# Patient Record
Sex: FEMALE | Race: WHITE | Employment: OTHER | URBAN - METROPOLITAN AREA
[De-identification: names, ages, dates, MRNs, and addresses within clinical notes are randomized per-mention and may not be internally consistent; named-entity substitution may affect disease eponyms.]

---

## 2017-01-04 ENCOUNTER — TELEPHONE (OUTPATIENT)
Dept: CARDIOLOGY CLINIC | Age: 82
End: 2017-01-04

## 2017-01-04 NOTE — TELEPHONE ENCOUNTER
Called patient due to schedule change/emergency procedure for Dr. Rosa Yo during scheduled appt time on tomorrow. She was assisted to r/s appt. The patient verbalized understanding and will call our office with any further questions or concerns.     Future Appointments  Date Time Provider Cuca Willis   1/10/2017 3:00 PM COUMADIN, STFRANCIS [facility] SCHED   2017 3:00 PM Rain Tyler MD [facility] Salazar   2017 2:40 PM Jeyson Epstein  [address]   3/8/2017 3:15 PM PACEMAKER, STFRANCES [facility] SCHED   3/20/2017 11:00 AM ECHOSHERRIE [facility] SCHED   3/20/2017 11:40 AM Ida De Paz  [address]

## 2017-01-18 ENCOUNTER — CLINICAL SUPPORT (OUTPATIENT)
Dept: CARDIOLOGY CLINIC | Age: 82
End: 2017-01-18

## 2017-01-18 ENCOUNTER — DOCUMENTATION ONLY (OUTPATIENT)
Dept: CARDIOLOGY CLINIC | Age: 82
End: 2017-01-18

## 2017-01-18 DIAGNOSIS — I42.9 CARDIOMYOPATHY (HCC): ICD-10-CM

## 2017-01-18 DIAGNOSIS — Z79.01 LONG TERM (CURRENT) USE OF ANTICOAGULANTS: Primary | ICD-10-CM

## 2017-01-18 DIAGNOSIS — I48.91 ATRIAL FIBRILLATION, UNSPECIFIED TYPE (HCC): ICD-10-CM

## 2017-01-18 LAB
INR BLD: 3
PT POC: 40.7 SEC
VALID INTERNAL CONTROL?: YES

## 2017-01-23 ENCOUNTER — OFFICE VISIT (OUTPATIENT)
Dept: INTERNAL MEDICINE CLINIC | Age: 82
End: 2017-01-23

## 2017-01-23 ENCOUNTER — OFFICE VISIT (OUTPATIENT)
Dept: CARDIOLOGY CLINIC | Age: 82
End: 2017-01-23

## 2017-01-23 ENCOUNTER — HOSPITAL ENCOUNTER (OUTPATIENT)
Dept: LAB | Age: 82
Discharge: HOME | End: 2017-01-23
Payer: MEDICARE

## 2017-01-23 VITALS
DIASTOLIC BLOOD PRESSURE: 76 MMHG | HEART RATE: 84 BPM | HEIGHT: 65 IN | SYSTOLIC BLOOD PRESSURE: 132 MMHG | WEIGHT: 163 LBS | BODY MASS INDEX: 27.16 KG/M2

## 2017-01-23 VITALS
BODY MASS INDEX: 27.16 KG/M2 | DIASTOLIC BLOOD PRESSURE: 82 MMHG | SYSTOLIC BLOOD PRESSURE: 136 MMHG | OXYGEN SATURATION: 95 % | HEART RATE: 87 BPM | WEIGHT: 163 LBS | TEMPERATURE: 97.4 F | RESPIRATION RATE: 18 BRPM | HEIGHT: 65 IN

## 2017-01-23 DIAGNOSIS — F51.01 PRIMARY INSOMNIA: ICD-10-CM

## 2017-01-23 DIAGNOSIS — F32.A ANXIETY AND DEPRESSION: Chronic | ICD-10-CM

## 2017-01-23 DIAGNOSIS — E03.4 HYPOTHYROIDISM DUE TO ACQUIRED ATROPHY OF THYROID: Primary | ICD-10-CM

## 2017-01-23 DIAGNOSIS — F41.9 ANXIETY AND DEPRESSION: Chronic | ICD-10-CM

## 2017-01-23 DIAGNOSIS — R94.39 ABNORMAL STRESS TEST: ICD-10-CM

## 2017-01-23 DIAGNOSIS — I50.22 CHF NYHA CLASS III (SYMPTOMS WITH MILDLY STRENUOUS ACTIVITIES), CHRONIC, SYSTOLIC (HCC): ICD-10-CM

## 2017-01-23 DIAGNOSIS — I48.20 CHRONIC ATRIAL FIBRILLATION (HCC): ICD-10-CM

## 2017-01-23 DIAGNOSIS — N18.30 CKD (CHRONIC KIDNEY DISEASE) STAGE 3, GFR 30-59 ML/MIN (HCC): ICD-10-CM

## 2017-01-23 DIAGNOSIS — E03.4 HYPOTHYROIDISM DUE TO ACQUIRED ATROPHY OF THYROID: ICD-10-CM

## 2017-01-23 DIAGNOSIS — Z98.890 H/O ATRIOVENTRICULAR NODAL ABLATION: ICD-10-CM

## 2017-01-23 DIAGNOSIS — I44.7 LBBB (LEFT BUNDLE BRANCH BLOCK): ICD-10-CM

## 2017-01-23 DIAGNOSIS — I34.0 MITRAL VALVE INSUFFICIENCY, UNSPECIFIED ETIOLOGY: ICD-10-CM

## 2017-01-23 DIAGNOSIS — I42.9 CARDIOMYOPATHY (HCC): ICD-10-CM

## 2017-01-23 DIAGNOSIS — Z79.01 ANTICOAGULATED ON COUMADIN: ICD-10-CM

## 2017-01-23 DIAGNOSIS — Z95.0 PACEMAKER: Primary | ICD-10-CM

## 2017-01-23 PROCEDURE — 80048 BASIC METABOLIC PNL TOTAL CA: CPT

## 2017-01-23 PROCEDURE — 36415 COLL VENOUS BLD VENIPUNCTURE: CPT

## 2017-01-23 RX ORDER — CHOLECALCIFEROL (VITAMIN D3) 125 MCG
5 CAPSULE ORAL
COMMUNITY
Start: 2017-01-23 | End: 2017-05-11 | Stop reason: ALTCHOICE

## 2017-01-23 RX ORDER — SPIRONOLACTONE 25 MG/1
25 TABLET ORAL DAILY
Qty: 30 TAB | Refills: 3 | Status: SHIPPED | OUTPATIENT
Start: 2017-01-23 | End: 2017-05-10 | Stop reason: ALTCHOICE

## 2017-01-23 RX ORDER — CARVEDILOL 6.25 MG/1
6.25 TABLET ORAL 2 TIMES DAILY WITH MEALS
Qty: 60 TAB | Refills: 3 | Status: SHIPPED | OUTPATIENT
Start: 2017-01-23 | End: 2017-05-11 | Stop reason: SDUPTHER

## 2017-01-23 NOTE — MR AVS SNAPSHOT
Visit Information Date & Time Provider Department Dept. Phone Encounter #  
 1/23/2017 11:30 AM Joy Caputo MD [facility] [address] [id number] Follow-up Instructions Return in about 6 months (around 7/23/2017). Your Appointments 1/23/2017  2:40 PM  
New Patient with Nadia Denise MD  
[facility] ([facility]) Appt Note: Per Dr. Colette Montes dx question pacemaker upgrade; Per Dr. Colette Montes dx question pacemaker upgrade [address] [place] [address]  
[address] [facility]  
  
    
 2/17/2017  7:40 AM  
COUMADIN CLINIC with COUMADINSTFRANCIS  
[facility] ([facility] SCHEDULING) Appt Note: f/u sov$0 crf 01/18/17  
 [address] [facility] [address]  
[phone]  
  
   
 [address] [facility] [address] [place] [address]  
  
    
 3/8/2017  3:15 PM  
PACEMAKER with PACEMAKER, STFRANCES  
[facility] ([facility] SCHEDULING) Appt Note: med/pm/stf/b 8-29-16  
 [address] [facility]  
[phone]  
  
   
 [address] [facility] [address] [place] [address] [address] [address]  
  
    
 3/20/2017 11:00 AM  
ECHO CARDIOGRAMS 2D with ECHO, BALDERAS [facility] ([facility] SCHEDULING) Appt Note: Per Dr. Colette Montes Echo dx CM, See Colette Montes after  
 [address] [place] [address]  
[address]  
  
    
 3/20/2017 11:40 AM  
ESTABLISHED PATIENT with Vannesa Villalobos MD  
[facility] ([facility] SCHEDULING) Appt Note: Per Dr. Colette Montes Echo dx CM, See Colette Montes after  
 [address] [place] [address]  
[address] [address] Upcoming Health Maintenance  Date Due  
 DTaP/Tdap/Td series (1 - Tdap) 4/3/2012 MEDICARE YEARLY EXAM 11/6/2016 GLAUCOMA SCREENING Q2Y 9/10/2017 Allergies as of 1/23/2017  Review Complete On: 1/23/2017 By: Juan Blue, LPN Severity Noted Reaction Type Reactions Cardizem [Diltiazem Hcl]  10/04/2010    Hives Codeine  10/04/2010    Nausea and Vomiting Darvocet A500 [Propoxyphene N-acetaminophen]  10/04/2010    Nausea and Vomiting Labetalol  04/25/2012    Nausea and Vomiting Dizzy/Disoriented Pcn [Penicillins]  10/04/2010    Swelling Tongue Swelling Primidone  07/31/2012    Other (comments) Lightheaded/unsteady gait Sulfa (Sulfonamide Antibiotics)  10/04/2010    Nausea and Vomiting Current Immunizations  Reviewed on 11/23/2015 Name Date Influenza High Dose Vaccine PF 9/29/2016, 9/24/2015, 10/1/2014 Influenza Vaccine 12/1/2013 Influenza Vaccine Split 10/12/2012 Pneumococcal Conjugate (PCV-13) 11/23/2015 Pneumococcal Polysaccharide (PPSV-23) 2/27/2015 Pneumococcal Vaccine (Pcv) 12/15/2010 TB Skin Test (PPD) 1/1/2001 TD Vaccine 4/2/2012 Varicella Virus Vaccine Live 9/12/2012 Zoster Vaccine, Live 12/1/2013 Not reviewed this visit You Were Diagnosed With   
  
 Codes Comments Hypothyroidism due to acquired atrophy of thyroid    -  Primary ICD-10-CM: E03.4 ICD-9-CM: 244.8, 246.8 CKD (chronic kidney disease) stage 3, GFR 30-59 ml/min     ICD-10-CM: N18.3 ICD-9-CM: 585.3 Primary insomnia     ICD-10-CM: F51.01 
ICD-9-CM: 307.42 Vitals BP Pulse Temp Resp Height(growth percentile) Weight(growth percentile) 136/82 (BP 1 Location: Right arm, BP Patient Position: Sitting) 87 97.4 °F (36.3 °C) (Oral) 18 5' 5\" (1.651 m) 163 lb (73.9 kg) LMP SpO2 BMI OB Status Smoking Status 02[birth year]26/1970 95% 27.12 kg/m2 Postmenopausal Passive Smoke Exposure - Never Smoker Vitals History BMI and BSA Data Body Mass Index Body Surface Area 27.12 kg/m 2 1.84 m 2 Preferred Pharmacy Pharmacy Name Phone [facility]/PHARMACY #[id number]- [place], [address] [place] [address]) [phone] Your Updated Medication List  
  
   
This list is accurate as of: 1/23/17 12:27 PM.  Always use your most recent med list.  
  
  
  
  
 acetaminophen 500 mg tablet Commonly known as:  TYLENOL Take 1 tablet by mouth every six (6) hours as needed for Pain. ADVAIR DISKUS 250-50 mcg/dose diskus inhaler Generic drug:  fluticasone-salmeterol Take 1 Puff by inhalation two (2) times a day. Calcium-Cholecalciferol (D3) 600 mg(1,500mg) -400 unit Cap Take  by mouth two (2) times a day. COUMADIN 5 mg tablet Generic drug:  warfarin TAKE 1 TABLET BY MOUTH DAILY AND 1/2 TABLET ON SUNDAY AND THURSDAY  
  
 metoprolol tartrate 50 mg tablet Commonly known as:  LOPRESSOR  
TAKE 1 TAB BY MOUTH TWO (2) TIMES A DAY. multivitamin tablet Commonly known as:  ONE A DAY Take 1 Tab by mouth daily. OXYGEN-AIR DELIVERY SYSTEMS  
2 L by Does Not Apply route nightly. potassium chloride SA 10 mEq capsule Commonly known as:  Bernardo Leopard Take 1 Cap by mouth as needed. Take with the as needed Furosemide dose. raNITIdine 150 mg tablet Commonly known as:  ZANTAC TAKE 1 TABLET BY MOUTH TWICE A DAY  
  
 simvastatin 20 mg tablet Commonly known as:  ZOCOR Take 1 Tab by mouth nightly. SPIRIVA WITH HANDIHALER 18 mcg inhalation capsule Generic drug:  tiotropium Take 1 Cap by inhalation daily. SYNTHROID 112 mcg tablet Generic drug:  levothyroxine TAKE 1 TABLET BY MOUTH EVERY DAY BEFORE BREAKFAST  
  
 ZOLOFT 50 mg tablet Generic drug:  sertraline TAKE 1 TABLET BY MOUTH EVERY DAY Follow-up Instructions Return in about 6 months (around 7/23/2017). Introducing [facility] [place] & HEALTH [facility]!    
 [employer] introduces [facility] patient portal. Now you can access parts of your medical record, email your doctor's office, and request medication refills online. 1. In your internet browser, go to [email or web address] 2. Click on the First Time User? Click Here link in the Sign In box. You will see the New Member Sign Up page. 3. Enter your Toothpick Access Code exactly as it appears below. You will not need to use this code after youve completed the sign-up process. If you do not sign up before the expiration date, you must request a new code. · Toothpick Access Code: [email or web address] Expires: 3/30/2017  9:48 AM 
 
4. Enter the last four digits of your Social Security Number (xxxx) and Date of Birth (mm/dd/yyyy) as indicated and click Submit. You will be taken to the next sign-up page. 5. Create a Toothpick ID. This will be your Toothpick login ID and cannot be changed, so think of one that is secure and easy to remember. 6. Create a Toothpick password. You can change your password at any time. 7. Enter your Password Reset Question and Answer. This can be used at a later time if you forget your password. 8. Enter your e-mail address. You will receive e-mail notification when new information is available in [address] 9. Click Sign Up. You can now view and download portions of your medical record. 10. Click the Download Summary menu link to download a portable copy of your medical information. If you have questions, please visit the Frequently Asked Questions section of the Toothpick website. Remember, Toothpick is NOT to be used for urgent needs. For medical emergencies, dial 911. Now available from your iPhone and Android! Please provide this summary of care documentation to your next provider. Your primary care clinician is listed as Monique Pride. If you have any questions after today's visit, please call [phone].

## 2017-01-23 NOTE — MR AVS SNAPSHOT
Visit Information Date & Time Provider Department Dept. Phone Encounter #  
 1/23/2017  2:40 PM Betsy Blizzard, MD [facility]itz[facility]cher [phone] [id number] Your Appointments 2/17/2017  7:40 AM  
COUMADIN CLINIC with COUMADINYOSSI  
[facility] ([place] SCHEDULING) Appt Note: f/u sov$0 crf 01/18/17  
 [address] [facility] [address]  
[phone]  
  
   
 [address] [facility] [address]  
  
    
 3/8/2017  3:15 PM  
PACEMAKER with PACEMAKERDIANA  
[facility] ([place] SCHEDULING) Appt Note: med/pm/stf/b 8-29-16  
 [address] [facility]  
[phone]  
  
   
 [address] [facility] [address] [address]  
  
    
 3/20/2017 11:00 AM  
ECHO CARDIOGRAMS 2D with ECHO, BALDERAS [facility] ([place] SCHEDULING) Appt Note: Per Dr. Radha Bell Echo dx CM, See Radha Bell after  
 [address] [place]  
[address] [facility]  
  
    
 3/20/2017 11:40 AM  
ESTABLISHED PATIENT with Cassandra Gonsalez MD  
[facility] ([place] SCHEDULING) Appt Note: Per Dr. Radha Bell Echo dx CM, See Radha Bell after  
 [address] [place] [address]  
[address] [facility] [address] [address] Upcoming Health Maintenance Date Due DTaP/Tdap/Td series (1 - Tdap) 4/3/2012 MEDICARE YEARLY EXAM 11/6/2016 GLAUCOMA SCREENING Q2Y 9/10/2017 Allergies as of 1/23/2017  Review Complete On: 1/23/2017 By: Betsy Blizzard, MD  
  
 Severity Noted Reaction Type Reactions Cardizem [Diltiazem Hcl]  10/04/2010    Hives Codeine  10/04/2010    Nausea and Vomiting Darvocet A500 [Propoxyphene N-acetaminophen]  10/04/2010    Nausea and Vomiting Labetalol  04/25/2012    Nausea and Vomiting Dizzy/Disoriented Pcn [Penicillins]  10/04/2010    Swelling Tongue Swelling Primidone  07/31/2012    Other (comments) Lightheaded/unsteady gait Sulfa (Sulfonamide Antibiotics)  10/04/2010    Nausea and Vomiting Current Immunizations  Reviewed on 11/23/2015 Name Date Influenza High Dose Vaccine PF 9/29/2016, 9/24/2015, 10/1/2014 Influenza Vaccine 12/1/2013 Influenza Vaccine Split 10/12/2012 Pneumococcal Conjugate (PCV-13) 11/23/2015 Pneumococcal Polysaccharide (PPSV-23) 2/27/2015 Pneumococcal Vaccine (Pcv) 12/15/2010 TB Skin Test (PPD) 1/1/2001 TD Vaccine 4/2/2012 Varicella Virus Vaccine Live 9/12/2012 Zoster Vaccine, Live 12/1/2013 Not reviewed this visit You Were Diagnosed With   
  
 Codes Comments [facility]    -  Primary ICD-10-CM: I42.9 ICD-9-CM: 425.4 CKD (chronic kidney disease) stage 3, GFR 30-59 ml/min     ICD-10-CM: N18.3 ICD-9-CM: 512. 3 Abnormal stress test     ICD-10-CM: R94.39 
ICD-9-CM: 794.39 Vitals BP Pulse Height(growth percentile) Weight(growth percentile) LMP BMI  
 132/76 (BP 1 Location: Left arm, BP Patient Position: Sitting) 84 5' 5\" (1.651 m) 163 lb (73.9 kg) 02/26/1970 27.12 kg/m2 OB Status Smoking Status Postmenopausal Passive Smoke Exposure - Never Smoker Vitals History BMI and BSA Data Body Mass Index Body Surface Area  
 27.12 kg/m 2 1.84 m 2 Preferred Pharmacy Pharmacy Name Phone [id number] [address] [place] [address]) [phone] Your Updated Medication List  
  
   
This list is accurate as of: 1/23/17  3:36 PM.  Always use your most recent med list.  
  
  
  
  
 acetaminophen 500 mg tablet Commonly known as:  TYLENOL Take 1 tablet by mouth every six (6) hours as needed for Pain. ADVAIR DISKUS 250-50 mcg/dose diskus inhaler Generic drug:  fluticasone-salmeterol Take 1 Puff by inhalation two (2) times a day. Calcium-Cholecalciferol (D3) 600 mg(1,500mg) -400 unit Cap Take  by mouth two (2) times a day. carvedilol 6.25 mg tablet Commonly known as:  Kevin Bloom Take 1 Tab by mouth two (2) times daily (with meals). COUMADIN 5 mg tablet Generic drug:  warfarin TAKE 1 TABLET BY MOUTH DAILY AND 1/2 TABLET ON SUNDAY AND THURSDAY  
  
 melatonin Tab tablet Take 1 Tab by mouth nightly. multivitamin tablet Commonly known as:  ONE A DAY Take 1 Tab by mouth daily. OXYGEN-AIR DELIVERY SYSTEMS  
2 L by Does Not Apply route nightly. potassium chloride SA 10 mEq capsule Commonly known as:  [place] Take 1 Cap by mouth as needed. Take with the as needed Furosemide dose. raNITIdine 150 mg tablet Commonly known as:  ZANTAC TAKE 1 TABLET BY MOUTH TWICE A DAY  
  
 * sacubitril-valsartan 24-26 mg tablet Commonly known as:  ENTRESTO Take 1 Tab by mouth two (2) times a day. * sacubitril-valsartan 24-26 mg tablet Commonly known as:  ENTRESTO Take 1 Tab by mouth two (2) times a day. simvastatin 20 mg tablet Commonly known as:  ZOCOR Take 1 Tab by mouth nightly. SPIRIVA WITH HANDIHALER 18 mcg inhalation capsule Generic drug:  tiotropium Take 1 Cap by inhalation daily. spironolactone 25 mg tablet Commonly known as:  ALDACTONE Take 1 Tab by mouth daily. SYNTHROID 112 mcg tablet Generic drug:  levothyroxine TAKE 1 TABLET BY MOUTH EVERY DAY BEFORE BREAKFAST  
  
 ZOLOFT 50 mg tablet Generic drug:  sertraline TAKE 1 TABLET BY MOUTH EVERY DAY  
  
 * Notice: This list has 2 medication(s) that are the same as other medications prescribed for you. Read the directions carefully, and ask your doctor or other care provider to review them with you. Prescriptions Sent to Pharmacy Refills carvedilol (COREG) 6.25 mg tablet 3 Sig: Take 1 Tab by mouth two (2) times daily (with meals). Class: Normal  
 Pharmacy: [facility]/pharmacy #[id number]- [address] [facility], [address] ([place]) Ph #: [phone] Route: Oral  
 spironolactone (ALDACTONE) 25 mg tablet 3 Sig: Take 1 Tab by mouth daily. Class: Normal  
 Pharmacy: [facility]/pharmacy #[id number]- [address] [facility], [address] ([place]) Ph #: [phone] Route: Oral  
 sacubitril-valsartan (ENTRESTO) 24 mg/26 mg tablet 3 Sig: Take 1 Tab by mouth two (2) times a day. Class: Normal  
 Pharmacy: [facility]/pharmacy #[id number]- [address] [facility], [address] ([place]) Ph #: [phone] Route: Oral  
  
We Performed the Following METABOLIC PANEL, BASIC [56855 CPT(R)] To-Do List   
 Around 01/23/2017 ECHO:  2D ECHO COMPLETE ADULT (TTE) W OR WO CONTR Patient Instructions Stop taking metoprolol. Begin taking Coreg 6.25mg twice daily. Begin taking Entresto 24-26mg twice daily. Begin taking aldactone 25mg once daily. You will need labs drawn: BMP. Please go to [facility] to have this done. Return to see Dr. Marva Pena in 3 months. You will also be scheduled for echocardiogram at this time to evaluate the change in your medications and effect on your heart function. Introducing [facility] & HEALTH SERVICES! [employer] introduces [employer] patient portal. Now you can access parts of your medical record, email your doctor's office, and request medication refills online. 1. In your internet browser, go to [email or web address] 2. Click on the First Time User? Click Here link in the Sign In box. You will see the New Member Sign Up page. 3. Enter your [employer] Access Code exactly as it appears below. You will not need to use this code after youve completed the sign-up process.  If you do not sign up before the expiration date, you must request a new code. · [email or web address] Access Code: [email or web address] Expires: 3/30/2017  9:48 AM 
 
4. Enter the last four digits of your Social Security Number (xxxx) and Date of Birth (mm/dd/yyyy) as indicated and click Submit. You will be taken to the next sign-up page. 5. Create a divorce360 ID. This will be your [employer] login ID and cannot be changed, so think of one that is secure and easy to remember. 6. Create a divorce360 password. You can change your password at any time. 7. Enter your Password Reset Question and Answer. This can be used at a later time if you forget your password. 8. Enter your e-mail address. You will receive e-mail notification when new information is available in [address] 9. Click Sign Up. You can now view and download portions of your medical record. 10. Click the Download Summary menu link to download a portable copy of your medical information. If you have questions, please visit the Frequently Asked Questions section of the divorce360 website. Remember, divorce360 is NOT to be used for urgent needs. For medical emergencies, dial 911. Now available from your iPhone and Android! Please provide this summary of care documentation to your next provider. Your primary care clinician is listed as Darren Paulson. If you have any questions after today's visit, please call [phone].

## 2017-01-23 NOTE — PROGRESS NOTES
HISTORY OF PRESENT ILLNESS  Baron Carson is a 80 y.o. female. HPI  Hypertension:  Baron Carson is a 80 y.o. female with hypertension. with Chronic kidney disease stage 3   Medication change since last visit: No  The patient reports:  taking medications as instructed, no medication side effects noted, no chest pain on exertion, no dyspnea on exertion, no swelling of ankles, no orthostatic dizziness or lightheadedness, no palpitations. Lifestyle modification/social history: generally follows a low fat low cholesterol diet, generally follows a low sodium diet, nonsmoker    Lab Results   Component Value Date/Time    Sodium 144 10/21/2016 12:00 AM    Potassium 4.6 10/21/2016 12:00 AM    Chloride 100 10/21/2016 12:00 AM    CO2 27 10/21/2016 12:00 AM    Anion gap 9 01/17/2014 09:16 AM    Glucose 75 10/21/2016 12:00 AM    BUN 22 10/21/2016 12:00 AM    Creatinine 1.01 10/21/2016 12:00 AM    BUN/Creatinine ratio 22 10/21/2016 12:00 AM    GFR est AA 59 10/21/2016 12:00 AM    GFR est non-AA 51 10/21/2016 12:00 AM    Calcium 9.5 10/21/2016 12:00 AM         Thyroid Disease:  Baron Carson is a 80 y.o. female here for follow up of hypothyroidism. Lab Results   Component Value Date/Time    TSH 3.620 08/22/2016 08:45 AM     Residual symptoms denies fatigue, weight changes, heat/cold intolerance, bowel/skin changes or CVS symptoms. she denies denies fatigue, weight changes, heat/cold intolerance, bowel/skin changes or CVS symptoms  Thyroid medication has been unchanged since last medication check and labs. She reports ongoing trouble maintaining sleep. Wakes up in middle of night and can't fall back to sleep. Lives alone and does have onoing anxiety. zoloft has not completely relieved that. She has been referred to psychiatry by another specialist.  ROS    Physical Exam   Constitutional: She appears well-developed and well-nourished. No distress.    /82 (BP 1 Location: Right arm, BP Patient Position: Sitting)  Pulse 87  Temp 97.4 °F (36.3 °C) (Oral)   Resp 18  Ht 5' 5\" (1.651 m)  Wt 163 lb (73.9 kg)  LMP 02/26/1970  SpO2 95%  BMI 27.12 kg/m2Body mass index is 27.12 kg/(m^2). HENT:   Mouth/Throat: Oropharynx is clear and moist.   Neck: No JVD present. Carotid bruit is not present. Cardiovascular: Normal rate, regular rhythm, normal heart sounds and intact distal pulses. Pulmonary/Chest: Effort normal. No accessory muscle usage. No respiratory distress. She has decreased breath sounds. She has no wheezes. She has no rhonchi. She has no rales. Musculoskeletal: She exhibits no edema. Neurological: She is alert. Skin: Skin is warm and dry. She is not diaphoretic. Nursing note and vitals reviewed. ASSESSMENT and PLAN  Quentin Salazar was seen today for thyroid problem. Diagnoses and all orders for this visit:    Hypothyroidism due to acquired atrophy of thyroid - Well controlled and stable. her medications were reviewed and refilled where necessary as noted below. Labs ordered as noted. CKD (chronic kidney disease) stage 3, GFR 30-59 ml/min  -     METABOLIC PANEL, BASIC    Primary insomnia - add melatonin at nighttime. Brandi Jones was counseled on this new medication prescribed. Adverse effects, risks and monitoring of medication along with potential benefits of medication were discussed. All of her questions about the medication were answered. Anxiety and depression -may relate to insomnia. She is planning to see psychiatry given difficulty with medications in past.      Follow-up Disposition:  Return in about 6 months (around 7/23/2017).

## 2017-01-23 NOTE — PROGRESS NOTES
Cardiac Electrophysiology Consultation Note     Subjective:      Silvana Leger is a 80 y.o. female patient who is seen to discuss upgrade to biventricular ICD/pacemaker kindly referred by Dr Geovanni Bain. She is here with her daughter. Dr Carlos De Oliveira used to be her physician, Medtronic pacemaker  implanted 7/2005, generator change 8/13/13. Hx of AV node ablation 5/1998. Hx of pacemaker pocket revision 1/2014. Dr Sharlene Ramos took over after he retired. She knew Dr Geovanni Bain from [facility] and wanted to switch. She reports + JIMENEZ after walking short distances and worse after eating. No chest pain or LE edema. She says Dr Geovanni Bain thought the risk outweigh the benefits in proceeding with cardiac cath d/t abnormal stress test.      Prior cardiac testing  Echo 8/20/14 - LVEF 45%, akinesis of distal lat and inf walls, mod-marked LAE, mild-mod MR, RVSP 50  MUGA Scan 8/29/14 - LVEF 53%  Nuclear stress test Lexiscan Cardiolite 8/24/16 - There is a large sized, mod-high grade, partially reversible defect involving mid-distal anterior wall and apex. The apical defect is high grade and fixed. Extent of ischemia is moderate in LAD territory. SSS = 17, SRS = 10, SDS = 6. LVEF 35% with apical akinesis.    Echo 8/2016 LVEF 35 %. Severe hypokinesis of the mid-apical inferior, apical septal, apical lateral, and apical wall(s). Severe LAE. Mild MAC and Moderate MR. Aortic valve:  trileaflet. Leaflets exhibited mild calcification. Mild aortic regurgitation. Moderate TR and Mild PH. Social History     Social History    Marital status:      Spouse name: N/A    Number of children: N/A    Years of education: N/A     Occupational History    Not on file.      Social History Main Topics    Smoking status: Passive Smoke Exposure - Never Smoker    Smokeless tobacco: Never Used    Alcohol use No    Drug use: No    Sexual activity: No     Other Topics Concern    Not on file     Social History Narrative     Family History Problem Relation Age of Onset    Diabetes Mother     Heart Disease Mother     Heart Attack Mother     Heart Disease Father     Stroke Sister     Breast Cancer Sister 80    Cancer Maternal Aunt      uterus    Diabetes Maternal Uncle     Breast Cancer Daughter 61         Patient Active Problem List    Diagnosis Date Noted    Cardiomyopathy [facility]) 09/20/2016    Abnormal stress test 09/13/2016    Ischemic cardiomyopathy     Chest pain 08/10/2016    Restrictive lung disease 12/14/2015    Osteopenia 11/23/2015    Hypothyroidism due to acquired atrophy of thyroid 11/06/2015    CKD (chronic kidney disease) stage 3, GFR 30-59 ml/min 02/09/2015    Anxiety and depression 02/26/2014    Colon cancer ([facility] 02/26/2014    Hypothyroid 02/26/2014    [facility] 09/26/2011    Mitral regurgitation 10/21/2010    CHB (complete heart block) ([facility] 10/21/2010    Orthostatic hypotension 10/21/2010    Chest pain, unspecified 10/21/2010    Atrial fibrillation (HCC)     Mixed hyperlipidemia 10/18/2010     Current Outpatient Prescriptions   Medication Sig Dispense Refill    melatonin tab tablet Take 1 Tab by mouth nightly.  carvedilol (COREG) 6.25 mg tablet Take 1 Tab by mouth two (2) times daily (with meals). 60 Tab 3    sacubitril-valsartan (ENTRESTO) 24 mg/26 mg tablet Take 1 Tab by mouth two (2) times a day. 28 Tab 0    spironolactone (ALDACTONE) 25 mg tablet Take 1 Tab by mouth daily. 30 Tab 3    sacubitril-valsartan (ENTRESTO) 24 mg/26 mg tablet Take 1 Tab by mouth two (2) times a day. 60 Tab 3    multivitamin (ONE A DAY) tablet Take 1 Tab by mouth daily.       COUMADIN 5 mg tablet TAKE 1 TABLET BY MOUTH DAILY AND 1/2 TABLET ON SUNDAY AND THURSDAY (Patient taking differently: TAKE 1 TABLET BY MOUTH DAILY AND 1/2 TABLET ON SUNDAY) 90 Tab 0    SYNTHROID 112 mcg tablet TAKE 1 TABLET BY MOUTH EVERY DAY BEFORE BREAKFAST 30 Tab 5    Calcium-Cholecalciferol, D3, 600 mg(1,500mg) -400 unit cap Take  by mouth two (2) times a day.  potassium chloride SA (MICRO-K) 10 mEq capsule Take 1 Cap by mouth as needed. Take with the as needed Furosemide dose. 10 Cap 5    simvastatin (ZOCOR) 20 mg tablet Take 1 Tab by mouth nightly. 30 Tab 12    ranitidine (ZANTAC) 150 mg tablet TAKE 1 TABLET BY MOUTH TWICE A DAY 60 Tab 5    OXYGEN-AIR DELIVERY SYSTEMS 2 L by Does Not Apply route nightly.  ZOLOFT 50 mg tablet TAKE 1 TABLET BY MOUTH EVERY DAY 30 Tab 5    ADVAIR DISKUS 250-50 mcg/dose diskus inhaler Take 1 Puff by inhalation two (2) times a day.  acetaminophen (TYLENOL) 500 mg tablet Take 1 tablet by mouth every six (6) hours as needed for Pain.  0    tiotropium (SPIRIVA WITH HANDIHALER) 18 mcg inhalation capsule Take 1 Cap by inhalation daily.        Allergies   Allergen Reactions    Cardizem [Diltiazem Hcl] Hives    Codeine Nausea and Vomiting    Darvocet A500 [Propoxyphene N-Acetaminophen] Nausea and Vomiting    Labetalol Nausea and Vomiting     Dizzy/Disoriented     Pcn [Penicillins] Swelling     Tongue Swelling     Primidone Other (comments)     Lightheaded/unsteady gait    Sulfa (Sulfonamide Antibiotics) Nausea and Vomiting     Past Medical History   Diagnosis Date    Arthritis     Atrial fibrillation ([address]      av node ablation 5/22/98 with placement of CPI #[id number] dual chamber pacemaker subsequently replaced with a single chamber medtronic #[id number] single chamber pacemaker 7/25/05    Cancer ([address] 1970's     colon cancer w/ resection    COPD     Depression     GERD (gastroesophageal reflux disease)     Hyperlipidemia     Hypertension     Ischemic cardiomyopathy     Migraine headache     Orthostatic hypotension     RADHA (obstructive sleep apnea)     Pacemaker 5/22/98     AV node ablation /pacemaker placement utilizing CPI # [id number] dual chamber system, medtronic #[id number] single chamber device placed 7/22/05    Pulmonary hypertension ([address] 9/26/2011     RVSP 50 on echo 8/14    Thyroid disease     Valvular heart disease      mild-mod MR/TR     Past Surgical History   Procedure Laterality Date    Hx tubal ligation      Hx pacemaker      Hx colectomy  1974     colon    Hx knee replacement       right TKA    Pr total knee arthroplasty       total on R, partial on L    Pr breast surgery procedure unlisted       benign breast tumor excision right breast    Hx breast biopsy Right 2002     neg; surgical bx     Family History   Problem Relation Age of Onset    Diabetes Mother     Heart Disease Mother     Heart Attack Mother     Heart Disease Father     Stroke Sister     Breast Cancer Sister 80    Cancer Maternal Aunt      uterus    Diabetes Maternal Uncle     Breast Cancer Daughter 61     Social History   Substance Use Topics    Smoking status: Passive Smoke Exposure - Never Smoker    Smokeless tobacco: Never Used    Alcohol use No        Review of Systems:   Constitutional: Negative for fever, chills, weight loss, +malaise/fatigue. HEENT: Negative for nosebleeds, vision changes. Respiratory: Negative for cough, hemoptysis, sputum production, and wheezing. + JIMENEZ  Cardiovascular: Negative for chest pain, palpitations, orthopnea, claudication, leg swelling, syncope, and PND. Gastrointestinal: Negative for nausea, vomiting, diarrhea, constipation, blood in stool and melena. Genitourinary: Negative for dysuria, and hematuria. Musculoskeletal: Negative for myalgias, arthralgia. Skin: Negative for rash. Heme: Does not bleed or bruise easily. Neurological: Negative for speech change and focal weakness     Objective:     Visit Vitals    /76 (BP 1 Location: Left arm, BP Patient Position: Sitting)    Pulse 84    Ht 5' 5\" (1.651 m)    Wt 163 lb (73.9 kg)    LMP 02/26/1970    BMI 27.12 kg/m2      Physical Exam:   Constitutional: Well-nourished. No distress. in wheel chair   Head: Normocephalic and atraumatic. Eyes: Pupils are equal, round  Neck: supple.  No JVD present. Cardiovascular: Normal rate, regular rhythm. Exam reveals no gallop and no friction rub. No murmur heard. Pulmonary/Chest: Effort normal and breath sounds normal. No wheezes. Abdominal: Soft, no tenderness. Musculoskeletal: no edema. Neurological: alert,oriented. Skin: Skin is warm and dry. Left side ppm scar flat and healed. Psychiatric: normal mood and affect. Behavior is normal. Judgment and thought content normal.      EK2016 ventricular paced,  msec    Assessment/Plan:       ICD-10-CM ICD-9-CM    1. Pacemaker Z95.0 V45.01    2. CKD (chronic kidney disease) stage 3, GFR 30-59 ml/min O91.5 006.8 METABOLIC PANEL, BASIC      2D ECHO COMPLETE ADULT (TTE) W OR WO CONTR   3. Abnormal stress test Q19.41 956.31 METABOLIC PANEL, BASIC      2D ECHO COMPLETE ADULT (TTE) W OR WO CONTR   4. Ischemic cardiomyopathy I25.5 414.8    5. Chronic atrial fibrillation (HCC) I48.2 427.31    6. H/O atrioventricular citlaly ablation Z98.890 V15.1    7. 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 Mitral valve insufficiency, unspecified etiology I34.0 424.0    9. Anticoagulated on Coumadin Z51.81 V58.83     Z79.01 V58.61    10. CHF NYHA class III (symptoms with mildly strenuous activities), chronic, systolic (HCC) P85.19 268.69      428.0    11. LBBB (left bundle branch block) I44.7 426.3      Recommend d/c metoprolol. Will start Coreg, entresto and aldactone. Check BMP to monitor renal function. She will need at least 3 months of GDMT and repeat echo. If LVEF is less than 35%, then recommend biventricular ICD. LBBB QRS > 140 msec  Life expectancy greater than 1 year. Thank you for involving me in this patient's care and please call with further concerns or questions. Marley Rodriguez M.D.   Electrophysiology/Cardiology  [facility]  [address] , [place] [address] [place] [address] [facility] [id number]  [phone]                                        [phone]

## 2017-01-23 NOTE — PROGRESS NOTES
Chief Complaint   Patient presents with    Cardiomyopathy     to discuss possible upgrade to ICD    Irregular Heart Beat     a-fib    Hypertension     Complains of palpitations and shortness of breath. Denies chest pain.      Verified medications with patients medications list.

## 2017-01-23 NOTE — PATIENT INSTRUCTIONS
Stop taking metoprolol. Begin taking Coreg 6.25mg twice daily. Begin taking Entresto 24-26mg twice daily. Begin taking aldactone 25mg once daily. You will need labs drawn: BMP. Please go to [facility] to have this done. Return to see Dr. Ai Ellis in 3 months. You will also be scheduled for echocardiogram at this time to evaluate the change in your medications and effect on your heart function.

## 2017-01-24 LAB
BUN SERPL-MCNC: 21 MG/DL (ref 8–27)
BUN/CREAT SERPL: 21 (ref 11–26)
CALCIUM SERPL-MCNC: 10.1 MG/DL (ref 8.7–10.3)
CHLORIDE SERPL-SCNC: 102 MMOL/L (ref 96–106)
CO2 SERPL-SCNC: 30 MMOL/L (ref 18–29)
CREAT SERPL-MCNC: 0.98 MG/DL (ref 0.57–1)
GLUCOSE SERPL-MCNC: 102 MG/DL (ref 65–99)
INTERPRETATION: NORMAL
POTASSIUM SERPL-SCNC: 4.5 MMOL/L (ref 3.5–5.2)
SODIUM SERPL-SCNC: 145 MMOL/L (ref 134–144)

## 2017-01-24 NOTE — PROGRESS NOTES
Cardiac Electrophysiology Office Consultation Note     Subjective:      Mikhail Willams is a 80 y.o. female patient who is seen to discuss upgrade to biventricular ICD/pacemaker  She is kindly referred by Dr Alex Hackett. She is here with her daughter in law. Dr Madhu Lara used to be her EP physician, Medtronic pacemaker  implanted 7/2005, generator change 8/13/13. Hx of AV node ablation 5/1998. She is pacer dependent and hence she has LBBB  Hx of pacemaker pocket revision 1/2014. Dr Albaro Rodriguez took over after Dr Madhu Lara retired. She knew Dr Alex Hackett from [facility] and wanted to switch after nuclear stress test was done last year by Dr Albaor Rodriguez that showed scar in LAD area with mild reversibility but she has no ischemic chest pain   She reports + JIMENEZ after walking short distances   No chest pain or LE edema. She says Dr Alex Hackett thought the risk outweighed the benefits in proceeding with cardiac cath d/t abnormal stress test.      Prior cardiac testing  Echo 8/20/14 - LVEF 45%, akinesis of distal lat and inf walls, mod-marked LAE, mild-mod MR, RVSP 50  MUGA Scan 8/29/14 - LVEF 53%  Nuclear stress test Lexiscan Cardiolite 8/24/16 - There is a large sized, mod-high grade, partially reversible defect involving mid-distal anterior wall and apex. The apical defect is high grade and fixed. Extent of ischemia is moderate in LAD territory. SSS = 17, SRS = 10, SDS = 6. LVEF 35% with apical akinesis.    Echo 8/2016 LVEF 35 %. Severe hypokinesis of the mid-apical inferior, apical septal, apical lateral, and apical wall(s). Severe LAE. Mild MAC and Moderate MR. Aortic valve:  trileaflet. Leaflets exhibited mild calcification. Mild aortic regurgitation. Moderate TR and Mild PH. Social History     Social History    Marital status:      Spouse name: N/A    Number of children: N/A    Years of education: N/A     Occupational History    Not on file.      Social History Main Topics    Smoking status: Passive Smoke Exposure - Never Smoker    Smokeless tobacco: Never Used    Alcohol use No    Drug use: No    Sexual activity: No     Other Topics Concern    Not on file     Social History Narrative     Family History   Problem Relation Age of Onset    Diabetes Mother     Heart Disease Mother     Heart Attack Mother     Heart Disease Father     Stroke Sister     Breast Cancer Sister 80    Cancer Maternal Aunt      uterus    Diabetes Maternal Uncle     Breast Cancer Daughter 61         Patient Active Problem List    Diagnosis Date Noted    Cardiomyopathy ([facility] 09/20/2016    Abnormal stress test 09/13/2016    Ischemic cardiomyopathy     Chest pain 08/10/2016    Restrictive lung disease 12/14/2015    Osteopenia 11/23/2015    Hypothyroidism due to acquired atrophy of thyroid 11/06/2015    CKD (chronic kidney disease) stage 3, GFR 30-59 ml/min 02/09/2015    Anxiety and depression 02/26/2014    [facility] 02/26/2014    Hypothyroid 02/26/2014    [facility] 09/26/2011    Mitral regurgitation 10/21/2010    CHB (complete heart block) ([facility] 10/21/2010    Orthostatic hypotension 10/21/2010    Chest pain, unspecified 10/21/2010    Atrial fibrillation (HCC)     Mixed hyperlipidemia 10/18/2010     Current Outpatient Prescriptions   Medication Sig Dispense Refill    melatonin tab tablet Take 1 Tab by mouth nightly.  carvedilol (COREG) 6.25 mg tablet Take 1 Tab by mouth two (2) times daily (with meals). 60 Tab 3    sacubitril-valsartan (ENTRESTO) 24 mg/26 mg tablet Take 1 Tab by mouth two (2) times a day. 28 Tab 0    spironolactone (ALDACTONE) 25 mg tablet Take 1 Tab by mouth daily. 30 Tab 3    sacubitril-valsartan (ENTRESTO) 24 mg/26 mg tablet Take 1 Tab by mouth two (2) times a day. 60 Tab 3    multivitamin (ONE A DAY) tablet Take 1 Tab by mouth daily.       COUMADIN 5 mg tablet TAKE 1 TABLET BY MOUTH DAILY AND 1/2 TABLET ON SUNDAY AND THURSDAY (Patient taking differently: TAKE 1 TABLET BY MOUTH DAILY AND 1/2 TABLET ON SUNDAY) 90 Tab 0    SYNTHROID 112 mcg tablet TAKE 1 TABLET BY MOUTH EVERY DAY BEFORE BREAKFAST 30 Tab 5    Calcium-Cholecalciferol, D3, 600 mg(1,500mg) -400 unit cap Take  by mouth two (2) times a day.  potassium chloride SA (MICRO-K) 10 mEq capsule Take 1 Cap by mouth as needed. Take with the as needed Furosemide dose. 10 Cap 5    simvastatin (ZOCOR) 20 mg tablet Take 1 Tab by mouth nightly. 30 Tab 12    ranitidine (ZANTAC) 150 mg tablet TAKE 1 TABLET BY MOUTH TWICE A DAY 60 Tab 5    OXYGEN-AIR DELIVERY SYSTEMS 2 L by Does Not Apply route nightly.  ZOLOFT 50 mg tablet TAKE 1 TABLET BY MOUTH EVERY DAY 30 Tab 5    ADVAIR DISKUS 250-50 mcg/dose diskus inhaler Take 1 Puff by inhalation two (2) times a day.  acetaminophen (TYLENOL) 500 mg tablet Take 1 tablet by mouth every six (6) hours as needed for Pain.  0    tiotropium (SPIRIVA WITH HANDIHALER) 18 mcg inhalation capsule Take 1 Cap by inhalation daily.        Allergies   Allergen Reactions    Cardizem [Diltiazem Hcl] Hives    Codeine Nausea and Vomiting    Darvocet A500 [Propoxyphene N-Acetaminophen] Nausea and Vomiting    Labetalol Nausea and Vomiting     Dizzy/Disoriented     Pcn [Penicillins] Swelling     Tongue Swelling     Primidone Other (comments)     Lightheaded/unsteady gait    Sulfa (Sulfonamide Antibiotics) Nausea and Vomiting     Past Medical History   Diagnosis Date    Arthritis     Atrial fibrillation ([facility]      av node ablation 5/22/98 with placement of CPI #[id number] dual chamber pacemaker subsequently replaced with a single chamber medtronic #[id number] single chamber pacemaker 7/25/05    Cancer ([facility] 1970's     colon cancer w/ resection    COPD     Depression     GERD (gastroesophageal reflux disease)     Hyperlipidemia     Hypertension     Ischemic cardiomyopathy     Migraine headache     Orthostatic hypotension     RADHA (obstructive sleep apnea)     Pacemaker 5/22/98     AV node ablation /pacemaker placement utilizing [facility] # [id number] dual chamber system, medtronic #[id number] single chamber device placed 7/22/05    Pulmonary hypertension ([address] 9/26/2011     RVSP 50 on echo 8/14    Thyroid disease     Valvular heart disease      mild-mod MR/TR     Past Surgical History   Procedure Laterality Date    Hx tubal ligation      Hx pacemaker      Hx colectomy  1974     colon    Hx knee replacement       right TKA    Pr total knee arthroplasty       total on R, partial on L    Pr breast surgery procedure unlisted       benign breast tumor excision right breast    Hx breast biopsy Right 2002     neg; surgical bx     Family History   Problem Relation Age of Onset    Diabetes Mother     Heart Disease Mother     Heart Attack Mother     Heart Disease Father     Stroke Sister     Breast Cancer Sister 80    Cancer Maternal Aunt      uterus    Diabetes Maternal Uncle     Breast Cancer Daughter 61     Social History   Substance Use Topics    Smoking status: Passive Smoke Exposure - Never Smoker    Smokeless tobacco: Never Used    Alcohol use No        Review of Systems:   Constitutional: Negative for fever, chills, weight loss, +malaise/fatigue. HEENT: Negative for nosebleeds, vision changes. Respiratory: Negative for cough, hemoptysis, sputum production, and wheezing. + JIMENEZ  Cardiovascular: Negative for chest pain, palpitations, orthopnea, claudication, leg swelling, syncope, and PND. Gastrointestinal: Negative for nausea, vomiting, diarrhea, constipation, blood in stool and melena. Genitourinary: Negative for dysuria, and hematuria. Musculoskeletal: Negative for myalgias, + arthralgia. Skin: Negative for rash. Heme: Does not bleed or bruise easily.    Neurological: Negative for speech change and focal weakness     Objective:     Visit Vitals    /76 (BP 1 Location: Left arm, BP Patient Position: Sitting)    Pulse 84    Ht 5' 5\" (1.651 m)    Wt 163 lb (73.9 kg)    LMP 1970    BMI 27.12 kg/m2      Physical Exam:   Constitutional: Well-nourished. No distress. in wheel chair   Head: Normocephalic and atraumatic. Eyes: Pupils are equal, round  Neck: supple. No JVD present. Cardiovascular: Normal rate, regular rhythm. Exam reveals no gallop and no friction rub. No murmur heard. Pulmonary/Chest: Effort normal and breath sounds normal. No wheezes. Abdominal: Soft, no tenderness. Musculoskeletal: no edema. Neurological: alert,oriented. Skin: Skin is warm and dry. Left side ppm scar flat and healed. Psychiatric: normal mood and affect. Behavior is normal. Judgment and thought content normal.      EK2016 ventricular paced, LBBB  msec    Assessment/Plan:       ICD-10-CM ICD-9-CM    1. Pacemaker Z95.0 V45.01    2. CKD (chronic kidney disease) stage 3, GFR 30-59 ml/min C58.4 478.5 METABOLIC PANEL, BASIC      2D ECHO COMPLETE ADULT (TTE) W OR WO CONTR   3. Abnormal stress test U28.43 891.43 METABOLIC PANEL, BASIC      2D ECHO COMPLETE ADULT (TTE) W OR WO CONTR   4. Cardiomyopathy ([address] I42.9 425.4    5. Chronic atrial fibrillation (HCC) I48.2 427.31    6. H/O atrioventricular citlaly ablation Z98.890 V15.1    7. 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 Mitral valve insufficiency, unspecified etiology I34.0 424.0    9. Anticoagulated on Coumadin Z51.81 V58.83     Z79.01 V58.61    10. CHF NYHA class III (symptoms with mildly strenuous activities), chronic, systolic (HCC) K53.97 206.05      428.0    11. LBBB (left bundle branch block) I44.7 426.3    I had a long discussion with the patient,  her daughter-in-law about her current medications (she is on lopressor only) as well as indication to upgrade pacemaker to biventricular pacer AICD  Recommend d/c metoprolol. Will start Coreg, entresto and aldactone. Check BMP to monitor renal function. She will need at least 3 months of GDMT and repeat echo.   Her nuclear stress test could be old silent MI of unknown duration or non ischemic cardiomyopathy with scars. She is not considered for cardiac cath per Dr Magui Montano and her  If LVEF is less than 35% after GDMT, then recommend biventricular ICD. LBBB QRS > 140 msec  Life expectancy greater than 1 year. If she is not feeling better she will call me in 2 weeks  Follow-up Disposition:  Return in about 3 months (around 4/23/2017). Thank you for involving me in this patient's care and please call with further concerns or questions. Aneudy Banegas M.D.   Electrophysiology/Cardiology  [facility]  [address] [place] [address]  [phone]

## 2017-02-09 RX ORDER — WARFARIN SODIUM 5 MG/1
TABLET ORAL
Qty: 90 TAB | Refills: 0 | Status: SHIPPED | OUTPATIENT
Start: 2017-02-09 | End: 2017-05-07 | Stop reason: SDUPTHER

## 2017-02-09 RX ORDER — RANITIDINE 150 MG/1
TABLET, FILM COATED ORAL
Qty: 60 TAB | Refills: 5 | Status: SHIPPED | OUTPATIENT
Start: 2017-02-09 | End: 2017-03-23 | Stop reason: SDUPTHER

## 2017-02-13 ENCOUNTER — TELEPHONE (OUTPATIENT)
Dept: CARDIOLOGY CLINIC | Age: 82
End: 2017-02-13

## 2017-02-21 ENCOUNTER — TELEPHONE (OUTPATIENT)
Dept: CARDIOLOGY CLINIC | Age: 82
End: 2017-02-21

## 2017-02-23 ENCOUNTER — TELEPHONE (OUTPATIENT)
Dept: CARDIOLOGY CLINIC | Age: 82
End: 2017-02-23

## 2017-02-23 NOTE — TELEPHONE ENCOUNTER
Pt's daughter called and stated pt accidentally took an additional dose of carvedilol. Please return daughter Rosa's call to [phone]. Thanks!

## 2017-02-23 NOTE — TELEPHONE ENCOUNTER
Patient's daughter called back. Verified patient's identity with two identifiers. Patient took an extra coreg tab. She states patient denies any sxs and feels fine. I told her not to worry as long as patient feels fine since it is low dose coreg. I told her patient can hold tonight's dose if she develops any sxs and resume regular schedule tomorrow. Discussed signs and symptoms qualifying urgent care or call to office. She verbalizes understanding and denies further questions or concerns.

## 2017-03-06 ENCOUNTER — TELEPHONE (OUTPATIENT)
Dept: CARDIOLOGY CLINIC | Age: 82
End: 2017-03-06

## 2017-03-06 NOTE — TELEPHONE ENCOUNTER
Patient's daughter Lauren Mcdonnell calling to discuss patient's recent bp readings. Can be reached at [phone]. Thanks!

## 2017-03-06 NOTE — TELEPHONE ENCOUNTER
Called Sharla. She states she was just calling to update us on patient's BP. She states patient's BP has ranged from 114//73. She states one day BP was 80/65, but then it was checked in the other arm and was 116/72. She states patient denied any sxs at that time. She states patient's only complaint is back pain, which is relieved by tylenol. I advised she encourage patient to stay hydrated and keep us updated. Discussed signs and symptoms qualifying urgent care or call to office. She verbalizes understanding and denies further questions or concerns.

## 2017-03-07 ENCOUNTER — TELEPHONE (OUTPATIENT)
Dept: INTERNAL MEDICINE CLINIC | Age: 82
End: 2017-03-07

## 2017-03-07 RX ORDER — LEVOTHYROXINE SODIUM 112 UG/1
TABLET ORAL
Qty: 30 TAB | Refills: 5 | Status: SHIPPED | OUTPATIENT
Start: 2017-03-07 | End: 2017-08-28 | Stop reason: SDUPTHER

## 2017-03-07 NOTE — TELEPHONE ENCOUNTER
----- Message from Jordan Cody sent at 3/7/2017 10:17 AM EST -----  Regarding: Dr. Jamil Patel  Pt's daughter(Sharla) requested a referral to a rheumatologist ( [facility] [phone]) [phone]) but the doctor doesn't have an appt until April or late May. Pt's daughter would like the doctor to recommend a rheumatologist that maybe able to see the pt sooner. Best contact number [phone].

## 2017-03-08 ENCOUNTER — CLINICAL SUPPORT (OUTPATIENT)
Dept: CARDIOLOGY CLINIC | Age: 82
End: 2017-03-08

## 2017-03-08 ENCOUNTER — TELEPHONE (OUTPATIENT)
Dept: CARDIOLOGY CLINIC | Age: 82
End: 2017-03-08

## 2017-03-08 DIAGNOSIS — Z95.0 CARDIAC PACEMAKER IN SITU: Primary | ICD-10-CM

## 2017-03-14 NOTE — TELEPHONE ENCOUNTER
Patients daughter reports increasing back and hand pain for a few weeks now. The back pain increases intensity after working for 5 minutes. She was using OTC pain relief patient and tylenol that has helped some.

## 2017-03-15 ENCOUNTER — TELEPHONE (OUTPATIENT)
Dept: CARDIOLOGY CLINIC | Age: 82
End: 2017-03-15

## 2017-03-15 RX ORDER — METOPROLOL TARTRATE 50 MG/1
TABLET ORAL
Qty: 60 TAB | Refills: 0 | Status: SHIPPED | OUTPATIENT
Start: 2017-03-15 | End: 2017-03-23

## 2017-03-15 NOTE — TELEPHONE ENCOUNTER
Per Dr. Kriss Whyte patient needs to be scheduled for an evaluation for increase hand and back pain with movement.

## 2017-03-22 ENCOUNTER — TELEPHONE (OUTPATIENT)
Dept: CARDIOLOGY CLINIC | Age: 82
End: 2017-03-22

## 2017-03-22 NOTE — TELEPHONE ENCOUNTER
Denise Vasques. I told her Dr. Jerry Long said okay to wait and see him after echo and seeing Dr. Anna Marie Bales. They will schedule f/u to see Dr. Jerry Long at later date. She denied further questions or concerns. Canceled this Friday's appointment.

## 2017-03-22 NOTE — TELEPHONE ENCOUNTER
Looks like patient was scheduled for echo and to see you, but then saw Dr. Mai Sarabia and echo and and f/u with him were scheduled. She is scheduled to see you Friday. Can that be moved to after she has echo and sees Dr. Mai Sarabia again? As long as she is doing fine of course?     Future Appointments  Date Time Provider Cuca Willis   3/23/2017 2:15 PM MD Dillon Tolentino   3/24/2017 3:20 PM Blanca Phillips  [address]   4/27/2017 3:00 PM ECHO, [address]   4/27/2017 4:00 PM Reno Rand  [address]   6/14/2017 2:45 PM PACEMAKER, [address] [facility]

## 2017-03-22 NOTE — TELEPHONE ENCOUNTER
Pt's daughter, Lodnon Irving  called in reference to office visit with Dr. Crandall Agent, Friday 3/24. She wanted to verify the importance level of that appointment considering she is coming in for echo and Dr. Manriquez January visit next month. She can be reached at [phone]. Please leave a message if she doesn't answer.

## 2017-03-23 ENCOUNTER — OFFICE VISIT (OUTPATIENT)
Dept: INTERNAL MEDICINE CLINIC | Age: 82
End: 2017-03-23

## 2017-03-23 ENCOUNTER — HOSPITAL ENCOUNTER (OUTPATIENT)
Dept: GENERAL RADIOLOGY | Age: 82
Discharge: HOME | End: 2017-03-23
Attending: INTERNAL MEDICINE
Payer: MEDICARE

## 2017-03-23 VITALS
RESPIRATION RATE: 18 BRPM | TEMPERATURE: 97.9 F | DIASTOLIC BLOOD PRESSURE: 53 MMHG | OXYGEN SATURATION: 97 % | HEIGHT: 65 IN | SYSTOLIC BLOOD PRESSURE: 90 MMHG | BODY MASS INDEX: 27.03 KG/M2 | WEIGHT: 162.25 LBS | HEART RATE: 83 BPM

## 2017-03-23 DIAGNOSIS — M54.9 CHRONIC RIGHT-SIDED BACK PAIN, UNSPECIFIED BACK LOCATION: ICD-10-CM

## 2017-03-23 DIAGNOSIS — G89.29 CHRONIC RIGHT-SIDED BACK PAIN, UNSPECIFIED BACK LOCATION: Primary | ICD-10-CM

## 2017-03-23 DIAGNOSIS — M54.9 CHRONIC RIGHT-SIDED BACK PAIN, UNSPECIFIED BACK LOCATION: Primary | ICD-10-CM

## 2017-03-23 DIAGNOSIS — G89.29 CHRONIC RIGHT-SIDED BACK PAIN, UNSPECIFIED BACK LOCATION: ICD-10-CM

## 2017-03-23 DIAGNOSIS — L40.9 PSORIASIS: ICD-10-CM

## 2017-03-23 PROCEDURE — 72072 X-RAY EXAM THORAC SPINE 3VWS: CPT

## 2017-03-23 PROCEDURE — 72100 X-RAY EXAM L-S SPINE 2/3 VWS: CPT

## 2017-03-23 RX ORDER — LIDOCAINE 50 MG/G
1 PATCH TOPICAL EVERY 12 HOURS
Qty: 60 EACH | Refills: 0 | Status: SHIPPED | OUTPATIENT
Start: 2017-03-23 | End: 2017-08-07 | Stop reason: SDUPTHER

## 2017-03-23 RX ORDER — DESONIDE 0.5 MG/G
CREAM TOPICAL 2 TIMES DAILY
Qty: 15 G | Refills: 0 | Status: SHIPPED | OUTPATIENT
Start: 2017-03-23 | End: 2019-06-29

## 2017-03-23 NOTE — PROGRESS NOTES
HISTORY OF PRESENT ILLNESS  Angelia Amador is a 80 y.o. female. HPI  Low Back Pain:  Angelia Amador is a 80 y.o. female who complains of low back pain on the right for 4 month(s), is positional with bending, standing, walking - now using cane, wheelchair, without radiation down the legs. Severity of pain is 10 out of 10.  numbness is present in right leg(s)/ foot. Precipitating factors: none recalled by the patient. Prior history of back problems: recurrent self limited episodes of low back pain in the past.  Self treatment:  Tylenol/ aspircream used but not effective . The patient denies fevers, chills or sweats. The patient has had more bowel/bladder incontinence without saddle numbness. She has had psoriasis and wondering about psoriatic arthritis/ rheum consult.     Patient Active Problem List   Diagnosis Code    Mixed hyperlipidemia E78.2    Atrial fibrillation ([facility] I48.91    Mitral regurgitation I34.0    CHB (complete heart block) (HCC) I44.2    Orthostatic hypotension I95.1    Chest pain, unspecified R07.9    Pulmonary hypertension (HCC) I27.2    Anxiety and depression F41.9, F32.9    [facility] C18.9    Hypothyroid E03.9    CKD (chronic kidney disease) stage 3, GFR 30-59 ml/min N18.3    Hypothyroidism due to acquired atrophy of thyroid E03.4    Osteopenia M85.80    Restrictive lung disease J98.4    Chest pain R07.9    Abnormal stress test R94.39    Ischemic cardiomyopathy I25.5    Cardiomyopathy (HCC) I42.9    Psoriasis L40.9     Past Medical History:   Diagnosis Date    Arthritis     Atrial fibrillation (HCC)     av node ablation 5/22/98 with placement of CPI #[id number] dual chamber pacemaker subsequently replaced with a single chamber medtronic #[id number] single chamber pacemaker 7/25/05    Cancer ([facility] 1970's    colon cancer w/ resection    COPD     Depression     GERD (gastroesophageal reflux disease)     Hyperlipidemia     Hypertension     Ischemic cardiomyopathy     Migraine headache     Orthostatic hypotension     RADHA (obstructive sleep apnea)     Pacemaker 5/22/98    AV node ablation /pacemaker placement utilizing CPI # [id number] dual chamber system, medtronic #[id number] single chamber device placed 7/22/05    Pulmonary hypertension ([address] 9/26/2011    RVSP 50 on echo 8/14    Thyroid disease     Valvular heart disease     mild-mod MR/TR     Allergies   Allergen Reactions    Cardizem [Diltiazem Hcl] Hives    Codeine Nausea and Vomiting    Darvocet A500 [Propoxyphene N-Acetaminophen] Nausea and Vomiting    Labetalol Nausea and Vomiting     Dizzy/Disoriented     Pcn [Penicillins] Swelling     Tongue Swelling     Primidone Other (comments)     Lightheaded/unsteady gait    Sulfa (Sulfonamide Antibiotics) Nausea and Vomiting     Current Outpatient Prescriptions on File Prior to Visit   Medication Sig Dispense Refill    SYNTHROID 112 mcg tablet TAKE 1 TABLET BY MOUTH EVERY DAY BEFORE BREAKFAST 30 Tab 5    COUMADIN 5 mg tablet TAKE 1 TABLET BY MOUTH DAILY AND 1/2 TABLET ON SUNDAY AND THURSDAY 90 Tab 0    melatonin tab tablet Take 1 Tab by mouth nightly.  carvedilol (COREG) 6.25 mg tablet Take 1 Tab by mouth two (2) times daily (with meals). 60 Tab 3    spironolactone (ALDACTONE) 25 mg tablet Take 1 Tab by mouth daily. 30 Tab 3    sacubitril-valsartan (ENTRESTO) 24 mg/26 mg tablet Take 1 Tab by mouth two (2) times a day. 60 Tab 3    multivitamin (ONE A DAY) tablet Take 1 Tab by mouth daily.  Calcium-Cholecalciferol, D3, 600 mg(1,500mg) -400 unit cap Take  by mouth two (2) times a day.  potassium chloride SA (MICRO-K) 10 mEq capsule Take 1 Cap by mouth as needed. Take with the as needed Furosemide dose. 10 Cap 5    simvastatin (ZOCOR) 20 mg tablet Take 1 Tab by mouth nightly. 30 Tab 12    ranitidine (ZANTAC) 150 mg tablet TAKE 1 TABLET BY MOUTH TWICE A DAY 60 Tab 5    OXYGEN-AIR DELIVERY SYSTEMS 2 L by Does Not Apply route nightly.       ZOLOFT 50 mg tablet TAKE 1 TABLET BY MOUTH EVERY DAY 30 Tab 5    ADVAIR DISKUS 250-50 mcg/dose diskus inhaler Take 1 Puff by inhalation two (2) times a day.  acetaminophen (TYLENOL) 500 mg tablet Take 500 mg by mouth every six (6) hours as needed for Pain. (2) Tablet in am (1) tablet in pm (2) Tablet pm  0    tiotropium (SPIRIVA WITH HANDIHALER) 18 mcg inhalation capsule Take 1 Cap by inhalation daily. No current facility-administered medications on file prior to visit. Social History   Substance Use Topics    Smoking status: Passive Smoke Exposure - Never Smoker    Smokeless tobacco: Never Used    Alcohol use No           ROS    Physical Exam   Musculoskeletal:        Back:    Current vital are: BP 90/53 (BP 1 Location: Left arm, BP Patient Position: Sitting)  Pulse 83  Temp 97.9 °F (36.6 °C) (Oral)   Resp 18  Ht 5' 5\" (1.651 m)  Wt 162 lb 4 oz (73.6 kg)  LMP 02/26/1970  SpO2 97%  BMI 27 kg/m2   Patient appears to be in moderate pain, antalgic gait noted. Lumbosacral spine area reveals moderate local tenderness. Painful LS ROM noted. Spinal alignment is normal.  Straight leg raise is negative at 80 degrees on both sides. Lumbosacral distribution DTR's, motor strength and sensation are normal, including heel and toe gait. Peripheral pulses are palpable and 1 plus. Prior studies inlcude: Lumbar spine X-Ray: not available. MRI not available  Tender where depicted. No swelling, rash, ecchymosis. ASSESSMENT and PLAN  Izabella Shook was seen today for back pain. Diagnoses and all orders for this visit:    Chronic right-sided back pain, unspecified back location - paraT spine/L spine. No SI joint tenderness. C/w osteoarthritis/ DJD spine. Will get xrays now. She is intolerant of opiate pain meds, has CKD so can't take nsaids. Tylenol ok. Try lidoderm patch. Rheum referral given hx of psoriasis.   -     XR SPINE THORAC 3 V; Future  -     XR SPINE LUMB 2 OR 3 V; Future  -     lidocaine (LIDODERM) 5 %; 1 Patch by TransDERmal route every twelve (12) hours. -     REFERRAL TO RHEUMATOLOGY    Psoriasis  -     REFERRAL TO RHEUMATOLOGY  -     desonide (TRIDESILON) 0.05 % cream; Apply  to affected area two (2) times a day. Follow-up Disposition:  Return if symptoms worsen or fail to improve.

## 2017-03-23 NOTE — MR AVS SNAPSHOT
Visit Information Date & Time Provider Department Dept. Phone Encounter #  
 3/23/2017  2:15 PM Chris Garcia MD [facility] [address] [facility] [id number] Follow-up Instructions Return if symptoms worsen or fail to improve. Your Appointments 4/27/2017  3:00 PM  
ECHO CARDIOGRAMS 2D with ECHO, [facility] ([place] SCHEDULING) Appt Note: Echo per Dr. Zoey Mark month Medication eval  
 [address] [place]  
[address] [facility]  
  
    
 4/27/2017  4:00 PM  
ESTABLISHED PATIENT with Loco Jackson MD  
[facility] ([facility]) Appt Note: Per Dr. Claude Matias Echo dx CM, See Claude Matias after; Echo per Dr. Zoey Mark month Medication eval  
 [address] [place]  
[phone]  
  
   
 [address] [place]  
  
    
 6/14/2017  2:45 PM  
PACEMAKER with PACEMAKERDIANA  
[facility] ([place] SCHEDULING) Appt Note: med/pm/stf  
 [address] [facility] [address] [place] [place] [address] Upcoming Health Maintenance Date Due DTaP/Tdap/Td series (1 - Tdap) 4/3/2012 MEDICARE YEARLY EXAM 11/6/2016 GLAUCOMA SCREENING Q2Y 9/10/2017 Allergies as of 3/23/2017  Review Complete On: 3/23/2017 By: Chris Garcia MD  
  
 Severity Noted Reaction Type Reactions Cardizem [Diltiazem Hcl]  10/04/2010    Hives Codeine  10/04/2010    Nausea and Vomiting Darvocet A500 [Propoxyphene N-acetaminophen]  10/04/2010    Nausea and Vomiting Labetalol  04/25/2012    Nausea and Vomiting Dizzy/Disoriented Pcn [Penicillins]  10/04/2010    Swelling Tongue Swelling Primidone  07/31/2012    Other (comments) Lightheaded/unsteady gait Sulfa (Sulfonamide Antibiotics)  10/04/2010    Nausea and Vomiting Current Immunizations  Reviewed on 11/23/2015 Name Date Influenza High Dose Vaccine PF 9/29/2016, 9/24/2015, 10/1/2014 Influenza Vaccine 12/1/2013 Influenza Vaccine Split 10/12/2012 Pneumococcal Conjugate (PCV-13) 11/23/2015 Pneumococcal Polysaccharide (PPSV-23) 2/27/2015 Pneumococcal Vaccine (Pcv) 12/15/2010 TB Skin Test (PPD) 1/1/2001 TD Vaccine 4/2/2012 Varicella Virus Vaccine Live 9/12/2012 Zoster Vaccine, Live 12/1/2013 Not reviewed this visit You Were Diagnosed With   
  
 Codes Comments Chronic right-sided back pain, unspecified back location    -  Primary ICD-10-CM: M54.9, G89.29 ICD-9-CM: 724.5, 338.29 Psoriasis     ICD-10-CM: L40.9 ICD-9-CM: 696.1 Vitals BP Pulse Temp Resp Height(growth percentile) Weight(growth percentile) 90/53 (BP 1 Location: Left arm, BP Patient Position: Sitting) 83 97.9 °F (36.6 °C) (Oral) 18 5' 5\" (1.651 m) 162 lb 4 oz (73.6 kg) LMP SpO2 BMI OB Status Smoking Status 02/26/1970 97% 27 kg/m2 Postmenopausal Passive Smoke Exposure - Never Smoker Vitals History BMI and BSA Data Body Mass Index Body Surface Area  
 27 kg/m 2 1.84 m 2 Preferred Pharmacy Pharmacy Name Phone [id number]- [place], [address] [place] [address]) [phone] Your Updated Medication List  
  
   
This list is accurate as of: 3/23/17  2:48 PM.  Always use your most recent med list.  
  
  
  
  
 acetaminophen 500 mg tablet Commonly known as:  TYLENOL Take 500 mg by mouth every six (6) hours as needed for Pain. (2) Tablet in am (1) tablet in pm (2) Tablet pm  
  
 ADVAIR DISKUS 250-50 mcg/dose diskus inhaler Generic drug:  fluticasone-salmeterol Take 1 Puff by inhalation two (2) times a day. Calcium-Cholecalciferol (D3) 600 mg(1,500mg) -400 unit Cap Take  by mouth two (2) times a day. carvedilol 6.25 mg tablet Commonly known as:  [place] [employer]ant Take 1 Tab by mouth two (2) times daily (with meals). COUMADIN 5 mg tablet Generic drug:  warfarin TAKE 1 TABLET BY MOUTH DAILY AND 1/2 TABLET ON  AND THURSDAY  
  
 lidocaine 5 % Commonly known as:  LIDODERM  
1 Patch by TransDERmal route every twelve (12) hours. melatonin Tab tablet Take 1 Tab by mouth nightly. multivitamin tablet Commonly known as:  ONE A DAY Take 1 Tab by mouth daily. OXYGEN-AIR DELIVERY SYSTEMS  
2 L by Does Not Apply route nightly. potassium chloride SA 10 mEq capsule Commonly known as:  [place] Take 1 Cap by mouth as needed. Take with the as needed Furosemide dose. raNITIdine 150 mg tablet Commonly known as:  ZANTAC TAKE 1 TABLET BY MOUTH TWICE A DAY  
  
 sacubitril-valsartan 24-26 mg tablet Commonly known as:  ENTRESTO Take 1 Tab by mouth two (2) times a day. simvastatin 20 mg tablet Commonly known as:  ZOCOR Take 1 Tab by mouth nightly. SPIRIVA WITH HANDIHALER 18 mcg inhalation capsule Generic drug:  tiotropium Take 1 Cap by inhalation daily. spironolactone 25 mg tablet Commonly known as:  ALDACTONE Take 1 Tab by mouth daily. SYNTHROID 112 mcg tablet Generic drug:  levothyroxine TAKE 1 TABLET BY MOUTH EVERY DAY BEFORE BREAKFAST  
  
 ZOLOFT 50 mg tablet Generic drug:  sertraline TAKE 1 TABLET BY MOUTH EVERY DAY Prescriptions Printed Refills  
 lidocaine (LIDODERM) 5 % 0 Si Patch by TransDERmal route every twelve (12) hours. Class: Print Route: TransDERmal  
  
We Performed the Following REFERRAL TO RHEUMATOLOGY [BLR58 Custom] Comments:  
 . Follow-up Instructions Return if symptoms worsen or fail to improve. To-Do List   
 2017 Imaging:  XR SPINE LUMB 2 OR 3 V   
  
 2017   Imaging:  XR SPINE THORAC 3 V   
  
  
 Referral Information Referral ID Referred By Referred To  
  
 [id number] [facility] [address] [place], [address] Visits Status Start Date End Date 1 New Request 3/23/17 3/23/18 If your referral has a status of pending review or denied, additional information will be sent to support the outcome of this decision. Introducing \[facility] [place] [facility]\"" & HEALTH SERVICES! Andria Conroy introduces [employer] patient portal. Now you can access parts of your medical record, email your doctor's office, and request medication refills online. 1. In your internet browser, go to [email or web address] 2. Click on the First Time User? Click Here link in the Sign In box. You will see the New Member Sign Up page. 3. Enter your [employer] Access Code exactly as it appears below. You will not need to use this code after youve completed the sign-up process. If you do not sign up before the expiration date, you must request a new code. · [employer] Access Code: [email or web address] Expires: 3/30/2017 10:48 AM 
 
4. Enter the last four digits of your Social Security Number (xxxx) and Date of Birth (mm/dd/yyyy) as indicated and click Submit. You will be taken to the next sign-up page. 5. Create a [employer] ID. This will be your [employer] login ID and cannot be changed, so think of one that is secure and easy to remember. 6. Create a [employer] password. You can change your password at any time. 7. Enter your Password Reset Question and Answer. This can be used at a later time if you forget your password. 8. Enter your e-mail address. You will receive e-mail notification when new information is available in [address] 9. Click Sign Up. You can now view and download portions of your medical record. 10. Click the Download Summary menu link to download a portable copy of your medical information. If you have questions, please visit the Frequently Asked Questions section of the [employer] website. Remember, [employer] is NOT to be used for urgent needs. For medical emergencies, dial 911. Now available from your iPhone and Android! Please provide this summary of care documentation to your next provider. Your primary care clinician is listed as Francine Long. If you have any questions after today's visit, please call [phone].

## 2017-03-24 ENCOUNTER — TELEPHONE (OUTPATIENT)
Dept: CARDIOLOGY CLINIC | Age: 82
End: 2017-03-24

## 2017-03-24 ENCOUNTER — TELEPHONE (OUTPATIENT)
Dept: INTERNAL MEDICINE CLINIC | Age: 82
End: 2017-03-24

## 2017-03-24 NOTE — TELEPHONE ENCOUNTER
Patients appointment is 5/15/2017 with her arriving at 10:30am. LM daughter Daniele Chauhan has been informed of appointment date and time. Formed faxed from their office to be filled out and faxed again. Notes and all testing will be faxed as requested from providers office.

## 2017-03-24 NOTE — TELEPHONE ENCOUNTER
Pt's daughter states they tried to set up an appt with Dr. Estrada Police but that dr office requires that the PCP office call to set up the first appt. Please call [phone] for appt. Also, pt's daughter would be the one taking her and she is unavailable the week of May 9th so please do not schedule for then.  Please call daughter back with appt info [phone]

## 2017-03-24 NOTE — TELEPHONE ENCOUNTER
Patient does not require any referrals for this service patient has Medicare & BCBS Medicare Supplement.  However patient may require an order from PCP to go to specialist

## 2017-03-30 ENCOUNTER — TELEPHONE (OUTPATIENT)
Dept: INTERNAL MEDICINE CLINIC | Age: 82
End: 2017-03-30

## 2017-03-30 ENCOUNTER — TELEPHONE (OUTPATIENT)
Dept: CARDIOLOGY CLINIC | Age: 82
End: 2017-03-30

## 2017-03-30 NOTE — TELEPHONE ENCOUNTER
----- Message from [facility] sent at 3/30/2017  5:55 PM EDT -----  Regarding: Dr. Doloris Hodgkins requesting that a copy of her latest back x-ray results and office notes are faxed to Dr. Mariusz Man office. (fax) [phone].

## 2017-03-31 ENCOUNTER — TELEPHONE (OUTPATIENT)
Dept: INTERNAL MEDICINE CLINIC | Age: 82
End: 2017-03-31

## 2017-03-31 NOTE — TELEPHONE ENCOUNTER
pts daughter Renetta Nesbitt called in today. She is trying to get her in program at [address] [place] [address]. They are requiring a physical and TB test done prior to her being accepted into program.  I have scheduled her for a physical with Dr Constantino Payton on 4/21/17 at 6 AM however if at all possible if  could write a letter saying she can participate and then just schedule apt for the TB. Please call and advise Renetta Nesbitt verified number [phone].

## 2017-04-04 NOTE — TELEPHONE ENCOUNTER
Informed patients daughter that she is due for an annual exam. It is best that she keeps her appointment and have all required items done to prevent any delay or denial into the program. She verbalized an understanding.

## 2017-04-05 ENCOUNTER — TELEPHONE (OUTPATIENT)
Dept: CARDIOLOGY CLINIC | Age: 82
End: 2017-04-05

## 2017-04-13 ENCOUNTER — TELEPHONE (OUTPATIENT)
Dept: CARDIOLOGY CLINIC | Age: 82
End: 2017-04-13

## 2017-04-18 ENCOUNTER — HOSPITAL ENCOUNTER (OUTPATIENT)
Dept: LAB | Age: 82
Discharge: HOME | End: 2017-04-18
Payer: MEDICARE

## 2017-04-18 PROCEDURE — 80048 BASIC METABOLIC PNL TOTAL CA: CPT

## 2017-04-18 PROCEDURE — 36415 COLL VENOUS BLD VENIPUNCTURE: CPT

## 2017-04-19 ENCOUNTER — TELEPHONE (OUTPATIENT)
Dept: CARDIOLOGY CLINIC | Age: 82
End: 2017-04-19

## 2017-04-19 LAB
BUN SERPL-MCNC: 36 MG/DL (ref 8–27)
BUN/CREAT SERPL: 31 (ref 12–28)
CALCIUM SERPL-MCNC: 9.6 MG/DL (ref 8.7–10.3)
CHLORIDE SERPL-SCNC: 105 MMOL/L (ref 96–106)
CO2 SERPL-SCNC: 24 MMOL/L (ref 18–29)
CREAT SERPL-MCNC: 1.16 MG/DL (ref 0.57–1)
GLUCOSE SERPL-MCNC: 72 MG/DL (ref 65–99)
INTERPRETATION: NORMAL
POTASSIUM SERPL-SCNC: 5.2 MMOL/L (ref 3.5–5.2)
SODIUM SERPL-SCNC: 145 MMOL/L (ref 134–144)

## 2017-04-19 NOTE — TELEPHONE ENCOUNTER
----- Message from Hans Garland NP sent at 4/19/2017  9:17 AM EDT -----  Creatinine slightly up since starting aldactone and entresto  Will continue to monitor creatinine  K borderline high- please d/c potassium supplement as she is now on aldactone and entresto     4/27/2017  3:00 PM    ECHO, [address]  4/27/2017  4:00 PM    Moy Lopez MD            [place] [address]

## 2017-04-19 NOTE — PROGRESS NOTES
Creatinine slightly up since starting aldactone and entresto  Will continue to monitor creatinine  K borderline high- please d/c potassium supplement as she is now on aldactone and entresto     4/27/2017  3:00 PM    ECHO, [address]  4/27/2017  4:00 PM    Loco Jackson MD            [place] [address]

## 2017-04-19 NOTE — TELEPHONE ENCOUNTER
Verified patient with two types of identifiers. Notified daughter Manjeet Martin of lab results and told her to stop the Potassium. Rosa verbalizes understanding. And will call with any questions or concerns. Medication list updated.

## 2017-04-21 ENCOUNTER — TELEPHONE ANTICOAG (OUTPATIENT)
Dept: CARDIOLOGY CLINIC | Age: 82
End: 2017-04-21

## 2017-04-21 NOTE — PROGRESS NOTES
Coumadin Flowsheet Values Recorded Today: Date: 04/19/17  DX:  Atrial Fibrillation  Dosage: 5  Sunday: 2.5  Monday: 5  Tuesday: 2.5  Wednesday: 5  Thursday: 5  Friday: 5  Saturday: 5  INR results: 1.8  INR Results Ranges: 2-3  Denies missing dose or extra dose?: Yes  Bleeding or unusual bruising?: No  Blood in stool or urine?: No  Coughing up blood?: No  Alcohol Use?: No  Any change in medications?: No  Epistaxis?: No  Same dose?: Yes  Next Check: 04/26/17

## 2017-04-27 ENCOUNTER — CLINICAL SUPPORT (OUTPATIENT)
Dept: CARDIOLOGY CLINIC | Age: 82
End: 2017-04-27

## 2017-04-27 ENCOUNTER — OFFICE VISIT (OUTPATIENT)
Dept: CARDIOLOGY CLINIC | Age: 82
End: 2017-04-27

## 2017-04-27 VITALS
DIASTOLIC BLOOD PRESSURE: 75 MMHG | RESPIRATION RATE: 20 BRPM | HEIGHT: 65 IN | WEIGHT: 160 LBS | SYSTOLIC BLOOD PRESSURE: 115 MMHG | OXYGEN SATURATION: 98 % | HEART RATE: 84 BPM | BODY MASS INDEX: 26.66 KG/M2

## 2017-04-27 DIAGNOSIS — I44.2 CHB (COMPLETE HEART BLOCK) (HCC): ICD-10-CM

## 2017-04-27 DIAGNOSIS — Z95.0 PACEMAKER: ICD-10-CM

## 2017-04-27 DIAGNOSIS — I25.5 ISCHEMIC CARDIOMYOPATHY: Primary | ICD-10-CM

## 2017-04-27 DIAGNOSIS — I50.22 CHF NYHA CLASS III (SYMPTOMS WITH MILDLY STRENUOUS ACTIVITIES), CHRONIC, SYSTOLIC (HCC): ICD-10-CM

## 2017-04-27 DIAGNOSIS — N18.30 CKD (CHRONIC KIDNEY DISEASE) STAGE 3, GFR 30-59 ML/MIN (HCC): ICD-10-CM

## 2017-04-27 DIAGNOSIS — R94.39 ABNORMAL STRESS TEST: ICD-10-CM

## 2017-04-27 DIAGNOSIS — I48.20 CHRONIC ATRIAL FIBRILLATION (HCC): ICD-10-CM

## 2017-04-27 DIAGNOSIS — I44.7 LBBB (LEFT BUNDLE BRANCH BLOCK): ICD-10-CM

## 2017-04-27 DIAGNOSIS — I95.1 ORTHOSTATIC HYPOTENSION: ICD-10-CM

## 2017-04-27 NOTE — PROGRESS NOTES
Chief Complaint   Patient presents with    Mitral Regurgitation     3 month follow up with medication changes. Complaints of occasional chest pain. Continued shortness of breath. Contineud dizziness with quick movments.  Irregular Heart Beat     Daughter with patient. States patient has frequent complaints of back pain and decreased energy. To see rheumatologist, Dr. Martina Bustamante in May. Would like to discuss recent labs.

## 2017-04-27 NOTE — MR AVS SNAPSHOT
Visit Information Date & Time Provider Department Dept. Phone Encounter #  
 4/27/2017  4:00 PM Shelby Rebollar MD [facility] [place] [facility] [phone] [id number] Your Appointments 5/19/2017 10:15 AM  
COMPLETE PHYSICAL with Nora Anderson MD  
[facility]) Appt Note: physical for entry in [address]; Pt's daughter called to confirm. ; physical for entry in [address] [place] [address]  
  
   
 Arsen Ritter [phone]  
  
    
 6/14/2017  2:45 PM  
PACEMAKER with PACEMAKER, STANCES  
[facility] (FARHAD SCHEDULING) Appt Note: med/pm/stf  
 [address] [facility] [address] [address] [address] Upcoming Health Maintenance Date Due DTaP/Tdap/Td series (1 - Tdap) 4/3/2012 MEDICARE YEARLY EXAM 11/6/2016 GLAUCOMA SCREENING Q2Y 9/10/2017 Allergies as of 4/27/2017  Review Complete On: 4/27/2017 By: Debra Paula RN Severity Noted Reaction Type Reactions Cardizem [Diltiazem Hcl]  10/04/2010    Hives Codeine  10/04/2010    Nausea and Vomiting Darvocet A500 [Propoxyphene N-acetaminophen]  10/04/2010    Nausea and Vomiting Labetalol  04/25/2012    Nausea and Vomiting Dizzy/Disoriented Pcn [Penicillins]  10/04/2010    Swelling Tongue Swelling Primidone  07/31/2012    Other (comments) Lightheaded/unsteady gait Sulfa (Sulfonamide Antibiotics)  10/04/2010    Nausea and Vomiting Current Immunizations  Reviewed on 11/23/2015 Name Date Influenza High Dose Vaccine PF 9/29/2016, 9/24/2015, 10/1/2014 Influenza Vaccine 12/1/2013 Influenza Vaccine Split 10/12/2012 Pneumococcal Conjugate (PCV-13) 11/23/2015 Pneumococcal Polysaccharide (PPSV-23) 2/27/2015 Pneumococcal Vaccine (Pcv) 12/15/2010 TB Skin Test (PPD) 1/1/2001 TD Vaccine 4/2/2012 Varicella Virus Vaccine Live 9/12/2012 Zoster Vaccine, Live 12/1/2013 Not reviewed this visit You Were Diagnosed With   
  
 Codes Comments Pacemaker    -  Primary ICD-10-CM: Z95.0 ICD-9-CM: V45.01 Ischemic cardiomyopathy     ICD-10-CM: I25.5 ICD-9-CM: 414.8 Chronic atrial fibrillation (HCC)     ICD-10-CM: E73.8 ICD-9-CM: 427.31   
 CHB (complete heart block) (HCC)     ICD-10-CM: I44.2 ICD-9-CM: 426.0 Abnormal stress test     ICD-10-CM: R94.39 
ICD-9-CM: 794.39   
 LBBB (left bundle branch block)     ICD-10-CM: I44.7 ICD-9-CM: 426.3 CHF NYHA class III (symptoms with mildly strenuous activities), chronic, systolic     OUV-15-JJ: V46.58 ICD-9-CM: 428.22, 428.0 Orthostatic hypotension     ICD-10-CM: I95.1 ICD-9-CM: 458.0 Vitals BP Pulse Resp Height(growth percentile) Weight(growth percentile) LMP  
 115/75 (BP 1 Location: Left arm, BP Patient Position: Sitting) 84 20 5' 5\" (1.651 m) 160 lb (72.6 kg) 02/26/1970 SpO2 BMI OB Status Smoking Status 98% 26.63 kg/m2 Postmenopausal Passive Smoke Exposure - Never Smoker Vitals History BMI and BSA Data Body Mass Index Body Surface Area  
 26.63 kg/m 2 1.82 m 2 Preferred Pharmacy Pharmacy Name Phone [id number] [address] [place] [address]) [phone] Your Updated Medication List  
  
   
This list is accurate as of: 4/27/17  5:58 PM.  Always use your most recent med list.  
  
  
  
  
 acetaminophen 500 mg tablet Commonly known as:  TYLENOL Take 500 mg by mouth every six (6) hours as needed for Pain. (2) Tablet in am (1) tablet in pm (2) Tablet pm  
  
 ADVAIR DISKUS 250-50 mcg/dose diskus inhaler Generic drug:  fluticasone-salmeterol Take 1 Puff by inhalation two (2) times a day. Calcium-Cholecalciferol (D3) 600 mg(1,500mg) -400 unit Cap Take  by mouth two (2) times a day. carvedilol 6.25 mg tablet Commonly known as:  Yane Breed Take 1 Tab by mouth two (2) times daily (with meals). COUMADIN 5 mg tablet Generic drug:  warfarin TAKE 1 TABLET BY MOUTH DAILY AND 1/2 TABLET ON SUNDAY AND THURSDAY  
  
 desonide 0.05 % cream  
Commonly known as:  Janeal Bernie Apply  to affected area two (2) times a day. lidocaine 5 % Commonly known as:  LIDODERM  
1 Patch by TransDERmal route every twelve (12) hours. melatonin Tab tablet Take 1 Tab by mouth nightly. multivitamin tablet Commonly known as:  ONE A DAY Take 1 Tab by mouth daily. OXYGEN-AIR DELIVERY SYSTEMS  
2 L by Does Not Apply route nightly. raNITIdine 150 mg tablet Commonly known as:  ZANTAC TAKE 1 TABLET BY MOUTH TWICE A DAY  
  
 sacubitril-valsartan 24-26 mg tablet Commonly known as:  ENTRESTO Take 1 Tab by mouth two (2) times a day. simvastatin 20 mg tablet Commonly known as:  ZOCOR  
TAKE 1 TABLET BY MOUTH NIGHTLY SPIRIVA WITH HANDIHALER 18 mcg inhalation capsule Generic drug:  tiotropium Take 1 Cap by inhalation daily. spironolactone 25 mg tablet Commonly known as:  ALDACTONE Take 1 Tab by mouth daily. SYNTHROID 112 mcg tablet Generic drug:  levothyroxine TAKE 1 TABLET BY MOUTH EVERY DAY BEFORE BREAKFAST  
  
 ZENPEP 40,000-136,000- 218,000 unit Cpdr  
Generic drug:  lipase-protease-amylase Take 1 Tab by mouth three (3) times daily (with meals). And snacks. ZOLOFT 50 mg tablet Generic drug:  sertraline TAKE 1 TABLET BY MOUTH EVERY DAY Introducing [facility]! [facility] introduces [employer] patient portal. Now you can access parts of your medical record, email your doctor's office, and request medication refills online.    
 
1. In your internet browser, go to [email or web address] 2. Click on the First Time User? Click Here link in the Sign In box. You will see the New Member Sign Up page. 3. Enter your Emair Access Code exactly as it appears below. You will not need to use this code after youve completed the sign-up process. If you do not sign up before the expiration date, you must request a new code. · Emair Access Code: [email or web address] Expires: 7/20/2017  4:14 PM 
 
4. Enter the last four digits of your Social Security Number (xxxx) and Date of Birth (mm/dd/yyyy) as indicated and click Submit. You will be taken to the next sign-up page. 5. Create a Transperat ID. This will be your Emair login ID and cannot be changed, so think of one that is secure and easy to remember. 6. Create a Emair password. You can change your password at any time. 7. Enter your Password Reset Question and Answer. This can be used at a later time if you forget your password. 8. Enter your e-mail address. You will receive e-mail notification when new information is available in [address] 9. Click Sign Up. You can now view and download portions of your medical record. 10. Click the Download Summary menu link to download a portable copy of your medical information. If you have questions, please visit the Frequently Asked Questions section of the Emair website. Remember, Emair is NOT to be used for urgent needs. For medical emergencies, dial 911. Now available from your iPhone and Android! Please provide this summary of care documentation to your next provider. Your primary care clinician is listed as Charla Cowart. If you have any questions after today's visit, please call [phone].

## 2017-04-27 NOTE — PROGRESS NOTES
Cardiac Electrophysiology Office Note     Subjective:      Darnell Winter is a 80 y.o. female patient who is seen to discuss upgrade to biventricular ICD/pacemaker  She is kindly referred by Dr Charlee Manzano. She is here with her daughter in law. Dr Esa Dasilva used to be her EP physician, Medtronic pacemaker  implanted 7/2005, generator change 8/13/13. Hx of AV node ablation 5/1998. She is pacer dependent and hence she has LBBB  Hx of pacemaker pocket revision 1/2014. Dr Marii Harirs took over after Dr Esa Dasilva retired. She knew Dr Charlee Manzano from [facility] and wanted to switch after nuclear stress test was done last year by Dr Marii Harris that showed scar in LAD area with mild reversibility but she has no ischemic chest pain   She reports + JIMENEZ after walking short distances   She says Dr Charlee Manzano thought the risk outweighed the benefits in proceeding with cardiac cath d/t abnormal stress test.     She is here today for 3 month follow up after starting Entresto and aldactone. She had an echo today to evaluate LVEF for potential upgrade to biventricular ICD. She reports lightheadedness and dizziness ( chronic problem for several years + 10 years), JIMENEZ (no change since starting the entresto). No chest pain or LE edema. No hospitalizations. She checks her INR at home. Prior cardiac testing  Echo 8/20/14 - LVEF 45%, akinesis of distal lat and inf walls, mod-marked LAE, mild-mod MR, RVSP 50  MUGA Scan 8/29/14 - LVEF 53%  Nuclear stress test Lexiscan Cardiolite 8/24/16 - There is a large sized, mod-high grade, partially reversible defect involving mid-distal anterior wall and apex. The apical defect is high grade and fixed. Extent of ischemia is moderate in LAD territory. SSS = 17, SRS = 10, SDS = 6. LVEF 35% with apical akinesis.    Echo 8/2016 LVEF 35 %. Severe hypokinesis of the mid-apical inferior, apical septal, apical lateral, and apical wall(s). Severe LAE. Mild MAC and Moderate MR. Aortic valve:  trileaflet.  Leaflets exhibited mild calcification. Mild aortic regurgitation. Moderate TR and Mild PH. Social History     Social History    Marital status:      Spouse name: N/A    Number of children: N/A    Years of education: N/A     Occupational History    Not on file. Social History Main Topics    Smoking status: Passive Smoke Exposure - Never Smoker    Smokeless tobacco: Never Used    Alcohol use No    Drug use: No    Sexual activity: No     Other Topics Concern    Not on file     Social History Narrative     Family History   Problem Relation Age of Onset    Diabetes Mother     Heart Disease Mother     Heart Attack Mother     Heart Disease Father     Stroke Sister     Breast Cancer Sister 80    Cancer Maternal Aunt      uterus    Diabetes Maternal Uncle     Breast Cancer Daughter 61         Patient Active Problem List    Diagnosis Date Noted    Psoriasis 03/23/2017    Cardiomyopathy ([facility] 09/20/2016    Abnormal stress test 09/13/2016    Ischemic cardiomyopathy     Chest pain 08/10/2016    Restrictive lung disease 12/14/2015    Osteopenia 11/23/2015    Hypothyroidism due to acquired atrophy of thyroid 11/06/2015    CKD (chronic kidney disease) stage 3, GFR 30-59 ml/min 02/09/2015    Anxiety and depression 02/26/2014    Colon cancer ([facility] 02/26/2014    Hypothyroid 02/26/2014    [facility] 09/26/2011    Mitral regurgitation 10/21/2010    CHB [facility]complete heart block) [facility] 10/21/2010    Orthostatic hypotension 10/21/2010    Chest pain, unspecified 10/21/2010    Atrial fibrillation (HCC)     Mixed hyperlipidemia 10/18/2010     Current Outpatient Prescriptions   Medication Sig Dispense Refill    lipase-protease-amylase (ZENPEP) 40,000-136,000- 218,000 unit cpDR Take 1 Tab by mouth three (3) times daily (with meals). And snacks.       simvastatin (ZOCOR) 20 mg tablet TAKE 1 TABLET BY MOUTH NIGHTLY 30 Tab 0    lidocaine (LIDODERM) 5 % 1 Patch by TransDERmal route every twelve (12) hours. 60 Each 0    desonide (TRIDESILON) 0.05 % cream Apply  to affected area two (2) times a day. 15 g 0    SYNTHROID 112 mcg tablet TAKE 1 TABLET BY MOUTH EVERY DAY BEFORE BREAKFAST 30 Tab 5    COUMADIN 5 mg tablet TAKE 1 TABLET BY MOUTH DAILY AND 1/2 TABLET ON SUNDAY AND THURSDAY 90 Tab 0    carvedilol (COREG) 6.25 mg tablet Take 1 Tab by mouth two (2) times daily (with meals). 60 Tab 3    spironolactone (ALDACTONE) 25 mg tablet Take 1 Tab by mouth daily. 30 Tab 3    sacubitril-valsartan (ENTRESTO) 24 mg/26 mg tablet Take 1 Tab by mouth two (2) times a day. 60 Tab 3    multivitamin (ONE A DAY) tablet Take 1 Tab by mouth daily.  Calcium-Cholecalciferol, D3, 600 mg(1,500mg) -400 unit cap Take  by mouth two (2) times a day.  ranitidine (ZANTAC) 150 mg tablet TAKE 1 TABLET BY MOUTH TWICE A DAY 60 Tab 5    OXYGEN-AIR DELIVERY SYSTEMS 2 L by Does Not Apply route nightly.  ZOLOFT 50 mg tablet TAKE 1 TABLET BY MOUTH EVERY DAY 30 Tab 5    ADVAIR DISKUS 250-50 mcg/dose diskus inhaler Take 1 Puff by inhalation two (2) times a day.  acetaminophen (TYLENOL) 500 mg tablet Take 500 mg by mouth every six (6) hours as needed for Pain. (2) Tablet in am (1) tablet in pm (2) Tablet pm  0    tiotropium (SPIRIVA WITH HANDIHALER) 18 mcg inhalation capsule Take 1 Cap by inhalation daily.  melatonin tab tablet Take 1 Tab by mouth nightly.        Allergies   Allergen Reactions    Cardizem [Diltiazem Hcl] Hives    Codeine Nausea and Vomiting    Darvocet A500 [Propoxyphene N-Acetaminophen] Nausea and Vomiting    Labetalol Nausea and Vomiting     Dizzy/Disoriented     Pcn [Penicillins] Swelling     Tongue Swelling     Primidone Other (comments)     Lightheaded/unsteady gait    Sulfa (Sulfonamide Antibiotics) Nausea and Vomiting     Past Medical History:   Diagnosis Date    Arthritis     Atrial fibrillation ([address]     av node ablation 5/22/98 with placement of CPI #[id number] dual chamber pacemaker subsequently replaced with a single chamber medtronic #[id number] single chamber pacemaker 7/25/05    Cancer ([facility]'s    colon cancer w/ resection    COPD     Depression     GERD (gastroesophageal reflux disease)     Hyperlipidemia     Hypertension     Ischemic cardiomyopathy     Migraine headache     Orthostatic hypotension     RADHA (obstructive sleep apnea)     Pacemaker 5/22/98    AV node ablation /pacemaker placement utilizing CPI # [id number] dual chamber system, medtronic #[id number] single chamber device placed 7/22/05    Pulmonary hypertension ([address] 9/26/2011    RVSP 50 on echo 8/14    Thyroid disease     Valvular heart disease     mild-mod MR/TR     Past Surgical History:   Procedure Laterality Date    BREAST SURGERY PROCEDURE UNLISTED      benign breast tumor excision right breast    HX BREAST BIOPSY Right 2002    neg; surgical bx    HX COLECTOMY  1974    colon    HX KNEE REPLACEMENT      right TKA    HX PACEMAKER      HX TUBAL LIGATION      TOTAL KNEE ARTHROPLASTY      total on R, partial on L     Family History   Problem Relation Age of Onset    Diabetes Mother     Heart Disease Mother     Heart Attack Mother     Heart Disease Father     Stroke Sister     Breast Cancer Sister 80    Cancer Maternal Aunt      uterus    Diabetes Maternal Uncle     Breast Cancer Daughter 61     Social History   Substance Use Topics    Smoking status: Passive Smoke Exposure - Never Smoker    Smokeless tobacco: Never Used    Alcohol use No        Review of Systems:   Constitutional: Negative for fever, chills, weight loss, +malaise/fatigue. HEENT: Negative for nosebleeds, vision changes. Respiratory: Negative for cough, hemoptysis, sputum production, and wheezing. + JIMENEZ  Cardiovascular: Negative for chest pain, palpitations, orthopnea, claudication, leg swelling, syncope, and PND. Gastrointestinal: Negative for nausea, vomiting, diarrhea, constipation, blood in stool and melena. Genitourinary: Negative for dysuria, and hematuria. Musculoskeletal: Negative for myalgias, + arthralgia. Skin: Negative for rash. Heme: Does not bleed or bruise easily. Neurological: Negative for speech change and focal weakness     Objective:     Visit Vitals    /75 (BP 1 Location: Left arm, BP Patient Position: Sitting)    Pulse 84    Resp 20    Ht 5' 5\" (1.651 m)    Wt 160 lb (72.6 kg)    LMP 1970    SpO2 98%    BMI 26.63 kg/m2      Physical Exam:   Constitutional: Well-nourished. No distress. in wheel chair   Head: Normocephalic and atraumatic. Eyes: Pupils are equal, round  Neck: supple. No JVD present. Cardiovascular: Normal rate, regular rhythm. Exam reveals no gallop and no friction rub. No murmur heard. Pulmonary/Chest: Effort normal and breath sounds normal. No wheezes. Abdominal: Soft, no tenderness. Musculoskeletal: no edema. Neurological: alert,oriented. Skin: Skin is warm and dry. Left side ppm scar flat and healed. Psychiatric: normal mood and affect. Behavior is normal. Judgment and thought content normal.      EK2016 ventricular paced, LBBB  msec  BP readings from home show -120. Assessment/Plan:       ICD-10-CM ICD-9-CM    1. Pacemaker Z95.0 V45.01    2. Ischemic cardiomyopathy I25.5 414.8    3. Chronic atrial fibrillation (HCC) I48.2 427.31    4. CHB (complete heart block) (HCC) I44.2 426.0    5. Abnormal stress test R94.39 794.39    6. LBBB (left bundle branch block) I44.7 426.3    7. CHF NYHA class III (symptoms with mildly strenuous activities), chronic, systolic O56.37 582.16      428.0    8. Orthostatic hypotension I95.1 458.0         She is tolerating the entresto, coreg and aldactone. Echo today shows  Reviewed upgrade procedure to biventricular ICD/pacemaker in detail with the patient and her daughter. If LVEF is less than 35% after GDMT, then recommend biventricular ICD.   LBBB QRS > 140 msec  Life expectancy greater than 1 year. Follow-up Disposition: Not on File    Thank you for involving me in this patient's care and please call with further concerns or questions. Nii Newman M.D.   Electrophysiology/Cardiology  [facility]  [facility], [facility] [address] [place] [address]  [phone]

## 2017-04-28 ENCOUNTER — TELEPHONE ANTICOAG (OUTPATIENT)
Dept: CARDIOLOGY CLINIC | Age: 82
End: 2017-04-28

## 2017-04-28 NOTE — PROGRESS NOTES
Cardiac Electrophysiology Office Note     Subjective:      Pratibha Mcbride is a 80 y.o. female patient who is seen to discuss upgrade to biventricular ICD/pacemaker  She was kindly referred by Dr Benny Rich 3 months ago when I saw her and recommended to try entresto and aldactone before the [facility] ICD upgrade. She is here with her daughter in law. Dr Carmelina Chaves used to be her EP physician, Medtronic pacemaker  implanted 7/2005, generator change 8/13/13. Hx of AV node ablation 5/1998. She is pacer dependent and hence she has LBBB  Hx of pacemaker pocket revision 1/2014. Dr Antwan Nazario took over after Dr Carmelina Chaves retired. She knew Dr Benny Rich from [facility] and wanted to switch after nuclear stress test was done last year by Dr Antwan Nazario that showed scar in LAD area with mild reversibility but she has no ischemic chest pain   She reports + JIMENEZ after walking short distances   She says Dr Benny Rich thought the risk outweighed the benefits in proceeding with cardiac cath d/t abnormal stress test.  She reports lightheadedness and dizziness ( chronic problem for several years + 10 years), JIMENEZ (no change since starting the entresto). No chest pain or LE edema. No hospitalizations. She checks her INR at home. Prior cardiac testing  Echo 8/20/14 - LVEF 45%, akinesis of distal lat and inf walls, mod-marked LAE, mild-mod MR, RVSP 50  MUGA Scan 8/29/14 - LVEF 53%  Nuclear stress test Lexiscan Cardiolite 8/24/16 - There is a large sized, mod-high grade, partially reversible defect involving mid-distal anterior wall and apex. The apical defect is high grade and fixed. Extent of ischemia is moderate in LAD territory. SSS = 17, SRS = 10, SDS = 6. LVEF 35% with apical akinesis.    Echo 8/2016 LVEF 35 %. Severe hypokinesis of the mid-apical inferior, apical septal, apical lateral, and apical wall(s). Severe LAE. Mild MAC and Moderate MR. Aortic valve:  trileaflet. Leaflets exhibited mild calcification. Mild aortic regurgitation.  Moderate TR and Mild PH. Social History     Social History    Marital status:      Spouse name: N/A    Number of children: N/A    Years of education: N/A     Occupational History    Not on file. Social History Main Topics    Smoking status: Passive Smoke Exposure - Never Smoker    Smokeless tobacco: Never Used    Alcohol use No    Drug use: No    Sexual activity: No     Other Topics Concern    Not on file     Social History Narrative     Family History   Problem Relation Age of Onset    Diabetes Mother     Heart Disease Mother     Heart Attack Mother     Heart Disease Father     Stroke Sister     Breast Cancer Sister 80    Cancer Maternal Aunt      uterus    Diabetes Maternal Uncle     Breast Cancer Daughter 61         Patient Active Problem List    Diagnosis Date Noted    Psoriasis 03/23/2017    Cardiomyopathy ([facility] 09/20/2016    Abnormal stress test 09/13/2016    Ischemic cardiomyopathy     Chest pain 08/10/2016    Restrictive lung disease 12/14/2015    Osteopenia 11/23/2015    Hypothyroidism due to acquired atrophy of thyroid 11/06/2015    CKD (chronic kidney disease) stage 3, GFR 30-59 ml/min 02/09/2015    Anxiety and depression 02/26/2014    [facility] cancer ([facility] 02/26/2014    Hypothyroid 02/26/2014    Pulmonary hypertension ([facility] 09/26/2011    Mitral regurgitation 10/21/2010    CHB (complete heart block) ([facility] 10/21/2010    Orthostatic hypotension 10/21/2010    Chest pain, unspecified 10/21/2010    Atrial fibrillation (HCC)     Mixed hyperlipidemia 10/18/2010     Current Outpatient Prescriptions   Medication Sig Dispense Refill    lipase-protease-amylase (ZENPEP) 40,000-136,000- 218,000 unit cpDR Take 1 Tab by mouth three (3) times daily (with meals). And snacks.  simvastatin (ZOCOR) 20 mg tablet TAKE 1 TABLET BY MOUTH NIGHTLY 30 Tab 0    lidocaine (LIDODERM) 5 % 1 Patch by TransDERmal route every twelve (12) hours.  60 Each 0    desonide (TRIDESILON) 0.05 % cream Apply  to affected area two (2) times a day. 15 g 0    SYNTHROID 112 mcg tablet TAKE 1 TABLET BY MOUTH EVERY DAY BEFORE BREAKFAST 30 Tab 5    COUMADIN 5 mg tablet TAKE 1 TABLET BY MOUTH DAILY AND 1/2 TABLET ON SUNDAY AND THURSDAY 90 Tab 0    carvedilol (COREG) 6.25 mg tablet Take 1 Tab by mouth two (2) times daily (with meals). 60 Tab 3    spironolactone (ALDACTONE) 25 mg tablet Take 1 Tab by mouth daily. 30 Tab 3    sacubitril-valsartan (ENTRESTO) 24 mg/26 mg tablet Take 1 Tab by mouth two (2) times a day. 60 Tab 3    multivitamin (ONE A DAY) tablet Take 1 Tab by mouth daily.  Calcium-Cholecalciferol, D3, 600 mg(1,500mg) -400 unit cap Take  by mouth two (2) times a day.  ranitidine (ZANTAC) 150 mg tablet TAKE 1 TABLET BY MOUTH TWICE A DAY 60 Tab 5    OXYGEN-AIR DELIVERY SYSTEMS 2 L by Does Not Apply route nightly.  ZOLOFT 50 mg tablet TAKE 1 TABLET BY MOUTH EVERY DAY 30 Tab 5    ADVAIR DISKUS 250-50 mcg/dose diskus inhaler Take 1 Puff by inhalation two (2) times a day.  acetaminophen (TYLENOL) 500 mg tablet Take 500 mg by mouth every six (6) hours as needed for Pain. (2) Tablet in am (1) tablet in pm (2) Tablet pm  0    tiotropium (SPIRIVA WITH HANDIHALER) 18 mcg inhalation capsule Take 1 Cap by inhalation daily.  melatonin tab tablet Take 1 Tab by mouth nightly.        Allergies   Allergen Reactions    Cardizem [Diltiazem Hcl] Hives    Codeine Nausea and Vomiting    Darvocet A500 [Propoxyphene N-Acetaminophen] Nausea and Vomiting    Labetalol Nausea and Vomiting     Dizzy/Disoriented     Pcn [Penicillins] Swelling     Tongue Swelling     Primidone Other (comments)     Lightheaded/unsteady gait    Sulfa (Sulfonamide Antibiotics) Nausea and Vomiting     Past Medical History:   Diagnosis Date    Arthritis     Atrial fibrillation ([address]     av node ablation 5/22/98 with placement of CPI #[id number] dual chamber pacemaker subsequently replaced with a single chamber medtronic #[id number] single chamber pacemaker 7/25/05    Cancer ([facility]'s    colon cancer w/ resection    COPD     Depression     GERD (gastroesophageal reflux disease)     Hyperlipidemia     Hypertension     Ischemic cardiomyopathy     Migraine headache     Orthostatic hypotension     RADHA (obstructive sleep apnea)     Pacemaker 5/22/98    AV node ablation /pacemaker placement utilizing CPI # [id number] dual chamber system, medtronic #[id number] single chamber device placed 7/22/05    Pulmonary hypertension ([facility] 9/26/2011    RVSP 50 on echo 8/14    Thyroid disease     Valvular heart disease     mild-mod MR/TR     Past Surgical History:   Procedure Laterality Date    BREAST SURGERY PROCEDURE UNLISTED      benign breast tumor excision right breast    HX BREAST BIOPSY Right 2002    neg; surgical bx    HX COLECTOMY  1974    colon    HX KNEE REPLACEMENT      right TKA    HX PACEMAKER      HX TUBAL LIGATION      TOTAL KNEE ARTHROPLASTY      total on R, partial on L     Family History   Problem Relation Age of Onset    Diabetes Mother     Heart Disease Mother     Heart Attack Mother     Heart Disease Father     Stroke Sister     Breast Cancer Sister 80    Cancer Maternal Aunt      uterus    Diabetes Maternal Uncle     Breast Cancer Daughter 61     Social History   Substance Use Topics    Smoking status: Passive Smoke Exposure - Never Smoker    Smokeless tobacco: Never Used    Alcohol use No        Review of Systems:   Constitutional: Negative for fever, chills, weight loss, +malaise/fatigue. HEENT: Negative for nosebleeds, vision changes. Respiratory: Negative for cough, hemoptysis, sputum production, and wheezing. + JIMENEZ  Cardiovascular: Negative for chest pain, palpitations, orthopnea, claudication, leg swelling, syncope, and PND. Gastrointestinal: Negative for nausea, vomiting, diarrhea, constipation, blood in stool and melena. Genitourinary: Negative for dysuria, and hematuria.    Musculoskeletal: Negative for myalgias, + arthralgia. Skin: Negative for rash. Heme: Does not bleed or bruise easily. Neurological: Negative for speech change and focal weakness     Objective:     Visit Vitals    /75 (BP 1 Location: Left arm, BP Patient Position: Sitting)    Pulse 84    Resp 20    Ht 5' 5\" (1.651 m)    Wt 160 lb (72.6 kg)    LMP 1970    SpO2 98%    BMI 26.63 kg/m2      Physical Exam:   Constitutional: Well-nourished. No distress. in wheel chair   Head: Normocephalic and atraumatic. Eyes: Pupils are equal, round  Neck: supple. No JVD present. Cardiovascular: Normal rate, regular rhythm. Exam reveals no gallop and no friction rub. No murmur heard. Pulmonary/Chest: Effort normal and breath sounds normal. No wheezes. Abdominal: Soft, no tenderness. Musculoskeletal: no edema. Neurological: alert,oriented. Skin: Skin is warm and dry. Left side ppm scar flat and healed. Psychiatric: normal mood and affect. Behavior is normal. Judgment and thought content normal.      EK2016 ventricular paced, LBBB  msec    Assessment/Plan:       ICD-10-CM ICD-9-CM    1. Ischemic cardiomyopathy I25.5 414.8    2. Chronic atrial fibrillation (HCC) I48.2 427.31    3. CHB (complete heart block) (HCC) I44.2 426.0    4. Abnormal stress test R94.39 794.39    5. Pacemaker Z95.0 V45.01    6. LBBB (left bundle branch block) I44.7 426.3    7. CHF NYHA class III (symptoms with mildly strenuous activities), chronic, systolic G44.77 090.78      428.0    8. Orthostatic hypotension I95.1 458.0      She is tolerating the entresto, coreg and aldactone but she is still dizzy when getting up and still has JIMENEZ with NYHA class 3  SBP is not high enough to increase more GDMT   Echo today shows LVEF still not normal.  It is estimated 45%. In parasternal views, still appear to be 35% but apical 4 may be better  Reviewed upgrade procedure to BIV pacemaker in the event that LVEF is considered >35% by Dr Poonam Parker.    Her daughter in law said she wants to see Dr Berenice Esqueda and review echo again before deciding on procedure so I make another appt with Dr Berenice Esqueda for her  Her Life expectancy greater than 1 year. She and her family have decided to transfer all her care to [facility] office so will make arrangement for future follow up and remote device check at [facility] office with me and Dr Berenice Esqueda  In the event that they are reluctant to upgrade device, I think it is also reasonable to continue with longer GDMT but not sure entresto dose can be increased more with current BP and orthostatic hypotension dizziness  I do think that MR, AR are mild and may get better with CRT as LVESD improves    Follow-up Disposition:  Return in about 3 months (around 7/27/2017). if no plan to upgrade device. Thank you for involving me in this patient's care and please call with further concerns or questions. Nii Newman M.D.   Electrophysiology/Cardiology  [facility]  [address], [facility] [address] [place] [address] Jahaira [address] [place] [address]  [phone]

## 2017-04-28 NOTE — PROGRESS NOTES
Coumadin Flowsheet Values Recorded Today: Date: 04/26/17  DX:  Atrial Fibrillation  Dosage: 5  Sunday: 2.5  Tuesday: 5  Wednesday: 5  Thursday: 2.5  Friday: 5  Saturday: 5  INR results: 2.5  INR Results Ranges: 2-3  Denies missing dose or extra dose?: Yes  Bleeding or unusual bruising?: No  Blood in stool or urine?: No  Coughing up blood?: No  Alcohol Use?: No  Any change in medications?: No  Epistaxis?: No  Same dose?: Yes  Next Check: 05/03/17

## 2017-05-04 ENCOUNTER — OFFICE VISIT (OUTPATIENT)
Dept: CARDIOLOGY CLINIC | Age: 82
End: 2017-05-04

## 2017-05-04 VITALS
RESPIRATION RATE: 16 BRPM | DIASTOLIC BLOOD PRESSURE: 64 MMHG | SYSTOLIC BLOOD PRESSURE: 94 MMHG | OXYGEN SATURATION: 96 % | BODY MASS INDEX: 26.99 KG/M2 | HEART RATE: 81 BPM | HEIGHT: 65 IN | WEIGHT: 162 LBS

## 2017-05-04 DIAGNOSIS — I48.20 CHRONIC ATRIAL FIBRILLATION (HCC): ICD-10-CM

## 2017-05-04 DIAGNOSIS — I34.0 NON-RHEUMATIC MITRAL REGURGITATION: ICD-10-CM

## 2017-05-04 DIAGNOSIS — I95.1 ORTHOSTATIC HYPOTENSION: ICD-10-CM

## 2017-05-04 DIAGNOSIS — I42.0 DILATED CARDIOMYOPATHY (HCC): Primary | ICD-10-CM

## 2017-05-04 DIAGNOSIS — N18.30 CKD (CHRONIC KIDNEY DISEASE) STAGE 3, GFR 30-59 ML/MIN (HCC): ICD-10-CM

## 2017-05-04 DIAGNOSIS — R94.39 ABNORMAL STRESS TEST: ICD-10-CM

## 2017-05-04 RX ORDER — CHLORHEXIDINE GLUCONATE 4 G/100ML
SOLUTION TOPICAL
Qty: 1 BOTTLE | Refills: 0 | Status: SHIPPED | OUTPATIENT
Start: 2017-05-04 | End: 2017-08-07 | Stop reason: ALTCHOICE

## 2017-05-04 NOTE — PROGRESS NOTES
HISTORY OF PRESENT ILLNESS  Mary Cantrell is a 80 y.o. female     SUMMARY:   Problem List  Date Reviewed: 5/4/2017          Codes Class Noted    Psoriasis (Chronic) ICD-10-CM: L40.9  ICD-9-CM: 696.1  3/23/2017        Cardiomyopathy ([facility] ICD-10-CM: I42.9  ICD-9-CM: 425.4  9/20/2016    Overview Addendum 9/20/2016  8:45 AM by Yuri Reina MD     8/12 normal lexiscan cardiolyte, lvef 68%  8/16 echo lvef 35% multiple wall motion abnormalities, melissa, mod mr, mild ai, mod tr pa pressure 36mm  8/16 lexiscan mod reversible anterior defect, mostly fixed apical defect.  lvef 35%             Abnormal stress test ICD-10-CM: R94.39  ICD-9-CM: 794.39  9/13/2016        Ischemic cardiomyopathy ICD-10-CM: I25.5  ICD-9-CM: 414.8  Unknown        Chest pain ICD-10-CM: R07.9  ICD-9-CM: 786.50  8/10/2016        Restrictive lung disease ICD-10-CM: J98.4  ICD-9-CM: 518.89  12/14/2015        Osteopenia ICD-10-CM: M85.80  ICD-9-CM: 733.90  11/23/2015        Hypothyroidism due to acquired atrophy of thyroid ICD-10-CM: E03.4  ICD-9-CM: 244.8, 246.8  11/6/2015        CKD (chronic kidney disease) stage 3, GFR 30-59 ml/min ICD-10-CM: N18.3  ICD-9-CM: 585.3  2/9/2015        Anxiety and depression (Chronic) ICD-10-CM: F41.9, F32.9  ICD-9-CM: 300.00, 311  2/26/2014        Colon cancer ([facility] ICD-10-CM: C18.9  ICD-9-CM: 153.9  2/26/2014    Overview Addendum 2/26/2014 11:12 AM by Merlin Hussar, MD     Partial colectomy  Petey Alejandro             Hypothyroid ICD-10-CM: E03.9  ICD-9-CM: 244.9  2/26/2014        Pulmonary hypertension ([address] ICD-10-CM: I27.2  ICD-9-CM: 416.8  9/26/2011        Atrial fibrillation ([facility] ICD-10-CM: I48.91  ICD-9-CM: 427.31  Unknown    Overview Signed 10/21/2010  8:16 AM by Bakari Burdick     av node ablation 5/22/98 with placement of CPI #[id number] dual chamber pacemaker subsequently replaced with a single chamber medtronic #[id number] single chamber pacemaker 7/25/05             Mitral regurgitation ICD-10-CM: I34.0  ICD-9-CM: 424.0  10/21/2010        CHB (complete heart block) ([facility] ICD-10-CM: I44.2  ICD-9-CM: 426.0  10/21/2010        Orthostatic hypotension ICD-10-CM: I95.1  ICD-9-CM: 458.0  10/21/2010        Chest pain, unspecified ICD-10-CM: R07.9  ICD-9-CM: 786.50  10/21/2010        Mixed hyperlipidemia ICD-10-CM: E78.2  ICD-9-CM: 272.2  10/18/2010              Current Outpatient Prescriptions on File Prior to Visit   Medication Sig    simvastatin (ZOCOR) 20 mg tablet TAKE 1 TABLET BY MOUTH NIGHTLY    lipase-protease-amylase (ZENPEP) 40,000-136,000- 218,000 unit cpDR Take 1 Tab by mouth three (3) times daily (with meals). And snacks.  lidocaine (LIDODERM) 5 % 1 Patch by TransDERmal route every twelve (12) hours.  desonide (TRIDESILON) 0.05 % cream Apply  to affected area two (2) times a day.  SYNTHROID 112 mcg tablet TAKE 1 TABLET BY MOUTH EVERY DAY BEFORE BREAKFAST    COUMADIN 5 mg tablet TAKE 1 TABLET BY MOUTH DAILY AND 1/2 TABLET ON SUNDAY AND THURSDAY (Patient taking differently: TAKE 1 TABLET BY MOUTH DAILY AND 1/2 TABLET ON SUNDAY only)    carvedilol (COREG) 6.25 mg tablet Take 1 Tab by mouth two (2) times daily (with meals).  spironolactone (ALDACTONE) 25 mg tablet Take 1 Tab by mouth daily.  sacubitril-valsartan (ENTRESTO) 24 mg/26 mg tablet Take 1 Tab by mouth two (2) times a day.  multivitamin (ONE A DAY) tablet Take 1 Tab by mouth daily.  Calcium-Cholecalciferol, D3, 600 mg(1,500mg) -400 unit cap Take  by mouth two (2) times a day.  ranitidine (ZANTAC) 150 mg tablet TAKE 1 TABLET BY MOUTH TWICE A DAY    OXYGEN-AIR DELIVERY SYSTEMS 2 L by Does Not Apply route nightly.  ZOLOFT 50 mg tablet TAKE 1 TABLET BY MOUTH EVERY DAY    ADVAIR DISKUS 250-50 mcg/dose diskus inhaler Take 1 Puff by inhalation two (2) times a day.  acetaminophen (TYLENOL) 500 mg tablet Take 500 mg by mouth every six (6) hours as needed for Pain.  (2) Tablet in am (1) tablet in pm (2) Tablet pm    tiotropium (SPIRIVA WITH HANDIHALER) 18 mcg inhalation capsule Take 1 Cap by inhalation daily.  melatonin tab tablet Take 1 Tab by mouth nightly. No current facility-administered medications on file prior to visit. CARDIOLOGY STUDIES TO DATE:  8/12 normal lexiscan cardiolyte, lvef 68%  8/16 echo lvef 35% multiple wall motion abnormalities, melissa, mod mr, mild ai, mod tr pa pressure 36mm  8/16 lexiscan mod reversible anterior defect, mostly fixed apical defect. lvef 35%    4/17 lvef 40% ant hypo, lvh, melissa, mild to mod mr, mild ai. Mild to mod tr upper normal pa pressure      Chief Complaint   Patient presents with    Cardiomyopathy     HPI :  Ms. Bogdan Melo is doing okay. She is now on Entresto but she cannot tolerate any more medication. Her blood pressure is low as it is and she is still having orthostatic type symptoms and has fallen but not hurt herself a few times without actually passing out. She is Class III in terms of heart failure symptoms. No chest pain. She is with both of her daughters today and they had a lot of questions about whether or not it was advisable to proceed with a bi-V pacer upgrade. She has seen a rheumatologist recently who wants to put her on high dose tapering steroids to help with some of her back pain and she is also supposed to see a urologist because there is a suspicion she may have a kidney stone.       CARDIAC ROS:   negative for chest pain, syncope, orthopnea, paroxysmal nocturnal dyspnea, exertional chest pressure/discomfort, claudication, lower extremity edema    Family History   Problem Relation Age of Onset    Diabetes Mother     Heart Disease Mother     Heart Attack Mother     Heart Disease Father     Stroke Sister     Breast Cancer Sister 80    Cancer Maternal Aunt      uterus    Diabetes Maternal Uncle     Breast Cancer Daughter 61       Past Medical History:   Diagnosis Date    Arthritis     Atrial fibrillation ([address]     av node ablation 5/22/98 with placement of CPI #[id number] dual chamber pacemaker subsequently replaced with a single chamber medtronic #[id number] single chamber pacemaker 7/25/05    Cancer ([facility]'s    colon cancer w/ resection    COPD     Depression     GERD (gastroesophageal reflux disease)     Hyperlipidemia     Hypertension     Ischemic cardiomyopathy     Migraine headache     Orthostatic hypotension     RADHA (obstructive sleep apnea)     Pacemaker 5/22/98    AV node ablation /pacemaker placement utilizing CPI # [id number] dual chamber system, medtronic #[id number] single chamber device placed 7/22/05    Pulmonary hypertension ([facility] 9/26/2011    RVSP 50 on echo 8/14    Thyroid disease     Valvular heart disease     mild-mod MR/TR       GENERAL ROS:  A comprehensive review of systems was negative except for that written in the HPI.     Visit Vitals    BP 94/64 (BP 1 Location: Right arm, BP Patient Position: Standing)  Comment: standing    Pulse 81    Resp 16    Ht 5' 5\" (1.651 m)    Wt 162 lb (73.5 kg)    LMP 02/26/1970    SpO2 96%    BMI 26.96 kg/m2       Wt Readings from Last 3 Encounters:   05/04/17 162 lb (73.5 kg)   04/27/17 160 lb (72.6 kg)   03/23/17 162 lb 4 oz (73.6 kg)            BP Readings from Last 3 Encounters:   05/04/17 94/64   04/27/17 115/75   03/23/17 90/53       PHYSICAL EXAM  General appearance: alert, cooperative, no distress, appears stated age  Neck: supple, symmetrical, trachea midline, no adenopathy, no carotid bruit and no JVD  Lungs: clear to auscultation bilaterally  Heart: regular rate and rhythm, S1, S2 normal, no murmur, click, rub or gallop  Extremities: extremities normal, atraumatic, no cyanosis or edema    Lab Results   Component Value Date/Time    Cholesterol, total 173 08/22/2016 08:45 AM    Cholesterol, total 157 08/11/2015 12:14 PM    Cholesterol, total 157 02/09/2015 08:34 AM    Cholesterol, total 156 02/27/2014 08:49 AM    Cholesterol, total 152 05/10/2013 09:15 AM    HDL Cholesterol 46 08/22/2016 08:45 AM    HDL Cholesterol 47 08/11/2015 12:14 PM    HDL Cholesterol 46 02/09/2015 08:34 AM    HDL Cholesterol 48 02/27/2014 08:49 AM    HDL Cholesterol 43 05/10/2013 09:15 AM    LDL, calculated 99 08/22/2016 08:45 AM    LDL, calculated 86 08/11/2015 12:14 PM    LDL, calculated 81 02/09/2015 08:34 AM    LDL, calculated 83 02/27/2014 08:49 AM    LDL, calculated 83 05/10/2013 09:15 AM    Triglyceride 140 08/22/2016 08:45 AM    Triglyceride 121 08/11/2015 12:14 PM    Triglyceride 149 02/09/2015 08:34 AM    Triglyceride 125 02/27/2014 08:49 AM    Triglyceride 130 05/10/2013 09:15 AM     ASSESSMENT  I think a Bi-V upgrade is relatively noninvasive and relatively likely to improve Ms. Forbes's symptoms, so I think she should proceed. I had cautioned them that the biggest negative potential outcome would be device infection. We talked to Dr. Miles Kang, and they are going to get her scheduled in the near future, and they are going to call back with more specific information about steroids and whether or not that should be done pre or post implant. current treatment plan is effective, no change in therapy  lab results and schedule of future lab studies reviewed with patient  reviewed diet, exercise and weight control    Encounter Diagnoses   Name Primary?  Dilated cardiomyopathy (HCC) Yes    Chronic atrial fibrillation (HCC)     Non-rheumatic mitral regurgitation     Abnormal stress test     CKD (chronic kidney disease) stage 3, GFR 30-59 ml/min     Orthostatic hypotension      No orders of the defined types were placed in this encounter. Follow-up Disposition:  Return in about 3 months (around 8/4/2017).     Aiden Joy MD  5/4/2017

## 2017-05-04 NOTE — PATIENT INSTRUCTIONS
Your BiV pacemaker upgrade procedure has been scheduled for 7/24/17 at 8:00am, at [facility].    Please report to Admitting Department by 6:15am, or 2 hours prior to your scheduled procedure. Please bring a list of your current medications and medication bottles, if able, to the hospital on this day. You will be unable to drive after your procedure so please make sure to bring someone with you to your procedure. You will need to have nothing to eat or drink after midnight, the night prior to your procedure. You may have small sips of water, if needed, to take with your medication. You will need labs drawn prior to your procedure. Please go to [facility] to have this done no sooner than 6/25/17 and no later than 7/20/17. You should not stop your medications prior to your scheduled procedure. After your procedure, you will need to follow up with Dr. Benny Connell. Your follow-up appointment has been scheduled for 8/7/17 at 1:00pm.     Hibiclens 4% topical solution  ordered and sent into your pharmacy  Patient it start Hibiclens application 5 days prior to procedure date    Directions Hibiclens 4%: Start cleanse 5 days prior to procedure   1. Rinse area (upper chest and upper arms) with water. 2. Apply minimum amount necessary to scrub the upper chest area from shoulder/neck to mid line of chest and to below the nipple each of  5 nights before the day of the procedure  3. Let solution dry.

## 2017-05-04 NOTE — MR AVS SNAPSHOT
Visit Information Date & Time Provider Department Dept. Phone Encounter #  
 5/4/2017  1:00 PM Charlotte Cabrera MD [facility] [phone] [id number] Follow-up Instructions Return in about 3 months (around 8/4/2017). Your Appointments 5/19/2017 10:15 AM  
COMPLETE PHYSICAL with Nora Anderson MD  
[facility] Appt Note: physical for entry in [address]; Pt's daughter called to confirm. ; physical for entry in [address] [place] [address]  
  
   
 [phone]  
  
    
 6/14/2017  2:45 PM  
PACEMAKER with PACEMAKER, DIANA  
[facility] ([facility] SCHEDULING) Appt Note: med/pm/stf  
 [address] [facility] [address]  
[phone]  
  
   
 [address] [facility] [address]  
  
    
 8/3/2017  8:40 AM  
ESTABLISHED PATIENT with Charlotte Cabrera MD  
[facility] ([facility]) Appt Note: 3 mo f/u  
 [address]  
[facility] [address] [address] [facility] [address]  
  
    
 8/7/2017  1:00 PM  
PACEMAKER with Kalyani Duran [facility] (FARHAD SCHEDULING) Appt Note: 2 week BiV PPM upgrade wound check [address]  
[address] [address] [facility] [address]  
  
    
 8/7/2017  1:00 PM  
ESTABLISHED PATIENT with Shelby Rebollar MD  
[facility] ([facility]) Appt Note: 2 week BiV PPM upgrade wound check [address]  
[address] [facility],[address] [address] Upcoming Health Maintenance  Date Due  
 DTaP/Tdap/Td series (1 - Tdap) 4/3/2012 MEDICARE YEARLY EXAM 11/6/2016 INFLUENZA AGE 9 TO ADULT 8/1/2017 GLAUCOMA SCREENING Q2Y 9/10/2017 Allergies as of 5/4/2017  Review Complete On: 5/4/2017 By: Aiden Joy MD  
  
 Severity Noted Reaction Type Reactions Cardizem [Diltiazem Hcl]  10/04/2010    Hives Codeine  10/04/2010    Nausea and Vomiting Darvocet A500 [Propoxyphene N-acetaminophen]  10/04/2010    Nausea and Vomiting Labetalol  04/25/2012    Nausea and Vomiting Dizzy/Disoriented Pcn [Penicillins]  10/04/2010    Swelling Tongue Swelling Primidone  07/31/2012    Other (comments) Lightheaded/unsteady gait Sulfa (Sulfonamide Antibiotics)  10/04/2010    Nausea and Vomiting Current Immunizations  Reviewed on 11/23/2015 Name Date Influenza High Dose Vaccine PF 9/29/2016, 9/24/2015, 10/1/2014 Influenza Vaccine 12/1/2013 Influenza Vaccine Split 10/12/2012 Pneumococcal Conjugate (PCV-13) 11/23/2015 Pneumococcal Polysaccharide (PPSV-23) 2/27/2015 Pneumococcal Vaccine (Pcv) 12/15/2010 TB Skin Test (PPD) 1/1/2001 TD Vaccine 4/2/2012 Varicella Virus Vaccine Live 9/12/2012 Zoster Vaccine, Live 12/1/2013 Not reviewed this visit You Were Diagnosed With   
  
 Codes Comments Dilated cardiomyopathy ([facility] [address]    -  Primary ICD-10-CM: I42.0 ICD-9-CM: 425. 4 Chronic atrial fibrillation (HCC)     ICD-10-CM: K16.6 ICD-9-CM: 427.31 Non-rheumatic mitral regurgitation     ICD-10-CM: I34.0 ICD-9-CM: 424.0 Abnormal stress test     ICD-10-CM: R94.39 
ICD-9-CM: 794.39 CKD (chronic kidney disease) stage 3, GFR 30-59 ml/min     ICD-10-CM: N18.3 ICD-9-CM: 585.3 Orthostatic hypotension     ICD-10-CM: I95.1 ICD-9-CM: 458.0 Vitals BP Pulse Resp Height(growth percentile) Weight(growth percentile) LMP  
 94/64 (BP 1 Location: Right arm, BP Patient Position: Standing) 81 16 5' 5\" (1.651 m) 162 lb (73.5 kg) 02/26/1970 SpO2 BMI OB Status Smoking Status 96% 26.96 kg/m2 Postmenopausal Passive Smoke Exposure - Never Smoker Vitals History BMI and BSA Data Body Mass Index Body Surface Area  
 26.96 kg/m 2 1.84 m 2 Preferred Pharmacy Pharmacy Name Phone [facility]/PHARMACY #[id number]- [place], [address] [place] [address]) [phone] Your Updated Medication List  
  
   
This list is accurate as of: 5/4/17  2:08 PM.  Always use your most recent med list.  
  
  
  
  
 acetaminophen 500 mg tablet Commonly known as:  TYLENOL Take 500 mg by mouth every six (6) hours as needed for Pain. (2) Tablet in am (1) tablet in pm (2) Tablet pm  
  
 ADVAIR DISKUS 250-50 mcg/dose diskus inhaler Generic drug:  fluticasone-salmeterol Take 1 Puff by inhalation two (2) times a day. Calcium-Cholecalciferol (D3) 600 mg(1,500mg) -400 unit Cap Take  by mouth two (2) times a day. carvedilol 6.25 mg tablet Commonly known as:  Fatmata Duff Take 1 Tab by mouth two (2) times daily (with meals). chlorhexidine 4 % liquid Commonly known as:  HIBICLENS Apply to upper chest and arms as directed COUMADIN 5 mg tablet Generic drug:  warfarin TAKE 1 TABLET BY MOUTH DAILY AND 1/2 TABLET ON SUNDAY AND THURSDAY  
  
 desonide 0.05 % cream  
Commonly known as:  Sammye L[place] Wolf Apply  to affected area two (2) times a day. lidocaine 5 % Commonly known as:  LIDODERM  
1 Patch by TransDERmal route every twelve (12) hours. melatonin Tab tablet Take 1 Tab by mouth nightly. multivitamin tablet Commonly known as:  ONE A DAY Take 1 Tab by mouth daily. OXYGEN-AIR DELIVERY SYSTEMS  
2 L by Does Not Apply route nightly. raNITIdine 150 mg tablet Commonly known as:  ZANTAC TAKE 1 TABLET BY MOUTH TWICE A DAY  
  
 sacubitril-valsartan 24-26 mg tablet Commonly known as:  ENTRESTO Take 1 Tab by mouth two (2) times a day. simvastatin 20 mg tablet Commonly known as:  ZOCOR  
TAKE 1 TABLET BY MOUTH NIGHTLY SPIRIVA WITH HANDIHALER 18 mcg inhalation capsule Generic drug:  tiotropium Take 1 Cap by inhalation daily. spironolactone 25 mg tablet Commonly known as:  ALDACTONE Take 1 Tab by mouth daily. SYNTHROID 112 mcg tablet Generic drug:  levothyroxine TAKE 1 TABLET BY MOUTH EVERY DAY BEFORE BREAKFAST  
  
 ZENPEP 40,000-136,000- 218,000 unit Cpdr  
Generic drug:  lipase-protease-amylase Take 1 Tab by mouth three (3) times daily (with meals). And snacks. ZOLOFT 50 mg tablet Generic drug:  sertraline TAKE 1 TABLET BY MOUTH EVERY DAY Prescriptions Sent to Pharmacy Refills  
 chlorhexidine (HIBICLENS) 4 % liquid 0 Sig: Apply to upper chest and arms as directed Class: Normal  
 Pharmacy: [facility]/pharmacy #[id number]- [address], [address] ([place] [address]) Ph #: [phone] Follow-up Instructions Return in about 3 months (around 8/4/2017). To-Do List   
 06/25/2017 Lab:  CBC WITH AUTOMATED DIFF   
  
 06/25/2017 Lab:  METABOLIC PANEL, BASIC   
  
 06/25/2017 Lab:  PROTHROMBIN TIME + INR Patient Instructions Your BiV pacemaker upgrade procedure has been scheduled for 7/24/17 at 8:00am, at [facility]. 
 
Please report to Admitting Department by 6:15am, or 2 hours prior to your scheduled procedure. Please bring a list of your current medications and medication bottles, if able, to the hospital on this day. You will be unable to drive after your procedure so please make sure to bring someone with you to your procedure. You will need to have nothing to eat or drink after midnight, the night prior to your procedure. You may have small sips of water, if needed, to take with your medication. You will need labs drawn prior to your procedure.  Please go to [facility] to have this done no sooner than 6/25/17 and no later than 7/20/17. You should not stop your medications prior to your scheduled procedure. After your procedure, you will need to follow up with Dr. Domenica Shepard. Your follow-up appointment has been scheduled for 8/7/17 at 1:00pm.  
 
Hibiclens 4% topical solution  ordered and sent into your pharmacy Patient it start Hibiclens application 5 days prior to procedure date Directions Hibiclens 4%: Start cleanse 5 days prior to procedure 1. Rinse area (upper chest and upper arms) with water. 2. Apply minimum amount necessary to scrub the upper chest area from shoulder/neck to mid line of chest and to below the nipple each of  5 nights before the day of the procedure 3. Let solution dry. Introducing [facility] SERVICES! Christi Doll introduces [employer] patient portal. Now you can access parts of your medical record, email your doctor's office, and request medication refills online. 1. In your internet browser, go to [email or web address] 2. Click on the First Time User? Click Here link in the Sign In box. You will see the New Member Sign Up page. 3. Enter your [employer] Access Code exactly as it appears below. You will not need to use this code after youve completed the sign-up process. If you do not sign up before the expiration date, you must request a new code. · [employer] Access Code: [email or web address] Expires: 7/20/2017  4:14 PM 
 
4. Enter the last four digits of your Social Security Number (xxxx) and Date of Birth (mm/dd/yyyy) as indicated and click Submit. You will be taken to the next sign-up page. 5. Create a [employer] ID. This will be your [employer] login ID and cannot be changed, so think of one that is secure and easy to remember. 6. Create a [employer] password. You can change your password at any time. 7. Enter your Password Reset Question and Answer. This can be used at a later time if you forget your password. 8. Enter your e-mail address. You will receive e-mail notification when new information is available in [address] 9. Click Sign Up. You can now view and download portions of your medical record. 10. Click the Download Summary menu link to download a portable copy of your medical information. If you have questions, please visit the Frequently Asked Questions section of the [employer] website. Remember, [employer] is NOT to be used for urgent needs. For medical emergencies, dial 911. Now available from your iPhone and Android! Please provide this summary of care documentation to your next provider. Your primary care clinician is listed as Mateus Rodriguez. If you have any questions after today's visit, please call [phone].

## 2017-05-05 ENCOUNTER — TELEPHONE (OUTPATIENT)
Dept: INTERNAL MEDICINE CLINIC | Age: 82
End: 2017-05-05

## 2017-05-05 NOTE — TELEPHONE ENCOUNTER
Records from pts visit with Dr Maye Madison are coming here via fax. Dr Navid Smith would like Dr Jayy Corey to order a CT for kidney stones for pt. When ready please call daughter, Sadiq Navarro, at [phone].

## 2017-05-08 NOTE — TELEPHONE ENCOUNTER
She has hematuria and back pain. I need to see her in office and recheck please. Please schedule with me this week.

## 2017-05-10 ENCOUNTER — DOCUMENTATION ONLY (OUTPATIENT)
Dept: CARDIOLOGY CLINIC | Age: 82
End: 2017-05-10

## 2017-05-10 RX ORDER — CARVEDILOL 6.25 MG/1
TABLET ORAL
Qty: 60 TAB | Refills: 3 | OUTPATIENT
Start: 2017-05-10

## 2017-05-10 RX ORDER — SPIRONOLACTONE 25 MG/1
TABLET ORAL
Qty: 30 TAB | Refills: 3 | OUTPATIENT
Start: 2017-05-10

## 2017-05-10 NOTE — PROGRESS NOTES
Stop aldactone for K 5.2  Reevaluation needed  Future Appointments  Date Time Provider Cuca Rendoni   5/11/2017 11:30 AM Cat Pereira MD [facility]   5/15/2017 11:00 AM [facility]   5/19/2017 10:15 AM Cat Pereira MD [facility]   7/24/2017 8:15 AM CATH ROOM 3 [facility]   8/3/2017 8:40 AM Paulino Hernández  [address] 8/7/2017 1:00 PM Chin Lang  [address] 8/7/2017 1:00  [facility], [address] [facility]

## 2017-05-11 ENCOUNTER — TELEPHONE (OUTPATIENT)
Dept: CARDIOLOGY CLINIC | Age: 82
End: 2017-05-11

## 2017-05-11 ENCOUNTER — OFFICE VISIT (OUTPATIENT)
Dept: INTERNAL MEDICINE CLINIC | Age: 82
End: 2017-05-11

## 2017-05-11 ENCOUNTER — HOSPITAL ENCOUNTER (OUTPATIENT)
Dept: CT IMAGING | Age: 82
Discharge: HOME | End: 2017-05-11
Attending: INTERNAL MEDICINE
Payer: MEDICARE

## 2017-05-11 ENCOUNTER — HOSPITAL ENCOUNTER (OUTPATIENT)
Dept: LAB | Age: 82
Discharge: HOME | End: 2017-05-11
Payer: MEDICARE

## 2017-05-11 VITALS
DIASTOLIC BLOOD PRESSURE: 68 MMHG | SYSTOLIC BLOOD PRESSURE: 119 MMHG | RESPIRATION RATE: 18 BRPM | HEART RATE: 87 BPM | OXYGEN SATURATION: 98 % | BODY MASS INDEX: 26.85 KG/M2 | WEIGHT: 161.13 LBS | HEIGHT: 65 IN | TEMPERATURE: 97.4 F

## 2017-05-11 DIAGNOSIS — F32.A ANXIETY AND DEPRESSION: Chronic | ICD-10-CM

## 2017-05-11 DIAGNOSIS — R31.9 HEMATURIA: ICD-10-CM

## 2017-05-11 DIAGNOSIS — E03.4 HYPOTHYROIDISM DUE TO ACQUIRED ATROPHY OF THYROID: ICD-10-CM

## 2017-05-11 DIAGNOSIS — Z13.31 SCREENING FOR DEPRESSION: ICD-10-CM

## 2017-05-11 DIAGNOSIS — F41.9 ANXIETY AND DEPRESSION: Chronic | ICD-10-CM

## 2017-05-11 DIAGNOSIS — R10.9 FLANK PAIN: ICD-10-CM

## 2017-05-11 DIAGNOSIS — Z00.00 MEDICARE ANNUAL WELLNESS VISIT, INITIAL: Primary | ICD-10-CM

## 2017-05-11 DIAGNOSIS — N18.30 CKD (CHRONIC KIDNEY DISEASE) STAGE 3, GFR 30-59 ML/MIN (HCC): ICD-10-CM

## 2017-05-11 DIAGNOSIS — Z71.89 ADVANCED CARE PLANNING/COUNSELING DISCUSSION: ICD-10-CM

## 2017-05-11 DIAGNOSIS — Z13.39 SCREENING FOR ALCOHOLISM: ICD-10-CM

## 2017-05-11 LAB
BILIRUB UR QL STRIP: NEGATIVE
GLUCOSE UR-MCNC: NEGATIVE MG/DL
KETONES P FAST UR STRIP-MCNC: NEGATIVE MG/DL
PH UR STRIP: 5.5 [PH] (ref 4.6–8)
PROT UR QL STRIP: NEGATIVE MG/DL
SP GR UR STRIP: 1.02 (ref 1–1.03)
UA UROBILINOGEN AMB POC: NORMAL (ref 0.2–1)
URINALYSIS CLARITY POC: CLEAR
URINALYSIS COLOR POC: NORMAL
URINE BLOOD POC: NORMAL
URINE LEUKOCYTES POC: NEGATIVE
URINE NITRITES POC: NEGATIVE

## 2017-05-11 PROCEDURE — 36415 COLL VENOUS BLD VENIPUNCTURE: CPT

## 2017-05-11 PROCEDURE — 84443 ASSAY THYROID STIM HORMONE: CPT

## 2017-05-11 PROCEDURE — 74176 CT ABD & PELVIS W/O CONTRAST: CPT

## 2017-05-11 RX ORDER — SERTRALINE HYDROCHLORIDE 50 MG/1
TABLET, FILM COATED ORAL DAILY
COMMUNITY
End: 2017-05-11 | Stop reason: SDUPTHER

## 2017-05-11 RX ORDER — PREDNISONE 10 MG/1
20 TABLET ORAL DAILY
COMMUNITY
Start: 2017-05-05 | End: 2017-11-02 | Stop reason: ALTCHOICE

## 2017-05-11 RX ORDER — CARVEDILOL 6.25 MG/1
6.25 TABLET ORAL 2 TIMES DAILY WITH MEALS
Qty: 60 TAB | Refills: 6 | Status: ON HOLD | OUTPATIENT
Start: 2017-05-11 | End: 2017-07-24

## 2017-05-11 RX ORDER — SERTRALINE HYDROCHLORIDE 50 MG/1
50 TABLET, FILM COATED ORAL DAILY
Qty: 90 TAB | Refills: 2 | Status: SHIPPED | OUTPATIENT
Start: 2017-05-11 | End: 2018-02-22 | Stop reason: SDUPTHER

## 2017-05-11 RX ORDER — SPIRONOLACTONE 25 MG/1
TABLET ORAL
Refills: 3 | COMMUNITY
Start: 2017-04-13 | End: 2017-05-11 | Stop reason: ALTCHOICE

## 2017-05-11 NOTE — MR AVS SNAPSHOT
Visit Information Date & Time Provider Department Dept. Phone Encounter #  
 5/11/2017 11:30 AM Hetal Christy MD [facility] [address] [id number] Follow-up Instructions Return in about 6 months (around 11/11/2017). Your Appointments 5/19/2017 10:15 AM  
COMPLETE PHYSICAL with Hetal Christy MD  
[facility]) Appt Note: physical for entry in [address]; Pt's daughter called to confirm. ; physical for entry in [address] [place] [address]  
[phone]  
  
   
 [phone]  
  
    
 8/3/2017  8:40 AM  
ESTABLISHED PATIENT with Mary Jo Michael MD  
[facility] ([facility]) Appt Note: 3 mo f/u  
 [address]  
[address] [address]  
  
    
 8/7/2017  1:00 PM  
PACEMAKER with Kori Browne [facility] [place] ([facility] SCHED[facility]) Appt Note: 2 week BiV PPM upgrade wound check [address]  
[address] [address]  
  
    
 8/7/2017  1:00 PM  
ESTABLISHED PATIENT with Chin Rodriguez MD  
[facility] [place] ([facility]) Appt Note: 2 week BiV PPM upgrade wound check [address]  
[address] [address] Upcoming Health Maintenance Date Due DTaP/Tdap/Td series (1 - Tdap) 4/3/2012 MEDICARE YEARLY EXAM 11/6/2016 INFLUENZA AGE 9 TO ADULT 8/1/2017 GLAUCOMA SCREENING Q2Y 9/10/2017 Allergies as of 5/11/2017  Review Complete On: 5/11/2017 By: Hetal Christy MD  
  
 Severity Noted Reaction Type Reactions Cardizem [Diltiazem Hcl]  10/04/2010    Hives Codeine  10/04/2010    Nausea and Vomiting Darvocet A500 [Propoxyphene N-acetaminophen]  10/04/2010    Nausea and Vomiting Labetalol  04/25/2012    Nausea and Vomiting Dizzy/Disoriented Pcn [Penicillins]  10/04/2010    Swelling Tongue Swelling Primidone  07/31/2012    Other (comments) Lightheaded/unsteady gait Sulfa (Sulfonamide Antibiotics)  10/04/2010    Nausea and Vomiting Current Immunizations  Reviewed on 11/23/2015 Name Date Influenza High Dose Vaccine PF 9/29/2016, 9/24/2015, 10/1/2014 Influenza Vaccine 12/1/2013 Influenza Vaccine Split 10/12/2012 Pneumococcal Conjugate (PCV-13) 11/23/2015 Pneumococcal Polysaccharide (PPSV-23) 2/27/2015 Pneumococcal Vaccine (Pcv) 12/15/2010 TB Skin Test (PPD) 1/1/2001 TD Vaccine 4/2/2012 Varicella Virus Vaccine Live 9/12/2012 Zoster Vaccine, Live 12/1/2013 Not reviewed this visit You Were Diagnosed With   
  
 Codes Comments Hematuria    -  Primary ICD-10-CM: R31.9 ICD-9-CM: 599.70 Flank pain     ICD-10-CM: R10.9 ICD-9-CM: 789.09   
 CKD (chronic kidney disease) stage 3, GFR 30-59 ml/min     ICD-10-CM: N18.3 ICD-9-CM: 581. 3 Hypothyroidism due to acquired atrophy of thyroid     ICD-10-CM: E03.4 ICD-9-CM: 244.8, 246.8 Vitals BP Pulse Temp Resp Height(growth percentile) Weight(growth percentile)  
 119/68 (BP 1 Location: Left arm, BP Patient Position: Sitting) 87 97.4 °F (36.3 °C) (Oral) 18 5' 5\" (1.651 m) 161 lb 2 oz (73.1 kg) LMP SpO2 BMI OB Status Smoking Status 02/26/1970 98% 26.81 kg/m2 Postmenopausal Passive Smoke Exposure - Never Smoker Vitals History BMI and BSA Data Body Mass Index Body Surface Area  
 26.81 kg/m 2 1.83 m 2 Preferred Pharmacy Pharmacy Name Phone [facility]/PHARMACY #[id number]- [place], [address] ([place] [address]) [phone] Your Updated Medication List  
  
   
This list is accurate as of: 5/11/17 12:13 PM.  Always use your most recent med list.  
  
  
  
  
 acetaminophen 500 mg tablet Commonly known as:  TYLENOL Take 500 mg by mouth every six (6) hours as needed for Pain. (2) Tablet in am (1) tablet in pm (2) Tablet pm  
  
 ADVAIR DISKUS 250-50 mcg/dose diskus inhaler Generic drug:  fluticasone-salmeterol Take 1 Puff by inhalation two (2) times a day. Calcium-Cholecalciferol (D3) 600 mg(1,500mg) -400 unit Cap Take  by mouth two (2) times a day. carvedilol 6.25 mg tablet Commonly known as:  Boyd Stinson Take 1 Tab by mouth two (2) times daily (with meals). chlorhexidine 4 % liquid Commonly known as:  HIBICLENS Apply to upper chest and arms as directed COUMADIN 5 mg tablet Generic drug:  warfarin TAKE 1 TABLET BY MOUTH DAILY AND 1/2 TABLET ON SUNDAY AND THURSDAY  
  
 desonide 0.05 % cream  
Commonly known as:  Haylee Makos Apply  to affected area two (2) times a day. lidocaine 5 % Commonly known as:  LIDODERM  
1 Patch by TransDERmal route every twelve (12) hours. multivitamin tablet Commonly known as:  ONE A DAY Take 1 Tab by mouth daily. OXYGEN-AIR DELIVERY SYSTEMS  
2 L by Does Not Apply route nightly. predniSONE 10 mg tablet Commonly known as:  DELTASONE  
  
 raNITIdine 150 mg tablet Commonly known as:  ZANTAC TAKE 1 TABLET BY MOUTH TWICE A DAY  
  
 sacubitril-valsartan 24-26 mg tablet Commonly known as:  ENTRESTO Take 1 Tab by mouth two (2) times a day. sertraline 50 mg tablet Commonly known as:  ZOLOFT Take 1 Tab by mouth daily. simvastatin 20 mg tablet Commonly known as:  ZOCOR  
TAKE 1 TABLET BY MOUTH NIGHTLY SPIRIVA WITH HANDIHALER 18 mcg inhalation capsule Generic drug:  tiotropium Take 1 Cap by inhalation daily. SYNTHROID 112 mcg tablet Generic drug:  levothyroxine TAKE 1 TABLET BY MOUTH EVERY DAY BEFORE BREAKFAST  
  
 ZENPEP 40,000-136,000- 218,000 unit Cpdr  
Generic drug:  lipase-protease-amylase Take 1 Tab by mouth three (3) times daily (with meals). And snacks. Prescriptions Sent to Pharmacy Refills  
 sertraline (ZOLOFT) 50 mg tablet 2 Sig: Take 1 Tab by mouth daily. Class: Normal  
 Pharmacy: [facility]/pharmacy #[id number]- [address], [address] ([place] [address]) Ph #: [phone] Route: Oral  
  
We Performed the Following AMB POC URINALYSIS DIP STICK MANUAL W/O MICRO [27888 CPT(R)] TSH 3RD GENERATION [01906 CPT(R)] Follow-up Instructions Return in about 6 months (around 11/11/2017). To-Do List   
 05/12/2017 Imaging:  CT ABD PELV WO CONT   
  
 05/15/2017 11:00 AM  
  Appointment with Paul Houser at [facility] [place] [address] [address] ([phone])  
  
 07/24/2017 8:15 AM  
  Appointment with CATH ROOM 3 [facility] at [address] [place] [address] ([phone]) NPO AFTER MIDNIGHT! ROUTINE CASES:  Please arrive 2 hour prior to your scheduled appointment time. If your scheduled appointment is for 0730, 0800, 0815, please arrive by 0645. NON ROUTINE CASES:  PATIENTS WHO REQUIRE LABS, X-RAY, EKG, or MEDS:  PLEASE ARRIVE 3 HOURS PRIOR TO YOUR SCHEDULED APPOINTMENT. If you require hydration prior to your procedure, PLEASE ARRIVE 4 HOURS PRIOR TO YOUR APPOINTMENT  **** IT IS THE OFFICE SCHEDULARS RESPONSBILITY TO NOTIFY THE CATH LAB SCHEDULAR IF THE PATIENT WILL REQUIRE ANY ADDITIONAL TIME FOR PREP FROM ROUTINE CASE ***** Introducing [facility] & HEALTH SERVICES! Viktoria Hope introduces [employer] patient portal. Now you can access parts of your medical record, email your doctor's office, and request medication refills online. 1. In your internet browser, go to [email or web address] 2. Click on the First Time User? Click Here link in the Sign In box. You will see the New Member Sign Up page. 3. Enter your [employer] Access Code exactly as it appears below. You will not need to use this code after youve completed the sign-up process. If you do not sign up before the expiration date, you must request a new code. · [employer] Access Code: [email or web address] Expires: 7/20/2017  4:14 PM 
 
4. Enter the last four digits of your Social Security Number (xxxx) and Date of Birth (mm/dd/yyyy) as indicated and click Submit. You will be taken to the next sign-up page. 5. Create a [employer] ID. This will be your [employer] login ID and cannot be changed, so think of one that is secure and easy to remember. 6. Create a [employer] password. You can change your password at any time. 7. Enter your Password Reset Question and Answer. This can be used at a later time if you forget your password. 8. Enter your e-mail address. You will receive e-mail notification when new information is available in [address] 9. Click Sign Up. You can now view and download portions of your medical record. 10. Click the Download Summary menu link to download a portable copy of your medical information. If you have questions, please visit the Frequently Asked Questions section of the [employer] website. Remember, [employer] is NOT to be used for urgent needs. For medical emergencies, dial 911. Now available from your iPhone and Android! Please provide this summary of care documentation to your next provider. Your primary care clinician is listed as Morena Hammond. If you have any questions after today's visit, please call [phone].

## 2017-05-11 NOTE — PATIENT INSTRUCTIONS
Medicare Part B Preventive Services Guidelines/Limitations Date last completed and Frequency Due Date   Bone Mass Measurement  (age 72 & older, biennial) Requires diagnosis related to osteoporosis or estrogen deficiency. Biennial benefit unless patient has history of long-term glucocorticoid tx or baseline is needed because initial test was by other method Completed 11/2015    Recommended every 2 years As recommended by your PCP or Specialist     Cardiovascular Screening Blood Tests (every 5 years)  Total cholesterol, HDL, Triglycerides Order as a panel if possible Completed 8/2016    As recommended by your PCP As recommended by your PCP or Specialist   Colorectal Cancer Screening  -Fecal occult blood test (annual)  -Flexible sigmoidoscopy (5y)  -Screening colonoscopy (10y)  -Barium Enema Age 49-80; After age [de-identified] if history of abnormal results Completed 2013     Recommended every 5 to 10 years  As recommended by your PCP or Specialist     Counseling to Prevent Tobacco Use (up to 8 sessions per year)  - Counseling greater than 3 and up to 10 minutes  - Counseling greater than 10 minutes Patients must be asymptomatic of tobacco-related conditions to receive as preventive service N/A N/A   Diabetes Screening Tests (at least every 3 years, Medicare covers annually or at 6-month intervals for prediabetic patients)    Fasting blood sugar (FBS) or glucose tolerance test (GTT) Patient must be diagnosed with one of the following:  -Hypertension, Dyslipidemia, obesity, previous impaired FBS or GTT  Or any two of the following: overweight, FH of diabetes, age ? 72, history of gestational diabetes, birth of baby weighing more than 9 pounds Completed 1/2017    Recommended every 3 years for non-diabetics     As recommended by your PCP or Specialist     Glaucoma Screening (no USPSTF recommendation) Diabetes mellitus, family history, , age 48 or over,  American, age 72 or over Completed within the last year    Recommended annually As recommended by your PCP or Specialist   Seasonal Influenza Vaccination (annually)  Completed 9/2016    Recommended Annually Completed for 2016 flu season. TDAP Vaccination  Completed 4/2012    Recommended every 10 years As recommended by your PCP or Specialist   Zoster (Shingles) Vaccination Covered by Medicare Part D through the pharmacy- PCP provides prescription Completed 12/2013    Recommended once over age 48  Complete   Pneumococcal Vaccination (once after 72)  Pneumo 23- 2/2015  Recommended once over the age of 72    Prevnar 15- 11/2015 Recommended once over the age of 72 Complete        Complete   Screening Mammography (biennial age 54-69) Annually (age 36 or over) Completed 12/2016   As recommended by your PCP or Specialist     Screening Pap Tests and Pelvic Examination (up to age 79 and after 79 if unknown history or abnormal study last 8 years) Every 25 months except high risk As recommended by your PCP or Specialist   As recommended by your PCP or Specialist     Ultrasound Screening for Abdominal Aortic Aneurysm (AAA) (once) Patient must be referred through IPPE and not have had a screening for abdominal aortic aneurysm before under Medicare. Limited to patients who meet one of the following criteria:  - Men who are 73-68 years old and have smoked more than 100 cigarettes in their lifetime.  -Anyone with a FH of AAA  -Anyone recommended for screening by USPSTF Not indicated unless recommended by PCP   Not indicated unless recommended by PCP     Family Practice Management 2011    If you have any questions or concerns please feel free to contact me at [phone]. It was a pleasure meeting you today and participating in your healthcare.   Julian Drummond RN

## 2017-05-11 NOTE — PROGRESS NOTES
HISTORY OF PRESENT ILLNESS  Santa Madden is a 80 y.o. female. HPI  Problem follow up:  Santa Madden returns for follow up visit regarding low back pain and hematuria. she was seen several weeks ago in reumatologist diagnosed with same and treated with referral her for further workup for kidney stones, referral to Dr. Harvinder Barnhart for back injections. Workup was significant for hemturia. Notes, labs, studies, imaging related to this problem during prior visit were available . Since that visit, she has not changed. she has been compliant with prescribed treatment. She denies gross blood in urine. No dysuria, frequency. She is on warfarin. Planning new PM placement in July with Dr. Vesta Jones. Thyroid Disease:  Santa Madden is a 80 y.o. female here for follow up of hypothyroidism. Lab Results   Component Value Date/Time    TSH 3.620 08/22/2016 08:45 AM     Residual symptoms denies fatigue, weight changes, heat/cold intolerance, bowel/skin changes or CVS symptoms. she denies denies fatigue, weight changes, heat/cold intolerance, bowel/skin changes or CVS symptoms  Thyroid medication has been unchanged since last medication check and labs. ROS    Physical Exam   Musculoskeletal:        Back:    Very tender to light palpation where depicted. C/w musculoskeletal back pain and not renal colic. Visit Vitals    /68 (BP 1 Location: Left arm, BP Patient Position: Sitting)    Pulse 87    Temp 97.4 °F (36.3 °C) (Oral)    Resp 18    Ht 5' 5\" (1.651 m)    Wt 161 lb 2 oz (73.1 kg)    LMP 02/26/1970    SpO2 98%    BMI 26.81 kg/m2     Urine dipstick shows positive for trace red blood cells otherwise negative. ASSESSMENT and PLAN  Park Begum was seen today for hematuria. Diagnoses and all orders for this visit:    Medicare wellness, initial.  Santa Madden received a complete medicare wellness assessment today by Jose Newell RN.   I've reviewed her assessment note and all screening/preventative plans addressed. I also addressed all questions and concerns surrounding the assessment and plans with Brandie Dias. Hematuria - rule out stones, renal masses/sources. May need  consult based on CT results. If negative, may need cystoscopy. -     CT ABD PELV WO CONT; Future  -     AMB POC URINALYSIS DIP STICK MANUAL W/O MICRO    Flank pain  -     CT ABD PELV WO CONT; Future  Now on steroids per rheum. CKD (chronic kidney disease) stage 3, GFR 30-59 ml/min - stable. Repeat bmp in 4 weeks on steroids. Hypothyroidism due to acquired atrophy of thyroid  -     TSH 3RD GENERATION  Anxiety/ depression. She is now on zoloft and seeming to help. .Well controlled and stable. her medications were reviewed and refilled where necessary as noted below. Labs ordered as noted. -     sertraline (ZOLOFT) 50 mg tablet; Take 1 Tab by mouth daily. Follow-up Disposition:  Return in about 6 months (around 11/11/2017).

## 2017-05-11 NOTE — TELEPHONE ENCOUNTER
Pt daughter in law calling in reference to medication spironolactone being denied. She requested a call back to [phone]. Thanks!

## 2017-05-11 NOTE — TELEPHONE ENCOUNTER
Notified Daughter in law Heriberto Geronimo that Dr Anu Mathis stopped the spironolactone a while ago due to K being high. Coreg refill sent into pharmacy [facility] per Dr Anu Mathis.

## 2017-05-11 NOTE — PROGRESS NOTES
Nurse Navigator Medicare Wellness Visit performed by ESTEHPANIA Rachel    This is an Initial Ferdinand Exam (AWV) (Performed 12 months after IPPE or effective date of Medicare Part B enrollment, Once in a lifetime)    I have reviewed the patient's medical history in detail and updated the computerized patient record. History     Past Medical History:   Diagnosis Date    Arthritis     Atrial fibrillation ([address]     av node ablation 5/22/98 with placement of CPI #[id number] dual chamber pacemaker subsequently replaced with a single chamber medtronic #[id number] single chamber pacemaker 7/25/05    Cancer ([address] 1970's    colon cancer w/ resection    COPD     Depression     GERD (gastroesophageal reflux disease)     Hyperlipidemia     Hypertension     Ischemic cardiomyopathy     Migraine headache     Orthostatic hypotension     RADHA (obstructive sleep apnea)     Pacemaker 5/22/98    AV node ablation /pacemaker placement utilizing CPI # [id number] dual chamber system, medtronic #[id number] single chamber device placed 7/22/05    Pulmonary hypertension ([address] 9/26/2011    RVSP 50 on echo 8/14    Thyroid disease     Valvular heart disease     mild-mod MR/TR      Past Surgical History:   Procedure Laterality Date    BREAST SURGERY PROCEDURE UNLISTED      benign breast tumor excision right breast    HX BREAST BIOPSY Right 2002    neg; surgical bx    HX COLECTOMY  1974    colon    HX KNEE REPLACEMENT      right TKA    HX PACEMAKER      HX TUBAL LIGATION      TOTAL KNEE ARTHROPLASTY      total on R, partial on L     Current Outpatient Prescriptions   Medication Sig Dispense Refill    carvedilol (COREG) 6.25 mg tablet Take 1 Tab by mouth two (2) times daily (with meals). 60 Tab 6    sertraline (ZOLOFT) 50 mg tablet Take 1 Tab by mouth daily.  90 Tab 2    COUMADIN 5 mg tablet TAKE 1 TABLET BY MOUTH DAILY AND 1/2 TABLET ON SUNDAY AND THURSDAY (Patient taking differently: TAKE 1 TABLET BY MOUTH DAILY AND 1/2 TABLET ON SUNDAY) 90 Tab 0    chlorhexidine (HIBICLENS) 4 % liquid Apply to upper chest and arms as directed 1 Bottle 0    simvastatin (ZOCOR) 20 mg tablet TAKE 1 TABLET BY MOUTH NIGHTLY 30 Tab 0    lipase-protease-amylase (ZENPEP) 40,000-136,000- 218,000 unit cpDR Take 1 Tab by mouth three (3) times daily (with meals). And snacks.  lidocaine (LIDODERM) 5 % 1 Patch by TransDERmal route every twelve (12) hours. 60 Each 0    desonide (TRIDESILON) 0.05 % cream Apply  to affected area two (2) times a day. 15 g 0    SYNTHROID 112 mcg tablet TAKE 1 TABLET BY MOUTH EVERY DAY BEFORE BREAKFAST 30 Tab 5    sacubitril-valsartan (ENTRESTO) 24 mg/26 mg tablet Take 1 Tab by mouth two (2) times a day. 60 Tab 3    multivitamin (ONE A DAY) tablet Take 1 Tab by mouth daily.  Calcium-Cholecalciferol, D3, 600 mg(1,500mg) -400 unit cap Take  by mouth two (2) times a day.  ranitidine (ZANTAC) 150 mg tablet TAKE 1 TABLET BY MOUTH TWICE A DAY 60 Tab 5    OXYGEN-AIR DELIVERY SYSTEMS 2 L by Does Not Apply route nightly.  ADVAIR DISKUS 250-50 mcg/dose diskus inhaler Take 1 Puff by inhalation two (2) times a day.  acetaminophen (TYLENOL) 500 mg tablet Take 500 mg by mouth every six (6) hours as needed for Pain. (2) Tablet in am (1) tablet in pm (2) Tablet pm  0    tiotropium (SPIRIVA WITH HANDIHALER) 18 mcg inhalation capsule Take 1 Cap by inhalation daily.       predniSONE (DELTASONE) 10 mg tablet        Allergies   Allergen Reactions    Cardizem [Diltiazem Hcl] Hives    Codeine Nausea and Vomiting    Darvocet A500 [Propoxyphene N-Acetaminophen] Nausea and Vomiting    Labetalol Nausea and Vomiting     Dizzy/Disoriented     Pcn [Penicillins] Swelling     Tongue Swelling     Primidone Other (comments)     Lightheaded/unsteady gait    Sulfa (Sulfonamide Antibiotics) Nausea and Vomiting     Family History   Problem Relation Age of Onset    Diabetes Mother     Heart Disease Mother     Heart Attack Mother  Heart Disease Father     Stroke Sister     Breast Cancer Sister 80    Cancer Maternal Aunt      uterus    Diabetes Maternal Uncle     Breast Cancer Daughter 61     Social History   Substance Use Topics    Smoking status: Passive Smoke Exposure - Never Smoker    Smokeless tobacco: Never Used    Alcohol use No     Patient Active Problem List   Diagnosis Code    Mixed hyperlipidemia E78.2    Atrial fibrillation (HCC) I48.91    Mitral regurgitation I34.0    CHB (complete heart block) (HCC) I44.2    Orthostatic hypotension I95.1    Chest pain, unspecified R07.9    Pulmonary hypertension (HCC) I27.2    Anxiety and depression F41.9, F32.9    Colon cancer ([facility] C18.9    Hypothyroid E03.9    CKD (chronic kidney disease) stage 3, GFR 30-59 ml/min N18.3    Hypothyroidism due to acquired atrophy of thyroid E03.4    Osteopenia M85.80    Restrictive lung disease J98.4    Chest pain R07.9    Abnormal stress test R94.39    Ischemic cardiomyopathy I25.5    Cardiomyopathy (HCC) I42.9    Psoriasis L40.9    Advanced care planning/counseling discussion Z71.89         Depression Risk Factor Screening:   Patient denies feelings of being down, depressed or hopeless at this time. Patient states that they have a strong support system within their family & friends. Patient shares that she has been experiencing chronic fatigue which makes her feel unmotivated to get up & get going. Patient states that she will discuss this in more detail with PCP today. Per PCP's progress note, patient does take zoloft as prescribed & as managed by PCP. Patient denies thoughts of harm to self or others. PHQ over the last two weeks 5/11/2017   Little interest or pleasure in doing things Not at all   Feeling down, depressed or hopeless Not at all   Total Score PHQ 2 0     Alcohol Risk Factor Screening: On any occasion during the past 3 months, have you had more than 3 drinks containing alcohol?   No    Do you average more than 7 drinks per week? No    Functional Ability and Level of Safety:     Hearing Loss   mild    Activities of Daily Living   Partial assistance. Patient states that she lives alone in a one level small condo, but her 4 children visit & speak with her frequently. Patient states that she does require assistance with some ADL's as listed in chart below. Patient's daughter (present during appointment) explains that patient has a private duty home care aid with her every Monday & every Friday from 9am to 1pm & that the home care aid will assist with chores/ meal prep & take the patient on outings (i.e to go visit patient's sister at [facility] or out for a meal). When home care aid is not present, patient's family will step in to provide assistance as needed. Patient uses a cane or rollator for ambulation assistance, but patient states that longer distance ambulation is becoming increasingly more challenging & she will inquire with PCP about getting a small wheelchair. Patient states that her physical activity is limited given her age & her medical history. Patient will discuss chronic fatigue & chronic back pain with PCP today. Requires assistance with:   ADL Assessment 5/11/2017   Feeding yourself No Help Needed   Getting from bed to chair No Help Needed   Getting dressed No Help Needed   Bathing or showering No Help Needed   Walk across the room (includes cane/walker) No Help Needed   Using the telphone No Help Needed   Taking your medications Help Needed   Preparing meals Help Needed   Managing money (expenses/bills) Help Needed   Moderately strenuous housework (laundry) Help Needed   Shopping for personal items (toiletries/medicines) Help Needed   Shopping for groceries Help Needed   Driving Help Needed   Climbing a flight of stairs Help Needed   Getting to places beyond walking distances Help Needed       Fall Risk   Patient reports two minor falls at home within the last year.  Patient states that her condo is small & on two occasions, she has caught her foot on a piece of furniture & fallen. Patient adds that her home is carpeted, so there have been no injuries at the time of these falls. Patient denies a visit to the ER/ MD at the time of the falls. Patient states that she was able to get herself back up after the falls. Patient ambulated with the assistance of a cane or rollator. Patient denies any additional falls. Patient denies feeling dizzy, weak, lightheaded & states no loss of consciousness at the time of the fall. Patient verbalized fall prevention strategies. Fall Risk Assessment, last 12 mths 5/11/2017   Able to walk? Yes   Fall in past 12 months? Yes   Fall with injury? No   Number of falls in past 12 months 2   Fall Risk Score 2     Abuse Screen   Patient is not abused    Review of Systems   Medicare Wellness Visit    Physical Examination     No exam data present    Evaluation of Cognitive Function:  Mood/affect:  happy  Appearance: age appropriate and casually dressed  Family member/caregiver input: Patient's daughter present in a supportive manner. No exam performed today, Medicare Wellness Visit. Patient Care Team:  Mann Holder MD as PCP - General (Internal Medicine)  Florentin Vega MD (Pulmonary Disease)  Antoinette Peck MD (Cardiology)  Montey Schirmer, MD as Consulting Provider (Cardiology)    Advice/Referrals/Counseling   Education and counseling provided:  End-of-Life planning (with patient's consent)  Pneumococcal Vaccine  Influenza Vaccine  Screening Mammography  Screening Pap and pelvic (covered once every 2 years)  Colorectal cancer screening tests  Bone mass measurement (DEXA)  Screening for glaucoma  tdap & shingles vaccinations      Assessment/Plan   1. A copy of patient's completed Advanced Medical Directive is on file in the patient's medical record.  NN reviewed Advanced Medical Directive document with patient & patient denies the need for changes/ updates. 2. Patient is up to date on the following immunizations: flu vaccine (admin 9/2016), tdap vaccine (admin 4/2012), shingles vaccine (admin 12/2013), pneumonia 23 vaccine (admin 2/2015) & prevnar 13 vaccine (admin 11/2015). Patient confirmed the aforementioned preventative immunization dates are correct. Patient's health maintenance immunization record has been updated & is current. 3. Due to the patient's age, screening mammograms (report on file from 12/2016) & screening colonoscopies (report on file from 2013 performed by Dr. Mikael Drake) are no longer indicated unless recommended by PCP or a specialist. Patient's last screening dexa scan completed 11/2015 & a copy of this dexa scan report is on file in the patient's medical record. 4. Patient wears corrective lenses. Patient reports having a routine eye exam & glaucoma screening within the last year performed by Dr. Melissa Mcnamara at the [facility]. RICARDO faxed requesting a copy of patient's last eye exam with glaucoma screening with patient's verbal approval. Patient's daughter shares that patient had cataract removal surgery 10/2016 & 11/2016 with good results. 5. Please see depression, ADL & fall risk screening section for additional information. Patient verbalized understanding of all information discussed. Patient was given the opportunity to ask questions. Medication reconciliation completed by MA/ LPN and reviewed by PCP. Patient provided AVS which includes Medicare Wellness Preventative Screening Table.

## 2017-05-12 ENCOUNTER — TELEPHONE (OUTPATIENT)
Dept: INTERNAL MEDICINE CLINIC | Age: 82
End: 2017-05-12

## 2017-05-12 LAB — TSH SERPL DL<=0.005 MIU/L-ACNC: 2.3 UIU/ML (ref 0.45–4.5)

## 2017-05-12 NOTE — TELEPHONE ENCOUNTER
Patient is going to use [facility] for the wheelchair. Carlos Miles was informed that they need to send us the order form so the correct information can be sent to get process going.

## 2017-05-15 ENCOUNTER — HOSPITAL ENCOUNTER (OUTPATIENT)
Dept: PHYSICAL THERAPY | Age: 82
Discharge: HOME | End: 2017-05-15
Payer: MEDICARE

## 2017-05-15 PROCEDURE — G8978 MOBILITY CURRENT STATUS: HCPCS | Performed by: PHYSICAL THERAPIST

## 2017-05-15 PROCEDURE — 97162 PT EVAL MOD COMPLEX 30 MIN: CPT | Performed by: PHYSICAL THERAPIST

## 2017-05-15 PROCEDURE — 97110 THERAPEUTIC EXERCISES: CPT | Performed by: PHYSICAL THERAPIST

## 2017-05-15 PROCEDURE — G8979 MOBILITY GOAL STATUS: HCPCS | Performed by: PHYSICAL THERAPIST

## 2017-05-15 RX ORDER — SACUBITRIL AND VALSARTAN 24; 26 MG/1; MG/1
TABLET, FILM COATED ORAL
Qty: 60 TAB | Refills: 6 | Status: SHIPPED | OUTPATIENT
Start: 2017-05-15 | End: 2017-09-12 | Stop reason: SINTOL

## 2017-05-15 NOTE — PROGRESS NOTES
[address] [facility], [address]  Phone: [phone]  Fax: [phone]    Plan of Care/Statement of Necessity for Physical Therapy Services  2-15    Patient name: Brandie Dias  : 1932  Provider#: [id number]  Referral source: Catherine Strauss MD      Medical/Treatment Diagnosis: Low back pain [M54.5]     Prior Hospitalization: see medical history     Comorbidities: Colon Cancer (87), R TKR, L partial TKR, pacemaker, heart disease, sleep apnea, dizziness, scoliosis, depression, osteoporosis, Elim IRA, HTN  Prior Level of Function: Independent ADLs and household chores, walking without an AD  Medications: Verified on Patient Summary List  Start of Care: 5/15/17      Onset Date: 6 months ago   The Plan of Care and following information is based on the information from the initial evaluation. Assessment/ key information: The patient presents with signs and symptoms consistent with L lumbar radiculopathy complicated by significant scoliosis and arthritis in her lumbar spine. The patient's condition is complicated by complex PMH and fall risk. The patient would benefit from balance training to decrease risk of falls. Evaluation Complexity History HIGH Complexity :3+ comorbidities / personal factors will impact the outcome/ POC ; Examination HIGH Complexity : 4+ Standardized tests and measures addressing body structure, function, activity limitation and / or participation in recreation  ;Presentation MEDIUM Complexity : Evolving with changing characteristics  ; Clinical Decision Making HIGH Complexity : FOTO score of 1- 25   Overall Complexity Rating: MEDIUM    Problem List: pain affecting function, decrease ROM, decrease strength, impaired gait/ balance, decrease ADL/ functional abilitiies, decrease activity tolerance, decrease flexibility/ joint mobility and decrease transfer abilities   Treatment Plan may include any combination of the following: Therapeutic exercise, Therapeutic activities, Neuromuscular re-education, Physical agent/modality, Gait/balance training, Manual therapy, Patient education and Functional mobility training  Patient / Family readiness to learn indicated by: asking questions, trying to perform skills and interest  Persons(s) to be included in education: patient (P) and family support person (FSP);list caretaker  Barriers to Learning/Limitations: None  Patient Goal (s): to decrease the pain  Patient Self Reported Health Status: fair  Rehabilitation Potential: good    Short Term Goals: To be accomplished in 4 weeks:  1) The patient will be independent with introductory HEP   2) The patient will demonstrate ability to transfer sit to stand without UE assist without an increase in pain  3) The patient will demonstrate lumbar flexion AROM to floor without an increase in pain  Long Term Goals: To be accomplished in 12 weeks:  1) The patient will be independent with finalized strengthening HEP  2) The patient will report ability to ambulate 20 minutes without an increase in pain using LRAD  3) The patient will demonstrate pain free bed mobility and transfers, to improve ability to sleep through the night  Frequency / Duration: Patient to be seen 2 times per week for 12 weeks. Patient/ Caregiver education and instruction: self care, activity modification and exercises    [x]  Plan of care has been reviewed with PTA    G-Codes (GP)  Mobility   Current  CL= 60-79%   Goal  CK= 40-59%    The severity rating is based on clinical judgment and the FOTO Score score. Certification Period: 5/15/17-8/17/17  Sarina Belcher, PT 5/15/2017 12:19 PM    ________________________________________________________________________    I certify that the above Therapy Services are being furnished while the patient is under my care. I agree with the treatment plan and certify that this therapy is necessary.     [address] [facility] Signature:____________________  Date:____________Time: _________

## 2017-05-15 NOTE — PROGRESS NOTES
PT INITIAL EVALUATION NOTE - [facility] 2-15    Patient Name: Santa Madden  Date:5/15/2017  : 1932  [x]  Patient  Verified  Payor: Reji Franco / Plan: [place] MEDICARE PART A & B / Product Type: Medicare /    In time:11:05 AM  Out time:12:10 PM  Total Treatment Time (min): 65  Total Timed Codes (min): 50  1:1 Treatment Time ( only): 15   Visit #: 1     Treatment Area: Dorsalgia, unspecified [M54.9]    SUBJECTIVE  Pain Level (0-10 scale): 8  Any medication changes, allergies to medications, adverse drug reactions, diagnosis change, or new procedure performed?: [] No    [x] Yes (see summary sheet for update)  Subjective:    Pt c/o terrible pain in her back. She does not have the results of her CT scan. Pt reports her pain started 6 months ago. Pain has increased since onset. Pt reports pain began insidiously. X-ray reveals arthritis. Pain is across her low back and mid back. Pain is not worse on once side. Pain goes down her left leg to her knee. Pt reports she has been experiencing numbness/ tingling when she wakes up in the morning. Pt describes the pain as excruciating. Pt reports when she lays down or sits her pain lessens, she uses a heating pad for pain  Pain is increased by standing and walking, making her bed, cooking, driving  [employer] has been working for her since 2017, she takes her to the grocery store and doctor appointment. Pt needs help bending over to put on her shoes, laundry. Pt has a walk in shower so she is able to do that independently. She takes 600 mg Tylenol and Lidocaine patch. Pain is relieved temporarily.   Pt has been on oxygen when she sleeps at night, she sleeps at an incline  Pt has been ambulating with SPC for the past 6 months, she uses a rollator intermittnetly  PLOF: Independent with ADLs and household chores  Mechanism of Injury: Insidious onset  Previous Treatment/Compliance: Pt has not had PT for her low back  PMHx/Surgical Hx: Colon Cancer (87), R TKR, L partial TKR, pacemaker, heart disease, sleep apnea, dizziness, scoliosis, depression, osteoporosis, Habematolel, HTN  Work Hx: Retired 20 years ago, she used to work in  and had to lift heavy objects for [employer] Situation: Pt lives by herself. No steps to enter house  Pt Goals:  To get rid of the pain  Barriers: Age  Motivation: Excellent  Substance use: None  FABQ Score: 47%  Cognition: A & O x 3        OBJECTIVE/EXAMINATION  Posture:  L shoulder higher, R hip higher, thoracic kyphosis, decreased lumbar lordosis, scoliosis  Other Observations:  Increased postural sway when standing without UE assist  Gait and Functional Mobility:  Pt requires UE assist to t/f sit to stand, pain with transfers sit to supine, pt required mod assist to transfer sidelying to sit    Lumbar AROM:        R  L   Flexion    5 inches         Extension   25%       Side Bending   50%  50%        Rotation   50% p!  50%        *Pulling pain    LOWER QUARTER   MUSCLE STRENGTH  KEY       R  L  0 - No Contraction  L1, L2 Psoas  4-  4-    1 - Trace   L3 Quads  4-  4-    2 - Poor   L4 Tib Ant  4-  4-    3 - Fair    L5 EHL  4+  4    4 - Good   S1 FHL  4-  3+    5 - Normal   S2 Hams  4-  4-         Neurological: Sensation: decreased sensation in L calf  Special Tests:        SLR: R- L+      Modality rationale: decrease pain and increase tissue extensibility to improve the patients ability to sit, stand, transfer, ambulate, lift, carry, reach, complete ADLs   Min Type Additional Details    [] Estim: []Att   []Unatt        []TENS instruct                  []IFC  []Premod   []NMES                     []Other:  []w/US   []w/ice   []w/heat  Position:  Location:    []  Traction: [] Cervical       []Lumbar                       [] Prone          []Supine                       []Intermittent   []Continuous Lbs:  [] before manual  [] after manual  []w/heat    []  Ultrasound: []Continuous   [] Pulsed at:                           []1MHz []3MHz Location:  W/cm2:    [] Paraffin         Location:   []w/heat   15 []  Ice     [x]  Heat  []  Ice massage Position: supine  Location: low back pain    []  Laser  []  Other: Position:  Location:      []  Vasopneumatic Device Pressure:       [] lo [] med [] hi   Temperature:      [x] Skin assessment post-treatment:  [x]intact []redness- no adverse reaction    []redness  adverse reaction:     15 min Therapeutic Exercise:  [x] See flow sheet :   Rationale: increase ROM and increase strength to improve the patients ability to sit, stand, transfer, ambulate, lift, carry, reach, complete ADLs       With   [x] TE   [] TA   [] neuro   [] other: Patient Education: [x] Review HEP    [] Progressed/Changed HEP based on:   [x] positioning   [x] body mechanics   [] transfers   [x] heat/ice application    [] other:      Other Objective/Functional Measures:Painful bed mobility    Pain Level (0-10 scale) post treatment: 8/10, dizziness    ASSESSMENT/Changes in Function:     [x]  See Plan of [address] PT 5/15/2017  11:03 AM

## 2017-05-15 NOTE — TELEPHONE ENCOUNTER
Request for entresto 24-26mg twice a day. Last office visit 4/27/17, next office visit 8/7/17. Refills per verbal order from Dr. Daniel Faulkner.

## 2017-05-17 ENCOUNTER — TELEPHONE (OUTPATIENT)
Dept: INTERNAL MEDICINE CLINIC | Age: 82
End: 2017-05-17

## 2017-05-17 NOTE — TELEPHONE ENCOUNTER
The daughter Walter García would like to speak with the nurse regarding her mom CT Scan of her kidney stone from last week.  No [phone]  Would like a call this am

## 2017-05-18 ENCOUNTER — TELEPHONE (OUTPATIENT)
Dept: CARDIOLOGY CLINIC | Age: 82
End: 2017-05-18

## 2017-05-18 ENCOUNTER — TELEPHONE (OUTPATIENT)
Dept: INTERNAL MEDICINE CLINIC | Age: 82
End: 2017-05-18

## 2017-05-18 NOTE — PROGRESS NOTES
CT reviewed with daughter- no evidence of stones. To start prednisone for MS back pain. .  Can stop lidocaine patches as they are not working well.

## 2017-05-19 ENCOUNTER — HOSPITAL ENCOUNTER (OUTPATIENT)
Dept: PHYSICAL THERAPY | Age: 82
Discharge: HOME | End: 2017-05-19
Payer: MEDICARE

## 2017-05-19 PROCEDURE — 97112 NEUROMUSCULAR REEDUCATION: CPT | Performed by: PHYSICAL THERAPIST

## 2017-05-19 PROCEDURE — 97110 THERAPEUTIC EXERCISES: CPT | Performed by: PHYSICAL THERAPIST

## 2017-05-19 NOTE — PROGRESS NOTES
PT DAILY TREATMENT NOTE - [facility] 2-15    Patient Name: Tasneem Cheek  Date:2017  : 1932  [x]  Patient  Verified  Payor: [place] MEDICARE / Plan: [place] MEDICARE PART A & B / Product Type: Medicare /    In time:10:30 AM  Out time:11:25 AM  Total Treatment Time (min): 55  Total Timed Codes (min): 40  1:1 Treatment Time (MC only): 40   Visit #: 2     Treatment Area: Low back pain [M54.5]    SUBJECTIVE  Pain Level (0-10 scale): 7  Any medication changes, allergies to medications, adverse drug reactions, diagnosis change, or new procedure performed?: [x] No    [] Yes (see summary sheet for update)  Subjective functional status/changes:   [] No changes reported  Pt reports compliance with HEP  Pt reports she still has to take a pain pill in the morning    OBJECTIVE    Modality rationale: decrease pain and increase tissue extensibility to improve the patients ability to sit, stand, transfer, ambulate, lift, carry, reach, complete ADLs   Min Type Additional Details    [] Estim: []Att   []Unatt        []TENS instruct                  []IFC  []Premod   []NMES                     []Other:  []w/US   []w/ice   []w/heat  Position:  Location:    []  Traction: [] Cervical       []Lumbar                       [] Prone          []Supine                       []Intermittent   []Continuous Lbs:  [] before manual  [] after manual  []w/heat    []  Ultrasound: []Continuous   [] Pulsed at:                           []1MHz   []3MHz Location:  W/cm2:    [] Paraffin         Location:   []w/heat   15 []  Ice     [x]  Heat  []  Ice massage Position: supine  Location:    []  Laser  []  Other: Position:  Location:      []  Vasopneumatic Device Pressure:       [] lo [] med [] hi   Temperature:      [x] Skin assessment post-treatment:  [x]intact []redness- no adverse reaction    []redness  adverse reaction:     30 min Therapeutic Exercise:  [x] See flow sheet :   Rationale: increase ROM and increase strength to improve the patients ability to sit, stand, transfer, ambulate, lift, carry, reach, complete ADLs    10 min Neuromuscular Re-education:  [x]  See flow sheet :   Rationale: improve coordination, improve balance and increase proprioception  to improve the patients ability to sit, stand, transfer, ambulate, lift, carry, reach, complete ADLs        With   [x] TE   [] TA   [x] neuro   [] other: Patient Education: [x] Review HEP    [] Progressed/Changed HEP based on:   [x] positioning   [x] body mechanics   [] transfers   [x] heat/ice application    [] other:      Other Objective/Functional Measures:   No LOB with NBOS EO and WBOS EC, increased postural sway   No increase in pain with therapeutic exercise  Pain Level (0-10 scale) post treatment: 7    ASSESSMENT/Changes in Function:     Patient will continue to benefit from skilled PT services to modify and progress therapeutic interventions, address functional mobility deficits, address ROM deficits, address strength deficits, analyze and address soft tissue restrictions, analyze and cue movement patterns, analyze and modify body mechanics/ergonomics, assess and modify postural abnormalities, address imbalance/dizziness and instruct in home and community integration to attain remaining goals. []  See Plan of Care  []  See progress note/recertification  []  See Discharge Summary         Progress towards goals / Updated goals:  Patient continues to require verbal cues to complete exercises with correct form and postural awareness. Patient was able to advance several exercises this visit and is progressing well towards goals.     PLAN  [x]  Upgrade activities as tolerated     [x]  Continue plan of care  [x]  Update interventions per flow sheet       []  Discharge due to:_  []  Other:_      Cindy Johnson, PT 5/19/2017  10:46 AM

## 2017-05-19 NOTE — TELEPHONE ENCOUNTER
[facility] stated that patient had no other episodes over night and is doing well. She is at PT right now. The daughter was reminding to call if she needs anything.

## 2017-05-22 ENCOUNTER — TELEPHONE (OUTPATIENT)
Dept: CARDIOLOGY CLINIC | Age: 82
End: 2017-05-22

## 2017-05-22 NOTE — TELEPHONE ENCOUNTER
Per Dr Steven Reeves prednisone will not interfere with her BiV upgrade. Notified Yadira Daniel of this.

## 2017-05-22 NOTE — TELEPHONE ENCOUNTER
Patient's daughter [facility] states that the patient will be starting prednisone as prescribed by another physician and would like to discuss this. Can be reached at [phone]. Thanks!

## 2017-05-23 ENCOUNTER — HOSPITAL ENCOUNTER (OUTPATIENT)
Dept: PHYSICAL THERAPY | Age: 82
Discharge: HOME | End: 2017-05-23
Payer: MEDICARE

## 2017-05-23 PROCEDURE — 97112 NEUROMUSCULAR REEDUCATION: CPT | Performed by: PHYSICAL THERAPIST

## 2017-05-23 PROCEDURE — 97110 THERAPEUTIC EXERCISES: CPT | Performed by: PHYSICAL THERAPIST

## 2017-05-23 NOTE — PROGRESS NOTES
PT DAILY TREATMENT NOTE - [facility] 2-15    Patient Name: Darnell Winter  Date:2017  : 1932  [x]  Patient  Verified  Payor: [place] MEDICARE / Plan: [place] MEDICARE PART A & B / Product Type: Medicare /    In time: 11:00 AM Out time: 11:55 AM  Total Treatment Time (min): 55  Total Timed Codes (min): 40  1:1 Treatment Time ( only): 40  Visit #: 3    Treatment Area: Low back pain [M54.5]    SUBJECTIVE  Pain Level (0-10 scale): 7  Any medication changes, allergies to medications, adverse drug reactions, diagnosis change, or new procedure performed?: [x] No    [] Yes (see summary sheet for update)  Subjective functional status/changes:   [] No changes reported  Pt reports when she got up this morning she felt pretty good but then she tried to clean her fridge/ freezer she had an increase in pain    OBJECTIVE    Modality rationale: decrease pain and increase tissue extensibility to improve the patients ability to sit, stand, transfer, ambulate, lift, carry, reach, complete ADLs   Min Type Additional Details    [] Estim: []Att   []Unatt        []TENS instruct                  []IFC  []Premod   []NMES                     []Other:  []w/US   []w/ice   []w/heat  Position:  Location:    []  Traction: [] Cervical       []Lumbar                       [] Prone          []Supine                       []Intermittent   []Continuous Lbs:  [] before manual  [] after manual  []w/heat    []  Ultrasound: []Continuous   [] Pulsed at:                           []1MHz   []3MHz Location:  W/cm2:    [] Paraffin         Location:   []w/heat   15 []  Ice     [x]  Heat  []  Ice massage Position: supine  Location:    []  Laser  []  Other: Position:  Location:      []  Vasopneumatic Device Pressure:       [] lo [] med [] hi   Temperature:      [x] Skin assessment post-treatment:  [x]intact []redness- no adverse reaction    []redness  adverse reaction:     30 min Therapeutic Exercise:  [x] See flow sheet :   Rationale: increase ROM and increase strength to improve the patients ability to sit, stand, transfer, ambulate, lift, carry, reach, complete ADLs    10 min Neuromuscular Re-education:  [x]  See flow sheet :   Rationale: improve coordination, improve balance and increase proprioception  to improve the patients ability to sit, stand, transfer, ambulate, lift, carry, reach, complete ADLs        With   [x] TE   [] TA   [x] neuro   [] other: Patient Education: [x] Review HEP    [] Progressed/Changed HEP based on:   [x] positioning   [x] body mechanics   [] transfers   [x] heat/ice application    [] other:      Other Objective/Functional Measures:   No LOB with standing marching, no UE assist  Pt unable to perform standing heel raises without UE assist    Pain Level (0-10 scale) post treatment: 7    ASSESSMENT/Changes in Function:     Patient will continue to benefit from skilled PT services to modify and progress therapeutic interventions, address functional mobility deficits, address ROM deficits, address strength deficits, analyze and address soft tissue restrictions, analyze and cue movement patterns, analyze and modify body mechanics/ergonomics, assess and modify postural abnormalities, address imbalance/dizziness and instruct in home and community integration to attain remaining goals. []  See Plan of Care  []  See progress note/recertification  []  See Discharge Summary         Progress towards goals / Updated goals:  Patient continues to require verbal cues to complete exercises with correct form and postural awareness. Patient was able to advance several exercises this visit and is progressing well towards goals.     PLAN  [x]  Upgrade activities as tolerated     [x]  Continue plan of care  [x]  Update interventions per flow sheet       []  Discharge due to:_  []  Other:_      Hugh Bautista PT 5/23/2017  10:46 AM

## 2017-05-25 ENCOUNTER — TELEPHONE (OUTPATIENT)
Dept: CARDIOLOGY CLINIC | Age: 82
End: 2017-05-25

## 2017-05-26 ENCOUNTER — HOSPITAL ENCOUNTER (OUTPATIENT)
Dept: PHYSICAL THERAPY | Age: 82
Discharge: HOME | End: 2017-05-26
Payer: MEDICARE

## 2017-05-26 PROCEDURE — 97110 THERAPEUTIC EXERCISES: CPT

## 2017-05-26 PROCEDURE — 97112 NEUROMUSCULAR REEDUCATION: CPT

## 2017-05-26 NOTE — PROGRESS NOTES
PT DAILY TREATMENT NOTE - [facility] 2-15    Patient Name: Julia Duran  Date:2017  : 1932  [x]  Patient  Verified  Payor: Jannetta Dancer / Plan: [place] MEDICARE PART A & B / Product Type: Medicare /    In time: 1100a Out time: 1155a  Total Treatment Time (min): 55  Total Timed Codes (min): 55  1:1 Treatment Time ( only): 40  Visit #: 4    Treatment Area: Low back pain [M54.5]    SUBJECTIVE  Pain Level (0-10 scale): 5.5  Any medication changes, allergies to medications, adverse drug reactions, diagnosis change, or new procedure performed?: [x] No    [] Yes (see summary sheet for update)  Subjective functional status/changes:   [] No changes reported  Patient reports she is feeling a little better today and felt good after last session.     OBJECTIVE    Modality rationale: Patient declined   Min Type Additional Details    [] Estim: []Att   []Unatt        []TENS instruct                  []IFC  []Premod   []NMES                     []Other:  []w/US   []w/ice   []w/heat  Position:  Location:    []  Traction: [] Cervical       []Lumbar                       [] Prone          []Supine                       []Intermittent   []Continuous Lbs:  [] before manual  [] after manual  []w/heat    []  Ultrasound: []Continuous   [] Pulsed at:                           []1MHz   []3MHz Location:  W/cm2:    [] Paraffin         Location:   []w/heat   15 []  Ice     [x]  Heat  []  Ice massage Position: supine  Location:    []  Laser  []  Other: Position:  Location:      []  Vasopneumatic Device Pressure:       [] lo [] med [] hi   Temperature:      [x] Skin assessment post-treatment:  [x]intact []redness- no adverse reaction    []redness  adverse reaction:     35 min Therapeutic Exercise:  [x] See flow sheet :   Rationale: increase ROM and increase strength to improve the patients ability to sit, stand, transfer, ambulate, lift, carry, reach, complete ADLs    20 min Neuromuscular Re-education:  [x]  See flow sheet :   Rationale: improve coordination, improve balance and increase proprioception  to improve the patients ability to sit, stand, transfer, ambulate, lift, carry, reach, complete ADLs        With   [x] TE   [] TA   [x] neuro   [] other: Patient Education: [x] Review HEP    [] Progressed/Changed HEP based on:   [x] positioning   [x] body mechanics   [] transfers   [] heat/ice application    [] other:      Other Objective/Functional Measures:   No LOB with standing marching, no UE assist  Pt unable to perform standing heel raises without UE assist    Pain Level (0-10 scale) post treatment: 5.5    ASSESSMENT/Changes in Function:     Patient will continue to benefit from skilled PT services to modify and progress therapeutic interventions, address functional mobility deficits, address ROM deficits, address strength deficits, analyze and address soft tissue restrictions, analyze and cue movement patterns, analyze and modify body mechanics/ergonomics, assess and modify postural abnormalities, address imbalance/dizziness and instruct in home and community integration to attain remaining goals. []  See Plan of Care  []  See progress note/recertification  []  See Discharge Summary         Progress towards goals / Updated goals:  Patient able to advance neuro re-education exercise and strengthening exercises with no increased pain throughout. Patient requires verbal cues to maintain upright posture with standing exercises. Patient ambulating without SPC throughout clinic and demonstrates 2 LOB with ambulation, however was able to correct without assistance.      PLAN  [x]  Upgrade activities as tolerated     [x]  Continue plan of care  [x]  Update interventions per flow sheet       []  Discharge due to:_  []  Other:_      Tamiko Mendoza, EMILIA 5/26/2017  10:46 AM

## 2017-05-30 ENCOUNTER — HOSPITAL ENCOUNTER (OUTPATIENT)
Dept: PHYSICAL THERAPY | Age: 82
Discharge: HOME | End: 2017-05-30
Payer: MEDICARE

## 2017-05-30 PROCEDURE — 97110 THERAPEUTIC EXERCISES: CPT | Performed by: PHYSICAL THERAPIST

## 2017-05-30 PROCEDURE — 97112 NEUROMUSCULAR REEDUCATION: CPT | Performed by: PHYSICAL THERAPIST

## 2017-05-30 NOTE — PROGRESS NOTES
PT DAILY TREATMENT NOTE - [facility] 2-15    Patient Name: Anai Pop  Date:2017  : 1932  [x]  Patient  Verified  Payor: [place] MEDICARE / Plan: [place] MEDICARE PART A & B / Product Type: Medicare /    In time:  a Out time:   Total Treatment Time (min): 55  Total Timed Codes (min): 40  1:1 Treatment Time (MC only): 30  Visit #: 5    Treatment Area: Low back pain [M54.5]    SUBJECTIVE  Pain Level (0-10 scale): 5  Any medication changes, allergies to medications, adverse drug reactions, diagnosis change, or new procedure performed?: [x] No    [] Yes (see summary sheet for update)  Subjective functional status/changes:   [] No changes reported  Patient reports she continues to feel a little better each day    OBJECTIVE    Modality rationale: decrease pain and increase tissue extensibility to improve the patients ability to sit, stand, transfer, ambulate, lift, carry, reach, complete ADLs   Min Type Additional Details    [] Estim: []Att   []Unatt        []TENS instruct                  []IFC  []Premod   []NMES                     []Other:  []w/US   []w/ice   []w/heat  Position:  Location:    []  Traction: [] Cervical       []Lumbar                       [] Prone          []Supine                       []Intermittent   []Continuous Lbs:  [] before manual  [] after manual  []w/heat    []  Ultrasound: []Continuous   [] Pulsed at:                           []1MHz   []3MHz Location:  W/cm2:    [] Paraffin         Location:   []w/heat   15 []  Ice     [x]  Heat  []  Ice massage Position: supine  Location:    []  Laser  []  Other: Position:  Location:      []  Vasopneumatic Device Pressure:       [] lo [] med [] hi   Temperature:      [x] Skin assessment post-treatment:  [x]intact []redness- no adverse reaction    []redness  adverse reaction:     25 min Therapeutic Exercise:  [x] See flow sheet :   Rationale: increase ROM and increase strength to improve the patients ability to sit, stand, transfer, ambulate, lift, carry, reach, complete ADLs    15 min Neuromuscular Re-education:  [x]  See flow sheet :   Rationale: improve coordination, improve balance and increase proprioception  to improve the patients ability to sit, stand, transfer, ambulate, lift, carry, reach, complete ADLs        With   [x] TE   [] TA   [x] neuro   [] other: Patient Education: [x] Review HEP    [] Progressed/Changed HEP based on:   [x] positioning   [x] body mechanics   [] transfers   [] heat/ice application    [] other:      Other Objective/Functional Measures:   Slight increase in R low back pain during heel raises this visit    1-2 episodes LOB during NBOS EC x 30 seconds    Pain Level (0-10 scale) post treatment: 0    ASSESSMENT/Changes in Function:     Patient will continue to benefit from skilled PT services to modify and progress therapeutic interventions, address functional mobility deficits, address ROM deficits, address strength deficits, analyze and address soft tissue restrictions, analyze and cue movement patterns, analyze and modify body mechanics/ergonomics, assess and modify postural abnormalities, address imbalance/dizziness and instruct in home and community integration to attain remaining goals. []  See Plan of Care  []  See progress note/recertification  []  See Discharge Summary         Progress towards goals / Updated goals:  Patient continues to require verbal cues to complete exercises with correct form and postural awareness. Patient was able to advance several exercises this visit and is progressing well towards goals.     PLAN  [x]  Upgrade activities as tolerated     [x]  Continue plan of care  [x]  Update interventions per flow sheet       []  Discharge due to:_  []  Other:_      Arnold Paz PT, DPT 5/30/2017  10:46 AM

## 2017-05-31 RX ORDER — SIMVASTATIN 20 MG/1
TABLET, FILM COATED ORAL
Qty: 30 TAB | Refills: 5 | Status: SHIPPED | OUTPATIENT
Start: 2017-05-31 | End: 2017-10-02 | Stop reason: SDUPTHER

## 2017-06-01 ENCOUNTER — TELEPHONE (OUTPATIENT)
Dept: INTERNAL MEDICINE CLINIC | Age: 82
End: 2017-06-01

## 2017-06-01 ENCOUNTER — TELEPHONE (OUTPATIENT)
Dept: CARDIOLOGY CLINIC | Age: 82
End: 2017-06-01

## 2017-06-01 NOTE — TELEPHONE ENCOUNTER
Pt's daughter Sean Newell requesting a return call from the nurse with a recommendation to a female GYN close to the [facility].  Please call [phone]

## 2017-06-02 ENCOUNTER — HOSPITAL ENCOUNTER (OUTPATIENT)
Dept: PHYSICAL THERAPY | Age: 82
Discharge: HOME | End: 2017-06-02
Payer: MEDICARE

## 2017-06-02 PROCEDURE — 97112 NEUROMUSCULAR REEDUCATION: CPT | Performed by: PHYSICAL THERAPIST

## 2017-06-02 PROCEDURE — 97110 THERAPEUTIC EXERCISES: CPT | Performed by: PHYSICAL THERAPIST

## 2017-06-02 NOTE — PROGRESS NOTES
PT DAILY TREATMENT NOTE - [facility] 2-15    Patient Name: Len Epperson  Date:2017  : 1932  [x]  Patient  Verified  Payor: [place] MEDICARE / Plan: [place] MEDICARE PART A & B / Product Type: Medicare /    In time: 10:00 am Out time: 11:00 am  Total Treatment Time (min): 60  Total Timed Codes (min): 45  1:1 Treatment Time ( only): 40  Visit #: 6    Treatment Area: Low back pain [M54.5]    SUBJECTIVE  Pain Level (0-10 scale): 4  Any medication changes, allergies to medications, adverse drug reactions, diagnosis change, or new procedure performed?: [x] No    [] Yes (see summary sheet for update)  Subjective functional status/changes:   [] No changes reported  Patient reports she is feeling pretty good today    OBJECTIVE    Modality rationale: decrease pain and increase tissue extensibility to improve the patients ability to sit, stand, transfer, ambulate, lift, carry, reach, complete ADLs   Min Type Additional Details    [] Estim: []Att   []Unatt        []TENS instruct                  []IFC  []Premod   []NMES                     []Other:  []w/US   []w/ice   []w/heat  Position:  Location:    []  Traction: [] Cervical       []Lumbar                       [] Prone          []Supine                       []Intermittent   []Continuous Lbs:  [] before manual  [] after manual  []w/heat    []  Ultrasound: []Continuous   [] Pulsed at:                           []1MHz   []3MHz Location:  W/cm2:    [] Paraffin         Location:   []w/heat   15 []  Ice     [x]  Heat  []  Ice massage Position: supine  Location:    []  Laser  []  Other: Position:  Location:      []  Vasopneumatic Device Pressure:       [] lo [] med [] hi   Temperature:      [x] Skin assessment post-treatment:  [x]intact []redness- no adverse reaction    []redness  adverse reaction:     35 min Therapeutic Exercise:  [x] See flow sheet :   Rationale: increase ROM and increase strength to improve the patients ability to sit, stand, transfer, ambulate, lift, carry, reach, complete ADLs    10 min Neuromuscular Re-education:  [x]  See flow sheet :   Rationale: improve coordination, improve balance and increase proprioception  to improve the patients ability to sit, stand, transfer, ambulate, lift, carry, reach, complete ADLs        With   [x] TE   [] TA   [x] neuro   [] other: Patient Education: [x] Review HEP    [] Progressed/Changed HEP based on:   [x] positioning   [x] body mechanics   [] transfers   [x] heat/ice application    [] other:      Other Objective/Functional Measures:   Pt able to perform heel raises and marching without UE assist, no LOB    Pain Level (0-10 scale) post treatment: 0, some dizziness with supine to sit t/f    ASSESSMENT/Changes in Function:     Patient will continue to benefit from skilled PT services to modify and progress therapeutic interventions, address functional mobility deficits, address ROM deficits, address strength deficits, analyze and address soft tissue restrictions, analyze and cue movement patterns, analyze and modify body mechanics/ergonomics, assess and modify postural abnormalities, address imbalance/dizziness and instruct in home and community integration to attain remaining goals. []  See Plan of Care  []  See progress note/recertification  []  See Discharge Summary         Progress towards goals / Updated goals:  Patient continues to require verbal cues to complete exercises with correct form and postural awareness. Patient was able to advance several exercises this visit and is progressing well towards goals.     PLAN  [x]  Upgrade activities as tolerated     [x]  Continue plan of care  [x]  Update interventions per flow sheet       []  Discharge due to:_  []  Other:_      Racquel Jamison, PT, DPT 6/2/2017  10:00 AM

## 2017-06-05 ENCOUNTER — APPOINTMENT (OUTPATIENT)
Dept: PHYSICAL THERAPY | Age: 82
End: 2017-06-05
Payer: MEDICARE

## 2017-06-06 ENCOUNTER — HOSPITAL ENCOUNTER (OUTPATIENT)
Dept: PHYSICAL THERAPY | Age: 82
Discharge: HOME | End: 2017-06-06
Payer: MEDICARE

## 2017-06-06 PROCEDURE — 97112 NEUROMUSCULAR REEDUCATION: CPT

## 2017-06-06 PROCEDURE — 97110 THERAPEUTIC EXERCISES: CPT

## 2017-06-06 NOTE — PROGRESS NOTES
PT DAILY TREATMENT NOTE - [facility] 2-15    Patient Name: Tristan Dominguez  Date:2017  : 1932  [x]  Patient  Verified  Payor: Juni Hurst / Plan: [place] MEDICARE PART A & B / Product Type: Medicare /    In time: [id number] Out time: 1235p  Total Treatment Time (min): 60  Total Timed Codes (min): 45  1:1 Treatment Time (Ravi Ripper only): 40  Visit #: 7    Treatment Area: Low back pain [M54.5]    SUBJECTIVE  Pain Level (0-10 scale): 2  Any medication changes, allergies to medications, adverse drug reactions, diagnosis change, or new procedure performed?: [x] No    [] Yes (see summary sheet for update)  Subjective functional status/changes:   [] No changes reported  Patient reports she is feeling better than yesterday as her back was hurting more, but felt better after heat.     OBJECTIVE    Modality rationale: decrease pain and increase tissue extensibility to improve the patients ability to sit, stand, transfer, ambulate, lift, carry, reach, complete ADLs   Min Type Additional Details    [] Estim: []Att   []Unatt        []TENS instruct                  []IFC  []Premod   []NMES                     []Other:  []w/US   []w/ice   []w/heat  Position:  Location:    []  Traction: [] Cervical       []Lumbar                       [] Prone          []Supine                       []Intermittent   []Continuous Lbs:  [] before manual  [] after manual  []w/heat    []  Ultrasound: []Continuous   [] Pulsed at:                           []1MHz   []3MHz Location:  W/cm2:    [] Paraffin         Location:   []w/heat   15 []  Ice     [x]  Heat  []  Ice massage Position: supine  Location:    []  Laser  []  Other: Position:  Location:      []  Vasopneumatic Device Pressure:       [] lo [] med [] hi   Temperature:      [x] Skin assessment post-treatment:  [x]intact []redness- no adverse reaction    []redness  adverse reaction:     35 min Therapeutic Exercise:  [x] See flow sheet :   Rationale: increase ROM and increase strength to improve the patients ability to sit, stand, transfer, ambulate, lift, carry, reach, complete ADLs    10 min Neuromuscular Re-education:  [x]  See flow sheet :   Rationale: improve coordination, improve balance and increase proprioception  to improve the patients ability to sit, stand, transfer, ambulate, lift, carry, reach, complete ADLs        With   [x] TE   [] TA   [x] neuro   [] other: Patient Education: [x] Review HEP    [] Progressed/Changed HEP based on:   [x] positioning   [x] body mechanics   [] transfers   [x] heat/ice application    [] other:      Other Objective/Functional Measures: Patient demonstrates improved stance with eye open and continues to demonstrate increased sway with eyes closed stance with no LOB. Pain Level (0-10 scale) post treatment: 0    ASSESSMENT/Changes in Function:     Patient will continue to benefit from skilled PT services to modify and progress therapeutic interventions, address functional mobility deficits, address ROM deficits, address strength deficits, analyze and address soft tissue restrictions, analyze and cue movement patterns, analyze and modify body mechanics/ergonomics, assess and modify postural abnormalities, address imbalance/dizziness and instruct in home and community integration to attain remaining goals. []  See Plan of Care  []  See progress note/recertification  []  See Discharge Summary         Progress towards goals / Updated goals: Patient able to tolerate all exercises with no increased pain and requires verbal cues for postural awareness. Patient making good progress towards goals.       PLAN  [x]  Upgrade activities as tolerated     [x]  Continue plan of care  [x]  Update interventions per flow sheet       []  Discharge due to:_  []  Other:_ Cline Najjar, PTA 6/6/2017  11:45 AM

## 2017-06-07 ENCOUNTER — APPOINTMENT (OUTPATIENT)
Dept: PHYSICAL THERAPY | Age: 82
End: 2017-06-07
Payer: MEDICARE

## 2017-06-08 ENCOUNTER — TELEPHONE (OUTPATIENT)
Dept: CARDIOLOGY CLINIC | Age: 82
End: 2017-06-08

## 2017-06-09 ENCOUNTER — HOSPITAL ENCOUNTER (OUTPATIENT)
Dept: PHYSICAL THERAPY | Age: 82
Discharge: HOME | End: 2017-06-09
Payer: MEDICARE

## 2017-06-09 PROCEDURE — 97112 NEUROMUSCULAR REEDUCATION: CPT

## 2017-06-09 PROCEDURE — 97110 THERAPEUTIC EXERCISES: CPT

## 2017-06-09 NOTE — PROGRESS NOTES
PT DAILY TREATMENT NOTE - [facility] 2-15    Patient Name: Jessica Molina  Date:2017  : 1932  [x]  Patient  Verified  [place]or: [place] / Plan: [place] & B / Product Type: Medicare /    In time: 1100a Out time: 1215p  Total Treatment Time (min): 75  Total Timed Codes (min): 60  1:1 Treatment Time ( only): 50  Visit #: 8    Treatment Area: Low back pain [M54.5]    SUBJECTIVE  Pain Level (0-10 scale): 1  Any medication changes, allergies to medications, adverse drug reactions, diagnosis change, or new procedure performed?: [x] No    [] Yes (see summary sheet for update)  Subjective functional status/changes:   [] No changes reported  Patient reports she might have slept on her back wrong and it was a little tender when she woke, but is feeling better.     OBJECTIVE    Modality rationale: decrease pain and increase tissue extensibility to improve the patients ability to sit, stand, transfer, ambulate, lift, carry, reach, complete ADLs   Min Type Additional Details    [] Estim: []Att   []Unatt        []TENS instruct                  []IFC  []Premod   []NMES                     []Other:  []w/US   []w/ice   []w/heat  Position:  Location:    []  Traction: [] Cervical       []Lumbar                       [] Prone          []Supine                       []Intermittent   []Continuous Lbs:  [] before manual  [] after manual  []w/heat    []  Ultrasound: []Continuous   [] Pulsed at:                           []1MHz   []3MHz Location:  W/cm2:    [] Paraffin         Location:   []w/heat   15 []  Ice     [x]  Heat  []  Ice massage Position: supine  Location:    []  Laser  []  Other: Position:  Location:      []  Vasopneumatic Device Pressure:       [] lo [] med [] hi   Temperature:      [x] Skin assessment post-treatment:  [x]intact []redness- no adverse reaction    []redness  adverse reaction:     45 min Therapeutic Exercise:  [x] See flow sheet :   Rationale: increase ROM and increase strength to improve the patients ability to sit, stand, transfer, ambulate, lift, carry, reach, complete ADLs    15 min Neuromuscular Re-education:  [x]  See flow sheet :   Rationale: improve coordination, improve balance and increase proprioception  to improve the patients ability to sit, stand, transfer, ambulate, lift, carry, reach, complete ADLs        With   [x] TE   [] TA   [x] neuro   [] other: Patient Education: [x] Review HEP    [] Progressed/Changed HEP based on:   [x] positioning   [x] body mechanics   [] transfers   [x] heat/ice application    [] other:      Other Objective/Functional Measures:  Patient demonstrates improved stance with eye open and continues to demonstrate increased sway with eyes closed stance with no LOB. Pain Level (0-10 scale) post treatment: 0    ASSESSMENT/Changes in Function:     Patient will continue to benefit from skilled PT services to modify and progress therapeutic interventions, address functional mobility deficits, address ROM deficits, address strength deficits, analyze and address soft tissue restrictions, analyze and cue movement patterns, analyze and modify body mechanics/ergonomics, assess and modify postural abnormalities, address imbalance/dizziness and instruct in home and community integration to attain remaining goals. []  See Plan of Care  []  See progress note/recertification  []  See Discharge Summary         Progress towards goals / Updated goals: Patient able to advance several exercises with no increased symptoms throughout and is making good progress towards goals.     PLAN  [x]  Upgrade activities as tolerated     [x]  Continue plan of care  [x]  Update interventions per flow sheet       []  Discharge due to:_  []  Other:_      Benson Goode PTA 6/9/2017  11:45 AM

## 2017-06-12 ENCOUNTER — HOSPITAL ENCOUNTER (EMERGENCY)
Age: 82
Discharge: HOME | End: 2017-06-12
Attending: EMERGENCY MEDICINE
Payer: MEDICARE

## 2017-06-12 ENCOUNTER — APPOINTMENT (OUTPATIENT)
Dept: CT IMAGING | Age: 82
End: 2017-06-12
Attending: PHYSICIAN ASSISTANT
Payer: MEDICARE

## 2017-06-12 VITALS
WEIGHT: 155.5 LBS | OXYGEN SATURATION: 98 % | BODY MASS INDEX: 27.55 KG/M2 | DIASTOLIC BLOOD PRESSURE: 72 MMHG | SYSTOLIC BLOOD PRESSURE: 135 MMHG | HEART RATE: 82 BPM | RESPIRATION RATE: 17 BRPM | HEIGHT: 63 IN | TEMPERATURE: 98.1 F

## 2017-06-12 DIAGNOSIS — Z79.01 CHRONIC ANTICOAGULATION: ICD-10-CM

## 2017-06-12 DIAGNOSIS — R07.89 MUSCULOSKELETAL CHEST PAIN: ICD-10-CM

## 2017-06-12 DIAGNOSIS — S20.211A RIB CONTUSION, RIGHT, INITIAL ENCOUNTER: ICD-10-CM
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
## 2017-06-12 DIAGNOSIS — R10.11 RUQ PAIN: ICD-10-CM

## 2017-06-12 LAB
ALBUMIN SERPL BCP-MCNC: 3.3 G/DL (ref 3.5–5)
ALBUMIN/GLOB SERPL: 1 {RATIO} (ref 1.1–2.2)
ALP SERPL-CCNC: 39 U/L (ref 45–117)
ALT SERPL-CCNC: 52 U/L (ref 12–78)
ANION GAP BLD CALC-SCNC: 4 MMOL/L (ref 5–15)
APPEARANCE UR: CLEAR
AST SERPL W P-5'-P-CCNC: 33 U/L (ref 15–37)
BASOPHILS # BLD AUTO: 0 K/UL (ref 0–0.1)
BASOPHILS # BLD: 0 % (ref 0–1)
BILIRUB SERPL-MCNC: 0.9 MG/DL (ref 0.2–1)
BILIRUB UR QL: NEGATIVE
BUN SERPL-MCNC: 45 MG/DL (ref 6–20)
BUN/CREAT SERPL: 33 (ref 12–20)
CALCIUM SERPL-MCNC: 8.6 MG/DL (ref 8.5–10.1)
CHLORIDE SERPL-SCNC: 103 MMOL/L (ref 97–108)
CO2 SERPL-SCNC: 31 MMOL/L (ref 21–32)
COLOR UR: NORMAL
CREAT SERPL-MCNC: 1.38 MG/DL (ref 0.55–1.02)
DIFFERENTIAL METHOD BLD: ABNORMAL
EOSINOPHIL # BLD: 0 K/UL (ref 0–0.4)
EOSINOPHIL NFR BLD: 0 % (ref 0–7)
ERYTHROCYTE [DISTWIDTH] IN BLOOD BY AUTOMATED COUNT: 12.4 % (ref 11.5–14.5)
GLOBULIN SER CALC-MCNC: 3.3 G/DL (ref 2–4)
GLUCOSE SERPL-MCNC: 192 MG/DL (ref 65–100)
GLUCOSE UR STRIP.AUTO-MCNC: NEGATIVE MG/DL
HCT VFR BLD AUTO: 41.1 % (ref 35–47)
HGB BLD-MCNC: 13.6 G/DL (ref 11.5–16)
HGB UR QL STRIP: NEGATIVE
INR PPP: 2.7 (ref 0.9–1.1)
KETONES UR QL STRIP.AUTO: NEGATIVE MG/DL
LEUKOCYTE ESTERASE UR QL STRIP.AUTO: NEGATIVE
LYMPHOCYTES # BLD AUTO: 7 % (ref 12–49)
LYMPHOCYTES # BLD: 0.7 K/UL (ref 0.8–3.5)
MCH RBC QN AUTO: 31.1 PG (ref 26–34)
MCHC RBC AUTO-ENTMCNC: 33.1 G/DL (ref 30–36.5)
MCV RBC AUTO: 94.1 FL (ref 80–99)
MONOCYTES # BLD: 0.4 K/UL (ref 0–1)
MONOCYTES NFR BLD AUTO: 4 % (ref 5–13)
NEUTS SEG # BLD: 8.3 K/UL (ref 1.8–8)
NEUTS SEG NFR BLD AUTO: 89 % (ref 32–75)
NITRITE UR QL STRIP.AUTO: NEGATIVE
PH UR STRIP: 6.5 [PH] (ref 5–8)
PLATELET # BLD AUTO: 137 K/UL (ref 150–400)
POTASSIUM SERPL-SCNC: 4.9 MMOL/L (ref 3.5–5.1)
PROT SERPL-MCNC: 6.6 G/DL (ref 6.4–8.2)
PROT UR STRIP-MCNC: NEGATIVE MG/DL
PROTHROMBIN TIME: 26.5 SEC (ref 9–11.1)
RBC # BLD AUTO: 4.37 M/UL (ref 3.8–5.2)
RBC MORPH BLD: ABNORMAL
SODIUM SERPL-SCNC: 138 MMOL/L (ref 136–145)
SP GR UR REFRACTOMETRY: 1.01 (ref 1–1.03)
UROBILINOGEN UR QL STRIP.AUTO: 0.2 EU/DL (ref 0.2–1)
WBC # BLD AUTO: 9.4 K/UL (ref 3.6–11)

## 2017-06-12 PROCEDURE — 74011250636 HC RX REV CODE- 250/636: Performed by: PHYSICIAN ASSISTANT

## 2017-06-12 PROCEDURE — 80053 COMPREHEN METABOLIC PANEL: CPT | Performed by: PHYSICIAN ASSISTANT

## 2017-06-12 PROCEDURE — 85610 PROTHROMBIN TIME: CPT | Performed by: PHYSICIAN ASSISTANT

## 2017-06-12 PROCEDURE — 81003 URINALYSIS AUTO W/O SCOPE: CPT | Performed by: PHYSICIAN ASSISTANT

## 2017-06-12 PROCEDURE — 99284 EMERGENCY DEPT VISIT MOD MDM: CPT

## 2017-06-12 PROCEDURE — 96375 TX/PRO/DX INJ NEW DRUG ADDON: CPT

## 2017-06-12 PROCEDURE — 85025 COMPLETE CBC W/AUTO DIFF WBC: CPT | Performed by: PHYSICIAN ASSISTANT

## 2017-06-12 PROCEDURE — 36415 COLL VENOUS BLD VENIPUNCTURE: CPT | Performed by: PHYSICIAN ASSISTANT

## 2017-06-12 PROCEDURE — 77030027138 HC INCENT SPIROMETER -A

## 2017-06-12 PROCEDURE — 96361 HYDRATE IV INFUSION ADD-ON: CPT

## 2017-06-12 PROCEDURE — 74176 CT ABD & PELVIS W/O CONTRAST: CPT

## 2017-06-12 PROCEDURE — 71250 CT THORAX DX C-: CPT

## 2017-06-12 PROCEDURE — 96374 THER/PROPH/DIAG INJ IV PUSH: CPT

## 2017-06-12 RX ORDER — LIDOCAINE 50 MG/G
PATCH TOPICAL
Qty: 1 EACH | Refills: 0 | Status: SHIPPED | OUTPATIENT
Start: 2017-06-12 | End: 2017-08-07 | Stop reason: SDUPTHER

## 2017-06-12 RX ORDER — SPIRONOLACTONE 25 MG/1
25 TABLET ORAL DAILY
COMMUNITY
End: 2017-07-05

## 2017-06-12 RX ORDER — ONDANSETRON 2 MG/ML
4 INJECTION INTRAMUSCULAR; INTRAVENOUS
Status: COMPLETED | OUTPATIENT
Start: 2017-06-12 | End: 2017-06-12

## 2017-06-12 RX ORDER — MORPHINE SULFATE 2 MG/ML
2 INJECTION, SOLUTION INTRAMUSCULAR; INTRAVENOUS
Status: COMPLETED | OUTPATIENT
Start: 2017-06-12 | End: 2017-06-12

## 2017-06-12 RX ADMIN — Medication 2 MG: at 15:15

## 2017-06-12 RX ADMIN — SODIUM CHLORIDE 1000 ML: 900 INJECTION, SOLUTION INTRAVENOUS at 15:14

## 2017-06-12 RX ADMIN — ONDANSETRON 4 MG: 2 INJECTION INTRAMUSCULAR; INTRAVENOUS at 15:15

## 2017-06-12 NOTE — ED TRIAGE NOTES
\"I fell over my own feet 3 hours ago at my house and landed on my right side against an oven door handle. I injured my right ribs and breast.\" No loss of consciousness, She was able to get herself in an upright position afterwards. Arrived today in a wheelchair and uses a cane with ambulation. No pain in her arms or legs. Due to have a new pacemaker placed in the next month.

## 2017-06-12 NOTE — DISCHARGE INSTRUCTIONS
Musculoskeletal Chest Pain: Care Instructions  Your Care Instructions  Chest pain is not always a sign that something is wrong with your heart or that you have another serious problem. The doctor thinks your chest pain is caused by strained muscles or ligaments, inflamed chest cartilage, or another problem in your chest, rather than by your heart. You may need more tests to find the cause of your chest pain. Follow-up care is a key part of your treatment and safety. Be sure to make and go to all appointments, and call your doctor if you are having problems. Its also a good idea to know your test results and keep a list of the medicines you take. How can you care for yourself at home? · Take pain medicines exactly as directed. ¨ If the doctor gave you a prescription medicine for pain, take it as prescribed. ¨ If you are not taking a prescription pain medicine, ask your doctor if you can take an over-the-counter medicine. · Rest and protect the sore area. · Stop, change, or take a break from any activity that may be causing your pain or soreness. · Put ice or a cold pack on the sore area for 10 to 20 minutes at a time. Try to do this every 1 to 2 hours for the next 3 days (when you are awake) or until the swelling goes down. Put a thin cloth between the ice and your skin. · After 2 or 3 days, apply a heating pad set on low or a warm cloth to the area that hurts. Some doctors suggest that you go back and forth between hot and cold. · Do not wrap or tape your ribs for support. This may cause you to take smaller breaths, which could increase your risk of lung problems. · Mentholated creams such as Bengay or Icy Hot may soothe sore muscles. Follow the instructions on the package. · Follow your doctor's instructions for exercising. · Gentle stretching and massage may help you get better faster. Stretch slowly to the point just before pain begins, and hold the stretch for at least 15 to 30 seconds.  Do this 3 or 4 times a day. Stretch just after you have applied heat. · As your pain gets better, slowly return to your normal activities. Any increased pain may be a sign that you need to rest a while longer. When should you call for help? Call 911 anytime you think you may need emergency care. For example, call if:  · You have chest pain or pressure. This may occur with:  ¨ Sweating. ¨ Shortness of breath. ¨ Nausea or vomiting. ¨ Pain that spreads from the chest to the neck, jaw, or one or both shoulders or arms. ¨ Dizziness or lightheadedness. ¨ A fast or uneven pulse. After calling 911, chew 1 adult-strength aspirin. Wait for an ambulance. Do not try to drive yourself. · You have sudden chest pain and shortness of breath, or you cough up blood. Call your doctor now or seek immediate medical care if:  · You have any trouble breathing. · Your chest pain gets worse. · Your chest pain occurs consistently with exercise and is relieved by rest.  Watch closely for changes in your health, and be sure to contact your doctor if:  · Your chest pain does not get better after 1 week. Where can you learn more? Go to http://markel-nicolasa.info/. Enter V293 in the search box to learn more about \"Musculoskeletal Chest Pain: Care Instructions. \"  Current as of: May 27, 2016  Content Version: 11.2  © 1463-5048 [employer]. Care instructions adapted under license by [employer] (which disclaims liability or warranty for this information). If you have questions about a medical condition or this instruction, always ask your healthcare professional. [place] [address] any warranty or liability for your use of this information.          Chest Contusion: Care Instructions  Your Care Instructions  A chest contusion, or bruise, is caused by a fall or direct blow to the chest. Car crashes, falls, getting punched, and injury from bicycle handlebars are common causes of chest contusions. A very forceful blow to the chest can injure the heart or blood vessels in the chest, the lungs, the airway, the liver, or the spleen. Pain may be caused by an injury to muscles, cartilage, or ribs. Deep breathing, coughing, or sneezing can increase your pain. Lying on the injured area also can cause pain. Follow-up care is a key part of your treatment and safety. Be sure to make and go to all appointments, and call your doctor if you are having problems. It's also a good idea to know your test results and keep a list of the medicines you take. How can you care for yourself at home? · Rest and protect the injured or sore area. Stop, change, or take a break from any activity that may be causing your pain. · Put ice or a cold pack on the area for 10 to 20 minutes at a time. Put a thin cloth between the ice and your skin. · After 2 or 3 days, if your swelling is gone, apply a heating pad set on low or a warm cloth to your chest. Some doctors suggest that you go back and forth between hot and cold. Put a thin cloth between the heating pad and your skin. · Do not wrap or tape your ribs for support. This may cause you to take smaller breaths, which could increase your risk of pneumonia and lung collapse. · Ask your doctor if you can take an over-the-counter pain medicine, such as acetaminophen (Tylenol), ibuprofen (Advil, Motrin), or naproxen (Aleve). Be safe with medicines. Read and follow all instructions on the label. · Take your medicines exactly as prescribed. Call your doctor if you think you are having a problem with your medicine. · Gentle stretching and massage may help you feel better after a few days of rest. Stretch slowly to the point just before discomfort begins, then hold the stretch for at least 15 to 30 seconds. Do this 3 or 4 times per day. · As your pain gets better, slowly return to your normal activities.  Be patient, because chest bruises can take weeks or months to heal. Any increased pain may be a sign that you need to rest a while longer. When should you call for help? Call 911 anytime you think you may need emergency care. For example, call if:  · You have severe trouble breathing. · You cough up blood. Call your doctor now or seek immediate medical care if:  · You have belly pain. · You are dizzy or lightheaded, or you feel like you may faint. · You develop new symptoms with the chest pain. · Your chest pain gets worse. · You have a fever. · You have some shortness of breath. · You have a cough that brings up mucus from the lungs. Watch closely for changes in your health, and be sure to contact your doctor if:  · Your chest pain is not improving after 1 week. Where can you learn more? Go to http://markel-nicolasa.info/. Enter I174 in the search box to learn more about \"Chest Contusion: Care Instructions. \"  Current as of: May 27, 2016  Content Version: 11.2  © 8461-7246 [email or web address]. Care instructions adapted under license by [employer] (which disclaims liability or warranty for this information). If you have questions about a medical condition or this instruction, always ask your healthcare professional. [place] [address] any warranty or liability for your use of this information. Abdominal Pain: Care Instructions  Your Care Instructions    Abdominal pain has many possible causes. Some aren't serious and get better on their own in a few days. Others need more testing and treatment. If your pain continues or gets worse, you need to be rechecked and may need more tests to find out what is wrong. You may need surgery to correct the problem. Don't ignore new symptoms, such as fever, nausea and vomiting, urination problems, pain that gets worse, and dizziness. These may be signs of a more serious problem. Your doctor may have recommended a follow-up visit in the next 8 to 12 hours.  If you are not getting better, you may need more tests or treatment. The doctor has checked you carefully, but problems can develop later. If you notice any problems or new symptoms, get medical treatment right away. Follow-up care is a key part of your treatment and safety. Be sure to make and go to all appointments, and call your doctor if you are having problems. It's also a good idea to know your test results and keep a list of the medicines you take. How can you care for yourself at home? · Rest until you feel better. · To prevent dehydration, drink plenty of fluids, enough so that your urine is light yellow or clear like water. Choose water and other caffeine-free clear liquids until you feel better. If you have kidney, heart, or liver disease and have to limit fluids, talk with your doctor before you increase the amount of fluids you drink. · If your stomach is upset, eat mild foods, such as rice, dry toast or crackers, bananas, and applesauce. Try eating several small meals instead of two or three large ones. · Wait until 48 hours after all symptoms have gone away before you have spicy foods, alcohol, and drinks that contain caffeine. · Do not eat foods that are high in fat. · Avoid anti-inflammatory medicines such as aspirin, ibuprofen (Advil, Motrin), and naproxen (Aleve). These can cause stomach upset. Talk to your doctor if you take daily aspirin for another health problem. When should you call for help? Call 911 anytime you think you may need emergency care. For example, call if:  · You passed out (lost consciousness). · You pass maroon or very bloody stools. · You vomit blood or what looks like coffee grounds. · You have new, severe belly pain. Call your doctor now or seek immediate medical care if:  · Your pain gets worse, especially if it becomes focused in one area of your belly. · You have a new or higher fever. · Your stools are black and look like tar, or they have streaks of blood.   · You have unexpected vaginal bleeding. · You have symptoms of a urinary tract infection. These may include:  ¨ Pain when you urinate. ¨ Urinating more often than usual.  ¨ Blood in your urine. · You are dizzy or lightheaded, or you feel like you may faint. Watch closely for changes in your health, and be sure to contact your doctor if:  · You are not getting better after 1 day (24 hours). Where can you learn more? Go to http://markel-nicolasa.info/. Enter I938 in the search box to learn more about \"Abdominal Pain: Care Instructions. \"  Current as of: May 27, 2016  Content Version: 11.2  © 0807-4029 [employer]. Care instructions adapted under license by [employer] (which disclaims liability or warranty for this information). If you have questions about a medical condition or this instruction, always ask your healthcare professional. [place] [address] any warranty or liability for your use of this information. Preventing Falls: Care Instructions  Your Care Instructions  Getting around your home safely can be a challenge if you have injuries or health problems that make it easy for you to fall. Loose rugs and furniture in walkways are among the dangers for many older people who have problems walking or who have poor eyesight. People who have conditions such as arthritis, osteoporosis, or dementia also have to be careful not to fall. You can make your home safer with a few simple measures. Follow-up care is a key part of your treatment and safety. Be sure to make and go to all appointments, and call your doctor if you are having problems. It's also a good idea to know your test results and keep a list of the medicines you take. How can you care for yourself at home? Taking care of yourself  · You may get dizzy if you do not drink enough water. To prevent dehydration, drink plenty of fluids, enough so that your urine is light yellow or clear like water. Choose water and other caffeine-free clear liquids. If you have kidney, heart, or liver disease and have to limit fluids, talk with your doctor before you increase the amount of fluids you drink. · Exercise regularly to improve your strength, muscle tone, and balance. Walk if you can. Swimming may be a good choice if you cannot walk easily. · Have your vision and hearing checked each year or any time you notice a change. If you have trouble seeing and hearing, you might not be able to avoid objects and could lose your balance. · Know the side effects of the medicines you take. Ask your doctor or pharmacist whether the medicines you take can affect your balance. Sleeping pills or sedatives can affect your balance. · Limit the amount of alcohol you drink. Alcohol can impair your balance and other senses. · Ask your doctor whether calluses or corns on your feet need to be removed. If you wear loose-fitting shoes because of calluses or corns, you can lose your balance and fall. · Talk to your doctor if you have numbness in your feet. Preventing falls at home  · Remove raised doorway thresholds, throw rugs, and clutter. Repair loose carpet or raised areas in the floor. · Move furniture and electrical cords to keep them out of walking paths. · Use nonskid floor wax, and wipe up spills right away, especially on ceramic tile floors. · If you use a walker or cane, put rubber tips on it. If you use crutches, clean the bottoms of them regularly with an abrasive pad, such as steel wool. · Keep your house well lit, especially [place], and outside walkways. Use night-lights in areas such as hallways and bathrooms. Add extra light switches or use remote switches (such as switches that go on or off when you clap your hands) to make it easier to turn lights on if you have to get up during the night. · Install sturdy handrails on stairways.   · Move items in your cabinets so that the things you use a lot are on the lower shelves (about waist level). · Keep a cordless phone and a flashlight with new batteries by your bed. If possible, put a phone in each of the main rooms of your house, or carry a cell phone in case you fall and cannot reach a phone. Or, you can wear a device around your neck or wrist. You push a button that sends a signal for help. · Wear low-heeled shoes that fit well and give your feet good support. Use footwear with nonskid soles. Check the heels and soles of your shoes for wear. Repair or replace worn heels or soles. · Do not wear socks without shoes on wood floors. · Walk on the grass when the sidewalks are slippery. If you live in an area that gets snow and ice in the winter, sprinkle salt on slippery steps and sidewalks. Preventing falls in the bath  · Install grab bars and nonskid mats inside and outside your shower or tub and near the toilet and sinks. · Use shower chairs and bath benches. · Use a hand-held shower head that will allow you to sit while showering. · Get into a tub or shower by putting the weaker leg in first. Get out of a tub or shower with your strong side first.  · Repair loose toilet seats and consider installing a raised toilet seat to make getting on and off the toilet easier. · Keep your bathroom door unlocked while you are in the shower. Where can you learn more? Go to http://markel-nicolasa.info/. Enter [id number] [id number] in the search box to learn more about \"Preventing Falls: Care Instructions. \"  Current as of: August 4, 2016  Content Version: 11.2  © 4683-1749 [employer]. Care instructions adapted under license by [employer] (which disclaims liability or warranty for this information). If you have questions about a medical condition or this instruction, always ask your healthcare professional. [address]  any warranty or liability for your use of this information.          We hope that we have addressed all of your medical concerns. The examination and treatment you received in the Emergency Department were for an emergent problem and were not intended as complete care. It is important that you follow up with your healthcare provider(s) for ongoing care. If your symptoms worsen or do not improve as expected, and you are unable to reach your usual health care provider(s), you should return to the Emergency Department. Today's healthcare is undergoing tremendous change, and patient satisfaction surveys are one of the many tools to assess the quality of medical care. You may receive a survey from the [employer] regarding your experience in the Emergency Department. I hope that your experience has been completely positive, particularly the medical care that I provided. As such, please participate in the survey; anything less than excellent does not meet my expectations or intentions. [facility] and [facility] participate in nationally recognized quality of care measures. If your blood pressure is greater than 120/80, as reported below, we urge that you seek medical care to address the potential of high blood pressure, commonly known as hypertension. Hypertension can be hereditary or can be caused by certain medical conditions, pain, stress, or \"white coat syndrome. \"       Please make an appointment with your health care provider(s) for follow up of your Emergency Department visit. VITALS:   Patient Vitals for the past 8 hrs:   Temp Pulse Resp BP SpO2   06/12/17 1636 - 80 17 - 98 %   06/12/17 1632 - - - 135/72 -   06/12/17 1521 - - - - 93 %   06/12/17 1515 - 82 15 103/51 -   06/12/17 1436 98.1 °F (36.7 °C) 83 20 128/66 96 %          Thank you for allowing us to provide you with medical care today. We realize that you have many choices for your emergency care needs. Please choose us in the future for any continued health care needs.       Regards, Tori oBo. [place], [address]   Office: [phone]            Recent Results (from the past 24 hour(s))   CBC WITH AUTOMATED DIFF    Collection Time: 06/12/17  3:02 PM   Result Value Ref Range    WBC 9.4 3.6 - 11.0 K/uL    RBC 4.37 3.80 - 5.20 M/uL    HGB 13.6 11.5 - 16.0 g/dL    HCT 41.1 35.0 - 47.0 %    MCV 94.1 80.0 - 99.0 FL    MCH 31.1 26.0 - 34.0 PG    MCHC 33.1 30.0 - 36.5 g/dL    RDW 12.4 11.5 - 14.5 %    PLATELET 650 (L) 191 - 400 K/uL    NEUTROPHILS 89 (H) 32 - 75 %    LYMPHOCYTES 7 (L) 12 - 49 %    MONOCYTES 4 (L) 5 - 13 %    EOSINOPHILS 0 0 - 7 %    BASOPHILS 0 0 - 1 %    ABS. NEUTROPHILS 8.3 (H) 1.8 - 8.0 K/UL    ABS. LYMPHOCYTES 0.7 (L) 0.8 - 3.5 K/UL    ABS. MONOCYTES 0.4 0.0 - 1.0 K/UL    ABS. EOSINOPHILS 0.0 0.0 - 0.4 K/UL    ABS. BASOPHILS 0.0 0.0 - 0.1 K/UL    DF SMEAR SCANNED      RBC COMMENTS NORMOCYTIC, NORMOCHROMIC     METABOLIC PANEL, COMPREHENSIVE    Collection Time: 06/12/17  3:02 PM   Result Value Ref Range    Sodium 138 136 - 145 mmol/L    Potassium 4.9 3.5 - 5.1 mmol/L    Chloride 103 97 - 108 mmol/L    CO2 31 21 - 32 mmol/L    Anion gap 4 (L) 5 - 15 mmol/L    Glucose 192 (H) 65 - 100 mg/dL    BUN 45 (H) 6 - 20 MG/DL    Creatinine 1.38 (H) 0.55 - 1.02 MG/DL    BUN/Creatinine ratio 33 (H) 12 - 20      GFR est AA 44 (L) >60 ml/min/1.73m2    GFR est non-AA 36 (L) >60 ml/min/1.73m2    Calcium 8.6 8.5 - 10.1 MG/DL    Bilirubin, total 0.9 0.2 - 1.0 MG/DL    ALT (SGPT) 52 12 - 78 U/L    AST (SGOT) 33 15 - 37 U/L    Alk.  phosphatase 39 (L) 45 - 117 U/L    Protein, total 6.6 6.4 - 8.2 g/dL    Albumin 3.3 (L) 3.5 - 5.0 g/dL    Globulin 3.3 2.0 - 4.0 g/dL    A-G Ratio 1.0 (L) 1.1 - 2.2     PROTHROMBIN TIME + INR    Collection Time: 06/12/17  3:02 PM   Result Value Ref Range    INR 2.7 (H) 0.9 - 1.1      Prothrombin time 26.5 (H) 9.0 - 11.1 sec   URINALYSIS W/ RFLX MICROSCOPIC    Collection Time: 06/12/17  4:36 PM   Result Value Ref Range    Color YELLOW/STRAW      Appearance CLEAR CLEAR      Specific gravity 1.015 1.003 - 1.030      pH (UA) 6.5 5.0 - 8.0      Protein NEGATIVE  NEG mg/dL    Glucose NEGATIVE  NEG mg/dL    Ketone NEGATIVE  NEG mg/dL    Bilirubin NEGATIVE  NEG      Blood NEGATIVE  NEG      Urobilinogen 0.2 0.2 - 1.0 EU/dL    Nitrites NEGATIVE  NEG      Leukocyte Esterase NEGATIVE  NEG         Ct Chest Wo Cont    Result Date: 6/12/2017  INDICATION: fall, right lower chest injury with bruising, on coumadin. COMPARISON: Chest x-ray 9/18/2014. TECHNIQUE:  5 mm axial images were obtained through the chest, abdomen, and pelvis. Oral contrast and IV contrast were not administered. Coronal and sagittal reconstructions were generated. CT dose reduction was achieved through use of a standardized protocol tailored for this examination and automatic exposure control for dose modulation. FINDINGS: THYROID: No nodule. MEDIASTINUM: No mass or lymphadenopathy. BRENT: No mass or lymphadenopathy. THORACIC AORTA: Atherosclerotic calcification without aneurysm. MAIN PULMONARY ARTERY: Normal in caliber. TRACHEA/BRONCHI: Patent. ESOPHAGUS: No wall thickening or dilatation. HEART: Normal in size without pericardial effusion. Coronary artery calcifications are noted. Left chest wall pacemaker and leads in stable and expected positions. PLEURA: No effusion or pneumothorax. LUNGS: No slight granulomata suprasellar segment left lower lobe. Linear atelectasis lingula. Otherwise clear. LIVER: No mass or biliary dilation. GALLBLADDER: Unremarkable. SPLEEN: A slight granuloma. Otherwise unremarkable. PANCREAS: No mass or ductal dilation. ADRENALS: Unremarkable. KIDNEYS: No mass, calculus, or hydronephrosis. STOMACH: Unremarkable. SMALL BOWEL: No dilatation or wall thickening. COLON: Noninflamed appearing sigmoid diverticula. Status post partial right colectomy. APPENDIX: Surgically absent. PERITONEUM: No ascites or pneumoperitoneum.  RETROPERITONEUM: Atherosclerotic calcification with infrarenal ectasia to 2.6 cm but no aneurysmal dilation or evident dissection. REPRODUCTIVE ORGANS: The uterus and ovaries appear unremarkable. URINARY BLADDER: No mass or calculus. 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 ADDITIONAL COMMENTS: N/A     IMPRESSION: No acute traumatic injury identified. Incidental findings as above including coronary artery disease and colonic diverticulosis. Ct Abd Pelv Wo Cont    Result Date: 6/12/2017  INDICATION: fall, right lower chest injury with bruising, on coumadin. COMPARISON: Chest x-ray 9/18/2014. TECHNIQUE:  5 mm axial images were obtained through the chest, abdomen, and pelvis. Oral contrast and IV contrast were not administered. Coronal and sagittal reconstructions were generated. CT dose reduction was achieved through use of a standardized protocol tailored for this examination and automatic exposure control for dose modulation. FINDINGS: THYROID: No nodule. MEDIASTINUM: No mass or lymphadenopathy. BRENT: No mass or lymphadenopathy. THORACIC AORTA: Atherosclerotic calcification without aneurysm. MAIN PULMONARY ARTERY: Normal in caliber. TRACHEA/BRONCHI: Patent. ESOPHAGUS: No wall thickening or dilatation. HEART: Normal in size without pericardial effusion. Coronary artery calcifications are noted. Left chest wall pacemaker and leads in stable and expected positions. PLEURA: No effusion or pneumothorax. LUNGS: No slight granulomata suprasellar segment left lower lobe. Linear atelectasis lingula. Otherwise clear. LIVER: No mass or biliary dilation. GALLBLADDER: Unremarkable. SPLEEN: A slight granuloma. Otherwise unremarkable. PANCREAS: No mass or ductal dilation. ADRENALS: Unremarkable. KIDNEYS: No mass, calculus, or hydronephrosis. STOMACH: Unremarkable. SMALL BOWEL: No dilatation or wall thickening.  COLON: Noninflamed appearing sigmoid diverticula. Status post partial right colectomy. APPENDIX: Surgically absent. PERITONEUM: No ascites or pneumoperitoneum. RETROPERITONEUM: Atherosclerotic calcification with infrarenal ectasia to 2.6 cm but no aneurysmal dilation or evident dissection. REPRODUCTIVE ORGANS: The uterus and ovaries appear unremarkable. URINARY BLADDER: No mass or calculus. 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 ADDITIONAL COMMENTS: N/A     IMPRESSION: No acute traumatic injury identified. Incidental findings as above including coronary artery disease and colonic diverticulosis. Bruises: Care Instructions  Your Care Instructions    Bruises occur when small blood vessels under the skin tear or rupture, most often from a twist, bump, or fall. Blood leaks into tissues under the skin and causes a black-and-blue spot that often turns colors, including purplish black, reddish blue, or yellowish green, as the bruise heals. Bruises hurt, but most are not serious and will go away on their own within 2 to 4 weeks. Sometimes, gravity causes them to spread down the body. A leg bruise usually will take longer to heal than a bruise on the face or arms. Follow-up care is a key part of your treatment and safety. Be sure to make and go to all appointments, and call your doctor if you are having problems. Its also a good idea to know your test results and keep a list of the medicines you take. How can you care for yourself at home? · Take pain medicines exactly as directed. ¨ If the doctor gave you a prescription medicine for pain, take it as prescribed. ¨ If you are not taking a prescription pain medicine, ask your doctor if you can take an over-the-counter medicine. · Put ice or a cold pack on the area for 10 to 20 minutes at a time.  Put a thin cloth between the ice and your skin. · If you can, prop up the bruised area on pillows as much as possible for the next few days. Try to keep the bruise above the level of your heart. When should you call for help? Call your doctor now or seek immediate medical care if:  · You have signs of infection, such as:  ¨ Increased pain, swelling, warmth, or redness. ¨ Red streaks leading from the bruise. ¨ Pus draining from the bruise. ¨ A fever. · You have a bruise on your leg and signs of a blood clot, such as:  ¨ Increasing redness and swelling along with warmth, tenderness, and pain in the bruised area. ¨ Pain in your calf, back of the knee, thigh, or groin. ¨ Redness and swelling in your leg or groin. · Your pain gets worse. Watch closely for changes in your health, and be sure to contact your doctor if:  · You do not get better as expected. Where can you learn more? Go to http://markel-nicolasa.info/. Enter ([phone] 239-014 in the search box to learn more about \"Bruises: Care Instructions. \"  Current as of: May 27, 2016  Content Version: 11.2  © 1049-8864 [employer]. Care instructions adapted under license by [employer] (which disclaims liability or warranty for this information). If you have questions about a medical condition or this instruction, always ask your healthcare professional. [place] [address] any warranty or liability for your use of this information.

## 2017-06-12 NOTE — ED NOTES
Patient resting on the stretcher and currently without complaints. Call bell within reach; will continue to monitor.

## 2017-06-12 NOTE — ED NOTES
The patient was discharged home by BAY Souza in stable condition. The patient is alert and oriented, in no respiratory distress. The patient's diagnosis, condition and treatment were explained. The patient expressed understanding. A discharge plan has been developed. A  was not involved in the process. Aftercare instructions were given. Pt discharged from the ED via w/c by this RN to the family's car. Ice pack given to patient to use on right ribcage. Incentive Spirometer given and instructions to patient. Patient used the device well.

## 2017-06-12 NOTE — ED PROVIDER NOTES
HPI Comments: Jessica Molina is a 80 y.o. female who presents ambulatory with daughter in law to the ED with a c/o fall 3 hours pta. Pt notes she felt well this am, got her feet tangled and tripped over herself. She reports hitting her right lower chest/ flank and RUQ against the stove handle. She states she hurts to move or take a deep breath now. Pt denies head injury or loc. She denies n/v/d or urinary sx after falling. Pt has a hx of chronic back pain that she notes is not hurting worse after the fall, and actually is feeling better recently secondary to steroids. Pt notes she was able to get up by herself after a while. She denies other injury. She specifically denies any fevers, chills, nausea, vomiting, urinary sx, headache, rash, diarrhea, sweating or weight loss. Pt notes she is on coumadin for her heart/  A fib.       PCP: Michael Garcia MD  PMHx significant for: Past Medical History:  No date: Arthritis  No date: Atrial fibrillation [facility])      Comment: av node ablation 5/22/98 with placement of CPI               #[id number] dual chamber pacemaker subsequently                replaced with a single chamber medtronic                #[id number] single chamber pacemaker 7/25/05  1970's: Cancer ([facility] [address]      Comment: colon cancer w/ resection  No date: COPD  No date: Depression  No date: GERD (gastroesophageal reflux disease)  No date: Hyperlipidemia  No date: Hypertension  No date: Ischemic cardiomyopathy  No date: Migraine headache  No date: Orthostatic hypotension  No date: RADHA (obstructive sleep apnea)  5/22/98: Pacemaker      Comment: AV node ablation /pacemaker placement                utilizing CPI # [id number] dual chamber system,                medtronic #[id number] single chamber device placed                7/22/05 9/26/2011: Pulmonary hypertension ([facility] [address]      Comment: RVSP 50 on echo 8/14  No date: Thyroid disease  No date: Valvular heart disease      Comment: mild-mod MR/TR  PSHx significant for: Past Surgical History:  No date: BREAST SURGERY PROCEDURE UNLISTED      Comment: benign breast tumor excision right breast  2002: HX BREAST BIOPSY Right      Comment: neg; surgical bx  1974: HX COLECTOMY      Comment: colon  No date: HX KNEE REPLACEMENT      Comment: right TKA  No date: HX PACEMAKER  No date: HX TUBAL LIGATION  No date: TOTAL KNEE ARTHROPLASTY      Comment: total on R, partial on L  Social Hx: Tobacco: denies  EtOH: denies  Illicit drug use: denies    There are no further complaints or symptoms at this time. The history is provided by the patient and a relative.         Past Medical History:   Diagnosis Date    Arthritis     Atrial fibrillation ([facility] [address]     av node ablation 5/22/98 with placement of CPI #[id number] dual chamber pacemaker subsequently replaced with a single chamber medtronic #[id number] single chamber pacemaker 7/25/05    Cancer ([facility] [address] 1970's    colon cancer w/ resection    COPD     Depression     GERD (gastroesophageal reflux disease)     Hyperlipidemia     Hypertension     Ischemic cardiomyopathy     Migraine headache     Orthostatic hypotension     RADHA (obstructive sleep apnea)     Pacemaker 5/22/98    AV node ablation /pacemaker placement utilizing CPI # [id number] dual chamber system, medtronic #[id number] single chamber device placed 7/22/05    Pulmonary hypertension ([facility] [address] 9/26/2011    RVSP 50 on echo 8/14    Thyroid disease     Valvular heart disease     mild-mod MR/TR       Past Surgical History:   Procedure Laterality Date    BREAST SURGERY PROCEDURE UNLISTED      benign breast tumor excision right breast    HX BREAST BIOPSY Right 2002    neg; surgical bx    HX COLECTOMY  1974    colon    HX KNEE REPLACEMENT      right TKA    HX PACEMAKER      HX TUBAL LIGATION      TOTAL KNEE ARTHROPLASTY      total on R, partial on L         Family History:   Problem Relation Age of Onset    Diabetes Mother     Heart Disease Mother     Heart Attack Mother     Heart Disease Father     Stroke Sister     Breast Cancer Sister 80    Cancer Maternal Aunt      uterus    Diabetes Maternal Uncle     Breast Cancer Daughter 61       Social History     Social History    Marital status:      Spouse name: N/A    Number of children: N/A    Years of education: N/A     Occupational History    Not on file. Social History Main Topics    Smoking status: Passive Smoke Exposure - Never Smoker    Smokeless tobacco: Never Used    Alcohol use No    Drug use: No    Sexual activity: No     Other Topics Concern    Not on file     Social History Narrative         ALLERGIES: Cardizem [diltiazem hcl]; Codeine; Darvocet a500 [propoxyphene n-acetaminophen]; Labetalol; Pcn [penicillins]; Primidone; and Sulfa (sulfonamide antibiotics)    Review of Systems   Constitutional: Negative for chills and fever. HENT: Negative for congestion, rhinorrhea, sneezing and sore throat. Eyes: Negative for redness and visual disturbance. Respiratory: Positive for shortness of breath. Cardiovascular: Positive for chest pain. Negative for leg swelling. Gastrointestinal: Positive for abdominal pain. Negative for constipation, diarrhea, nausea and vomiting. Genitourinary: Negative for difficulty urinating and frequency. Musculoskeletal: Negative for back pain, myalgias and neck stiffness. Skin: Negative for rash. Neurological: Negative for dizziness, syncope, weakness and headaches. Hematological: Negative for adenopathy. Bruises/bleeds easily. Vitals:    06/12/17 1436   BP: 128/66   Pulse: 83   Resp: 20   Temp: 98.1 °F (36.7 °C)   SpO2: 96%   Weight: 70.5 kg (155 lb 8 oz)   Height: 5' 3\" (1.6 m)            Physical Exam   Constitutional: She is oriented to person, place, and time. She appears well-developed and well-nourished. No distress. Elderly female in moderate discomfort   HENT:   Head: Normocephalic and atraumatic.    Right Ear: External ear normal.   Left Ear: External ear normal.   Eyes: EOM are normal. Pupils are equal, round, and reactive to light. Right eye exhibits no discharge. Left eye exhibits no discharge. Neck: Neck supple. No JVD present. No tracheal deviation present. Non-tender to midline and throughout. No swelling or step off. No discoloration or deformity. No lesions. Moves neck full ROM without diff against resistance.  5/5 bilaterally. Cardiovascular: Normal rate, regular rhythm, normal heart sounds and intact distal pulses. Exam reveals no gallop and no friction rub. No murmur heard. Pulmonary/Chest: Effort normal and breath sounds normal. No stridor. No respiratory distress. She has no wheezes. She has no rales. She exhibits tenderness. Right lower chest anteriorly and flank TTP without swelling. No step off. + some ecchymosis. No deformity or lesions. No subcutaneous air. No tracheal deviation. No changes in respiratory excursion   Abdominal: Soft. Bowel sounds are normal. She exhibits no distension and no mass. There is tenderness. There is no rebound and no guarding. Ruq TTP without rebounding or guarding   Musculoskeletal: Normal range of motion. She exhibits no edema, tenderness or deformity. BACK:Non-tender to midline and throughout no swelling or step off. No discoloration. No deformity or lesions. Neg SLR neg EHL neg MIGUEL ANGEL. Ambulates with pain (today's injury) Distal n/v intact. Cap refill brisk   Lymphadenopathy:     She has no cervical adenopathy. Neurological: She is alert and oriented to person, place, and time. No cranial nerve deficit. Coordination normal.   Skin: No rash noted. No erythema. No pallor. Psychiatric: She has a normal mood and affect. Her behavior is normal.   Nursing note and vitals reviewed.        MDM  Number of Diagnoses or Management Options     Amount and/or Complexity of Data Reviewed  Clinical lab tests: ordered and reviewed  Tests in the radiology section of CPT®: ordered and reviewed  Tests in the medicine section of CPT®: reviewed and ordered  Obtain history from someone other than the patient: yes (Daughter in law)  Review and summarize past medical records: yes  Independent visualization of images, tracings, or specimens: yes    Patient Progress  Patient progress: stable    ED Course       Procedures  2:45 PM  Discussed pt, sx, hx and current findings with Dr Angel Dunham. He is in agreement with plan and will see pt  Vivien Slim. GAMALIEL Souza    4:15 PM   pt improved after meds  Vivien Slim. GAMALIEL Souza      LABORATORY TESTS:  Recent Results (from the past 12 hour(s))   CBC WITH AUTOMATED DIFF    Collection Time: 06/12/17  3:02 PM   Result Value Ref Range    WBC 9.4 3.6 - 11.0 K/uL    RBC 4.37 3.80 - 5.20 M/uL    HGB 13.6 11.5 - 16.0 g/dL    HCT 41.1 35.0 - 47.0 %    MCV 94.1 80.0 - 99.0 FL    MCH 31.1 26.0 - 34.0 PG    MCHC 33.1 30.0 - 36.5 g/dL    RDW 12.4 11.5 - 14.5 %    PLATELET 210 (L) 240 - 400 K/uL    NEUTROPHILS 89 (H) 32 - 75 %    LYMPHOCYTES 7 (L) 12 - 49 %    MONOCYTES 4 (L) 5 - 13 %    EOSINOPHILS 0 0 - 7 %    BASOPHILS 0 0 - 1 %    ABS. NEUTROPHILS 8.3 (H) 1.8 - 8.0 K/UL    ABS. LYMPHOCYTES 0.7 (L) 0.8 - 3.5 K/UL    ABS. MONOCYTES 0.4 0.0 - 1.0 K/UL    ABS. EOSINOPHILS 0.0 0.0 - 0.4 K/UL    ABS. BASOPHILS 0.0 0.0 - 0.1 K/UL    DF SMEAR SCANNED      RBC COMMENTS NORMOCYTIC, NORMOCHROMIC     METABOLIC PANEL, COMPREHENSIVE    Collection Time: 06/12/17  3:02 PM   Result Value Ref Range    Sodium 138 136 - 145 mmol/L    Potassium 4.9 3.5 - 5.1 mmol/L    Chloride 103 97 - 108 mmol/L    CO2 31 21 - 32 mmol/L    Anion gap 4 (L) 5 - 15 mmol/L    Glucose 192 (H) 65 - 100 mg/dL    BUN 45 (H) 6 - 20 MG/DL    Creatinine 1.38 (H) 0.55 - 1.02 MG/DL    BUN/Creatinine ratio 33 (H) 12 - 20      GFR est AA 44 (L) >60 ml/min/1.73m2    GFR est non-AA 36 (L) >60 ml/min/1.73m2    Calcium 8.6 8.5 - 10.1 MG/DL    Bilirubin, total 0.9 0.2 - 1.0 MG/DL    ALT (SGPT) 52 12 - 78 U/L    AST (SGOT) 33 15 - 37 U/L    Alk.  phosphatase 39 (L) 45 - 117 U/L Protein, total 6.6 6.4 - 8.2 g/dL    Albumin 3.3 (L) 3.5 - 5.0 g/dL    Globulin 3.3 2.0 - 4.0 g/dL    A-G Ratio 1.0 (L) 1.1 - 2.2     PROTHROMBIN TIME + INR    Collection Time: 06/12/17  3:02 PM   Result Value Ref Range    INR 2.7 (H) 0.9 - 1.1      Prothrombin time 26.5 (H) 9.0 - 11.1 sec   URINALYSIS W/ RFLX MICROSCOPIC    Collection Time: 06/12/17  4:36 PM   Result Value Ref Range    Color YELLOW/STRAW      Appearance CLEAR CLEAR      Specific gravity 1.015 1.003 - 1.030      pH (UA) 6.5 5.0 - 8.0      Protein NEGATIVE  NEG mg/dL    Glucose NEGATIVE  NEG mg/dL    Ketone NEGATIVE  NEG mg/dL    Bilirubin NEGATIVE  NEG      Blood NEGATIVE  NEG      Urobilinogen 0.2 0.2 - 1.0 EU/dL    Nitrites NEGATIVE  NEG      Leukocyte Esterase NEGATIVE  NEG         IMAGING RESULTS:  Study Result      INDICATION: fall, right lower chest injury with bruising, on coumadin.     COMPARISON: Chest x-ray 9/18/2014.     TECHNIQUE: 5 mm axial images were obtained through the chest, abdomen, and  pelvis. Oral contrast and IV contrast were not administered. Coronal and  sagittal reconstructions were generated. CT dose reduction was achieved through  use of a standardized protocol tailored for this examination and automatic  exposure control for dose modulation.     FINDINGS:     THYROID: No nodule. MEDIASTINUM: No mass or lymphadenopathy. BRENT: No mass or lymphadenopathy. THORACIC AORTA: Atherosclerotic calcification without aneurysm. MAIN PULMONARY ARTERY: Normal in caliber. TRACHEA/BRONCHI: Patent. ESOPHAGUS: No wall thickening or dilatation. HEART: Normal in size without pericardial effusion. Coronary artery  calcifications are noted. Left chest wall pacemaker and leads in stable and  expected positions. PLEURA: No effusion or pneumothorax. LUNGS: No slight granulomata suprasellar segment left lower lobe. Linear  atelectasis lingula. Otherwise clear. LIVER: No mass or biliary dilation. GALLBLADDER: Unremarkable.   SPLEEN: A slight granuloma. Otherwise unremarkable. PANCREAS: No mass or ductal dilation. ADRENALS: Unremarkable. KIDNEYS: No mass, calculus, or hydronephrosis. STOMACH: Unremarkable. SMALL BOWEL: No dilatation or wall thickening. COLON: Noninflamed appearing sigmoid diverticula. Status post partial right  colectomy. APPENDIX: Surgically absent. PERITONEUM: No ascites or pneumoperitoneum. RETROPERITONEUM: Atherosclerotic calcification with infrarenal ectasia to 2.6 cm  but no aneurysmal dilation or evident dissection. REPRODUCTIVE ORGANS: The uterus and ovaries appear unremarkable. URINARY BLADDER: No mass or calculus. BONES: Osteopenia, rightward convex scoliosis, advanced left greater than right  osteoarthritic change of the hips, degenerative spine change, and no acute  fracture or aggressive lesion. ADDITIONAL COMMENTS: N/A     IMPRESSION  IMPRESSION: No acute traumatic injury identified. Incidental findings as above  including coronary artery disease and colonic diverticulosis.             MEDICATIONS GIVEN:  Medications   sodium chloride 0.9 % bolus infusion 1,000 mL (1,000 mL IntraVENous New Bag 6/12/17 1514)   morphine injection 2 mg (2 mg IntraVENous Given 6/12/17 1515)   ondansetron (ZOFRAN) injection 4 mg (4 mg IntraVENous Given 6/12/17 1515)       IMPRESSION:  1. Fall, initial encounter    2. Musculoskeletal chest pain    3. RUQ pain    4. Rib contusion, right, initial encounter    5. Chronic anticoagulation        PLAN:  1. Current Discharge Medication List      START taking these medications    Details   !! lidocaine (LIDODERM) 5 % Apply patch to the affected area for 12 hours a day and remove for 12 hours a day. Qty: 1 Each, Refills: 0       !! - Potential duplicate medications found. Please discuss with provider. CONTINUE these medications which have NOT CHANGED    Details   spironolactone (ALDACTONE) 25 mg tablet Take 25 mg by mouth daily.       simvastatin (ZOCOR) 20 mg tablet TAKE 1 TABLET BY MOUTH NIGHTLY  Qty: 30 Tab, Refills: 5      ENTRESTO 24-26 mg tablet TAKE 1 TABLET BY MOUTH TWICE A DAY  Qty: 60 Tab, Refills: 6      carvedilol (COREG) 6.25 mg tablet Take 1 Tab by mouth two (2) times daily (with meals). Qty: 60 Tab, Refills: 6      predniSONE (DELTASONE) 10 mg tablet Take 10 mg by mouth daily. Associated Diagnoses: Flank pain      sertraline (ZOLOFT) 50 mg tablet Take 1 Tab by mouth daily. Qty: 90 Tab, Refills: 2    Associated Diagnoses: Anxiety and depression      COUMADIN 5 mg tablet TAKE 1 TABLET BY MOUTH DAILY AND 1/2 TABLET ON SUNDAY AND THURSDAY  Qty: 90 Tab, Refills: 0      lipase-protease-amylase (ZENPEP) 40,000-136,000- 218,000 unit cpDR Take 1 Tab by mouth three (3) times daily (with meals). And snacks. SYNTHROID 112 mcg tablet TAKE 1 TABLET BY MOUTH EVERY DAY BEFORE BREAKFAST  Qty: 30 Tab, Refills: 5      multivitamin (ONE A DAY) tablet Take 1 Tab by mouth daily. Calcium-Cholecalciferol, D3, 600 mg(1,500mg) -400 unit cap Take  by mouth two (2) times a day. ranitidine (ZANTAC) 150 mg tablet TAKE 1 TABLET BY MOUTH TWICE A DAY  Qty: 60 Tab, Refills: 5      OXYGEN-AIR DELIVERY SYSTEMS 2 L by Does Not Apply route nightly. ADVAIR DISKUS 250-50 mcg/dose diskus inhaler Take 1 Puff by inhalation two (2) times a day. Associated Diagnoses: Atrial fibrillation (HCC)      tiotropium (SPIRIVA WITH HANDIHALER) 18 mcg inhalation capsule Take 1 Cap by inhalation daily. Associated Diagnoses: Atrial fibrillation ([address] Complete heart block (HCC)      chlorhexidine (HIBICLENS) 4 % liquid Apply to upper chest and arms as directed  Qty: 1 Bottle, Refills: 0      !! lidocaine (LIDODERM) 5 % 1 Patch by TransDERmal route every twelve (12) hours. Qty: 60 Each, Refills: 0    Associated Diagnoses: Chronic right-sided back pain, unspecified back location      desonide (TRIDESILON) 0.05 % cream Apply  to affected area two (2) times a day.   Qty: 15 g, Refills: 0    Associated Diagnoses: Psoriasis      acetaminophen (TYLENOL) 500 mg tablet Take 500 mg by mouth every six (6) hours as needed for Pain. (2) Tablet in am (1) tablet in pm (2) Tablet pm  Refills: 0    Associated Diagnoses: Fever; Viral URI with cough       !! - Potential duplicate medications found. Please discuss with provider. 2.   Follow-up Information     Follow up With Details Comments Contact Info    Nevaeh Whitfield MD Schedule an appointment as soon as possible for a visit 2-4 days for recheck [address]  [phone]          Return to ED if worse         5:07 PM  Pt has been reexamined. Pt has no new complaints, changes or physical findings. Care plan outlined and precautions discussed. All available results were reviewed with pt. All medications were reviewed with pt. All of pt's questions and concerns were addressed. Pt agrees to F/U as instructed and agrees to return to ED upon further deterioration. Pt is ready to go home.   BAY Damico

## 2017-06-13 ENCOUNTER — TELEPHONE (OUTPATIENT)
Dept: INTERNAL MEDICINE CLINIC | Age: 82
End: 2017-06-13

## 2017-06-13 NOTE — TELEPHONE ENCOUNTER
----- Message from Yadira Harry sent at 6/12/2017  4:20 PM EDT -----  Regarding: Dr. Odin Allen, pt's daughter, called in to make the doctor aware the pt is in the [facility] Emergency Room and was wondering if he could go by there and check on her. Daughter stated, if no answer please leave a message. Best contact number [phone].

## 2017-06-14 ENCOUNTER — PATIENT OUTREACH (OUTPATIENT)
Dept: INTERNAL MEDICINE CLINIC | Age: 82
End: 2017-06-14

## 2017-06-14 NOTE — PROGRESS NOTES
[address] [facility]    Patient on discharge report dated 6/12/17 from [facility]. Diagnosis:  Fall, initial encounter    Musculoskeletal chest pain    RUQ pain    Rib contusion, right, initial encounter    Chronic anticoagulation      Left message on voicemail to return call and to make a f/u appt. Will attempt to contact again. Need to complete post-discharge assessment.

## 2017-06-16 ENCOUNTER — TELEPHONE (OUTPATIENT)
Dept: CARDIOLOGY CLINIC | Age: 82
End: 2017-06-16

## 2017-06-21 DIAGNOSIS — I48.20 CHRONIC ATRIAL FIBRILLATION (HCC): Primary | ICD-10-CM

## 2017-06-22 DIAGNOSIS — I48.91 ATRIAL FIBRILLATION, UNSPECIFIED TYPE (HCC): Primary | ICD-10-CM

## 2017-06-23 ENCOUNTER — TELEPHONE (OUTPATIENT)
Dept: CARDIOLOGY CLINIC | Age: 82
End: 2017-06-23

## 2017-06-23 LAB
INR PPP: 3.6 (ref 0.8–1.2)
PROTHROMBIN TIME: 36.9 SEC (ref 9.1–12)

## 2017-06-23 NOTE — TELEPHONE ENCOUNTER
Patient's daughter called. Verified patient's identity with two identifiers. Verified HIPAA. She states patient is scheduled for BiV upgrade in July and asked if patient should get labs drawn now. I asked NP Lul Thacker if the BMP, CBC, and INR were all that is needed. She states the BMP and CBC needs to be within a month and INR will be checked day of procedure. I told this to patient's daughter. She states patient is due for an INR next Wednesday so they will still have all of them drawn at that time. She denied further questions or concerns.

## 2017-06-23 NOTE — TELEPHONE ENCOUNTER
Please call [phone] regarding coumadin. She stated she received a missed call and thought it may have been you. Thanks!

## 2017-06-25 DIAGNOSIS — I42.0 DILATED CARDIOMYOPATHY (HCC): ICD-10-CM

## 2017-06-25 DIAGNOSIS — R94.39 ABNORMAL STRESS TEST: ICD-10-CM

## 2017-06-25 DIAGNOSIS — N18.30 CKD (CHRONIC KIDNEY DISEASE) STAGE 3, GFR 30-59 ML/MIN (HCC): ICD-10-CM

## 2017-06-25 DIAGNOSIS — I48.20 CHRONIC ATRIAL FIBRILLATION (HCC): ICD-10-CM

## 2017-06-25 DIAGNOSIS — I95.1 ORTHOSTATIC HYPOTENSION: ICD-10-CM

## 2017-06-25 DIAGNOSIS — I34.0 NON-RHEUMATIC MITRAL REGURGITATION: ICD-10-CM

## 2017-06-25 NOTE — LETTER
6/30/2017 9:10 AM 
 
Ms. Michael Banuelos [address] [place] [phone] Dear Michael Banuelos: 
 
Please find your most recent results below. Resulted Orders CBC WITH AUTOMATED DIFF Result Value Ref Range WBC 6.7 3.4 - 10.8 x10E3/uL  
 RBC 4.33 3.77 - 5.28 x10E6/uL HGB 13.1 11.1 - 15.9 g/dL HCT 39.9 34.0 - 46.6 % MCV 92 79 - 97 fL  
 MCH 30.3 26.6 - 33.0 pg  
 MCHC 32.8 31.5 - 35.7 g/dL  
 RDW 13.6 12.3 - 15.4 % PLATELET 576 (L) 208 - 379 x10E3/uL NEUTROPHILS 62 % Lymphocytes 29 % MONOCYTES 7 % EOSINOPHILS 1 % BASOPHILS 0 %  
 ABS. NEUTROPHILS 4.2 1.4 - 7.0 x10E3/uL Abs Lymphocytes 2.0 0.7 - 3.1 x10E3/uL  
 ABS. MONOCYTES 0.4 0.1 - 0.9 x10E3/uL  
 ABS. EOSINOPHILS 0.1 0.0 - 0.4 x10E3/uL  
 ABS. BASOPHILS 0.0 0.0 - 0.2 x10E3/uL IMMATURE GRANULOCYTES 1 %  
 ABS. IMM. GRANS. 0.0 0.0 - 0.1 x10E3/uL Narrative Performed at:  [address]  [id number] : Beatriz Casey MD, Phone:  [phone] METABOLIC PANEL, BASIC Result Value Ref Range Glucose 94 65 - 99 mg/dL BUN 32 (H) 8 - 27 mg/dL Creatinine 1.30 (H) 0.57 - 1.00 mg/dL GFR est non-AA 37 (L) >59 mL/min/1.73 GFR est AA 43 (L) >59 mL/min/1.73  
 BUN/Creatinine ratio 25 12 - 28 Sodium 145 (H) 134 - 144 mmol/L Potassium 5.3 (H) 3.5 - 5.2 mmol/L Chloride 102 96 - 106 mmol/L  
 CO2 29 18 - 29 mmol/L Calcium 9.4 8.7 - 10.3 mg/dL Narrative Performed at:  [address]  [id number] : Beatriz Casey MD, Phone:  [phone] PROTHROMBIN TIME + INR Result Value Ref Range INR 1.7 (H) 0.8 - 1.2 Comment:  
   Reference interval is for non-anticoagulated patients. Suggested INR therapeutic range for Vitamin K 
antagonist therapy: 
   Standard Dose (moderate intensity 
                  therapeutic range):       2.0 - 3.0 Higher intensity therapeutic range       2.5 - 3.5 Prothrombin time 17.8 (H) 9.1 - 12.0 sec Narrative Performed at:  [address]  [id number] : Priti Peña MD, Phone:  [phone] CKD REPORT Result Value Ref Range Interpretation Note Comment:  
   Supplement report is available. Narrative Performed at:  [address] [place] [address]  [id number] : Mary Bhagat PhD, Phone:  [phone] RECOMMENDATIONS: Everything is near baseline, except your potassium is a little high. Try to watch your potassium in your diet, but don't overdo it. Please call me if you have any questions: [phone] Sincerely, MD Gerald Hollingsworth, RN

## 2017-06-29 NOTE — TELEPHONE ENCOUNTER
Called # below and it was patient's daughter in law. She states she did not call. She states it may have been patient's daughter.

## 2017-06-30 ENCOUNTER — DOCUMENTATION ONLY (OUTPATIENT)
Dept: CARDIOLOGY CLINIC | Age: 82
End: 2017-06-30

## 2017-06-30 LAB
BASOPHILS # BLD AUTO: 0 X10E3/UL (ref 0–0.2)
BASOPHILS NFR BLD AUTO: 0 %
BUN SERPL-MCNC: 32 MG/DL (ref 8–27)
BUN/CREAT SERPL: 25 (ref 12–28)
CALCIUM SERPL-MCNC: 9.4 MG/DL (ref 8.7–10.3)
CHLORIDE SERPL-SCNC: 102 MMOL/L (ref 96–106)
CO2 SERPL-SCNC: 29 MMOL/L (ref 18–29)
CREAT SERPL-MCNC: 1.3 MG/DL (ref 0.57–1)
EOSINOPHIL # BLD AUTO: 0.1 X10E3/UL (ref 0–0.4)
EOSINOPHIL NFR BLD AUTO: 1 %
ERYTHROCYTE [DISTWIDTH] IN BLOOD BY AUTOMATED COUNT: 13.6 % (ref 12.3–15.4)
GLUCOSE SERPL-MCNC: 94 MG/DL (ref 65–99)
HCT VFR BLD AUTO: 39.9 % (ref 34–46.6)
HGB BLD-MCNC: 13.1 G/DL (ref 11.1–15.9)
IMM GRANULOCYTES # BLD: 0 X10E3/UL (ref 0–0.1)
IMM GRANULOCYTES NFR BLD: 1 %
INR PPP: 1.7 (ref 0.8–1.2)
INTERPRETATION: NORMAL
LYMPHOCYTES # BLD AUTO: 2 X10E3/UL (ref 0.7–3.1)
LYMPHOCYTES NFR BLD AUTO: 29 %
MCH RBC QN AUTO: 30.3 PG (ref 26.6–33)
MCHC RBC AUTO-ENTMCNC: 32.8 G/DL (ref 31.5–35.7)
MCV RBC AUTO: 92 FL (ref 79–97)
MONOCYTES # BLD AUTO: 0.4 X10E3/UL (ref 0.1–0.9)
MONOCYTES NFR BLD AUTO: 7 %
NEUTROPHILS # BLD AUTO: 4.2 X10E3/UL (ref 1.4–7)
NEUTROPHILS NFR BLD AUTO: 62 %
PLATELET # BLD AUTO: 142 X10E3/UL (ref 150–379)
POTASSIUM SERPL-SCNC: 5.3 MMOL/L (ref 3.5–5.2)
PROTHROMBIN TIME: 17.8 SEC (ref 9.1–12)
RBC # BLD AUTO: 4.33 X10E6/UL (ref 3.77–5.28)
SODIUM SERPL-SCNC: 145 MMOL/L (ref 134–144)
WBC # BLD AUTO: 6.7 X10E3/UL (ref 3.4–10.8)

## 2017-06-30 NOTE — PROGRESS NOTES
Results mailed to patient with packet on following a low potassium diet. Routed to coumadin clinic, Dr. Marija Beatty, and YANA Roblero. (please see Dr. Lisa Billings note) Thanks.

## 2017-06-30 NOTE — PROGRESS NOTES
Pt had labwork done,INR 1. 7. Pt dose was on hold x 2 days,then pt held coumadin dose for 2 more days. Spoke with daughter,who pt is staying with pt. Pt restarted coumadin dose last pm,and daughter will take her to have INR,and pt on Monday.

## 2017-06-30 NOTE — TELEPHONE ENCOUNTER
Patients daughter has been speaking with kojo at [facility].  About a standing order for [employer]  She would like a call today please

## 2017-06-30 NOTE — PROGRESS NOTES
Everything near baseline, except potassium a little high so needs to watch potassium in diet, dont overdo it. Forward inr to coumadin clinic. Forward labs to dr pineda and Vannesa Reyes to make sure ok from their standpoint.  thanks

## 2017-07-03 DIAGNOSIS — I48.19 PERSISTENT ATRIAL FIBRILLATION (HCC): Primary | ICD-10-CM

## 2017-07-03 NOTE — TELEPHONE ENCOUNTER
Bailey Owen LPN   You 9 minutes ago (1:09 PM)                 Spoke with daughter this am,and standard order for INR/PT sent to [facility] in [facility].  (Routing comment)

## 2017-07-04 LAB
INR PPP: 2.3 (ref 0.8–1.2)
PROTHROMBIN TIME: 23.6 SEC (ref 9.1–12)

## 2017-07-05 ENCOUNTER — TELEPHONE (OUTPATIENT)
Dept: CARDIOLOGY CLINIC | Age: 82
End: 2017-07-05

## 2017-07-05 NOTE — PROGRESS NOTES
Spoke to daughter in law. She is not taking the Aldactone. Continues to take the [facility].

## 2017-07-05 NOTE — TELEPHONE ENCOUNTER
We held the aldactone 5/10 and when we rechecked the K it was better.  Please verify she is not taking the aldactone, I still see it on her medication list       She is also on entresto     Thanks [place]

## 2017-07-05 NOTE — TELEPHONE ENCOUNTER
I spoke to patient's daughter in law. She is not taking the aldactone and continues to take the [facility].

## 2017-07-06 ENCOUNTER — TELEPHONE (OUTPATIENT)
Dept: CARDIOLOGY CLINIC | Age: 82
End: 2017-07-06

## 2017-07-10 ENCOUNTER — CLINICAL SUPPORT (OUTPATIENT)
Dept: CARDIOLOGY CLINIC | Age: 82
End: 2017-07-10

## 2017-07-10 ENCOUNTER — TELEPHONE (OUTPATIENT)
Dept: INTERNAL MEDICINE CLINIC | Age: 82
End: 2017-07-10

## 2017-07-10 ENCOUNTER — TELEPHONE (OUTPATIENT)
Dept: CARDIOLOGY CLINIC | Age: 82
End: 2017-07-10

## 2017-07-10 DIAGNOSIS — I44.2 CHB (COMPLETE HEART BLOCK) (HCC): Primary | ICD-10-CM

## 2017-07-10 DIAGNOSIS — Z01.812 PRE-PROCEDURE LAB EXAM: ICD-10-CM

## 2017-07-10 DIAGNOSIS — I25.5 ISCHEMIC CARDIOMYOPATHY: ICD-10-CM

## 2017-07-10 DIAGNOSIS — Z95.0 CARDIAC PACEMAKER IN SITU: Primary | ICD-10-CM

## 2017-07-10 NOTE — TELEPHONE ENCOUNTER
Erendira Li states pt had labs done at [facility] on 6/29/17. She wants to know if her mother needs to repeat more labs with Dr. Memo Thomas due to her being on Prednisone or will those labs she already had done work? Erendira Li (the daughter) will wait for a response when Dr. Memo Thomas is back in the office.

## 2017-07-10 NOTE — TELEPHONE ENCOUNTER
Please call patients daughter, Pattricia Aschoff. She has some urgent questions/concerns about her mother's upcoming procedure with Dr. Doreen Arriaga.     Thank you, Abby Falcon

## 2017-07-10 NOTE — TELEPHONE ENCOUNTER
I spoke to pts daughter, she had questions regarding her mother's lab work that was done. She did not have the lab work with her but will be at her mother's house in about 45mins. She asked that I call her back.

## 2017-07-10 NOTE — TELEPHONE ENCOUNTER
Verified patient with two types of identifiers. States that she needs a new order for lab work for the procedure. Will place up front.

## 2017-07-10 NOTE — TELEPHONE ENCOUNTER
The daughter wants to speak with the nurse for Dr Barbara Lindsay regarding her mom.  The Daughter is on the [facility] no [phone] did not say why

## 2017-07-11 NOTE — TELEPHONE ENCOUNTER
Please call pts daughter Lucas Antonio regarding lab order, she has a few questions. She can be reached at [phone].  Thank you

## 2017-07-12 ENCOUNTER — HOSPITAL ENCOUNTER (OUTPATIENT)
Dept: PHYSICAL THERAPY | Age: 82
Discharge: HOME | End: 2017-07-12
Payer: MEDICARE

## 2017-07-12 PROCEDURE — G8980 MOBILITY D/C STATUS: HCPCS | Performed by: PHYSICAL THERAPIST

## 2017-07-12 PROCEDURE — 97110 THERAPEUTIC EXERCISES: CPT | Performed by: PHYSICAL THERAPIST

## 2017-07-12 PROCEDURE — G8979 MOBILITY GOAL STATUS: HCPCS | Performed by: PHYSICAL THERAPIST

## 2017-07-12 NOTE — TELEPHONE ENCOUNTER
Verified patient with two types of identifiers. Daughter states that she has CBC and CMP done 6/29/17 and she will fax it to us for her upcoming procedure.

## 2017-07-12 NOTE — ANCILLARY DISCHARGE INSTRUCTIONS
[address] [facility] [place] [address] [facility] Judie Russell  Phone: [phone] Fax: [phone]      Discharge Summary 2-15    Patient name: Denis Lane  : 1932  Provider#: [id number]  Referral source: Annalisa Rushing MD      Medical/Treatment Diagnosis: Low back pain [M54.5]     Prior Hospitalization: see medical history     Comorbidities: See Plan of Care  Prior Level of Function: See Plan of Care  Medications: Verified on Patient Summary List    Start of Care: 5/15/17      Onset Date:6 months ago    Visits from Start of Care: 9     Missed Visits: 0  Reporting Period : 5/15/17 to 17    Assessment/Summary of care: The patient returns for one visit following one month vacation to address pain, weakness and imbalance prior to pacemaker surgery this month. Her FOTO outcomes score improved from a 22% at initial evaluation to a 33% at today's visit indicating a significant improvement in function. The patient has made poor progress towards short and long term goals secondary to one month gap in care. She would benefit from return to PT following surgery to continue to address pain and general deconditioning. Short Term Goals: To be accomplished in 4 weeks:  1) The patient will be independent with introductory HEP   Status at last note/certification: not met  Status at discharge: met  2) The patient will demonstrate ability to transfer sit to stand without UE assist without an increase in pain  Status at last note/certification: not met  Status at discharge: not met  3) The patient will demonstrate lumbar flexion AROM to floor without an increase in pain  Status at last note/certification: not met  Status at discharge: not met  Long Term Goals:  To be accomplished in 12 weeks:  1) The patient will be independent with finalized strengthening HEP  Status at last note/certification: not met  Status at discharge: met  2) The patient will report ability to ambulate 20 minutes without an increase in pain using LRAD  Status at last note/certification: not met  Status at discharge: not met  3) The patient will demonstrate pain free bed mobility and transfers, to improve ability to sleep through the night  Status at last note/certification: not met  Status at discharge: met    G-Codes (GP)  Mobility    Goal  CL= 60-79%  D/C  CK= 40-59%    The severity rating is based on clinical judgment and the FOTO Score score.     RECOMMENDATIONS:  [x]Discontinue therapy: []Patient has reached or is progressing toward set goals     []Patient is non-compliant or has abdicated     []Due to lack of appreciable progress towards set goals     [x]Other Patient is scheduled for surgery  Jack Olivares, PT 7/12/2017 1:38 PM

## 2017-07-12 NOTE — PROGRESS NOTES
PT DAILY TREATMENT NOTE - [facility] 2-15    Patient Name: Alondra Leung  Date:2017  : 1932  [x]  Patient  Verified  Payor: [employer] / Plan: [place] PART [place] & B / Product Type: Medicare /    In time: 1100a Out time: 1200p  Total Treatment Time (min): 60  Total Timed Codes (min):45  1:1 Treatment Time ([address] only): 30  Visit #: 9    Treatment Area: Low back pain [M54.5]    SUBJECTIVE  Pain Level (0-10 scale): 5  Any medication changes, allergies to medications, adverse drug reactions, diagnosis change, or new procedure performed?: [x] No    [] Yes (see summary sheet for update)  Subjective functional status/changes:   [] No changes reported  Patient reports she has been on vacation and has been climbing a lot of steps   Patient is scheduled to see the Doctor on Monday    OBJECTIVE    Modality rationale: decrease pain and increase tissue extensibility to improve the patients ability to sit, stand, transfer, ambulate, lift, carry, reach, complete ADLs   Min Type Additional Details    [] Estim: []Att   []Unatt        []TENS instruct                  []IFC  []Premod   []NMES                     []Other:  []w/US   []w/ice   []w/heat  Position:  Location:    []  Traction: [] Cervical       []Lumbar                       [] Prone          []Supine                       []Intermittent   []Continuous Lbs:  [] before manual  [] after manual  []w/heat    []  Ultrasound: []Continuous   [] Pulsed at:                           []1MHz   []3MHz Location:  W/cm2:    [] Paraffin         Location:   []w/heat   15 []  Ice     [x]  Heat  []  Ice massage Position: supine  Location:    []  Laser  []  Other: Position:  Location:      []  Vasopneumatic Device Pressure:       [] lo [] med [] hi   Temperature:      [x] Skin assessment post-treatment:  [x]intact []redness- no adverse reaction    []redness  adverse reaction:     45 min Therapeutic Exercise:  [x] See flow sheet :   Rationale: increase ROM and increase strength to improve the patients ability to sit, stand, transfer, ambulate, lift, carry, reach, complete ADLs      With   [x] TE   [] TA   [x] neuro   [] other: Patient Education: [x] Review HEP    [] Progressed/Changed HEP based on:   [x] positioning   [x] body mechanics   [] transfers   [x] heat/ice application    [] other:      Other Objective/Functional Measures:   Poor performance of balance exercise this visit  Pt unable to hold Romberg position with EC    Lumbar flexion AROM: 1 foot from floor    Pt unable to perform sit to stand transfer without UE assist      Pain Level (0-10 scale) post treatment: 0    ASSESSMENT/Changes in Function:      []  See Plan of Care  []  See progress note/recertification  [x]  See Discharge Summary    PLAN  []  Upgrade activities as tolerated     []  Continue plan of care  []  Update interventions per flow sheet       [x]  Discharge due to:_ pt scheduled for surgery  []  Other:_      Patrick Manzano PT, DPT 7/12/2017  12:00 PM

## 2017-07-13 ENCOUNTER — TELEPHONE (OUTPATIENT)
Dept: CARDIOLOGY CLINIC | Age: 82
End: 2017-07-13

## 2017-07-14 ENCOUNTER — DOCUMENTATION ONLY (OUTPATIENT)
Dept: CARDIOLOGY CLINIC | Age: 82
End: 2017-07-14

## 2017-07-14 ENCOUNTER — TELEPHONE (OUTPATIENT)
Dept: CARDIOLOGY CLINIC | Age: 82
End: 2017-07-14

## 2017-07-14 NOTE — TELEPHONE ENCOUNTER
Patients daughter wants to speak with you about the changing of the pacemaker, they have a few questions

## 2017-07-14 NOTE — TELEPHONE ENCOUNTER
Patients daughter Jayme Alcantar called to confirm you received her fax and that you have everything you need. Please call her to [employer], she can be reached at [phone].  Ok to leave In[employer]

## 2017-07-14 NOTE — TELEPHONE ENCOUNTER
States that she was trying to find out what she is to do prior to the procedure. Notified her that she should have a letter that outlines all the procedure information as well as her sister Mrs Rere Mao has all the information. She will check with her and contact our office for any questions.

## 2017-07-18 ENCOUNTER — TELEPHONE (OUTPATIENT)
Dept: INTERNAL MEDICINE CLINIC | Age: 82
End: 2017-07-18

## 2017-07-18 NOTE — TELEPHONE ENCOUNTER
I spoke with pts daughter in law Gurdeep Vaughan. Pt had labs done on 07/14/17 in [address] [place] [address] with [address] [place] [address] Street from Dr. Steffen Wan. They have already informed his office and they have everything they need. She wants to know if pt needs lab work done every month due to being on Prednisone?

## 2017-07-18 NOTE — TELEPHONE ENCOUNTER
Please let her know Dr. Kimberly Hoang requested follow up labs on spironolactone to check lytes. He ordered 7/11.

## 2017-07-18 NOTE — TELEPHONE ENCOUNTER
Pt's daughter Axel Saha requesting a return call from [facility] regarding the labs the patient needs to have done per Dr. Андрей Villeda.  Please call [phone]

## 2017-07-20 ENCOUNTER — TELEPHONE (OUTPATIENT)
Dept: CARDIOLOGY CLINIC | Age: 82
End: 2017-07-20

## 2017-07-21 RX ORDER — SODIUM CHLORIDE 0.9 % (FLUSH) 0.9 %
5-10 SYRINGE (ML) INJECTION EVERY 8 HOURS
Status: CANCELLED | OUTPATIENT
Start: 2017-07-21

## 2017-07-21 RX ORDER — SODIUM CHLORIDE 0.9 % (FLUSH) 0.9 %
5-10 SYRINGE (ML) INJECTION AS NEEDED
Status: CANCELLED | OUTPATIENT
Start: 2017-07-21

## 2017-07-24 ENCOUNTER — HOSPITAL ENCOUNTER (OUTPATIENT)
Dept: NON INVASIVE DIAGNOSTICS | Age: 82
Discharge: HOME | End: 2017-07-24
Attending: INTERNAL MEDICINE | Admitting: INTERNAL MEDICINE
Payer: MEDICARE

## 2017-07-24 VITALS
TEMPERATURE: 97.6 F | DIASTOLIC BLOOD PRESSURE: 50 MMHG | SYSTOLIC BLOOD PRESSURE: 105 MMHG | WEIGHT: 159 LBS | BODY MASS INDEX: 26.49 KG/M2 | OXYGEN SATURATION: 95 % | HEART RATE: 88 BPM | RESPIRATION RATE: 23 BRPM | HEIGHT: 65 IN

## 2017-07-24 DIAGNOSIS — N18.30 CKD (CHRONIC KIDNEY DISEASE) STAGE 3, GFR 30-59 ML/MIN (HCC): Primary | ICD-10-CM

## 2017-07-24 PROBLEM — Z95.0 PACEMAKER: Status: ACTIVE | Noted: 2017-07-24

## 2017-07-24 LAB
ANION GAP BLD CALC-SCNC: 8 MMOL/L (ref 5–15)
BUN SERPL-MCNC: 23 MG/DL (ref 6–20)
BUN/CREAT SERPL: 18 (ref 12–20)
CALCIUM SERPL-MCNC: 8.8 MG/DL (ref 8.5–10.1)
CHLORIDE SERPL-SCNC: 104 MMOL/L (ref 97–108)
CO2 SERPL-SCNC: 30 MMOL/L (ref 21–32)
CREAT SERPL-MCNC: 1.28 MG/DL (ref 0.55–1.02)
ERYTHROCYTE [DISTWIDTH] IN BLOOD BY AUTOMATED COUNT: 13 % (ref 11.5–14.5)
GLUCOSE SERPL-MCNC: 121 MG/DL (ref 65–100)
HCT VFR BLD AUTO: 41 % (ref 35–47)
HGB BLD-MCNC: 13.4 G/DL (ref 11.5–16)
INR BLD: 2.4 (ref 0.9–1.2)
MCH RBC QN AUTO: 30.8 PG (ref 26–34)
MCHC RBC AUTO-ENTMCNC: 32.7 G/DL (ref 30–36.5)
MCV RBC AUTO: 94.3 FL (ref 80–99)
PLATELET # BLD AUTO: 136 K/UL (ref 150–400)
POTASSIUM SERPL-SCNC: 4.8 MMOL/L (ref 3.5–5.1)
RBC # BLD AUTO: 4.35 M/UL (ref 3.8–5.2)
SODIUM SERPL-SCNC: 142 MMOL/L (ref 136–145)
WBC # BLD AUTO: 8.4 K/UL (ref 3.6–11)

## 2017-07-24 PROCEDURE — 85027 COMPLETE CBC AUTOMATED: CPT | Performed by: INTERNAL MEDICINE

## 2017-07-24 PROCEDURE — 80048 BASIC METABOLIC PNL TOTAL CA: CPT | Performed by: INTERNAL MEDICINE

## 2017-07-24 PROCEDURE — 36415 COLL VENOUS BLD VENIPUNCTURE: CPT | Performed by: INTERNAL MEDICINE

## 2017-07-24 PROCEDURE — 85610 PROTHROMBIN TIME: CPT

## 2017-07-24 RX ORDER — SODIUM CHLORIDE 9 MG/ML
500 INJECTION, SOLUTION INTRAVENOUS ONCE
Status: CANCELLED | OUTPATIENT
Start: 2017-07-24 | End: 2017-07-24

## 2017-07-24 RX ORDER — MIDAZOLAM HYDROCHLORIDE 1 MG/ML
.5-1 INJECTION, SOLUTION INTRAMUSCULAR; INTRAVENOUS
Status: CANCELLED | OUTPATIENT
Start: 2017-07-24

## 2017-07-24 RX ORDER — CARVEDILOL 3.12 MG/1
3.12 TABLET ORAL 2 TIMES DAILY WITH MEALS
Qty: 60 TAB | Refills: 12 | Status: SHIPPED | OUTPATIENT
Start: 2017-07-24 | End: 2017-09-28 | Stop reason: SDUPTHER

## 2017-07-24 RX ORDER — SODIUM CHLORIDE 0.9 % (FLUSH) 0.9 %
5-10 SYRINGE (ML) INJECTION EVERY 8 HOURS
Status: DISCONTINUED | OUTPATIENT
Start: 2017-07-24 | End: 2017-07-24 | Stop reason: HOSPADM

## 2017-07-24 RX ORDER — FENTANYL CITRATE 50 UG/ML
25-200 INJECTION, SOLUTION INTRAMUSCULAR; INTRAVENOUS
Status: CANCELLED | OUTPATIENT
Start: 2017-07-24

## 2017-07-24 RX ORDER — SODIUM CHLORIDE 0.9 % (FLUSH) 0.9 %
5 SYRINGE (ML) INJECTION AS NEEDED
Status: CANCELLED | OUTPATIENT
Start: 2017-07-24

## 2017-07-24 RX ORDER — LIDOCAINE HYDROCHLORIDE 10 MG/ML
10-30 INJECTION INFILTRATION; PERINEURAL ONCE
Status: CANCELLED | OUTPATIENT
Start: 2017-07-24 | End: 2017-07-24

## 2017-07-24 RX ORDER — SODIUM CHLORIDE 0.9 % (FLUSH) 0.9 %
5-10 SYRINGE (ML) INJECTION AS NEEDED
Status: DISCONTINUED | OUTPATIENT
Start: 2017-07-24 | End: 2017-07-24 | Stop reason: HOSPADM

## 2017-07-24 RX ORDER — VANCOMYCIN/0.9 % SOD CHLORIDE 1.5G/250ML
1500 PLASTIC BAG, INJECTION (ML) INTRAVENOUS ONCE
Status: DISCONTINUED | OUTPATIENT
Start: 2017-07-24 | End: 2017-07-24 | Stop reason: HOSPADM

## 2017-07-24 RX ORDER — ATROPINE SULFATE 0.1 MG/ML
0.5 INJECTION INTRAVENOUS AS NEEDED
Status: CANCELLED | OUTPATIENT
Start: 2017-07-24

## 2017-07-24 NOTE — IP AVS SNAPSHOT
[address] 
[phone] Patient: Guadelupe Shone MRN: [id number] EOU:6/2/2003 Current Discharge Medication List  
  
CONTINUE these medications which have CHANGED Dose & Instructions Dispensing Information Comments Morning Noon Evening Bedtime  
 carvedilol 3.125 mg tablet Commonly known as:  Barry Jordan What changed:   
- medication strength 
- how much to take Your last dose was: Your next dose is:    
   
   
 Dose:  3.125 mg Take 1 Tab by mouth two (2) times daily (with meals). Quantity:  60 Tab Refills:  12  
     
   
   
   
  
 COUMADIN 5 mg tablet Generic drug:  warfarin What changed:  See the new instructions. Your last dose was: Your next dose is: TAKE 1 TABLET BY MOUTH DAILY AND 1/2 TABLET ON SUNDAY AND THURSDAY Quantity:  90 Tab Refills:  0 CONTINUE these medications which have NOT CHANGED Dose & Instructions Dispensing Information Comments Morning Noon Evening Bedtime  
 acetaminophen 500 mg tablet Commonly known as:  TYLENOL Your last dose was: Your next dose is:    
   
   
 Dose:  500 mg Take 500 mg by mouth every six (6) hours as needed for Pain. (2) Tablet in am (1) tablet in pm (2) Tablet pm  
 Refills:  0 ADVAIR DISKUS 250-50 mcg/dose diskus inhaler Generic drug:  fluticasone-salmeterol Your last dose was: Your next dose is:    
   
   
 Dose:  1 Puff Take 1 Puff by inhalation two (2) times a day. Refills:  0 Calcium-Cholecalciferol (D3) 600 mg(1,500mg) -400 unit Cap Your last dose was: Your next dose is: Take  by mouth two (2) times a day. Refills:  0  
     
   
   
   
  
 chlorhexidine 4 % liquid Commonly known as:  HIBICLENS Your last dose was: Your next dose is: Apply to upper chest and arms as directed Quantity:  1 Bottle Refills:  0  
     
   
   
   
  
 desonide 0.05 % cream  
Commonly known as:  Vick Quarles Your last dose was: Your next dose is:    
   
   
 Apply  to affected area two (2) times a day. Quantity:  15 g Refills:  0 ENTRESTO 24-26 mg tablet Generic drug:  sacubitril-valsartan Your last dose was: Your next dose is: TAKE 1 TABLET BY MOUTH TWICE A DAY Quantity:  60 Tab Refills:  6  
     
   
   
   
  
 * lidocaine 5 % Commonly known as:  Melany Yee Your last dose was: Your next dose is:    
   
   
 Dose:  1 Patch 1 Patch by TransDERmal route every twelve (12) hours. Quantity:  60 Each Refills:  0  
     
   
   
   
  
 * lidocaine 5 % Commonly known as:  Melany Yee Your last dose was: Your next dose is:    
   
   
 Apply patch to the affected area for 12 hours a day and remove for 12 hours a day. Quantity:  1 Each Refills:  0  
     
   
   
   
  
 multivitamin tablet Commonly known as:  ONE A DAY Your last dose was: Your next dose is:    
   
   
 Dose:  1 Tab Take 1 Tab by mouth daily. Refills:  0 OXYGEN-AIR DELIVERY SYSTEMS Your last dose was: Your next dose is:    
   
   
 Dose:  2 L  
2 L by Does Not Apply route nightly. Refills:  0  
     
   
   
   
  
 predniSONE 10 mg tablet Commonly known as:  Sang Rogers Your last dose was: Your next dose is:    
   
   
 Dose:  10 mg Take 10 mg by mouth daily. Refills:  0  
     
   
   
   
  
 raNITIdine 150 mg tablet Commonly known as:  ZANTAC Your last dose was: Your next dose is: TAKE 1 TABLET BY MOUTH TWICE A DAY Quantity:  60 Tab Refills:  5  
     
   
   
   
  
 sertraline 50 mg tablet Commonly known as:  ZOLOFT Your last dose was: Your next dose is:    
   
   
 Dose:  50 mg Take 1 Tab by mouth daily. Quantity:  90 Tab Refills:  2  
     
   
   
   
  
 simvastatin 20 mg tablet Commonly known as:  ZOCOR Your last dose was: Your next dose is: TAKE 1 TABLET BY MOUTH NIGHTLY Quantity:  30 Tab Refills:  5 SPIRIVA WITH HANDIHALER 18 mcg inhalation capsule Generic drug:  tiotropium Your last dose was: Your next dose is:    
   
   
 Dose:  1 Cap Take 1 Cap by inhalation daily. Refills:  0  
     
   
   
   
  
 SYNTHROID 112 mcg tablet Generic drug:  levothyroxine Your last dose was: Your next dose is: TAKE 1 TABLET BY MOUTH EVERY DAY BEFORE BREAKFAST Quantity:  30 Tab Refills:  5 ZENPEP 40,000-136,000- 218,000 unit Cpdr  
Generic drug:  lipase-protease-amylase Your last dose was: Your next dose is:    
   
   
 Dose:  1 Tab Take 1 Tab by mouth three (3) times daily (with meals). And snacks. Refills:  0  
     
   
   
   
  
 * Notice: This list has 2 medication(s) that are the same as other medications prescribed for you. Read the directions carefully, and ask your doctor or other care provider to review them with you. Where to Get Your Medications These medications were sent to [facility]/pharmacy #[id number]- [place], [address] ([address])  [address] [place] [address] Phone:  [phone]  
  carvedilol 3.125 mg tablet

## 2017-07-24 NOTE — PROGRESS NOTES
Cardiac Cath Lab Recovery Arrival Note:      Denis Lane arrived to Cardiac Cath Lab, Recovery Area. Staff introduced to patient. Patient identifiers verified with NAME and DATE OF BIRTH. Procedure verified with patient. Consent forms reviewed and signed by patient or authorized representative and verified. Allergies verified. Patient informed of procedure and plan of care. Questions answered with review. Patient prepped for procedure, per orders from physician, prior to arrival.    Patient on cardiac monitor, non-invasive blood pressure, SPO2 monitor. Patient is A&Ox 4. Patient reports no pain, no chest pain, no n/v. Patient in stretcher, in low position, with side rails up, call bell within reach, patient instructed to call of assistance as needed. Patient prep in: [facility] Recovery Area, Bed# 6. Family in: Daughters: Matthew Landis and Harlan Zheng [phone].    Prep by: Peg Rosales RN

## 2017-07-24 NOTE — IP AVS SNAPSHOT
[address] [place] [address] 
[phone] Patient: Joselito Landers MRN: [id number] CLD:2/7/[birth year]96 You are allergic to the following Allergen Reactions Cardizem (Diltiazem Hcl) Hives Codeine Nausea and Vomiting Darvocet A500 (Propoxyphene N-Acetaminophen) Nausea and Vomiting Labetalol Nausea and Vomiting Dizzy/Disoriented Pcn (Penicillins) Swelling Tongue Swelling Primidone Other (comments) Lightheaded/unsteady gait Sulfa (Sulfonamide Antibiotics) Nausea and Vomiting Recent Documentation Height Weight BMI OB Status Smoking Status 1.651 m 72.1 kg 26.46 kg/m2 Postmenopausal Passive Smoke Exposure - Never Smoker Emergency Contacts Name Discharge Info Relation Home Work Mobile Rosa Forbes DISCHARGE CAREGIVER [3] Child [2]   [phone] Lesley Castellanos  Child [2] [phone] [place]  Son [22]   [phone] About your hospitalization You were admitted on:  July 24, 2017 You last received care in the:  [facility] ELECTROPHYSIOLOGY You were discharged on:  July 24, 2017 Unit phone number:  [phone] Why you were hospitalized Your primary diagnosis was:  Pacemaker Your diagnoses also included:  Chb (Complete Heart Block) (Hcc), Orthostatic Hypotension Providers Seen During Your Hospitalizations Provider Role Specialty Primary office phone Gallo Cartagena MD Attending Provider Cardiology [phone] Your Primary Care Physician (PCP) Primary Care Physician Office Phone Office Fax Ari Hurd [phone] Follow-up Information Follow up With Details Comments Contact Info Adama Ash MD   [address] [facility] [address] [facility] 
[phone] Your Appointments  Thursday August 03, 2017  8:40 AM EDT  
 ESTABLISHED PATIENT with Malaika Courtney MD  
[facility] ([facility] SCHEDULING) [address] [facility]  
[phone] Monday August 07, 2017  1:00 PM EDT  
PACEMAKER with Kelseymelody Reyes [facility] ([facility] SCHEDULING) [address] [place]  
[phone] Monday August 07, 2017  1:00 PM EDT  
ESTABLISHED PATIENT with Boston Ames MD  
[facility] ([address]) [address] [place]  
[phone] Current Discharge Medication List  
  
CONTINUE these medications which have CHANGED Dose & Instructions Dispensing Information Comments Morning Noon Evening Bedtime  
 carvedilol 3.125 mg tablet Commonly known as:  Little Mcgarry What changed:   
- medication strength 
- how much to take Your last dose was: Your next dose is:    
   
   
 Dose:  3.125 mg Take 1 Tab by mouth two (2) times daily (with meals). Quantity:  60 Tab Refills:  12  
     
   
   
   
  
 COUMADIN 5 mg tablet Generic drug:  warfarin What changed:  See the new instructions. Your last dose was: Your next dose is: TAKE 1 TABLET BY MOUTH DAILY AND 1/2 TABLET ON SUNDAY AND THURSDAY Quantity:  90 Tab Refills:  0 CONTINUE these medications which have NOT CHANGED Dose & Instructions Dispensing Information Comments Morning Noon Evening Bedtime  
 acetaminophen 500 mg tablet Commonly known as:  TYLENOL Your last dose was: Your next dose is:    
   
   
 Dose:  500 mg Take 500 mg by mouth every six (6) hours as needed for Pain. (2) Tablet in am (1) tablet in pm (2) Tablet pm  
 Refills:  0 ADVAIR DISKUS 250-50 mcg/dose diskus inhaler Generic drug:  fluticasone-salmeterol Your last dose was: Your next dose is:    
   
   
 Dose:  1 Puff Take 1 Puff by inhalation two (2) times a day. Refills:  0 Calcium-Cholecalciferol (D3) 600 mg(1,500mg) -400 unit Cap Your last dose was: Your next dose is: Take  by mouth two (2) times a day. Refills:  0  
     
   
   
   
  
 chlorhexidine 4 % liquid Commonly known as:  HIBICLENS Your last dose was: Your next dose is:    
   
   
 Apply to upper chest and arms as directed Quantity:  1 Bottle Refills:  0  
     
   
   
   
  
 desonide 0.05 % cream  
Commonly known as:  Karen More Your last dose was: Your next dose is:    
   
   
 Apply  to affected area two (2) times a day. Quantity:  15 g Refills:  0 ENTRESTO 24-26 mg tablet Generic drug:  sacubitril-valsartan Your last dose was: Your next dose is: TAKE 1 TABLET BY MOUTH TWICE A DAY Quantity:  60 Tab Refills:  6  
     
   
   
   
  
 * lidocaine 5 % Commonly known as:  Paz Dust Your last dose was: Your next dose is:    
   
   
 Dose:  1 Patch 1 Patch by TransDERmal route every twelve (12) hours. Quantity:  60 Each Refills:  0  
     
   
   
   
  
 * lidocaine 5 % Commonly known as:  Paz Dust Your last dose was: Your next dose is:    
   
   
 Apply patch to the affected area for 12 hours a day and remove for 12 hours a day. Quantity:  1 Each Refills:  0  
     
   
   
   
  
 multivitamin tablet Commonly known as:  ONE A DAY Your last dose was: Your next dose is:    
   
   
 Dose:  1 Tab Take 1 Tab by mouth daily. Refills:  0 OXYGEN-AIR DELIVERY SYSTEMS Your last dose was: Your next dose is:    
   
   
 Dose:  2 L  
2 L by Does Not Apply route nightly. Refills:  0  
     
   
   
   
  
 predniSONE 10 mg tablet Commonly known as:  Eleanor Patton  
 Your last dose was: Your next dose is:    
   
   
 Dose:  10 mg Take 10 mg by mouth daily. Refills:  0  
     
   
   
   
  
 raNITIdine 150 mg tablet Commonly known as:  ZANTAC Your last dose was: Your next dose is: TAKE 1 TABLET BY MOUTH TWICE A DAY Quantity:  60 Tab Refills:  5  
     
   
   
   
  
 sertraline 50 mg tablet Commonly known as:  ZOLOFT Your last dose was: Your next dose is:    
   
   
 Dose:  50 mg Take 1 Tab by mouth daily. Quantity:  90 Tab Refills:  2  
     
   
   
   
  
 simvastatin 20 mg tablet Commonly known as:  ZOCOR Your last dose was: Your next dose is: TAKE 1 TABLET BY MOUTH NIGHTLY Quantity:  30 Tab Refills:  5 SPIRIVA WITH HANDIHALER 18 mcg inhalation capsule Generic drug:  tiotropium Your last dose was: Your next dose is:    
   
   
 Dose:  1 Cap Take 1 Cap by inhalation daily. Refills:  0  
     
   
   
   
  
 SYNTHROID 112 mcg tablet Generic drug:  levothyroxine Your last dose was: Your next dose is: TAKE 1 TABLET BY MOUTH EVERY DAY BEFORE BREAKFAST Quantity:  30 Tab Refills:  5 ZENPEP 40,000-136,000- 218,000 unit Cpdr  
Generic drug:  lipase-protease-amylase Your last dose was: Your next dose is:    
   
   
 Dose:  1 Tab Take 1 Tab by mouth three (3) times daily (with meals). And snacks. Refills:  0  
     
   
   
   
  
 * Notice: This list has 2 medication(s) that are the same as other medications prescribed for you. Read the directions carefully, and ask your doctor or other care provider to review them with you. Where to Get Your Medications These medications were sent to [facility]pharm[facility] #[id number]- [address], [address] ([address])  [address] [place] [address] Phone:  [phone]  
  carvedilol 3.125 mg tablet Discharge Instructions Future Appointments Date Time Provider Cuca Rendoni 8/3/2017 8:40 AM Fish Vasquez  [address] 8/7/2017 1:00 PM Estephanie Sunshine  [address] 8/7/2017 1:00  [facility], [address] Recheck lab at follow up 2 weeks Discharge Orders Procedure Order Date Status Priority Quantity Spec Type Associated Dx METABOLIC PANEL, BASIC 30/88/74 0956 Normal Routine 1 Serum CKD (chronic kidney disease) stage 3, GFR 30-59 ml/min [065822] ACO Transitions of Care Introducing [employer] [address] Duque offers a voluntary care coordination program to provide high quality service and care to [facility] fee-for-service beneficiaries. Majo Harsh was designed to help you enhance your health and well-being through the following services: ? Transitions of Care  support for individuals who are transitioning from one care setting to another (example: Hospital to home). ? Chronic and Complex Care Coordination  support for individuals and caregivers of those with serious or chronic illnesses or with more than one chronic (ongoing) condition and those who take a number of different medications. If you meet specific medical criteria, a Replaced by [facility] [address] may call you directly to coordinate your care with your primary care physician and your other care providers. For questions about the [facility] programs, please, contact your physicians office. For general questions or additional information about Accountable Care Organizations: 
Please visit www.medicare.gov/acos. html or call 1-800-MEDICARE (8-204.294.8453) TTY users should call 2-876.934.7821. Introducing [facility]!    
 [facility] introduces [place] patient portal. Now you can access parts of your medical record, email your doctor's office, and request medication refills online. 1. In your internet browser, go to [email or web address] 2. Click on the First Time User? Click Here link in the Sign In box. You will see the New Member Sign Up page. 3. Enter your [employer] Access Code exactly as it appears below. You will not need to use this code after youve completed the sign-up process. If you do not sign up before the expiration date, you must request a new code. · [employer] Access Code: [email or web address] Expires: 10/22/2017  9:59 AM 
 
4. Enter the last four digits of your Social Security Number (xxxx) and Date of Birth (mm/dd/yyyy) as indicated and click Submit. You will be taken to the next sign-up page. 5. Create a [employer] ID. This will be your [employer] login ID and cannot be changed, so think of one that is secure and easy to remember. 6. Create a [employer] password. You can change your password at any time. 7. Enter your Password Reset Question and Answer. This can be used at a later time if you forget your password. 8. Enter your e-mail address. You will receive e-mail notification when new information is available in [address] 9. Click Sign Up. You can now view and download portions of your medical record. 10. Click the Download Summary menu link to download a portable copy of your medical information. If you have questions, please visit the Frequently Asked Questions section of the [employer] website. Remember, [employer] is NOT to be used for urgent needs. For medical emergencies, dial 911. Now available from your iPhone and Android! General Information Please provide this summary of care documentation to your next provider. Patient Signature:  ____________________________________________________________ Date:  ____________________________________________________________  
  
Ryan Bustos  Provider Signature: ____________________________________________________________ Date:  ____________________________________________________________

## 2017-07-24 NOTE — H&P
Cardiac Electrophysiology H/P Note     Subjective:      Lani Tyson is a 80 y.o. patient who is seen for upgrade of dual chamber Medtronic pacemaker to BIV pacer  Her echo in 4/2017 showed LVEF 40%  She has not seen me since April  Her daughters were concerned about prednisone and infection risk  Since the last visit she had fallen and hurt her back  Today she and her 2 daughters said she would be ok with some help walking and requested her to be in hospital if device is upgraded    She was kindly referred by Dr Fran Khan 3 months ago when I saw her and she had tried entresto and aldactone but potassium is up and CKD stage 3 so aldactone is off and she still has K 5.3 and creatinine 1.3 in June 2017     Dr Elsa Simental used to be her EP physician, Medtronic pacemaker  implanted 7/2005, generator change 8/13/13. Hx of AV node ablation 5/1998. Her leads are from [address] [place] [address] Avenue  She is pacer dependent and hence she has LBBB  ms  Hx of pacemaker pocket revision 1/2014. Dr Antonio Madsen took over after Dr Elsa Simental retired. She knew Dr Fran Khan from [facility] and wanted to switch after nuclear stress test was done last year by Dr Antonio Madsen that showed scar in LAD area with mild reversibility but she has no ischemic chest pain   She reports + JIMENEZ after walking short distances   She says Dr Fran Khan thought the risk outweighed the benefits in proceeding with cardiac cath d/t abnormal stress test.  She reports lightheadedness and dizziness ( chronic problem for several years + 10 years), JIMENEZ (no change since starting the entresto). No chest pain or LE edema. She checks her INR at home.         Prior cardiac testing  Echo 8/20/14 - LVEF 45%, akinesis of distal lat and inf walls, mod-marked LAE, mild-mod MR, RVSP 50  MUGA Scan 8/29/14 - LVEF 53%  Nuclear stress test Lexiscan Cardiolite 8/24/16 - There is a large sized, mod-high grade, partially reversible defect involving mid-distal anterior wall and apex.  The apical defect is high grade and fixed. Extent of ischemia is moderate in LAD territory. SSS = 17, SRS = 10, SDS = 6. LVEF 35% with apical akinesis.    Echo 8/2016 LVEF 35 %. Severe hypokinesis of the mid-apical inferior, apical septal, apical lateral, and apical wall(s). Severe LAE. Mild MAC and Moderate MR. Aortic valve:  trileaflet. Leaflets exhibited mild calcification. Mild aortic regurgitation. Moderate TR and Mild PH.      Patient Active Problem List   Diagnosis Code    Mixed hyperlipidemia E78.2    Atrial fibrillation (HCC) I48.91    Mitral regurgitation I34.0    CHB (complete heart block) (HCC) I44.2    Orthostatic hypotension I95.1    Chest pain, unspecified R07.9    Pulmonary hypertension (HCC) I27.2    Anxiety and depression F41.9, F32.9    Colon cancer (HCC) C18.9    Hypothyroid E03.9    CKD (chronic kidney disease) stage 3, GFR 30-59 ml/min N18.3    Hypothyroidism due to acquired atrophy of thyroid E03.4    Osteopenia M85.80    Restrictive lung disease J98.4    Chest pain R07.9    Abnormal stress test R94.39    Ischemic cardiomyopathy I25.5    Cardiomyopathy (HCC) I42.9    Psoriasis L40.9    Advanced care planning/counseling discussion Z71.89     Current Facility-Administered Medications   Medication Dose Route Frequency Provider Last Rate Last Dose    sodium chloride (NS) flush 5-10 mL  5-10 mL IntraVENous Q8H Nidia Black MD        sodium chloride (NS) flush 5-10 mL  5-10 mL IntraVENous PRN Nidia Black MD        vancomycin (VANCOCIN) 1500 mg in  ml infusion  1,500 mg IntraVENous ONCE Nidia Black MD        bacitracin 50,000 Units in sodium chloride irrigation 0.9 % 500 mL Irrigation   Irrigation ONCE Nidia Black MD         Allergies   Allergen Reactions    Cardizem [Diltiazem Hcl] Hives    Codeine Nausea and Vomiting    Darvocet A500 [Propoxyphene N-Acetaminophen] Nausea and Vomiting    Labetalol Nausea and Vomiting     Dizzy/Disoriented     Pcn [Penicillins] Swelling     Tongue Swelling     Primidone Other (comments)     Lightheaded/unsteady gait    Sulfa (Sulfonamide Antibiotics) Nausea and Vomiting     Past Medical History:   Diagnosis Date    Arthritis     Atrial fibrillation ([facility] [address]     av node ablation 5/22/98 with placement of CPI #[id number] dual chamber pacemaker subsequently replaced with a single chamber medtronic #[id number] single chamber pacemaker 7/25/05    Cancer ([facility] [address] 1970's    colon cancer w/ resection    COPD     Depression     GERD (gastroesophageal reflux disease)     Hyperlipidemia     Hypertension     Ischemic cardiomyopathy     Migraine headache     Orthostatic hypotension     RADHA (obstructive sleep apnea)     Pacemaker 5/22/98    AV node ablation /pacemaker placement utilizing CPI # [id number] dual chamber system, medtronic #[id number] single chamber device placed 7/22/05    Pulmonary hypertension ([facility] [address] 9/26/2011    RVSP 50 on echo 8/14    Thyroid disease     Valvular heart disease     mild-mod MR/TR     Past Surgical History:   Procedure Laterality Date    BREAST SURGERY PROCEDURE UNLISTED      benign breast tumor excision right breast    HX BREAST BIOPSY Right 2002    neg; surgical bx    HX COLECTOMY  1974    colon    HX KNEE REPLACEMENT      right TKA    HX PACEMAKER      HX TUBAL LIGATION      TOTAL KNEE ARTHROPLASTY      total on R, partial on L     Family History   Problem Relation Age of Onset    Diabetes Mother     Heart Disease Mother     Heart Attack Mother     Heart Disease Father     Stroke Sister     Breast Cancer Sister 80    Cancer Maternal Aunt      uterus    Diabetes Maternal Uncle     Breast Cancer Daughter 61     Social History   Substance Use Topics    Smoking status: Passive Smoke Exposure - Never Smoker    Smokeless tobacco: Never Used    Alcohol use No        Review of Systems:   Constitutional: Negative for fever, chills, weight loss, + malaise/fatigue. HEENT: Negative for nosebleeds, vision changes.    Respiratory: Negative for cough, hemoptysis  Cardiovascular: Negative for chest pain, palpitations, orthopnea, claudication, leg swelling, syncope, and PND. +JIMENEZ  Gastrointestinal: Negative for nausea, vomiting, diarrhea, blood in stool and melena. Genitourinary: Negative for dysuria, and hematuria. Musculoskeletal: Negative for myalgias, + arthralgia. Skin: Negative for rash. Heme: Does not bleed or bruise easily. Neurological: Negative for speech change and focal weakness, + occasional dizziness, ? Orthostatic hypotension     Objective:     Visit Vitals    Ht 5' 5\" (1.651 m)    Wt 159 lb (72.1 kg)    LMP 02/26/1970    BMI 26.46 kg/m2      Physical Exam:   Constitutional: well-developed and well-nourished. No respiratory distress. Head: Normocephalic and atraumatic. Eyes: Pupils are equal, round  ENT: hearing normal  Neck: supple. No JVD present. Cardiovascular: Normal rate, regular rhythm. Exam reveals no gallop and no friction rub. Pulmonary/Chest: Effort normal and breath sounds normal. No wheezes. Abdominal: Soft, no tenderness. Musculoskeletal: no edema. Neurological: alert,oriented. Skin: Skin is warm and dry left side pacer site    Psychiatric: normal mood and affect.  Behavior is normal. Judgment and thought content normal.         Assessment/Plan:   Persistent atrial fibrillation with complete av block s/p av node ablation  RV pacing and hence LBBB  Chronic systolic CHF NYHA class 3 due to CAD and ischemic cardiomyopathy but Dr Emperatriz Oates did not think she is good candidate for cardiac cath and PCI given COPD and chronic renal failure  I noted that she has LVEF 40% and CKD stage 3 with hyperkalemia since April and did not improve after aldactone is stopped  I think if repeat lab shows persistent hyperkalemia, she cannot take entresto for long and not sure how much she can respond to CRT pacing and so I explained to her daughters  Will recheck INR as well  Wait for BMP and then make final decision about the procedure with the family and her       Addendum  Lab check showed INR 2.4   K 4.8 and creatinine 1.28  Daughters said they want to hold off BIV pacer upgrade  She has dizziness so will cut coreg down, recheck lab BMP and BP in 2 weeks to see if she can continue with medications. If she cannot, I do not think she will benefit from BIV pacing and so I agree to not proceeding with procedure today  Pacer recheck by Khris Lao showed battery 2.5 years left  RV pacing threshold 1.5 V @ 0.5 ms    Thank you for involving me in this patient's care and please call with further concerns or questions. Harleen Hernandez M.D.   Electrophysiology/Cardiology  [facility] [place]  [address], [facility] [address] [place] [address] [address]  [phone]

## 2017-07-24 NOTE — DISCHARGE INSTRUCTIONS
Future Appointments  Date Time Provider Cuca Willis   8/3/2017 8:40 AM Reed Benítez  [address] 8/7/2017 1:00 PM Krys Garcia  [address] 8/7/2017 1:00 PM PACEMAKER3, [place] lab at follow up 2 weeks

## 2017-07-26 ENCOUNTER — PATIENT OUTREACH (OUTPATIENT)
Dept: CARDIOLOGY CLINIC | Age: 82
End: 2017-07-26

## 2017-07-26 NOTE — PROGRESS NOTES
This note will not be viewable in [address] Nurse navigator note - cardiology  Call to pt and reached her daughter, Sohail Lynch - Ms. Buzz Gutiérrez is doing well at home - and the below noted - the doctor, patient and daughters agreed to monitor her labs - and regroup about what to do - evaluation of hyperkalemia - and ability to continue Entresto pending -     Plan: Medication changes noted - Coreg reduced to 3.125 mg bid   Coumadin dosing - 5 mg every day - with 1/2 pill on Sunday and Thursday reviewed   Labs to be drawn on 8/4 - and follow up with pacer clinic and  Dr. Elie Vilchis on 8/7 -      Question - Daughter has called Dr. Leigh Ann Valdez - arthritis clinic -         Is it ok to increase Prednisone to 20 mg every day for her back pain - any objections to                   that from Dr. Elie Vilchis         Note to provider - will advise -          8/3 Laurent appt cancelled d/t 8/7 follow up Elie Vilchis. Addendum 7/27/17 - Call to pt and daughter and LM - re: Prednisone 20 mg increase   Ok to increase if she can monitor her pulse, bp and daily weights - call if increasing   LM and contact information - should they have questions.     Joana Ortiz RN , CHFN, CCM  [facility] [place]  [phone]  _______________________________________________________________________________    Dr. Elie Vilchis pre-device change note -7/25/17  Assessment/Plan:   Persistent atrial fibrillation with complete av block s/p av node ablation  RV pacing and hence LBBB  Chronic systolic CHF NYHA class 3 due to CAD and ischemic cardiomyopathy but Dr Tika Martin did not think she is good candidate for cardiac cath and PCI given COPD and chronic renal failure  I noted that she has LVEF 40% and CKD stage 3 with hyperkalemia since April and did not improve after aldactone is stopped  I think if repeat lab shows persistent hyperkalemia, she cannot take entresto for long and not sure how much she can respond to CRT pacing and so I explained to her daughters  Will recheck INR as well  Wait for BMP and then make final decision about the procedure with the family and her      Addendum  Lab check showed INR 2.4   K 4.8 and creatinine 1.28  Daughters said they want to hold off BIV pacer upgrade  She has dizziness so will cut coreg down, recheck lab BMP and BP in 2 weeks to see if she can continue with medications.   If she cannot, I do not think she will benefit from BIV pacing and so I agree to not proceeding with procedure today  Pacer recheck by Kaitlynn Al showed battery 2.5 years left  RV pacing threshold 1.5 V @ 0.5 ms     Thank you for involving me in this patient's care and please call with further concerns or questions.        Erma Malhotra M.D.

## 2017-07-28 ENCOUNTER — TELEPHONE (OUTPATIENT)
Dept: CARDIOLOGY CLINIC | Age: 82
End: 2017-07-28

## 2017-08-03 ENCOUNTER — TELEPHONE (OUTPATIENT)
Dept: CARDIOLOGY CLINIC | Age: 82
End: 2017-08-03

## 2017-08-04 ENCOUNTER — HOSPITAL ENCOUNTER (OUTPATIENT)
Dept: LAB | Age: 82
Discharge: HOME | End: 2017-08-04
Payer: MEDICARE

## 2017-08-04 PROCEDURE — 36415 COLL VENOUS BLD VENIPUNCTURE: CPT

## 2017-08-04 PROCEDURE — 80048 BASIC METABOLIC PNL TOTAL CA: CPT

## 2017-08-04 PROCEDURE — 85025 COMPLETE CBC W/AUTO DIFF WBC: CPT

## 2017-08-05 LAB
BASOPHILS # BLD AUTO: 0 X10E3/UL (ref 0–0.2)
BASOPHILS NFR BLD AUTO: 0 %
BUN SERPL-MCNC: 36 MG/DL (ref 8–27)
BUN/CREAT SERPL: 32 (ref 12–28)
CALCIUM SERPL-MCNC: 9.4 MG/DL (ref 8.7–10.3)
CHLORIDE SERPL-SCNC: 100 MMOL/L (ref 96–106)
CO2 SERPL-SCNC: 24 MMOL/L (ref 18–29)
CREAT SERPL-MCNC: 1.12 MG/DL (ref 0.57–1)
EOSINOPHIL # BLD AUTO: 0.1 X10E3/UL (ref 0–0.4)
EOSINOPHIL NFR BLD AUTO: 1 %
ERYTHROCYTE [DISTWIDTH] IN BLOOD BY AUTOMATED COUNT: 14 % (ref 12.3–15.4)
GLUCOSE SERPL-MCNC: 89 MG/DL (ref 65–99)
HCT VFR BLD AUTO: 41.1 % (ref 34–46.6)
HGB BLD-MCNC: 13.6 G/DL (ref 11.1–15.9)
IMM GRANULOCYTES # BLD: 0 X10E3/UL (ref 0–0.1)
IMM GRANULOCYTES NFR BLD: 1 %
INTERPRETATION: NORMAL
LYMPHOCYTES # BLD AUTO: 2 X10E3/UL (ref 0.7–3.1)
LYMPHOCYTES NFR BLD AUTO: 26 %
MCH RBC QN AUTO: 30.6 PG (ref 26.6–33)
MCHC RBC AUTO-ENTMCNC: 33.1 G/DL (ref 31.5–35.7)
MCV RBC AUTO: 93 FL (ref 79–97)
MONOCYTES # BLD AUTO: 0.6 X10E3/UL (ref 0.1–0.9)
MONOCYTES NFR BLD AUTO: 7 %
NEUTROPHILS # BLD AUTO: 5.2 X10E3/UL (ref 1.4–7)
NEUTROPHILS NFR BLD AUTO: 65 %
PLATELET # BLD AUTO: 148 X10E3/UL (ref 150–379)
POTASSIUM SERPL-SCNC: 5 MMOL/L (ref 3.5–5.2)
RBC # BLD AUTO: 4.44 X10E6/UL (ref 3.77–5.28)
SODIUM SERPL-SCNC: 143 MMOL/L (ref 134–144)
WBC # BLD AUTO: 7.9 X10E3/UL (ref 3.4–10.8)

## 2017-08-07 ENCOUNTER — TELEPHONE (OUTPATIENT)
Dept: CARDIOLOGY CLINIC | Age: 82
End: 2017-08-07

## 2017-08-07 ENCOUNTER — OFFICE VISIT (OUTPATIENT)
Dept: CARDIOLOGY CLINIC | Age: 82
End: 2017-08-07

## 2017-08-07 VITALS
WEIGHT: 160 LBS | DIASTOLIC BLOOD PRESSURE: 62 MMHG | SYSTOLIC BLOOD PRESSURE: 110 MMHG | HEART RATE: 92 BPM | BODY MASS INDEX: 26.66 KG/M2 | HEIGHT: 65 IN

## 2017-08-07 DIAGNOSIS — Z79.01 ANTICOAGULATED ON COUMADIN: ICD-10-CM

## 2017-08-07 DIAGNOSIS — N18.30 CKD (CHRONIC KIDNEY DISEASE) STAGE 3, GFR 30-59 ML/MIN (HCC): ICD-10-CM

## 2017-08-07 DIAGNOSIS — G89.29 CHRONIC RIGHT-SIDED BACK PAIN, UNSPECIFIED BACK LOCATION: ICD-10-CM

## 2017-08-07 DIAGNOSIS — R42 DIZZINESS: ICD-10-CM

## 2017-08-07 DIAGNOSIS — I44.7 LBBB (LEFT BUNDLE BRANCH BLOCK): ICD-10-CM

## 2017-08-07 DIAGNOSIS — I48.20 CHRONIC ATRIAL FIBRILLATION (HCC): ICD-10-CM

## 2017-08-07 DIAGNOSIS — I25.5 ISCHEMIC CARDIOMYOPATHY: Primary | ICD-10-CM

## 2017-08-07 DIAGNOSIS — M54.9 CHRONIC RIGHT-SIDED BACK PAIN, UNSPECIFIED BACK LOCATION: ICD-10-CM

## 2017-08-07 DIAGNOSIS — Z98.890 H/O ATRIOVENTRICULAR NODAL ABLATION: ICD-10-CM

## 2017-08-07 DIAGNOSIS — Z95.0 PACEMAKER: ICD-10-CM

## 2017-08-07 DIAGNOSIS — I34.0 NON-RHEUMATIC MITRAL REGURGITATION: ICD-10-CM

## 2017-08-07 DIAGNOSIS — I50.22 CHF NYHA CLASS II (SYMPTOMS WITH MODERATELY STRENUOUS ACTIVITIES), CHRONIC, SYSTOLIC (HCC): ICD-10-CM

## 2017-08-07 RX ORDER — LIDOCAINE 50 MG/G
1 PATCH TOPICAL EVERY 12 HOURS
Qty: 60 EACH | Refills: 0 | Status: SHIPPED | OUTPATIENT
Start: 2017-08-07 | End: 2018-01-08 | Stop reason: ALTCHOICE

## 2017-08-07 RX ORDER — LIDOCAINE 50 MG/G
PATCH TOPICAL
Qty: 60 PATCH | Refills: 0 | Status: SHIPPED | OUTPATIENT
Start: 2017-08-07 | End: 2017-09-07 | Stop reason: ALTCHOICE

## 2017-08-07 NOTE — MR AVS SNAPSHOT
Visit Information Date & Time Provider Department Dept. Phone Encounter #  
 8/7/2017  1:00 PM Denver Kc MD [facility] [place] [facility] [phone] [id number] Follow-up Instructions Return if symptoms worsen or fail to improve. Follow-up and Disposition History Your Appointments 11/2/2017 11:15 AM  
PACEMAKER with [facility] (FARHAD SCHEDULING) Appt Note: follow up pacer check and follow up wYanira Smith Dr [address] [place] [address]  
[address] [phone]  
  
    
 11/2/2017 11:20 AM  
ESTABLISHED PATIENT with Millie Titus MD  
[facility] ([address]) Appt Note: follow up pacer check and follow up w/Laurent [address] [facility] [address]  
[address] [phone] Upcoming Health Maintenance Date Due DTaP/Tdap/Td series (1 - Tdap) 4/3/2012 INFLUENZA AGE 9 TO ADULT 8/1/2017 MEDICARE YEARLY EXAM 5/12/2018 GLAUCOMA SCREENING Q2Y 12/8/2018 Allergies as of 8/7/2017  Review Complete On: 8/7/2017 By: Denver Kc MD  
  
 Severity Noted Reaction Type Reactions Cardizem [Diltiazem Hcl]  10/04/2010    Hives Codeine  10/04/2010    Nausea and Vomiting Darvocet A500 [Propoxyphene N-acetaminophen]  10/04/2010    Nausea and Vomiting Labetalol  04/25/2012    Nausea and Vomiting Dizzy/Disoriented Pcn [Penicillins]  10/04/2010    Swelling Tongue Swelling Primidone  07/31/2012    Other (comments) Lightheaded/unsteady gait Sulfa (Sulfonamide Antibiotics)  10/04/2010    Nausea and Vomiting Current Immunizations  Reviewed on 11/23/2015 Name Date Influenza High Dose Vaccine PF 9/29/2016, 9/24/2015, 10/1/2014 Influenza Vaccine 12/1/2013 Influenza Vaccine Split 10/12/2012 Pneumococcal Conjugate (PCV-13) 11/23/2015 Pneumococcal Polysaccharide (PPSV-23) 2/27/2015 Pneumococcal Vaccine (Pcv) 12/15/2010 TB Skin Test (PPD) 1/1/2001 TD Vaccine 4/2/2012 Varicella Virus Vaccine Live 9/12/2012 Zoster Vaccine, Live 12/1/2013 Not reviewed this visit You Were Diagnosed With   
  
 Codes Comments Ischemic cardiomyopathy    -  Primary ICD-10-CM: I25.5 ICD-9-CM: 414.8 Non-rheumatic mitral regurgitation     ICD-10-CM: I34.0 ICD-9-CM: 424.0 Pacemaker     ICD-10-CM: Z95.0 ICD-9-CM: V45.01   
 CKD (chronic kidney disease) stage 3, GFR 30-59 ml/min     ICD-10-CM: N18.3 ICD-9-CM: 585.3 LBBB (left bundle branch block)     ICD-10-CM: I44.7 ICD-9-CM: 426.3 Dizziness     ICD-10-CM: R96 ICD-9-CM: 780.4 H/O atrioventricular citlaly ablation     ICD-10-CM: L97.049 ICD-9-CM: V15.1 CHF NYHA class II (symptoms with moderately strenuous activities), chronic, systolic (HCC)     UXA-74-AT: I50.22 ICD-9-CM: 428.22, 428.0 Chronic atrial fibrillation (HCC)     ICD-10-CM: B00.6 ICD-9-CM: 427.31 Anticoagulated on Coumadin     ICD-10-CM: Z51.81, Z79.01 
ICD-9-CM: V58.83, V58.61 Vitals BP Pulse Height(growth percentile) Weight(growth percentile) LMP BMI  
 110/62 (BP 1 Location: Left arm, BP Patient Position: Sitting) 92 5' 5\" (1.651 m) 160 lb (72.6 kg) [birth year]1970 26.63 kg/m2 OB Status Smoking Status Postmenopausal Passive Smoke Exposure - Never Smoker Vitals History BMI and BSA Data Body Mass Index Body Surface Area  
 26.63 kg/m 2 1.82 m 2 Preferred Pharmacy Pharmacy Name Phone [id number] [place], [address]) [phone] Your Updated Medication List  
  
   
This list is accurate as of: 8/7/17  1:36 PM.  Always use your most recent med list.  
  
  
  
  
 acetaminophen 500 mg tablet Commonly known as:  TYLENOL  
 Take 500 mg by mouth every six (6) hours as needed for Pain. (2) Tablet in am (1) tablet in pm (2) Tablet pm  
  
 ADVAIR DISKUS 250-50 mcg/dose diskus inhaler Generic drug:  fluticasone-salmeterol Take 1 Puff by inhalation two (2) times a day. Calcium-Cholecalciferol (D3) 600 mg(1,500mg) -400 unit Cap Take  by mouth two (2) times a day. carvedilol 3.125 mg tablet Commonly known as:  Quinten Beets Take 1 Tab by mouth two (2) times daily (with meals). COUMADIN 5 mg tablet Generic drug:  warfarin TAKE 1 TABLET BY MOUTH DAILY AND 1/2 TABLET ON SUNDAY AND THURSDAY  
  
 desonide 0.05 % cream  
Commonly known as:  Kaaren Pillar Apply  to affected area two (2) times a day. ENTRESTO 24-26 mg tablet Generic drug:  sacubitril-valsartan TAKE 1 TABLET BY MOUTH TWICE A DAY  
  
 lidocaine 5 % Commonly known as:  LIDODERM  
1 Patch by TransDERmal route every twelve (12) hours. multivitamin tablet Commonly known as:  ONE A DAY Take 1 Tab by mouth daily. OXYGEN-AIR DELIVERY SYSTEMS  
2 L by Does Not Apply route nightly. predniSONE 10 mg tablet Commonly known as:  Marium Alistair Take 20 mg by mouth daily. raNITIdine 150 mg tablet Commonly known as:  ZANTAC TAKE 1 TABLET BY MOUTH TWICE A DAY  
  
 sertraline 50 mg tablet Commonly known as:  ZOLOFT Take 1 Tab by mouth daily. simvastatin 20 mg tablet Commonly known as:  ZOCOR  
TAKE 1 TABLET BY MOUTH NIGHTLY SPIRIVA WITH HANDIHALER 18 mcg inhalation capsule Generic drug:  tiotropium Take 1 Cap by inhalation daily. SYNTHROID 112 mcg tablet Generic drug:  levothyroxine TAKE 1 TABLET BY MOUTH EVERY DAY BEFORE BREAKFAST  
  
 ZENPEP 40,000-136,000- 218,000 unit Cpdr  
Generic drug:  lipase-protease-amylase Take 1 Tab by mouth three (3) times daily (with meals). And snacks. Follow-up Instructions Return if symptoms worsen or fail to improve. Patient Instructions Follow up with Dr Gabi Garcia. Follow up with Dr Doreen Arriaga as needed Introducing [facility]! Sarah Coello introduces GetBack patient portal. Now you can access parts of your medical record, email your doctor's office, and request medication refills online. 1. In your internet browser, go to [email or web address] 2. Click on the First Time User? Click Here link in the Sign In box. You will see the New Member Sign Up page. 3. Enter your GetBack Access Code exactly as it appears below. You will not need to use this code after youve completed the sign-up process. If you do not sign up before the expiration date, you must request a new code. · GetBack Access Code: [email or web address] Expires: 10/22/2017  9:59 AM 
 
4. Enter the last four digits of your Social Security Number (xxxx) and Date of Birth (mm/dd/yyyy) as indicated and click Submit. You will be taken to the next sign-up page. 5. Create a GetBack ID. This will be your GetBack login ID and cannot be changed, so think of one that is secure and easy to remember. 6. Create a GetBack password. You can change your password at any time. 7. Enter your Password Reset Question and Answer. This can be used at a later time if you forget your password. 8. Enter your e-mail address. You will receive e-mail notification when new information is available in [address] 9. Click Sign Up. You can now view and download portions of your medical record. 10. Click the Download Summary menu link to download a portable copy of your medical information. If you have questions, please visit the Frequently Asked Questions section of the GetBack website. Remember, GetBack is NOT to be used for urgent needs. For medical emergencies, dial 911. Now available from your iPhone and Android! Please provide this summary of care documentation to your next provider. Your primary care clinician is listed as Millicent Mac.  If you have any questions after today's visit, please call [phone].

## 2017-08-07 NOTE — PROGRESS NOTES
Cardiac Electrophysiology Office Note     Subjective:      Abhilash Diaz is a 80 y.o. female patient who is seen for follow up, she was at [facility] for a biventricular pacemaker, however she did not have this done   LVEF was 40%. Coreg was decreased d/t dizziness. Aldactone d/c d/t hyperkalemia. entresto can only be used with low dose due to the above 2 reasons  Therefore I did not think she would benefit from CRT pacing    She says since she was seen last she is more fatigued. Repeat K since d/c the aldactone, K is 5.0. Weight log avg weight 159-160  BP log shows -130's. She reports lightheadedness and dizziness ( chronic problem for several years + 10 years), JIMENEZ. No chest pain or LE edema. No hospitalizations. She checks her INR at home. She was kindly referred by Dr Vannesa Metz 3 months ago when I saw her and recommended to try entresto and aldactone before the [facility] ICD upgrade. She is here with her daughter in law. Dr Michelle Guerra used to be her EP physician, Medtronic pacemaker  implanted 7/2005, generator change 8/13/13. Hx of AV node ablation 5/1998. She is pacer dependent and hence she has LBBB  Hx of pacemaker pocket revision 1/2014. Dr Cesar Norris took over after Dr Michelle Guerra retired. She knew Dr Vannesa Metz from [facility] and wanted to switch after nuclear stress test was done last year by Dr Cesar Norris that showed scar in LAD area with mild reversibility but she has no ischemic chest pain   She says Dr Vannesa Metz thought the risk outweighed the benefits in proceeding with cardiac cath d/t abnormal stress test.    Prior cardiac testing  Echo 8/20/14 - LVEF 45%, akinesis of distal lat and inf walls, mod-marked LAE, mild-mod MR, RVSP 50  MUGA Scan 8/29/14 - LVEF 53%  Nuclear stress test Lexiscan Cardiolite 8/24/16 - There is a large sized, mod-high grade, partially reversible defect involving mid-distal anterior wall and apex. The apical defect is high grade and fixed.  Extent of ischemia is moderate in LAD territory. SSS = 17, SRS = 10, SDS = 6. LVEF 35% with apical akinesis.    Echo 8/2016 LVEF 35 %. Severe hypokinesis of the mid-apical inferior, apical septal, apical lateral, and apical wall(s). Severe LAE. Mild MAC and Moderate MR. Aortic valve:  trileaflet. Leaflets exhibited mild calcification. Mild aortic regurgitation. Moderate TR and Mild PH. Social History     Social History    Marital status:      Spouse name: N/A    Number of children: N/A    Years of education: N/A     Occupational History    Not on file.      Social History Main Topics    Smoking status: Passive Smoke Exposure - Never Smoker    Smokeless tobacco: Never Used    Alcohol use No    Drug use: No    Sexual activity: No     Other Topics Concern    Not on file     Social History Narrative     Family History   Problem Relation Age of Onset    Diabetes Mother     Heart Disease Mother     Heart Attack Mother     Heart Disease Father     Stroke Sister     Breast Cancer Sister 80    Cancer Maternal Aunt      uterus    Diabetes Maternal Uncle     Breast Cancer Daughter 61         Patient Active Problem List    Diagnosis Date Noted    Pacemaker 07/24/2017    Advanced care planning/counseling discussion 05/11/2017    Psoriasis 03/23/2017    Cardiomyopathy ([facility] [address] 09/20/2016    Abnormal stress test 09/13/2016    Ischemic cardiomyopathy     Chest pain 08/10/2016    Restrictive lung disease 12/14/2015    Osteopenia 11/23/2015    Hypothyroidism due to acquired atrophy of thyroid 11/06/2015    CKD (chronic kidney disease) stage 3, GFR 30-59 ml/min 02/09/2015    Anxiety and depression 02/26/2014    Colon cancer ([address] 02/26/2014    Hypothyroid 02/26/2014    Pulmonary hypertension ([address] 09/26/2011    Mitral regurgitation 10/21/2010    CHB (complete heart block) ([address] 10/21/2010    Orthostatic hypotension 10/21/2010    Chest pain, unspecified 10/21/2010    Atrial fibrillation (HCC)     Mixed hyperlipidemia 10/18/2010     Current Outpatient Prescriptions   Medication Sig Dispense Refill    carvedilol (COREG) 3.125 mg tablet Take 1 Tab by mouth two (2) times daily (with meals). 60 Tab 12    simvastatin (ZOCOR) 20 mg tablet TAKE 1 TABLET BY MOUTH NIGHTLY 30 Tab 5    ENTRESTO 24-26 mg tablet TAKE 1 TABLET BY MOUTH TWICE A DAY 60 Tab 6    predniSONE (DELTASONE) 10 mg tablet Take 20 mg by mouth daily.  sertraline (ZOLOFT) 50 mg tablet Take 1 Tab by mouth daily. 90 Tab 2    COUMADIN 5 mg tablet TAKE 1 TABLET BY MOUTH DAILY AND 1/2 TABLET ON SUNDAY AND THURSDAY (Patient taking differently: TAKE 1 TABLET BY MOUTH DAILY AND 1/2 TABLET ON SUNDAY) 90 Tab 0    lipase-protease-amylase (ZENPEP) 40,000-136,000- 218,000 unit cpDR Take 1 Tab by mouth three (3) times daily (with meals). And snacks.  lidocaine (LIDODERM) 5 % 1 Patch by TransDERmal route every twelve (12) hours. 60 Each 0    desonide (TRIDESILON) 0.05 % cream Apply  to affected area two (2) times a day. 15 g 0    SYNTHROID 112 mcg tablet TAKE 1 TABLET BY MOUTH EVERY DAY BEFORE BREAKFAST 30 Tab 5    multivitamin (ONE A DAY) tablet Take 1 Tab by mouth daily.  Calcium-Cholecalciferol, D3, 600 mg(1,500mg) -400 unit cap Take  by mouth two (2) times a day.  ranitidine (ZANTAC) 150 mg tablet TAKE 1 TABLET BY MOUTH TWICE A DAY 60 Tab 5    OXYGEN-AIR DELIVERY SYSTEMS 2 L by Does Not Apply route nightly.  ADVAIR DISKUS 250-50 mcg/dose diskus inhaler Take 1 Puff by inhalation two (2) times a day.  acetaminophen (TYLENOL) 500 mg tablet Take 500 mg by mouth every six (6) hours as needed for Pain. (2) Tablet in am (1) tablet in pm (2) Tablet pm  0    tiotropium (SPIRIVA WITH HANDIHALER) 18 mcg inhalation capsule Take 1 Cap by inhalation daily.        Allergies   Allergen Reactions    Cardizem [Diltiazem Hcl] Hives    Codeine Nausea and Vomiting    Darvocet A500 [Propoxyphene N-Acetaminophen] Nausea and Vomiting    Labetalol Nausea and Vomiting     Dizzy/Disoriented     Pcn [Penicillins] Swelling     Tongue Swelling     Primidone Other (comments)     Lightheaded/unsteady gait    Sulfa (Sulfonamide Antibiotics) Nausea and Vomiting     Past Medical History:   Diagnosis Date    Arthritis     Atrial fibrillation ([facility] [address]     av node ablation 5/22/98 with placement of CPI #[id number] dual chamber pacemaker subsequently replaced with a single chamber medtronic #[id number] single chamber pacemaker 7/25/05    Cancer ([facility] [address] 1970's    colon cancer w/ resection    COPD     Depression     GERD (gastroesophageal reflux disease)     Hyperlipidemia     Hypertension     Ischemic cardiomyopathy     Migraine headache     Orthostatic hypotension     RADHA (obstructive sleep apnea)     Pacemaker 5/22/98    AV node ablation /pacemaker placement utilizing CPI # [id number] dual chamber system, medtronic #[id number] single chamber device placed 7/22/05    Pulmonary hypertension ([facility] [address] 9/26/2011    RVSP 50 on echo 8/14    Thyroid disease     Valvular heart disease     mild-mod MR/TR     Past Surgical History:   Procedure Laterality Date    BREAST SURGERY PROCEDURE UNLISTED      benign breast tumor excision right breast    HX BREAST BIOPSY Right 2002    neg; surgical bx    HX COLECTOMY  1974    colon    HX KNEE REPLACEMENT      right TKA    HX PACEMAKER      HX TUBAL LIGATION      TOTAL KNEE ARTHROPLASTY      total on R, partial on L     Family History   Problem Relation Age of Onset    Diabetes Mother     Heart Disease Mother     Heart Attack Mother     Heart Disease Father     Stroke Sister     Breast Cancer Sister 80    Cancer Maternal Aunt      uterus    Diabetes Maternal Uncle     Breast Cancer Daughter 61     Social History   Substance Use Topics    Smoking status: Passive Smoke Exposure - Never Smoker    Smokeless tobacco: Never Used    Alcohol use No        Review of Systems:   Constitutional: Negative for fever, chills, weight loss, +malaise/fatigue. HEENT: Negative for nosebleeds, vision changes. Respiratory: Negative for cough, hemoptysis, sputum production, and wheezing. + JIMENEZ  Cardiovascular: Negative for chest pain, palpitations, orthopnea, claudication, leg swelling, syncope, and PND. Gastrointestinal: Negative for nausea, vomiting, diarrhea, constipation, blood in stool and melena. Genitourinary: Negative for dysuria, and hematuria. Musculoskeletal: Negative for myalgias, + arthralgia. Skin: Negative for rash. Heme: Does not bleed or bruise easily. Neurological: Negative for speech change and focal weakness     Objective:     Visit Vitals    /62 (BP 1 Location: Left arm, BP Patient Position: Sitting)    Pulse 92    Ht 5' 5\" (1.651 m)    Wt 160 lb (72.6 kg)    LMP 1970    BMI 26.63 kg/m2      Physical Exam:   Constitutional: Well-nourished. No distress. Rolling walker. Head: Normocephalic and atraumatic. Eyes: Pupils are equal, round  Neck: supple. No JVD present. Cardiovascular: Normal rate, regular rhythm. Exam reveals no gallop and no friction rub. No murmur heard. Pulmonary/Chest: Effort normal and breath sounds normal. No wheezes. Abdominal: Soft, no tenderness. Musculoskeletal: no edema. Neurological: alert,oriented. Skin: Skin is warm and dry. Left side ppm scar flat and healed. Psychiatric: normal mood and affect. Behavior is normal. Judgment and thought content normal.      EK2016 ventricular paced, LBBB  msec    Assessment/Plan:       ICD-10-CM ICD-9-CM    1. Ischemic cardiomyopathy I25.5 414.8    2. Non-rheumatic mitral regurgitation I34.0 424.0    3. Pacemaker Z95.0 V45.01    4. CKD (chronic kidney disease) stage 3, GFR 30-59 ml/min N18.3 585.3    5. LBBB (left bundle branch block) I44.7 426.3    6. Dizziness R42 780.4    7.  H/O atrioventricular citlaly ablation Z98.890 V15.1    8. CHF NYHA class II (symptoms with moderately strenuous activities), chronic, systolic (HCC) F17.89 428.22      428.0    9. Chronic atrial fibrillation (HCC) I48.2 427.31    10. Anticoagulated on Coumadin Z51.81 V58.83     Z79.01 V58.61      Reviewed rationale for not proceeding with biventricular pacemaker upgrade at time. She has 2.5 years left on pacemaker generator. I thinks her response to CRT will be limited and risk of infection would be higher than usual so I recommended her not to proceed and I have explained the reasons to her daughters several times including today  Continue with current medication regimen  If potassium continues to rise and orthostatic dizziness is worse I recommend her stop entresto  If JIMENEZ is worse, she may need cardiac cath by Dr Eulalia Hyatt given the abnormal nuclear stress test in the past     Follow-up Disposition:  Return if symptoms worsen or fail to improve. Thank you for involving me in this patient's care and please call with further concerns or questions. Denise Henderson M.D.   Electrophysiology/Cardiology  [facility]  [address] [place] [address]  [phone]

## 2017-08-07 NOTE — PROGRESS NOTES
Cardiac Electrophysiology Office Note     Subjective:      Chapman Phoenix is a 80 y.o. female patient who is seen for follow up, she was at [facility] for a biventricular pacemaker, however she did not have this done LVEF was 40%. Coreg was decreased d/t dizziness. Aldactone d/c d/t hyperkalemia. She says since she was seen last she is more fatigued. Repeat K since d/c the aldactone, K is 5.0. Weight log avg weight 159-160  BP log shows -130's. She reports lightheadedness and dizziness ( chronic problem for several years + 10 years), JIMENEZ. No chest pain or LE edema. No hospitalizations. She checks her INR at home. She was kindly referred by Dr Thomas Kebede 3 months ago when I saw her and recommended to try entresto and aldactone before the [facility] ICD upgrade. She is here with her daughter in law. Dr Jes Abbott used to be her EP physician, Medtronic pacemaker  implanted 7/2005, generator change 8/13/13. Hx of AV node ablation 5/1998. She is pacer dependent and hence she has LBBB  Hx of pacemaker pocket revision 1/2014. Dr Nicky Castaneda took over after Dr Jes Abbott retired. She knew Dr Thomas Kebede from [facility] and wanted to switch after nuclear stress test was done last year by Dr Nicky Castaneda that showed scar in LAD area with mild reversibility but she has no ischemic chest pain   She says Dr Thomas Kebede thought the risk outweighed the benefits in proceeding with cardiac cath d/t abnormal stress test.          Prior cardiac testing  Echo 8/20/14 - LVEF 45%, akinesis of distal lat and inf walls, mod-marked LAE, mild-mod MR, RVSP 50  MUGA Scan 8/29/14 - LVEF 53%  Nuclear stress test Lexiscan Cardiolite 8/24/16 - There is a large sized, mod-high grade, partially reversible defect involving mid-distal anterior wall and apex. The apical defect is high grade and fixed. Extent of ischemia is moderate in LAD territory. SSS = 17, SRS = 10, SDS = 6. LVEF 35% with apical akinesis.    Echo 8/2016 LVEF 35 %.  Severe hypokinesis of the mid-apical inferior, apical septal, apical lateral, and apical wall(s). Severe LAE. Mild MAC and Moderate MR. Aortic valve:  trileaflet. Leaflets exhibited mild calcification. Mild aortic regurgitation. Moderate TR and Mild PH. Social History     Social History    Marital status:      Spouse name: N/A    Number of children: N/A    Years of education: N/A     Occupational History    Not on file.      Social History Main Topics    Smoking status: Passive Smoke Exposure - Never Smoker    Smokeless tobacco: Never Used    Alcohol use No    Drug use: No    Sexual activity: No     Other Topics Concern    Not on file     Social History Narrative     Family History   Problem Relation Age of Onset    Diabetes Mother     Heart Disease Mother     Heart Attack Mother     Heart Disease Father     Stroke Sister     Breast Cancer Sister 80    Cancer Maternal Aunt      uterus    Diabetes Maternal Uncle     Breast Cancer Daughter 61         Patient Active Problem List    Diagnosis Date Noted    Pacemaker 07/24/2017    Advanced care planning/counseling discussion 05/11/2017    Psoriasis 03/23/2017    Cardiomy[facility] 09/20/2016    Abnormal stress test 09/13/2016    Ischemic cardiomyopathy     Chest pain 08/10/2016    Restrictive lung disease 12/14/2015    Osteopenia 11/23/2015    Hypothyroidism due to acquired atrophy of thyroid 11/06/2015    CKD (chronic kidney disease) stage 3, GFR 30-59 ml/min 02/09/2015    Anxiety and depression 02/26/2014    Colon cancer ([facility] 02/26/2014    Hypothyroid 02/26/2014    Pulmonary hypertension ([facility] 09/26/2011    Mitral regurgitation 10/21/2010    CHB (complete heart block) ([facility] 10/21/2010    Orthostatic hypotension 10/21/2010    Chest pain, unspecified 10/21/2010    Atrial fibrillation (HCC)     Mixed hyperlipidemia 10/18/2010     Current Outpatient Prescriptions   Medication Sig Dispense Refill    carvedilol (COREG) 3.125 mg tablet Take 1 Tab by mouth two (2) times daily (with meals). 60 Tab 12    simvastatin (ZOCOR) 20 mg tablet TAKE 1 TABLET BY MOUTH NIGHTLY 30 Tab 5    ENTRESTO 24-26 mg tablet TAKE 1 TABLET BY MOUTH TWICE A DAY 60 Tab 6    predniSONE (DELTASONE) 10 mg tablet Take 20 mg by mouth daily.  sertraline (ZOLOFT) 50 mg tablet Take 1 Tab by mouth daily. 90 Tab 2    COUMADIN 5 mg tablet TAKE 1 TABLET BY MOUTH DAILY AND 1/2 TABLET ON SUNDAY AND THURSDAY (Patient taking differently: TAKE 1 TABLET BY MOUTH DAILY AND 1/2 TABLET ON SUNDAY) 90 Tab 0    lipase-protease-amylase (ZENPEP) 40,000-136,000- 218,000 unit cpDR Take 1 Tab by mouth three (3) times daily (with meals). And snacks.  lidocaine (LIDODERM) 5 % 1 Patch by TransDERmal route every twelve (12) hours. 60 Each 0    desonide (TRIDESILON) 0.05 % cream Apply  to affected area two (2) times a day. 15 g 0    SYNTHROID 112 mcg tablet TAKE 1 TABLET BY MOUTH EVERY DAY BEFORE BREAKFAST 30 Tab 5    multivitamin (ONE A DAY) tablet Take 1 Tab by mouth daily.  Calcium-Cholecalciferol, D3, 600 mg(1,500mg) -400 unit cap Take  by mouth two (2) times a day.  ranitidine (ZANTAC) 150 mg tablet TAKE 1 TABLET BY MOUTH TWICE A DAY 60 Tab 5    OXYGEN-AIR DELIVERY SYSTEMS 2 L by Does Not Apply route nightly.  ADVAIR DISKUS 250-50 mcg/dose diskus inhaler Take 1 Puff by inhalation two (2) times a day.  acetaminophen (TYLENOL) 500 mg tablet Take 500 mg by mouth every six (6) hours as needed for Pain. (2) Tablet in am (1) tablet in pm (2) Tablet pm  0    tiotropium (SPIRIVA WITH HANDIHALER) 18 mcg inhalation capsule Take 1 Cap by inhalation daily.        Allergies   Allergen Reactions    Cardizem [Diltiazem Hcl] Hives    Codeine Nausea and Vomiting    Darvocet A500 [Propoxyphene N-Acetaminophen] Nausea and Vomiting    Labetalol Nausea and Vomiting     Dizzy/Disoriented     Pcn [Penicillins] Swelling     Tongue Swelling     Primidone Other (comments) Lightheaded/unsteady gait    Sulfa (Sulfonamide Antibiotics) Nausea and Vomiting     Past Medical History:   Diagnosis Date    Arthritis     Atrial fibrillation ([facility]     av node ablation 5/22/98 with placement of CPI #[id number] dual chamber pacemaker subsequently replaced with a single chamber medtronic #[id number] single chamber pacemaker 7/25/05    Cancer ([facility] 1970's    colon cancer w/ resection    COPD     Depression     GERD (gastroesophageal reflux disease)     Hyperlipidemia     Hypertension     Ischemic cardiomyopathy     Migraine headache     Orthostatic hypotension     RADHA (obstructive sleep apnea)     Pacemaker 5/22/98    AV node ablation /pacemaker placement utilizing CPI # [id number] dual chamber system, medtronic #[id number] single chamber device placed 7/22/05    Pulmonary hypertension ([facility] 9/26/2011    RVSP 50 on echo 8/14    Thyroid disease     Valvular heart disease     mild-mod MR/TR     Past Surgical History:   Procedure Laterality Date    BREAST SURGERY PROCEDURE UNLISTED      benign breast tumor excision right breast    HX BREAST BIOPSY Right 2002    neg; surgical bx    HX COLECTOMY  1974    colon    HX KNEE REPLACEMENT      right TKA    HX PACEMAKER      HX TUBAL LIGATION      TOTAL KNEE ARTHROPLASTY      total on R, partial on L     Family History   Problem Relation Age of Onset    Diabetes Mother     Heart Disease Mother     Heart Attack Mother     Heart Disease Father     Stroke Sister     Breast Cancer Sister 80    Cancer Maternal Aunt      uterus    Diabetes Maternal Uncle     Breast Cancer Daughter 61     Social History   Substance Use Topics    Smoking status: Passive Smoke Exposure - Never Smoker    Smokeless tobacco: Never Used    Alcohol use No        Review of Systems:   Constitutional: Negative for fever, chills, weight loss, +malaise/fatigue. HEENT: Negative for nosebleeds, vision changes.    Respiratory: Negative for cough, hemoptysis, sputum production, and wheezing. + JIMENEZ  Cardiovascular: Negative for chest pain, palpitations, orthopnea, claudication, leg swelling, syncope, and PND. Gastrointestinal: Negative for nausea, vomiting, diarrhea, constipation, blood in stool and melena. Genitourinary: Negative for dysuria, and hematuria. Musculoskeletal: Negative for myalgias, + arthralgia. Skin: Negative for rash. Heme: Does not bleed or bruise easily. Neurological: Negative for speech change and focal weakness     Objective:     Visit Vitals    /62 (BP 1 Location: Left arm, BP Patient Position: Sitting)    Pulse 92    Ht 5' 5\" (1.651 m)    Wt 160 lb (72.6 kg)    LMP 1970    BMI 26.63 kg/m2      Physical Exam:   Constitutional: Well-nourished. No distress. Rolling walker. Head: Normocephalic and atraumatic. Eyes: Pupils are equal, round  Neck: supple. No JVD present. Cardiovascular: Normal rate, regular rhythm. Exam reveals no gallop and no friction rub. No murmur heard. Pulmonary/Chest: Effort normal and breath sounds normal. No wheezes. Abdominal: Soft, no tenderness. Musculoskeletal: no edema. Neurological: alert,oriented. Skin: Skin is warm and dry. Left side ppm scar flat and healed. Psychiatric: normal mood and affect. Behavior is normal. Judgment and thought content normal.      EK2016 ventricular paced, LBBB  msec    Assessment/Plan:       ICD-10-CM ICD-9-CM    1. Pacemaker Z95.0 V45.01    2. Ischemic cardiomyopathy I25.5 414.8    3. Non-rheumatic mitral regurgitation I34.0 424.0    4. CKD (chronic kidney disease) stage 3, GFR 30-59 ml/min N18.3 585.3    5. LBBB (left bundle branch block) I44.7 426.3    6. Dizziness R42 780.4    7. H/O atrioventricular citlaly ablation Z98.890 V15.1    8. CHF NYHA class II (symptoms with moderately strenuous activities), chronic, systolic (HCC) V96.19 044.16      428.0    9. Chronic atrial fibrillation (HCC) I48.2 427.31    10.  Anticoagulated on Coumadin Z51.81 V58.83 Z79.01 V58.61      Reviewed rationale for not proceeding with biventricular pacemaker upgrade at time. She has 2.5 years left on pacemaker generator. Continue with current medication regiment. Reviewed lab results, recommend checking labs at least every 6 months to monitor renal function and potassium. Follow-up Disposition: Not on File    Thank you for involving me in this patient's care and please call with further concerns or questions. Dong Ayers M.D.   Electrophysiology/Cardiology  [facility] [place]  [facility], [facility] [address] [place] [address]  [phone]

## 2017-08-07 NOTE — TELEPHONE ENCOUNTER
----- Message from Diego Patrick NP sent at 8/7/2017 10:16 AM EDT -----  H/H WNL  Platelets stable/baseline  Creatinine improving   K WNL

## 2017-08-11 ENCOUNTER — TELEPHONE (OUTPATIENT)
Dept: CARDIOLOGY CLINIC | Age: 82
End: 2017-08-11

## 2017-08-24 ENCOUNTER — TELEPHONE (OUTPATIENT)
Dept: CARDIOLOGY CLINIC | Age: 82
End: 2017-08-24

## 2017-08-24 LAB
INR PPP: 2 (ref 0.8–1.2)
PROTHROMBIN TIME: 20 SEC (ref 9.1–12)

## 2017-08-24 NOTE — TELEPHONE ENCOUNTER
INR 2.0. Dtr Renetta Parker [phone] notified Ms Leticia Ortiz is to keep her coumadin as it is.  Looks like from 8/11/17 she takes coumadin 5 mg daily except on Sun and Thurs it's 2.5 mg.

## 2017-08-24 NOTE — TELEPHONE ENCOUNTER
Patients daughter, Chuy Ash called to advise that her mothers lab was done at [facility] this week due to her being with her this week and away from her home monitoring system. Please call at [phone].       Thank you, Jake Brice

## 2017-08-25 ENCOUNTER — TELEPHONE ANTICOAG (OUTPATIENT)
Dept: CARDIOLOGY CLINIC | Age: 82
End: 2017-08-25

## 2017-08-25 NOTE — PROGRESS NOTES
Coumadin Flowsheet Values Recorded Today: Date: 08/24/17  DX:  Atrial Fibrillation  Dosage: 5  Sunday: 2.5  Monday: 5  Tuesday: 5  Wednesday: 5  Thursday: 2.5  Friday: 5  Saturday: 5  INR results: 2.0  INR Results Ranges: 2-3  Denies missing dose or extra dose?: Yes  Bleeding or unusual bruising?: No  Blood in stool or urine?: No  Coughing up blood?: No  Alcohol Use?: No  Epistaxis?: No  Same dose?: Yes  Next Check: 08/31/17

## 2017-08-28 RX ORDER — LEVOTHYROXINE SODIUM 112 UG/1
TABLET ORAL
Qty: 30 TAB | Refills: 5 | Status: SHIPPED | OUTPATIENT
Start: 2017-08-28 | End: 2017-10-06 | Stop reason: SDUPTHER

## 2017-08-30 ENCOUNTER — TELEPHONE (OUTPATIENT)
Dept: INTERNAL MEDICINE CLINIC | Age: 82
End: 2017-08-30

## 2017-09-01 ENCOUNTER — TELEPHONE (OUTPATIENT)
Dept: CARDIOLOGY CLINIC | Age: 82
End: 2017-09-01

## 2017-09-01 ENCOUNTER — TELEPHONE (OUTPATIENT)
Dept: INTERNAL MEDICINE CLINIC | Age: 82
End: 2017-09-01

## 2017-09-01 NOTE — TELEPHONE ENCOUNTER
Per patient daughter Kristian Hope has left several message to speak with nurse regarding her mom having to be test because she is on Prednisone.   Kae Later would like to be called her no is [phone]

## 2017-09-01 NOTE — TELEPHONE ENCOUNTER
Daughter informed that last lab work was done with another provider. She wants to know if Dr. Arlene Currie still wants her to have monthly check because she is on prednisone. She scheduled a FU appointment for Sep. 12 to discuss it.

## 2017-09-07 ENCOUNTER — TELEPHONE (OUTPATIENT)
Dept: CARDIOLOGY CLINIC | Age: 82
End: 2017-09-07

## 2017-09-07 ENCOUNTER — OFFICE VISIT (OUTPATIENT)
Dept: CARDIOLOGY CLINIC | Age: 82
End: 2017-09-07

## 2017-09-07 VITALS
HEART RATE: 78 BPM | WEIGHT: 163 LBS | BODY MASS INDEX: 27.12 KG/M2 | DIASTOLIC BLOOD PRESSURE: 68 MMHG | SYSTOLIC BLOOD PRESSURE: 116 MMHG

## 2017-09-07 DIAGNOSIS — I34.0 NON-RHEUMATIC MITRAL REGURGITATION: ICD-10-CM

## 2017-09-07 DIAGNOSIS — I42.0 DILATED CARDIOMYOPATHY (HCC): Primary | ICD-10-CM

## 2017-09-07 DIAGNOSIS — I48.20 CHRONIC ATRIAL FIBRILLATION (HCC): ICD-10-CM

## 2017-09-07 DIAGNOSIS — F32.A ANXIETY AND DEPRESSION: ICD-10-CM

## 2017-09-07 DIAGNOSIS — E78.2 MIXED HYPERLIPIDEMIA: ICD-10-CM

## 2017-09-07 DIAGNOSIS — Z95.0 PACEMAKER: ICD-10-CM

## 2017-09-07 DIAGNOSIS — F41.9 ANXIETY AND DEPRESSION: ICD-10-CM

## 2017-09-07 DIAGNOSIS — I95.1 ORTHOSTATIC HYPOTENSION: ICD-10-CM

## 2017-09-07 NOTE — PROGRESS NOTES
HISTORY OF PRESENT ILLNESS  Kingston Lawrence is a 80 y.o. female     SUMMARY:   Problem List  Date Reviewed: 9/6/2017          Codes Class Noted    Pacemaker ICD-10-CM: Z95.0  ICD-9-CM: V45.01  7/24/2017        Advanced care planning/counseling discussion ICD-10-CM: Z71.89  ICD-9-CM: V65.49  5/11/2017    Overview Signed 5/11/2017 12:44 PM by Laura Reyes RN     A copy of patient's completed Advanced Medical Directive is on file in the patient's medical record. NN reviewed Advanced Medical Directive document with patient & patient denies the need for changes/ updates. Psoriasis (Chronic) ICD-10-CM: L40.9  ICD-9-CM: 696.1  3/23/2017        Cardiomyopathy ([facility] ICD-10-CM: I42.9  ICD-9-CM: 425.4  9/20/2016    Overview Addendum 9/20/2016  8:45 AM by Kamari Villarreal MD     8/12 normal lexiscan cardiolyte, lvef 68%  8/16 echo lvef 35% multiple wall motion abnormalities, melissa, mod mr, mild ai, mod tr pa pressure 36mm  8/16 lexiscan mod reversible anterior defect, mostly fixed apical defect.  lvef 35%             Abnormal stress test ICD-10-CM: R94.39  ICD-9-CM: 794.39  9/13/2016        Ischemic cardiomyopathy ICD-10-CM: I25.5  ICD-9-CM: 414.8  Unknown        Chest pain ICD-10-CM: R07.9  ICD-9-CM: 786.50  8/10/2016        Restrictive lung disease ICD-10-CM: J98.4  ICD-9-CM: 518.89  12/14/2015        Osteopenia ICD-10-CM: M85.80  ICD-9-CM: 733.90  11/23/2015        Hypothyroidism due to acquired atrophy of thyroid ICD-10-CM: E03.4  ICD-9-CM: 244.8, 246.8  11/6/2015        CKD (chronic kidney disease) stage 3, GFR 30-59 ml/min ICD-10-CM: N18.3  ICD-9-CM: 585.3  2/9/2015        Anxiety and depression (Chronic) ICD-10-CM: F41.9, F32.9  ICD-9-CM: 300.00, 311  2/26/2014        Colon cancer ([facility] ICD-10-CM: C18.9  ICD-9-CM: 153.9  2/26/2014    Overview Addendum 2/26/2014 11:12 AM by Mendel Councilman, MD     Partial colectomy  Petey Allen             Hypothyroid ICD-10-CM: E03.9  ICD-9-CM: 244.9  2/26/2014 Pulmonary hypertension ([facility] ICD-10-CM: I27.2  ICD-9-CM: 416.8  9/26/2011        Atrial fibrillation ([facility] ICD-10-CM: I48.91  ICD-9-CM: 427.31  Unknown    Overview Signed 10/21/2010  8:16 AM by Carola Turner     av node ablation 5/22/98 with placement of CPI #[id number] dual chamber pacemaker subsequently replaced with a single chamber medtronic #[id number] single chamber pacemaker 7/25/05             Mitral regurgitation ICD-10-CM: I34.0  ICD-9-CM: 424.0  10/21/2010        CHB (complete heart block) (HCC) ICD-10-CM: I44.2  ICD-9-CM: 426.0  10/21/2010        Orthostatic hypotension ICD-10-CM: I95.1  ICD-9-CM: 458.0  10/21/2010        Chest pain, unspecified ICD-10-CM: R07.9  ICD-9-CM: 786.50  10/21/2010        Mixed hyperlipidemia ICD-10-CM: E78.2  ICD-9-CM: 272.2  10/18/2010              Current Outpatient Prescriptions on File Prior to Visit   Medication Sig    SYNTHROID 112 mcg tablet TAKE 1 TABLET BY MOUTH EVERY DAY BEFORE BREAKFAST    lidocaine (LIDODERM) 5 % 1 Patch by TransDERmal route every twelve (12) hours.  carvedilol (COREG) 3.125 mg tablet Take 1 Tab by mouth two (2) times daily (with meals).  simvastatin (ZOCOR) 20 mg tablet TAKE 1 TABLET BY MOUTH NIGHTLY    ENTRESTO 24-26 mg tablet TAKE 1 TABLET BY MOUTH TWICE A DAY    predniSONE (DELTASONE) 10 mg tablet Take 20 mg by mouth daily.  sertraline (ZOLOFT) 50 mg tablet Take 1 Tab by mouth daily.  COUMADIN 5 mg tablet TAKE 1 TABLET BY MOUTH DAILY AND 1/2 TABLET ON SUNDAY AND THURSDAY (Patient taking differently: TAKE 1 TABLET BY MOUTH DAILY AND 1/2 TABLET ON SUNDAY)    lipase-protease-amylase (ZENPEP) 40,000-136,000- 218,000 unit cpDR Take 1 Tab by mouth three (3) times daily (with meals). And snacks.  desonide (TRIDESILON) 0.05 % cream Apply  to affected area two (2) times a day.  multivitamin (ONE A DAY) tablet Take 1 Tab by mouth daily.  Calcium-Cholecalciferol, D3, 600 mg(1,500mg) -400 unit cap Take  by mouth two (2) times a day.  ranitidine (ZANTAC) 150 mg tablet TAKE 1 TABLET BY MOUTH TWICE A DAY    OXYGEN-AIR DELIVERY SYSTEMS 2 L by Does Not Apply route nightly.  ADVAIR DISKUS 250-50 mcg/dose diskus inhaler Take 1 Puff by inhalation two (2) times a day.  acetaminophen (TYLENOL) 500 mg tablet Take 500 mg by mouth every six (6) hours as needed for Pain. (2) Tablet in am (1) tablet in pm (2) Tablet pm    tiotropium (SPIRIVA WITH HANDIHALER) 18 mcg inhalation capsule Take 1 Cap by inhalation daily.  lidocaine (LIDODERM) 5 % APPLY 1 PATCH TO SKIN FOR UP TO 12 HOURS ON, 12 HOURS OFF     No current facility-administered medications on file prior to visit. CARDIOLOGY STUDIES TO DATE:  8/12 normal lexiscan cardiolyte, lvef 68%  8/16 echo lvef 35% multiple wall motion abnormalities, melissa, mod mr, mild ai, mod tr pa pressure 36mm  8/16 lexiscan mod reversible anterior defect, mostly fixed apical defect. lvef 35%     4/17 lvef 40% ant hypo, lvh, melissa, mild to mod mr, mild ai. Mild to mod tr upper normal pa pressure      Chief Complaint   Patient presents with    Cardiomyopathy     HPI :  Last month Ms. Vilma Gonzalez came to the hospital for a Bi-V upgrade, and because of her chronic prednisone therapy and general frailty Dr. Josesito Schultz determined that it was very unlikely her symptoms could be improved with a Bi-V, particularly since she cannot tolerate appropriate doses of usual medical therapy. I agree with that decision, and I explained again to her and her family. She is having more and more orthostatic-type symptoms, can barely stand up without feeling like she is going to faint, and brought in a number of blood pressures from home to support that. Things are particularly bad in the evening after she takes both her Coreg and second dose of Entresto. She also has chronic dyspnea on exertion which I think is primarily related to severe deconditioning, because she does not have any edema or orthopnea.  She has not fainted or fallen recently, but she has in the past which concerns all of us because of her Coumadin. She is seriously thinking about moving into an assisted living situation. CARDIAC ROS:   negative for chest pain, palpitations, paroxysmal nocturnal dyspnea, exertional chest pressure/discomfort, claudication, lower extremity edema    Family History   Problem Relation Age of Onset    Diabetes Mother     Heart Disease Mother     Heart Attack Mother     Heart Disease Father     Stroke Sister     Breast Cancer Sister 80    Cancer Maternal Aunt      uterus    Diabetes Maternal Uncle     Breast Cancer Daughter 61       Past Medical History:   Diagnosis Date    Arthritis     Atrial fibrillation ([address]     av node ablation 5/22/98 with placement of CPI #[id number] dual chamber pacemaker subsequently replaced with a single chamber medtronic #[id number] single chamber pacemaker 7/25/05    Cancer ([address] 1970's    colon cancer w/ resection    COPD     Depression     GERD (gastroesophageal reflux disease)     Hyperlipidemia     Hypertension     Ischemic cardiomyopathy     Migraine headache     Orthostatic hypotension     RADHA (obstructive sleep apnea)     Pacemaker 5/22/98    AV node ablation /pacemaker placement utilizing CPI # [id number] dual chamber system, medtronic #[id number] single chamber device placed 7/22/05    Pulmonary hypertension ([address] 9/26/2011    RVSP 50 on echo 8/14    Thyroid disease     Valvular heart disease     mild-mod MR/TR       GENERAL ROS:  A comprehensive review of systems was negative except for that written in the HPI.     Visit Vitals    /68 (BP 1 Location: Left arm, BP Patient Position: Sitting)    Pulse 78    Wt 163 lb (73.9 kg)    LMP 02/26/1970    BMI 27.12 kg/m2       Wt Readings from Last 3 Encounters:   09/07/17 163 lb (73.9 kg)   08/07/17 160 lb (72.6 kg)   07/24/17 159 lb (72.1 kg)            BP Readings from Last 3 Encounters:   09/07/17 116/68   08/07/17 110/62   07/24/17 105/50       PHYSICAL EXAM  General appearance: alert, cooperative, no distress, appears stated age  Neck: supple, symmetrical, trachea midline, no adenopathy, no carotid bruit and no JVD  Lungs: clear to auscultation bilaterally  Heart: regular rate and rhythm, S1, S2 normal, no murmur, click, rub or gallop  Extremities: extremities normal, atraumatic, no cyanosis or edema    Lab Results   Component Value Date/Time    Cholesterol, total 173 08/22/2016 08:45 AM    Cholesterol, total 157 08/11/2015 12:14 PM    Cholesterol, total 157 02/09/2015 08:34 AM    Cholesterol, total 156 02/27/2014 08:49 AM    Cholesterol, total 152 05/10/2013 09:15 AM    HDL Cholesterol 46 08/22/2016 08:45 AM    HDL Cholesterol 47 08/11/2015 12:14 PM    HDL Cholesterol 46 02/09/2015 08:34 AM    HDL Cholesterol 48 02/27/2014 08:49 AM    HDL Cholesterol 43 05/10/2013 09:15 AM    LDL, calculated 99 08/22/2016 08:45 AM    LDL, calculated 86 08/11/2015 12:14 PM    LDL, calculated 81 02/09/2015 08:34 AM    LDL, calculated 83 02/27/2014 08:49 AM    LDL, calculated 83 05/10/2013 09:15 AM    Triglyceride 140 08/22/2016 08:45 AM    Triglyceride 121 08/11/2015 12:14 PM    Triglyceride 149 02/09/2015 08:34 AM    Triglyceride 125 02/27/2014 08:49 AM    Triglyceride 130 05/10/2013 09:15 AM     ASSESSMENT  In terms of Ms. Forbes's quality of life and fall risk I think we should stop the [facility] and just try to continue low dose Coreg if we can. She has always been very difficult to manage with medications, and most recently prior to this her Aldactone had to be stopped because of hyperkalemia. As I mentioned I agree with the decision not to put her through the burden of a Bi-V upgrade given the risk of infection and so forth particularly since it is unlikely to make her quality of life substantially better. I strongly encouraged her to pursue an assisted living situation, so she can remain as independent as possible for as long as she can.       current treatment plan is effective, no change in therapy  lab results and schedule of future lab studies reviewed with patient  reviewed diet, exercise and weight control  We reviewed her advanced directive and executed a DDNR. Encounter Diagnoses   Name Primary?  Dilated cardiomyopathy (HCC) Yes    Chronic atrial fibrillation (HCC)     Non-rheumatic mitral regurgitation     Mixed hyperlipidemia     Pacemaker     Anxiety and depression     Orthostatic hypotension      No orders of the defined types were placed in this encounter. Follow-up Disposition:  Return in about 4 months (around 1/7/2018).     Blanca Neil MD  9/7/2017

## 2017-09-07 NOTE — MR AVS SNAPSHOT
Visit Information Date & Time Provider Department Dept. Phone Encounter #  
 9/7/2017  1:20 PM Malaika Courtney MD [facility] [phone] [id number] Follow-up Instructions Return in about 4 months (around 1/7/2018). Your Appointments 9/12/2017  8:45 AM  
ROUTINE CARE with Thom Kumar MD  
[facility] [address] [place] [address]) Appt Note: follow up [address] [place] [address]  
  
   
 [address] [facility]  
  
    
 11/2/2017 11:15 AM  
PACEMAKER with Kelsey Reyes [facility] ([facility] Appt Note: follow up pacer check and follow up [address] [place] [address]  
[address] [address]  
  
    
 11/2/2017 11:20 AM  
ESTABLISHED PATIENT with Malaika Courtney MD  
[facility] ([address] [place] [address]) Appt Note: follow up pacer check and follow up [address] [place] [address]  
[address] [address] Upcoming Health Maintenance Date Due DTaP/Tdap/Td series (1 - Tdap) 4/3/2012 INFLUENZA AGE 9 TO ADULT 8/1/2017 MEDICARE YEARLY EXAM 5/12/2018 GLAUCOMA SCREENING Q2Y 12/8/2018 Allergies as of 9/7/2017  Review Complete On: 9/7/2017 By: Malaika Courtney MD  
  
 Severity Noted Reaction Type Reactions Cardizem [Diltiazem Hcl]  10/04/2010    Hives Codeine  10/04/2010    Nausea and Vomiting Darvocet A500 [Propoxyphene N-acetaminophen]  10/04/2010    Nausea and Vomiting Labetalol  04/25/2012    Nausea and Vomiting Dizzy/Disoriented Pcn [Penicillins]  10/04/2010    Swelling Tongue Swelling Primidone  07/31/2012    Other (comments) Lightheaded/unsteady gait Sulfa (Sulfonamide Antibiotics)  10/04/2010    Nausea and Vomiting Current Immunizations  Reviewed on 11/23/2015 Name Date Influenza High Dose Vaccine PF 9/29/2016, 9/24/2015, 10/1/2014 Influenza Vaccine 12/1/2013 Influenza Vaccine Split 10/12/2012 Pneumococcal Conjugate (PCV-13) 11/23/2015 Pneumococcal Polysaccharide (PPSV-23) 2/27/2015 Pneumococcal Vaccine (Pcv) 12/15/2010 TB Skin Test (PPD) 1/1/2001 TD Vaccine 4/2/2012 Varicella Virus Vaccine Live 9/12/2012 Zoster Vaccine, Live 12/1/2013 Not reviewed this visit You Were Diagnosed With   
  
 Codes Comments Dilated cardiomyopathy ([facility]    -  Primary ICD-10-CM: I42.0 ICD-9-CM: 425. 4 Chronic atrial fibrillation (HCC)     ICD-10-CM: H74.2 ICD-9-CM: 427.31 Non-rheumatic mitral regurgitation     ICD-10-CM: I34.0 ICD-9-CM: 424.0 Mixed hyperlipidemia     ICD-10-CM: E78.2 ICD-9-CM: 272.2 Pacemaker     ICD-10-CM: Z95.0 ICD-9-CM: V45.01 Anxiety and depression     ICD-10-CM: F41.9, F32.9 ICD-9-CM: 300.00, 311 Orthostatic hypotension     ICD-10-CM: I95.1 ICD-9-CM: 458.0 Vitals BP Pulse Weight(growth percentile) LMP BMI OB Status 116/68 (BP 1 Location: Left arm, BP Patient Position: Sitting) 78 163 lb (73.9 kg) 02/26/1970 27.12 kg/m2 Postmenopausal  
 Smoking Status Passive Smoke Exposure - Never Smoker Vitals History BMI and BSA Data Body Mass Index Body Surface Area  
 27.12 kg/m 2 1.84 m 2 Preferred Pharmacy Pharmacy Name Phone [id number] [place] [address] [place] [address]) [phone] Your Updated Medication List  
  
   
This list is accurate as of: 9/7/17  1:36 PM.  Always use your most recent med list.  
  
  
  
  
 acetaminophen 500 mg tablet Commonly known as:  TYLENOL Take 500 mg by mouth every six (6) hours as needed for Pain.  (2) Tablet in am (1) tablet in pm (2) Tablet pm  
  
 ADVAIR DISKUS 250-50 mcg/dose diskus inhaler Generic drug:  fluticasone-salmeterol Take 1 Puff by inhalation two (2) times a day. Calcium-Cholecalciferol (D3) 600 mg(1,500mg) -400 unit Cap Take  by mouth two (2) times a day. carvedilol 3.125 mg tablet Commonly known as:  Ale Awe Take 1 Tab by mouth two (2) times daily (with meals). COUMADIN 5 mg tablet Generic drug:  warfarin TAKE 1 TABLET BY MOUTH DAILY AND 1/2 TABLET ON SUNDAY AND THURSDAY  
  
 desonide 0.05 % cream  
Commonly known as:  Samia Nata Apply  to affected area two (2) times a day. ENTRESTO 24-26 mg tablet Generic drug:  sacubitril-valsartan TAKE 1 TABLET BY MOUTH TWICE A DAY  
  
 * lidocaine 5 % Commonly known as:  LIDODERM  
1 Patch by TransDERmal route every twelve (12) hours. * lidocaine 5 % Commonly known as:  LIDODERM  
APPLY 1 PATCH TO SKIN FOR UP TO 12 HOURS ON, 12 HOURS OFF  
  
 multivitamin tablet Commonly known as:  ONE A DAY Take 1 Tab by mouth daily. OXYGEN-AIR DELIVERY SYSTEMS  
2 L by Does Not Apply route nightly. predniSONE 10 mg tablet Commonly known as:  Dalbert May Take 20 mg by mouth daily. raNITIdine 150 mg tablet Commonly known as:  ZANTAC TAKE 1 TABLET BY MOUTH TWICE A DAY  
  
 sertraline 50 mg tablet Commonly known as:  ZOLOFT Take 1 Tab by mouth daily. simvastatin 20 mg tablet Commonly known as:  ZOCOR  
TAKE 1 TABLET BY MOUTH NIGHTLY SPIRIVA WITH HANDIHALER 18 mcg inhalation capsule Generic drug:  tiotropium Take 1 Cap by inhalation daily. SYNTHROID 112 mcg tablet Generic drug:  levothyroxine TAKE 1 TABLET BY MOUTH EVERY DAY BEFORE BREAKFAST  
  
 ZENPEP 40,000-136,000- 218,000 unit Cpdr  
Generic drug:  lipase-protease-amylase Take 1 Tab by mouth three (3) times daily (with meals). And snacks. * Notice:   This list has 2 medication(s) that are the same as other medications prescribed for you. Read the directions carefully, and ask your doctor or other care provider to review them with you. Follow-up Instructions Return in about 4 months (around 1/7/2018). To-Do List   
 09/27/2017 10:30 AM  
  Appointment with Shereen Hassan at [facility] PT Lora Medley ([phone]) Introducing [facility] SERVICES! [facility] introduces [employer] patient portal. Now you can access parts of your medical record, email your doctor's office, and request medication refills online. 1. In your internet browser, go to [email or web address]. [employer]/[facility] 2. Click on the First Time User? Click Here link in the Sign In box. You will see the New Member Sign Up page. 3. Enter your [employer] Access Code exactly as it appears below. You will not need to use this code after youve completed the sign-up process. If you do not sign up before the expiration date, you must request a new code. · [employer] Access Code: [email or web address] Expires: 10/22/2017  9:59 AM 
 
4. Enter the last four digits of your Social Security Number (xxxx) and Date of Birth (mm/dd/yyyy) as indicated and click Submit. You will be taken to the next sign-up page. 5. Create a [employer] ID. This will be your [employer] login ID and cannot be changed, so think of one that is secure and easy to remember. 6. Create a [employer] password. You can change your password at any time. 7. Enter your Password Reset Question and Answer. This can be used at a later time if you forget your password. 8. Enter your e-mail address. You will receive e-mail notification when new information is available in [address] 9. Click Sign Up. You can now view and download portions of your medical record. 10. Click the Download Summary menu link to download a portable copy of your medical information.  
 
If you have questions, please visit the Frequently Asked Questions section of the [employer]. Remember, [employer] is NOT to be used for urgent needs. For medical emergencies, dial 911. Now available from your iPhone and Android! Please provide this summary of care documentation to your next provider. Your primary care clinician is listed as Daniel Denney. If you have any questions after today's visit, please call [phone].

## 2017-09-12 ENCOUNTER — OFFICE VISIT (OUTPATIENT)
Dept: INTERNAL MEDICINE CLINIC | Age: 82
End: 2017-09-12

## 2017-09-12 ENCOUNTER — HOSPITAL ENCOUNTER (OUTPATIENT)
Dept: LAB | Age: 82
Discharge: HOME | End: 2017-09-12
Payer: MEDICARE

## 2017-09-12 VITALS
HEIGHT: 65 IN | TEMPERATURE: 98.2 F | DIASTOLIC BLOOD PRESSURE: 76 MMHG | RESPIRATION RATE: 18 BRPM | BODY MASS INDEX: 27.39 KG/M2 | WEIGHT: 164.38 LBS | OXYGEN SATURATION: 98 % | HEART RATE: 93 BPM | SYSTOLIC BLOOD PRESSURE: 137 MMHG

## 2017-09-12 DIAGNOSIS — E78.2 MIXED HYPERLIPIDEMIA: Primary | ICD-10-CM

## 2017-09-12 DIAGNOSIS — I42.9 CARDIOMYOPATHY, UNSPECIFIED TYPE (HCC): ICD-10-CM

## 2017-09-12 DIAGNOSIS — N18.30 CKD (CHRONIC KIDNEY DISEASE) STAGE 3, GFR 30-59 ML/MIN (HCC): ICD-10-CM

## 2017-09-12 DIAGNOSIS — F32.A ANXIETY AND DEPRESSION: Chronic | ICD-10-CM

## 2017-09-12 DIAGNOSIS — F41.9 ANXIETY AND DEPRESSION: Chronic | ICD-10-CM

## 2017-09-12 PROCEDURE — 80061 LIPID PANEL: CPT

## 2017-09-12 PROCEDURE — 80048 BASIC METABOLIC PNL TOTAL CA: CPT

## 2017-09-12 NOTE — MR AVS SNAPSHOT
Visit Information Date & Time Provider Department Dept. Phone Encounter #  
 9/12/2017  8:45 AM Tammy Jansen MD [facility] [address] [id number] Follow-up Instructions Return in about 4 months (around 1/12/2018). Your Appointments 11/2/2017 11:15 AM  
PACEMAKER with POP3SHERREI [facility] ([facility] SCHEDULING) Appt Note: follow up pacer check and follow up w/[address] [place] [address]  
[address] [address]  
  
    
 11/2/2017 11:20 AM  
ESTABLISHED PATIENT with Diamante Mcdermott MD  
[facility] ([facility]) Appt Note: follow up pacer check and follow up w/Laurent [address] [place] [address]  
[address] [address] Upcoming Health Maintenance Date Due DTaP/Tdap/Td series (1 - Tdap) 4/3/2012 INFLUENZA AGE 9 TO ADULT 8/1/2017 MEDICARE YEARLY EXAM 5/12/2018 GLAUCOMA SCREENING Q2Y 12/8/2018 Allergies as of 9/12/2017  Review Complete On: 9/12/2017 By: Kota Ryan LPN Severity Noted Reaction Type Reactions Cardizem [Diltiazem Hcl]  10/04/2010    Hives Codeine  10/04/2010    Nausea and Vomiting Darvocet A500 [Propoxyphene N-acetaminophen]  10/04/2010    Nausea and Vomiting Labetalol  04/25/2012    Nausea and Vomiting Dizzy/Disoriented Pcn [Penicillins]  10/04/2010    Swelling Tongue Swelling Primidone  07/31/2012    Other (comments) Lightheaded/unsteady gait Sulfa (Sulfonamide Antibiotics)  10/04/2010    Nausea and Vomiting Current Immunizations  Reviewed on 11/23/2015 Name Date Influenza High Dose Vaccine PF 9/29/2016, 9/24/2015, 10/1/2014 Influenza Vaccine 12/1/2013 Influenza Vaccine Split 10/12/2012 Pneumococcal Conjugate (PCV-13) 11/23/2015 Pneumococcal Polysaccharide (PPSV-23) 2/27/2015 Pneumococcal Vaccine (Pcv) 12/15/2010 TB Skin Test (PPD) 1/1/2001 TD Vaccine 4/2/2012 Varicella Virus Vaccine Live 9/12/2012 Zoster Vaccine, Live 12/1/2013 Not reviewed this visit You Were Diagnosed With   
  
 Codes Comments Mixed hyperlipidemia    -  Primary ICD-10-CM: H39.3 ICD-9-CM: 272.2 Anxiety and depression     ICD-10-CM: F41.9, F32.9 ICD-9-CM: 300.00, 311 CKD (chronic kidney disease) stage 3, GFR 30-59 ml/min     ICD-10-CM: N18.3 ICD-9-CM: 258. 3 Cardiomyopathy, unspecified type ([facility] [facility]     ICD-10-CM: I42.9 ICD-9-CM: 425.4 Vitals BP Pulse Temp Resp Height(growth percentile) Weight(growth percentile)  
 137/76 (BP 1 Location: Left arm, BP Patient Position: Sitting) 93 98.2 °F (36.8 °C) (Oral) 18 5' 5\" (1.651 m) 164 lb 6 oz (74.6 kg) LMP SpO2 BMI OB Status Smoking Status 02/26[birth year]1970 98% 27.35 kg/m2 Postmenopausal Passive Smoke Exposure - Never Smoker Vitals History BMI and BSA Data Body Mass Index Body Surface Area  
 27.35 kg/m 2 1.85 m 2 Preferred Pharmacy Pharmacy Name Phone [id number]- [place], [address] ([place] [address]) [phone] Your Updated Medication List  
  
   
This list is accurate as of: 9/12/17  9:04 AM.  Always use your most recent med list.  
  
  
  
  
 acetaminophen 500 mg tablet Commonly known as:  TYLENOL Take 500 mg by mouth every six (6) hours as needed for Pain. (2) Tablet in am (1) tablet in pm (2) Tablet pm  
  
 ADVAIR DISKUS 250-50 mcg/dose diskus inhaler Generic drug:  fluticasone-salmeterol Take 1 Puff by inhalation two (2) times a day. Calcium-Cholecalciferol (D3) 600 mg(1,500mg) -400 unit Cap Take  by mouth two (2) times a day. carvedilol 3.125 mg tablet Commonly known as:  [place]  
 Take 1 Tab by mouth two (2) times daily (with meals). COUMADIN 5 mg tablet Generic drug:  warfarin TAKE 1 TABLET BY MOUTH DAILY AND 1/2 TABLET ON SUNDAY AND THURSDAY  
  
 desonide 0.05 % cream  
Commonly known as:  Dorthula Newcomer Apply  to affected area two (2) times a day. lidocaine 5 % Commonly known as:  LIDODERM  
1 Patch by TransDERmal route every twelve (12) hours. multivitamin tablet Commonly known as:  ONE A DAY Take 1 Tab by mouth daily. OXYGEN-AIR DELIVERY SYSTEMS  
2 L by Does Not Apply route nightly. predniSONE 10 mg tablet Commonly known as:  Cecillia [place] Take 20 mg by mouth daily. raNITIdine 150 mg tablet Commonly known as:  ZANTAC TAKE 1 TABLET BY MOUTH TWICE A DAY  
  
 sertraline 50 mg tablet Commonly known as:  ZOLOFT Take 1 Tab by mouth daily. simvastatin 20 mg tablet Commonly known as:  ZOCOR  
TAKE 1 TABLET BY MOUTH NIGHTLY SPIRIVA WITH HANDIHALER 18 mcg inhalation capsule Generic drug:  tiotropium Take 1 Cap by inhalation daily. SYNTHROID 112 mcg tablet Generic drug:  levothyroxine TAKE 1 TABLET BY MOUTH EVERY DAY BEFORE BREAKFAST  
  
 ZENPEP 40,000-136,000- 218,000 unit Cpdr  
Generic drug:  lipase-protease-amylase Take 1 Tab by mouth three (3) times daily (with meals). And snacks. As needed We Performed the Following LIPID PANEL [92888 CPT(R)] METABOLIC PANEL, BASIC [58542 CPT(R)] Follow-up Instructions Return in about 4 months (around 1/12/2018). To-Do List   
 09/27/2017 10:30 AM  
  Appointment with Dahiana Nevarez at [facility] PT [address] ([phone]) Introducing [facility]! Jose Schafer introduces [employer] patient portal. Now you can access parts of your medical record, email your doctor's office, and request medication refills online. 1. In your internet browser, go to [email or web address]/N(i)Â² 2. Click on the First Time User? Click Here link in the Sign In box. You will see the New Member Sign Up page. 3. Enter your [employer] Access Code exactly as it appears below. You will not need to use this code after youve completed the sign-up process. If you do not sign up before the expiration date, you must request a new code. · [employer] Access Code: [email or web address] Expires: 10/22/2017  9:59 AM 
 
4. Enter the last four digits of your Social Security Number (xxxx) and Date of Birth (mm/dd/yyyy) as indicated and click Submit. You will be taken to the next sign-up page. 5. Create a [employer] ID. This will be your [employer] login ID and cannot be changed, so think of one that is secure and easy to remember. 6. Create a [employer] password. You can change your password at any time. 7. Enter your Password Reset Question and Answer. This can be used at a later time if you forget your password. 8. Enter your e-mail address. You will receive e-mail notification when new information is available in [address] 9. Click Sign Up. You can now view and download portions of your medical record. 10. Click the Download Summary menu link to download a portable copy of your medical information. If you have questions, please visit the Frequently Asked Questions section of the [employer] website. Remember, [employer] is NOT to be used for urgent needs. For medical emergencies, dial 911. Now available from your iPhone and Android! Please provide this summary of care documentation to your next provider. Your primary care clinician is listed as [facility]. If you have any questions after today's visit, please call [phone].

## 2017-09-12 NOTE — PROGRESS NOTES
HISTORY OF PRESENT ILLNESS  Izzy Chavez is a 80 y.o. female. HPI  Cardiomyopathy. Followed closely by Dr. Brando Whitfield in cardiology. with Chronic kidney disease stage    Medication change since last visit: Yes: Comment: meds for cardiomyopathy changed. now on coreg, off entresto due to increased orthostatic dizziness. The patient reports:  taking medications as instructed, no medication side effects noted, home BP monitoring in range of 368-884'U systolic over 57-72'Y diastolic, no chest pain on exertion, stable dyspnea on exertion, no swelling of ankles, no orthostatic dizziness or lightheadedness, no palpitations. Lifestyle modification/social history: generally follows a low fat low cholesterol diet, generally follows a low sodium diet, sedentary    Lab Results   Component Value Date/Time    Sodium 143 08/04/2017 08:25 AM    Potassium 5.0 08/04/2017 08:25 AM    Chloride 100 08/04/2017 08:25 AM    CO2 24 08/04/2017 08:25 AM    Anion gap 8 07/24/2017 08:04 AM    Glucose 89 08/04/2017 08:25 AM    BUN 36 08/04/2017 08:25 AM    Creatinine 1.12 08/04/2017 08:25 AM    BUN/Creatinine ratio 32 08/04/2017 08:25 AM    GFR est AA 52 08/04/2017 08:25 AM    GFR est non-AA 45 08/04/2017 08:25 AM    Calcium 9.4 08/04/2017 08:25 AM       Hyperlipidemia:  Izzy Chavez is following up on her dyslipidemia. Cardiovascular risks for her are: LDL goal is under 80  sedentary lifestyle.    Currently she takes Zocor (simvastatin) ,   Lab Results   Component Value Date/Time    Cholesterol, total 173 08/22/2016 08:45 AM    Cholesterol, total 157 08/11/2015 12:14 PM    Cholesterol, total 157 02/09/2015 08:34 AM    Cholesterol, total 156 02/27/2014 08:49 AM    Cholesterol, total 152 05/10/2013 09:15 AM    HDL Cholesterol 46 08/22/2016 08:45 AM    HDL Cholesterol 47 08/11/2015 12:14 PM    HDL Cholesterol 46 02/09/2015 08:34 AM    HDL Cholesterol 48 02/27/2014 08:49 AM    HDL Cholesterol 43 05/10/2013 09:15 AM    LDL, calculated 99 08/22/2016 08:45 AM    LDL, calculated 86 08/11/2015 12:14 PM    LDL, calculated 81 02/09/2015 08:34 AM    LDL, calculated 83 02/27/2014 08:49 AM    LDL, calculated 83 05/10/2013 09:15 AM    Triglyceride 140 08/22/2016 08:45 AM    Triglyceride 121 08/11/2015 12:14 PM    Triglyceride 149 02/09/2015 08:34 AM    Triglyceride 125 02/27/2014 08:49 AM    Triglyceride 130 05/10/2013 09:15 AM     Lab Results   Component Value Date/Time    ALT (SGPT) 52 06/12/2017 03:02 PM    AST (SGOT) 33 06/12/2017 03:02 PM    Alk. phosphatase 39 06/12/2017 03:02 PM    Bilirubin, total 0.9 06/12/2017 03:02 PM       Myalgias: no  Fatigue: yes    Anxiety/ depression. Still on zoloft. She feels this has helped. She is pursuing assisted living situation where she will have more socialization opportunities.     Patient Active Problem List   Diagnosis Code    Mixed hyperlipidemia E78.2    Atrial fibrillation (HCC) I48.91    Mitral regurgitation I34.0    CHB (complete heart block) (HCC) I44.2    Orthostatic hypotension I95.1    Chest pain, unspecified R07.9    Pulmonary hypertension (HCC) I27.2    Anxiety and depression F41.9, F32.9    Colon cancer (HCC) C18.9    Hypothyroid E03.9    CKD (chronic kidney disease) stage 3, GFR 30-59 ml/min N18.3    Hypothyroidism due to acquired atrophy of thyroid E03.4    Osteopenia M85.80    Restrictive lung disease J98.4    Chest pain R07.9    Abnormal stress test R94.39    Ischemic cardiomyopathy I25.5    Cardiomyopathy (HCC) I42.9    Psoriasis L40.9    Advanced care planning/counseling discussion Z71.89    Pacemaker Z95.0     Past Medical History:   Diagnosis Date    Arthritis     Atrial fibrillation (HCC)     av node ablation 5/22/98 with placement of CPI #[id number] dual chamber pacemaker subsequently replaced with a single chamber medtronic #[id number] single chamber pacemaker 7/25/05    Cancer ([address] 1970's    colon cancer w/ resection    COPD     Depression     GERD (gastroesophageal reflux disease)     Hyperlipidemia     Hypertension     Ischemic cardiomyopathy     Migraine headache     Orthostatic hypotension     RADHA (obstructive sleep apnea)     Pacemaker 5/22/98    AV node ablation /pacemaker placement utilizing CPI # [id number] dual chamber system, medtronic #[id number] single chamber device placed 7/22/05    Pulmonary hypertension ([address] 9/26/2011    RVSP 50 on echo 8/14    Thyroid disease     Valvular heart disease     mild-mod MR/TR     Allergies   Allergen Reactions    Cardizem [Diltiazem Hcl] Hives    Codeine Nausea and Vomiting    Darvocet A500 [Propoxyphene N-Acetaminophen] Nausea and Vomiting    Labetalol Nausea and Vomiting     Dizzy/Disoriented     Pcn [Penicillins] Swelling     Tongue Swelling     Primidone Other (comments)     Lightheaded/unsteady gait    Sulfa (Sulfonamide Antibiotics) Nausea and Vomiting     Current Outpatient Prescriptions on File Prior to Visit   Medication Sig Dispense Refill    SYNTHROID 112 mcg tablet TAKE 1 TABLET BY MOUTH EVERY DAY BEFORE BREAKFAST 30 Tab 5    lidocaine (LIDODERM) 5 % 1 Patch by TransDERmal route every twelve (12) hours. 60 Each 0    carvedilol (COREG) 3.125 mg tablet Take 1 Tab by mouth two (2) times daily (with meals). 60 Tab 12    simvastatin (ZOCOR) 20 mg tablet TAKE 1 TABLET BY MOUTH NIGHTLY 30 Tab 5    predniSONE (DELTASONE) 10 mg tablet Take 20 mg by mouth daily.  sertraline (ZOLOFT) 50 mg tablet Take 1 Tab by mouth daily. 90 Tab 2    COUMADIN 5 mg tablet TAKE 1 TABLET BY MOUTH DAILY AND 1/2 TABLET ON SUNDAY AND THURSDAY (Patient taking differently: TAKE 1 TABLET BY MOUTH DAILY AND 1/2 TABLET ON SUNDAY) 90 Tab 0    lipase-protease-amylase (ZENPEP) 40,000-136,000- 218,000 unit cpDR Take 1 Tab by mouth three (3) times daily (with meals). And snacks. As needed      desonide (TRIDESILON) 0.05 % cream Apply  to affected area two (2) times a day. 15 g 0    multivitamin (ONE A DAY) tablet Take 1 Tab by mouth daily.  Calcium-Cholecalciferol, D3, 600 mg(1,500mg) -400 unit cap Take  by mouth two (2) times a day.  ranitidine (ZANTAC) 150 mg tablet TAKE 1 TABLET BY MOUTH TWICE A DAY 60 Tab 5    OXYGEN-AIR DELIVERY SYSTEMS 2 L by Does Not Apply route nightly.  ADVAIR DISKUS 250-50 mcg/dose diskus inhaler Take 1 Puff by inhalation two (2) times a day.  acetaminophen (TYLENOL) 500 mg tablet Take 500 mg by mouth every six (6) hours as needed for Pain. (2) Tablet in am (1) tablet in pm (2) Tablet pm  0    tiotropium (SPIRIVA WITH HANDIHALER) 18 mcg inhalation capsule Take 1 Cap by inhalation daily. No current facility-administered medications on file prior to visit. Social History   Substance Use Topics    Smoking status: Passive Smoke Exposure - Never Smoker    Smokeless tobacco: Never Used    Alcohol use No               ROS    Physical Exam   Constitutional: She appears well-developed and well-nourished. No distress. /76 (BP 1 Location: Left arm, BP Patient Position: Sitting)  Pulse 93  Temp 98.2 °F (36.8 °C) (Oral)   Resp 18  Ht 5' 5\" (1.651 m)  Wt 164 lb 6 oz (74.6 kg)  LMP 02/26/1970  SpO2 98%  BMI 27.35 kg/m2Body mass index is 27.35 kg/(m^2). HENT:   Mouth/Throat: Oropharynx is clear and moist.   Neck: No JVD present. Carotid bruit is not present. Cardiovascular: Normal rate, regular rhythm and intact distal pulses. Exam reveals distant heart sounds. Pulses:       Posterior tibial pulses are 1+ on the right side, and 1+ on the left side. Pulmonary/Chest: Effort normal. No accessory muscle usage. No respiratory distress. She has decreased breath sounds. She has no wheezes. She has no rhonchi. She has no rales. Musculoskeletal: She exhibits no edema. Neurological: She is alert. Skin: Skin is warm and dry. She is not diaphoretic. Nursing note and vitals reviewed. ASSESSMENT and PLAN  Diagnoses and all orders for this visit:    1.  Mixed hyperlipidemia -recheck on statin  -     LIPID PANEL    2. Anxiety and depression - Well controlled and stable. her medications were reviewed and refilled where necessary as noted below. Labs ordered as noted. 3. CKD (chronic kidney disease) stage 3, GFR 30-59 ml/min - recheck this and K  -     METABOLIC PANEL, BASIC    4. [facility]omy[facility], unspecified [facility] ([facility] - compensated clinically. No changes. Follow-up Disposition:  Return in about 4 months (around 1/12/2018).

## 2017-09-13 LAB
BUN SERPL-MCNC: 34 MG/DL (ref 8–27)
BUN/CREAT SERPL: 36 (ref 12–28)
CALCIUM SERPL-MCNC: 9.3 MG/DL (ref 8.7–10.3)
CHLORIDE SERPL-SCNC: 100 MMOL/L (ref 96–106)
CHOLEST SERPL-MCNC: 247 MG/DL (ref 100–199)
CO2 SERPL-SCNC: 30 MMOL/L (ref 18–29)
CREAT SERPL-MCNC: 0.95 MG/DL (ref 0.57–1)
GLUCOSE SERPL-MCNC: 80 MG/DL (ref 65–99)
HDLC SERPL-MCNC: 79 MG/DL
INTERPRETATION, 910389: NORMAL
INTERPRETATION: NORMAL
LDLC SERPL CALC-MCNC: 124 MG/DL (ref 0–99)
PDF IMAGE, 910387: NORMAL
POTASSIUM SERPL-SCNC: 4.5 MMOL/L (ref 3.5–5.2)
SODIUM SERPL-SCNC: 143 MMOL/L (ref 134–144)
TRIGL SERPL-MCNC: 221 MG/DL (ref 0–149)
VLDLC SERPL CALC-MCNC: 44 MG/DL (ref 5–40)

## 2017-09-15 ENCOUNTER — TELEPHONE (OUTPATIENT)
Dept: CARDIOLOGY CLINIC | Age: 82
End: 2017-09-15

## 2017-09-20 ENCOUNTER — TELEPHONE (OUTPATIENT)
Dept: CARDIOLOGY CLINIC | Age: 82
End: 2017-09-20

## 2017-09-21 NOTE — TELEPHONE ENCOUNTER
Spoke with daughter in law on yesterday,States no missed dose,except the one on last week when INR was 4.1.

## 2017-09-25 ENCOUNTER — TELEPHONE (OUTPATIENT)
Dept: CARDIOLOGY CLINIC | Age: 82
End: 2017-09-25

## 2017-09-26 ENCOUNTER — HOSPITAL ENCOUNTER (OUTPATIENT)
Dept: PHYSICAL THERAPY | Age: 82
Discharge: HOME | End: 2017-09-26
Payer: MEDICARE

## 2017-09-26 PROCEDURE — G8979 MOBILITY GOAL STATUS: HCPCS | Performed by: PHYSICAL THERAPIST

## 2017-09-26 PROCEDURE — 97162 PT EVAL MOD COMPLEX 30 MIN: CPT | Performed by: PHYSICAL THERAPIST

## 2017-09-26 PROCEDURE — 97112 NEUROMUSCULAR REEDUCATION: CPT | Performed by: PHYSICAL THERAPIST

## 2017-09-26 PROCEDURE — 97110 THERAPEUTIC EXERCISES: CPT | Performed by: PHYSICAL THERAPIST

## 2017-09-26 PROCEDURE — G8978 MOBILITY CURRENT STATUS: HCPCS | Performed by: PHYSICAL THERAPIST

## 2017-09-26 NOTE — PROGRESS NOTES
[employer] [facility]  [facility]  [facility] [address]  Phone: [phone]  Fax: [phone]    Plan of Care/Statement of Necessity for Physical Therapy Services  2-15    Patient name: Trevin Garcia  : 1932  Provider#: [id number]  Referral source: Ayush Chase MD      Medical/Treatment Diagnosis: low back pain     Prior Hospitalization: see medical history     Comorbidities:  Pacemaker, orthostatic hypotension, anxiety, depression, CKD, osteopenia, psoriasis, Colon Cancer (87), R TKR, L partial TKR,  heart disease, sleep apnea, dizziness, scoliosis, [place]  Prior Level of Function: Pt lives independently and has a   Medications: Verified on Patient Summary List  Start of Care: 17      Onset Date: 1 year ago   The Plan of Care and following information is based on the information from the initial evaluation. Assessment/ key information: The patient presents with non-radiating chronic low back pain, generalized weakness and imbalance affecting her ability to perform ADLs and ambulate. The patient would benefit from skilled outpatient physical therapy to improve mobility in her home and decrease risk of falls. Evaluation Complexity History HIGH Complexity :3+ comorbidities / personal factors will impact the outcome/ POC ; Examination HIGH Complexity : 4+ Standardized tests and measures addressing body structure, function, activity limitation and / or participation in recreation  ;Presentation MEDIUM Complexity : Evolving with changing characteristics  ; Clinical Decision Making HIGH Complexity : FOTO score of 1- 25   Overall Complexity Rating: MEDIUM    Problem List: pain affecting function, decrease ROM, decrease strength, impaired gait/ balance, decrease ADL/ functional abilitiies, decrease activity tolerance, decrease flexibility/ joint mobility and decrease transfer abilities   Treatment Plan may include any combination of the following: Therapeutic exercise, Neuromuscular re-education, Physical agent/modality, Gait/balance training, Manual therapy, Patient education, Functional mobility training, Home safety training and Stair training  Patient / Family readiness to learn indicated by: asking questions, trying to perform skills and interest  Persons(s) to be included in education: patient (P)  Barriers to Learning/Limitations: none   Patient Goal (s): have zero pain  Patient Self Reported Health Status: fair  Rehabilitation Potential: good    Short Term Goals: To be accomplished in 4 weeks:  1) The patient will be independent with independent HEP  2) The patient will improve lumbar flexion AROM to 5 inches to improve ease with dressing  3) The patient will demonstrate ability to transfer sit to supine and supine to sit independently without an increase in pain  Long Term Goals: To be accomplished in 12 weeks:  1) The patient will be independent with finalized HEP  2) The patient will demonstrate pain free lumbar AROM WFL to improve ease with ADLs  3) The patient will demonstrate ability to transfer sit to stand without UE assist to improve mobility in her home  Frequency / Duration: Patient to be seen 2 times per week for 12 weeks. Patient/ Caregiver education and instruction: self care, activity modification and exercises    [x]  Plan of care has been reviewed with PTA    G-Codes (GP)  Mobility   Current  CM= 80-99%   Goal  CL= 60-79%    The severity rating is based on clinical judgment and the FOTO Score score. Certification Period: 9/26/17-12/26/17  Wilber Dennis, PT 9/26/2017 8:53 AM    ________________________________________________________________________    I certify that the above Therapy Services are being furnished while the patient is under my care. I agree with the treatment plan and certify that this therapy is necessary.     [de-identified] Signature:____________________  Date:____________Time: _________

## 2017-09-26 NOTE — PROGRESS NOTES
PT INITIAL EVALUATION NOTE 2-15    Patient Name: Samuel Vivar  Date:2017  : 1932  [x]  Patient  Verified  Payor: Jose J Whitehead / Plan: [place] MED[place]ARE PART A & B / Product Type: Medicare /    In time:8:10 AM  Out time:9:05 AM  Total Treatment Time (min): 54  Visit #: 1     Treatment Area: low back pain    SUBJECTIVE  Pain Level (0-10 scale): 9/10  Any medication changes, allergies to medications, adverse drug reactions, diagnosis change, or new procedure performed?: [] No    [x] Yes (see summary sheet for update)  Subjective:     She has been experiencing low back pain for the past year. Pain has worsened over the past 3 months. Pain is decreased by sitting and laying down. Pain is increased by walking. Pt is taking Tylenol for the pain. She uses a heating a pad for pain management. Pt reports she doesn't sleep well at night but the pain doesn't wake her up at night. Pt has had 6-8 falls in the past 3 months. \"One was so bad I had to go to the ER. \"  \"My ankles are starting to swell. \"  Pt uses a SPC and rollator as needed. Pt denies numbness/tingling in legs. Pain does not radiate. PLOF: Pt lives independently and has a   Mechanism of Injury: Insidious onset  Previous Treatment/Compliance: Yes, May- 2017. PMHx/Surgical Hx: Pacemaker, orthostatic hypotension, anxiety, depression, CKD, osteopenia, psoriasis, Colon Cancer (87), R TKR, L partial TKR,  heart disease, sleep apnea, dizziness, scoliosis, [place]  Work Hx: Retired  Living Situation: Pt is living by herself  Pt Goals: Pain to be reduced to 0  Barriers: Complex PMH, chronic nature of condition  Motivation: Good  Substance use: None   FABQ Score: see FOTO   Cognition: A & O x 3        OBJECTIVE/EXAMINATION  Posture:   Forward flexed posture, thoracic kyphosis  Gait and Functional Mobility:  Pt requires BUE to t/f sit to stand, ambulates with decreased isis using SPC, pain with sit to supine and supine to sit t/f, pt requires mod A to t/f supine to sit    Lumbar AROM:        R  L   Flexion    7in        Extension   10% p! Side Bending   19 in p! 18in       Rotation   16in  14in            LOWER QUARTER   MUSCLE STRENGTH  KEY       R  L  0 - No Contraction  L1, L2 Psoas  4-  4-    1 - Trace   L3 Quads  5  5    2 - Poor   L4 Tib Ant  4-  4-    3 - Fair    L5 EHL  4-  4-    4 - Good   S1 FHL  4-  4-  5 - Normal   S2 Hams  4-  4-         Neurological: Sensation: hypersensitivity throughout L lateral lower leg, decreased sensitivity at medial border of L foot  Special Tests:        Forward Bend: negative      Slump: negative   Romberg: EO 30 EC 30   Sharpened Romberg: EO 30, significant postural sway EC unable    Modality rationale: decrease pain and increase tissue extensibility to improve the patients ability to sit, stand, transfer, ambulate, lift, carry, reach, complete ADLs   Min Type Additional Details    [] Estim: []Att   []Unatt        []TENS instruct                  []IFC  []Premod   []NMES                     []Other:  []w/US   []w/ice   []w/heat  Position:  Location:    []  Traction: [] Cervical       []Lumbar                       [] Prone          []Supine                       []Intermittent   []Continuous Lbs:  [] before manual  [] after manual  []w/heat    []  Ultrasound: []Continuous   [] Pulsed at:                            []1MHz   []3MHz Location:  W/cm2:    []  Paraffin         Location:  []w/heat   15 []  Ice     [x]  Heat  []  Ice massage Position: supine  Location: lumbar spine    []  Laser  []  Other: Position:  Location:    []  Vasopneumatic Device Pressure:       [] lo [] med [] hi   Temperature:    [x] Skin assessment post-treatment:  [x]intact []redness- no adverse reaction    []redness  adverse reaction:     10 min Therapeutic Exercise:  [x] See flow sheet :   Rationale: increase ROM and increase strength to improve the patients ability to sit, stand, transfer, ambulate, lift, carry, reach, complete ADLs    10 min Neuromuscular Re-education:  [x]  See flow sheet :   Rationale: improve coordination, improve balance and increase proprioception  to improve the patients ability to sit, stand, transfer, ambulate, lift, carry, reach, complete ADLs        With   [x] TE   [] TA   [x] neuro   [] other: Patient Education: [x] Review HEP    [] Progressed/Changed HEP based on:   [x] positioning   [x] body mechanics   [] transfers   [x] heat/ice application    [] other:      Pain Level (0-10 scale) post treatment: 9    ASSESSMENT:      [x]  See Plan of [address], PT 9/26/2017  7:49 AM

## 2017-09-27 ENCOUNTER — TELEPHONE (OUTPATIENT)
Dept: INTERNAL MEDICINE CLINIC | Age: 82
End: 2017-09-27

## 2017-09-27 NOTE — TELEPHONE ENCOUNTER
Patient daughter request a return call regarding patient's labs she does not want to wait for the lab result  letter that has been mailed out on 09/21 Patient best contact [facility] [phone]

## 2017-09-28 ENCOUNTER — TELEPHONE (OUTPATIENT)
Dept: CARDIOLOGY CLINIC | Age: 82
End: 2017-09-28

## 2017-09-28 ENCOUNTER — TELEPHONE (OUTPATIENT)
Dept: INTERNAL MEDICINE CLINIC | Age: 82
End: 2017-09-28

## 2017-09-28 RX ORDER — CARVEDILOL 3.12 MG/1
3.12 TABLET ORAL 2 TIMES DAILY WITH MEALS
Qty: 180 TAB | Refills: 1 | Status: SHIPPED | OUTPATIENT
Start: 2017-09-28 | End: 2018-04-02 | Stop reason: SDUPTHER

## 2017-09-28 NOTE — TELEPHONE ENCOUNTER
Denise Vasques. Verified patient's identity with two identifiers. Verified HIPAA. She states her mom has had swelling in her ankles. She states another family member mentioned some swelling of the face and neck. She states patient denies SOB or CP. She states  ordered labs and results are abnormal (in CC). She states she would like Dr. Nae Brink to review her mom's labs, but that she will discuss further with him when she comes in tomorrow. She denies further questions or concerns.

## 2017-09-28 NOTE — TELEPHONE ENCOUNTER
Please call patients daughter, Phoenix Teixeira at [phone]. Her mother had labs drawn for Dr. Emmy Vanessa on 9/12/17 and her results on many are abnormal.  She's been dealing with swelling in both ankles for awhile now but started yesterday, 9/27/17 with swelling of her face and neck. She was taken off Entresto.       Thank you, Michelle Yoon

## 2017-09-28 NOTE — TELEPHONE ENCOUNTER
Patient's daughter call and said 2 other things have developed and she would like a callback [phone]

## 2017-09-28 NOTE — TELEPHONE ENCOUNTER
Informed daughter of lab results and recommendations, per PCP. She would like to hold off on changing cholesterol medication for now. She is going to PT and changing her diet. Daughter wants to possible recheck lab in a few months to see if change in lifestyle will naturally lower levels. No call back means that PCP is in okay with plan. She will be notified if he decides on another plan of patient.

## 2017-09-28 NOTE — TELEPHONE ENCOUNTER
Lesley called back. She states patient complained of weight gain and fatigue (spoke with her earlier). She would like her mom to be seen instead of her tomorrow. I states we could see them both, but Boris Hinojosa insisted canceling her appointment and just having patient be seen. (She said she will r/s when in office). Discussed signs and symptoms qualifying urgent care or call to office. She verbalizes understanding and denies further questions or concerns.

## 2017-09-28 NOTE — TELEPHONE ENCOUNTER
Request for coreg 3.125mg twice a day. Last office visit 9/7/17, next office visit 11/2/17. Refills per verbal order from Dr. Shavonne Coley.

## 2017-09-29 ENCOUNTER — OFFICE VISIT (OUTPATIENT)
Dept: CARDIOLOGY CLINIC | Age: 82
End: 2017-09-29

## 2017-09-29 ENCOUNTER — TELEPHONE (OUTPATIENT)
Dept: CARDIOLOGY CLINIC | Age: 82
End: 2017-09-29

## 2017-09-29 VITALS
HEIGHT: 65 IN | WEIGHT: 169.8 LBS | HEART RATE: 87 BPM | SYSTOLIC BLOOD PRESSURE: 138 MMHG | OXYGEN SATURATION: 96 % | DIASTOLIC BLOOD PRESSURE: 80 MMHG | BODY MASS INDEX: 28.29 KG/M2

## 2017-09-29 DIAGNOSIS — I42.0 DILATED CARDIOMYOPATHY (HCC): Primary | ICD-10-CM

## 2017-09-29 DIAGNOSIS — F32.A ANXIETY AND DEPRESSION: ICD-10-CM

## 2017-09-29 DIAGNOSIS — I48.20 CHRONIC ATRIAL FIBRILLATION (HCC): ICD-10-CM

## 2017-09-29 DIAGNOSIS — I34.0 NON-RHEUMATIC MITRAL REGURGITATION: ICD-10-CM

## 2017-09-29 DIAGNOSIS — I95.1 ORTHOSTATIC HYPOTENSION: ICD-10-CM

## 2017-09-29 DIAGNOSIS — Z95.0 PACEMAKER: ICD-10-CM

## 2017-09-29 DIAGNOSIS — F41.9 ANXIETY AND DEPRESSION: ICD-10-CM

## 2017-09-29 RX ORDER — FUROSEMIDE 20 MG/1
20 TABLET ORAL DAILY
Qty: 30 TAB | Refills: 2 | Status: SHIPPED | OUTPATIENT
Start: 2017-09-29 | End: 2017-11-02 | Stop reason: DRUGHIGH

## 2017-09-29 RX ORDER — FUROSEMIDE 20 MG/1
TABLET ORAL DAILY
COMMUNITY
End: 2017-09-29 | Stop reason: SDUPTHER

## 2017-09-29 NOTE — PROGRESS NOTES
HISTORY OF PRESENT ILLNESS  Khanh Mcmahon is a 80 y.o. female     SUMMARY:   Problem List  Date Reviewed: 9/29/2017          Codes Class Noted    Pacemaker ICD-10-CM: Z95.0  ICD-9-CM: V45.01  7/24/2017        Advanced care planning/counseling discussion ICD-10-CM: Z71.89  ICD-9-CM: V65.49  5/11/2017    Overview Signed 5/11/2017 12:44 PM by Tamiko Pradhan RN     A copy of patient's completed Advanced Medical Directive is on file in the patient's medical record. NN reviewed Advanced Medical Directive document with patient & patient denies the need for changes/ updates. Psoriasis (Chronic) ICD-10-CM: L40.9  ICD-9-CM: 696.1  3/23/2017        Cardiomyopathy ([address] ICD-10-CM: I42.9  ICD-9-CM: 425.4  9/20/2016    Overview Addendum 9/20/2016  8:45 AM by Blanca Neil MD     8/12 normal lexiscan cardiolyte, lvef 68%  8/16 echo lvef 35% multiple wall motion abnormalities, melissa, mod mr, mild ai, mod tr pa pressure 36mm  8/16 lexiscan mod reversible anterior defect, mostly fixed apical defect.  lvef 35%             Abnormal stress test ICD-10-CM: R94.39  ICD-9-CM: 794.39  9/13/2016        Ischemic cardiomyopathy ICD-10-CM: I25.5  ICD-9-CM: 414.8  Unknown        Chest pain ICD-10-CM: R07.9  ICD-9-CM: 786.50  8/10/2016        Restrictive lung disease ICD-10-CM: J98.4  ICD-9-CM: 518.89  12/14/2015        Osteopenia ICD-10-CM: M85.80  ICD-9-CM: 733.90  11/23/2015        Hypothyroidism due to acquired atrophy of thyroid ICD-10-CM: E03.4  ICD-9-CM: 244.8, 246.8  11/6/2015        CKD (chronic kidney disease) stage 3, GFR 30-59 ml/min ICD-10-CM: N18.3  ICD-9-CM: 585.3  2/9/2015        Anxiety and depression (Chronic) ICD-10-CM: F41.9, F32.9  ICD-9-CM: 300.00, 311  2/26/2014        Colon cancer ([facility] [address] ICD-10-CM: C18.9  ICD-9-CM: 153.9  2/26/2014    Overview Addendum 2/26/2014 11:12 AM by Davon Woody MD     Partial colectomy  Petey Allen             Hypothyroid ICD-10-CM: E03.9  ICD-9-CM: 244.9  2/26/2014 Pulmonary hypertension ([facility] ICD-10-CM: I27.2  ICD-9-CM: 416.8  9/26/2011        Atrial fibrillation ([facility] ICD-10-CM: I48.91  ICD-9-CM: 427.31  Unknown    Overview Signed 10/21/2010  8:16 AM by Tacos Barbosa     av node ablation 5/22/98 with placement of CPI #[id number] dual chamber pacemaker subsequently replaced with a single chamber medtronic #[id number] single chamber pacemaker 7/25/05             Mitral regurgitation ICD-10-CM: I34.0  ICD-9-CM: 424.0  10/21/2010        CHB (complete heart block) (HCC) ICD-10-CM: I44.2  ICD-9-CM: 426.0  10/21/2010        Orthostatic hypotension ICD-10-CM: I95.1  ICD-9-CM: 458.0  10/21/2010        Chest pain, unspecified ICD-10-CM: R07.9  ICD-9-CM: 786.50  10/21/2010        Mixed hyperlipidemia ICD-10-CM: E78.2  ICD-9-CM: 272.2  10/18/2010              Current Outpatient Prescriptions on File Prior to Visit   Medication Sig    carvedilol (COREG) 3.125 mg tablet Take 1 Tab by mouth two (2) times daily (with meals).  COUMADIN 5 mg tablet TAKE 1 TABLET BY MOUTH DAILY AND 1/2 TABLET ON SUNDAY    SYNTHROID 112 mcg tablet TAKE 1 TABLET BY MOUTH EVERY DAY BEFORE BREAKFAST    lidocaine (LIDODERM) 5 % 1 Patch by TransDERmal route every twelve (12) hours.  simvastatin (ZOCOR) 20 mg tablet TAKE 1 TABLET BY MOUTH NIGHTLY    predniSONE (DELTASONE) 10 mg tablet Take 20 mg by mouth daily.  sertraline (ZOLOFT) 50 mg tablet Take 1 Tab by mouth daily.  lipase-protease-amylase (ZENPEP) 40,000-136,000- 218,000 unit cpDR Take 1 Tab by mouth three (3) times daily (with meals). And snacks. As needed    desonide (TRIDESILON) 0.05 % cream Apply  to affected area two (2) times a day.  multivitamin (ONE A DAY) tablet Take 1 Tab by mouth daily.  Calcium-Cholecalciferol, D3, 600 mg(1,500mg) -400 unit cap Take  by mouth two (2) times a day.  ranitidine (ZANTAC) 150 mg tablet TAKE 1 TABLET BY MOUTH TWICE A DAY    OXYGEN-AIR DELIVERY SYSTEMS 2 L by Does Not Apply route nightly.     ADVAIR DISKUS 250-50 mcg/dose diskus inhaler Take 1 Puff by inhalation two (2) times a day.  acetaminophen (TYLENOL) 500 mg tablet Take 500 mg by mouth every six (6) hours as needed for Pain. (2) Tablet in am (1) tablet in pm (2) Tablet pm    tiotropium (SPIRIVA WITH HANDIHALER) 18 mcg inhalation capsule Take 1 Cap by inhalation daily. No current facility-administered medications on file prior to visit. CARDIOLOGY STUDIES TO DATE:  8/12 normal lexiscan cardiolyte, lvef 68%  8/16 echo lvef 35% multiple wall motion abnormalities, melissa, mod mr, mild ai, mod tr pa pressure 36mm  8/16 lexiscan mod reversible anterior defect, mostly fixed apical defect. lvef 35%      4/17 lvef 40% ant hypo, lvh, melissa, mild to mod mr, mild ai. Mild to mod tr upper normal pa pressure      Chief Complaint   Patient presents with    Cardiomyopathy     HPI :  Ms. Cruzs dizziness completely went away when we stopped her Entresto earlier this month, so that is good. She has gained about six pounds and she is having a little more than usual amount of dependent lower extremity edema. She is still on 20 mg a day of Prednisone. Her diet, although she does not add salt, probably still involves quite a bit of additional or extra sodium. She gets very short of breath with minimal activity and that is nothing new.       CARDIAC ROS:   negative for chest pain, palpitations, syncope, orthopnea, paroxysmal nocturnal dyspnea, exertional chest pressure/discomfort, claudication    Family History   Problem Relation Age of Onset    Diabetes Mother     Heart Disease Mother     Heart Attack Mother     Heart Disease Father     Stroke Sister     Breast Cancer Sister 80    Cancer Maternal Aunt      uterus    Diabetes Maternal Uncle     Breast Cancer Daughter 61       Past Medical History:   Diagnosis Date    Arthritis     Atrial fibrillation ([facility] [address]     av node ablation 5/22/98 with placement of CPI #[id number] dual chamber pacemaker subsequently replaced with a single chamber medtronic #[id number] single chamber pacemaker 7/25/05    Cancer [facility]) 1970's    colon cancer w/ resection    COPD     Depression     GERD (gastroesophageal reflux disease)     Hyperlipidemia     Hypertension     Ischemic cardiomyopathy     Migraine headache     Orthostatic hypotension     RADHA (obstructive sleep apnea)     Pacemaker 5/22/98    AV node ablation /pacemaker placement utilizing CPI # [id number] dual chamber system, medtronic #[id number] single chamber device placed 7/22/05    Pulmonary hypertension ([address] 9/26/2011    RVSP 50 on echo 8/14    Thyroid disease     Valvular heart disease     mild-mod MR/TR       GENERAL ROS:  A comprehensive review of systems was negative except for that written in the HPI.     Visit Vitals    /80    Pulse 87    Ht 5' 5\" (1.651 m)    Wt 169 lb 12.8 oz (77 kg)    LMP 02/26/1970    SpO2 96%    BMI 28.26 kg/m2       Wt Readings from Last 3 Encounters:   09/29/17 169 lb 12.8 oz (77 kg)   09/12/17 164 lb 6 oz (74.6 kg)   09/07/17 163 lb (73.9 kg)            BP Readings from Last 3 Encounters:   09/29/17 138/80   09/12/17 137/76   09/07/17 116/68       PHYSICAL EXAM  General appearance: alert, cooperative, no distress, appears stated age  Neck: supple, symmetrical, trachea midline, no adenopathy, no carotid bruit and no JVD  Lungs: clear to auscultation bilaterally  Heart: regular rate and rhythm, S1, S2 normal, no murmur, click, rub or gallop  Extremities: edema tr    Lab Results   Component Value Date/Time    Cholesterol, total 247 09/12/2017 09:53 AM    Cholesterol, total 173 08/22/2016 08:45 AM    Cholesterol, total 157 08/11/2015 12:14 PM    Cholesterol, total 157 02/09/2015 08:34 AM    Cholesterol, total 156 02/27/2014 08:49 AM    HDL Cholesterol 79 09/12/2017 09:53 AM    HDL Cholesterol 46 08/22/2016 08:45 AM    HDL Cholesterol 47 08/11/2015 12:14 PM    HDL Cholesterol 46 02/09/2015 08:34 AM    HDL Cholesterol 48 02/27/2014 08:49 AM    LDL, calculated 124 09/12/2017 09:53 AM    LDL, calculated 99 08/22/2016 08:45 AM    LDL, calculated 86 08/11/2015 12:14 PM    LDL, calculated 81 02/09/2015 08:34 AM    LDL, calculated 83 02/27/2014 08:49 AM    Triglyceride 221 09/12/2017 09:53 AM    Triglyceride 140 08/22/2016 08:45 AM    Triglyceride 121 08/11/2015 12:14 PM    Triglyceride 149 02/09/2015 08:34 AM    Triglyceride 125 02/27/2014 08:49 AM     ASSESSMENT  I do not think there has been any major change with Ms. Rich Calderon other than the fact that her blood pressure is better and she is not having any more orthostatic symptoms. We are going to add just 20 mg of Lasix and try to get her weight down about five pounds, which is where she was a few weeks ago and then they can stop it and use it p.r.n.  I think her being on all this Prednisone is not good for her, so they are going to talk to their orthopedist about tapering that, as it sounds like it is not doing her much good for her back anyway. current treatment plan is effective, no change in therapy  lab results and schedule of future lab studies reviewed with patient  reviewed diet, exercise and weight control    Encounter Diagnoses   Name Primary?  Dilated cardiomyopathy (HCC) Yes    Chronic atrial fibrillation (HCC)     Non-rheumatic mitral regurgitation     Pacemaker     Anxiety and depression     Orthostatic hypotension      Orders Placed This Encounter    DISCONTD: furosemide (LASIX) 20 mg tablet    furosemide (LASIX) 20 mg tablet       Follow-up Disposition:  Return in about 3 months (around 12/29/2017).     Timothy López MD  9/29/2017

## 2017-09-29 NOTE — MR AVS SNAPSHOT
Visit Information Date & Time Provider Department Dept. Phone Encounter #  
 9/29/2017  1:20 PM Kiara Laird MD [facility] Norah Hernandez [phone] [id number] Follow-up Instructions Return in about 3 months (around 12/29/2017). Your Appointments 11/2/2017 11:15 AM  
PACEMAKER with [facility] (FARHAD SCHEDULING) Appt Note: follow up pacer check and follow up w/Laurent [address] [place]  
[address] [address]  
  
    
 11/2/2017 11:20 AM  
ESTABLISHED PATIENT with Kiara Laird MD  
[facility] ([facility] Appt Note: follow up pacer check and follow up w/Laurent [address] [facility] [address]  
[address] [address]  
  
    
 1/12/2018  3:00 PM  
ROUTINE CARE with Lawrence Yañez MD  
[facility] Appt Note: 4 mnth fu  
 [address] [place] [address] [address]  
[phone]  
  
   
 [address] [place] [id number] Upcoming Health Maintenance Date Due DTaP/Tdap/Td series (1 - Tdap) 4/3/2012 INFLUENZA AGE 9 TO ADULT 8/1/2017 MEDICARE YEARLY EXAM 5/12/2018 GLAUCOMA SCREENING Q2Y 12/8/2018 Allergies as of 9/29/2017  Review Complete On: 9/29/2017 By: Charline Yañez RN Severity Noted Reaction Type Reactions Cardizem [Diltiazem Hcl]  10/04/2010    Hives Codeine  10/04/2010    Nausea and Vomiting Darvocet A500 [Propoxyphene N-acetaminophen]  10/04/2010    Nausea and Vomiting Labetalol  04/25/2012    Nausea and Vomiting Dizzy/Disoriented Pcn [Penicillins]  10/04/2010    Swelling Tongue Swelling Primidone  07/31/2012    Other (comments) Lightheaded/unsteady gait Sulfa (Sulfonamide Antibiotics)  10/04/2010    Nausea and Vomiting Current Immunizations  Reviewed on 11/23/2015 Name Date Influenza High Dose Vaccine PF 9/29/2016, 9/24/2015, 10/1/2014 Influenza Vaccine 12/1/2013 Influenza Vaccine Split 10/12/2012 Pneumococcal Conjugate (PCV-13) 11/23/2015 Pneumococcal Polysaccharide (PPSV-23) 2/27/2015 Pneumococcal Vaccine (Pcv) 12/15/2010 TB Skin Test (PPD) 1/1/2001 TD Vaccine 4/2/2012 Varicella Virus Vaccine Live 9/12/2012 Zoster Vaccine, Live 12/1/2013 Not reviewed this visit You Were Diagnosed With   
  
 Codes Comments [facility]    -  Primary ICD-10-CM: I42.0 ICD-9-CM: 425. 4 Chronic atrial fibrillation (HCC)     ICD-10-CM: U63.8 ICD-9-CM: 427.31 Non-rheumatic mitral regurgitation     ICD-10-CM: I34.0 ICD-9-CM: 424.0 Pacemaker     ICD-10-CM: Z95.0 ICD-9-CM: V45.01 Anxiety and depression     ICD-10-CM: F41.9, F32.9 ICD-9-CM: 300.00, 311 Orthostatic hypotension     ICD-10-CM: I95.1 ICD-9-CM: 458.0 Vitals BP Pulse Height(growth percentile) Weight(growth percentile) LMP SpO2  
 138/80 87 5' 5\" (1.651 m) 169 lb 12.8 oz (77 kg) 02/26/1970 96% BMI OB Status Smoking Status 28.26 kg/m2 Postmenopausal Passive Smoke Exposure - Never Smoker Vitals History BMI and BSA Data Body Mass Index Body Surface Area  
 28.26 kg/m 2 1.88 m 2 Preferred Pharmacy Pharmacy Name Phone [id number] [address] [place] [address]) [phone] Your Updated Medication List  
  
   
This list is accurate as of: 9/29/17  2:18 PM.  Always use your most recent med list.  
  
  
  
  
 acetaminophen 500 mg tablet Commonly known as:  TYLENOL Take 500 mg by mouth every six (6) hours as needed for Pain. (2) Tablet in am (1) tablet in pm (2) Tablet pm  
  
 ADVAIR DISKUS 250-50 mcg/dose diskus inhaler Generic drug:  fluticasone-salmeterol Take 1 Puff by inhalation two (2) times a day. Calcium-Cholecalciferol (D3) 600 mg(1,500mg) -400 unit Cap Take  by mouth two (2) times a day. carvedilol 3.125 mg tablet Commonly known as:  [place] Take 1 Tab by mouth two (2) times daily (with meals). COUMADIN 5 mg tablet Generic drug:  warfarin TAKE 1 TABLET BY MOUTH DAILY AND 1/2 TABLET ON SUNDAY  
  
 desonide 0.05 % cream  
Commonly known as:  TRIDESILON Apply  to affected area two (2) times a day. furosemide 20 mg tablet Commonly known as:  LASIX Take 1 Tab by mouth daily. lidocaine 5 % Commonly known as:  LIDODERM  
1 Patch by TransDERmal route every twelve (12) hours. multivitamin tablet Commonly known as:  ONE A DAY Take 1 Tab by mouth daily. OXYGEN-AIR DELIVERY SYSTEMS  
2 L by Does Not Apply route nightly. predniSONE 10 mg tablet Commonly known as:  Garrel Moron Take 20 mg by mouth daily. raNITIdine 150 mg tablet Commonly known as:  ZANTAC TAKE 1 TABLET BY MOUTH TWICE A DAY  
  
 sertraline 50 mg tablet Commonly known as:  ZOLOFT Take 1 Tab by mouth daily. simvastatin 20 mg tablet Commonly known as:  ZOCOR  
TAKE 1 TABLET BY MOUTH NIGHTLY SPIRIVA WITH HANDIHALER 18 mcg inhalation capsule Generic drug:  tiotropium Take 1 Cap by inhalation daily. SYNTHROID 112 mcg tablet Generic drug:  levothyroxine TAKE 1 TABLET BY MOUTH EVERY DAY BEFORE BREAKFAST  
  
 ZENPEP 40,000-136,000- 218,000 unit Cpdr  
Generic drug:  lipase-protease-amylase Take 1 Tab by mouth three (3) times daily (with meals). And snacks. As needed Prescriptions Sent to Pharmacy Refills  
 furosemide (LASIX) 20 mg tablet 2 Sig: Take 1 Tab by mouth daily. Class: Normal  
 Pharmacy: [id number]- [address], [address] ([place] [address]) Ph #: [phone]  Route: Oral  
  
 Follow-up Instructions Return in about 3 months (around 12/29/2017). To-Do List   
 10/02/2017 8:00 AM  
  Appointment with Shanae Linus at [facility]  [address] ([phone]) Please remember to arrive at the hospital at least 30 minutes prior to your scheduled appointment time. When you come in for your appointment, please be sure to bring the therapy prescription the doctor gave you, your insurance card, and a list of the medicines you are taking. Also, please remember to wear comfortable, loose- fitting clothes. We look forward to seeing you. 10/05/2017 9:00 AM  
  Appointment with Cristofer [address] TONI at [facility]  [address] ([phone]) Please remember to arrive at the hospital at least 30 minutes prior to your scheduled appointment time. When you come in for your appointment, please be sure to bring the therapy prescription the doctor gave you, your insurance card, and a list of the medicines you are taking. Also, please remember to wear comfortable, loose- fitting clothes. We look forward to seeing you. Introducing [facility]! Dear Carmencita Goltz: 
Thank you for requesting a [employer] account. Our records indicate that you already have an active [employer] account. You can access your account anytime at [email or web address]. [employer] Did you know that you can access your hospital and ER discharge instructions at any time in [employer]? You can also review all of your test results from your hospital stay or ER visit. Additional Information If you have questions, please visit the Frequently Asked Questions section of the [employer] website at [email or web address] [email or web address] Remember, [employer] is NOT to be used for urgent needs. For medical emergencies, dial 911. Now available from your iPhone and Android! Please provide this summary of care documentation to your next provider. Your primary care clinician is listed as Breezy Vergara.  If you have any questions after today's visit, please call [phone].

## 2017-09-29 NOTE — TELEPHONE ENCOUNTER
Daughter informed that per PCP, she can stay on current cholesterol medication. Daughter verbalized an understanding and agreed to follow up. she reports swelling in left arm, face, and ankles. She has called cardiology and has an appointment today.

## 2017-10-02 ENCOUNTER — HOSPITAL ENCOUNTER (OUTPATIENT)
Dept: PHYSICAL THERAPY | Age: 82
End: 2017-10-02
Payer: MEDICARE

## 2017-10-02 RX ORDER — SIMVASTATIN 20 MG/1
TABLET, FILM COATED ORAL
Qty: 90 TAB | Refills: 1 | Status: SHIPPED | OUTPATIENT
Start: 2017-10-02 | End: 2018-05-22 | Stop reason: SDUPTHER

## 2017-10-04 ENCOUNTER — APPOINTMENT (OUTPATIENT)
Dept: PHYSICAL THERAPY | Age: 82
End: 2017-10-04
Payer: MEDICARE

## 2017-10-05 ENCOUNTER — APPOINTMENT (OUTPATIENT)
Dept: PHYSICAL THERAPY | Age: 82
End: 2017-10-05
Payer: MEDICARE

## 2017-10-05 ENCOUNTER — HOSPITAL ENCOUNTER (OUTPATIENT)
Dept: PHYSICAL THERAPY | Age: 82
Discharge: HOME | End: 2017-10-05
Payer: MEDICARE

## 2017-10-05 ENCOUNTER — TELEPHONE (OUTPATIENT)
Dept: CARDIOLOGY CLINIC | Age: 82
End: 2017-10-05

## 2017-10-05 PROCEDURE — 97110 THERAPEUTIC EXERCISES: CPT | Performed by: PHYSICAL THERAPIST

## 2017-10-05 PROCEDURE — 97112 NEUROMUSCULAR REEDUCATION: CPT | Performed by: PHYSICAL THERAPIST

## 2017-10-05 NOTE — PROGRESS NOTES
PT DAILY TREATMENT NOTE 2-15    Patient Name: Julianne Benoit  Date:10/5/2017  : 1932  [x]  Patient  Verified  Payor: [place] MEDICARE / Plan: [place] MEDICARE PART A & B / Product Type: Medicare /    In time:9:00 AM  Out time:10:05 AM  Total Treatment Time (min): 65  Total Timed Codes (min): 35  Total 1:1 Time (min): 35  Visit #: 2     Treatment Area: low back pain    SUBJECTIVE  Pain Level (0-10 scale): 8/10 (L)  Any medication changes, allergies to medications, adverse drug reactions, diagnosis change, or new procedure performed?: [x] No    [] Yes (see summary sheet for update)  Subjective functional status/changes:   [] No changes reported  Pt reports she is in a lot of pain today  Pt reports she fell while she was standing a week ago  Pt reports her son forgot about her last appointment    OBJECTIVE    Modality rationale: decrease pain and increase tissue extensibility to improve the patients ability to sit, stand, transfer, ambulate, lift, carry, reach, complete ADLs   Min Type Additional Details    [] Estim: []Att   []Unatt        []TENS instruct                  []IFC  []Premod   []NMES                     []Other:  []w/US   []w/ice   []w/heat  Position:  Location:    []  Traction: [] Cervical       []Lumbar                       [] Prone          []Supine                       []Intermittent   []Continuous Lbs:  [] before manual  [] after manual  []w/heat    []  Ultrasound: []Continuous   [] Pulsed at:                            []1MHz   []3MHz Location:  W/cm2:    []  Paraffin         Location:  []w/heat   15/15 pre and post []  Ice     [x]  Heat  []  Ice massage Position: supine  Location: lumbar spine    []  Laser  []  Other: Position:  Location:    []  Vasopneumatic Device Pressure:       [] lo [] med [] hi   Temperature:    [x] Skin assessment post-treatment:  [x]intact []redness- no adverse reaction    []redness  adverse reaction:     25 min Therapeutic Exercise:  [x] See flow sheet :   Rationale: increase ROM and increase strength to improve the patients ability to sit, stand, transfer, ambulate, lift, carry, reach, complete ADLs    10 min Neuromuscular Re-education:  [x]  See flow sheet :   Rationale: improve coordination, improve balance and increase proprioception  to improve the patients ability to sit, stand, transfer, ambulate, lift, carry, reach, complete ADLs      With   [x] TE   [] TA   [x] neuro   [] other: Patient Education: [x] Review HEP    [] Progressed/Changed HEP based on:   [x] positioning   [x] body mechanics   [] transfers   [x] heat/ice application    [] other:      Other Objective/Functional Measures:   Pt unable to hold NBOS with EC, so exercise was performed in WBOS with 1 LOB  Pain with standing balance exercise    Pain Level (0-10 scale) post treatment: \"A little bit better\"    ASSESSMENT/Changes in Function:     Patient will continue to benefit from skilled PT services to modify and progress therapeutic interventions, address functional mobility deficits, address ROM deficits, address strength deficits, analyze and address soft tissue restrictions, analyze and cue movement patterns, analyze and modify body mechanics/ergonomics, assess and modify postural abnormalities, address imbalance/dizziness and instruct in home and community integration to attain remaining goals. []  See Plan of Care  []  See progress note/recertification  []  See Discharge Summary         Progress towards goals / Updated goals:  Patient continues to require verbal cues to complete exercises with correct form and postural awareness. Patient was able to advance several exercises this visit and is progressing well towards goals.     PLAN  [x]  Upgrade activities as tolerated     [x]  Continue plan of care  [x]  Update interventions per flow sheet       []  Discharge due to:_  []  Other:_      Roney Fields PT 10/5/2017  9:09 AM

## 2017-10-06 ENCOUNTER — APPOINTMENT (OUTPATIENT)
Dept: PHYSICAL THERAPY | Age: 82
End: 2017-10-06
Payer: MEDICARE

## 2017-10-06 RX ORDER — LEVOTHYROXINE SODIUM 112 UG/1
112 TABLET ORAL
Qty: 90 TAB | Refills: 1 | Status: SHIPPED | OUTPATIENT
Start: 2017-10-06 | End: 2018-05-31 | Stop reason: SDUPTHER

## 2017-10-06 RX ORDER — RANITIDINE 150 MG/1
150 TABLET, FILM COATED ORAL 2 TIMES DAILY
Qty: 180 TAB | Refills: 1 | Status: SHIPPED | OUTPATIENT
Start: 2017-10-06 | End: 2018-06-03 | Stop reason: SDUPTHER

## 2017-10-09 ENCOUNTER — HOSPITAL ENCOUNTER (OUTPATIENT)
Dept: PHYSICAL THERAPY | Age: 82
Discharge: HOME | End: 2017-10-09
Payer: MEDICARE

## 2017-10-09 PROCEDURE — 97110 THERAPEUTIC EXERCISES: CPT

## 2017-10-09 PROCEDURE — 97140 MANUAL THERAPY 1/> REGIONS: CPT

## 2017-10-09 PROCEDURE — 97112 NEUROMUSCULAR REEDUCATION: CPT

## 2017-10-09 NOTE — PROGRESS NOTES
PT DAILY TREATMENT NOTE 2-15    Patient Name: James Pena  Date:10/9/2017  : 1932  [x]  Patient  Verified  Payor: [place] MEDICARE / Plan: VA MEDICARE PART A & B / Product Type: Medicare /    In time:7:40 AM  Out time:8:50a  Total Treatment Time (min): 70  Total Timed Codes (min): 60  Total 1:1 Time (min): 55  Visit #: 3     Treatment Area: low back pain    SUBJECTIVE  Pain Level (0-10 scale): 6/10 (L)  Any medication changes, allergies to medications, adverse drug reactions, diagnosis change, or new procedure performed?: [x] No    [] Yes (see summary sheet for update)  Subjective functional status/changes:   [] No changes reported  Patient reports she is doing \"fair. \"  Patient states she has not had any falls since last session and her pain is a little better, but still hurts more on the left side.     OBJECTIVE    Modality rationale: decrease pain and increase tissue extensibility to improve the patients ability to sit, stand, transfer, ambulate, lift, carry, reach, complete ADLs   Min Type Additional Details    [] Estim: []Att   []Unatt        []TENS instruct                  []IFC  []Premod   []NMES                     []Other:  []w/US   []w/ice   []w/heat  Position:  Location:    []  Traction: [] Cervical       []Lumbar                       [] Prone          []Supine                       []Intermittent   []Continuous Lbs:  [] before manual  [] after manual  []w/heat    []  Ultrasound: []Continuous   [] Pulsed at:                            []1MHz   []3MHz Location:  W/cm2:    []  Paraffin         Location:  []w/heat   10 []  Ice     [x]  Heat  []  Ice massage Position: supine  Location: lumbar spine    []  Laser  []  Other: Position:  Location:    []  Vasopneumatic Device Pressure:       [] lo [] med [] hi   Temperature:    [x] Skin assessment post-treatment:  [x]intact []redness- no adverse reaction    []redness  adverse reaction:     35 min Therapeutic Exercise:  [x] See flow sheet :   Rationale: increase ROM and increase strength to improve the patients ability to sit, stand, transfer, ambulate, lift, carry, reach, complete ADLs    10 min Neuromuscular Re-education:  [x]  See flow sheet :   Rationale: improve coordination, improve balance and increase proprioception  to improve the patients ability to sit, stand, transfer, ambulate, lift, carry, reach, complete ADLs    10 min Manual Therapy: passive HS and piriformis stretch   Rationale: increase tissue extensibility, increase ROM to improve the patients ability to sit, stand, transfer, ambulate, lift, carry, reach, complete ADLs        With   [x] TE   [] TA   [x] neuro   [] other: Patient Education: [x] Review HEP    [] Progressed/Changed HEP based on:   [x] positioning   [x] body mechanics   [] transfers   [x] heat/ice application    [] other:      Other Objective/Functional Measures:   No pain with standing balance exercises  Pt with several LOB with EC stance, increased sway and difficulty with EC and EO stances. Pain Level (0-10 scale) post treatment: \"little better\"    ASSESSMENT/Changes in Function:     Patient will continue to benefit from skilled PT services to modify and progress therapeutic interventions, address functional mobility deficits, address ROM deficits, address strength deficits, analyze and address soft tissue restrictions, analyze and cue movement patterns, analyze and modify body mechanics/ergonomics, assess and modify postural abnormalities, address imbalance/dizziness and instruct in home and community integration to attain remaining goals. []  See Plan of Care  []  See progress note/recertification  []  See Discharge Summary         Progress towards goals / Updated goals:   Patient continues to require verbal cues to complete exercises with correct form and postural awareness. Patient was able to advance several exercises this visit and is progressing well towards goals.     PLAN  [x]  Upgrade activities as tolerated [x]  Continue plan of care  [x]  Update interventions per flow sheet       []  Discharge due to:_  []  Other:Gatito Murphy 10/9/2017  9:09 AM

## 2017-10-12 ENCOUNTER — TELEPHONE (OUTPATIENT)
Dept: CARDIOLOGY CLINIC | Age: 82
End: 2017-10-12

## 2017-10-13 ENCOUNTER — HOSPITAL ENCOUNTER (OUTPATIENT)
Dept: PHYSICAL THERAPY | Age: 82
Discharge: HOME | End: 2017-10-13
Payer: MEDICARE

## 2017-10-13 PROCEDURE — 97112 NEUROMUSCULAR REEDUCATION: CPT | Performed by: PHYSICAL THERAPIST

## 2017-10-13 PROCEDURE — 97110 THERAPEUTIC EXERCISES: CPT | Performed by: PHYSICAL THERAPIST

## 2017-10-13 NOTE — PROGRESS NOTES
PT DAILY TREATMENT NOTE 2-15    Patient Name: Sathish Duong  Date:10/13/2017  : 1932  [x]  Patient  Verified  Payor: Christi May / Plan: VA MEDICARE PART A & B / Product Type: Medicare /    In time:7:30 AM  Out time: 8:30a  Total Treatment Time (min): 60  Total Timed Codes (min): 45  Total 1:1 Time (min): 25  Visit #: 34    Treatment Area: low back pain    SUBJECTIVE  Pain Level (0-10 scale): 6/10 (L)  Any medication changes, allergies to medications, adverse drug reactions, diagnosis change, or new procedure performed?: [x] No    [] Yes (see summary sheet for update)  Subjective functional status/changes:   [] No changes reported  Patient reports she had a fall last night  Patient reports her pain is no longer constant but she still has pain with walking    OBJECTIVE    Modality rationale: decrease pain and increase tissue extensibility to improve the patients ability to sit, stand, transfer, ambulate, lift, carry, reach, complete ADLs   Min Type Additional Details    [] Estim: []Att   []Unatt        []TENS instruct                  []IFC  []Premod   []NMES                     []Other:  []w/US   []w/ice   []w/heat  Position:  Location:    []  Traction: [] Cervical       []Lumbar                       [] Prone          []Supine                       []Intermittent   []Continuous Lbs:  [] before manual  [] after manual  []w/heat    []  Ultrasound: []Continuous   [] Pulsed at:                            []1MHz   []3MHz Location:  W/cm2:    []  Paraffin         Location:  []w/heat   15 []  Ice     [x]  Heat  []  Ice massage Position: supine  Location: lumbar spine    []  Laser  []  Other: Position:  Location:    []  Vasopneumatic Device Pressure:       [] lo [] med [] hi   Temperature:    [x] Skin assessment post-treatment:  [x]intact []redness- no adverse reaction    []redness  adverse reaction:     35 min Therapeutic Exercise:  [x] See flow sheet :   Rationale: increase ROM and increase strength to improve the patients ability to sit, stand, transfer, ambulate, lift, carry, reach, complete ADLs    10 min Neuromuscular Re-education:  [x]  See flow sheet :   Rationale: improve coordination, improve balance and increase proprioception  to improve the patients ability to sit, stand, transfer, ambulate, lift, carry, reach, complete ADLs      With   [x] TE   [] TA   [x] neuro   [] other: Patient Education: [x] Review HEP    [] Progressed/Changed HEP based on:   [x] positioning   [x] body mechanics   [] transfers   [x] heat/ice application    [] other:      Other Objective/Functional Measures:   Pt unable to hold heel to bunion stance with EO, without LOB    Pain Level (0-10 scale) post treatment: \"better\"    ASSESSMENT/Changes in Function:     Patient will continue to benefit from skilled PT services to modify and progress therapeutic interventions, address functional mobility deficits, address ROM deficits, address strength deficits, analyze and address soft tissue restrictions, analyze and cue movement patterns, analyze and modify body mechanics/ergonomics, assess and modify postural abnormalities, address imbalance/dizziness and instruct in home and community integration to attain remaining goals. []  See Plan of Care  []  See progress note/recertification  []  See Discharge Summary         Progress towards goals / Updated goals:   Patient will continue 2 weeks of PT in [facility].     PLAN  [x]  Upgrade activities as tolerated     [x]  Continue plan of care  [x]  Update interventions per flow sheet       []  Discharge due to:_  []  Other:_      Jose Antonio Mcbride, PT 10/13/2017  9:09 AM

## 2017-10-20 ENCOUNTER — TELEPHONE (OUTPATIENT)
Dept: CARDIOLOGY CLINIC | Age: 82
End: 2017-10-20

## 2017-10-26 ENCOUNTER — TELEPHONE (OUTPATIENT)
Dept: CARDIOLOGY CLINIC | Age: 82
End: 2017-10-26

## 2017-10-27 ENCOUNTER — DOCUMENTATION ONLY (OUTPATIENT)
Dept: CARDIOLOGY CLINIC | Age: 82
End: 2017-10-27

## 2017-10-27 NOTE — PROGRESS NOTES
Spoke with pts daughter this am,and she wanted to clarify pts coumadin dose. States pts INR was 3. 2. Pts dosage should be 5mg every day x mwsa 2.5mg

## 2017-11-02 ENCOUNTER — CLINICAL SUPPORT (OUTPATIENT)
Dept: CARDIOLOGY CLINIC | Age: 82
End: 2017-11-02

## 2017-11-02 ENCOUNTER — OFFICE VISIT (OUTPATIENT)
Dept: CARDIOLOGY CLINIC | Age: 82
End: 2017-11-02

## 2017-11-02 VITALS
BODY MASS INDEX: 28.72 KG/M2 | HEART RATE: 81 BPM | HEIGHT: 65 IN | WEIGHT: 172.4 LBS | OXYGEN SATURATION: 95 % | DIASTOLIC BLOOD PRESSURE: 60 MMHG | SYSTOLIC BLOOD PRESSURE: 110 MMHG | RESPIRATION RATE: 16 BRPM

## 2017-11-02 DIAGNOSIS — I95.1 ORTHOSTATIC HYPOTENSION: ICD-10-CM

## 2017-11-02 DIAGNOSIS — Z95.0 PACEMAKER: ICD-10-CM

## 2017-11-02 DIAGNOSIS — F41.9 ANXIETY AND DEPRESSION: ICD-10-CM

## 2017-11-02 DIAGNOSIS — I42.0 DILATED CARDIOMYOPATHY (HCC): ICD-10-CM

## 2017-11-02 DIAGNOSIS — F32.A ANXIETY AND DEPRESSION: ICD-10-CM

## 2017-11-02 DIAGNOSIS — I44.2 CHB (COMPLETE HEART BLOCK) (HCC): ICD-10-CM

## 2017-11-02 DIAGNOSIS — I25.5 ISCHEMIC CARDIOMYOPATHY: Primary | ICD-10-CM

## 2017-11-02 DIAGNOSIS — Z95.0 CARDIAC PACEMAKER IN SITU: Primary | ICD-10-CM

## 2017-11-02 DIAGNOSIS — I48.20 CHRONIC ATRIAL FIBRILLATION (HCC): Primary | ICD-10-CM

## 2017-11-02 RX ORDER — FUROSEMIDE 40 MG/1
TABLET ORAL DAILY
COMMUNITY
End: 2017-11-02 | Stop reason: SDUPTHER

## 2017-11-02 RX ORDER — FUROSEMIDE 40 MG/1
40 TABLET ORAL DAILY
Qty: 90 TAB | Refills: 2 | Status: SHIPPED | OUTPATIENT
Start: 2017-11-02 | End: 2018-07-27 | Stop reason: SDUPTHER

## 2017-11-02 RX ORDER — SODIUM FLUORIDE 1.1 G/100G
1 GEL, DENTIFRICE ORAL AS NEEDED
Refills: 2 | COMMUNITY
Start: 2017-08-10 | End: 2020-04-15

## 2017-11-02 NOTE — PROGRESS NOTES
1. Have you been to the ER, urgent care clinic since your last visit? Hospitalized since your last visit? No    2. Have you seen or consulted any other health care providers outside of the [address] [place] [address] since your last visit? Include any pap smears or colon screening. No     Pt's daughter reports pt has appt with Dr. Kody Fitch today at 1:30pm (spine specialist) for her back pain.

## 2017-11-02 NOTE — TELEPHONE ENCOUNTER
Requested Prescriptions     Signed Prescriptions Disp Refills    furosemide (LASIX) 40 mg tablet 90 Tab 2     Sig: Take 1 Tab by mouth daily.      Authorizing Provider: Annalee Ramsay     Ordering User: Mortimer Jakob    Per Dr. Baljit Azevedo verbal orders

## 2017-11-02 NOTE — MR AVS SNAPSHOT
Visit Information Date & Time Provider Department Dept. Phone Encounter #  
 11/2/2017 11:20 AM Srinath Perez MD [facility] Rachel Zavala [phone] [id number] Follow-up Instructions Return in about 3 months (around 2/2/2018). Your Appointments 1/12/2018  3:00 PM  
ROUTINE CARE with Lucy Machado MD  
[facility] [address]) Appt Note: 4 mnth   
 [address] [facility] [address] [address]  
[phone]  
  
   
 [address] [facility] [address] Upcoming Health Maintenance Date Due DTaP/Tdap/Td series (1 - Tdap) 4/3/2012 MEDICARE YEARLY EXAM 5/12/2018 GLAUCOMA SCREENING Q2Y 12/8/2018 Allergies as of 11/2/2017  Review Complete On: 11/2/2017 By: Srinath Perez MD  
  
 Severity Noted Reaction Type Reactions Cardizem [Diltiazem Hcl]  10/04/2010    Hives Codeine  10/04/2010    Nausea and Vomiting Darvocet A500 [Propoxyphene N-acetaminophen]  10/04/2010    Nausea and Vomiting Labetalol  04/25/2012    Nausea and Vomiting Dizzy/Disoriented Pcn [Penicillins]  10/04/2010    Swelling Tongue Swelling Primidone  07/31/2012    Other (comments) Lightheaded/unsteady gait Sulfa (Sulfonamide Antibiotics)  10/04/2010    Nausea and Vomiting Current Immunizations  Reviewed on 11/23/2015 Name Date Influenza High Dose Vaccine PF 9/21/2017, 9/29/2016, 9/24/2015, 10/1/2014 Influenza Vaccine 12/1/2013 Influenza Vaccine Split 10/12/2012 Pneumococcal Conjugate (PCV-13) 11/23/2015 Pneumococcal Polysaccharide (PPSV-23) 2/27/2015 Pneumococcal Vaccine (Pcv) 12/15/2010 TB Skin Test (PPD) 1/1/2001 TD Vaccine 4/2/2012 Varicella Virus Vaccine Live 9/12/2012 Zoster Vaccine, Live 12/1/2013 Not reviewed this visit You Were Diagnosed With   
  
 Codes Comments Chronic atrial fibrillation (HCC)    -  Primary ICD-10-CM: K68.7 ICD-9-CM: 427.31 Dilated cardiomyopathy ([facility] [address]     ICD-10-CM: I42.0 ICD-9-CM: 425.4 CHB (complete heart block) (HCC)     ICD-10-CM: I44.2 ICD-9-CM: 426.0 Pacemaker     ICD-10-CM: Z95.0 ICD-9-CM: V45.01 Orthostatic hypotension     ICD-10-CM: I95.1 ICD-9-CM: 458.0 Anxiety and depression     ICD-10-CM: F41.8 ICD-9-CM: 300.00, 311 Vitals BP Pulse Resp Height(growth percentile) Weight(growth percentile) LMP  
 110/60 81 16 5' 5\" (1.651 m) 172 lb 6.4 oz (78.2 kg) 02/26/1970 SpO2 BMI OB Status Smoking Status 95% 28.69 kg/m2 Postmenopausal Passive Smoke Exposure - Never Smoker Vitals History BMI and BSA Data Body Mass Index Body Surface Area  
 28.69 kg/m 2 1.89 m 2 Preferred Pharmacy Pharmacy Name Phone [id number] [address] [place] [address]) [phone] Your Updated Medication List  
  
   
This list is accurate as of: 11/2/17 11:49 AM.  Always use your most recent med list.  
  
  
  
  
 acetaminophen 500 mg tablet Commonly known as:  TYLENOL Take 500 mg by mouth every six (6) hours as needed for Pain. (2) Tablet in am (1) tablet in pm (2) Tablet pm  
  
 ADVAIR DISKUS 250-50 mcg/dose diskus inhaler Generic drug:  fluticasone-salmeterol Take 1 Puff by inhalation two (2) times a day. Calcium-Cholecalciferol (D3) 600 mg(1,500mg) -400 unit Cap Take  by mouth two (2) times a day. carvedilol 3.125 mg tablet Commonly known as:  Millicent Golas Take 1 Tab by mouth two (2) times daily (with meals). COUMADIN 5 mg tablet Generic drug:  warfarin TAKE 1 TABLET BY MOUTH DAILY AND 1/2 TABLET ON SUNDAY DENTA 5000 PLUS 1.1 % Crea Generic drug:  fluoride (sodium)  
as needed. desonide 0.05 % cream  
Commonly known as:  Auther Card Apply  to affected area two (2) times a day. LASIX 40 mg tablet Generic drug:  furosemide Take  by mouth daily. lidocaine 5 % Commonly known as:  LIDODERM  
1 Patch by TransDERmal route every twelve (12) hours. multivitamin tablet Commonly known as:  ONE A DAY Take 1 Tab by mouth daily. OXYGEN-AIR DELIVERY SYSTEMS  
2 L by Does Not Apply route nightly. raNITIdine 150 mg tablet Commonly known as:  ZANTAC Take 1 Tab by mouth two (2) times a day. sertraline 50 mg tablet Commonly known as:  ZOLOFT Take 1 Tab by mouth daily. simvastatin 20 mg tablet Commonly known as:  ZOCOR  
TAKE 1 TABLET BY MOUTH NIGHTLY SPIRIVA WITH HANDIHALER 18 mcg inhalation capsule Generic drug:  tiotropium Take 1 Cap by inhalation daily. SYNTHROID 112 mcg tablet Generic drug:  levothyroxine Take 1 Tab by mouth Daily (before breakfast). ZENPEP 40,000-136,000- 218,000 unit Cpdr  
Generic drug:  lipase-protease-amylase Take 1 Tab by mouth three (3) times daily (with meals). And snacks. As needed Follow-up Instructions Return in about 3 months (around 2/2/2018). Introducing [facility]! Dear Khloe Benavidez: 
Thank you for requesting a "[employer]" account. Our records indicate that you already have an active "[employer]" account. You can access your account anytime at [email or web address] Did you know that you can access your hospital and ER discharge instructions at any time in "[employer]"? You can also review all of your test results from your hospital stay or ER visit. Additional Information If you have questions, please visit the Frequently Asked Questions section of the "[employer]" website at [email or web address] Remember, "[employer]" is NOT to be used for urgent needs. For medical emergencies, dial 911. Now available from your iPhone and Android! Please provide this summary of care documentation to your next provider. Your primary care clinician is listed as Africa Lyons.  If you have any questions after today's visit, please call [phone].

## 2017-11-02 NOTE — PROGRESS NOTES
HISTORY OF PRESENT ILLNESS  Tigre Edouard is a 80 y.o. female     SUMMARY:   Problem List  Date Reviewed: 11/2/2017          Codes Class Noted    Pacemaker ICD-10-CM: Z95.0  ICD-9-CM: V45.01  7/24/2017        Advanced care planning/counseling discussion ICD-10-CM: Z71.89  ICD-9-CM: V65.49  5/11/2017    Overview Signed 5/11/2017 12:44 PM by Vashti Jacques RN     A copy of patient's completed Advanced Medical Directive is on file in the patient's medical record. NN reviewed Advanced Medical Directive document with patient & patient denies the need for changes/ updates. Psoriasis (Chronic) ICD-10-CM: L40.9  ICD-9-CM: 696.1  3/23/2017        Cardiomyopathy ([facility] [address] ICD-10-CM: I42.9  ICD-9-CM: 425.4  9/20/2016    Overview Addendum 9/20/2016  8:45 AM by Ida De Paz MD     8/12 normal lexiscan cardiolyte, lvef 68%  8/16 echo lvef 35% multiple wall motion abnormalities, melissa, mod mr, mild ai, mod tr pa pressure 36mm  8/16 lexiscan mod reversible anterior defect, mostly fixed apical defect.  lvef 35%             Abnormal stress test ICD-10-CM: R94.39  ICD-9-CM: 794.39  9/13/2016        Ischemic cardiomyopathy ICD-10-CM: I25.5  ICD-9-CM: 414.8  Unknown        Chest pain ICD-10-CM: R07.9  ICD-9-CM: 786.50  8/10/2016        Restrictive lung disease ICD-10-CM: J98.4  ICD-9-CM: 518.89  12/14/2015        Osteopenia ICD-10-CM: M85.80  ICD-9-CM: 733.90  11/23/2015        Hypothyroidism due to acquired atrophy of thyroid ICD-10-CM: E03.4  ICD-9-CM: 244.8, 246.8  11/6/2015        CKD (chronic kidney disease) stage 3, GFR 30-59 ml/min ICD-10-CM: N18.3  ICD-9-CM: 585.3  2/9/2015        Anxiety and depression (Chronic) ICD-10-CM: F41.8  ICD-9-CM: 300.00, 311  2/26/2014        Col[facility] ([facility] ICD-10-CM: C18.9  ICD-9-CM: 153.9  2/26/2014    Overview Addendum 2/26/2014 11:12 AM by Rain Tyler MD     [facility]             Hypothyroid ICD-10-CM: E03.9  ICD-9-CM: 244.9  2/26/2014 Pulmonary hypertension ICD-10-CM: I27.20  ICD-9-CM: 416.8  9/26/2011        Atrial fibrillation ([facility] [address] ICD-10-CM: I48.91  ICD-9-CM: 427.31  Unknown    Overview Signed 10/21/2010  8:16 AM by Vikram Pradhan     av node ablation 5/22/98 with placement of CPI #[id number] dual chamber pacemaker subsequently replaced with a single chamber medtronic #[id number] single chamber pacemaker 7/25/05             Mitral regurgitation ICD-10-CM: I34.0  ICD-9-CM: 424.0  10/21/2010        CHB (complete heart block) (HCC) ICD-10-CM: I44.2  ICD-9-CM: 426.0  10/21/2010        Orthostatic hypotension ICD-10-CM: I95.1  ICD-9-CM: 458.0  10/21/2010        Chest pain, unspecified ICD-10-CM: R07.9  ICD-9-CM: 786.50  10/21/2010        Mixed hyperlipidemia ICD-10-CM: E78.2  ICD-9-CM: 272.2  10/18/2010              Current Outpatient Prescriptions on File Prior to Visit   Medication Sig    raNITIdine (ZANTAC) 150 mg tablet Take 1 Tab by mouth two (2) times a day.  SYNTHROID 112 mcg tablet Take 1 Tab by mouth Daily (before breakfast).  simvastatin (ZOCOR) 20 mg tablet TAKE 1 TABLET BY MOUTH NIGHTLY    furosemide (LASIX) 20 mg tablet Take 1 Tab by mouth daily.  carvedilol (COREG) 3.125 mg tablet Take 1 Tab by mouth two (2) times daily (with meals).  COUMADIN 5 mg tablet TAKE 1 TABLET BY MOUTH DAILY AND 1/2 TABLET ON SUNDAY    lidocaine (LIDODERM) 5 % 1 Patch by TransDERmal route every twelve (12) hours.  sertraline (ZOLOFT) 50 mg tablet Take 1 Tab by mouth daily.  lipase-protease-amylase (ZENPEP) 40,000-136,000- 218,000 unit cpDR Take 1 Tab by mouth three (3) times daily (with meals). And snacks. As needed    multivitamin (ONE A DAY) tablet Take 1 Tab by mouth daily.  Calcium-Cholecalciferol, D3, 600 mg(1,500mg) -400 unit cap Take  by mouth two (2) times a day.  OXYGEN-AIR DELIVERY SYSTEMS 2 L by Does Not Apply route nightly.  ADVAIR DISKUS 250-50 mcg/dose diskus inhaler Take 1 Puff by inhalation two (2) times a day.     acetaminophen (TYLENOL) 500 mg tablet Take 500 mg by mouth every six (6) hours as needed for Pain. (2) Tablet in am (1) tablet in pm (2) Tablet pm    tiotropium (SPIRIVA WITH HANDIHALER) 18 mcg inhalation capsule Take 1 Cap by inhalation daily.  predniSONE (DELTASONE) 10 mg tablet Take 20 mg by mouth daily.  desonide (TRIDESILON) 0.05 % cream Apply  to affected area two (2) times a day. No current facility-administered medications on file prior to visit. CARDIOLOGY STUDIES TO DATE:  8/12 normal lexiscan cardiolyte, lvef 68%  8/16 echo lvef 35% multiple wall motion abnormalities, melissa, mod mr, mild ai, mod tr pa pressure 36mm  8/16 lexiscan mod reversible anterior defect, mostly fixed apical defect. lvef 35%      4/17 lvef 40% ant hypo, lvh, melissa, mild to mod mr, mild ai. Mild to mod tr upper normal pa pressure        Chief Complaint   Patient presents with    Irregular Heart Beat     HPI :  Ms. Bebo White lost weight consistently for a while after she started Lasix when we last met and she is now off Prednisone, but started to  a couple of pounds and is up about three pounds from when we last saw her. She continues to have dependent lower extremity edema and shortness of breath with minimal activity, all of which are old problems. They are now in the process of moving her to an assisted living facility, which I think will be really good for her.          CARDIAC ROS:   negative for chest pain, palpitations, syncope, orthopnea, paroxysmal nocturnal dyspnea, exertional chest pressure/discomfort, claudication    Family History   Problem Relation Age of Onset    Diabetes Mother     Heart Disease Mother     Heart Attack Mother     Heart Disease Father     Stroke Sister     Breast Cancer Sister 80    Cancer Maternal Aunt      uterus    Diabetes Maternal Uncle     Breast Cancer Daughter 61       Past Medical History:   Diagnosis Date    Arthritis     Atrial fibrillation (HCC)     av node ablation 5/22/98 with placement of CPI #[id number] dual chamber pacemaker subsequently replaced with a single chamber medtronic #[id number] single chamber pacemaker 7/25/05    Cancer ([address] 1970's    colon cancer w/ resection    COPD     Depression     GERD (gastroesophageal reflux disease)     Hyperlipidemia     Hypertension     Ischemic cardiomyopathy     Migraine headache     Orthostatic hypotension     RADHA (obstructive sleep apnea)     Pacemaker 5/22/98    AV node ablation /pacemaker placement utilizing CPI # [id number] dual chamber system, medtronic #[id number] single chamber device placed 7/22/05    Pulmonary hypertension 9/26/2011    RVSP 50 on echo 8/14    Thyroid disease     Valvular heart disease     mild-mod MR/TR       GENERAL ROS:  A comprehensive review of systems was negative except for that written in the HPI.     Visit Vitals    /60    Pulse 81    Resp 16    Ht 5' 5\" (1.651 m)    Wt 172 lb 6.4 oz (78.2 kg)    LMP 02/26/1970    SpO2 95%    BMI 28.69 kg/m2       Wt Readings from Last 3 Encounters:   11/02/17 172 lb 6.4 oz (78.2 kg)   09/29/17 169 lb 12.8 oz (77 kg)   09/12/17 164 lb 6 oz (74.6 kg)            BP Readings from Last 3 Encounters:   11/02/17 110/60   09/29/17 138/80   09/12/17 137/76       PHYSICAL EXAM  General appearance: alert, cooperative, no distress, appears stated age  Neck: supple, symmetrical, trachea midline, no adenopathy, no carotid bruit and no JVD  Lungs: clear to auscultation bilaterally  Heart: regular rate and rhythm, S1, S2 normal, no murmur, click, rub or gallop  Extremities: extremities normal, atraumatic, no cyanosis or edema    Lab Results   Component Value Date/Time    Cholesterol, total 247 09/12/2017 09:53 AM    Cholesterol, total 173 08/22/2016 08:45 AM    Cholesterol, total 157 08/11/2015 12:14 PM    Cholesterol, total 157 02/09/2015 08:34 AM    Cholesterol, total 156 02/27/2014 08:49 AM    HDL Cholesterol 79 09/12/2017 09:53 AM    HDL Cholesterol 46 08/22/2016 08:45 AM    HDL Cholesterol 47 08/11/2015 12:14 PM    HDL Cholesterol 46 02/09/2015 08:34 AM    HDL Cholesterol 48 02/27/2014 08:49 AM    LDL, calculated 124 09/12/2017 09:53 AM    LDL, calculated 99 08/22/2016 08:45 AM    LDL, calculated 86 08/11/2015 12:14 PM    LDL, calculated 81 02/09/2015 08:34 AM    LDL, calculated 83 02/27/2014 08:49 AM    Triglyceride 221 09/12/2017 09:53 AM    Triglyceride 140 08/22/2016 08:45 AM    Triglyceride 121 08/11/2015 12:14 PM    Triglyceride 149 02/09/2015 08:34 AM    Triglyceride 125 02/27/2014 08:49 AM     ASSESSMENT  Mr. Rivera Grandchild is concerned about the weight gain, so we are going to increase her Lasix to 40 mg a day. The only real risk to this is that she will become more orthostatic, which has been a problem for her for many years, but we will simply see how it goes. She is due for a pacemaker check today and otherwise needs no cardiac testing at this time. current treatment plan is effective, no change in therapy  lab results and schedule of future lab studies reviewed with patient  reviewed diet, exercise and weight control    Encounter Diagnoses   Name Primary?  Chronic atrial fibrillation (HCC) Yes    Dilated cardiomyopathy (HCC)     CHB (complete heart block) (HCC)     Pacemaker     Orthostatic hypotension     Anxiety and depression      Orders Placed This Encounter    DENTA 5000 PLUS 1.1 % crea       Follow-up Disposition:  Return in about 3 months (around 2/2/2018).     Demetrice Carson MD  11/2/2017

## 2017-11-03 ENCOUNTER — TELEPHONE (OUTPATIENT)
Dept: CARDIOLOGY CLINIC | Age: 82
End: 2017-11-03

## 2017-11-06 ENCOUNTER — OFFICE VISIT (OUTPATIENT)
Dept: INTERNAL MEDICINE CLINIC | Age: 82
End: 2017-11-06

## 2017-11-06 ENCOUNTER — TELEPHONE (OUTPATIENT)
Dept: INTERNAL MEDICINE CLINIC | Age: 82
End: 2017-11-06

## 2017-11-06 ENCOUNTER — HOME HEALTH ADMISSION (OUTPATIENT)
Dept: HOME HEALTH SERVICES | Facility: HOME HEALTH | Age: 82
End: 2017-11-06
Payer: MEDICARE

## 2017-11-06 VITALS
HEART RATE: 85 BPM | HEIGHT: 65 IN | OXYGEN SATURATION: 93 % | DIASTOLIC BLOOD PRESSURE: 75 MMHG | WEIGHT: 168 LBS | RESPIRATION RATE: 18 BRPM | SYSTOLIC BLOOD PRESSURE: 118 MMHG | TEMPERATURE: 98.7 F | BODY MASS INDEX: 27.99 KG/M2

## 2017-11-06 DIAGNOSIS — G89.29 CHRONIC MIDLINE LOW BACK PAIN WITHOUT SCIATICA: Primary | ICD-10-CM

## 2017-11-06 DIAGNOSIS — M54.50 CHRONIC MIDLINE LOW BACK PAIN WITHOUT SCIATICA: Primary | ICD-10-CM

## 2017-11-06 LAB
BILIRUB UR QL STRIP: NEGATIVE
GLUCOSE UR-MCNC: NEGATIVE MG/DL
KETONES P FAST UR STRIP-MCNC: NEGATIVE MG/DL
PH UR STRIP: 5.5 [PH] (ref 4.6–8)
PROT UR QL STRIP: NEGATIVE
SP GR UR STRIP: 1.01 (ref 1–1.03)
UA UROBILINOGEN AMB POC: NORMAL (ref 0.2–1)
URINALYSIS CLARITY POC: CLEAR
URINALYSIS COLOR POC: YELLOW
URINE BLOOD POC: NEGATIVE
URINE LEUKOCYTES POC: NORMAL
URINE NITRITES POC: NEGATIVE

## 2017-11-06 RX ORDER — BACLOFEN 10 MG/1
5 TABLET ORAL 2 TIMES DAILY
COMMUNITY
Start: 2017-11-02 | End: 2020-06-15

## 2017-11-06 NOTE — TELEPHONE ENCOUNTER
Per Dr. Shila Ferreira patient needs an overall assessment for safety and immobility. A new order has been placed as prescribed for [facility].

## 2017-11-06 NOTE — PROGRESS NOTES
HISTORY OF PRESENT ILLNESS  Sathish Duong is a 80 y.o. female. HPI  Here with daughter to discuss progressive immobility/ decreasing ability to do ADL's. Increased dependence on family. Family staying with her 50% of time. Limited by severe low back pain. Seeing Dr. Christos Laguerre in ortho. Placed on muscle relaxant with some benefit. Back pain better with sitting,  Worse with standing. Not radiating. No focal numbness, weakness . Walking very short distances but with pain. Using rolator some. Some near falls over last month or more. Hypertension:  Sathish Duong is a 80 y.o. female with hypertension. with Chronic kidney disease stage 3   Medication change since last visit: No  -although lasix has been increased by cardiologist temporarily  The patient reports:  taking medications as instructed, no medication side effects noted, home BP monitoring in range of 324-937'J systolic over 14-88'Z diastolic, no chest pain on exertion, noting swelling of ankles, no orthostatic dizziness or lightheadedness, no palpitations.        Lifestyle modification/social history: nonsmoker    Lab Results   Component Value Date/Time    Sodium 143 09/12/2017 09:53 AM    Potassium 4.5 09/12/2017 09:53 AM    Chloride 100 09/12/2017 09:53 AM    CO2 30 09/12/2017 09:53 AM    Anion gap 8 07/24/2017 08:04 AM    Glucose 80 09/12/2017 09:53 AM    BUN 34 09/12/2017 09:53 AM    Creatinine 0.95 09/12/2017 09:53 AM    BUN/Creatinine ratio 36 09/12/2017 09:53 AM    GFR est AA 63 09/12/2017 09:53 AM    GFR est non-AA 55 09/12/2017 09:53 AM    Calcium 9.3 09/12/2017 09:53 AM           Patient Active Problem List   Diagnosis Code    Mixed hyperlipidemia E78.2    Atrial fibrillation (HCC) I48.91    Mitral regurgitation I34.0    CHB (complete heart block) (HCC) I44.2    Orthostatic hypotension I95.1    Chest pain, unspecified R07.9    Pulmonary hypertension I27.20    Anxiety and depression F41.8    Colon cancer ([facility] [address] C18.9    Hypothyroid E03.9    CKD (chronic kidney disease) stage 3, GFR 30-59 ml/min N18.3    Hypothyroidism due to acquired atrophy of thyroid E03.4    Osteopenia M85.80    Restrictive lung disease J98.4    Chest pain R07.9    Abnormal stress test R94.39    Ischemic cardiomyopathy I25.5    Cardiomyopathy (HCC) I42.9    Psoriasis L40.9    Advanced care planning/counseling discussion Z71.89    Pacemaker Z95.0     Past Medical History:   Diagnosis Date    Arthritis     Atrial fibrillation ([facility] [address]     av node ablation 5/22/98 with placement of CPI #[id number] dual chamber pacemaker subsequently replaced with a single chamber medtronic #[id number] single chamber pacemaker 7/25/05    Cancer ([facility] [address] 1970's    colon cancer w/ resection    COPD     Depression     GERD (gastroesophageal reflux disease)     Hyperlipidemia     Hypertension     Ischemic cardiomyopathy     Migraine headache     Orthostatic hypotension     RADHA (obstructive sleep apnea)     Pacemaker 5/22/98    AV node ablation /pacemaker placement utilizing CPI # [id number] dual chamber system, medtronic #[id number] single chamber device placed 7/22/05    Pulmonary hypertension 9/26/2011    RVSP 50 on echo 8/14    Thyroid disease     Valvular heart disease     mild-mod MR/TR     Allergies   Allergen Reactions    Cardizem [Diltiazem Hcl] Hives    Codeine Nausea and Vomiting    Darvocet A500 [Propoxyphene N-Acetaminophen] Nausea and Vomiting    Diltiazem Hives    Labetalol Nausea and Vomiting     Dizzy/Disoriented     Pcn [Penicillins] Swelling     Tongue Swelling     Primidone Other (comments)     Lightheaded/unsteady gait    Sulfa (Sulfonamide Antibiotics) Nausea and Vomiting     Current Outpatient Prescriptions on File Prior to Visit   Medication Sig Dispense Refill    DENTA 5000 PLUS 1.1 % crea as needed. 2    furosemide (LASIX) 40 mg tablet Take 1 Tab by mouth daily. 90 Tab 2    raNITIdine (ZANTAC) 150 mg tablet Take 1 Tab by mouth two (2) times a day. 180 Tab 1    SYNTHROID 112 mcg tablet Take 1 Tab by mouth Daily (before breakfast). 90 Tab 1    simvastatin (ZOCOR) 20 mg tablet TAKE 1 TABLET BY MOUTH NIGHTLY 90 Tab 1    carvedilol (COREG) 3.125 mg tablet Take 1 Tab by mouth two (2) times daily (with meals). 180 Tab 1    COUMADIN 5 mg tablet TAKE 1 TABLET BY MOUTH DAILY AND 1/2 TABLET ON SUNDAY 90 Tab 0    lidocaine (LIDODERM) 5 % 1 Patch by TransDERmal route every twelve (12) hours. 60 Each 0    sertraline (ZOLOFT) 50 mg tablet Take 1 Tab by mouth daily. 90 Tab 2    desonide (TRIDESILON) 0.05 % cream Apply  to affected area two (2) times a day. 15 g 0    multivitamin (ONE A DAY) tablet Take 1 Tab by mouth daily.  Calcium-Cholecalciferol, D3, 600 mg(1,500mg) -400 unit cap Take  by mouth two (2) times a day.  OXYGEN-AIR DELIVERY SYSTEMS 2 L by Does Not Apply route nightly.  ADVAIR DISKUS 250-50 mcg/dose diskus inhaler Take 1 Puff by inhalation two (2) times a day.  acetaminophen (TYLENOL) 500 mg tablet Take 500 mg by mouth every six (6) hours as needed for Pain. (2) Tablet in am (1) tablet in pm (2) Tablet pm  0    tiotropium (SPIRIVA WITH HANDIHALER) 18 mcg inhalation capsule Take 1 Cap by inhalation daily. No current facility-administered medications on file prior to visit. Social History   Substance Use Topics    Smoking status: Passive Smoke Exposure - Never Smoker    Smokeless tobacco: Never Used    Alcohol use No         ROS    Physical Exam   Constitutional: She appears well-developed and well-nourished. No distress. /75 (BP 1 Location: Left arm, BP Patient Position: Sitting)  Pulse 85  Temp 98.7 °F (37.1 °C) (Oral)   Resp 18  Ht 5' 5\" (1.651 m)  Wt 168 lb (76.2 kg)  LMP 02/26/1970  SpO2 93%  BMI 27.96 kg/m2Body mass index is 27.96 kg/(m^2). HENT:   Mouth/Throat: Oropharynx is clear and moist.   Neck: No JVD present. Carotid bruit is not present.    Cardiovascular: Normal rate, regular rhythm, normal heart sounds and intact distal pulses. Pulmonary/Chest: Effort normal. No accessory muscle usage. No respiratory distress. She has no decreased breath sounds. She has wheezes (distant). She has no rhonchi. She has no rales. Musculoskeletal: She exhibits no edema. Lumbar back: She exhibits decreased range of motion, tenderness and pain. She exhibits no swelling. Back:    Neurological: She is alert. Skin: Skin is warm and dry. She is not diaphoretic. Nursing note and vitals reviewed. ASSESSMENT and PLAN  Diagnoses and all orders for this visit:    1. Chronic midline low back pain without sciatica  - increasing immobility and dependence. Refer to [facility] for OT/ PT, home assessment. I suspect she will soon be candidate for assisted living at least if family unable to manage her needs at home. -     AMB POC URINALYSIS DIP STICK MANUAL W/O MICRO  -     [address] [facility]  -     REFERRAL TO OCCUPATIONAL THERAPY      Follow-up Disposition:  Return if symptoms worsen or fail to improve.

## 2017-11-06 NOTE — MR AVS SNAPSHOT
Visit Information Date & Time Provider Department Dept. Phone Encounter #  
 11/6/2017 11:00 AM Carey Chester MD [facility] [address] [place] [address] [id number] Follow-up Instructions Return if symptoms worsen or fail to improve. Your Appointments 1/12/2018  3:00 PM  
ROUTINE CARE with Carey Chester MD  
[facility] [place] [address] Appt Note: 4 mnth fu  
 Andrea Hansen [address]  
  
   
 Arsen Ritter [phone]  
  
    
 2/15/2018  9:20 AM  
ESTABLISHED PATIENT with Marla Luna MD  
[facility] ([address]) Appt Note: med interrogation/lin/Laurent 1st b 11-2-17  (full ck 7-10-17 at [facility]-single lead) [address] [place]  
[address] [facility] [address]  
  
    
 2/15/2018  9:45 AM  
PACEMAKER with Gwynn Leyden [facility] (FARHAD SCHEDULING) Appt Note: med interrogation/lin/Laurent 1st b 11-2-17  (full ck 7-10-17 at [facility]) [address] [place]  
[address] [address] Upcoming Health Maintenance Date Due DTaP/Tdap/Td series (1 - Tdap) 4/3/2012 MEDICARE YEARLY EXAM 5/12/2018 GLAUCOMA SCREENING Q2Y 12/8/2018 Allergies as of 11/6/2017  Review Complete On: 11/6/2017 By: Gali Milton LPN Severity Noted Reaction Type Reactions Cardizem [Diltiazem Hcl]  10/04/2010    Hives Codeine  10/04/2010    Nausea and Vomiting Darvocet A500 [Propoxyphene N-acetaminophen]  10/04/2010    Nausea and Vomiting Diltiazem  10/04/2010    Hives Labetalol  04/25/2012    Nausea and Vomiting Dizzy/Disoriented Pcn [Penicillins]  10/04/2010    Swelling Tongue Swelling Primidone  07/31/2012    Other (comments) Lightheaded/unsteady gait Sulfa (Sulfonamide Antibiotics)  10/04/2010    Nausea and Vomiting Current Immunizations  Reviewed on 11/23/2015 Name Date Influenza High Dose Vaccine PF 9/21/2017, 9/29/2016, 9/24/2015, 10/1/2014 Influenza Vaccine 12/1/2013 Influenza Vaccine Split 10/12/2012 Pneumococcal Conjugate (PCV-13) 11/23/2015 Pneumococcal Polysaccharide (PPSV-23) 2/27/2015 Pneumococcal Vaccine (Pcv) 12/15/2010 TB Skin Test (PPD) 1/1/2001 TD Vaccine 4/2/2012 Varicella Virus Vaccine Live 9/12/2012 Zoster Vaccine, Live 12/1/2013 Not reviewed this visit You Were Diagnosed With   
  
 Codes Comments Chronic midline low back pain without sciatica    -  Primary ICD-10-CM: M54.5, G89.29 ICD-9-CM: 724.2, 338.29 Vitals BP Pulse Temp Resp Height(growth percentile) Weight(growth percentile) 118/75 (BP 1 Location: Left arm, BP Patient Position: Sitting) 85 98.7 °F (37.1 °C) (Oral) 18 5' 5\" (1.651 m) 168 lb (76.2 kg) LMP SpO2 BMI OB Status Smoking Status 02/26/1970 93% 27.96 kg/m2 Postmenopausal Passive Smoke Exposure - Never Smoker Vitals History BMI and BSA Data Body Mass Index Body Surface Area  
 27.96 kg/m 2 1.87 m 2 Preferred Pharmacy Pharmacy Name Phone [id number] [address] [place] [address]) [phone] Your Updated Medication List  
  
   
This list is accurate as of: 11/6/17 11:56 AM.  Always use your most recent med list.  
  
  
  
  
 acetaminophen 500 mg tablet Commonly known as:  TYLENOL Take 500 mg by mouth every six (6) hours as needed for Pain. (2) Tablet in am (1) tablet in pm (2) Tablet pm  
  
 ADVAIR DISKUS 250-50 mcg/dose diskus inhaler Generic drug:  fluticasone-salmeterol Take 1 Puff by inhalation two (2) times a day. baclofen 10 mg tablet Commonly known as:  LIORESAL  
 Take 10 mg by mouth daily. Calcium-Cholecalciferol (D3) 600 mg(1,500mg) -400 unit Cap Take  by mouth two (2) times a day. carvedilol 3.125 mg tablet Commonly known as:  Georgette Dome Take 1 Tab by mouth two (2) times daily (with meals). COUMADIN 5 mg tablet Generic drug:  warfarin TAKE 1 TABLET BY MOUTH DAILY AND 1/2 TABLET ON SUNDAY DENTA 5000 PLUS 1.1 % Crea Generic drug:  fluoride (sodium)  
as needed. desonide 0.05 % cream  
Commonly known as:  Daphane Renée Apply  to affected area two (2) times a day. furosemide 40 mg tablet Commonly known as:  LASIX Take 1 Tab by mouth daily. lidocaine 5 % Commonly known as:  LIDODERM  
1 Patch by TransDERmal route every twelve (12) hours. multivitamin tablet Commonly known as:  ONE A DAY Take 1 Tab by mouth daily. OXYGEN-AIR DELIVERY SYSTEMS  
2 L by Does Not Apply route nightly. raNITIdine 150 mg tablet Commonly known as:  ZANTAC Take 1 Tab by mouth two (2) times a day. sertraline 50 mg tablet Commonly known as:  ZOLOFT Take 1 Tab by mouth daily. simvastatin 20 mg tablet Commonly known as:  ZOCOR  
TAKE 1 TABLET BY MOUTH NIGHTLY SPIRIVA WITH HANDIHALER 18 mcg inhalation capsule Generic drug:  tiotropium Take 1 Cap by inhalation daily. SYNTHROID 112 mcg tablet Generic drug:  levothyroxine Take 1 Tab by mouth Daily (before breakfast). We Performed the Following AMB POC URINALYSIS DIP STICK MANUAL W/O MICRO [72975 CPT(R)] [address] REFERRAL TO OCCUPATIONAL THERAPY [REF53 Custom] REFERRAL TO PHYSICAL THERAPY [XTG92 Custom] Comments:  
 [facility] home health Follow-up Instructions Return if symptoms worsen or fail to improve. Referral Information Referral ID Referred By Referred To  
  
 [id number] [facility] Not Available Visits Status Start Date End Date 1 New Request 11/6/17 11/6/18 If your referral has a status of pending review or denied, additional information will be sent to support the outcome of this decision. Referral ID Referred By Referred To  
 [id number] [facility] Not Available Visits Status Start Date End Date 1 New Request 11/6/17 11/6/18 If your referral has a status of pending review or denied, additional information will be sent to support the outcome of this decision. Referral ID Referred By Referred To  
 [id number] Nathan Kevin Not Available Visits Status Start Date End Date 1 New Request 11/6/17 11/6/18 If your referral has a status of pending review or denied, additional information will be sent to support the outcome of this decision. Introducing [facility]! Dear Sharda Murillo: 
Thank you for requesting a [employer] account. Our records indicate that you already have an active [employer]ovill[employer] account. You can access your account anytime at [email or web address] Number Did you know that you can access your hospital and ER discharge instructions at any time in Becovillage? You can also review all of your test results from your hospital stay or ER visit. Additional Information If you have questions, please visit the Frequently Asked Questions section of the [employer] website at [email or web address] Remember, Bec[employer]age is NOT to be used for urgent needs. For medical emergencies, dial 911. Now available from your iPhone and Android! Please provide this summary of care documentation to your next provider. Your primary care clinician is listed as Darren Paulson. If you have any questions after today's visit, please call [phone].

## 2017-11-08 ENCOUNTER — HOME CARE VISIT (OUTPATIENT)
Dept: SCHEDULING | Facility: HOME HEALTH | Age: 82
End: 2017-11-08
Payer: MEDICARE

## 2017-11-08 ENCOUNTER — TELEPHONE (OUTPATIENT)
Dept: INTERNAL MEDICINE CLINIC | Age: 82
End: 2017-11-08

## 2017-11-08 VITALS
OXYGEN SATURATION: 98 % | SYSTOLIC BLOOD PRESSURE: 110 MMHG | TEMPERATURE: 98.4 F | DIASTOLIC BLOOD PRESSURE: 60 MMHG | RESPIRATION RATE: 15 BRPM | HEART RATE: 65 BPM

## 2017-11-08 PROCEDURE — G0151 HHCP-SERV OF PT,EA 15 MIN: HCPCS

## 2017-11-08 PROCEDURE — 400013 HH SOC

## 2017-11-08 PROCEDURE — 3331090001 HH PPS REVENUE CREDIT

## 2017-11-08 PROCEDURE — 3331090002 HH PPS REVENUE DEBIT

## 2017-11-08 NOTE — TELEPHONE ENCOUNTER
Amanda Lam from  [facility] called in has questions regarding pt and her pain level and is there anything else that can be given for pain.  Please call her at [phone]

## 2017-11-09 ENCOUNTER — TELEPHONE (OUTPATIENT)
Dept: CARDIOLOGY CLINIC | Age: 82
End: 2017-11-09

## 2017-11-09 PROCEDURE — 3331090001 HH PPS REVENUE CREDIT

## 2017-11-09 PROCEDURE — 3331090002 HH PPS REVENUE DEBIT

## 2017-11-10 ENCOUNTER — HOME CARE VISIT (OUTPATIENT)
Dept: SCHEDULING | Facility: HOME HEALTH | Age: 82
End: 2017-11-10
Payer: MEDICARE

## 2017-11-10 VITALS — DIASTOLIC BLOOD PRESSURE: 64 MMHG | SYSTOLIC BLOOD PRESSURE: 100 MMHG | OXYGEN SATURATION: 91 % | HEART RATE: 86 BPM

## 2017-11-10 PROCEDURE — G0152 HHCP-SERV OF OT,EA 15 MIN: HCPCS

## 2017-11-10 PROCEDURE — 3331090002 HH PPS REVENUE DEBIT

## 2017-11-10 PROCEDURE — 3331090001 HH PPS REVENUE CREDIT

## 2017-11-10 RX ORDER — FENTANYL 25 UG/1
1 PATCH TRANSDERMAL
Qty: 5 PATCH | Refills: 0 | Status: SHIPPED | OUTPATIENT
Start: 2017-11-10 | End: 2018-01-08 | Stop reason: ALTCHOICE

## 2017-11-10 NOTE — TELEPHONE ENCOUNTER
LM informing daughter that new pain prescription is ready to be picked up. The recommendation came from [place] the from [facility] PT. We are open until Alexander Gutierrez has been informed of new prescription.

## 2017-11-10 NOTE — TELEPHONE ENCOUNTER
She has GI upset with narcotics. Transderm fentanyl is a option but family/ pt needs to be cautious of mental confusion/ fall risks. Will start low dose. New Medication or refill(s) ordered, printed and signed. Ready for . Please notify patient family if necessary. Bill Roche

## 2017-11-10 NOTE — TELEPHONE ENCOUNTER
Patient high pain level makes it difficult for patient to ambulate, accurate assessment of need and provide quality assistance to patient. Requesting a new pain medication for patient. Daughter stated that muscle relaxers made her loopy and worried about fall risk.

## 2017-11-11 PROCEDURE — 3331090001 HH PPS REVENUE CREDIT

## 2017-11-11 PROCEDURE — 3331090002 HH PPS REVENUE DEBIT

## 2017-11-12 PROCEDURE — 3331090002 HH PPS REVENUE DEBIT

## 2017-11-12 PROCEDURE — 3331090001 HH PPS REVENUE CREDIT

## 2017-11-13 ENCOUNTER — TELEPHONE (OUTPATIENT)
Dept: INTERNAL MEDICINE CLINIC | Age: 82
End: 2017-11-13

## 2017-11-13 DIAGNOSIS — M54.50 CHRONIC MIDLINE LOW BACK PAIN WITHOUT SCIATICA: ICD-10-CM

## 2017-11-13 DIAGNOSIS — G89.29 CHRONIC MIDLINE LOW BACK PAIN WITHOUT SCIATICA: ICD-10-CM

## 2017-11-13 PROCEDURE — 3331090002 HH PPS REVENUE DEBIT

## 2017-11-13 PROCEDURE — 3331090001 HH PPS REVENUE CREDIT

## 2017-11-13 RX ORDER — BACLOFEN 10 MG/1
5 TABLET ORAL 2 TIMES DAILY
Status: CANCELLED | OUTPATIENT
Start: 2017-11-13

## 2017-11-13 RX ORDER — FENTANYL 25 UG/1
1 PATCH TRANSDERMAL
Qty: 5 PATCH | Refills: 0 | Status: CANCELLED | OUTPATIENT
Start: 2017-11-13

## 2017-11-13 NOTE — TELEPHONE ENCOUNTER
Patient's daughter request a callback only if the pt needs to stop taking the medication. Pt is taking baclofen and was prescribed fentynal patch from us. She wants to make sure that it it won't interact. She would also  Like a Denty's message either way.

## 2017-11-13 NOTE — TELEPHONE ENCOUNTER
Mitra from  [facility] calling in requesting an order for a home health aide. Please call her at [phone] to give a verbal order or just enter in chart.

## 2017-11-13 NOTE — TELEPHONE ENCOUNTER
Both are fairly low dose but can cause drowsiness so family will need to watch her closely. Please let them know.

## 2017-11-14 ENCOUNTER — HOME CARE VISIT (OUTPATIENT)
Dept: HOME HEALTH SERVICES | Facility: HOME HEALTH | Age: 82
End: 2017-11-14
Payer: MEDICARE

## 2017-11-14 ENCOUNTER — TELEPHONE (OUTPATIENT)
Dept: INTERNAL MEDICINE CLINIC | Age: 82
End: 2017-11-14

## 2017-11-14 PROCEDURE — 3331090002 HH PPS REVENUE DEBIT

## 2017-11-14 PROCEDURE — 3331090001 HH PPS REVENUE CREDIT

## 2017-11-14 NOTE — PROGRESS NOTES
[address] [facility] Judie [address]  Phone: [phone]  Fax: [phone]    Discharge Summary  2-15    Patient name: Blanca Runner  : 1932  Provider#: [id number]  Referral source: Nataliia Rollins MD      Medical/Treatment Diagnosis: low back pain     Prior Hospitalization: see medical history     Comorbidities: See Plan of Care  Prior Level of Function:See Plan of Care  Medications: Verified on Patient Summary List    Start of Care: 17      Onset Date:1 year ago   Visits from Start of Care: 4     Missed Visits: 0  Reporting Period : 17 to 10/13/17      ASSESSMENT/SUMMARY OF CARE: The patient has not been seen since 10/13/17 when she moved to [place] to live with her daughter for a couple of weeks. She is scheduled to start home health PT at this time.         RECOMMENDATIONS:  [x]Discontinue therapy: []Patient has reached or is progressing toward set goals      []Patient is non-compliant or has abdicated      []Due to lack of appreciable progress towards set goals      [x]Other Will start home health PT    Lexie Began, PT 2017 8:13 AM

## 2017-11-14 NOTE — TELEPHONE ENCOUNTER
----- Message from Karla Tony sent at 11/14/2017  9:48 AM EST -----  Regarding: Dr.  Melbamy Osgood, daughter would like a call back from the nurse regarding the pain patch and muscle relaxer. Mrs. Peters [place] received the my chart message that the pt can take them together,,but would have to be monitored for falls. Mrs. Peters [place] wants to know if the pt can be admitted to the [address] [place] [address] to be monitored since the pt lives alone. Ms. Peters [place] can be reached at [phone].

## 2017-11-14 NOTE — TELEPHONE ENCOUNTER
Yes we discussed that at last visit. Daughter will need to initiate intake process with [facility].   Let her know

## 2017-11-15 ENCOUNTER — TELEPHONE (OUTPATIENT)
Dept: INTERNAL MEDICINE CLINIC | Age: 82
End: 2017-11-15

## 2017-11-15 ENCOUNTER — TELEPHONE (OUTPATIENT)
Dept: CARDIOLOGY CLINIC | Age: 82
End: 2017-11-15

## 2017-11-15 ENCOUNTER — HOME CARE VISIT (OUTPATIENT)
Dept: SCHEDULING | Facility: HOME HEALTH | Age: 82
End: 2017-11-15
Payer: MEDICARE

## 2017-11-15 ENCOUNTER — HOME CARE VISIT (OUTPATIENT)
Dept: HOME HEALTH SERVICES | Facility: HOME HEALTH | Age: 82
End: 2017-11-15
Payer: MEDICARE

## 2017-11-15 PROCEDURE — 3331090002 HH PPS REVENUE DEBIT

## 2017-11-15 PROCEDURE — 3331090001 HH PPS REVENUE CREDIT

## 2017-11-15 NOTE — TELEPHONE ENCOUNTER
Home health physical therapist called and wants an order for home health aid for pt to help with getting dressed and bathing.  Mariusz Christensen call her at [phone]

## 2017-11-15 NOTE — TELEPHONE ENCOUNTER
Daughter is going to speak to someone the [facility] to get her into assisted living and not home health right now. Mariusz Christensen has been informed.

## 2017-11-16 ENCOUNTER — HOSPITAL ENCOUNTER (OUTPATIENT)
Dept: CT IMAGING | Age: 82
Discharge: HOME | End: 2017-11-16
Attending: PHYSICAL MEDICINE & REHABILITATION
Payer: MEDICARE

## 2017-11-16 ENCOUNTER — HOME CARE VISIT (OUTPATIENT)
Dept: HOME HEALTH SERVICES | Facility: HOME HEALTH | Age: 82
End: 2017-11-16
Payer: MEDICARE

## 2017-11-16 DIAGNOSIS — M54.50 CHRONIC BILATERAL LOW BACK PAIN WITHOUT SCIATICA: ICD-10-CM

## 2017-11-16 DIAGNOSIS — G89.29 CHRONIC BILATERAL LOW BACK PAIN WITHOUT SCIATICA: ICD-10-CM

## 2017-11-16 DIAGNOSIS — M47.816 LUMBAR SPONDYLOSIS: ICD-10-CM

## 2017-11-16 PROCEDURE — 3331090002 HH PPS REVENUE DEBIT

## 2017-11-16 PROCEDURE — 3331090001 HH PPS REVENUE CREDIT

## 2017-11-16 PROCEDURE — 72131 CT LUMBAR SPINE W/O DYE: CPT

## 2017-11-17 ENCOUNTER — HOME CARE VISIT (OUTPATIENT)
Dept: HOME HEALTH SERVICES | Facility: HOME HEALTH | Age: 82
End: 2017-11-17
Payer: MEDICARE

## 2017-11-17 PROCEDURE — 3331090001 HH PPS REVENUE CREDIT

## 2017-11-17 PROCEDURE — 3331090002 HH PPS REVENUE DEBIT

## 2017-11-18 PROCEDURE — 3331090002 HH PPS REVENUE DEBIT

## 2017-11-18 PROCEDURE — 3331090001 HH PPS REVENUE CREDIT

## 2017-11-19 PROCEDURE — 3331090001 HH PPS REVENUE CREDIT

## 2017-11-19 PROCEDURE — 3331090002 HH PPS REVENUE DEBIT

## 2017-11-20 ENCOUNTER — HOME CARE VISIT (OUTPATIENT)
Dept: HOME HEALTH SERVICES | Facility: HOME HEALTH | Age: 82
End: 2017-11-20
Payer: MEDICARE

## 2017-11-20 ENCOUNTER — HOME CARE VISIT (OUTPATIENT)
Dept: SCHEDULING | Facility: HOME HEALTH | Age: 82
End: 2017-11-20
Payer: MEDICARE

## 2017-11-20 PROCEDURE — 3331090001 HH PPS REVENUE CREDIT

## 2017-11-20 PROCEDURE — 3331090002 HH PPS REVENUE DEBIT

## 2017-11-21 ENCOUNTER — TELEPHONE (OUTPATIENT)
Dept: INTERNAL MEDICINE CLINIC | Age: 82
End: 2017-11-21

## 2017-11-21 ENCOUNTER — DOCUMENTATION ONLY (OUTPATIENT)
Dept: INTERNAL MEDICINE CLINIC | Age: 82
End: 2017-11-21

## 2017-11-21 PROCEDURE — 3331090002 HH PPS REVENUE DEBIT

## 2017-11-21 PROCEDURE — 3331090001 HH PPS REVENUE CREDIT

## 2017-11-21 NOTE — TELEPHONE ENCOUNTER
Devan Montes De Oca with [facility] would like to to speak to the nurse in regards to the pt's current status. She states the pt was going to a nursing home and wants to confirm. Please call her [phone].       Thank you

## 2017-11-22 ENCOUNTER — TELEPHONE (OUTPATIENT)
Dept: CARDIOLOGY CLINIC | Age: 82
End: 2017-11-22

## 2017-11-22 ENCOUNTER — HOME CARE VISIT (OUTPATIENT)
Dept: SCHEDULING | Facility: HOME HEALTH | Age: 82
End: 2017-11-22
Payer: MEDICARE

## 2017-11-22 PROCEDURE — 3331090002 HH PPS REVENUE DEBIT

## 2017-11-22 PROCEDURE — 3331090001 HH PPS REVENUE CREDIT

## 2017-11-22 NOTE — TELEPHONE ENCOUNTER
Dr. [facility] is requested cardiac clearance for patient to stop coumadin 4 days prior to an epidural steroid injection. Please advise if okay. Fax # [phone].  # [phone].

## 2017-11-22 NOTE — TELEPHONE ENCOUNTER
Informed Arden Galindo that we haven't heard from the patient or her family since 11/15/2017. Patient's family was asked to let us know what assisted living facility they are deciding to place the patient in and for how long. LM asking for a return call for an update.

## 2017-11-23 PROCEDURE — 3331090002 HH PPS REVENUE DEBIT

## 2017-11-23 PROCEDURE — 3331090001 HH PPS REVENUE CREDIT

## 2017-11-24 PROCEDURE — 3331090002 HH PPS REVENUE DEBIT

## 2017-11-24 PROCEDURE — 3331090001 HH PPS REVENUE CREDIT

## 2017-11-25 ENCOUNTER — HOME CARE VISIT (OUTPATIENT)
Dept: HOME HEALTH SERVICES | Facility: HOME HEALTH | Age: 82
End: 2017-11-25
Payer: MEDICARE

## 2017-11-25 PROCEDURE — 3331090003 HH PPS REVENUE ADJ

## 2017-11-25 PROCEDURE — 3331090002 HH PPS REVENUE DEBIT

## 2017-11-25 PROCEDURE — 3331090001 HH PPS REVENUE CREDIT

## 2017-11-27 ENCOUNTER — TELEPHONE (OUTPATIENT)
Dept: CARDIOLOGY CLINIC | Age: 82
End: 2017-11-27

## 2017-11-30 ENCOUNTER — DOCUMENTATION ONLY (OUTPATIENT)
Dept: CARDIOLOGY CLINIC | Age: 82
End: 2017-11-30

## 2017-11-30 NOTE — PROGRESS NOTES
Spoke to daughter Carla Guzman she states her mother stopped coumadin on Monday,11/27/17,to have back injection in am,and INR was checked on 11/29/17,and it was 1.6,states she must have entered the wrong number,1. 9. Coumadin dose will restart 12/01/17,and she will recheck INR on 12/06/17

## 2017-12-08 ENCOUNTER — TELEPHONE (OUTPATIENT)
Dept: CARDIOLOGY CLINIC | Age: 82
End: 2017-12-08

## 2017-12-14 ENCOUNTER — TELEPHONE (OUTPATIENT)
Dept: CARDIOLOGY CLINIC | Age: 82
End: 2017-12-14

## 2017-12-19 ENCOUNTER — TELEPHONE (OUTPATIENT)
Dept: INTERNAL MEDICINE CLINIC | Age: 82
End: 2017-12-19

## 2017-12-19 NOTE — TELEPHONE ENCOUNTER
Pt's daughter left a form for the doctor to complete and she would like a call when it is complete. Please call her [phone]. I placed it in the doctor's mailbox at the .      Thanks

## 2017-12-21 ENCOUNTER — TELEPHONE (OUTPATIENT)
Dept: CARDIOLOGY CLINIC | Age: 82
End: 2017-12-21

## 2017-12-29 ENCOUNTER — HOSPITAL ENCOUNTER (OUTPATIENT)
Dept: MAMMOGRAPHY | Age: 82
Discharge: HOME | End: 2017-12-29
Attending: INTERNAL MEDICINE
Payer: MEDICARE

## 2017-12-29 DIAGNOSIS — Z12.31 ENCOUNTER FOR SCREENING MAMMOGRAM FOR BREAST CANCER: ICD-10-CM

## 2017-12-29 PROCEDURE — 77067 SCR MAMMO BI INCL CAD: CPT

## 2018-01-02 ENCOUNTER — TELEPHONE ANTICOAG (OUTPATIENT)
Dept: CARDIOLOGY CLINIC | Age: 83
End: 2018-01-02

## 2018-01-02 NOTE — PROGRESS NOTES
Coumadin Flowsheet Values Recorded Today: Date: 12/27/17  DX:  Atrial Fibrillation  Dosage: 5  Sunday: 2.5  Monday: 2.5  Tuesday: 5  Wednesday: 2.5  Thursday: 2.5  Friday: 2.5  Saturday: 5  INR results: 2.6  INR Results Ranges: 2-3  Bleeding or unusual bruising?: No  Blood in stool or urine?: No  Coughing up blood?: No  Alcohol Use?: No  Any change in medications?: No  Same dose?: Yes

## 2018-01-03 NOTE — TELEPHONE ENCOUNTER
Reached out to daughter to schedule an Assisted living [facility]  / Medicare Wellness appointment.  Daughter schedule an appointment for 01/16 @ 3:30

## 2018-01-05 ENCOUNTER — TELEPHONE (OUTPATIENT)
Dept: CARDIOLOGY CLINIC | Age: 83
End: 2018-01-05

## 2018-01-08 ENCOUNTER — HOSPITAL ENCOUNTER (OUTPATIENT)
Dept: LAB | Age: 83
Discharge: HOME | End: 2018-01-08
Payer: MEDICARE

## 2018-01-08 ENCOUNTER — OFFICE VISIT (OUTPATIENT)
Dept: INTERNAL MEDICINE CLINIC | Age: 83
End: 2018-01-08

## 2018-01-08 VITALS
TEMPERATURE: 97.9 F | WEIGHT: 162.5 LBS | SYSTOLIC BLOOD PRESSURE: 123 MMHG | HEART RATE: 89 BPM | HEIGHT: 65 IN | OXYGEN SATURATION: 96 % | DIASTOLIC BLOOD PRESSURE: 67 MMHG | BODY MASS INDEX: 27.07 KG/M2 | RESPIRATION RATE: 18 BRPM

## 2018-01-08 DIAGNOSIS — Z11.1 SCREENING-PULMONARY TB: ICD-10-CM

## 2018-01-08 DIAGNOSIS — N18.30 CKD (CHRONIC KIDNEY DISEASE) STAGE 3, GFR 30-59 ML/MIN (HCC): ICD-10-CM

## 2018-01-08 DIAGNOSIS — I42.0 DILATED CARDIOMYOPATHY (HCC): ICD-10-CM

## 2018-01-08 DIAGNOSIS — E03.4 HYPOTHYROIDISM DUE TO ACQUIRED ATROPHY OF THYROID: ICD-10-CM

## 2018-01-08 DIAGNOSIS — J98.4 RESTRICTIVE LUNG DISEASE: ICD-10-CM

## 2018-01-08 DIAGNOSIS — Z00.00 PREVENTATIVE HEALTH CARE: Primary | ICD-10-CM

## 2018-01-08 PROBLEM — F33.9 RECURRENT DEPRESSION (HCC): Status: ACTIVE | Noted: 2018-01-08

## 2018-01-08 PROCEDURE — 85025 COMPLETE CBC W/AUTO DIFF WBC: CPT

## 2018-01-08 PROCEDURE — 84443 ASSAY THYROID STIM HORMONE: CPT

## 2018-01-08 PROCEDURE — 81001 URINALYSIS AUTO W/SCOPE: CPT

## 2018-01-08 PROCEDURE — 80048 BASIC METABOLIC PNL TOTAL CA: CPT

## 2018-01-08 PROCEDURE — 86480 TB TEST CELL IMMUN MEASURE: CPT

## 2018-01-08 RX ORDER — WARFARIN SODIUM 5 MG/1
TABLET ORAL
Qty: 90 TAB | Refills: 0
Start: 2018-01-08 | End: 2018-02-16 | Stop reason: SDUPTHER

## 2018-01-08 NOTE — PROGRESS NOTES
HISTORY OF PRESENT ILLNESS  Joseline Pugh is a 80 y.o. female. HPI  Here for pre admit evaluation - moving to [facility]. Independent apt living. Patient Active Problem List   Diagnosis Code    Mixed hyperlipidemia E78.2    Atrial fibrillation (HCC) I48.91    Mitral regurgitation I34.0    CHB (complete heart block) (HCC) I44.2    Orthostatic hypotension I95.1    Chest pain, unspecified R07.9    Pulmonary hypertension I27.20    Anxiety and depression F41.8    Colon cancer ([facility] [address] C18.9    Hypothyroid E03.9    CKD (chronic kidney disease) stage 3, GFR 30-59 ml/min N18.3    Hypothyroidism due to acquired atrophy of thyroid E03.4    Osteopenia M85.80    Restrictive lung disease J98.4    Chest pain R07.9    Abnormal stress test R94.39    Ischemic cardiomyopathy I25.5    Cardiomyopathy (HCC) I42.9    Psoriasis L40.9    Advanced care planning/counseling discussion Z71.89    Pacemaker Z95.0    Recurrent depression (HCC) F33.9     Past Surgical History:   Procedure Laterality Date    BREAST SURGERY PROCEDURE UNLISTED      benign breast tumor excision right breast    HX BREAST BIOPSY Right 2002    neg; surgical bx    HX COLECTOMY  1974    colon    HX KNEE REPLACEMENT      right TKA    HX PACEMAKER      HX TUBAL LIGATION      TOTAL KNEE ARTHROPLASTY      total on R, partial on L     Social History     Social History    Marital status:      Spouse name: N/A    Number of children: N/A    Years of education: N/A     Occupational History    Not on file.      Social History Main Topics    Smoking status: Passive Smoke Exposure - Never Smoker    Smokeless tobacco: Never Used    Alcohol use No    Drug use: No    Sexual activity: No     Other Topics Concern    Not on file     Social History Narrative     Family History   Problem Relation Age of Onset    Diabetes Mother     Heart Disease Mother     Heart Attack Mother     Heart Disease Father     Stroke Sister     Breast Cancer Sister 80    Cancer Maternal Aunt      uterus    Diabetes Maternal Uncle     Breast Cancer Daughter 61     Current Outpatient Prescriptions   Medication Sig    COUMADIN 5 mg tablet TAKE 1 TABLET BY MOUTH DAILY AND 1/2 TABLET ON SUNDAY    baclofen (LIORESAL) 10 mg tablet Take 5 mg by mouth two (2) times a day.  DENTA 5000 PLUS 1.1 % crea Apply 1 mg to affected area as needed (to prevent cavities).  furosemide (LASIX) 40 mg tablet Take 1 Tab by mouth daily.  raNITIdine (ZANTAC) 150 mg tablet Take 1 Tab by mouth two (2) times a day.  SYNTHROID 112 mcg tablet Take 1 Tab by mouth Daily (before breakfast).  simvastatin (ZOCOR) 20 mg tablet TAKE 1 TABLET BY MOUTH NIGHTLY    carvedilol (COREG) 3.125 mg tablet Take 1 Tab by mouth two (2) times daily (with meals).  sertraline (ZOLOFT) 50 mg tablet Take 1 Tab by mouth daily.  desonide (TRIDESILON) 0.05 % cream Apply  to affected area two (2) times a day.  multivitamin (ONE A DAY) tablet Take 1 Tab by mouth daily.  Calcium-Cholecalciferol, D3, 600 mg(1,500mg) -400 unit cap Take  by mouth two (2) times a day.  OXYGEN-AIR DELIVERY SYSTEMS 2 L by Does Not Apply route nightly.  ADVAIR DISKUS 250-50 mcg/dose diskus inhaler Take 1 Puff by inhalation two (2) times a day.  acetaminophen (TYLENOL) 500 mg tablet Take 650 mg by mouth every six (6) hours as needed for Pain. (2) Tablet in am (1) tablet in pm (2) Tablet pm    tiotropium (SPIRIVA WITH HANDIHALER) 18 mcg inhalation capsule Take 1 Cap by inhalation daily. No current facility-administered medications for this visit.       Allergies   Allergen Reactions    Cardizem [Diltiazem Hcl] Hives    Codeine Nausea and Vomiting    Darvocet A500 [Propoxyphene N-Acetaminophen] Nausea and Vomiting    Diltiazem Hives    Labetalol Nausea and Vomiting     Dizzy/Disoriented     Pcn [Penicillins] Swelling     Tongue Swelling     Primidone Other (comments)     Lightheaded/unsteady gait    Sulfa (Sulfonamide Antibiotics) Nausea and Vomiting     Immunization History   Administered Date(s) Administered    Influenza High Dose Vaccine PF 10/01/2014, 09/24/2015, 09/29/2016, 09/21/2017    Influenza Vaccine 12/01/2013    Influenza Vaccine Split 10/12/2012    Pneumococcal Conjugate (PCV-13) 11/23/2015    Pneumococcal Polysaccharide (PPSV-23) 02/27/2015    Pneumococcal Vaccine (Pcv) 12/15/2010    TB Skin Test (PPD) 01/01/2001    TD Vaccine 04/02/2012    Varicella Virus Vaccine Live 09/12/2012    Zoster Vaccine, Live 12/01/2013         Review of Systems   Constitutional: Negative for chills, fever, malaise/fatigue and weight loss. HENT: Negative. Negative for sore throat. Eyes: Negative for blurred vision and pain. Respiratory: Negative for cough, shortness of breath and wheezing. Cardiovascular: Negative for chest pain, palpitations and leg swelling. Gastrointestinal: Negative for abdominal pain, blood in stool, constipation, diarrhea, heartburn, nausea and vomiting. Genitourinary: Negative for dysuria, frequency and urgency. Urine has been darker. Occasional incontinence      Musculoskeletal: Positive for back pain (but much improved after steroid injection by pain manager). Negative for falls and joint pain. Skin: Negative for itching and rash. Neurological: Negative for dizziness and headaches. Endo/Heme/Allergies: Negative for environmental allergies. Does not bruise/bleed easily. Psychiatric/Behavioral: Negative for depression and substance abuse. The patient is not nervous/anxious and does not have insomnia. Physical Exam   Constitutional: She appears well-developed and well-nourished. Non-toxic appearance. She does not have a sickly appearance. She does not appear ill. No distress.    /67 (BP 1 Location: Left arm, BP Patient Position: Sitting)  Pulse 89  Temp 97.9 °F (36.6 °C)  Resp 18  Ht 5' 5\" (1.651 m)  Wt 162 lb 8 oz (73.7 kg)  LMP 02/26/1970  SpO2 96%  BMI 27.04 kg/m2Body mass index is 27.04 kg/(m^2). HENT:   Mouth/Throat: Oropharynx is clear and moist.   Neck: No JVD present. Carotid bruit is not present. No thyromegaly present. Cardiovascular: Normal rate, regular rhythm, normal heart sounds and intact distal pulses. Pulses:       Posterior tibial pulses are 1+ on the right side, and 1+ on the left side. Pulmonary/Chest: Effort normal and breath sounds normal.   Abdominal: Soft. Bowel sounds are normal. She exhibits no distension. There is no tenderness. Musculoskeletal: She exhibits no edema. She ambulates with a rolator walker. Neurological: She is alert. Skin: Skin is warm and dry. She is not diaphoretic. Psychiatric: She has a normal mood and affect. Her behavior is normal. Thought content normal.   Nursing note and vitals reviewed. ASSESSMENT and PLAN  Diagnoses and all orders for this visit:    1. Preventative health care  -     URINALYSIS W/ RFLX MICROSCOPIC  -     CBC WITH AUTOMATED DIFF  -     METABOLIC PANEL, BASIC  -     QUANTIFERON TB GOLD    2. Dilated cardiomyopathy ([address]    3. CKD (chronic kidney disease) stage 3, GFR 30-59 ml/min    4. Hypothyroidism due to acquired atrophy of thyroid  -     TSH 3RD GENERATION    5. Restrictive lung disease    6. Screening-pulmonary TB  -     QUANTIFERON TB GOLD    Other orders  -     COUMADIN 5 mg tablet; TAKE 1 TABLET BY MOUTH DAILY AND 1/2 TABLET ON SUNDAY  Results and note to [employer].     Follow-up Disposition: Not on File

## 2018-01-08 NOTE — MR AVS SNAPSHOT
Visit Information Date & Time Provider Department Dept. Phone Encounter #  
 1/8/2018  9:00 AM Елена Rosas MD [facility] [address] [facility] [id number] Your Appointments 2/15/2018  9:20 AM  
ESTABLISHED PATIENT with Krys Linn MD  
[facility] ([address]) Appt Note: med interrogation/lin/Laurent 1st b 11-2-17  (full ck 7-10-17 at [facility]-single lead) [address] [facility] [facility], [facility] [address] [facility] [address]  
  
    
 2/15/2018  9:45 AM  
PACEMAKER with Leonora Krishnan [facility] (FARHAD SCHEDULING) Appt Note: med interrogation/lin/Laurent 1st b 11-2-17  (full ck 7-10-17 at [facility]-single lead) [address], [facility] [address] [facility] 7 [address] Upcoming Health Maintenance Date Due DTaP/Tdap/Td series (1 - Tdap) 4/3/2012 MEDICARE YEARLY EXAM 5/12/2018 GLAUCOMA SCREENING Q2Y 12/8/2018 Allergies as of 1/8/2018  Review Complete On: 1/8/2018 By: Елена Rosas MD  
  
 Severity Noted Reaction Type Reactions Cardizem [Diltiazem Hcl]  10/04/2010    Hives Codeine  10/04/2010    Nausea and Vomiting Darvocet A500 [Propoxyphene N-acetaminophen]  10/04/2010    Nausea and Vomiting Diltiazem  10/04/2010    Hives Labetalol  04/25/2012    Nausea and Vomiting Dizzy/Disoriented Pcn [Penicillins]  10/04/2010    Swelling Tongue Swelling Primidone  07/31/2012    Other (comments) Lightheaded/unsteady gait Sulfa (Sulfonamide Antibiotics)  10/04/2010    Nausea and Vomiting Current Immunizations  Reviewed on 11/23/2015 Name Date Influenza High Dose Vaccine PF 9/21/2017, 9/29/2016, 9/24/2015, 10/1/2014 Influenza Vaccine 12/1/2013 Influenza Vaccine Split 10/12/2012 Pneumococcal Conjugate (PCV-13) 11/23/2015 Pneumococcal Polysaccharide (PPSV-23) 2/27/2015 Pneumococcal Vaccine (Pcv) 12/15/2010 TB Skin Test (PPD) 1/1/2001 TD Vaccine 4/2/2012 Varicella Virus Vaccine Live 9/12/2012 Zoster Vaccine, Live 12/1/2013 Not reviewed this visit You Were Diagnosed With   
  
 Codes Comments Preventative health care    -  Primary ICD-10-CM: Z00.00 ICD-9-CM: V70.0 Dilated cardiomyopathy ([facility] [address]     ICD-10-CM: I42.0 ICD-9-CM: 425.4 CKD (chronic kidney disease) stage 3, GFR 30-59 ml/min     ICD-10-CM: N18.3 ICD-9-CM: 469. 3 Hypothyroidism due to acquired atrophy of thyroid     ICD-10-CM: E03.4 ICD-9-CM: 244.8, 246.8 Restrictive lung disease     ICD-10-CM: J98.4 ICD-9-CM: 518.89 Screening-pulmonary TB     ICD-10-CM: Z11.1 ICD-9-CM: V74.1 Vitals BP Pulse Temp Resp Height(growth percentile) Weight(growth percentile) 123/67 (BP 1 Location: Left arm, BP Patient Position: Sitting) 89 97.9 °F (36.6 °C) 18 5' 5\" (1.651 m) 162 lb 8 oz (73.7 kg) LMP SpO2 BMI OB Status Smoking Status 02/26/1970 96% 27.04 kg/m2 Postmenopausal Passive Smoke Exposure - Never Smoker Vitals History BMI and BSA Data Body Mass Index Body Surface Area  
 27.04 kg/m 2 1.84 m 2 Preferred Pharmacy Pharmacy Name Phone [facility]/PHARMACY #[id number]- [place], [address] [place] [address]) [phone] Your Updated Medication List  
  
   
This list is accurate as of: 1/8/18  9:39 AM.  Always use your most recent med list.  
  
  
  
  
 acetaminophen 500 mg tablet Commonly known as:  TYLENOL Take 650 mg by mouth every six (6) hours as needed for Pain. (2) Tablet in am (1) tablet in pm (2) Tablet pm  
  
 ADVAIR DISKUS 250-50 mcg/dose diskus inhaler Generic drug:  fluticasone-salmeterol Take 1 Puff by inhalation two (2) times a day. baclofen 10 mg tablet Commonly known as:  LIORESAL Take 5 mg by mouth two (2) times a day. Calcium-Cholecalciferol (D3) 600 mg(1,500mg) -400 unit Cap Take  by mouth two (2) times a day. carvedilol 3.125 mg tablet Commonly known as:  Delphina [place] Take 1 Tab by mouth two (2) times daily (with meals). COUMADIN 5 mg tablet Generic drug:  warfarin TAKE 1 TABLET BY MOUTH DAILY AND 1/2 TABLET ON SUNDAY DENTA 5000 PLUS 1.1 % Crea Generic drug:  fluoride (sodium) Apply 1 mg to affected area as needed (to prevent cavities). desonide 0.05 % cream  
Commonly known as:  Erendira Mc Apply  to affected area two (2) times a day. furosemide 40 mg tablet Commonly known as:  LASIX Take 1 Tab by mouth daily. multivitamin tablet Commonly known as:  ONE A DAY Take 1 Tab by mouth daily. OXYGEN-AIR DELIVERY SYSTEMS  
2 L by Does Not Apply route nightly. raNITIdine 150 mg tablet Commonly known as:  ZANTAC Take 1 Tab by mouth two (2) times a day. sertraline 50 mg tablet Commonly known as:  ZOLOFT Take 1 Tab by mouth daily. simvastatin 20 mg tablet Commonly known as:  ZOCOR  
TAKE 1 TABLET BY MOUTH NIGHTLY SPIRIVA WITH HANDIHALER 18 mcg inhalation capsule Generic drug:  tiotropium Take 1 Cap by inhalation daily. SYNTHROID 112 mcg tablet Generic drug:  levothyroxine Take 1 Tab by mouth Daily (before breakfast). We Performed the Following CBC WITH AUTOMATED DIFF [51353 CPT(R)] METABOLIC PANEL, BASIC [19994 CPT(R)] QUANTIFERON TB GOLD [VYL80749 Custom] TSH 3RD GENERATION [28842 CPT(R)] URINALYSIS W/ RFLX MICROSCOPIC [39574 CPT(R)] Introducing [facility] & HEALTH SERVICES! Dear [place]: 
Thank you for requesting a Zase account. Our records indicate that you already have an active Zase account. You can access your account anytime at [email or web address] Kidz Did you know that you can access your hospital and ER discharge instructions at any time in TheShoppingPro? You can also review all of your test results from your hospital stay or ER visit. Additional Information If you have questions, please visit the Frequently Asked Questions section of the TheShoppingPro website at [email or web address] Remember, TheShoppingPro is NOT to be used for urgent needs. For medical emergencies, dial 911. Now available from your iPhone and Android! Please provide this summary of care documentation to your next provider. Your primary care clinician is listed as Marleen Miller. If you have any questions after today's visit, please call [phone].

## 2018-01-09 ENCOUNTER — TELEPHONE (OUTPATIENT)
Dept: INTERNAL MEDICINE CLINIC | Age: 83
End: 2018-01-09

## 2018-01-09 NOTE — TELEPHONE ENCOUNTER
Pt's daughter was wondering if PCP completed assisted living paperwork and she would like to  form when done so she can take it directly to [facility].

## 2018-01-09 NOTE — TELEPHONE ENCOUNTER
Pt's daughter would like a call when paperwork for assistant living is ready for the pt. She would like to come pick it up.   Alla Pizarro [phone]

## 2018-01-11 ENCOUNTER — TELEPHONE (OUTPATIENT)
Dept: INTERNAL MEDICINE CLINIC | Age: 83
End: 2018-01-11

## 2018-01-11 ENCOUNTER — TELEPHONE (OUTPATIENT)
Dept: CARDIOLOGY CLINIC | Age: 83
End: 2018-01-11

## 2018-01-11 DIAGNOSIS — R76.12 (QFT) QUANTIFERON-TB TEST REACTION WITHOUT ACTIVE TUBERCULOSIS: Primary | ICD-10-CM

## 2018-01-11 LAB
ANNOTATION COMMENT IMP: ABNORMAL
APPEARANCE UR: CLEAR
BACTERIA #/AREA URNS HPF: ABNORMAL /[HPF]
BASOPHILS # BLD AUTO: 0 X10E3/UL (ref 0–0.2)
BASOPHILS NFR BLD AUTO: 0 %
BILIRUB UR QL STRIP: NEGATIVE
BUN SERPL-MCNC: 32 MG/DL (ref 8–27)
BUN/CREAT SERPL: 28 (ref 12–28)
CALCIUM SERPL-MCNC: 10.4 MG/DL (ref 8.7–10.3)
CASTS URNS MICRO: ABNORMAL
CASTS URNS QL MICRO: PRESENT /LPF
CHLORIDE SERPL-SCNC: 99 MMOL/L (ref 96–106)
CO2 SERPL-SCNC: 27 MMOL/L (ref 18–29)
COLOR UR: YELLOW
CREAT SERPL-MCNC: 1.14 MG/DL (ref 0.57–1)
EOSINOPHIL # BLD AUTO: 0.1 X10E3/UL (ref 0–0.4)
EOSINOPHIL NFR BLD AUTO: 2 %
EPI CELLS #/AREA URNS HPF: ABNORMAL /HPF
ERYTHROCYTE [DISTWIDTH] IN BLOOD BY AUTOMATED COUNT: 13.6 % (ref 12.3–15.4)
GAMMA INTERFERON BACKGROUND BLD IA-ACNC: 0.05 IU/ML
GLUCOSE SERPL-MCNC: 118 MG/DL (ref 65–99)
GLUCOSE UR QL: NEGATIVE
HCT VFR BLD AUTO: 41.4 % (ref 34–46.6)
HGB BLD-MCNC: 13.3 G/DL (ref 11.1–15.9)
HGB UR QL STRIP: NEGATIVE
IMM GRANULOCYTES # BLD: 0 X10E3/UL (ref 0–0.1)
IMM GRANULOCYTES NFR BLD: 0 %
INTERPRETATION: NORMAL
KETONES UR QL STRIP: NEGATIVE
LEUKOCYTE ESTERASE UR QL STRIP: ABNORMAL
LYMPHOCYTES # BLD AUTO: 1.1 X10E3/UL (ref 0.7–3.1)
LYMPHOCYTES NFR BLD AUTO: 20 %
M TB IFN-G BLD-IMP: ABNORMAL
M TB IFN-G CD4+ BCKGRND COR BLD-ACNC: 0.81 IU/ML
M TB IFN-G CD4+ T-CELLS BLD-ACNC: 0.86 IU/ML
MCH RBC QN AUTO: 29.5 PG (ref 26.6–33)
MCHC RBC AUTO-ENTMCNC: 32.1 G/DL (ref 31.5–35.7)
MCV RBC AUTO: 92 FL (ref 79–97)
MICRO URNS: ABNORMAL
MITOGEN IGNF BLD-ACNC: 10 IU/ML
MONOCYTES # BLD AUTO: 0.6 X10E3/UL (ref 0.1–0.9)
MONOCYTES NFR BLD AUTO: 10 %
MUCOUS THREADS URNS QL MICRO: PRESENT
NEUTROPHILS # BLD AUTO: 3.8 X10E3/UL (ref 1.4–7)
NEUTROPHILS NFR BLD AUTO: 68 %
NITRITE UR QL STRIP: NEGATIVE
PH UR STRIP: 5 [PH] (ref 5–7.5)
PLATELET # BLD AUTO: 172 X10E3/UL (ref 150–379)
POTASSIUM SERPL-SCNC: 4.3 MMOL/L (ref 3.5–5.2)
PROT UR QL STRIP: NEGATIVE
QUANTIFERON INCUBATION: NORMAL
RBC # BLD AUTO: 4.51 X10E6/UL (ref 3.77–5.28)
RBC #/AREA URNS HPF: ABNORMAL /HPF
SERVICE CMNT-IMP: ABNORMAL
SODIUM SERPL-SCNC: 142 MMOL/L (ref 134–144)
SP GR UR: 1.01 (ref 1–1.03)
TSH SERPL DL<=0.005 MIU/L-ACNC: 1.96 UIU/ML (ref 0.45–4.5)
UROBILINOGEN UR STRIP-MCNC: 0.2 MG/DL (ref 0.2–1)
WBC # BLD AUTO: 5.6 X10E3/UL (ref 3.4–10.8)
WBC #/AREA URNS HPF: ABNORMAL /HPF

## 2018-01-11 NOTE — PROGRESS NOTES
Concern for false pos result given low risk. Would repeat quant gold and PPD next week to confirm or rule out.

## 2018-01-11 NOTE — PROGRESS NOTES
I have attempted without success to contact this patient's daughter by phone to discuss lab results and I left a message on answering machine.

## 2018-01-11 NOTE — TELEPHONE ENCOUNTER
The Daughter Martha López needs to speak with you regarding her mom lab results she does not under stand them her no is [phone]

## 2018-01-12 NOTE — TELEPHONE ENCOUNTER
Informed Miriam King that per Dr. Barbara Lindsay, If repeat test is positive, we will get chest X-ray. She would like to get the patient in ASAP. I will call her if something opens up.

## 2018-01-12 NOTE — TELEPHONE ENCOUNTER
Pt's daughter called and states they looked at the [facility] website and realize that pt needs a chest x-ray to rule it out. They want a chest xray ordered and done today because pt is now worried that she won't be able to move. Carlitos Vilchis is also requesting the paperwork now as well.   Call messi at [phone]

## 2018-01-12 NOTE — TELEPHONE ENCOUNTER
----- Message from Elpidio Llanos sent at 1/11/2018  5:27 PM EST -----  Regarding: Dr Stinson/telephone  Pt's daughter [phone], f/up on message left earlier  At 3:30pm and 4:45pm for  a return call back today , and so far no one has return her call about some test that was done., She would like a call back tomorrow morning.

## 2018-01-16 ENCOUNTER — HOSPITAL ENCOUNTER (OUTPATIENT)
Dept: LAB | Age: 83
Discharge: HOME | End: 2018-01-16
Payer: MEDICARE

## 2018-01-16 ENCOUNTER — OFFICE VISIT (OUTPATIENT)
Dept: INTERNAL MEDICINE CLINIC | Age: 83
End: 2018-01-16

## 2018-01-16 VITALS
OXYGEN SATURATION: 95 % | HEART RATE: 88 BPM | WEIGHT: 165.13 LBS | BODY MASS INDEX: 27.51 KG/M2 | RESPIRATION RATE: 18 BRPM | TEMPERATURE: 97.6 F | SYSTOLIC BLOOD PRESSURE: 109 MMHG | DIASTOLIC BLOOD PRESSURE: 70 MMHG | HEIGHT: 65 IN

## 2018-01-16 DIAGNOSIS — Z11.1 SCREENING-PULMONARY TB: Primary | ICD-10-CM

## 2018-01-16 PROCEDURE — 86480 TB TEST CELL IMMUN MEASURE: CPT

## 2018-01-16 NOTE — PROGRESS NOTES
Tracee Arnold presents for screening for latent tuberculosis. she denies exposures to TB including prolonged work or visits in hospitals, homeless shelters, prisons or third world countries. she has not been around anyone he knows who has TB.  she denies symptoms of weight loss, cough, hemoptysis, fever, chills, or drenching sweats. she has had PPD skin testing in past.  (if so, results have been Positive - but she was not treated in 1999? )  She had low pos quantiferon gold test recently and returns for recheck to exclude false pos. Component Results      Component Value Flag Ref Range Units Status     QuantiFERON TB Gold  (A) Negative  Final     Positive   Note: [facility] considers any TB antigen minus Nil value between 0.35        Comment:     and 2.0 to be low positive result. The CDC has stated the following \"In healthy persons who have a low   likelihood both of M tuberculosis infection and of progression to   active tuberculosis if infected, a single positive Interferon gamma   release assay (IGRA) or tuberculin skin test (TST) result should not   be taken as reliable evidence of M tuberculosis infection. Because of   the low probability of infection, a false-positive is more likely. In   such situations the likelihood of M tuberculosis infection and of   disease progression should be reassessed, and the initial test   results should be confirmed (on a new specimen). Repeat testing with   either the initial test or a different test, may be considered on a   case-by-case basis. For such persons, an alternative is to assume,   without additional testing, that the initial result is a false   positive. \" (MMWR, 2010;59(LN-22) p12). Diagnoses and all orders for this visit:    1. Screening-pulmonary TB  -     AMB POC TUBERCULOSIS, INTRADERMAL (SKIN TEST)  -     QUANTIFERON TB GOLD        Follow-up Disposition:  Return in about 2 days (around 1/18/2018).

## 2018-01-16 NOTE — MR AVS SNAPSHOT
[address] [facility], [place] 
 
 
 [address] [place] [address] [facility] 
[phone] Patient: Yon Riojas MRN:  [id number] Visit Information Date & Time Provider Department Dept. Phone Encounter #  
 1/16/2018  9:00 AM Hao Stokes MD [facility] [address] [id number] Your Appointments 2/15/2018  9:20 AM  
ESTABLISHED PATIENT with Pedrito Amos MD  
[facility] Appt Note: med interrogation/lin/Laurent 1st b 11-2-17  (full ck 7-10-17 at [facility]-single lead) [address] [place] [address]  
[address] [place] [address]  
  
    
 2/15/2018  9:45 AM  
PACEMAKER with Carroll Gaytan [facility] (FARHAD SCHEDULING) Appt Note: med interrogation/rc/Laurent 1st b 11-2-17  (full ck 7-10-17 at [facility]-single lead) [address]  
[address] [address] Upcoming Health Maintenance Date Due DTaP/Tdap/Td series (1 - Tdap) 4/3/2012 MEDICARE YEARLY EXAM 5/12/2018 GLAUCOMA SCREENING Q2Y 12/8/2018 Allergies as of 1/16/2018  Review Complete On: 1/8/2018 By: Hao Stokes MD  
  
 Severity Noted Reaction Type Reactions Cardizem [Diltiazem Hcl]  10/04/2010    Hives Codeine  10/04/2010    Nausea and Vomiting Darvocet A500 [Propoxyphene N-acetaminophen]  10/04/2010    Nausea and Vomiting Diltiazem  10/04/2010    Hives Labetalol  04/25/2012    Nausea and Vomiting Dizzy/Disoriented Pcn [Penicillins]  10/04/2010    Swelling Tongue Swelling Primidone  07/31/2012    Other (comments) Lightheaded/unsteady gait Sulfa (Sulfonamide Antibiotics)  10/04/2010    Nausea and Vomiting Current Immunizations  Reviewed on 11/23/2015 Name Date Influenza High Dose Vaccine PF 9/21/2017, 9/29/2016, 9/24/2015, 10/1/2014 Influenza Vaccine 12/1/2013 Influenza Vaccine Split 10/12/2012 Pneumococcal Conjugate (PCV-13) 11/23/2015 Pneumococcal Polysaccharide (PPSV-23) 2/27/2015 Pneumococcal Vaccine (Pcv) 12/15/2010 TB Skin Test (PPD) 1/1/2001 TB Skin Test (PPD) Intradermal  Incomplete TD Vaccine 4/2/2012 Varicella Virus Vaccine Live 9/12/2012 Zoster Vaccine, Live 12/1/2013 Not reviewed this visit You Were Diagnosed With   
  
 Codes Comments Screening-pulmonary TB    -  Primary ICD-10-CM: Z11.1 ICD-9-CM: V74.1 Vitals BP Pulse Temp Resp Height(growth percentile) Weight(growth percentile) 109/70 (BP 1 Location: Left arm, BP Patient Position: Sitting) 88 97.6 °F (36.4 °C) (Oral) 18 5' 5\" (1.651 m) 165 lb 2 oz (74.9 kg) LMP SpO2 BMI OB Status Smoking Status 02/26/1970 95% 27.48 kg/m2 Postmenopausal Passive Smoke Exposure - Never Smoker Vitals History BMI and BSA Data Body Mass Index Body Surface Area  
 27.48 kg/m 2 1.85 m 2 Preferred Pharmacy Pharmacy Name Phone [facility]/PHARMACY #[id number]- [place], [address] ([place] [address]) [phone] Your Updated Medication List  
  
   
This list is accurate as of: 1/16/18  9:16 AM.  Always use your most recent med list.  
  
  
  
  
 acetaminophen 500 mg tablet Commonly known as:  TYLENOL Take 650 mg by mouth every six (6) hours as needed for Pain. (2) Tablet in am (1) tablet in pm (2) Tablet pm  
  
 ADVAIR DISKUS 250-50 mcg/dose diskus inhaler Generic drug:  fluticasone-salmeterol Take 1 Puff by inhalation two (2) times a day. baclofen 10 mg tablet Commonly known as:  LIORESAL Take 5 mg by mouth two (2) times a day. Calcium-Cholecalciferol (D3) 600 mg(1,500mg) -400 unit Cap Take  by mouth two (2) times a day. carvedilol 3.125 mg tablet Commonly known as:  Zulema Rami[place] Take 1 Tab by mouth two (2) times daily (with meals). COUMADIN 5 mg tablet Generic drug:  warfarin TAKE 1 TABLET BY MOUTH DAILY AND 1/2 TABLET ON SUNDAY DENTA 5000 PLUS 1.1 % Crea Generic drug:  fluoride (sodium) Apply 1 mg to affected area as needed (to prevent cavities). desonide 0.05 % cream  
Commonly known as:  Ora Allegra Apply  to affected area two (2) times a day. furosemide 40 mg tablet Commonly known as:  LASIX Take 1 Tab by mouth daily. multivitamin tablet Commonly known as:  ONE A DAY Take 1 Tab by mouth daily. OXYGEN-AIR DELIVERY SYSTEMS  
2 L by Does Not Apply route nightly. raNITIdine 150 mg tablet Commonly known as:  ZANTAC Take 1 Tab by mouth two (2) times a day. sertraline 50 mg tablet Commonly known as:  ZOLOFT Take 1 Tab by mouth daily. simvastatin 20 mg tablet Commonly known as:  ZOCOR  
TAKE 1 TABLET BY MOUTH NIGHTLY SPIRIVA WITH HANDIHALER 18 mcg inhalation capsule Generic drug:  tiotropium Take 1 Cap by inhalation daily. SYNTHROID 112 mcg tablet Generic drug:  levothyroxine Take 1 Tab by mouth Daily (before breakfast). We Performed the Following AMB POC TUBERCULOSIS, INTRADERMAL (SKIN TEST) [96191 CPT(R)] QUANTIFERON TB GOLD [[id number] Custom] Introducing [facility]! Dear [place]: 
Thank you for requesting a Krave-N account. Our records indicate that you already have an active Krave-N account. You can access your account anytime at [email or web address] Did you know that you can access your hospital and ER discharge instructions at any time in Krave-N? You can also review all of your test results from your hospital stay or ER visit. Additional Information If you have questions, please visit the Frequently Asked Questions section of the Krave-N website at [email or web address] Remember, MyChart is NOT to be used for urgent needs. For medical emergencies, dial 911. Now available from your iPhone and Android! Please provide this summary of care documentation to your next provider. Your primary care clinician is listed as Santy Collier. If you have any questions after today's visit, please call [phone].

## 2018-01-18 ENCOUNTER — OFFICE VISIT (OUTPATIENT)
Dept: INTERNAL MEDICINE CLINIC | Age: 83
End: 2018-01-18

## 2018-01-18 VITALS
HEIGHT: 65 IN | HEART RATE: 92 BPM | SYSTOLIC BLOOD PRESSURE: 139 MMHG | OXYGEN SATURATION: 98 % | BODY MASS INDEX: 27.86 KG/M2 | TEMPERATURE: 97.7 F | WEIGHT: 167.25 LBS | RESPIRATION RATE: 18 BRPM | DIASTOLIC BLOOD PRESSURE: 82 MMHG

## 2018-01-18 DIAGNOSIS — Z11.1 SCREENING-PULMONARY TB: Primary | ICD-10-CM

## 2018-01-18 LAB
MM INDURATION POC: 0 MM (ref 0–5)
PPD POC: NEGATIVE NEGATIVE

## 2018-01-18 NOTE — PROGRESS NOTES
Ppd follow up. R forearm. 0 induration palpable. quantiferon gold test repeat is pending still. Diagnoses and all orders for this visit:    1. Screening-pulmonary TB    Over 50% of the 10 minutes face to face with Puneet Andrews consisted of counseling and/or discussing treatment plans in reference to her TB screening results/ plans.

## 2018-01-19 ENCOUNTER — TELEPHONE (OUTPATIENT)
Dept: INTERNAL MEDICINE CLINIC | Age: 83
End: 2018-01-19

## 2018-01-19 ENCOUNTER — TELEPHONE (OUTPATIENT)
Dept: CARDIOLOGY CLINIC | Age: 83
End: 2018-01-19

## 2018-01-19 LAB
ANNOTATION COMMENT IMP: ABNORMAL
GAMMA INTERFERON BACKGROUND BLD IA-ACNC: 0.04 IU/ML
M TB IFN-G BLD-IMP: ABNORMAL
M TB IFN-G CD4+ BCKGRND COR BLD-ACNC: 1.72 IU/ML
M TB IFN-G CD4+ T-CELLS BLD-ACNC: 1.76 IU/ML
MITOGEN IGNF BLD-ACNC: >10 IU/ML
QUANTIFERON INCUBATION: NORMAL
SERVICE CMNT-IMP: ABNORMAL

## 2018-01-19 NOTE — TELEPHONE ENCOUNTER
Lab results are back and placed on PCP's desk. patients daughter has been waiting in the waiting room for results.

## 2018-01-19 NOTE — TELEPHONE ENCOUNTER
Pt's daughter wants to know if what the results are for the testing done for tb Call Arsenio Acevedo at [phone]

## 2018-01-19 NOTE — TELEPHONE ENCOUNTER
Informed Mrs. Castellanos that result is not back yet. She will be notified as soon as Dr. Kandice Duran informs me of results.

## 2018-01-21 ENCOUNTER — HOSPITAL ENCOUNTER (EMERGENCY)
Age: 83
Discharge: HOME | End: 2018-01-21
Attending: EMERGENCY MEDICINE
Payer: MEDICARE

## 2018-01-21 VITALS
BODY MASS INDEX: 27.82 KG/M2 | HEART RATE: 79 BPM | RESPIRATION RATE: 18 BRPM | SYSTOLIC BLOOD PRESSURE: 151 MMHG | OXYGEN SATURATION: 98 % | WEIGHT: 167 LBS | DIASTOLIC BLOOD PRESSURE: 77 MMHG | TEMPERATURE: 98.5 F | HEIGHT: 65 IN

## 2018-01-21 DIAGNOSIS — L72.9 INFECTED CYST OF SKIN: Primary | ICD-10-CM

## 2018-01-21 DIAGNOSIS — L08.9 INFECTED CYST OF SKIN: Primary | ICD-10-CM

## 2018-01-21 PROCEDURE — 87205 SMEAR GRAM STAIN: CPT | Performed by: EMERGENCY MEDICINE

## 2018-01-21 PROCEDURE — 99282 EMERGENCY DEPT VISIT SF MDM: CPT

## 2018-01-21 PROCEDURE — 87077 CULTURE AEROBIC IDENTIFY: CPT | Performed by: EMERGENCY MEDICINE

## 2018-01-21 RX ORDER — DOXYCYCLINE HYCLATE 100 MG
100 TABLET ORAL 2 TIMES DAILY
Qty: 20 TAB | Refills: 0 | Status: SHIPPED | OUTPATIENT
Start: 2018-01-21 | End: 2018-01-31

## 2018-01-21 NOTE — ED TRIAGE NOTES
Per pt's daughter in law pt has three places on her back that have broken open and have \"pus\" in them

## 2018-01-21 NOTE — ED PROVIDER NOTES
HPI Comments: 80 y.o. female with extensive past medical history, please see list, significant for HTN, COPD, afib, valvular heart disease, colon cancer, ischemic cardiomyopathy, pulmonary HTN, RADHA, hyperlipidemia, thyroid disease, GERD, migraines, and pacemaker who presents to the ED with chief complaint of wound drainage. Pt's daughter reports pt has three wounds on her back with sanguinous and purulent drainage. Pt's daughter states pt has been seen by her PCP for these areas in the past and has an appointment with dermatology on February 7th. Pt states she ambulates with a walker or cane. There are no other acute medical complaints voiced at this time. Social Hx: Never smoker. PCP: Peter rGant MD    Note written by Gladys Pompa, as dictated by Rosa Wilson MD 7:43 AM     The history is provided by the patient and a relative (daughter).         Past Medical History:   Diagnosis Date    Arthritis     Atrial fibrillation ([facility] [address]     av node ablation 5/22/98 with placement of CPI #[id number] dual chamber pacemaker subsequently replaced with a single chamber medtronic #[id number] single chamber pacemaker 7/25/05    Cancer ([facility] [address] 1970's    colon cancer w/ resection    COPD     Depression     GERD (gastroesophageal reflux disease)     Hyperlipidemia     Hypertension     Ischemic cardiomyopathy     Migraine headache     Orthostatic hypotension     RADHA (obstructive sleep apnea)     Pacemaker 5/22/98    AV node ablation /pacemaker placement utilizing CPI # [id number] dual chamber system, medtronic #[id number] single chamber device placed 7/22/05    Pulmonary hypertension 9/26/2011    RVSP 50 on echo 8/14    Thyroid disease     Valvular heart disease     mild-mod MR/TR       Past Surgical History:   Procedure Laterality Date    BREAST SURGERY PROCEDURE UNLISTED      benign breast tumor excision right breast    HX BREAST BIOPSY Right 2002    neg; surgical bx    HX COLECTOMY  1974    colon    HX KNEE REPLACEMENT      right TKA    HX PACEMAKER      HX TUBAL LIGATION      TOTAL KNEE ARTHROPLASTY      total on R, partial on L         Family History:   Problem Relation Age of Onset    Diabetes Mother     Heart Disease Mother     Heart Attack Mother     Heart Disease Father     Stroke Sister     Breast Cancer Sister 80    Cancer Maternal Aunt      uterus    Diabetes Maternal Uncle     Breast Cancer Daughter 61       Social History     Social History    Marital status:      Spouse name: N/A    Number of children: N/A    Years of education: N/A     Occupational History    Not on file. Social History Main Topics    Smoking status: Passive Smoke Exposure - Never Smoker    Smokeless tobacco: Never Used    Alcohol use No    Drug use: No    Sexual activity: No     Other Topics Concern    Not on file     Social History Narrative         ALLERGIES: Cardizem [diltiazem hcl]; Codeine; Darvocet a500 [propoxyphene n-acetaminophen]; Diltiazem; Labetalol; Pcn [penicillins]; Primidone; and Sulfa (sulfonamide antibiotics)    Review of Systems   Constitutional: Negative for chills and fever. HENT: Negative for ear pain and sore throat. Eyes: Negative for photophobia and pain. Respiratory: Negative for chest tightness and shortness of breath. Cardiovascular: Negative for chest pain and leg swelling. Gastrointestinal: Negative for abdominal pain, nausea and vomiting. Genitourinary: Negative for dysuria and flank pain. Musculoskeletal: Negative for back pain and neck pain. Skin: Positive for wound (three wounds on back with drainage). Negative for rash. Neurological: Negative for dizziness, light-headedness and headaches. All other systems reviewed and are negative.       Vitals:    01/21/18 0722   BP: 151/77   Pulse: 79   Resp: 18   Temp: 98.5 °F (36.9 °C)   SpO2: 98%   Weight: 75.8 kg (167 lb)   Height: 5' 5\" (1.651 m)            Physical Exam   Constitutional: She is oriented to person, place, and time. She appears well-developed and well-nourished. HENT:   Head: Normocephalic and atraumatic. Pulmonary/Chest: Effort normal. No respiratory distress. Neurological: She is alert and oriented to person, place, and time. Skin: She is not diaphoretic.        2 area of discoloration with pus oozing from wound, bandages removed to evaluate the lesions  Near right buttock  - small area of ecchymosis, no fluctuance or drainage noted   Psychiatric: She has a normal mood and affect. Nursing note and vitals reviewed. MDM  Number of Diagnoses or Management Options  Infected cyst of skin:   Diagnosis management comments: Patient with what appears to be 2 infected cysts on her back - pus, cystic material and blood expressed from the 2 areas on her back. Will d/c on doxycycline and wound culture sent. Amount and/or Complexity of Data Reviewed  Clinical lab tests: ordered      ED Course       I&D Abcess Simple  Date/Time: 1/21/2018 8:36 AM  Performed by: Jess Fajardo  Authorized by: Jess GOODMAN     Consent:     Consent obtained:  Verbal    Consent given by:  Patient and guardian    Risks discussed:  Bleeding, pain and incomplete drainage    Alternatives discussed: abx only and referral.  Location:     Type:  Abscess    Size:  1cm each    Location: back -both sides. Anesthesia (see MAR for exact dosages): Anesthesia method:  None  Procedure type:     Complexity:  Simple  Procedure details:     Needle aspiration: no      Techniques: expression of material through skin openings. Drainage:  Purulent and bloody    Drainage amount:   Moderate    Wound treatment:  Wound left open    Packing materials:  None

## 2018-01-22 ENCOUNTER — TELEPHONE (OUTPATIENT)
Dept: INTERNAL MEDICINE CLINIC | Age: 83
End: 2018-01-22

## 2018-01-22 DIAGNOSIS — Z22.7 LATENT TUBERCULOSIS BY BLOOD TEST: Primary | ICD-10-CM

## 2018-01-22 NOTE — PROGRESS NOTES
I discussed case with Dr. Livia Vidales. 2 abnormal quantiferon gold tests, negative PPD. He recommends interpreting at positive depite posibility of false positive from other condition. He recommends yearly chest X-ray vs 6 months INH/B6. I discussed all this with daughter Lino Chavira who will review with pt and decide on options and let me know.

## 2018-01-23 ENCOUNTER — TELEPHONE (OUTPATIENT)
Dept: INTERNAL MEDICINE CLINIC | Age: 83
End: 2018-01-23

## 2018-01-23 NOTE — TELEPHONE ENCOUNTER
[facility] , pt's daughter, called to  confirm we received pt's x ray yesterday states that was dropped off. Confirmed that the nurse did receive report. [facility] also states dr Emmie Closs stated pt could take a certain  medication but that would mean her coumadin dose would need to be adjusted so pt declined . States its not worth all the trouble, thinks coumadin more much more important and should remain untouched.

## 2018-01-23 NOTE — TELEPHONE ENCOUNTER
Per patient, she is refusing to take the medication recommended by PCP. She doesn't think she needs to take it at this time and doesn't want any more medications to take. This information will be passed onto PCP for form completion in to assisted living facility.

## 2018-01-23 NOTE — TELEPHONE ENCOUNTER
Ariadne Ida Rizvi was informed that form is completed and waiting for  with additional copy for her record.

## 2018-01-23 NOTE — TELEPHONE ENCOUNTER
Per the Daughter Eliana Cerrato needs to speak with Dr Margarette Mora regarding the forum for her mom to get into to [facility]. You should have them and filled out. The Daughter is so up set about this. They need it today for the her to get into [employer] on Wed 01/24/2018.  Daughter wants to be called this melinda blum at [phone]

## 2018-01-24 ENCOUNTER — TELEPHONE (OUTPATIENT)
Dept: CARDIOLOGY CLINIC | Age: 83
End: 2018-01-24

## 2018-01-24 NOTE — ED NOTES
Spoke to patient's daughter with patient in the car, discussed culture results.   No change in management at this time suggested

## 2018-01-25 LAB
BACTERIA SPEC CULT: ABNORMAL
GRAM STN SPEC: ABNORMAL
GRAM STN SPEC: ABNORMAL
SERVICE CMNT-IMP: ABNORMAL

## 2018-02-02 ENCOUNTER — TELEPHONE (OUTPATIENT)
Dept: CARDIOLOGY CLINIC | Age: 83
End: 2018-02-02

## 2018-02-08 ENCOUNTER — TELEPHONE (OUTPATIENT)
Dept: CARDIOLOGY CLINIC | Age: 83
End: 2018-02-08

## 2018-02-15 ENCOUNTER — TELEPHONE (OUTPATIENT)
Dept: CARDIOLOGY CLINIC | Age: 83
End: 2018-02-15

## 2018-02-15 ENCOUNTER — CLINICAL SUPPORT (OUTPATIENT)
Dept: CARDIOLOGY CLINIC | Age: 83
End: 2018-02-15

## 2018-02-15 ENCOUNTER — OFFICE VISIT (OUTPATIENT)
Dept: CARDIOLOGY CLINIC | Age: 83
End: 2018-02-15

## 2018-02-15 VITALS
HEIGHT: 65 IN | BODY MASS INDEX: 27.39 KG/M2 | HEART RATE: 88 BPM | DIASTOLIC BLOOD PRESSURE: 70 MMHG | SYSTOLIC BLOOD PRESSURE: 132 MMHG | WEIGHT: 164.4 LBS

## 2018-02-15 DIAGNOSIS — Z95.0 CARDIAC PACEMAKER IN SITU: Primary | ICD-10-CM

## 2018-02-15 DIAGNOSIS — I48.91 ATRIAL FIBRILLATION, UNSPECIFIED TYPE (HCC): Primary | ICD-10-CM

## 2018-02-15 DIAGNOSIS — I42.0 DILATED CARDIOMYOPATHY (HCC): ICD-10-CM

## 2018-02-15 DIAGNOSIS — R06.02 SOB (SHORTNESS OF BREATH): ICD-10-CM

## 2018-02-15 DIAGNOSIS — Z95.0 PACEMAKER: ICD-10-CM

## 2018-02-15 DIAGNOSIS — F33.9 RECURRENT DEPRESSION (HCC): ICD-10-CM

## 2018-02-15 RX ORDER — MELATONIN
1000 DAILY
COMMUNITY
End: 2019-02-07

## 2018-02-15 NOTE — TELEPHONE ENCOUNTER
Spoke to patient's daughter, Neo Grover. Identifiers x 2. Seen by Dr. Stephany Sandra and pacemaker clinic today. Dr. Stephany Sandra had requested INR check. Patient unable to wait. Stated can check at home. Previous INRs followed by Ilan Buchanan LPN. Neo Grover states INR today 1.8. Taking 2.5 mg (1/2 tablet) 4 days/week and 5 mg (1 tablet) 3 days/week. Spoke to Hanna at [address] [place] [address] office. To call patient/patient's daughter regarding INR.

## 2018-02-15 NOTE — PROGRESS NOTES
HISTORY OF PRESENT ILLNESS  Adelfo Hatfield is a 80 y.o. female     SUMMARY:   Problem List  Date Reviewed: 2/15/2018          Codes Class Noted    Latent tuberculosis by blood test ICD-10-CM: R76.11  ICD-9-CM: 790.6  1/22/2018        Recurrent depression ([facility] ICD-10-CM: F33.9  ICD-9-CM: 296.30  1/8/2018        Pacemaker ICD-10-CM: Z95.0  ICD-9-CM: V45.01  7/24/2017        Advanced care planning/counseling discussion ICD-10-CM: Z71.89  ICD-9-CM: V65.49  5/11/2017    Overview Signed 5/11/2017 12:44 PM by Horacio Multani RN     A copy of patient's completed Advanced Medical Directive is on file in the patient's medical record. NN reviewed Advanced Medical Directive document with patient & patient denies the need for changes/ updates. Psoriasis (Chronic) ICD-10-CM: L40.9  ICD-9-CM: 696.1  3/23/2017        Cardiomyopathy ([facility] ICD-10-CM: I42.9  ICD-9-CM: 425.4  9/20/2016    Overview Addendum 9/20/2016  8:45 AM by Bonita Barron MD     8/12 normal lexiscan cardiolyte, lvef 68%  8/16 echo lvef 35% multiple wall motion abnormalities, melissa, mod mr, mild ai, mod tr pa pressure 36mm  8/16 lexiscan mod reversible anterior defect, mostly fixed apical defect.  lvef 35%             Abnormal stress test ICD-10-CM: R94.39  ICD-9-CM: 794.39  9/13/2016        Ischemic cardiomyopathy ICD-10-CM: I25.5  ICD-9-CM: 414.8  Unknown        Chest pain ICD-10-CM: R07.9  ICD-9-CM: 786.50  8/10/2016        Restrictive lung disease ICD-10-CM: J98.4  ICD-9-CM: 518.89  12/14/2015        Osteopenia ICD-10-CM: M85.80  ICD-9-CM: 733.90  11/23/2015        Hypothyroidism due to acquired atrophy of thyroid ICD-10-CM: E03.4  ICD-9-CM: 244.8, 246.8  11/6/2015        CKD (chronic kidney disease) stage 3, GFR 30-59 ml/min ICD-10-CM: N18.3  ICD-9-CM: 585.3  2/9/2015        Anxiety and depression (Chronic) ICD-10-CM: F41.8  ICD-9-CM: 300.00, 311  2/26/2014        Colon cancer ([facility] ICD-10-CM: C18.9  ICD-9-CM: 153.9  2/26/2014 Overview Addendum 2/26/2014 11:12 AM by Mimi Sahu MD     Partial colectomy  Petey Alejandro             Hypothyroid ICD-10-CM: E03.9  ICD-9-CM: 244.9  2/26/2014        Pulmonary hypertension ICD-10-CM: I27.20  ICD-9-CM: 416.8  9/26/2011        Atrial fibrillation ([address] ICD-10-CM: I48.91  ICD-9-CM: 427.31  Unknown    Overview Signed 10/21/2010  8:16 AM by Magen Regalado     av node ablation 5/22/98 with placement of CPI #[id number] dual chamber pacemaker subsequently replaced with a single chamber medtronic #[id number] single chamber pacemaker 7/25/05             Mitral regurgitation ICD-10-CM: I34.0  ICD-9-CM: 424.0  10/21/2010        CHB (complete heart block) (HCC) ICD-10-CM: I44.2  ICD-9-CM: 426.0  10/21/2010        Orthostatic hypotension ICD-10-CM: I95.1  ICD-9-CM: 458.0  10/21/2010        Chest pain, unspecified ICD-10-CM: R07.9  ICD-9-CM: 786.50  10/21/2010        Mixed hyperlipidemia ICD-10-CM: E78.2  ICD-9-CM: 272.2  10/18/2010              Current Outpatient Prescriptions on File Prior to Visit   Medication Sig    COUMADIN 5 mg tablet TAKE 1 TABLET BY MOUTH DAILY AND 1/2 TABLET ON SUNDAY    baclofen (LIORESAL) 10 mg tablet Take 5 mg by mouth two (2) times a day.  DENTA 5000 PLUS 1.1 % crea Apply 1 mg to affected area as needed (to prevent cavities).  furosemide (LASIX) 40 mg tablet Take 1 Tab by mouth daily. (Patient taking differently: Take 20 mg by mouth daily.)    raNITIdine (ZANTAC) 150 mg tablet Take 1 Tab by mouth two (2) times a day.  SYNTHROID 112 mcg tablet Take 1 Tab by mouth Daily (before breakfast).  simvastatin (ZOCOR) 20 mg tablet TAKE 1 TABLET BY MOUTH NIGHTLY    carvedilol (COREG) 3.125 mg tablet Take 1 Tab by mouth two (2) times daily (with meals).  sertraline (ZOLOFT) 50 mg tablet Take 1 Tab by mouth daily.  desonide (TRIDESILON) 0.05 % cream Apply  to affected area two (2) times a day.  multivitamin (ONE A DAY) tablet Take 1 Tab by mouth daily.     OXYGEN-AIR DELIVERY SYSTEMS 2 L by Does Not Apply route nightly.  ADVAIR DISKUS 250-50 mcg/dose diskus inhaler Take 1 Puff by inhalation two (2) times a day.  acetaminophen (TYLENOL) 500 mg tablet Take 650 mg by mouth every six (6) hours as needed for Pain. (2) Tablet in am (1) tablet in pm (2) Tablet pm    tiotropium (SPIRIVA WITH HANDIHALER) 18 mcg inhalation capsule Take 1 Cap by inhalation daily.  Calcium-Cholecalciferol, D3, 600 mg(1,500mg) -400 unit cap Take  by mouth two (2) times a day. No current facility-administered medications on file prior to visit. CARDIOLOGY STUDIES TO DATE:  8/12 normal lexiscan cardiolyte, lvef 68%  8/16 echo lvef 35% multiple wall motion abnormalities, melissa, mod mr, mild ai, mod tr pa pressure 36mm  8/16 lexiscan mod reversible anterior defect, mostly fixed apical defect. lvef 35%      4/17 lvef 40% ant hypo, lvh, melissa, mild to mod mr, mild ai. Mild to mod tr upper normal pa pressure      Chief Complaint   Patient presents with    Cardiomyopathy     HPI :  Ms. Maisha Burger moved to [facility] shortly after we saw her in November and she looks like a new person. She is very engaged. She is doing an exercise class. She is enjoying the company of other people. Her weight is down about seven pounds since November and she continues to have issues with mild dependent lower extremity edema, as well as shortness of breath and fatigue. She needs a Coumadin check today.        CARDIAC ROS:   negative for chest pain, palpitations, syncope, orthopnea, paroxysmal nocturnal dyspnea, exertional chest pressure/discomfort, claudication    Family History   Problem Relation Age of Onset    Diabetes Mother     Heart Disease Mother     Heart Attack Mother     Heart Disease Father     Stroke Sister     Breast Cancer Sister 80    Cancer Maternal Aunt      uterus    Diabetes Maternal Uncle     Breast Cancer Daughter 61       Past Medical History:   Diagnosis Date    Arthritis     Atrial fibrillation ([facility]     av node ablation 5/22/98 with placement of CPI #[id number] dual chamber pacemaker subsequently replaced with a single chamber medtronic #[id number] single chamber pacemaker 7/25/05    Cancer ([facility]'s    colon cancer w/ resection    COPD     Depression     GERD (gastroesophageal reflux disease)     Hyperlipidemia     Hypertension     Ischemic cardiomyopathy     Migraine headache     Orthostatic hypotension     RADHA (obstructive sleep apnea)     Pacemaker 5/22/98    AV node ablation /pacemaker placement utilizing CPI # [id number] dual chamber system, medtronic #[id number] single chamber device placed 7/22/05    Pulmonary hypertension 9/26/2011    RVSP 50 on echo 8/14    Thyroid disease     Valvular heart disease     mild-mod MR/TR       GENERAL ROS:  A comprehensive review of systems was negative except for that written in the HPI.     Visit Vitals    /70    Pulse 88    Ht 5' 5\" (1.651 m)    Wt 164 lb 6.4 oz (74.6 kg)    LMP 02/26/1970    BMI 27.36 kg/m2       Wt Readings from Last 3 Encounters:   02/15/18 164 lb 6.4 oz (74.6 kg)   01/21/18 167 lb (75.8 kg)   01/18/18 167 lb 4 oz (75.9 kg)            BP Readings from Last 3 Encounters:   02/15/18 132/70   01/21/18 151/77   01/18/18 139/82       PHYSICAL EXAM  General appearance: alert, cooperative, no distress, appears stated age  Neck: supple, symmetrical, trachea midline, no adenopathy, no carotid bruit and no JVD  Lungs: clear to auscultation bilaterally  Heart: regular rate and rhythm, S1, S2 normal, no murmur, click, rub or gallop  Extremities: extremities normal, atraumatic, no cyanosis or edema    Lab Results   Component Value Date/Time    Cholesterol, total 247 (H) 09/12/2017 09:53 AM    Cholesterol, total 173 08/22/2016 08:45 AM    Cholesterol, total 157 08/11/2015 12:14 PM    Cholesterol, total 157 02/09/2015 08:34 AM    Cholesterol, total 156 02/27/2014 08:49 AM    HDL Cholesterol 79 09/12/2017 09:53 AM    HDL Cholesterol 46 08/22/2016 08:45 AM    HDL Cholesterol 47 08/11/2015 12:14 PM    HDL Cholesterol 46 02/09/2015 08:34 AM    HDL Cholesterol 48 02/27/2014 08:49 AM    LDL, calculated 124 (H) 09/12/2017 09:53 AM    LDL, calculated 99 08/22/2016 08:45 AM    LDL, calculated 86 08/11/2015 12:14 PM    LDL, calculated 81 02/09/2015 08:34 AM    LDL, calculated 83 02/27/2014 08:49 AM    Triglyceride 221 (H) 09/12/2017 09:53 AM    Triglyceride 140 08/22/2016 08:45 AM    Triglyceride 121 08/11/2015 12:14 PM    Triglyceride 149 02/09/2015 08:34 AM    Triglyceride 125 02/27/2014 08:49 AM     ASSESSMENT  Ms. Enzo Reid is stable, asymptomatic and well compensated on a good medical regimen and needs no cardiac testing at this time. current treatment plan is effective, no change in therapy  lab results and schedule of future lab studies reviewed with patient  reviewed diet, exercise and weight control    Encounter Diagnoses   Name Primary?  Atrial fibrillation, unspecified type ([address] Yes    Dilated cardiomyopathy ([address]     SOB (shortness of breath)     Recurrent depression ([address]     Pacemaker      Orders Placed This Encounter    cholecalciferol (VITAMIN D3) 1,000 unit tablet       Follow-up Disposition:  Return in about 4 months (around 6/15/2018).     Av Bennett MD  2/15/2018

## 2018-02-15 NOTE — MR AVS SNAPSHOT
[address] [facility] [address] [place] 
[phone] Patient: Riley Scherer MRN:  [id number] Visit Information Date & Time Provider Department Dept. Phone Encounter #  
 2/15/2018  9:20 AM Regine Dotson MD [facility] [phone] [id number] Follow-up Instructions Return in about 3 months (around 5/15/2018). Your Appointments 2/15/2018  9:45 AM  
PROCEDURE with PACEMAKER3, [facility] (FARHAD SCHEDULING) Appt Note: med interrogation/lin/Laurent 1st b 11-2-17 (full ck 7-10-17 at [facility]-single lead) r.s from 2/13  
 [address] [place] [address]  
[address] [facility] [id number] Upcoming Health Maintenance Date Due DTaP/Tdap/Td series (1 - Tdap) 4/3/2012 MEDICARE YEARLY EXAM 5/12/2018 GLAUCOMA SCREENING Q2Y 12/8/2018 Allergies as of 2/15/2018  Review Complete On: 2/15/2018 By: Regine Dotson MD  
  
 Severity Noted Reaction Type Reactions Cardizem [Diltiazem Hcl]  10/04/2010    Hives Codeine  10/04/2010    Nausea and Vomiting Darvocet A500 [Propoxyphene N-acetaminophen]  10/04/2010    Nausea and Vomiting Diltiazem  10/04/2010    Hives Labetalol  04/25/2012    Nausea and Vomiting Dizzy/Disoriented Pcn [Penicillins]  10/04/2010    Swelling Tongue Swelling Primidone  07/31/2012    Other (comments) Lightheaded/unsteady gait Sulfa (Sulfonamide Antibiotics)  10/04/2010    Nausea and Vomiting Current Immunizations  Reviewed on 11/23/2015 Name Date Influenza High Dose Vaccine PF 9/21/2017, 9/29/2016, 9/24/2015, 10/1/2014 Influenza Vaccine 12/1/2013 Influenza Vaccine Split 10/12/2012 Pneumococcal Conjugate (PCV-13) 11/23/2015 Pneumococcal Polysaccharide (PPSV-23) 2/27/2015 Pneumococcal Vaccine (Pcv) 12/15/2010 TB Skin Test (PPD) 1/1/2001 TB Skin Test (PPD) Intradermal 1/16/2018 TD Vaccine 4/2/2012 Varicella Virus Vaccine Live 9/12/2012 Zoster Vaccine, Live 12/1/2013 Not reviewed this visit You Were Diagnosed With   
  
 Codes Comments Atrial fibrillation, unspecified type ([facility]    -  Primary ICD-10-CM: I48.91 
ICD-9-CM: 427.31 Dilated cardiomyopathy ([facility]     ICD-10-CM: I42.0 ICD-9-CM: 425.4 SOB (shortness of breath)     ICD-10-CM: R06.02 
ICD-9-CM: 786.05 Recurrent depression ([facility]     ICD-10-CM: F33.9 ICD-9-CM: 296.30 Pacemaker     ICD-10-CM: Z95.0 ICD-9-CM: V45.01 Vitals BP Pulse Height(growth percentile) Weight(growth percentile) LMP BMI  
 132/70 88 5' 5\" (1.651 m) 164 lb 6.4 oz (74.6 kg) 02/26/1970 27.36 kg/m2 OB Status Smoking Status Postmenopausal Passive Smoke Exposure - Never Smoker Vitals History BMI and BSA Data Body Mass Index Body Surface Area  
 27.36 kg/m 2 1.85 m 2 Preferred Pharmacy Pharmacy Name Phone [id number]- [place] ([place]) [phone] Your Updated Medication List  
  
   
This list is accurate as of: 2/15/18  9:42 AM.  Always use your most recent med list.  
  
  
  
  
 acetaminophen 500 mg tablet Commonly known as:  TYLENOL Take 650 mg by mouth every six (6) hours as needed for Pain. (2) Tablet in am (1) tablet in pm (2) Tablet pm  
  
 ADVAIR DISKUS 250-50 mcg/dose diskus inhaler Generic drug:  fluticasone-salmeterol Take 1 Puff by inhalation two (2) times a day. baclofen 10 mg tablet Commonly known as:  LIORESAL Take 5 mg by mouth two (2) times a day. Calcium-Cholecalciferol (D3) 600 mg(1,500mg) -400 unit Cap Take  by mouth two (2) times a day. carvedilol 3.125 mg tablet Commonly known as:  Nettie Fielders Take 1 Tab by mouth two (2) times daily (with meals). COUMADIN 5 mg tablet Generic drug:  warfarin TAKE 1 TABLET BY MOUTH DAILY AND 1/2 TABLET ON SUNDAY DENTA 5000 PLUS 1.1 % Crea Generic drug:  fluoride (sodium) Apply 1 mg to affected area as needed (to prevent cavities). desonide 0.05 % cream  
Commonly known as:  Marene Flatness Apply  to affected area two (2) times a day. furosemide 40 mg tablet Commonly known as:  LASIX Take 1 Tab by mouth daily. multivitamin tablet Commonly known as:  ONE A DAY Take 1 Tab by mouth daily. OXYGEN-AIR DELIVERY SYSTEMS  
2 L by Does Not Apply route nightly. raNITIdine 150 mg tablet Commonly known as:  ZANTAC Take 1 Tab by mouth two (2) times a day. sertraline 50 mg tablet Commonly known as:  ZOLOFT Take 1 Tab by mouth daily. simvastatin 20 mg tablet Commonly known as:  ZOCOR  
TAKE 1 TABLET BY MOUTH NIGHTLY SPIRIVA WITH HANDIHALER 18 mcg inhalation capsule Generic drug:  tiotropium Take 1 Cap by inhalation daily. SYNTHROID 112 mcg tablet Generic drug:  levothyroxine Take 1 Tab by mouth Daily (before breakfast). VITAMIN D3 1,000 unit tablet Generic drug:  cholecalciferol Take  by mouth daily. Follow-up Instructions Return in about 3 months (around 5/15/2018). Introducing [facility]! Dear Prema Castaneda: 
Thank you for requesting a [employer] account. Our records indicate that you already have an active Routezilla account. You can access your account anytime at [email or web address] Did you know that you can access your hospital and ER discharge instructions at any time in Routezilla? You can also review all of your test results from your hospital stay or ER visit. Additional Information If you have questions, please visit the Frequently Asked Questions section of the Routezilla website at [email or web address] Remember, Routezilla is NOT to be used for urgent needs. For medical emergencies, dial 911. Now available from your iPhone and Android! Please provide this summary of care documentation to your next provider. Your primary care clinician is listed as Rex [place]. If you have any questions after today's visit, please call [phone].

## 2018-02-16 ENCOUNTER — TELEPHONE (OUTPATIENT)
Dept: CARDIOLOGY CLINIC | Age: 83
End: 2018-02-16

## 2018-02-16 RX ORDER — WARFARIN SODIUM 5 MG/1
TABLET ORAL
Qty: 90 TAB | Refills: 0 | Status: SHIPPED | OUTPATIENT
Start: 2018-02-16 | End: 2018-03-19 | Stop reason: SDUPTHER

## 2018-02-16 NOTE — TELEPHONE ENCOUNTER
Pharmacy verified. Patient is almost completely out of her coumadin.    Phone [phone]  [place]

## 2018-02-16 NOTE — TELEPHONE ENCOUNTER
Left message on yesterday,with daughter Rosa,and dauhther in law,Lesley,for pt to have coumadin dose of 7.5mg,per verbal order from [address] [place] [address].

## 2018-02-22 ENCOUNTER — TELEPHONE (OUTPATIENT)
Dept: CARDIOLOGY CLINIC | Age: 83
End: 2018-02-22

## 2018-02-22 DIAGNOSIS — F32.A ANXIETY AND DEPRESSION: Chronic | ICD-10-CM

## 2018-02-22 DIAGNOSIS — F41.9 ANXIETY AND DEPRESSION: Chronic | ICD-10-CM

## 2018-02-22 RX ORDER — SERTRALINE HCL 50 MG
TABLET ORAL
Qty: 90 TAB | Refills: 2 | Status: SHIPPED | OUTPATIENT
Start: 2018-02-22 | End: 2018-11-22 | Stop reason: SDUPTHER

## 2018-03-01 ENCOUNTER — TELEPHONE (OUTPATIENT)
Dept: CARDIOLOGY CLINIC | Age: 83
End: 2018-03-01

## 2018-03-08 ENCOUNTER — TELEPHONE (OUTPATIENT)
Dept: INTERNAL MEDICINE CLINIC | Age: 83
End: 2018-03-08

## 2018-03-08 NOTE — TELEPHONE ENCOUNTER
? Weakness in upper extemity? She needs evaluation first before treatment. I spoke to daughter. Pt not using L hand as much, feels right hand more stable. Noticed over last 3 weeks. No other symptoms. She needs evaluation in office tomorrow or Monday to fully assess neuro status. ? Prior CVA? Daughter will schedule with me.

## 2018-03-08 NOTE — TELEPHONE ENCOUNTER
Per the daughter she would like for Dr Andrey Mckenna to order PT for her Left Arm and Left Hand. She is loosing some of the use of it. And having to use Right Hand and she is out a Right handed person.   Fax the order to [address]  [phone]      Karyn Románjennifer her Daughter no  Is [phone]

## 2018-03-09 ENCOUNTER — TELEPHONE (OUTPATIENT)
Dept: CARDIOLOGY CLINIC | Age: 83
End: 2018-03-09

## 2018-03-09 ENCOUNTER — TELEPHONE (OUTPATIENT)
Dept: INTERNAL MEDICINE CLINIC | Age: 83
End: 2018-03-09

## 2018-03-09 NOTE — TELEPHONE ENCOUNTER
----- Message from [facility] sent at 3/9/2018  8:48 AM EST -----  Regarding: Dr. Jacqueline Velarde Pt's daughter, Denny Mckeon, would like to have her mom seen Monday 3/12 afternoon if possible for possible stroke? Best contact [phone] will not be available between 9:30-noon.

## 2018-03-09 NOTE — TELEPHONE ENCOUNTER
Over the past 3 weeks family noticed she is using her left arm/wrist less and less. She has decreased range of motion. She is scheduled to see PCP 3/13/2018.

## 2018-03-15 ENCOUNTER — TELEPHONE (OUTPATIENT)
Dept: CARDIOLOGY CLINIC | Age: 83
End: 2018-03-15

## 2018-03-19 ENCOUNTER — OFFICE VISIT (OUTPATIENT)
Dept: INTERNAL MEDICINE CLINIC | Age: 83
End: 2018-03-19

## 2018-03-19 VITALS
DIASTOLIC BLOOD PRESSURE: 73 MMHG | SYSTOLIC BLOOD PRESSURE: 116 MMHG | HEIGHT: 65 IN | OXYGEN SATURATION: 95 % | HEART RATE: 83 BPM | RESPIRATION RATE: 18 BRPM | BODY MASS INDEX: 27.24 KG/M2 | TEMPERATURE: 98 F | WEIGHT: 163.5 LBS

## 2018-03-19 DIAGNOSIS — R25.1 TREMORS OF NERVOUS SYSTEM: Primary | ICD-10-CM

## 2018-03-19 DIAGNOSIS — G20 PARKINSONISM, UNSPECIFIED PARKINSONISM TYPE (HCC): ICD-10-CM

## 2018-03-19 DIAGNOSIS — R25.8 BRADYKINESIA: ICD-10-CM

## 2018-03-19 DIAGNOSIS — R29.898 LEFT HAND WEAKNESS: ICD-10-CM

## 2018-03-19 NOTE — PROGRESS NOTES
HISTORY OF PRESENT ILLNESS  Karen Ibarra is a 80 y.o. female. HPI  Problem visit:  Karen Ibarra is here for complaint of L worse than right thumb weakness, numbness. L worse than R hand tremor. .  Problem began months  Ago. She is L handed. Severity is moderate  Character of problem: all worsening over time. Holding hands out, activity  makes the problem worse. Resting hands makes the problem better. Associated symptoms include: also has jaw tremor, trouble buttoning clothing, handwriting. Treatments tried include: medication not used  ICD Codes / Adm. Diagnosis: 215829  214.9 / Head Injury    Examination:  CT HEAD WO CON  - [id number] - Feb 20 2013  6:00PM  Accession No:  [id number]  Reason: Lissettliam Rvaisheba      REPORT:  Indication:  Fall, head trauma    Comparison: None    Findings: 5 mm axial images were obtained from the skull base through the   vertex. The ventricles and cortical sulci are prominent, compatible with age   related volume loss. There is no evidence of intracranial hemorrhage, mass,   mass effect, or acute infarct. There is periventricular white matter   disease. No extra-axial fluid collections are seen. There is trace fluid in   the right sphenoid sinus. The orbital structures are unremarkable. No   osseous abnormalities are seen.        IMPRESSION:  1. No evidence of acute infarct or intracranial hemorrhage. 2. Periventricular white matter disease is likely secondary to chronic small   vessel ischemic changes.        ROS    Physical Exam   Musculoskeletal:   Positive for cogwheeling both UE's vs stiffness. Neurological: She is alert. She is not disoriented. She displays tremor (L UE with hand - arm at 90 deg abduction. resolves at rest.  interrmittant jaw tremor). No cranial nerve deficit.  Gait (short / narrow based gait, excess multistep 180 deg pivot  but no festination) abnormal.   UE's with 4 to 4- /5 strength, lessor on L.  FTF slower, inaccurate on L than R  MIRANDA hands - worse / slower on L  Foot tap slower, clumbsier on L     Visit Vitals    /73 (BP 1 Location: Left arm, BP Patient Position: Sitting)    Pulse 83    Temp 98 °F (36.7 °C) (Oral)    Resp 18    Ht 5' 5\" (1.651 m)    Wt 163 lb 8 oz (74.2 kg)    LMP 02/26/1970    SpO2 95%    BMI 27.21 kg/m2       ASSESSMENT and PLAN  Diagnoses and all orders for this visit:    1. Tremors of nervous system -assymetrical, intentional/postural with jaw tremor - concerning for Parkinsons.  -     CT HEAD WO CONT; Future  -     [facility] ref [facility]    2. Bradykinesia - gait and dexterity disturbance - concerning for Parkinsons  -     CT HEAD WO CONT; Future  -     [facility] ref [facility]    3. Parkinsonism, unspecified Parkinsonism type ([facility]  -     [facility] ref [facility]    4. Left hand weakness -with more focal ataxia - get head CT. Refer to neurology. ? Old CVA      Follow-up Disposition:  Return if symptoms worsen or fail to improve.

## 2018-03-19 NOTE — MR AVS SNAPSHOT
[address] [facility] 
 
 
 [address] [place] [address] 
[phone] Patient: Nate Fox MRN:  [id number] Visit Information Date & Time Provider Department Dept. Phone Encounter #  
 3/19/2018  8:30 AM Renetta Coe MD [facility] [address] [facility] [id number] Follow-up Instructions Return if symptoms worsen or fail to improve. Your Appointments 6/26/2018  1:00 PM  
PACEMAKER with [facility] (FARHAD SCHEDULING) Appt Note: 4 mo f/u; 4 month f/u med interrogation/rc (b 2-15-18 interr)  full ck due after 7-10-18  
 [address] [facility] [address] [place] [address]  
[address] [facility]  
  
    
 6/26/2018  1:20 PM  
ESTABLISHED PATIENT with Tre Patel MD  
[facility] ([facility]) Appt Note: 4 month f/u med interrogation/rc (b 2-15-18 interr)  full ck due after 7-10-18  
 [address] [facility] [address] [place] [address]  
[address] [facility] [address] Upcoming Health Maintenance Date Due DTaP/Tdap/Td series (1 - Tdap) 4/3/2012 MEDICARE YEARLY EXAM 5/12/2018 GLAUCOMA SCREENING Q2Y 12/8/2018 Allergies as of 3/19/2018  Review Complete On: 3/19/2018 By: Brendan Im, LPN Severity Noted Reaction Type Reactions Cardizem [Diltiazem Hcl]  10/04/2010    Hives Codeine  10/04/2010    Nausea and Vomiting Darvocet A500 [Propoxyphene N-acetaminophen]  10/04/2010    Nausea and Vomiting Diltiazem  10/04/2010    Hives Labetalol  04/25/2012    Nausea and Vomiting Dizzy/Disoriented Pcn [Penicillins]  10/04/2010    Swelling Tongue Swelling Primidone  07/31/2012    Other (comments) Lightheaded/unsteady gait Sulfa (Sulfonamide Antibiotics)  10/04/2010    Nausea and Vomiting Current Immunizations  Reviewed on 11/23/2015 Name Date Influenza High Dose Vaccine PF 9/21/2017, 9/29/2016, 9/24/2015, 10/1/2014 Influenza Vaccine 12/1/2013 Influenza Vaccine Split 10/12/2012 Pneumococcal Conjugate (PCV-13) 11/23/2015 Pneumococcal Polysaccharide (PPSV-23) 2/27/2015 Pneumococcal Vaccine (Pcv) 12/15/2010 TB Skin Test (PPD) 1/1/2001 TB Skin Test (PPD) Intradermal 1/16/2018 TD Vaccine 4/2/2012 Varicella Virus Vaccine Live 9/12/2012 Zoster Vaccine, Live 12/1/2013 Not reviewed this visit You Were Diagnosed With   
  
 Codes Comments Tremors of nervous system    -  Primary ICD-10-CM: R25.1 ICD-9-CM: 781.0 Bradykinesia     ICD-10-CM: R25.8 ICD-9-CM: 781.0 Parkinsonism, unspecified Parkinsonism type ([facility]     ICD-10-CM: G20 
ICD-9-CM: 332.0 Left hand weakness     ICD-10-CM: R29.898 ICD-9-CM: 728.87 Vitals BP Pulse Temp Resp Height(growth percentile) Weight(growth percentile) 116/73 (BP 1 Location: Left arm, BP Patient Position: Sitting) 83 98 °F (36.7 °C) (Oral) 18 5' 5\" (1.651 m) 163 lb 8 oz (74.2 kg) LMP SpO2 BMI OB Status Smoking Status 02/26/1970 95% 27.21 kg/m2 Postmenopausal Passive Smoke Exposure - Never Smoker BMI and BSA Data Body Mass Index Body Surface Area  
 27.21 kg/m 2 1.84 m 2 Preferred Pharmacy Pharmacy Name Phone [id number] [address] [place] [address]) [phone] Your Updated Medication List  
  
   
This list is accurate as of 3/19/18  8:58 AM.  Always use your most recent med list.  
  
  
  
  
 acetaminophen 500 mg tablet Commonly known as:  TYLENOL Take 650 mg by mouth every six (6) hours as needed for Pain. (2) Tablet in am (1) tablet in pm (2) Tablet pm  
  
 ADVAIR DISKUS 250-50 mcg/dose diskus inhaler Generic drug:  fluticasone-salmeterol Take 1 Puff by inhalation two (2) times a day. baclofen 10 mg tablet Commonly known as:  LIORESAL Take 5 mg by mouth two (2) times a day. Calcium-Cholecalciferol (D3) 600 mg(1,500mg) -400 unit Cap Take  by mouth two (2) times a day. carvedilol 3.125 mg tablet Commonly known as:  Lindaagris Naa Take 1 Tab by mouth two (2) times daily (with meals). COUMADIN 5 mg tablet Generic drug:  warfarin TAKE 1 TABLET BY MOUTH DAILY AND 1/2 TABLET ON SUNDAY DENTA 5000 PLUS 1.1 % Crea Generic drug:  fluoride (sodium) Apply 1 mg to affected area as needed (to prevent cavities). desonide 0.05 % cream  
Commonly known as:  Viviana Loll Apply  to affected area two (2) times a day. furosemide 40 mg tablet Commonly known as:  LASIX Take 1 Tab by mouth daily. multivitamin tablet Commonly known as:  ONE A DAY Take 1 Tab by mouth daily. OXYGEN-AIR DELIVERY SYSTEMS  
2 L by Does Not Apply route nightly. raNITIdine 150 mg tablet Commonly known as:  ZANTAC Take 1 Tab by mouth two (2) times a day. simvastatin 20 mg tablet Commonly known as:  ZOCOR  
TAKE 1 TABLET BY MOUTH NIGHTLY SPIRIVA WITH HANDIHALER 18 mcg inhalation capsule Generic drug:  tiotropium Take 1 Cap by inhalation daily. SYNTHROID 112 mcg tablet Generic drug:  levothyroxine Take 1 Tab by mouth Daily (before breakfast). VITAMIN D3 1,000 unit tablet Generic drug:  cholecalciferol Take  by mouth daily. ZOLOFT 50 mg tablet Generic drug:  sertraline TAKE 1 TABLET BY MOUTH EVERY DAY We Performed the Following REFERRAL TO NEUROLOGY [TWG09 Custom] Follow-up Instructions Return if symptoms worsen or fail to improve. To-Do List   
 03/19/2018 Imaging:  CT HEAD WO CONT Referral Information Referral ID Referred By Referred To  
  
 [id number] RONAN, [address] [facility], [place] [address] [place] [address] Phone: [phone] Fax: [phone] Visits Status Start Date End Date 1 New Request 3/19/18 3/19/19 If your referral has a status of pending review or denied, additional information will be sent to support the outcome of this decision. Introducing [facility] & HEALTH SERVICES! Dear Susie Dean: 
Thank you for requesting a [employer] account. Our records indicate that you already have an active [employer] account. You can access your account anytime at [email or web address]. [email or web address] Did you know that you can access your hospital and ER discharge instructions at any time in [employer]? You can also review all of your test results from your hospital stay or ER visit. Additional Information If you have questions, please visit the Frequently Asked Questions section of the [employer] website at [email or web address] Remember, [employer] is NOT to be used for urgent needs. For medical emergencies, dial 911. Now available from your iPhone and Android! Please provide this summary of care documentation to your next provider. Your primary care clinician is listed as Radha Bui. If you have any questions after today's visit, please call [phone].

## 2018-03-23 ENCOUNTER — TELEPHONE (OUTPATIENT)
Dept: CARDIOLOGY CLINIC | Age: 83
End: 2018-03-23

## 2018-03-29 ENCOUNTER — TELEPHONE ANTICOAG (OUTPATIENT)
Dept: CARDIOLOGY CLINIC | Age: 83
End: 2018-03-29

## 2018-03-29 ENCOUNTER — HOSPITAL ENCOUNTER (OUTPATIENT)
Dept: CT IMAGING | Age: 83
Discharge: HOME | End: 2018-03-29
Attending: INTERNAL MEDICINE
Payer: MEDICARE

## 2018-03-29 DIAGNOSIS — R25.1 TREMORS OF NERVOUS SYSTEM: ICD-10-CM

## 2018-03-29 DIAGNOSIS — R25.8 BRADYKINESIA: ICD-10-CM

## 2018-03-29 LAB — INR, EXTERNAL: 2.7 (ref 2–3)

## 2018-03-29 PROCEDURE — 70450 CT HEAD/BRAIN W/O DYE: CPT

## 2018-03-29 NOTE — PROGRESS NOTES

## 2018-04-04 ENCOUNTER — TELEPHONE ANTICOAG (OUTPATIENT)
Dept: CARDIOLOGY CLINIC | Age: 83
End: 2018-04-04

## 2018-04-04 LAB — INR, EXTERNAL: 3.1 (ref 2–3)

## 2018-04-04 NOTE — PROGRESS NOTES

## 2018-04-12 ENCOUNTER — TELEPHONE ANTICOAG (OUTPATIENT)
Dept: CARDIOLOGY CLINIC | Age: 83
End: 2018-04-12

## 2018-04-12 LAB — INR, EXTERNAL: 2.8 (ref 2–3)

## 2018-04-17 ENCOUNTER — OFFICE VISIT (OUTPATIENT)
Dept: NEUROLOGY | Age: 83
End: 2018-04-17

## 2018-04-17 VITALS
SYSTOLIC BLOOD PRESSURE: 116 MMHG | HEART RATE: 87 BPM | BODY MASS INDEX: 26.31 KG/M2 | HEIGHT: 65 IN | OXYGEN SATURATION: 94 % | WEIGHT: 157.9 LBS | DIASTOLIC BLOOD PRESSURE: 72 MMHG

## 2018-04-17 DIAGNOSIS — M25.512 LEFT SHOULDER PAIN, UNSPECIFIED CHRONICITY: ICD-10-CM

## 2018-04-17 DIAGNOSIS — M48.061 SPINAL STENOSIS OF LUMBAR REGION, UNSPECIFIED WHETHER NEUROGENIC CLAUDICATION PRESENT: ICD-10-CM

## 2018-04-17 DIAGNOSIS — R26.9 GAIT ABNORMALITY: ICD-10-CM

## 2018-04-17 DIAGNOSIS — R25.1 TREMOR: Primary | ICD-10-CM

## 2018-04-17 RX ORDER — CARBIDOPA AND LEVODOPA 25; 100 MG/1; MG/1
1 TABLET ORAL 3 TIMES DAILY
Qty: 90 TAB | Refills: 5 | Status: SHIPPED | OUTPATIENT
Start: 2018-04-17 | End: 2018-12-20 | Stop reason: ALTCHOICE

## 2018-04-17 NOTE — LETTER
Dear Marleny Sanchez MD, Thank you for allowing me to see your patient, Samuel Vivar for a neurological consultation. Please see my impression and recommendations as outlined in my note. Sincerely, Weldon Romberg, MD 
[facility] at [address] [place] [address] BY: 
Marleny Sanchez MD 
 
CHIEF COMPLAINT: 
Tremor HISTORY OF PRESENT ILLNESS HISTORY PROVIDED BY: 
Patient Family Member: daughter Samuel Vivar is a 80 y.o. female left handed female who I am asked to see in consultation for tremor and balance issues. Her left hand has some numbness in the thumb and her pinky finger. This has been in the last 4-6 months. This makes her have difficulty with holding things. She has had tremor in her left hand for two years. She has some slight resting tremor in the right hand. She has tremor with action. Especially with holding things. At a recent PCP visit when watching her gait he was concerned for PD. She feels like she bends over when walking. She is having a shuffling gait. She has some back pain and arthritis. She did have an epidural which has helped but she is ready for another one. She has felt some freezing gait some challenges with doors. It is challenging to know if this is due to fatigue or not being able to do it. She does have a some breathing issues and a pacemaker. She is too high risk to take get this changed to a dual pacer. She does have hypothyroid and is on synthroid. She is on coumadin for afib. She has not had a stroke. FH of aunt with PD/tremors. She feels her memory is declining. She lives in [facility]. She has been there two months. She is active there. There is a dining room she can eat at. She has a note from her occupational therapist that is concerned about her left shoulder.   She appears to have significant pain and decreased range of motion. Subsequently it is challenging for her to complete therapy due to this. PMH Past Medical History:  
Diagnosis Date  Arthritis  Atrial fibrillation ([address]   
 av node ablation 5/22/98 with placement of CPI #[id number] dual chamber pacemaker subsequently replaced with a single chamber medtronic #[id number] single chamber pacemaker 7/25/05  Cancer [facility]) [id number]  
 colon cancer w/ resection  COPD  Depression  GERD (gastroesophageal reflux disease)  Hyperlipidemia  Hypertension  Ischemic cardiomyopathy  Migraine headache  Orthostatic hypotension  RADHA (obstructive sleep apnea)  Pacemaker 5/22/98 AV node ablation /pacemaker placement utilizing CPI # [id number] dual chamber system, medtronic #[id number] single chamber device placed 7/22/05  Pulmonary hypertension ([address] 9/26/2011 RVSP 50 on echo 8/14  Thyroid disease  Valvular heart disease   
 mild-mod MR/TR  
 
 
[address] Social History Social History  Marital status:  Spouse name: N/A  
 Number of children: N/A  
 Years of education: N/A Social History Main Topics  Smoking status: Passive Smoke Exposure - Never Smoker  Smokeless tobacco: Never Used  Alcohol use No  
 Drug use: No  
 Sexual activity: No  
 
Other Topics Concern  Not on file Social History Narrative [facility] Family History Problem Relation Age of Onset  Diabetes Mother  Heart Disease Mother  Heart Attack Mother  Heart Disease Father  Stroke Sister  Breast Cancer Sister 80  
 Cancer Maternal Aunt   
  uterus  Diabetes Maternal Uncle  Breast Cancer Daughter 61 ALLERGIES Allergies Allergen Reactions  Cardizem [Diltiazem Hcl] Hives  Codeine Nausea and Vomiting  Darvocet A500 [Propoxyphene N-Acetaminophen] Nausea and Vomiting  Diltiazem Hives  Labetalol Nausea and Vomiting Dizzy/Disoriented  Pcn [Penicillins] Swelling Tongue Swelling  Primidone Other (comments) Lightheaded/unsteady gait  Sulfa (Sulfonamide Antibiotics) Nausea and Vomiting CURRENT MEDS Current Outpatient Prescriptions Medication Sig Dispense Refill  carvedilol (COREG) 3.125 mg tablet TAKE 1 TABLET BY MOUTH TWICE A DAY WITH MEALS 180 Tab 1  
 ZOLOFT 50 mg tablet TAKE 1 TABLET BY MOUTH EVERY DAY 90 Tab 2  
 COUMADIN 5 mg tablet TAKE 1 TABLET BY MOUTH DAILY AND 1/2 TABLET ON SUNDAY (Patient taking differently: TAKE 1 TABLET BY MOUTH DAILY AND 1/2 TABLET ON SUNDAY MON & WED) 90 Tab 0  cholecalciferol (VITAMIN D3) 1,000 unit tablet Take  by mouth daily.  baclofen (LIORESAL) 10 mg tablet Take 5 mg by mouth two (2) times a day.  DENTA 5000 PLUS 1.1 % crea Apply 1 mg to affected area as needed (to prevent cavities). 2  
 furosemide (LASIX) 40 mg tablet Take 1 Tab by mouth daily. (Patient taking differently: Take 20 mg by mouth daily.) 90 Tab 2  
 raNITIdine (ZANTAC) 150 mg tablet Take 1 Tab by mouth two (2) times a day. 180 Tab 1  
 SYNTHROID 112 mcg tablet Take 1 Tab by mouth Daily (before breakfast). 90 Tab 1  
 simvastatin (ZOCOR) 20 mg tablet TAKE 1 TABLET BY MOUTH NIGHTLY 90 Tab 1  
 desonide (TRIDESILON) 0.05 % cream Apply  to affected area two (2) times a day. 15 g 0  
 multivitamin (ONE A DAY) tablet Take 1 Tab by mouth daily.  OXYGEN-AIR DELIVERY SYSTEMS 2 L by Does Not Apply route nightly.  ADVAIR DISKUS 250-50 mcg/dose diskus inhaler Take 1 Puff by inhalation two (2) times a day.  acetaminophen (TYLENOL) 500 mg tablet Take 650 mg by mouth every six (6) hours as needed for Pain. (2) Tablet in am (1) tablet in pm (2) Tablet pm  0  
 tiotropium (SPIRIVA WITH HANDIHALER) 18 mcg inhalation capsule Take 1 Cap by inhalation daily.  Calcium-Cholecalciferol, D3, 600 mg(1,500mg) -400 unit cap Take  by mouth two (2) times a day.     
 
 
REVIEW OF SYSTEMS:  
 
Y  N       Y  N  Y  N   Y  N 
 [] [x] AIDS          [x] [] Falls  [x] [] Memory Loss  [x] []  Shortness of breath [x] [] Anxiety          [x] [] Fatigue [x] [] Muscle Pain        [] [x]  Skipped beats [x] [x] Chest Pain   [] [x] Frequent HA [x] [] Ms Weakness     [] [x]  Snoring 
[] [x] Constipation [x] []Hearing loss [] [x] Nause/Vomiting  [] [x]  Stomach Pain 
[] [x] Cough          [] [x]Hepatitis [] [x] Neuropathy         [x] []  Swallowing difficulty 
[] [x] Depression  [x] []Incontinence [] [x] Poor appetite      [x] []  Vertigo 
[x] [] Diarrhea       [x] [] Joint Pain [x] [] Rash                   [] [x]  Visual disturbances 
[] [x] Fainting        [x] [] Leg Swelling [] [x] Ringing ears       [] [x]  Weight changes []Unable to obtain  ROS due to  []mental status change  []sedated   []intubated PREVIOUS WORKUP IMAGING: CT head: Negative (I personally reviewed these images in PACS and this is my impression) LABS Results for orders placed or performed in visit on 04/12/18 AMB PT/INR EXTERNAL Result Value Ref Range INR, External 2.8 2.0 - 3.0 PHYSICAL EXAM 
Visit Vitals  /72  Pulse 87  
 Ht 5' 5\" (1.651 m)  Wt 71.6 kg (157 lb 14.4 oz)  LMP 02/26/1970  SpO2 94%  BMI 26.28 kg/m2 General:  Alert, cooperative, no distress. Head:  Normocephalic, without obvious abnormality, atraumatic. Eyes:  Conjunctivae/corneas clear. Pupils equal, round, reactive to light. Extraocular movements intact, VFF, NO papilledema Lungs: 
Heart:   Non labored breathing Regular rate and rhythm, no carotid bruits Abdomen:   Soft, non-distended Extremities: Extremities normal, atraumatic, no cyanosis or edema. Pulses: 2+ and symmetric all extremities. Skin: Skin color, texture, turgor normal. No rashes or lesions. Neurologic:  Gen: Attention normal 
           Language: naming, repetition, fluency normal 
           Memory: intact recent and remote memory Cranial Nerves: 
I: smell Not tested II: visual fields Full to confrontation II: pupils Equal, round, reactive to light II: optic disc No papilledema III,VII: ptosis none III,IV,VI: extraocular muscles  Full ROM V: mastication normal  
V: facial light touch sensation  normal  
VII: facial muscle function   symmetric VIII: hearing  decreased bilaterally IX: soft palate elevation  normal  
XI: trapezius strength  5/5 XI: sternocleidomastoid strength 5/5 XI: neck flexion strength  5/5 XII: tongue  midline Motor: normal bulk and mild cogwheel rigidity on left upper extremity, mild resting tremor of the left hand but significant action tremor in left hand Strength: 5/5 on right upper extremity and bilateral lower extremities, 4/5 in left upper extremity secondary to pain Sensory: intact to LT, PP, vibration, and temperature Coordination: Not tested Gait: Slow and shuffling gait with resting tremor apparent with walking Reflexes: 2+ throughout Positive bradykinesia, no masked facies IMPRESSION Cornelia Duran is a 80 y.o. female who presents for evaluation of tremor and balance issues. Additionally patient is having some significant left shoulder pain and subsequent numbness of the fingers. Patient's tremor is mixed with both resting and action. She does have some other symptoms on exam including abnormal gait that are concerning for Parkinson's. She is on no medications that could induce this. She has had a CT of the head that was negative. She cannot have an MRI due to pacemaker. Will check reversible tremor labs today. I do think she would benefit from a trial of Sinemet to see if she has improvement. I would like her to be on physical therapy at the same time that we start the Sinemet to determine if it is working well for her. Recommend patient to follow-up with orthopedics regarding her left shoulder to see if there is a rotator cuff issue that could be causing her pain. RECOMMENDATIONS Problem List Items Addressed This Visit None Visit Diagnoses Tremor    -  Primary Relevant Medications  
 carbidopa-levodopa (SINEMET)  mg per tablet Other Relevant Orders AMMONIA  
 HEAVY METALS PROFILE I, BLOOD Gait abnormality Relevant Orders REFERRAL TO PHYSICAL THERAPY Spinal stenosis of lumbar region, unspecified whether neurogenic claudication present Left shoulder pain, unspecified chronicity Referral to orthopedics for this FU 6 weeks Maurizio Barton MD 
 
CC: Mendel Councilman, MD 
Fax: [phone] Medications and side effects discussed with patient in detail. With any new medications prescribed, patient was given instructions on administration and side effects. Written medication information was provided to the patient as well. This note was created using voice recognition software. Despite editing, there may be syntax errors. This note will not be viewable in [address] Chief Complaint Patient presents with  Tremors 1. Have you been to the ER, urgent care clinic since your last visit? Hospitalized since your last visit? No 
 
2. Have you seen or consulted any other health care providers outside of the [address] [place] [address] since your last visit? Include any pap smears or colon screening. No  
Reviewed record in preparation for visit and have necessary documentation Pt did not bring medication to office visit for review Medication list reviewed and reconciled with patient Information was given to pt on Advanced Directives, Living Will 
opportunity was given for questions

## 2018-04-17 NOTE — MR AVS SNAPSHOT
Marisa [facility]inedauaPrem[facility] Cent[facility] [address] [facility] [id number] [phone] Patient: Manny Ortega MRN:  [id number] Visit Information Date & Time Provider Department Dept. Phone Encounter #  
 4/17/2018 10:00 AM Candace Jeffers MD [facility] [phone] [id number] Follow-up Instructions Return in about 6 weeks (around 5/29/2018). Your Appointments 6/26/2018  1:00 PM  
PACEMAKER with [facility] ([facility] SCHEDULING) Appt Note: 4 mo f/u; 4 month f/u med interrogation/rc (b 2-15-18 interr)  full ck due after 7-10-18  
 [address] [place] [address] [address]  
[address]  
  
    
 6/26/2018  1:20 PM  
ESTABLISHED PATIENT with Millie Titus MD  
[facility] Appt Note: 4 month f/u med interrogation/rc (b 2-15-18 interr)  full ck due after 7-10-18  
 [address] [place] [address] [address]  
[address] [facility] [facility] [id number] Upcoming Health Maintenance Date Due DTaP/Tdap/Td series (1 - Tdap) 4/3/2012 MEDICARE YEARLY EXAM 5/12/2018 GLAUCOMA SCREENING Q2Y 12/8/2018 Allergies as of 4/17/2018  Review Complete On: 4/17/2018 By: Candace Jeffers MD  
  
 Severity Noted Reaction Type Reactions Cardizem [Diltiazem Hcl]  10/04/2010    Hives Codeine  10/04/2010    Nausea and Vomiting Darvocet A500 [Propoxyphene N-acetaminophen]  10/04/2010    Nausea and Vomiting Diltiazem  10/04/2010    Hives Labetalol  04/25/2012    Nausea and Vomiting Dizzy/Disoriented Pcn [Penicillins]  10/04/2010    Swelling Tongue Swelling Primidone  07/31/2012    Other (comments) Lightheaded/unsteady gait Sulfa (Sulfonamide Antibiotics)  10/04/2010    Nausea and Vomiting Current Immunizations  Reviewed on 11/23/2015 Name Date Influenza High Dose Vaccine PF 9/21/2017, 9/29/2016, 9/24/2015, 10/1/2014 Influenza Vaccine 12/1/2013 Influenza Vaccine Split 10/12/2012 Pneumococcal Conjugate (PCV-13) 11/23/2015 Pneumococcal Polysaccharide (PPSV-23) 2/27/2015 Pneumococcal Vaccine (Pcv) 12/15/2010 TB Skin Test (PPD) 1/1/2001 TB Skin Test (PPD) Intradermal 1/16/2018 TD Vaccine 4/2/2012 Varicella Virus Vaccine Live 9/12/2012 Zoster Vaccine, Live 12/1/2013 Not reviewed this visit You Were Diagnosed With   
  
 Codes Comments Tremor    -  Primary ICD-10-CM: R25.1 ICD-9-CM: 781.0 Gait abnormality     ICD-10-CM: R26.9 ICD-9-CM: 781.2 Spinal stenosis of lumbar region, unspecified whether neurogenic claudication present     ICD-10-CM: M48.061 
ICD-9-CM: 724.02 Left shoulder pain, unspecified chronicity     ICD-10-CM: M25.512 ICD-9-CM: 719.41 Vitals BP Pulse Height(growth percentile) Weight(growth percentile) LMP SpO2  
 116/72 87 5' 5\" (1.651 m) 157 lb 14.4 oz (71.6 kg) [birth year]1970 94% BMI OB Status Smoking Status 26.28 kg/m2 Postmenopausal Passive Smoke Exposure - Never Smoker Vitals History BMI and BSA Data Body Mass Index Body Surface Area  
 26.28 kg/m 2 1.81 m 2 Preferred Pharmacy Pharmacy Name Phone [facility]/PHARMACY #[id number]- [place], [address]) [phone] Your Updated Medication List  
  
   
This list is accurate as of 4/17/18 11:04 AM.  Always use your most recent med list.  
  
  
  
  
 acetaminophen 500 mg tablet Commonly known as:  TYLENOL Take 650 mg by mouth every six (6) hours as needed for Pain. (2) Tablet in am (1) tablet in pm (2) Tablet pm  
  
 ADVAIR DISKUS 250-50 mcg/dose diskus inhaler Generic drug:  fluticasone-salmeterol Take 1 Puff by inhalation two (2) times a day. baclofen 10 mg tablet Commonly known as:  LIORESAL Take 5 mg by mouth two (2) times a day. Calcium-Cholecalciferol (D3) 600 mg(1,500mg) -400 unit Cap Take  by mouth two (2) times a day. carbidopa-levodopa  mg per tablet Commonly known as:  SINEMET Take 1 Tab by mouth three (3) times daily. carvedilol 3.125 mg tablet Commonly known as:  COREG  
TAKE 1 TABLET BY MOUTH TWICE A DAY WITH MEALS  
  
 COUMADIN 5 mg tablet Generic drug:  warfarin TAKE 1 TABLET BY MOUTH DAILY AND 1/2 TABLET ON SUNDAY DENTA 5000 PLUS 1.1 % Crea Generic drug:  fluoride (sodium) Apply 1 mg to affected area as needed (to prevent cavities). desonide 0.05 % cream  
Commonly known as:  Placer Yohannes Apply  to affected area two (2) times a day. furosemide 40 mg tablet Commonly known as:  LASIX Take 1 Tab by mouth daily. multivitamin tablet Commonly known as:  ONE A DAY Take 1 Tab by mouth daily. OXYGEN-AIR DELIVERY SYSTEMS  
2 L by Does Not Apply route nightly. raNITIdine 150 mg tablet Commonly known as:  ZANTAC Take 1 Tab by mouth two (2) times a day. simvastatin 20 mg tablet Commonly known as:  ZOCOR  
TAKE 1 TABLET BY MOUTH NIGHTLY SPIRIVA WITH HANDIHALER 18 mcg inhalation capsule Generic drug:  tiotropium Take 1 Cap by inhalation daily. SYNTHROID 112 mcg tablet Generic drug:  levothyroxine Take 1 Tab by mouth Daily (before breakfast). VITAMIN D3 1,000 unit tablet Generic drug:  cholecalciferol Take  by mouth daily. ZOLOFT 50 mg tablet Generic drug:  sertraline TAKE 1 TABLET BY MOUTH EVERY DAY Prescriptions Sent to Pharmacy Refills  
 carbidopa-levodopa (SINEMET)  mg per tablet 5 Sig: Take 1 Tab by mouth three (3) times daily.   
 Class: Normal  
 Pharmacy: [facility]/pharmacy #[id number]- [address], [address] AT [facility] #: [phone] Route: Oral  
  
We Performed the Following REFERRAL TO PHYSICAL THERAPY [PRT77 Custom] Comments:  
 [facility] for PT eval for gait. Follow-up Instructions Return in about 6 weeks (around 5/29/2018). Referral Information Referral ID Referred By Referred To  
  
 [id number] Juan José Wills Not Available Visits Status Start Date End Date 1 New Request 4/17/18 4/17/19 If your referral has a status of pending review or denied, additional information will be sent to support the outcome of this decision. Patient Instructions PRESCRIPTION REFILL POLICY [facility] Statement to Patients April 1, 2014 In an effort to ensure the large volume of patient prescription refills is processed in the most efficient and expeditious manner, we are asking our patients to assist us by calling your Pharmacy for all prescription refills, this will include also your  Mail Order Pharmacy. The pharmacy will contact our office electronically to continue the refill process. Please do not wait until the last minute to call your pharmacy. We need at least 48 hours (2days) to fill prescriptions. We also encourage you to call your pharmacy before going to  your prescription to make sure it is ready. With regard to controlled substance prescription refill requests (narcotic refills) that need to be picked up at our office, we ask your cooperation by providing us with at least 72 hours (3days) notice that you will need a refill. We will not refill narcotic prescription refill requests after 4:00pm on any weekday, Monday through Thursday, or after 2:00pm on Fridays, or on the weekends.   
  
We encourage everyone to explore another way of getting your prescription refill request processed using [employer], our patient web portal through our electronic medical record system. THE ICONIC is an efficient and effective way to communicate your medication request directly to the office and  downloadable as an sebastian on your smart phone . [employer] also features a review functionality that allows you to view your medication list as well as leave messages for your physician. Are you ready to get connected? If so please review the attatched instructions or speak to any of our staff to get you set up right away! Thank you so much for your cooperation. Should you have any questions please contact our Practice Administrator. The Physicians and Staff,  [facility] Alison [address]ino [address] What is a living will? A living will is a legal form you use to write down the kind of care you want at the end of your life. It is used by the health professionals who will treat you if you aren't able to decide for yourself. If you put your wishes in writing, your loved ones and others will know what kind of care you want. They won't need to guess. This can ease your mind and be helpful to others. A living will is not the same as an estate or property will. An estate will explains what you want to happen with your money and property after you die. Is a living will a legal document? A living will is a legal document. Each state has its own laws about living edmond. If you move to another state, make sure that your living will is legal in the state where you now live. Or you might use a universal form that has been approved by many states. This kind of form can sometimes be completed and stored online. Your electronic copy will then be available wherever you have a connection to the Internet. In most cases, doctors will respect your wishes even if you have a form from a different state. · You don't need an  to complete a living will.  But legal advice can be helpful if your state's laws are unclear, your health history is complicated, or your family can't agree on what should be in your living will. · You can change your living will at any time. Some people find that their wishes about end-of-life care change as their health changes. · In addition to making a living will, think about completing a medical power of  form. This form lets you name the person you want to make end-of-life treatment decisions for you (your \"health care agent\") if you're not able to. Many hospitals and nursing homes will give you the forms you need to complete a living will and a medical power of . · Your living will is used only if you can't make or communicate decisions for yourself anymore. If you become able to make decisions again, you can accept or refuse any treatment, no matter what you wrote in your living will. · Your state may offer an online registry. This is a place where you can store your living will online so the doctors and nurses who need to treat you can find it right away. What should you think about when creating a living will? Talk about your end-of-life wishes with your family members and your doctor. Let them know what you want. That way the people making decisions for you won't be surprised by your choices. Think about these questions as you make your living will: · Do you know enough about life support methods that might be used? If not, talk to your doctor so you know what might be done if you can't breathe on your own, your heart stops, or you're unable to swallow. · What things would you still want to be able to do after you receive life-support methods? Would you want to be able to walk? To speak? To eat on your own? To live without the help of machines? · If you have a choice, where do you want to be cared for? In your home? At a hospital or nursing home? · Do you want certain Hindu practices performed if you become very ill? · If you have a choice at the end of your life, where would you prefer to die? At home? In a hospital or nursing home? Somewhere else? · Would you prefer to be buried or cremated? · Do you want your organs to be donated after you die? What should you do with your living will? · Make sure that your family members and your health care agent have copies of your living will. · Give your doctor a copy of your living will to keep in your medical record. If you have more than one doctor, make sure that each one has a copy. · You may want to put a copy of your living will where it can be easily found. Where can you learn more? Go to http://markel-nicolasa.info/. Enter T509 in the search box to learn more about \"Learning About Living Perrolisa. \" Current as of: September 24, 2016 Content Version: 11.4 © 1728-5083 [employer]. Care instructions adapted under license by [employer] (which disclaims liability or warranty for this information). If you have questions about a medical condition or this instruction, always ask your healthcare professional. [place] [address] any warranty or liability for your use of this information. Advance Directives: Care Instructions Your Care Instructions An advance directive is a legal way to state your wishes at the end of your life. It tells your family and your doctor what to do if you can no longer say what you want. There are two main types of advance directives. You can change them any time that your wishes change. · A living will tells your family and your doctor your wishes about life support and other treatment. · A durable power of  for health care lets you name a person to make treatment decisions for you when you can't speak for yourself. This person is called a health care agent.  
If you do not have an advance directive, decisions about your medical care may be made by a doctor or a  who doesn't know you. It may help to think of an advance directive as a gift to the people who care for you. If you have one, they won't have to make tough decisions by themselves. Follow-up care is a key part of your treatment and safety. Be sure to make and go to all appointments, and call your doctor if you are having problems. It's also a good idea to know your test results and keep a list of the medicines you take. How can you care for yourself at home? · Discuss your wishes with your loved ones and your doctor. This way, there are no surprises. · Many states have a unique form. Or you might use a universal form that has been approved by many states. This kind of form can sometimes be completed and stored online. Your electronic copy will then be available wherever you have a connection to the Internet. In most cases, doctors will respect your wishes even if you have a form from a different state. · You don't need a  to do an advance directive. But you may want to get legal advice. · Think about these questions when you prepare an advance directive: ¨ Who do you want to make decisions about your medical care if you are not able to? Many people choose a family member or close friend. ¨ Do you know enough about life support methods that might be used? If not, talk to your doctor so you understand. ¨ What are you most afraid of that might happen? You might be afraid of having pain, losing your independence, or being kept alive by machines. ¨ Where would you prefer to die? Choices include your home, a hospital, or a nursing home. ¨ Would you like to have information about hospice care to support you and your family? ¨ Do you want to donate organs when you die? ¨ Do you want certain Adventist practices performed before you die? If so, put your wishes in the advance directive. · Read your advance directive every year, and make changes as needed. When should you call for help? Be sure to contact your doctor if you have any questions. Where can you learn more? Go to http://markel-nicolasa.info/. Enter R264 in the search box to learn more about \"Advance Directives: Care Instructions. \" Current as of: September 24, 2016 Content Version: 11.4 © 3804-5090 [employer]or. Care instructions adapted under license by [employer] (which disclaims liability or warranty for this information). If you have questions about a medical condition or this instruction, always ask your healthcare professional. [address] any warranty or liability for your use of this information. Patient Instructions/Plans: 
Carbidopa/Levodopa (By mouth) Carbidopa (ify-hq-LEJ-pa), Levodopa (pyi-oro-UEI-pa) Treats symptoms of Parkinson disease. Brand Name(s): Rytary, Sinemet , Sinemet , Sinemet , Sinemet CR There may be other brand names for this medicine. When This Medicine Should Not Be Used: This medicine is not right for everyone. Do not use it if you had an allergic reaction to carbidopa or levodopa, or if you have narrow-angle glaucoma, skin lesions, or a history of skin cancer. How to Use This Medicine:  
Long Acting Capsule, Tablet, Dissolving Tablet, Long Acting Tablet · Take your medicine as directed. Your dose may need to be changed several times to find what works best for you. · This medicine will take longer to work if you take it with a high-fat or high-protein meal. Ask your doctor or pharmacist if you should take it 1 or 2 hours before a meal. 
· Disintegrating tablet: Make sure your hands are dry before you handle the tablet. Do not remove the tablet from the bottle until you are ready to take it. Place the tablet on top of your tongue, where it will melt quickly.  
· Extended-release capsule or sustained-release tablet: Swallow the capsule or tablet whole. Do not crush, break, or chew it. · If you cannot swallow the extended-release capsule, you may open it and pour the medicine into a small amount of soft food such as pudding, yogurt, or applesauce. Stir this mixture well and swallow it without chewing. · Missed dose: Take a dose as soon as you remember. If it is almost time for your next dose, wait until then and take a regular dose. Do not take extra medicine to make up for a missed dose. · Store the medicine in a closed container at room temperature, away from heat, moisture, and direct light. Drugs and Foods to Avoid: Ask your doctor or pharmacist before using any other medicine, including over-the-counter medicines, vitamins, and herbal products. · Do not use this medicine if you have used an MAO inhibitor (MAOI) within the past 14 days. · Some foods and medicines can affect how carbidopa/levodopa works. Tell your doctor if you are using any of the following: ¨ Isoniazid, metoclopramide, papaverine, phenytoin, reserpine, risperidone, tetrabenazine ¨ A phenothiazine medicine (such as chlorpromazine, perphenazine, promethazine, prochlorperazine, thioridazine) ¨ Blood pressure medicine ¨ Medicine to treat depression or mental illness · Tell your doctor if you are using any mineral supplements or multivitamins with iron. · Tell your doctor if you use anything else that makes you sleepy. Some examples are allergy medicine, narcotic pain medicine, and alcohol. Warnings While Using This Medicine: · Tell your doctor if you are pregnant or breastfeeding, or if you have kidney disease, liver disease, endocrine problems, lung or breathing problems (such as asthma), a sleep disorder, heart or blood vessel problems, heart rhythm problems, or a history of heart attack. Tell your doctor if you have wide-angle glaucoma, phenylketonuria (PKU), or a history of stomach ulcer or mental illness. · This medicine may cause the following problems: ¨ Dyskinesia (trouble controlling movements) ¨ An increased risk for skin cancer ¨ Unusual changes in thoughts or behavior, such as the urge to candelaria or an increased sex drive · This medicine may make you dizzy or drowsy, or even cause you to fall asleep without warning. This could happen while you are driving, eating, or talking. Tell your doctor right away if this happens. Do not drive or do anything that could be dangerous until you know how this medicine affects you. · If you are already taking levodopa, stop taking it at least 12 hours before starting treatment with carbidopa/levodopa combination. · This medicine might cause your saliva, sweat, or urine to become dark red, brown, or black. This is nothing to worry about, but it could stain your clothes. · Do not stop using this medicine suddenly. Your doctor will need to slowly decrease your dose before you stop it completely. · Tell any doctor or dentist who treats you that you are using this medicine. This medicine may affect certain medical test results. · Your doctor will check your progress and the effects of this medicine at regular visits. Keep all appointments. · Keep all medicine out of the reach of children. Never share your medicine with anyone. Possible Side Effects While Using This Medicine:  
Call your doctor right away if you notice any of these side effects: · Allergic reaction: Itching or hives, swelling in your face or hands, swelling or tingling in your mouth or throat, chest tightness, trouble breathing · Chest pain, trouble breathing, fast or uneven heartbeat · Compulsive behavior or intense urges you cannot control · Falling asleep in the middle of an activity · Jerky muscle movement you cannot control (often in your face, tongue, or jaw) · Unusual behavior, mood changes, seeing or hearing things that are not there, or thoughts of hurting yourself If you notice these less serious side effects, talk with your doctor: · Drowsiness or sleepiness · Headache, trouble sleeping · Lightheadedness, dizziness, or fainting · Nausea · Pink or red urine, dark sweat or saliva, or heavy sweating If you notice other side effects that you think are caused by this medicine, tell your doctor. Call your doctor for medical advice about side effects. You may report side effects to FDA at 3-117-LDZ-2137 © 2017 [address] Information is for End User's use only and may not be sold, redistributed or otherwise used for commercial purposes. The above information is an  only. It is not intended as medical advice for individual conditions or treatments. Talk to your doctor, nurse or pharmacist before following any medical regimen to see if it is safe and effective for you. Introducing [facility]! Dear Josee Partida: 
Thank you for requesting a TaxJar account. Our records indicate that you already have an active TaxJar account. You can access your account anytime at [email or web address] Did you know that you can access your hospital and ER discharge instructions at any time in TaxJar? You can also review all of your test results from your hospital stay or ER visit. Additional Information If you have questions, please visit the Frequently Asked Questions section of the TaxJar website at [email or web address] Remember, TaxJar is NOT to be used for urgent needs. For medical emergencies, dial 911. Now available from your iPhone and Android! Please provide this summary of care documentation to your next provider. Your primary care clinician is listed as [facility]. If you have any questions after today's visit, please call [phone].

## 2018-04-17 NOTE — PROGRESS NOTES
NEUROLOGY NEW PATIENT CONSULTATION    REFERRED BY:  Kanu Ceballos MD    CHIEF COMPLAINT:  Tremor    HISTORY OF PRESENT ILLNESS    HISTORY PROVIDED BY:  Patient  Family Member: daughter      Danielle Alcocer is a 80 y.o. female left handed female who I am asked to see in consultation for tremor and balance issues. Her left hand has some numbness in the thumb and her pinky finger. This has been in the last 4-6 months. This makes her have difficulty with holding things. She has had tremor in her left hand for two years. She has some slight resting tremor in the right hand. She has tremor with action. Especially with holding things. At a recent PCP visit when watching her gait he was concerned for PD. She feels like she bends over when walking. She is having a shuffling gait. She has some back pain and arthritis. She did have an epidural which has helped but she is ready for another one. She has felt some freezing gait some challenges with doors. It is challenging to know if this is due to fatigue or not being able to do it. She does have a some breathing issues and a pacemaker. She is too high risk to take get this changed to a dual pacer. She does have hypothyroid and is on synthroid. She is on coumadin for afib. She has not had a stroke. FH of aunt with PD/tremors. She feels her memory is declining. She lives in [facility]. She has been there two months. She is active there. There is a dining room she can eat at. She has a note from her occupational therapist that is concerned about her left shoulder. She appears to have significant pain and decreased range of motion. Subsequently it is challenging for her to complete therapy due to this.     PMH  Past Medical History:   Diagnosis Date    Arthritis     Atrial fibrillation ([address]     av node ablation 5/22/98 with placement of CPI #[id number] dual chamber pacemaker subsequently replaced with a single chamber medtronic #[id number] single chamber pacemaker 7/25/05    Cancer ([facility] [address] 1970's    colon cancer w/ resection    COPD     Depression     GERD (gastroesophageal reflux disease)     Hyperlipidemia     Hypertension     Ischemic cardiomyopathy     Migraine headache     Orthostatic hypotension     RAHDA (obstructive sleep apnea)     Pacemaker 5/22/98    AV node ablation /pacemaker placement utilizing CPI # [id number] dual chamber system, medtronic #[id number] single chamber device placed 7/22/05    Pulmonary hypertension ([facility] [address] 9/26/2011    RVSP 50 on echo 8/14    Thyroid disease     Valvular heart disease     mild-mod MR/TR       SH  Social History     Social History    Marital status:      Spouse name: N/A    Number of children: N/A    Years of education: N/A     Social History Main Topics    Smoking status: Passive Smoke Exposure - Never Smoker    Smokeless tobacco: Never Used    Alcohol use No    Drug use: No    Sexual activity: No     Other Topics Concern    Not on file     Social History Narrative       FH  Family History   Problem Relation Age of Onset    Diabetes Mother     Heart Disease Mother     Heart Attack Mother     Heart Disease Father     Stroke Sister     Breast Cancer Sister 80    Cancer Maternal Aunt      uterus    Diabetes Maternal Uncle     Breast Cancer Daughter 61       ALLERGIES  Allergies   Allergen Reactions    Cardizem [Diltiazem Hcl] Hives    Codeine Nausea and Vomiting    Darvocet A500 [Propoxyphene N-Acetaminophen] Nausea and Vomiting    Diltiazem Hives    Labetalol Nausea and Vomiting     Dizzy/Disoriented     Pcn [Penicillins] Swelling     Tongue Swelling     Primidone Other (comments)     Lightheaded/unsteady gait    Sulfa (Sulfonamide Antibiotics) Nausea and Vomiting       CURRENT MEDS  Current Outpatient Prescriptions   Medication Sig Dispense Refill    carvedilol (COREG) 3.125 mg tablet TAKE 1 TABLET BY MOUTH TWICE A DAY WITH MEALS 180 Tab 1    ZOLOFT 50 mg tablet TAKE 1 TABLET BY MOUTH EVERY DAY 90 Tab 2    COUMADIN 5 mg tablet TAKE 1 TABLET BY MOUTH DAILY AND 1/2 TABLET ON SUNDAY (Patient taking differently: TAKE 1 TABLET BY MOUTH DAILY AND 1/2 TABLET ON SUNDAY MON & WED) 90 Tab 0    cholecalciferol (VITAMIN D3) 1,000 unit tablet Take  by mouth daily.  baclofen (LIORESAL) 10 mg tablet Take 5 mg by mouth two (2) times a day.  DENTA 5000 PLUS 1.1 % crea Apply 1 mg to affected area as needed (to prevent cavities). 2    furosemide (LASIX) 40 mg tablet Take 1 Tab by mouth daily. (Patient taking differently: Take 20 mg by mouth daily.) 90 Tab 2    raNITIdine (ZANTAC) 150 mg tablet Take 1 Tab by mouth two (2) times a day. 180 Tab 1    SYNTHROID 112 mcg tablet Take 1 Tab by mouth Daily (before breakfast). 90 Tab 1    simvastatin (ZOCOR) 20 mg tablet TAKE 1 TABLET BY MOUTH NIGHTLY 90 Tab 1    desonide (TRIDESILON) 0.05 % cream Apply  to affected area two (2) times a day. 15 g 0    multivitamin (ONE A DAY) tablet Take 1 Tab by mouth daily.  OXYGEN-AIR DELIVERY SYSTEMS 2 L by Does Not Apply route nightly.  ADVAIR DISKUS 250-50 mcg/dose diskus inhaler Take 1 Puff by inhalation two (2) times a day.  acetaminophen (TYLENOL) 500 mg tablet Take 650 mg by mouth every six (6) hours as needed for Pain. (2) Tablet in am (1) tablet in pm (2) Tablet pm  0    tiotropium (SPIRIVA WITH HANDIHALER) 18 mcg inhalation capsule Take 1 Cap by inhalation daily.  Calcium-Cholecalciferol, D3, 600 mg(1,500mg) -400 unit cap Take  by mouth two (2) times a day.          REVIEW OF SYSTEMS:     Y  N       Y  N  Y  N   Y  N  [] [x] AIDS          [x] [] Falls  [x] [] Memory Loss  [x] []  Shortness of breath  [x] [] Anxiety          [x] [] Fatigue [x] [] Muscle Pain        [] [x]  Skipped beats  [x] [x] Chest Pain   [] [x] Frequent HA [x] [] Ms Weakness     [] [x]  Snoring  [] [x] Constipation [x] []Hearing loss [] [x] Nause/Vomiting  [] [x]  Stomach Pain  [] [x] Cough          [] [x]Hepatitis [] [x] Neuropathy [x] []  Swallowing difficulty  [] [x] Depression  [x] []Incontinence [] [x] Poor appetite      [x] []  Vertigo  [x] [] Diarrhea       [x] [] Joint Pain [x] [] Rash                   [] [x]  Visual disturbances  [] [x] Fainting        [x] [] Leg Swelling [] [x] Ringing ears       [] [x]  Weight changes      []Unable to obtain  ROS due to  []mental status change  []sedated   []intubated          PREVIOUS WORKUP  IMAGING: CT head: Negative  (I personally reviewed these images in PACS and this is my impression)    LABS  Results for orders placed or performed in visit on 04/12/18   AMB PT/INR EXTERNAL   Result Value Ref Range    INR, External 2.8 2.0 - 3.0       PHYSICAL EXAM  Visit Vitals    /72    Pulse 87    Ht 5' 5\" (1.651 m)    Wt 71.6 kg (157 lb 14.4 oz)    LMP 02/26/1970    SpO2 94%    BMI 26.28 kg/m2     General:  Alert, cooperative, no distress. Head:  Normocephalic, without obvious abnormality, atraumatic. Eyes:  Conjunctivae/corneas clear. Pupils equal, round, reactive to light. Extraocular movements intact, VFF, NO papilledema   Lungs:  Heart:   Non labored breathing  Regular rate and rhythm, no carotid bruits   Abdomen:   Soft, non-distended   Extremities: Extremities normal, atraumatic, no cyanosis or edema. Pulses: 2+ and symmetric all extremities. Skin: Skin color, texture, turgor normal. No rashes or lesions.    Neurologic:  Gen: Attention normal             Language: naming, repetition, fluency normal             Memory: intact recent and remote memory  Cranial Nerves:  I: smell Not tested   II: visual fields Full to confrontation   II: pupils Equal, round, reactive to light   II: optic disc No papilledema   III,VII: ptosis none   III,IV,VI: extraocular muscles  Full ROM   V: mastication normal   V: facial light touch sensation  normal   VII: facial muscle function   symmetric   VIII: hearing  decreased bilaterally   IX: soft palate elevation  normal   XI: trapezius strength 5/5   XI: sternocleidomastoid strength 5/5   XI: neck flexion strength  5/5   XII: tongue  midline     Motor: normal bulk and mild cogwheel rigidity on left upper extremity, mild resting tremor of the left hand but significant action tremor in left hand              Strength: 5/5 on right upper extremity and bilateral lower extremities, 4/5 in left upper extremity secondary to pain  Sensory: intact to LT, PP, vibration, and temperature  Coordination: Not tested  Gait: Slow and shuffling gait with resting tremor apparent with walking  Reflexes: 2+ throughout  Positive bradykinesia, no masked facies     IMPRESSION  Lani Tyson is a 80 y.o. female who presents for evaluation of tremor and balance issues. Additionally patient is having some significant left shoulder pain and subsequent numbness of the fingers. Patient's tremor is mixed with both resting and action. She does have some other symptoms on exam including abnormal gait that are concerning for Parkinson's. She is on no medications that could induce this. She has had a CT of the head that was negative. She cannot have an MRI due to pacemaker. Will check reversible tremor labs today. I do think she would benefit from a trial of Sinemet to see if she has improvement. I would like her to be on physical therapy at the same time that we start the Sinemet to determine if it is working well for her. Recommend patient to follow-up with orthopedics regarding her left shoulder to see if there is a rotator cuff issue that could be causing her pain.     RECOMMENDATIONS    Problem List Items Addressed This Visit     None      Visit Diagnoses     Tremor    -  Primary    Relevant Medications    carbidopa-levodopa (SINEMET)  mg per tablet    Other Relevant Orders    AMMONIA    HEAVY METALS PROFILE I, BLOOD    Gait abnormality        Relevant Orders    REFERRAL TO PHYSICAL THERAPY    Spinal stenosis of lumbar region, unspecified whether neurogenic claudication present        Left shoulder pain, unspecified chronicity      Referral to orthopedics for this          FU 6 weeks    Maddy Moon MD    CC: Taylor Boas, MD  Fax: [phone]    Medications and side effects discussed with patient in detail. With any new medications prescribed, patient was given instructions on administration and side effects. Written medication information was provided to the patient as well. This note was created using voice recognition software. Despite editing, there may be syntax errors. This note will not be viewable in [address]

## 2018-04-17 NOTE — PATIENT INSTRUCTIONS
[facility]   Statement to Patients  April 1, 2014      In an effort to ensure the large volume of patient prescription refills is processed in the most efficient and expeditious manner, we are asking our patients to assist us by calling your Pharmacy for all prescription refills, this will include also your  Mail Order Pharmacy. The pharmacy will contact our office electronically to continue the refill process. Please do not wait until the last minute to call your pharmacy. We need at least 48 hours (2days) to fill prescriptions. We also encourage you to call your pharmacy before going to  your prescription to make sure it is ready. With regard to controlled substance prescription refill requests (narcotic refills) that need to be picked up at our office, we ask your cooperation by providing us with at least 72 hours (3days) notice that you will need a refill. We will not refill narcotic prescription refill requests after 4:00pm on any weekday, Monday through Thursday, or after 2:00pm on Fridays, or on the weekends. We encourage everyone to explore another way of getting your prescription refill request processed using [employer], our patient web portal through our electronic medical record system. [employer] is an efficient and effective way to communicate your medication request directly to the office and  downloadable as an sebastian on your smart phone . [employer] also features a review functionality that allows you to view your medication list as well as leave messages for your physician. Are you ready to get connected? If so please review the attatched instructions or speak to any of our staff to get you set up right away! Thank you so much for your cooperation. Should you have any questions please contact our Practice Administrator.     The Physicians and Staff,  [facility] [id number] [address]  What is a living will?    A living will is a legal form you use to write down the kind of care you want at the end of your life. It is used by the health professionals who will treat you if you aren't able to decide for yourself. If you put your wishes in writing, your loved ones and others will know what kind of care you want. They won't need to guess. This can ease your mind and be helpful to others. A living will is not the same as an estate or property will. An estate will explains what you want to happen with your money and property after you die. Is a living will a legal document? A living will is a legal document. Each state has its own laws about living [place]. If you move to another state, make sure that your living will is legal in the state where you now live. Or you might use a universal form that has been approved by many states. This kind of form can sometimes be completed and stored online. Your electronic copy will then be available wherever you have a connection to the Internet. In most cases, doctors will respect your wishes even if you have a form from a different state. · You don't need an  to complete a living will. But legal advice can be helpful if your state's laws are unclear, your health history is complicated, or your family can't agree on what should be in your living will. · You can change your living will at any time. Some people find that their wishes about end-of-life care change as their health changes. · In addition to making a living will, think about completing a medical power of  form. This form lets you name the person you want to make end-of-life treatment decisions for you (your \"health care agent\") if you're not able to. Many hospitals and nursing homes will give you the forms you need to complete a living will and a medical power of . · Your living will is used only if you can't make or communicate decisions for yourself anymore.  If you become able to make decisions again, you can accept or refuse any treatment, no matter what you wrote in your living will. · Your state may offer an online registry. This is a place where you can store your living will online so the doctors and nurses who need to treat you can find it right away. What should you think about when creating a living will? Talk about your end-of-life wishes with your family members and your doctor. Let them know what you want. That way the people making decisions for you won't be surprised by your choices. Think about these questions as you make your living will:  · Do you know enough about life support methods that might be used? If not, talk to your doctor so you know what might be done if you can't breathe on your own, your heart stops, or you're unable to swallow. · What things would you still want to be able to do after you receive life-support methods? Would you want to be able to walk? To speak? To eat on your own? To live without the help of machines? · If you have a choice, where do you want to be cared for? In your home? At a hospital or nursing home? · Do you want certain Anabaptism practices performed if you become very ill? · If you have a choice at the end of your life, where would you prefer to die? At home? In a hospital or nursing home? Somewhere else? · Would you prefer to be buried or cremated? · Do you want your organs to be donated after you die? What should you do with your living will? · Make sure that your family members and your health care agent have copies of your living will. · Give your doctor a copy of your living will to keep in your medical record. If you have more than one doctor, make sure that each one has a copy. · You may want to put a copy of your living will where it can be easily found. Where can you learn more? Go to http://markel-nicolasa.info/. Enter O657 in the search box to learn more about \"Learning About Living [place]. \"  Current as of: September 24, 2016  Content Version: 11.4  © 7286-4778 [employer]. Care instructions adapted under license by [employer] (which disclaims liability or warranty for this information). If you have questions about a medical condition or this instruction, always ask your healthcare professional. [address] any warranty or liability for your use of this information. Advance Directives: Care Instructions  Your Care Instructions  An advance directive is a legal way to state your wishes at the end of your life. It tells your family and your doctor what to do if you can no longer say what you want. There are two main types of advance directives. You can change them any time that your wishes change. · A living will tells your family and your doctor your wishes about life support and other treatment. · A durable power of  for health care lets you name a person to make treatment decisions for you when you can't speak for yourself. This person is called a health care agent. If you do not have an advance directive, decisions about your medical care may be made by a doctor or a  who doesn't know you. It may help to think of an advance directive as a gift to the people who care for you. If you have one, they won't have to make tough decisions by themselves. Follow-up care is a key part of your treatment and safety. Be sure to make and go to all appointments, and call your doctor if you are having problems. It's also a good idea to know your test results and keep a list of the medicines you take. How can you care for yourself at home? · Discuss your wishes with your loved ones and your doctor. This way, there are no surprises. · Many states have a unique form. Or you might use a universal form that has been approved by many states. This kind of form can sometimes be completed and stored online.  Your electronic copy will then be available wherever you have a connection to the Internet. In most cases, doctors will respect your wishes even if you have a form from a different state. · You don't need a  to do an advance directive. But you may want to get legal advice. · Think about these questions when you prepare an advance directive:  ¨ Who do you want to make decisions about your medical care if you are not able to? Many people choose a family member or close friend. ¨ Do you know enough about life support methods that might be used? If not, talk to your doctor so you understand. ¨ What are you most afraid of that might happen? You might be afraid of having pain, losing your independence, or being kept alive by machines. ¨ Where would you prefer to die? Choices include your home, a hospital, or a nursing home. ¨ Would you like to have information about hospice care to support you and your family? ¨ Do you want to donate organs when you die? ¨ Do you want certain Sikhism practices performed before you die? If so, put your wishes in the advance directive. · Read your advance directive every year, and make changes as needed. When should you call for help? Be sure to contact your doctor if you have any questions. Where can you learn more? Go to http://markel-nicolasa.info/. Enter R264 in the search box to learn more about \"Advance Directives: Care Instructions. \"  Current as of: September 24, 2016  Content Version: 11.4  © 6787-5997 [employer]. Care instructions adapted under license by [employer] (which disclaims liability or warranty for this information). If you have questions about a medical condition or this instruction, always ask your healthcare professional. [place] [address] any warranty or liability for your use of this information.   Patient Instructions/Plans:  Carbidopa/Levodopa (By mouth)   Carbidopa ([id number]), Levodopa ([id number])  Treats symptoms of Parkinson disease. Brand Name(s): Rytary, Sinemet , Sinemet , Sinemet , Sinemet CR   There may be other brand names for this medicine. When This Medicine Should Not Be Used: This medicine is not right for everyone. Do not use it if you had an allergic reaction to carbidopa or levodopa, or if you have narrow-angle glaucoma, skin lesions, or a history of skin cancer. How to Use This Medicine:   Long Acting Capsule, Tablet, Dissolving Tablet, Long Acting Tablet  · Take your medicine as directed. Your dose may need to be changed several times to find what works best for you. · This medicine will take longer to work if you take it with a high-fat or high-protein meal. Ask your doctor or pharmacist if you should take it 1 or 2 hours before a meal.  · Disintegrating tablet: Make sure your hands are dry before you handle the tablet. Do not remove the tablet from the bottle until you are ready to take it. Place the tablet on top of your tongue, where it will melt quickly. · Extended-release capsule or sustained-release tablet: Swallow the capsule or tablet whole. Do not crush, break, or chew it. · If you cannot swallow the extended-release capsule, you may open it and pour the medicine into a small amount of soft food such as pudding, yogurt, or applesauce. Stir this mixture well and swallow it without chewing. · Missed dose: Take a dose as soon as you remember. If it is almost time for your next dose, wait until then and take a regular dose. Do not take extra medicine to make up for a missed dose. · Store the medicine in a closed container at room temperature, away from heat, moisture, and direct light. Drugs and Foods to Avoid:   Ask your doctor or pharmacist before using any other medicine, including over-the-counter medicines, vitamins, and herbal products. · Do not use this medicine if you have used an MAO inhibitor (MAOI) within the past 14 days.   · Some foods and medicines can affect how carbidopa/levodopa works. Tell your doctor if you are using any of the following:  ¨ Isoniazid, metoclopramide, papaverine, phenytoin, reserpine, risperidone, tetrabenazine  ¨ A phenothiazine medicine (such as chlorpromazine, perphenazine, promethazine, prochlorperazine, thioridazine)  ¨ Blood pressure medicine  ¨ Medicine to treat depression or mental illness  · Tell your doctor if you are using any mineral supplements or multivitamins with iron. · Tell your doctor if you use anything else that makes you sleepy. Some examples are allergy medicine, narcotic pain medicine, and alcohol. Warnings While Using This Medicine:   · Tell your doctor if you are pregnant or breastfeeding, or if you have kidney disease, liver disease, endocrine problems, lung or breathing problems (such as asthma), a sleep disorder, heart or blood vessel problems, heart rhythm problems, or a history of heart attack. Tell your doctor if you have wide-angle glaucoma, phenylketonuria (PKU), or a history of stomach ulcer or mental illness. · This medicine may cause the following problems:  ¨ Dyskinesia (trouble controlling movements)  ¨ An increased risk for skin cancer  ¨ Unusual changes in thoughts or behavior, such as the urge to candelaria or an increased sex drive  · This medicine may make you dizzy or drowsy, or even cause you to fall asleep without warning. This could happen while you are driving, eating, or talking. Tell your doctor right away if this happens. Do not drive or do anything that could be dangerous until you know how this medicine affects you. · If you are already taking levodopa, stop taking it at least 12 hours before starting treatment with carbidopa/levodopa combination. · This medicine might cause your saliva, sweat, or urine to become dark red, brown, or black. This is nothing to worry about, but it could stain your clothes. · Do not stop using this medicine suddenly.  Your doctor will need to slowly decrease your dose before you stop it completely. · Tell any doctor or dentist who treats you that you are using this medicine. This medicine may affect certain medical test results. · Your doctor will check your progress and the effects of this medicine at regular visits. Keep all appointments. · Keep all medicine out of the reach of children. Never share your medicine with anyone. Possible Side Effects While Using This Medicine:   Call your doctor right away if you notice any of these side effects:  · Allergic reaction: Itching or hives, swelling in your face or hands, swelling or tingling in your mouth or throat, chest tightness, trouble breathing  · Chest pain, trouble breathing, fast or uneven heartbeat  · Compulsive behavior or intense urges you cannot control  · Falling asleep in the middle of an activity  · Jerky muscle movement you cannot control (often in your face, tongue, or jaw)  · Unusual behavior, mood changes, seeing or hearing things that are not there, or thoughts of hurting yourself  If you notice these less serious side effects, talk with your doctor:   · Drowsiness or sleepiness  · Headache, trouble sleeping  · Lightheadedness, dizziness, or fainting  · Nausea  · Pink or red urine, dark sweat or saliva, or heavy sweating  If you notice other side effects that you think are caused by this medicine, tell your doctor. Call your doctor for medical advice about side effects. You may report side effects to FDA at 6-105-FDA-4287  © 2017 [address] Information is for End User's use only and may not be sold, redistributed or otherwise used for commercial purposes. The above information is an  only. It is not intended as medical advice for individual conditions or treatments. Talk to your doctor, nurse or pharmacist before following any medical regimen to see if it is safe and effective for you.

## 2018-04-17 NOTE — PROGRESS NOTES
Chief Complaint   Patient presents with    Tremors     1. Have you been to the ER, urgent care clinic since your last visit? Hospitalized since your last visit? No    2. Have you seen or consulted any other health care providers outside of the [address] [place] [address] since your last visit? Include any pap smears or colon screening.  No   Reviewed record in preparation for visit and have necessary documentation  Pt did not bring medication to office visit for review  Medication list reviewed and reconciled with patient  Information was given to pt on Advanced Directives, Living Will  opportunity was given for questions

## 2018-04-18 ENCOUNTER — TELEPHONE ANTICOAG (OUTPATIENT)
Dept: CARDIOLOGY CLINIC | Age: 83
End: 2018-04-18

## 2018-04-18 DIAGNOSIS — R25.1 TREMOR: ICD-10-CM

## 2018-04-18 LAB — INR, EXTERNAL: 2.5 (ref 2–3)

## 2018-04-25 ENCOUNTER — TELEPHONE ANTICOAG (OUTPATIENT)
Dept: CARDIOLOGY CLINIC | Age: 83
End: 2018-04-25

## 2018-04-25 LAB
INR, EXTERNAL: 3.5 (ref 2–3)
INR, EXTERNAL: 3.5 (ref 2–3)

## 2018-04-25 NOTE — PROGRESS NOTES

## 2018-04-27 LAB
AMMONIA PLAS-MCNC: 34 UG/DL (ref 19–87)
ARSENIC BLD-MCNC: 9 UG/L (ref 2–23)
LEAD BLD-MCNC: 1 UG/DL (ref 0–19)
MERCURY BLD-MCNC: NORMAL UG/L (ref 0–14.9)

## 2018-05-10 ENCOUNTER — TELEPHONE ANTICOAG (OUTPATIENT)
Dept: CARDIOLOGY CLINIC | Age: 83
End: 2018-05-10

## 2018-05-10 LAB — INR, EXTERNAL: 2.6

## 2018-05-10 NOTE — PROGRESS NOTES

## 2018-05-14 DIAGNOSIS — I48.91 ATRIAL FIBRILLATION, UNSPECIFIED TYPE (HCC): Primary | ICD-10-CM

## 2018-05-14 RX ORDER — WARFARIN SODIUM 5 MG/1
TABLET ORAL
Qty: 90 TAB | Refills: 0 | Status: SHIPPED | OUTPATIENT
Start: 2018-05-14 | End: 2018-06-26 | Stop reason: ALTCHOICE

## 2018-05-14 NOTE — TELEPHONE ENCOUNTER
Requested Prescriptions     Signed Prescriptions Disp Refills    COUMADIN 5 mg tablet 90 Tab 0     Sig: TAKE 1 TABLET BY MOUTH DAILY AND 1/2 TABLET ON SUNDAY     Authorizing Provider: Vidhi Tomlin     Ordering User: Gabby Castaneda    Per Dr. Sergey Fonseca verbal orders

## 2018-05-18 ENCOUNTER — TELEPHONE ANTICOAG (OUTPATIENT)
Dept: CARDIOLOGY CLINIC | Age: 83
End: 2018-05-18

## 2018-05-18 LAB
INR, EXTERNAL: 2.6 (ref 2–3)
INR, EXTERNAL: 2.7 (ref 2–3)

## 2018-05-18 NOTE — PROGRESS NOTES

## 2018-05-22 DIAGNOSIS — E78.2 MIXED HYPERLIPIDEMIA: Primary | ICD-10-CM

## 2018-05-29 RX ORDER — SIMVASTATIN 20 MG/1
TABLET, FILM COATED ORAL
Qty: 90 TAB | Refills: 1 | Status: SHIPPED | OUTPATIENT
Start: 2018-05-29 | End: 2018-11-22 | Stop reason: SDUPTHER

## 2018-05-29 NOTE — TELEPHONE ENCOUNTER
Requested Prescriptions     Signed Prescriptions Disp Refills    simvastatin (ZOCOR) 20 mg tablet 90 Tab 1     Sig: TAKE 1 TABLET BY MOUTH NIGHTLY     Authorizing Provider: Di Hutchins     Ordering User: Uma Villalobos    Per Dr. Castro Trujillo verbal orders

## 2018-05-30 ENCOUNTER — TELEPHONE ANTICOAG (OUTPATIENT)
Dept: CARDIOLOGY CLINIC | Age: 83
End: 2018-05-30

## 2018-05-30 NOTE — PROGRESS NOTES

## 2018-05-31 RX ORDER — LEVOTHYROXINE SODIUM 112 UG/1
TABLET ORAL
Qty: 90 TAB | Refills: 1 | Status: SHIPPED | OUTPATIENT
Start: 2018-05-31 | End: 2018-09-12 | Stop reason: ALTCHOICE

## 2018-06-03 RX ORDER — RANITIDINE 150 MG/1
TABLET, FILM COATED ORAL
Qty: 180 TAB | Refills: 1 | Status: SHIPPED | OUTPATIENT
Start: 2018-06-03 | End: 2018-12-01 | Stop reason: SDUPTHER

## 2018-06-07 ENCOUNTER — OFFICE VISIT (OUTPATIENT)
Dept: INTERNAL MEDICINE CLINIC | Age: 83
End: 2018-06-07

## 2018-06-07 VITALS
OXYGEN SATURATION: 94 % | HEIGHT: 65 IN | BODY MASS INDEX: 26.16 KG/M2 | SYSTOLIC BLOOD PRESSURE: 143 MMHG | HEART RATE: 83 BPM | TEMPERATURE: 98.3 F | WEIGHT: 157 LBS | RESPIRATION RATE: 18 BRPM | DIASTOLIC BLOOD PRESSURE: 81 MMHG

## 2018-06-07 DIAGNOSIS — L03.032 CELLULITIS OF TOE OF LEFT FOOT: Primary | ICD-10-CM

## 2018-06-07 DIAGNOSIS — I48.91 ATRIAL FIBRILLATION, UNSPECIFIED TYPE (HCC): ICD-10-CM

## 2018-06-07 DIAGNOSIS — N18.30 CKD (CHRONIC KIDNEY DISEASE) STAGE 3, GFR 30-59 ML/MIN (HCC): ICD-10-CM

## 2018-06-07 RX ORDER — CEPHALEXIN 500 MG/1
500 CAPSULE ORAL 2 TIMES DAILY
Qty: 14 CAP | Refills: 0 | Status: SHIPPED | OUTPATIENT
Start: 2018-06-07 | End: 2018-06-07 | Stop reason: ALTCHOICE

## 2018-06-07 RX ORDER — BACITRACIN ZINC 500 UNIT/G
OINTMENT (GRAM) TOPICAL 2 TIMES DAILY
Qty: 15 G | Refills: 0 | Status: SHIPPED | OUTPATIENT
Start: 2018-06-07 | End: 2018-09-11 | Stop reason: SDUPTHER

## 2018-06-07 RX ORDER — ALBUTEROL SULFATE 90 UG/1
AEROSOL, METERED RESPIRATORY (INHALATION)
Status: ON HOLD | COMMUNITY
Start: 2018-06-03 | End: 2019-01-13

## 2018-06-07 RX ORDER — CEPHALEXIN 250 MG/5ML
500 POWDER, FOR SUSPENSION ORAL 2 TIMES DAILY
Qty: 140 ML | Refills: 0 | Status: SHIPPED | OUTPATIENT
Start: 2018-06-07 | End: 2018-06-14

## 2018-06-07 NOTE — PATIENT INSTRUCTIONS
Cellulitis: Care Instructions  Your Care Instructions    Cellulitis is a skin infection. It often occurs after a break in the skin from a scrape, cut, bite, or puncture, or after a rash. The doctor has checked you carefully, but problems can develop later. If you notice any problems or new symptoms, get medical treatment right away. Follow-up care is a key part of your treatment and safety. Be sure to make and go to all appointments, and call your doctor if you are having problems. It's also a good idea to know your test results and keep a list of the medicines you take. How can you care for yourself at home? · Take your antibiotics as directed. Do not stop taking them just because you feel better. You need to take the full course of antibiotics. · Prop up the infected area on pillows to reduce pain and swelling. Try to keep the area above the level of your heart as often as you can. · If your doctor told you how to care for your wound, follow your doctor's instructions. If you did not get instructions, follow this general advice:  ¨ Wash the wound with clean water 2 times a day. Don't use hydrogen peroxide or alcohol, which can slow healing. ¨ You may cover the wound with a thin layer of petroleum jelly, such as Vaseline, and a nonstick bandage. ¨ Apply more petroleum jelly and replace the bandage as needed. · Be safe with medicines. Take pain medicines exactly as directed. ¨ If the doctor gave you a prescription medicine for pain, take it as prescribed. ¨ If you are not taking a prescription pain medicine, ask your doctor if you can take an over-the-counter medicine. To prevent cellulitis in the future  · Try to prevent cuts, scrapes, or other injuries to your skin. Cellulitis most often occurs where there is a break in the skin. · If you get a scrape, cut, mild burn, or bite, wash the wound with clean water as soon as you can to help avoid infection.  Don't use hydrogen peroxide or alcohol, which can slow healing. · If you have swelling in your legs (edema), support stockings and good skin care may help prevent leg sores and cellulitis. · Take care of your feet, especially if you have diabetes or other conditions that increase the risk of infection. Wear shoes and socks. Do not go barefoot. If you have athlete's foot or other skin problems on your feet, talk to your doctor about how to treat them. When should you call for help? Call your doctor now or seek immediate medical care if:  ? · You have signs that your infection is getting worse, such as:  ¨ Increased pain, swelling, warmth, or redness. ¨ Red streaks leading from the area. ¨ Pus draining from the area. ¨ A fever. ? · You get a rash. ? Watch closely for changes in your health, and be sure to contact your doctor if:  ? · You are not getting better after 1 day (24 hours). ? · You do not get better as expected. Where can you learn more? Go to http://markel-nicolasa.info/. John Moser in the search box to learn more about \"Cellulitis: Care Instructions. \"  Current as of: October 13, 2016  Content Version: 11.4  © 1246-1499 [employer]. Care instructions adapted under license by [employer] (which disclaims liability or warranty for this information). If you have questions about a medical condition or this instruction, always ask your healthcare professional. [place] [address] any warranty or liability for your use of this information.

## 2018-06-07 NOTE — PROGRESS NOTES
Manny Ortega is a 80 y.o. female who presents today for Toe Injury  . 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 Today patient is here for an acute visit. she does not have other concerns. Problem visit:  Manny Ortega is here for complaint of L big toe pain and swelling. Problem began 3 week(s) ago. Severity is moderate  Character of problem: tender and swelling. Trauma occurred after a pedicure. Pressure makes the problem worse. Associated symptoms include: no F/C.     ROS  Review of Systems   Constitutional: Negative for chills, fever and weight loss. HENT: Negative for congestion and sore throat. Eyes: Negative for blurred vision, double vision and photophobia. Respiratory: Negative for cough and shortness of breath. Cardiovascular: Positive for leg swelling (Toe). Negative for chest pain and palpitations. Gastrointestinal: Negative for abdominal pain, constipation, diarrhea, heartburn, nausea and vomiting. Genitourinary: Negative for dysuria, frequency and urgency. Musculoskeletal: Positive for joint pain. Negative for myalgias and neck pain. Skin: Negative for rash. Neurological: Negative. Negative for headaches. Endo/Heme/Allergies: Does not bruise/bleed easily. Psychiatric/Behavioral: Negative for depression, memory loss, substance abuse and suicidal ideas. Visit Vitals    /81 (BP 1 Location: Left arm, BP Patient Position: Sitting)    Pulse 83    Temp 98.3 °F (36.8 °C) (Oral)    Resp 18    Ht 5' 5\" (1.651 m)    Wt 157 lb (71.2 kg)    SpO2 94%    BMI 26.13 kg/m2       Physical Exam   Constitutional: She is oriented to person, place, and time. She appears well-developed and well-nourished. HENT:   Head: Normocephalic and atraumatic. Cardiovascular: Normal rate and regular rhythm. Pulmonary/Chest: Effort normal. No respiratory distress. Musculoskeletal:        Feet:    Puncture wound to L great toe latterally. No pus. Cellulitis involved almost to base of toe. No obvious abscess or pus. Neurological: She is alert and oriented to person, place, and time. Skin: Skin is warm and dry. Psychiatric: She has a normal mood and affect. Her behavior is normal.         Current Outpatient Prescriptions   Medication Sig    PROAIR HFA 90 mcg/actuation inhaler     bacitracin zinc (BACITRACIN) ointment Apply  to affected area two (2) times a day.  cephALEXin (KEFLEX) 250 mg/5 mL suspension Take 10 mL by mouth two (2) times a day for 7 days.  raNITIdine (ZANTAC) 150 mg tablet TAKE 1 TABLET BY MOUTH TWICE A DAY    SYNTHROID 112 mcg tablet TAKE 1 TAB BY MOUTH DAILY (BEFORE BREAKFAST).  simvastatin (ZOCOR) 20 mg tablet TAKE 1 TABLET BY MOUTH NIGHTLY    COUMADIN 5 mg tablet TAKE 1 TABLET BY MOUTH DAILY AND 1/2 TABLET ON SUNDAY    carbidopa-levodopa (SINEMET)  mg per tablet Take 1 Tab by mouth three (3) times daily.     carvedilol (COREG) 3.125 mg tablet TAKE 1 TABLET BY MOUTH TWICE A DAY WITH MEALS    ZOLOFT 50 mg tablet TAKE 1 TABLET BY MOUTH EVERY DAY    COUMADIN 5 mg tablet TAKE 1 TABLET BY MOUTH DAILY AND 1/2 TABLET ON SUNDAY (Patient taking differently: TAKE 1 TABLET BY MOUTH DAILY AND 1/2 TABLET ON SUNDAY MON & WED)    cholecalciferol (VITAMIN D3) 1,000 unit tablet Take  by mouth daily.  baclofen (LIORESAL) 10 mg tablet Take 5 mg by mouth two (2) times a day.  DENTA 5000 PLUS 1.1 % crea Apply 1 mg to affected area as needed (to prevent cavities).  furosemide (LASIX) 40 mg tablet Take 1 Tab by mouth daily. (Patient taking differently: Take 20 mg by mouth daily.)    desonide (TRIDESILON) 0.05 % cream Apply  to affected area two (2) times a day.  multivitamin (ONE A DAY) tablet Take 1 Tab by mouth daily.  OXYGEN-AIR DELIVERY SYSTEMS 2 L by Does Not Apply route nightly.  ADVAIR DISKUS 250-50 mcg/dose diskus inhaler Take 1 Puff by inhalation two (2) times a day.  acetaminophen (TYLENOL) 500 mg tablet Take 650 mg by mouth every six (6) hours as needed for Pain. (2) Tablet in am (1) tablet in pm (2) Tablet pm    tiotropium (SPIRIVA WITH HANDIHALER) 18 mcg inhalation capsule Take 1 Cap by inhalation daily.  Calcium-Cholecalciferol, D3, 600 mg(1,500mg) -400 unit cap Take  by mouth two (2) times a day. No current facility-administered medications for this visit.          Past Medical History:   Diagnosis Date    Arthritis     Atrial fibrillation ([facility] [address]     av node ablation 5/22/98 with placement of CPI #[id number] dual chamber pacemaker subsequently replaced with a single chamber medtronic #[id number] single chamber pacemaker 7/25/05    Cancer ([address] 1970's    colon cancer w/ resection    COPD     Depression     GERD (gastroesophageal reflux disease)     Hyperlipidemia     Hypertension     Ischemic cardiomyopathy     Migraine headache     Orthostatic hypotension     RADHA (obstructive sleep apnea)     Pacemaker 5/22/98    AV node ablation /pacemaker placement utilizing CPI # [id number] dual chamber system, medtronic #[id number] single chamber device placed 7/22/05    Pulmonary hypertension ([facility] [address] 9/26/2011 RVSP 50 on echo 8/14    Thyroid disease     Valvular heart disease     mild-mod MR/TR      Past Surgical History:   Procedure Laterality Date    BREAST SURGERY PROCEDURE UNLISTED      benign breast tumor excision right breast    HX BREAST BIOPSY Right 2002    neg; surgical bx    HX COLECTOMY  1974    colon    HX KNEE REPLACEMENT      right TKA    HX PACEMAKER      HX TUBAL LIGATION      TOTAL KNEE ARTHROPLASTY      total on R, partial on L      Social History   Substance Use Topics    Smoking status: Passive Smoke Exposure - Never Smoker    Smokeless tobacco: Never Used    Alcohol use No      Family History   Problem Relation Age of Onset    Diabetes Mother     Heart Disease Mother     Heart Attack Mother     Heart Disease Father     Stroke Sister     Breast Cancer Sister 80    Cancer Maternal Aunt      uterus    Diabetes Maternal Uncle     Breast Cancer Daughter 61        Allergies   Allergen Reactions    Cardizem [Diltiazem Hcl] Hives    Codeine Nausea and Vomiting    Darvocet A500 [Propoxyphene N-Acetaminophen] Nausea and Vomiting    Diltiazem Hives    Labetalol Nausea and Vomiting     Dizzy/Disoriented     Pcn [Penicillins] Swelling     Tongue Swelling     Primidone Other (comments)     Lightheaded/unsteady gait    Sulfa (Sulfonamide Antibiotics) Nausea and Vomiting        Assessment/Plan  Diagnoses and all orders for this visit:    1. Cellulitis of toe of left foot - given appearance, renally dosed keflex. Check INR two days after starting. Urgent evaluation if worsens. Topical Abx. No pus and therefore will not cover MRSA. Close f/u. -     bacitracin zinc (BACITRACIN) ointment; Apply  to affected area two (2) times a day. -     cephALEXin (KEFLEX) 250 mg/5 mL suspension; Take 10 mL by mouth two (2) times a day for 7 days. 2. CKD (chronic kidney disease) stage 3, GFR 30-59 ml/min    3.  Atrial fibrillation, unspecified type ([facility] [address]        Follow-up Disposition:  Return in about 1 week (around 6/14/2018).     Mary Webster MD  6/7/2018

## 2018-06-07 NOTE — MR AVS SNAPSHOT
Randal Guise 
 
 
 [address] [facility] 
[phone] Patient: Rolando Forrest MRN:  [id number] Visit Information Date & Time Provider Department Dept. Phone Encounter #  
 6/7/2018 11:15 AM Chad Fajardo MD [facility] [address] [id number] Your Appointments 6/14/2018  3:20 PM  
Follow Up with Tana Garza MD  
[facility]) Appt Note: per Dr. Guerda Gabriel (6 weeks follow up ) [place] [address] [place] [address] [phone]  
  
   
 [facility]  
  
    
 6/26/2018  1:00 PM  
PACEMAKER with Neal Adams [facility] ([facility] SCHEDULING) Appt Note: 4 mo f/u; 4 month f/u med interrogation/rc (b 2-15-18 interr)  full ck due after 7-10-18  
 [address] [facility] [address]  
[address] [facility]  
  
    
 6/26/2018  1:20 PM  
ESTABLISHED PATIENT with Lennie Wise MD  
[facility] ([facility]) Appt Note: 4 month f/u med interrogation/rc (b 2-15-18 interr)  full ck due after 7-10-18  
 [address] [facility] [address]  
[address] [facility] [id number] Upcoming Health Maintenance Date Due DTaP/Tdap/Td series (1 - Tdap) 4/3/2012 MEDICARE YEARLY EXAM 5/12/2018 Influenza Age 5 to Adult 8/1/2018 GLAUCOMA SCREENING Q2Y 12/8/2018 Allergies as of 6/7/2018  Review Complete On: 9/4/1984 By: Tai Valdovinos Severity Noted Reaction Type Reactions Cardizem [Diltiazem Hcl]  10/04/2010    Hives Codeine  10/04/2010    Nausea and Vomiting Darvocet A500 [Propoxyphene N-acetaminophen]  10/04/2010    Nausea and Vomiting Diltiazem  10/04/2010    Hives Labetalol  04/25/2012    Nausea and Vomiting Dizzy/Disoriented Pcn [Penicillins]  10/04/2010    Swelling Tongue Swelling Primidone  07/31/2012    Other (comments) Lightheaded/unsteady gait Sulfa (Sulfonamide Antibiotics)  10/04/2010    Nausea and Vomiting Current Immunizations  Reviewed on 11/23/2015 Name Date Influenza High Dose Vaccine PF 9/21/2017, 9/29/2016, 9/24/2015, 10/1/2014 Influenza Vaccine 12/1/2013 Influenza Vaccine Split 10/12/2012 Pneumococcal Conjugate (PCV-13) 11/23/2015 Pneumococcal Polysaccharide (PPSV-23) 2/27/2015 Pneumococcal Vaccine (Pcv) 12/15/2010 TB Skin Test (PPD) 1/1/2001 TB Skin Test (PPD) Intradermal 1/16/2018 TD Vaccine 4/2/2012 Varicella Virus Vaccine Live 9/12/2012 Zoster Vaccine, Live 12/1/2013 Not reviewed this visit You Were Diagnosed With   
  
 Codes Comments Cellulitis of toe of left foot    -  Primary ICD-10-CM: D92.236 
ICD-9-CM: 681.10 Vitals BP Pulse Temp Resp Height(growth percentile) Weight(growth percentile) 143/81 (BP 1 Location: Left arm, BP Patient Position: Sitting) 83 98.3 °F (36.8 °C) (Oral) 18 5' 5\" (1.651 m) 157 lb (71.2 kg) LMP SpO2 BMI OB Status Smoking Status 02/26/1970 94% 26.13 kg/m2 Postmenopausal Passive Smoke Exposure - Never Smoker Vitals History BMI and BSA Data Body Mass Index Body Surface Area  
 26.13 kg/m 2 1.81 m 2 Preferred Pharmacy Pharmacy Name Phone [facility]/PHARMACY #[id number]- [place], [address] ([place]) [phone] Your Updated Medication List  
  
   
This list is accurate as of 6/7/18 12:16 PM.  Always use your most recent med list.  
  
  
  
  
 acetaminophen 500 mg tablet Commonly known as:  TYLENOL Take 650 mg by mouth every six (6) hours as needed for Pain. (2) Tablet in am (1) tablet in pm (2) Tablet pm  
  
 ADVAIR DISKUS 250-50 mcg/dose diskus inhaler Generic drug:  fluticasone-salmeterol Take 1 Puff by inhalation two (2) times a day. bacitracin zinc ointment Commonly known as:  BACITRACIN Apply  to affected area two (2) times a day. baclofen 10 mg tablet Commonly known as:  LIORESAL Take 5 mg by mouth two (2) times a day. Calcium-Cholecalciferol (D3) 600 mg(1,500mg) -400 unit Cap Take  by mouth two (2) times a day. carbidopa-levodopa  mg per tablet Commonly known as:  SINEMET Take 1 Tab by mouth three (3) times daily. carvedilol 3.125 mg tablet Commonly known as:  COREG  
TAKE 1 TABLET BY MOUTH TWICE A DAY WITH MEALS  
  
 cephALEXin 500 mg capsule Commonly known as:  Anisha Bees Take 1 Cap by mouth two (2) times a day for 7 days. * COUMADIN 5 mg tablet Generic drug:  warfarin TAKE 1 TABLET BY MOUTH DAILY AND 1/2 TABLET ON SUNDAY  
  
 * COUMADIN 5 mg tablet Generic drug:  warfarin TAKE 1 TABLET BY MOUTH DAILY AND 1/2 TABLET ON SUNDAY DENTA 5000 PLUS 1.1 % Crea Generic drug:  fluoride (sodium) Apply 1 mg to affected area as needed (to prevent cavities). desonide 0.05 % cream  
Commonly known as:  Dorthula Newcomer Apply  to affected area two (2) times a day. furosemide 40 mg tablet Commonly known as:  LASIX Take 1 Tab by mouth daily. multivitamin tablet Commonly known as:  ONE A DAY Take 1 Tab by mouth daily. OXYGEN-AIR DELIVERY SYSTEMS  
2 L by Does Not Apply route nightly. PROAIR HFA 90 mcg/actuation inhaler Generic drug:  albuterol  
  
 raNITIdine 150 mg tablet Commonly known as:  ZANTAC TAKE 1 TABLET BY MOUTH TWICE A DAY  
  
 simvastatin 20 mg tablet Commonly known as:  ZOCOR  
TAKE 1 TABLET BY MOUTH NIGHTLY SPIRIVA WITH HANDIHALER 18 mcg inhalation capsule Generic drug:  tiotropium Take 1 Cap by inhalation daily. SYNTHROID 112 mcg tablet Generic drug:  levothyroxine TAKE 1 TAB BY MOUTH DAILY (BEFORE BREAKFAST). VITAMIN D3 1,000 unit tablet Generic drug:  cholecalciferol Take  by mouth daily. ZOLOFT 50 mg tablet Generic drug:  sertraline TAKE 1 TABLET BY MOUTH EVERY DAY  
  
 * Notice: This list has 2 medication(s) that are the same as other medications prescribed for you. Read the directions carefully, and ask your doctor or other care provider to review them with you. Prescriptions Sent to Pharmacy Refills  
 bacitracin zinc (BACITRACIN) ointment 0 Sig: Apply  to affected area two (2) times a day. Class: Normal  
 Pharmacy: [facility]/pharmacy #[id number]- [address], [address] ([address]) Ph #: [phone] Route: Topical  
 cephALEXin (KEFLEX) 500 mg capsule 0 Sig: Take 1 Cap by mouth two (2) times a day for 7 days. Class: Normal  
 Pharmacy: [facility]/pharmacy #[id number]- [address], [address] ([address]) Ph #: [phone] Route: Oral  
  
Patient Instructions Cellulitis: Care Instructions Your Care Instructions Cellulitis is a skin infection. It often occurs after a break in the skin from a scrape, cut, bite, or puncture, or after a rash. The doctor has checked you carefully, but problems can develop later. If you notice any problems or new symptoms, get medical treatment right away. Follow-up care is a key part of your treatment and safety. Be sure to make and go to all appointments, and call your doctor if you are having problems. It's also a good idea to know your test results and keep a list of the medicines you take. How can you care for yourself at home? · Take your antibiotics as directed. Do not stop taking them just because you feel better. You need to take the full course of antibiotics. · Prop up the infected area on pillows to reduce pain and swelling. Try to keep the area above the level of your heart as often as you can. · If your doctor told you how to care for your wound, follow your doctor's instructions. If you did not get instructions, follow this general advice: ¨ Wash the wound with clean water 2 times a day. Don't use hydrogen peroxide or alcohol, which can slow healing. ¨ You may cover the wound with a thin layer of petroleum jelly, such as Vaseline, and a nonstick bandage. ¨ Apply more petroleum jelly and replace the bandage as needed. · Be safe with medicines. Take pain medicines exactly as directed. ¨ If the doctor gave you a prescription medicine for pain, take it as prescribed. ¨ If you are not taking a prescription pain medicine, ask your doctor if you can take an over-the-counter medicine. To prevent cellulitis in the future · Try to prevent cuts, scrapes, or other injuries to your skin. Cellulitis most often occurs where there is a break in the skin. · If you get a scrape, cut, mild burn, or bite, wash the wound with clean water as soon as you can to help avoid infection. Don't use hydrogen peroxide or alcohol, which can slow healing. · If you have swelling in your legs (edema), support stockings and good skin care may help prevent leg sores and cellulitis. · Take care of your feet, especially if you have diabetes or other conditions that increase the risk of infection. Wear shoes and socks. Do not go barefoot. If you have athlete's foot or other skin problems on your feet, talk to your doctor about how to treat them. When should you call for help? Call your doctor now or seek immediate medical care if: 
? · You have signs that your infection is getting worse, such as: 
¨ Increased pain, swelling, warmth, or redness. ¨ Red streaks leading from the area. ¨ Pus draining from the area. ¨ A fever. ? · You get a rash. ? Watch closely for changes in your health, and be sure to contact your doctor if: 
? · You are not getting better after 1 day (24 hours). ? · You do not get better as expected. Where can you learn more? Go to http://markel-nicolasa.info/. Stephanie Keaton in the search box to learn more about \"Cellulitis: Care Instructions. \" Current as of: October 13, 2016 Content Version: 11.4 © 1926-2604 [employer]. Care instructions adapted under license by [employer] (which disclaims liability or warranty for this information). If you have questions about a medical condition or this instruction, always ask your healthcare professional. [address] any warranty or liability for your use of this information. Introducing [facility]! Dear Ernst Hunt: 
Thank you for requesting a [employer] account. Our records indicate that you already have an active [employer] account. You can access your account anytime at [email or web address] Did you know that you can access your hospital and ER discharge instructions at any time in [employer]? You can also review all of your test results from your hospital stay or ER visit. Additional Information If you have questions, please visit the Frequently Asked Questions section of the [employer] website at [email or web address] Remember, [employer] is NOT to be used for urgent needs. For medical emergencies, dial 911. Now available from your iPhone and Android! Please provide this summary of care documentation to your next provider. Your primary care clinician is listed as Madeline Eldridge. If you have any questions after today's visit, please call [phone].

## 2018-06-11 ENCOUNTER — TELEPHONE (OUTPATIENT)
Dept: NEUROLOGY | Age: 83
End: 2018-06-11

## 2018-06-11 NOTE — TELEPHONE ENCOUNTER
----- Message from Sylvia Amador sent at 6/11/2018 10:10 AM EDT -----  Regarding: Dr. Piter Hayes  Pt has an appt on 06/14/18, but pt's daughter(Lesley) stated she really would like to r/s for this Friday after 1:00 p.m. Or anytime next Tuesday or Thursday.  Best contact number   [phone](please leave message if no answer)

## 2018-06-13 ENCOUNTER — TELEPHONE ANTICOAG (OUTPATIENT)
Dept: CARDIOLOGY CLINIC | Age: 83
End: 2018-06-13

## 2018-06-13 ENCOUNTER — OFFICE VISIT (OUTPATIENT)
Dept: INTERNAL MEDICINE CLINIC | Age: 83
End: 2018-06-13

## 2018-06-13 VITALS
WEIGHT: 158 LBS | HEART RATE: 90 BPM | HEIGHT: 65 IN | SYSTOLIC BLOOD PRESSURE: 120 MMHG | BODY MASS INDEX: 26.33 KG/M2 | TEMPERATURE: 97.9 F | RESPIRATION RATE: 16 BRPM | DIASTOLIC BLOOD PRESSURE: 74 MMHG | OXYGEN SATURATION: 91 %

## 2018-06-13 DIAGNOSIS — L03.032 CELLULITIS OF TOE OF LEFT FOOT: Primary | ICD-10-CM

## 2018-06-13 LAB — INR, EXTERNAL: 3.4 (ref 2–3)

## 2018-06-13 NOTE — PROGRESS NOTES
Arlin Otto is a 80 y.o. female who presents today for Skin Infection and Toe Pain  . She has a history of   Patient Active Problem List   Diagnosis Code    Mixed hyperlipidemia E78.2    Atrial fibrillation ([facility] [address] I48.91    Mitral regurgitation I34.0    CHB (complete heart block) ([facility]) I44.2    Orthostatic hypotension I95.1    Chest pain, unspecified R07.9    Pulmonary hypertension ([facility]) I27.20    Anxiety and depression F41.9, F32.9    Colon cancer ([facility]) C18.9    Hypothyroid E03.9    CKD (chronic kidney disease) stage 3, GFR 30-59 ml/min N18.3    Hypothyroidism due to acquired atrophy of thyroid E03.4    Osteopenia M85.80    Restrictive lung disease J98.4    Chest pain R07.9    Abnormal stress test R94.39    Ischemic cardiomyopathy I25.5    Cardiomyopathy ([facility]) I42.9    Psoriasis L40.9    Advanced care planning/counseling discussion Z71.89    Pacemaker Z95.0    Recurrent depression ([facility]) F33.9    Latent tuberculosis by blood test R76.11   . Today patient is here for f/u.   she does not have other concerns. Toe Cellulitis: seen on 6/7 due to worsening swelling and redness of L great toe after a pedicure. Pt noted increased pain. Decided to treat with PO keflex (renally dosed) and she was instructed on wound Care. Since she notes overall improved. Still mild discharge. INR has been stable. Overall feeling well. No fevers or chills. .     ROS  Review of Systems   Constitutional: Negative for chills, fever and weight loss. Respiratory: Negative for cough and shortness of breath. Cardiovascular: Negative for chest pain, palpitations and leg swelling. Gastrointestinal: Negative for abdominal pain, nausea and vomiting. Musculoskeletal: Positive for joint pain. Neurological: Negative. Endo/Heme/Allergies: Does not bruise/bleed easily. Psychiatric/Behavioral: Negative for depression. The patient is not nervous/anxious.         Visit Vitals    /74 (BP 1 Location: Left arm, BP Patient Position: Sitting)    Pulse 90    Temp 97.9 °F (36.6 °C) (Oral)    Resp 16    Ht 5' 5\" (1.651 m)    Wt 158 lb (71.7 kg)    SpO2 91%    BMI 26.29 kg/m2       Physical Exam   Constitutional: She is oriented to person, place, and time. She appears well-developed and well-nourished. HENT:   Head: Normocephalic and atraumatic. Cardiovascular: Normal rate and regular rhythm. No murmur heard. Pulmonary/Chest: Effort normal. No respiratory distress. Abdominal: Soft. Bowel sounds are normal. She exhibits no distension. Musculoskeletal:        Feet:    Redness improved. Still open wound. No pus. Neurological: She is alert and oriented to person, place, and time. Skin: Skin is warm and dry. Psychiatric: She has a normal mood and affect. Her behavior is normal.         Current Outpatient Prescriptions   Medication Sig    PROAIR HFA 90 mcg/actuation inhaler     bacitracin zinc (BACITRACIN) ointment Apply  to affected area two (2) times a day.  cephALEXin (KEFLEX) 250 mg/5 mL suspension Take 10 mL by mouth two (2) times a day for 7 days.  raNITIdine (ZANTAC) 150 mg tablet TAKE 1 TABLET BY MOUTH TWICE A DAY    SYNTHROID 112 mcg tablet TAKE 1 TAB BY MOUTH DAILY (BEFORE BREAKFAST).  simvastatin (ZOCOR) 20 mg tablet TAKE 1 TABLET BY MOUTH NIGHTLY    COUMADIN 5 mg tablet TAKE 1 TABLET BY MOUTH DAILY AND 1/2 TABLET ON SUNDAY    carbidopa-levodopa (SINEMET)  mg per tablet Take 1 Tab by mouth three (3) times daily.  carvedilol (COREG) 3.125 mg tablet TAKE 1 TABLET BY MOUTH TWICE A DAY WITH MEALS    ZOLOFT 50 mg tablet TAKE 1 TABLET BY MOUTH EVERY DAY    COUMADIN 5 mg tablet TAKE 1 TABLET BY MOUTH DAILY AND 1/2 TABLET ON SUNDAY (Patient taking differently: TAKE 1 TABLET BY MOUTH DAILY AND 1/2 TABLET ON SUNDAY MON & WED)    cholecalciferol (VITAMIN D3) 1,000 unit tablet Take  by mouth daily.  baclofen (LIORESAL) 10 mg tablet Take 5 mg by mouth two (2) times a day.  DENTA 5000 PLUS 1.1 % crea Apply 1 mg to affected area as needed (to prevent cavities).  furosemide (LASIX) 40 mg tablet Take 1 Tab by mouth daily. (Patient taking differently: Take 20 mg by mouth daily.)    desonide (TRIDESILON) 0.05 % cream Apply  to affected area two (2) times a day.  multivitamin (ONE A DAY) tablet Take 1 Tab by mouth daily.  OXYGEN-AIR DELIVERY SYSTEMS 2 L by Does Not Apply route nightly.  ADVAIR DISKUS 250-50 mcg/dose diskus inhaler Take 1 Puff by inhalation two (2) times a day.  acetaminophen (TYLENOL) 500 mg tablet Take 650 mg by mouth every six (6) hours as needed for Pain. (2) Tablet in am (1) tablet in pm (2) Tablet pm    tiotropium (SPIRIVA WITH HANDIHALER) 18 mcg inhalation capsule Take 1 Cap by inhalation daily.  Calcium-Cholecalciferol, D3, 600 mg(1,500mg) -400 unit cap Take  by mouth two (2) times a day. No current facility-administered medications for this visit.          Past Medical History:   Diagnosis Date    Arthritis     Atrial fibrillation ([facility] [address]     av node ablation 5/22/98 with placement of CPI #[id number] dual chamber pacemaker subsequently replaced with a single chamber medtronic #[id number] single chamber pacemaker 7/25/05    Cancer ([facility] [address] 1970's    colon cancer w/ resection    COPD     Depression     GERD (gastroesophageal reflux disease)     Hyperlipidemia     Hypertension     Ischemic cardiomyopathy     Migraine headache     Orthostatic hypotension     RADHA (obstructive sleep apnea)     Pacemaker 5/22/98    AV node ablation /pacemaker placement utilizing CPI # [id number] dual chamber system, medtronic #[id number] single chamber device placed 7/22/05    Pulmonary hypertension ([facility] [address] 9/26/2011    RVSP 50 on echo 8/14    Thyroid disease     Valvular heart disease     mild-mod MR/TR      Past Surgical History:   Procedure Laterality Date    BREAST SURGERY PROCEDURE UNLISTED      benign breast tumor excision right breast    HX BREAST BIOPSY Right 2002 neg; surgical bx    HX COLECTOMY  1974    colon    HX KNEE REPLACEMENT      right TKA    HX PACEMAKER      HX TUBAL LIGATION      TOTAL KNEE ARTHROPLASTY      total on R, partial on L      Social History   Substance Use Topics    Smoking status: Passive Smoke Exposure - Never Smoker    Smokeless tobacco: Never Used    Alcohol use No      Family History   Problem Relation Age of Onset    Diabetes Mother     Heart Disease Mother     Heart Attack Mother     Heart Disease Father     Stroke Sister     Breast Cancer Sister 80    Cancer Maternal Aunt      uterus    Diabetes Maternal Uncle     Breast Cancer Daughter 61        Allergies   Allergen Reactions    Cardizem [Diltiazem Hcl] Hives    Codeine Nausea and Vomiting    Darvocet A500 [Propoxyphene N-Acetaminophen] Nausea and Vomiting    Diltiazem Hives    Labetalol Nausea and Vomiting     Dizzy/Disoriented     Pcn [Penicillins] Swelling     Tongue Swelling     Primidone Other (comments)     Lightheaded/unsteady gait    Sulfa (Sulfonamide Antibiotics) Nausea and Vomiting        Assessment/Plan  Diagnoses and all orders for this visit:    1. Cellulitis of toe of left foot - improved. Continue local wound care. Finish 7th day of abx. S/s of recurrent infection discussed.          Follow-up Disposition: Not on File    Rachel Magallanes MD  6/13/2018

## 2018-06-13 NOTE — MR AVS SNAPSHOT
[facility] 
 
 
 [address] [facility] [address] 
[phone] Patient: Abhilash Diaz MRN:  [id number] Visit Information Date & Time Provider Department Dept. Phone Encounter #  
 6/13/2018 10:45 AM Mary Webster MD [facility] [address] [id number] Your Appointments 6/14/2018  3:20 PM  
Follow Up with Nghia Trinh MD  
[facility]) Appt Note: per Dr. Miladys Sadler (6 weeks follow up ) [place] [address] [facility] [address] [facility] [id number] [phone]  
  
   
 [facility]  
  
    
 6/26/2018  1:00 PM  
PACEMAKER with Brian Conte [facility] ([facility] SCHEDULING) Appt Note: 4 mo f/u; 4 month f/u med interrogation/rc (b 2-15-18 interr)  full ck due after 7-10-18  
 [address] [facility] [address] [address], [facility] [address] [facility]  
  
    
 6/26/2018  1:20 PM  
ESTABLISHED PATIENT with Ingris Guerrero MD  
[facility] ([facility]) Appt Note: 4 month f/u med interrogation/rc (b 2-15-18 interr)  full ck due after 7-10-18  
 [address] [facility] [address] [facility] [address] [facility] [address] [id number] Upcoming Health Maintenance Date Due DTaP/Tdap/Td series (1 - Tdap) 4/3/2012 MEDICARE YEARLY EXAM 5/12/2018 Influenza Age 5 to Adult 8/1/2018 GLAUCOMA SCREENING Q2Y 12/8/2018 Allergies as of 6/13/2018  Review Complete On: 8/83/4154 By: Viet Valdovinos Severity Noted Reaction Type Reactions Cardizem [Diltiazem Hcl]  10/04/2010    Hives Codeine  10/04/2010    Nausea and Vomiting Darvocet A500 [Propoxyphene N-acetaminophen]  10/04/2010    Nausea and Vomiting Diltiazem  10/04/2010    Hives Labetalol  04/25/2012    Nausea and Vomiting Dizzy/Disoriented Pcn [Penicillins]  10/04/2010    Swelling Tongue Swelling Primidone  07/31/2012    Other (comments) Lightheaded/unsteady gait Sulfa (Sulfonamide Antibiotics)  10/04/2010    Nausea and Vomiting Current Immunizations  Reviewed on 11/23/2015 Name Date Influenza High Dose Vaccine PF 9/21/2017, 9/29/2016, 9/24/2015, 10/1/2014 Influenza Vaccine 12/1/2013 Influenza Vaccine Split 10/12/2012 Pneumococcal Conjugate (PCV-13) 11/23/2015 Pneumococcal Polysaccharide (PPSV-23) 2/27/2015 Pneumococcal Vaccine (Pcv) 12/15/2010 TB Skin Test (PPD) 1/1/2001 TB Skin Test (PPD) Intradermal 1/16/2018 TD Vaccine 4/2/2012 Varicella Virus Vaccine Live 9/12/2012 Zoster Vaccine, Live 12/1/2013 Not reviewed this visit You Were Diagnosed With   
  
 Codes Comments Cellulitis of toe of left foot    -  Primary ICD-10-CM: O76.897 
ICD-9-CM: 681.10 Vitals BP Pulse Temp Resp Height(growth percentile) Weight(growth percentile) 120/74 (BP 1 Location: Left arm, BP Patient Position: Sitting) 90 97.9 °F (36.6 °C) (Oral) 16 5' 5\" (1.651 m) 158 lb (71.7 kg) LMP SpO2 BMI OB Status Smoking Status 02/26/1970 91% 26.29 kg/m2 Postmenopausal Passive Smoke Exposure - Never Smoker Vitals History BMI and BSA Data Body Mass Index Body Surface Area  
 26.29 kg/m 2 1.81 m 2 Preferred Pharmacy Pharmacy Name Phone [id number]- [place], [address] [place] [address]) [phone] Your Updated Medication List  
  
   
This list is accurate as of 6/13/18 11:21 AM.  Always use your most recent med list.  
  
  
  
  
 acetaminophen 500 mg tablet Commonly known as:  TYLENOL Take 650 mg by mouth every six (6) hours as needed for Pain. (2) Tablet in am (1) tablet in pm (2) Tablet pm  
  
 ADVAIR DISKUS 250-50 mcg/dose diskus inhaler Generic drug:  fluticasone-salmeterol Take 1 Puff by inhalation two (2) times a day. bacitracin zinc ointment Commonly known as:  BACITRACIN Apply  to affected area two (2) times a day. baclofen 10 mg tablet Commonly known as:  LIORESAL Take 5 mg by mouth two (2) times a day. Calcium-Cholecalciferol (D3) 600 mg(1,500mg) -400 unit Cap Take  by mouth two (2) times a day. carbidopa-levodopa  mg per tablet Commonly known as:  SINEMET Take 1 Tab by mouth three (3) times daily. carvedilol 3.125 mg tablet Commonly known as:  COREG  
TAKE 1 TABLET BY MOUTH TWICE A DAY WITH MEALS  
  
 cephALEXin 250 mg/5 mL suspension Commonly known as:  Janette Crigler Take 10 mL by mouth two (2) times a day for 7 days. * COUMADIN 5 mg tablet Generic drug:  warfarin TAKE 1 TABLET BY MOUTH DAILY AND 1/2 TABLET ON SUNDAY  
  
 * COUMADIN 5 mg tablet Generic drug:  warfarin TAKE 1 TABLET BY MOUTH DAILY AND 1/2 TABLET ON SUNDAY DENTA 5000 PLUS 1.1 % Crea Generic drug:  fluoride (sodium) Apply 1 mg to affected area as needed (to prevent cavities). desonide 0.05 % cream  
Commonly known as:  Lenward Mangle Apply  to affected area two (2) times a day. furosemide 40 mg tablet Commonly known as:  LASIX Take 1 Tab by mouth daily. multivitamin tablet Commonly known as:  ONE A DAY Take 1 Tab by mouth daily. OXYGEN-AIR DELIVERY SYSTEMS  
2 L by Does Not Apply route nightly. PROAIR HFA 90 mcg/actuation inhaler Generic drug:  albuterol  
  
 raNITIdine 150 mg tablet Commonly known as:  ZANTAC TAKE 1 TABLET BY MOUTH TWICE A DAY  
  
 simvastatin 20 mg tablet Commonly known as:  ZOCOR  
TAKE 1 TABLET BY MOUTH NIGHTLY SPIRIVA WITH HANDIHALER 18 mcg inhalation capsule Generic drug:  tiotropium Take 1 Cap by inhalation daily. SYNTHROID 112 mcg tablet Generic drug:  levothyroxine TAKE 1 TAB BY MOUTH DAILY (BEFORE BREAKFAST). VITAMIN D3 1,000 unit tablet Generic drug:  cholecalciferol Take  by mouth daily. ZOLOFT 50 mg tablet Generic drug:  sertraline TAKE 1 TABLET BY MOUTH EVERY DAY  
  
 * Notice: This list has 2 medication(s) that are the same as other medications prescribed for you. Read the directions carefully, and ask your doctor or other care provider to review them with you. Introducing [facility] SERVICES! Dear [facility]: 
Thank you for requesting a [employer] account. Our records indicate that you already have an active [employer] account. You can access your account anytime at [email or web address] Did you know that you can access your hospital and ER discharge instructions at any time in [employer]? You can also review all of your test results from your hospital stay or ER visit. Additional Information If you have questions, please visit the Frequently Asked Questions section of the [employer] website at [email or web address] Remember, [employer] is NOT to be used for urgent needs. For medical emergencies, dial 911. Now available from your iPhone and Android! Please provide this summary of care documentation to your next provider. Your primary care clinician is listed as Efrain Foley. If you have any questions after today's visit, please call [phone].

## 2018-06-13 NOTE — PROGRESS NOTES

## 2018-06-14 ENCOUNTER — OFFICE VISIT (OUTPATIENT)
Dept: NEUROLOGY | Age: 83
End: 2018-06-14

## 2018-06-14 VITALS
RESPIRATION RATE: 18 BRPM | WEIGHT: 158 LBS | BODY MASS INDEX: 26.33 KG/M2 | OXYGEN SATURATION: 96 % | SYSTOLIC BLOOD PRESSURE: 120 MMHG | DIASTOLIC BLOOD PRESSURE: 66 MMHG | HEART RATE: 95 BPM | HEIGHT: 65 IN

## 2018-06-14 DIAGNOSIS — R26.9 GAIT ABNORMALITY: ICD-10-CM

## 2018-06-14 DIAGNOSIS — R25.1 TREMOR: Primary | ICD-10-CM

## 2018-06-14 NOTE — PROGRESS NOTES
Neurology Progress Note    Patient ID:  Veronica Maxwell  [id number]  31 y.o.  2/6/1932    HISTORY PROVIDED BY:  Patient  Family Member: Daughter in law  Other:    Chief Complaint: Tremor  Subjective:    Ms. Leticia Ortiz is here for follow up today of tremor. Patient has a mixed tremor. Predominantly she has a resting tremor. She does have some with action. At our last visit we did order some labs which were negative. She had already had CT of the head that was negative and cannot have an MRI due to having a pacemaker. She has had some freezing gait and balance issues. No fall since our last visit. We started her on a trial of Sinemet. Patient notes that when she takes it it does seem to work and is is wearing off tremor is worse. Patient is having some issues taking the medication since it 3 times a day and is not on the same schedule as her other medications. Patient denies any new focal numbness or weakness. Recap:  Veronica Maxwell is a 80 y.o. female left handed female who I am asked to see in consultation for tremor and balance issues. Her left hand has some numbness in the thumb and her pinky finger. This has been in the last 4-6 months. This makes her have difficulty with holding things. She has had tremor in her left hand for two years. She has some slight resting tremor in the right hand. She has tremor with action. Especially with holding things. At a recent PCP visit when watching her gait he was concerned for PD. She feels like she bends over when walking. She is having a shuffling gait. She has some back pain and arthritis. She did have an epidural which has helped but she is ready for another one. She has felt some freezing gait some challenges with doors. It is challenging to know if this is due to fatigue or not being able to do it. She does have a some breathing issues and a pacemaker. She is too high risk to take get this changed to a dual pacer.    She does have hypothyroid and is on synthroid. She is on coumadin for afib. She has not had a stroke. FH of aunt with PD/tremors. She feels her memory is declining. She lives in [address]. She has been there two months. She is active there. There is a dining room she can eat at. She has a note from her occupational therapist that is concerned about her left shoulder. She appears to have significant pain and decreased range of motion. Subsequently it is challenging for her to complete therapy due to this. Objective:   ROS:  Per HPI-  Otherwise 12 point ROS was negative    Meds:  Current Outpatient Prescriptions on File Prior to Visit   Medication Sig Dispense Refill    PROAIR HFA 90 mcg/actuation inhaler       bacitracin zinc (BACITRACIN) ointment Apply  to affected area two (2) times a day. 15 g 0    cephALEXin (KEFLEX) 250 mg/5 mL suspension Take 10 mL by mouth two (2) times a day for 7 days. 140 mL 0    raNITIdine (ZANTAC) 150 mg tablet TAKE 1 TABLET BY MOUTH TWICE A  Tab 1    SYNTHROID 112 mcg tablet TAKE 1 TAB BY MOUTH DAILY (BEFORE BREAKFAST). 90 Tab 1    simvastatin (ZOCOR) 20 mg tablet TAKE 1 TABLET BY MOUTH NIGHTLY 90 Tab 1    COUMADIN 5 mg tablet TAKE 1 TABLET BY MOUTH DAILY AND 1/2 TABLET ON SUNDAY 90 Tab 0    carbidopa-levodopa (SINEMET)  mg per tablet Take 1 Tab by mouth three (3) times daily. 90 Tab 5    carvedilol (COREG) 3.125 mg tablet TAKE 1 TABLET BY MOUTH TWICE A DAY WITH MEALS 180 Tab 1    ZOLOFT 50 mg tablet TAKE 1 TABLET BY MOUTH EVERY DAY 90 Tab 2    COUMADIN 5 mg tablet TAKE 1 TABLET BY MOUTH DAILY AND 1/2 TABLET ON SUNDAY (Patient taking differently: TAKE 1 TABLET BY MOUTH DAILY AND 1/2 TABLET ON SUNDAY MON & WED) 90 Tab 0    cholecalciferol (VITAMIN D3) 1,000 unit tablet Take  by mouth daily.  baclofen (LIORESAL) 10 mg tablet Take 5 mg by mouth two (2) times a day.  DENTA 5000 PLUS 1.1 % crea Apply 1 mg to affected area as needed (to prevent cavities).   2    furosemide (LASIX) 40 mg tablet Take 1 Tab by mouth daily. (Patient taking differently: Take 20 mg by mouth daily.) 90 Tab 2    desonide (TRIDESILON) 0.05 % cream Apply  to affected area two (2) times a day. 15 g 0    multivitamin (ONE A DAY) tablet Take 1 Tab by mouth daily.  Calcium-Cholecalciferol, D3, 600 mg(1,500mg) -400 unit cap Take  by mouth two (2) times a day.  OXYGEN-AIR DELIVERY SYSTEMS 2 L by Does Not Apply route nightly.  ADVAIR DISKUS 250-50 mcg/dose diskus inhaler Take 1 Puff by inhalation two (2) times a day.  acetaminophen (TYLENOL) 500 mg tablet Take 650 mg by mouth every six (6) hours as needed for Pain. (2) Tablet in am (1) tablet in pm (2) Tablet pm  0    tiotropium (SPIRIVA WITH HANDIHALER) 18 mcg inhalation capsule Take 1 Cap by inhalation daily. No current facility-administered medications on file prior to visit. Imaging:  No new imaging    Reviewed records in [employer] tab today    Lab Review   Results for orders placed or performed in visit on 06/13/18   AMB PT/INR EXTERNAL   Result Value Ref Range    INR, External 3.4 (A) 2.0 - 3.0   Heavy metal profile negative    Exam:  Visit Vitals    /66    Pulse 95    Resp 18    Ht 5' 5\" (1.651 m)    Wt 71.7 kg (158 lb)    SpO2 96%    BMI 26.29 kg/m2     Gen: Well developed  CV: RRR  Lungs: non labored breathing  Abd: non distending  Neuro: A&O x 3, no dysarthria or aphasia  CN II-XII: PERRL, EOMI, face symmetric, tongue/palate midline  Motor: strength 5/5 all four ext, resting tremor L>R  Sensory: intact to LT  Gait: Slow and shuffling gait    Assessment:   Tahmina Guzman is a 80 y.o. female who presents for follow up of tremor. Patient is a mixed tremor. However, she did do well with Sinemet. Would like to continue on this path for now. Given that she has difficulty with pill burden, will try Neupro patch instead. Will start patient 1 mg and titrate.   Discussed with patient that I suspect she has Parkinson's due to some of the features on her exam and would continue to treat for this for now. Plan:        Visit Diagnoses     Tremor    -  Primary  · Continue Sinemet and will change dosing to be with meals  · Start Neupro patch 1 mg transdermal daily. Patient instructed to call prior to next refill to increase dose to 2 mg  · Discussed tremor and Parkinson's disease  · Continue with walker and physical therapy    Gait abnormality     · Continue physical therapy  · Continue with walker        Follow-up in 3 months    Signed:  Casey Samuels MD  6/14/2018    Medications and side effects discussed with patient in detail. With any new medications prescribed, patient was given instructions on administration and side effects. Written medication information was provided to the patient as well. This note was created using voice recognition software. Despite editing, there may be syntax errors. This note will not be viewable in [address]

## 2018-06-14 NOTE — LETTER
Neurology Progress Note Patient ID: 
Khanh Mcmahon [id number] 
80 y.o. 
2/6/1932 HISTORY PROVIDED BY: 
Patient Family Member: Daughter in law Other: Chief Complaint: Tremor Subjective:  
 Ms. Delano Yang is here for follow up today of tremor. Patient has a mixed tremor. Predominantly she has a resting tremor. She does have some with action. At our last visit we did order some labs which were negative. She had already had CT of the head that was negative and cannot have an MRI due to having a pacemaker. She has had some freezing gait and balance issues. No fall since our last visit. We started her on a trial of Sinemet. Patient notes that when she takes it it does seem to work and is is wearing off tremor is worse. Patient is having some issues taking the medication since it 3 times a day and is not on the same schedule as her other medications. Patient denies any new focal numbness or weakness. Recap: 
Khanh Mcmahon is a 80 y.o. female left handed female who I am asked to see in consultation for tremor and balance issues. Her left hand has some numbness in the thumb and her pinky finger. This has been in the last 4-6 months. This makes her have difficulty with holding things. She has had tremor in her left hand for two years. She has some slight resting tremor in the right hand. She has tremor with action. Especially with holding things. At a recent PCP visit when watching her gait he was concerned for PD. She feels like she bends over when walking. She is having a shuffling gait. She has some back pain and arthritis. She did have an epidural which has helped but she is ready for another one. She has felt some freezing gait some challenges with doors. It is challenging to know if this is due to fatigue or not being able to do it. She does have a some breathing issues and a pacemaker. She is too high risk to take get this changed to a dual pacer. She does have hypothyroid and is on synthroid. She is on coumadin for afib. She has not had a stroke. FH of aunt with PD/tremors. She feels her memory is declining. She lives in [address] She has been there two months. She is active there. There is a dining room she can eat at. She has a note from her occupational therapist that is concerned about her left shoulder. She appears to have significant pain and decreased range of motion. Subsequently it is challenging for her to complete therapy due to this. Objective:  
ROS: 
Per HPI- 
Otherwise 12 point ROS was negative Meds: 
Current Outpatient Prescriptions on File Prior to Visit Medication Sig Dispense Refill  PROAIR HFA 90 mcg/actuation inhaler  bacitracin zinc (BACITRACIN) ointment Apply  to affected area two (2) times a day. 15 g 0  
 cephALEXin (KEFLEX) 250 mg/5 mL suspension Take 10 mL by mouth two (2) times a day for 7 days. 140 mL 0  
 raNITIdine (ZANTAC) 150 mg tablet TAKE 1 TABLET BY MOUTH TWICE A  Tab 1  
 SYNTHROID 112 mcg tablet TAKE 1 TAB BY MOUTH DAILY (BEFORE BREAKFAST). 90 Tab 1  
 simvastatin (ZOCOR) 20 mg tablet TAKE 1 TABLET BY MOUTH NIGHTLY 90 Tab 1  
 COUMADIN 5 mg tablet TAKE 1 TABLET BY MOUTH DAILY AND 1/2 TABLET ON SUNDAY 90 Tab 0  carbidopa-levodopa (SINEMET)  mg per tablet Take 1 Tab by mouth three (3) times daily. 90 Tab 5  carvedilol (COREG) 3.125 mg tablet TAKE 1 TABLET BY MOUTH TWICE A DAY WITH MEALS 180 Tab 1  
 ZOLOFT 50 mg tablet TAKE 1 TABLET BY MOUTH EVERY DAY 90 Tab 2  
 COUMADIN 5 mg tablet TAKE 1 TABLET BY MOUTH DAILY AND 1/2 TABLET ON SUNDAY (Patient taking differently: TAKE 1 TABLET BY MOUTH DAILY AND 1/2 TABLET ON SUNDAY MON & WED) 90 Tab 0  cholecalciferol (VITAMIN D3) 1,000 unit tablet Take  by mouth daily.  baclofen (LIORESAL) 10 mg tablet Take 5 mg by mouth two (2) times a day.     
 DENTA 5000 PLUS 1.1 % crea Apply 1 mg to affected area as needed (to prevent cavities). 2  
 furosemide (LASIX) 40 mg tablet Take 1 Tab by mouth daily. (Patient taking differently: Take 20 mg by mouth daily.) 90 Tab 2  
 desonide (TRIDESILON) 0.05 % cream Apply  to affected area two (2) times a day. 15 g 0  
 multivitamin (ONE A DAY) tablet Take 1 Tab by mouth daily.  Calcium-Cholecalciferol, D3, 600 mg(1,500mg) -400 unit cap Take  by mouth two (2) times a day.  OXYGEN-AIR DELIVERY SYSTEMS 2 L by Does Not Apply route nightly.  ADVAIR DISKUS 250-50 mcg/dose diskus inhaler Take 1 Puff by inhalation two (2) times a day.  acetaminophen (TYLENOL) 500 mg tablet Take 650 mg by mouth every six (6) hours as needed for Pain. (2) Tablet in am (1) tablet in pm (2) Tablet pm  0  
 tiotropium (SPIRIVA WITH HANDIHALER) 18 mcg inhalation capsule Take 1 Cap by inhalation daily. No current facility-administered medications on file prior to visit. Imaging: No new imaging Reviewed records in [facility] tab today Lab Review Results for orders placed or performed in visit on 06/13/18 AMB PT/INR EXTERNAL Result Value Ref Range INR, External 3.4 (A) 2.0 - 3.0 Heavy metal profile negative Exam: 
Visit Vitals  /66  Pulse 95  Resp 18  Ht 5' 5\" (1.651 m)  Wt 71.7 kg (158 lb)  SpO2 96%  BMI 26.29 kg/m2 Gen: Well developed CV: RRR Lungs: non labored breathing Abd: non distending Neuro: A&O x 3, no dysarthria or aphasia CN II-XII: PERRL, EOMI, face symmetric, tongue/palate midline Motor: strength 5/5 all four ext, resting tremor L>R Sensory: intact to LT Gait: Slow and shuffling gait Assessment:  
Trevin Garcia is a 80 y.o. female who presents for follow up of tremor. Patient is a mixed tremor. However, she did do well with Sinemet. Would like to continue on this path for now. Given that she has difficulty with pill burden, will try Neupro patch instead. Will start patient 1 mg and titrate. Discussed with patient that I suspect she has Parkinson's due to some of the features on her exam and would continue to treat for this for now. Plan:  
 
  
Visit Diagnoses Tremor    -  Primary · Continue Sinemet and will change dosing to be with meals · Start Neupro patch 1 mg transdermal daily. Patient instructed to call prior to next refill to increase dose to 2 mg · Discussed tremor and Parkinson's disease · Continue with walker and physical therapy Gait abnormality · Continue physical therapy · Continue with walker Follow-up in 3 months Signed: 
Rubén Chahal MD 
6/14/2018 Medications and side effects discussed with patient in detail. With any new medications prescribed, patient was given instructions on administration and side effects. Written medication information was provided to the patient as well. This note was created using voice recognition software. Despite editing, there may be syntax errors. This note will not be viewable in [address] Chief Complaint Patient presents with  Tremors  
  better after taking medicaiton 1. Have you been to the ER, urgent care clinic since your last visit? Hospitalized since your last visit? No 
 
2. Have you seen or consulted any other health care providers outside of the [address] [place] [address] since your last visit? Include any pap smears or colon screening. No  
 
Visit Vitals  /66  Pulse 95  Resp 18  Ht 5' 5\" (1.651 m)  Wt 71.7 kg (158 lb)  SpO2 96%  BMI 26.29 kg/m2

## 2018-06-14 NOTE — PATIENT INSTRUCTIONS
[facility]   Statement to Patients  April 1, 2014      In an effort to ensure the large volume of patient prescription refills is processed in the most efficient and expeditious manner, we are asking our patients to assist us by calling your Pharmacy for all prescription refills, this will include also your  Mail Order Pharmacy. The pharmacy will contact our office electronically to continue the refill process. Please do not wait until the last minute to call your pharmacy. We need at least 48 hours (2days) to fill prescriptions. We also encourage you to call your pharmacy before going to  your prescription to make sure it is ready. With regard to controlled substance prescription refill requests (narcotic refills) that need to be picked up at our office, we ask your cooperation by providing us with at least 72 hours (3days) notice that you will need a refill. We will not refill narcotic prescription refill requests after 4:00pm on any weekday, Monday through Thursday, or after 2:00pm on Fridays, or on the weekends. We encourage everyone to explore another way of getting your prescription refill request processed using [employer], our patient web portal through our electronic medical record system. [employer] is an efficient and effective way to communicate your medication request directly to the office and  downloadable as an sebastian on your smart phone . [employer] also features a review functionality that allows you to view your medication list as well as leave messages for your physician. Are you ready to get connected? If so please review the attatched instructions or speak to any of our staff to get you set up right away! Thank you so much for your cooperation. Should you have any questions please contact our Practice Administrator.     The Physicians and Staff,  [facility]   Patient Instruction Plan/ Result Policy    If we have ordered testing for you, know that; \"NO NEWS IS GOOD NEWS! \" It is our policy that we know longer call patients with results, nor do we  give test results over the phone. We schedule follow up appointments so that your results can be discussed in person. This allows you to address any questions you have regarding the results. If something of concern is revealed on your test, we will contact you to discuss the matter and if needed schedule a sooner follow up appointment. Additionally, results may be found by using the My Chart feature and one of our patient service representatives at the  can give you instructions on how to access this feature to utilize our electronic medical record system. Thank you for your understanding. Rotigotine (Absorbed through the skin)   Rotigotine (oneal-TIG-oh-teen)  Treats symptoms of Parkinson disease, such as jerky muscle movements, muscle stiffness, and slow movement. Also treats restless legs syndrome (RLS). Brand Name(s): Neupro   There may be other brand names for this medicine. When This Medicine Should Not Be Used:   Do not use this medicine if you have had an allergic reaction to rotigotine. How to Use This Medicine:   Patch  · Your doctor will tell you how many patches to use, where to apply them, and how often to apply them. Do not use more patches or apply them more often than your doctor tells you to. Try to change the patch at the same time each day. · Read and follow the patient instructions that come with this medicine. Talk to your doctor or pharmacist if you have any questions. · Wash your hands with soap and water before and after applying a patch. · Leave the patch in its sealed wrapper until you are ready to put it on. Tear the wrapper open carefully. NEVER CUT the wrapper or the patch with scissors. Do not use any patch that has been cut by accident.  Remove the protective liner, and put the patch on immediately after you open the package. · The patient instructions will show the body areas where you can wear the patch. When putting on each new patch, choose a different place within these areas. Do not put the new patch on the same place you wore the last one. Be sure to remove the old patch before applying a new one. Do not put a new patch in the same place for at least 14 days. · Apply the patch to clean, dry skin that has very little or no hair. Do not put the patch over burns, cuts, irritated, or oily skin. Avoid putting the patch on skin folds, under a waistband, or on areas where it could be rubbed by tight clothing. If you need to put the patch on a hairy area, the area should be shaved at least 3 days before you apply the patch. · Do not put cream, lotion, ointment, oil, or powder on the skin area where the patch will be placed. · Put on a new patch if the old one has fallen off and cannot be reapplied. · After you remove the patch, wash the site with soap and water to remove any drug or adhesive. You may also use baby or mineral oil to remove any excess medicine. If a dose is missed:   · If you forget to wear or change a patch, put one on as soon as you can. If it is almost time to put on your next patch, wait until then to apply a new patch and skip the one you missed. Do not apply extra patches to make up for a missed dose. · If the patch comes off, stick it back on. You can use medical tape to keep the edges taped down, if needed. If the patch comes off and you cannot find it, put on a new patch. Continue to change patches on your regular schedule. How to Store and Dispose of This Medicine:   · Store the patches at room temperature in a closed container, away from heat, moisture, and direct light. · Fold the used patch in half with the sticky sides together. Throw any used patch away so that children or pets cannot get to it. You will also need to throw away old patches after the expiration date has passed.   · Keep all medicine out of the reach of children. Never share your medicine with anyone. Drugs and Foods to Avoid:   Ask your doctor or pharmacist before using any other medicine, including over-the-counter medicines, vitamins, and herbal products. · Make sure your doctor knows if you are using metoclopramide (Renford Yamini), medicine to treat mental illness (such as haloperidol, mesoridazine, pimozide, prochlorperazine, quetiapine, thioridazine, ziprasidone, Compazine®, Geodon®, Haldol®, Mellaril®, Orap®, Serentil®, or Seroquel®), or an antidepressant (such as amitriptyline, fluoxetine, nortriptyline, Elavil®, Pamelor®, Prozac®, Sarafem®, Vivactil®). · Tell your doctor if you use anything else that makes you sleepy. Some examples are allergy medicine, narcotic pain medicine, and alcohol. · Do not drink excess alcohol while you are using this medicine. Warnings While Using This Medicine:   · Make sure your doctor knows if you are pregnant or breastfeeding, or if you have kidney disease, heart or blood vessel disease, congestive heart failure, asthma, dyskinesia (abnormal muscle movements), lung disease, melanoma, or psychotic mental illness. · Tell your doctor if you have a sulfite allergy. This medicine contains sodium metabisulfite. · Rarely, this medicine has caused sudden sleep attacks in some people. You might fall asleep without warning during normal daily activities, such as driving or eating. This is more likely to happen if you are also using other medicines that make you sleepy or if you have a sleep disorder. However, many people will feel tired or sleepy but do not have a sudden sleep attack. Tell your doctor about any sleepiness or tiredness that you have. · This medicine may change your blood pressure or heart rate. Make sure your doctor knows if you already have high or low blood pressure. Tell your doctor if you feel faint or lightheaded, or have a fast or pounding heart beat.   · This medicine may make you dizzy, lightheaded, or drowsy, especially when you first begin to use it. Do not drive, use machines, or do anything else that could be dangerous until you know how this medicine affects you. Stand or sit up slowly if you are dizzy. · This medicine may cause agitation, irritability, confusion, aggressiveness, or hostility. It may also cause you to see, hear, or feel things that are not there. Tell your doctor right away if you or your caregiver notice any of these side effects. · This medicine may cause impulse behavior changes, such as strong urges to candelaria, spend money, or binge eat, or an increased sex drive. · Your doctor will need to check your skin regularly for melanoma while you are receiving this medicine. · Remove this patch before you have a medical procedure that involves electricity, such as MRI or cardioversion, because the patch contains aluminum. Make sure any doctor who treats you knows that you have this patch. · Heat may cause your skin to absorb too much of this medicine. Keep the patch away from heat, especially from heating pads, electric blankets, hot tubs, or direct sunlight. · Tell your doctor if you develop redness, swelling, or itchiness from the patch that lasts longer than a few days, becomes more severe, or spreads to areas outside the application site. Also, do not expose the area to direct sunlight until it heals. Direct sunlight may cause skin discoloration. · This medicine may worsen RLS symptoms during or after treatment. · This medicine may cause new or worsened Parkinson disease symptoms, such as jerky muscle movements, during treatment. · Do not stop using this medicine suddenly. Your doctor will need to slowly decrease your dose before you stop it completely. · Your doctor will check your progress and the effects of this medicine at regular visits. Keep all appointments.   Possible Side Effects While Using This Medicine:   Call your doctor right away if you notice any of these side effects:  · Allergic reaction: Itching or hives, swelling in your face or hands, swelling or tingling in your mouth or throat, chest tightness, trouble breathing  · Changes in vision  · Headache  · New or worsened achy legs, or urge to move your legs  · New or worsened jerky muscle movement that you cannot control (often in your face, tongue, arms, or legs)  · Lightheadedness, dizziness, or faint  · Rapid weight gain, or swelling in your hands, ankles, or feet  · Severe drowsiness, suddenly falling asleep  · Skin rash, itching, swelling, or redness where the patch is worn  · Unusual behavior, mood changes, confusion, or seeing or hearing things that are not there  If you notice these less serious side effects, talk with your doctor:   · Loss of appetite  · Nausea, vomiting, or upset stomach  · Sweating  · Trouble sleeping  If you notice other side effects that you think are caused by this medicine, tell your doctor. Call your doctor for medical advice about side effects. You may report side effects to FDA at 9-090-FDA-9626  © 2017 [address] Information is for End User's use only and may not be sold, redistributed or otherwise used for commercial purposes. The above information is an  only. It is not intended as medical advice for individual conditions or treatments. Talk to your doctor, nurse or pharmacist before following any medical regimen to see if it is safe and effective for you.

## 2018-06-14 NOTE — PROGRESS NOTES
Chief Complaint   Patient presents with    Tremors     better after taking medicaiton     1. Have you been to the ER, urgent care clinic since your last visit? Hospitalized since your last visit? No    2. Have you seen or consulted any other health care providers outside of the [address] [place] [address] since your last visit? Include any pap smears or colon screening.  No     Visit Vitals    /66    Pulse 95    Resp 18    Ht 5' 5\" (1.651 m)    Wt 71.7 kg (158 lb)    SpO2 96%    BMI 26.29 kg/m2

## 2018-06-14 NOTE — MR AVS SNAPSHOT
[address] [place] [address] [address] [facility] [id number] [phone] Patient: Heriberto Kaba MRN:  [id number] Visit Information Date & Time Provider Department Dept. Phone Encounter #  
 6/14/2018  3:20 PM MD Rani Baker Neurology [facility] [phone] [id number] Follow-up Instructions Return in about 3 months (around 9/14/2018). Your Appointments 6/26/2018  1:00 PM  
PACEMAKER with PAC[facility] (FARHAD SCHEDULING) Appt Note: 4 mo f/u; 4 month f/u med interrogation/rc (b 2-15-18 interr)  full ck due after 7-10-18  
 [address] [employer] [address] [place]  
[address] [facility]  
  
    
 6/26/2018  1:20 PM  
ESTABLISHED PATIENT with Nasim Noel MD  
[facility] ([address]) Appt Note: 4 month f/u med interrogation/rc (b 2-15-18 interr)  full ck due after 7-10-18  
 [address] [employer] [address] [place]  
[address] [facility] [address] Upcoming Health Maintenance Date Due DTaP/Tdap/Td series (1 - Tdap) 4/3/2012 MEDICARE YEARLY EXAM 5/12/2018 Influenza Age 5 to Adult 8/1/2018 GLAUCOMA SCREENING Q2Y 12/8/2018 Allergies as of 6/14/2018  Review Complete On: 5/90/5899 By: Media Shall Wears Severity Noted Reaction Type Reactions Cardizem [Diltiazem Hcl]  10/04/2010    Hives Codeine  10/04/2010    Nausea and Vomiting Darvocet A500 [Propoxyphene N-acetaminophen]  10/04/2010    Nausea and Vomiting Diltiazem  10/04/2010    Hives Labetalol  04/25/2012    Nausea and Vomiting Dizzy/Disoriented Pcn [Penicillins]  10/04/2010    Swelling Tongue Swelling Primidone  07/31/2012    Other (comments) Lightheaded/unsteady gait Sulfa (Sulfonamide Antibiotics)  10/04/2010    Nausea and Vomiting Current Immunizations  Reviewed on 11/23/2015 Name Date Influenza High Dose Vaccine PF 9/21/2017, 9/29/2016, 9/24/2015, 10/1/2014 Influenza Vaccine 12/1/2013 Influenza Vaccine Split 10/12/2012 Pneumococcal Conjugate (PCV-13) 11/23/2015 Pneumococcal Polysaccharide (PPSV-23) 2/27/2015 Pneumococcal Vaccine (Pcv) 12/15/2010 TB Skin Test (PPD) 1/1/2001 TB Skin Test (PPD) Intradermal 1/16/2018 TD Vaccine 4/2/2012 Varicella Virus Vaccine Live 9/12/2012 Zoster Vaccine, Live 12/1/2013 Not reviewed this visit You Were Diagnosed With   
  
 Codes Comments Tremor    -  Primary ICD-10-CM: R25.1 ICD-9-CM: 781.0 Gait abnormality     ICD-10-CM: R26.9 ICD-9-CM: 268. 2 Vitals BP Pulse Resp Height(growth percentile) Weight(growth percentile) LMP  
 120/66 95 18 5' 5\" (1.651 m) 158 lb (71.7 kg) 02/26/1970 SpO2 BMI OB Status Smoking Status 96% 26.29 kg/m2 Postmenopausal Passive Smoke Exposure - Never Smoker BMI and BSA Data Body Mass Index Body Surface Area  
 26.29 kg/m 2 1.81 m 2 Preferred Pharmacy Pharmacy Name Phone [facility] #[id number]- [place], [address] [place] [address]) [phone] Your Updated Medication List  
  
   
This list is accurate as of 6/14/18  4:02 PM.  Always use your most recent med list.  
  
  
  
  
 acetaminophen 500 mg tablet Commonly known as:  TYLENOL Take 650 mg by mouth every six (6) hours as needed for Pain. (2) Tablet in am (1) tablet in pm (2) Tablet pm  
  
 ADVAIR DISKUS 250-50 mcg/dose diskus inhaler Generic drug:  fluticasone-salmeterol Take 1 Puff by inhalation two (2) times a day. bacitracin zinc ointment Commonly known as:  BACITRACIN Apply  to affected area two (2) times a day. baclofen 10 mg tablet Commonly known as:  LIORESAL  
 Take 5 mg by mouth two (2) times a day. Calcium-Cholecalciferol (D3) 600 mg(1,500mg) -400 unit Cap Take  by mouth two (2) times a day. carbidopa-levodopa  mg per tablet Commonly known as:  SINEMET Take 1 Tab by mouth three (3) times daily. carvedilol 3.125 mg tablet Commonly known as:  COREG  
TAKE 1 TABLET BY MOUTH TWICE A DAY WITH MEALS  
  
 cephALEXin 250 mg/5 mL suspension Commonly known as:  James Awkward Take 10 mL by mouth two (2) times a day for 7 days. * COUMADIN 5 mg tablet Generic drug:  warfarin TAKE 1 TABLET BY MOUTH DAILY AND 1/2 TABLET ON SUNDAY  
  
 * COUMADIN 5 mg tablet Generic drug:  warfarin TAKE 1 TABLET BY MOUTH DAILY AND 1/2 TABLET ON SUNDAY DENTA 5000 PLUS 1.1 % Crea Generic drug:  fluoride (sodium) Apply 1 mg to affected area as needed (to prevent cavities). desonide 0.05 % cream  
Commonly known as:  King Gaw Apply  to affected area two (2) times a day. furosemide 40 mg tablet Commonly known as:  LASIX Take 1 Tab by mouth daily. multivitamin tablet Commonly known as:  ONE A DAY Take 1 Tab by mouth daily. OXYGEN-AIR DELIVERY SYSTEMS  
2 L by Does Not Apply route nightly. PROAIR HFA 90 mcg/actuation inhaler Generic drug:  albuterol  
  
 raNITIdine 150 mg tablet Commonly known as:  ZANTAC TAKE 1 TABLET BY MOUTH TWICE A DAY  
  
 rotigotine 1 mg/24 hour patch Commonly known as:  NEUPRO  
1 Patch by TransDERmal route daily. simvastatin 20 mg tablet Commonly known as:  ZOCOR  
TAKE 1 TABLET BY MOUTH NIGHTLY SPIRIVA WITH HANDIHALER 18 mcg inhalation capsule Generic drug:  tiotropium Take 1 Cap by inhalation daily. SYNTHROID 112 mcg tablet Generic drug:  levothyroxine TAKE 1 TAB BY MOUTH DAILY (BEFORE BREAKFAST). VITAMIN D3 1,000 unit tablet Generic drug:  cholecalciferol Take  by mouth daily. ZOLOFT 50 mg tablet Generic drug:  sertraline TAKE 1 TABLET BY MOUTH EVERY DAY  
  
 * Notice: This list has 2 medication(s) that are the same as other medications prescribed for you. Read the directions carefully, and ask your doctor or other care provider to review them with you. Prescriptions Sent to Pharmacy Refills  
 rotigotine (NEUPRO) 1 mg/24 hour patch 1 Si Patch by TransDERmal route daily. Class: Normal  
 Pharmacy: [facility]/pharmacy #[id number]- [address], [address] ([place] [address]) Ph #: [phone] Route: TransDERmal  
  
Follow-up Instructions Return in about 3 months (around 2018). Patient Instructions PRESCRIPTION REFILL POLICY [facility] Statement to Patients 2014 In an effort to ensure the large volume of patient prescription refills is processed in the most efficient and expeditious manner, we are asking our patients to assist us by calling your Pharmacy for all prescription refills, this will include also your  Mail Order Pharmacy. The pharmacy will contact our office electronically to continue the refill process. Please do not wait until the last minute to call your pharmacy. We need at least 48 hours (2days) to fill prescriptions. We also encourage you to call your pharmacy before going to  your prescription to make sure it is ready. With regard to controlled substance prescription refill requests (narcotic refills) that need to be picked up at our office, we ask your cooperation by providing us with at least 72 hours (3days) notice that you will need a refill. We will not refill narcotic prescription refill requests after 4:00pm on any weekday, Monday through Thursday, or after 2:00pm on , or on the weekends.   
  
We encourage everyone to explore another way of getting your prescription refill request processed using [employer], our patient web portal through our electronic medical record system. [employer] is an efficient and effective way to communicate your medication request directly to the office and  downloadable as an sebastian on your smart phone . [employer] also features a review functionality that allows you to view your medication list as well as leave messages for your physician. Are you ready to get connected? If so please review the attatched instructions or speak to any of our staff to get you set up right away! Thank you so much for your cooperation. Should you have any questions please contact our Practice Administrator. The Physicians and Staff,  [facility] Patient Instruction Plan/ Result Policy If we have ordered testing for you, know that; \"NO NEWS IS GOOD NEWS! \" It is our policy that we know longer call patients with results, nor do we  give test results over the phone. We schedule follow up appointments so that your results can be discussed in person. This allows you to address any questions you have regarding the results. If something of concern is revealed on your test, we will contact you to discuss the matter and if needed schedule a sooner follow up appointment. Additionally, results may be found by using the My Chart feature and one of our patient service representatives at the  can give you instructions on how to access this feature to utilize our electronic medical record system. Thank you for your understanding. Rotigotine (Absorbed through the skin) Rotigotine (oneal-TIG-oh-teen) Treats symptoms of Parkinson disease, such as jerky muscle movements, muscle stiffness, and slow movement. Also treats restless legs syndrome (RLS). Brand Name(s): Neupro There may be other brand names for this medicine. When This Medicine Should Not Be Used: Do not use this medicine if you have had an allergic reaction to rotigotine. How to Use This Medicine:  
Patch · Your doctor will tell you how many patches to use, where to apply them, and how often to apply them. Do not use more patches or apply them more often than your doctor tells you to. Try to change the patch at the same time each day. · Read and follow the patient instructions that come with this medicine. Talk to your doctor or pharmacist if you have any questions. · Wash your hands with soap and water before and after applying a patch. · Leave the patch in its sealed wrapper until you are ready to put it on. Tear the wrapper open carefully. NEVER CUT the wrapper or the patch with scissors. Do not use any patch that has been cut by accident. Remove the protective liner, and put the patch on immediately after you open the package. · The patient instructions will show the body areas where you can wear the patch. When putting on each new patch, choose a different place within these areas. Do not put the new patch on the same place you wore the last one. Be sure to remove the old patch before applying a new one. Do not put a new patch in the same place for at least 14 days. · Apply the patch to clean, dry skin that has very little or no hair. Do not put the patch over burns, cuts, irritated, or oily skin. Avoid putting the patch on skin folds, under a waistband, or on areas where it could be rubbed by tight clothing. If you need to put the patch on a hairy area, the area should be shaved at least 3 days before you apply the patch. · Do not put cream, lotion, ointment, oil, or powder on the skin area where the patch will be placed. · Put on a new patch if the old one has fallen off and cannot be reapplied. · After you remove the patch, wash the site with soap and water to remove any drug or adhesive. You may also use baby or mineral oil to remove any excess medicine. If a dose is missed: · If you forget to wear or change a patch, put one on as soon as you can. If it is almost time to put on your next patch, wait until then to apply a new patch and skip the one you missed. Do not apply extra patches to make up for a missed dose. · If the patch comes off, stick it back on. You can use medical tape to keep the edges taped down, if needed. If the patch comes off and you cannot find it, put on a new patch. Continue to change patches on your regular schedule. How to Store and Dispose of This Medicine: · Store the patches at room temperature in a closed container, away from heat, moisture, and direct light. · Fold the used patch in half with the sticky sides together. Throw any used patch away so that children or pets cannot get to it. You will also need to throw away old patches after the expiration date has passed. · Keep all medicine out of the reach of children. Never share your medicine with anyone. Drugs and Foods to Avoid: Ask your doctor or pharmacist before using any other medicine, including over-the-counter medicines, vitamins, and herbal products. · Make sure your doctor knows if you are using metoclopramide (Laroy Mooring), medicine to treat mental illness (such as haloperidol, mesoridazine, pimozide, prochlorperazine, quetiapine, thioridazine, ziprasidone, Compazine®, Geodon®, Haldol®, Mellaril®, Orap®, Serentil®, or Seroquel®), or an antidepressant (such as amitriptyline, fluoxetine, nortriptyline, Elavil®, Pamelor®, Prozac®, Sarafem®, Vivactil®). · Tell your doctor if you use anything else that makes you sleepy. Some examples are allergy medicine, narcotic pain medicine, and alcohol. · Do not drink excess alcohol while you are using this medicine. Warnings While Using This Medicine: · Make sure your doctor knows if you are pregnant or breastfeeding, or if you have kidney disease, heart or blood vessel disease, congestive heart failure, asthma, dyskinesia (abnormal muscle movements), lung disease, melanoma, or psychotic mental illness. · Tell your doctor if you have a sulfite allergy. This medicine contains sodium metabisulfite. · Rarely, this medicine has caused sudden sleep attacks in some people. You might fall asleep without warning during normal daily activities, such as driving or eating. This is more likely to happen if you are also using other medicines that make you sleepy or if you have a sleep disorder. However, many people will feel tired or sleepy but do not have a sudden sleep attack. Tell your doctor about any sleepiness or tiredness that you have. · This medicine may change your blood pressure or heart rate. Make sure your doctor knows if you already have high or low blood pressure. Tell your doctor if you feel faint or lightheaded, or have a fast or pounding heart beat. · This medicine may make you dizzy, lightheaded, or drowsy, especially when you first begin to use it. Do not drive, use machines, or do anything else that could be dangerous until you know how this medicine affects you. Stand or sit up slowly if you are dizzy. · This medicine may cause agitation, irritability, confusion, aggressiveness, or hostility. It may also cause you to see, hear, or feel things that are not there. Tell your doctor right away if you or your caregiver notice any of these side effects. · This medicine may cause impulse behavior changes, such as strong urges to candelaria, spend money, or binge eat, or an increased sex drive. · Your doctor will need to check your skin regularly for melanoma while you are receiving this medicine. · Remove this patch before you have a medical procedure that involves electricity, such as MRI or cardioversion, because the patch contains aluminum. Make sure any doctor who treats you knows that you have this patch. · Heat may cause your skin to absorb too much of this medicine. Keep the patch away from heat, especially from heating pads, electric blankets, hot tubs, or direct sunlight. · Tell your doctor if you develop redness, swelling, or itchiness from the patch that lasts longer than a few days, becomes more severe, or spreads to areas outside the application site. Also, do not expose the area to direct sunlight until it heals. Direct sunlight may cause skin discoloration. · This medicine may worsen RLS symptoms during or after treatment. · This medicine may cause new or worsened Parkinson disease symptoms, such as jerky muscle movements, during treatment. · Do not stop using this medicine suddenly. Your doctor will need to slowly decrease your dose before you stop it completely. · Your doctor will check your progress and the effects of this medicine at regular visits. Keep all appointments. Possible Side Effects While Using This Medicine:  
Call your doctor right away if you notice any of these side effects: · Allergic reaction: Itching or hives, swelling in your face or hands, swelling or tingling in your mouth or throat, chest tightness, trouble breathing · Changes in vision · Headache · New or worsened achy legs, or urge to move your legs · New or worsened jerky muscle movement that you cannot control (often in your face, tongue, arms, or legs) · Lightheadedness, dizziness, or faint · Rapid weight gain, or swelling in your hands, ankles, or feet · Severe drowsiness, suddenly falling asleep · Skin rash, itching, swelling, or redness where the patch is worn · Unusual behavior, mood changes, confusion, or seeing or hearing things that are not there If you notice these less serious side effects, talk with your doctor: · Loss of appetite · Nausea, vomiting, or upset stomach · Sweating · Trouble sleeping If you notice other side effects that you think are caused by this medicine, tell your doctor. Call your doctor for medical advice about side effects. You may report side effects to FDA at 1-584-RZT-8250 © 2017 [address] [facility] Information is for End User's use only and may not be sold, redistributed or otherwise used for commercial purposes. The above information is an  only. It is not intended as medical advice for individual conditions or treatments. Talk to your doctor, nurse or pharmacist before following any medical regimen to see if it is safe and effective for you. Introducing [facility]! Dear Jolly Barton: 
Thank you for requesting a [employer] account. Our records indicate that you already have an active [employer] account. You can access your account anytime at [email or web address] Did you know that you can access your hospital and ER discharge instructions at any time in [employer]? You can also review all of your test results from your hospital stay or ER visit. Additional Information If you have questions, please visit the Frequently Asked Questions section of the [employer] website at [email or web address] Remember, [employer] is NOT to be used for urgent needs. For medical emergencies, dial 911. Now available from your iPhone and Android! Please provide this summary of care documentation to your next provider. Your primary care clinician is listed as Roann Holstein. If you have any questions after today's visit, please call [phone].

## 2018-06-15 ENCOUNTER — TELEPHONE (OUTPATIENT)
Dept: CARDIOLOGY CLINIC | Age: 83
End: 2018-06-15

## 2018-06-15 NOTE — TELEPHONE ENCOUNTER
[facility] is requesting cardiac clearance for patient to hold coumadin 4 days prior to having spinal injections on 6/27/18. Please advise.      Fax # [phone]   Phone # [phone]

## 2018-06-22 ENCOUNTER — TELEPHONE ANTICOAG (OUTPATIENT)
Dept: CARDIOLOGY CLINIC | Age: 83
End: 2018-06-22

## 2018-06-22 LAB — INR, EXTERNAL: 2 (ref 2–3)

## 2018-06-22 NOTE — PROGRESS NOTES

## 2018-06-26 ENCOUNTER — CLINICAL SUPPORT (OUTPATIENT)
Dept: CARDIOLOGY CLINIC | Age: 83
End: 2018-06-26

## 2018-06-26 ENCOUNTER — OFFICE VISIT (OUTPATIENT)
Dept: CARDIOLOGY CLINIC | Age: 83
End: 2018-06-26

## 2018-06-26 VITALS
OXYGEN SATURATION: 98 % | BODY MASS INDEX: 25.99 KG/M2 | HEART RATE: 85 BPM | SYSTOLIC BLOOD PRESSURE: 100 MMHG | DIASTOLIC BLOOD PRESSURE: 52 MMHG | WEIGHT: 156 LBS | RESPIRATION RATE: 18 BRPM | HEIGHT: 65 IN

## 2018-06-26 DIAGNOSIS — I48.20 CHRONIC ATRIAL FIBRILLATION (HCC): ICD-10-CM

## 2018-06-26 DIAGNOSIS — I25.5 ISCHEMIC CARDIOMYOPATHY: Primary | ICD-10-CM

## 2018-06-26 DIAGNOSIS — Z95.0 PACEMAKER: ICD-10-CM

## 2018-06-26 DIAGNOSIS — I44.2 CHB (COMPLETE HEART BLOCK) (HCC): ICD-10-CM

## 2018-06-26 DIAGNOSIS — Z95.0 CARDIAC PACEMAKER IN SITU: Primary | ICD-10-CM

## 2018-06-26 NOTE — PROGRESS NOTES
HISTORY OF PRESENT ILLNESS  Trevin Garcia is a 80 y.o. female     SUMMARY:   Problem List  Date Reviewed: 6/26/2018          Codes Class Noted    Latent tuberculosis by blood test ICD-10-CM: R76.11  ICD-9-CM: 790.6  1/22/2018        Recurrent depression ([facility] ICD-10-CM: F33.9  ICD-9-CM: 296.30  1/8/2018        Pacemaker ICD-10-CM: Z95.0  ICD-9-CM: V45.01  7/24/2017        Advanced care planning/counseling discussion ICD-10-CM: Z71.89  ICD-9-CM: V65.49  5/11/2017    Overview Signed 5/11/2017 12:44 PM by Abner Hussein RN     A copy of patient's completed Advanced Medical Directive is on file in the patient's medical record. NN reviewed Advanced Medical Directive document with patient & patient denies the need for changes/ updates. Psoriasis (Chronic) ICD-10-CM: L40.9  ICD-9-CM: 696.1  3/23/2017        Cardiomyopathy ([facility] ICD-10-CM: I42.9  ICD-9-CM: 425.4  9/20/2016    Overview Addendum 9/20/2016  8:45 AM by Cash Petersen MD     8/12 normal lexiscan cardiolyte, lvef 68%  8/16 echo lvef 35% multiple wall motion abnormalities, melissa, mod mr, mild ai, mod tr pa pressure 36mm  8/16 lexiscan mod reversible anterior defect, mostly fixed apical defect.  lvef 35%             Abnormal stress test ICD-10-CM: R94.39  ICD-9-CM: 794.39  9/13/2016        Ischemic cardiomyopathy ICD-10-CM: I25.5  ICD-9-CM: 414.8  Unknown        Chest pain ICD-10-CM: R07.9  ICD-9-CM: 786.50  8/10/2016        Restrictive lung disease ICD-10-CM: J98.4  ICD-9-CM: 518.89  12/14/2015        Osteopenia ICD-10-CM: M85.80  ICD-9-CM: 733.90  11/23/2015        Hypothyroidism due to acquired atrophy of thyroid ICD-10-CM: E03.4  ICD-9-CM: 244.8, 246.8  11/6/2015        CKD (chronic kidney disease) stage 3, GFR 30-59 ml/min ICD-10-CM: N18.3  ICD-9-CM: 585.3  2/9/2015        Anxiety and depression (Chronic) ICD-10-CM: F41.9, F32.9  ICD-9-CM: 300.00, 311  2/26/2014        Colon cancer ([facility] ICD-10-CM: C18.9  ICD-9-CM: 153.9  2/26/2014 Overview Addendum 2/26/2014 11:12 AM by Adama Ash MD     Partial colectomy  Petey Alejandro             Hypothyroid ICD-10-CM: E03.9  ICD-9-CM: 244.9  2/26/2014        Pulmonary hypertension ([facility] ICD-10-CM: I27.20  ICD-9-CM: 416.8  9/26/2011        Atrial fibrillation ([facility] ICD-10-CM: I48.91  ICD-9-CM: 427.31  Unknown    Overview Signed 10/21/2010  8:16 AM by Jhonny Salcedo     av node ablation 5/22/98 with placement of CPI #[id number] dual chamber pacemaker subsequently replaced with a single chamber medtronic #[id number] single chamber pacemaker 7/25/05             Mitral regurgitation ICD-10-CM: I34.0  ICD-9-CM: 424.0  10/21/2010        CHB (complete heart block) (HCC) ICD-10-CM: I44.2  ICD-9-CM: 426.0  10/21/2010        Orthostatic hypotension ICD-10-CM: I95.1  ICD-9-CM: 458.0  10/21/2010        Chest pain, unspecified ICD-10-CM: R07.9  ICD-9-CM: 786.50  10/21/2010        Mixed hyperlipidemia ICD-10-CM: E78.2  ICD-9-CM: 272.2  10/18/2010              Current Outpatient Prescriptions on File Prior to Visit   Medication Sig    PROAIR HFA 90 mcg/actuation inhaler     raNITIdine (ZANTAC) 150 mg tablet TAKE 1 TABLET BY MOUTH TWICE A DAY    SYNTHROID 112 mcg tablet TAKE 1 TAB BY MOUTH DAILY (BEFORE BREAKFAST).  simvastatin (ZOCOR) 20 mg tablet TAKE 1 TABLET BY MOUTH NIGHTLY    carbidopa-levodopa (SINEMET)  mg per tablet Take 1 Tab by mouth three (3) times daily.  carvedilol (COREG) 3.125 mg tablet TAKE 1 TABLET BY MOUTH TWICE A DAY WITH MEALS    ZOLOFT 50 mg tablet TAKE 1 TABLET BY MOUTH EVERY DAY    COUMADIN 5 mg tablet TAKE 1 TABLET BY MOUTH DAILY AND 1/2 TABLET ON SUNDAY (Patient taking differently: TAKE 1 TABLET BY MOUTH DAILY AND 1/2 TABLET ON SUNDAY MON & WED)    cholecalciferol (VITAMIN D3) 1,000 unit tablet Take  by mouth daily.  baclofen (LIORESAL) 10 mg tablet Take 5 mg by mouth two (2) times a day.  furosemide (LASIX) 40 mg tablet Take 1 Tab by mouth daily.  (Patient taking differently: Take 20 mg by mouth daily.)    desonide (TRIDESILON) 0.05 % cream Apply  to affected area two (2) times a day.  multivitamin (ONE A DAY) tablet Take 1 Tab by mouth daily.  OXYGEN-AIR DELIVERY SYSTEMS 2 L by Does Not Apply route nightly.  ADVAIR DISKUS 250-50 mcg/dose diskus inhaler Take 1 Puff by inhalation two (2) times a day.  acetaminophen (TYLENOL) 500 mg tablet Take 650 mg by mouth every six (6) hours as needed for Pain. (2) Tablet in am (1) tablet in pm (2) Tablet pm    tiotropium (SPIRIVA WITH HANDIHALER) 18 mcg inhalation capsule Take 1 Cap by inhalation daily.  bacitracin zinc (BACITRACIN) ointment Apply  to affected area two (2) times a day.  DENTA 5000 PLUS 1.1 % crea Apply 1 mg to affected area as needed (to prevent cavities). No current facility-administered medications on file prior to visit. CARDIOLOGY STUDIES TO DATE:  8/12 normal lexiscan cardiolyte, lvef 68%  8/16 echo lvef 35% multiple wall motion abnormalities, melissa, mod mr, mild ai, mod tr pa pressure 36mm  8/16 lexiscan mod reversible anterior defect, mostly fixed apical defect. lvef 35%      4/17 lvef 40% ant hypo, lvh, melissa, mild to mod mr, mild ai. Mild to mod tr upper normal pa pressure        Chief Complaint   Patient presents with    Cardiomyopathy     HPI :  Ms. Nora Armstrong continues with lots of trouble with aches and pains and currently she is off Coumadin because she is going to get an epidural injection. She also has chronic shortness of breath, which is bronchodilator responsive and it sounds like her pulmonologist is thinking about putting her on chronic low dose Prednisone. She is doing some therapies at [facility] three days a week, which she enjoys.           CARDIAC ROS:   negative for chest pain, palpitations, syncope, orthopnea, paroxysmal nocturnal dyspnea, exertional chest pressure/discomfort, claudication, lower extremity edema    Family History   Problem Relation Age of Onset    Diabetes Mother     Heart Disease Mother     Heart Attack Mother     Heart Disease Father     Stroke Sister     Breast Cancer Sister 80    Cancer Maternal Aunt      uterus    Diabetes Maternal Uncle     Breast Cancer Daughter 61       Past Medical History:   Diagnosis Date    Arthritis     Atrial fibrillation ([address]     av node ablation 5/22/98 with placement of CPI #[id number] dual chamber pacemaker subsequently replaced with a single chamber medtronic #[id number] single chamber pacemaker 7/25/05    Cancer ([address] 1970's    colon cancer w/ resection    COPD     Depression     GERD (gastroesophageal reflux disease)     Hyperlipidemia     Hypertension     Ischemic cardiomyopathy     Migraine headache     Orthostatic hypotension     RADHA (obstructive sleep apnea)     Pacemaker 5/22/98    AV node ablation /pacemaker placement utilizing CPI # [id number] dual chamber system, medtronic #[id number] single chamber device placed 7/22/05    Pulmonary hypertension ([address] 9/26/2011    RVSP 50 on echo 8/14    Thyroid disease     Valvular heart disease     mild-mod MR/TR       GENERAL ROS:  A comprehensive review of systems was negative except for that written in the HPI.     Visit Vitals    /52 (BP 1 Location: Left arm, BP Patient Position: Sitting)    Pulse 85    Resp 18    Ht 5' 5\" (1.651 m)    Wt 156 lb (70.8 kg)    LMP 02/26/1970    SpO2 98%    BMI 25.96 kg/m2       Wt Readings from Last 3 Encounters:   06/26/18 156 lb (70.8 kg)   06/14/18 158 lb (71.7 kg)   06/13/18 158 lb (71.7 kg)            BP Readings from Last 3 Encounters:   06/26/18 100/52   06/14/18 120/66   06/13/18 120/74       PHYSICAL EXAM  General appearance: alert, cooperative, no distress, appears stated age  Neck: supple, symmetrical, trachea midline, no adenopathy, no carotid bruit and no JVD  Lungs: clear to auscultation bilaterally  Heart: irregularly irregular rhythm, S1, S2 normal, no S3 or S4  Extremities: extremities normal, atraumatic, no cyanosis or edema    Lab Results   Component Value Date/Time    Cholesterol, total 247 (H) 09/12/2017 09:53 AM    Cholesterol, total 173 08/22/2016 08:45 AM    Cholesterol, total 157 08/11/2015 12:14 PM    Cholesterol, total 157 02/09/2015 08:34 AM    Cholesterol, total 156 02/27/2014 08:49 AM    HDL Cholesterol 79 09/12/2017 09:53 AM    HDL Cholesterol 46 08/22/2016 08:45 AM    HDL Cholesterol 47 08/11/2015 12:14 PM    HDL Cholesterol 46 02/09/2015 08:34 AM    HDL Cholesterol 48 02/27/2014 08:49 AM    LDL, calculated 124 (H) 09/12/2017 09:53 AM    LDL, calculated 99 08/22/2016 08:45 AM    LDL, calculated 86 08/11/2015 12:14 PM    LDL, calculated 81 02/09/2015 08:34 AM    LDL, calculated 83 02/27/2014 08:49 AM    Triglyceride 221 (H) 09/12/2017 09:53 AM    Triglyceride 140 08/22/2016 08:45 AM    Triglyceride 121 08/11/2015 12:14 PM    Triglyceride 149 02/09/2015 08:34 AM    Triglyceride 125 02/27/2014 08:49 AM     ASSESSMENT  Ms. Vilma Gonzalez is stable and I think asymptomatic from a cardiac perspective on a good medical regimen and needs no cardiac testing at this time. She is unable to tolerate higher doses of medical therapy because of orthostatic hypotension symptoms. current treatment plan is effective, no change in therapy  lab results and schedule of future lab studies reviewed with patient  reviewed diet, exercise and weight control    Encounter Diagnoses   Name Primary?  Ischemic cardiomyopathy Yes    Chronic atrial fibrillation (HCC)     CHB (complete heart block) ([facility]     Pacemaker      No orders of the defined types were placed in this encounter. Follow-up Disposition:  Return in about 6 months (around 12/26/2018).     Rebbeca Lesch, MD  6/26/2018

## 2018-06-26 NOTE — MR AVS SNAPSHOT
[address] [facility] [address] [facility] 
[phone] Patient: Heriberto Kaba MRN:  [id number] Visit Information Date & Time Provider Department Dept. Phone Encounter #  
 6/26/2018  1:20 PM Nasim Noel MD [facility] [phone] [id number] Follow-up Instructions Return in about 6 months (around 12/26/2018). Your Appointments 6/26/2018  1:20 PM  
ESTABLISHED PATIENT with Nasim Noel MD  
[facility] ([address]) Appt Note: 4 month f/u med interrogation/rc (b 2-15-18 interr)  full ck due after 7-10-18  
 [address] [address]  
[address] [place] [address]  
  
    
 9/14/2018  3:40 PM  
Follow Up with Dakota Bales MD  
[facility]) Appt Note: 3 month f/u [facility] 06/14/18  
 [facility] [address] [facility] [address] [phone]  
  
   
 [address] [address] [address] Upcoming Health Maintenance Date Due DTaP/Tdap/Td series (1 - Tdap) 4/3/2012 MEDICARE YEARLY EXAM 5/12/2018 Influenza Age 5 to Adult 8/1/2018 GLAUCOMA SCREENING Q2Y 12/8/2018 Allergies as of 6/26/2018  Review Complete On: 6/26/2018 By: Nasim Noel MD  
  
 Severity Noted Reaction Type Reactions Cardizem [Diltiazem Hcl]  10/04/2010    Hives Codeine  10/04/2010    Nausea and Vomiting Darvocet A500 [Propoxyphene N-acetaminophen]  10/04/2010    Nausea and Vomiting Diltiazem  10/04/2010    Hives Labetalol  04/25/2012    Nausea and Vomiting Dizzy/Disoriented Pcn [Penicillins]  10/04/2010    Swelling Tongue Swelling Primidone  07/31/2012    Other (comments) Lightheaded/unsteady gait Sulfa (Sulfonamide Antibiotics)  10/04/2010    Nausea and Vomiting Current Immunizations  Reviewed on 11/23/2015 Name Date Influenza High Dose Vaccine PF 9/21/2017, 9/29/2016, 9/24/2015, 10/1/2014 Influenza Vaccine 12/1/2013 Influenza Vaccine Split 10/12/2012 Pneumococcal Conjugate (PCV-13) 11/23/2015 Pneumococcal Polysaccharide (PPSV-23) 2/27/2015 Pneumococcal Vaccine (Pcv) 12/15/2010 TB Skin Test (PPD) 1/1/2001 TB Skin Test (PPD) Intradermal 1/16/2018 TD Vaccine 4/2/2012 Varicella Virus Vaccine Live 9/12/2012 Zoster Vaccine, Live 12/1/2013 Not reviewed this visit You Were Diagnosed With   
  
 Codes Comments Ischemic cardiomyopathy    -  Primary ICD-10-CM: I25.5 ICD-9-CM: 414.8 Chronic atrial fibrillation (HCC)     ICD-10-CM: F39.4 ICD-9-CM: 427.31   
 CHB (complete heart block) (HCC)     ICD-10-CM: I44.2 ICD-9-CM: 426.0 Pacemaker     ICD-10-CM: Z95.0 ICD-9-CM: V45.01 Vitals BP Pulse Resp Height(growth percentile) Weight(growth percentile) LMP  
 100/52 (BP 1 Location: Left arm, BP Patient Position: Sitting) 85 18 5' 5\" (1.651 m) 156 lb (70.8 kg) 02/26/1970 SpO2 BMI OB Status Smoking Status 98% 25.96 kg/m2 Postmenopausal Passive Smoke Exposure - Never Smoker Vitals History BMI and BSA Data Body Mass Index Body Surface Area  
 25.96 kg/m 2 1.8 m 2 Preferred Pharmacy Pharmacy Name Phone [id number] [address] [place]) [phone] Your Updated Medication List  
  
   
This list is accurate as of 6/26/18  1:06 PM.  Always use your most recent med list.  
  
  
  
  
 acetaminophen 500 mg tablet Commonly known as:  TYLENOL Take 650 mg by mouth every six (6) hours as needed for Pain. (2) Tablet in am (1) tablet in pm (2) Tablet pm  
  
 ADVAIR DISKUS 250-50 mcg/dose diskus inhaler Generic drug:  fluticasone-salmeterol Take 1 Puff by inhalation two (2) times a day. bacitracin zinc ointment Commonly known as:  BACITRACIN Apply  to affected area two (2) times a day. baclofen 10 mg tablet Commonly known as:  LIORESAL Take 5 mg by mouth two (2) times a day. carbidopa-levodopa  mg per tablet Commonly known as:  SINEMET Take 1 Tab by mouth three (3) times daily. carvedilol 3.125 mg tablet Commonly known as:  COREG  
TAKE 1 TABLET BY MOUTH TWICE A DAY WITH MEALS  
  
 COUMADIN 5 mg tablet Generic drug:  warfarin TAKE 1 TABLET BY MOUTH DAILY AND 1/2 TABLET ON SUNDAY DENTA 5000 PLUS 1.1 % Crea Generic drug:  fluoride (sodium) Apply 1 mg to affected area as needed (to prevent cavities). desonide 0.05 % cream  
Commonly known as:  Julia Caroli Apply  to affected area two (2) times a day. furosemide 40 mg tablet Commonly known as:  LASIX Take 1 Tab by mouth daily. multivitamin tablet Commonly known as:  ONE A DAY Take 1 Tab by mouth daily. OXYGEN-AIR DELIVERY SYSTEMS  
2 L by Does Not Apply route nightly. PROAIR HFA 90 mcg/actuation inhaler Generic drug:  albuterol  
  
 raNITIdine 150 mg tablet Commonly known as:  ZANTAC TAKE 1 TABLET BY MOUTH TWICE A DAY  
  
 simvastatin 20 mg tablet Commonly known as:  ZOCOR  
TAKE 1 TABLET BY MOUTH NIGHTLY SPIRIVA WITH HANDIHALER 18 mcg inhalation capsule Generic drug:  tiotropium Take 1 Cap by inhalation daily. SYNTHROID 112 mcg tablet Generic drug:  levothyroxine TAKE 1 TAB BY MOUTH DAILY (BEFORE BREAKFAST). VITAMIN D3 1,000 unit tablet Generic drug:  cholecalciferol Take  by mouth daily. ZOLOFT 50 mg tablet Generic drug:  sertraline TAKE 1 TABLET BY MOUTH EVERY DAY Follow-up Instructions Return in about 6 months (around 12/26/2018). Introducing [facility]! Dear Danika Vann: 
Thank you for requesting a [employer] account.   Our records indicate that you already have an active [employer] account. You can access your account anytime at [email or web address] Did you know that you can access your hospital and ER discharge instructions at any time in [employer]? You can also review all of your test results from your hospital stay or ER visit. Additional Information If you have questions, please visit the Frequently Asked Questions section of the [employer] website at [email or web address] Remember, [employer] is NOT to be used for urgent needs. For medical emergencies, dial 911. Now available from your iPhone and Android! Please provide this summary of care documentation to your next provider. Your primary care clinician is listed as Francisco Singer. If you have any questions after today's visit, please call [phone].

## 2018-07-03 ENCOUNTER — TELEPHONE ANTICOAG (OUTPATIENT)
Dept: CARDIOLOGY CLINIC | Age: 83
End: 2018-07-03

## 2018-07-03 LAB — INR, EXTERNAL: 2.6 (ref 2–3)

## 2018-07-03 NOTE — PROGRESS NOTES

## 2018-07-11 ENCOUNTER — TELEPHONE ANTICOAG (OUTPATIENT)
Dept: CARDIOLOGY CLINIC | Age: 83
End: 2018-07-11

## 2018-07-11 LAB — INR, EXTERNAL: 2.2 (ref 2–3)

## 2018-07-18 ENCOUNTER — TELEPHONE (OUTPATIENT)
Dept: NEUROLOGY | Age: 83
End: 2018-07-18

## 2018-07-18 ENCOUNTER — TELEPHONE ANTICOAG (OUTPATIENT)
Dept: CARDIOLOGY CLINIC | Age: 83
End: 2018-07-18

## 2018-07-18 LAB — INR, EXTERNAL: 3.7 (ref 2–3)

## 2018-07-18 NOTE — PROGRESS NOTES

## 2018-07-18 NOTE — TELEPHONE ENCOUNTER
Spoke with jaiden and she wants to know if they can stop the patches. Was told to leave a detailed message on her voicemail concerning the matter if she did not pick-up.     Please advise

## 2018-07-18 NOTE — TELEPHONE ENCOUNTER
----- Message from Love De Luna sent at 7/18/2018  2:02 PM EDT -----  Regarding: Dr. Yee Dequan, daughter in law would like a call back from the nurse regarding whether or not it will be okay for the pt to stop taking the nu-pro patches. Pt has not been getting any sleep since using the patches and the patches are not working to relieve the tremors. Mrs. Deya Tang can be reached at [phone].

## 2018-07-19 NOTE — TELEPHONE ENCOUNTER
Called and discussed information per Dr. Daniela Tate note. She stated that they would discuss the situation and get back to us as to what they have decided.

## 2018-07-19 NOTE — TELEPHONE ENCOUNTER
We started her on a low dose of the patch so likely it is not enough. We need to titrate up to 2-4 mg to see benefit.  If she doesn't want to do this, then we can stop it and go back to TID dosing of sinemet if they prefer

## 2018-07-25 ENCOUNTER — TELEPHONE ANTICOAG (OUTPATIENT)
Dept: CARDIOLOGY CLINIC | Age: 83
End: 2018-07-25

## 2018-07-25 LAB — INR, EXTERNAL: 2.6 (ref 2–3)

## 2018-07-27 DIAGNOSIS — I25.5 ISCHEMIC CARDIOMYOPATHY: ICD-10-CM

## 2018-07-27 RX ORDER — FUROSEMIDE 40 MG/1
TABLET ORAL
Qty: 90 TAB | Refills: 2 | Status: ON HOLD | OUTPATIENT
Start: 2018-07-27 | End: 2019-01-13

## 2018-07-27 NOTE — TELEPHONE ENCOUNTER
Requested Prescriptions     Signed Prescriptions Disp Refills    furosemide (LASIX) 40 mg tablet 90 Tab 2     Sig: TAKE 1 TABLET BY MOUTH DAILY.      Authorizing Provider: Sheri Nieves     Ordering User: Maximiliano James  Date Time Provider [facility]   9/14/2018 3:40 PM Elliot Jefferson MD [facility]   9/27/2018 1:15 PM [address] [place] [address], [address] [facility]   12/18/2018 3:30 PM [address] [place] [address], [address] [facility]   12/18/2018 4:20 PM Demetrice Carson  [address]

## 2018-07-30 ENCOUNTER — TELEPHONE (OUTPATIENT)
Dept: NEUROLOGY | Age: 83
End: 2018-07-30

## 2018-07-30 NOTE — TELEPHONE ENCOUNTER
----- Message from Tony Cabral sent at 7/30/2018  9:21 AM EDT -----  Regarding: Los/Sandi Chicas request a call back to discuss taking the pt off of her New \"Propatch\" medication.  Best contact number is [phone]

## 2018-07-30 NOTE — TELEPHONE ENCOUNTER
They can do a trial off of it completely and see if she worsens.  If she has falls, balance issues, tremor then she can take the Sinemet again

## 2018-07-30 NOTE — TELEPHONE ENCOUNTER
----- Message from Emeli Elam sent at 7/30/2018  9:21 AM EDT -----  Regarding: Los/Telephone  Daisy Lee request a call back to discuss taking the pt off of her New \"Propatch\" medication.  Best contact number is [phone]

## 2018-07-30 NOTE — TELEPHONE ENCOUNTER
Discussed with aMrilee Roa. They will stop the patch and resume the sinemet TID tonight. Would like to know if they could stop the sinemet all together since she doesn't think it has been helping.

## 2018-08-01 ENCOUNTER — TELEPHONE ANTICOAG (OUTPATIENT)
Dept: CARDIOLOGY CLINIC | Age: 83
End: 2018-08-01

## 2018-08-01 LAB — INR, EXTERNAL: 2.3 (ref 2–3)

## 2018-08-01 NOTE — PROGRESS NOTES

## 2018-08-07 ENCOUNTER — TELEPHONE (OUTPATIENT)
Dept: INTERNAL MEDICINE CLINIC | Age: 83
End: 2018-08-07

## 2018-08-07 ENCOUNTER — HOSPITAL ENCOUNTER (EMERGENCY)
Age: 83
Discharge: HOME | End: 2018-08-07
Attending: EMERGENCY MEDICINE | Admitting: EMERGENCY MEDICINE
Payer: MEDICARE

## 2018-08-07 VITALS
HEIGHT: 65 IN | SYSTOLIC BLOOD PRESSURE: 113 MMHG | WEIGHT: 150 LBS | HEART RATE: 84 BPM | OXYGEN SATURATION: 97 % | TEMPERATURE: 98.2 F | DIASTOLIC BLOOD PRESSURE: 79 MMHG | BODY MASS INDEX: 24.99 KG/M2 | RESPIRATION RATE: 14 BRPM

## 2018-08-07 DIAGNOSIS — L08.9 INFECTED SEBACEOUS CYST: Primary | ICD-10-CM

## 2018-08-07 DIAGNOSIS — L72.3 INFECTED SEBACEOUS CYST: Primary | ICD-10-CM

## 2018-08-07 PROCEDURE — 75810000289 HC I&D ABSCESS SIMP/COMP/MULT

## 2018-08-07 PROCEDURE — 74011000250 HC RX REV CODE- 250: Performed by: PHYSICIAN ASSISTANT

## 2018-08-07 PROCEDURE — 99283 EMERGENCY DEPT VISIT LOW MDM: CPT

## 2018-08-07 RX ORDER — LIDOCAINE 40 MG/G
CREAM TOPICAL
Status: COMPLETED | OUTPATIENT
Start: 2018-08-07 | End: 2018-08-07

## 2018-08-07 RX ORDER — DOXYCYCLINE 25 MG/5ML
100 POWDER, FOR SUSPENSION ORAL EVERY 12 HOURS
Qty: 200 ML | Refills: 0 | Status: SHIPPED | OUTPATIENT
Start: 2018-08-07 | End: 2018-08-12

## 2018-08-07 RX ADMIN — LIDOCAINE 1 TUBE: 40 CREAM TOPICAL at 10:43

## 2018-08-07 NOTE — DISCHARGE INSTRUCTIONS
Epidermoid Cyst: Care Instructions  Your Care Instructions  An epidermoid (say \"lb-ljh-RMJ-maine\") cyst is a lump just under the skin. These cysts can form when a hair follicle becomes blocked. They are common in acne and may occur on the face, neck, back, and genitals. However, they can form anywhere on the body. These cysts are not cancer and do not lead to cancer. They tend not to hurt, but they can sometimes become swollen and painful. They also may break open (rupture) and cause scarring. These cysts sometimes do not cause problems and may not need treatment. If you have a cyst that is swollen and hurts, your doctor may inject it with a medicine to help it heal. But it is more likely that a painful cyst will need to be removed. Your doctor will give you a shot of numbing medicine and cut into the cyst to drain it or remove it. This makes the symptoms go away. Follow-up care is a key part of your treatment and safety. Be sure to make and go to all appointments, and call your doctor if you are having problems. It's also a good idea to know your test results and keep a list of the medicines you take. How can you care for yourself at home? · Do not squeeze the cyst or poke it with a needle to open it. This can cause swelling, redness, and infection. · Always have a doctor look at any new lumps you get to make sure that they are not serious. When should you call for help? Watch closely for changes in your health, and be sure to contact your doctor if:    · You have a fever, redness, or swelling after you get a shot of medicine in the cyst.     · You see or feel a new lump on your skin. Where can you learn more? Go to http://markel-nicolasa.info/. Enter E646 in the search box to learn more about \"Epidermoid Cyst: Care Instructions. \"  Current as of: October 5, 2017  Content Version: 11.7  © 6450-1416 [employer], [employer].  Care instructions adapted under license by [employer] (which disclaims liability or warranty for this information). If you have questions about a medical condition or this instruction, always ask your healthcare professional. [address] any warranty or liability for your use of this information.

## 2018-08-07 NOTE — ED TRIAGE NOTES
Patient states she had a cyst on her back.  States she has had many cysts before and they normally \"open them up to get the core out\"

## 2018-08-07 NOTE — ED PROVIDER NOTES
HPI Comments: 80year old female presenting to the ED for cyst.  Reports hx same. Notes that she has never had to have an incision made. States that she started with an area of soreness to her right mid-back about one week ago, notes that 2 days ago it \"came to a head\" and opened up. Denies fever, chills, vomiting, or other systemic symptoms. No treatment PTA. No other concerns. PMHx: high cholesterol, thyroid disease, migraines, GERD, COPD, RADHA, MR, TR, pacemaker, depression, a fib, pulmonary HTN, HTN, CA, arthritis, ischemic cardiomyopathy    Social: non-smoker    Patient is a 80 y.o. female presenting with cyst. The history is provided by the patient, medical records and a relative.    Cyst           Past Medical History:   Diagnosis Date    Arthritis     Atrial fibrillation ([address]     av node ablation 5/22/98 with placement of CPI #[id number] dual chamber pacemaker subsequently replaced with a single chamber medtronic #[id number] single chamber pacemaker 7/25/05    Cancer ([address] 1970's    colon cancer w/ resection    COPD     Depression     GERD (gastroesophageal reflux disease)     Hyperlipidemia     Hypertension     Ischemic cardiomyopathy     Migraine headache     Orthostatic hypotension     RADHA (obstructive sleep apnea)     Pacemaker 5/22/98    AV node ablation /pacemaker placement utilizing CPI # [id number] dual chamber system, medtronic #[id number] single chamber device placed 7/22/05    Pulmonary hypertension ([address] 9/26/2011    RVSP 50 on echo 8/14    Thyroid disease     Valvular heart disease     mild-mod MR/TR       Past Surgical History:   Procedure Laterality Date    BREAST SURGERY PROCEDURE UNLISTED      benign breast tumor excision right breast    HX BREAST BIOPSY Right 2002    neg; surgical bx    HX COLECTOMY  1974    colon    HX KNEE REPLACEMENT      right TKA    HX PACEMAKER      HX TUBAL LIGATION      TOTAL KNEE ARTHROPLASTY      total on R, partial on L         Family History:   Problem Relation Age of Onset    Diabetes Mother     Heart Disease Mother     Heart Attack Mother     Heart Disease Father     Stroke Sister     Breast Cancer Sister 80    Cancer Maternal Aunt      uterus    Diabetes Maternal Uncle     Breast Cancer Daughter 61       Social History     Social History    Marital status:      Spouse name: N/A    Number of children: N/A    Years of education: N/A     Occupational History    Not on file. Social History Main Topics    Smoking status: Passive Smoke Exposure - Never Smoker    Smokeless tobacco: Never Used    Alcohol use No    Drug use: No    Sexual activity: No     Other Topics Concern    Not on file     Social History Narrative         ALLERGIES: Cardizem [diltiazem hcl]; Codeine; Darvocet a500 [propoxyphene n-acetaminophen]; Diltiazem; Labetalol; Pcn [penicillins]; Primidone; and Sulfa (sulfonamide antibiotics)    Review of Systems   Constitutional: Negative for chills and fever. HENT: Negative for facial swelling. Respiratory: Negative for shortness of breath. Cardiovascular: Negative for chest pain. Gastrointestinal: Negative for nausea and vomiting. Skin: Positive for wound. Neurological: Negative for syncope. All other systems reviewed and are negative. Vitals:    08/07/18 1023 08/07/18 1150   BP: 100/55 113/79   Pulse: 89 84   Resp: 19 14   Temp: 98.2 °F (36.8 °C)    SpO2: 97%    Weight: 68 kg (150 lb)    Height: 5' 5\" (1.651 m)             Physical Exam   Constitutional: She is oriented to person, place, and time. She appears well-developed and well-nourished. No distress. Pleasant elderly WF   HENT:   Head: Normocephalic and atraumatic. Right Ear: External ear normal.   Left Ear: External ear normal.   Eyes: Conjunctivae are normal. No scleral icterus. Neck: Neck supple. No tracheal deviation present. Cardiovascular: Normal rate, regular rhythm and normal heart sounds. Exam reveals no gallop and no friction rub. No murmur heard. Pulmonary/Chest: Effort normal and breath sounds normal. No stridor. No respiratory distress. She has no wheezes. Abdominal: Soft. She exhibits no distension. Musculoskeletal: Normal range of motion. Neurological: She is alert and oriented to person, place, and time. Skin: Skin is warm and dry. Psychiatric: She has a normal mood and affect. Her behavior is normal.   Nursing note and vitals reviewed. MDM  Number of Diagnoses or Management Options  Infected sebaceous cyst:   Diagnosis management comments: 80year old female presenting for infected sebaceous cyst, hx same. Material expressed after LET with purulent and sebaceous material removed. Given purulent drainage, will treat with short course doxy, however no overlying cellulitis changes or systemic symptoms. Encouraged PCP f/u in 2-3 days for wound check and INR check. Amount and/or Complexity of Data Reviewed  Discuss the patient with other providers: yes (Dr. Jessi Willams, ED attending)          ED Course       I&D Abcess Simple  Date/Time: 8/7/2018 11:27 AM  Performed by: Tamara Zamorano  Authorized by: Tamara Zamorano     Consent:     Consent obtained:  Verbal    Consent given by:  Patient    Risks discussed:  Incomplete drainage  Location:     Indications for incision and drainage: infected sebaceous cyst.    Size:  2cm    Location: back. Anesthesia (see MAR for exact dosages): Anesthesia method:  Topical application    Topical anesthetic:  Lidocaine gel  Procedure type:     Complexity:  Simple  Procedure details:     Drainage:  Purulent    Drainage amount:   Moderate    Wound treatment:  Wound left open  Comments:      Material expressed with palpation, yielded some bloody/purulent drainage and small amount of sebaceous material

## 2018-08-07 NOTE — TELEPHONE ENCOUNTER
Patient's daughter is requesting an apt today with Dr Abe Zheng due to an ruptured boil on patient's back , states Dr Ok Marques is never available so they are requesting Dr Abe Zheng . I see Dr Abe Zheng has an open physical slot this afternoon , wasn't sure if I can spilt it up into two acute visit. Please advice.          Eulalia Us can be reached at [phone]

## 2018-08-08 LAB — INR, EXTERNAL: 2.2 (ref 2–3)

## 2018-08-10 ENCOUNTER — TELEPHONE ANTICOAG (OUTPATIENT)
Dept: CARDIOLOGY CLINIC | Age: 83
End: 2018-08-10

## 2018-08-10 ENCOUNTER — OFFICE VISIT (OUTPATIENT)
Dept: INTERNAL MEDICINE CLINIC | Age: 83
End: 2018-08-10

## 2018-08-10 ENCOUNTER — TELEPHONE (OUTPATIENT)
Dept: INTERNAL MEDICINE CLINIC | Age: 83
End: 2018-08-10

## 2018-08-10 VITALS
TEMPERATURE: 98 F | OXYGEN SATURATION: 93 % | DIASTOLIC BLOOD PRESSURE: 58 MMHG | BODY MASS INDEX: 25.66 KG/M2 | HEIGHT: 65 IN | WEIGHT: 154 LBS | HEART RATE: 81 BPM | SYSTOLIC BLOOD PRESSURE: 94 MMHG | RESPIRATION RATE: 16 BRPM

## 2018-08-10 DIAGNOSIS — L40.9 PSORIASIS: Chronic | ICD-10-CM

## 2018-08-10 DIAGNOSIS — R19.7 DIARRHEA DUE TO MALABSORPTION: ICD-10-CM

## 2018-08-10 DIAGNOSIS — L72.9 INFECTED CYST OF SKIN: Primary | ICD-10-CM

## 2018-08-10 DIAGNOSIS — L08.9 INFECTED CYST OF SKIN: Primary | ICD-10-CM

## 2018-08-10 DIAGNOSIS — K90.9 DIARRHEA DUE TO MALABSORPTION: ICD-10-CM

## 2018-08-10 RX ORDER — ROTIGOTINE 1 MG/24H
PATCH, EXTENDED RELEASE TRANSDERMAL
Refills: 1 | COMMUNITY
Start: 2018-07-23 | End: 2018-08-21 | Stop reason: SDUPTHER

## 2018-08-10 RX ORDER — CLOBETASOL PROPIONATE 0.5 MG/G
OINTMENT TOPICAL 2 TIMES DAILY
Qty: 15 G | Refills: 0 | Status: ON HOLD | OUTPATIENT
Start: 2018-08-10 | End: 2019-01-13

## 2018-08-10 RX ORDER — CHOLESTYRAMINE
4 POWDER (GRAM) MISCELLANEOUS DAILY
Qty: 100 G | Refills: 3 | Status: SHIPPED | OUTPATIENT
Start: 2018-08-10 | End: 2019-02-21 | Stop reason: ALTCHOICE

## 2018-08-10 RX ORDER — CLOBETASOL PROPIONATE 0.05 G/100ML
SHAMPOO TOPICAL
Qty: 118 ML | Refills: 3 | Status: ON HOLD | OUTPATIENT
Start: 2018-08-10 | End: 2019-01-13

## 2018-08-10 RX ORDER — BACITRACIN ZINC 500 UNIT/G
OINTMENT (GRAM) TOPICAL 2 TIMES DAILY
Qty: 15 G | Refills: 0 | Status: ON HOLD | OUTPATIENT
Start: 2018-08-10 | End: 2019-01-13

## 2018-08-10 NOTE — MR AVS SNAPSHOT
[facility] 
 
 
 [address] [facility] [address] [address] [facility] 
[phone] Patient: Luiza Frances MRN:  [id number] Visit Information Date & Time Provider Department Dept. Phone Encounter #  
 8/10/2018  8:15 AM Chayo Corcoran MD [facility] [address] [id number] Your Appointments 2018  3:40 PM  
Follow Up with Shy Medeiros MD  
 [facility] ([facility]) Appt Note: 3 month f/u [facility] G[address] 18  
 [address] [facility] [address] [id number] [phone]  
  
   
 [address] [place] [address] [address] 2018  1:15 PM  
PACEMAKER with [facility] (FARHAD SCHEDULING) Appt Note: med thresholc (annual)/rc b 18 (interr billed) [address] [facility]  
[facility] [address] [place] [id number]  
  
    
 2018  3:30 PM  
PACEMAKER with Isabella Nice [facility] (FARHAD SCHEDULING) Appt Note: med interr/rc b   (s) assess ROBBIE; pt r/s 10/18/18 appt. ..atb to coordinate with Dr. Jaymie Mantilla appt. ..atb  
 [address] [address]  
[phone]  
  
    
 2018  4:20 PM  
ESTABLISHED PATIENT with Jacob Cao MD  
[facility] (FARHAD SCHEDULING) Appt Note: 6 mo f/u  
 [address] [facility]  
[address] [place] [address] [address] Upcoming Health Maintenance Date Due DTaP/Tdap/Td series (1 - Tdap) 4/3/2012 MEDICARE YEARLY EXAM 2018 Influenza Age 5 to Adult 2018 GLAUCOMA SCREENING Q2Y 2018 Allergies as of 8/10/2018  Review Complete On: 3/66/8741 By: Charlotta Gowers Wears Severity Noted Reaction Type Reactions Cardizem [Diltiazem Hcl]  10/04/2010    Hives Codeine  10/04/2010    Nausea and Vomiting Darvocet A500 [Propoxyphene N-acetaminophen]  10/04/2010    Nausea and Vomiting Diltiazem  10/04/2010    Hives Labetalol  04/25/2012    Nausea and Vomiting Dizzy/Disoriented Pcn [Penicillins]  10/04/2010    Swelling Tongue Swelling Primidone  07/31/2012    Other (comments) Lightheaded/unsteady gait Sulfa (Sulfonamide Antibiotics)  10/04/2010    Nausea and Vomiting Current Immunizations  Reviewed on 11/23/2015 Name Date Influenza High Dose Vaccine PF 9/21/2017, 9/29/2016, 9/24/2015, 10/1/2014 Influenza Vaccine 12/1/2013 Influenza Vaccine Split 10/12/2012 Pneumococcal Conjugate (PCV-13) 11/23/2015 Pneumococcal Polysaccharide (PPSV-23) 2/27/2015 Pneumococcal Vaccine (Pcv) 12/15/2010 TB Skin Test (PPD) 1/1/2001 TB Skin Test (PPD) Intradermal 1/16/2018 TD Vaccine 4/2/2012 Varicella Virus Vaccine Live 9/12/2012 Zoster Vaccine, Live 12/1/2013 Not reviewed this visit You Were Diagnosed With   
  
 Codes Comments Infected cyst of skin    -  Primary ICD-10-CM: L72.9, L08.9 ICD-9-CM: 706.2 Psoriasis     ICD-10-CM: L40.9 ICD-9-CM: 696.1 Diarrhea, unspecified type     ICD-10-CM: R19.7 ICD-9-CM: 787.91 Diarrhea due to malabsorption     ICD-10-CM: K90.9, R19.7 ICD-9-CM: 579.9, 787.91 Vitals BP Pulse Temp Resp Height(growth percentile) Weight(growth percentile) 94/58 (BP 1 Location: Left arm, BP Patient Position: Sitting) 81 98 °F (36.7 °C) (Oral) 16 5' 5\" (1.651 m) 154 lb (69.9 kg) LMP SpO2 BMI OB Status Smoking Status 02/26/1970 93% 25.63 kg/m2 Postmenopausal Passive Smoke Exposure - Never Smoker Vitals History BMI and BSA Data Body Mass Index Body Surface Area  
 25.63 kg/m 2 1.79 m 2 Preferred Pharmacy Pharmacy Name Phone  [id number] [address], [address] [phone] Your Updated Medication List  
  
   
This list is accurate as of 8/10/18  8:57 AM.  Always use your most recent med list.  
  
  
  
  
 acetaminophen 500 mg tablet Commonly known as:  TYLENOL Take 650 mg by mouth every six (6) hours as needed for Pain. (2) Tablet in am (1) tablet in pm (2) Tablet pm  
  
 ADVAIR DISKUS 250-50 mcg/dose diskus inhaler Generic drug:  fluticasone-salmeterol Take 1 Puff by inhalation two (2) times a day. * bacitracin zinc ointment Commonly known as:  BACITRACIN Apply  to affected area two (2) times a day. * bacitracin zinc ointment Commonly known as:  BACITRACIN Apply  to affected area two (2) times a day. baclofen 10 mg tablet Commonly known as:  LIORESAL Take 5 mg by mouth two (2) times a day. carbidopa-levodopa  mg per tablet Commonly known as:  SINEMET Take 1 Tab by mouth three (3) times daily. carvedilol 3.125 mg tablet Commonly known as:  COREG  
TAKE 1 TABLET BY MOUTH TWICE A DAY WITH MEALS Cholestyramine (Bulk) Powd  
4 g by Does Not Apply route daily. * clobetasol 0.05 % ointment Commonly known as:  Hettie Caulk Apply  to affected area two (2) times a day. * clobetasol 0.05 % Sham  
Apply thin film to dry scalp once daily COUMADIN 5 mg tablet Generic drug:  warfarin TAKE 1 TABLET BY MOUTH DAILY AND 1/2 TABLET ON SUNDAY DENTA 5000 PLUS 1.1 % Crea Generic drug:  fluoride (sodium) Apply 1 mg to affected area as needed (to prevent cavities). desonide 0.05 % cream  
Commonly known as:  Ardath Lie Apply  to affected area two (2) times a day. doxycycline monohydrate 25 mg/5 mL Susr Commonly known as:  VIBRAMYCIN Take 20 mL by mouth every twelve (12) hours for 5 days. furosemide 40 mg tablet Commonly known as:  LASIX TAKE 1 TABLET BY MOUTH DAILY. multivitamin tablet Commonly known as:  ONE A DAY  
 Take 1 Tab by mouth daily. NEUPRO 1 mg/24 hour patch Generic drug:  rotigotine USE 1 PATCH TRANSDERMALLY EVERY DAY OXYGEN-AIR DELIVERY SYSTEMS  
2 L by Does Not Apply route nightly. PROAIR HFA 90 mcg/actuation inhaler Generic drug:  albuterol  
  
 raNITIdine 150 mg tablet Commonly known as:  ZANTAC TAKE 1 TABLET BY MOUTH TWICE A DAY  
  
 simvastatin 20 mg tablet Commonly known as:  ZOCOR  
TAKE 1 TABLET BY MOUTH NIGHTLY SPIRIVA WITH HANDIHALER 18 mcg inhalation capsule Generic drug:  tiotropium Take 1 Cap by inhalation daily. SYNTHROID 112 mcg tablet Generic drug:  levothyroxine TAKE 1 TAB BY MOUTH DAILY (BEFORE BREAKFAST). VITAMIN D3 1,000 unit tablet Generic drug:  cholecalciferol Take  by mouth daily. ZOLOFT 50 mg tablet Generic drug:  sertraline TAKE 1 TABLET BY MOUTH EVERY DAY  
  
 * Notice: This list has 4 medication(s) that are the same as other medications prescribed for you. Read the directions carefully, and ask your doctor or other care provider to review them with you. Prescriptions Sent to Pharmacy Refills  
 clobetasol (TEMOVATE) 0.05 % ointment 0 Sig: Apply  to affected area two (2) times a day. Class: Normal  
 Pharmacy: [id number]- [address] [facility], [address] [facility] [address] ([place]) Ph #: [phone] Route: Topical  
 clobetasol 0.05 % sham 3 Sig: Apply thin film to dry scalp once daily Class: Normal  
 Pharmacy: [facility] Ph #: [phone]  
 bacitracin zinc (BACITRACIN) ointment 0 Sig: Apply  to affected area two (2) times a day. Class: Normal  
 Pharmacy: [facility] #[id number]- [address] [facility], [address] [facility] [place] ([place] [address]) Ph #: [phone] Route: Topical  
 Cholestyramine, Bulk, powd 3 Si g by Does Not Apply route daily. Class: Normal  
 [id number] [address], [address] ([place]) Ph #: [phone] Route: Does Not Apply Introducing [facility] SERVICES! Dear Geetha Osuna: 
Thank you for requesting a [employer] account. Our records indicate that you already have an active PlotWatt account. You can access your account anytime at [email or web address] Did you know that you can access your hospital and ER discharge instructions at any time in PlotWatt? You can also review all of your test results from your hospital stay or ER visit. Additional Information If you have questions, please visit the Frequently Asked Questions section of the PlotWatt website at [email or web address] Remember, PlotWatt is NOT to be used for urgent needs. For medical emergencies, dial 911. Now available from your iPhone and Android! Please provide this summary of care documentation to your next provider. Your primary care clinician is listed as Jemima Ellsworth. If you have any questions after today's visit, please call [phone].

## 2018-08-10 NOTE — PROGRESS NOTES

## 2018-08-10 NOTE — TELEPHONE ENCOUNTER
Patient was just in to see Dr Alena Lam regarding the Boil on her Back and the Daughter Sun Sandy needs to speak with nurse this am her no is [phone]

## 2018-08-10 NOTE — TELEPHONE ENCOUNTER
Patient's daughter London Irving was not aware her mother was using the doxycycline as directed. Patient was doing 10 mL BID instead of 20 mL BID. London Irving questions if her mother should resume the oral antibiotics as instructed. Discussed case with provider who advised the abscess in office did not look too bad so can d/c all oral antibiotics & just use the topicals given at today's visit. Lesley voiced understanding.

## 2018-08-10 NOTE — PROGRESS NOTES
Agustina Denny is a 80 y.o. female who presents today for Cyst and Psoriasis  . She has a history of   Patient Active Problem List   Diagnosis Code    Mixed hyperlipidemia E78.2    Atrial fibrillation ([facility] I48.91    Mitral regurgitation I34.0    CHB (complete heart block) ([facility]) I44.2    Orthostatic hypotension I95.1    Chest pain, unspecified R07.9    Pulmonary hypertension ([facility]) I27.20    Anxiety and depression F41.9, F32.9    Colon cancer (HCC) C18.9    Hypothyroid E03.9    CKD (chronic kidney disease) stage 3, GFR 30-59 ml/min N18.3    Hypothyroidism due to acquired atrophy of thyroid E03.4    Osteopenia M85.80    Restrictive lung disease J98.4    Chest pain R07.9    Abnormal stress test R94.39    Ischemic cardiomyopathy I25.5    Cardiomyopathy (HCC) I42.9    Psoriasis L40.9    Advanced care planning/counseling discussion Z71.89    Pacemaker Z95.0    Recurrent depression ([facility]) F33.9    Latent tuberculosis by blood test R76.11   . Today patient is here for f/u on infected. she does have other concerns. Seen at 8/7/2018 in the ER due to infected cyst. Notes that it had been draining some. The ER I&D'ed the cyst and placed her on a short coarse of Doxy. Since notes that she has been doing well. No systemic s/s of infection. Diarrhea now much worse. Progressively getting worse since CRC surgery decades ago. Has been taking a lot of immodium. No new infectious type symptoms. ROS  Review of Systems   Constitutional: Negative for chills, fever and weight loss. Respiratory: Negative for cough and shortness of breath. Cardiovascular: Negative for chest pain, palpitations and leg swelling. Gastrointestinal: Positive for diarrhea. Negative for abdominal pain, blood in stool, constipation, nausea and vomiting. Skin: Negative for itching. Abscess   Neurological: Negative. Endo/Heme/Allergies: Does not bruise/bleed easily.    Psychiatric/Behavioral: Negative for depression. The patient is not nervous/anxious. Visit Vitals    BP 94/58 (BP 1 Location: Left arm, BP Patient Position: Sitting)    Pulse 81    Temp 98 °F (36.7 °C) (Oral)    Resp 16    Ht 5' 5\" (1.651 m)    Wt 154 lb (69.9 kg)    SpO2 93%    BMI 25.63 kg/m2       Physical Exam   Constitutional: She is oriented to person, place, and time. She appears well-developed and well-nourished. HENT:   Head: Normocephalic and atraumatic. Cardiovascular: Normal rate and regular rhythm. No murmur heard. Pulmonary/Chest: Effort normal. No respiratory distress. Musculoskeletal:        Arms:  Small 2x2cm abscess with mild purulent draining. No surrounding cellulitis. Applied topical abx and covered with dressing. Neurological: She is alert and oriented to person, place, and time. Skin: Skin is warm and dry. Psychiatric: She has a normal mood and affect. Her behavior is normal.         Current Outpatient Prescriptions   Medication Sig    clobetasol (TEMOVATE) 0.05 % ointment Apply  to affected area two (2) times a day.  clobetasol 0.05 % sham Apply thin film to dry scalp once daily    bacitracin zinc (BACITRACIN) ointment Apply  to affected area two (2) times a day.  Cholestyramine, Bulk, powd 4 g by Does Not Apply route daily.  doxycycline monohydrate (VIBRAMYCIN) 25 mg/5 mL susr Take 20 mL by mouth every twelve (12) hours for 5 days.  furosemide (LASIX) 40 mg tablet TAKE 1 TABLET BY MOUTH DAILY.  PROAIR HFA 90 mcg/actuation inhaler     bacitracin zinc (BACITRACIN) ointment Apply  to affected area two (2) times a day.  raNITIdine (ZANTAC) 150 mg tablet TAKE 1 TABLET BY MOUTH TWICE A DAY    SYNTHROID 112 mcg tablet TAKE 1 TAB BY MOUTH DAILY (BEFORE BREAKFAST).     simvastatin (ZOCOR) 20 mg tablet TAKE 1 TABLET BY MOUTH NIGHTLY    carvedilol (COREG) 3.125 mg tablet TAKE 1 TABLET BY MOUTH TWICE A DAY WITH MEALS    ZOLOFT 50 mg tablet TAKE 1 TABLET BY MOUTH EVERY DAY    COUMADIN 5 mg tablet TAKE 1 TABLET BY MOUTH DAILY AND 1/2 TABLET ON SUNDAY (Patient taking differently: TAKE 1 TABLET BY MOUTH DAILY AND 1/2 TABLET ON SUNDAY MON & WED)    cholecalciferol (VITAMIN D3) 1,000 unit tablet Take  by mouth daily.  baclofen (LIORESAL) 10 mg tablet Take 5 mg by mouth two (2) times a day.  DENTA 5000 PLUS 1.1 % crea Apply 1 mg to affected area as needed (to prevent cavities).  desonide (TRIDESILON) 0.05 % cream Apply  to affected area two (2) times a day.  multivitamin (ONE A DAY) tablet Take 1 Tab by mouth daily.  OXYGEN-AIR DELIVERY SYSTEMS 2 L by Does Not Apply route nightly.  ADVAIR DISKUS 250-50 mcg/dose diskus inhaler Take 1 Puff by inhalation two (2) times a day.  acetaminophen (TYLENOL) 500 mg tablet Take 650 mg by mouth every six (6) hours as needed for Pain. (2) Tablet in am (1) tablet in pm (2) Tablet pm    tiotropium (SPIRIVA WITH HANDIHALER) 18 mcg inhalation capsule Take 1 Cap by inhalation daily.  NEUPRO 1 mg/24 hour patch USE 1 PATCH TRANSDERMALLY EVERY DAY    carbidopa-levodopa (SINEMET)  mg per tablet Take 1 Tab by mouth three (3) times daily. No current facility-administered medications for this visit.          Past Medical History:   Diagnosis Date    Arthritis     Atrial fibrillation ([facility]     av node ablation 5/22/98 with placement of CPI #[id number] dual chamber pacemaker subsequently replaced with a single chamber medtronic #[id number] single chamber pacemaker 7/25/05    Cancer ([facility] 1970's    colon cancer w/ resection    COPD     Depression     GERD (gastroesophageal reflux disease)     Hyperlipidemia     Hypertension     Ischemic cardiomyopathy     Migraine headache     Orthostatic hypotension     RADHA (obstructive sleep apnea)     Pacemaker 5/22/98    AV node ablation /pacemaker placement utilizing CPI # [id number] dual chamber system, medtronic #[id number] single chamber device placed 7/22/05    Pulmonary hypertension ([facility] 9/26/2011    RVSP 50 on echo 8/14    Thyroid disease     Valvular heart disease     mild-mod MR/TR      Past Surgical History:   Procedure Laterality Date    BREAST SURGERY PROCEDURE UNLISTED      benign breast tumor excision right breast    HX BREAST BIOPSY Right 2002    neg; surgical bx    HX COLECTOMY  1974    colon    HX KNEE REPLACEMENT      right TKA    HX PACEMAKER      HX TUBAL LIGATION      TOTAL KNEE ARTHROPLASTY      total on R, partial on L      Social History   Substance Use Topics    Smoking status: Passive Smoke Exposure - Never Smoker    Smokeless tobacco: Never Used    Alcohol use No      Family History   Problem Relation Age of Onset    Diabetes Mother     Heart Disease Mother     Heart Attack Mother     Heart Disease Father     Stroke Sister     Breast Cancer Sister 80    Cancer Maternal Aunt      uterus    Diabetes Maternal Uncle     Breast Cancer Daughter 61        Allergies   Allergen Reactions    Cardizem [Diltiazem Hcl] Hives    Codeine Nausea and Vomiting    Darvocet A500 [Propoxyphene N-Acetaminophen] Nausea and Vomiting    Diltiazem Hives    Labetalol Nausea and Vomiting     Dizzy/Disoriented     Pcn [Penicillins] Swelling     Tongue Swelling     Primidone Other (comments)     Lightheaded/unsteady gait    Sulfa (Sulfonamide Antibiotics) Nausea and Vomiting        Assessment/Plan  Diagnoses and all orders for this visit:    1. Infected cyst of skin - improving. Still drainage. Pt instructed on wound care and will have nurse at [address] [place] [address] help her. Provided with extra dressings. -     bacitracin zinc (BACITRACIN) ointment; Apply  to affected area two (2) times a day. 2. Psoriasis - refill   -     clobetasol (TEMOVATE) 0.05 % ointment; Apply  to affected area two (2) times a day. -     clobetasol 0.05 % sham; Apply thin film to dry scalp once daily    3. Diarrhea due to malabsorption - a bit worse and affecting life.  Try adding bile acid sequestrant  -     Cholestyramine, Bulk, powd; 4 g by Does Not Apply route daily. Over 50% of a visit of 25 minutes spent reviewing diagnosis and treatment options and side effects of medicines. Follow-up Disposition:  Return if symptoms worsen or fail to improve.     Joni Cleveland MD  8/10/2018

## 2018-08-11 DIAGNOSIS — I48.91 ATRIAL FIBRILLATION, UNSPECIFIED TYPE (HCC): ICD-10-CM

## 2018-08-15 ENCOUNTER — TELEPHONE ANTICOAG (OUTPATIENT)
Dept: CARDIOLOGY CLINIC | Age: 83
End: 2018-08-15

## 2018-08-15 LAB — INR, EXTERNAL: 3.3 (ref 2–3)

## 2018-08-15 NOTE — PROGRESS NOTES

## 2018-08-16 ENCOUNTER — TELEPHONE (OUTPATIENT)
Dept: CARDIOLOGY CLINIC | Age: 83
End: 2018-08-16

## 2018-08-16 NOTE — TELEPHONE ENCOUNTER
Vesta Todd from [facility] will be doing a epidural steroid injection on 8/27/18 and they would like to know if the patient can hold coumadin for 4 days prior to the procedure.      Phone # [phone]  Fax # [phone]

## 2018-08-21 DIAGNOSIS — R25.1 TREMOR: Primary | ICD-10-CM

## 2018-08-21 RX ORDER — ROTIGOTINE 1 MG/24H
PATCH, EXTENDED RELEASE TRANSDERMAL
Qty: 30 PATCH | Refills: 4 | Status: SHIPPED | OUTPATIENT
Start: 2018-08-21 | End: 2018-12-20 | Stop reason: ALTCHOICE

## 2018-08-21 RX ORDER — ROTIGOTINE 1 MG/24H
PATCH, EXTENDED RELEASE TRANSDERMAL
Qty: 90 PATCH | Refills: 1 | Status: CANCELLED | OUTPATIENT
Start: 2018-08-21

## 2018-08-31 ENCOUNTER — DOCUMENTATION ONLY (OUTPATIENT)
Dept: CARDIOLOGY CLINIC | Age: 83
End: 2018-08-31

## 2018-09-06 RX ORDER — WARFARIN SODIUM 5 MG/1
TABLET ORAL
Qty: 90 TAB | Refills: 0 | Status: SHIPPED | OUTPATIENT
Start: 2018-09-06 | End: 2018-10-05 | Stop reason: SDUPTHER

## 2018-09-07 ENCOUNTER — TELEPHONE ANTICOAG (OUTPATIENT)
Dept: CARDIOLOGY CLINIC | Age: 83
End: 2018-09-07

## 2018-09-07 LAB — INR, EXTERNAL: 2 (ref 2–3)

## 2018-09-07 NOTE — PROGRESS NOTES
A full discussion of the nature of anticoagulants has been carried out. A benefit risk analysis has been presented to the patient, so that they understand the justification for choosing anticoagulation at this time. The need for frequent and regular monitoring, precise dosage adjustment and compliance is stressed. Side effects of potential bleeding are discussed. The patient should avoid any OTC items containing aspirin or ibuprofen, and should avoid great swings in general diet. Avoid alcohol consumption. Call if any signs of abnormal bleeding. Next PT/INR test in 09/14/18; telephone follow up thereafter.

## 2018-09-11 ENCOUNTER — OFFICE VISIT (OUTPATIENT)
Dept: INTERNAL MEDICINE CLINIC | Age: 83
End: 2018-09-11

## 2018-09-11 ENCOUNTER — HOSPITAL ENCOUNTER (OUTPATIENT)
Dept: LAB | Age: 83
Discharge: HOME | End: 2018-09-11
Payer: MEDICARE

## 2018-09-11 VITALS
DIASTOLIC BLOOD PRESSURE: 76 MMHG | OXYGEN SATURATION: 90 % | TEMPERATURE: 98.3 F | BODY MASS INDEX: 25.49 KG/M2 | SYSTOLIC BLOOD PRESSURE: 120 MMHG | RESPIRATION RATE: 16 BRPM | HEART RATE: 85 BPM | WEIGHT: 153 LBS | HEIGHT: 65 IN

## 2018-09-11 DIAGNOSIS — R41.3 MEMORY DEFICITS: ICD-10-CM

## 2018-09-11 DIAGNOSIS — Z13.31 SCREENING FOR DEPRESSION: ICD-10-CM

## 2018-09-11 DIAGNOSIS — Z71.89 ADVANCED DIRECTIVES, COUNSELING/DISCUSSION: ICD-10-CM

## 2018-09-11 DIAGNOSIS — F33.9 RECURRENT DEPRESSION (HCC): ICD-10-CM

## 2018-09-11 DIAGNOSIS — Z00.00 MEDICARE ANNUAL WELLNESS VISIT, SUBSEQUENT: Primary | ICD-10-CM

## 2018-09-11 DIAGNOSIS — M85.80 OSTEOPENIA, UNSPECIFIED LOCATION: ICD-10-CM

## 2018-09-11 DIAGNOSIS — E03.4 HYPOTHYROIDISM DUE TO ACQUIRED ATROPHY OF THYROID: ICD-10-CM

## 2018-09-11 DIAGNOSIS — F32.A ANXIETY AND DEPRESSION: Chronic | ICD-10-CM

## 2018-09-11 DIAGNOSIS — Z78.0 POST-MENOPAUSAL: ICD-10-CM

## 2018-09-11 DIAGNOSIS — E78.2 MIXED HYPERLIPIDEMIA: ICD-10-CM

## 2018-09-11 DIAGNOSIS — R19.7 DIARRHEA, UNSPECIFIED TYPE: ICD-10-CM

## 2018-09-11 DIAGNOSIS — M19.90 OSTEOARTHRITIS, UNSPECIFIED OSTEOARTHRITIS TYPE, UNSPECIFIED SITE: ICD-10-CM

## 2018-09-11 DIAGNOSIS — C18.9 MALIGNANT NEOPLASM OF COLON, UNSPECIFIED PART OF COLON (HCC): ICD-10-CM

## 2018-09-11 DIAGNOSIS — E55.9 VITAMIN D DEFICIENCY: ICD-10-CM

## 2018-09-11 DIAGNOSIS — F41.9 ANXIETY AND DEPRESSION: Chronic | ICD-10-CM

## 2018-09-11 PROCEDURE — 80053 COMPREHEN METABOLIC PANEL: CPT

## 2018-09-11 PROCEDURE — 82607 VITAMIN B-12: CPT

## 2018-09-11 PROCEDURE — 84443 ASSAY THYROID STIM HORMONE: CPT

## 2018-09-11 PROCEDURE — 80061 LIPID PANEL: CPT

## 2018-09-11 PROCEDURE — 36415 COLL VENOUS BLD VENIPUNCTURE: CPT

## 2018-09-11 PROCEDURE — 85025 COMPLETE CBC W/AUTO DIFF WBC: CPT

## 2018-09-11 PROCEDURE — 82306 VITAMIN D 25 HYDROXY: CPT

## 2018-09-11 NOTE — ACP (ADVANCE CARE PLANNING)
Advance Care Planning    Advance Care Planning (ACP) Provider Conversation Snapshot    Date of ACP Conversation: 09/11/18  Persons included in Conversation:  patient and family  Length of ACP Conversation in minutes:  <16 minutes (Non-Billable)    Authorized Decision Maker (if patient is incapable of making informed decisions):    This person is:   Healthcare Agent/Medical Power of  under Advance Directive          For Patients with Decision Making Capacity:   Values/Goals: Exploration of values, goals, and preferences if recovery is not expected, even with continued medical treatment in the event of:  Imminent death  Severe, permanent brain injury    Conversation Outcomes / Follow-Up Plan:   Entered DNR order (If yes, complete Durable DNR form)  Forms in chart

## 2018-09-11 NOTE — PATIENT INSTRUCTIONS
Medicare Wellness Visit, Female The best way to live healthy is to have a lifestyle where you eat a well-balanced diet, exercise regularly, limit alcohol use, and quit all forms of tobacco/nicotine, if applicable. Regular preventive services are another way to keep healthy. Preventive services (vaccines, screening tests, monitoring & exams) can help personalize your care plan, which helps you manage your own care. Screening tests can find health problems at the earliest stages, when they are easiest to treat. [facility] follows the current, evidence-based guidelines published by the [facility] ([facility]) when recommending preventive services for our patients. Because we follow these guidelines, sometimes recommendations change over time as research supports it. (For example, mammograms used to be recommended annually. Even though Medicare will still pay for an annual mammogram, the newer guidelines recommend a mammogram every two years for women of average risk.) Of course, you and your doctor may decide to screen more often for some diseases, based on your risk and your health status. Preventive services for you include: - Medicare offers their members a free annual wellness visit, which is time for you and your primary care provider to discuss and plan for your preventive service needs. Take advantage of this benefit every year! 
-All adults over the age of 72 should receive the recommended pneumonia vaccines. Current USPSTF guidelines recommend a series of two vaccines for the best pneumonia protection.  
-All adults should have a flu vaccine yearly and a tetanus vaccine every 10 years. All adults age 61 and older should receive a shingles vaccine once in their lifetime.   
-A bone mass density test is recommended when a woman turns 65 to screen for osteoporosis. This test is only recommended one time, as a screening. Some providers will use this same test as a disease monitoring tool if you already have osteoporosis. -All adults age 38-68 who are overweight should have a diabetes screening test once every three years.  
-Other screening tests and preventive services for persons with diabetes include: an eye exam to screen for diabetic retinopathy, a kidney function test, a foot exam, and stricter control over your cholesterol.  
-Cardiovascular screening for adults with routine risk involves an electrocardiogram (ECG) at intervals determined by your doctor.  
-Colorectal cancer screenings should be done for adults age 54-65 with no increased risk factors for colorectal cancer. There are a number of acceptable methods of screening for this type of cancer. Each test has its own benefits and drawbacks. Discuss with your doctor what is most appropriate for you during your annual wellness visit. The different tests include: colonoscopy (considered the best screening method), a fecal occult blood test, a fecal DNA test, and sigmoidoscopy. -Breast cancer screenings are recommended every other year for women of normal risk, age 54-69. 
-Cervical cancer screenings for women over age 72 are only recommended with certain risk factors.  
-All adults born between [facility] should be screened once for Hepatitis C. Here is a list of your current Health Maintenance items (your personalized list of preventive services) with a due date: 
Health Maintenance Due Topic Date Due  
 DTaP/Tdap/Td  (1 - Tdap) 04/03/2012  Flu Vaccine  08/01/2018

## 2018-09-11 NOTE — PROGRESS NOTES
Cintia Brandon is a 80 y.o. female who presents today for Annual Wellness Visit Brad Beverly She has a history of  
Patient Active Problem List  
Diagnosis Code  Mixed hyperlipidemia E78.2  Atrial fibrillation (HCC) I48.91  
 Mitral regurgitation I34.0  
 CHB (complete heart block) (HCC) I44.2  Orthostatic hypotension I95.1  Chest pain, unspecified R07.9  Pulmonary hypertension (HCC) I27.20  Anxiety and depression F41.9, F32.9  Colon cancer ([facility] [address] C18.9  Hypothyroid E03.9  CKD (chronic kidney disease) stage 3, GFR 30-59 ml/min N18.3  Hypothyroidism due to acquired atrophy of thyroid E03.4  Osteopenia M85.80  Restrictive lung disease J98.4  Chest pain R07.9  Abnormal stress test R94.39  
 Ischemic cardiomyopathy I25.5  Cardiomyopathy ([facility] I42.9  Psoriasis L40.9  Advanced care planning/counseling discussion Z70.80  
 Pacemaker Z95.0  Recurrent depression ([facility] F33.9  Latent tuberculosis by blood test R76.11 Brad Beverly Today patient is here for f/u CPE. she does have other concerns. Afib/ICM: Taking coumadin, BB, lasix and statin. BP today is good. Home BP readings are very good. No volume overload symptoms. Working with Luz. Notes some short term memory loss. Has not had her thyroid rechecked. No red flag symptoms. Long term memory pretty good. Recent MMSE test 24/30. Seeing neurology for her tremor. Stable. Diarrhea: much improved with bile acid sequestrant. She is very grateful. Hypothyroidism: on replacement. Will be seeing pulmonologist soon. Breathing is stable. Rarely using O2. Mood overall is stable. Still on SSRI. Health maintenance hx includes: 
Exercise: doing aerobics at [facility]. Diet: generally follows a low fat low cholesterol diet Social: Lives at [facility]. Screening:  
 Colon cancer screening:  Last Colonoscopy: History of CRC. No longer screening. Breast cancer screening: last mammogram 2017 and   was normal 
 Cervical cancer screening: last PAP/Pelvic exam: N/A Osteoporosis screening:  Last BMD:  2015 and revealed - Osteopenia, will repeat this. Immunizations: 
  
Immunization History Administered Date(s) Administered  Influenza High Dose Vaccine PF 10/01/2014, 09/24/2015, 09/29/2016, 09/21/2017  Influenza Vaccine 12/01/2013  Influenza Vaccine Split 10/12/2012  Pneumococcal Conjugate (PCV-13) 11/23/2015  Pneumococcal Polysaccharide (PPSV-23) 02/27/2015  Pneumococcal Vaccine (Pcv) 12/15/2010  TB Skin Test (PPD) 01/01/2001  TB Skin Test (PPD) Intradermal 01/16/2018  TD Vaccine 04/02/2012  Varicella Virus Vaccine Live 09/12/2012  Zoster Vaccine, Live 12/01/2013 Immunization status: up to date and documented. Discussed new Shingrix. ROS Review of Systems Constitutional: Negative for chills, fever and weight loss. Respiratory: Positive for shortness of breath. Negative for cough, hemoptysis and sputum production. Cardiovascular: Negative for chest pain, palpitations and leg swelling. Gastrointestinal: Negative for abdominal pain, nausea and vomiting. Musculoskeletal: Positive for back pain, joint pain and neck pain. Negative for myalgias. Neurological: Negative. Endo/Heme/Allergies: Does not bruise/bleed easily. Psychiatric/Behavioral: Positive for memory loss. Negative for depression, hallucinations, substance abuse and suicidal ideas. The patient has insomnia. The patient is not nervous/anxious. Visit Vitals  /76 (BP 1 Location: Left arm, BP Patient Position: Sitting)  Pulse 85  Temp 98.3 °F (36.8 °C) (Oral)  Resp 16  
 Ht 5' 5\" (1.651 m)  Wt 153 lb (69.4 kg)  SpO2 90%  BMI 25.46 kg/m2 Physical Exam  
Constitutional: She is oriented to person, place, and time. She appears well-developed and well-nourished.   
HENT:  
 Head: Normocephalic and atraumatic. Eyes: EOM are normal. Pupils are equal, round, and reactive to light. Neck: Normal range of motion. Neck supple. Cardiovascular: Normal rate and regular rhythm. Exam reveals no friction rub. No murmur heard. Pulmonary/Chest: Effort normal. No respiratory distress. Distant BS Abdominal: Soft. Bowel sounds are normal. She exhibits no distension. There is no tenderness. Musculoskeletal: S/p knee surgery. No significant swelling. Neurological: She is alert and oriented to person, place, and time. Skin: Skin is warm and dry. Small area of eczema to lower R back. No areas of skin breakdown. Psychiatric: She has a normal mood and affect. Her behavior is normal.  
 
 
 
Current Outpatient Prescriptions Medication Sig  
 COUMADIN 5 mg tablet TAKE 1 TABLET BY MOUTH DAILY AND 1/2 TABLET ON SUNDAY  clobetasol (TEMOVATE) 0.05 % ointment Apply  to affected area two (2) times a day.  clobetasol 0.05 % sham Apply thin film to dry scalp once daily  bacitracin zinc (BACITRACIN) ointment Apply  to affected area two (2) times a day.  Cholestyramine, Bulk, powd 4 g by Does Not Apply route daily.  furosemide (LASIX) 40 mg tablet TAKE 1 TABLET BY MOUTH DAILY.  PROAIR HFA 90 mcg/actuation inhaler  raNITIdine (ZANTAC) 150 mg tablet TAKE 1 TABLET BY MOUTH TWICE A DAY  
 SYNTHROID 112 mcg tablet TAKE 1 TAB BY MOUTH DAILY (BEFORE BREAKFAST).  simvastatin (ZOCOR) 20 mg tablet TAKE 1 TABLET BY MOUTH NIGHTLY  carvedilol (COREG) 3.125 mg tablet TAKE 1 TABLET BY MOUTH TWICE A DAY WITH MEALS  ZOLOFT 50 mg tablet TAKE 1 TABLET BY MOUTH EVERY DAY  cholecalciferol (VITAMIN D3) 1,000 unit tablet Take  by mouth daily.  baclofen (LIORESAL) 10 mg tablet Take 5 mg by mouth two (2) times a day.  DENTA 5000 PLUS 1.1 % crea Apply 1 mg to affected area as needed (to prevent cavities).  desonide (TRIDESILON) 0.05 % cream Apply  to affected area two (2) times a day.  multivitamin (ONE A DAY) tablet Take 1 Tab by mouth daily.  OXYGEN-AIR DELIVERY SYSTEMS 2 L by Does Not Apply route nightly.  ADVAIR DISKUS 250-50 mcg/dose diskus inhaler Take 1 Puff by inhalation two (2) times a day.  acetaminophen (TYLENOL) 500 mg tablet Take 650 mg by mouth every six (6) hours as needed for Pain. (2) Tablet in am (1) tablet in pm (2) Tablet pm  
 tiotropium (SPIRIVA WITH HANDIHALER) 18 mcg inhalation capsule Take 1 Cap by inhalation daily.  NEUPRO 1 mg/24 hour patch USE 1 PATCH TRANSDERMALLY EVERY DAY  carbidopa-levodopa (SINEMET)  mg per tablet Take 1 Tab by mouth three (3) times daily. No current facility-administered medications for this visit. Past Medical History:  
Diagnosis Date  Arthritis  Atrial fibrillation ([address]   
 av node ablation 5/22/98 with placement of CPI #[id number] dual chamber pacemaker subsequently replaced with a single chamber medtronic #[id number] single chamber pacemaker 7/25/05  Cancer [facility]) 18's  
 colon cancer w/ resection  COPD  Depression  GERD (gastroesophageal reflux disease)  Hyperlipidemia  Hypertension  Ischemic cardiomyopathy  Migraine headache  Orthostatic hypotension  RADHA (obstructive sleep apnea)  Pacemaker 5/22/98 AV node ablation /pacemaker placement utilizing CPI # [id number] dual chamber system, medtronic #[id number] single chamber device placed 7/22/05  Pulmonary hypertension ([address] 9/26/2011 RVSP 50 on echo 8/14  Thyroid disease  Valvular heart disease   
 mild-mod MR/TR Past Surgical History:  
Procedure Laterality Date  BREAST SURGERY PROCEDURE UNLISTED    
 benign breast tumor excision right breast  
 HX BREAST BIOPSY Right 2002  
 neg; surgical bx  HX COLECTOMY  1974  
 colon  HX KNEE REPLACEMENT    
 right TKA  HX PACEMAKER    
 HX TUBAL LIGATION    
  TOTAL KNEE ARTHROPLASTY    
 total on R, partial on L Social History Substance Use Topics  Smoking status: Passive Smoke Exposure - Never Smoker  Smokeless tobacco: Never Used  Alcohol use No  
  
Family History Problem Relation Age of Onset  Diabetes Mother  Heart Disease Mother  Heart Attack Mother  Heart Disease Father  Stroke Sister  Breast Cancer Sister 80  
 Cancer Maternal Aunt   
  uterus  Diabetes Maternal Uncle  Breast Cancer Daughter 61 Allergies Allergen Reactions  Cardizem [Diltiazem Hcl] Hives  Codeine Nausea and Vomiting  Darvocet A500 [Propoxyphene N-Acetaminophen] Nausea and Vomiting  Diltiazem Hives  Labetalol Nausea and Vomiting Dizzy/Disoriented  Pcn [Penicillins] Swelling Tongue Swelling  Primidone Other (comments) Lightheaded/unsteady gait  Sulfa (Sulfonamide Antibiotics) Nausea and Vomiting Assessment/Plan Diagnoses and all orders for this visit: 
 
1. Medicare annual wellness visit, subsequent - discussed Shingrix. DNR order placed. Pt will get DEXA. Silvana Leger was counseled on age-appropriate/ guideline-based risk prevention behaviors and screening for a 80y.o. year old   female . We also discussed adjustments in screening based on family history if necessary. Printed instructions for preventative screening guidelines and healthy behaviors given to patient with after visit summary. 
 
-     Depression Screen Annual 
-     DEXA BONE DENSITY STUDY AXIAL; Future -     METABOLIC PANEL, COMPREHENSIVE 
-     CBC WITH AUTOMATED DIFF 
-     VITAMIN B12 
-     TSH 3RD GENERATION 
-     VITAMIN D, 25 HYDROXY 
-     LIPID PANEL 2. Recurrent depression ([facility] [address] [facility] - symptoms stable. Continue SSRI. -     Depression Screen Annual 
-     DEXA BONE DENSITY STUDY AXIAL; Future -     METABOLIC PANEL, COMPREHENSIVE 
-     CBC WITH AUTOMATED DIFF 
-     VITAMIN B12 
-     TSH 3RD GENERATION 
 -     VITAMIN D, 25 HYDROXY 
-     LIPID PANEL 3. Malignant neoplasm of colon, unspecified part of colon ([facility] [address] - in the past. No longer screening. Diarrhea much better with bile acid sequestrant.  
-     Depression Screen Annual 
-     DEXA BONE DENSITY STUDY AXIAL; Future -     METABOLIC PANEL, COMPREHENSIVE 
-     CBC WITH AUTOMATED DIFF 
-     VITAMIN B12 
-     TSH 3RD GENERATION 
-     VITAMIN D, 25 HYDROXY 
-     LIPID PANEL 4. Anxiety and depression - stable. Continue SSRI. -     Depression Screen Annual 
-     DEXA BONE DENSITY STUDY AXIAL; Future -     METABOLIC PANEL, COMPREHENSIVE 
-     CBC WITH AUTOMATED DIFF 
-     VITAMIN B12 
-     TSH 3RD GENERATION 
-     VITAMIN D, 25 HYDROXY 
-     LIPID PANEL 5. Mixed hyperlipidemia - on statin. Check FLP. Unclear if this is still needed, but no s/e. -     Depression Screen Annual 
-     DEXA BONE DENSITY STUDY AXIAL; Future -     METABOLIC PANEL, COMPREHENSIVE 
-     CBC WITH AUTOMATED DIFF 
-     VITAMIN B12 
-     TSH 3RD GENERATION 
-     VITAMIN D, 25 HYDROXY 
-     LIPID PANEL 6. Hypothyroidism due to acquired atrophy of thyroid - repeat, some mild memory loss. -     Depression Screen Annual 
-     DEXA BONE DENSITY STUDY AXIAL; Future -     METABOLIC PANEL, COMPREHENSIVE 
-     CBC WITH AUTOMATED DIFF 
-     VITAMIN B12 
-     TSH 3RD GENERATION 
-     VITAMIN D, 25 HYDROXY 
-     LIPID PANEL 
 
7. Osteopenia, unspecified location - repeat. -     Depression Screen Annual 
-     DEXA BONE DENSITY STUDY AXIAL; Future -     METABOLIC PANEL, COMPREHENSIVE 
-     CBC WITH AUTOMATED DIFF 
-     VITAMIN B12 
-     TSH 3RD GENERATION 
-     VITAMIN D, 25 HYDROXY 
-     LIPID PANEL 8. Advanced directives, counseling/discussion -     Depression Screen Annual 
-     DEXA BONE DENSITY STUDY AXIAL; Future -     METABOLIC PANEL, COMPREHENSIVE 
-     CBC WITH AUTOMATED DIFF 
-     VITAMIN B12 
-     TSH 3RD GENERATION 
 -     VITAMIN D, 25 HYDROXY 
-     LIPID PANEL 
-     DO NOT RESUSCITATE 9. Screening for depression -     Depression Screen Annual 
-     DEXA BONE DENSITY STUDY AXIAL; Future -     METABOLIC PANEL, COMPREHENSIVE 
-     CBC WITH AUTOMATED DIFF 
-     VITAMIN B12 
-     TSH 3RD GENERATION 
-     VITAMIN D, 25 HYDROXY 
-     LIPID PANEL 10. Memory deficits - mild. No red flags. Continue to be physically and mentally active. Check B12.  
-     Depression Screen Annual 
-     DEXA BONE DENSITY STUDY AXIAL; Future -     METABOLIC PANEL, COMPREHENSIVE 
-     CBC WITH AUTOMATED DIFF 
-     VITAMIN B12 
-     TSH 3RD GENERATION 
-     VITAMIN D, 25 HYDROXY 
-     LIPID PANEL 11. Vitamin D deficiency -     Depression Screen Annual 
-     DEXA BONE DENSITY STUDY AXIAL; Future -     METABOLIC PANEL, COMPREHENSIVE 
-     CBC WITH AUTOMATED DIFF 
-     VITAMIN B12 
-     TSH 3RD GENERATION 
-     VITAMIN D, 25 HYDROXY 
-     LIPID PANEL 12. Osteoarthritis, unspecified osteoarthritis type, unspecified site - symptoms are stable. -     Depression Screen Annual 
-     DEXA BONE DENSITY STUDY AXIAL; Future -     METABOLIC PANEL, COMPREHENSIVE 
-     CBC WITH AUTOMATED DIFF 
-     VITAMIN B12 
-     TSH 3RD GENERATION 
-     VITAMIN D, 25 HYDROXY 
-     LIPID PANEL 13. Post-menopausal 
-     Depression Screen Annual 
-     DEXA BONE DENSITY STUDY AXIAL; Future -     METABOLIC PANEL, COMPREHENSIVE 
-     CBC WITH AUTOMATED DIFF 
-     VITAMIN B12 
-     TSH 3RD GENERATION 
-     VITAMIN D, 25 HYDROXY 
-     LIPID PANEL Follow-up Disposition: Not on File Starr Garcia MD 
9/11/2018 This is the Subsequent Medicare Annual Wellness Exam, performed 12 months or more after the Initial AWV or the last Subsequent AWV I have reviewed the patient's medical history in detail and updated the computerized patient record. History Past Medical History:  
Diagnosis Date  Arthritis  Atrial fibrillation ([facility] [address] [facility]   
 av node ablation 5/22/98 with placement of CPI #[id number] dual chamber pacemaker subsequently replaced with a single chamber medtronic #[id number] single chamber pacemaker 7/25/05  Cancer [facility]) 18's  
 colon cancer w/ resection  COPD  Depression  GERD (gastroesophageal reflux disease)  Hyperlipidemia  Hypertension  Ischemic cardiomyopathy  Migraine headache  Orthostatic hypotension  RADHA (obstructive sleep apnea)  Pacemaker 5/22/98 AV node ablation /pacemaker placement utilizing CPI # [id number] dual chamber system, medtronic #[id number] single chamber device placed 7/22/05  Pulmonary hypertension ([facility] [address] 9/26/2011 RVSP 50 on echo 8/14  Thyroid disease  Valvular heart disease   
 mild-mod MR/TR Past Surgical History:  
Procedure Laterality Date  BREAST SURGERY PROCEDURE UNLISTED    
 benign breast tumor excision right breast  
 HX BREAST BIOPSY Right 2002  
 neg; surgical bx  HX COLECTOMY  1974  
 colon  HX KNEE REPLACEMENT    
 right TKA  HX PACEMAKER    
 HX TUBAL LIGATION    
 TOTAL KNEE ARTHROPLASTY    
 total on R, partial on L Current Outpatient Prescriptions Medication Sig Dispense Refill  COUMADIN 5 mg tablet TAKE 1 TABLET BY MOUTH DAILY AND 1/2 TABLET ON SUNDAY 90 Tab 0  clobetasol (TEMOVATE) 0.05 % ointment Apply  to affected area two (2) times a day. 15 g 0  
 clobetasol 0.05 % sham Apply thin film to dry scalp once daily 118 mL 3  
 bacitracin zinc (BACITRACIN) ointment Apply  to affected area two (2) times a day. 15 g 0  Cholestyramine, Bulk, powd 4 g by Does Not Apply route daily. 100 g 3  furosemide (LASIX) 40 mg tablet TAKE 1 TABLET BY MOUTH DAILY. 90 Tab 2  
 PROAIR HFA 90 mcg/actuation inhaler  raNITIdine (ZANTAC) 150 mg tablet TAKE 1 TABLET BY MOUTH TWICE A  Tab 1  
 SYNTHROID 112 mcg tablet TAKE 1 TAB BY MOUTH DAILY (BEFORE BREAKFAST).  90 Tab 1  
  simvastatin (ZOCOR) 20 mg tablet TAKE 1 TABLET BY MOUTH NIGHTLY 90 Tab 1  carvedilol (COREG) 3.125 mg tablet TAKE 1 TABLET BY MOUTH TWICE A DAY WITH MEALS 180 Tab 1  
 ZOLOFT 50 mg tablet TAKE 1 TABLET BY MOUTH EVERY DAY 90 Tab 2  cholecalciferol (VITAMIN D3) 1,000 unit tablet Take  by mouth daily.  baclofen (LIORESAL) 10 mg tablet Take 5 mg by mouth two (2) times a day.  DENTA 5000 PLUS 1.1 % crea Apply 1 mg to affected area as needed (to prevent cavities). 2  
 desonide (TRIDESILON) 0.05 % cream Apply  to affected area two (2) times a day. 15 g 0  
 multivitamin (ONE A DAY) tablet Take 1 Tab by mouth daily.  OXYGEN-AIR DELIVERY SYSTEMS 2 L by Does Not Apply route nightly.  ADVAIR DISKUS 250-50 mcg/dose diskus inhaler Take 1 Puff by inhalation two (2) times a day.  acetaminophen (TYLENOL) 500 mg tablet Take 650 mg by mouth every six (6) hours as needed for Pain. (2) Tablet in am (1) tablet in pm (2) Tablet pm  0  
 tiotropium (SPIRIVA WITH HANDIHALER) 18 mcg inhalation capsule Take 1 Cap by inhalation daily.  NEUPRO 1 mg/24 hour patch USE 1 PATCH TRANSDERMALLY EVERY DAY 30 Patch 4  
 carbidopa-levodopa (SINEMET)  mg per tablet Take 1 Tab by mouth three (3) times daily. 90 Tab 5 Allergies Allergen Reactions  Cardizem [Diltiazem Hcl] Hives  Codeine Nausea and Vomiting  Darvocet A500 [Propoxyphene N-Acetaminophen] Nausea and Vomiting  Diltiazem Hives  Labetalol Nausea and Vomiting Dizzy/Disoriented  Pcn [Penicillins] Swelling Tongue Swelling  Primidone Other (comments) Lightheaded/unsteady gait  Sulfa (Sulfonamide Antibiotics) Nausea and Vomiting Family History Problem Relation Age of Onset  Diabetes Mother  Heart Disease Mother  Heart Attack Mother  Heart Disease Father  Stroke Sister  Breast Cancer Sister 80  
 Cancer Maternal Aunt   
  uterus  Diabetes Maternal Uncle  Breast Cancer Daughter 61 Social History Substance Use Topics  Smoking status: Passive Smoke Exposure - Never Smoker  Smokeless tobacco: Never Used  Alcohol use No  
 
Patient Active Problem List  
Diagnosis Code  Mixed hyperlipidemia E78.2  Atrial fibrillation (HCC) I48.91  
 Mitral regurgitation I34.0  
 CHB (complete heart block) (HCC) I44.2  Orthostatic hypotension I95.1  Chest pain, unspecified R07.9  Pulmonary hypertension (HCC) I27.20  Anxiety and depression F41.9, F32.9  Colon cancer ([facility] C18.9  Hypothyroid E03.9  CKD (chronic kidney disease) stage 3, GFR 30-59 ml/min N18.3  Hypothyroidism due to acquired atrophy of thyroid E03.4  Osteopenia M85.80  Restrictive lung disease J98.4  Chest pain R07.9  Abnormal stress test R94.39  
 Ischemic cardiomyopathy I25.5  Cardiomyopathy ([facility] I42.9  Psoriasis L40.9  Advanced care planning/counseling discussion Z70.80  
 Pacemaker Z95.0  Recurrent depression ([facility] F33.9  Latent tuberculosis by blood test R76.11 Depression Risk Factor Screening: PHQ over the last two weeks 9/11/2018 Little interest or pleasure in doing things Not at all Feeling down, depressed, irritable, or hopeless Not at all Total Score PHQ 2 0 Trouble falling or staying asleep, or sleeping too much Nearly every day Feeling tired or having little energy More than half the days Poor appetite, weight loss, or overeating Several days Feeling bad about yourself - or that you are a failure or have let yourself or your family down Not at all Trouble concentrating on things such as school, work, reading, or watching TV Not at all Moving or speaking so slowly that other people could have noticed; or the opposite being so fidgety that others notice Not at all Thoughts of being better off dead, or hurting yourself in some way Not at all PHQ 9 Score 6  
 How difficult have these problems made it for you to do your work, take care of your home and get along with others Somewhat difficult Alcohol Risk Factor Screening: You do not drink alcohol or very rarely. Functional Ability and Level of Safety:  
Hearing Loss Hearing is good. Activities of Daily Living The home contains: handrails and grab bars Patient needs help with:  transportation and shopping Fall Risk Fall Risk Assessment, last 12 mths 9/11/2018 Able to walk? Yes Fall in past 12 months? Yes Fall with injury? No  
Number of falls in past 12 months 1 Fall Risk Score 1 Abuse Screen Patient is not abused Cognitive Screening Evaluation of Cognitive Function: 
Has your family/caregiver stated any concerns about your memory: yes Normal, MMSE Patient Care Team  
Patient Care Team: 
Velasquez Adames MD as PCP - General (Internal Medicine) Jkai Benítez MD (Pulmonary Disease) Tristian Lundberg Dior Whiteside MD (Cardiology) Terrie Morris MD as Consulting Provider (Cardiology) Yoly Sunshine, RN as Ambulatory Care Navigator (Internal Medicine) Marito Pete RN as Ambulatory Care Navigator (Cardiology) Assessment/Plan Education and counseling provided: 
Are appropriate based on today's review and evaluation End-of-Life planning (with patient's consent) Pneumococcal Vaccine Screening Mammography Colorectal cancer screening tests Bone mass measurement (DEXA) Diagnoses and all orders for this visit: 
 
1. Medicare annual wellness visit, subsequent -     Depression Screen Annual 
-     DEXA BONE DENSITY STUDY AXIAL; Future -     METABOLIC PANEL, COMPREHENSIVE 
-     CBC WITH AUTOMATED DIFF 
-     VITAMIN B12 
-     TSH 3RD GENERATION 
-     VITAMIN D, 25 HYDROXY 
-     LIPID PANEL 2. Recurrent depression ([facility] [address] -     Depression Screen Annual 
-     DEXA BONE DENSITY STUDY AXIAL; Future -     METABOLIC PANEL, COMPREHENSIVE 
 -     CBC WITH AUTOMATED DIFF 
-     VITAMIN B12 
-     TSH 3RD GENERATION 
-     VITAMIN D, 25 HYDROXY 
-     LIPID PANEL 3. Malignant neoplasm of colon, unspecified part of colon ([facility] [facility] -     Depression Screen Annual 
-     DEXA BONE DENSITY STUDY AXIAL; Future -     METABOLIC PANEL, COMPREHENSIVE 
-     CBC WITH AUTOMATED DIFF 
-     VITAMIN B12 
-     TSH 3RD GENERATION 
-     VITAMIN D, 25 HYDROXY 
-     LIPID PANEL 4. Anxiety and depression -     Depression Screen Annual 
-     DEXA BONE DENSITY STUDY AXIAL; Future -     METABOLIC PANEL, COMPREHENSIVE 
-     CBC WITH AUTOMATED DIFF 
-     VITAMIN B12 
-     TSH 3RD GENERATION 
-     VITAMIN D, 25 HYDROXY 
-     LIPID PANEL 5. Mixed hyperlipidemia -     Depression Screen Annual 
-     DEXA BONE DENSITY STUDY AXIAL; Future -     METABOLIC PANEL, COMPREHENSIVE 
-     CBC WITH AUTOMATED DIFF 
-     VITAMIN B12 
-     TSH 3RD GENERATION 
-     VITAMIN D, 25 HYDROXY 
-     LIPID PANEL 6. 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 
-     DEXA BONE DENSITY STUDY AXIAL; Future -     METABOLIC PANEL, COMPREHENSIVE 
-     CBC WITH AUTOMATED DIFF 
-     VITAMIN B12 
-     TSH 3RD GENERATION 
-     VITAMIN D, 25 HYDROXY 
-     LIPID PANEL 
 
7. Osteopenia, unspecified location -     Depression Screen Annual 
-     DEXA BONE DENSITY STUDY AXIAL; Future -     METABOLIC PANEL, COMPREHENSIVE 
-     CBC WITH AUTOMATED DIFF 
-     VITAMIN B12 
-     TSH 3RD GENERATION 
-     VITAMIN D, 25 HYDROXY 
-     LIPID PANEL 8. Advanced directives, counseling/discussion -     Depression Screen Annual 
-     DEXA BONE DENSITY STUDY AXIAL; Future -     METABOLIC PANEL, COMPREHENSIVE 
-     CBC WITH AUTOMATED DIFF 
-     VITAMIN B12 
-     TSH 3RD GENERATION 
-     VITAMIN D, 25 HYDROXY 
-     LIPID PANEL 
-     DO NOT RESUSCITATE 9. Screening for depression -     Depression Screen Annual 
 -     DEXA BONE DENSITY STUDY AXIAL; Future -     METABOLIC PANEL, COMPREHENSIVE 
-     CBC WITH AUTOMATED DIFF 
-     VITAMIN B12 
-     TSH 3RD GENERATION 
-     VITAMIN D, 25 HYDROXY 
-     LIPID PANEL 10. Memory deficits -     Depression Screen Annual 
-     DEXA BONE DENSITY STUDY AXIAL; Future -     METABOLIC PANEL, COMPREHENSIVE 
-     CBC WITH AUTOMATED DIFF 
-     VITAMIN B12 
-     TSH 3RD GENERATION 
-     VITAMIN D, 25 HYDROXY 
-     LIPID PANEL 11. Vitamin D deficiency -     Depression Screen Annual 
-     DEXA BONE DENSITY STUDY AXIAL; Future -     METABOLIC PANEL, COMPREHENSIVE 
-     CBC WITH AUTOMATED DIFF 
-     VITAMIN B12 
-     TSH 3RD GENERATION 
-     VITAMIN D, 25 HYDROXY 
-     LIPID PANEL 12. Osteoarthritis, unspecified osteoarthritis type, unspecified site -     Depression Screen Annual 
-     DEXA BONE DENSITY STUDY AXIAL; Future -     METABOLIC PANEL, COMPREHENSIVE 
-     CBC WITH AUTOMATED DIFF 
-     VITAMIN B12 
-     TSH 3RD GENERATION 
-     VITAMIN D, 25 HYDROXY 
-     LIPID PANEL 13. Post-menopausal 
-     Depression Screen Annual 
-     DEXA BONE DENSITY STUDY AXIAL; Future -     METABOLIC PANEL, COMPREHENSIVE 
-     CBC WITH AUTOMATED DIFF 
-     VITAMIN B12 
-     TSH 3RD GENERATION 
-     VITAMIN D, 25 HYDROXY 
-     LIPID PANEL Health Maintenance Due Topic Date Due  
 DTaP/Tdap/Td series (1 - Tdap) 04/03/2012  Influenza Age 5 to Adult  08/01/2018

## 2018-09-11 NOTE — MR AVS SNAPSHOT
[facility] 
 
 
 [address] [place] [address] 
[phone] Patient: Geraldine Dickey MRN:  [id number] Visit Information Date & Time Provider Department Dept. Phone Encounter #  
 9/11/2018  1:30 PM Silver Mejia MD [facility] [address]  [id number] Your Appointments 9/27/2018  1:15 PM  
PACEMAKER with [facility] ([place] SCHEDULING) Appt Note: med thresholc (annual)/rc b 6-26-18 (interr billed) [address]  [address] [place]  
[address] [address]  
  
    
 12/18/2018  3:30 PM  
PACEMAKER with Arnulfo Peraza [facility] ([place] SCHEDULING) Appt Note: med interr/rc b   (s) assess ROBBIE; pt r/s 10/18/18 appt. ..atb to coordinate with Dr. Horacio Raza appt. ..atb  
 [address] [address]  
[phone]  
  
    
 12/18/2018  4:20 PM  
ESTABLISHED PATIENT with Sherif Morales MD  
[facility] ([place] SCHEDULING) Appt Note: 6 mo f/u  
 [address] [place]  
[address] [address] Upcoming Health Maintenance Date Due DTaP/Tdap/Td series (1 - Tdap) 4/3/2012 Influenza Age 5 to Adult 8/1/2018 GLAUCOMA SCREENING Q2Y 12/8/2018 MEDICARE YEARLY EXAM 9/12/2019 Allergies as of 9/11/2018  Review Complete On: 9/11/2018 By: Silver Mejia MD  
  
 Severity Noted Reaction Type Reactions Cardizem [Diltiazem Hcl]  10/04/2010    Hives Codeine  10/04/2010    Nausea and Vomiting Darvocet A500 [Propoxyphene N-acetaminophen]  10/04/2010    Nausea and Vomiting Diltiazem  10/04/2010    Hives Labetalol  04/25/2012    Nausea and Vomiting Dizzy/Disoriented Pcn [Penicillins]  10/04/2010    Swelling Tongue Swelling Primidone  07/31/2012    Other (comments) Lightheaded/unsteady gait Sulfa (Sulfonamide Antibiotics)  10/04/2010    Nausea and Vomiting Current Immunizations  Reviewed on 11/23/2015 Name Date Influenza High Dose Vaccine PF 9/21/2017, 9/29/2016, 9/24/2015, 10/1/2014 Influenza Vaccine 12/1/2013 Influenza Vaccine Split 10/12/2012 Pneumococcal Conjugate (PCV-13) 11/23/2015 Pneumococcal Polysaccharide (PPSV-23) 2/27/2015 Pneumococcal Vaccine (Pcv) 12/15/2010 TB Skin Test (PPD) 1/1/2001 TB Skin Test (PPD) Intradermal 1/16/2018 TD Vaccine 4/2/2012 Varicella Virus Vaccine Live 9/12/2012 Zoster Vaccine, Live 12/1/2013 Not reviewed this visit You Were Diagnosed With   
  
 Codes Comments Recurrent depression ([facility]    -  Primary ICD-10-CM: F33.9 ICD-9-CM: 296.30 Malignant neoplasm of colon, unspecified part of colon ([facility].)     ICD-10-CM: C18.9 ICD-9-CM: 153.9 Anxiety and depression     ICD-10-CM: F41.9, F32.9 ICD-9-CM: 300.00, 311 Mixed hyperlipidemia     ICD-10-CM: E78.2 ICD-9-CM: 272.2 Hypothyroidism due to acquired atrophy of thyroid     ICD-10-CM: E03.4 ICD-9-CM: 244.8, 246.8 Osteopenia, unspecified location     ICD-10-CM: M85.80 ICD-9-CM: 733.90 Medicare annual wellness visit, subsequent     ICD-10-CM: Z00.00 ICD-9-CM: V70.0 Advanced directives, counseling/discussion     ICD-10-CM: Z71.89 ICD-9-CM: V65.49 Screening for depression     ICD-10-CM: Z13.89 ICD-9-CM: V79.0 Memory deficits     ICD-10-CM: R41.3 ICD-9-CM: 780.93 Vitamin D deficiency     ICD-10-CM: E55.9 ICD-9-CM: 268.9 Osteoarthritis, unspecified osteoarthritis type, unspecified site     ICD-10-CM: M19.90 ICD-9-CM: 715.90 Post-menopausal     ICD-10-CM: Z78.0 ICD-9-CM: V49.81 Vitals BP Pulse Temp Resp Height(growth percentile) Weight(growth percentile) 120/76 (BP 1 Location: Left arm, BP Patient Position: Sitting) 85 98.3 °F (36.8 °C) (Oral) 16 5' 5\" (1.651 m) 153 lb (69.4 kg) LMP SpO2 BMI OB Status Smoking Status 02/26/1970 90% 25.46 kg/m2 Postmenopausal Passive Smoke Exposure - Never Smoker Vitals History BMI and BSA Data Body Mass Index Body Surface Area  
 25.46 kg/m 2 1.78 m 2 Preferred Pharmacy Pharmacy Name Phone [id number]- [place], [address]) [phone] Your Updated Medication List  
  
   
This list is accurate as of 9/11/18  2:51 PM.  Always use your most recent med list.  
  
  
  
  
 acetaminophen 500 mg tablet Commonly known as:  TYLENOL Take 650 mg by mouth every six (6) hours as needed for Pain. (2) Tablet in am (1) tablet in pm (2) Tablet pm  
  
 ADVAIR DISKUS 250-50 mcg/dose diskus inhaler Generic drug:  fluticasone-salmeterol Take 1 Puff by inhalation two (2) times a day. bacitracin zinc ointment Commonly known as:  BACITRACIN Apply  to affected area two (2) times a day. baclofen 10 mg tablet Commonly known as:  LIORESAL Take 5 mg by mouth two (2) times a day. carbidopa-levodopa  mg per tablet Commonly known as:  SINEMET Take 1 Tab by mouth three (3) times daily. carvedilol 3.125 mg tablet Commonly known as:  COREG  
TAKE 1 TABLET BY MOUTH TWICE A DAY WITH MEALS Cholestyramine (Bulk) Powd  
4 g by Does Not Apply route daily. * clobetasol 0.05 % ointment Commonly known as:  Laurence Mood Apply  to affected area two (2) times a day. * clobetasol 0.05 % Sham  
Apply thin film to dry scalp once daily COUMADIN 5 mg tablet Generic drug:  warfarin TAKE 1 TABLET BY MOUTH DAILY AND 1/2 TABLET ON SUNDAY DENTA 5000 PLUS 1.1 % Crea Generic drug:  fluoride (sodium) Apply 1 mg to affected area as needed (to prevent cavities).   
  
 desonide 0.05 % cream  
 Commonly known as:  Willard Mulch Apply  to affected area two (2) times a day. furosemide 40 mg tablet Commonly known as:  LASIX TAKE 1 TABLET BY MOUTH DAILY. multivitamin tablet Commonly known as:  ONE A DAY Take 1 Tab by mouth daily. NEUPRO 1 mg/24 hour patch Generic drug:  rotigotine USE 1 PATCH TRANSDERMALLY EVERY DAY OXYGEN-AIR DELIVERY SYSTEMS  
2 L by Does Not Apply route nightly. PROAIR HFA 90 mcg/actuation inhaler Generic drug:  albuterol  
  
 raNITIdine 150 mg tablet Commonly known as:  ZANTAC TAKE 1 TABLET BY MOUTH TWICE A DAY  
  
 simvastatin 20 mg tablet Commonly known as:  ZOCOR  
TAKE 1 TABLET BY MOUTH NIGHTLY SPIRIVA WITH HANDIHALER 18 mcg inhalation capsule Generic drug:  tiotropium Take 1 Cap by inhalation daily. SYNTHROID 112 mcg tablet Generic drug:  levothyroxine TAKE 1 TAB BY MOUTH DAILY (BEFORE BREAKFAST). VITAMIN D3 1,000 unit tablet Generic drug:  cholecalciferol Take  by mouth daily. ZOLOFT 50 mg tablet Generic drug:  sertraline TAKE 1 TABLET BY MOUTH EVERY DAY  
  
 * Notice: This list has 2 medication(s) that are the same as other medications prescribed for you. Read the directions carefully, and ask your doctor or other care provider to review them with you. We Performed the Following CBC WITH AUTOMATED DIFF [52079 CPT(R)] [address] [[id number] [facility]] LIPID PANEL [60995 CPT(R)] METABOLIC PANEL, COMPREHENSIVE [01043 CPT(R)] TSH 3RD GENERATION [57579 CPT(R)] VITAMIN B12 [id number] CPT(R)] VITAMIN D, 25 HYDROXY [id number] CPT(R)] To-Do List   
 09/11/2018 Imaging:  DEXA BONE DENSITY STUDY AXIAL Patient Instructions Medicare Wellness Visit, Female The best way to live healthy is to have a lifestyle where you eat a well-balanced diet, exercise regularly, limit alcohol use, and quit all forms of tobacco/nicotine, if applicable. Regular preventive services are another way to keep healthy. Preventive services (vaccines, screening tests, monitoring & exams) can help personalize your care plan, which helps you manage your own care. Screening tests can find health problems at the earliest stages, when they are easiest to treat. [facility] follows the current, evidence-based guidelines published by the [facility] [employer]re Root (USPSTF) when recommending preventive services for our patients. Because we follow these guidelines, sometimes recommendations change over time as research supports it. (For example, mammograms used to be recommended annually. Even though Medicare will still pay for an annual mammogram, the newer guidelines recommend a mammogram every two years for women of average risk.) Of course, you and your doctor may decide to screen more often for some diseases, based on your risk and your health status. Preventive services for you include: - Medicare offers their members a free annual wellness visit, which is time for you and your primary care provider to discuss and plan for your preventive service needs. Take advantage of this benefit every year! 
-All adults over the age of 72 should receive the recommended pneumonia vaccines. Current USPSTF guidelines recommend a series of two vaccines for the best pneumonia protection.  
-All adults should have a flu vaccine yearly and a tetanus vaccine every 10 years. All adults age 61 and older should receive a shingles vaccine once in their lifetime.   
-A bone mass density test is recommended when a woman turns 65 to screen for osteoporosis. This test is only recommended one time, as a screening. Some providers will use this same test as a disease monitoring tool if you already have osteoporosis. -All adults age 38-68 who are overweight should have a diabetes screening test once every three years. -Other screening tests and preventive services for persons with diabetes include: an eye exam to screen for diabetic retinopathy, a kidney function test, a foot exam, and stricter control over your cholesterol.  
-Cardiovascular screening for adults with routine risk involves an electrocardiogram (ECG) at intervals determined by your doctor.  
-Colorectal cancer screenings should be done for adults age 54-65 with no increased risk factors for colorectal cancer. There are a number of acceptable methods of screening for this type of cancer. Each test has its own benefits and drawbacks. Discuss with your doctor what is most appropriate for you during your annual wellness visit. The different tests include: colonoscopy (considered the best screening method), a fecal occult blood test, a fecal DNA test, and sigmoidoscopy. -Breast cancer screenings are recommended every other year for women of normal risk, age 54-69. 
-Cervical cancer screenings for women over age 72 are only recommended with certain risk factors.  
-All adults born between [facility] should be screened once for Hepatitis C. Here is a list of your current Health Maintenance items (your personalized list of preventive services) with a due date: 
Health Maintenance Due Topic Date Due  
 DTaP/Tdap/Td  (1 - Tdap) 04/03/2012  Flu Vaccine  08/01/2018 Introducing [facility]! Dear Paul Deutsch: 
Thank you for requesting a [employer] account. Our records indicate that you already have an active [employer] account. You can access your account anytime at [email or web address] Did you know that you can access your hospital and ER discharge instructions at any time in [employer]? You can also review all of your test results from your hospital stay or ER visit. Additional Information If you have questions, please visit the Frequently Asked Questions section of the [employer] website at [email or web address] Remember, [employer] is NOT to be used for urgent needs. For medical emergencies, dial 911. Now available from your iPhone and Android! Please provide this summary of care documentation to your next provider. Your primary care clinician is listed as Ivy Huizar. If you have any questions after today's visit, please call [phone].

## 2018-09-12 ENCOUNTER — PATIENT MESSAGE (OUTPATIENT)
Dept: INTERNAL MEDICINE CLINIC | Age: 83
End: 2018-09-12

## 2018-09-12 DIAGNOSIS — E03.4 HYPOTHYROIDISM DUE TO ACQUIRED ATROPHY OF THYROID: Primary | ICD-10-CM

## 2018-09-12 LAB
25(OH)D3+25(OH)D2 SERPL-MCNC: 37.1 NG/ML (ref 30–100)
ALBUMIN SERPL-MCNC: 4.4 G/DL (ref 3.5–4.7)
ALBUMIN/GLOB SERPL: 1.7 {RATIO} (ref 1.2–2.2)
ALP SERPL-CCNC: 76 IU/L (ref 39–117)
ALT SERPL-CCNC: 8 IU/L (ref 0–32)
AST SERPL-CCNC: 17 IU/L (ref 0–40)
BASOPHILS # BLD AUTO: 0 X10E3/UL (ref 0–0.2)
BASOPHILS NFR BLD AUTO: 0 %
BILIRUB SERPL-MCNC: 0.6 MG/DL (ref 0–1.2)
BUN SERPL-MCNC: 23 MG/DL (ref 8–27)
BUN/CREAT SERPL: 20 (ref 12–28)
CALCIUM SERPL-MCNC: 9.6 MG/DL (ref 8.7–10.3)
CHLORIDE SERPL-SCNC: 105 MMOL/L (ref 96–106)
CHOLEST SERPL-MCNC: 141 MG/DL (ref 100–199)
CO2 SERPL-SCNC: 25 MMOL/L (ref 20–29)
CREAT SERPL-MCNC: 1.16 MG/DL (ref 0.57–1)
EOSINOPHIL # BLD AUTO: 0.1 X10E3/UL (ref 0–0.4)
EOSINOPHIL NFR BLD AUTO: 2 %
ERYTHROCYTE [DISTWIDTH] IN BLOOD BY AUTOMATED COUNT: 14 % (ref 12.3–15.4)
GLOBULIN SER CALC-MCNC: 2.6 G/DL (ref 1.5–4.5)
GLUCOSE SERPL-MCNC: 88 MG/DL (ref 65–99)
HCT VFR BLD AUTO: 38.9 % (ref 34–46.6)
HDLC SERPL-MCNC: 49 MG/DL
HGB BLD-MCNC: 12.5 G/DL (ref 11.1–15.9)
IMM GRANULOCYTES # BLD: 0 X10E3/UL (ref 0–0.1)
IMM GRANULOCYTES NFR BLD: 0 %
INTERPRETATION, 910389: NORMAL
INTERPRETATION: NORMAL
LDLC SERPL CALC-MCNC: 68 MG/DL (ref 0–99)
LYMPHOCYTES # BLD AUTO: 1.4 X10E3/UL (ref 0.7–3.1)
LYMPHOCYTES NFR BLD AUTO: 20 %
MCH RBC QN AUTO: 29.1 PG (ref 26.6–33)
MCHC RBC AUTO-ENTMCNC: 32.1 G/DL (ref 31.5–35.7)
MCV RBC AUTO: 91 FL (ref 79–97)
MONOCYTES # BLD AUTO: 0.6 X10E3/UL (ref 0.1–0.9)
MONOCYTES NFR BLD AUTO: 9 %
NEUTROPHILS # BLD AUTO: 4.6 X10E3/UL (ref 1.4–7)
NEUTROPHILS NFR BLD AUTO: 69 %
PDF IMAGE, 910387: NORMAL
PLATELET # BLD AUTO: 170 X10E3/UL (ref 150–379)
POTASSIUM SERPL-SCNC: 4.4 MMOL/L (ref 3.5–5.2)
PROT SERPL-MCNC: 7 G/DL (ref 6–8.5)
RBC # BLD AUTO: 4.29 X10E6/UL (ref 3.77–5.28)
SODIUM SERPL-SCNC: 146 MMOL/L (ref 134–144)
TRIGL SERPL-MCNC: 119 MG/DL (ref 0–149)
TSH SERPL DL<=0.005 MIU/L-ACNC: 0.27 UIU/ML (ref 0.45–4.5)
VIT B12 SERPL-MCNC: 321 PG/ML (ref 232–1245)
VLDLC SERPL CALC-MCNC: 24 MG/DL (ref 5–40)
WBC # BLD AUTO: 6.7 X10E3/UL (ref 3.4–10.8)

## 2018-09-12 RX ORDER — LEVOTHYROXINE SODIUM 100 UG/1
100 TABLET ORAL
Qty: 90 TAB | Refills: 1 | Status: SHIPPED | OUTPATIENT
Start: 2018-09-12 | End: 2019-01-02 | Stop reason: DRUGHIGH

## 2018-09-12 NOTE — PROGRESS NOTES
Please inform that we are decreasing dose of thyroid med. Change to 100mcg. Sent in. Tell daughter sent [employer] message, but unsure if this is read. Other labs are stable.

## 2018-09-13 ENCOUNTER — TELEPHONE ANTICOAG (OUTPATIENT)
Dept: CARDIOLOGY CLINIC | Age: 83
End: 2018-09-13

## 2018-09-13 LAB — INR, EXTERNAL: 3.9 (ref 2–3)

## 2018-09-13 NOTE — TELEPHONE ENCOUNTER
Spoke with patient an made aware of decrease in medication, advised her to have her daughter check My Chart with the same directions.

## 2018-09-13 NOTE — TELEPHONE ENCOUNTER
----- Message from Zohra Link LPN sent at 7/03/0891  8:50 AM EDT -----      ----- Message -----     From: Marcio Hein MD     Sent: 9/12/2018   2:01 PM       To: Zohra Link LPN    Please inform that we are decreasing dose of thyroid med. Change to 100mcg. Sent in. Tell daughter sent mychart message, but unsure if this is read. Other labs are stable.

## 2018-09-13 NOTE — TELEPHONE ENCOUNTER
----- Message from Johnathon Fernandez LPN sent at 1/47/6487  8:50 AM EDT -----      ----- Message -----     From: Jenny Parsons MD     Sent: 9/12/2018   2:01 PM       To: Johnathon Fernandez LPN    Please inform that we are decreasing dose of thyroid med. Change to 100mcg. Sent in. Tell daughter sent mychart message, but unsure if this is read. Other labs are stable.

## 2018-09-19 ENCOUNTER — DOCUMENTATION ONLY (OUTPATIENT)
Dept: CARDIOLOGY CLINIC | Age: 83
End: 2018-09-19

## 2018-09-19 LAB — INR, EXTERNAL: 1.4 (ref 2–3)

## 2018-09-19 NOTE — PROGRESS NOTES
BMI is normal.  .NAZANIN Pink MA July 12, 2017 11:53 AM     Cardiology NN note:  Received phone call from [facility]- they wanted to report an out of range INR. Danitza Quinn is saying that the INR is 1.4. He does not have the PTT value. Verbally passed this onto the INR clinic- she will get the paper from the company and talk with daughter tomorrow about dosing.

## 2018-09-21 ENCOUNTER — TELEPHONE (OUTPATIENT)
Dept: CARDIOLOGY CLINIC | Age: 83
End: 2018-09-21

## 2018-09-21 ENCOUNTER — TELEPHONE ANTICOAG (OUTPATIENT)
Dept: CARDIOLOGY CLINIC | Age: 83
End: 2018-09-21

## 2018-09-27 ENCOUNTER — TELEPHONE ANTICOAG (OUTPATIENT)
Dept: CARDIOLOGY CLINIC | Age: 83
End: 2018-09-27

## 2018-09-27 ENCOUNTER — CLINICAL SUPPORT (OUTPATIENT)
Dept: CARDIOLOGY CLINIC | Age: 83
End: 2018-09-27

## 2018-09-27 DIAGNOSIS — Z95.0 CARDIAC PACEMAKER IN SITU: Primary | ICD-10-CM

## 2018-09-27 LAB — INR, EXTERNAL: 2.2 (ref 2–3)

## 2018-09-27 NOTE — PROGRESS NOTES

## 2018-10-02 ENCOUNTER — TELEPHONE ANTICOAG (OUTPATIENT)
Dept: CARDIOLOGY CLINIC | Age: 83
End: 2018-10-02

## 2018-10-03 ENCOUNTER — TELEPHONE ANTICOAG (OUTPATIENT)
Dept: CARDIOLOGY CLINIC | Age: 83
End: 2018-10-03

## 2018-10-03 LAB — INR, EXTERNAL: 2.6 (ref 2–3)

## 2018-10-03 NOTE — PROGRESS NOTES

## 2018-10-04 RX ORDER — CARVEDILOL 3.12 MG/1
TABLET ORAL
Qty: 180 TAB | Refills: 1 | Status: SHIPPED | OUTPATIENT
Start: 2018-10-04 | End: 2019-04-04 | Stop reason: SDUPTHER

## 2018-10-05 DIAGNOSIS — I48.91 ATRIAL FIBRILLATION, UNSPECIFIED TYPE (HCC): ICD-10-CM

## 2018-10-05 RX ORDER — WARFARIN SODIUM 5 MG/1
TABLET ORAL
Qty: 90 TAB | Refills: 1 | Status: ON HOLD | OUTPATIENT
Start: 2018-10-05 | End: 2019-01-13

## 2018-10-05 NOTE — TELEPHONE ENCOUNTER
Requested Prescriptions     Signed Prescriptions Disp Refills    warfarin (COUMADIN) 5 mg tablet 90 Tab 1     Sig: TAKE 1 TABLET BY MOUTH DAILY AND 1/2 TABLET ON SUNDAY     Authorizing Provider: Arnulfo Naik     Ordering User: Humaira Méndez  Date Time Provider Cuca Willis   12/18/2018 3:30 PM [facility], [address] [facility]   12/18/2018 4:20 PM MD José Wynne

## 2018-10-11 ENCOUNTER — TELEPHONE ANTICOAG (OUTPATIENT)
Dept: CARDIOLOGY CLINIC | Age: 83
End: 2018-10-11

## 2018-10-11 LAB — INR, EXTERNAL: 2.6 (ref 2–3)

## 2018-10-11 NOTE — PROGRESS NOTES

## 2018-10-22 ENCOUNTER — TELEPHONE ANTICOAG (OUTPATIENT)
Dept: CARDIOLOGY CLINIC | Age: 83
End: 2018-10-22

## 2018-10-22 LAB — INR, EXTERNAL: 3.5 (ref 2–3)

## 2018-10-22 NOTE — PROGRESS NOTES
Verbal order from Azar Parada ,to hold coumadin dose x 2 days. Daughter states no change in diet or meds.

## 2018-10-25 DIAGNOSIS — I48.91 ATRIAL FIBRILLATION, UNSPECIFIED TYPE (HCC): Primary | ICD-10-CM

## 2018-10-26 ENCOUNTER — TELEPHONE (OUTPATIENT)
Dept: CARDIOLOGY CLINIC | Age: 83
End: 2018-10-26

## 2018-10-26 NOTE — TELEPHONE ENCOUNTER
Pt daughter called to discuss a faxed prescription to be sent to [facility] she recently discussed with nurse.   Phone # [phone]  Thanks

## 2018-10-30 ENCOUNTER — TELEPHONE ANTICOAG (OUTPATIENT)
Dept: CARDIOLOGY CLINIC | Age: 83
End: 2018-10-30

## 2018-10-30 LAB — INR, EXTERNAL: 2.1 (ref 2–3)

## 2018-11-02 LAB
INR PPP: 3.3 (ref 0.8–1.2)
PROTHROMBIN TIME: 31.9 SEC (ref 9.1–12)
SPECIMEN STATUS REPORT, ROLRST: NORMAL

## 2018-11-12 ENCOUNTER — CLINICAL SUPPORT (OUTPATIENT)
Dept: CARDIOLOGY CLINIC | Age: 83
End: 2018-11-12

## 2018-11-12 DIAGNOSIS — Z79.01 ANTICOAGULATED ON COUMADIN: Primary | ICD-10-CM

## 2018-11-12 LAB
INR BLD: 3.5
INR, EXTERNAL: 3.5 (ref 2–3)
PT POC: 42.3 SECONDS
VALID INTERNAL CONTROL?: YES

## 2018-11-21 ENCOUNTER — TELEPHONE ANTICOAG (OUTPATIENT)
Dept: CARDIOLOGY CLINIC | Age: 83
End: 2018-11-21

## 2018-11-21 DIAGNOSIS — I25.5 ISCHEMIC CARDIOMYOPATHY: ICD-10-CM

## 2018-11-21 DIAGNOSIS — N18.30 CKD (CHRONIC KIDNEY DISEASE) STAGE 3, GFR 30-59 ML/MIN (HCC): ICD-10-CM

## 2018-11-21 DIAGNOSIS — I48.91 ATRIAL FIBRILLATION, UNSPECIFIED TYPE (HCC): Primary | ICD-10-CM

## 2018-11-21 LAB — INR, EXTERNAL: 2.1 (ref 2–3)

## 2018-11-22 DIAGNOSIS — F41.9 ANXIETY AND DEPRESSION: Chronic | ICD-10-CM

## 2018-11-22 DIAGNOSIS — F32.A ANXIETY AND DEPRESSION: Chronic | ICD-10-CM

## 2018-11-22 DIAGNOSIS — E78.2 MIXED HYPERLIPIDEMIA: ICD-10-CM

## 2018-11-23 RX ORDER — SIMVASTATIN 20 MG/1
TABLET, FILM COATED ORAL
Qty: 90 TAB | Refills: 3 | Status: SHIPPED | OUTPATIENT
Start: 2018-11-23 | End: 2019-11-07 | Stop reason: SDUPTHER

## 2018-11-23 NOTE — TELEPHONE ENCOUNTER
Requested Prescriptions     Signed Prescriptions Disp Refills    simvastatin (ZOCOR) 20 mg tablet 90 Tab 3     Sig: TAKE 1 TABLET BY MOUTH NIGHTLY     Authorizing Provider: Karla Bray     Ordering User: Inga Giles    Per Dr. Latonya Ceballos verbal order.

## 2018-11-25 RX ORDER — SERTRALINE HCL 50 MG
TABLET ORAL
Qty: 90 TAB | Refills: 2 | Status: SHIPPED | OUTPATIENT
Start: 2018-11-25 | End: 2019-11-05 | Stop reason: SDUPTHER

## 2018-11-28 ENCOUNTER — OFFICE VISIT (OUTPATIENT)
Dept: INTERNAL MEDICINE CLINIC | Age: 83
End: 2018-11-28

## 2018-11-28 VITALS
HEIGHT: 65 IN | BODY MASS INDEX: 24.49 KG/M2 | OXYGEN SATURATION: 92 % | DIASTOLIC BLOOD PRESSURE: 63 MMHG | WEIGHT: 147 LBS | HEART RATE: 84 BPM | TEMPERATURE: 98.4 F | RESPIRATION RATE: 16 BRPM | SYSTOLIC BLOOD PRESSURE: 123 MMHG

## 2018-11-28 DIAGNOSIS — N30.90 CYSTITIS: Primary | ICD-10-CM

## 2018-11-28 DIAGNOSIS — R30.9 URINARY PAIN: ICD-10-CM

## 2018-11-28 DIAGNOSIS — E03.4 HYPOTHYROIDISM DUE TO ACQUIRED ATROPHY OF THYROID: ICD-10-CM

## 2018-11-28 DIAGNOSIS — I48.91 ATRIAL FIBRILLATION, UNSPECIFIED TYPE (HCC): ICD-10-CM

## 2018-11-28 DIAGNOSIS — N18.30 CKD (CHRONIC KIDNEY DISEASE) STAGE 3, GFR 30-59 ML/MIN (HCC): ICD-10-CM
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
## 2018-11-28 LAB
BILIRUB UR QL STRIP: NEGATIVE
GLUCOSE UR-MCNC: NEGATIVE MG/DL
KETONES P FAST UR STRIP-MCNC: NEGATIVE MG/DL
PH UR STRIP: 5.5 [PH] (ref 4.6–8)
PROT UR QL STRIP: NEGATIVE
SP GR UR STRIP: 1.03 (ref 1–1.03)
UA UROBILINOGEN AMB POC: NORMAL (ref 0.2–1)
URINALYSIS CLARITY POC: CLEAR
URINALYSIS COLOR POC: YELLOW
URINE BLOOD POC: NORMAL
URINE LEUKOCYTES POC: NORMAL
URINE NITRITES POC: NEGATIVE

## 2018-11-28 RX ORDER — PREDNISONE 5 MG/1
5 TABLET ORAL EVERY OTHER DAY
Status: ON HOLD | COMMUNITY
End: 2019-01-13

## 2018-11-28 RX ORDER — CEPHALEXIN 500 MG/1
500 CAPSULE ORAL 2 TIMES DAILY
Qty: 10 CAP | Refills: 0 | Status: SHIPPED | OUTPATIENT
Start: 2018-11-28 | End: 2018-12-03

## 2018-11-28 NOTE — PATIENT INSTRUCTIONS
Urinary Tract Infection in Women: Care Instructions  Your Care Instructions    A urinary tract infection, or UTI, is a general term for an infection anywhere between the kidneys and the urethra (where urine comes out). Most UTIs are bladder infections. They often cause pain or burning when you urinate. UTIs are caused by bacteria and can be cured with antibiotics. Be sure to complete your treatment so that the infection goes away. Follow-up care is a key part of your treatment and safety. Be sure to make and go to all appointments, and call your doctor if you are having problems. It's also a good idea to know your test results and keep a list of the medicines you take. How can you care for yourself at home? · Take your antibiotics as directed. Do not stop taking them just because you feel better. You need to take the full course of antibiotics. · Drink extra water and other fluids for the next day or two. This may help wash out the bacteria that are causing the infection. (If you have kidney, heart, or liver disease and have to limit fluids, talk with your doctor before you increase your fluid intake.)  · Avoid drinks that are carbonated or have caffeine. They can irritate the bladder. · Urinate often. Try to empty your bladder each time. · To relieve pain, take a hot bath or lay a heating pad set on low over your lower belly or genital area. Never go to sleep with a heating pad in place. To prevent UTIs  · Drink plenty of water each day. This helps you urinate often, which clears bacteria from your system. (If you have kidney, heart, or liver disease and have to limit fluids, talk with your doctor before you increase your fluid intake.)  · Urinate when you need to. · Urinate right after you have sex. · Change sanitary pads often. · Avoid douches, bubble baths, feminine hygiene sprays, and other feminine hygiene products that have deodorants.   · After going to the bathroom, wipe from front to back.  When should you call for help? Call your doctor now or seek immediate medical care if:    · Symptoms such as fever, chills, nausea, or vomiting get worse or appear for the first time.     · You have new pain in your back just below your rib cage. This is called flank pain.     · There is new blood or pus in your urine.     · You have any problems with your antibiotic medicine.    Watch closely for changes in your health, and be sure to contact your doctor if:    · You are not getting better after taking an antibiotic for 2 days.     · Your symptoms go away but then come back. Where can you learn more? Go to http://markel-nicolasa.info/. Enter T172 in the search box to learn more about \"Urinary Tract Infection in Women: Care Instructions. \"  Current as of: March 21, 2018  Content Version: 11.8  © 5055-8251 Healthwise, [employer]. Care instructions adapted under license by [employer] (which disclaims liability or warranty for this information). If you have questions about a medical condition or this instruction, always ask your healthcare professional. [address] any warranty or liability for your use of this information.

## 2018-11-28 NOTE — PROGRESS NOTES
Nataliya Wooten is a 80 y.o. female who presents today for Urinary Pain and Fall  . She has a history of   Patient Active Problem List   Diagnosis Code    Mixed hyperlipidemia E78.2    Atrial fibrillation ([facility] [address] I48.91    Mitral regurgitation I34.0    CHB (complete heart block) ([facility]) I44.2    Orthostatic hypotension I95.1    Chest pain, unspecified R07.9    Pulmonary hypertension ([facility]) I27.20    Anxiety and depression F41.9, F32.9    Colon cancer ([facility]) C18.9    Hypothyroid E03.9    CKD (chronic kidney disease) stage 3, GFR 30-59 ml/min ([facility]) N18.3    Hypothyroidism due to acquired atrophy of thyroid E03.4    Osteopenia M85.80    Restrictive lung disease J98.4    Chest pain R07.9    Abnormal stress test R94.39    Ischemic cardiomyopathy I25.5    Cardiomyopathy ([facility]) I42.9    Psoriasis L40.9    Advanced care planning/counseling discussion Z71.89    Pacemaker Z95.0    Recurrent depression ([facility]) F33.9    Latent tuberculosis by blood test R76.11    Nuclear cataract [id number]    Posterior vitreous detachment H43.819    Pseudophakia Z96.1   . Today patient is here for an acute visit. Did have a fall last night. Mechanical fall when she tripped. No LOC. No hematoma. Needed to have assistance to get back up. No acute changes in mentation. Recall is good. Not more confused. Urinary Problems:  Nataliya Wooten is a 80 y.o. female who complains of dysuria, frequency/urgency x 5 days, without flank pain, fever, chills, nausea or vomiting. Urine has been cloudy/foul smelling.  her symptoms are mild. she is drinking plenty of fluids for hydration. Has had several UTI's in the past.     reports that she does not engage in sexual activity. .    Patient's last menstrual period was 02/26/1970. Has been on prednisone since October with Pulmonology. Hypothyroidism: Overtreated at last evaluation. Patient is now taking a lower dose Synthroid. We will repeat this in about a month.     ROS  Review of Systems   Constitutional: Negative for chills, fever and weight loss. Eyes: Negative for blurred vision and double vision. Respiratory: Negative for cough and shortness of breath. Cardiovascular: Negative for chest pain, palpitations and leg swelling. Gastrointestinal: Positive for diarrhea (better). Negative for abdominal pain, nausea and vomiting. Genitourinary: Positive for dysuria, frequency and urgency. Musculoskeletal: Positive for falls. Negative for back pain, joint pain, myalgias and neck pain. Neurological: Negative. Endo/Heme/Allergies: Does not bruise/bleed easily. Psychiatric/Behavioral: Negative for depression. The patient is not nervous/anxious. Visit Vitals  /63 (BP 1 Location: Left arm, BP Patient Position: Sitting)   Pulse 84   Temp 98.4 °F (36.9 °C) (Oral)   Resp 16   Ht 5' 5\" (1.651 m)   Wt 147 lb (66.7 kg)   SpO2 92%   BMI 24.46 kg/m²       Physical Exam   Constitutional: She is oriented to person, place, and time. She appears well-developed and well-nourished. HENT:   Head: Normocephalic and atraumatic. Eyes: EOM are normal. Pupils are equal, round, and reactive to light. Cardiovascular: Normal rate and regular rhythm. Exam reveals no friction rub. No murmur heard. Pulmonary/Chest: Effort normal. No stridor. No respiratory distress. She has no wheezes. She has no rales. Abdominal: Soft. Bowel sounds are normal. She exhibits no distension and no mass. There is no tenderness. There is no guarding. No CVAT   Neurological: She is alert and oriented to person, place, and time. Skin: Skin is warm and dry. Psychiatric: She has a normal mood and affect. Her behavior is normal. Thought content normal.         Current Outpatient Medications   Medication Sig    predniSONE (DELTASONE) 5 mg tablet Take  by mouth.  cephALEXin (KEFLEX) 500 mg capsule Take 1 Cap by mouth two (2) times a day for 5 days.     ZOLOFT 50 mg tablet TAKE 1 TABLET BY MOUTH EVERY DAY    simvastatin (ZOCOR) 20 mg tablet TAKE 1 TABLET BY MOUTH NIGHTLY    warfarin (COUMADIN) 5 mg tablet TAKE 1 TABLET BY MOUTH DAILY AND 1/2 TABLET ON SUNDAY    carvedilol (COREG) 3.125 mg tablet TAKE 1 TABLET BY MOUTH TWICE A DAY WITH MEALS    SYNTHROID 100 mcg tablet Take 1 Tab by mouth Daily (before breakfast).  clobetasol (TEMOVATE) 0.05 % ointment Apply  to affected area two (2) times a day.  clobetasol 0.05 % sham Apply thin film to dry scalp once daily    bacitracin zinc (BACITRACIN) ointment Apply  to affected area two (2) times a day.  Cholestyramine, Bulk, powd 4 g by Does Not Apply route daily.  furosemide (LASIX) 40 mg tablet TAKE 1 TABLET BY MOUTH DAILY.  PROAIR HFA 90 mcg/actuation inhaler     raNITIdine (ZANTAC) 150 mg tablet TAKE 1 TABLET BY MOUTH TWICE A DAY    cholecalciferol (VITAMIN D3) 1,000 unit tablet Take  by mouth daily.  baclofen (LIORESAL) 10 mg tablet Take 5 mg by mouth two (2) times a day.  DENTA 5000 PLUS 1.1 % crea Apply 1 mg to affected area as needed (to prevent cavities).  desonide (TRIDESILON) 0.05 % cream Apply  to affected area two (2) times a day.  multivitamin (ONE A DAY) tablet Take 1 Tab by mouth daily.  OXYGEN-AIR DELIVERY SYSTEMS 2 L by Does Not Apply route nightly.  ADVAIR DISKUS 250-50 mcg/dose diskus inhaler Take 1 Puff by inhalation two (2) times a day.  acetaminophen (TYLENOL) 500 mg tablet Take 650 mg by mouth every six (6) hours as needed for Pain. (2) Tablet in am (1) tablet in pm (2) Tablet pm    tiotropium (SPIRIVA WITH HANDIHALER) 18 mcg inhalation capsule Take 1 Cap by inhalation daily.  NEUPRO 1 mg/24 hour patch USE 1 PATCH TRANSDERMALLY EVERY DAY    carbidopa-levodopa (SINEMET)  mg per tablet Take 1 Tab by mouth three (3) times daily. No current facility-administered medications for this visit.          Past Medical History:   Diagnosis Date    Arthritis     Atrial fibrillation (HCC)     av node ablation 5/22/98 with placement of CPI #[id number] dual chamber pacemaker subsequently replaced with a single chamber medtronic #[id number] single chamber pacemaker 7/25/05    Cancer ([facility] [address] 1970's    colon cancer w/ resection    COPD     Depression     GERD (gastroesophageal reflux disease)     Hyperlipidemia     Hypertension     Ischemic cardiomyopathy     Migraine headache     Orthostatic hypotension     RADHA (obstructive sleep apnea)     Pacemaker 5/22/98    AV node ablation /pacemaker placement utilizing CPI # [id number] dual chamber system, medtronic #[id number] single chamber device placed 7/22/05    Pulmonary hypertension ([facility] [address] 9/26/2011    RVSP 50 on echo 8/14    Thyroid disease     Valvular heart disease     mild-mod MR/TR      Past Surgical History:   Procedure Laterality Date    BREAST SURGERY PROCEDURE UNLISTED      benign breast tumor excision right breast    HX BREAST BIOPSY Right 2002    neg; surgical bx    HX COLECTOMY  1974    colon    HX KNEE REPLACEMENT      right TKA    HX PACEMAKER      HX TUBAL LIGATION      TOTAL KNEE ARTHROPLASTY      total on R, partial on L      Social History     Tobacco Use    Smoking status: Passive Smoke Exposure - Never Smoker    Smokeless tobacco: Never Used   Substance Use Topics    Alcohol use: No     Alcohol/week: 0.0 oz      Family History   Problem Relation Age of Onset    Diabetes Mother     Heart Disease Mother     Heart Attack Mother     Heart Disease Father     Stroke Sister     Breast Cancer Sister 80    Cancer Maternal Aunt         uterus    Diabetes Maternal Uncle     Breast Cancer Daughter 61        Allergies   Allergen Reactions    Cardizem [Diltiazem Hcl] Hives    Codeine Nausea and Vomiting    Darvocet A500 [Propoxyphene N-Acetaminophen] Nausea and Vomiting    Diltiazem Hives    Labetalol Nausea and Vomiting     Dizzy/Disoriented     Pcn [Penicillins] Swelling     Tongue Swelling     Primidone Other (comments)     Lightheaded/unsteady gait    Sulfa (Sulfonamide Antibiotics) Nausea and Vomiting        Assessment/Plan  Diagnoses and all orders for this visit:    1. Cystitis -we will treat today with Keflex. Patient's creatinine borderline for Macrobid. Culture this today  -     CULTURE, URINE  -     cephALEXin (KEFLEX) 500 mg capsule; Take 1 Cap by mouth two (2) times a day for 5 days. 2. Urinary pain  -     AMB POC URINALYSIS DIP STICK MANUAL W/O MICRO  -     CULTURE, URINE    3. Fall, initial encounter -mechanical fall. Patient denies any orthostatic type symptoms. No mental status changes. We will not need further imaging even though she is on warfarin at this time. 4. CKD (chronic kidney disease) stage 3, GFR 30-59 ml/min ([facility]) -lower dose Keflex given kidney function    5. Atrial fibrillation, unspecified type ([facility] [address] -patient on Coumadin. 6. Hypothyroidism due to acquired atrophy of thyroid -now on lower dose.   Will need to be repeated next month  -     METABOLIC PANEL, BASIC  -     TSH 3RD GENERATION        Follow-up Disposition: Not on File    Bautista Whyte MD  11/28/2018

## 2018-11-29 ENCOUNTER — TELEPHONE ANTICOAG (OUTPATIENT)
Dept: CARDIOLOGY CLINIC | Age: 83
End: 2018-11-29

## 2018-11-29 DIAGNOSIS — I25.5 ISCHEMIC CARDIOMYOPATHY: ICD-10-CM

## 2018-11-29 DIAGNOSIS — N18.30 CKD (CHRONIC KIDNEY DISEASE) STAGE 3, GFR 30-59 ML/MIN (HCC): ICD-10-CM

## 2018-11-29 DIAGNOSIS — I48.91 ATRIAL FIBRILLATION, UNSPECIFIED TYPE (HCC): Primary | ICD-10-CM

## 2018-11-29 LAB — INR, EXTERNAL: 2.7 (ref 2–3)

## 2018-11-30 LAB
BACTERIA UR CULT: NO GROWTH
SPECIMEN STATUS REPORT, ROLRST: NORMAL

## 2018-11-30 NOTE — PROGRESS NOTES
Please let patient know that her urine had no growth. She can stop her antibiotic. If still having urinary symptoms let us know.

## 2018-12-03 ENCOUNTER — TELEPHONE ANTICOAG (OUTPATIENT)
Dept: CARDIOLOGY CLINIC | Age: 83
End: 2018-12-03

## 2018-12-03 ENCOUNTER — TELEPHONE (OUTPATIENT)
Dept: CARDIOLOGY CLINIC | Age: 83
End: 2018-12-03

## 2018-12-03 LAB — INR, EXTERNAL: 4.1 (ref 2–3)

## 2018-12-03 RX ORDER — RANITIDINE 150 MG/1
TABLET, FILM COATED ORAL
Qty: 180 TAB | Refills: 1 | Status: SHIPPED | OUTPATIENT
Start: 2018-12-03 | End: 2019-06-02 | Stop reason: SDUPTHER

## 2018-12-12 ENCOUNTER — TELEPHONE ANTICOAG (OUTPATIENT)
Dept: CARDIOLOGY CLINIC | Age: 83
End: 2018-12-12

## 2018-12-12 DIAGNOSIS — I48.91 ATRIAL FIBRILLATION, UNSPECIFIED TYPE (HCC): Primary | ICD-10-CM

## 2018-12-12 DIAGNOSIS — I25.5 ISCHEMIC CARDIOMYOPATHY: ICD-10-CM

## 2018-12-12 DIAGNOSIS — N18.30 CKD (CHRONIC KIDNEY DISEASE) STAGE 3, GFR 30-59 ML/MIN (HCC): ICD-10-CM

## 2018-12-12 LAB — INR, EXTERNAL: 2.5 (ref 2–3)

## 2018-12-20 ENCOUNTER — OFFICE VISIT (OUTPATIENT)
Dept: CARDIOLOGY CLINIC | Age: 83
End: 2018-12-20

## 2018-12-20 ENCOUNTER — TELEPHONE ANTICOAG (OUTPATIENT)
Dept: CARDIOLOGY CLINIC | Age: 83
End: 2018-12-20

## 2018-12-20 ENCOUNTER — CLINICAL SUPPORT (OUTPATIENT)
Dept: CARDIOLOGY CLINIC | Age: 83
End: 2018-12-20

## 2018-12-20 ENCOUNTER — HOSPITAL ENCOUNTER (EMERGENCY)
Age: 83
Discharge: HOME | End: 2018-12-20
Attending: EMERGENCY MEDICINE
Payer: MEDICARE

## 2018-12-20 ENCOUNTER — TELEPHONE (OUTPATIENT)
Dept: CARDIOLOGY CLINIC | Age: 83
End: 2018-12-20

## 2018-12-20 VITALS
BODY MASS INDEX: 24.37 KG/M2 | HEART RATE: 87 BPM | HEIGHT: 64 IN | RESPIRATION RATE: 16 BRPM | DIASTOLIC BLOOD PRESSURE: 60 MMHG | SYSTOLIC BLOOD PRESSURE: 110 MMHG | OXYGEN SATURATION: 96 %

## 2018-12-20 VITALS
HEART RATE: 80 BPM | RESPIRATION RATE: 18 BRPM | OXYGEN SATURATION: 93 % | DIASTOLIC BLOOD PRESSURE: 70 MMHG | SYSTOLIC BLOOD PRESSURE: 133 MMHG | TEMPERATURE: 98.5 F | WEIGHT: 142 LBS | HEIGHT: 64 IN | BODY MASS INDEX: 24.24 KG/M2

## 2018-12-20 DIAGNOSIS — I48.0 PAROXYSMAL ATRIAL FIBRILLATION (HCC): Primary | ICD-10-CM

## 2018-12-20 DIAGNOSIS — I48.91 ATRIAL FIBRILLATION, UNSPECIFIED TYPE (HCC): Primary | ICD-10-CM

## 2018-12-20 DIAGNOSIS — L08.9 INFECTED SEBACEOUS CYST OF SKIN: Primary | ICD-10-CM

## 2018-12-20 DIAGNOSIS — I25.5 ISCHEMIC CARDIOMYOPATHY: ICD-10-CM

## 2018-12-20 DIAGNOSIS — E78.2 MIXED HYPERLIPIDEMIA: ICD-10-CM

## 2018-12-20 DIAGNOSIS — Z95.0 PACEMAKER: ICD-10-CM

## 2018-12-20 DIAGNOSIS — I42.0 DILATED CARDIOMYOPATHY (HCC): ICD-10-CM

## 2018-12-20 DIAGNOSIS — L72.3 INFECTED SEBACEOUS CYST OF SKIN: Primary | ICD-10-CM

## 2018-12-20 DIAGNOSIS — N18.30 CKD (CHRONIC KIDNEY DISEASE) STAGE 3, GFR 30-59 ML/MIN (HCC): ICD-10-CM

## 2018-12-20 DIAGNOSIS — I95.1 ORTHOSTATIC HYPOTENSION: ICD-10-CM

## 2018-12-20 DIAGNOSIS — Z95.0 CARDIAC PACEMAKER IN SITU: Primary | ICD-10-CM

## 2018-12-20 LAB
APPEARANCE UR: CLEAR
BACTERIA URNS QL MICRO: ABNORMAL /HPF
BILIRUB UR QL: NEGATIVE
COLOR UR: ABNORMAL
EPITH CASTS URNS QL MICRO: ABNORMAL /LPF
GLUCOSE UR STRIP.AUTO-MCNC: NEGATIVE MG/DL
HGB UR QL STRIP: ABNORMAL
INR, EXTERNAL: 3 (ref 2–3)
KETONES UR QL STRIP.AUTO: ABNORMAL MG/DL
LEUKOCYTE ESTERASE UR QL STRIP.AUTO: NEGATIVE
NITRITE UR QL STRIP.AUTO: NEGATIVE
PH UR STRIP: 5.5 [PH] (ref 5–8)
PROT UR STRIP-MCNC: NEGATIVE MG/DL
RBC #/AREA URNS HPF: ABNORMAL /HPF (ref 0–5)
SP GR UR REFRACTOMETRY: 1.02 (ref 1–1.03)
UR CULT HOLD, URHOLD: NORMAL
UROBILINOGEN UR QL STRIP.AUTO: 0.2 EU/DL (ref 0.2–1)
WBC URNS QL MICRO: ABNORMAL /HPF (ref 0–4)

## 2018-12-20 PROCEDURE — 99283 EMERGENCY DEPT VISIT LOW MDM: CPT

## 2018-12-20 PROCEDURE — 81001 URINALYSIS AUTO W/SCOPE: CPT

## 2018-12-20 PROCEDURE — 74011250637 HC RX REV CODE- 250/637: Performed by: EMERGENCY MEDICINE

## 2018-12-20 PROCEDURE — 74011000250 HC RX REV CODE- 250: Performed by: EMERGENCY MEDICINE

## 2018-12-20 RX ORDER — MUPIROCIN 20 MG/G
OINTMENT TOPICAL 3 TIMES DAILY
Qty: 22 G | Refills: 0 | Status: ON HOLD | OUTPATIENT
Start: 2018-12-20 | End: 2019-01-13

## 2018-12-20 RX ORDER — DOXYCYCLINE 25 MG/5ML
100 POWDER, FOR SUSPENSION ORAL EVERY 12 HOURS
Qty: 280 ML | Refills: 0 | Status: SHIPPED | OUTPATIENT
Start: 2018-12-20 | End: 2018-12-27

## 2018-12-20 RX ORDER — DOXYCYCLINE 100 MG/1
100 CAPSULE ORAL 2 TIMES DAILY
Qty: 14 CAP | Refills: 0 | Status: SHIPPED | OUTPATIENT
Start: 2018-12-20 | End: 2018-12-27

## 2018-12-20 RX ORDER — LIDOCAINE HYDROCHLORIDE AND EPINEPHRINE 10; 10 MG/ML; UG/ML
1.5 INJECTION, SOLUTION INFILTRATION; PERINEURAL
Status: DISCONTINUED | OUTPATIENT
Start: 2018-12-20 | End: 2018-12-20

## 2018-12-20 RX ORDER — MUPIROCIN 20 MG/G
OINTMENT TOPICAL 2 TIMES DAILY
Status: DISCONTINUED | OUTPATIENT
Start: 2018-12-20 | End: 2018-12-20 | Stop reason: HOSPADM

## 2018-12-20 RX ADMIN — Medication 5 ML: at 07:37

## 2018-12-20 RX ADMIN — MUPIROCIN: 20 OINTMENT TOPICAL at 08:08

## 2018-12-20 NOTE — TELEPHONE ENCOUNTER
[facility] Requested patient's most recent office note. Note was received and given to Dr. Cora Erazo to review.

## 2018-12-20 NOTE — ED NOTES
LET applied to patient right lower back. Patient tolerated well. Updated regarding plan of care and associated time constraints. Daughter remains at bedside.

## 2018-12-20 NOTE — ED TRIAGE NOTES
Pt has three cyst areas to her lower back. None are draining at this time. Family at bedside. Call bell within reach.

## 2018-12-20 NOTE — PROGRESS NOTES
HISTORY OF PRESENT ILLNESS  Carlos Oshea is a 80 y.o. female     SUMMARY:   Problem List  Date Reviewed: 12/20/2018          Codes Class Noted    Latent tuberculosis by blood test ICD-10-CM: R76.11  ICD-9-CM: 790.6  1/22/2018        Recurrent depression ([facility] ICD-10-CM: F33.9  ICD-9-CM: 296.30  1/8/2018        Pacemaker ICD-10-CM: Z95.0  ICD-9-CM: V45.01  7/24/2017        Advanced care planning/counseling discussion ICD-10-CM: Z71.89  ICD-9-CM: V65.49  5/11/2017    Overview Signed 5/11/2017 12:44 PM by Esperanza Diaz RN     A copy of patient's completed Advanced Medical Directive is on file in the patient's medical record. NN reviewed Advanced Medical Directive document with patient & patient denies the need for changes/ updates. Psoriasis (Chronic) ICD-10-CM: L40.9  ICD-9-CM: 696.1  3/23/2017        Pseudophakia ICD-10-CM: Z96.1  ICD-9-CM: V43.1  11/14/2016        Cardiomyopathy ([facility] ICD-10-CM: I42.9  ICD-9-CM: 425.4  9/20/2016    Overview Addendum 9/20/2016  8:45 AM by Ryan Peralta MD     8/12 normal lexiscan cardiolyte, lvef 68%  8/16 echo lvef 35% multiple wall motion abnormalities, melissa, mod mr, mild ai, mod tr pa pressure 36mm  8/16 lexiscan mod reversible anterior defect, mostly fixed apical defect.  lvef 35%             Abnormal stress test ICD-10-CM: R94.39  ICD-9-CM: 794.39  9/13/2016        Ischemic cardiomyopathy ICD-10-CM: I25.5  ICD-9-CM: 414.8  Unknown        Chest pain ICD-10-CM: R07.9  ICD-9-CM: 786.50  8/10/2016        Nuclear cataract ICD-10-CM: [id number]  ICD-9-CM: 366.16  6/6/2016        Posterior vitreous detachment ICD-10-CM: H43.819  ICD-9-CM: 379.21  6/6/2016        Restrictive lung disease ICD-10-CM: J98.4  ICD-9-CM: 518.89  12/14/2015        Osteopenia ICD-10-CM: M85.80  ICD-9-CM: 733.90  11/23/2015        Hypothyroidism due to acquired atrophy of thyroid ICD-10-CM: E03.4  ICD-9-CM: 244.8, 246.8  11/6/2015        CKD (chronic kidney disease) stage 3, GFR 30-59 ml/min ([email or web address] ICD-10-CM: N18.3  ICD-9-CM: 585.3  2/9/2015        Anxiety and depression (Chronic) ICD-10-CM: F41.9, F32.9  ICD-9-CM: 300.00, 311  2/26/2014        Colon cancer ([place] [address] ICD-10-CM: C18.9  ICD-9-CM: 153.9  2/26/2014    Overview Addendum 2/26/2014 11:12 AM by Fina Veloz MD     Partial colectomy  Petey Alejandro             Hypothyroid ICD-10-CM: E03.9  ICD-9-CM: 244.9  2/26/2014        Pulmonary hypertension ([place] [address] ICD-10-CM: I27.20  ICD-9-CM: 416.8  9/26/2011        Atrial fibrillation ([place] [address] ICD-10-CM: I48.91  ICD-9-CM: 427.31  Unknown    Overview Signed 10/21/2010  8:16 AM by Ryan Chu     av node ablation 5/22/98 with placement of CPI #[id number] dual chamber pacemaker subsequently replaced with a single chamber medtronic #[id number] single chamber pacemaker 7/25/05             Mitral regurgitation ICD-10-CM: I34.0  ICD-9-CM: 424.0  10/21/2010        CHB (complete heart block) (HCC) ICD-10-CM: I44.2  ICD-9-CM: 426.0  10/21/2010        Orthostatic hypotension ICD-10-CM: I95.1  ICD-9-CM: 458.0  10/21/2010        Chest pain, unspecified ICD-10-CM: R07.9  ICD-9-CM: 786.50  10/21/2010        Mixed hyperlipidemia ICD-10-CM: E78.2  ICD-9-CM: 272.2  10/18/2010              Current Outpatient Medications on File Prior to Visit   Medication Sig    raNITIdine (ZANTAC) 150 mg tablet TAKE 1 TABLET BY MOUTH TWICE A DAY    predniSONE (DELTASONE) 5 mg tablet Take 5 mg by mouth every other day. ALTERNATE DAYS 10 MG    ZOLOFT 50 mg tablet TAKE 1 TABLET BY MOUTH EVERY DAY    simvastatin (ZOCOR) 20 mg tablet TAKE 1 TABLET BY MOUTH NIGHTLY    warfarin (COUMADIN) 5 mg tablet TAKE 1 TABLET BY MOUTH DAILY AND 1/2 TABLET ON SUNDAY    carvedilol (COREG) 3.125 mg tablet TAKE 1 TABLET BY MOUTH TWICE A DAY WITH MEALS    SYNTHROID 100 mcg tablet Take 1 Tab by mouth Daily (before breakfast).  clobetasol (TEMOVATE) 0.05 % ointment Apply  to affected area two (2) times a day.     clobetasol 0.05 % sham Apply thin film to dry scalp once daily    bacitracin zinc (BACITRACIN) ointment Apply  to affected area two (2) times a day.  Cholestyramine, Bulk, powd 4 g by Does Not Apply route daily.  furosemide (LASIX) 40 mg tablet TAKE 1 TABLET BY MOUTH DAILY.  PROAIR HFA 90 mcg/actuation inhaler     cholecalciferol (VITAMIN D3) 1,000 unit tablet Take  by mouth daily.  baclofen (LIORESAL) 10 mg tablet Take 5 mg by mouth two (2) times a day.  DENTA 5000 PLUS 1.1 % crea Apply 1 mg to affected area as needed (to prevent cavities).  desonide (TRIDESILON) 0.05 % cream Apply  to affected area two (2) times a day.  multivitamin (ONE A DAY) tablet Take 1 Tab by mouth daily.  OXYGEN-AIR DELIVERY SYSTEMS 2 L by Does Not Apply route nightly.  ADVAIR DISKUS 250-50 mcg/dose diskus inhaler Take 1 Puff by inhalation two (2) times a day.  acetaminophen (TYLENOL) 500 mg tablet Take 650 mg by mouth every six (6) hours as needed for Pain. (2) Tablet in am (1) tablet in pm (2) Tablet pm    tiotropium (SPIRIVA WITH HANDIHALER) 18 mcg inhalation capsule Take 1 Cap by inhalation daily.  NEUPRO 1 mg/24 hour patch USE 1 PATCH TRANSDERMALLY EVERY DAY    carbidopa-levodopa (SINEMET)  mg per tablet Take 1 Tab by mouth three (3) times daily. No current facility-administered medications on file prior to visit.         CARDIOLOGY STUDIES TO DATE:  Medtronic [id number] Pulse pacemaker, implanted 7/2005  Echo 2008 - EF 60%, mild LAE, mild AR, mild to mod MR, mild TR, mild PA HTN  Echo 2009 - EF 55%, bilateral atrial enlargement, mild MR, trace AR, mod TR, mild PA HTN  Lexiscan cardiolite 10/15/09 - normal EF 51-71 %  Echo 10/21/10 - EF 55-60%, bilateral atrial enlargement, mild concentric LVH, mild to mod MR, mild AI, mod to severe TI, pulmonary pressures mildly elevated 35mmhg  Echo 7/31/12 - EF 55-60%, ventricular septal paradoxical motion, LA markedly dilated, RA mild to mod dilated, mod MR, mod TR, mod PA HTN  Lexiscan 8/14/12 - normal EF 68%  Lexiscan 12/17/12 - normal, EF 65%  Pacemaker 8/21/13 - generator change, single chamber Medtronic, model # [id number]. Pacemaker Pocket Revision 1/24/14   Echo 8/20/14 - LVEF 45%, akinesis of distal lat and inf walls, mod-marked LAE, mild-mod MR, RVSP 50  MUGA Scan 8/29/14 - LVEF 53%    Chief Complaint   Patient presents with    Irregular Heart Beat     HPI :  Ms. Caden Gonzales is doing okay. She is on chronic Prednisone now for her lung disease and she wears oxygen at night and at times during the day. She gets regular followup with Dr. Hilda Andre. She has gotten even more weak and has had a couple of falls, though has not hurt herself in any way. She does go to therapy four or five days a week at her assisted living and she enjoys that. Apparently, she has a little less than a year left on her pacemaker generator.                CARDIAC ROS:   negative for chest pain, palpitations, syncope, orthopnea, paroxysmal nocturnal dyspnea, exertional chest pressure/discomfort, claudication    Family History   Problem Relation Age of Onset    Diabetes Mother     Heart Disease Mother     Heart Attack Mother     Heart Disease Father     Stroke Sister     Breast Cancer Sister 80    Cancer Maternal Aunt         uterus    Diabetes Maternal Uncle     Breast Cancer Daughter 61       Past Medical History:   Diagnosis Date    Arthritis     Atrial fibrillation ([facility] [address]     av node ablation 5/22/98 with placement of CPI #[id number] dual chamber pacemaker subsequently replaced with a single chamber medtronic #[id number] single chamber pacemaker 7/25/05    Cancer ([facility] [address] 1970's    colon cancer w/ resection    COPD     Depression     GERD (gastroesophageal reflux disease)     Hyperlipidemia     Hypertension     Ischemic cardiomyopathy     Migraine headache     Orthostatic hypotension     RADHA (obstructive sleep apnea)     Pacemaker 5/22/98    AV node ablation /pacemaker placement utilizing CPI # [id number] dual chamber system, medtronic #[id number] single chamber device placed 7/22/05    Pulmonary hypertension ([address] 9/26/2011    RVSP 50 on echo 8/14    Thyroid disease     Valvular heart disease     mild-mod MR/TR       GENERAL ROS:  A comprehensive review of systems was negative except for that written in the HPI.     Visit Vitals  /60 (BP 1 Location: Left arm, BP Patient Position: Sitting)   Pulse 87   Resp 16   Ht 5' 4\" (1.626 m)   LMP 02/26/1970   SpO2 96%   BMI 24.37 kg/m²       Wt Readings from Last 3 Encounters:   12/20/18 142 lb (64.4 kg)   11/28/18 147 lb (66.7 kg)   09/11/18 153 lb (69.4 kg)            BP Readings from Last 3 Encounters:   12/20/18 110/60   12/20/18 133/70   11/28/18 123/63       PHYSICAL EXAM  General appearance: alert, cooperative, no distress, appears stated age  Neurologic: Alert and oriented X 3  Neck: supple, symmetrical, trachea midline, no adenopathy, no carotid bruit and no JVD  Lungs: clear to auscultation bilaterally  Heart: regular rate and rhythm, S1, S2 normal, no murmur, click, rub or gallop  Extremities: edema tr    Lab Results   Component Value Date/Time    Cholesterol, total 141 09/11/2018 03:10 PM    Cholesterol, total 247 (H) 09/12/2017 09:53 AM    Cholesterol, total 173 08/22/2016 08:45 AM    Cholesterol, total 157 08/11/2015 12:14 PM    Cholesterol, total 157 02/09/2015 08:34 AM    HDL Cholesterol 49 09/11/2018 03:10 PM    HDL Cholesterol 79 09/12/2017 09:53 AM    HDL Cholesterol 46 08/22/2016 08:45 AM    HDL Cholesterol 47 08/11/2015 12:14 PM    HDL Cholesterol 46 02/09/2015 08:34 AM    LDL, calculated 68 09/11/2018 03:10 PM    LDL, calculated 124 (H) 09/12/2017 09:53 AM    LDL, calculated 99 08/22/2016 08:45 AM    LDL, calculated 86 08/11/2015 12:14 PM    LDL, calculated 81 02/09/2015 08:34 AM    Triglyceride 119 09/11/2018 03:10 PM    Triglyceride 221 (H) 09/12/2017 09:53 AM    Triglyceride 140 08/22/2016 08:45 AM    Triglyceride 121 08/11/2015 12:14 PM    Triglyceride 149 02/09/2015 08:34 AM     ASSESSMENT  I think Ms. Carisa Flowers is stable and reasonably well compensated from a cardiac perspective on a good medical regimen. She needs no cardiac testing at this time. current treatment plan is effective, no change in therapy  lab results and schedule of future lab studies reviewed with patient  reviewed diet, exercise and weight control    Encounter Diagnoses   Name Primary?  Paroxysmal atrial fibrillation (HCC) Yes    Dilated cardiomyopathy ([address]     Mixed hyperlipidemia     Orthostatic hypotension     Pacemaker      No orders of the defined types were placed in this encounter.       Follow-up Disposition: Not on File    Sathish Dumont MD  12/20/2018

## 2018-12-20 NOTE — ED NOTES
The patient was discharged home by provider in stable condition. The patient is alert and oriented, in no respiratory distress and discharge vital signs obtained. The patient's diagnosis, condition and treatment were explained. The patient expressed understanding. Two prescriptions given. No work/school note given. A discharge plan has been developed. A  was not involved in the process. Aftercare instructions were given. Pt discharged from the ED via w/c with family.

## 2018-12-20 NOTE — ED PROVIDER NOTES
81 yo female with history of infected sebaceous cysts presents with symptoms consistent with prior. The history is provided by the patient and a relative. Skin Problem    This is a recurrent problem. Episode onset: one week ago. The problem has not changed since onset. The problem is associated with nothing. There has been no fever. The pain is at a severity of 0/10. The patient is experiencing no pain. The pain has been constant since onset. She has tried nothing for the symptoms.         Past Medical History:   Diagnosis Date    Arthritis     Atrial fibrillation ([address]     av node ablation 5/22/98 with placement of CPI #[id number] dual chamber pacemaker subsequently replaced with a single chamber medtronic #[id number] single chamber pacemaker 7/25/05    Cancer ([address] 1970's    colon cancer w/ resection    COPD     Depression     GERD (gastroesophageal reflux disease)     Hyperlipidemia     Hypertension     Ischemic cardiomyopathy     Migraine headache     Orthostatic hypotension     RADHA (obstructive sleep apnea)     Pacemaker 5/22/98    AV node ablation /pacemaker placement utilizing CPI # [id number] dual chamber system, medtronic #[id number] single chamber device placed 7/22/05    Pulmonary hypertension ([address] 9/26/2011    RVSP 50 on echo 8/14    Thyroid disease     Valvular heart disease     mild-mod MR/TR       Past Surgical History:   Procedure Laterality Date    BREAST SURGERY PROCEDURE UNLISTED      benign breast tumor excision right breast    HX BREAST BIOPSY Right 2002    neg; surgical bx    HX COLECTOMY  1974    colon    HX KNEE REPLACEMENT      right TKA    HX PACEMAKER      HX TUBAL LIGATION      TOTAL KNEE ARTHROPLASTY      total on R, partial on L         Family History:   Problem Relation Age of Onset    Diabetes Mother     Heart Disease Mother     Heart Attack Mother     Heart Disease Father     Stroke Sister     Breast Cancer Sister 80    Cancer Maternal Aunt         uterus    Diabetes Maternal Uncle     Breast Cancer Daughter 61       Social History     Socioeconomic History    Marital status:      Spouse name: Not on file    Number of children: Not on file    Years of education: Not on file    Highest education level: Not on file   Social Needs    Financial resource strain: Not on file    Food insecurity - worry: Not on file    Food insecurity - inability: Not on file   MovieSet needs - medical: Not on file   MovieSet needs - non-medical: Not on file   Occupational History    Not on file   Tobacco Use    Smoking status: Passive Smoke Exposure - Never Smoker    Smokeless tobacco: Never Used   Substance and Sexual Activity    Alcohol use: No     Alcohol/week: 0.0 oz    Drug use: No    Sexual activity: No   Other Topics Concern    Not on file   Social History Narrative    Not on file         ALLERGIES: Cardizem [diltiazem hcl]; Codeine; Darvocet a500 [propoxyphene n-acetaminophen]; Diltiazem; Labetalol; Pcn [penicillins]; Primidone; and Sulfa (sulfonamide antibiotics)    Review of Systems   Constitutional: Negative for chills and fever. HENT: Negative for ear pain and sore throat. Eyes: Negative for pain. Respiratory: Negative for chest tightness and shortness of breath. Cardiovascular: Negative for chest pain and leg swelling. Gastrointestinal: Negative for abdominal pain, nausea and vomiting. Genitourinary: Negative for dysuria and flank pain. Musculoskeletal: Negative for back pain. Skin: Negative for rash. Neurological: Negative for headaches. All other systems reviewed and are negative. Vitals:    12/20/18 0658   BP: 132/67   Pulse: 87   Resp: 16   Temp: 97.6 °F (36.4 °C)   SpO2: 93%   Weight: 64.4 kg (142 lb)   Height: 5' 4\" (1.626 m)            Physical Exam   Constitutional: She appears well-developed and well-nourished. HENT:   Head: Normocephalic and atraumatic.    Right Ear: Tympanic membrane normal.   Left Ear: Tympanic membrane normal.   Eyes: No scleral icterus. Neck: No tracheal deviation present. Cardiovascular: Normal rate and regular rhythm. Pulmonary/Chest: Effort normal. No respiratory distress. Abdominal: She exhibits no distension. Musculoskeletal: She exhibits no deformity. Neurological: She is alert. Skin: Skin is dry. Erythematous papules/ pustules on right lower back   Psychiatric: She has a normal mood and affect. MDM  Number of Diagnoses or Management Options  Infected sebaceous cyst of skin:   Diagnosis management comments:   - doxycycline  - f/u with pcp  - Return to the emergency department for new/ worsening symptoms or other concerns            I&D Abcess Simple  Date/Time: 12/21/2018 11:12 AM  Performed by: Teresa Lua MD  Authorized by: Teresa Lua MD     Consent:     Consent obtained:  Verbal    Consent given by:  Patient    Risks discussed:  Bleeding, damage to other organs, incomplete drainage and pain    Alternatives discussed:  No treatment  Location:     Type:  Abscess    Location:  Trunk    Trunk location:  Back  Anesthesia (see MAR for exact dosages): Anesthesia method:  Topical application    Topical anesthetic:  LET  Procedure type:     Complexity:  Simple  Procedure details:     Needle aspiration: no    Comments:      Small amount of purulent material expressed from small infected sebaceous cyst               Paul Copeland MD

## 2018-12-20 NOTE — ED NOTES
Mupirocin applied to patient right lower back as ordered; patient tolerated well. Covered with bandages.

## 2018-12-27 LAB — INR, EXTERNAL: 4 (ref 2–3)

## 2018-12-28 ENCOUNTER — TELEPHONE ANTICOAG (OUTPATIENT)
Dept: CARDIOLOGY CLINIC | Age: 83
End: 2018-12-28

## 2018-12-28 DIAGNOSIS — I25.5 ISCHEMIC CARDIOMYOPATHY: ICD-10-CM

## 2018-12-28 DIAGNOSIS — N18.30 CKD (CHRONIC KIDNEY DISEASE) STAGE 3, GFR 30-59 ML/MIN (HCC): ICD-10-CM

## 2018-12-28 DIAGNOSIS — I48.91 ATRIAL FIBRILLATION, UNSPECIFIED TYPE (HCC): Primary | ICD-10-CM

## 2018-12-31 ENCOUNTER — HOSPITAL ENCOUNTER (OUTPATIENT)
Dept: MAMMOGRAPHY | Age: 83
Discharge: HOME | End: 2018-12-31
Attending: INTERNAL MEDICINE
Payer: MEDICARE

## 2018-12-31 ENCOUNTER — HOSPITAL ENCOUNTER (OUTPATIENT)
Dept: LAB | Age: 83
Discharge: HOME | End: 2018-12-31
Payer: MEDICARE

## 2018-12-31 DIAGNOSIS — E55.9 VITAMIN D DEFICIENCY: ICD-10-CM

## 2018-12-31 DIAGNOSIS — Z13.31 SCREENING FOR DEPRESSION: ICD-10-CM

## 2018-12-31 DIAGNOSIS — R41.3 MEMORY DEFICITS: ICD-10-CM

## 2018-12-31 DIAGNOSIS — M19.90 OSTEOARTHRITIS, UNSPECIFIED OSTEOARTHRITIS TYPE, UNSPECIFIED SITE: ICD-10-CM

## 2018-12-31 DIAGNOSIS — Z12.31 VISIT FOR SCREENING MAMMOGRAM: ICD-10-CM

## 2018-12-31 DIAGNOSIS — M85.80 OSTEOPENIA, UNSPECIFIED LOCATION: ICD-10-CM

## 2018-12-31 DIAGNOSIS — Z78.0 POST-MENOPAUSAL: ICD-10-CM

## 2018-12-31 DIAGNOSIS — F32.A ANXIETY AND DEPRESSION: Chronic | ICD-10-CM

## 2018-12-31 DIAGNOSIS — E03.4 HYPOTHYROIDISM DUE TO ACQUIRED ATROPHY OF THYROID: ICD-10-CM

## 2018-12-31 DIAGNOSIS — C18.9 MALIGNANT NEOPLASM OF COLON, UNSPECIFIED PART OF COLON (HCC): ICD-10-CM

## 2018-12-31 DIAGNOSIS — F41.9 ANXIETY AND DEPRESSION: Chronic | ICD-10-CM

## 2018-12-31 DIAGNOSIS — Z71.89 ADVANCED DIRECTIVES, COUNSELING/DISCUSSION: ICD-10-CM

## 2018-12-31 DIAGNOSIS — Z00.00 MEDICARE ANNUAL WELLNESS VISIT, SUBSEQUENT: ICD-10-CM

## 2018-12-31 DIAGNOSIS — F33.9 RECURRENT DEPRESSION (HCC): ICD-10-CM

## 2018-12-31 DIAGNOSIS — E78.2 MIXED HYPERLIPIDEMIA: ICD-10-CM

## 2018-12-31 PROCEDURE — 77067 SCR MAMMO BI INCL CAD: CPT

## 2018-12-31 PROCEDURE — 77080 DXA BONE DENSITY AXIAL: CPT

## 2018-12-31 PROCEDURE — 36415 COLL VENOUS BLD VENIPUNCTURE: CPT

## 2018-12-31 PROCEDURE — 84443 ASSAY THYROID STIM HORMONE: CPT

## 2018-12-31 PROCEDURE — 80048 BASIC METABOLIC PNL TOTAL CA: CPT

## 2019-01-01 LAB
BUN SERPL-MCNC: 21 MG/DL (ref 8–27)
BUN/CREAT SERPL: 25 (ref 12–28)
CALCIUM SERPL-MCNC: 9.4 MG/DL (ref 8.7–10.3)
CHLORIDE SERPL-SCNC: 103 MMOL/L (ref 96–106)
CO2 SERPL-SCNC: 26 MMOL/L (ref 20–29)
CREAT SERPL-MCNC: 0.85 MG/DL (ref 0.57–1)
GLUCOSE SERPL-MCNC: 100 MG/DL (ref 65–99)
POTASSIUM SERPL-SCNC: 4.2 MMOL/L (ref 3.5–5.2)
SODIUM SERPL-SCNC: 144 MMOL/L (ref 134–144)
TSH SERPL DL<=0.005 MIU/L-ACNC: 0.42 UIU/ML (ref 0.45–4.5)

## 2019-01-02 ENCOUNTER — HOSPITAL ENCOUNTER (EMERGENCY)
Age: 84
Discharge: HOME | End: 2019-01-02
Attending: EMERGENCY MEDICINE | Admitting: EMERGENCY MEDICINE
Payer: MEDICARE

## 2019-01-02 ENCOUNTER — APPOINTMENT (OUTPATIENT)
Dept: CT IMAGING | Age: 84
End: 2019-01-02
Attending: PHYSICIAN ASSISTANT
Payer: MEDICARE

## 2019-01-02 ENCOUNTER — APPOINTMENT (OUTPATIENT)
Dept: GENERAL RADIOLOGY | Age: 84
End: 2019-01-02
Attending: PHYSICIAN ASSISTANT
Payer: MEDICARE

## 2019-01-02 VITALS
SYSTOLIC BLOOD PRESSURE: 169 MMHG | RESPIRATION RATE: 14 BRPM | HEIGHT: 64 IN | OXYGEN SATURATION: 95 % | DIASTOLIC BLOOD PRESSURE: 80 MMHG | HEART RATE: 80 BPM | BODY MASS INDEX: 24.37 KG/M2 | TEMPERATURE: 98.3 F

## 2019-01-02 DIAGNOSIS — R79.1 SUBTHERAPEUTIC INTERNATIONAL NORMALIZED RATIO (INR): ICD-10-CM

## 2019-01-02 DIAGNOSIS — S22.000A COMPRESSION FRACTURE OF BODY OF THORACIC VERTEBRA (HCC): ICD-10-CM

## 2019-01-02 DIAGNOSIS — S09.90XA INJURY OF HEAD, INITIAL ENCOUNTER: ICD-10-CM

## 2019-01-02 DIAGNOSIS — Z23 NEED FOR TETANUS BOOSTER: ICD-10-CM

## 2019-01-02 DIAGNOSIS — S01.01XA LACERATION OF SCALP WITHOUT FOREIGN BODY, INITIAL ENCOUNTER: Primary | ICD-10-CM

## 2019-01-02 DIAGNOSIS — E03.4 HYPOTHYROIDISM DUE TO ACQUIRED ATROPHY OF THYROID: ICD-10-CM

## 2019-01-02 DIAGNOSIS — M50.30 DDD (DEGENERATIVE DISC DISEASE), CERVICAL: ICD-10-CM

## 2019-01-02 LAB
INR BLD: 1.2 (ref 0.9–1.2)
INR, EXTERNAL: 1.3 (ref 2–3)

## 2019-01-02 PROCEDURE — 85610 PROTHROMBIN TIME: CPT

## 2019-01-02 PROCEDURE — 72125 CT NECK SPINE W/O DYE: CPT

## 2019-01-02 PROCEDURE — 72072 X-RAY EXAM THORAC SPINE 3VWS: CPT

## 2019-01-02 PROCEDURE — 74011000250 HC RX REV CODE- 250: Performed by: PHYSICIAN ASSISTANT

## 2019-01-02 PROCEDURE — 70450 CT HEAD/BRAIN W/O DYE: CPT

## 2019-01-02 PROCEDURE — 90471 IMMUNIZATION ADMIN: CPT

## 2019-01-02 PROCEDURE — 99284 EMERGENCY DEPT VISIT MOD MDM: CPT

## 2019-01-02 PROCEDURE — 74011250636 HC RX REV CODE- 250/636: Performed by: PHYSICIAN ASSISTANT

## 2019-01-02 PROCEDURE — 90715 TDAP VACCINE 7 YRS/> IM: CPT | Performed by: PHYSICIAN ASSISTANT

## 2019-01-02 PROCEDURE — 75810000293 HC SIMP/SUPERF WND  RPR

## 2019-01-02 PROCEDURE — 77030018836 HC SOL IRR NACL ICUM -A

## 2019-01-02 PROCEDURE — 77030008460 HC STPLR SKN PRECIS 3M -A

## 2019-01-02 RX ORDER — LEVOTHYROXINE SODIUM 88 UG/1
88 TABLET ORAL
Qty: 30 TAB | Refills: 5 | Status: ON HOLD | OUTPATIENT
Start: 2019-01-02 | End: 2019-07-06 | Stop reason: SDUPTHER

## 2019-01-02 RX ORDER — LIDOCAINE HYDROCHLORIDE AND EPINEPHRINE 10; 10 MG/ML; UG/ML
5 INJECTION, SOLUTION INFILTRATION; PERINEURAL ONCE
Status: COMPLETED | OUTPATIENT
Start: 2019-01-02 | End: 2019-01-02

## 2019-01-02 RX ADMIN — TETANUS TOXOID, REDUCED DIPHTHERIA TOXOID AND ACELLULAR PERTUSSIS VACCINE, ADSORBED 0.5 ML: 5; 2.5; 8; 8; 2.5 SUSPENSION INTRAMUSCULAR at 18:01

## 2019-01-02 RX ADMIN — LIDOCAINE HYDROCHLORIDE,EPINEPHRINE BITARTRATE 50 MG: 10; .01 INJECTION, SOLUTION INFILTRATION; PERINEURAL at 18:01

## 2019-01-02 NOTE — PROGRESS NOTES
Please inform patient that thyroid is still overtreated. I would like her to decrease to 88 mcg. I have sent this in.   The kidneys are back to normal

## 2019-01-02 NOTE — ED NOTES
Pt. Presents post ground level occurring today around 1600. Fall was secondary to [facility] chair wrapped around my feet\". Denies presyncopal symptoms and no LOC. INR checked from home today. Level was 1.3. Family reports moderate amount of blood loss prior to hemostasis. Bleeding controlled PTA. Hematoma and dried blood observed on left posterior scalp. Mentation at baseline.

## 2019-01-02 NOTE — ED PROVIDER NOTES
Tahmina Guzman is a 80 y.o. female with PMH of A-fib on warfarin, pacemaker, thyroid disease, hyperlipidemia, GERD, HTN, COPD, pulmonary HTN, ischemic cardiomyopathy presents to emergency room via EMS with daughters for evaluation of head injury. Daughter states prior to arrival patient was at her home in [place] when she went to stand up, lost her footing and fell backwards striking the back left of her head on the hardwood floor. The daughter witnessed the event and confirms patient's recollection of no LOC, no vomiting, and patient recalls entire event. She stood when EMS arrived. Just prior to the fall patient had just gotten her INR level checked which was 1.3. C/o dull posterior headache and thoracic back pain only with palpation. Family states occasionally she falls when trying to plant her feet to stand requiring ER visits. Family states patient is at her mental baseline and has been recently without concerns for confusion, fever, cough, rash. Last documented tetanus almost 7 years ago. PCP: James Lee MD 
 
Surgical hx- see chart Social hx- passive smoker The patient has no other complaints at this time. Past Medical History:  
Diagnosis Date  Arthritis  Atrial fibrillation ([facility] [address]   
 av node ablation 5/22/98 with placement of CPI #[id number] dual chamber pacemaker subsequently replaced with a single chamber medtronic #[id number] single chamber pacemaker 7/25/05  Cancer [facility]) [id number]  
 colon cancer w/ resection  COPD  Depression  GERD (gastroesophageal reflux disease)  Hyperlipidemia  Hypertension  Ischemic cardiomyopathy  Migraine headache  Orthostatic hypotension  RADHA (obstructive sleep apnea)  Pacemaker 5/22/98 AV node ablation /pacemaker placement utilizing CPI # [id number] dual chamber system, medtronic #[id number] single chamber device placed 7/22/05  Pulmonary hypertension ([facility] [address] 9/26/2011 RVSP 50 on echo 8/14  Thyroid disease  Valvular heart disease   
 mild-mod MR/TR Past Surgical History:  
Procedure Laterality Date  BREAST SURGERY PROCEDURE UNLISTED    
 benign breast tumor excision right breast  
 HX BREAST BIOPSY Right 2002  
 neg; surgical bx  HX COLECTOMY  1974  
 colon  HX KNEE REPLACEMENT    
 right TKA  HX PACEMAKER    
 HX TUBAL LIGATION    
 TOTAL KNEE ARTHROPLASTY    
 total on R, partial on L Family History:  
Problem Relation Age of Onset  Diabetes Mother  Heart Disease Mother  Heart Attack Mother  Heart Disease Father  Stroke Sister  Breast Cancer Sister 80  
 Cancer Maternal Aunt   
     uterus  Diabetes Maternal Uncle  Breast Cancer Daughter 61 Social History Socioeconomic History  Marital status:  Spouse name: Not on file  Number of children: Not on file  Years of education: Not on file  Highest education level: Not on file Social Needs  Financial resource strain: Not on file  Food insecurity - worry: Not on file  Food insecurity - inability: Not on file  Transportation needs - medical: Not on file  Transportation needs - non-medical: Not on file Occupational History  Not on file Tobacco Use  Smoking status: Passive Smoke Exposure - Never Smoker  Smokeless tobacco: Never Used Substance and Sexual Activity  Alcohol use: No  
  Alcohol/week: 0.0 oz  Drug use: No  
 Sexual activity: No  
Other Topics Concern  Not on file Social History Narrative  Not on file ALLERGIES: Cardizem [diltiazem hcl]; Codeine; Darvocet a500 [propoxyphene n-acetaminophen]; Diltiazem; Labetalol; Pcn [penicillins]; Primidone; and Sulfa (sulfonamide antibiotics) Review of Systems Constitutional: Negative. Negative for activity change, chills, fatigue and unexpected weight change. HENT: Negative for trouble swallowing. Respiratory: Negative for cough, chest tightness, shortness of breath and wheezing. Cardiovascular: Negative. Negative for chest pain and palpitations. Gastrointestinal: Negative. Negative for abdominal pain, diarrhea, nausea and vomiting. Genitourinary: Negative. Negative for dysuria, flank pain, frequency and hematuria. Musculoskeletal: Negative. Negative for arthralgias, back pain, neck pain and neck stiffness. Skin: Positive for wound. Negative for color change and rash. Neurological: Positive for headaches (dull, posterior). Negative for dizziness, weakness and numbness. All other systems reviewed and are negative. Vitals:  
 01/02/19 1659 01/02/19 1700 BP: 176/82 169/80 Pulse: 80 Resp: 14 Temp: 98.3 °F (36.8 °C) SpO2: 95% 95% Height: 5' 4\" (1.626 m) Physical Exam  
Constitutional: She is oriented to person, place, and time. She appears well-developed and well-nourished. She is active. Non-toxic appearance. No distress. Elderly WF  
HENT:  
Head: Normocephalic.  
5cm linear vertical gaping laceration to L occiput of head to subcutaneous layer; no active hemorrhage. Eyes: Conjunctivae are normal. Pupils are equal, round, and reactive to light. Right eye exhibits no discharge. Left eye exhibits no discharge. Neck: Normal range of motion and full passive range of motion without pain. No tracheal tenderness present. TTP of upper c-spine and BL paracervical soft tissues; no step-off; TTP of midline mid-thoracic region. No lumbar spinous process TTP. Cardiovascular: Normal rate, regular rhythm, normal heart sounds, intact distal pulses and normal pulses. Exam reveals no gallop and no friction rub. No murmur heard. Pulmonary/Chest: Effort normal and breath sounds normal. No respiratory distress. She has no wheezes. She has no rales. She exhibits no tenderness. Abdominal: Soft. Bowel sounds are normal. She exhibits no distension. There is no tenderness. There is no rebound and no guarding. Musculoskeletal: Normal range of motion. She exhibits no edema or tenderness. Pelvis stable; FROM of BL arms, hips and legs. NVI throughout. Strength 5/5. Neurological: She is alert and oriented to person, place, and time. She has normal strength. No cranial nerve deficit or sensory deficit. Coordination normal.  
Skin: Skin is warm, dry and intact. Capillary refill takes less than 2 seconds. No abrasion and no rash noted. She is not diaphoretic. No erythema. Psychiatric: She has a normal mood and affect. Her speech is normal and behavior is normal. Cognition and memory are normal.  
Nursing note and vitals reviewed. MDM Number of Diagnoses or Management Options Compression fracture of body of thoracic vertebra ([address]):  
DDD (degenerative disc disease), cervical:  
Injury of head, initial encounter:  
Laceration of scalp without foreign body, initial encounter:  
Subtherapeutic international normalized ratio (INR):  
Diagnosis management comments:  
Ddx: ICH, frx, abnormal INR Amount and/or Complexity of Data Reviewed Clinical lab tests: ordered and reviewed Tests in the radiology section of CPT®: ordered and reviewed Review and summarize past medical records: yes Discuss the patient with other providers: yes Patient Progress Patient progress: stable Procedures I discussed patient's PMH, exam findings as well as careplan with the ER attending who agrees with care plan. Glenys Ambriz PA-C Procedure Note - Laceration Repair: 
6:45 PM 
Procedure by Glenys Ambriz PA-C. Complexity: complex (length) 5cm linear laceration to scalp  was irrigated copiously with NS under jet lavage, prepped with Betadine and draped in a sterile fashion. Patient declined use of local anesthesia. The wound was explored with the following results: No foreign bodies found.   The wound was repaired with 5 staples. The wound was closed with good hemostasis and approximation. Sterile dressing applied. Estimated blood loss: <1mL The procedure took 1-15 minutes, and pt tolerated well. LABORATORY TESTS: 
POC INR 1.2 
 
7:09 PM 
Patient can ambulate, discussed CT XR and lab findings. She is to restart her warfarin tonight as she is subtherapeutic and no active bleed. Head injury precautions discussed with patient and family who will stay with her tonight. Wound care discussed. Has thoracic compression fractures, unk if acute, patient is not having pain except with palpation, no pain with rotation of spine, has spine specialist she can f/u with. No neurologic deficits. I have reviewed the additional findings with the patient and encouraged them to follow-up with a primary care provider for appropriate outpatient evaluation and treatment. I have encouraged them to see the official results in [facility]\" or to retrieve the specifics of their results from medical records. The patient states that they understand that there were additional findings and that they agree to follow-up as recommended. MEDICATIONS GIVEN: 
Medications  
neomycin-bacitracnZn-polymyxnB (NEOSPORIN) ointment 1 Packet (not administered) diph,Pertuss(AC),Tet Vac-PF (BOOSTRIX) suspension 0.5 mL (0.5 mL IntraMUSCular Given 1/2/19 1801) lidocaine-EPINEPHrine (XYLOCAINE) 1 %-1:100,000 injection 50 mg (50 mg SubCUTAneous Given by Provider 1/2/19 1801) DISCHARGE NOTE: 
7:11 PM 
The patient's results have been reviewed with them and/or available family. Patient and/or family verbally conveyed their understanding and agreement of the patient's signs, symptoms, diagnosis, treatment and prognosis and additionally agree to follow up as recommended in the discharge instructions or to return to the Emergency Room should their condition change prior to their follow-up appointment.  The patient/family verbally agrees with the care-plan and verbally conveys that all of their questions have been answered. The discharge instructions have also been provided to the patient and/or family with some educational information regarding the patient's diagnosis as well a list of reasons why the patient would want to return to the ER prior to their follow-up appointment, should their condition change. Plan: 
1. F/U with PCP 2. Wound and head injury discussed 3. Return precautions discussed and advised to return to ER if worse I was personally available for consultation in the emergency department. I have reviewed the chart and agree with the documentation recorded by the [facility], including the assessment, treatment plan, and disposition.  
Aldo Jurado MD  
1/5/2019 
10:20AM

## 2019-01-03 ENCOUNTER — TELEPHONE ANTICOAG (OUTPATIENT)
Dept: CARDIOLOGY CLINIC | Age: 84
End: 2019-01-03

## 2019-01-03 DIAGNOSIS — I25.5 ISCHEMIC CARDIOMYOPATHY: ICD-10-CM

## 2019-01-03 DIAGNOSIS — N18.30 CKD (CHRONIC KIDNEY DISEASE) STAGE 3, GFR 30-59 ML/MIN (HCC): ICD-10-CM

## 2019-01-03 DIAGNOSIS — I48.91 ATRIAL FIBRILLATION, UNSPECIFIED TYPE (HCC): Primary | ICD-10-CM

## 2019-01-03 NOTE — ED NOTES
Pt. Able to ambulate severe steps without disequilibrium, dizziness, or presyncope. \"I feel fine\". Pt. Discharged at this time.

## 2019-01-03 NOTE — DISCHARGE INSTRUCTIONS
Patient Education        Learning About a Closed Head Injury  What is a closed head injury? A closed head injury happens when your head gets hit hard. The strong force of the blow causes your brain to shake in your skull. This movement can cause the brain to bruise, swell, or tear. Sometimes nerves or blood vessels also get damaged. This can cause bleeding in or around the brain. A concussion is a type of closed head injury. What are the symptoms? If you have a mild concussion, you may have a mild headache or feel \"not quite right. \" These symptoms are common. They usually go away over a few days to 4 weeks. But sometimes after a concussion, you feel like you can't function as well as before the injury. And you have new symptoms. This is called postconcussive syndrome. You may:  · Find it harder to solve problems, think, concentrate, or remember. · Have headaches. · Have changes in your sleep patterns, such as not being able to sleep or sleeping all the time. · Have changes in your personality. · Not be interested in your usual activities. · Feel angry or anxious without a clear reason. · Lose your sense of taste or smell. · Be dizzy, lightheaded, or unsteady. It may be hard to stand or walk. How is a closed head injury treated? Any person who may have a concussion needs to see a doctor. Some people have to stay in the hospital to be watched. Others can go home safely. If you go home, follow your doctor's instructions. He or she will tell you if you need someone to watch you closely for the next 24 hours or longer. Rest is the best treatment. Get plenty of sleep at night. And try to rest during the day. · Avoid activities that are physically or mentally demanding. These include housework, exercise, and schoolwork. And don't play video games, send text messages, or use the computer. You may need to change your school or work schedule to be able to avoid these activities.   · Ask your doctor when it's okay to drive, ride a bike, or operate machinery. · Take an over-the-counter pain medicine, such as acetaminophen (Tylenol), ibuprofen (Advil, Motrin), or naproxen (Aleve). Be safe with medicines. Read and follow all instructions on the label. · Check with your doctor before you use any other medicines for pain. · Do not drink alcohol or use illegal drugs. They can slow recovery. They can also increase your risk of getting a second head injury. Follow-up care is a key part of your treatment and safety. Be sure to make and go to all appointments, and call your doctor if you are having problems. It's also a good idea to know your test results and keep a list of the medicines you take. Where can you learn more? Go to http://markel-nicolasa.info/. Enter E235 in the search box to learn more about \"Learning About a Closed Head Injury. \"  Current as of: June 4, 2018  Content Version: 11.8  © 9180-6764 [employer]. Care instructions adapted under license by [employer] (which disclaims liability or warranty for this information). If you have questions about a medical condition or this instruction, always ask your healthcare professional. [place] [address] any warranty or liability for your use of this information. Patient Education        Cuts Closed With Staples: Care Instructions  Your Care Instructions  A cut can happen anywhere on your body. The doctor used staples to close the cut. Staples easily and quickly close a cut, which helps the cut heal.  Sometimes a cut can injure tendons, blood vessels, or nerves. If the cut went deep and through the skin, the doctor may have put in a layer of stitches below the staples. The deeper layer of stitches brings the deep part of the cut together. These stitches will dissolve and don't need to be removed. The staples in the upper layer are what you see on the cut. You may have a bandage.  You will need to have the staples removed, usually in 7 to 14 days. The doctor has checked you carefully, but problems can develop later. If you notice any problems or new symptoms, get medical treatment right away. Follow-up care is a key part of your treatment and safety. Be sure to make and go to all appointments, and call your doctor if you are having problems. It's also a good idea to know your test results and keep a list of the medicines you take. How can you care for yourself at home? · Keep the cut dry for the first 24 to 48 hours. After this, you can shower if your doctor okays it. Pat the cut dry. · Don't soak the cut, such as in a bathtub. Your doctor will tell you when it's safe to get the cut wet. · If your doctor told you how to care for your cut, follow your doctor's instructions. If you did not get instructions, follow this general advice:  ? After the first 24 to 48 hours, wash around the cut with clean water 2 times a day. Don't use hydrogen peroxide or alcohol, which can slow healing. ? You may cover the cut with a thin layer of petroleum jelly, such as Vaseline, and a nonstick bandage. ? Apply more petroleum jelly and replace the bandage as needed. · Avoid any activity that could cause your cut to reopen. · Do not remove the staples on your own. Your doctor will tell you when to come back to have the staples removed. · Take pain medicines exactly as directed. ? If the doctor gave you a prescription medicine for pain, take it as prescribed. ? If you are not taking a prescription pain medicine, ask your doctor if you can take an over-the-counter medicine. When should you call for help? Call your doctor now or seek immediate medical care if:    · You have new pain, or your pain gets worse.     · The skin near the cut is cold or pale or changes color.     · You have tingling, weakness, or numbness near the cut.     · The cut starts to bleed, and blood soaks through the bandage.  Oozing small amounts of blood is normal.     · You have trouble moving the area near the cut.     · You have symptoms of infection, such as:  ? Increased pain, swelling, warmth, or redness around the cut.  ? Red streaks leading from the cut.  ? Pus draining from the cut.  ? A fever.    Watch closely for changes in your health, and be sure to contact your doctor if:    · You do not get better as expected. Where can you learn more? Go to http://markel-nicolasa.info/. Enter N201 in the search box to learn more about \"Cuts Closed With Staples: Care Instructions. \"  Current as of: November 20, 2017  Content Version: 11.8  © 0098-0612 [employer]. Care instructions adapted under license by [employer] (which disclaims liability or warranty for this information). If you have questions about a medical condition or this instruction, always ask your healthcare professional. [place] [address] any warranty or liability for your use of this information. Patient Education        Cervical Disc Disease: Care Instructions  Your Care Instructions    Cervical disc disease results from damage, disease, or wear and tear to the discs between the bones (vertebra) in your neck. The discs act as shock absorbers for the spine and keep the spine flexible. When a disc is damaged, it can bulge out and press against the nerve roots or spinal cord. This is sometimes called a herniated or \"slipped disc. \" This pressure can cause pain and numbness or tingling in your arms and hands. It can also cause weakness in your legs. An accident can damage a disc and cause it to break open (rupture). Aging and hard physical work can also cause damage to cervical discs. The first treatments for cervical disc disease include physical therapy, special neck exercises, heat, and pain medicine. If these fail, your doctor may inject steroids and pain medicine into your neck.  Surgery is usually done only if other treatments have not worked. Follow-up care is a key part of your treatment and safety. Be sure to make and go to all appointments, and call your doctor if you are having problems. It's also a good idea to know your test results and keep a list of the medicines you take. How can you care for yourself at home? · Take pain medicines exactly as directed. ? If the doctor gave you a prescription medicine for pain, take it as prescribed. ? If you are not taking a prescription pain medicine, ask your doctor if you can take an over-the-counter medicine. · Don't spend too long in one position. Take short breaks to move around and change positions. · Wear a seat belt and shoulder harness when you are in a car. · Sleep with a pillow under your head and neck that keeps your neck straight. · Follow your doctor's instructions for gentle neck-stretching exercises. · Do not smoke. Smoking can slow healing of your discs. If you need help quitting, talk to your doctor about stop-smoking programs and medicines. These can increase your chances of quitting for good. · Avoid strenuous work or exercise until your doctor says it is okay. When should you call for help? Call 911 anytime you think you may need emergency care. For example, call if:    · You are unable to move an arm or a leg at all.   [facility] your doctor now or seek immediate medical care if:    · You have new or worse symptoms in your arms, legs, belly, or buttocks. Symptoms may include:  ? Numbness or tingling. ? Weakness. ? Pain.     · You lose bladder or bowel control.    Watch closely for changes in your health, and be sure to contact your doctor if:    · You do not get better as expected. Where can you learn more? Go to http://markel-nicolasa.info/. Enter N118 in the search box to learn more about \"Cervical Disc Disease: Care Instructions. \"  Current as of: November 29, 2017  Content Version: 11.8  © 9838-3079 [employer]. Care instructions adapted under license by [employer] (which disclaims liability or warranty for this information). If you have questions about a medical condition or this instruction, always ask your healthcare professional. [address] any warranty or liability for your use of this information.

## 2019-01-10 ENCOUNTER — TELEPHONE ANTICOAG (OUTPATIENT)
Dept: CARDIOLOGY CLINIC | Age: 84
End: 2019-01-10

## 2019-01-10 ENCOUNTER — HOSPITAL ENCOUNTER (EMERGENCY)
Age: 84
Discharge: HOME | End: 2019-01-10
Attending: EMERGENCY MEDICINE
Payer: MEDICARE

## 2019-01-10 VITALS
DIASTOLIC BLOOD PRESSURE: 61 MMHG | BODY MASS INDEX: 24.37 KG/M2 | TEMPERATURE: 98.2 F | RESPIRATION RATE: 16 BRPM | OXYGEN SATURATION: 96 % | WEIGHT: 142 LBS | HEART RATE: 82 BPM | SYSTOLIC BLOOD PRESSURE: 115 MMHG

## 2019-01-10 DIAGNOSIS — Z48.02 ENCOUNTER FOR STAPLE REMOVAL: Primary | ICD-10-CM

## 2019-01-10 DIAGNOSIS — I48.91 ATRIAL FIBRILLATION, UNSPECIFIED TYPE (HCC): Primary | ICD-10-CM

## 2019-01-10 DIAGNOSIS — N18.30 CKD (CHRONIC KIDNEY DISEASE) STAGE 3, GFR 30-59 ML/MIN (HCC): ICD-10-CM

## 2019-01-10 DIAGNOSIS — I25.5 ISCHEMIC CARDIOMYOPATHY: ICD-10-CM

## 2019-01-10 LAB — INR, EXTERNAL: 2.8 (ref 2–3)

## 2019-01-10 PROCEDURE — 75810000275 HC EMERGENCY DEPT VISIT NO LEVEL OF CARE

## 2019-01-10 NOTE — ED PROVIDER NOTES
80 y.o. Female, who presents to the ED with assistance of a wheelchair accompanied by family member, with chief complaint of staple removal. Pt reports that she is here in the ED to have staples removed from her posterior head. She states that she received a laceration to her posterior head after a fall on 1/2/19. Pt reports that she has had buttocks and lower back pain yesterday and this morning PTA, but notes that she took Tylenol PTA which provides relief. She also reports having chest pains \"the other night\" and a \"funny feeling\" in her head since fall, but states that she is \"not worried\" about pain as they are intermittent and resolve quickly. Denies any falls since 1/2/19. Family reports that the patient took her coumadin this morning and had an INR of \"2.8\". There are no other acute medical concerns at this time. Social hx: Tobacco use (passive smoke exposure, no personal use); Negative for EtOH use; Negative for Illicit Drug use PCP: Jose E Jacome MD 
 
Note written by Gladys Muniz, as dictated by Marcos Tipton MD 11:36 AM 
 
 
The history is provided by the patient and a relative. No  was used. Past Medical History:  
Diagnosis Date  Arthritis  Atrial fibrillation ([address]   
 av node ablation 5/22/98 with placement of CPI #[id number] dual chamber pacemaker subsequently replaced with a single chamber medtronic #[id number] single chamber pacemaker 7/25/05  Cancer [facility]) [id number]  
 colon cancer w/ resection  COPD  Depression  GERD (gastroesophageal reflux disease)  Hyperlipidemia  Hypertension  Ischemic cardiomyopathy  Migraine headache  Orthostatic hypotension  RADHA (obstructive sleep apnea)  Pacemaker 5/22/98 AV node ablation /pacemaker placement utilizing CPI # [id number] dual chamber system, medtronic #[id number] single chamber device placed 7/22/05  Pulmonary hypertension ([address] 9/26/2011 RVSP 50 on echo 8/14  Thyroid disease  Valvular heart disease   
 mild-mod MR/TR Past Surgical History:  
Procedure Laterality Date  BREAST SURGERY PROCEDURE UNLISTED    
 benign breast tumor excision right breast  
 HX BREAST BIOPSY Right 2002  
 neg; surgical bx  HX COLECTOMY  1974  
 colon  HX KNEE REPLACEMENT    
 right TKA  HX PACEMAKER    
 HX TUBAL LIGATION    
 TOTAL KNEE ARTHROPLASTY    
 total on R, partial on L Family History:  
Problem Relation Age of Onset  Diabetes Mother  Heart Disease Mother  Heart Attack Mother  Heart Disease Father  Stroke Sister  Breast Cancer Sister 80  
 Cancer Maternal Aunt   
     uterus  Diabetes Maternal Uncle  Breast Cancer Daughter 61 Social History Socioeconomic History  Marital status:  Spouse name: Not on file  Number of children: Not on file  Years of education: Not on file  Highest education level: Not on file Social Needs  Financial resource strain: Not on file  Food insecurity - worry: Not on file  Food insecurity - inability: Not on file  Transportation needs - medical: Not on file  Transportation needs - non-medical: Not on file Occupational History  Not on file Tobacco Use  Smoking status: Passive Smoke Exposure - Never Smoker  Smokeless tobacco: Never Used Substance and Sexual Activity  Alcohol use: No  
  Alcohol/week: 0.0 oz  Drug use: No  
 Sexual activity: No  
Other Topics Concern  Not on file Social History Narrative  Not on file ALLERGIES: Cardizem [diltiazem hcl]; Codeine; Darvocet a500 [propoxyphene n-acetaminophen]; Diltiazem; Labetalol; Pcn [penicillins]; Primidone; and Sulfa (sulfonamide antibiotics) Review of Systems Cardiovascular: Negative for chest pain (resolved). Musculoskeletal: Positive for back pain (lower) and myalgias (buttocks). Skin: Positive for wound (posterior head, healing). All other systems reviewed and are negative. Vitals:  
 01/10/19 1133 BP: 115/61 Pulse: 82 Resp: 16 Temp: 98.2 °F (36.8 °C) SpO2: 96% Weight: 64.4 kg (142 lb) Physical Exam  
Constitutional: She appears well-developed and well-nourished. HENT:  
Head: Normocephalic. Scalp wound looks to be healing well. 5 staples were removed. Eyes: Conjunctivae are normal. No scleral icterus. Neck: No JVD present. No tracheal deviation present. Cardiovascular: Normal rate. Pulmonary/Chest: Effort normal.  
Abdominal: She exhibits no distension. Musculoskeletal: She exhibits no edema. Neurological: She is alert. oriented Skin: No rash noted. No pallor. Psychiatric: She has a normal mood and affect. Nursing note and vitals reviewed. Note written by Gladys Arteaga, as dictated by Pau Chester MD 11:34 AM 
 
MDM Procedures A/P: here for staple removal of scalp wound - wound appears to be healing well; removed.   Billy Nicholson MD

## 2019-01-10 NOTE — DISCHARGE INSTRUCTIONS
Patient Education        Learning About Stitches and Staples Removal  When are stitches and staples removed? Your doctor will tell you when to have your stitches or staples removed, usually in 7 to 14 days. How long you'll be told to wait will depend on things like where the wound is located, how big and how deep the wound is, and what your general health is like. Do not remove the stitches on your own. Stitches on the face are usually removed within a week. But stitches and staples on other areas of the body, such as on the back or belly or over a joint, may need to stay in place longer, often a week or two. Be sure to follow your doctor's instructions. How are stitches and staples removed? It usually doesn't hurt when the doctor removes the stitches or staples. You may feel a tug as each stitch or staple is removed. · You will either be seated or lying down. · To remove stitches, the doctor will use scissors to cut each of the knots and then pull the threads out. · To remove staples, the doctor will use a tool to take out the staples one at a time. · The area may still feel tender after the stitches or staples are gone. But it should feel better within a few minutes or up to a few hours. What can you expect after stitches and staples are removed? Depending on the type and location of the cut, you will have a scar. Scars usually fade over time. Keep the area clean, but you won't need a bandage. When should you call for help? Call your doctor now or seek immediate medical care if :  · You have new pain, or your pain gets worse. · You have trouble moving the area near the scar. · You have symptoms of infection, such as:  ? Increased pain, swelling, warmth, or redness around the scar. ? Red streaks leading from the scar. ? Pus draining from the scar. ? A fever. Watch closely for changes in your health, and be sure to contact your doctor if:  · The scar opens.   · You do not get better as expected. Follow-up care is a key part of your treatment and safety. Be sure to make and go to all appointments, and call your doctor if you do not get better as expected. It's also a good idea to keep a list of the medicines you take. Where can you learn more? Go to http://markel-nicolasa.info/. Enter P319 in the search box to learn more about \"Learning About Stitches and Staples Removal.\"  Current as of: November 20, 2017  Content Version: 11.8  © 5277-2220 Healthwise, Inc[employer]. Care instructions adapted under license by [employer] (which disclaims liability or warranty for this information). If you have questions about a medical condition or this instruction, always ask your healthcare professional. [address] any warranty or liability for your use of this information.

## 2019-01-13 ENCOUNTER — APPOINTMENT (OUTPATIENT)
Dept: CT IMAGING | Age: 84
DRG: 554 | End: 2019-01-13
Attending: PHYSICIAN ASSISTANT
Payer: MEDICARE

## 2019-01-13 ENCOUNTER — APPOINTMENT (OUTPATIENT)
Dept: GENERAL RADIOLOGY | Age: 84
DRG: 554 | End: 2019-01-13
Attending: PHYSICIAN ASSISTANT
Payer: MEDICARE

## 2019-01-13 ENCOUNTER — DOCUMENTATION ONLY (OUTPATIENT)
Dept: CARDIOLOGY CLINIC | Age: 84
End: 2019-01-13

## 2019-01-13 ENCOUNTER — HOSPITAL ENCOUNTER (INPATIENT)
Age: 84
LOS: 4 days | Discharge: NURSING, REHAB OR LONG-TERM CARE | DRG: 554 | End: 2019-01-17
Attending: EMERGENCY MEDICINE | Admitting: INTERNAL MEDICINE
Payer: MEDICARE

## 2019-01-13 DIAGNOSIS — M25.551 ACUTE RIGHT HIP PAIN: Primary | ICD-10-CM

## 2019-01-13 PROBLEM — T45.515A WARFARIN-INDUCED COAGULOPATHY (HCC): Status: ACTIVE | Noted: 2019-01-13

## 2019-01-13 PROBLEM — J44.9 COPD (CHRONIC OBSTRUCTIVE PULMONARY DISEASE) (HCC): Status: ACTIVE | Noted: 2019-01-13

## 2019-01-13 PROBLEM — R29.6 FREQUENT FALLS: Status: ACTIVE | Noted: 2019-01-13

## 2019-01-13 PROBLEM — D68.32 WARFARIN-INDUCED COAGULOPATHY (HCC): Status: ACTIVE | Noted: 2019-01-13

## 2019-01-13 PROBLEM — J96.11 CHRONIC RESPIRATORY FAILURE WITH HYPOXIA (HCC): Status: ACTIVE | Noted: 2019-01-13

## 2019-01-13 LAB
ALBUMIN SERPL-MCNC: 3.4 G/DL (ref 3.5–5)
ALBUMIN/GLOB SERPL: 0.9 {RATIO} (ref 1.1–2.2)
ALP SERPL-CCNC: 81 U/L (ref 45–117)
ALT SERPL-CCNC: 25 U/L (ref 12–78)
ANION GAP SERPL CALC-SCNC: 7 MMOL/L (ref 5–15)
APPEARANCE UR: CLEAR
AST SERPL-CCNC: 23 U/L (ref 15–37)
BACTERIA URNS QL MICRO: NEGATIVE /HPF
BASOPHILS # BLD: 0 K/UL (ref 0–0.1)
BASOPHILS NFR BLD: 1 % (ref 0–1)
BILIRUB SERPL-MCNC: 0.7 MG/DL (ref 0.2–1)
BILIRUB UR QL: NEGATIVE
BUN SERPL-MCNC: 26 MG/DL (ref 6–20)
BUN/CREAT SERPL: 25 (ref 12–20)
CALCIUM SERPL-MCNC: 9 MG/DL (ref 8.5–10.1)
CHLORIDE SERPL-SCNC: 105 MMOL/L (ref 97–108)
CO2 SERPL-SCNC: 32 MMOL/L (ref 21–32)
COLOR UR: ABNORMAL
COMMENT, HOLDF: NORMAL
CREAT SERPL-MCNC: 1.04 MG/DL (ref 0.55–1.02)
DIFFERENTIAL METHOD BLD: ABNORMAL
EOSINOPHIL # BLD: 0.3 K/UL (ref 0–0.4)
EOSINOPHIL NFR BLD: 3 % (ref 0–7)
EPITH CASTS URNS QL MICRO: ABNORMAL /LPF
ERYTHROCYTE [DISTWIDTH] IN BLOOD BY AUTOMATED COUNT: 13.5 % (ref 11.5–14.5)
GLOBULIN SER CALC-MCNC: 3.7 G/DL (ref 2–4)
GLUCOSE SERPL-MCNC: 102 MG/DL (ref 65–100)
GLUCOSE UR STRIP.AUTO-MCNC: NEGATIVE MG/DL
HCT VFR BLD AUTO: 41.1 % (ref 35–47)
HGB BLD-MCNC: 13.1 G/DL (ref 11.5–16)
HGB UR QL STRIP: ABNORMAL
HYALINE CASTS URNS QL MICRO: ABNORMAL /LPF (ref 0–5)
IMM GRANULOCYTES # BLD AUTO: 0 K/UL (ref 0–0.04)
IMM GRANULOCYTES NFR BLD AUTO: 1 % (ref 0–0.5)
INR PPP: 3.6 (ref 0.9–1.1)
KETONES UR QL STRIP.AUTO: NEGATIVE MG/DL
LEUKOCYTE ESTERASE UR QL STRIP.AUTO: ABNORMAL
LYMPHOCYTES # BLD: 1.2 K/UL (ref 0.8–3.5)
LYMPHOCYTES NFR BLD: 14 % (ref 12–49)
MCH RBC QN AUTO: 29.6 PG (ref 26–34)
MCHC RBC AUTO-ENTMCNC: 31.9 G/DL (ref 30–36.5)
MCV RBC AUTO: 93 FL (ref 80–99)
MONOCYTES # BLD: 0.7 K/UL (ref 0–1)
MONOCYTES NFR BLD: 9 % (ref 5–13)
NEUTS SEG # BLD: 6.2 K/UL (ref 1.8–8)
NEUTS SEG NFR BLD: 73 % (ref 32–75)
NITRITE UR QL STRIP.AUTO: NEGATIVE
NRBC # BLD: 0 K/UL (ref 0–0.01)
NRBC BLD-RTO: 0 PER 100 WBC
PH UR STRIP: 5.5 [PH] (ref 5–8)
PLATELET # BLD AUTO: 177 K/UL (ref 150–400)
PMV BLD AUTO: 11.7 FL (ref 8.9–12.9)
POTASSIUM SERPL-SCNC: 4.1 MMOL/L (ref 3.5–5.1)
PROT SERPL-MCNC: 7.1 G/DL (ref 6.4–8.2)
PROT UR STRIP-MCNC: NEGATIVE MG/DL
PROTHROMBIN TIME: 34.1 SEC (ref 9–11.1)
RBC # BLD AUTO: 4.42 M/UL (ref 3.8–5.2)
RBC #/AREA URNS HPF: ABNORMAL /HPF (ref 0–5)
SAMPLES BEING HELD,HOLD: NORMAL
SODIUM SERPL-SCNC: 144 MMOL/L (ref 136–145)
SP GR UR REFRACTOMETRY: 1.02 (ref 1–1.03)
UR CULT HOLD, URHOLD: NORMAL
UROBILINOGEN UR QL STRIP.AUTO: 0.2 EU/DL (ref 0.2–1)
WBC # BLD AUTO: 8.5 K/UL (ref 3.6–11)
WBC URNS QL MICRO: ABNORMAL /HPF (ref 0–4)

## 2019-01-13 PROCEDURE — 70450 CT HEAD/BRAIN W/O DYE: CPT

## 2019-01-13 PROCEDURE — 81001 URINALYSIS AUTO W/SCOPE: CPT

## 2019-01-13 PROCEDURE — 96375 TX/PRO/DX INJ NEW DRUG ADDON: CPT

## 2019-01-13 PROCEDURE — 74011250637 HC RX REV CODE- 250/637: Performed by: PHYSICIAN ASSISTANT

## 2019-01-13 PROCEDURE — 93005 ELECTROCARDIOGRAM TRACING: CPT

## 2019-01-13 PROCEDURE — 72192 CT PELVIS W/O DYE: CPT

## 2019-01-13 PROCEDURE — 85610 PROTHROMBIN TIME: CPT

## 2019-01-13 PROCEDURE — 94761 N-INVAS EAR/PLS OXIMETRY MLT: CPT

## 2019-01-13 PROCEDURE — 77030038269 HC DRN EXT URIN PURWCK BARD -A

## 2019-01-13 PROCEDURE — 85025 COMPLETE CBC W/AUTO DIFF WBC: CPT

## 2019-01-13 PROCEDURE — 74011250636 HC RX REV CODE- 250/636: Performed by: PHYSICIAN ASSISTANT

## 2019-01-13 PROCEDURE — 74011636637 HC RX REV CODE- 636/637: Performed by: INTERNAL MEDICINE

## 2019-01-13 PROCEDURE — 99285 EMERGENCY DEPT VISIT HI MDM: CPT

## 2019-01-13 PROCEDURE — 96374 THER/PROPH/DIAG INJ IV PUSH: CPT

## 2019-01-13 PROCEDURE — 65660000000 HC RM CCU STEPDOWN

## 2019-01-13 PROCEDURE — 74011250637 HC RX REV CODE- 250/637: Performed by: INTERNAL MEDICINE

## 2019-01-13 PROCEDURE — 73502 X-RAY EXAM HIP UNI 2-3 VIEWS: CPT

## 2019-01-13 PROCEDURE — 80053 COMPREHEN METABOLIC PANEL: CPT

## 2019-01-13 RX ORDER — IPRATROPIUM BROMIDE AND ALBUTEROL SULFATE 2.5; .5 MG/3ML; MG/3ML
3 SOLUTION RESPIRATORY (INHALATION)
Status: DISCONTINUED | OUTPATIENT
Start: 2019-01-13 | End: 2019-01-17 | Stop reason: HOSPADM

## 2019-01-13 RX ORDER — SODIUM CHLORIDE 0.9 % (FLUSH) 0.9 %
5-40 SYRINGE (ML) INJECTION AS NEEDED
Status: DISCONTINUED | OUTPATIENT
Start: 2019-01-13 | End: 2019-01-17 | Stop reason: HOSPADM

## 2019-01-13 RX ORDER — FAMOTIDINE 20 MG/1
20 TABLET, FILM COATED ORAL
Status: DISCONTINUED | OUTPATIENT
Start: 2019-01-13 | End: 2019-01-17 | Stop reason: HOSPADM

## 2019-01-13 RX ORDER — MELATONIN
1000 DAILY
Status: DISCONTINUED | OUTPATIENT
Start: 2019-01-14 | End: 2019-01-17 | Stop reason: HOSPADM

## 2019-01-13 RX ORDER — PREDNISONE 5 MG/1
5 TABLET ORAL EVERY OTHER DAY
COMMUNITY
End: 2019-02-07 | Stop reason: SDUPTHER

## 2019-01-13 RX ORDER — SERTRALINE HYDROCHLORIDE 50 MG/1
50 TABLET, FILM COATED ORAL DAILY
Status: DISCONTINUED | OUTPATIENT
Start: 2019-01-14 | End: 2019-01-17 | Stop reason: HOSPADM

## 2019-01-13 RX ORDER — ALBUTEROL SULFATE 90 UG/1
1 AEROSOL, METERED RESPIRATORY (INHALATION)
COMMUNITY

## 2019-01-13 RX ORDER — WARFARIN SODIUM 5 MG/1
2.5 TABLET ORAL
Status: ON HOLD | COMMUNITY
End: 2019-06-29

## 2019-01-13 RX ORDER — WARFARIN SODIUM 5 MG/1
5 TABLET ORAL
Status: ON HOLD | COMMUNITY
End: 2019-06-29

## 2019-01-13 RX ORDER — ACETAMINOPHEN 325 MG/1
650 TABLET ORAL
Status: ACTIVE | OUTPATIENT
Start: 2019-01-13 | End: 2019-01-13

## 2019-01-13 RX ORDER — PREDNISONE 5 MG/1
10 TABLET ORAL DAILY
Status: ON HOLD | COMMUNITY
End: 2020-07-08

## 2019-01-13 RX ORDER — NALOXONE HYDROCHLORIDE 0.4 MG/ML
0.4 INJECTION, SOLUTION INTRAMUSCULAR; INTRAVENOUS; SUBCUTANEOUS AS NEEDED
Status: DISCONTINUED | OUTPATIENT
Start: 2019-01-13 | End: 2019-01-17 | Stop reason: HOSPADM

## 2019-01-13 RX ORDER — CHOLESTYRAMINE 4 G/4.8G
4 POWDER, FOR SUSPENSION ORAL
Status: DISCONTINUED | OUTPATIENT
Start: 2019-01-14 | End: 2019-01-17 | Stop reason: HOSPADM

## 2019-01-13 RX ORDER — SODIUM CHLORIDE 0.9 % (FLUSH) 0.9 %
5-40 SYRINGE (ML) INJECTION EVERY 8 HOURS
Status: DISCONTINUED | OUTPATIENT
Start: 2019-01-13 | End: 2019-01-17 | Stop reason: HOSPADM

## 2019-01-13 RX ORDER — ONDANSETRON 2 MG/ML
4 INJECTION INTRAMUSCULAR; INTRAVENOUS
Status: DISCONTINUED | OUTPATIENT
Start: 2019-01-13 | End: 2019-01-17 | Stop reason: HOSPADM

## 2019-01-13 RX ORDER — BACLOFEN 10 MG/1
5 TABLET ORAL 2 TIMES DAILY
Status: DISCONTINUED | OUTPATIENT
Start: 2019-01-14 | End: 2019-01-17 | Stop reason: HOSPADM

## 2019-01-13 RX ORDER — LEVOTHYROXINE SODIUM 88 UG/1
88 TABLET ORAL
Status: DISCONTINUED | OUTPATIENT
Start: 2019-01-14 | End: 2019-01-17 | Stop reason: HOSPADM

## 2019-01-13 RX ORDER — FENTANYL CITRATE 50 UG/ML
25 INJECTION, SOLUTION INTRAMUSCULAR; INTRAVENOUS
Status: COMPLETED | OUTPATIENT
Start: 2019-01-13 | End: 2019-01-13

## 2019-01-13 RX ORDER — PREDNISONE 5 MG/1
10 TABLET ORAL EVERY OTHER DAY
Status: DISCONTINUED | OUTPATIENT
Start: 2019-01-13 | End: 2019-01-17 | Stop reason: HOSPADM

## 2019-01-13 RX ORDER — ONDANSETRON 2 MG/ML
4 INJECTION INTRAMUSCULAR; INTRAVENOUS
Status: COMPLETED | OUTPATIENT
Start: 2019-01-13 | End: 2019-01-13

## 2019-01-13 RX ORDER — OXYCODONE AND ACETAMINOPHEN 5; 325 MG/1; MG/1
1 TABLET ORAL
Status: COMPLETED | OUTPATIENT
Start: 2019-01-13 | End: 2019-01-13

## 2019-01-13 RX ORDER — SIMVASTATIN 20 MG/1
20 TABLET, FILM COATED ORAL DAILY
Status: DISCONTINUED | OUTPATIENT
Start: 2019-01-14 | End: 2019-01-17 | Stop reason: HOSPADM

## 2019-01-13 RX ORDER — PREDNISONE 5 MG/1
5 TABLET ORAL EVERY OTHER DAY
Status: DISCONTINUED | OUTPATIENT
Start: 2019-01-14 | End: 2019-01-17 | Stop reason: HOSPADM

## 2019-01-13 RX ORDER — ACETAMINOPHEN 325 MG/1
650 TABLET ORAL
Status: DISCONTINUED | OUTPATIENT
Start: 2019-01-13 | End: 2019-01-15

## 2019-01-13 RX ORDER — CARVEDILOL 3.12 MG/1
3.12 TABLET ORAL 2 TIMES DAILY WITH MEALS
Status: DISCONTINUED | OUTPATIENT
Start: 2019-01-14 | End: 2019-01-17 | Stop reason: HOSPADM

## 2019-01-13 RX ADMIN — Medication 10 ML: at 21:44

## 2019-01-13 RX ADMIN — FENTANYL CITRATE 25 MCG: 50 INJECTION, SOLUTION INTRAMUSCULAR; INTRAVENOUS at 11:03

## 2019-01-13 RX ADMIN — LOPERAMIDE HYDROCHLORIDE 2 MG: 1 SOLUTION ORAL at 21:40

## 2019-01-13 RX ADMIN — ONDANSETRON 4 MG: 2 INJECTION INTRAMUSCULAR; INTRAVENOUS at 10:35

## 2019-01-13 RX ADMIN — OXYCODONE AND ACETAMINOPHEN 0.5 TABLET: 5; 325 TABLET ORAL at 15:33

## 2019-01-13 RX ADMIN — PREDNISONE 10 MG: 5 TABLET ORAL at 21:41

## 2019-01-13 RX ADMIN — FAMOTIDINE 20 MG: 20 TABLET, FILM COATED ORAL at 21:40

## 2019-01-13 NOTE — PROGRESS NOTES
[facility] Dosing Services: 1/13/19 Consult for Warfarin Dosing by Pharmacy by Dr. Bessy Cantu Consult provided for this 80 y.o.  female , for indication of Atrial Fibrillation. Day of Therapy: Resume from home Dose to achieve an INR goal of 2-3 Hold warfarin tonight for INR 3.6 Significant drug interactions:  
Previous dose given PT/INR Lab Results Component Value Date/Time INR 3.6 (H) 01/13/2019 09:29 AM  
  
Platelets Lab Results Component Value Date/Time PLATELET 342 49/83/3757 09:29 AM  
  
H/H Lab Results Component Value Date/Time HGB 13.1 01/13/2019 09:29 AM  
  
 
Pharmacy to follow daily and will provide subsequent Warfarin dosing based on clinical status. [facility], [address] [place] [address] 23 [id number] [phone]

## 2019-01-13 NOTE — PROGRESS NOTES
She is admitted to [facility] with right hip pain and fall  Her pacemaker has been functioning properly  She is scheduled to recheck that 2/21/19  Will add echo to that day and to see me same day so she does not have to come twice  Nurse will make change and inform her family

## 2019-01-13 NOTE — PROGRESS NOTES
Admission Medication Reconciliation: 
 
Comments/Recommendations: 
-Medication history obtained on the 4th floor (room 436) -Confirmed allergy history and preferred pharmacy -Reviewed medication list with daughter Sina De León in room 
-Warfarin: Home regimen is 5 mg PO every day except for 2.5 mg on Mondays and Wednesdays. Most recent warfarin dose was yesterday Saturday 1/12/19 evening which was 5 mg (full tablet) Medications added: Loperamide Medications removed: Topical creams/ointment that patient no longer uses, furosemide not active med Medications changed: Warfarin regimen Information obtained from: Patient, patient's daughter, and review of ODRBSZF Significant PMH/Disease States:  
Past Medical History:  
Diagnosis Date  Arthritis  Atrial fibrillation ([facility] [address]   
 av node ablation 5/22/98 with placement of CPI #[id number] dual chamber pacemaker subsequently replaced with a single chamber medtronic #[id number] single chamber pacemaker 7/25/05  Cancer [facility]) 18's  
 colon cancer w/ resection  COPD  Depression  GERD (gastroesophageal reflux disease)  Hyperlipidemia  Hypertension  Ischemic cardiomyopathy  Migraine headache  Orthostatic hypotension  RADHA (obstructive sleep apnea)  Pacemaker 5/22/98 AV node ablation /pacemaker placement utilizing CPI # [id number] dual chamber system, medtronic #[id number] single chamber device placed 7/22/05  Pulmonary hypertension ([facility] [address] 9/26/2011 RVSP 50 on echo 8/14  Thyroid disease  Valvular heart disease   
 mild-mod MR/TR Chief Complaint for this Admission: Chief Complaint Patient presents with  
 Hip Pain  Fall  Head Injury Allergies:  Cardizem [diltiazem hcl]; Codeine; Darvocet a500 [propoxyphene n-acetaminophen]; Diltiazem; Labetalol; Pcn [penicillins]; Primidone; and Sulfa (sulfonamide antibiotics) Prior to Admission Medications:  
Prior to Admission Medications Prescriptions Last Dose Informant Patient Reported? Taking? ADVAIR DISKUS 250-50 mcg/dose diskus inhaler 2019 at Unknown time  Yes Yes Sig: Take 1 Puff by inhalation two (2) times a day. Cholestyramine, Bulk, powd 2019 at AM  No Yes Si g by Does Not Apply route daily. DENTA 5000 PLUS 1.1 % crea 2019 at Unknown time  Yes Yes Sig: Apply 1 mg to affected area as needed (to prevent cavities). ZOLOFT 50 mg tablet 2019 at Unknown time  No Yes Sig: TAKE 1 TABLET BY MOUTH EVERY DAY  
acetaminophen (TYLENOL) 500 mg tablet 2019 at Unknown time  Yes Yes Sig: Take 650 mg by mouth every six (6) hours as needed for Pain. (2) Tablet in am (1) tablet in pm (2) Tablet pm  
albuterol (PROVENTIL HFA, VENTOLIN HFA, PROAIR HFA) 90 mcg/actuation inhaler 2019 at Unknown time  Yes Yes Sig: Take 1 Puff by inhalation every six (6) hours as needed for Wheezing. baclofen (LIORESAL) 10 mg tablet 2019 at Unknown time  Yes Yes Sig: Take 5 mg by mouth two (2) times a day. carvedilol (COREG) 3.125 mg tablet 2019 at Unknown time  No Yes Sig: TAKE 1 TABLET BY MOUTH TWICE A DAY WITH MEALS  
cholecalciferol (VITAMIN D3) 1,000 unit tablet 2019 at Unknown time  Yes Yes Sig: Take 1,000 Units by mouth daily. desonide (TRIDESILON) 0.05 % cream 2019 at Unknown time  No Yes Sig: Apply  to affected area two (2) times a day. levothyroxine (SYNTHROID) 88 mcg tablet 2019 at Unknown time  No Yes Sig: Take 1 Tab by mouth Daily (before breakfast). loperamide (IMODIUM) 1 mg/5 mL solution 2019 at Unknown time  Yes Yes Sig: Take 2 mg by mouth nightly. multivitamin (ONE A DAY) tablet 2019 at Unknown time  Yes Yes Sig: Take 1 Tab by mouth daily. predniSONE (DELTASONE) 5 mg tablet 2019 at Unknown time  Yes Yes Sig: Take 5 mg by mouth every other day. predniSONE (DELTASONE) 5 mg tablet 2019  Yes Yes Sig: Take 10 mg by mouth every other day. raNITIdine (ZANTAC) 150 mg tablet 1/12/2019 at Unknown time  No Yes Sig: TAKE 1 TABLET BY MOUTH TWICE A DAY  
simvastatin (ZOCOR) 20 mg tablet 1/12/2019 at bedtime  No Yes Sig: TAKE 1 TABLET BY MOUTH NIGHTLY  
tiotropium (SPIRIVA WITH HANDIHALER) 18 mcg inhalation capsule 1/12/2019 at Unknown time  Yes Yes Sig: Take 1 Cap by inhalation daily. warfarin (COUMADIN) 5 mg tablet 1/12/2019 at PM  Yes Yes Sig: Take 5 mg by mouth five (5) days a week. Take 5 mg on Tuesday, Thursday, Friday, Saturday, and Sunday  
warfarin (COUMADIN) 5 mg tablet 1/9/2019 at PM  Yes Yes Sig: Take 2.5 mg by mouth two (2) days a week. Take 2.5 mg on Mondays and Wednesdays Facility-Administered Medications: None Thank you, 
Markus Manzanares, PHARMD

## 2019-01-13 NOTE — ED NOTES
TRANSFER - OUT REPORT: 
 
Verbal report given to Susi (name) on Anahi Cain  being transferred to 4th floor medical  
Remote tellemetry (unit) for routine progression of care Report consisted of patients Situation, Background, Assessment and  
Recommendations(SBAR). Information from the following report(s) SBAR, ED Summary, Med Rec Status and Cardiac Rhythm paced  was reviewed with the receiving nurse. Lines:  
Peripheral IV 01/13/19 Right Antecubital (Active) Site Assessment Clean, dry, & intact 1/13/2019  9:29 AM  
Phlebitis Assessment 0 1/13/2019  9:29 AM  
Infiltration Assessment 0 1/13/2019  9:29 AM  
Dressing Status Clean, dry, & intact 1/13/2019  9:29 AM  
Dressing Type Transparent;Tape 1/13/2019  9:29 AM  
Hub Color/Line Status Pink;Flushed 1/13/2019  9:29 AM  
Action Taken Blood drawn 1/13/2019  9:29 AM  
  
 
Opportunity for questions and clarification was provided. Patient transported with: 
 O2 @ 2L/nc liters

## 2019-01-13 NOTE — ED NOTES
RN in to medicate pt. Pt verifies with RN that she sometimes has difficulty swallowing and choking. Percocet held and provider notified.

## 2019-01-13 NOTE — ED NOTES
Up to [facility] to void, having a great deal of pain and able to bear only partial weight on RLE. Has a half dollar size bruise on right trochanter area. Patient reports this is her 4th fall since Jan 1, 2019 and she lives alone.

## 2019-01-13 NOTE — ED NOTES
ED EKG interpretation: 
Rhythm: paced; and regular .  Rate (approx.): 80; Axis: left axis deviation; NSST  
EKG documented by Taras Nicole MD

## 2019-01-13 NOTE — ED PROVIDER NOTES
80 y.o. female with past medical history significant for A-fib (currently on coumadin), COPD, dyslipidemia, GERD presents with complaints of right hip pain and head pain after mechanical GLF last night. The pt was accompanied to the ED by family who explained she has had several falls within the last month. The pt reports hitting her head as a result of the fall. Denies LOC. The pt has been unable to bear weight on the right hip since the fall. Denies any other injury. There are no other acute medical complaints at this time. PCP: MD Moreno Lamb PA-C Past Medical History:  
Diagnosis Date  Arthritis  Atrial fibrillation ([address]   
 av node ablation 5/22/98 with placement of CPI #[id number] dual chamber pacemaker subsequently replaced with a single chamber medtronic #[id number] single chamber pacemaker 7/25/05  Cancer [facility]) [id number]  
 colon cancer w/ resection  COPD  Depression  GERD (gastroesophageal reflux disease)  Hyperlipidemia  Hypertension  Ischemic cardiomyopathy  Migraine headache  Orthostatic hypotension  RADHA (obstructive sleep apnea)  Pacemaker 5/22/98 AV node ablation /pacemaker placement utilizing CPI # [id number] dual chamber system, medtronic #[id number] single chamber device placed 7/22/05  Pulmonary hypertension ([address] 9/26/2011 RVSP 50 on echo 8/14  Thyroid disease  Valvular heart disease   
 mild-mod MR/TR Past Surgical History:  
Procedure Laterality Date  BREAST SURGERY PROCEDURE UNLISTED    
 benign breast tumor excision right breast  
 HX BREAST BIOPSY Right 2002  
 neg; surgical bx  HX COLECTOMY  1974  
 colon  HX KNEE REPLACEMENT    
 right TKA  HX PACEMAKER    
 HX TUBAL LIGATION    
 TOTAL KNEE ARTHROPLASTY    
 total on R, partial on L Family History:  
Problem Relation Age of Onset  Diabetes Mother  Heart Disease Mother  Heart Attack Mother  Heart Disease Father  Stroke Sister  Breast Cancer Sister 80  
 Cancer Maternal Aunt   
     uterus  Diabetes Maternal Uncle  Breast Cancer Daughter 61 Social History Socioeconomic History  Marital status:  Spouse name: Not on file  Number of children: Not on file  Years of education: Not on file  Highest education level: Not on file Social Needs  Financial resource strain: Not on file  Food insecurity - worry: Not on file  Food insecurity - inability: Not on file  Transportation needs - medical: Not on file  Transportation needs - non-medical: Not on file Occupational History  Not on file Tobacco Use  Smoking status: Passive Smoke Exposure - Never Smoker  Smokeless tobacco: Never Used Substance and Sexual Activity  Alcohol use: No  
  Alcohol/week: 0.0 oz  Drug use: No  
 Sexual activity: No  
Other Topics Concern  Not on file Social History Narrative  Not on file ALLERGIES: Cardizem [diltiazem hcl]; Codeine; Darvocet a500 [propoxyphene n-acetaminophen]; Diltiazem; Labetalol; Pcn [penicillins]; Primidone; and Sulfa (sulfonamide antibiotics) Review of Systems Constitutional: Negative for chills, diaphoresis and fever. HENT: Negative for congestion, postnasal drip, rhinorrhea and sore throat. Eyes: Negative for photophobia, discharge, redness and visual disturbance. Respiratory: Negative for cough, chest tightness, shortness of breath and wheezing. Cardiovascular: Negative for chest pain, palpitations and leg swelling. Gastrointestinal: Negative for abdominal distention, abdominal pain, blood in stool, constipation, diarrhea, nausea and vomiting. Genitourinary: Negative for difficulty urinating, dysuria, frequency, hematuria and urgency. Musculoskeletal: Positive for arthralgias. Negative for back pain, joint swelling and myalgias. Skin: Negative for color change and rash. Neurological: Negative for dizziness, speech difficulty, weakness, light-headedness, numbness and headaches. Psychiatric/Behavioral: Negative for confusion. The patient is not nervous/anxious. All other systems reviewed and are negative. Vitals:  
 01/13/19 1400 01/13/19 1430 01/13/19 1500 01/13/19 1530 BP: 146/81 122/68 138/65 131/63 Pulse: 82 86 80 80 Resp:      
Temp:      
SpO2: 99% 100% 99% 100% Weight:      
Height:      
      
 
Physical Exam  
Constitutional: She is oriented to person, place, and time. She appears well-developed and well-nourished. No distress. HENT:  
Head: Normocephalic. Head is without raccoon's eyes, without Mcclure's sign and without laceration. Right Ear: Hearing, tympanic membrane, external ear and ear canal normal. No foreign bodies. Tympanic membrane is not bulging. No hemotympanum. Left Ear: Hearing, tympanic membrane, external ear and ear canal normal. No foreign bodies. Tympanic membrane is not bulging. No hemotympanum. Nose: Nose normal. No mucosal edema or rhinorrhea. Right sinus exhibits no maxillary sinus tenderness and no frontal sinus tenderness. Left sinus exhibits no maxillary sinus tenderness and no frontal sinus tenderness. Mouth/Throat: Uvula is midline, oropharynx is clear and moist and mucous membranes are normal. No tonsillar abscesses. No hemotympanum. Dried blood present from frontal scalp abrasion. No active bleeding noted. Eyes: Conjunctivae and EOM are normal. Pupils are equal, round, and reactive to light. Right eye exhibits no discharge. Left eye exhibits no discharge. Neck: Normal range of motion. Neck supple. Cardiovascular: Normal rate, regular rhythm and normal heart sounds. Exam reveals no gallop and no friction rub. No murmur heard. Regular rate and rhythm. No murmurs, gallops, rubs, or clicks.     
Pulmonary/Chest: Effort normal and breath sounds normal. No respiratory distress. She has no wheezes. She has no rales. No stridor or wheezes. No accessory muscle usage. No nasal flaring. Breath Sounds equal bilaterally. Abdominal: Soft. Bowel sounds are normal. She exhibits no distension. There is no tenderness. There is no rebound and no guarding. No abdominal Bruits. No pulsatile mass. No abdominal scars. Active bowel sounds. Musculoskeletal: She exhibits tenderness. She exhibits no edema or deformity. + TTP over the right hip. No C spine tenderness. Neurological: She is alert and oriented to person, place, and time. Skin: She is not diaphoretic. Nursing note and vitals reviewed. MDM Number of Diagnoses or Management Options Diagnosis management comments: Imaging of right hip did not reveal acute fx. Concern d/t multiple ground level falls and pt is on blood thinner. Pt unable to bear weight in ED. Spoke to hospitalist (Dr. Martin Cisneros) regarding concern of fall risk. Hospitalist will admit to their service. Reviewed treatment plan with attending and they agree. Xavier Jimenez PA-C Procedures

## 2019-01-13 NOTE — ED NOTES
In room to medicate patient. Sats noted to be 86% on RA. Pt resting with eyes closed and awakes to voice. Family reports that she uses O2 at night. Pt placed on 2LO2NC. Pt tolerated well. Sats only increased 89% on 2L so O2 increased to 4L. MD aware and verified okay to give Fentanyl.

## 2019-01-13 NOTE — ED TRIAGE NOTES
915 Pt here with right hip pain after falling last night. Pt unable to weight bear today because of the pain.

## 2019-01-13 NOTE — H&P
[facility] [address], [facility] [address] 
[phone] Admission History and Physical 
 
 
NAME:              Abdon Cheema :   1932 MRN:  [id number] PCP:  Charlee Nazario MD  
 
Date:     2019 Chief  Complaint: Recurrent falls, right hip area pain History Of Presenting Illness: Ms. Zia Garcia is a 80 y.o. female who is being admitted for a persistent right hip pain and frequent falls. Ms. Zia Garcia is a resident at the [facility] and was brought to the Emergency Department today due to recurrent falls and a right hip pain. She says she has fallen about 4 times in last 2 months and this time, she hit her head on a surface with some scalp bruising but no LOC. She sustained some injury to her right hip area and has since been having a continuous moderate aching pain, worse with movement and seems stable with rest. She says it radiated to her knee. She denies any chest pain, palpitations or SOB. Last seen by Dr Andrey houser 6 weeks ago and she was told her devise was working well. Xrays and CT pelvis done have been non revealing. She will be admitted for further management and orthopedic review. Allergies Allergen Reactions  Cardizem [Diltiazem Hcl] Hives  Codeine Nausea and Vomiting  Darvocet A500 [Propoxyphene N-Acetaminophen] Nausea and Vomiting  Diltiazem Hives  Labetalol Nausea and Vomiting Dizzy/Disoriented  Pcn [Penicillins] Swelling Tongue Swelling  Primidone Other (comments) Lightheaded/unsteady gait  Sulfa (Sulfonamide Antibiotics) Nausea and Vomiting Prior to Admission medications Medication Sig Start Date End Date Taking?  Authorizing Provider  
levothyroxine (SYNTHROID) 88 mcg tablet Take 1 Tab by mouth Daily (before breakfast). 1/2/19  Yes Yaima Shipley MD  
raNITIdine (ZANTAC) 150 mg tablet TAKE 1 TABLET BY MOUTH TWICE A DAY 12/3/18  Yes Rona Chauhan MD  
predniSONE (DELTASONE) 5 mg tablet Take 5 mg by mouth every other day. ALTERNATE DAYS 10 MG   Yes Provider, Historical  
ZOLOFT 50 mg tablet TAKE 1 TABLET BY MOUTH EVERY DAY 11/25/18  Yes Rona Chauhan MD  
simvastatin (ZOCOR) 20 mg tablet TAKE 1 TABLET BY MOUTH NIGHTLY 11/23/18  Yes Nina Beltrán MD  
warfarin (COUMADIN) 5 mg tablet TAKE 1 TABLET BY MOUTH DAILY AND 1/2 TABLET ON ZORAN 10/5/18  Yes Jarrod Miller MD  
carvedilol (COREG) 3.125 mg tablet TAKE 1 TABLET BY MOUTH TWICE A DAY WITH MEALS 10/4/18  Yes Jarrod Miller MD  
Cholestyramine, Bulk, powd 4 g by Does Not Apply route daily. 8/10/18  Yes Yaima Shipley MD  
cholecalciferol (VITAMIN D3) 1,000 unit tablet Take  by mouth daily. Yes Provider, Historical  
baclofen (LIORESAL) 10 mg tablet Take 5 mg by mouth two (2) times a day. 11/2/17  Yes Provider, Historical  
DENTA 5000 PLUS 1.1 % crea Apply 1 mg to affected area as needed (to prevent cavities). 8/10/17  Yes Provider, Historical  
desonide (TRIDESILON) 0.05 % cream Apply  to affected area two (2) times a day. 3/23/17  Yes Rona Chauhan MD  
multivitamin (ONE A DAY) tablet Take 1 Tab by mouth daily. Yes Provider, Historical  
OXYGEN-AIR DELIVERY SYSTEMS 2 L by Does Not Apply route nightly. Yes Provider, Historical  
ADVAIR DISKUS 250-50 mcg/dose diskus inhaler Take 1 Puff by inhalation two (2) times a day. 1/12/15  Yes Provider, Historical  
acetaminophen (TYLENOL) 500 mg tablet Take 650 mg by mouth every six (6) hours as needed for Pain. (2) Tablet in am (1) tablet in pm (2) Tablet pm   Yes Rona Chauhan MD  
tiotropium (SPIRIVA WITH HANDIHALER) 18 mcg inhalation capsule Take 1 Cap by inhalation daily.    Yes Provider, Historical  
mupirocin (BACTROBAN) 2 % ointment Apply  to affected area three (3) times daily. Apply to area for 10 days 12/20/18   Garry Becerra MD  
clobetasol (TEMOVATE) 0.05 % ointment Apply  to affected area two (2) times a day. 8/10/18   Glenys Bowers MD  
clobetasol 0.05 % sham Apply thin film to dry scalp once daily 8/10/18   Glenys Bowers MD  
bacitracin zinc (BACITRACIN) ointment Apply  to affected area two (2) times a day. 8/10/18   Glenys Bowers MD  
furosemide (LASIX) 40 mg tablet TAKE 1 TABLET BY MOUTH DAILY. 7/27/18   Erma Sheffield MD  
[id number]  [facility] 90 mcg/actuation inhaler  6/3/18   Provider, Historical  
 
 
Past Medical History:  
Diagnosis Date  Arthritis  Atrial [address] ([facility] [address]   
 av node ablation 5/22/98 with placement of CPI #[id number] dual chamber pacemaker subsequently replaced with a single chamber medtronic #[id number] single chamber pacemaker 7/25/05  Cancer [facility]) [id number]  
 colon cancer w/ resection  COPD  Depression  GERD (gastroesophageal reflux disease)  Hyperlipidemia  Hypertension  Ischemic cardiomyopathy  Migraine headache  Orthostatic hypotension  RADHA (obstructive sleep apnea)  Pacemaker 5/22/98 AV node ablation /pacemaker placement utilizing CPI # [id number] dual chamber system, medtronic #[id number] single chamber device placed 7/22/05  Pulmonary hypertension ([address] 9/26/2011 RVSP 50 on echo 8/14  Thyroid disease  Valvular heart disease   
 mild-mod MR/TR Past Surgical History:  
Procedure Laterality Date  BREAST SURGERY PROCEDURE UNLISTED    
 benign breast tumor excision right breast  
 HX BREAST BIOPSY Right 2002  
 neg; surgical bx  HX COLECTOMY  1974  
 colon  HX KNEE REPLACEMENT    
 right TKA  HX PACEMAKER    
 HX TUBAL LIGATION    
 TOTAL KNEE ARTHROPLASTY    
 total on R, partial on L Social History Tobacco Use  Smoking status: Passive Smoke Exposure - Never Smoker  Smokeless tobacco: Never Used Substance Use Topics  Alcohol use:  No  
 Alcohol/week: 0.0 oz Family History Problem Relation Age of Onset  Diabetes Mother  Heart Disease Mother  Heart Attack Mother  Heart Disease Father  Stroke Sister  Breast Cancer Sister 80  
 Cancer Maternal Aunt   
     uterus  Diabetes Maternal Uncle  Breast Cancer Daughter 61 Review of Systems: 
Constitutional ROS: no fever, chills, rigors or night sweats Respiratory ROS: no cough, sputum, hemoptysis, dyspnea or pleuritic pain. Cardiovascular ROS: no chest pain, palpitations, orthopnea, PND or syncope Endocrine ROS: no polydispsia, polyuria, heat or cold intolerance or major weight change. Gastrointestinal ROS: no dysphagia, abdominal pain, nausea, vomiting or diarrhea Genito-Urinary ROS: no dysuria, frequency, hematuria, retention or flank pain Musculoskeletal ROS: no swelling or muscular tenderness but right hip area discomfort Neurological ROS: no headache, confusion, focal weakness or any other neurological symptoms Psychiatric ROS: no depression, anxiety, mood swings Dermatological ROS: no rash, pruritis, or urticaria Heme-Lymph ROS: no swollen glands, bleeding Examination: 
 
Constitutional:   
Visit Vitals /65 Pulse 80 Temp 98.2 °F (36.8 °C) Resp 18 Ht 5' 4\" (1.626 m) Wt 64.4 kg (142 lb) LMP 02/26/1970 SpO2 99% BMI 24.37 kg/m² General:  Weak and ill looking patient in no acute distress Eyes: Pink conjunctivae, PERRLA with no discharge. Normal eye movements Ear, Nose, Mouth & Throat: No ottorrhea, rhinorrhea, non tender sinuses, dry mucous membranes Respiratory:  No accessory muscle use, clear breath sounds without crackles or wheezes Cardiovascular:  No JVD or murmurs, regular and normal S1, S2 without thrills, bruits or peripheral edema. GI & :  Soft abdomen, non-tender, non-distended, normoactive bowel sounds with no palpable organomegaly Heme:  No cervical or axillary adenopathy. Musculoskeletal:  No cyanosis, clubbing, atrophy or deformities. R pelvic area discomfort with restricted ROM RLE Skin:  No rashes. Scalp bruising Neurological: Awake and alert, speech is clear, CNs 2-12 are grossly intact and otherwise non focal 
Psychiatric:  Has a fair insight and is oriented x 3 
________________________________________________________________________ Data Review: 
 
Labs: 
 
Recent Labs  
  01/13/19 
7027 WBC 8.5 HGB 13.1 HCT 41.1  Recent Labs  
  01/13/19 
7192   
K 4.1  CO2 32 * BUN 26* CREA 1.04* CA 9.0 ALB 3.4* SGOT 23 ALT 25 No components found for: [place] No results for input(s): PH, PCO2, PO2, HCO3, FIO2 in the last 72 hours. Recent Labs  
  01/13/19 
7140 INR 3.6* Radiological Studies: All reviewed Other Medical tests: 
 
Personally reviewed 12 lead EKG - ventricular paced I have also reviewed available old medical records. Assessment & Impression: Ms. Ubaldo Agrawal is a 80 y.o. female being evaluated for:  
 
Principal Problem: 
  Right hip pain (1/13/2019) Active Problems: 
  Mixed hyperlipidemia (10/18/2010) Atrial fibrillation (HCC) () Overview: av node ablation 5/22/98 with placement of CPI #[id number] dual chamber  
    pacemaker subsequently replaced with a single chamber medtronic #[id number]  
    single chamber pacemaker 7/25/05 Anxiety and depression (2/26/2014) Hypothyroid (2/26/2014) CKD (chronic kidney disease) stage 3, GFR 30-59 ml/min ([facility]) (2/9/2015) Cardiomyopathy ([facility] [address] (9/20/2016) Overview: 8/12 normal lexiscan cardiolyte, lvef 68% 8/16 echo lvef 35% multiple wall motion abnormalities, melissa, mod mr, mild  
    ai, mod tr pa pressure 36mm 8/16 lexiscan mod reversible anterior defect, mostly fixed apical defect. lvef 35% Pacemaker (7/24/2017) Frequent falls (1/13/2019) Warfarin-induced coagulopathy ([facility] (1/13/2019) COPD (chronic obstructive pulmonary disease) ([facility] [address] (1/13/2019) Chronic respiratory failure with hypoxia ([facility] [address] (1/13/2019) Plan of management: 
 
Right hip pain (1/13/2019): pain significant. No overt displaced fracture on CT and Xrays. Admit to hospital. Pain control and consult orthopedic surgery. Fall precautions. May need therapy at discharge Atrial fibrillation ([facility] [address]): Overview: av node ablation 5/22/98 with placement of CPI #[id number] dual chamber pacemaker subsequently replaced with a single chamber medtronic #[id number] single chamber pacemaker 7/25/05. Rate controlled. Resume Coreg. Pharmacy to dose Coumadin. Current INR supra-therapeutic Cardiomyopathy ([facility] [address] (9/20/2016): Overview: 8/12 normal lexiscan cardiolyte, lvef 68% 8/16 echo lvef 35% multiple wall motion abnormalities, melissa, mod mr, mild ai, mod tr pa pressure 36 mm 8/16 lexiscan mod reversible anterior defect, mostly fixed apical defect. lvef 35%. Consult Cardiology Frequent falls (1/13/2019): at risk for fractures, bleeding. Consult PT, OT. May need rehab Warfarin-induced coagulopathy ([facility] [address] (1/13/2019): hold Coumadin for now. Monitor INR Anxiety and depression (2/26/2014): resume Zoloft Hypothyroid (2/26/2014): resume levothyroxine CKD (chronic kidney disease) stage 3, GFR 30-59 ml/min ([facility]) (2/9/2015): stable. Monitor COPD (chronic obstructive pulmonary disease) ([facility] [address] (1/13/2019): not wheezing. Nebs as needed Chronic respiratory failure with hypoxia ([facility] [address] (1/13/2019): resume home oxygen and monitor Mixed hyperlipidemia (10/18/2010): resume simvastatin Code Status:  Full Surrogate decision maker: Family Risk of deterioration: high Total time spent for the care of the patient: 79 Minutes Care Plan discussed with: Patient, Family, Nursing Staff and ED physician Discussed:  Code Status, Care Plan and D/C Planning Prophylaxis:  Coumadin 
 
Probable Disposition:  [facility]/Rehab ___________________________________________________ Attending Physician: Mark Grant MD

## 2019-01-14 ENCOUNTER — TELEPHONE (OUTPATIENT)
Dept: INTERNAL MEDICINE CLINIC | Age: 84
End: 2019-01-14

## 2019-01-14 ENCOUNTER — APPOINTMENT (OUTPATIENT)
Dept: GENERAL RADIOLOGY | Age: 84
DRG: 554 | End: 2019-01-14
Attending: FAMILY MEDICINE
Payer: MEDICARE

## 2019-01-14 LAB
ALBUMIN SERPL-MCNC: 3 G/DL (ref 3.5–5)
ALBUMIN/GLOB SERPL: 0.8 {RATIO} (ref 1.1–2.2)
ALP SERPL-CCNC: 73 U/L (ref 45–117)
ALT SERPL-CCNC: 16 U/L (ref 12–78)
ANION GAP SERPL CALC-SCNC: 9 MMOL/L (ref 5–15)
AST SERPL-CCNC: 18 U/L (ref 15–37)
ATRIAL RATE: 75 BPM
BASOPHILS # BLD: 0 K/UL (ref 0–0.1)
BASOPHILS NFR BLD: 0 % (ref 0–1)
BILIRUB SERPL-MCNC: 0.9 MG/DL (ref 0.2–1)
BNP SERPL-MCNC: 4771 PG/ML (ref 0–450)
BUN SERPL-MCNC: 23 MG/DL (ref 6–20)
BUN/CREAT SERPL: 23 (ref 12–20)
CALCIUM SERPL-MCNC: 8.9 MG/DL (ref 8.5–10.1)
CALCULATED R AXIS, ECG10: -65 DEGREES
CALCULATED T AXIS, ECG11: 112 DEGREES
CHLORIDE SERPL-SCNC: 102 MMOL/L (ref 97–108)
CO2 SERPL-SCNC: 27 MMOL/L (ref 21–32)
CREAT SERPL-MCNC: 0.98 MG/DL (ref 0.55–1.02)
DIAGNOSIS, 93000: NORMAL
DIFFERENTIAL METHOD BLD: ABNORMAL
EOSINOPHIL # BLD: 0 K/UL (ref 0–0.4)
EOSINOPHIL NFR BLD: 0 % (ref 0–7)
ERYTHROCYTE [DISTWIDTH] IN BLOOD BY AUTOMATED COUNT: 13.2 % (ref 11.5–14.5)
GLOBULIN SER CALC-MCNC: 3.6 G/DL (ref 2–4)
GLUCOSE SERPL-MCNC: 134 MG/DL (ref 65–100)
HCT VFR BLD AUTO: 40.2 % (ref 35–47)
HGB BLD-MCNC: 12.6 G/DL (ref 11.5–16)
IMM GRANULOCYTES # BLD AUTO: 0.1 K/UL (ref 0–0.04)
IMM GRANULOCYTES NFR BLD AUTO: 1 % (ref 0–0.5)
INR PPP: 3 (ref 0.9–1.1)
LYMPHOCYTES # BLD: 0.7 K/UL (ref 0.8–3.5)
LYMPHOCYTES NFR BLD: 9 % (ref 12–49)
MCH RBC QN AUTO: 29 PG (ref 26–34)
MCHC RBC AUTO-ENTMCNC: 31.3 G/DL (ref 30–36.5)
MCV RBC AUTO: 92.6 FL (ref 80–99)
MONOCYTES # BLD: 0.4 K/UL (ref 0–1)
MONOCYTES NFR BLD: 5 % (ref 5–13)
NEUTS SEG # BLD: 6.6 K/UL (ref 1.8–8)
NEUTS SEG NFR BLD: 85 % (ref 32–75)
NRBC # BLD: 0 K/UL (ref 0–0.01)
NRBC BLD-RTO: 0 PER 100 WBC
PLATELET # BLD AUTO: 157 K/UL (ref 150–400)
PMV BLD AUTO: 11.6 FL (ref 8.9–12.9)
POTASSIUM SERPL-SCNC: 4.9 MMOL/L (ref 3.5–5.1)
PROT SERPL-MCNC: 6.6 G/DL (ref 6.4–8.2)
PROTHROMBIN TIME: 29 SEC (ref 9–11.1)
Q-T INTERVAL, ECG07: 432 MS
QRS DURATION, ECG06: 166 MS
QTC CALCULATION (BEZET), ECG08: 498 MS
RBC # BLD AUTO: 4.34 M/UL (ref 3.8–5.2)
RBC MORPH BLD: ABNORMAL
SODIUM SERPL-SCNC: 138 MMOL/L (ref 136–145)
VENTRICULAR RATE, ECG03: 80 BPM
WBC # BLD AUTO: 7.8 K/UL (ref 3.6–11)

## 2019-01-14 PROCEDURE — 83880 ASSAY OF NATRIURETIC PEPTIDE: CPT

## 2019-01-14 PROCEDURE — 85025 COMPLETE CBC W/AUTO DIFF WBC: CPT

## 2019-01-14 PROCEDURE — 36415 COLL VENOUS BLD VENIPUNCTURE: CPT

## 2019-01-14 PROCEDURE — 92610 EVALUATE SWALLOWING FUNCTION: CPT | Performed by: SPEECH-LANGUAGE PATHOLOGIST

## 2019-01-14 PROCEDURE — 97530 THERAPEUTIC ACTIVITIES: CPT

## 2019-01-14 PROCEDURE — 74011636637 HC RX REV CODE- 636/637: Performed by: INTERNAL MEDICINE

## 2019-01-14 PROCEDURE — 74011250637 HC RX REV CODE- 250/637: Performed by: INTERNAL MEDICINE

## 2019-01-14 PROCEDURE — 94760 N-INVAS EAR/PLS OXIMETRY 1: CPT

## 2019-01-14 PROCEDURE — 65660000000 HC RM CCU STEPDOWN

## 2019-01-14 PROCEDURE — 93880 EXTRACRANIAL BILAT STUDY: CPT

## 2019-01-14 PROCEDURE — 71046 X-RAY EXAM CHEST 2 VIEWS: CPT

## 2019-01-14 PROCEDURE — 97165 OT EVAL LOW COMPLEX 30 MIN: CPT

## 2019-01-14 PROCEDURE — 80053 COMPREHEN METABOLIC PANEL: CPT

## 2019-01-14 PROCEDURE — 97162 PT EVAL MOD COMPLEX 30 MIN: CPT

## 2019-01-14 PROCEDURE — 77010033678 HC OXYGEN DAILY

## 2019-01-14 PROCEDURE — 85610 PROTHROMBIN TIME: CPT

## 2019-01-14 RX ORDER — WARFARIN 2.5 MG/1
2.5 TABLET ORAL EVERY EVENING
Status: COMPLETED | OUTPATIENT
Start: 2019-01-14 | End: 2019-01-14

## 2019-01-14 RX ADMIN — CHOLESTYRAMINE 4 G: 4 POWDER, FOR SUSPENSION ORAL at 06:26

## 2019-01-14 RX ADMIN — FAMOTIDINE 20 MG: 20 TABLET, FILM COATED ORAL at 21:34

## 2019-01-14 RX ADMIN — Medication 10 ML: at 21:34

## 2019-01-14 RX ADMIN — WARFARIN SODIUM 2.5 MG: 2.5 TABLET ORAL at 17:02

## 2019-01-14 RX ADMIN — CARVEDILOL 3.12 MG: 3.12 TABLET, FILM COATED ORAL at 17:02

## 2019-01-14 RX ADMIN — PREDNISONE 5 MG: 5 TABLET ORAL at 08:54

## 2019-01-14 RX ADMIN — SIMVASTATIN 20 MG: 20 TABLET, FILM COATED ORAL at 08:54

## 2019-01-14 RX ADMIN — CARVEDILOL 3.12 MG: 3.12 TABLET, FILM COATED ORAL at 08:54

## 2019-01-14 RX ADMIN — BACLOFEN 5 MG: 10 TABLET ORAL at 08:54

## 2019-01-14 RX ADMIN — VITAMIN D, TAB 1000IU (100/BT) 1000 UNITS: 25 TAB at 08:54

## 2019-01-14 RX ADMIN — SERTRALINE HYDROCHLORIDE 50 MG: 50 TABLET ORAL at 08:54

## 2019-01-14 RX ADMIN — ACETAMINOPHEN 650 MG: 325 TABLET, FILM COATED ORAL at 09:58

## 2019-01-14 RX ADMIN — BACLOFEN 5 MG: 10 TABLET ORAL at 17:01

## 2019-01-14 RX ADMIN — Medication 10 ML: at 06:29

## 2019-01-14 RX ADMIN — LEVOTHYROXINE SODIUM 88 MCG: 88 TABLET ORAL at 07:30

## 2019-01-14 RX ADMIN — LOPERAMIDE HYDROCHLORIDE 2 MG: 1 SOLUTION ORAL at 21:34

## 2019-01-14 NOTE — PROGRESS NOTES
Speech Path Consult received and appreciated. Chart reviewed. SLP session aborted due to a variety of reasons; transport arrival for Radiology, toileting needs and PT/OT visits. Will follow up for full assessment once available. In short, patient with longstanding (1+ years) of dysphagia characterized by \"sticking in the throat. \" Patient has been working with an SLP at [facility] on strategies and reports her swallowing is no worse this admission. Suspect dysphagia may be esophageal in nature especially given h/o GERD. Claudia Swain, MS, CCC-SLP, BCS-S

## 2019-01-14 NOTE — PROGRESS NOTES
Problem: Mobility Impaired (Adult and Pediatric) Goal: *Acute Goals and Plan of Care (Insert Text) Physical Therapy Goals Initiated 1/14/2019 1. Patient will move from supine to sit and sit to supine  in bed with supervision/set-up within 7 day(s). 2.  Patient will transfer from bed to chair and chair to bed with minimal assistance assist using the least restrictive device within 7 day(s). 3.  Patient will perform sit to stand with minimal assistance assist within 7 day(s). 4.  Patient will ambulate with minimal assistance assist for 20 feet with the least restrictive device within 7 day(s). physical Therapy EVALUATION Patient: Abdon Cheema (46 y.o. female) Date: 1/14/2019 Primary Diagnosis: Right hip pain Precautions:   Fall, WBAT 
 
ASSESSMENT : 
Based on the objective data described below, the patient presents with decreased balance, strength, ROM, with decreased Right weight bearing in standing following admission for frequent falls. Patient with most recent falls with injury was 2 weeks ago when she hit her head and required staples at her frontal scalp region. Patient prior to admission lives at the [address] [place] [address] and ambulates with a rollator. Patient has been falling more frequently in the last 2 months. She is vague on history, referring to asking her children for the details. She is alert and oriented x4. New orders acknowledged for O2 monitoring and JOBST compression order received following session (we will attempt at next visit). Patient does indicate she wear supplemental O2 at night, but occasionally during the day,  She wears 2L. Patient today received in supine and requesting to use the restroom. Orthostatics were unable to be obtained due to her urgency and need to use the restroom.   Patient was overall MIN A for bed mobility, MOD A x1 for sit-stand, once in standing very decreased weight bearing on the Right LE leading to decreased LE advancement (reports pin point hip pain at groin area inner thigh region). Patient was able to transfer to sit on bedside commode and the transfer to chair. Patient reported no shortness of breath or dizziness with upright activity. Her O2 sats on room air were 87% with activity, improved to 90% on 2L. Patient would benefit from SNF at discharge. Recommend she not return to her premorbid assistive device of Rollator, due to her fall history. She would most benefit from transition to assisted living following her rehab stay due to her frequent falls and increased medical complexity. Documentation for home O2: 
  
ROOM AIR  
 AT REST 
 O2 SATS 94% HR 
88 ROOM AIR WITH ACTIVITY 02 SATS 87% HR 
82  
(2    ) LITERS OF O2 WITH ACTIVITY O2 SATS 90% HR 
83  
(2    )LITERS OF 02 PATIENT LEFT COMFORTABLY SITTING/SUPINE 02 SATS 93% HR 
84 Patient will benefit from skilled intervention to address the above impairments. Patients rehabilitation potential is considered to be Good Factors which may influence rehabilitation potential include:  
[]         None noted 
[]         Mental ability/status [x]         Medical condition 
[]         Home/family situation and support systems 
[]         Safety awareness 
[]         Pain tolerance/management 
[]         Other: PLAN : 
Recommendations and Planned Interventions: 
[x]           Bed Mobility Training             [x]    Neuromuscular Re-Education 
[x]           Transfer Training                   []    Orthotic/Prosthetic Training 
[x]           Gait Training                         []    Modalities [x]           Therapeutic Exercises           []    Edema Management/Control 
[x]           Therapeutic Activities            [x]    Patient and Family Training/Education 
[]           Other (comment): Frequency/Duration: Patient will be followed by physical therapy  5 times a week to address goals. Discharge Recommendations: Bowen Estrada Further Equipment Recommendations for Discharge: TBD SUBJECTIVE:  
Patient stated it hurts right here.  OBJECTIVE DATA SUMMARY:  
HISTORY:   
Past Medical History:  
Diagnosis Date  Arthritis  Atrial fibrillation ([address]   
 av node ablation 5/22/98 with placement of CPI #[id number] dual chamber pacemaker subsequently replaced with a single chamber medtronic #[id number] single chamber pacemaker 7/25/05  Cancer [facility]) [id number]  
 colon cancer w/ resection  COPD  Depression  GERD (gastroesophageal reflux disease)  Hyperlipidemia  Hypertension  Ischemic cardiomyopathy  Migraine headache  Orthostatic hypotension  RADHA (obstructive sleep apnea)  Pacemaker 5/22/98 AV node ablation /pacemaker placement utilizing CPI # [id number] dual chamber system, medtronic #[id number] single chamber device placed 7/22/05  Pulmonary hypertension ([facility] 9/26/2011 RVSP 50 on echo 8/14  Thyroid disease  Valvular heart disease   
 mild-mod MR/TR Past Surgical History:  
Procedure Laterality Date  BREAST SURGERY PROCEDURE UNLISTED    
 benign breast tumor excision right breast  
 HX BREAST BIOPSY Right 2002  
 neg; surgical bx  HX COLECTOMY  1974  
 colon  HX KNEE REPLACEMENT    
 right TKA  HX PACEMAKER    
 HX TUBAL LIGATION    
 TOTAL KNEE ARTHROPLASTY    
 total on R, partial on L Prior Level of Function/Home Situation: see above Personal factors and/or comorbidities impacting plan of care: see below Home Situation [facility] # Steps to Enter: 0 Rails to Enter: No 
One/Two Story Residence: One story Living Alone: Yes Support Systems: Family member(s) Patient Expects to be Discharged to[de-identified] Skilled nursing facility Current DME Used/Available at Home: vanesa Reynoso EXAMINATION/PRESENTATION/DECISION MAKING: Critical Behavior: 
Neurologic State: Alert Orientation Level: Oriented X4 Cognition: Appropriate decision making, Follows commands, Appropriate for age attention/concentration Safety/Judgement: Awareness of environment, Insight into deficits Hearing: Auditory Auditory Impairment: NoneSkin:  All exposed intact, laceration to scalp Edema: none noted Range Of Motion: 
AROM: Generally decreased, functional 
  
  
  
PROM: Generally decreased, functional 
  
  
  
Strength:   
Strength: Generally decreased, functional 
  
  
RLE Strength 
R Hip Flexion: 2+ 
R Knee Flexion: 3 
R Knee Extension: 3 
R Ankle Dorsiflexion: 3- 
R Ankle Plantar Flexion: 3+ 
  
  
LLE Strength L Hip Flexion: 4- 
L Knee Flexion: 4- 
L Knee Extension: 4- 
L Ankle Dorsiflexion: 4+ 
L Ankle Plantar Flexion: 4+ Tone & Sensation:  
Tone: Normal 
  
  
  
  
Sensation: Intact Coordination: 
Coordination: Generally decreased, functional 
Vision:  
  
Functional Mobility: 
Bed Mobility: 
Rolling: Minimum assistance Supine to Sit: Minimum assistance Transfers: 
Sit to Stand: Moderate assistance;Assist x1;Additional time Stand to Sit: Moderate assistance;Assist x1;Additional time Bed to Chair: Moderate assistance;Assist x1;Additional time Balance:  
Sitting: Intact Standing: Impaired Standing - Static: Constant support Standing - Dynamic : PoorAmbulation/Gait Training:  
  
  
  
  
  
  
  
  
  
  
  
  
  
  
  
   
 Stairs: Therapeutic Exercises:  
 
 
Functional Measure: 
Tinetti test: 
 
Sitting Balance: 1 Arises: 0 Attempts to Rise: 0 Immediate Standing Balance: 1 Standing Balance: 1 Nudged: 0 Eyes Closed: 0 Turn 360 Degrees - Continuous/Discontinuous: 0 Turn 360 Degrees - Steady/Unsteady: 0 Sitting Down: 1 Balance Score: 4 Indication of Gait: 0 
R Step Length/Height: 0 
L Step Length/Height: 0 
R Foot Clearance: 1 L Foot Clearance: 1 Step Symmetry: 0 Step Continuity: 0 Path: 1 Trunk: 0 Walking Time: 1 Gait Score: 4 Total Score: 8 Tinetti Tool Score Risk of Falls 
<19 = High Fall Risk 19-24 = Moderate Fall Risk 25-28 = Low Fall Risk Tinetti ME. Performance-Oriented Assessment of Mobility Problems in Elderly Patients. [address]; [id number]. (Scoring Description: PT Bulletin Feb. 10, 1993) Older adults: Bridger Avelino et al, 2009; n = [address] elderly evaluated with ABC, JOHN, ADL, and IADL) · Mean JOHN score for males aged 69-68 years = 26.21(3.40) · Mean JOHN score for females age 69-68 years = 25.16(4.30) · Mean JOHN score for males over 80 years = 23.29(6.02) · Mean JOHN score for females over 80 years = 17.20(8.32) Physical Therapy Evaluation Charge Determination History Examination Presentation Decision-Making HIGH Complexity :3+ comorbidities / personal factors will impact the outcome/ POC  HIGH Complexity : 4+ Standardized tests and measures addressing body structure, function, activity limitation and / or participation in recreation  MEDIUM Complexity : Evolving with changing characteristics  Other outcome measures Tinetti  HIGH Based on the above components, the patient evaluation is determined to be of the following complexity level: MEDIUM Pain: 
  
  
  
  
  
  
Activity Tolerance:  
Fair- limited by pain Please refer to the flowsheet for vital signs taken during this treatment. After treatment:  
[x]         Patient left in no apparent distress sitting up in chair 
[]         Patient left in no apparent distress in bed 
[x]         Call bell left within reach [x]         Nursing notified 
[]         Caregiver present [x]         Chair alarm activated COMMUNICATION/EDUCATION:  
The patients plan of care was discussed with: Registered Nurse. [x]         Fall prevention education was provided and the patient/caregiver indicated understanding. [x]         Patient/family have participated as able in goal setting and plan of care. [x]         Patient/family agree to work toward stated goals and plan of care. []         Patient understands intent and goals of therapy, but is neutral about his/her participation. []         Patient is unable to participate in goal setting and plan of care. Thank you for this referral. 
Mariusz Cai, PT, DPT Time Calculation: 21 mins

## 2019-01-14 NOTE — CONSULTS
ORTHOPEDIC CONSULT    Subjective:     Date of Consultation:  January 14, 2019    Referring Physician:  Dr. Francesca Barbour is a 80 y.o. female who is being seen for right hip pain. She has a significant past medical history of debility. She has a past medical history of Atrial fibrillation s/p pacemaker and on warfarin, ischemic cardiomyopathy, HLD, CKD, Hypothyroid, COPD with chronic respiratory failure, RADHA, anxiety/depression. She has had persistent worsening weakness and falls over the course of the past 6 months per the patient. Since November she reports no less than 6 falls at home. None of these episodes were proceeded by chest pain, SOB, or palpations. She states that every episode she just \"falls,\" but remembers the entire event. She reports worsening right hip pain since the most recent fall that brought her to the ER. She states that prior to her falls she was having mild right groin pain and worsening left groin pain. She reports that this pain has progressively worsening over the course of the past few months. She complains of a feeling of leg length discrepancy on the left versus the right. She is currently complaining of moderate right groin pain that is worsened with ambulation. She has had xrays and a CT scan all negative for fracture to date. She can not have a MRI due to pacemaker.       Patient Active Problem List    Diagnosis Date Noted    Right hip pain 01/13/2019    Frequent falls 01/13/2019    Warfarin-induced coagulopathy ([address] 01/13/2019    COPD (chronic obstructive pulmonary disease) ([address] 01/13/2019    Chronic respiratory failure with hypoxia ([address] 01/13/2019    Latent tuberculosis by blood test 01/22/2018    Recurrent depression ([address] 01/08/2018    Pacemaker 07/24/2017    Advanced care planning/counseling discussion 05/11/2017    Psoriasis 03/23/2017    Pseudophakia 11/14/2016    Cardiomyopathy ([address] 09/20/2016    Abnormal stress test 09/13/2016    Ischemic cardiomyopathy     Chest pain 08/10/2016    Nuclear cataract 06/06/2016    Posterior vitreous detachment 06/06/2016    Restrictive lung disease 12/14/2015    Osteopenia 11/23/2015    Hypothyroidism due to acquired atrophy of thyroid 11/06/2015    CKD (chronic kidney disease) stage 3, GFR 30-59 ml/min ([facility]) 02/09/2015    Anxiety and depression 02/26/2014    Colon cancer ([facility] 02/26/2014    Hypothyroid 02/26/2014    Pulmonary hypertension ([facility] 09/26/2011    Mitral regurgitation 10/21/2010    CHB (complete heart block) ([facility] 10/21/2010    Orthostatic hypotension 10/21/2010    Chest pain, unspecified 10/21/2010    Atrial fibrillation ([address]     Mixed hyperlipidemia 10/18/2010     Family History   Problem Relation Age of Onset    Diabetes Mother     Heart Disease Mother     Heart Attack Mother     Heart Disease Father     Stroke Sister     Breast Cancer Sister 80    Cancer Maternal Aunt         uterus    Diabetes Maternal Uncle     Breast Cancer Daughter 61      Social History     Tobacco Use    Smoking status: Passive Smoke Exposure - Never Smoker    Smokeless tobacco: Never Used   Substance Use Topics    Alcohol use: No     Alcohol/week: 0.0 oz     Past Medical History:   Diagnosis Date    Arthritis     Atrial fibrillation ([address]     av node ablation 5/22/98 with placement of CPI #[id number] dual chamber pacemaker subsequently replaced with a single chamber medtronic #[id number] single chamber pacemaker 7/25/05    Cancer ([address] 1970's    colon cancer w/ resection    COPD     Depression     GERD (gastroesophageal reflux disease)     Hyperlipidemia     Hypertension     Ischemic cardiomyopathy     Migraine headache     Orthostatic hypotension     RADHA (obstructive sleep apnea)     Pacemaker 5/22/98    AV node ablation /pacemaker placement utilizing CPI # [id number] dual chamber system, medtronic #[id number] single chamber device placed 7/22/05    Pulmonary hypertension ([address] 9/26/2011    RVSP 50 on echo 8/14    Thyroid disease     Valvular heart disease     mild-mod MR/TR      Past Surgical History:   Procedure Laterality Date    BREAST SURGERY PROCEDURE UNLISTED      benign breast tumor excision right breast    HX BREAST BIOPSY Right 2002    neg; surgical bx    HX COLECTOMY  1974    colon    HX KNEE REPLACEMENT      right TKA    HX PACEMAKER      HX TUBAL LIGATION      TOTAL KNEE ARTHROPLASTY      total on R, partial on L      Prior to Admission medications    Medication Sig Start Date End Date Taking? Authorizing Provider   predniSONE (DELTASONE) 5 mg tablet Take 5 mg by mouth every other day. Yes Provider, Historical   predniSONE (DELTASONE) 5 mg tablet Take 10 mg by mouth every other day. Yes Provider, Historical   albuterol (PROVENTIL HFA, VENTOLIN HFA, PROAIR HFA) 90 mcg/actuation inhaler Take 1 Puff by inhalation every six (6) hours as needed for Wheezing. Yes Provider, Historical   loperamide (IMODIUM) 1 mg/5 mL solution Take 2 mg by mouth nightly. Yes Provider, Historical   warfarin (COUMADIN) 5 mg tablet Take 5 mg by mouth five (5) days a week. Take 5 mg on Tuesday, Thursday, Friday, Saturday, and Sunday   Yes Provider, Historical   warfarin (COUMADIN) 5 mg tablet Take 2.5 mg by mouth two (2) days a week. Take 2.5 mg on Mondays and Wednesdays   Yes Provider, Historical   levothyroxine (SYNTHROID) 88 mcg tablet Take 1 Tab by mouth Daily (before breakfast). 1/2/19  Yes Rosco Gitelman, MD   raNITIdine (ZANTAC) 150 mg tablet TAKE 1 TABLET BY MOUTH TWICE A DAY 12/3/18  Yes Malik Rabago MD   ZOLOFT 50 mg tablet TAKE 1 TABLET BY MOUTH EVERY DAY 11/25/18  Yes Malik Rabago MD   simvastatin (ZOCOR) 20 mg tablet TAKE 1 TABLET BY MOUTH NIGHTLY 11/23/18  Yes Jackie Browne MD   carvedilol (COREG) 3.125 mg tablet TAKE 1 TABLET BY MOUTH TWICE A DAY WITH MEALS 10/4/18  Yes Mimi Ly MD   Cholestyramine, Bulk, powd 4 g by Does Not Apply route daily.  8/10/18  Yes Josiane Garth Sampson MD   cholecalciferol (VITAMIN D3) 1,000 unit tablet Take 1,000 Units by mouth daily. Yes Provider, Historical   baclofen (LIORESAL) 10 mg tablet Take 5 mg by mouth two (2) times a day. 11/2/17  Yes Provider, Historical   DENTA 5000 PLUS 1.1 % crea Apply 1 mg to affected area as needed (to prevent cavities). 8/10/17  Yes Provider, Historical   desonide (TRIDESILON) 0.05 % cream Apply  to affected area two (2) times a day. 3/23/17  Yes Arpita Gordon MD   multivitamin (ONE A DAY) tablet Take 1 Tab by mouth daily. Yes Provider, Historical   ADVAIR DISKUS 250-50 mcg/dose diskus inhaler Take 1 Puff by inhalation two (2) times a day. 1/12/15  Yes Provider, Historical   acetaminophen (TYLENOL) 500 mg tablet Take 650 mg by mouth every six (6) hours as needed for Pain. (2) Tablet in am (1) tablet in pm (2) Tablet pm   Yes Arpita Gordon MD   tiotropium (SPIRIVA WITH HANDIHALER) 18 mcg inhalation capsule Take 1 Cap by inhalation daily.    Yes Provider, Historical     Current Facility-Administered Medications   Medication Dose Route Frequency    sodium chloride (NS) flush 5-40 mL  5-40 mL IntraVENous Q8H    sodium chloride (NS) flush 5-40 mL  5-40 mL IntraVENous PRN    acetaminophen (TYLENOL) tablet 650 mg  650 mg Oral Q4H PRN    naloxone (NARCAN) injection 0.4 mg  0.4 mg IntraVENous PRN    ondansetron (ZOFRAN) injection 4 mg  4 mg IntraVENous Q4H PRN    Warfarin- Pharmacist Dosing   Other Rx Dosing/Monitoring    cholestyramine-aspartame (QUESTRAN LIGHT) packet 4 g  4 g Oral 7am    sertraline (ZOLOFT) tablet 50 mg  50 mg Oral DAILY    baclofen (LIORESAL) tablet 5 mg  5 mg Oral BID    carvedilol (COREG) tablet 3.125 mg  3.125 mg Oral BID WITH MEALS    cholecalciferol (VITAMIN D3) tablet 1,000 Units  1,000 Units Oral DAILY    levothyroxine (SYNTHROID) tablet 88 mcg  88 mcg Oral ACB    loperamide (IMODIUM) 1 mg/5 mL oral solution 2 mg  2 mg Oral QHS    predniSONE (DELTASONE) tablet 5 mg  5 mg Oral EVERY OTHER DAY    predniSONE (DELTASONE) tablet 10 mg  10 mg Oral EVERY OTHER DAY    famotidine (PEPCID) tablet 20 mg  20 mg Oral QHS    simvastatin (ZOCOR) tablet 20 mg  20 mg Oral DAILY    albuterol-ipratropium (DUO-NEB) 2.5 MG-0.5 MG/3 ML  3 mL Nebulization Q6H PRN     Allergies   Allergen Reactions    Cardizem [Diltiazem Hcl] Hives    Codeine Nausea and Vomiting    Darvocet A500 [Propoxyphene N-Acetaminophen] Nausea and Vomiting    Diltiazem Hives    Labetalol Nausea and Vomiting     Dizzy/Disoriented     Pcn [Penicillins] Swelling     Tongue Swelling   Has previously tolerated cephalexin per daughter    Primidone Other (comments)     Lightheaded/unsteady gait    Sulfa (Sulfonamide Antibiotics) Nausea and Vomiting        Review of Systems:  Pertinent items are noted in HPI. Objective:     Patient Vitals for the past 8 hrs:   BP Temp Pulse Resp SpO2   19 0741 109/59 97.7 °F (36.5 °C) 82 18 93 %   19 0702   80       Temp (24hrs), Av.1 °F (36.7 °C), Min:97.6 °F (36.4 °C), Max:98.5 °F (36.9 °C)        EXAM: GEN: Well appearing female in NAD  PSYCH:  AAO x 3  MUSC: Right and left legs with mild vascular changes distally. There is shortening of the left leg in comparison to the right by .Francenia Ahumada. There are no areas of ecchymosis or erythema about the right hip. Log roll of the right hip does not reproduce pain. I had difficulty flexing her right hip. ER to 30/IR to 15. Moderate pain with IR and ER of the right hip. BCR distally. +DP pulse. DF and PF 5/5. IMAGING:  IMPRESSION:   1. Nonspecific fat stranding noted lateral to the right proximal femur and right  hip and slight ill-definition of the soft tissue planes. Findings could be  related to trauma. Infection cannot be excluded on this unenhanced study. No  fracture. Clinical correlation is warranted.     2. Moderate right and severe left degenerative changes in the hips.     Data Review   Recent Results (from the past 24 hour(s))   URINALYSIS W/MICROSCOPIC    Collection Time: 01/13/19  1:40 PM   Result Value Ref Range    Color YELLOW/STRAW      Appearance CLEAR CLEAR      Specific gravity 1.022 1.003 - 1.030      pH (UA) 5.5 5.0 - 8.0      Protein NEGATIVE  NEG mg/dL    Glucose NEGATIVE  NEG mg/dL    Ketone NEGATIVE  NEG mg/dL    Bilirubin NEGATIVE  NEG      Blood TRACE (A) NEG      Urobilinogen 0.2 0.2 - 1.0 EU/dL    Nitrites NEGATIVE  NEG      Leukocyte Esterase SMALL (A) NEG      WBC 10-20 0 - 4 /hpf    RBC 0-5 0 - 5 /hpf    Epithelial cells FEW FEW /lpf    Bacteria NEGATIVE  NEG /hpf    Hyaline cast 0-2 0 - 5 /lpf   URINE CULTURE HOLD SAMPLE    Collection Time: 01/13/19  1:40 PM   Result Value Ref Range    Urine culture hold        URINE ON HOLD IN MICROBIOLOGY DEPT FOR 3 DAYS. IF UNPRESERVED URINE IS SUBMITTED, IT CANNOT BE USED FOR ADDITIONAL TESTING AFTER 24 HRS, RECOLLECTION WILL BE REQUIRED. METABOLIC PANEL, COMPREHENSIVE    Collection Time: 01/14/19  6:17 AM   Result Value Ref Range    Sodium 138 136 - 145 mmol/L    Potassium 4.9 3.5 - 5.1 mmol/L    Chloride 102 97 - 108 mmol/L    CO2 27 21 - 32 mmol/L    Anion gap 9 5 - 15 mmol/L    Glucose 134 (H) 65 - 100 mg/dL    BUN 23 (H) 6 - 20 MG/DL    Creatinine 0.98 0.55 - 1.02 MG/DL    BUN/Creatinine ratio 23 (H) 12 - 20      GFR est AA >60 >60 ml/min/1.73m2    GFR est non-AA 54 (L) >60 ml/min/1.73m2    Calcium 8.9 8.5 - 10.1 MG/DL    Bilirubin, total 0.9 0.2 - 1.0 MG/DL    ALT (SGPT) 16 12 - 78 U/L    AST (SGOT) 18 15 - 37 U/L    Alk.  phosphatase 73 45 - 117 U/L    Protein, total 6.6 6.4 - 8.2 g/dL    Albumin 3.0 (L) 3.5 - 5.0 g/dL    Globulin 3.6 2.0 - 4.0 g/dL    A-G Ratio 0.8 (L) 1.1 - 2.2     CBC WITH AUTOMATED DIFF    Collection Time: 01/14/19  6:17 AM   Result Value Ref Range    WBC 7.8 3.6 - 11.0 K/uL    RBC 4.34 3.80 - 5.20 M/uL    HGB 12.6 11.5 - 16.0 g/dL    HCT 40.2 35.0 - 47.0 %    MCV 92.6 80.0 - 99.0 FL    MCH 29.0 26.0 - 34.0 PG    MCHC 31.3 30.0 - 36.5 g/dL RDW 13.2 11.5 - 14.5 %    PLATELET 108 650 - 321 K/uL    MPV 11.6 8.9 - 12.9 FL    NRBC 0.0 0  WBC    ABSOLUTE NRBC 0.00 0.00 - 0.01 K/uL    NEUTROPHILS 85 (H) 32 - 75 %    LYMPHOCYTES 9 (L) 12 - 49 %    MONOCYTES 5 5 - 13 %    EOSINOPHILS 0 0 - 7 %    BASOPHILS 0 0 - 1 %    IMMATURE GRANULOCYTES 1 (H) 0.0 - 0.5 %    ABS. NEUTROPHILS 6.6 1.8 - 8.0 K/UL    ABS. LYMPHOCYTES 0.7 (L) 0.8 - 3.5 K/UL    ABS. MONOCYTES 0.4 0.0 - 1.0 K/UL    ABS. EOSINOPHILS 0.0 0.0 - 0.4 K/UL    ABS. BASOPHILS 0.0 0.0 - 0.1 K/UL    ABS. IMM. GRANS. 0.1 (H) 0.00 - 0.04 K/UL    DF SMEAR SCANNED      RBC COMMENTS NORMOCYTIC, NORMOCHROMIC     PROTHROMBIN TIME + INR    Collection Time: 01/14/19  6:17 AM   Result Value Ref Range    INR 3.0 (H) 0.9 - 1.1      Prothrombin time 29.0 (H) 9.0 - 11.1 sec         Assessment/Plan:   A: 1. Bilateral hip osteoarthritis, left greater than right  2. Right hip hematoma  3. Debility  4. Frequent falls    P: 1. Hips:  Ideally we would pursue a MRI of her right hip to exclude and occult femoral neck fracture, but she can not have one due to her pacemaker. She has severe degenerative changes in her left hip causing the leg length discrepancy. She needs bilateral BILL, but obviously her medical state would not allow this and she would likely due poorly from this without significant preoperative therapy. At this point I would recommend PT/OT to see her and ambulate as tolerated. If she has worsening debility we would be forced to repeat imaging to evaluate for changes from initial xrays/ct of the femoral neck. Certainly a hip injection can be considered, but this carries risk in a patient we can not fully exclude fracture from. We will continue to follow at this point and repeat imaging as necessary. Discussed case with Dr. Arlette Sparks who agrees with plan.       Charla Atkins, [place]   [facility]  [address] [facility]

## 2019-01-14 NOTE — PROCEDURES
[address]  *** FINAL REPORT ***    Name: Alfredito Mackenzie  MRN: [id number]    Inpatient  : 1932  HIS Order #: [id number]  [address] [facility] Visit #: [id number]  Date: 2019    TYPE OF TEST: Cerebrovascular Duplex    REASON FOR TEST  Frequent Falls    Right Carotid:-             Proximal               Mid                 Distal  cm/s  Systolic  Diastolic  Systolic  Diastolic  Systolic  Diastolic  CCA:    660.5      16.3                           125.1      14.4  Bulb:  ECA:     78.4       0.0  ICA:     74.6      20.2       92.7      22.8       43.7      12.7  ICA/CCA:  0.6       1.4    ICA Stenosis: <50%    Right Vertebral:-  Finding: Antegrade  Sys:       50.1  Brittney:        9.5    Right Subclavian:    Left Carotid:-            Proximal                Mid                 Distal  cm/s  Systolic  Diastolic  Systolic  Diastolic  Systolic  Diastolic  CCA:    194.5       9.8                            66.6      12.8  Bulb:  ECA:     87.5       1.8  ICA:    113.5      26.8      101.1      20.4       80.1      20.3  ICA/CCA:  1.7       2.1    ICA Stenosis: <50%    Left Vertebral:-  Finding: Antegrade  Sys:       82.3  Brittney:       15.0    Left Subclavian:    INTERPRETATION/FINDINGS  PROCEDURE:  Evaluation of the extracranial cerebrovascular arteries  with ultrasound (B-mode imaging, pulsed Doppler, color Doppler). Includes the common carotid, internal carotid, external carotid, and  vertebral arteries. FINDINGS:  Plaque Morphology:  Calcific plaque noted in the bilateral bulb, proximal ECA, and ICA  with no hemodynamic changes. IMPRESSION: Findings are consistent with 0-49% stenosis of the right  internal carotid and 0-49% stenosis of the left internal carotid. Vertebrals are patent with antegrade flow. ADDITIONAL COMMENTS    I have personally reviewed the data relevant to the interpretation of  this  study. TECHNOLOGIST: Addy Atwood.  Caden  Signed: 2019 03:04 PM    PHYSICIAN: Queen Yuridia Chayo Griffiths MD  Signed: 01/15/2019 09:42 AM

## 2019-01-14 NOTE — PROGRESS NOTES
[facility] Dosing Services: 1/14/19 Consult for Warfarin Dosing by Pharmacy by Dr. Marilin No Consult provided for this 80 y.o.  female , for indication of Atrial Fibrillation. Day of Therapy :continuation - home dose: 5 mg TTFSS 2.5 mg MW Dose to achieve an INR goal of 2-3 Order entered for  Warfarin  2.5 mg (mg) ordered to be given today at 18:00. Significant drug interactions:  
Previous dose given 1/13 - held INR 3.6 
1/12 - 5 mg reported taken home dose PT/INR Lab Results Component Value Date/Time INR 3.0 (H) 01/14/2019 06:17 AM  
  
Platelets Lab Results Component Value Date/Time PLATELET 277 57/83/5488 06:17 AM  
  
H/H Lab Results Component Value Date/Time HGB 12.6 01/14/2019 06:17 AM  
  
 
Pharmacy to follow daily and will provide subsequent Warfarin dosing based on clinical status. Dutch Haines)  Contact information [phone]

## 2019-01-14 NOTE — PROGRESS NOTES
Patient voiced recent difficulty with swallowing food and medications. Dr. Adithya Kennedy aware. Speech to consult. OK for regular diet for now per Dr. Adithya Kennedy.

## 2019-01-14 NOTE — PROGRESS NOTES
Problem: Self Care Deficits Care Plan (Adult) Goal: *Acute Goals and Plan of Care (Insert Text) Occupational Therapy Goals Initiated 1/14/2019 1. Patient will perform lower body dressing with supervision/set-up within 7 day(s) using AE. 2.  Patient will perform bathing with supervision/set-up within 7 day(s). 3.  Patient will perform toilet transfers with supervision/set-up within 7 day(s). 4.  Patient will perform all aspects of toileting with supervision/set-up within 7 day(s). Occupational Therapy EVALUATION Patient: Arlin Otto (14 y.o. female) Date: 1/14/2019 Primary Diagnosis: Right hip pain Precautions:   Fall, WBAT 
 
ASSESSMENT : 
Based on the objective data described below, the patient presents with past medical history of atrial fibrillation s/p pacemaker and on warfarin, ischemic cardiomyopathy, HLD, CKD, Hypothyroid, COPD with chronic respiratory failure, RADHA, anxiety/depression. She has had persistent worsening weakness and increase falls over the course of the past 6 month . Per pt, no symptoms of dizziness, headaches or SOB prior to her falls. She reports worsening right hip pain since the most recent fall that brought her to the ER. PTA, patient resides at independent living at [facility]. Pt was able to access dinning room using RW and perform her own self-care. Today, pt presents at min assist to mod assist level with self-care and mobility. Limited by pain in R groin and having difficulty bearing wt through R LE. Feel pt would benefit from SNF at her facility so they can re-assess living situation 2/2 increase falls in her apt. Will con't to follow and address listed goals. Patient will benefit from skilled intervention to address the above impairments. Patients rehabilitation potential is considered to be Good Factors which may influence rehabilitation potential include:  
[]             None noted []             Mental ability/status [x]             Medical condition []             Home/family situation and support systems []             Safety awareness []             Pain tolerance/management 
[]             Other: PLAN : 
Recommendations and Planned Interventions: 
[x]               Self Care Training                  [x]        Therapeutic Activities [x]               Functional Mobility Training    []        Cognitive Retraining 
[]               Therapeutic Exercises           [x]        Endurance Activities [x]               Balance Training                   []        Neuromuscular Re-Education []               Visual/Perceptual Training     [x]   Home Safety Training 
[x]               Patient Education                 [x]        Family Training/Education []               Other (comment): Frequency/Duration: Patient will be followed by occupational therapy 5 times a week to address goals. Discharge Recommendations: Bowen Estrada Further Equipment Recommendations for Discharge: TBD SUBJECTIVE:  
Patient stated It hurts to put weight through it.  OBJECTIVE DATA SUMMARY:  
HISTORY:  
Past Medical History:  
Diagnosis Date  Arthritis  Atrial fibrillation ([facility] [address]   
 av node ablation 5/22/98 with placement of CPI #[id number] dual chamber pacemaker subsequently replaced with a single chamber medtronic #[id number] single chamber pacemaker 7/25/05  Cancer [facility]) [id number]  
 colon cancer w/ resection  COPD  Depression  GERD (gastroesophageal reflux disease)  Hyperlipidemia  Hypertension  Ischemic cardiomyopathy  Migraine headache  Orthostatic hypotension  RADHA (obstructive sleep apnea)  Pacemaker 5/22/98 AV node ablation /pacemaker placement utilizing CPI # [id number] dual chamber system, medtronic #[id number] single chamber device placed 7/22/05  Pulmonary hypertension ([facility] [address] 9/26/2011 RVSP 50 on echo 8/14  Thyroid disease  Valvular heart disease   
 mild-mod MR/TR  
 
 Past Surgical History:  
Procedure Laterality Date  BREAST SURGERY PROCEDURE UNLISTED    
 benign breast tumor excision right breast  
 HX BREAST BIOPSY Right 2002  
 neg; surgical bx  HX COLECTOMY  1974  
 colon  HX KNEE REPLACEMENT    
 right TKA  HX PACEMAKER    
 HX TUBAL LIGATION    
 TOTAL KNEE ARTHROPLASTY    
 total on R, partial on L Prior Level of Function/Environment/Context: mod I with h/o frequent falls Occupations in which the patient is/was successful, what are the barriers preventing that success:  
Performance Patterns (routines, roles, habits, and rituals):  
Personal Interests and/or values:  
Expanded or extensive additional review of patient history:  
 
Home Situation Home Environment: Independent living([facility]) # Steps to Enter: 0 Rails to Enter: No 
One/Two Story Residence: One story Living Alone: Yes Support Systems: Family member(s) Patient Expects to be Discharged to[de-identified] Skilled nursing facility Current DME Used/Available at Home: vanesa Dorsey EXAMINATION OF PERFORMANCE DEFICITS: 
Cognitive/Behavioral Status: 
Neurologic State: Alert Orientation Level: Oriented X4 Cognition: Appropriate for age attention/concentration Perception: Appears intact Perseveration: No perseveration noted Safety/Judgement: Awareness of environment; Insight into deficits Skin: intact Edema: none noted Hearing: Auditory Auditory Impairment: None Vision/Perceptual:   
    
    
    
  
    
    
  
Range of Motion: 
 
AROM: Generally decreased, functional 
PROM: Generally decreased, functional 
  
  
  
  
  
  
Strength: 
 
Strength: Generally decreased, functional 
  
  
  
  
Coordination: 
Coordination: Generally decreased, functional 
Fine Motor Skills-Upper: Left Intact; Right Intact Gross Motor Skills-Upper: Left Intact; Right Intact Tone & Sensation: 
 
Tone: Normal 
Sensation: Intact Balance: 
Sitting: Intact Standing: Impaired Standing - Static: Constant support Standing - Dynamic : Poor Functional Mobility and Transfers for ADLs:Bed Mobility: 
Rolling: Minimum assistance Supine to Sit: Minimum assistance Transfers: 
Sit to Stand: Moderate assistance;Assist x1;Additional time Stand to Sit: Moderate assistance;Assist x1;Additional time Bed to Chair: Moderate assistance;Assist x1;Additional time ADL Assessment: 
Feeding: Setup Oral Facial Hygiene/Grooming: Setup Bathing: Moderate assistance Upper Body Dressing: Supervision Lower Body Dressing: Moderate assistance Toileting: Moderate assistance ADL Intervention and task modifications: 
  
 
  
 
  
 
Cognitive Retraining Safety/Judgement: Awareness of environment; Insight into deficits Functional Measure: 
Barthel Index: 
 
Bathin Bladder: 10 Bowels: 10 
Groomin Dressin Feeding: 10 Mobility: 0 Stairs: 0 Toilet Use: 5 Transfer (Bed to Chair and Back): 10 Total: 55 The Barthel ADL Index: Guidelines 1. The index should be used as a record of what a patient does, not as a record of what a patient could do. 2. The main aim is to establish degree of independence from any help, physical or verbal, however minor and for whatever reason. 3. The need for supervision renders the patient not independent. 4. A patient's performance should be established using the best available evidence. Asking the patient, friends/relatives and nurses are the usual sources, but direct observation and common sense are also important. However direct testing is not needed. 5. Usually the patient's performance over the preceding 24-48 hours is important, but occasionally longer periods will be relevant. 6. Middle categories imply that the patient supplies over 50 per cent of the effort. 7. Use of aids to be independent is allowed. Marina Mejias., Barthel, D.W. (2370).  Functional evaluation: the Barthel Index. [facility] (14)2. RADHA Bundy, King Hollis, Blessing Guajardo., Tristen, [address] (1999). Measuring the change indisability after inpatient rehabilitation; comparison of the responsiveness of the Barthel Index and Functional [place] Measure. Journal of Neurology, Neurosurgery, and Psychiatry, 66(4), 435-525. CHANELLE Palomino, SARA Rodriguez, & Natalee Hess M.A. (2004.) Assessment of post-stroke quality of life in cost-effectiveness studies: The usefulness of the Barthel Index and the EuroQoL-5D. [facility], 38, 791-16 Occupational Therapy Evaluation Charge Determination History Examination Decision-Making LOW Complexity : Brief history review  LOW Complexity : 1-3 performance deficits relating to physical, cognitive , or psychosocial skils that result in activity limitations and / or participation restrictions  LOW Complexity : No comorbidities that affect functional and no verbal or physical assistance needed to complete eval tasks Based on the above components, the patient evaluation is determined to be of the following complexity level: LOW Pain: 
  
  
  
  
  
  
Activity Tolerance:  
Good Please refer to the flowsheet for vital signs taken during this treatment. After treatment:  
[x] Patient left in no apparent distress sitting up in chair 
[] Patient left in no apparent distress in bed 
[x] Call bell left within reach [x] Nursing notified 
[] Caregiver present [x] Chair alarm activated COMMUNICATION/EDUCATION:  
The patients plan of care was discussed with: Physical Therapist. 
[x] Home safety education was provided and the patient/caregiver indicated understanding. [x] Patient/family have participated as able in goal setting and plan of care. [x] Patient/family agree to work toward stated goals and plan of care. [] Patient understands intent and goals of therapy, but is neutral about his/her participation. [] Patient is unable to participate in goal setting and plan of care. This patients plan of care is appropriate for delegation to RL. Thank you for this referral. 
Nedra Canavan, OTR/L Time Calculation: 17 mins

## 2019-01-14 NOTE — PROGRESS NOTES
Nate Whitaker ian [address] 
[address] [facility], [address] [place] [address], [address] [place] [address] 
[phone] Medical Progress Note NAME: Lior Barrios :  1932 MRM:  [id number] Date/Time: 2019  10:06 AM 
 
  
Assessment and Plan: 1. Right hip pain (2019)/ Frequent falls: pain significant. No overt displaced fracture on CT and Xrays. Pain control and consult orthopedic surgery. Fall precautions. PT/OT evaluation.   
  
2. Atrial fibrillation ([facility] [address]): Overview: av node ablation 98 with placement of CPI #[id number] dual chamber pacemaker subsequently replaced with a single chamber medtronic #[id number] single chamber pacemaker 05. Rate controlled. Resume Coreg. Risk and benefit of anti coagulation was discussed.  
  
3.  Cardiomyopathy ([facility] [address] (2016): evaluated by cardiology 
  
4. Anxiety and depression (2014): resume Zoloft 
  
5. Hypothyroid (2014): resume levothyroxine 
  
6. CKD (chronic kidney disease) stage 3, GFR 30-59 ml/min ([facility]) (2015): stable. Monitor 
  
7. COPD (chronic obstructive pulmonary disease) ([facility] [address] (2019): not wheezing. Nebs as needed. Stable.  
  
8. Chronic respiratory failure with hypoxia ([facility] [address] (2019): on oxygen at home.  
  
9. Mixed hyperlipidemia (10/18/2010): On simvastatin 
  
Code Status:  DNR Subjective: Chief Complaint:  Follow up of pt who was admitted with hip pain after a fall. C/o hip pain on movement ROS: 
(bold if positive, if negative) Tolerating PT  Tolerating Diet Objective:  
 
Last 24hrs VS reviewed since prior progress note. Most recent are: 
 
Visit Vitals /59 (BP 1 Location: Left arm, BP Patient Position: At rest) Pulse 82 Temp 97.7 °F (36.5 °C) Resp 18 Ht 5' 4\" (1.626 m) Wt 64.4 kg (142 lb) SpO2 93% BMI 24.37 kg/m² SpO2 Readings from Last 6 Encounters:  
19 93% 19 95% 18 96% 18 93% 18 92% 18 90% O2 Flow Rate (L/min): 2 l/min No intake or output data in the 24 hours ending 01/14/19 1006 Physical Exam: 
 
Gen:  Well-developed, well-nourished, in no acute distress HEENT:  Pink conjunctivae, PERRL, hearing intact to voice, moist mucous membranes Neck:  Supple, without masses, thyroid non-tender Resp:  No accessory muscle use, clear breath sounds without wheezes rales or rhonchi 
Card:  No murmurs, normal S1, S2 without thrills, bruits or peripheral edema Abd:  Soft, non-tender, non-distended, normoactive bowel sounds are present, no palpable organomegaly and no detectable hernias Lymph:  No cervical or inguinal adenopathy Musc:  No cyanosis or clubbing Skin:  No rashes or ulcers, skin turgor is good Neuro:  Cranial nerves are grossly intact, no focal motor weakness, follows commands appropriately Psych:  Good insight, oriented to person, place and time, alert 
__________________________________________________________________ Medications Reviewed: (see below) Medications:  
 
Current Facility-Administered Medications Medication Dose Route Frequency  sodium chloride (NS) flush 5-40 mL  5-40 mL IntraVENous Q8H  
 sodium chloride (NS) flush 5-40 mL  5-40 mL IntraVENous PRN  
 acetaminophen (TYLENOL) tablet 650 mg  650 mg Oral Q4H PRN  
 naloxone (NARCAN) injection 0.4 mg  0.4 mg IntraVENous PRN  
 ondansetron (ZOFRAN) injection 4 mg  4 mg IntraVENous Q4H PRN  
 Warfarin- Pharmacist Dosing   Other Rx Dosing/Monitoring  cholestyramine-aspartame (QUESTRAN LIGHT) packet 4 g  4 g Oral 7am  
 sertraline (ZOLOFT) tablet 50 mg  50 mg Oral DAILY  baclofen (LIORESAL) tablet 5 mg  5 mg Oral BID  carvedilol (COREG) tablet 3.125 mg  3.125 mg Oral BID WITH MEALS  cholecalciferol (VITAMIN D3) tablet 1,000 Units  1,000 Units Oral DAILY  levothyroxine (SYNTHROID) tablet 88 mcg  88 mcg Oral ACB  loperamide (IMODIUM) 1 mg/5 mL oral solution 2 mg  2 mg Oral QHS  predniSONE (DELTASONE) tablet 5 mg  5 mg Oral EVERY OTHER DAY  predniSONE (DELTASONE) tablet 10 mg  10 mg Oral EVERY OTHER DAY  famotidine (PEPCID) tablet 20 mg  20 mg Oral QHS  simvastatin (ZOCOR) tablet 20 mg  20 mg Oral DAILY  albuterol-ipratropium (DUO-NEB) 2.5 MG-0.5 MG/3 ML  3 mL Nebulization Q6H PRN Lab Data Reviewed: (see below) Lab Review:  
 
Recent Labs  
  01/14/19 
0617 01/13/19 
0054 WBC 7.8 8.5 HGB 12.6 13.1 HCT 40.2 41.1  177 Recent Labs  
  01/14/19 
0617 01/13/19 
9880  144  
K 4.9 4.1  105 CO2 27 32 * 102* BUN 23* 26* CREA 0.98 1.04* CA 8.9 9.0 ALB 3.0* 3.4* TBILI 0.9 0.7 SGOT 18 23 ALT 16 25 INR 3.0* 3.6* No results found for: Adi Whitman No results for input(s): PH, PCO2, PO2, HCO3, FIO2 in the last 72 hours. Recent Labs  
  01/14/19 
0617 01/13/19 
1091 INR 3.0* 3.6* All Micro Results Procedure Component Value Units Date/Time URINE CULTURE HOLD SAMPLE [[id number]] Collected:  01/13/19 1340 Order Status:  Completed Specimen:  Urine from Serum Updated:  01/13/19 1349 Urine culture hold URINE ON HOLD IN MICROBIOLOGY DEPT FOR 3 DAYS. IF UNPRESERVED URINE IS SUBMITTED, IT CANNOT BE USED FOR ADDITIONAL TESTING AFTER 24 HRS, RECOLLECTION WILL BE REQUIRED. I have reviewed notes of prior 24hr. Other pertinent lab: Total time spent with patient: 30 Minutes Care Plan discussed with: Patient, Nursing Staff, Consultant/Specialist and >50% of time spent in counseling and coordination of care Discussed:  Care Plan Prophylaxis:  Coumadin Disposition:  SNF/LTC 
        
___________________________________________________ Attending Physician: Nilton Manzo MD

## 2019-01-14 NOTE — ROUTINE PROCESS
Bedside shift change report given to Beth RN (oncoming nurse) by Sai Bennett RN (offgoing nurse). Report included the following information SBAR, Kardex, MAR and Recent Results.

## 2019-01-14 NOTE — TELEPHONE ENCOUNTER
----- Message from Mary Anne Person sent at 1/14/2019 11:01 AM EST -----  Regarding: Josiane/telephone  Pts daughter Chani Garcia is requesting for you to know her mother fell two days ago and she is at [facility] number is [phone].

## 2019-01-14 NOTE — PROGRESS NOTES
Reason for Admission:  Right hip pain RRAT Score:    16 Do you (patient/family) have any concerns for transition/discharge? No concerns noted. \"I have a lot of help. \" 
           
Plan for utilizing home health:     Plan is for rehab Likelihood of readmission? Moderate Transition of Care Plan:   Rehab 
CM met with pt and confirmed that she resides in [address] [place] [address]. Charted address is pt's daughter, Geraldine Myers, address in [place]. Ms. Nandini Garcia is pt's POA. Pt has another daughter, Rahul Loving, who lives locally. Pt reports owning a rollator, bsc and cane. She has nocturnal O2 from [address]. Pt gets her prescription medications filled at [facility] on [address]. Pt relies on [facility] or her daughter, Rahul Loving, for transportation. CM discussed PT recommendation for rehab with pt. She stated she has had therapy in the past with [facility] and wants to complete her rehab there. When asked about doing rehab at [facility], pt replied, \"let me think about that. \"  Pt is agreeable to sending a referral to [address] [place] [address] and a referral was sent in [facility]. Sulma Woodward LCSW Care Management Interventions PCP Verified by CM: Yes(Dr. Nabeel Lopez; nurse navigator notified) Palliative Care Criteria Met (RRAT>21 & CHF Dx)?: No 
Transition of Care Consult (CM Consult): Discharge Planning Physical Therapy Consult: Yes Occupational Therapy Consult: Yes Speech Therapy Consult: Yes Current Support Network: ([facility]) Plan discussed with Pt/Family/Caregiver: Yes Discharge Location Discharge Placement: Other:(Rehab)

## 2019-01-14 NOTE — CONSULTS
Mary Carmen Leiva MD.       Patient: Tavares Rahman  : 1932    Today's Date: 2019    HISTORY OF PRESENT ILLNESS:     History of Present Illness:    Tavares Rahman is a 80 y.o. female with history of Atrial fibrillation s/p pacemaker and on warfarin, ischemic cardiomyopathy, HLD, CKD, Hypothyroid, COPD with chronic respiratory failure, RADHA, anxiety/depression presenting for right hip pain. We have been consulted for the management of Atrial Fibrillation and Cardiomyopathy in the setting of frequent falls. Patient is followed by Dr. Karis Gonzalez, last seen 2018. Patient admitted following fall onto right hip without fracture. At baseline use rollator walker. Patient endorses that he has had 4 significant falls over the last 2-3 months, 1 fall with head injury requiring staples. Denies syncope, remembers entire falling events, denies palpitations, SOB, dizziness, or other symptoms during fall. Over the last 3 months have increasing \"weakness\" and endorses near falls 1-2 times weekly. History of Orthostatic Hypotension but denies dizziness/falls following standing, wears compression stockings. Chronic respiratory failure on oxygen but does not wear oxygen during most activities, notes per personal pulse-ox O2 sats normally in the \"mid 80's\", does where oxygen at night and when leaving facility. Labs during admission overall unremarkable except INR of 3.6 on admission, EKG with ventricular pacing. Spoke with Daughter of patient Suman Jackson [phone] about plans for stopping warfarin, discussed with Dr. Karis Gonzalez, given increased falls. Daughter would like to hold on stopping Warfarin until she can come to hospital (Lives in [facility]). Also reports previous work up for Parkinsons and on medications in 2018, on medications for 3 months before stopping, also with PT. Unsure why medication was stopped.       PAST MEDICAL HISTORY:     Past Medical History:   Diagnosis Date  Arthritis     Atrial fibrillation (HCC)     av node ablation 5/22/98 with placement of CPI #[id number] dual chamber pacemaker subsequently replaced with a single chamber medtronic #[id number] single chamber pacemaker 7/25/05    Cancer ([facility] [address] 1970's    colon cancer w/ resection    COPD     Depression     GERD (gastroesophageal reflux disease)     Hyperlipidemia     Hypertension     Ischemic cardiomyopathy     Migraine headache     Orthostatic hypotension     RADHA (obstructive sleep apnea)     Pacemaker 5/22/98    AV node ablation /pacemaker placement utilizing CPI # [id number] dual chamber system, medtronic #[id number] single chamber device placed 7/22/05    Pulmonary hypertension ([facility] [address] 9/26/2011    RVSP 50 on echo 8/14    Thyroid disease     Valvular heart disease     mild-mod MR/TR       Past Surgical History:   Procedure Laterality Date    BREAST SURGERY PROCEDURE UNLISTED      benign breast tumor excision right breast    HX BREAST BIOPSY Right 2002    neg; surgical bx    HX COLECTOMY  1974    colon    HX KNEE REPLACEMENT      right TKA    HX PACEMAKER      HX TUBAL LIGATION      TOTAL KNEE ARTHROPLASTY      total on R, partial on L         MEDICATIONS:     Current Facility-Administered Medications   Medication Dose Route Frequency Provider Last Rate Last Dose    sodium chloride (NS) flush 5-40 mL  5-40 mL IntraVENous Q8H Ivana Ochoa MD   10 mL at 01/14/19 0629    sodium chloride (NS) flush 5-40 mL  5-40 mL IntraVENous PRN Ivana Ochoa MD        acetaminophen (TYLENOL) tablet 650 mg  650 mg Oral Q4H PRN Ivana Ochoa MD        naloxone [facility]) injection 0.4 mg  0.4 mg IntraVENous PRN Ivana Ochoa MD        ondansetron [facility]) injection 4 mg  4 mg IntraVENous Q4H PRN Ivana Ochoa MD        Warfarin- Pharmacist Dosing   Other Rx Dosing/Monitoring Ivana Ochoa MD        cholestyramine-aspartame (QUESTRAN LIGHT) packet 4 g  4 g Oral 7am Ivana Ochoa MD   4 g at 01/14/19 6739    sertraline (ZOLOFT) tablet 50 mg  50 mg Oral DAILY Erica Puente MD   50 mg at 01/14/19 0854    baclofen (LIORESAL) tablet 5 mg  5 mg Oral BID Erica Puente MD   5 mg at 01/14/19 0854    carvedilol (COREG) tablet 3.125 mg  3.125 mg Oral BID WITH MEALS Erica Puente MD   3.125 mg at 01/14/19 8782    cholecalciferol (VITAMIN D3) tablet 1,000 Units  1,000 Units Oral DAILY Erica Puente MD   1,000 Units at 01/14/19 1771    levothyroxine (SYNTHROID) tablet 88 mcg  88 mcg Oral ACB Erica Puente MD   88 mcg at 01/14/19 0730    loperamide (IMODIUM) 1 mg/5 mL oral solution 2 mg  2 mg Oral QHS Erica Puente MD   2 mg at 01/13/19 2140    predniSONE (DELTASONE) tablet 5 mg  5 mg Oral EVERY OTHER DAY Erica Puente MD   5 mg at 01/14/19 3688    predniSONE (DELTASONE) tablet 10 mg  10 mg Oral EVERY OTHER DAY Erica Puente MD   10 mg at 01/13/19 2141    famotidine (PEPCID) tablet 20 mg  20 mg Oral QHS Erica Puente MD   20 mg at 01/13/19 2140    simvastatin (ZOCOR) tablet 20 mg  20 mg Oral DAILY Erica Puente MD   20 mg at 01/14/19 0854    albuterol-ipratropium (DUO-NEB) 2.5 MG-0.5 MG/3 ML  3 mL Nebulization Q6H PRN Erica Puente MD           Allergies   Allergen Reactions    Cardizem [Diltiazem Hcl] Hives    Codeine Nausea and Vomiting    Darvocet A500 [Propoxyphene N-Acetaminophen] Nausea and Vomiting    Diltiazem Hives    Labetalol Nausea and Vomiting     Dizzy/Disoriented     Pcn [Penicillins] Swelling     Tongue Swelling   Has previously tolerated cephalexin per daughter    Primidone Other (comments)     Lightheaded/unsteady gait    Sulfa (Sulfonamide Antibiotics) Nausea and Vomiting         SOCIAL HISTORY:     Social History     Tobacco Use    Smoking status: Passive Smoke Exposure - Never Smoker    Smokeless tobacco: Never Used   Substance Use Topics    Alcohol use: No     Alcohol/week: 0.0 oz    Drug use: No         FAMILY HISTORY:     Family History Problem Relation Age of Onset    Diabetes Mother     Heart Disease Mother     Heart Attack Mother     Heart Disease Father     Stroke Sister     Breast Cancer Sister 80    Cancer Maternal Aunt         uterus    Diabetes Maternal Uncle     Breast Cancer Daughter 61         REVIEW OF SYMPTOMS:     Review of Symptoms:  Constitutional: Negative for fever, chills  HEENT: Negative for nosebleeds, tinnitus, and vision changes. Respiratory: Negative for cough, wheezing  Cardiovascular: Positive for falls Negative for orthopnea, claudication, syncope, and PND. Gastrointestinal: Negative for abdominal pain, diarrhea, melena. Genitourinary: Negative for dysuria  Musculoskeletal: Positive for generalized weakness Negative for myalgias. Skin: Negative for rash  Heme: On warfarin  Neurological: Negative for speech change and focal weakness. PHYSICAL EXAM:     Physical Exam:  Visit Vitals  /59 (BP 1 Location: Left arm, BP Patient Position: At rest)   Pulse 82   Temp 97.7 °F (36.5 °C)   Resp 18   Ht 5' 4\" (1.626 m)   Wt 142 lb (64.4 kg)   LMP 02/26/1970   SpO2 93%   BMI 24.37 kg/m²     Patient appears generally well, mood and affect are appropriate and pleasant. HEENT:  Hearing intact, non-icteric, normocephalic, atraumatic. Neck Exam: Supple, No JVD or carotid bruits. Lung Exam: Clear to auscultation, even breath sounds. Cardiac Exam: Regular rate and rhythm with no murmur  Abdomen: Soft, non-tender, normal bowel sounds. No bruits or masses. Extremities: Moves all ext well. No lower extremity edema. Vascular: 2+ dorsalis pedis pulses bilaterally.   Psych: Appropriate affect  Neuro: Grossly intact      LABS / OTHER STUDIES:     Recent Results (from the past 24 hour(s))   URINALYSIS W/MICROSCOPIC    Collection Time: 01/13/19  1:40 PM   Result Value Ref Range    Color YELLOW/STRAW      Appearance CLEAR CLEAR      Specific gravity 1.022 1.003 - 1.030      pH (UA) 5.5 5.0 - 8.0      Protein NEGATIVE  NEG mg/dL    Glucose NEGATIVE  NEG mg/dL    Ketone NEGATIVE  NEG mg/dL    Bilirubin NEGATIVE  NEG      Blood TRACE (A) NEG      Urobilinogen 0.2 0.2 - 1.0 EU/dL    Nitrites NEGATIVE  NEG      Leukocyte Esterase SMALL (A) NEG      WBC 10-20 0 - 4 /hpf    RBC 0-5 0 - 5 /hpf    Epithelial cells FEW FEW /lpf    Bacteria NEGATIVE  NEG /hpf    Hyaline cast 0-2 0 - 5 /lpf   URINE CULTURE HOLD SAMPLE    Collection Time: 01/13/19  1:40 PM   Result Value Ref Range    Urine culture hold        URINE ON HOLD IN MICROBIOLOGY DEPT FOR 3 DAYS. IF UNPRESERVED URINE IS SUBMITTED, IT CANNOT BE USED FOR ADDITIONAL TESTING AFTER 24 HRS, RECOLLECTION WILL BE REQUIRED. METABOLIC PANEL, COMPREHENSIVE    Collection Time: 01/14/19  6:17 AM   Result Value Ref Range    Sodium 138 136 - 145 mmol/L    Potassium 4.9 3.5 - 5.1 mmol/L    Chloride 102 97 - 108 mmol/L    CO2 27 21 - 32 mmol/L    Anion gap 9 5 - 15 mmol/L    Glucose 134 (H) 65 - 100 mg/dL    BUN 23 (H) 6 - 20 MG/DL    Creatinine 0.98 0.55 - 1.02 MG/DL    BUN/Creatinine ratio 23 (H) 12 - 20      GFR est AA >60 >60 ml/min/1.73m2    GFR est non-AA 54 (L) >60 ml/min/1.73m2    Calcium 8.9 8.5 - 10.1 MG/DL    Bilirubin, total 0.9 0.2 - 1.0 MG/DL    ALT (SGPT) 16 12 - 78 U/L    AST (SGOT) 18 15 - 37 U/L    Alk.  phosphatase 73 45 - 117 U/L    Protein, total 6.6 6.4 - 8.2 g/dL    Albumin 3.0 (L) 3.5 - 5.0 g/dL    Globulin 3.6 2.0 - 4.0 g/dL    A-G Ratio 0.8 (L) 1.1 - 2.2     CBC WITH AUTOMATED DIFF    Collection Time: 01/14/19  6:17 AM   Result Value Ref Range    WBC 7.8 3.6 - 11.0 K/uL    RBC 4.34 3.80 - 5.20 M/uL    HGB 12.6 11.5 - 16.0 g/dL    HCT 40.2 35.0 - 47.0 %    MCV 92.6 80.0 - 99.0 FL    MCH 29.0 26.0 - 34.0 PG    MCHC 31.3 30.0 - 36.5 g/dL    RDW 13.2 11.5 - 14.5 %    PLATELET 563 255 - 201 K/uL    MPV 11.6 8.9 - 12.9 FL    NRBC 0.0 0  WBC    ABSOLUTE NRBC 0.00 0.00 - 0.01 K/uL    NEUTROPHILS 85 (H) 32 - 75 %    LYMPHOCYTES 9 (L) 12 - 49 %    MONOCYTES 5 5 - 13 %    EOSINOPHILS 0 0 - 7 %    BASOPHILS 0 0 - 1 %    IMMATURE GRANULOCYTES 1 (H) 0.0 - 0.5 %    ABS. NEUTROPHILS 6.6 1.8 - 8.0 K/UL    ABS. LYMPHOCYTES 0.7 (L) 0.8 - 3.5 K/UL    ABS. MONOCYTES 0.4 0.0 - 1.0 K/UL    ABS. EOSINOPHILS 0.0 0.0 - 0.4 K/UL    ABS. BASOPHILS 0.0 0.0 - 0.1 K/UL    ABS. IMM. GRANS. 0.1 (H) 0.00 - 0.04 K/UL    DF SMEAR SCANNED      RBC COMMENTS NORMOCYTIC, NORMOCHROMIC     PROTHROMBIN TIME + INR    Collection Time: 01/14/19  6:17 AM   Result Value Ref Range    INR 3.0 (H) 0.9 - 1.1      Prothrombin time 29.0 (H) 9.0 - 11.1 sec         CARDIAC DIAGNOSTICS:     Cardiac Evaluation Includes:    - AV node ablation: 5/22/98 with placement of CPI #[id number] dual chamber pacemaker   - Medtronic [id number] EN Pulse pacemaker, implanted 7/2005  - Echo 2008 - EF 60%, mild LAE, mild AR, mild to mod MR, mild TR, mild PA HTN  - Echo 2009 - EF 55%, bilateral atrial enlargement, mild MR, trace AR, mod TR, mild PA HTN  - Lexiscan cardiolite 10/15/09 - normal EF 51-71 %  - Echo 10/21/10 - EF 55-60%, bilateral atrial enlargement, mild concentric LVH, mild to mod MR, mild AI, mod to severe TI, pulmonary pressures mildly elevated 35mmhg  - Echo 7/31/12 - EF 55-60%, ventricular septal paradoxical motion, LA markedly dilated, RA mild to mod dilated, mod MR, mod TR, mod PA HTN  - Lexiscan 8/14/12 - normal EF 68%  - Lexiscan 12/17/12 - normal, EF 65%  - Pacemaker 8/21/13 - generator change, single chamber Medtronic, model # ADSR01.  - Pacemaker Pocket Revision 1/24/14   - Echo 8/20/14 - LVEF 45%, akinesis of distal lat and inf walls, mod-marked LAE, mild-mod MR, RVSP 50  - MUGA Scan 8/29/14 - LVEF 53%  - EKG 1/14/19: Ventricular pacing     ASSESSMENT AND PLAN:     Assessment and Plan:    Frequent Falls:  - Multiple potential causes.   Hx of orthostatic hypotension, atrial fibrillation with pacemaker, chronic respiratory failure w/ oxygen non-compliance, debility.  - CXR, Carotid Duplex  - Pro-BNP for evaluation, expect to be elevated in elderly  - Pacer interrogation  - Repeat Orthostatic BP's  - PT/OT - walking oximetry  - Patient with increased falls over the last 3 months, weakly near falls. Discussed with primary cardiologist (Dr. Duc Sales) and concerned for increased risk of bleeding while on anticoagulation. Recommending stopping anticoagulation as risks of bleeding outweigh benefits. Discussed with patient and POA, discussed risks and benefits of continuing or stopping anticoagulation. Patient/daughter would like to think about decision for now. Atrial Fibrillation s/p Pacemaker:  - Chads-Vasc score of 6  - Rate controlled, Ventricular pacing  - Frequent falls, continue warfarin at this time, continue conversation with family about stopping anti-coagulation  - Pacemaker Interrogation while admitted   - Continue Carvedilol    Ischemic Cardiomyopathy/Orthostatic Hypotension:  - Repeat Orthostatic BP  - CXR/pro-BNP  - PT/OT  - Compression stockings with activity, Thigh high. Right Hip Pain:  - Per Primary    Anxiety/Depression:  - Per Primary    COPD with chronic respiratory failure:   - Per Primary  - Walking Oximetry and patient education    Hypothyroidism:  - Per Primary    Resident: MD Luis Alberto oSfia MD    [address] [address], [facility] [address]  [address]  [address] [facility], [address]  Ph: [phone]   Ph [phone]

## 2019-01-14 NOTE — PROGRESS NOTES
Bedside and Verbal shift change report given to [address] [place] [address] [address] (oncoming nurse) by AURORA Rao (offgoing nurse). Report given with SBAR, Kardex, OR Summary, Intake/Output, MAR and Recent Results.

## 2019-01-14 NOTE — PROGRESS NOTES
Speech Pathology bedside swallow evaluation/discharge Patient: Danielle Alcocer (43 y.o. female) Date: 1/14/2019 Primary Diagnosis: Right hip pain Precautions: fall ASSESSMENT : 
Based on the objective data described below, the patient presents with intact oropharyngeal swallow. No overt s/s of aspiration. I suspect, based on patient's complaints, reported dysphagia is likely esophageal in nature. Recommend continuing regular diet. Skilled acute therapy provided by a speech-language pathologist is not indicated at this time. PLAN : 
Recommendations: 1. Regular diet/thin liquids 2. Safe swallowing strategies (upright for all PO, small bites/sips, slow rate) 3. Alternate liquids/solids 4. Pills one at a time Discharge Recommendations: None SUBJECTIVE:  
Patient stated hard, dry solids give her the most difficulty swallowing along with pills. Her complaint is \"sticking\" in the throat and this has been occurring for at least a year. She uses a \"Pill Glide\" swallow spray before pills which makes her throat \"slippery\" and thus makes swallowing pills easier. She reports her PCP mentioned to her about possibly having PD but she has not gotten a dx from her neurologist. NAD. OBJECTIVE:  
 
Past Medical History:  
Diagnosis Date  Arthritis  Atrial fibrillation ([address]   
 av node ablation 5/22/98 with placement of CPI #[id number] dual chamber pacemaker subsequently replaced with a single chamber medtronic #[id number] single chamber pacemaker 7/25/05  Cancer [facility]) 18's  
 colon cancer w/ resection  COPD  Depression  GERD (gastroesophageal reflux disease)  Hyperlipidemia  Hypertension  Ischemic cardiomyopathy  Migraine headache  Orthostatic hypotension  RADHA (obstructive sleep apnea)  Pacemaker 5/22/98 AV node ablation /pacemaker placement utilizing CPI # [id number] dual chamber system, medtronic #[id number] single chamber device placed 7/22/05  Pulmonary hypertension ([facility] [address] 9/26/2011 RVSP 50 on echo 8/14  Thyroid disease  Valvular heart disease   
 mild-mod MR/TR Past Surgical History:  
Procedure Laterality Date  BREAST SURGERY PROCEDURE UNLISTED    
 benign breast tumor excision right breast  
 HX BREAST BIOPSY Right 2002  
 neg; surgical bx  HX COLECTOMY  1974  
 colon  HX KNEE REPLACEMENT    
 right TKA  HX PACEMAKER    
 HX TUBAL LIGATION    
 TOTAL KNEE ARTHROPLASTY    
 total on R, partial on L Diet prior to admission: Regular Current Diet:  Regular Cognitive and Communication Status: 
Neurologic State: Alert Orientation Level: Oriented X4 Cognition: Appropriate decision making, Follows commands, Appropriate for age attention/concentration Perception: Appears intact Perseveration: No perseveration noted Safety/Judgement: Awareness of environment, Insight into deficits Oral Assessment: 
Oral Assessment Labial: No impairment Dentition: Intact Lingual: No impairment Velum: No impairment Mandible: No impairment P.O. Trials: 
Upright in chair Vocal quality prior to P.O.: No impairment Consistency Presented: All consistencies; Puree; Solid; Thin liquid How Presented: Self-fed/presented;Cup/sip;Spoon Bolus Acceptance: No impairment Bolus Formation/Control: No impairment Propulsion: No impairment Oral Residue: None Initiation of Swallow: No impairment Laryngeal Elevation: Functional 
Aspiration Signs/Symptoms: None Pharyngeal Phase Characteristics: No impairment, issues, or problems Effective Modifications: Small sips and bites Oral Phase Severity: No impairment Pharyngeal Phase Severity : No impairment NOMS:  
The NOMS functional outcome measure was used to quantify this patient's level of swallowing impairment. Based on the NOMS, the patient was determined to be at level 7 for swallow function NOMS Swallowing Levels: 
Level 1 (CN): NPO 
 Level 2 (CM): NPO but takes consistency in therapy Level 3 (CL): Takes less than 50% of nutrition p.o. and continues with nonoral feedings; and/or safe with mod cues; and/or max diet restriction Level 4 (CK): Safe swallow but needs mod cues; and/or mod diet restriction; and/or still requires some nonoral feeding/supplements Level 5 (CJ): Safe swallow with min diet restriction; and/or needs min cues Level 6 (CI): Independent with p.o.; rare cues; usually self cues; may need to avoid some foods or needs extra time Level 7 (CH): Independent for all p.o. JONATHAN. (2003). National Outcomes Measurement System (NOMS): Adult Speech-Language Pathology User's Guide. After treatment:  
[x] Patient left in no apparent distress sitting up in chair 
[] Patient left in no apparent distress in bed 
[x] Call bell left within reach [x] Nursing notified 
[] Caregiver present 
[] Bed alarm activated COMMUNICATION/EDUCATION:  
The patients plan of care including findings, recommendations, and recommended diet changes were discussed with: Registered Nurse. 
 
[] Posted safety precautions in patient's room. [x] Patient/family have participated as able and agree with findings and recommendations. [] Patient is unable to participate in plan of care at this time. Thank you for this referral. 
Alaina Malloy. Danielle Howell MS, CCC-SLP, BCS-S Time Calculation: 20 mins

## 2019-01-14 NOTE — PROGRESS NOTES
Future Appointments   Date Time Provider [place] Willis   1/17/2019  9:15 AM Larisa Camarillo MD [address] [facility] [address]   2/21/2019 10:45 AM Bailey Hutson, [address]   2/21/2019 11:00 AM M[place] [address]   2/21/2019 11:40 AM Addy Ureña  [address]   6/20/2019 10:00 AM Marv Jansen  [address]

## 2019-01-15 LAB
INR PPP: 2.5 (ref 0.9–1.1)
PROTHROMBIN TIME: 23.9 SEC (ref 9–11.1)

## 2019-01-15 PROCEDURE — 97535 SELF CARE MNGMENT TRAINING: CPT

## 2019-01-15 PROCEDURE — 97530 THERAPEUTIC ACTIVITIES: CPT

## 2019-01-15 PROCEDURE — 97116 GAIT TRAINING THERAPY: CPT

## 2019-01-15 PROCEDURE — 77010033678 HC OXYGEN DAILY

## 2019-01-15 PROCEDURE — 74011636637 HC RX REV CODE- 636/637: Performed by: INTERNAL MEDICINE

## 2019-01-15 PROCEDURE — 85610 PROTHROMBIN TIME: CPT

## 2019-01-15 PROCEDURE — 74011250637 HC RX REV CODE- 250/637: Performed by: INTERNAL MEDICINE

## 2019-01-15 PROCEDURE — 36415 COLL VENOUS BLD VENIPUNCTURE: CPT

## 2019-01-15 PROCEDURE — 94760 N-INVAS EAR/PLS OXIMETRY 1: CPT

## 2019-01-15 PROCEDURE — 65660000000 HC RM CCU STEPDOWN

## 2019-01-15 RX ORDER — WARFARIN SODIUM 5 MG/1
5 TABLET ORAL EVERY EVENING
Status: COMPLETED | OUTPATIENT
Start: 2019-01-15 | End: 2019-01-15

## 2019-01-15 RX ADMIN — Medication 10 ML: at 21:06

## 2019-01-15 RX ADMIN — Medication 10 ML: at 07:05

## 2019-01-15 RX ADMIN — WARFARIN SODIUM 5 MG: 5 TABLET ORAL at 18:31

## 2019-01-15 RX ADMIN — SIMVASTATIN 20 MG: 20 TABLET, FILM COATED ORAL at 08:15

## 2019-01-15 RX ADMIN — BACLOFEN 5 MG: 10 TABLET ORAL at 18:31

## 2019-01-15 RX ADMIN — CARVEDILOL 3.12 MG: 3.12 TABLET, FILM COATED ORAL at 08:15

## 2019-01-15 RX ADMIN — CHOLESTYRAMINE 4 G: 4 POWDER, FOR SUSPENSION ORAL at 06:31

## 2019-01-15 RX ADMIN — LEVOTHYROXINE SODIUM 88 MCG: 88 TABLET ORAL at 06:31

## 2019-01-15 RX ADMIN — BACLOFEN 5 MG: 10 TABLET ORAL at 08:14

## 2019-01-15 RX ADMIN — FAMOTIDINE 20 MG: 20 TABLET, FILM COATED ORAL at 21:04

## 2019-01-15 RX ADMIN — CARVEDILOL 3.12 MG: 3.12 TABLET, FILM COATED ORAL at 18:31

## 2019-01-15 RX ADMIN — ACETAMINOPHEN 650 MG: 650 SOLUTION ORAL at 21:04

## 2019-01-15 RX ADMIN — SERTRALINE HYDROCHLORIDE 50 MG: 50 TABLET ORAL at 08:15

## 2019-01-15 RX ADMIN — VITAMIN D, TAB 1000IU (100/BT) 1000 UNITS: 25 TAB at 08:15

## 2019-01-15 RX ADMIN — PREDNISONE 10 MG: 5 TABLET ORAL at 08:15

## 2019-01-15 NOTE — PROGRESS NOTES
Nate Whitaker [facility] [address] 
[address] [place] [address] 
[phone] Medical Progress Note NAME: Trevin Garcia :  1932 MRM:  [id number] Date/Time: 1/15/2019  10:06 AM 
 
  
Assessment and Plan: 1. Right hip pain (2019)/ Frequent falls: pain significant. No overt displaced fracture on CT and Xrays. Pain control. Evaluated by orthopedic surgery. Fall precautions. PT/OT evaluation. May need SNF/ rehab  
  
2. Atrial fibrillation ([facility]): Overview: av node ablation 98 with placement of CPI #[id number] dual chamber pacemaker subsequently replaced with a single chamber medtronic #[id number] single chamber pacemaker 05. Rate controlled. Resume Coreg. Risk and benefit of anti coagulation was discussed( family still wants to think about it).  
  
3.  Cardiomyopathy ([facility] (2016): evaluated by cardiology 
  
4. Anxiety and depression (2014): resume Zoloft 
  
5. Hypothyroid (2014): resume levothyroxine 
  
6. CKD (chronic kidney disease) stage 3, GFR 30-59 ml/min ([facility]) (2015): stable. Monitor 
  
7. COPD (chronic obstructive pulmonary disease) ([facility] (2019): not wheezing. Nebs as needed. Stable.  
  
8. Chronic respiratory failure with hypoxia ([facility] (2019): on oxygen at home.  
  
9. Mixed hyperlipidemia (10/18/2010): On simvastatin 
  
Code Status:  DNR Subjective: Chief Complaint:  Follow up of pt who was admitted with hip pain after a fall. C/o hip pain on movement ROS: 
(bold if positive, if negative) Tolerating PT  Tolerating Diet Objective:  
 
Last 24hrs VS reviewed since prior progress note. Most recent are: 
 
Visit Vitals /64 (BP 1 Location: Left arm, BP Patient Position: At rest;Head of bed elevated (Comment degrees)) Pulse 86 Temp 98.4 °F (36.9 °C) Resp 18 Ht 5' 4\" (1.626 m) Wt 64.4 kg (142 lb) SpO2 97% BMI 24.37 kg/m² SpO2 Readings from Last 6 Encounters: 01/15/19 97% 01/02/19 95% 12/20/18 96% 12/20/18 93% 11/28/18 92% 09/11/18 90% O2 Flow Rate (L/min): 2 l/min Intake/Output Summary (Last 24 hours) at 1/15/2019 1049 Last data filed at 1/15/2019 6654 Gross per 24 hour Intake 720 ml Output 200 ml Net 520 ml Physical Exam: 
 
Gen:  Well-developed, well-nourished, in no acute distress HEENT:  Pink conjunctivae, PERRL, hearing intact to voice, moist mucous membranes Neck:  Supple, without masses, thyroid non-tender Resp:  No accessory muscle use, clear breath sounds without wheezes rales or rhonchi 
Card:  No murmurs, normal S1, S2 without thrills, bruits or peripheral edema Abd:  Soft, non-tender, non-distended, normoactive bowel sounds are present, no palpable organomegaly and no detectable hernias Lymph:  No cervical or inguinal adenopathy Musc:  No cyanosis or clubbing Skin:  No rashes or ulcers, skin turgor is good Neuro:  Cranial nerves are grossly intact, no focal motor weakness, follows commands appropriately Psych:  Good insight, oriented to person, place and time, alert 
__________________________________________________________________ Medications Reviewed: (see below) Medications:  
 
Current Facility-Administered Medications Medication Dose Route Frequency  sodium chloride (NS) flush 5-40 mL  5-40 mL IntraVENous Q8H  
 sodium chloride (NS) flush 5-40 mL  5-40 mL IntraVENous PRN  
 acetaminophen (TYLENOL) tablet 650 mg  650 mg Oral Q4H PRN  
 naloxone (NARCAN) injection 0.4 mg  0.4 mg IntraVENous PRN  
 ondansetron (ZOFRAN) injection 4 mg  4 mg IntraVENous Q4H PRN  
 Warfarin- Pharmacist Dosing   Other Rx Dosing/Monitoring  cholestyramine-aspartame (QUESTRAN LIGHT) packet 4 g  4 g Oral 7am  
 sertraline (ZOLOFT) tablet 50 mg  50 mg Oral DAILY  baclofen (LIORESAL) tablet 5 mg  5 mg Oral BID  carvedilol (COREG) tablet 3.125 mg  3.125 mg Oral BID WITH MEALS  
  cholecalciferol (VITAMIN D3) tablet 1,000 Units  1,000 Units Oral DAILY  levothyroxine (SYNTHROID) tablet 88 mcg  88 mcg Oral ACB  loperamide (IMODIUM) 1 mg/5 mL oral solution 2 mg  2 mg Oral QHS  predniSONE (DELTASONE) tablet 5 mg  5 mg Oral EVERY OTHER DAY  predniSONE (DELTASONE) tablet 10 mg  10 mg Oral EVERY OTHER DAY  famotidine (PEPCID) tablet 20 mg  20 mg Oral QHS  simvastatin (ZOCOR) tablet 20 mg  20 mg Oral DAILY  albuterol-ipratropium (DUO-NEB) 2.5 MG-0.5 MG/3 ML  3 mL Nebulization Q6H PRN Lab Data Reviewed: (see below) Lab Review:  
 
Recent Labs  
  01/14/19 
0617 01/13/19 
4025 WBC 7.8 8.5 HGB 12.6 13.1 HCT 40.2 41.1  177 Recent Labs  
  01/15/19 
0218 01/14/19 
0617 01/13/19 
0366 NA  --  138 144 K  --  4.9 4.1 CL  --  102 105 CO2  --  27 32 GLU  --  134* 102* BUN  --  23* 26* CREA  --  0.98 1.04* CA  --  8.9 9.0 ALB  --  3.0* 3.4* TBILI  --  0.9 0.7 SGOT  --  18 23 ALT  --  16 25 INR 2.5* 3.0* 3.6* No results found for: [facility] No results for input(s): PH, PCO2, PO2, HCO3, FIO2 in the last 72 hours. Recent Labs  
  01/15/19 
0218 01/14/19 
0617 01/13/19 
6751 INR 2.5* 3.0* 3.6* All Micro Results Procedure Component Value Units Date/Time URINE CULTURE HOLD SAMPLE [[id number]] Collected:  01/13/19 1340 Order Status:  Completed Specimen:  Urine from Serum Updated:  01/13/19 1349 Urine culture hold URINE ON HOLD IN MICROBIOLOGY DEPT FOR 3 DAYS. IF UNPRESERVED URINE IS SUBMITTED, IT CANNOT BE USED FOR ADDITIONAL TESTING AFTER 24 HRS, RECOLLECTION WILL BE REQUIRED. I have reviewed notes of prior 24hr. Other pertinent lab: Total time spent with patient: 30 Minutes Care Plan discussed with: Patient, Nursing Staff, Consultant/Specialist and >50% of time spent in counseling and coordination of care Discussed:  Care Plan Prophylaxis:  Coumadin Disposition:  SNF/LTC 
        
___________________________________________________ Attending Physician: Shon Bowles MD

## 2019-01-15 NOTE — PROGRESS NOTES
Problem: Self Care Deficits Care Plan (Adult) Goal: *Acute Goals and Plan of Care (Insert Text) Occupational Therapy Goals Initiated 1/14/2019 1. Patient will perform lower body dressing with supervision/set-up within 7 day(s) using AE. 2.  Patient will perform bathing with supervision/set-up within 7 day(s). 3.  Patient will perform toilet transfers with supervision/set-up within 7 day(s). 4.  Patient will perform all aspects of toileting with supervision/set-up within 7 day(s). Occupational Therapy TREATMENT Patient: Abhilash Diaz (50 y.o. female) Date: 1/15/2019 Diagnosis: Right hip pain Right hip pain Precautions: Fall, WBAT Chart, occupational therapy assessment, plan of care, and goals were reviewed. ASSESSMENT: 
Pt agreeable to OT and wanting to transfer to [facility]. She stood with min assist, pain in standing  and pivoted to commode. Pt first was able to manage her depends in standing with contact guard assist. She stated she needed to sit for a while and pt left with her call bell. Instructed her to call for assistance when through on commode. Recommend inpatient rehab. Progression toward goals: 
[]       Improving appropriately and progressing toward goals [x]       Improving slowly and progressing toward goals 
[]       Not making progress toward goals and plan of care will be adjusted PLAN: 
Patient continues to benefit from skilled intervention to address the above impairments. Continue treatment per established plan of care. Discharge Recommendations:  Rehab Further Equipment Recommendations for Discharge:  None SUBJECTIVE:  
Patient stated Joseph Lucero will need to sit for a while.  OBJECTIVE DATA SUMMARY:  
Cognitive/Behavioral Status: 
Neurologic State: Alert Orientation Level: Oriented to person Cognition: Appropriate decision making Functional Mobility and Transfers for ADLs:Bed Mobility: Not tested as pt already up in the chair Transfers: Sit to Stand: Minimum assistance;Assist x1 Functional Transfers Toilet Transfer : Minimum assistance Cues: Physical assistance Adaptive Equipment: Bedside commode Balance: 
 Impaired standing balance with support. ADL Intervention: 
 Pts family brought in her JOBST stockings which look intact. Pain: 
Pain Scale 1: Numeric (0 - 10) Pain Intensity 1: 0 Activity Tolerance:  
Fair Please refer to the flowsheet for vital signs taken during this treatment. After treatment:  
[x] Patient left in no apparent distress sitting up in chair 
[] Patient left in no apparent distress in bed 
[x] Call bell left within reach 
[] Nursing notified 
[] Caregiver present 
[] Bed alarm activated COMMUNICATION/COLLABORATION:  
The patients plan of care was discussed with: Occupational Therapist, Physical Therapy JANIE Newell Time Calculation: 15 mins

## 2019-01-15 NOTE — PROGRESS NOTES
Problem: Mobility Impaired (Adult and Pediatric) Goal: *Acute Goals and Plan of Care (Insert Text) Physical Therapy Goals Initiated 2019 1. Patient will move from supine to sit and sit to supine  in bed with supervision/set-up within 7 day(s). 2.  Patient will transfer from bed to chair and chair to bed with minimal assistance assist using the least restrictive device within 7 day(s). 3.  Patient will perform sit to stand with minimal assistance assist within 7 day(s). 4.  Patient will ambulate with minimal assistance assist for 20 feet with the least restrictive device within 7 day(s). physical Therapy TREATMENT Patient: Marshall Gardiner (59 y.o. female) Date: 1/15/2019 Diagnosis: Right hip pain Right hip pain Precautions: Fall, TTWB Chart, physical therapy assessment, plan of care and goals were reviewed. ASSESSMENT: 
Chart reviewed and RN cleared PT to work with patient. PT paged orthopedics for weight bearing status and orders. Patient CT scan of right hip negative for fracture. Received return call from NP Best Barry and status at this time TTWB RLE - orders entered. Patient seen for orders to follow up on the  followin.) oxygen needs with activity (uses prn during day and 2L at night), 2.) orthostatic hypotension, 3.) JOBST stockings  4.) new TTWB status RLE. Patient with severe pain with all movement of RLE. Patient had difficultly advancing her LLE due to poor upper body strength to unload LLE and maintain TTWB RLE. Patient is able to maintain TTWB on RLE but chair brought behind after only 4' due to pain. Patient has pair of thigh high small JOBST stocking at home but in past she was unsuccessful donning stockings and so she does not use. Awaiting arrival of stockings otherwise have small pair in patient's room to charge to her. Spoke with [employer] to be on lookout for stockings and address don/doffing- with a babcock or other adaptive equipment. [employer] agreed to follow up.  Spoke with DGt and patient regarding her activity level at baseline, falls , and follow up rehab that will be needed at discharge. Discussed rollator is unsafe since TTWB at this time and patient owns RW. Patient demonstrated she was not positive for orthostatic hypotension and her SPO2 dropped slightly with activity on room air but overall stable- results below. Documentation for home O2: 
  
ROOM AIR  
 AT REST- SUPINE 
 O2 SATS 92 HR             BP 
88 bpm   121/63 ROOM AIR WITH ACTIVITY- Sit EOB 02 SATS 
88 but quick rebound to 91% with PLB HR           BP 
89          116/68 Slight dizzy sitting up EOB  
ROOM AIR WITH ACTIVITY- Standing bedside and few steps. May drop farther if more active  O2 SATS 90% HR         BP 
117        117/61 Same level dizziness (2    )LITERS OF 02 PATIENT LEFT COMFORTABLY SITTING in chair 02 SATS 94% HR        BP  
111      122/80 Progression toward goals: 
[]    Improving appropriately and progressing toward goals [x]    Improving slowly and progressing toward goals 
[]    Not making progress toward goals and plan of care will be adjusted PLAN: 
Patient continues to benefit from skilled intervention to address the above impairments. Continue treatment per established plan of care. Discharge Recommendations:  Rehab Further Equipment Recommendations for Discharge:  TBD SUBJECTIVE:  
Patient stated The pain is incredible.  OBJECTIVE DATA SUMMARY:  
Critical Behavior: 
Neurologic State: Alert Orientation Level: Oriented to person Cognition: Appropriate decision making Safety/Judgement: Awareness of environment, Insight into deficits Functional Mobility Training: 
Bed Mobility: 
Rolling: Moderate assistance Supine to Sit: Moderate assistance Sit to Supine: (up to chair) Scooting: Minimum assistance Transfers: 
Sit to Stand: Minimum assistance Stand to Sit: Minimum assistance Stand Pivot Transfers: Assist x2    
 Bed to Chair: Minimum assistance;Assist x2 Balance: 
Sitting: Intact; With support Standing: Impaired Standing - Static: Constant support; Fair 
Standing - Dynamic : PoorAmbulation/Gait Training: 
Distance (ft): 4 Feet (ft) Assistive Device: Gait belt;Walker, rolling Ambulation - Level of Assistance: Minimal assistance; Moderate assistance; Additional time;Assist x1 Gait Abnormalities: Antalgic;Decreased step clearance Right Side Weight Bearing: Toe touch Base of Support: Narrowed Speed/Melanie: Shuffled; Slow Therapeutic Exercises: Ankle pumps as noemi Pain: 
Pain Scale 1: Numeric (0 - 10) Pain Intensity 1: 0 Activity Tolerance:  
Poor- debility poor balance Please refer to the flowsheet for vital signs taken during this treatment. After treatment:  
[x]    Patient left in no apparent distress sitting up in chair 
[]    Patient left in no apparent distress in bed 
[x]    Call bell left within reach [x]    Nursing notified 
[x]    Caregiver present 
[]    Bed alarm activated COMMUNICATION/COLLABORATION:  
The patients plan of care was discussed with: Certified Occupational Therapy Assistant, Registered Nurse, Physician and  Lorene Mckinley PT, DPT Time Calculation: 50 mins

## 2019-01-15 NOTE — PROGRESS NOTES
[facility] Dosing Services: 1/15/19 Consult for Warfarin Dosing by Pharmacy by Dr. Noris Prieto Consult provided for this 80 y.o.  female , for indication of Atrial Fibrillation. Day of Therapy :continuation - home dose: 5 mg TTFSS 2.5 mg MW Dose to achieve an INR goal of 2-3 Order entered for  Warfarin  5 (mg) ordered to be given today at 18:00. Significant drug interactions:  
Previous dose given 1/14   2.5 mg 
1/13 - held INR 3.6 
1/12 - 5 mg reported taken home dose PT/INR Lab Results Component Value Date/Time INR 2.5 (H) 01/15/2019 02:18 AM  
  
Platelets Lab Results Component Value Date/Time PLATELET 347 91/43/2717 06:17 AM  
  
H/H Lab Results Component Value Date/Time HGB 12.6 01/14/2019 06:17 AM  
  
 
Pharmacy to follow daily and will provide subsequent Warfarin dosing based on clinical status. Dutch Haines)  Contact information [phone]

## 2019-01-15 NOTE — PROGRESS NOTES
Bedside shift change report given to Beth RN (oncoming nurse) by Malcolm Almaguer RN (offgoing nurse). Report included the following information SBAR, Kardex, Accordion and Recent Results.

## 2019-01-15 NOTE — PROGRESS NOTES
Bedside and Verbal shift change report given to Galilea Salgado RN (oncoming nurse) by AURORA Brink (offgoing nurse). Report given with SBAR, Kardex, OR Summary, Intake/Output, MAR and Recent Results.

## 2019-01-15 NOTE — PROGRESS NOTES
1/15/2019 2:38 PM Discussed pt with [address] liaison, [address] referral remains pending until final Ortho recommendations are made. Met with pt and pt's daughter discussed back up to [address]. Pt's daughter reported [address] would be pt's back up if [address] is not an option. Referral sent to [facility] and also called and lvm with admission. 1/15/2019  10:43 AM [facility] referral pending, requesting to know Ortho plan of care. Ortho PA paged. CM will follow up. YELITZA CoppolaW

## 2019-01-16 ENCOUNTER — TELEPHONE (OUTPATIENT)
Dept: CARDIOLOGY CLINIC | Age: 84
End: 2019-01-16

## 2019-01-16 ENCOUNTER — APPOINTMENT (OUTPATIENT)
Dept: GENERAL RADIOLOGY | Age: 84
DRG: 554 | End: 2019-01-16
Attending: PHYSICIAN ASSISTANT
Payer: MEDICARE

## 2019-01-16 LAB
INR PPP: 2.1 (ref 0.9–1.1)
PROTHROMBIN TIME: 21.1 SEC (ref 9–11.1)

## 2019-01-16 PROCEDURE — 73552 X-RAY EXAM OF FEMUR 2/>: CPT

## 2019-01-16 PROCEDURE — 74011636637 HC RX REV CODE- 636/637: Performed by: INTERNAL MEDICINE

## 2019-01-16 PROCEDURE — 77010033678 HC OXYGEN DAILY

## 2019-01-16 PROCEDURE — 85610 PROTHROMBIN TIME: CPT

## 2019-01-16 PROCEDURE — 65660000000 HC RM CCU STEPDOWN

## 2019-01-16 PROCEDURE — 94760 N-INVAS EAR/PLS OXIMETRY 1: CPT

## 2019-01-16 PROCEDURE — 74011250637 HC RX REV CODE- 250/637: Performed by: INTERNAL MEDICINE

## 2019-01-16 PROCEDURE — 97110 THERAPEUTIC EXERCISES: CPT

## 2019-01-16 PROCEDURE — 97530 THERAPEUTIC ACTIVITIES: CPT

## 2019-01-16 PROCEDURE — 72170 X-RAY EXAM OF PELVIS: CPT

## 2019-01-16 PROCEDURE — 97535 SELF CARE MNGMENT TRAINING: CPT

## 2019-01-16 PROCEDURE — 36415 COLL VENOUS BLD VENIPUNCTURE: CPT

## 2019-01-16 RX ORDER — WARFARIN SODIUM 5 MG/1
5 TABLET ORAL EVERY EVENING
Status: COMPLETED | OUTPATIENT
Start: 2019-01-16 | End: 2019-01-16

## 2019-01-16 RX ADMIN — CHOLESTYRAMINE 4 G: 4 POWDER, FOR SUSPENSION ORAL at 06:34

## 2019-01-16 RX ADMIN — PREDNISONE 5 MG: 5 TABLET ORAL at 08:45

## 2019-01-16 RX ADMIN — VITAMIN D, TAB 1000IU (100/BT) 1000 UNITS: 25 TAB at 08:45

## 2019-01-16 RX ADMIN — Medication 10 ML: at 06:34

## 2019-01-16 RX ADMIN — ACETAMINOPHEN 650 MG: 650 SOLUTION ORAL at 22:03

## 2019-01-16 RX ADMIN — CARVEDILOL 3.12 MG: 3.12 TABLET, FILM COATED ORAL at 08:45

## 2019-01-16 RX ADMIN — LOPERAMIDE HYDROCHLORIDE 2 MG: 1 SOLUTION ORAL at 22:03

## 2019-01-16 RX ADMIN — CARVEDILOL 3.12 MG: 3.12 TABLET, FILM COATED ORAL at 17:17

## 2019-01-16 RX ADMIN — BACLOFEN 5 MG: 10 TABLET ORAL at 17:17

## 2019-01-16 RX ADMIN — WARFARIN SODIUM 5 MG: 5 TABLET ORAL at 17:17

## 2019-01-16 RX ADMIN — SERTRALINE HYDROCHLORIDE 50 MG: 50 TABLET ORAL at 08:45

## 2019-01-16 RX ADMIN — SIMVASTATIN 20 MG: 20 TABLET, FILM COATED ORAL at 08:45

## 2019-01-16 RX ADMIN — FAMOTIDINE 20 MG: 20 TABLET, FILM COATED ORAL at 21:59

## 2019-01-16 RX ADMIN — Medication 10 ML: at 22:03

## 2019-01-16 RX ADMIN — LEVOTHYROXINE SODIUM 88 MCG: 88 TABLET ORAL at 06:34

## 2019-01-16 RX ADMIN — BACLOFEN 5 MG: 10 TABLET ORAL at 08:45

## 2019-01-16 NOTE — PROGRESS NOTES
1/16/2019 4:36 PM Called and lvm with [facility] liaison regarding pending referral. CM will follow up. 1/16/2019  3:03 PM Spoke with Ortho PA, pt will be wbat and no plan for surgery. CM relayed this to [facility] liaison. Pt will be reviewed by their MD.  
CM will follow up. JESSE Cristobal

## 2019-01-16 NOTE — PROGRESS NOTES
Problem: Mobility Impaired (Adult and Pediatric) Goal: *Acute Goals and Plan of Care (Insert Text) Physical Therapy Goals Initiated 1/14/2019 1. Patient will move from supine to sit and sit to supine  in bed with supervision/set-up within 7 day(s). 2.  Patient will transfer from bed to chair and chair to bed with minimal assistance assist using the least restrictive device within 7 day(s). 3.  Patient will perform sit to stand with minimal assistance assist within 7 day(s). 4.  Patient will ambulate with minimal assistance assist for 20 feet with the least restrictive device within 7 day(s). physical Therapy TREATMENT Patient: Precious Sharma (89 y.o. female) Date: 1/16/2019 Diagnosis: Right hip pain Right hip pain Precautions: Fall, TTWB Chart, physical therapy assessment, plan of care and goals were reviewed. ASSESSMENT: 
Radiographs (-) fx WBAT from Ortho PA. Pt denies pain at rest, reviewed bed level thera ex with heel slides, SLR, ankle pumps, increased posterior hip pain with modified bridging. Mod A to don compression stocking on RLE. Min A to come to sitting EOB with increased dizziness. Tolerated thera ex in sitting without increased pain. Symptoms somewhat resolved. Min A for sit<>stand using RW. Performed wt shifting and pre gait exercise, increased dizziness after 1 min /61 returned to sitting. Able to scoot toward [facility] with CGA. CGA/Min A to return to supine. /88. Daughter present throughout session. would benefit from rehab at discharge. Progression toward goals: 
[]    Improving appropriately and progressing toward goals [x]    Improving slowly and progressing toward goals 
[]    Not making progress toward goals and plan of care will be adjusted PLAN: 
Patient continues to benefit from skilled intervention to address the above impairments. Continue treatment per established plan of care. Discharge Recommendations:  Rehab Further Equipment Recommendations for Discharge:  Defer to rehab SUBJECTIVE:  
Patient stated Boyd Salazar do ok.  OBJECTIVE DATA SUMMARY:  
Critical Behavior: 
Neurologic State: Appropriate for age Orientation Level: Appropriate for age Cognition: Appropriate decision making Safety/Judgement: Awareness of environment, Insight into deficits Functional Mobility Training: 
Bed Mobility: 
  
Supine to Sit: Minimum assistance Sit to Supine: Minimum assistance Scooting: Contact guard assistance Transfers: 
Sit to Stand: Minimum assistance Stand to Sit: Minimum assistance Bed to Chair: Minimum assistance Balance: 
Sitting: Intact; With support Standing: Impaired Standing - Static: Fair Standing - Dynamic : FairAmbulation/Gait Training: 
  
  
  
  
  
  
  
  
  
  
  
  
  
  
  
  
  
  
Stairs: 
  
  
   
 
Neuro Re-Education: 
 
Therapeutic Exercises:  
 
Pain: 
  
  
  
  
  
  
Activity Tolerance:  
fair Please refer to the flowsheet for vital signs taken during this treatment. After treatment:  
[]    Patient left in no apparent distress sitting up in chair 
[x]    Patient left in no apparent distress in bed 
[x]    Call bell left within reach [x]    Nursing notified 
[x]    Caregiver present [x]    Bed alarm activated COMMUNICATION/COLLABORATION:  
The patients plan of care was discussed with: Registered Nurse Marvin Segundo Time Calculation: 25 mins

## 2019-01-16 NOTE — PROGRESS NOTES
Bedside and Verbal shift change report given to [facility] RN (oncoming nurse) by AURORA Marshall (offgoing nurse). Report given with SBAR, Kardex, OR Summary, Intake/Output, MAR and Recent Results.

## 2019-01-16 NOTE — PROGRESS NOTES
Physical therapy 1203 chart reviewed, spoke with RN. Ortho requesting additional radiographs of R hip/LE. PT will follow up later today Emir Angelo

## 2019-01-16 NOTE — PROGRESS NOTES
Nate Whitaker Gladys Tallmadge 79 
[address] [facility], [address] [place] [address] 
[phone] Medical Progress Note NAME: Danielle Alcocer :  1932 MRM:  [id number] Date/Time: 2019  10:06 AM 
 
  
Assessment and Plan: 1. Right hip pain (2019)/ Frequent falls: pain significant. No overt displaced fracture on CT and Xrays. Pain control. Evaluated by orthopedic surgery. Fall precautions. PT/OT evaluation. Plan is to check xray and work with PT/OT. If pain worsening may need to repeat CT scan ( MRI can not be done due to pacemaker).  
  
2. Atrial fibrillation ([facility] [address]): Overview: av node ablation 98 with placement of CPI #[id number] dual chamber pacemaker subsequently replaced with a single chamber medtronic #[id number] single chamber pacemaker 05. Rate controlled. Resume Coreg. Risk and benefit of anti coagulation was discussed( one daughter is coming from [place] and they want to talk to [address] [place] [address]. According to pt, she still wants to continue taking it).   
  
3.  Cardiomyopathy ([facility] [address] (2016): evaluated by cardiology 
  
4. Anxiety and depression (2014): resume Zoloft 
  
5. Hypothyroid (2014): resume levothyroxine 
  
6. CKD (chronic kidney disease) stage 3, GFR 30-59 ml/min ([facility]) (2015): stable. Monitor 
  
7. COPD (chronic obstructive pulmonary disease) ([facility] [address] (2019): not wheezing. Nebs as needed. Stable.  
  
8. Chronic respiratory failure with hypoxia ([facility] [address] (2019): on oxygen at home.  
  
9. Mixed hyperlipidemia (10/18/2010): On simvastatin 
  
Code Status:  DNR Subjective: Chief Complaint:  Follow up of pt who was admitted with hip pain after a fall. C/o hip pain on movement ROS: 
(bold if positive, if negative) Tolerating PT  Tolerating Diet Objective:  
 
Last 24hrs VS reviewed since prior progress note. Most recent are: 
 
Visit Vitals /73 (BP 1 Location: Left arm, BP Patient Position: At rest) Pulse 81 Temp 97.5 °F (36.4 °C) Resp 16 Ht 5' 4\" (1.626 m) Wt 64.4 kg (142 lb) SpO2 93% BMI 24.37 kg/m² SpO2 Readings from Last 6 Encounters:  
01/16/19 93% 01/02/19 95% 12/20/18 96% 12/20/18 93% 11/28/18 92% 09/11/18 90% O2 Flow Rate (L/min): 2 l/min No intake or output data in the 24 hours ending 01/16/19 1154 Physical Exam: 
 
Gen:  Well-developed, well-nourished, in no acute distress HEENT:  Pink conjunctivae, PERRL, hearing intact to voice, moist mucous membranes Neck:  Supple, without masses, thyroid non-tender Resp:  No accessory muscle use, clear breath sounds without wheezes rales or rhonchi 
Card:  No murmurs, normal S1, S2 without thrills, bruits or peripheral edema Abd:  Soft, non-tender, non-distended, normoactive bowel sounds are present, no palpable organomegaly and no detectable hernias Lymph:  No cervical or inguinal adenopathy Musc:  No cyanosis or clubbing Skin:  No rashes or ulcers, skin turgor is good Neuro:  Cranial nerves are grossly intact, no focal motor weakness, follows commands appropriately Psych:  Good insight, oriented to person, place and time, alert 
__________________________________________________________________ Medications Reviewed: (see below) Medications:  
 
Current Facility-Administered Medications Medication Dose Route Frequency  acetaminophen (TYLENOL) solution 650 mg  650 mg Oral Q4H PRN  
 sodium chloride (NS) flush 5-40 mL  5-40 mL IntraVENous Q8H  
 sodium chloride (NS) flush 5-40 mL  5-40 mL IntraVENous PRN  
 naloxone (NARCAN) injection 0.4 mg  0.4 mg IntraVENous PRN  
 ondansetron (ZOFRAN) injection 4 mg  4 mg IntraVENous Q4H PRN  
 Warfarin- Pharmacist Dosing   Other Rx Dosing/Monitoring  cholestyramine-aspartame (QUESTRAN LIGHT) packet 4 g  4 g Oral 7am  
 sertraline (ZOLOFT) tablet 50 mg  50 mg Oral DAILY  baclofen (LIORESAL) tablet 5 mg  5 mg Oral BID  
  carvedilol (COREG) tablet 3.125 mg  3.125 mg Oral BID WITH MEALS  cholecalciferol (VITAMIN D3) tablet 1,000 Units  1,000 Units Oral DAILY  levothyroxine (SYNTHROID) tablet 88 mcg  88 mcg Oral ACB  loperamide (IMODIUM) 1 mg/5 mL oral solution 2 mg  2 mg Oral QHS  predniSONE (DELTASONE) tablet 5 mg  5 mg Oral EVERY OTHER DAY  predniSONE (DELTASONE) tablet 10 mg  10 mg Oral EVERY OTHER DAY  famotidine (PEPCID) tablet 20 mg  20 mg Oral QHS  simvastatin (ZOCOR) tablet 20 mg  20 mg Oral DAILY  albuterol-ipratropium (DUO-NEB) 2.5 MG-0.5 MG/3 ML  3 mL Nebulization Q6H PRN Lab Data Reviewed: (see below) Lab Review:  
 
Recent Labs  
  01/14/19 0617 WBC 7.8 HGB 12.6 HCT 40.2  Recent Labs  
  01/16/19 
4737 01/15/19 
0218 01/14/19 
0617 NA  --   --  138 K  --   --  4.9 CL  --   --  102 CO2  --   --  27  
GLU  --   --  134* BUN  --   --  23* CREA  --   --  0.98  
CA  --   --  8.9 ALB  --   --  3.0* TBILI  --   --  0.9 SGOT  --   --  18 ALT  --   --  16 INR 2.1* 2.5* 3.0* No results found for: Eveline Martha No results for input(s): PH, PCO2, PO2, HCO3, FIO2 in the last 72 hours. Recent Labs  
  01/16/19 
4676 01/15/19 
0218 01/14/19 
0617 INR 2.1* 2.5* 3.0* All Micro Results Procedure Component Value Units Date/Time URINE CULTURE HOLD SAMPLE [[id number]] Collected:  01/13/19 1340 Order Status:  Completed Specimen:  Urine from Serum Updated:  01/13/19 1349 Urine culture hold URINE ON HOLD IN MICROBIOLOGY DEPT FOR 3 DAYS. IF UNPRESERVED URINE IS SUBMITTED, IT CANNOT BE USED FOR ADDITIONAL TESTING AFTER 24 HRS, RECOLLECTION WILL BE REQUIRED. I have reviewed notes of prior 24hr. Other pertinent lab: Total time spent with patient: 30 Minutes Care Plan discussed with: Patient, Nursing Staff, Consultant/Specialist and >50% of time spent in counseling and coordination of care Discussed:  Care Plan Prophylaxis:  Coumadin Disposition:  SNF/LTC 
        
___________________________________________________ Attending Physician: Zuri Gomes MD

## 2019-01-16 NOTE — TELEPHONE ENCOUNTER
Patient has jury duty on the date of her appointments and needs to be rs but they are asking for earlier or the very next week. She has an echo, pacer check as well as an appointment with dr pineda .   Phone: [phone]

## 2019-01-16 NOTE — PROGRESS NOTES
Orthopaedic Progress Note 2019 3:40 PM  
 
Patient: Precious Sharma MRN: [id number]  SSN: [id number] YOB: 1932  Age: 80 y.o. Sex: female Admit date:  2019 Admitting Physician:  Tripp Selby MD  
Consulting Physician(s): Treatment Team: Attending Provider: Esme Fritz MD; Physician Assistant: Brandy Hernandez; Consulting Provider: BAY Cooper; Surgeon: Cj Kulkarni MD; Care Manager: Mina Rosas; Utilization Review: Lilian Jeronimo; Care Manager: Jim Hayes SUBJECTIVE: 
  
Precious Sharma is a 80 y.o. female is resting in bed. She complains of right hip pain. She states it is a little worse than her normal right hip osteoarthritis pain. No complaints of nausea, vomiting, dizziness, lightheadedness, chest pain, or shortness of breath. OBJECTIVE: 
 
  
Physical Exam: 
General: Alert, cooperative, no distress. Respiratory: Respirations unlabored Neurological:  Neurovascular exam within normal limits. Motor: + DF/PF. Musculoskeletal: Calves soft, supple, non-tender upon palpation. Vital Signs:  
  
 
Patient Vitals for the past 8 hrs: 
 BP Temp Pulse Resp SpO2  
19 1506 141/66 96.9 °F (36.1 °C) 81 18 97 % 19 1137 114/73 97.5 °F (36.4 °C) 81 16 93 % 19 0820 117/56 97.6 °F (36.4 °C) 88 16  Temp (24hrs), Av.5 °F (36.4 °C), Min:96.9 °F (36.1 °C), Max:98.1 °F (36.7 °C) Labs:  
  
 
Recent Labs  
  19 
0283  19 
0617 HCT  --   --  40.2 HGB  --   --  12.6 INR 2.1*   < > 3.0*  
 < > = values in this interval not displayed. Lab Results Component Value Date/Time  Sodium 138 2019 06:17 AM  
 Potassium 4.9 2019 06:17 AM  
 Chloride 102 2019 06:17 AM  
 CO2 27 2019 06:17 AM  
 Glucose 134 (H) 2019 06:17 AM  
 BUN 23 (H) 2019 06:17 AM  
 Creatinine 0.98 2019 06:17 AM  
 Calcium 8.9 01/14/2019 06:17 AM  
 
 
PT/OT:  
 
  
Gait Base of Support: Narrowed Speed/Melanie: Shuffled, Slow Gait Abnormalities: Antalgic, Decreased step clearance Ambulation - Level of Assistance: Minimal assistance, Moderate assistance, Additional time, Assist x1 Distance (ft): 4 Feet (ft) Assistive Device: Gait belt, Walker, rolling Patient mobility Bed Mobility Training Rolling: Moderate assistance Supine to Sit: Moderate assistance Sit to Supine: (up to chair) Scooting: Minimum assistance Transfer Training Sit to Stand: Minimum assistance Stand to Sit: Minimum assistance Bed to Chair: Minimum assistance Gait Training Assistive Device: Gait belt, Walker, rolling Ambulation - Level of Assistance: Minimal assistance, Moderate assistance, Additional time, Assist x1 Distance (ft): 4 Feet (ft) Weight Bearing Status Right Side Weight Bearing: Toe touch ASSESSMENT / PLAN:  
Principal Problem: 
  Right hip pain (1/13/2019) Active Problems: 
  Mixed hyperlipidemia (10/18/2010) Atrial fibrillation (H[facility]) () Overview: av node ablation 5/22/98 with placement of CPI #[id number] dual chamber  
    pacemaker subsequently replaced with a single chamber medtronic #[id number]  
    single chamber pacemaker 7/25/05 Anxiety and depression (2/26/2014) Hypothyroid (2/26/2014) CKD (chronic kidney disease) stage 3, GFR 30-59 ml/min ([facility]) (2/9/2015) Cardiomyopathy ([facility] [address] (9/20/2016) Overview: 8/12 normal lexiscan cardiolyte, lvef 68% 8/16 echo lvef 35% multiple wall motion abnormalities, melissa, mod mr, mild  
    ai, mod tr pa pressure 36mm 8/16 lexiscan mod reversible anterior defect, mostly fixed apical defect. lvef 35% Pacemaker (7/24/2017) Frequent falls (1/13/2019) Warfarin-induced coagulopathy ([address] (1/13/2019) COPD (chronic obstructive pulmonary disease) ([facility] [address] (1/13/2019) [facility] failure with [facility] (1/13/2019) A: 1. Right hip osteoarthritis 2. Left hip osteoarthritis P: 1. New imaging completed today of right hip and femur which was negative for any changes in the right hip. Currently, there appears to be no femoral neck fracture, although MRI is the most definitive test.  I will let her WBAT and we will check her imaging back in the office in 1-2 weeks. If she can not WBAT an outpatient arthrocentesis of the right hip would be considered after final imaging. We will sign off and hope that patient will be placed in a SNF. Signed By: 
BAY Betts [address]

## 2019-01-16 NOTE — PROGRESS NOTES
Ortho progress note: 
 
S: Pt resting in bed on left side. Denies any significant complaints of pain while laying in bed. Daughter at bedside feels that her pain is slightly better than it was when she was first admitted in her right hip. Pt attempted to work with PT this afternoon and was unable to put weight on that RLE. O: VSS A and O x3 Breathing even/nonlabored. Pt was lying on left side with both hips and knees in flexed position. Pt able to extend right LE without difficulty. Pt only complained of slight discomfort in her groin with internal and external rotation. PP+2. Calves / soft/ nontender. A:. Bilateral hip osteoarthritis, left greater than right 2. Right hip hematoma 3. Debility 4. Frequent falls Had a long discussion with the patient and daughter about current condition The daughter is feeling slightly confident that her pain seems better. Pt can agree to that to some extent. Unfortunately, we are unable to get a MRI due to her pacemaker. I would recommend continuing with PT for the next few days and if her pain is worse, consider reimaging with a CT scan. Continue TTWB for now.   
 
Marv Vargas, YANA

## 2019-01-16 NOTE — PROGRESS NOTES
Bedside shift change report given to Beth RN (oncoming nurse) by Lorna Toure RN (offgoing nurse). Report included the following information SBAR, Kardex, Accordion and Recent Results.

## 2019-01-16 NOTE — TELEPHONE ENCOUNTER
Verified patient with two types of identifiers. States that she may or may not have jury duty the day of her appt's and would like a sooner appt date. Advised her that I could not do sooner but that I could move all of her appts to march. She states to keep the appt's as planned and if she has jury duty she will have someone else come with her. Daughter is aware of all three appts.

## 2019-01-16 NOTE — DISCHARGE SUMMARY
Physician Interim Summary   Patient ID:  Cornelia Duran  [id number]  16 y.o.  2/6/1932    PCP: Sole Hodges MD     Consults: Orthopedic Surgery    Covering dates: 1/13/2019 through 1/16/2019    Admission Diagnoses: Right hip pain    Hospital Course:    Ms. Buzz Gutiérrez is a 80 y.o. female who was being admitted for a persistent right hip pain and frequent falls. Pt Has recurrent falls. She has fallen about 4 times in last 2 months. CT scan of the pelvis doesn't show fracture. Evaluated by orthopedic surgery. Plan is repaet xray and work with PT/OT. If pain worsening may need to repeat CT scan ( MRI can not be done due to pacemaker).     Atrial fibrillation ([address]): pt is on coumadin. Risk and benefit of anti coagulation was discussed( one daughter is coming from [place] and they want to talk to Dr Tika Martin. According to pt, she still wants to continue anticoagulation     Please see daily progress note. Additional hospital course and discharge summary will be done by discharging physician.

## 2019-01-16 NOTE — PROGRESS NOTES
[facility] Dosing Services: 1/16/19 Consult for Warfarin Dosing by Pharmacy by Dr. Karen Sevilla Consult provided for this 79 yo female for indication of Atrial Fibrillation. Day of Therapy:resumed from home. (PTA: Warfairn 5 mg Tue/Thur/Fri/Sat/Sun and 2.5 mg on Mon/Wed) Dose to achieve an INR goal of 2-3 Order entered for  Warfarin  5 (mg) ordered to be given today at 18:00. Significant drug interactions: None Previous dose given 5 mg PT/INR Lab Results Component Value Date/Time INR 2.1 (H) 01/16/2019 06:05 AM  
  
Platelets Lab Results Component Value Date/Time PLATELET 950 71/88/8730 06:17 AM  
  
H/H Lab Results Component Value Date/Time HGB 12.6 01/14/2019 06:17 AM  
  
 
Pharmacy to follow daily and will provide subsequent Warfarin dosing based on clinical status. Mauricio Santana)  Contact information [phone]

## 2019-01-16 NOTE — PROGRESS NOTES
Problem: Self Care Deficits Care Plan (Adult) Goal: *Acute Goals and Plan of Care (Insert Text) Occupational Therapy Goals Initiated 1/14/2019 1. Patient will perform lower body dressing with supervision/set-up within 7 day(s) using AE. 2.  Patient will perform bathing with supervision/set-up within 7 day(s). 3.  Patient will perform toilet transfers with supervision/set-up within 7 day(s). 4.  Patient will perform all aspects of toileting with supervision/set-up within 7 day(s). Occupational Therapy TREATMENT Patient: Abhilash Diaz (41 y.o. female) Date: 1/16/2019 Diagnosis: Right hip pain Right hip pain Precautions: Fall, TTWB Chart, occupational therapy assessment, plan of care, and goals were reviewed. ASSESSMENT: 
Pt seated in chair and wanting to return to bed. Min assist chair to bed TTWB,and than educated pt as to sock aide to try to don JOBST stocking L LE. Pt max assist to don stocking material over sock aide and than the hard plastic was too rough on her skin, pt stated she has a history of vein stripping and tender feet, LE's. Recommended pt try a quinn cloth soft sock aide vs hard plastic to assist with donning stockings. Pt stated she would inform her daughter of this option. Pt assisted to supine and min assist. Recommend rehab. Progression toward goals: 
[]       Improving appropriately and progressing toward goals [x]       Improving slowly and progressing toward goals 
[]       Not making progress toward goals and plan of care will be adjusted PLAN: 
Patient continues to benefit from skilled intervention to address the above impairments. Continue treatment per established plan of care. Discharge Recommendations:  Rehab Further Equipment Recommendations for Discharge: None SUBJECTIVE:  
Patient stated This is just too hard on my leg OBJECTIVE DATA SUMMARY:  
Cognitive/Behavioral Status: 
Neurologic State: Appropriate for age Orientation Level: Appropriate for age Cognition: Appropriate decision making Functional Mobility and Transfers for ADLs:Bed Mobility: 
 Min assist sit to supine Transfers: 
  
  
Bed to Chair: Minimum assistance Balance: 
Sitting: Intact; With support Standing: Impaired ADL Intervention: Lower Body Dressing Assistance Antiembolitic Stockings: Maximum assistance Position Performed: Seated edge of bed Cues: Don;Physical assistance Adaptive Equipment Used: Sock aid Activity Tolerance: No signs/symptoms of distress or discomfort during OT Please refer to the flowsheet for vital signs taken during this treatment. After treatment:  
[] Patient left in no apparent distress sitting up in chair 
[x] Patient left in no apparent distress in bed 
[x] Call bell left within reach 
[] Nursing notified 
[] Caregiver present [x] Bed alarm activated COMMUNICATION/COLLABORATION:  
The patients plan of care was discussed with: Occupational Therapist 
 
[address], PEREYRA/L Time Calculation: 22 mins

## 2019-01-17 ENCOUNTER — HOSPITAL ENCOUNTER (OUTPATIENT)
Dept: REHABILITATION | Age: 84
End: 2019-01-26
Attending: PHYSICAL MEDICINE & REHABILITATION | Admitting: PHYSICAL MEDICINE & REHABILITATION

## 2019-01-17 VITALS
WEIGHT: 142 LBS | BODY MASS INDEX: 24.24 KG/M2 | SYSTOLIC BLOOD PRESSURE: 114 MMHG | OXYGEN SATURATION: 97 % | DIASTOLIC BLOOD PRESSURE: 70 MMHG | RESPIRATION RATE: 16 BRPM | HEART RATE: 79 BPM | TEMPERATURE: 97.5 F | HEIGHT: 64 IN

## 2019-01-17 DIAGNOSIS — R26.9 ABNORMALITY OF GAIT AND MOBILITY: ICD-10-CM

## 2019-01-17 PROBLEM — Z95.0 PACEMAKER: Chronic | Status: ACTIVE | Noted: 2017-07-24

## 2019-01-17 PROBLEM — J44.9 COPD (CHRONIC OBSTRUCTIVE PULMONARY DISEASE) (HCC): Chronic | Status: ACTIVE | Noted: 2019-01-13

## 2019-01-17 PROBLEM — T45.515A WARFARIN-INDUCED COAGULOPATHY (HCC): Status: RESOLVED | Noted: 2019-01-13 | Resolved: 2019-01-17

## 2019-01-17 PROBLEM — J96.11 CHRONIC RESPIRATORY FAILURE WITH HYPOXIA (HCC): Chronic | Status: ACTIVE | Noted: 2019-01-13

## 2019-01-17 PROBLEM — R29.6 FREQUENT FALLS: Chronic | Status: ACTIVE | Noted: 2019-01-13

## 2019-01-17 PROBLEM — D68.32 WARFARIN-INDUCED COAGULOPATHY (HCC): Status: RESOLVED | Noted: 2019-01-13 | Resolved: 2019-01-17

## 2019-01-17 LAB
INR PPP: 2.4 (ref 0.9–1.1)
PROTHROMBIN TIME: 23.3 SEC (ref 9–11.1)

## 2019-01-17 PROCEDURE — 94760 N-INVAS EAR/PLS OXIMETRY 1: CPT

## 2019-01-17 PROCEDURE — 74011250637 HC RX REV CODE- 250/637: Performed by: INTERNAL MEDICINE

## 2019-01-17 PROCEDURE — 85610 PROTHROMBIN TIME: CPT

## 2019-01-17 PROCEDURE — 74011636637 HC RX REV CODE- 636/637: Performed by: INTERNAL MEDICINE

## 2019-01-17 PROCEDURE — 36415 COLL VENOUS BLD VENIPUNCTURE: CPT

## 2019-01-17 PROCEDURE — 77010033678 HC OXYGEN DAILY

## 2019-01-17 RX ORDER — WARFARIN SODIUM 5 MG/1
5 TABLET ORAL ONCE
Status: DISCONTINUED | OUTPATIENT
Start: 2019-01-17 | End: 2019-01-17 | Stop reason: HOSPADM

## 2019-01-17 RX ADMIN — PREDNISONE 10 MG: 5 TABLET ORAL at 08:26

## 2019-01-17 RX ADMIN — CARVEDILOL 3.12 MG: 3.12 TABLET, FILM COATED ORAL at 08:26

## 2019-01-17 RX ADMIN — Medication 10 ML: at 06:39

## 2019-01-17 RX ADMIN — VITAMIN D, TAB 1000IU (100/BT) 1000 UNITS: 25 TAB at 08:26

## 2019-01-17 RX ADMIN — BACLOFEN 5 MG: 10 TABLET ORAL at 08:26

## 2019-01-17 RX ADMIN — SIMVASTATIN 20 MG: 20 TABLET, FILM COATED ORAL at 08:26

## 2019-01-17 RX ADMIN — LEVOTHYROXINE SODIUM 88 MCG: 88 TABLET ORAL at 06:39

## 2019-01-17 RX ADMIN — SERTRALINE HYDROCHLORIDE 50 MG: 50 TABLET ORAL at 08:27

## 2019-01-17 RX ADMIN — ACETAMINOPHEN 650 MG: 650 SOLUTION ORAL at 10:39

## 2019-01-17 RX ADMIN — CHOLESTYRAMINE 4 G: 4 POWDER, FOR SUSPENSION ORAL at 06:39

## 2019-01-17 NOTE — DISCHARGE SUMMARY
Physician Discharge Summary     Patient ID:  Jacobo Hinton  [id number]  31 y.o.  2/6/1932    Admit date: 1/13/2019    Discharge date: 1/17/2019    Admission Diagnoses: Right hip pain    Discharge Diagnoses:  Principal Diagnosis Right hip pain                                            Principal Problem:    Right hip pain (1/13/2019)    Active Problems:    Atrial fibrillation (HCC) ()      Overview: av node ablation 5/22/98 with placement of CPI #[id number] dual chamber       pacemaker subsequently replaced with a single chamber medtronic #[id number]       single chamber pacemaker 7/25/05      Anxiety and depression (2/26/2014)      CKD (chronic kidney disease) stage 3, GFR 30-59 ml/min (HCC) (2/9/2015)      Frequent falls (1/13/2019)      COPD (chronic obstructive pulmonary disease) ([facility] (1/13/2019)      Chronic respiratory failure with hypoxia ([facility] (1/13/2019)         Resolved Problems:  Problem List as of 1/17/2019 Date Reviewed: 1/17/2019          Codes Class Noted - Resolved    * (Principal) Right hip pain ICD-10-CM: M25.551  ICD-9-CM: 719.45  1/13/2019 - Present        Frequent falls (Chronic) ICD-10-CM: R29.6  ICD-9-CM: V15.88  1/13/2019 - Present        COPD (chronic obstructive pulmonary disease) ([facility] (Chronic) ICD-10-CM: J44.9  ICD-9-CM: 496  1/13/2019 - Present        Chronic respiratory failure with hypoxia (HCC) (Chronic) ICD-10-CM: J96.11  ICD-9-CM: 518.83, 799.02  1/13/2019 - Present        Latent tuberculosis by blood test ICD-10-CM: R76.11  ICD-9-CM: 790.6  1/22/2018 - Present        Recurrent depression ([facility] ICD-10-CM: F33.9  ICD-9-CM: 296.30  1/8/2018 - Present        Pacemaker (Chronic) ICD-10-CM: Z95.0  ICD-9-CM: V45.01  7/24/2017 - Present        Advanced care planning/counseling discussion ICD-10-CM: Z71.89  ICD-9-CM: V65.49  5/11/2017 - Present    Overview Signed 5/11/2017 12:44 PM by Albaro Perez RN     A copy of patient's completed Advanced Medical Directive is on file in the patient's medical record. NN reviewed Advanced Medical Directive document with patient & patient denies the need for changes/ updates. Psoriasis (Chronic) ICD-10-CM: L40.9  ICD-9-CM: 696.1  3/23/2017 - Present        Pseudophakia ICD-10-CM: Z96.1  ICD-9-CM: V43.1  11/14/2016 - Present        Cardiomyopathy ([address] (Chronic) ICD-10-CM: I42.9  ICD-9-CM: 425.4  9/20/2016 - Present    Overview Addendum 9/20/2016  8:45 AM by Daisy Bolden MD     8/12 normal lexiscan cardiolyte, lvef 68%  8/16 echo lvef 35% multiple wall motion abnormalities, melissa, mod mr, mild ai, mod tr pa pressure 36mm  8/16 lexiscan mod reversible anterior defect, mostly fixed apical defect.  lvef 35%             Abnormal stress test ICD-10-CM: R94.39  ICD-9-CM: 794.39  9/13/2016 - Present        Ischemic cardiomyopathy ICD-10-CM: I25.5  ICD-9-CM: 414.8  Unknown - Present        Chest pain ICD-10-CM: R07.9  ICD-9-CM: 786.50  8/10/2016 - Present        Nuclear cataract ICD-10-CM: [id number]  ICD-9-CM: 366.16  6/6/2016 - Present        Posterior vitreous detachment ICD-10-CM: H43.819  ICD-9-CM: 379.21  6/6/2016 - Present        Restrictive lung disease ICD-10-CM: J98.4  ICD-9-CM: 518.89  12/14/2015 - Present        Osteopenia ICD-10-CM: M85.80  ICD-9-CM: 733.90  11/23/2015 - Present        Hypothyroidism due to acquired atrophy of thyroid ICD-10-CM: E03.4  ICD-9-CM: 244.8, 246.8  11/6/2015 - Present        CKD (chronic kidney disease) stage 3, GFR 30-59 ml/min (HCC) (Chronic) ICD-10-CM: N18.3  ICD-9-CM: 585.3  2/9/2015 - Present        Anxiety and depression (Chronic) ICD-10-CM: F41.9, F32.9  ICD-9-CM: 300.00, 311  2/26/2014 - Present        Colon cancer ([facility] ICD-10-CM: C18.9  ICD-9-CM: 153.9  2/26/2014 - Present    Overview Addendum 2/26/2014 11:12 AM by Nichelle Antonio MD     Partial colectomy  Petey Allen             Hypothyroid (Chronic) ICD-10-CM: E03.9  ICD-9-CM: 244.9  2/26/2014 - Present        Pulmonary hypertension ([facility] ICD-10-CM: I27.20  ICD-9-CM: 416.8  9/26/2011 - Present        Atrial fibrillation (HCC) (Chronic) ICD-10-CM: I48.91  ICD-9-CM: 427.31  Unknown - Present    Overview Signed 10/21/2010  8:16 AM by Law Maciel     av node ablation 5/22/98 with placement of CPI #[id number] dual chamber pacemaker subsequently replaced with a single chamber medtronic #[id number] single chamber pacemaker 7/25/05             Mitral regurgitation ICD-10-CM: I34.0  ICD-9-CM: 424.0  10/21/2010 - Present        CHB (complete heart block) ([facility] ICD-10-CM: I44.2  ICD-9-CM: 426.0  10/21/2010 - Present        Orthostatic hypotension ICD-10-CM: I95.1  ICD-9-CM: 458.0  10/21/2010 - Present        Chest pain, unspecified ICD-10-CM: R07.9  ICD-9-CM: 786.50  10/21/2010 - Present        Mixed hyperlipidemia (Chronic) ICD-10-CM: E78.2  ICD-9-CM: 272.2  10/18/2010 - Present        RESOLVED: Warfarin-induced coagulopathy ([facility] ICD-10-CM: D68.32, T45.515A  ICD-9-CM: 286.7, E934.2  1/13/2019 - 1/17/2019        RESOLVED: Atrial fibrillation ([facility] ICD-10-CM: I48.91  ICD-9-CM: 427.31  10/18/2010 - 2/3/2014        RESOLVED: Complete heart block ([facility] ICD-10-CM: I44.2  ICD-9-CM: 426.0  10/18/2010 - 2/3/2014                Hospital Course: This is an interim summary and covers the hospitalization from 1/16/2019 to 1/17/2019. For any preceding portion of the patient's hospitalization, please see my partner's notes. Rehab was arranged by Case Management. She was discharged to [address] [place] [address] on 1/17/2019 in improved condition. PCP: Jai Valdovinos MD    Consults: Cardiology and Orthopedic Surgery    Discharge Exam:  See my Progress Note from today. Disposition: rehab      Patient Instructions:   Current Discharge Medication List      CONTINUE these medications which have NOT CHANGED    Details   ! ! predniSONE (DELTASONE) 5 mg tablet Take 5 mg by mouth every other day. !! predniSONE (DELTASONE) 5 mg tablet Take 10 mg by mouth every other day.       albuterol (PROVENTIL HFA, VENTOLIN HFA, PROAIR HFA) 90 mcg/actuation inhaler Take 1 Puff by inhalation every six (6) hours as needed for Wheezing. loperamide (IMODIUM) 1 mg/5 mL solution Take 2 mg by mouth nightly. !! warfarin (COUMADIN) 5 mg tablet Take 5 mg by mouth five (5) days a week. Take 5 mg on Tuesday, Thursday, Friday, Saturday, and Sunday      !! warfarin (COUMADIN) 5 mg tablet Take 2.5 mg by mouth two (2) days a week. Take 2.5 mg on Mondays and Wednesdays      levothyroxine (SYNTHROID) 88 mcg tablet Take 1 Tab by mouth Daily (before breakfast). Qty: 30 Tab, Refills: 5    Associated Diagnoses: Hypothyroidism due to acquired atrophy of thyroid      raNITIdine (ZANTAC) 150 mg tablet TAKE 1 TABLET BY MOUTH TWICE A DAY  Qty: 180 Tab, Refills: 1      ZOLOFT 50 mg tablet TAKE 1 TABLET BY MOUTH EVERY DAY  Qty: 90 Tab, Refills: 2    Associated Diagnoses: Anxiety and depression      simvastatin (ZOCOR) 20 mg tablet TAKE 1 TABLET BY MOUTH NIGHTLY  Qty: 90 Tab, Refills: 3    Associated Diagnoses: Mixed hyperlipidemia      carvedilol (COREG) 3.125 mg tablet TAKE 1 TABLET BY MOUTH TWICE A DAY WITH MEALS  Qty: 180 Tab, Refills: 1      Cholestyramine, Bulk, powd 4 g by Does Not Apply route daily. Qty: 100 g, Refills: 3    Associated Diagnoses: Diarrhea due to malabsorption      cholecalciferol (VITAMIN D3) 1,000 unit tablet Take 1,000 Units by mouth daily. baclofen (LIORESAL) 10 mg tablet Take 5 mg by mouth two (2) times a day. Associated Diagnoses: Chronic midline low back pain without sciatica      DENTA 5000 PLUS 1.1 % crea Apply 1 mg to affected area as needed (to prevent cavities). Refills: 2      desonide (TRIDESILON) 0.05 % cream Apply  to affected area two (2) times a day. Qty: 15 g, Refills: 0    Associated Diagnoses: Psoriasis      multivitamin (ONE A DAY) tablet Take 1 Tab by mouth daily. ADVAIR DISKUS 250-50 mcg/dose diskus inhaler Take 1 Puff by inhalation two (2) times a day.     Associated Diagnoses: Atrial fibrillation (HCC)      acetaminophen (TYLENOL) 500 mg tablet Take 650 mg by mouth every six (6) hours as needed for Pain. (2) Tablet in am (1) tablet in pm (2) Tablet pm  Refills: 0    Associated Diagnoses: Fever; Viral URI with cough      tiotropium (SPIRIVA WITH HANDIHALER) 18 mcg inhalation capsule Take 1 Cap by inhalation daily. Associated Diagnoses: Atrial fibrillation ([address] Complete heart block ([address]       ! ! - Potential duplicate medications found. Please discuss with provider. Activity: restrictions per Orthopedics  Diet: Regular Diet  Wound Care: None needed    Follow-up Information     Follow up With Specialties Details Why Contact Info    Nahid Silva MD Orthopedic Surgery In 1 week  [address]  [address] [facility]  Britni Bardales MD Internal Medicine In 2 weeks  [facility] [address]  Internal Med [facility]. 56.  [phone]            35 minutes were spent on this discharge.     Signed:  Christina Horn MD  1/17/2019  2:40 PM

## 2019-01-17 NOTE — PROGRESS NOTES
Bedside shift change report given to Beth RN (oncoming nurse) by Radha Huitron RN (offgoing nurse). Report included the following information SBAR, Kardex, Accordion and Recent Results.

## 2019-01-17 NOTE — PROGRESS NOTES
1/17/2019 11:10 AM Nursing please call report to [facility] at [phone]. [facility] would like to pt to come to their facility after lunch time. Pt's RN aware. [facility] liaison has confirmed pt's acceptance with pt and pt's daughters. Nurse navigator notified of pt's discharge today. 1/17/2019 10:35 AM [facility] has accepted pt for admission today. Pt's attending notified. CM will follow up. 1/17/2019 7:58 AM Pt's referral is pending with [facility]. Pt's family's preference is [facility]. If pt is not accepted by [facility], planning for pt to discharge to [facility]. CM will follow. JESSE Uribe

## 2019-01-17 NOTE — PROGRESS NOTES
Nate Whitaker ian Derwood [address] 
[address] [facility], [address] [place] [address] 
[phone] Medical Progress Note NAME: Cornelia Duran :  1932 MRM:  [id number] Date/Time: 2019  9:31 AM 
 
 
  
Subjective: Chief Complaint:  Pain: bilateral hips, R>L, intermittent, worse with ambulation ROS: 
(bold if positive, if negative) Tolerating PT  Tolerating Diet Objective:  
 
 
Vitals:  
 
 
  
Last 24hrs VS reviewed since prior progress note. Most recent are: 
 
Visit Vitals /81 (BP 1 Location: Right arm, BP Patient Position: Head of bed elevated (Comment degrees)) Pulse 78 Temp 97.9 °F (36.6 °C) Resp 16 Ht 5' 4\" (1.626 m) Wt 64.4 kg (142 lb) SpO2 93% BMI 24.37 kg/m² SpO2 Readings from Last 6 Encounters:  
19 93% 19 95% 18 96% 18 93% 18 92% 18 90% O2 Flow Rate (L/min): 2 l/min Intake/Output Summary (Last 24 hours) at 2019 4578 Last data filed at 2019 1236 Gross per 24 hour Intake  Output 300 ml Net -300 ml Exam:  
 
Physical Exam: 
 
Gen:  Well-developed, well-nourished, frail, elderly, in no acute distress HEENT:  Pink conjunctivae, PERRL, hearing intact to voice, moist mucous membranes Neck:  Supple, without masses, thyroid non-tender Resp:  No accessory muscle use, clear breath sounds without wheezes rales or rhonchi 
Card:  No murmurs, normal S1, S2 without thrills, bruits or peripheral edema Abd:  Soft, non-tender, non-distended, normoactive bowel sounds are present, no palpable organomegaly and no detectable hernias Lymph:  No cervical or inguinal adenopathy Musc:  No cyanosis or clubbing Skin:  No rashes or ulcers, skin turgor is good Neuro:  Cranial nerves are grossly intact, no focal motor weakness, follows commands appropriately Psych:  Good insight, oriented to person, place and time, alert Medications Reviewed: (see below) Lab Data Reviewed: (see below) 
 
______________________________________________________________________ Medications:  
 
Current Facility-Administered Medications Medication Dose Route Frequency  acetaminophen (TYLENOL) solution 650 mg  650 mg Oral Q4H PRN  
 sodium chloride (NS) flush 5-40 mL  5-40 mL IntraVENous Q8H  
 sodium chloride (NS) flush 5-40 mL  5-40 mL IntraVENous PRN  
 naloxone (NARCAN) injection 0.4 mg  0.4 mg IntraVENous PRN  
 ondansetron (ZOFRAN) injection 4 mg  4 mg IntraVENous Q4H PRN  
 Warfarin- Pharmacist Dosing   Other Rx Dosing/Monitoring  cholestyramine-aspartame (QUESTRAN LIGHT) packet 4 g  4 g Oral 7am  
 sertraline (ZOLOFT) tablet 50 mg  50 mg Oral DAILY  baclofen (LIORESAL) tablet 5 mg  5 mg Oral BID  carvedilol (COREG) tablet 3.125 mg  3.125 mg Oral BID WITH MEALS  cholecalciferol (VITAMIN D3) tablet 1,000 Units  1,000 Units Oral DAILY  levothyroxine (SYNTHROID) tablet 88 mcg  88 mcg Oral ACB  loperamide (IMODIUM) 1 mg/5 mL oral solution 2 mg  2 mg Oral QHS  predniSONE (DELTASONE) tablet 5 mg  5 mg Oral EVERY OTHER DAY  predniSONE (DELTASONE) tablet 10 mg  10 mg Oral EVERY OTHER DAY  famotidine (PEPCID) tablet 20 mg  20 mg Oral QHS  simvastatin (ZOCOR) tablet 20 mg  20 mg Oral DAILY  albuterol-ipratropium (DUO-NEB) 2.5 MG-0.5 MG/3 ML  3 mL Nebulization Q6H PRN Lab Review: No results for input(s): WBC, HGB, HCT, PLT, HGBEXT, HCTEXT, PLTEXT in the last 72 hours. Recent Labs  
  01/17/19 
0404 01/16/19 
2642 01/15/19 
8609 INR 2.4* 2.1* 2.5* No results found for: Izabella Castro No results for input(s): PH, PCO2, PO2, HCO3, FIO2 in the last 72 hours. Recent Labs  
  01/17/19 
0404 01/16/19 
5731 01/15/19 
4163 INR 2.4* 2.1* 2.5* Lab Results Component Value Date/Time Specimen Description: URINE 01/17/2014 10:02 AM  
 
Lab Results Component Value Date/Time Culture result: (A) 01/21/2018 08:27 AM  
  RARE STENOTROPHOMONAS Kervin Christina.) MALTOPHILIA CLSI GUIDELINES DO NOT RECOMMEND REPORTING SUSCEPTIBILITIES FOR S. MALTOPHILIA. TRIMETHOPRIM/SULFAMETHOXAZOLE IS THE DRUG OF CHOICE. Culture result: TERESITA CHEN [facility] 01/17/2014 10:02 AM  
 
 
 
  
Assessment:  
 
Principal Problem: 
  Right hip pain (1/13/2019) Active Problems: 
  Atrial fibrillation (HCC) () Overview: av node ablation 5/22/98 with placement of CPI #[id number] dual chamber  
    pacemaker subsequently replaced with a single chamber medtronic #[id number]  
    single chamber pacemaker 7/25/05 Anxiety and depression (2/26/2014) CKD (chronic kidney disease) stage 3, GFR 30-59 ml/min ([facility]) (2/9/2015) Frequent falls (1/13/2019) COPD (chronic obstructive pulmonary disease) ([address] (1/13/2019) Chronic respiratory failure with hypoxia ([address] (1/13/2019) Plan:  
 
Principal Problem: 
  Right hip pain (1/13/2019)/Frequent falls (1/13/2019) - without evidence of fracture 
 - requiring only acetaminophen for pain control 
 - Orthopedics input appreciated 
 - mobilize with PT/OT 
 - suspect she is no longer safe for independent living 
 - awaiting decision on [facility] but suspect she will need SNF for rehab Active Problems: 
  Atrial fibrillation (HCC) () 
 - rate controlled, paced rhythm on monitor 
 - continue warfarin Anxiety and depression (2/26/2014) - continue psych meds CKD (chronic kidney disease) stage 3, GFR 30-59 ml/min ([facility]) (2/9/2015) - creatinine at baseline COPD (chronic obstructive pulmonary disease) ([facility]) (1/13/2019)/Chronic respiratory failure with hypoxia ([address] (1/13/2019) - stable, continue respiratory meds and O2 Total time spent in patient care: 25 minutes Care Plan discussed with: Patient, Care Manager and Nursing Staff Discussed:  Code Status, Care Plan and D/C Planning Prophylaxis:  Coumadin Disposition:  SNF/LTC 
        
___________________________________________________ Attending Physician: Brandie Valdez MD

## 2019-01-17 NOTE — PROGRESS NOTES
SBARreport given to Arpita Dial from [facility]  by AURORA Farfan. Report given with SBAR, Kardex, OR Summary, Intake/Output, MAR and Recent Results.

## 2019-01-17 NOTE — DISCHARGE INSTRUCTIONS
HOSPITALIST DISCHARGE INSTRUCTIONS  NAME: Chapman Phoenix   :  1932   MRN:  [id number]     Date/Time:  2019 10:57 AM    ADMIT DATE: 2019     DISCHARGE DATE: 2019     DISCHARGE DIAGNOSIS:  Hip pain/multiple falls    MEDICATIONS:  · It is important that you take the medication exactly as they are prescribed. · Keep your medication in the bottles provided by the pharmacist and keep a list of the medication names, dosages, and times to be taken in your wallet. · Do not take other medications without consulting your doctor. Pain Management: per above medications    What to do at Home    Recommended diet:  Cardiac Diet    Recommended activity: Activity as tolerated    If you experience any of the following symptoms then please call your primary care physician or return to the emergency room if you cannot get hold of your doctor:  Fever, chills, nausea, vomiting, diarrhea, change in mentation, falling, bleeding, shortness of breath    Follow Up: Follow-up Information     Follow up With Specialties Details Why Contact Info    Satish Broussard MD Orthopedic Surgery In 1 week  [address]  [address] [facility]  Bijal Duffy MD Internal Medicine In 2 weeks  [facility] [address]  Internal Med [facility] [address] [facility]  [phone]              Information obtained by :  I understand that if any problems occur once I am at home I am to contact my physician. I understand and acknowledge receipt of the instructions indicated above.                                                                                                                                            Physician's or R.N.'s Signature                                                                  Date/Time Patient or Representative Signature                                                          Date/Time

## 2019-01-17 NOTE — PROGRESS NOTES
Attempted to see patient. She is eating lunch and wants to finish. PT will attempt to see later. Nursing aware.

## 2019-01-18 LAB
25(OH)D3 SERPL-MCNC: 25.1 NG/ML (ref 30–100)
ALBUMIN SERPL-MCNC: 2.9 G/DL (ref 3.5–5)
ALBUMIN/GLOB SERPL: 0.8 {RATIO} (ref 1.1–2.2)
ALP SERPL-CCNC: 73 U/L (ref 45–117)
ALT SERPL-CCNC: 13 U/L (ref 12–78)
ANION GAP SERPL CALC-SCNC: 5 MMOL/L (ref 5–15)
APPEARANCE UR: CLEAR
AST SERPL-CCNC: 20 U/L (ref 15–37)
BACTERIA URNS QL MICRO: NEGATIVE /HPF
BASOPHILS # BLD: 0 K/UL (ref 0–0.1)
BASOPHILS NFR BLD: 0 % (ref 0–1)
BILIRUB SERPL-MCNC: 0.7 MG/DL (ref 0.2–1)
BILIRUB UR QL: NEGATIVE
BUN SERPL-MCNC: 21 MG/DL (ref 6–20)
BUN/CREAT SERPL: 24 (ref 12–20)
CALCIUM SERPL-MCNC: 9.2 MG/DL (ref 8.5–10.1)
CHLORIDE SERPL-SCNC: 105 MMOL/L (ref 97–108)
CO2 SERPL-SCNC: 31 MMOL/L (ref 21–32)
COLOR UR: ABNORMAL
CREAT SERPL-MCNC: 0.86 MG/DL (ref 0.55–1.02)
DIFFERENTIAL METHOD BLD: ABNORMAL
EOSINOPHIL # BLD: 0.1 K/UL (ref 0–0.4)
EOSINOPHIL NFR BLD: 2 % (ref 0–7)
EPITH CASTS URNS QL MICRO: ABNORMAL /LPF
ERYTHROCYTE [DISTWIDTH] IN BLOOD BY AUTOMATED COUNT: 12.9 % (ref 11.5–14.5)
GLOBULIN SER CALC-MCNC: 3.8 G/DL (ref 2–4)
GLUCOSE SERPL-MCNC: 87 MG/DL (ref 65–100)
GLUCOSE UR STRIP.AUTO-MCNC: NEGATIVE MG/DL
HCT VFR BLD AUTO: 40.8 % (ref 35–47)
HGB BLD-MCNC: 13 G/DL (ref 11.5–16)
HGB UR QL STRIP: ABNORMAL
HYALINE CASTS URNS QL MICRO: ABNORMAL /LPF (ref 0–5)
IMM GRANULOCYTES # BLD AUTO: 0 K/UL (ref 0–0.04)
IMM GRANULOCYTES NFR BLD AUTO: 1 % (ref 0–0.5)
KETONES UR QL STRIP.AUTO: NEGATIVE MG/DL
LEUKOCYTE ESTERASE UR QL STRIP.AUTO: NEGATIVE
LYMPHOCYTES # BLD: 1.5 K/UL (ref 0.8–3.5)
LYMPHOCYTES NFR BLD: 18 % (ref 12–49)
MCH RBC QN AUTO: 28.6 PG (ref 26–34)
MCHC RBC AUTO-ENTMCNC: 31.9 G/DL (ref 30–36.5)
MCV RBC AUTO: 89.9 FL (ref 80–99)
MONOCYTES # BLD: 0.7 K/UL (ref 0–1)
MONOCYTES NFR BLD: 8 % (ref 5–13)
NEUTS SEG # BLD: 5.8 K/UL (ref 1.8–8)
NEUTS SEG NFR BLD: 72 % (ref 32–75)
NITRITE UR QL STRIP.AUTO: NEGATIVE
NRBC # BLD: 0 K/UL (ref 0–0.01)
NRBC BLD-RTO: 0 PER 100 WBC
PH UR STRIP: 6.5 [PH] (ref 5–8)
PLATELET # BLD AUTO: 177 K/UL (ref 150–400)
PMV BLD AUTO: 11.2 FL (ref 8.9–12.9)
POTASSIUM SERPL-SCNC: 4.7 MMOL/L (ref 3.5–5.1)
PROT SERPL-MCNC: 6.7 G/DL (ref 6.4–8.2)
PROT UR STRIP-MCNC: NEGATIVE MG/DL
RBC # BLD AUTO: 4.54 M/UL (ref 3.8–5.2)
RBC #/AREA URNS HPF: ABNORMAL /HPF (ref 0–5)
SODIUM SERPL-SCNC: 141 MMOL/L (ref 136–145)
SP GR UR REFRACTOMETRY: 1.01 (ref 1–1.03)
UA: UC IF INDICATED,UAUC: ABNORMAL
UROBILINOGEN UR QL STRIP.AUTO: 0.2 EU/DL (ref 0.2–1)
WBC # BLD AUTO: 8.1 K/UL (ref 3.6–11)
WBC URNS QL MICRO: ABNORMAL /HPF (ref 0–4)

## 2019-01-18 PROCEDURE — 80053 COMPREHEN METABOLIC PANEL: CPT

## 2019-01-18 PROCEDURE — 85025 COMPLETE CBC W/AUTO DIFF WBC: CPT

## 2019-01-18 PROCEDURE — 77030037759

## 2019-01-18 PROCEDURE — 81001 URINALYSIS AUTO W/SCOPE: CPT

## 2019-01-18 PROCEDURE — 82306 VITAMIN D 25 HYDROXY: CPT

## 2019-01-18 PROCEDURE — 36415 COLL VENOUS BLD VENIPUNCTURE: CPT

## 2019-01-21 LAB
ANION GAP SERPL CALC-SCNC: 8 MMOL/L (ref 5–15)
BUN SERPL-MCNC: 26 MG/DL (ref 6–20)
BUN/CREAT SERPL: 29 (ref 12–20)
CALCIUM SERPL-MCNC: 9.2 MG/DL (ref 8.5–10.1)
CHLORIDE SERPL-SCNC: 104 MMOL/L (ref 97–108)
CO2 SERPL-SCNC: 30 MMOL/L (ref 21–32)
CREAT SERPL-MCNC: 0.91 MG/DL (ref 0.55–1.02)
ERYTHROCYTE [DISTWIDTH] IN BLOOD BY AUTOMATED COUNT: 13.2 % (ref 11.5–14.5)
GLUCOSE SERPL-MCNC: 81 MG/DL (ref 65–100)
HCT VFR BLD AUTO: 42.3 % (ref 35–47)
HGB BLD-MCNC: 13.3 G/DL (ref 11.5–16)
MCH RBC QN AUTO: 28.7 PG (ref 26–34)
MCHC RBC AUTO-ENTMCNC: 31.4 G/DL (ref 30–36.5)
MCV RBC AUTO: 91.4 FL (ref 80–99)
NRBC # BLD: 0 K/UL (ref 0–0.01)
NRBC BLD-RTO: 0 PER 100 WBC
PLATELET # BLD AUTO: 190 K/UL (ref 150–400)
PMV BLD AUTO: 11.6 FL (ref 8.9–12.9)
POTASSIUM SERPL-SCNC: 4.1 MMOL/L (ref 3.5–5.1)
RBC # BLD AUTO: 4.63 M/UL (ref 3.8–5.2)
SODIUM SERPL-SCNC: 142 MMOL/L (ref 136–145)
WBC # BLD AUTO: 7.3 K/UL (ref 3.6–11)

## 2019-01-21 PROCEDURE — 36415 COLL VENOUS BLD VENIPUNCTURE: CPT

## 2019-01-21 PROCEDURE — 80048 BASIC METABOLIC PNL TOTAL CA: CPT

## 2019-01-21 PROCEDURE — 85027 COMPLETE CBC AUTOMATED: CPT

## 2019-01-24 LAB
ANION GAP SERPL CALC-SCNC: 6 MMOL/L (ref 5–15)
BUN SERPL-MCNC: 27 MG/DL (ref 6–20)
BUN/CREAT SERPL: 28 (ref 12–20)
CALCIUM SERPL-MCNC: 9.2 MG/DL (ref 8.5–10.1)
CHLORIDE SERPL-SCNC: 104 MMOL/L (ref 97–108)
CO2 SERPL-SCNC: 30 MMOL/L (ref 21–32)
CREAT SERPL-MCNC: 0.97 MG/DL (ref 0.55–1.02)
ERYTHROCYTE [DISTWIDTH] IN BLOOD BY AUTOMATED COUNT: 13.1 % (ref 11.5–14.5)
GLUCOSE SERPL-MCNC: 94 MG/DL (ref 65–100)
HCT VFR BLD AUTO: 42.5 % (ref 35–47)
HGB BLD-MCNC: 13.4 G/DL (ref 11.5–16)
MCH RBC QN AUTO: 28.7 PG (ref 26–34)
MCHC RBC AUTO-ENTMCNC: 31.5 G/DL (ref 30–36.5)
MCV RBC AUTO: 91 FL (ref 80–99)
NRBC # BLD: 0 K/UL (ref 0–0.01)
NRBC BLD-RTO: 0 PER 100 WBC
PLATELET # BLD AUTO: 174 K/UL (ref 150–400)
PMV BLD AUTO: 11.3 FL (ref 8.9–12.9)
POTASSIUM SERPL-SCNC: 4.6 MMOL/L (ref 3.5–5.1)
RBC # BLD AUTO: 4.67 M/UL (ref 3.8–5.2)
SODIUM SERPL-SCNC: 140 MMOL/L (ref 136–145)
WBC # BLD AUTO: 7.3 K/UL (ref 3.6–11)

## 2019-01-24 PROCEDURE — 80048 BASIC METABOLIC PNL TOTAL CA: CPT

## 2019-01-24 PROCEDURE — 85027 COMPLETE CBC AUTOMATED: CPT

## 2019-01-24 PROCEDURE — 36415 COLL VENOUS BLD VENIPUNCTURE: CPT

## 2019-01-25 ENCOUNTER — APPOINTMENT (OUTPATIENT)
Dept: GENERAL RADIOLOGY | Age: 84
End: 2019-01-25
Attending: PHYSICAL MEDICINE & REHABILITATION

## 2019-01-25 ENCOUNTER — DOCUMENTATION ONLY (OUTPATIENT)
Dept: CARDIOLOGY CLINIC | Age: 84
End: 2019-01-25

## 2019-01-25 PROCEDURE — 71046 X-RAY EXAM CHEST 2 VIEWS: CPT

## 2019-01-25 NOTE — PROGRESS NOTES
Called per daughter to let me know that her mother had fallen 2 weeks ago,and had been in hospital,and now rehab.but maybe home 01/26/19. States INR WAS 2.8 TODAY.

## 2019-01-26 LAB
ANION GAP SERPL CALC-SCNC: 10 MMOL/L (ref 5–15)
BUN SERPL-MCNC: 18 MG/DL (ref 6–20)
BUN/CREAT SERPL: 21 (ref 12–20)
CALCIUM SERPL-MCNC: 9.2 MG/DL (ref 8.5–10.1)
CHLORIDE SERPL-SCNC: 106 MMOL/L (ref 97–108)
CO2 SERPL-SCNC: 26 MMOL/L (ref 21–32)
CREAT SERPL-MCNC: 0.87 MG/DL (ref 0.55–1.02)
GLUCOSE SERPL-MCNC: 79 MG/DL (ref 65–100)
POTASSIUM SERPL-SCNC: 4.4 MMOL/L (ref 3.5–5.1)
SODIUM SERPL-SCNC: 142 MMOL/L (ref 136–145)

## 2019-01-26 PROCEDURE — 80048 BASIC METABOLIC PNL TOTAL CA: CPT

## 2019-01-26 PROCEDURE — 36415 COLL VENOUS BLD VENIPUNCTURE: CPT

## 2019-01-28 ENCOUNTER — PATIENT OUTREACH (OUTPATIENT)
Dept: INTERNAL MEDICINE CLINIC | Age: 84
End: 2019-01-28

## 2019-01-28 NOTE — PROGRESS NOTES
Hospital/Rehab Discharge Follow-Up Date/Time:  2019 5:52 PM 
 
Patient was admitted to [facility] 1019 to 2019 and discharged to [facility] 2019 to 2019. Top Challenges reviewed with the provider Going slow but improving. Now having \"soreness\" not pain in hip. Having frequent falls Method of communication with provider :none Inpatient RRAT score: 16 Was this a readmission? no  
 
Nurse Navigator (NN) contacted the patient by telephone to perform post hospital discharge assessment. Verified name and  with patient as identifiers. Provided introduction to self, and explanation of the Nurse Navigator role. Reviewed discharge instructions and red flags with patient who verbalized understanding. Patient given an opportunity to ask questions and does not have any further questions or concerns at this time. The patient agrees to contact the PCP office for questions related to their healthcare. NN provided contact information for future reference. Disease Specific:   N/A Summary of patient's top problems: 1. Frequent falls and now hip soreness. Patient did not want to have conversation with NN. States she just gets weak. Denied pain or SOB prior to falling. 2. Afib - denies any chest pain 3. COPD - understands medication but declined med rec. 4. Depression Barriers to care? None at this time. Advance Care Planning:  
Does patient have an Advance Directive:  reviewed and current Medication(s):  
Declined medication reconciliation. Patient reports daughter fills her pill boxes. Message left for daughter Referral to Pharm D needed: no  
 
Current Outpatient Medications Medication Sig  predniSONE (DELTASONE) 5 mg tablet Take 5 mg by mouth every other day.  predniSONE (DELTASONE) 5 mg tablet Take 10 mg by mouth every other day.   
 albuterol (PROVENTIL HFA, VENTOLIN HFA, PROAIR HFA) 90 mcg/actuation inhaler Take 1 Puff by inhalation every six (6) hours as needed for Wheezing.  loperamide (IMODIUM) 1 mg/5 mL solution Take 2 mg by mouth nightly.  warfarin (COUMADIN) 5 mg tablet Take 5 mg by mouth five (5) days a week. Take 5 mg on Tuesday, Thursday, Friday, Saturday, and Sunday  warfarin (COUMADIN) 5 mg tablet Take 2.5 mg by mouth two (2) days a week. Take 2.5 mg on Mondays and Wednesdays  levothyroxine (SYNTHROID) 88 mcg tablet Take 1 Tab by mouth Daily (before breakfast).  raNITIdine (ZANTAC) 150 mg tablet TAKE 1 TABLET BY MOUTH TWICE A DAY  ZOLOFT 50 mg tablet TAKE 1 TABLET BY MOUTH EVERY DAY  simvastatin (ZOCOR) 20 mg tablet TAKE 1 TABLET BY MOUTH NIGHTLY  carvedilol (COREG) 3.125 mg tablet TAKE 1 TABLET BY MOUTH TWICE A DAY WITH MEALS  Cholestyramine, Bulk, powd 4 g by Does Not Apply route daily.  cholecalciferol (VITAMIN D3) 1,000 unit tablet Take 1,000 Units by mouth daily.  baclofen (LIORESAL) 10 mg tablet Take 5 mg by mouth two (2) times a day.  DENTA 5000 PLUS 1.1 % crea Apply 1 mg to affected area as needed (to prevent cavities).  desonide (TRIDESILON) 0.05 % cream Apply  to affected area two (2) times a day.  multivitamin (ONE A DAY) tablet Take 1 Tab by mouth daily.  ADVAIR DISKUS 250-50 mcg/dose diskus inhaler Take 1 Puff by inhalation two (2) times a day.  acetaminophen (TYLENOL) 500 mg tablet Take 650 mg by mouth every six (6) hours as needed for Pain. (2) Tablet in am (1) tablet in pm (2) Tablet pm  
 tiotropium (SPIRIVA WITH HANDIHALER) 18 mcg inhalation capsule Take 1 Cap by inhalation daily. No current facility-administered medications for this visit. There are no discontinued medications. [facility] follow up appointment(s):  
Future Appointments Date Time Provider Cuca Alba 2/7/2019 10:00 AM Rosevelt Cushing, MD [address] [address]  
2/21/2019 10:20 AM Millie Hurst  [address]  
 2/21/2019 10:45 AM PACEMAKER[facility], [address]  
2/21/2019 11:00 AM ECHOTWO, [address]  
2/21/2019 11:40 AM Jack Meadows  [address]  
6/20/2019 10:00 AM Cielo Odom  [address] Non-[facility] follow up appointment(s): None at this time. [facility]:  n/a  
 
 
Goals  Attends follow-up appointments as directed. 1/29/2019 Appointments scheduled. PAC 
  
  Understands red flags post discharge. 1/28/2019 Patient understands standing and walking methods taught by PT.  PAC

## 2019-02-06 ENCOUNTER — TELEPHONE ANTICOAG (OUTPATIENT)
Dept: CARDIOLOGY CLINIC | Age: 84
End: 2019-02-06

## 2019-02-06 DIAGNOSIS — I48.91 ATRIAL FIBRILLATION, UNSPECIFIED TYPE (HCC): Primary | ICD-10-CM

## 2019-02-06 DIAGNOSIS — N18.30 CKD (CHRONIC KIDNEY DISEASE) STAGE 3, GFR 30-59 ML/MIN (HCC): ICD-10-CM

## 2019-02-06 DIAGNOSIS — I25.5 ISCHEMIC CARDIOMYOPATHY: ICD-10-CM

## 2019-02-06 LAB — INR, EXTERNAL: 2.6 (ref 2–3)

## 2019-02-07 ENCOUNTER — OFFICE VISIT (OUTPATIENT)
Dept: INTERNAL MEDICINE CLINIC | Age: 84
End: 2019-02-07

## 2019-02-07 ENCOUNTER — HOSPITAL ENCOUNTER (OUTPATIENT)
Dept: LAB | Age: 84
Discharge: HOME | End: 2019-02-07
Payer: MEDICARE

## 2019-02-07 VITALS
TEMPERATURE: 98.2 F | OXYGEN SATURATION: 98 % | BODY MASS INDEX: 25.27 KG/M2 | DIASTOLIC BLOOD PRESSURE: 74 MMHG | WEIGHT: 148 LBS | HEART RATE: 82 BPM | RESPIRATION RATE: 16 BRPM | HEIGHT: 64 IN | SYSTOLIC BLOOD PRESSURE: 112 MMHG

## 2019-02-07 DIAGNOSIS — R29.6 FREQUENT FALLS: ICD-10-CM

## 2019-02-07 DIAGNOSIS — E03.4 HYPOTHYROIDISM DUE TO ACQUIRED ATROPHY OF THYROID: Chronic | ICD-10-CM

## 2019-02-07 DIAGNOSIS — M85.80 OSTEOPENIA, UNSPECIFIED LOCATION: ICD-10-CM

## 2019-02-07 DIAGNOSIS — M25.551 PAIN OF RIGHT HIP JOINT: ICD-10-CM

## 2019-02-07 DIAGNOSIS — I48.91 ATRIAL FIBRILLATION, UNSPECIFIED TYPE (HCC): Chronic | ICD-10-CM

## 2019-02-07 DIAGNOSIS — J44.9 CHRONIC OBSTRUCTIVE PULMONARY DISEASE, UNSPECIFIED COPD TYPE (HCC): Chronic | ICD-10-CM

## 2019-02-07 DIAGNOSIS — Z09 HOSPITAL DISCHARGE FOLLOW-UP: Primary | ICD-10-CM

## 2019-02-07 PROCEDURE — 84443 ASSAY THYROID STIM HORMONE: CPT

## 2019-02-07 PROCEDURE — 36415 COLL VENOUS BLD VENIPUNCTURE: CPT

## 2019-02-07 RX ORDER — ALENDRONATE SODIUM 70 MG/1
70 TABLET ORAL
Qty: 12 TAB | Refills: 3 | Status: SHIPPED | OUTPATIENT
Start: 2019-02-07 | End: 2019-08-01

## 2019-02-07 RX ORDER — CHOLECALCIFEROL (VITAMIN D3) 125 MCG
2000 CAPSULE ORAL DAILY
COMMUNITY
End: 2020-05-14

## 2019-02-07 NOTE — PROGRESS NOTES
Jacobo Hinton is a 80 y.o. female who presents today for Hypothyroidism; Osteopenia; and Hospital Follow Up Fanny Rees She has a history of  
Patient Active Problem List  
Diagnosis Code  Mixed hyperlipidemia E78.2  Atrial fibrillation ([facility]) I48.91  
 Mitral regurgitation I34.0  
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  
 Ischemic cardiomyopathy I25.5  Cardiomyopathy ([address] I42.9  Psoriasis L40.9  Advanced care planning/counseling discussion Z70.80  
 Pacemaker Z95.0  Recurrent depression ([address] F33.9  Latent tuberculosis by blood test R76.11  
 Nuclear cataract [facility]  Posterior vitreous detachment H43.819  Pseudophakia Z96.1  Right hip pain M25.551  Frequent falls R29.6  COPD (chronic obstructive pulmonary disease) ([facility]) J44.9  Chronic respiratory failure with hypoxia ([address] J96.11 Fanny Rees Today patient is here for follow-up. Fanny Rees Hospitalizations. Patient has been hospitalized several times over the course of the last couple months. Navigator has been in touch with patient has facilitated this follow-up . longest hospitalization was January 13 the 17th. She was then in rehab from the 17th until the 25th. Since back at home. This was due to right hip pain. Ortho was consulted and has followed up with her as outpatient. She notes having several falls. No LOC, getting off balance. Doing PT 3x/week and [facility] .  
Back in [facility]. Notes that overall her hip is not hurting her anymore. Hypothyroidism: We decreased her dose in December due to a low TSH. She is currently taking 88 mcg. Osteopenia: Bone density resulted as osteopenia. Given her pulmonary issues she has remained on low-dose prednisone. I discussed starting bisphosphonate to protect her bones. Patient and family agrees. COPD: pt on long term steroids for her COPD. Seeing Dr. Rodney Messer. LOBO. fib: Patient still on Coumadin. This was recently checked and her INR was at 2.6 ROS Review of Systems Constitutional: Negative for chills, fever and weight loss. Respiratory: Negative for cough and shortness of breath. Cardiovascular: Negative for chest pain, palpitations and leg swelling. Gastrointestinal: Negative for abdominal pain, nausea and vomiting. Genitourinary: Negative for dysuria, frequency, hematuria and urgency. Musculoskeletal: Positive for joint pain. Neurological: Negative. Endo/Heme/Allergies: Does not bruise/bleed easily. Psychiatric/Behavioral: Negative for depression. The patient is not nervous/anxious. Visit Vitals /74 (BP 1 Location: Left arm, BP Patient Position: Sitting) Pulse 82 Temp 98.2 °F (36.8 °C) (Oral) Resp 16 Ht 5' 4\" (1.626 m) Wt 148 lb (67.1 kg) SpO2 98% BMI 25.40 kg/m² Physical Exam  
Constitutional: She is oriented to person, place, and time. She appears well-developed and well-nourished. HENT:  
Head: Normocephalic and atraumatic. Neck: Normal range of motion. Neck supple. No thyromegaly present. Cardiovascular: Normal rate and regular rhythm. No murmur heard. Pulmonary/Chest: Effort normal. No respiratory distress. Abdominal: Soft. Bowel sounds are normal. She exhibits no distension. Musculoskeletal:  
Resolving hematoma to R lateral hip. Neurological: She is alert and oriented to person, place, and time. Skin: Skin is warm and dry. Psychiatric: She has a normal mood and affect.  Her behavior is normal.  
 
 
 
 Current Outpatient Medications Medication Sig  cholecalciferol, vitamin D3, (VITAMIN D3) 2,000 unit tab Take  by mouth.  alendronate (FOSAMAX) 70 mg tablet Take 1 Tab by mouth every seven (7) days.  predniSONE (DELTASONE) 5 mg tablet Take 10 mg by mouth every other day.  albuterol (PROVENTIL HFA, VENTOLIN HFA, PROAIR HFA) 90 mcg/actuation inhaler Take 1 Puff by inhalation every six (6) hours as needed for Wheezing.  loperamide (IMODIUM) 1 mg/5 mL solution Take 2 mg by mouth nightly.  warfarin (COUMADIN) 5 mg tablet Take 5 mg by mouth five (5) days a week. Take 5 mg on Tuesday, Thursday, Friday, Saturday, and Sunday  warfarin (COUMADIN) 5 mg tablet Take 2.5 mg by mouth two (2) days a week. Take 2.5 mg on Mondays and Wednesdays  levothyroxine (SYNTHROID) 88 mcg tablet Take 1 Tab by mouth Daily (before breakfast).  raNITIdine (ZANTAC) 150 mg tablet TAKE 1 TABLET BY MOUTH TWICE A DAY  ZOLOFT 50 mg tablet TAKE 1 TABLET BY MOUTH EVERY DAY  simvastatin (ZOCOR) 20 mg tablet TAKE 1 TABLET BY MOUTH NIGHTLY  carvedilol (COREG) 3.125 mg tablet TAKE 1 TABLET BY MOUTH TWICE A DAY WITH MEALS  Cholestyramine, Bulk, powd 4 g by Does Not Apply route daily.  baclofen (LIORESAL) 10 mg tablet Take 5 mg by mouth two (2) times a day.  DENTA 5000 PLUS 1.1 % crea Apply 1 mg to affected area as needed (to prevent cavities).  desonide (TRIDESILON) 0.05 % cream Apply  to affected area two (2) times a day.  multivitamin (ONE A DAY) tablet Take 1 Tab by mouth daily.  ADVAIR DISKUS 250-50 mcg/dose diskus inhaler Take 1 Puff by inhalation two (2) times a day.  acetaminophen (TYLENOL) 500 mg tablet Take 650 mg by mouth every six (6) hours as needed for Pain. (2) Tablet in am (1) tablet in pm (2) Tablet pm  
 tiotropium (SPIRIVA WITH HANDIHALER) 18 mcg inhalation capsule Take 1 Cap by inhalation daily. No current facility-administered medications for this visit. Past Medical History:  
Diagnosis Date  Arthritis  Atrial fibrillation ([facility]   
 av node ablation 5/22/98 with placement of CPI #[id number] dual chamber pacemaker subsequently replaced with a single chamber medtronic #[id number] single chamber pacemaker 7/25/05  Cancer [facility]) [id number]  
 colon cancer w/ resection  COPD  Depression  GERD (gastroesophageal reflux disease)  Hyperlipidemia  Hypertension  Ischemic cardiomyopathy  Migraine headache  Orthostatic hypotension  RADHA (obstructive sleep apnea)  Pacemaker 5/22/98 AV node ablation /pacemaker placement utilizing CPI # [id number] dual chamber system, medtronic #[id number] single chamber device placed 7/22/05  Pulmonary hypertension ([facility] 9/26/2011 RVSP 50 on echo 8/14  Thyroid disease  Valvular heart disease   
 mild-mod MR/TR Past Surgical History:  
Procedure Laterality Date  BREAST SURGERY PROCEDURE UNLISTED    
 benign breast tumor excision right breast  
 HX BREAST BIOPSY Right 2002  
 neg; surgical bx  HX COLECTOMY  1974  
 colon  HX KNEE REPLACEMENT    
 right TKA  HX PACEMAKER    
 HX TUBAL LIGATION    
 TOTAL KNEE ARTHROPLASTY    
 total on R, partial on L Social History Tobacco Use  Smoking status: Passive Smoke Exposure - Never Smoker  Smokeless tobacco: Never Used Substance Use Topics  Alcohol use: No  
  Alcohol/week: 0.0 oz Family History Problem Relation Age of Onset  Diabetes Mother  Heart Disease Mother  Heart Attack Mother  Heart Disease Father  Stroke Sister  Breast Cancer Sister 80  
 Cancer Maternal Aunt   
     uterus  Diabetes Maternal Uncle  Breast Cancer Daughter 61 Allergies Allergen Reactions  Cardizem [Diltiazem Hcl] Hives  Codeine Nausea and Vomiting  Darvocet A500 [Propoxyphene N-Acetaminophen] Nausea and Vomiting  Diltiazem Hives  Labetalol Nausea and Vomiting Dizzy/Disoriented  Pcn [Penicillins] Swelling Tongue Swelling Has previously tolerated cephalexin per daughter  Primidone Other (comments) Lightheaded/unsteady gait  Sulfa (Sulfonamide Antibiotics) Nausea and Vomiting Assessment/Plan Diagnoses and all orders for this visit: 1. Hospital discharge follow-up -patient seems to be having mechanical falls. Patient working with physical therapy for strengthening and balance training. Right hip is improving overall. 2. Chronic obstructive pulmonary disease, unspecified COPD type ([facility] -has been on prednisone for some time. Given her osteopenia we will treat her with a bisphosphonate. 3. Osteopenia, unspecified location -patient agrees to start once weekly Fosamax 
-     alendronate (FOSAMAX) 70 mg tablet; Take 1 Tab by mouth every seven (7) days. 4. Hypothyroidism due to acquired atrophy of thyroid -overtreated in the past we will need to repeat this 
-     TSH 3RD GENERATION 5. Atrial fibrillation, unspecified type ([facility] -on Coumadin hemoglobin stable 6. Pain of right hip joint -resolved small hematoma to the area 7. Frequent falls -#1 Follow-up Disposition: 
Return in about 6 weeks (around 3/21/2019). Leslye Braden MD 
2/7/2019

## 2019-02-08 LAB — TSH SERPL DL<=0.005 MIU/L-ACNC: 1.79 UIU/ML (ref 0.45–4.5)

## 2019-02-15 ENCOUNTER — TELEPHONE ANTICOAG (OUTPATIENT)
Dept: CARDIOLOGY CLINIC | Age: 84
End: 2019-02-15

## 2019-02-15 DIAGNOSIS — I25.5 ISCHEMIC CARDIOMYOPATHY: ICD-10-CM

## 2019-02-15 DIAGNOSIS — N18.30 CKD (CHRONIC KIDNEY DISEASE) STAGE 3, GFR 30-59 ML/MIN (HCC): ICD-10-CM

## 2019-02-15 DIAGNOSIS — I48.91 ATRIAL FIBRILLATION, UNSPECIFIED TYPE (HCC): Primary | ICD-10-CM

## 2019-02-15 LAB — INR, EXTERNAL: 2.7 (ref 2–3)

## 2019-02-21 ENCOUNTER — TELEPHONE ANTICOAG (OUTPATIENT)
Dept: CARDIOLOGY CLINIC | Age: 84
End: 2019-02-21

## 2019-02-21 ENCOUNTER — OFFICE VISIT (OUTPATIENT)
Dept: CARDIOLOGY CLINIC | Age: 84
End: 2019-02-21

## 2019-02-21 ENCOUNTER — CLINICAL SUPPORT (OUTPATIENT)
Dept: CARDIOLOGY CLINIC | Age: 84
End: 2019-02-21

## 2019-02-21 VITALS
BODY MASS INDEX: 25.13 KG/M2 | DIASTOLIC BLOOD PRESSURE: 80 MMHG | RESPIRATION RATE: 18 BRPM | WEIGHT: 147.2 LBS | HEIGHT: 64 IN | SYSTOLIC BLOOD PRESSURE: 130 MMHG | HEART RATE: 88 BPM | OXYGEN SATURATION: 94 %

## 2019-02-21 VITALS
WEIGHT: 147 LBS | SYSTOLIC BLOOD PRESSURE: 130 MMHG | HEART RATE: 88 BPM | DIASTOLIC BLOOD PRESSURE: 80 MMHG | BODY MASS INDEX: 25.1 KG/M2 | HEIGHT: 64 IN

## 2019-02-21 DIAGNOSIS — I48.20 CHRONIC ATRIAL FIBRILLATION (HCC): Primary | ICD-10-CM

## 2019-02-21 DIAGNOSIS — Z79.01 ANTICOAGULATED ON COUMADIN: ICD-10-CM

## 2019-02-21 DIAGNOSIS — I48.19 PERSISTENT ATRIAL FIBRILLATION (HCC): ICD-10-CM

## 2019-02-21 DIAGNOSIS — I44.2 CHB (COMPLETE HEART BLOCK) (HCC): ICD-10-CM

## 2019-02-21 DIAGNOSIS — E78.2 MIXED HYPERLIPIDEMIA: ICD-10-CM

## 2019-02-21 DIAGNOSIS — Z98.890 H/O ATRIOVENTRICULAR NODAL ABLATION: ICD-10-CM

## 2019-02-21 DIAGNOSIS — R29.6 FREQUENT FALLS: ICD-10-CM

## 2019-02-21 DIAGNOSIS — Z95.0 CARDIAC PACEMAKER IN SITU: Primary | ICD-10-CM

## 2019-02-21 DIAGNOSIS — I25.5 ISCHEMIC CARDIOMYOPATHY: Primary | ICD-10-CM

## 2019-02-21 DIAGNOSIS — N18.30 CKD (CHRONIC KIDNEY DISEASE) STAGE 3, GFR 30-59 ML/MIN (HCC): ICD-10-CM

## 2019-02-21 DIAGNOSIS — Z95.0 PACEMAKER: ICD-10-CM

## 2019-02-21 DIAGNOSIS — J43.8 OTHER EMPHYSEMA (HCC): ICD-10-CM

## 2019-02-21 DIAGNOSIS — I48.0 PAROXYSMAL ATRIAL FIBRILLATION (HCC): ICD-10-CM

## 2019-02-21 DIAGNOSIS — I25.5 ISCHEMIC CARDIOMYOPATHY: ICD-10-CM

## 2019-02-21 LAB — INR, EXTERNAL: 3.2 (ref 2–3)

## 2019-02-21 NOTE — PROGRESS NOTES
Identified pt with two pt identifiers(name and ). Reviewed record in preparation for visit and have obtained necessary documentation. No chief complaint on file. Visit Vitals  /80 (BP 1 Location: Right arm, BP Patient Position: Sitting)   Pulse 88   Resp 18   Ht 5' 4\" (1.626 m)   Wt 147 lb 3.2 oz (66.8 kg)   SpO2 94%   BMI 25.27 kg/m²         Health Maintenance Due   Topic    Shingrix Vaccine Age 50> (1 of 2)    GLAUCOMA SCREENING Q2Y        Coordination of Care Questionnaire:  :   1) Have you been to an emergency room, urgent care, or hospitalized since your last visit?   no   If yes, where when, and reason for visit? 2. Have seen or consulted any other health care provider since your last visit? NO  If yes, where when, and reason for visit? 3) Do you have an Advanced Directive/ Living Will in place? YES  If yes, do we have a copy on file YES  If no, would you like information NO    Patient is accompanied by daughter I have received verbal consent from Denis Lane to discuss any/all medical information while they are present in the room.

## 2019-02-21 NOTE — PROGRESS NOTES
HISTORY OF PRESENT ILLNESS  Abdon Cheema is a 80 y.o. female     SUMMARY:   Problem List  Date Reviewed: 2/20/2019          Codes Class Noted    Right hip pain ICD-10-CM: M25.551  ICD-9-CM: 719.45  1/13/2019        Frequent falls (Chronic) ICD-10-CM: R29.6  ICD-9-CM: V15.88  1/13/2019        COPD (chronic obstructive pulmonary disease) (HCC) (Chronic) ICD-10-CM: J44.9  ICD-9-CM: 496  1/13/2019        Chronic respiratory failure with hypoxia (HCC) (Chronic) ICD-10-CM: J96.11  ICD-9-CM: 518.83, 799.02  1/13/2019        Latent tuberculosis by blood test ICD-10-CM: R76.11  ICD-9-CM: 790.6  1/22/2018        Recurrent depression ([facility] ICD-10-CM: F33.9  ICD-9-CM: 296.30  1/8/2018        Pacemaker (Chronic) ICD-10-CM: Z95.0  ICD-9-CM: V45.01  7/24/2017        Advanced care planning/counseling discussion ICD-10-CM: Z71.89  ICD-9-CM: V65.49  5/11/2017    Overview Signed 5/11/2017 12:44 PM by Ke Vela RN     A copy of patient's completed Advanced Medical Directive is on file in the patient's medical record. NN reviewed Advanced Medical Directive document with patient & patient denies the need for changes/ updates. Psoriasis (Chronic) ICD-10-CM: L40.9  ICD-9-CM: 696.1  3/23/2017        Pseudophakia ICD-10-CM: Z96.1  ICD-9-CM: V43.1  11/14/2016        Cardiomyopathy ([facility] (Chronic) ICD-10-CM: I42.9  ICD-9-CM: 425.4  9/20/2016    Overview Addendum 9/20/2016  8:45 AM by Jose Eduardo Jamison MD     8/12 normal lexiscan cardiolyte, lvef 68%  8/16 echo lvef 35% multiple wall motion abnormalities, melissa, mod mr, mild ai, mod tr pa pressure 36mm  8/16 lexiscan mod reversible anterior defect, mostly fixed apical defect.  lvef 35%             Abnormal stress test ICD-10-CM: R94.39  ICD-9-CM: 794.39  9/13/2016        Ischemic cardiomyopathy ICD-10-CM: I25.5  ICD-9-CM: 414.8  Unknown        Chest pain ICD-10-CM: R07.9  ICD-9-CM: 786.50  8/10/2016        Nuclear cataract ICD-10-CM: SOW0738  ICD-9-CM: 366.16 6/6/2016        Posterior vitreous detachment ICD-10-CM: H43.819  ICD-9-CM: 379.21  6/6/2016        Restrictive lung disease ICD-10-CM: J98.4  ICD-9-CM: 518.89  12/14/2015        Osteopenia ICD-10-CM: M85.80  ICD-9-CM: 733.90  11/23/2015        Hypothyroidism due to acquired atrophy of thyroid ICD-10-CM: E03.4  ICD-9-CM: 244.8, 246.8  11/6/2015        CKD (chronic kidney disease) stage 3, GFR 30-59 ml/min (H[facility]) (Chronic) ICD-10-CM: N18.3  ICD-9-CM: 585.3  2/9/2015        Anxiety and depression (Chronic) ICD-10-CM: F41.9, F32.9  ICD-9-CM: 300.00, 311  2/26/2014        Colon cancer ([facility] ICD-10-CM: C18.9  ICD-9-CM: 153.9  2/26/2014    Overview Addendum 2/26/2014 11:12 AM by Bisi Cantu MD     Partial colectomy  Petey Alejandro             Hypothyroid (Chronic) ICD-10-CM: E03.9  ICD-9-CM: 244.9  2/26/2014        Pulmonary hypertension ([facility] ICD-10-CM: I27.20  ICD-9-CM: 416.8  9/26/2011        Atrial fibrillation (HCC) (Chronic) ICD-10-CM: I48.91  ICD-9-CM: 427.31  Unknown    Overview Signed 10/21/2010  8:16 AM by Teri Wynne     av node ablation 5/22/98 with placement of CPI #[id number] dual chamber pacemaker subsequently replaced with a single chamber medtronic #[id number] single chamber pacemaker 7/25/05             Mitral regurgitation ICD-10-CM: I34.0  ICD-9-CM: 424.0  10/21/2010        CHB (complete heart block) ([facility] ICD-10-CM: I44.2  ICD-9-CM: 426.0  10/21/2010        Orthostatic hypotension ICD-10-CM: I95.1  ICD-9-CM: 458.0  10/21/2010        Chest pain, unspecified ICD-10-CM: R07.9  ICD-9-CM: 786.50  10/21/2010        Mixed hyperlipidemia (Chronic) ICD-10-CM: O00.9  ICD-9-CM: 272.2  10/18/2010              Current Outpatient Medications on File Prior to Visit   Medication Sig    cholecalciferol, vitamin D3, (VITAMIN D3) 2,000 unit tab Take  by mouth.  alendronate (FOSAMAX) 70 mg tablet Take 1 Tab by mouth every seven (7) days.  predniSONE (DELTASONE) 5 mg tablet Take 10 mg by mouth every other day.     albuterol (PROVENTIL HFA, VENTOLIN HFA, PROAIR HFA) 90 mcg/actuation inhaler Take 1 Puff by inhalation every six (6) hours as needed for Wheezing.  loperamide (IMODIUM) 1 mg/5 mL solution Take 2 mg by mouth nightly.  warfarin (COUMADIN) 5 mg tablet Take 5 mg by mouth five (5) days a week. Take 5 mg on Tuesday, Thursday, Friday, Saturday, and Sunday    warfarin (COUMADIN) 5 mg tablet Take 2.5 mg by mouth two (2) days a week. Take 2.5 mg on Mondays and Wednesdays    levothyroxine (SYNTHROID) 88 mcg tablet Take 1 Tab by mouth Daily (before breakfast).  raNITIdine (ZANTAC) 150 mg tablet TAKE 1 TABLET BY MOUTH TWICE A DAY    ZOLOFT 50 mg tablet TAKE 1 TABLET BY MOUTH EVERY DAY    simvastatin (ZOCOR) 20 mg tablet TAKE 1 TABLET BY MOUTH NIGHTLY    carvedilol (COREG) 3.125 mg tablet TAKE 1 TABLET BY MOUTH TWICE A DAY WITH MEALS    baclofen (LIORESAL) 10 mg tablet Take 5 mg by mouth two (2) times a day.  DENTA 5000 PLUS 1.1 % crea Apply 1 mg to affected area as needed (to prevent cavities).  desonide (TRIDESILON) 0.05 % cream Apply  to affected area two (2) times a day.  multivitamin (ONE A DAY) tablet Take 1 Tab by mouth daily.  ADVAIR DISKUS 250-50 mcg/dose diskus inhaler Take 1 Puff by inhalation two (2) times a day.  acetaminophen (TYLENOL) 500 mg tablet Take 650 mg by mouth every six (6) hours as needed for Pain. (2) Tablet in am (1) tablet in pm (2) Tablet pm    tiotropium (SPIRIVA WITH HANDIHALER) 18 mcg inhalation capsule Take 1 Cap by inhalation daily. No current facility-administered medications on file prior to visit.         CARDIOLOGY STUDIES TO DATE:  Medtronic [id number] Pulse pacemaker, implanted 7/2005  Echo 2008 - EF 60%, mild LAE, mild AR, mild to mod MR, mild TR, mild PA HTN  Echo 2009 - EF 55%, bilateral atrial enlargement, mild MR, trace AR, mod TR, mild PA HTN  Lexiscan cardiolite 10/15/09 - normal EF 51-71 %  Echo 10/21/10 - EF 55-60%, bilateral atrial enlargement, mild concentric LVH, mild to mod MR, mild AI, mod to severe TI, pulmonary pressures mildly elevated 35mmhg  Echo 7/31/12 - EF 55-60%, ventricular septal paradoxical motion, LA markedly dilated, RA mild to mod dilated, mod MR, mod TR, mod PA HTN  Lexiscan 8/14/12 - normal EF 68%  Lexiscan 12/17/12 - normal, EF 65%  Pacemaker 8/21/13 - generator change, single chamber Medtronic, model # [id number]. Pacemaker Pocket Revision 1/24/14   Echo 8/20/14 - LVEF 45%, akinesis of distal lat and inf walls, mod-marked LAE, mild-mod MR, RVSP 50  MUGA Scan 8/29/14 - LVEF 53%  4/17 echo lvef 40% with apical hypo, melissa, mild to mod mr and tr without pul htn, mild ai  4/17 echo lvef 40% with apical hypo, melissa, mild to mod mr and tr without pul htn, mild ai      Chief Complaint   Patient presents with    Cardiomyopathy     HPI :  Ms. Aleksandra Padilla was hospitalized in January after a couple of falls. On one occasion she hurt her hip and another she hit her head and had to have some sutures. She has subsequently gone through the program at [address] [place] [address], which has markedly improved her stability and she has had no further falls. She still has back pain and shortness of breath with activity. She is wearing her oxygen more faithfully now, which helps significantly.           CARDIAC ROS:   negative for chest pain, palpitations, syncope, orthopnea, paroxysmal nocturnal dyspnea, exertional chest pressure/discomfort, claudication, lower extremity edema    Family History   Problem Relation Age of Onset    Diabetes Mother     Heart Disease Mother     Heart Attack Mother     Heart Disease Father     Stroke Sister     Breast Cancer Sister 80    Cancer Maternal Aunt         uterus    Diabetes Maternal Uncle     Breast Cancer Daughter 61       Past Medical History:   Diagnosis Date    Arthritis     Atrial fibrillation ([address]     av node ablation 5/22/98 with placement of CPI #[id number] dual chamber pacemaker subsequently replaced with a single chamber medtronic #[id number] single chamber pacemaker 7/25/05    Cancer ([address] 1970's    colon cancer w/ resection    COPD     Depression     GERD (gastroesophageal reflux disease)     Hyperlipidemia     Hypertension     Ischemic cardiomyopathy     Migraine headache     Orthostatic hypotension     RADHA (obstructive sleep apnea)     Pacemaker 5/22/98    AV node ablation /pacemaker placement utilizing CPI # [id number] dual chamber system, medtronic #[id number] single chamber device placed 7/22/05    Pulmonary hypertension ([address] 9/26/2011    RVSP 50 on echo 8/14    Thyroid disease     Valvular heart disease     mild-mod MR/TR       GENERAL ROS:  A comprehensive review of systems was negative except for that written in the HPI.     Visit Vitals  /80 (BP 1 Location: Right arm, BP Patient Position: Sitting)   Pulse 88   Resp 18   Ht 5' 4\" (1.626 m)   Wt 147 lb 3.2 oz (66.8 kg)   LMP 02/26/1970   SpO2 94%   BMI 25.27 kg/m²       Wt Readings from Last 3 Encounters:   02/21/19 147 lb 3.2 oz (66.8 kg)   02/07/19 148 lb (67.1 kg)   01/13/19 142 lb (64.4 kg)            BP Readings from Last 3 Encounters:   02/21/19 130/80   02/07/19 112/74   01/17/19 114/70       PHYSICAL EXAM  General appearance: alert, cooperative, no distress, appears stated age  Neurologic: Alert and oriented X 3  Neck: supple, symmetrical, trachea midline, no adenopathy, no carotid bruit and no JVD  Lungs: clear to auscultation bilaterally  Heart: irregularly irregular rhythm, S1, S2 normal, no S3 or S4  Extremities: extremities normal, atraumatic, no cyanosis or edema    Lab Results   Component Value Date/Time    Cholesterol, total 141 09/11/2018 03:10 PM    Cholesterol, total 247 (H) 09/12/2017 09:53 AM    Cholesterol, total 173 08/22/2016 08:45 AM    Cholesterol, total 157 08/11/2015 12:14 PM    Cholesterol, total 157 02/09/2015 08:34 AM    HDL Cholesterol 49 09/11/2018 03:10 PM    HDL Cholesterol 79 09/12/2017 09:53 AM    HDL Cholesterol 46 08/22/2016 08:45 AM    HDL Cholesterol 47 08/11/2015 12:14 PM    HDL Cholesterol 46 02/09/2015 08:34 AM    LDL, calculated 68 09/11/2018 03:10 PM    LDL, calculated 124 (H) 09/12/2017 09:53 AM    LDL, calculated 99 08/22/2016 08:45 AM    LDL, calculated 86 08/11/2015 12:14 PM    LDL, calculated 81 02/09/2015 08:34 AM    Triglyceride 119 09/11/2018 03:10 PM    Triglyceride 221 (H) 09/12/2017 09:53 AM    Triglyceride 140 08/22/2016 08:45 AM    Triglyceride 121 08/11/2015 12:14 PM    Triglyceride 149 02/09/2015 08:34 AM     ASSESSMENT  Ms. David Mcdermott is stable and fairly well compensated on a reasonable medical regimen. We have never been able to push her medications very far because of orthostatic hypotension, though it sounds like her recent falls were not related to syncope. We had a long talk about the pros and cons of stopping her Coumadin. Her INR does remain relatively stable, which is good in terms of bleeding risks, but if she continues to fall, I think her risk of serious injury, intracranial hemorrhage is probably as high as her risk of stroke. The daughter is going to discuss this further with her and let us know if they want to make any changes. current treatment plan is effective, no change in therapy  lab results and schedule of future lab studies reviewed with patient  reviewed diet, exercise and weight control    Encounter Diagnoses   Name Primary?  Ischemic cardiomyopathy Yes    Other emphysema (HCC)     Paroxysmal atrial fibrillation (HCC)     CKD (chronic kidney disease) stage 3, GFR 30-59 ml/min (HCC)     Mixed hyperlipidemia     Frequent falls     Pacemaker      No orders of the defined types were placed in this encounter. Follow-up Disposition:  Return in about 3 months (around 5/21/2019).     Kiara Laird MD  2/21/2019

## 2019-02-21 NOTE — PROGRESS NOTES
Cardiac Electrophysiology Office Note     Subjective:      Trevin Garcia is a 80 y.o. female patient who is seen for follow up  The patient is here with her daughter. She had been falling and was treated [facility] and then to [facility] for rehab. She is better but her daughter is concerned about frequent falls and the risk of taking Coumadin, but she is also concerned about having a stroke if she stops the anticoagulant. I have been seeing her for pacemaker. In the past, she was at [facility] for a biventricular pacemaker, however she did not have this done   LVEF was 40%. Coreg was decreased d/t dizziness. Aldactone/entresto d/c d/t hyperkalemia. She was kindly referred by Dr Kayleen Cruz used to be her EP physician, Medtronic pacemaker  implanted 7/2005, generator change 8/13/13. Hx of AV node ablation 5/1998. She is pacer dependent and hence she has LBBB  Hx of pacemaker pocket revision 1/2014. Dr Gem Del Toro took over after Dr Royce Cruz retired. She knew Dr Kayleen Pruitt from [facility] and wanted to switch after nuclear stress test was done last year by Dr Gem Del Toro that showed scar in LAD area with mild reversibility but she has no ischemic chest pain   She says Dr Kayleen Pruitt thought the risk outweighed the benefits in proceeding with cardiac cath d/t abnormal stress test.    Prior cardiac testing  Echo 8/20/14 - LVEF 45%, akinesis of distal lat and inf walls, mod-marked LAE, mild-mod MR, RVSP 50  MUGA Scan 8/29/14 - LVEF 53%  Nuclear stress test Lexiscan Cardiolite 8/24/16 - There is a large sized, mod-high grade, partially reversible defect involving mid-distal anterior wall and apex. The apical defect is high grade and fixed. Extent of ischemia is moderate in LAD territory. SSS = 17, SRS = 10, SDS = 6. LVEF 35% with apical akinesis.    Echo 8/2016 LVEF 35 %. Severe hypokinesis of the mid-apical inferior, apical septal, apical lateral, and apical wall(s). Severe LAE. Mild MAC and Moderate MR.  Aortic valve:  trileaflet. Leaflets exhibited mild calcification. Mild aortic regurgitation. Moderate TR and Mild PH.      Social History     Socioeconomic History    Marital status:      Spouse name: Not on file    Number of children: Not on file    Years of education: Not on file    Highest education level: Not on file   Social Needs    Financial resource strain: Not on file    Food insecurity - worry: Not on file    Food insecurity - inability: Not on file    Transportation needs - medical: Not on file   [employer] needs - non-medical: Not on file   Occupational History    Not on file   Tobacco Use    Smoking status: Passive Smoke Exposure - Never Smoker    Smokeless tobacco: Never Used   Substance and Sexual Activity    Alcohol use: No     Alcohol/week: 0.0 oz    Drug use: No    Sexual activity: No   Other Topics Concern    Not on file   Social History Narrative    Not on file     Family History   Problem Relation Age of Onset    Diabetes Mother     Heart Disease Mother     Heart Attack Mother     Heart Disease Father     Stroke Sister     Breast Cancer Sister 80    Cancer Maternal Aunt         uterus    Diabetes Maternal Uncle     Breast Cancer Daughter 61         Patient Active Problem List    Diagnosis Date Noted    Right hip pain 01/13/2019    Frequent falls 01/13/2019    COPD (chronic obstructive pulmonary disease) ([facility] 01/13/2019    Chronic respiratory failure with hypoxia ([facility] 01/13/2019    Latent tuberculosis by blood test 01/22/2018    Recurrent depression ([facility] 01/08/2018    Pacemaker 07/24/2017    Advanced care planning/counseling discussion 05/11/2017    Psoriasis 03/23/2017    Pseudophakia 11/14/2016    Cardiomyopathy ([facility] 09/20/2016    Abnormal stress test 09/13/2016    Ischemic cardiomyopathy     Chest pain 08/10/2016    Nuclear cataract 06/06/2016    Posterior vitreous detachment 06/06/2016    Restrictive lung disease 12/14/2015    Osteopenia 11/23/2015    Hypothyroidism due to acquired atrophy of thyroid 11/06/2015    CKD (chronic kidney disease) stage 3, GFR 30-59 ml/min ([facility] 02/09/2015    Anxiety and depression 02/26/2014    Colon cancer ([facility] 02/26/2014    Hypothyroid 02/26/2014    Pulmonary hypertension ([facility] 09/26/2011    Mitral regurgitation 10/21/2010    CHB (complete heart block) ([facility] 10/21/2010    Orthostatic hypotension 10/21/2010    Chest pain, unspecified 10/21/2010    Atrial fibrillation (HCC)     Mixed hyperlipidemia 10/18/2010     Current Outpatient Medications   Medication Sig Dispense Refill    cholecalciferol, vitamin D3, (VITAMIN D3) 2,000 unit tab Take  by mouth.  alendronate (FOSAMAX) 70 mg tablet Take 1 Tab by mouth every seven (7) days. 12 Tab 3    predniSONE (DELTASONE) 5 mg tablet Take 10 mg by mouth every other day.  albuterol (PROVENTIL HFA, VENTOLIN HFA, PROAIR HFA) 90 mcg/actuation inhaler Take 1 Puff by inhalation every six (6) hours as needed for Wheezing.  loperamide (IMODIUM) 1 mg/5 mL solution Take 2 mg by mouth nightly.  warfarin (COUMADIN) 5 mg tablet Take 5 mg by mouth five (5) days a week. Take 5 mg on Tuesday, Thursday, Friday, Saturday, and Sunday      warfarin (COUMADIN) 5 mg tablet Take 2.5 mg by mouth two (2) days a week. Take 2.5 mg on Mondays and Wednesdays      levothyroxine (SYNTHROID) 88 mcg tablet Take 1 Tab by mouth Daily (before breakfast). 30 Tab 5    raNITIdine (ZANTAC) 150 mg tablet TAKE 1 TABLET BY MOUTH TWICE A  Tab 1    ZOLOFT 50 mg tablet TAKE 1 TABLET BY MOUTH EVERY DAY 90 Tab 2    simvastatin (ZOCOR) 20 mg tablet TAKE 1 TABLET BY MOUTH NIGHTLY 90 Tab 3    carvedilol (COREG) 3.125 mg tablet TAKE 1 TABLET BY MOUTH TWICE A DAY WITH MEALS 180 Tab 1    baclofen (LIORESAL) 10 mg tablet Take 5 mg by mouth two (2) times a day.  DENTA 5000 PLUS 1.1 % crea Apply 1 mg to affected area as needed (to prevent cavities).   2    desonide (TRIDESILON) 0.05 % cream Apply  to affected area two (2) times a day. 15 g 0    multivitamin (ONE A DAY) tablet Take 1 Tab by mouth daily.  ADVAIR DISKUS 250-50 mcg/dose diskus inhaler Take 1 Puff by inhalation two (2) times a day.  acetaminophen (TYLENOL) 500 mg tablet Take 650 mg by mouth every six (6) hours as needed for Pain. (2) Tablet in am (1) tablet in pm (2) Tablet pm  0    tiotropium (SPIRIVA WITH HANDIHALER) 18 mcg inhalation capsule Take 1 Cap by inhalation daily.        Allergies   Allergen Reactions    Cardizem [Diltiazem Hcl] Hives    Codeine Nausea and Vomiting    Darvocet A500 [Propoxyphene N-Acetaminophen] Nausea and Vomiting    Diltiazem Hives    Labetalol Nausea and Vomiting     Dizzy/Disoriented     Pcn [Penicillins] Swelling     Tongue Swelling   Has previously tolerated cephalexin per daughter    Primidone Other (comments)     Lightheaded/unsteady gait    Sulfa (Sulfonamide Antibiotics) Nausea and Vomiting     Past Medical History:   Diagnosis Date    Arthritis     Atrial fibrillation (HCC)     av node ablation 5/22/98 with placement of CPI #[id number] dual chamber pacemaker subsequently replaced with a single chamber medtronic #[id number] single chamber pacemaker 7/25/05    Cancer ([address] 1970's    colon cancer w/ resection    COPD     Depression     GERD (gastroesophageal reflux disease)     Hyperlipidemia     Hypertension     Ischemic cardiomyopathy     Migraine headache     Orthostatic hypotension     RADHA (obstructive sleep apnea)     Pacemaker 5/22/98    AV node ablation /pacemaker placement utilizing CPI # [id number] dual chamber system, medtronic #[id number] single chamber device placed 7/22/05    Pulmonary hypertension ([address] 9/26/2011    RVSP 50 on echo 8/14    Thyroid disease     Valvular heart disease     mild-mod MR/TR     Past Surgical History:   Procedure Laterality Date    BREAST SURGERY PROCEDURE UNLISTED      benign breast tumor excision right breast    HX BREAST BIOPSY Right 2002    neg; surgical bx    HX COLECTOMY  1974    colon    HX KNEE REPLACEMENT      right TKA    HX PACEMAKER      HX TUBAL LIGATION      TOTAL KNEE ARTHROPLASTY      total on R, partial on L     Family History   Problem Relation Age of Onset    Diabetes Mother     Heart Disease Mother     Heart Attack Mother     Heart Disease Father     Stroke Sister     Breast Cancer Sister 80    Cancer Maternal Aunt         uterus    Diabetes Maternal Uncle     Breast Cancer Daughter 61     Social History     Tobacco Use    Smoking status: Passive Smoke Exposure - Never Smoker    Smokeless tobacco: Never Used   Substance Use Topics    Alcohol use: No     Alcohol/week: 0.0 oz        Review of Systems:   Constitutional: Negative for fever, chills, weight loss  HEENT: Negative for nosebleeds, vision changes. Respiratory: Negative for cough, hemoptysis, sputum production, and wheezing. Cardiovascular: Negative for chest pain, palpitations, orthopnea, claudication, leg swelling, syncope, and PND. Gastrointestinal: Negative for nausea, vomiting, diarrhea, constipation, blood in stool and melena. Genitourinary: Negative for dysuria, and hematuria. Musculoskeletal: Negative for myalgias, + arthralgia. Skin: Negative for rash. Heme: Does not bleed or bruise easily. Neurological: Negative for speech change and focal weakness  + falls     Objective:     Visit Vitals  /80   Pulse 88   Ht 5' 4\" (1.626 m)   Wt 147 lb (66.7 kg)   LMP 02/26/1970   BMI 25.23 kg/m²      Physical Exam:   Constitutional: Well-nourished. No distress. Rolling walker. Head: Normocephalic and atraumatic. Eyes: Pupils are equal, round  Neck: supple. No JVD present. Cardiovascular: Normal rate, regular rhythm. Exam reveals no gallop and no friction rub. No murmur heard. Pulmonary/Chest: Effort normal and breath sounds normal. No wheezes. Abdominal: Soft, no tenderness. Musculoskeletal: no edema.    Neurological: alert,oriented. walk with walker  Skin: Skin is warm and dry. Left side ppm scar flat and healed. Psychiatric: normal mood and affect. Behavior is normal. Judgment and thought content normal.      EK2016 ventricular paced, LBBB  msec    Assessment/Plan:       ICD-10-CM ICD-9-CM    1. Cardiac pacemaker in situ Z95.0 V45.01    2. Persistent atrial fibrillation (HCC) I48.1 427.31    3. Anticoagulated on Coumadin Z51.81 V58.83     Z79.01 V58.61    4. CHB (complete heart block) (HCC) I44.2 426.0    5. H/O atrioventricular citlaly ablation Z98.890 V15.1         I have explained to the patient and her daughter about the risks of falls and intracranial hemorrhage. I am more concerned about Coumadin and uncontrollable therapeutic level of INR and her risk of bleeding. We discussed the role of Eliquis and her daughter will call in to see if it is covered. Due to her previous normal renal function, she can use 5 mg twice a day of Eliquis. Her pacemaker functions properly. She is pacemaker dependent. Her echocardiogram is pending today for Dr. Keven Willams to review. IN the past, we were considering biventricular pacemaker upgrade due to the systolic dysfunction and LV dyssynchrony with chronic RV pacing. Her battery of the pacemaker is estimated to be about one year left. I have explained to her and her daughter about the generator change with or without upgrade to biventricular pacemaker at the same time. Symptomatically, she is doing better. She has not fallen since she got home.    Future Appointments   Date Time Provider Cuca Willis   3/21/2019 10:30 AM Larisa Camarillo MD [address]   2019  9:45 AM PACEMAKER3, [address]   2019 10:00 AM Marv Jansen  [address]   2019 10:00 AM Marv Jansen  [address]   2020 10:20 AM Addy Ureña  [address]       Thank you for involving me in this patient's care and please call with further concerns or questions. Dong Ayers M.D.   Electrophysiology/Cardiology  [facility]  [address] [facility], [facility] [place] [address] [place] [address]  [phone]

## 2019-02-22 ENCOUNTER — TELEPHONE (OUTPATIENT)
Dept: CARDIOLOGY CLINIC | Age: 84
End: 2019-02-22

## 2019-02-22 NOTE — TELEPHONE ENCOUNTER
----- Message from Juarez Loo MD sent at 2/22/2019  8:27 AM EST -----  Left ventricular function is slightly worse from 40% to 30-35%  Will ask if she wants to try low dose entresto or just continue to follow up as is

## 2019-02-27 ENCOUNTER — TELEPHONE ANTICOAG (OUTPATIENT)
Dept: CARDIOLOGY CLINIC | Age: 84
End: 2019-02-27

## 2019-02-27 DIAGNOSIS — N18.30 CKD (CHRONIC KIDNEY DISEASE) STAGE 3, GFR 30-59 ML/MIN (HCC): ICD-10-CM

## 2019-02-27 DIAGNOSIS — I48.91 ATRIAL FIBRILLATION, UNSPECIFIED TYPE (HCC): Primary | ICD-10-CM

## 2019-02-27 DIAGNOSIS — I25.5 ISCHEMIC CARDIOMYOPATHY: ICD-10-CM

## 2019-02-27 LAB — INR, EXTERNAL: 2.5 (ref 2–3)

## 2019-03-05 ENCOUNTER — TELEPHONE (OUTPATIENT)
Dept: INTERNAL MEDICINE CLINIC | Age: 84
End: 2019-03-05

## 2019-03-05 NOTE — TELEPHONE ENCOUNTER
Please call Pt's daughter, pt was pt today and complaining of confusion last few days and Pt is asking for her to have urine test. Can she come here to get one w/ out an appt. Please call daughter.

## 2019-03-07 ENCOUNTER — TELEPHONE ANTICOAG (OUTPATIENT)
Dept: CARDIOLOGY CLINIC | Age: 84
End: 2019-03-07

## 2019-03-07 DIAGNOSIS — I25.5 ISCHEMIC CARDIOMYOPATHY: ICD-10-CM

## 2019-03-07 DIAGNOSIS — N18.30 CKD (CHRONIC KIDNEY DISEASE) STAGE 3, GFR 30-59 ML/MIN (HCC): ICD-10-CM

## 2019-03-07 DIAGNOSIS — I48.91 ATRIAL FIBRILLATION, UNSPECIFIED TYPE (HCC): Primary | ICD-10-CM

## 2019-03-07 LAB
INR, EXTERNAL: 3.6 (ref 2–3)
INR, EXTERNAL: 3.7 (ref 2–3)

## 2019-03-08 ENCOUNTER — OFFICE VISIT (OUTPATIENT)
Dept: INTERNAL MEDICINE CLINIC | Age: 84
End: 2019-03-08

## 2019-03-08 ENCOUNTER — HOSPITAL ENCOUNTER (OUTPATIENT)
Dept: LAB | Age: 84
Discharge: HOME | End: 2019-03-08
Payer: MEDICARE

## 2019-03-08 VITALS
TEMPERATURE: 98.1 F | SYSTOLIC BLOOD PRESSURE: 138 MMHG | WEIGHT: 153.8 LBS | BODY MASS INDEX: 26.26 KG/M2 | RESPIRATION RATE: 14 BRPM | HEIGHT: 64 IN | HEART RATE: 86 BPM | DIASTOLIC BLOOD PRESSURE: 81 MMHG | OXYGEN SATURATION: 95 %

## 2019-03-08 DIAGNOSIS — N30.00 ACUTE CYSTITIS WITHOUT HEMATURIA: Primary | ICD-10-CM

## 2019-03-08 DIAGNOSIS — R29.6 FREQUENT FALLS: ICD-10-CM

## 2019-03-08 DIAGNOSIS — R42 DIZZINESS: ICD-10-CM

## 2019-03-08 LAB
BILIRUB UR QL STRIP: NORMAL
GLUCOSE UR-MCNC: NEGATIVE MG/DL
KETONES P FAST UR STRIP-MCNC: NORMAL MG/DL
PH UR STRIP: 5.5 [PH] (ref 4.6–8)
PROT UR QL STRIP: NORMAL
SP GR UR STRIP: 1.02 (ref 1–1.03)
UA UROBILINOGEN AMB POC: NORMAL (ref 0.2–1)
URINALYSIS CLARITY POC: NORMAL
URINALYSIS COLOR POC: NORMAL
URINE BLOOD POC: NORMAL
URINE LEUKOCYTES POC: NORMAL
URINE NITRITES POC: NEGATIVE

## 2019-03-08 PROCEDURE — 87086 URINE CULTURE/COLONY COUNT: CPT

## 2019-03-08 RX ORDER — CEPHALEXIN 500 MG/1
1 TABLET ORAL 2 TIMES DAILY
Qty: 10 TAB | Refills: 0 | Status: SHIPPED | OUTPATIENT
Start: 2019-03-08 | End: 2019-03-21

## 2019-03-08 NOTE — PATIENT INSTRUCTIONS
Urinary Tract Infection in Women: Care Instructions  Your Care Instructions    A urinary tract infection, or UTI, is a general term for an infection anywhere between the kidneys and the urethra (where urine comes out). Most UTIs are bladder infections. They often cause pain or burning when you urinate. UTIs are caused by bacteria and can be cured with antibiotics. Be sure to complete your treatment so that the infection goes away. Follow-up care is a key part of your treatment and safety. Be sure to make and go to all appointments, and call your doctor if you are having problems. It's also a good idea to know your test results and keep a list of the medicines you take. How can you care for yourself at home? · Take your antibiotics as directed. Do not stop taking them just because you feel better. You need to take the full course of antibiotics. · Drink extra water and other fluids for the next day or two. This may help wash out the bacteria that are causing the infection. (If you have kidney, heart, or liver disease and have to limit fluids, talk with your doctor before you increase your fluid intake.)  · Avoid drinks that are carbonated or have caffeine. They can irritate the bladder. · Urinate often. Try to empty your bladder each time. · To relieve pain, take a hot bath or lay a heating pad set on low over your lower belly or genital area. Never go to sleep with a heating pad in place. To prevent UTIs  · Drink plenty of water each day. This helps you urinate often, which clears bacteria from your system. (If you have kidney, heart, or liver disease and have to limit fluids, talk with your doctor before you increase your fluid intake.)  · Urinate when you need to. · Urinate right after you have sex. · Change sanitary pads often. · Avoid douches, bubble baths, feminine hygiene sprays, and other feminine hygiene products that have deodorants.   · After going to the bathroom, wipe from front to back.  When should you call for help? Call your doctor now or seek immediate medical care if:    · Symptoms such as fever, chills, nausea, or vomiting get worse or appear for the first time.     · You have new pain in your back just below your rib cage. This is called flank pain.     · There is new blood or pus in your urine.     · You have any problems with your antibiotic medicine.    Watch closely for changes in your health, and be sure to contact your doctor if:    · You are not getting better after taking an antibiotic for 2 days.     · Your symptoms go away but then come back. Where can you learn more? Go to http://markel-nicolasa.info/. Enter J221 in the search box to learn more about \"Urinary Tract Infection in Women: Care Instructions. \"  Current as of: March 20, 2018  Content Version: 11.9  © 9378-9710 [employer]. Care instructions adapted under license by [employer] (which disclaims liability or warranty for this information). If you have questions about a medical condition or this instruction, always ask your healthcare professional. [address] any warranty or liability for your use of this information.

## 2019-03-08 NOTE — PROGRESS NOTES
HISTORY OF PRESENT ILLNESS  Sohail Amato is a 80 y.o. female. HPI  Presents accompanied by her daughter-in-law with concerns about possible UTI; was at PT yesterday for gait strengthening and mentioned to therapist that she has some dizziness with position changes. Today she states that this has been a ongoing issue for her and has not increased recently. Having issues with frequent falls. Notes some urinary frequency, urgency over the last several days but denies any burning. Denies fever, chills, abdominal pain, flank pain, nausea, vomiting. Has not noted any blood in urine.     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 atrophy of thyroid E03.4    Osteopenia M85.80    Restrictive lung disease J98.4    Chest pain R07.9    Abnormal stress test R94.39    Ischemic cardiomyopathy I25.5    Cardiomyopathy ([facility]) I42.9    Psoriasis L40.9    Advanced care planning/counseling discussion Z71.89    Pacemaker Z95.0    Recurrent depression ([facility] F33.9    Latent tuberculosis by blood test R76.11    Nuclear cataract [id number]    Posterior vitreous detachment H43.819    Pseudophakia Z96.1    Right hip pain M25.551    Frequent falls R29.6    COPD (chronic obstructive pulmonary disease) ([facility]) J44.9    Chronic respiratory failure with hypoxia ([facility]) J96.11     Past Surgical History:   Procedure Laterality Date    BREAST SURGERY PROCEDURE UNLISTED      benign breast tumor excision right breast    HX BREAST BIOPSY Right 2002    neg; surgical bx    HX COLECTOMY  1974 colon    HX KNEE REPLACEMENT      right TKA    HX PACEMAKER      HX TUBAL LIGATION      TOTAL KNEE ARTHROPLASTY      total on R, partial on L     Social History     Socioeconomic History    Marital status:      Spouse name: Not on file    Number of children: Not on file    Years of education: Not on file    Highest education level: Not on file   Social Needs    Financial resource strain: Not on file    Food insecurity - worry: Not on file    Food insecurity - inability: Not on file   [employer] needs - medical: Not on file   [employer] needs - non-medical: Not on file   Occupational History    Not on file   Tobacco Use    Smoking status: Passive Smoke Exposure - Never Smoker    Smokeless tobacco: Never Used   Substance and Sexual Activity    Alcohol use: No     Alcohol/week: 0.0 oz    Drug use: No    Sexual activity: No   Other Topics Concern    Not on file   Social History Narrative    Not on file     Family History   Problem Relation Age of Onset    Diabetes Mother     Heart Disease Mother     Heart Attack Mother     Heart Disease Father     Stroke Sister     Breast Cancer Sister 80    Cancer Maternal Aunt         uterus    Diabetes Maternal Uncle     Breast Cancer Daughter 61     Current Outpatient Medications   Medication Sig    cephalexin 500 mg tab Take 1 Tab by mouth two (2) times a day. Please dispense tablet form not capsule    cholecalciferol, vitamin D3, (VITAMIN D3) 2,000 unit tab Take  by mouth.  alendronate (FOSAMAX) 70 mg tablet Take 1 Tab by mouth every seven (7) days.  predniSONE (DELTASONE) 5 mg tablet Take 10 mg by mouth every other day.  albuterol (PROVENTIL HFA, VENTOLIN HFA, PROAIR HFA) 90 mcg/actuation inhaler Take 1 Puff by inhalation every six (6) hours as needed for Wheezing.  loperamide (IMODIUM) 1 mg/5 mL solution Take 2 mg by mouth nightly.  warfarin (COUMADIN) 5 mg tablet Take 5 mg by mouth five (5) days a week.  Take 5 mg on Tuesday, Thursday, Friday, Saturday, and Sunday    warfarin (COUMADIN) 5 mg tablet Take 2.5 mg by mouth two (2) days a week. Take 2.5 mg on Mondays and Wednesdays    levothyroxine (SYNTHROID) 88 mcg tablet Take 1 Tab by mouth Daily (before breakfast).  raNITIdine (ZANTAC) 150 mg tablet TAKE 1 TABLET BY MOUTH TWICE A DAY    ZOLOFT 50 mg tablet TAKE 1 TABLET BY MOUTH EVERY DAY    simvastatin (ZOCOR) 20 mg tablet TAKE 1 TABLET BY MOUTH NIGHTLY    carvedilol (COREG) 3.125 mg tablet TAKE 1 TABLET BY MOUTH TWICE A DAY WITH MEALS    baclofen (LIORESAL) 10 mg tablet Take 5 mg by mouth two (2) times a day.  DENTA 5000 PLUS 1.1 % crea Apply 1 mg to affected area as needed (to prevent cavities).  desonide (TRIDESILON) 0.05 % cream Apply  to affected area two (2) times a day.  multivitamin (ONE A DAY) tablet Take 1 Tab by mouth daily.  ADVAIR DISKUS 250-50 mcg/dose diskus inhaler Take 1 Puff by inhalation two (2) times a day.  acetaminophen (TYLENOL) 500 mg tablet Take 650 mg by mouth every six (6) hours as needed for Pain. (2) Tablet in am (1) tablet in pm (2) Tablet pm    tiotropium (SPIRIVA WITH HANDIHALER) 18 mcg inhalation capsule Take 1 Cap by inhalation daily. No current facility-administered medications for this visit.       Allergies   Allergen Reactions    Cardizem [Diltiazem Hcl] Hives    Codeine Nausea and Vomiting    Darvocet A500 [Propoxyphene N-Acetaminophen] Nausea and Vomiting    Diltiazem Hives    Labetalol Nausea and Vomiting     Dizzy/Disoriented     Pcn [Penicillins] Swelling     Tongue Swelling   Has previously tolerated cephalexin per daughter    Primidone Other (comments)     Lightheaded/unsteady gait    Sulfa (Sulfonamide Antibiotics) Nausea and Vomiting     Immunization History   Administered Date(s) Administered    Influenza High Dose Vaccine PF 10/01/2014, 09/24/2015, 09/29/2016, 09/21/2017, 10/05/2018    Influenza Vaccine 12/01/2013    Influenza Vaccine Split 10/12/2012    Pneumococcal Conjugate (PCV-13) 11/23/2015    Pneumococcal Polysaccharide (PPSV-23) 02/27/2015    Pneumococcal Vaccine (Pcv) 12/15/2010    TB Skin Test (PPD) 01/01/2001    TB Skin Test (PPD) Intradermal 01/16/2018    TD Vaccine 04/02/2012    Tdap 01/02/2019    Varicella Virus Vaccine Live 09/12/2012    Zoster Vaccine, Live 12/01/2013       Review of Systems   Constitutional: Negative for chills and fever. HENT: Negative for congestion and sore throat. Respiratory: Negative for cough. Cardiovascular: Negative for chest pain and palpitations. Gastrointestinal: Negative for abdominal pain, nausea and vomiting. Genitourinary: Positive for frequency and urgency. Negative for dysuria, flank pain and hematuria. Neurological: Positive for dizziness. Negative for headaches. Physical Exam   Constitutional: She is oriented to person, place, and time. She appears well-developed and well-nourished. No distress. HENT:   Head: Normocephalic and atraumatic. Cardiovascular: Normal rate and regular rhythm. Pulmonary/Chest: Effort normal and breath sounds normal.   Abdominal: Soft. Bowel sounds are normal. There is tenderness (mild tenderness over suprapubic area). Neurological: She is alert and oriented to person, place, and time. Psychiatric: She has a normal mood and affect. Her behavior is normal.   Nursing note and vitals reviewed. ASSESSMENT and PLAN  Diagnoses and all orders for this visit:    1. Acute cystitis without hematuria --urine fam in color with specific gravity 1.025; advised patient to increase fluid intake. Will treat with Keflex and send out urine culture. -     CULTURE, URINE  -     cephalexin 500 mg tab; Take 1 Tab by mouth two (2) times a day. Please dispense tablet form not capsule  -     AMB POC URINALYSIS DIP STICK AUTO W/O MICRO    2. Dizziness --has had history of orthostatic hypotension and this appears unchanged.     3. Frequent falls --currently in gait strengthening with physical therapy.       lab results and schedule of future lab studies reviewed with patient  reviewed diet, exercise and weight control  reviewed medications and side effects in detail

## 2019-03-10 LAB — BACTERIA UR CULT: NORMAL

## 2019-03-11 ENCOUNTER — TELEPHONE (OUTPATIENT)
Dept: INTERNAL MEDICINE CLINIC | Age: 84
End: 2019-03-11

## 2019-03-11 NOTE — TELEPHONE ENCOUNTER
Patents daughter called to report that patient was seen for UTI last week by NP. Stated they are awaiting culture results. Reports that mother also has noticed weight gain and swelling. Mother's ankles also stated to swell so she started the PRN lasix medication given by DR. Joshua. Patient is taking 1/2 dose and it seems to be helping. Daughter just wanted Dr. Idalmis Samuel to be aware. Please call daughter back if you have any questions regarding.  [phone]

## 2019-03-13 ENCOUNTER — TELEPHONE ANTICOAG (OUTPATIENT)
Dept: CARDIOLOGY CLINIC | Age: 84
End: 2019-03-13

## 2019-03-13 DIAGNOSIS — I48.91 ATRIAL FIBRILLATION, UNSPECIFIED TYPE (HCC): Primary | ICD-10-CM

## 2019-03-13 DIAGNOSIS — I25.5 ISCHEMIC CARDIOMYOPATHY: ICD-10-CM

## 2019-03-13 DIAGNOSIS — N18.30 CKD (CHRONIC KIDNEY DISEASE) STAGE 3, GFR 30-59 ML/MIN (HCC): ICD-10-CM

## 2019-03-13 LAB — INR, EXTERNAL: 2 (ref 2–3)

## 2019-03-21 ENCOUNTER — OFFICE VISIT (OUTPATIENT)
Dept: INTERNAL MEDICINE CLINIC | Age: 84
End: 2019-03-21

## 2019-03-21 ENCOUNTER — TELEPHONE ANTICOAG (OUTPATIENT)
Dept: CARDIOLOGY CLINIC | Age: 84
End: 2019-03-21

## 2019-03-21 VITALS
HEART RATE: 86 BPM | RESPIRATION RATE: 16 BRPM | DIASTOLIC BLOOD PRESSURE: 72 MMHG | HEIGHT: 64 IN | BODY MASS INDEX: 25.27 KG/M2 | OXYGEN SATURATION: 94 % | TEMPERATURE: 98 F | SYSTOLIC BLOOD PRESSURE: 116 MMHG | WEIGHT: 148 LBS

## 2019-03-21 DIAGNOSIS — R29.6 FREQUENT FALLS: Chronic | ICD-10-CM

## 2019-03-21 DIAGNOSIS — N30.00 ACUTE CYSTITIS WITHOUT HEMATURIA: ICD-10-CM

## 2019-03-21 DIAGNOSIS — I48.91 ATRIAL FIBRILLATION, UNSPECIFIED TYPE (HCC): Chronic | ICD-10-CM

## 2019-03-21 DIAGNOSIS — I48.91 ATRIAL FIBRILLATION, UNSPECIFIED TYPE (HCC): Primary | ICD-10-CM

## 2019-03-21 DIAGNOSIS — E03.4 HYPOTHYROIDISM DUE TO ACQUIRED ATROPHY OF THYROID: Primary | Chronic | ICD-10-CM

## 2019-03-21 DIAGNOSIS — I25.5 ISCHEMIC CARDIOMYOPATHY: Chronic | ICD-10-CM

## 2019-03-21 DIAGNOSIS — Z79.52 LONG TERM SYSTEMIC STEROID USER: ICD-10-CM

## 2019-03-21 DIAGNOSIS — N18.30 CKD (CHRONIC KIDNEY DISEASE) STAGE 3, GFR 30-59 ML/MIN (HCC): Chronic | ICD-10-CM

## 2019-03-21 DIAGNOSIS — I25.5 ISCHEMIC CARDIOMYOPATHY: ICD-10-CM

## 2019-03-21 DIAGNOSIS — J43.8 OTHER EMPHYSEMA (HCC): Chronic | ICD-10-CM

## 2019-03-21 DIAGNOSIS — N18.30 CKD (CHRONIC KIDNEY DISEASE) STAGE 3, GFR 30-59 ML/MIN (HCC): ICD-10-CM

## 2019-03-21 LAB — INR, EXTERNAL: 2.3 (ref 2–3)

## 2019-03-21 NOTE — PROGRESS NOTES
Tavares Rahman is a 80 y.o. female who presents today for COPD; Irregular Heart Beat; and Hypothyroidism Aamir Castillo She has a history of  
Patient Active Problem List  
Diagnosis Code  Mixed hyperlipidemia E78.2  Atrial fibrillation ([facility]) I48.91  
 Mitral regurgitation I34.0  
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  
 Ischemic cardiomyopathy I25.5  Cardiomyopathy ([facility] I42.9  Psoriasis L40.9  Advanced care planning/counseling discussion Z70.80  
 Pacemaker Z95.0  Recurrent depression ([facility] F33.9  Latent tuberculosis by blood test R76.11  
 Nuclear cataract Prudy Navy  Posterior vitreous detachment H43.819  Pseudophakia Z96.1  Right hip pain M25.551  Frequent falls R29.6  COPD (chronic obstructive pulmonary disease) ([facility]) J44.9  Chronic respiratory failure with hypoxia ([facility]) J96.11  
 Long term systemic steroid user H56.34 Aamir Castillo Today patient is here for follow-up. Recurrent Falls: I am concerned of her recurrent falls that she has had. I last saw her in February after several hospitalizations due to falls and joint pains. These all seem to be mechanical falls. She had then finished physical therapy with Prinivil once. She is currently done with physical therapy. UTI: seen by NP on 3/8. Culture negative. She did finish her course of cephalexin. Staying hydrated. Urinary symptoms have resolved. Afib/CM: Patient is currently anticoagulated with Coumadin. She is following with the cardiologist.  INR today was 2.3. Last echo showed an ejection fraction in the low 30s. Fluid status is stable and she has not required diuretics recently. Hypothyroidism: Patient continued on levothyroxine. COPD: has been maintained on prednisone. Will be seeing Dr. Dyllan Singh next month. Still using PRN O2.  
 
ROS Review of Systems Constitutional: Negative for chills, fever and weight loss. HENT: Negative for congestion and sore throat. Eyes: Negative for blurred vision, double vision and photophobia. Respiratory: Positive for shortness of breath (Stable). Negative for cough, hemoptysis and sputum production. Cardiovascular: Negative for chest pain, palpitations and leg swelling. Gastrointestinal: Negative for abdominal pain, constipation, diarrhea, heartburn, nausea and vomiting. Genitourinary: Negative for dysuria, frequency and urgency. Musculoskeletal: Negative for joint pain and myalgias. No falls since last visit. Skin: Negative for rash. Neurological: Negative. Negative for dizziness and headaches. Endo/Heme/Allergies: Does not bruise/bleed easily. Psychiatric/Behavioral: Negative for memory loss and suicidal ideas. Visit Vitals /72 (BP 1 Location: Left arm, BP Patient Position: Sitting) Pulse 86 Temp 98 °F (36.7 °C) (Oral) Resp 16 Ht 5' 4\" (1.626 m) Wt 148 lb (67.1 kg) SpO2 94% BMI 25.40 kg/m² Physical Exam  
Constitutional: She is oriented to person, place, and time. She appears well-developed and well-nourished. HENT:  
Head: Normocephalic and atraumatic. Right Ear: External ear normal.  
Left Ear: External ear normal.  
Cardiovascular: Normal rate and regular rhythm. No murmur heard. Regular, pacer located to left upper chest  
Pulmonary/Chest: Effort normal. No stridor. No respiratory distress. No basilar crackles. No wheezing. Distant breath sounds Abdominal: Soft. Bowel sounds are normal. She exhibits no distension. There is no tenderness. Musculoskeletal: She exhibits no edema. Neurological: She is alert and oriented to person, place, and time. Skin: Skin is warm and dry. Psychiatric: She has a normal mood and affect. Her behavior is normal.  
 
 
 
Current Outpatient Medications Medication Sig  cholecalciferol, vitamin D3, (VITAMIN D3) 2,000 unit tab Take  by mouth.  alendronate (FOSAMAX) 70 mg tablet Take 1 Tab by mouth every seven (7) days.  predniSONE (DELTASONE) 5 mg tablet Take 10 mg by mouth every other day.  albuterol (PROVENTIL HFA, VENTOLIN HFA, PROAIR HFA) 90 mcg/actuation inhaler Take 1 Puff by inhalation every six (6) hours as needed for Wheezing.  loperamide (IMODIUM) 1 mg/5 mL solution Take 2 mg by mouth nightly.  warfarin (COUMADIN) 5 mg tablet Take 5 mg by mouth five (5) days a week. Take 5 mg on Tuesday, Thursday, Friday, Saturday, and Sunday  warfarin (COUMADIN) 5 mg tablet Take 2.5 mg by mouth two (2) days a week. Take 2.5 mg on Mondays and Wednesdays  levothyroxine (SYNTHROID) 88 mcg tablet Take 1 Tab by mouth Daily (before breakfast).  raNITIdine (ZANTAC) 150 mg tablet TAKE 1 TABLET BY MOUTH TWICE A DAY  ZOLOFT 50 mg tablet TAKE 1 TABLET BY MOUTH EVERY DAY  simvastatin (ZOCOR) 20 mg tablet TAKE 1 TABLET BY MOUTH NIGHTLY  carvedilol (COREG) 3.125 mg tablet TAKE 1 TABLET BY MOUTH TWICE A DAY WITH MEALS  
 baclofen (LIORESAL) 10 mg tablet Take 5 mg by mouth two (2) times a day.  DENTA 5000 PLUS 1.1 % crea Apply 1 mg to affected area as needed (to prevent cavities).  desonide (TRIDESILON) 0.05 % cream Apply  to affected area two (2) times a day.  multivitamin (ONE A DAY) tablet Take 1 Tab by mouth daily.  ADVAIR DISKUS 250-50 mcg/dose diskus inhaler Take 1 Puff by inhalation two (2) times a day.  acetaminophen (TYLENOL) 500 mg tablet Take 650 mg by mouth every six (6) hours as needed for Pain.  (2) Tablet in am (1) tablet in pm (2) Tablet pm  
  tiotropium (SPIRIVA WITH HANDIHALER) 18 mcg inhalation capsule Take 1 Cap by inhalation daily. No current facility-administered medications for this visit. Past Medical History:  
Diagnosis Date  Arthritis  Atrial fibrillation ([address]   
 av node ablation 5/22/98 with placement of CPI #[id number] dual chamber pacemaker subsequently replaced with a single chamber medtronic #[id number] single chamber pacemaker 7/25/05  Cancer [facility]) [id number]  
 colon cancer w/ resection  COPD  Depression  GERD (gastroesophageal reflux disease)  Hyperlipidemia  Hypertension  Ischemic cardiomyopathy  Migraine headache  Orthostatic hypotension  RADHA (obstructive sleep apnea)  Pacemaker 5/22/98 AV node ablation /pacemaker placement utilizing CPI # [id number] dual chamber system, medtronic #[id number] single chamber device placed 7/22/05  Pulmonary hypertension ([address] 9/26/2011 RVSP 50 on echo 8/14  Thyroid disease  Valvular heart disease   
 mild-mod MR/TR Past Surgical History:  
Procedure Laterality Date  BREAST SURGERY PROCEDURE UNLISTED    
 benign breast tumor excision right breast  
 HX BREAST BIOPSY Right 2002  
 neg; surgical bx  HX COLECTOMY  1974  
 colon  HX KNEE REPLACEMENT    
 right TKA  HX PACEMAKER    
 HX TUBAL LIGATION    
 TOTAL KNEE ARTHROPLASTY    
 total on R, partial on L Social History Tobacco Use  Smoking status: Passive Smoke Exposure - Never Smoker  Smokeless tobacco: Never Used Substance Use Topics  Alcohol use: No  
  Alcohol/week: 0.0 oz Family History Problem Relation Age of Onset  Diabetes Mother  Heart Disease Mother  Heart Attack Mother  Heart Disease Father  Stroke Sister  Breast Cancer Sister 80  
 Cancer Maternal Aunt   
     uterus  Diabetes Maternal Uncle  Breast Cancer Daughter 61 Allergies Allergen Reactions  Cardizem [Diltiazem Hcl] Hives  Codeine Nausea and Vomiting  Darvocet A500 [Propoxyphene N-Acetaminophen] Nausea and Vomiting  Diltiazem Hives  Labetalol Nausea and Vomiting Dizzy/Disoriented  Pcn [Penicillins] Swelling Tongue Swelling Has previously tolerated cephalexin per daughter  Primidone Other (comments) Lightheaded/unsteady gait  Sulfa (Sulfonamide Antibiotics) Nausea and Vomiting Assessment/Plan Diagnoses and all orders for this visit: 
 
1. Hypothyroidism due to acquired atrophy of thyroid -continue current therapy. We will repeat this in 3 months 2. Other emphysema ([facility] -seeing pulmonologist soon. We will try to to wean steroids 3. Long term systemic steroid user -patient on Fosamax to help with bone loss with steroid use. 4. Frequent falls -has done very well with physical therapy. Patient still doing weekly courses where she lives in [facility] 5. CKD (chronic kidney disease) stage 3, GFR 30-59 ml/min ([facility]) -repeat at follow-up 6. Atrial fibrillation, unspecified type ([facility] -following with cards 7. Ischemic cardiomyopathy -only using as needed Lasix. 8. Acute cystitis without hematuria -symptoms have resolved. Santa Metz MD 
3/21/2019

## 2019-03-28 ENCOUNTER — TELEPHONE ANTICOAG (OUTPATIENT)
Dept: CARDIOLOGY CLINIC | Age: 84
End: 2019-03-28

## 2019-03-28 DIAGNOSIS — I48.91 ATRIAL FIBRILLATION, UNSPECIFIED TYPE (HCC): Primary | ICD-10-CM

## 2019-03-28 DIAGNOSIS — N18.30 CKD (CHRONIC KIDNEY DISEASE) STAGE 3, GFR 30-59 ML/MIN (HCC): ICD-10-CM

## 2019-03-28 DIAGNOSIS — I25.5 ISCHEMIC CARDIOMYOPATHY: ICD-10-CM

## 2019-03-28 LAB — INR, EXTERNAL: 3.3 (ref 2–3)

## 2019-04-04 ENCOUNTER — TELEPHONE ANTICOAG (OUTPATIENT)
Dept: CARDIOLOGY CLINIC | Age: 84
End: 2019-04-04

## 2019-04-04 DIAGNOSIS — N18.30 CKD (CHRONIC KIDNEY DISEASE) STAGE 3, GFR 30-59 ML/MIN (HCC): ICD-10-CM

## 2019-04-04 DIAGNOSIS — I48.91 ATRIAL FIBRILLATION, UNSPECIFIED TYPE (HCC): Primary | ICD-10-CM

## 2019-04-04 DIAGNOSIS — I48.91 ATRIAL FIBRILLATION, UNSPECIFIED TYPE (HCC): ICD-10-CM

## 2019-04-04 DIAGNOSIS — I25.5 ISCHEMIC CARDIOMYOPATHY: ICD-10-CM

## 2019-04-04 LAB — INR, EXTERNAL: 2.4 (ref 2–3)

## 2019-04-04 RX ORDER — WARFARIN SODIUM 5 MG/1
TABLET ORAL
Qty: 90 TAB | Refills: 0 | Status: SHIPPED | OUTPATIENT
Start: 2019-04-04 | End: 2019-05-23

## 2019-04-04 RX ORDER — CARVEDILOL 3.12 MG/1
TABLET ORAL
Qty: 180 TAB | Refills: 1 | Status: SHIPPED | OUTPATIENT
Start: 2019-04-04 | End: 2019-10-10 | Stop reason: SDUPTHER

## 2019-04-04 NOTE — TELEPHONE ENCOUNTER
Requested Prescriptions     Signed Prescriptions Disp Refills    warfarin (COUMADIN) 5 mg tablet 90 Tab 0     Sig: TAKE 1 TABLET BY MOUTH DAILY AND 1/2 TABLET ON Tuesday & Thursday. OR AS ADVISED BY COUMADIN CLINIC.      Authorizing Provider: Bernard Gibbs     Ordering User: Johnathan Major carvedilol (COREG) 3.125 mg tablet 180 Tab 1     Sig: TAKE 1 TABLET BY MOUTH TWICE A DAY WITH MEALS     Authorizing Provider: Bernard Gibbs     Ordering User: Randal Luna    Per Dr. Prasanna Saxena verbal orders     Future Appointments   Date Time Provider Cuca Willis   5/23/2019  9:45 AM [id number], [address]   5/23/2019 10:00 AM [address] 6/20/2019 10:00 AM [address] 2/20/2020 10:20 AM Kris Damon  [address]

## 2019-04-19 ENCOUNTER — TELEPHONE ANTICOAG (OUTPATIENT)
Dept: CARDIOLOGY CLINIC | Age: 84
End: 2019-04-19

## 2019-04-19 DIAGNOSIS — I25.5 ISCHEMIC CARDIOMYOPATHY: ICD-10-CM

## 2019-04-19 DIAGNOSIS — N18.30 CKD (CHRONIC KIDNEY DISEASE) STAGE 3, GFR 30-59 ML/MIN (HCC): ICD-10-CM

## 2019-04-19 DIAGNOSIS — I48.91 ATRIAL FIBRILLATION, UNSPECIFIED TYPE (HCC): Primary | ICD-10-CM

## 2019-04-19 LAB — INR, EXTERNAL: 3 (ref 2–3)

## 2019-05-01 ENCOUNTER — TELEPHONE ANTICOAG (OUTPATIENT)
Dept: CARDIOLOGY CLINIC | Age: 84
End: 2019-05-01

## 2019-05-01 DIAGNOSIS — N18.30 CKD (CHRONIC KIDNEY DISEASE) STAGE 3, GFR 30-59 ML/MIN (HCC): ICD-10-CM

## 2019-05-01 DIAGNOSIS — I48.91 ATRIAL FIBRILLATION, UNSPECIFIED TYPE (HCC): Primary | ICD-10-CM

## 2019-05-01 DIAGNOSIS — I25.5 ISCHEMIC CARDIOMYOPATHY: ICD-10-CM

## 2019-05-01 LAB — INR, EXTERNAL: 2.9 (ref 2–3)

## 2019-05-07 ENCOUNTER — TELEPHONE (OUTPATIENT)
Dept: CARDIOLOGY CLINIC | Age: 84
End: 2019-05-07

## 2019-05-07 NOTE — TELEPHONE ENCOUNTER
Dr. Elias Cabot with [address] is requesting cardiac clearance for patient to have an epidural steroid injection. Clearance to include holding coumadin 4 days prior to procedure. Please advise. Fax #: [phone]. Phone #: [phone].

## 2019-05-23 ENCOUNTER — CLINICAL SUPPORT (OUTPATIENT)
Dept: CARDIOLOGY CLINIC | Age: 84
End: 2019-05-23

## 2019-05-23 ENCOUNTER — TELEPHONE (OUTPATIENT)
Dept: CARDIOLOGY CLINIC | Age: 84
End: 2019-05-23

## 2019-05-23 ENCOUNTER — OFFICE VISIT (OUTPATIENT)
Dept: CARDIOLOGY CLINIC | Age: 84
End: 2019-05-23

## 2019-05-23 VITALS
DIASTOLIC BLOOD PRESSURE: 78 MMHG | HEART RATE: 83 BPM | OXYGEN SATURATION: 95 % | RESPIRATION RATE: 17 BRPM | HEIGHT: 64 IN | BODY MASS INDEX: 25.27 KG/M2 | WEIGHT: 148 LBS | SYSTOLIC BLOOD PRESSURE: 130 MMHG

## 2019-05-23 DIAGNOSIS — I48.20 CHRONIC ATRIAL FIBRILLATION (HCC): Primary | ICD-10-CM

## 2019-05-23 DIAGNOSIS — Z95.0 CARDIAC PACEMAKER IN SITU: Primary | ICD-10-CM

## 2019-05-23 DIAGNOSIS — I48.91 ATRIAL FIBRILLATION, UNSPECIFIED TYPE (HCC): ICD-10-CM

## 2019-05-23 DIAGNOSIS — J43.8 OTHER EMPHYSEMA (HCC): ICD-10-CM

## 2019-05-23 DIAGNOSIS — I42.0 DILATED CARDIOMYOPATHY (HCC): ICD-10-CM

## 2019-05-23 DIAGNOSIS — I44.7 LBBB (LEFT BUNDLE BRANCH BLOCK): ICD-10-CM

## 2019-05-23 RX ORDER — FUROSEMIDE 40 MG/1
20 TABLET ORAL DAILY
COMMUNITY
End: 2019-09-19 | Stop reason: SDUPTHER

## 2019-05-23 NOTE — TELEPHONE ENCOUNTER
Patient's daughter, Manjeet Bautista, states that Dr. Hyacinth Albarran advised her that if the patient decided she wanted to start taking Eliquis, she could call to let you know. She is stating that the patient does want to start Eliquis and it can be called in to [facility] on [address] at [facility]. Please advise.     Phone #: [phone]  Thanks

## 2019-05-23 NOTE — PROGRESS NOTES
HISTORY OF PRESENT ILLNESS  Lucille Hester is a 80 y.o. female     SUMMARY:   Problem List  Date Reviewed: 5/22/2019          Codes Class Noted    Long term systemic steroid user ICD-10-CM: Z79.52  ICD-9-CM: V58.65  3/21/2019        Right hip pain ICD-10-CM: M25.551  ICD-9-CM: 719.45  1/13/2019        Frequent falls (Chronic) ICD-10-CM: R29.6  ICD-9-CM: V15.88  1/13/2019        COPD (chronic obstructive pulmonary disease) (HCC) (Chronic) ICD-10-CM: J44.9  ICD-9-CM: 496  1/13/2019        Chronic respiratory failure with hypoxia (HCC) (Chronic) ICD-10-CM: J96.11  ICD-9-CM: 518.83, 799.02  1/13/2019        Latent tuberculosis by blood test ICD-10-CM: R76.11  ICD-9-CM: 790.6  1/22/2018        Recurrent depression ([facility] ICD-10-CM: F33.9  ICD-9-CM: 296.30  1/8/2018        Pacemaker (Chronic) ICD-10-CM: Z95.0  ICD-9-CM: V45.01  7/24/2017        Advanced care planning/counseling discussion ICD-10-CM: Z71.89  ICD-9-CM: V65.49  5/11/2017    Overview Signed 5/11/2017 12:44 PM by Laxmi Montes RN     A copy of patient's completed Advanced Medical Directive is on file in the patient's medical record. NN reviewed Advanced Medical Directive document with patient & patient denies the need for changes/ updates. Psoriasis (Chronic) ICD-10-CM: L40.9  ICD-9-CM: 696.1  3/23/2017        Pseudophakia ICD-10-CM: Z96.1  ICD-9-CM: V43.1  11/14/2016        Cardiomyopathy ([facility] (Chronic) ICD-10-CM: I42.9  ICD-9-CM: 425.4  9/20/2016    Overview Addendum 9/20/2016  8:45 AM by Natanael Newberry MD     8/12 normal lexiscan cardiolyte, lvef 68%  8/16 echo lvef 35% multiple wall motion abnormalities, melissa, mod mr, mild ai, mod tr pa pressure 36mm  8/16 lexiscan mod reversible anterior defect, mostly fixed apical defect.  lvef 35%             Abnormal stress test ICD-10-CM: R94.39  ICD-9-CM: 794.39  9/13/2016        Ischemic cardiomyopathy ICD-10-CM: I25.5  ICD-9-CM: 414.8  Unknown        Chest pain ICD-10-CM: R07.9  ICD-9-CM: 786.50  8/10/2016        Nuclear cataract ICD-10-CM: QYQ0490  ICD-9-CM: 366.16  6/6/2016        Posterior vitreous detachment ICD-10-CM: H43.819  ICD-9-CM: 379.21  6/6/2016        Restrictive lung disease ICD-10-CM: J98.4  ICD-9-CM: 518.89  12/14/2015        Osteopenia ICD-10-CM: M85.80  ICD-9-CM: 733.90  11/23/2015        Hypothyroidism due to acquired atrophy of thyroid ICD-10-CM: E03.4  ICD-9-CM: 244.8, 246.8  11/6/2015        CKD (chronic kidney disease) stage 3, GFR 30-59 ml/min (HCC) (Chronic) ICD-10-CM: N18.3  ICD-9-CM: 585.3  2/9/2015        Anxiety and depression (Chronic) ICD-10-CM: F41.9, F32.9  ICD-9-CM: 300.00, 311  2/26/2014        Colon cancer ([facility] ICD-10-CM: C18.9  ICD-9-CM: 153.9  2/26/2014    Overview Addendum 2/26/2014 11:12 AM by Lazaro Bowling MD     Partial colectomy  Peteylisa Allen             Hypothyroid (Chronic) ICD-10-CM: E03.9  ICD-9-CM: 244.9  2/26/2014        Pulmonary hypertension ([facility] ICD-10-CM: I27.20  ICD-9-CM: 416.8  9/26/2011        Atrial fibrillation (HCC) (Chronic) ICD-10-CM: I48.91  ICD-9-CM: 427.31  Unknown    Overview Signed 10/21/2010  8:16 AM by Mary Anne Jiménez     av node ablation 5/22/98 with placement of CPI #[id number] dual chamber pacemaker subsequently replaced with a single chamber medtronic #[id number] single chamber pacemaker 7/25/05             Mitral regurgitation ICD-10-CM: I34.0  ICD-9-CM: 424.0  10/21/2010        CHB (complete heart block) ([facility] ICD-10-CM: I44.2  ICD-9-CM: 426.0  10/21/2010        Orthostatic hypotension ICD-10-CM: I95.1  ICD-9-CM: 458.0  10/21/2010        Chest pain, unspecified ICD-10-CM: R07.9  ICD-9-CM: 786.50  10/21/2010        Mixed hyperlipidemia (Chronic) ICD-10-CM: G51.2  ICD-9-CM: 272.2  10/18/2010              Current Outpatient Medications on File Prior to Visit   Medication Sig    furosemide (LASIX) 40 mg tablet Take 40 mg by mouth daily.  Daily as needed for swelling    warfarin (COUMADIN) 5 mg tablet TAKE 1 TABLET BY MOUTH DAILY AND 1/2 TABLET ON Tuesday & Thursday. OR AS ADVISED BY COUMADIN CLINIC.  carvedilol (COREG) 3.125 mg tablet TAKE 1 TABLET BY MOUTH TWICE A DAY WITH MEALS    cholecalciferol, vitamin D3, (VITAMIN D3) 2,000 unit tab Take  by mouth.  alendronate (FOSAMAX) 70 mg tablet Take 1 Tab by mouth every seven (7) days.  predniSONE (DELTASONE) 5 mg tablet Take 10 mg by mouth every other day.  albuterol (PROVENTIL HFA, VENTOLIN HFA, PROAIR HFA) 90 mcg/actuation inhaler Take 1 Puff by inhalation every six (6) hours as needed for Wheezing.  loperamide (IMODIUM) 1 mg/5 mL solution Take 2 mg by mouth nightly.  warfarin (COUMADIN) 5 mg tablet Take 5 mg by mouth five (5) days a week. Take 5 mg on Tuesday, Thursday, Friday, Saturday, and Sunday    warfarin (COUMADIN) 5 mg tablet Take 2.5 mg by mouth two (2) days a week. Take 2.5 mg on Mondays and Wednesdays    levothyroxine (SYNTHROID) 88 mcg tablet Take 1 Tab by mouth Daily (before breakfast).  raNITIdine (ZANTAC) 150 mg tablet TAKE 1 TABLET BY MOUTH TWICE A DAY    ZOLOFT 50 mg tablet TAKE 1 TABLET BY MOUTH EVERY DAY    simvastatin (ZOCOR) 20 mg tablet TAKE 1 TABLET BY MOUTH NIGHTLY    baclofen (LIORESAL) 10 mg tablet Take 5 mg by mouth two (2) times a day.  DENTA 5000 PLUS 1.1 % crea Apply 1 mg to affected area as needed (to prevent cavities).  desonide (TRIDESILON) 0.05 % cream Apply  to affected area two (2) times a day.  multivitamin (ONE A DAY) tablet Take 1 Tab by mouth daily.  ADVAIR DISKUS 250-50 mcg/dose diskus inhaler Take 1 Puff by inhalation two (2) times a day.  acetaminophen (TYLENOL) 500 mg tablet Take 650 mg by mouth every six (6) hours as needed for Pain. (2) Tablet in am (1) tablet in pm (2) Tablet pm    tiotropium (SPIRIVA WITH HANDIHALER) 18 mcg inhalation capsule Take 1 Cap by inhalation daily. No current facility-administered medications on file prior to visit.         CARDIOLOGY STUDIES TO DATE:  Medtronic [id number] Pulse pacemaker, implanted 7/2005  Echo 2008 - EF 60%, mild LAE, mild AR, mild to mod MR, mild TR, mild PA HTN  Echo 2009 - EF 55%, bilateral atrial enlargement, mild MR, trace AR, mod TR, mild PA HTN  Lexiscan cardiolite 10/15/09 - normal EF 51-71 %  Echo 10/21/10 - EF 55-60%, bilateral atrial enlargement, mild concentric LVH, mild to mod MR, mild AI, mod to severe TI, pulmonary pressures mildly elevated 35mmhg  Echo 7/31/12 - EF 55-60%, ventricular septal paradoxical motion, LA markedly dilated, RA mild to mod dilated, mod MR, mod TR, mod PA HTN  Lexiscan 8/14/12 - normal EF 68%  Lexiscan 12/17/12 - normal, EF 65%  Pacemaker 8/21/13 - generator change, single chamber Medtronic, model # [id number]. Pacemaker Pocket Revision 1/24/14   Echo 8/20/14 - LVEF 45%, akinesis of distal lat and inf walls, mod-marked LAE, mild-mod MR, RVSP 50  MUGA Scan 8/29/14 - LVEF 53%  4/17 echo lvef 40% with apical hypo, melissa, mild to mod mr and tr without pul htn, mild ai  2/10 02/21/19   ECHO ADULT COMPLETE 02/21/2019 2/21/2019  Narrative  · Estimated left ventricular ejection fraction is 31 - 35%. Visually measured ejection fraction. Mild concentric remodeling observed. Abnormal left ventricular wall motion as described on the wall scoring diagram below. Abnormal left ventricular septal motion consistent with right ventricular pacing. Inconclusive left ventricular diastolic function. · Mild aortic valve leaflet calcification present with reduced excursion. Mild aortic valve stenosis is present. Mild aortic valve regurgitation is present. · Right ventricular global systolic function is mildly reduced. Pacing wire present· Right atrial cavity size is moderately dilated. · Mild mitral annular calcification. Mild to moderate mitral valve regurgitation. · Left atrial cavity size is moderately dilated. · Moderate tricuspid valve regurgitation is present. Moderate pulmonary hypertension is present. · Pa systolic pressure 24KL· Moderately elevated central venous pressure (10-15 mmHg); IVC diameter is larger than 21 mm and collapses more than 50% with respiration. Signed by: Shabnam Natarajan MD    Chief Complaint   Patient presents with    Irregular Heart Beat     HPI :  Ms. Leoncio Dumont is doing remarkably well. Her back pain is under pretty good control and she is walking now just with a cane, no wheelchair. She is going to a light exercise class. Very little in the way of dizziness. She does have some mild shortness of breath with activity and she has not had any falls.         CARDIAC ROS:   negative for chest pain, palpitations, syncope, orthopnea, paroxysmal nocturnal dyspnea, exertional chest pressure/discomfort, claudication, lower extremity edema    Family History   Problem Relation Age of Onset    Diabetes Mother     Heart Disease Mother     Heart Attack Mother     Heart Disease Father     Stroke Sister     Breast Cancer Sister 80    Cancer Maternal Aunt         uterus    Diabetes Maternal Uncle     Breast Cancer Daughter 61       Past Medical History:   Diagnosis Date    Arthritis     Atrial fibrillation ([address]     av node ablation 5/22/98 with placement of CPI #[id number] dual chamber pacemaker subsequently replaced with a single chamber medtronic #[id number] single chamber pacemaker 7/25/05    Cancer ([address] 1970's    colon cancer w/ resection    COPD     Depression     GERD (gastroesophageal reflux disease)     Hyperlipidemia     Hypertension     Ischemic cardiomyopathy     Migraine headache     Orthostatic hypotension     RADHA (obstructive sleep apnea)     Pacemaker 5/22/98    AV node ablation /pacemaker placement utilizing CPI # [id number] dual chamber system, medtronic #[id number] single chamber device placed 7/22/05    Pulmonary hypertension ([address] 9/26/2011    RVSP 50 on echo 8/14    Thyroid disease     Valvular heart disease     mild-mod MR/TR       GENERAL ROS:  A comprehensive review of systems was negative except for that written in the HPI. Visit Vitals  /78 (BP 1 Location: Left arm, BP Patient Position: Sitting)   Pulse 83   Resp 17   Ht 5' 4\" (1.626 m)   Wt 148 lb (67.1 kg)   LMP 02/26/1970   SpO2 95%   BMI 25.40 kg/m²       Wt Readings from Last 3 Encounters:   05/23/19 148 lb (67.1 kg)   03/21/19 148 lb (67.1 kg)   03/08/19 153 lb 12.8 oz (69.8 kg)            BP Readings from Last 3 Encounters:   05/23/19 130/78   03/21/19 116/72   03/08/19 138/81       PHYSICAL EXAM  General appearance: alert, cooperative, no distress, appears stated age  Neurologic: Alert and oriented X 3  Neck: supple, symmetrical, trachea midline, no adenopathy, no carotid bruit and no JVD  Lungs: clear to auscultation bilaterally  Heart: regular rate and rhythm, S1, S2 normal, no murmur, click, rub or gallop  Extremities: extremities normal, atraumatic, no cyanosis or edema    Lab Results   Component Value Date/Time    Cholesterol, total 141 09/11/2018 03:10 PM    Cholesterol, total 247 (H) 09/12/2017 09:53 AM    Cholesterol, total 173 08/22/2016 08:45 AM    Cholesterol, total 157 08/11/2015 12:14 PM    Cholesterol, total 157 02/09/2015 08:34 AM    HDL Cholesterol 49 09/11/2018 03:10 PM    HDL Cholesterol 79 09/12/2017 09:53 AM    HDL Cholesterol 46 08/22/2016 08:45 AM    HDL Cholesterol 47 08/11/2015 12:14 PM    HDL Cholesterol 46 02/09/2015 08:34 AM    LDL, calculated 68 09/11/2018 03:10 PM    LDL, calculated 124 (H) 09/12/2017 09:53 AM    LDL, calculated 99 08/22/2016 08:45 AM    LDL, calculated 86 08/11/2015 12:14 PM    LDL, calculated 81 02/09/2015 08:34 AM    Triglyceride 119 09/11/2018 03:10 PM    Triglyceride 221 (H) 09/12/2017 09:53 AM    Triglyceride 140 08/22/2016 08:45 AM    Triglyceride 121 08/11/2015 12:14 PM    Triglyceride 149 02/09/2015 08:34 AM     ASSESSMENT  Ms. Johnny Urbina is stable and minimally symptomatic, well compensated on a good medical regimen.   They are still debating whether or not they want to switch from Coumadin to Eliquis. If they decide they are going to do it, she should hold her Coumadin for four days and then she can start full dose Eliquis at 5 mg twice a day. current treatment plan is effective, no change in therapy  lab results and schedule of future lab studies reviewed with patient  reviewed diet, exercise and weight control    Encounter Diagnoses   Name Primary?  Chronic atrial fibrillation (HCC) Yes    Other emphysema (HCC)     Dilated cardiomyopathy (HCC)     LBBB (left bundle branch block)      Orders Placed This Encounter    furosemide (LASIX) 40 mg tablet       Follow-up and Dispositions    · Return in about 4 months (around 9/23/2019).          Inocente Carr MD  5/23/2019

## 2019-05-23 NOTE — TELEPHONE ENCOUNTER
Denise Vasques. They contacted pharmacy and eliquis will only cost patient $14. Patient would like to switch from coumadin to eliquis 5 mg bid, which Dr. Scotty Loyd had advised was okay in the office morning (see office note). Reminded her patient needs to stop coumadin now and hold for 4 days prior to starting eliquis. Patient has not taken coumadin today, so will not take it tonight, not take either for the next 3 days also, and start eliquis 5 mg bid on Monday morning. I including this in prescription instructions that will print off on bottle when picked up at pharmacy. Jeanette Vuderian will notify patient and denied questions or concerns. Requested Prescriptions     Signed Prescriptions Disp Refills    apixaban (ELIQUIS) 5 mg tablet 180 Tab 3     Sig: Take 1 Tab by mouth two (2) times a day. Stop coumadin now (last dose 5/22). Hold blood thinners 4 days. Start eliquis Monday 5/27.      Authorizing Provider: Ernie Marquis     Ordering User: Efrain Miramontes    Per Dr. Srinath Singh verbal orders

## 2019-06-02 RX ORDER — RANITIDINE 150 MG/1
TABLET, FILM COATED ORAL
Qty: 180 TAB | Refills: 1 | Status: SHIPPED | OUTPATIENT
Start: 2019-06-02 | End: 2019-11-29 | Stop reason: SDUPTHER

## 2019-06-10 ENCOUNTER — HOSPITAL ENCOUNTER (OUTPATIENT)
Dept: GENERAL RADIOLOGY | Age: 84
Discharge: HOME | End: 2019-06-10
Attending: PHYSICIAN ASSISTANT
Payer: MEDICARE

## 2019-06-10 DIAGNOSIS — M54.16 LUMBAR RADICULOPATHY: ICD-10-CM

## 2019-06-10 PROCEDURE — 72100 X-RAY EXAM L-S SPINE 2/3 VWS: CPT

## 2019-06-17 ENCOUNTER — TELEPHONE (OUTPATIENT)
Dept: CARDIOLOGY CLINIC | Age: 84
End: 2019-06-17

## 2019-06-17 NOTE — TELEPHONE ENCOUNTER
Dr. Madelyn Elliott with [facility] is requesting cardiac clearance for patient to have a spinal injection. Clearance to include holding Eliquis 4 days prior to procedure, if okay to do so. Please advise.     Fax # [phone]  Phone # [phone]

## 2019-06-29 ENCOUNTER — HOSPITAL ENCOUNTER (INPATIENT)
Age: 84
LOS: 4 days | Discharge: NURSING, REHAB OR LONG-TERM CARE | DRG: 516 | End: 2019-07-03
Attending: EMERGENCY MEDICINE | Admitting: INTERNAL MEDICINE
Payer: MEDICARE

## 2019-06-29 ENCOUNTER — APPOINTMENT (OUTPATIENT)
Dept: CT IMAGING | Age: 84
DRG: 516 | End: 2019-06-29
Attending: NURSE PRACTITIONER
Payer: MEDICARE

## 2019-06-29 DIAGNOSIS — M54.9 ACUTE BACK PAIN, UNSPECIFIED BACK LOCATION, UNSPECIFIED BACK PAIN LATERALITY: ICD-10-CM

## 2019-06-29 DIAGNOSIS — R52 INTRACTABLE PAIN: Primary | ICD-10-CM

## 2019-06-29 DIAGNOSIS — Z74.09 MOBILITY IMPAIRED: ICD-10-CM

## 2019-06-29 DIAGNOSIS — S32.030A CLOSED COMPRESSION FRACTURE OF THIRD LUMBAR VERTEBRA, INITIAL ENCOUNTER: ICD-10-CM

## 2019-06-29 PROBLEM — K59.00 CONSTIPATION: Status: ACTIVE | Noted: 2019-06-29

## 2019-06-29 PROBLEM — S32.000A LUMBAR COMPRESSION FRACTURE (HCC): Status: ACTIVE | Noted: 2019-06-29

## 2019-06-29 PROBLEM — I71.40 AAA (ABDOMINAL AORTIC ANEURYSM): Status: ACTIVE | Noted: 2019-06-29

## 2019-06-29 PROBLEM — I10 HYPERTENSION: Status: ACTIVE | Noted: 2019-06-29

## 2019-06-29 PROBLEM — E80.6 HYPERBILIRUBINEMIA: Status: ACTIVE | Noted: 2019-06-29

## 2019-06-29 LAB
ALBUMIN SERPL-MCNC: 3.3 G/DL (ref 3.5–5)
ALBUMIN/GLOB SERPL: 1 {RATIO} (ref 1.1–2.2)
ALP SERPL-CCNC: 47 U/L (ref 45–117)
ALT SERPL-CCNC: 19 U/L (ref 12–78)
ANION GAP SERPL CALC-SCNC: 2 MMOL/L (ref 5–15)
APPEARANCE UR: CLEAR
AST SERPL-CCNC: 16 U/L (ref 15–37)
BASOPHILS # BLD: 0 K/UL (ref 0–0.1)
BASOPHILS NFR BLD: 0 % (ref 0–1)
BILIRUB SERPL-MCNC: 1.1 MG/DL (ref 0.2–1)
BILIRUB UR QL: NEGATIVE
BUN SERPL-MCNC: 24 MG/DL (ref 6–20)
BUN/CREAT SERPL: 23 (ref 12–20)
CALCIUM SERPL-MCNC: 9.2 MG/DL (ref 8.5–10.1)
CHLORIDE SERPL-SCNC: 99 MMOL/L (ref 97–108)
CO2 SERPL-SCNC: 36 MMOL/L (ref 21–32)
COLOR UR: NORMAL
COMMENT, HOLDF: NORMAL
CREAT SERPL-MCNC: 1.05 MG/DL (ref 0.55–1.02)
DIFFERENTIAL METHOD BLD: ABNORMAL
EOSINOPHIL # BLD: 0.1 K/UL (ref 0–0.4)
EOSINOPHIL NFR BLD: 2 % (ref 0–7)
ERYTHROCYTE [DISTWIDTH] IN BLOOD BY AUTOMATED COUNT: 13.4 % (ref 11.5–14.5)
GLOBULIN SER CALC-MCNC: 3.3 G/DL (ref 2–4)
GLUCOSE SERPL-MCNC: 126 MG/DL (ref 65–100)
GLUCOSE UR STRIP.AUTO-MCNC: NEGATIVE MG/DL
HCT VFR BLD AUTO: 44.7 % (ref 35–47)
HGB BLD-MCNC: 14.9 G/DL (ref 11.5–16)
HGB UR QL STRIP: NEGATIVE
IMM GRANULOCYTES # BLD AUTO: 0.1 K/UL (ref 0–0.04)
IMM GRANULOCYTES NFR BLD AUTO: 1 % (ref 0–0.5)
KETONES UR QL STRIP.AUTO: NEGATIVE MG/DL
LEUKOCYTE ESTERASE UR QL STRIP.AUTO: NEGATIVE
LIPASE SERPL-CCNC: 77 U/L (ref 73–393)
LYMPHOCYTES # BLD: 1 K/UL (ref 0.8–3.5)
LYMPHOCYTES NFR BLD: 13 % (ref 12–49)
MCH RBC QN AUTO: 30.9 PG (ref 26–34)
MCHC RBC AUTO-ENTMCNC: 33.3 G/DL (ref 30–36.5)
MCV RBC AUTO: 92.7 FL (ref 80–99)
MONOCYTES # BLD: 0.7 K/UL (ref 0–1)
MONOCYTES NFR BLD: 9 % (ref 5–13)
NEUTS SEG # BLD: 6.2 K/UL (ref 1.8–8)
NEUTS SEG NFR BLD: 75 % (ref 32–75)
NITRITE UR QL STRIP.AUTO: NEGATIVE
NRBC # BLD: 0 K/UL (ref 0–0.01)
NRBC BLD-RTO: 0 PER 100 WBC
PH UR STRIP: 7 [PH] (ref 5–8)
PLATELET # BLD AUTO: 141 K/UL (ref 150–400)
PMV BLD AUTO: 11.7 FL (ref 8.9–12.9)
POTASSIUM SERPL-SCNC: 4 MMOL/L (ref 3.5–5.1)
PROT SERPL-MCNC: 6.6 G/DL (ref 6.4–8.2)
PROT UR STRIP-MCNC: NEGATIVE MG/DL
RBC # BLD AUTO: 4.82 M/UL (ref 3.8–5.2)
SAMPLES BEING HELD,HOLD: NORMAL
SODIUM SERPL-SCNC: 137 MMOL/L (ref 136–145)
SP GR UR REFRACTOMETRY: 1.01 (ref 1–1.03)
TROPONIN I SERPL-MCNC: <0.05 NG/ML
UROBILINOGEN UR QL STRIP.AUTO: 0.2 EU/DL (ref 0.2–1)
WBC # BLD AUTO: 8.2 K/UL (ref 3.6–11)

## 2019-06-29 PROCEDURE — 77030005563 HC CATH URETH INT MMGH -A

## 2019-06-29 PROCEDURE — 74011250637 HC RX REV CODE- 250/637: Performed by: INTERNAL MEDICINE

## 2019-06-29 PROCEDURE — 96376 TX/PRO/DX INJ SAME DRUG ADON: CPT

## 2019-06-29 PROCEDURE — 36415 COLL VENOUS BLD VENIPUNCTURE: CPT

## 2019-06-29 PROCEDURE — 74011000250 HC RX REV CODE- 250: Performed by: INTERNAL MEDICINE

## 2019-06-29 PROCEDURE — 97530 THERAPEUTIC ACTIVITIES: CPT

## 2019-06-29 PROCEDURE — 84484 ASSAY OF TROPONIN QUANT: CPT

## 2019-06-29 PROCEDURE — 80053 COMPREHEN METABOLIC PANEL: CPT

## 2019-06-29 PROCEDURE — 97162 PT EVAL MOD COMPLEX 30 MIN: CPT

## 2019-06-29 PROCEDURE — 93005 ELECTROCARDIOGRAM TRACING: CPT

## 2019-06-29 PROCEDURE — 65270000029 HC RM PRIVATE

## 2019-06-29 PROCEDURE — L0627 LO SAG RI AN/POS PNL PRE CST: HCPCS

## 2019-06-29 PROCEDURE — 74177 CT ABD & PELVIS W/CONTRAST: CPT

## 2019-06-29 PROCEDURE — 74011250636 HC RX REV CODE- 250/636: Performed by: NURSE PRACTITIONER

## 2019-06-29 PROCEDURE — 74011636320 HC RX REV CODE- 636/320: Performed by: RADIOLOGY

## 2019-06-29 PROCEDURE — 96374 THER/PROPH/DIAG INJ IV PUSH: CPT

## 2019-06-29 PROCEDURE — 81003 URINALYSIS AUTO W/O SCOPE: CPT

## 2019-06-29 PROCEDURE — 85025 COMPLETE CBC W/AUTO DIFF WBC: CPT

## 2019-06-29 PROCEDURE — 94640 AIRWAY INHALATION TREATMENT: CPT

## 2019-06-29 PROCEDURE — 83690 ASSAY OF LIPASE: CPT

## 2019-06-29 PROCEDURE — 99285 EMERGENCY DEPT VISIT HI MDM: CPT

## 2019-06-29 PROCEDURE — 77030038269 HC DRN EXT URIN PURWCK BARD -A

## 2019-06-29 RX ORDER — AMOXICILLIN 250 MG
1 CAPSULE ORAL 2 TIMES DAILY
Status: DISCONTINUED | OUTPATIENT
Start: 2019-06-29 | End: 2019-07-03 | Stop reason: HOSPADM

## 2019-06-29 RX ORDER — PREDNISONE 10 MG/1
5 TABLET ORAL EVERY OTHER DAY
COMMUNITY
End: 2019-08-01

## 2019-06-29 RX ORDER — CARVEDILOL 3.12 MG/1
3.12 TABLET ORAL 2 TIMES DAILY WITH MEALS
Status: DISCONTINUED | OUTPATIENT
Start: 2019-06-29 | End: 2019-07-03 | Stop reason: HOSPADM

## 2019-06-29 RX ORDER — SERTRALINE HYDROCHLORIDE 50 MG/1
50 TABLET, FILM COATED ORAL DAILY
Status: DISCONTINUED | OUTPATIENT
Start: 2019-06-30 | End: 2019-07-03 | Stop reason: HOSPADM

## 2019-06-29 RX ORDER — IPRATROPIUM BROMIDE AND ALBUTEROL SULFATE 2.5; .5 MG/3ML; MG/3ML
3 SOLUTION RESPIRATORY (INHALATION)
Status: DISCONTINUED | OUTPATIENT
Start: 2019-06-29 | End: 2019-07-03 | Stop reason: HOSPADM

## 2019-06-29 RX ORDER — SIMVASTATIN 20 MG/1
20 TABLET, FILM COATED ORAL
Status: DISCONTINUED | OUTPATIENT
Start: 2019-06-29 | End: 2019-07-03 | Stop reason: HOSPADM

## 2019-06-29 RX ORDER — SODIUM CHLORIDE 0.9 % (FLUSH) 0.9 %
5-40 SYRINGE (ML) INJECTION EVERY 8 HOURS
Status: DISCONTINUED | OUTPATIENT
Start: 2019-06-29 | End: 2019-07-03 | Stop reason: HOSPADM

## 2019-06-29 RX ORDER — NALOXONE HYDROCHLORIDE 0.4 MG/ML
0.4 INJECTION, SOLUTION INTRAMUSCULAR; INTRAVENOUS; SUBCUTANEOUS AS NEEDED
Status: DISCONTINUED | OUTPATIENT
Start: 2019-06-29 | End: 2019-07-03 | Stop reason: HOSPADM

## 2019-06-29 RX ORDER — FENTANYL CITRATE 50 UG/ML
25 INJECTION, SOLUTION INTRAMUSCULAR; INTRAVENOUS
Status: COMPLETED | OUTPATIENT
Start: 2019-06-29 | End: 2019-06-29

## 2019-06-29 RX ORDER — FAMOTIDINE 20 MG/1
20 TABLET, FILM COATED ORAL 2 TIMES DAILY
Status: DISCONTINUED | OUTPATIENT
Start: 2019-06-29 | End: 2019-06-30

## 2019-06-29 RX ORDER — SODIUM CHLORIDE 0.9 % (FLUSH) 0.9 %
5-40 SYRINGE (ML) INJECTION AS NEEDED
Status: DISCONTINUED | OUTPATIENT
Start: 2019-06-29 | End: 2019-07-03 | Stop reason: HOSPADM

## 2019-06-29 RX ORDER — ARFORMOTEROL TARTRATE 15 UG/2ML
15 SOLUTION RESPIRATORY (INHALATION)
Status: DISCONTINUED | OUTPATIENT
Start: 2019-06-29 | End: 2019-07-03 | Stop reason: HOSPADM

## 2019-06-29 RX ORDER — LIDOCAINE 4 G/100G
1 PATCH TOPICAL EVERY 24 HOURS
Status: DISCONTINUED | OUTPATIENT
Start: 2019-06-29 | End: 2019-07-03 | Stop reason: HOSPADM

## 2019-06-29 RX ORDER — THERA TABS 400 MCG
1 TAB ORAL DAILY
Status: DISCONTINUED | OUTPATIENT
Start: 2019-06-30 | End: 2019-07-03 | Stop reason: HOSPADM

## 2019-06-29 RX ORDER — BUDESONIDE 0.5 MG/2ML
500 INHALANT ORAL
Status: DISCONTINUED | OUTPATIENT
Start: 2019-06-29 | End: 2019-07-03 | Stop reason: HOSPADM

## 2019-06-29 RX ORDER — LEVOTHYROXINE SODIUM 88 UG/1
88 TABLET ORAL
Status: DISCONTINUED | OUTPATIENT
Start: 2019-06-30 | End: 2019-07-03 | Stop reason: HOSPADM

## 2019-06-29 RX ORDER — ACETAMINOPHEN 325 MG/1
650 TABLET ORAL
Status: DISCONTINUED | OUTPATIENT
Start: 2019-06-29 | End: 2019-07-03 | Stop reason: HOSPADM

## 2019-06-29 RX ORDER — HYDROMORPHONE HYDROCHLORIDE 1 MG/ML
0.2 INJECTION, SOLUTION INTRAMUSCULAR; INTRAVENOUS; SUBCUTANEOUS
Status: DISCONTINUED | OUTPATIENT
Start: 2019-06-29 | End: 2019-07-02

## 2019-06-29 RX ORDER — MELATONIN
2000 DAILY
Status: DISCONTINUED | OUTPATIENT
Start: 2019-06-30 | End: 2019-07-03 | Stop reason: HOSPADM

## 2019-06-29 RX ORDER — LIDOCAINE 50 MG/G
1 PATCH TOPICAL
Status: ON HOLD | COMMUNITY
End: 2020-10-09

## 2019-06-29 RX ORDER — OXYCODONE HYDROCHLORIDE 5 MG/1
5 TABLET ORAL
Status: DISCONTINUED | OUTPATIENT
Start: 2019-06-29 | End: 2019-07-02

## 2019-06-29 RX ORDER — PREDNISONE 5 MG/1
10 TABLET ORAL EVERY OTHER DAY
Status: DISCONTINUED | OUTPATIENT
Start: 2019-07-01 | End: 2019-07-03 | Stop reason: HOSPADM

## 2019-06-29 RX ORDER — TRAMADOL HYDROCHLORIDE 50 MG/1
50 TABLET ORAL
Status: DISCONTINUED | OUTPATIENT
Start: 2019-06-29 | End: 2019-07-03 | Stop reason: HOSPADM

## 2019-06-29 RX ORDER — ACETAMINOPHEN 500 MG
1000 TABLET ORAL
COMMUNITY
End: 2020-04-15

## 2019-06-29 RX ORDER — PREDNISONE 5 MG/1
5 TABLET ORAL EVERY OTHER DAY
Status: DISCONTINUED | OUTPATIENT
Start: 2019-06-30 | End: 2019-07-03 | Stop reason: HOSPADM

## 2019-06-29 RX ADMIN — SIMVASTATIN 20 MG: 20 TABLET, FILM COATED ORAL at 23:52

## 2019-06-29 RX ADMIN — IOPAMIDOL 100 ML: 755 INJECTION, SOLUTION INTRAVENOUS at 10:02

## 2019-06-29 RX ADMIN — FENTANYL CITRATE 25 MCG: 50 INJECTION, SOLUTION INTRAMUSCULAR; INTRAVENOUS at 13:03

## 2019-06-29 RX ADMIN — BUDESONIDE 500 MCG: 0.5 INHALANT RESPIRATORY (INHALATION) at 20:06

## 2019-06-29 RX ADMIN — TRAMADOL HYDROCHLORIDE 50 MG: 50 TABLET, FILM COATED ORAL at 23:52

## 2019-06-29 RX ADMIN — TRAMADOL HYDROCHLORIDE 50 MG: 50 TABLET, FILM COATED ORAL at 15:23

## 2019-06-29 RX ADMIN — CARVEDILOL 3.12 MG: 6.25 TABLET, FILM COATED ORAL at 17:44

## 2019-06-29 RX ADMIN — SENNOSIDES, DOCUSATE SODIUM 1 TABLET: 50; 8.6 TABLET, FILM COATED ORAL at 17:44

## 2019-06-29 RX ADMIN — FENTANYL CITRATE 25 MCG: 50 INJECTION, SOLUTION INTRAMUSCULAR; INTRAVENOUS at 10:35

## 2019-06-29 RX ADMIN — ARFORMOTEROL TARTRATE 15 MCG: 15 SOLUTION RESPIRATORY (INHALATION) at 20:06

## 2019-06-29 RX ADMIN — Medication 10 ML: at 23:53

## 2019-06-29 RX ADMIN — APIXABAN 5 MG: 5 TABLET, FILM COATED ORAL at 17:44

## 2019-06-29 RX ADMIN — FAMOTIDINE 20 MG: 20 TABLET, FILM COATED ORAL at 17:44

## 2019-06-29 NOTE — PROGRESS NOTES
Initial assessment:    ED NP contacted me to see pt as family was hopeful pt could be transferred from the ED to inpatient rehab @ [address]     I had talked earlier with the liason from [facility] who informed me that the next female bed is anticipated sometime between Monday and Wednesday and there are some female patients ahead of pt. I discussed other options such as [facility] and [facility] but family states pt has been @ [facility] before and she did very well there. On pt.'s demographics,there is a [facility] address listed. This is pt.'s mailing address as Bassam Foy takes care of all of her bills. Pt actually lives in independent living @ [facility]. I explained to family that pt will need 24 hour supervision if she returned to [place] or even if she had a bed in [place] . Family agrees that this plan would not be sufficient or safe for pt @ this juncture. PT has worked with pt today. OT order obtained from Dr Júnior Chan in anticipation of pt going to inpatient rehab @ [address]. I discussed the plan with the ED NP. Pt is being admitted to the hospital.    [facility]. A back-up plan will be SNF @ [facility]. Referrals sent to both facilities. Case Management will continue to follow pt for discharge needs. [address] [facility] for pt:    Jakub Malhotra states she is the [place] but the AMD designates Sahra Santiago @ [phone] or [phone] as the primary decision maker if pt cannot make her own decisions and Dorothyann Sandifer as the secondary decision-maker,His numbes are [phone] and [phone].     Tyree Lagunas

## 2019-06-29 NOTE — PROGRESS NOTES
Upon shift change day shift nurse and current nurse were reported that patient had hematuria, patient urinated 200ml of bright red blood, area assessed by day shift nurse noticed small open areas probably related to trama from straight cath obtained in ED, amount of blood is concerning, MD called spoke to MD Nani Falk and MD Scooter Stephens, reported findings, labs reviewed and order received to hold next dose of Eliquis, referral to be placed, and UA obtained tomorrow as well.  Sn will continue to monitor   02:04AM  Patient urinated for second time during shift, patient urine is clear with droplets of blood this time, probably related to trauma as discussed previously 1

## 2019-06-29 NOTE — ED NOTES
Patient assisted to use bedpan to urinate, amber-care provided and patient is now resting on stretcher in no apparent distress. Family at bedside. Waiting for PT to deliver and apply the LSO brace.

## 2019-06-29 NOTE — PROGRESS NOTES
Discussed with PT. Patient having 10/10 back pain with bed mobility. LSO issued by PT. Will follow up at later time after pain managed to initiate OT training for ADL.

## 2019-06-29 NOTE — PROGRESS NOTES
[facility]: MED RECONCILIATION    Comments/Recommendations:   Medication information was received from patient's daughter. Patient's daughter states that she has been taking Loperamide daily to avoid diarrhea. Patient was prescribed Cholestyramine powder daily, but the patient does not take it due to taste and having to mix it up. Patient's daughter believes she is not complaint with her Advair inhaler. She does not use it every day (BID) as directed. Allergies were confirmed    Medication(s) ADDED to PTA list:  Tylenol 1000 mg in the morning, 500 mg in the afternoon, 1000 mg in the evening  Prednisone 5 mg daily (alternating every other day with 10 mg)  Lidoderm patch    Medication(s) REMOVED from PTA list:  Desonide cream  Warfarin (taking Eliquis)    Medication(s) ADJUSTED on PTA list:        Information obtained from: Rx Query/ Patient's daughter      Allergies: Cardizem [diltiazem hcl]; Codeine; Darvocet a500 [propoxyphene n-acetaminophen]; Diltiazem; Labetalol; Pcn [penicillins]; Primidone; and Sulfa (sulfonamide antibiotics)    Prior to Admission Medications:     Prior to Admission Medications   Prescriptions Last Dose Informant Patient Reported? Taking? ADVAIR DISKUS 250-50 mcg/dose diskus inhaler 6/28/2019 at Unknown time  Yes Yes   Sig: Take 1 Puff by inhalation two (2) times a day. DENTA 5000 PLUS 1.1 % crea   Yes Yes   Sig: Apply 1 mg to affected area as needed (to prevent cavities). ZOLOFT 50 mg tablet 6/29/2019 at Unknown time  No Yes   Sig: TAKE 1 TABLET BY MOUTH EVERY DAY   acetaminophen (TYLENOL EXTRA STRENGTH) 500 mg tablet   Yes Yes   Sig: Take 500 mg by mouth daily (with lunch). acetaminophen (TYLENOL) 500 mg tablet 6/29/2019 at Unknown time  Yes Yes   Sig: Take 1,000 mg by mouth two (2) times a day.  (2) Tablet in am (1) tablet in pm (2) Tablet pm   albuterol (PROVENTIL HFA, VENTOLIN HFA, PROAIR HFA) 90 mcg/actuation inhaler 6/28/2019 at Unknown time  Yes Yes   Sig: Take 1 Puff by inhalation every six (6) hours as needed for Wheezing. alendronate (FOSAMAX) 70 mg tablet 2019 at Unknown time  No Yes   Sig: Take 1 Tab by mouth every seven (7) days. Patient taking differently: Take 70 mg by mouth every . apixaban (ELIQUIS) 5 mg tablet 2019 at Unknown time  No Yes   Sig: Take 1 Tab by mouth two (2) times a day. Stop coumadin now (last dose ). Hold blood thinners 4 days. Start eliquis . baclofen (LIORESAL) 10 mg tablet 2019 at Unknown time  Yes Yes   Sig: Take 5 mg by mouth two (2) times a day. carvedilol (COREG) 3.125 mg tablet 2019 at Unknown time  No Yes   Sig: TAKE 1 TABLET BY MOUTH TWICE A DAY WITH MEALS   cholecalciferol, vitamin D3, (VITAMIN D3) 2,000 unit tab 2019 at Unknown time  Yes Yes   Sig: Take 2,000 Units by mouth daily. furosemide (LASIX) 40 mg tablet   Yes Yes   Sig: Take 20 mg by mouth daily. Daily as needed for swelling    levothyroxine (SYNTHROID) 88 mcg tablet 2019 at Unknown time  No Yes   Sig: Take 1 Tab by mouth Daily (before breakfast). lidocaine (LIDODERM) 5 %   Yes Yes   Si Patch by TransDERmal route every twenty-four (24) hours. Apply patch to the affected area for 12 hours a day and remove for 12 hours a day. loperamide (IMODIUM) 1 mg/5 mL solution 2019 at Unknown time  Yes Yes   Sig: Take 2 mg by mouth daily. multivitamins (CHEWABLE-AMOS) chew 2019 at Unknown time  Yes Yes   Sig: Take 1 Tab by mouth daily. predniSONE (DELTASONE) 10 mg tablet 2019 at Unknown time  Yes Yes   Sig: Take 5 mg by mouth every other day. predniSONE (DELTASONE) 5 mg tablet 2019 at Unknown time  Yes Yes   Sig: Take 10 mg by mouth every other day.    raNITIdine (ZANTAC) 150 mg tablet 2019 at Unknown time  No Yes   Sig: TAKE 1 TABLET BY MOUTH TWICE A DAY   simvastatin (ZOCOR) 20 mg tablet 2019 at Unknown time  No Yes   Sig: TAKE 1 TABLET BY MOUTH NIGHTLY   tiotropium ([address]) 18 mcg inhalation capsule 6/28/2019 at Unknown time  Yes Yes   Sig: Take 1 Cap by inhalation daily.       Facility-Administered Medications: None                Maria De Jesus Rosales, PHARMD   Contact: [phone]

## 2019-06-29 NOTE — PROGRESS NOTES
Notified by nursing re: hematuria. Bright red blood mixed with urine. Apparently had traumatic valentine but per nursing seemed more blood than expected. Hold Eliquis tonight. Urology evaluation in AM.  If hematuria worsens / persists, check serial Hg.

## 2019-06-29 NOTE — PROGRESS NOTES
Problem: Mobility Impaired (Adult and Pediatric)  Goal: *Acute Goals and Plan of Care (Insert Text)  Description  Physical Therapy Goals  Initiated 6/29/2019    1. Patient will move from supine to sit and sit to supine  and roll side to side in bed with supervision/set-up within 7 days. 2. Patient will perform sit to stand with supervision/set-up within 7 days. 3. Patient will ambulate with supervision/set-up for 100 feet with the least restrictive device within 7 days. 4. Patient will verbalize and demonstrate understanding of spinal precautions (No bending, lifting greater than 5 lbs, or twisting; log-roll technique; frequent repositioning as instructed) within  days. Outcome: Progressing Towards Goal    PHYSICAL THERAPY EVALUATION  Patient: Noel Guevara (14 y.o. female)  Date: 6/29/2019  Primary Diagnosis: No admission diagnoses are documented for this encounter. Precautions:   Fall, Back    ASSESSMENT :  Based on the objective data described below, the patient presents with generally decreased strength, decreased endurance, back pain rated 10/10 with bed mobility, and limited functional mobility on day of presentation with back pain and decreased mobility x 2 weeks. Pt with acute L3 compression fracture and order for LSO. Pt reports modified independent baseline ambulation using RW or cane and offers good participation with PT evaluation. Pt requiring up to moderate assistance for rolling and supine to sit and reports 10/10 pain with this activity. Transfers and brief ambulation performed with minimal assistance. Pt and family educated regarding LSO use, back precautions, and recommended assistance for pt mobility. Patient will benefit from skilled intervention to address the above impairments. Patient?s rehabilitation potential is considered to be Good, likely slow  Factors which may influence rehabilitation potential include:   ? None noted  ? Mental ability/status  ? Medical condition  ? Home/family situation and support systems  ? Safety awareness  ? Pain tolerance/management  ? Other:      PLAN :  Recommendations and Planned Interventions:  ?           Bed Mobility Training             ? Neuromuscular Re-Education  ? Transfer Training                   ? Orthotic/Prosthetic Training  ? Gait Training                         ? Modalities  ? Therapeutic Exercises           ? Edema Management/Control  ? Therapeutic Activities            ? Patient and Family Training/Education  ? Other (comment):    Frequency/Duration: Patient will be followed by physical therapy  5 times a week to address goals. Discharge Recommendations: [address] with 24-hour assistance  Further Equipment Recommendations for Discharge: none; LSO issued per order and BAY Danielson's instruction. Per Eloisa Mas, ortho PA Paola Turner instructing PT to issue LSO from hospital supply. SUBJECTIVE:   Patient stated ? It hurts so bad. ?    Pt received supine, agreeable to PT and cleared by RN.     OBJECTIVE DATA SUMMARY:   HISTORY:    Past Medical History:   Diagnosis Date    Arthritis     Atrial fibrillation ([facility] [address]     av node ablation 5/22/98 with placement of CPI #[id number] dual chamber pacemaker subsequently replaced with a single chamber medtronic #[id number] single chamber pacemaker 7/25/05    Cancer ([facility] [address] 1970's    colon cancer w/ resection    COPD     Depression     GERD (gastroesophageal reflux disease)     Hyperlipidemia     Hypertension     Ischemic cardiomyopathy     Migraine headache     Orthostatic hypotension     RADHA (obstructive sleep apnea)     Pacemaker 5/22/98    AV node ablation /pacemaker placement utilizing CPI # [id number] dual chamber system, medtronic #[id number] single chamber device placed 7/22/05    Pulmonary hypertension ([facility] [address] 9/26/2011    RVSP 50 on echo 8/14    Thyroid disease     Valvular heart disease     mild-mod MR/TR     Past Surgical History:   Procedure Laterality Date    BREAST SURGERY PROCEDURE UNLISTED      benign breast tumor excision right breast    HX BREAST BIOPSY Right 2002    neg; surgical bx    HX COLECTOMY  1974    colon    HX KNEE REPLACEMENT      right TKA    HX PACEMAKER      HX TUBAL LIGATION      TOTAL KNEE ARTHROPLASTY      total on R, partial on L     Prior Level of Function/Home Situation: as above; reports receiving facility assistance for meals and housekeeping only, no assistance with mobility. Personal factors and/or comorbidities impacting plan of care: as above. Home Situation  Home Environment: Independent living  One/Two Story Residence: One story  Living Alone: Yes  Support Systems: Family member(s)  Current DME Used/Available at Home: Walker, rolling    EXAMINATION/PRESENTATION/DECISION MAKING:   Critical Behavior:  Neurologic State: Alert  Orientation Level: Oriented X4  Cognition: Follows commands     Hearing: Auditory  Auditory Impairment: Hard of hearing, bilateral, Hearing aid(s)  Hearing Aids/Status: With patient  Skin:  LE exposed skin intact  Edema: none noted LEs. Range Of Motion:      Generally decreased LEs. Strength:     Generally decreased throughout                    Tone & Sensation:       Normal LE tone; LE sensation intact                           Coordination:   WFL       Functional Mobility:  Bed Mobility:  Rolling: Additional time;Minimum assistance  Supine to Sit: Additional time; Moderate assistance  Sit to Supine: Additional time; Moderate assistance  Scooting: Additional time;Contact guard assistance  Transfers:  Sit to Stand:  Additional time;Minimum assistance; cues for UE placement  Stand to Sit: Additional time;Contact guard assistance                       Balance:   Sitting: Without support  Sitting - Static: Good (unsupported)  Sitting - Dynamic: Good (unsupported)  Standing: With support  Standing - Static: Good  Standing - Dynamic : Fair  Ambulation/Gait Training:  Distance (ft): 4 Feet (ft)  Assistive Device: Gait belt;Walker, rolling;Brace/Splint  Ambulation - Level of Assistance: Minimal assistance        Gait Abnormalities: Decreased step clearance              Speed/Melanie: Pace decreased (<100 feet/min)                     Appropriate RW techniques. Tolerance limited by pain. Functional Measure:  Barthel Index:    Bathin  Bladder: 5  Bowels: 10  Groomin  Dressin  Feeding: 10  Mobility: 0  Stairs: 0  Toilet Use: 5  Transfer (Bed to Chair and Back): 5  Total: 35/100       Percentage of impairment   0%   1-19%   20-39%   40-59%   60-79%   80-99%   100%   Barthel Score 0-100 100 99-80 79-60 59-40 20-39 1-19   0     The Barthel ADL Index: Guidelines  1. The index should be used as a record of what a patient does, not as a record of what a patient could do. 2. The main aim is to establish degree of independence from any help, physical or verbal, however minor and for whatever reason. 3. The need for supervision renders the patient not independent. 4. A patient's performance should be established using the best available evidence. Asking the patient, friends/relatives and nurses are the usual sources, but direct observation and common sense are also important. However direct testing is not needed. 5. Usually the patient's performance over the preceding 24-48 hours is important, but occasionally longer periods will be relevant. 6. Middle categories imply that the patient supplies over 50 per cent of the effort. 7. Use of aids to be independent is allowed. Vinh Jean., Barthel, D.W. (). Functional evaluation: the Barthel Index. [facility] (14)2. RADHA Robbins, Venancio Sher, Shaylee Mustafa., Tristen, [address] [place] [address] (). Measuring the change indisability after inpatient rehabilitation; comparison of the responsiveness of the Barthel Index and Functional [place] Measure. Journal of Neurology, Neurosurgery, and Psychiatry, 66(4), 646-667. CHANELLE Peraza, SARA Rodriguez, & Dayanara Oviedo M.A. (2004.) Assessment of post-stroke quality of life in cost-effectiveness studies: The usefulness of the Barthel Index and the EuroQoL-5D. Quality of Life Research, 15, 616-95            Physical Therapy Evaluation Charge Determination   History Examination Presentation Decision-Making   HIGH Complexity :3+ comorbidities / personal factors will impact the outcome/ POC  HIGH Complexity : 4+ Standardized tests and measures addressing body structure, function, activity limitation and / or participation in recreation  MEDIUM Complexity : Evolving with changing characteristics  Other outcome measures barthel  MEDIUM      Based on the above components, the patient evaluation is determined to be of the following complexity level: MEDIUM    Pain:  Pain Scale 1: Numeric (0 - 10)  Pain Intensity 1: 5 at rest; 10 with activity. Pain Location 1: Abdomen;Back   Interventions: rest, repositioned, RN providing medication. Activity Tolerance:   Limited by pain. Please refer to the flowsheet for vital signs taken during this treatment. After treatment:   ?         Patient left in no apparent distress sitting up in chair  ? Patient left in no apparent distress in bed  ? Call bell left within reach  ? Nursing notified  ? Caregiver present  ? Bed alarm activated    Reviewed back precautions including no bending, no lifting more than 5 lbs, no twisting, (BLTs); log roll technique for bed mobility. Pt verbalizes understanding. COMMUNICATION/EDUCATION:   The patient?s plan of care was discussed with: Registered Nurse. ?         Fall prevention education was provided and the patient/caregiver indicated understanding. ? Patient/family have participated as able in goal setting and plan of care.   ?         Patient/family agree to work toward stated goals and plan of care. ?         Patient understands intent and goals of therapy, but is neutral about his/her participation. ? Patient is unable to participate in goal setting and plan of care.     Thank you for this referral.  Stacie Katz, PT, DPT   Time Calculation: 40 mins

## 2019-06-29 NOTE — ED PROVIDER NOTES
This is an 70-year-old female who presents by way of EMS to the emergency room with complaint of low back pain acutely worsening and abdominal pain. Patient states she started having low back pain about 2 weeks ago but has gotten acutely worse. Patient states her pain is 7/10 in intensity and aching in nature. Patient has not taken any medications prior to arrival for her pain however she did receive a spine injection right before she had the acute back pain beginning 2 weeks ago. Patient denies any trauma, denies any falls. Patient denies chest pain, shortness of breath, dizziness. States she does have mild abdominal distention with a history of diarrhea. Patient takes Imodium daily for her diarrhea. States her last bowel movement was yesterday and loose. Denies any blood. Denies any urinary urgency or frequency, hematuria. There are no further complaints at this time.     Sully Greenfield MD  Past Medical History:  No date: Arthritis  No date: Atrial fibrillation [facility])      Comment:  av node ablation 5/22/98 with placement of CPI #[id number]                dual chamber pacemaker subsequently replaced with a                single chamber medtronic #[id number] single chamber pacemaker               7/25/05  1970's: Cancer ([facility] [address]      Comment:  colon cancer w/ resection  No date: COPD  No date: Depression  No date: GERD (gastroesophageal reflux disease)  No date: Hyperlipidemia  No date: Hypertension  No date: Ischemic cardiomyopathy  No date: Migraine headache  No date: Orthostatic hypotension  No date: RADHA (obstructive sleep apnea)  5/22/98: Pacemaker      Comment:  AV node ablation /pacemaker placement utilizing CPI #                [id number] dual chamber system, medtronic #[id number] single                chamber device placed 7/22/05 9/26/2011: Pulmonary hypertension (HCC)      Comment:  RVSP 50 on echo 8/14  No date: Thyroid disease  No date: Valvular heart disease      Comment:  mild-mod MR/TR  Past Surgical History:  No date: BREAST SURGERY PROCEDURE UNLISTED      Comment:  benign breast tumor excision right breast  2002: HX BREAST BIOPSY;  Right      Comment:  neg; surgical bx  1974: HX COLECTOMY      Comment:  colon  No date: HX KNEE REPLACEMENT      Comment:  right TKA  No date: HX PACEMAKER  No date: HX TUBAL LIGATION  No date: TOTAL KNEE ARTHROPLASTY      Comment:  total on R, partial on L             Past Medical History:   Diagnosis Date    Arthritis     Atrial fibrillation ([address]     av node ablation 5/22/98 with placement of CPI #[id number] dual chamber pacemaker subsequently replaced with a single chamber medtronic #[id number] single chamber pacemaker 7/25/05    Cancer ([facility] [address] 1970's    colon cancer w/ resection    COPD     Depression     GERD (gastroesophageal reflux disease)     Hyperlipidemia     Hypertension     Ischemic cardiomyopathy     Migraine headache     Orthostatic hypotension     RADHA (obstructive sleep apnea)     Pacemaker 5/22/98    AV node ablation /pacemaker placement utilizing CPI # [id number] dual chamber system, medtronic #[id number] single chamber device placed 7/22/05    Pulmonary hypertension ([facility] [address] 9/26/2011    RVSP 50 on echo 8/14    Thyroid disease     Valvular heart disease     mild-mod MR/TR       Past Surgical History:   Procedure Laterality Date    BREAST SURGERY PROCEDURE UNLISTED      benign breast tumor excision right breast    HX BREAST BIOPSY Right 2002    neg; surgical bx    HX COLECTOMY  1974    colon    HX KNEE REPLACEMENT      right TKA    HX PACEMAKER      HX TUBAL LIGATION      TOTAL KNEE ARTHROPLASTY      total on R, partial on L         Family History:   Problem Relation Age of Onset    Diabetes Mother     Heart Disease Mother     Heart Attack Mother     Heart Disease Father     Stroke Sister     Breast Cancer Sister 80    Cancer Maternal Aunt         uterus    Diabetes Maternal Uncle     Breast Cancer Daughter 61       Social History     Socioeconomic History    Marital status:      Spouse name: Not on file    Number of children: Not on file    Years of education: Not on file    Highest education level: Not on file   Occupational History    Not on file   Social Needs    Financial resource strain: Not on file    Food insecurity:     Worry: Not on file     Inability: Not on file    Transportation needs:     Medical: Not on file     Non-medical: Not on file   Tobacco Use    Smoking status: Passive Smoke Exposure - Never Smoker    Smokeless tobacco: Never Used   Substance and Sexual Activity    Alcohol use: No     Alcohol/week: 0.0 oz    Drug use: No    Sexual activity: Never   Lifestyle    Physical activity:     Days per week: Not on file     Minutes per session: Not on file    Stress: Not on file   Relationships    Social connections:     Talks on phone: Not on file     Gets together: Not on file     Attends [employer] service: Not on file     Active member of club or organization: Not on file     Attends meetings of clubs or organizations: Not on file     Relationship status: Not on file    Intimate partner violence:     Fear of current or ex partner: Not on file     Emotionally abused: Not on file     Physically abused: Not on file     Forced sexual activity: Not on file   Other Topics Concern    Not on file   Social History Narrative    Not on file         ALLERGIES: Cardizem [diltiazem hcl]; Codeine; Darvocet a500 [propoxyphene n-acetaminophen]; Diltiazem; Labetalol; Pcn [penicillins]; Primidone; and Sulfa (sulfonamide antibiotics)    Review of Systems   Constitutional: Negative for appetite change, chills, diaphoresis, fatigue and fever. HENT: Negative for congestion, ear discharge, ear pain, sinus pressure, sinus pain, sore throat and trouble swallowing. Eyes: Negative for photophobia, pain, redness and visual disturbance. Respiratory: Negative for chest tightness, shortness of breath and wheezing.     Cardiovascular: Negative for chest pain and palpitations. Gastrointestinal: Positive for abdominal distention and abdominal pain. Negative for nausea and vomiting. Endocrine: Negative. Genitourinary: Negative for difficulty urinating, flank pain, frequency and urgency. Musculoskeletal: Positive for back pain. Negative for neck pain and neck stiffness. Skin: Negative for color change, pallor, rash and wound. Allergic/Immunologic: Negative. Neurological: Negative for dizziness, speech difficulty, weakness and headaches. Hematological: Does not bruise/bleed easily. Psychiatric/Behavioral: Negative for behavioral problems. The patient is not nervous/anxious. Vitals:    06/29/19 0856   BP: 118/69   Pulse: 81   Resp: 18   Temp: 98.3 °F (36.8 °C)   SpO2: 95%   Weight: 68.5 kg (151 lb)   Height: 5' 2\" (1.575 m)            Physical Exam   Constitutional: She is oriented to person, place, and time. She appears well-developed and well-nourished. No distress. HENT:   Head: Normocephalic and atraumatic. Right Ear: External ear normal.   Left Ear: External ear normal.   Nose: Nose normal.   Mouth/Throat: Oropharynx is clear and moist.   Eyes: Pupils are equal, round, and reactive to light. Conjunctivae and EOM are normal. Right eye exhibits no discharge. Left eye exhibits no discharge. Neck: Normal range of motion. Neck supple. No JVD present. No tracheal deviation present. Cardiovascular: Normal rate, normal heart sounds and intact distal pulses. Exam reveals no gallop. No murmur heard. Paced on the cardiac monitor. Pulmonary/Chest: Effort normal and breath sounds normal. No respiratory distress. She has no wheezes. She has no rales. She exhibits no tenderness. Abdominal: Soft. Bowel sounds are normal. She exhibits no distension. There is tenderness. There is no rebound and no guarding. Genitourinary:   Genitourinary Comments: Negative     Musculoskeletal: Normal range of motion. She exhibits tenderness (lumbar back pain).  She exhibits no edema. Neurological: She is alert and oriented to person, place, and time. Skin: Skin is warm and dry. No rash noted. No erythema. No pallor. Psychiatric: She has a normal mood and affect. Her behavior is normal. Judgment and thought content normal.   Nursing note and vitals reviewed. MDM  Number of Diagnoses or Management Options  Acute back pain, unspecified back location, unspecified back pain laterality: new and requires workup  Closed compression fracture of third lumbar vertebra, initial encounter [facility]): new and requires workup  Intractable pain: new and requires workup  Mobility impaired: new and requires workup  Diagnosis management comments: Plan:  Admit to the hospital for pain control, PT and case management for disposition. Patient and family in agreement with plan of care. Amount and/or Complexity of Data Reviewed  Clinical lab tests: ordered and reviewed  Tests in the radiology section of CPT®: ordered and reviewed  Discuss the patient with other providers: yes (Discussed with Dr. Ernst Kelly  )         10:56 AM  [place] text with orthopedics regarding L3 compression fracture. They will order a LSO brace. Procedures    ED EKG interpretation:  Rhythm: Ventricular paced rhythm; and regular . Rate (approx.): 82 bpm.    Note written by Faylene Galeazzi, Scribe, as dictated by France Sacks, NP 12:00 PM    Hospitalist Lynsey for Admission  1:32 PM    ED Room Number: ER12/12  Patient Name and age:  Mylene Ramos 80 y.o.  female  Working Diagnosis:   1. Intractable pain    2. Acute back pain, unspecified back location, unspecified back pain laterality    3. Mobility impaired      Readmission: no  Isolation Requirements:  no  Recommended Level of Care:  med/surg  Code Status:  full  Other:  New compression fracture L3.  Did not pass PT, pain control

## 2019-06-29 NOTE — ED NOTES
TRANSFER - OUT REPORT:    Verbal report given to Pro Macias RN (name) on Nate Bill  being transferred to 4th floor(unit) for routine progression of care       Report consisted of patients Situation, Background, Assessment and   Recommendations(SBAR). Information from the following report(s) ED Summary and MAR was reviewed with the receiving nurse. Lines:   Peripheral IV 06/29/19 Right Antecubital (Active)   Site Assessment Clean, dry, & intact 6/29/2019  9:11 AM   Phlebitis Assessment 0 6/29/2019  9:11 AM   Infiltration Assessment 0 6/29/2019  9:11 AM   Dressing Status Clean, dry, & intact 6/29/2019  9:11 AM   Hub Color/Line Status Pink 6/29/2019  9:11 AM   Action Taken Catheter retaped 6/29/2019  9:11 AM        Opportunity for questions and clarification was provided.       Patient transported with:   [email or web address]

## 2019-06-29 NOTE — ED TRIAGE NOTES
Pt arrives via EMS from [place] [address] assisted living with cc of lower abd pain and back pain x2 weeks, progressively worsening. No changes in bowel patterns, denies nausea and vomiting, denies urinary symptoms. Reports abd distention. Pt reports Person Spine gave injection approx 2 weeks ago, after which this pain began.

## 2019-06-29 NOTE — H&P
[facility]  [address] [facility], [facility] [address]  [phone]    Hospitalist Admission Note      NAME:  Ellen Adkins   :   1932   MRN:  [id number]     PCP:  Mame Carrera MD     Date/Time:  2019 1:47 PM         Assessment / Plan:         Acute back pain: due to acute lumbar compression fracture, likely due to osteoporosis and long term systemic steroid user. Requiring frequent IV opioids, unable to ambulate. Continue IV opioids, monitor for sedation. Having loose stools but CT shows evidence of constipation, ?overflow. Ortho consult. Lengthy discussion held, pt and family wish to try conservative therapy first. PT/OT, pain control, bracing      Atrial fibrillation (HCC) (): s/p AVN ablation w/ placement of PPM. Anticoagulated on Eliquis, which we would have to hold if she forwards with kyphoplasty      Ischemic cardiomyopathy / HFrEF: EF <35% on echo in . Continue Coreg. Takes Lasix PRN at home. Monitor volume status      Mitral regurgitation / Tricuspid regurgitation: moderate MR/TR seen on last echo. Monitor volume status      COPD (chronic obstructive pulmonary disease) / Pulmonary hypertension / Restrictive lung disease : no wheezing, use duo-nebs PRN. Supplemental O2 PRN. On chronic prednisone      Hypertension: BP controlled. Monitor on Coreg      AAA (abdominal aortic aneurysm): Chronic infrarenal AAA measuring 3 cm seen on CT a/p. Outpatient follow up      Hyperbilirubinemia: borderline. May be 2/2 congestive hepatopathy. Repeat hepatic panel in AM       Mixed hyperlipidemia: continue statin      Hypothyroidism due to acquired atrophy of thyroid: cont LT4       CKD (chronic kidney disease) stage 3, GFR 30-59 ml/min: Cr stable. Follow BMP      Recurrent depression: stable. Continue Zoloft      Colon cancer: s/p partial colectomy. Follow up outpatient      Code Status: DNR, d/w pt and family.  See ACP note     Surrogate decision maker: children      ED notes and lab results reviewed. Recent echo results reviewed      Total time spent with patient: 79 Minutes   Time spent in the care of this patient included reviewing records, discussing with nursing, obtaining history and examining the patient, and discussing treatment plans, with >50% time spent counseling/coordinating care    Risk of deterioration: High                 Care Plan discussed with: ED provider, Patient, Family, Nursing Staff and >50% of time spent in counseling and coordination of care    Discussed:  Code Status, Care Plan and D/C Planning    Prophylaxis:  Eliquis    Disposition:  TBD                 Subjective:     CHIEF COMPLAINT: back pain     HISTORY OF PRESENT ILLNESS:     Ms. Amaris Calloway is a 80 y.o. female w/ hx of AF, HFrEF, COPD who presents with low back pain. Started few days ago, low back, sharp, severe at times. NOt associated with fever or chills or LE weakness. No recent falls. Does have osteoporosis and is on chronic steroids. No better in the past few days so came to ED for eval.    Ms. Amaris Calloway is admitted for further evaluation and management.       Past Medical History:   Diagnosis Date    Arthritis     Atrial fibrillation ([address]     av node ablation 5/22/98 with placement of CPI #[id number] dual chamber pacemaker subsequently replaced with a single chamber medtronic #[id number] single chamber pacemaker 7/25/05    Cancer ([address] 1970's    colon cancer w/ resection    COPD     Depression     GERD (gastroesophageal reflux disease)     Hyperlipidemia     Hypertension     Ischemic cardiomyopathy     Migraine headache     Orthostatic hypotension     RADHA (obstructive sleep apnea)     Pacemaker 5/22/98    AV node ablation /pacemaker placement utilizing CPI # [id number] dual chamber system, medtronic #[id number] single chamber device placed 7/22/05    Pulmonary hypertension ([address] 9/26/2011    RVSP 50 on echo 8/14    Thyroid disease     Valvular heart disease     mild-mod MR/TR Past Surgical History:   Procedure Laterality Date    BREAST SURGERY PROCEDURE UNLISTED      benign breast tumor excision right breast    HX BREAST BIOPSY Right 2002    neg; surgical bx    HX COLECTOMY  1974    colon    HX KNEE REPLACEMENT      right TKA    HX PACEMAKER      HX TUBAL LIGATION      TOTAL KNEE ARTHROPLASTY      total on R, partial on L       Social History     Tobacco Use    Smoking status: Passive Smoke Exposure - Never Smoker    Smokeless tobacco: Never Used   Substance Use Topics    Alcohol use: No     Alcohol/week: 0.0 oz        Family History   Problem Relation Age of Onset    Diabetes Mother     Heart Disease Mother     Heart Attack Mother     Heart Disease Father     Stroke Sister     Breast Cancer Sister 80    Cancer Maternal Aunt         uterus    Diabetes Maternal Uncle     Breast Cancer Daughter 61          Allergies   Allergen Reactions    Cardizem [Diltiazem Hcl] Hives    Codeine Nausea and Vomiting    Darvocet A500 [Propoxyphene N-Acetaminophen] Nausea and Vomiting    Diltiazem Hives    Labetalol Nausea and Vomiting     Dizzy/Disoriented     Pcn [Penicillins] Swelling     Tongue Swelling   Has previously tolerated cephalexin per daughter    Primidone Other (comments)     Lightheaded/unsteady gait    Sulfa (Sulfonamide Antibiotics) Nausea and Vomiting        Prior to Admission medications    Medication Sig Start Date End Date Taking? Authorizing Provider   raNITIdine (ZANTAC) 150 mg tablet TAKE 1 TABLET BY MOUTH TWICE A DAY 6/2/19  Yes Nir Randhawa MD   apixaban (ELIQUIS) 5 mg tablet Take 1 Tab by mouth two (2) times a day. Stop coumadin now (last dose 5/22). Hold blood thinners 4 days. Start eliquis Monday 5/27. 5/23/19  Yes Hu Henderson MD   carvedilol (COREG) 3.125 mg tablet TAKE 1 TABLET BY MOUTH TWICE A DAY WITH MEALS 4/4/19  Yes Hu Henderson MD   cholecalciferol, vitamin D3, (VITAMIN D3) 2,000 unit tab Take  by mouth.    Yes Provider, Historical   alendronate (FOSAMAX) 70 mg tablet Take 1 Tab by mouth every seven (7) days. 2/7/19  Yes Ezequiel Ruffin MD   predniSONE (DELTASONE) 5 mg tablet Take 10 mg by mouth every other day. Yes Provider, Historical   albuterol (PROVENTIL HFA, VENTOLIN HFA, PROAIR HFA) 90 mcg/actuation inhaler Take 1 Puff by inhalation every six (6) hours as needed for Wheezing. Yes Provider, Historical   loperamide (IMODIUM) 1 mg/5 mL solution Take 2 mg by mouth nightly. Yes Provider, Historical   levothyroxine (SYNTHROID) 88 mcg tablet Take 1 Tab by mouth Daily (before breakfast). 1/2/19  Yes Ezequiel Ruffin MD   ZOLOFT 50 mg tablet TAKE 1 TABLET BY MOUTH EVERY DAY 11/25/18  Yes Estela Kunz MD   simvastatin (ZOCOR) 20 mg tablet TAKE 1 TABLET BY MOUTH NIGHTLY 11/23/18  Yes Theodore Burger MD   baclofen (LIORESAL) 10 mg tablet Take 5 mg by mouth two (2) times a day. 11/2/17  Yes Provider, Historical   desonide (TRIDESILON) 0.05 % cream Apply  to affected area two (2) times a day. 3/23/17  Yes Estela Kunz MD   multivitamin (ONE A DAY) tablet Take 1 Tab by mouth daily. Yes Provider, Historical   ADVAIR DISKUS 250-50 mcg/dose diskus inhaler Take 1 Puff by inhalation two (2) times a day. 1/12/15  Yes Provider, Historical   acetaminophen (TYLENOL) 500 mg tablet Take 650 mg by mouth every six (6) hours as needed for Pain. (2) Tablet in am (1) tablet in pm (2) Tablet pm   Yes Estela Kunz MD   tiotropium (SPIRIVA WITH HANDIHALER) 18 mcg inhalation capsule Take 1 Cap by inhalation daily. Yes Provider, Historical   furosemide (LASIX) 40 mg tablet Take 40 mg by mouth daily. Daily as needed for swelling    Provider, Historical   warfarin (COUMADIN) 5 mg tablet Take 5 mg by mouth five (5) days a week. Take 5 mg on Tuesday, Thursday, Friday, Saturday, and Sunday    Provider, Historical   warfarin (COUMADIN) 5 mg tablet Take 2.5 mg by mouth two (2) days a week.  Take 2.5 mg on Mondays and Wednesdays Provider, Historical   DENTA 5000 PLUS 1.1 % crea Apply 1 mg to affected area as needed (to prevent cavities). 8/10/17   Provider, Historical       Review of Systems:  (bold if positive, if negative)    Gen:  Eyes:  ENT:  CVS:  Pulm:  dyspneaGI:    :    MS:  PainSkin:  Psych:  Endo:    Hem:  Renal:    Neuro:            Objective:      VITALS:    Vital signs reviewed; most recent are:    Visit Vitals  /62   Pulse 81   Temp 98.3 °F (36.8 °C)   Resp 18   Ht 5' 2\" (1.575 m)   Wt 68.5 kg (151 lb)   SpO2 93%   BMI 27.62 kg/m²     SpO2 Readings from Last 6 Encounters:   06/29/19 93%   05/23/19 95%   03/21/19 94%   03/08/19 95%   02/21/19 94%   02/07/19 98%        No intake or output data in the 24 hours ending 06/29/19 1347         Exam:     Physical Exam:    Gen:  Elderly. Well-developed, well-nourished, in no acute distress  HEENT:  No scleral icterus, PERRL, hearing intact to voice, moist mucous membranes  Neck:  Supple, without masses. Thyroid non-tender  Resp:  No accessory muscle use. CTAB without wheezing, rales, rhonchi  Card: irreg irreg. 2/6 systolic murmurs. No rubs, or gallops. No peripheral lower extremity edema. No JVD. Peripheral pulses in tact. Abd:  Normoactive bowel sounds. Soft, non-tender, non-distended. No rebound, no guarding. No appreciable hepatosplenomegaly   Lymph:  No cervical adenopathy  Musc:  No cyanosis or clubbing  Skin:  No rashes or ulcers; turgor intact. Neuro:  Cranial nerves are grossly intact, no focal motor weakness, follows commands appropriately  Psych:  Good insight, normal affect. Alert, oriented x 3.  Answers questions appropriately       Labs:    Recent Labs     06/29/19  0912   WBC 8.2   HGB 14.9   HCT 44.7   *     Recent Labs     06/29/19  0912      K 4.0   CL 99   CO2 36*   *   BUN 24*   CREA 1.05*   CA 9.2   ALB 3.3*   SGOT 16   ALT 19     No components found for: GLPOC  No results for input(s): PH, PCO2, PO2, HCO3, FIO2 in the last 72 hours. No results for input(s): INR in the last 72 hours. No lab exists for component: INREXT  Lab Results   Component Value Date/Time    Specimen Description: URINE 01/17/2014 10:02 AM     Lab Results   Component Value Date/Time    Culture result: (A) 01/21/2018 08:27 AM     RARE STENOTROPHOMONAS Keon Kari.) MALTOPHILIA CLSI GUIDELINES DO NOT RECOMMEND REPORTING SUSCEPTIBILITIES FOR S. MALTOPHILIA. TRIMETHOPRIM/SULFAMETHOXAZOLE IS THE DRUG OF CHOICE. Culture result: TERESITA CHEN [facility] 01/17/2014 10:02 AM     All other current labs reviewed in the computer. Imaging/Studies:    CT a/p  1. Acute-appearing L3 compression fracture with minimal loss of height. 2. Background of severe degenerative spondylosis throughout the spine, with a  moderate levoscoliosis of the lumbar spine. 3. Moderate generalized constipation. 4. Diverticulosis of the colon. No acute diverticulitis. 5. Cardiomegaly. 6. Chronic infrarenal AAA measuring 3 cm.     EKG: V-paced  EKG personally reviewed    ___________________________________________________    Attending Physician: Halle Pate MD

## 2019-06-30 PROBLEM — E80.6 HYPERBILIRUBINEMIA: Status: RESOLVED | Noted: 2019-06-29 | Resolved: 2019-06-30

## 2019-06-30 PROBLEM — I71.40 AAA (ABDOMINAL AORTIC ANEURYSM): Chronic | Status: ACTIVE | Noted: 2019-06-29

## 2019-06-30 PROBLEM — I10 HYPERTENSION: Chronic | Status: ACTIVE | Noted: 2019-06-29

## 2019-06-30 PROBLEM — Z79.52 LONG TERM SYSTEMIC STEROID USER: Chronic | Status: ACTIVE | Noted: 2019-03-21

## 2019-06-30 PROBLEM — R31.9 HEMATURIA: Status: ACTIVE | Noted: 2019-06-30

## 2019-06-30 PROBLEM — F33.9 RECURRENT DEPRESSION (HCC): Chronic | Status: ACTIVE | Noted: 2018-01-08

## 2019-06-30 PROBLEM — M25.551 RIGHT HIP PAIN: Status: RESOLVED | Noted: 2019-01-13 | Resolved: 2019-06-30

## 2019-06-30 PROBLEM — Z71.89 ADVANCED CARE PLANNING/COUNSELING DISCUSSION: Status: RESOLVED | Noted: 2017-05-11 | Resolved: 2019-06-30

## 2019-06-30 LAB
ALBUMIN SERPL-MCNC: 3.3 G/DL (ref 3.5–5)
ALBUMIN/GLOB SERPL: 1 {RATIO} (ref 1.1–2.2)
ALP SERPL-CCNC: 53 U/L (ref 45–117)
ALT SERPL-CCNC: 16 U/L (ref 12–78)
ANION GAP SERPL CALC-SCNC: 6 MMOL/L (ref 5–15)
AST SERPL-CCNC: 18 U/L (ref 15–37)
ATRIAL RATE: 84 BPM
BASOPHILS # BLD: 0 K/UL (ref 0–0.1)
BASOPHILS NFR BLD: 0 % (ref 0–1)
BILIRUB SERPL-MCNC: 0.9 MG/DL (ref 0.2–1)
BUN SERPL-MCNC: 24 MG/DL (ref 6–20)
BUN/CREAT SERPL: 22 (ref 12–20)
CALCIUM SERPL-MCNC: 8.8 MG/DL (ref 8.5–10.1)
CALCULATED R AXIS, ECG10: -60 DEGREES
CALCULATED T AXIS, ECG11: 110 DEGREES
CHLORIDE SERPL-SCNC: 101 MMOL/L (ref 97–108)
CO2 SERPL-SCNC: 30 MMOL/L (ref 21–32)
CREAT SERPL-MCNC: 1.07 MG/DL (ref 0.55–1.02)
DIAGNOSIS, 93000: NORMAL
DIFFERENTIAL METHOD BLD: ABNORMAL
EOSINOPHIL # BLD: 0.1 K/UL (ref 0–0.4)
EOSINOPHIL NFR BLD: 2 % (ref 0–7)
ERYTHROCYTE [DISTWIDTH] IN BLOOD BY AUTOMATED COUNT: 13.2 % (ref 11.5–14.5)
GLOBULIN SER CALC-MCNC: 3.4 G/DL (ref 2–4)
GLUCOSE SERPL-MCNC: 132 MG/DL (ref 65–100)
HCT VFR BLD AUTO: 43.7 % (ref 35–47)
HGB BLD-MCNC: 14.4 G/DL (ref 11.5–16)
IMM GRANULOCYTES # BLD AUTO: 0.1 K/UL (ref 0–0.04)
IMM GRANULOCYTES NFR BLD AUTO: 1 % (ref 0–0.5)
INR PPP: 1.3 (ref 0.9–1.1)
LYMPHOCYTES # BLD: 1.6 K/UL (ref 0.8–3.5)
LYMPHOCYTES NFR BLD: 19 % (ref 12–49)
MAGNESIUM SERPL-MCNC: 1.7 MG/DL (ref 1.6–2.4)
MCH RBC QN AUTO: 30.3 PG (ref 26–34)
MCHC RBC AUTO-ENTMCNC: 33 G/DL (ref 30–36.5)
MCV RBC AUTO: 91.8 FL (ref 80–99)
MONOCYTES # BLD: 0.9 K/UL (ref 0–1)
MONOCYTES NFR BLD: 11 % (ref 5–13)
NEUTS SEG # BLD: 5.8 K/UL (ref 1.8–8)
NEUTS SEG NFR BLD: 67 % (ref 32–75)
NRBC # BLD: 0 K/UL (ref 0–0.01)
NRBC BLD-RTO: 0 PER 100 WBC
PHOSPHATE SERPL-MCNC: 2.8 MG/DL (ref 2.6–4.7)
PLATELET # BLD AUTO: 119 K/UL (ref 150–400)
PMV BLD AUTO: 12.2 FL (ref 8.9–12.9)
POTASSIUM SERPL-SCNC: 4 MMOL/L (ref 3.5–5.1)
PROT SERPL-MCNC: 6.7 G/DL (ref 6.4–8.2)
PROTHROMBIN TIME: 13.2 SEC (ref 9–11.1)
Q-T INTERVAL, ECG07: 462 MS
QRS DURATION, ECG06: 170 MS
QTC CALCULATION (BEZET), ECG08: 539 MS
RBC # BLD AUTO: 4.76 M/UL (ref 3.8–5.2)
SODIUM SERPL-SCNC: 137 MMOL/L (ref 136–145)
VENTRICULAR RATE, ECG03: 82 BPM
WBC # BLD AUTO: 8.5 K/UL (ref 3.6–11)

## 2019-06-30 PROCEDURE — 74011250637 HC RX REV CODE- 250/637: Performed by: INTERNAL MEDICINE

## 2019-06-30 PROCEDURE — 80053 COMPREHEN METABOLIC PANEL: CPT

## 2019-06-30 PROCEDURE — 36415 COLL VENOUS BLD VENIPUNCTURE: CPT

## 2019-06-30 PROCEDURE — 94760 N-INVAS EAR/PLS OXIMETRY 1: CPT

## 2019-06-30 PROCEDURE — 65270000029 HC RM PRIVATE

## 2019-06-30 PROCEDURE — 94640 AIRWAY INHALATION TREATMENT: CPT

## 2019-06-30 PROCEDURE — 97530 THERAPEUTIC ACTIVITIES: CPT

## 2019-06-30 PROCEDURE — 85610 PROTHROMBIN TIME: CPT

## 2019-06-30 PROCEDURE — 97116 GAIT TRAINING THERAPY: CPT

## 2019-06-30 PROCEDURE — 84100 ASSAY OF PHOSPHORUS: CPT

## 2019-06-30 PROCEDURE — 74011636637 HC RX REV CODE- 636/637: Performed by: INTERNAL MEDICINE

## 2019-06-30 PROCEDURE — 97535 SELF CARE MNGMENT TRAINING: CPT

## 2019-06-30 PROCEDURE — 83735 ASSAY OF MAGNESIUM: CPT

## 2019-06-30 PROCEDURE — 74011000250 HC RX REV CODE- 250: Performed by: INTERNAL MEDICINE

## 2019-06-30 PROCEDURE — 97165 OT EVAL LOW COMPLEX 30 MIN: CPT

## 2019-06-30 PROCEDURE — 85025 COMPLETE CBC W/AUTO DIFF WBC: CPT

## 2019-06-30 RX ORDER — FAMOTIDINE 20 MG/1
20 TABLET, FILM COATED ORAL DAILY
Status: DISCONTINUED | OUTPATIENT
Start: 2019-07-01 | End: 2019-07-03 | Stop reason: HOSPADM

## 2019-06-30 RX ORDER — FACIAL-BODY WIPES
10 EACH TOPICAL DAILY PRN
Status: DISCONTINUED | OUTPATIENT
Start: 2019-06-30 | End: 2019-07-03 | Stop reason: HOSPADM

## 2019-06-30 RX ORDER — POLYETHYLENE GLYCOL 3350 17 G/17G
17 POWDER, FOR SOLUTION ORAL 2 TIMES DAILY
Status: DISCONTINUED | OUTPATIENT
Start: 2019-06-30 | End: 2019-07-03 | Stop reason: HOSPADM

## 2019-06-30 RX ADMIN — TRAMADOL HYDROCHLORIDE 50 MG: 50 TABLET, FILM COATED ORAL at 10:59

## 2019-06-30 RX ADMIN — VITAMIN D, TAB 1000IU (100/BT) 2000 UNITS: 25 TAB at 07:59

## 2019-06-30 RX ADMIN — CARVEDILOL 3.12 MG: 6.25 TABLET, FILM COATED ORAL at 17:43

## 2019-06-30 RX ADMIN — OXYCODONE HYDROCHLORIDE 5 MG: 5 TABLET ORAL at 07:59

## 2019-06-30 RX ADMIN — Medication 10 ML: at 22:00

## 2019-06-30 RX ADMIN — BUDESONIDE 500 MCG: 0.5 INHALANT RESPIRATORY (INHALATION) at 20:35

## 2019-06-30 RX ADMIN — ARFORMOTEROL TARTRATE 15 MCG: 15 SOLUTION RESPIRATORY (INHALATION) at 07:33

## 2019-06-30 RX ADMIN — Medication 10 MG: at 11:32

## 2019-06-30 RX ADMIN — SENNOSIDES, DOCUSATE SODIUM 1 TABLET: 50; 8.6 TABLET, FILM COATED ORAL at 07:59

## 2019-06-30 RX ADMIN — SIMVASTATIN 20 MG: 20 TABLET, FILM COATED ORAL at 21:35

## 2019-06-30 RX ADMIN — LEVOTHYROXINE SODIUM 88 MCG: 88 TABLET ORAL at 06:31

## 2019-06-30 RX ADMIN — SERTRALINE HYDROCHLORIDE 50 MG: 50 TABLET ORAL at 08:00

## 2019-06-30 RX ADMIN — OXYCODONE HYDROCHLORIDE 5 MG: 5 TABLET ORAL at 04:02

## 2019-06-30 RX ADMIN — IPRATROPIUM BROMIDE AND ALBUTEROL SULFATE 3 ML: .5; 3 SOLUTION RESPIRATORY (INHALATION) at 03:51

## 2019-06-30 RX ADMIN — PREDNISONE 5 MG: 5 TABLET ORAL at 08:00

## 2019-06-30 RX ADMIN — THERA TABS 1 TABLET: TAB at 08:00

## 2019-06-30 RX ADMIN — POLYETHYLENE GLYCOL 3350 17 G: 17 POWDER, FOR SOLUTION ORAL at 11:35

## 2019-06-30 RX ADMIN — SENNOSIDES, DOCUSATE SODIUM 1 TABLET: 50; 8.6 TABLET, FILM COATED ORAL at 17:43

## 2019-06-30 RX ADMIN — Medication 10 ML: at 11:37

## 2019-06-30 RX ADMIN — TRAMADOL HYDROCHLORIDE 50 MG: 50 TABLET, FILM COATED ORAL at 19:49

## 2019-06-30 RX ADMIN — FAMOTIDINE 20 MG: 20 TABLET, FILM COATED ORAL at 08:00

## 2019-06-30 RX ADMIN — BUDESONIDE 500 MCG: 0.5 INHALANT RESPIRATORY (INHALATION) at 07:33

## 2019-06-30 RX ADMIN — CARVEDILOL 3.12 MG: 6.25 TABLET, FILM COATED ORAL at 07:59

## 2019-06-30 RX ADMIN — ACETAMINOPHEN 650 MG: 325 TABLET ORAL at 21:35

## 2019-06-30 RX ADMIN — ARFORMOTEROL TARTRATE 15 MCG: 15 SOLUTION RESPIRATORY (INHALATION) at 20:35

## 2019-06-30 RX ADMIN — Medication 10 ML: at 06:31

## 2019-06-30 NOTE — PROGRESS NOTES
Problem: Self Care Deficits Care Plan (Adult)  Goal: *Acute Goals and Plan of Care (Insert Text)  Description  Occupational Therapy Goals  Initiated 6/30/2019    1. Patient will perform lower body dressing with supervision/set-up using AE PRN within 7 days. 2.  Patient will perform toilet transfer with modified independence using most appropriate DME within 7 days. 3.  Patient will perform all aspects of toileting at supervision/set-up within 7 days. 4.  Patient will don/doff back brace at supervision/set-up within 7 days. 5.  Patient will verbalize/demonstrate 3/3 back precautions during ADL tasks without cues within 7 days. Outcome: Progressing Towards Goal     OCCUPATIONAL THERAPY EVALUATION  Patient: Yanni Evangelista (35 y.o. female)  Date: 6/30/2019  Primary Diagnosis: Acute back pain [M54.9]        Precautions: Fall, Back    ASSESSMENT :  Based on the objective data described below, the patient presents with elevated pain, impaired dynamic standing balance, decreased recall, poor awareness of safety precautions, and decreased ADL performance following admission for acute back pain. Patient found to have lumbar compression fracture and moving forward with conservative mgmt and order for LSO brace from PT. Patient is received this date up in bathroom with PT and reporting increased pain. She requires min A for toilet transfer due to attempting to twist to use both UEs on grab bar. Patient requires constant cues to maintain back precautions, with formal education provided regarding BLTS (and written on board to increase carry over). Patient with trial for standing to don LSO brace in standing with max A, however continued to have increased pain (see PT note). Patient unable to perform tailor sitting, therefore requires max to total assist for lower body dressing tasks and appears resistant to using AE . At baseline, patient lives alone in independent living, does not drive, and is independent.  At this time, patient is below her baseline and would benefit from continued skilled services. Recommend discharge to SNF pending progress in acute setting. Patient will benefit from skilled intervention to address the above impairments. Patient?s rehabilitation potential is considered to be Fair  Factors which may influence rehabilitation potential include:   ? None noted  ? Mental ability/status  ? Medical condition: back precautions  ? Home/family situation and support systems  ? Safety awareness  ? Pain tolerance/management  ? Other:      PLAN :  Recommendations and Planned Interventions:  ?               Self Care Training                  ? Therapeutic Activities  ? Functional Mobility Training    ? Cognitive Retraining  ? Therapeutic Exercises           ? Endurance Activities  ? Balance Training                   ? Neuromuscular Re-Education  ? Visual/Perceptual Training     ? Home Safety Training  ? Patient Education                 ? Family Training/Education  ? Other (comment):    Frequency/Duration: Patient will be followed by occupational therapy 5 times a week to address goals. Discharge Recommendations: Rehab  Further Equipment Recommendations for Discharge: AE for lower body dressing; patient has all other needed DME      SUBJECTIVE:   Patient stated ?ugh. I've had therapy before, I know what equipment you are talking about. ? RN cleared patient for therapy. Patient pleasant and agreeable to participate, however with elevated pain levels. Daughter present at bedside for duration of session.      OBJECTIVE DATA SUMMARY:   HISTORY:   Past Medical History:   Diagnosis Date    Arthritis     Atrial fibrillation ([address]     av node ablation 5/22/98 with placement of CPI #[id number] dual chamber pacemaker subsequently replaced with a single chamber medtronic #[id number] single chamber pacemaker 7/25/05    Cancer ([facility] [address] 1970's    colon cancer w/ resection    COPD     Depression     GERD (gastroesophageal reflux disease)     Hyperlipidemia     Hypertension     Ischemic cardiomyopathy     Migraine headache     Orthostatic hypotension     RADHA (obstructive sleep apnea)     Pacemaker 5/22/98    AV node ablation /pacemaker placement utilizing CPI # [id number] dual chamber system, medtronic #[id number] single chamber device placed 7/22/05    Pulmonary hypertension ([facility] [address] 9/26/2011    RVSP 50 on echo 8/14    Thyroid disease     Valvular heart disease     mild-mod MR/TR     Past Surgical History:   Procedure Laterality Date    BREAST SURGERY PROCEDURE UNLISTED      benign breast tumor excision right breast    HX BREAST BIOPSY Right 2002    neg; surgical bx    HX COLECTOMY  1974    colon    HX KNEE REPLACEMENT      right TKA    HX PACEMAKER      HX TUBAL LIGATION      TOTAL KNEE ARTHROPLASTY      total on R, partial on L     Prior Level of Function/Environment/Context: patient lives in independent living at [facility] and is independent for ADLs. Uses rolling walker or rollator for mobility, with use of transport chair when out in community. Does not drive. Home Situation  Home Environment: Independent living  One/Two Story Residence: One story  Living Alone: Yes  Support Systems: Assisted living  Patient Expects to be Discharged to[de-identified] Independent living facility  Current DME Used/Available at Home: Millie Minium, rolling, Walker, rollator, Commode, bedside, Shower chair, Grab bars(transport chair)  Tub or Shower Type: Shower    EXAMINATION OF PERFORMANCE DEFICITS:  Cognitive/Behavioral Status:  Neurologic State: Alert  Orientation Level: Oriented X4  Cognition: Appropriate decision making; Appropriate for age attention/concentration; Appropriate safety awareness; Follows commands  Perception: Appears intact  Perseveration: No perseveration noted  Safety/Judgement: Awareness of environment    Skin: observed skin intact    Edema: no abnormal swelling noted. Hearing: Auditory  Auditory Impairment: Hard of hearing, bilateral, Hearing aid(s)  Hearing Aids/Status: With patient    Vision/Perceptual:    Tracking: Able to track stimulus in all quadrants w/o difficulty    Corrective Lenses: Reading glasses    Range of Motion:  AROM: Generally decreased, functional In bilateral UEs    Strength:  Strength: Generally decreased, functional In bilateral UEs    Coordination:  Coordination: Within functional limits In bilateral UEs  Fine Motor Skills-Upper: Left Intact; Right Intact    Gross Motor Skills-Upper: Left Intact; Right Intact    Tone & Sensation:  Tone: Normal  Sensation: Intact    Balance:  Sitting: Intact  Sitting - Static: Good (unsupported)  Sitting - Dynamic: Good (unsupported)  Standing: Impaired  Standing - Static: Good  Standing - Dynamic : Fair(with hand held assist)    Functional Mobility and Transfers for ADLs:  Bed Mobility:  Rolling: Minimum assistance;Assist x1;Additional time(verbal cues for log roll technique)  Supine to Sit: (Patient was received sitting up)  Sit to Supine: Minimum assistance; Additional time;Assist x1(for LE management)    Transfers:  Sit to Stand: Minimum assistance;Assist x1  Stand to Sit: Contact guard assistance  Bathroom Mobility: Minimum assistance(with hand hold on L side)  Toilet Transfer : Minimum assistance;Assist x1(with grab bar use; cues for twisting)    ADL Assessment:  Feeding: Modified independent    Oral Facial Hygiene/Grooming: Contact guard assistance    Bathing: Maximum assistance(due to inability to reach LEs 2/2 back precautions)    Upper Body Dressing: Moderate assistance(including donning/doffing LSO brace)    Lower Body Dressing:  Total assistance    Toileting: Contact guard assistance(in standing to manage clothing)    ADL Intervention and task modifications:  Grooming  Grooming Assistance: Contact guard assistance(standing at sink; verbal cues for twisting)  Washing Hands: Contact guard assistance    Upper Body Dressing Assistance  Orthotics(Brace): Maximum assistance(in standing due to reported pain when donning in sitting)    Lower Body Dressing Assistance  Socks: Total assistance (dependent)(Patient resistive to adaptive equipment)  Leg Crossed Method Used: No(patient unable to perform tailor sitting)  Position Performed: Seated edge of bed  Cues: Physical assistance    Toileting  Bladder Hygiene: Supervision(seated)  Clothing Management: Contact guard assistance  Cues: Verbal cues provided; Tactile cues provided  Adaptive Equipment: Grab bars    Cognitive Retraining  Safety/Judgement: Awareness of environment    Patient instructed and indicated understanding the benefits of maintaining activity tolerance, functional mobility, and independence with self care tasks during acute stay  to ensure safe return home and to baseline. Encouraged patient to increase frequency and duration OOB, not sitting longer than 30 mins without marching/walking with staff, be out of bed for all meals, perform daily ADLs (as approved by RN/MD regarding bathing etc), and performing functional mobility to/from bathroom. Patient instruction and indicated understanding on body mechanics, ergonomics and gravitational force on the spine during different body positions to plan activities in prep for return home to complete basic ADLs, instrumental ADLs and back to work safely. Dressing brace: Patient instructed and demonstrated to don/doff velcro on brace using dominant side, keeping non-dominant side intact. Patient instructed and demonstrated in meantime of being able to stand with back against wall to don/doff brace, to don/doff seated using lap and bed/chair surface to support brace while manipulating. Patient with increased pain in lower L back, therefore LSO brace removed.    Dressing lower body: Patient instructed to don brace first and on the benefits to remain seated to don all clothing to increase independence with precautions and pain management. Patient instructed on tailor sitting for lower body dressing however unable to perform. Discussed use of AE to assist with independence and maintaining back precautions; patient appears resistant of same and reports familiarity with tools due to previous rehab. Will follow up with formal education on AE. Toileting: Patient instructed on the benefits of using flushable wet wipes and toilet tongs if decreased reach or pain for amber care. Also, the benefits of a reacher to aid in clothing management. Home safety: Patient instructed and indicated understanding on home modifications and safety [raise height of ADL objects (i.e. clothing, sink items, fridge items, items to mouth when grooming), appropriate height of chair surfaces, recliner safety, change of floor surfaces, clear pathways] to increase independence and fall prevention. Functional Measure:  Barthel Index:    Bathin  Bladder: 5  Bowels: 10  Groomin  Dressin  Feeding: 10  Mobility: 0  Stairs: 0  Toilet Use: 5  Transfer (Bed to Chair and Back): 10  Total: 45/100        Percentage of impairment   0%   1-19%   20-39%   40-59%   60-79%   80-99%   100%   Barthel Score 0-100 100 99-80 79-60 59-40 20-39 1-19   0     The Barthel ADL Index: Guidelines  1. The index should be used as a record of what a patient does, not as a record of what a patient could do. 2. The main aim is to establish degree of independence from any help, physical or verbal, however minor and for whatever reason. 3. The need for supervision renders the patient not independent. 4. A patient's performance should be established using the best available evidence. Asking the patient, friends/relatives and nurses are the usual sources, but direct observation and common sense are also important. However direct testing is not needed.   5. Usually the patient's performance over the preceding 24-48 hours is important, but occasionally longer periods will be relevant. 6. Middle categories imply that the patient supplies over 50 per cent of the effort. 7. Use of aids to be independent is allowed. Jeffrey Marques., Barthel, D.W. ([id number]). Functional evaluation: the Barthel Index. [facility] (14)2. Wilian Palencia marc RADHA Page, Betsy Marquez., Levon Marcos., [place], [address] (1999). Measuring the change indisability after inpatient rehabilitation; comparison of the responsiveness of the Barthel Index and Functional Cunningham Measure. Journal of Neurology, Neurosurgery, and Psychiatry, 66(4), 939-953. CHANELLE Manzano, SARA Rodriguez, & Tierney Roper MLYNDA. (2004.) Assessment of post-stroke quality of life in cost-effectiveness studies: The usefulness of the Barthel Index and the EuroQoL-5D. Quality of Life Research, 15, 481-83     Occupational Therapy Evaluation Charge Determination   History Examination Decision-Making   LOW Complexity : Brief history review  HIGH Complexity : 5 or more performance deficits relating to physical, cognitive , or psychosocial skils that result in activity limitations and / or participation restrictions LOW Complexity : No comorbidities that affect functional and no verbal or physical assistance needed to complete eval tasks       Based on the above components, the patient evaluation is determined to be of the following complexity level: LOW   Pain:  Pain Scale 1: Numeric (0 - 10)  Pain Intensity 1: 7  Pain Location 1: Back  Pain Orientation 1: Lower  Pain Description 1: Aching  Pain Intervention(s) 1: repositioned in bed to patient comfort. Patient reporting she had pain medication prior to session. Activity Tolerance:   Patient limited by pain this session. Denied feeling dizzy or lightheaded during session. Please refer to the flowsheet for vital signs taken during this treatment.   After treatment:   ? Patient left in no apparent distress sitting up in chair  ? Patient left in no apparent distress in bed  ? Call bell left within reach  ? Nursing notified  ? Caregiver present (daughter)  ? Bed alarm activated    COMMUNICATION/EDUCATION:   The patient?s plan of care was discussed with: Physical Therapist and Registered Nurse.  ? Home safety education was provided and the patient/caregiver indicated understanding. ? Patient/family have participated as able in goal setting and plan of care. ? Patient/family agree to work toward stated goals and plan of care. ? Patient understands intent and goals of therapy, but is neutral about his/her participation. ? Patient is unable to participate in goal setting and plan of care. This patient?s plan of care is appropriate for delegation to [facility] [place].     Thank you for this referral.  Chantell Salmeron OT  Time Calculation: 20 mins

## 2019-06-30 NOTE — ACP (ADVANCE CARE PLANNING)
Advance Care Planning (ACP) Provider Note - Comprehensive      Date of ACP Conversation: 06/29/19    Persons included in Conversation:  patient and family (2 daughters and son)  Length of ACP Conversation in minutes:  16 minutes     Authorized Decision Maker (if patient is incapable of making informed decisions):   Patient's daughters and son       General ACP for ALL Patients with Decision Making Capacity:  Importance of advance care planning, including choosing a healthcare agent to communicate patient's healthcare decisions if patient lost the ability to make decisions. Review of Existing Advance Directive:  Patient and family do have a current advance directive however not readily available for review.  Pt confirms that her children would serve as her MPOA     Active Diagnoses:   Patient Active Problem List   Diagnosis Code    Mixed hyperlipidemia E78.2    Atrial fibrillation ([facility] [address] I48.91    Mitral regurgitation I34.0    CHB (complete heart block) ([facility]) I44.2    Orthostatic hypotension I95.1    Chest pain, unspecified R07.9    Pulmonary hypertension ([facility]) I27.20    Anxiety and depression F41.9, F32.9    Colon cancer ([facility]) C18.9    Hypothyroid E03.9    CKD (chronic kidney disease) stage 3, GFR 30-59 ml/min ([facility]) N18.3    Hypothyroidism due to acquired atrophy of thyroid E03.4    Osteopenia M85.80    Restrictive lung disease J98.4    Chest pain R07.9    Abnormal stress test R94.39    Ischemic cardiomyopathy I25.5    Cardiomyopathy ([facility]) I42.9    Psoriasis L40.9    Advanced care planning/counseling discussion Z71.89    Pacemaker Z95.0    Recurrent depression ([facility] [address] F33.9    Latent tuberculosis by blood test R76.11    Nuclear cataract [id number]    Posterior vitreous detachment H43.819    Pseudophakia Z96.1    Right hip pain M25.551    Frequent falls R29.6    COPD (chronic obstructive pulmonary disease) ([facility]) J44.9    Chronic respiratory failure with hypoxia ([facility]) J96.11    Long term systemic steroid user Z79.52    Hypertension I10    AAA (abdominal aortic aneurysm) ([facility]) I71.4    Hyperbilirubinemia E80.6    Acute back pain M54.9    Constipation K59.00    Lumbar compression fracture ([facility]) S32.000A         These active diagnoses are of sufficient risk that focused discussion on advance care planning is indicated in order to allow the patient to thoughtfully consider personal goals of care; and, if situations arise that prevent the ability to personally give input, to ensure appropriate representation of their personal desires for different levels and aggressiveness of care. For Serious or Chronic Illness:  Understanding of medical condition     Reviewed pt's clinical status. Mrs. Lawyer Fernandes has multiple medical problems including HFrEF, A-fib, COPD and is being admitted for acute back pain secondary to acute lumbar fracture. We reviewed our treatment plan and anticipated discharge plans. Further, we discussed pt's wishes on how whe would like to proceed (aggressive/heroic resuscitation vs not intervening and allowing nature takes its course)  if she were to suffer cardiopulmonary arrest.    Understanding of CPR, goals and expected outcomes, benefits and burdens discussed. Information on CPR success provided (many factors lower a patients chance of survival, including advanced age, performance status, malignancy, and presence of multiple comorbidities); Individual asked to communicate understanding of information in his/her own words. With family at bedside, patient made it very clear to me that she would not want heroic measures for resuscitation in the event of cardiopulmonary arrest, including chest compressions, defibrillation, intubation/mechanical ventilation.  She is alert and oriented during our discussion     Interventions Provided:  Code status updated to DNR

## 2019-06-30 NOTE — PROGRESS NOTES
Problem: Mobility Impaired (Adult and Pediatric)  Goal: *Acute Goals and Plan of Care (Insert Text)  Description  Physical Therapy Goals  Initiated 6/29/2019    1. Patient will move from supine to sit and sit to supine  and roll side to side in bed with supervision/set-up within 7 days. 2. Patient will perform sit to stand with supervision/set-up within 7 days. 3. Patient will ambulate with supervision/set-up for 100 feet with the least restrictive device within 7 days. 4. Patient will verbalize and demonstrate understanding of spinal precautions (No bending, lifting greater than 5 lbs, or twisting; log-roll technique; frequent repositioning as instructed) within  days. Outcome: Progressing Towards Goal   PHYSICAL THERAPY TREATMENT  Patient: Harsh Serrato (15 y.o. female)  Date: 6/30/2019  Diagnosis: Acute back pain [M54.9] Lumbar compression fracture (HCC)       Precautions: (P) Fall, Back  Chart, physical therapy assessment, plan of care and goals were reviewed. ASSESSMENT:  Pt received supine in bed hesitant to work with therapy as she was finally pain free and unsteady d/t pain medication. Agreeable with encouragement. Pt needing multiple reminders to maintain back precautions throughout bed mobility and transfers (particularly twisting). Min A to log roll and come to sitting. Min A to stand. Ambulated with bilateral HHA to the bathroom and then back to sitting EOB. Pt reports significant pain from the brace therefore did not don prior to toileting. Attempted to place brace on in standing vs sitting but continual complains of pain. Pt is not pain-free without the brace but does much better than with it on. Pending progress anticipate pt will need rehab at discharge as she lives alone in [address] [place] [address]. Progression toward goals:  ?    Improving appropriately and progressing toward goals  ? Improving slowly and progressing toward goals  ?     Not making progress toward goals and plan of care will be adjusted PLAN:  Patient continues to benefit from skilled intervention to address the above impairments. Continue treatment per established plan of care. Discharge Recommendations:  Rehab  Further Equipment Recommendations for Discharge:  None at this time      SUBJECTIVE:   Patient stated ? The brace hurts too much. ?    OBJECTIVE DATA SUMMARY:   Critical Behavior:  Neurologic State: (P) Alert  Orientation Level: (P) Oriented X4  Cognition: (P) Appropriate decision making, Appropriate for age attention/concentration, Appropriate safety awareness, Follows commands  Safety/Judgement: (P) Awareness of environment  Functional Mobility Training:  Bed Mobility:  Rolling: Minimum assistance  Supine to Sit: Minimum assistance  Sit to Supine: Minimum assistance           Transfers:  Sit to Stand: Minimum assistance  Stand to Sit: Minimum assistance                             Balance:  Sitting: Intact  Standing: Impaired  Standing - Static: Good  Standing - Dynamic : Fair  Ambulation/Gait Training:  Distance (ft): 12 Feet (ft)(12ft x 2)  Assistive Device: Gait belt(hand held assist)  Ambulation - Level of Assistance: Minimal assistance        Gait Abnormalities: Decreased step clearance;Shuffling gait              Speed/Melanie: Pace decreased (<100 feet/min); Slow;Shuffled                       Stairs:              Neuro Re-Education:    Therapeutic Exercises:     Pain:  Pain Scale 1: Numeric (0 - 10)  Pain Intensity 1: 8  Pain Location 1: Back  Pain Orientation 1: Lower  Pain Description 1: Aching  Pain Intervention(s) 1: Medication (see MAR)  Activity Tolerance:   Good  Please refer to the flowsheet for vital signs taken during this treatment. After treatment:   ?    Patient left in no apparent distress sitting up in chair  ? Patient left in no apparent distress in bed  ? Call bell left within reach  ? Nursing notified  ? Caregiver present  ?     Bed alarm activated    COMMUNICATION/COLLABORATION:   The patient?s plan of care was discussed with: Registered Nurse    Leonor Prakash, PT   Time Calculation: 28 mins

## 2019-06-30 NOTE — CONSULTS
ORTHO CONSULT    Subjective:     Date of Consultation:  June 29, 2019    Referring Physician:  Dr. Marlena Rivas is a 80 y.o. female we are consulted to see for LBP, worsened over the last few days. Unable to ambulate. Pt. States long history of LBP, sees pain specialist, last SA[facility]ARA 1 month ago. States had worsening pain last week, no improving, bedridden for 2 days. Denies injury. Denies numbness or tingling, no bowel or bladder issues.     Patient Active Problem List    Diagnosis Date Noted    Hypertension 06/29/2019    AAA (abdominal aortic aneurysm) ([address] 06/29/2019    Hyperbilirubinemia 06/29/2019    Acute back pain 06/29/2019    Constipation 06/29/2019    Lumbar compression fracture ([address] 06/29/2019    Long term systemic steroid user 03/21/2019    Right hip pain 01/13/2019    Frequent falls 01/13/2019    COPD (chronic obstructive pulmonary disease) ([address] 01/13/2019    Chronic respiratory failure with hypoxia ([address] 01/13/2019    Latent tuberculosis by blood test 01/22/2018    Recurrent depression ([address] 01/08/2018    Pacemaker 07/24/2017    Advanced care planning/counseling discussion 05/11/2017    Psoriasis 03/23/2017    Pseudophakia 11/14/2016    Cardiomyopathy ([address] 09/20/2016    Abnormal stress test 09/13/2016    Ischemic cardiomyopathy     Chest pain 08/10/2016    Nuclear cataract 06/06/2016    Posterior vitreous detachment 06/06/2016    Restrictive lung disease 12/14/2015    Osteopenia 11/23/2015    Hypothyroidism due to acquired atrophy of thyroid 11/06/2015    CKD (chronic kidney disease) stage 3, GFR 30-59 ml/min ([facility]) 02/09/2015    Anxiety and depression 02/26/2014    Colon cancer ([address] 02/26/2014    Hypothyroid 02/26/2014    Pulmonary hypertension ([address] 09/26/2011    Mitral regurgitation 10/21/2010    CHB (complete heart block) ([address] 10/21/2010    Orthostatic hypotension 10/21/2010    Chest pain, unspecified 10/21/2010    Atrial fibrillation ([address]     Mixed hyperlipidemia 10/18/2010     Family History   Problem Relation Age of Onset    Diabetes Mother     Heart Disease Mother     Heart Attack Mother     Heart Disease Father     Stroke Sister     Breast Cancer Sister 80    Cancer Maternal Aunt         uterus    Diabetes Maternal Uncle     Breast Cancer Daughter 61      Social History     Tobacco Use    Smoking status: Passive Smoke Exposure - Never Smoker    Smokeless tobacco: Never Used   Substance Use Topics    Alcohol use: No     Alcohol/week: 0.0 oz     Past Medical History:   Diagnosis Date    Arthritis     Atrial fibrillation (HCC)     av node ablation 5/22/98 with placement of CPI #[id number] dual chamber pacemaker subsequently replaced with a single chamber medtronic #[id number] single chamber pacemaker 7/25/05    Cancer ([facility] 1970's    colon cancer w/ resection    COPD     Depression     GERD (gastroesophageal reflux disease)     Hyperlipidemia     Hypertension     Ischemic cardiomyopathy     Migraine headache     Orthostatic hypotension     RADHA (obstructive sleep apnea)     Pacemaker 5/22/98    AV node ablation /pacemaker placement utilizing CPI # [id number] dual chamber system, medtronic #[id number] single chamber device placed 7/22/05    Pulmonary hypertension ([facility] 9/26/2011    RVSP 50 on echo 8/14    Thyroid disease     Valvular heart disease     mild-mod MR/TR      Past Surgical History:   Procedure Laterality Date    BREAST SURGERY PROCEDURE UNLISTED      benign breast tumor excision right breast    HX BREAST BIOPSY Right 2002    neg; surgical bx    HX COLECTOMY  1974    colon    HX KNEE REPLACEMENT      right TKA    HX PACEMAKER      HX TUBAL LIGATION      TOTAL KNEE ARTHROPLASTY      total on R, partial on L      Prior to Admission medications    Medication Sig Start Date End Date Taking? Authorizing Provider   acetaminophen (TYLENOL EXTRA STRENGTH) 500 mg tablet Take 500 mg by mouth daily (with lunch).    Yes Provider, Historical multivitamins (CHEWABLE-AMOS) chew Take 1 Tab by mouth daily. Yes Provider, Historical   predniSONE (DELTASONE) 10 mg tablet Take 5 mg by mouth every other day. Yes Provider, Historical   lidocaine (LIDODERM) 5 % 1 Patch by TransDERmal route every twenty-four (24) hours. Apply patch to the affected area for 12 hours a day and remove for 12 hours a day. Yes Provider, Historical   raNITIdine (ZANTAC) 150 mg tablet TAKE 1 TABLET BY MOUTH TWICE A DAY 6/2/19  Yes Emir Carias MD   furosemide (LASIX) 40 mg tablet Take 20 mg by mouth daily. Daily as needed for swelling    Yes Provider, Historical   apixaban (ELIQUIS) 5 mg tablet Take 1 Tab by mouth two (2) times a day. Stop coumadin now (last dose 5/22). Hold blood thinners 4 days. Start eliquis Monday 5/27. 5/23/19  Yes Willie Sosa MD   carvedilol (COREG) 3.125 mg tablet TAKE 1 TABLET BY MOUTH TWICE A DAY WITH MEALS 4/4/19  Yes Willie Sosa MD   cholecalciferol, vitamin D3, (VITAMIN D3) 2,000 unit tab Take 2,000 Units by mouth daily. Yes Provider, Historical   alendronate (FOSAMAX) 70 mg tablet Take 1 Tab by mouth every seven (7) days. Patient taking differently: Take 70 mg by mouth every Sunday. 2/7/19  Yes Emir Carias MD   predniSONE (DELTASONE) 5 mg tablet Take 10 mg by mouth every other day. Yes Provider, Historical   albuterol (PROVENTIL HFA, VENTOLIN HFA, PROAIR HFA) 90 mcg/actuation inhaler Take 1 Puff by inhalation every six (6) hours as needed for Wheezing. Yes Provider, Historical   loperamide (IMODIUM) 1 mg/5 mL solution Take 2 mg by mouth daily. Yes Provider, Historical   levothyroxine (SYNTHROID) 88 mcg tablet Take 1 Tab by mouth Daily (before breakfast).  1/2/19  Yes Emir Carias MD   ZOLOFT 50 mg tablet TAKE 1 TABLET BY MOUTH EVERY DAY 11/25/18  Yes Bryn Mckeon MD   simvastatin (ZOCOR) 20 mg tablet TAKE 1 TABLET BY MOUTH NIGHTLY 11/23/18  Yes Itz Wray MD   baclofen (LIORESAL) 10 mg tablet Take 5 mg by mouth two (2) times a day. 11/2/17  Yes Provider, Historical   DENTA 5000 PLUS 1.1 % crea Apply 1 mg to affected area as needed (to prevent cavities). 8/10/17  Yes Provider, Historical   ADVAIR DISKUS 250-50 mcg/dose diskus inhaler Take 1 Puff by inhalation two (2) times a day. 1/12/15  Yes Provider, Historical   acetaminophen (TYLENOL) 500 mg tablet Take 1,000 mg by mouth two (2) times a day. (2) Tablet in am (1) tablet in pm (2) Tablet pm   Yes Yohana Cosby MD   tiotropium (SPIRIVA WITH HANDIHALER) 18 mcg inhalation capsule Take 1 Cap by inhalation daily.    Yes Provider, Historical     Current Facility-Administered Medications   Medication Dose Route Frequency    sodium chloride (NS) flush 5-40 mL  5-40 mL IntraVENous Q8H    sodium chloride (NS) flush 5-40 mL  5-40 mL IntraVENous PRN    acetaminophen (TYLENOL) tablet 650 mg  650 mg Oral Q6H PRN    naloxone (NARCAN) injection 0.4 mg  0.4 mg IntraVENous PRN    traMADol (ULTRAM) tablet 50 mg  50 mg Oral Q6H PRN    oxyCODONE IR (ROXICODONE) tablet 5 mg  5 mg Oral Q4H PRN    HYDROmorphone (DILAUDID) syringe 0.2 mg  0.2 mg IntraVENous Q4H PRN    lidocaine 4 % patch 1 Patch  1 Patch TransDERmal Q24H    senna-docusate (PERICOLACE) 8.6-50 mg per tablet 1 Tab  1 Tab Oral BID    carvedilol (COREG) tablet 3.125 mg  3.125 mg Oral BID WITH MEALS    [START ON 6/30/2019] cholecalciferol (VITAMIN D3) tablet 2,000 Units  2,000 Units Oral DAILY    [START ON 6/30/2019] levothyroxine (SYNTHROID) tablet 88 mcg  88 mcg Oral ACB    [START ON 6/30/2019] therapeutic multivitamin (THERAGRAN) tablet 1 Tab  1 Tab Oral DAILY    [START ON 7/1/2019] predniSONE (DELTASONE) tablet 10 mg  10 mg Oral EVERY OTHER DAY    famotidine (PEPCID) tablet 20 mg  20 mg Oral BID    simvastatin (ZOCOR) tablet 20 mg  20 mg Oral QHS    [START ON 6/30/2019] sertraline (ZOLOFT) tablet 50 mg  50 mg Oral DAILY    budesonide (PULMICORT) 500 mcg/2 ml nebulizer suspension  500 mcg Nebulization BID RT    arformoterol (BROVANA) neb solution 15 mcg  15 mcg Nebulization BID RT    albuterol-ipratropium (DUO-NEB) 2.5 MG-0.5 MG/3 ML  3 mL Nebulization Q6H PRN    [START ON 6/30/2019] predniSONE (DELTASONE) tablet 5 mg  5 mg Oral EVERY OTHER DAY     Allergies   Allergen Reactions    Cardizem [Diltiazem Hcl] Hives    Codeine Nausea and Vomiting    Darvocet A500 [Propoxyphene N-Acetaminophen] Nausea and Vomiting    Diltiazem Hives    Labetalol Nausea and Vomiting     Dizzy/Disoriented     Pcn [Penicillins] Swelling     Tongue Swelling   Has previously tolerated cephalexin per daughter    Primidone Other (comments)     Lightheaded/unsteady gait    Sulfa (Sulfonamide Antibiotics) Nausea and Vomiting        Review of Systems:  A comprehensive review of systems was negative except for that written in the HPI. Objective:     Visit Vitals  /71 (BP 1 Location: Left arm, BP Patient Position: At rest)   Pulse 80   Temp 98.3 °F (36.8 °C)   Resp 16   Ht 5' 2\" (1.575 m)   Wt 68.5 kg (151 lb)   LMP 02/26/1970   SpO2 94%   BMI 27.62 kg/m²       EXAM: I examined pt's L spine. +L3 Spinous process pain. No paraspinous process pain on exam. Strength 5/5 BLE's, DTR 2+ BLE's, +Dorsi/plantar flexion BLE's. NVI distally BLE's. Cap. Refill <2secs all toe. No long track signs. XR Results (most recent):  CT abd:    BONES: No destructive bone lesion. There is a degenerative levoscoliosis of the  lumbar spine. There is severe multilevel degenerative spondylosis. The bones  also appear osteopenic. There is an L3 compression fracture, new since 2017,  with minimal loss of height. There is multilevel bilateral neuroforaminal  stenosis. ,        Assessment/Plan:     Encounter Diagnoses     ICD-10-CM ICD-9-CM   1. Intractable pain R52 780.96   2. Acute back pain, unspecified back location, unspecified back pain laterality M54.9 724.5   3. Mobility impaired Z74.09 799.89   4.  Closed compression fracture of third lumbar vertebra, initial encounter ([facility] S32.030A 805.4     Acute appearing L3 compression fx, not seen on xrays 2 weeks ago. Pt. Unable to get MRI due to pacemaker. Will attempt to work with PT using LSO brace over next 48hrs. If not improving, will discuss kyphoplasty at that time. Dr. Rhina Heard agrees with plan.       BAY Montiel-C    Orthopaedic Surgery PA

## 2019-06-30 NOTE — PROGRESS NOTES
The following medication: Famotidine was automatically dose-adjusted per [address] [place] [address] P&T Committee Protocol, for estimated CrCl ~30ml/min. Previous dose: Famotidine 20mg PO BID  New dose: Famotidine 20mg PO daily    Ht Readings from Last 1 Encounters:   12/10/18 160 cm (63\")     Lab Results   Component Value Date/Time    Creatinine 0.99 12/17/2018 02:20 PM     Pharmacy to continue to monitor patient daily. Will automatically make dosing changes as renal function changes. Jenny Guzman, Pharm. D.   [address]

## 2019-06-30 NOTE — PROGRESS NOTES
[facility]  [address] [facility], [address] [place] [address]  [phone]      Medical Progress Note      NAME: Ellen Adkins   :  1932  MRM:  [id number]    Date/Time: 2019  10:49 AM         Subjective:     Chief Complaint:  Pain: low back, severe, intermittent, better with pain meds    ROS:  (bold if positive, if negative)                        Tolerating Diet          Objective:       Vitals:          Last 24hrs VS reviewed since prior progress note.  Most recent are:    Visit Vitals  /66 (BP 1 Location: Right arm, BP Patient Position: At rest)   Pulse 83   Temp 97.6 °F (36.4 °C)   Resp 16   Ht 5' 2\" (1.575 m)   Wt 68.5 kg (151 lb)   SpO2 97%   BMI 27.62 kg/m²     SpO2 Readings from Last 6 Encounters:   19 97%   19 95%   19 94%   19 95%   19 94%   19 98%            Intake/Output Summary (Last 24 hours) at 2019 1049  Last data filed at 2019 0201  Gross per 24 hour   Intake    Output 200 ml   Net -200 ml          Exam:     Physical Exam:    Gen:  Well-developed, well-nourished, frail, elderly, in no acute distress  HEENT:  Pink conjunctivae, PERRL, hearing intact to voice, moist mucous membranes  Neck:  Supple, without masses, thyroid non-tender  Resp:  No accessory muscle use, clear breath sounds without wheezes rales or rhonchi  Card:  No murmurs, normal S1, S2 without thrills, bruits or peripheral edema  Abd:  Soft, non-tender, non-distended, normoactive bowel sounds are present, no palpable organomegaly and no detectable hernias  Lymph:  No cervical or inguinal adenopathy  Musc:  No cyanosis or clubbing  Skin:  No rashes or ulcers, skin turgor is good  Neuro:  Cranial nerves are grossly intact, no focal motor weakness, follows commands appropriately  Psych:  Good insight, oriented to person, place and time, alert    Medications Reviewed: (see below)    Lab Data Reviewed: (see below)    ______________________________________________________________________    Medications:     Current Facility-Administered Medications   Medication Dose Route Frequency    polyethylene glycol (MIRALAX) packet 17 g  17 g Oral BID    bisacodyl (DULCOLAX) suppository 10 mg  10 mg Rectal DAILY PRN    sodium chloride (NS) flush 5-40 mL  5-40 mL IntraVENous Q8H    sodium chloride (NS) flush 5-40 mL  5-40 mL IntraVENous PRN    acetaminophen (TYLENOL) tablet 650 mg  650 mg Oral Q6H PRN    naloxone (NARCAN) injection 0.4 mg  0.4 mg IntraVENous PRN    traMADol (ULTRAM) tablet 50 mg  50 mg Oral Q6H PRN    oxyCODONE IR (ROXICODONE) tablet 5 mg  5 mg Oral Q4H PRN    HYDROmorphone (DILAUDID) syringe 0.2 mg  0.2 mg IntraVENous Q4H PRN    lidocaine 4 % patch 1 Patch  1 Patch TransDERmal Q24H    senna-docusate (PERICOLACE) 8.6-50 mg per tablet 1 Tab  1 Tab Oral BID    carvedilol (COREG) tablet 3.125 mg  3.125 mg Oral BID WITH MEALS    cholecalciferol (VITAMIN D3) tablet 2,000 Units  2,000 Units Oral DAILY    levothyroxine (SYNTHROID) tablet 88 mcg  88 mcg Oral ACB    therapeutic multivitamin (THERAGRAN) tablet 1 Tab  1 Tab Oral DAILY    [START ON 7/1/2019] predniSONE (DELTASONE) tablet 10 mg  10 mg Oral EVERY OTHER DAY    famotidine (PEPCID) tablet 20 mg  20 mg Oral BID    simvastatin (ZOCOR) tablet 20 mg  20 mg Oral QHS    sertraline (ZOLOFT) tablet 50 mg  50 mg Oral DAILY    budesonide (PULMICORT) 500 mcg/2 ml nebulizer suspension  500 mcg Nebulization BID RT    arformoterol (BROVANA) neb solution 15 mcg  15 mcg Nebulization BID RT    albuterol-ipratropium (DUO-NEB) 2.5 MG-0.5 MG/3 ML  3 mL Nebulization Q6H PRN    predniSONE (DELTASONE) tablet 5 mg  5 mg Oral EVERY OTHER DAY            Lab Review:     Recent Labs     06/30/19  0435 06/29/19  0912   WBC 8.5 8.2   HGB 14.4 14.9   HCT 43.7 44.7   * 141*     Recent Labs     06/30/19 0435 06/29/19  0912    137   K 4.0 4.0    99   CO2 30 36*   * 126*   BUN 24* 24*   CREA 1.07* 1.05*   CA 8.8 9.2   MG 1.7  --    PHOS 2.8  --    ALB 3.3* 3.3*   TBILI 0.9 1.1*   SGOT 18 16   ALT 16 19   INR 1.3*  --      No results found for: GLUCPOC  No results for input(s): PH, PCO2, PO2, HCO3, FIO2 in the last 72 hours. Recent Labs     06/30/19  0435   INR 1.3*     Lab Results   Component Value Date/Time    Specimen Description: URINE 01/17/2014 10:02 AM     Lab Results   Component Value Date/Time    Culture result: (A) 01/21/2018 08:27 AM     RARE STENOTROPHOMONAS Jacoby Doyne.) MALTOPHILIA CLSI GUIDELINES DO NOT RECOMMEND REPORTING SUSCEPTIBILITIES FOR S. MALTOPHILIA. TRIMETHOPRIM/SULFAMETHOXAZOLE IS THE DRUG OF CHOICE.     Culture result: TERESITA CHEN [facility] 01/17/2014 10:02 AM            Assessment:     Principal Problem:    Lumbar compression fracture ([address] (6/29/2019)    Active Problems:    Atrial fibrillation (HCC) ()      Overview: av node ablation 5/22/98 with placement of CPI #[id number] dual chamber       pacemaker subsequently replaced with a single chamber medtronic #[id number]       single chamber pacemaker 7/25/05      Pulmonary hypertension ([address] (9/26/2011)      Colon cancer ([facility] (2/26/2014)      Overview: Partial colectomy      Petey Alejandro      CKD (chronic kidney disease) stage 3, GFR 30-59 ml/min ([facility]) (2/9/2015)      Hypothyroidism due to acquired atrophy of thyroid (11/6/2015)      Restrictive lung disease (12/14/2015)      COPD (chronic obstructive pulmonary disease) ([address] (1/13/2019)      Hypertension (6/29/2019)      Constipation (6/29/2019)      Hematuria (6/30/2019)           Plan:     Principal Problem:    Lumbar compression fracture ([address] (6/29/2019)   - mobilize with PT/OT with pain control and brace   - Orthopedics following, may need kyphoplasty   - this is a pathologic fracture due to osteoporosis and chronic steroid use    Active Problems:    Hematuria (6/30/2019)   - developed post-Dahl placement, on anticoagulation (now on hold)   - follow for now, consult Urology if persists/worsens      Atrial fibrillation ([address] ()   - Eliquis on hold for hematuria      COPD (chronic obstructive pulmonary disease) ([address] (1/13/2019)/Restrictive lung disease (12/14/2015)/Pulmonary hypertension ([address] (9/26/2011)   - continue respiratory meds      Colon cancer ([address] (2/26/2014)   - outpatient follow up      CKD (chronic kidney disease) stage 3, GFR 30-59 ml/min ([address] (2/9/2015)   - creatinine at baseline      Hypothyroidism due to acquired atrophy of thyroid (11/6/2015)   - continue LT4, TSH okay      Hypertension (6/29/2019)   - BP okay on meds as above      Constipation (6/29/2019)   - increase bowel regimen, see orders      Total time spent in patient care: 25 minutes                  Care Plan discussed with: Patient, Family, Care Manager and Nursing Staff    Discussed:  Code Status, Care Plan and D/C Planning    Prophylaxis:  SCD's    Disposition:  SNF/LTC           ___________________________________________________    Attending Physician: Tyler Bradley MD

## 2019-07-01 ENCOUNTER — TELEPHONE (OUTPATIENT)
Dept: CARDIOLOGY CLINIC | Age: 84
End: 2019-07-01

## 2019-07-01 ENCOUNTER — APPOINTMENT (OUTPATIENT)
Dept: NUCLEAR MEDICINE | Age: 84
DRG: 516 | End: 2019-07-01
Attending: NURSE PRACTITIONER
Payer: MEDICARE

## 2019-07-01 LAB
ANION GAP SERPL CALC-SCNC: 5 MMOL/L (ref 5–15)
BUN SERPL-MCNC: 25 MG/DL (ref 6–20)
BUN/CREAT SERPL: 22 (ref 12–20)
CALCIUM SERPL-MCNC: 8.7 MG/DL (ref 8.5–10.1)
CHLORIDE SERPL-SCNC: 100 MMOL/L (ref 97–108)
CO2 SERPL-SCNC: 29 MMOL/L (ref 21–32)
CREAT SERPL-MCNC: 1.13 MG/DL (ref 0.55–1.02)
ERYTHROCYTE [DISTWIDTH] IN BLOOD BY AUTOMATED COUNT: 13 % (ref 11.5–14.5)
GLUCOSE SERPL-MCNC: 114 MG/DL (ref 65–100)
HCT VFR BLD AUTO: 41.7 % (ref 35–47)
HGB BLD-MCNC: 14 G/DL (ref 11.5–16)
MAGNESIUM SERPL-MCNC: 1.8 MG/DL (ref 1.6–2.4)
MCH RBC QN AUTO: 30.4 PG (ref 26–34)
MCHC RBC AUTO-ENTMCNC: 33.6 G/DL (ref 30–36.5)
MCV RBC AUTO: 90.7 FL (ref 80–99)
NRBC # BLD: 0 K/UL (ref 0–0.01)
NRBC BLD-RTO: 0 PER 100 WBC
PHOSPHATE SERPL-MCNC: 3.3 MG/DL (ref 2.6–4.7)
PLATELET # BLD AUTO: 128 K/UL (ref 150–400)
PMV BLD AUTO: 11.7 FL (ref 8.9–12.9)
POTASSIUM SERPL-SCNC: 3.9 MMOL/L (ref 3.5–5.1)
RBC # BLD AUTO: 4.6 M/UL (ref 3.8–5.2)
SODIUM SERPL-SCNC: 134 MMOL/L (ref 136–145)
WBC # BLD AUTO: 8.2 K/UL (ref 3.6–11)

## 2019-07-01 PROCEDURE — 94760 N-INVAS EAR/PLS OXIMETRY 1: CPT

## 2019-07-01 PROCEDURE — 97116 GAIT TRAINING THERAPY: CPT

## 2019-07-01 PROCEDURE — 74011250637 HC RX REV CODE- 250/637: Performed by: INTERNAL MEDICINE

## 2019-07-01 PROCEDURE — 97530 THERAPEUTIC ACTIVITIES: CPT

## 2019-07-01 PROCEDURE — 84100 ASSAY OF PHOSPHORUS: CPT

## 2019-07-01 PROCEDURE — 94640 AIRWAY INHALATION TREATMENT: CPT

## 2019-07-01 PROCEDURE — 65270000029 HC RM PRIVATE

## 2019-07-01 PROCEDURE — 36415 COLL VENOUS BLD VENIPUNCTURE: CPT

## 2019-07-01 PROCEDURE — 97535 SELF CARE MNGMENT TRAINING: CPT

## 2019-07-01 PROCEDURE — 74011250636 HC RX REV CODE- 250/636: Performed by: INTERNAL MEDICINE

## 2019-07-01 PROCEDURE — 74011636637 HC RX REV CODE- 636/637: Performed by: INTERNAL MEDICINE

## 2019-07-01 PROCEDURE — 83735 ASSAY OF MAGNESIUM: CPT

## 2019-07-01 PROCEDURE — 74011000250 HC RX REV CODE- 250: Performed by: INTERNAL MEDICINE

## 2019-07-01 PROCEDURE — 85027 COMPLETE CBC AUTOMATED: CPT

## 2019-07-01 PROCEDURE — A9503 TC99M MEDRONATE: HCPCS

## 2019-07-01 PROCEDURE — 80048 BASIC METABOLIC PNL TOTAL CA: CPT

## 2019-07-01 RX ORDER — SODIUM CHLORIDE AND POTASSIUM CHLORIDE .9; .15 G/100ML; G/100ML
SOLUTION INTRAVENOUS CONTINUOUS
Status: DISPENSED | OUTPATIENT
Start: 2019-07-01 | End: 2019-07-02

## 2019-07-01 RX ADMIN — PREDNISONE 10 MG: 5 TABLET ORAL at 07:58

## 2019-07-01 RX ADMIN — Medication 10 ML: at 06:00

## 2019-07-01 RX ADMIN — CARVEDILOL 3.12 MG: 6.25 TABLET, FILM COATED ORAL at 07:58

## 2019-07-01 RX ADMIN — POLYETHYLENE GLYCOL 3350 17 G: 17 POWDER, FOR SOLUTION ORAL at 08:00

## 2019-07-01 RX ADMIN — BUDESONIDE 500 MCG: 0.5 INHALANT RESPIRATORY (INHALATION) at 08:12

## 2019-07-01 RX ADMIN — THERA TABS 1 TABLET: TAB at 08:00

## 2019-07-01 RX ADMIN — BUDESONIDE 500 MCG: 0.5 INHALANT RESPIRATORY (INHALATION) at 21:19

## 2019-07-01 RX ADMIN — SODIUM CHLORIDE AND POTASSIUM CHLORIDE: 9; 1.49 INJECTION, SOLUTION INTRAVENOUS at 07:54

## 2019-07-01 RX ADMIN — LEVOTHYROXINE SODIUM 88 MCG: 88 TABLET ORAL at 06:30

## 2019-07-01 RX ADMIN — CARVEDILOL 3.12 MG: 6.25 TABLET, FILM COATED ORAL at 17:19

## 2019-07-01 RX ADMIN — ARFORMOTEROL TARTRATE 15 MCG: 15 SOLUTION RESPIRATORY (INHALATION) at 08:12

## 2019-07-01 RX ADMIN — POLYETHYLENE GLYCOL 3350 17 G: 17 POWDER, FOR SOLUTION ORAL at 17:19

## 2019-07-01 RX ADMIN — Medication 10 ML: at 16:00

## 2019-07-01 RX ADMIN — SIMVASTATIN 20 MG: 20 TABLET, FILM COATED ORAL at 21:05

## 2019-07-01 RX ADMIN — VITAMIN D, TAB 1000IU (100/BT) 2000 UNITS: 25 TAB at 08:00

## 2019-07-01 RX ADMIN — FAMOTIDINE 20 MG: 20 TABLET ORAL at 08:00

## 2019-07-01 RX ADMIN — TRAMADOL HYDROCHLORIDE 50 MG: 50 TABLET, FILM COATED ORAL at 01:22

## 2019-07-01 RX ADMIN — ARFORMOTEROL TARTRATE 15 MCG: 15 SOLUTION RESPIRATORY (INHALATION) at 21:19

## 2019-07-01 RX ADMIN — TRAMADOL HYDROCHLORIDE 50 MG: 50 TABLET, FILM COATED ORAL at 14:35

## 2019-07-01 RX ADMIN — SERTRALINE HYDROCHLORIDE 50 MG: 50 TABLET ORAL at 08:00

## 2019-07-01 RX ADMIN — TRAMADOL HYDROCHLORIDE 50 MG: 50 TABLET, FILM COATED ORAL at 07:59

## 2019-07-01 RX ADMIN — SENNOSIDES, DOCUSATE SODIUM 1 TABLET: 50; 8.6 TABLET, FILM COATED ORAL at 08:00

## 2019-07-01 RX ADMIN — SENNOSIDES, DOCUSATE SODIUM 1 TABLET: 50; 8.6 TABLET, FILM COATED ORAL at 17:19

## 2019-07-01 NOTE — TELEPHONE ENCOUNTER
Patients daughter, Stacy Ambrocio is calling to get a verbal approval for a surgery the patient wants to have. Patients daughter states that it is a radiology procedure, they will be injecting fluid in patients back to realign a disc in patients back. Stacy Ambrocio states that the Dr. Vanessa Beyer (Chief of orthopedic surgery patient states) over at [facility] would like to do the surgery on Wednesday 7/3, which is not yet scheduled. Daughters states that the Dr stated is was minimally invasive and that it should be okay, but patients daughter would like an 'ok' from Dr. Aniyah Nettles. Patients daughter would also like to know if the patient needs to stop taking Eliquis. Procure name: Kyphoplasty, Dr. [facility])  would be the Dr Zendejas Persons the procedure.      Phone: [phone]

## 2019-07-01 NOTE — PROGRESS NOTES
Ms Pratima Sauceda had some gross hematuria assoc with her Eliquis use and a valentine. It has improved. CT images reviewed. No source for [facility] is clearly seen. She can FU as OP with [facility] for consideration of cystoscopy in the office to complete the [facility] workup. No need for inpt management of this issue. My  will arrange with her family.

## 2019-07-01 NOTE — PROGRESS NOTES
Orthopaedic Progress Note  Post Op day: * No surgery found *    2019 11:15 AM     Patient: Arnulfo Cruz MRN: [id number]  SSN: [id number]    YOB: 1932  Age: 80 y.o. Sex: female      Admit date:  2019  Date of Surgery:  * No surgery found *   Procedures:    Admitting Physician:  Lonny Jacobs MD   Surgeon:  * Surgery not found *    Consulting Physician(s): Treatment Team: Attending Provider: Jose Goodwin MD; Consulting Provider: Myesha Andrews MD; Consulting Provider: Anny Mcgowan MD; Utilization Review: Linus Pro RN; Primary Nurse: Colonel Cox; Utilization Review: Teresa Huynhr; Care Manager: Cosme Angel Consulting Provider: Cedric Thakkar MD    SUBJECTIVE:     Arnulfo Cruz is a 80 y.o. female is * No surgery found * s/p  with an appropriate level of post-operative pain. No complaints of nausea, vomiting, dizziness, lightheadedness, chest pain, or shortness of breath. OBJECTIVE:       Physical Exam:  General: Alert, cooperative, no distress. Respiratory: Respirations unlabored  Neurological:  Neurovascular exam within normal limits. Motor: + DF/PF. Musculoskeletal: Calves soft, supple, non-tender upon palpation. Dressing/Wound:  Clean, dry and intact. No significant erythema or swelling.       Vital Signs:        Patient Vitals for the past 8 hrs:   BP Temp Pulse Resp SpO2   19 1041 100/60 98 °F (36.7 °C) 76 18 94 %   19 0812     98 %   19 0758 116/69  80     19 0711 116/74 98.6 °F (37 °C) 94 15 96 %   19 0355 125/76 97.7 °F (36.5 °C) 80 16 94 %                                          Temp (24hrs), Av.9 °F (36.6 °C), Min:97.1 °F (36.2 °C), Max:98.6 °F (37 °C)      Labs:        Recent Labs     19  0512 19  0435   HCT 41.7 43.7   HGB 14.0 14.4   INR  --  1.3*     Lab Results   Component Value Date/Time    Sodium 134 (L) 2019 05:12 AM    Potassium 3.9 2019 05:12 AM Chloride 100 07/01/2019 05:12 AM    CO2 29 07/01/2019 05:12 AM    Glucose 114 (H) 07/01/2019 05:12 AM    BUN 25 (H) 07/01/2019 05:12 AM    Creatinine 1.13 (H) 07/01/2019 05:12 AM    Calcium 8.7 07/01/2019 05:12 AM       PT/OT:        Gait  Speed/Melanie: Pace decreased (<100 feet/min), Slow, Shuffled  Gait Abnormalities: Decreased step clearance, Shuffling gait  Ambulation - Level of Assistance: Minimal assistance  Distance (ft): 12 Feet (ft)(12ft x 2)  Assistive Device: Gait belt(hand held assist)       Patient mobility  Bed Mobility Training  Rolling: Minimum assistance, Assist x1, Additional time(verbal cues for log roll technique Simultaneous filing. User may not have seen previous data.)  Supine to Sit: (Patient was received sitting up Simultaneous filing. User may not have seen previous data.)  Sit to Supine: Minimum assistance, Additional time, Assist x1(for LE management Simultaneous filing. User may not have seen previous data.)  Scooting: Additional time, Contact guard assistance  Transfer Training  Sit to Stand: Minimum assistance, Assist x1(Simultaneous filing. User may not have seen previous data.)  Stand to Sit: Contact guard assistance(Simultaneous filing.  User may not have seen previous data.)      Gait Training  Assistive Device: Gait belt(hand held assist)  Ambulation - Level of Assistance: Minimal assistance  Distance (ft): 12 Feet (ft)(12ft x 2)            ASSESSMENT / PLAN:   Principal Problem:    Lumbar compression fracture ([address] (6/29/2019)    Active Problems:    Atrial fibrillation ([address] ()      Overview: av node ablation 5/22/98 with placement of CPI #[id number] dual chamber       pacemaker subsequently replaced with a single chamber medtronic #[id number]       single chamber pacemaker 7/25/05      Pulmonary hypertension ([address] (9/26/2011)      Colon cancer ([address] (2/26/2014)      Overview: Partial colectomy      Petey Allen      CKD (chronic kidney disease) stage 3, GFR 30-59 ml/min ([address] (2/9/2015) Hypothyroidism due to acquired atrophy of thyroid (11/6/2015)      Restrictive lung disease (12/14/2015)      COPD (chronic obstructive pulmonary disease) [facility] (1/13/2019)      Hypertension (6/29/2019)      Constipation (6/29/2019)      Hematuria (6/30/2019)          L3 compression fx; as noted on CT abd/ pelvis. Likely acute.    -Pt unable to tolerate LSO brace.    -Pt with severe, limiting pain.    -Long discussion with patient and daughter about nature of condition and all tx options, including risks/ benefits of kypho including              risk of fracture above and below kyphoplasty site. .   -Due to patient difficulty with tolerating PT/ patient and family would like to pursue kyphoplasty   -Will need bone scan to check acuity of L3 compression fx/ ordered for today   -Discussed with IR, pt last dose of Eliquis on 6/29. Will need at least 48 hours off eliquis.   -Pt will need to be NPO after MN and most likely have anesthesia for kypho.    -Plan for kypho to be done tomorrow.      Will discuss with Dr. Júnior Rader discussed with Dr. Veronica Knight.                  Signed By:  Jenny Ward NP    Orthopedic Trauma Nurse Practitioner  [address] [place] [address]

## 2019-07-01 NOTE — TELEPHONE ENCOUNTER
Patients daughter (messi) is calling to speak with you letting you know the procedure is to take place tomorrow. Is 48 hrs long enough to be off eliquis?    Phone: [phone]

## 2019-07-01 NOTE — PROGRESS NOTES
SN notified by Cerevo, around 1900 that patient had low B/p, instructed patient to increase fluid intake and report and s/s of light headedness or dizziness, B/p reassessed 21:44 WNL as documented, will continue to monitor, note patient/daughter would like her pain managed with oral medications, tramadol as available every 6 hours and then oxy for break through pain, patient states this has been adequate for pain relief at this time, SN will continue with pain management.

## 2019-07-01 NOTE — TELEPHONE ENCOUNTER
Dr. Emely Orozco provided cardiac clearance for spinal injection (see note 6/26/19). I am not sure if this is for the same procedure so I called Lesley. Left message on voicemail stating I was returning call. Will ask if this is for the same clearance.

## 2019-07-01 NOTE — PROGRESS NOTES
Problem: Mobility Impaired (Adult and Pediatric)  Goal: *Acute Goals and Plan of Care (Insert Text)  Description  Physical Therapy Goals  Initiated 6/29/2019    1. Patient will move from supine to sit and sit to supine  and roll side to side in bed with supervision/set-up within 7 days. 2. Patient will perform sit to stand with supervision/set-up within 7 days. 3. Patient will ambulate with supervision/set-up for 100 feet with the least restrictive device within 7 days. 4. Patient will verbalize and demonstrate understanding of spinal precautions (No bending, lifting greater than 5 lbs, or twisting; log-roll technique; frequent repositioning as instructed) within  days. Outcome: Progressing Towards Goal  Note:   PHYSICAL THERAPY TREATMENT  Patient: Lindy Scott (35 y.o. female)  Date: 7/1/2019  Diagnosis: Acute back pain [M54.9] Lumbar compression fracture [facility])       Precautions: Fall, Back  Chart, physical therapy assessment, plan of care and goals were reviewed. ASSESSMENT:  Pt received in bed, daughter at bedside reporting pt will be having kyphoplasty with many questions regarding pain medications, compression fx's, kyphoplasty and rehab. Pt requires increased time for functional mobility and gait training, pt does not tolerate LSO. Min A for steadying ,secondary to pain, with gait training, verbal cues throughout session to maintain back precautions with functional activity. Pt requested to return to bed, in hooklying bolstered with pillows for pt comfort. Progression toward goals:  ?    Improving appropriately and progressing toward goals  ? Improving slowly and progressing toward goals  ? Not making progress toward goals and plan of care will be adjusted     PLAN:  Patient continues to benefit from skilled intervention to address the above impairments. Continue treatment per established plan of care.   Discharge Recommendations:  Home Health and [place] pending progress  Further Equipment Recommendations for Discharge:  none     SUBJECTIVE:   Patient stated ?i need to get to bathroom. ?    OBJECTIVE DATA SUMMARY:   Critical Behavior:  Neurologic State: Alert  Orientation Level: Oriented X4  Cognition: Appropriate decision making, Appropriate for age attention/concentration, Appropriate safety awareness  Safety/Judgement: Awareness of environment  Functional Mobility Training:  Bed Mobility:     Supine to Sit: Moderate assistance  Sit to Supine: Minimum assistance           Transfers:  Sit to Stand: Minimum assistance  Stand to Sit: Minimum assistance                             Balance:  Sitting: Intact  Standing: Impaired  Standing - Static: Constant support;Good  Standing - Dynamic : Fair  Ambulation/Gait Training:  Distance (ft): 20 Feet (ft)  Assistive Device: Gait belt;Walker, rolling  Ambulation - Level of Assistance: Minimal assistance;Contact guard assistance        Gait Abnormalities: Decreased step clearance              Speed/Melanie: Slow                       Stairs:              Neuro Re-Education:    Therapeutic Exercises:     Pain:  Pain Scale 1: Numeric (0 - 10)  Pain Intensity 1: 6  Pain Location 1: Back  Pain Orientation 1: Lower  Pain Description 1: Aching  Pain Intervention(s) 1: Medication (see MAR)  Activity Tolerance:   Fair-limited by pain  Please refer to the flowsheet for vital signs taken during this treatment. After treatment:   ?    Patient left in no apparent distress sitting up in chair  ? Patient left in no apparent distress in bed  ? Call bell left within reach  ? Nursing notified  ? Caregiver present  ?     Bed alarm activated    COMMUNICATION/COLLABORATION:   The patient?s plan of care was discussed with: Occupational Therapist and Registered Nurse    Forest Oppenheim   Time Calculation: 23 mins

## 2019-07-01 NOTE — PROGRESS NOTES
Spiritual Care Partner Volunteer visited patient in [address] [place] [address] on 7/1/19.   Documented by: Chela Bravo, MS., [address] [facility] ([id number])

## 2019-07-01 NOTE — TELEPHONE ENCOUNTER
Called Dagoberto Petersen and notified her Dr. Justina Gurrola said this was fine. She stated she was thankful for the call and feels better knowing Dr. Justina Gurrola said this was okay. She denied further questions or concerns.

## 2019-07-01 NOTE — PROGRESS NOTES
Bedside and Verbal shift change report given to Pola Guerra (oncoming nurse) by Dmitry Kirk (offgoing nurse). Report included the following information SBAR, Kardex, Intake/Output, MAR and Recent Results.

## 2019-07-01 NOTE — PROGRESS NOTES
Nutrition Assessment:    RECOMMENDATIONS/INTERVENTION(S):   1. Continue cardiac diet. 2. Encourage/ monitor PO intake. If PO intake trends <50% will add ONS. 3. Monitor labs, GI function, and weight trends. ASSESSMENT:   7/1: 81 y/o F admitted with acute back pain. MST screen >2: Pt reports recent weight loss without trying. PMH - AF, HFrEF, COPD. Unable to interview pt at time of visit, attempt x2. Spoke with daughter, reports she is eating well and consumed 50% lunch. Daughter reports pt does not like low sodium diet but has explained to her it is r/t cardiac health.  lb. BMI 27 c/w overweight. Weight Hx per EMR shows weight stability/ mild weight gain. No note of GI disturbances. LBM 6/28, on bowel regimen. Skin without PI. Will f/u need for ONS. Labs - Na 134 L, BUN 25 H, Cr 1.13 H. Meds - carvedilol, cholecalciferol, famotidine, levothyroxine, polyethylene glycol, prednisone, senna-docusate, multivitamin. Diet Order:  Cardiac  % Eaten:  No data found. Pertinent Medications: [x] Reviewed    Labs: [x] Reviewed    Anthropometrics: Height: 5' 2\" (157.5 cm) Weight: 68.5 kg (151 lb)    IBW (%IBW):   ( ) UBW (%UBW):   (  %)      BMI: Body mass index is 27.62 kg/m². This BMI is indicative of:   [] Underweight    [] Normal    [x] Overweight    []  Obesity    []  Extreme Obesity (BMI>40)  Estimated Nutrition Needs (Based on): 1395 Kcals/day(REE 1073 x 1.3 ) , 70 g(69 - 80 gm (1.0 - 1.2 gm/kg)) Protein  Carbohydrate: At Least 130 g/day  Fluids: 1395 mL/day (1 ml/kcal)    Last BM: 6/28   []Active     []Hyperactive  []Hypoactive       [] Absent   BS  Skin:  No PI  [] Intact   [] Incision  [] Breakdown   [] DTI   [] Tears/Excoriation/Abrasion  []Edema [] Other:    Wt Readings from Last 30 Encounters:   06/29/19 68.5 kg (151 lb)   05/23/19 67.1 kg (148 lb)   03/21/19 67.1 kg (148 lb)   03/08/19 69.8 kg (153 lb 12.8 oz)   02/21/19 66.7 kg (147 lb)   02/21/19 66.8 kg (147 lb 3.2 oz)   02/07/19 67.1 kg (148 lb)   01/13/19 64.4 kg (142 lb)   12/20/18 64.4 kg (142 lb)   11/28/18 66.7 kg (147 lb)   09/11/18 69.4 kg (153 lb)   08/10/18 69.9 kg (154 lb)   08/07/18 68 kg (150 lb)   06/26/18 70.8 kg (156 lb)   06/14/18 71.7 kg (158 lb)   06/13/18 71.7 kg (158 lb)   06/07/18 71.2 kg (157 lb)   04/17/18 71.6 kg (157 lb 14.4 oz)   03/19/18 74.2 kg (163 lb 8 oz)   02/15/18 74.6 kg (164 lb 6.4 oz)   01/21/18 75.8 kg (167 lb)   01/18/18 75.9 kg (167 lb 4 oz)   01/16/18 74.9 kg (165 lb 2 oz)   01/08/18 73.7 kg (162 lb 8 oz)   11/06/17 76.2 kg (168 lb)   11/02/17 78.2 kg (172 lb 6.4 oz)   09/29/17 77 kg (169 lb 12.8 oz)   09/12/17 74.6 kg (164 lb 6 oz)   09/07/17 73.9 kg (163 lb)   08/07/17 72.6 kg (160 lb)      NUTRITION DIAGNOSES:   Problem:  Inadequate energy intake     Etiology: related to decreased ability to consume sufficient energy     Signs/Symptoms: as evidenced by predicted suboptimal energy intake d/t advanced age and pt report weight loss      NUTRITION INTERVENTIONS:                     GOAL:   Pt will consume >50% meals within 2-3 days    Cultural, [place], or Ethnic Dietary Needs: None     EDUCATION & DISCHARGE NEEDS:    [x] None Identified   [] Identified and Education Provided/Documented   [] Identified and Pt declined/was not appropriate      [x] Interdisciplinary Care Plan Reviewed/Documented    [x] Discharge Needs:    Cardiac diet   [] No Nutrition Related Discharge Needs    NUTRITION RISK:   Pt Is At Nutrition Risk  [x]     No Nutrition Risk Identified  []       PT SEEN FOR:    []  MD Consult: []Calorie Count      []Diabetic Diet Education        []Diet Education     []Electrolyte Management     []General Nutrition Management and Supplements     []Management of Tube Feeding     []TPN Recommendations    [x]  RN Referral:  [x]MST score >=2     []Enteral/Parenteral Nutrition PTA     []Pregnant: Gestational DM or Multigestation                 [] Pressure Ulcer    []  Low BMI      []  Length of Stay [] Dysphagia Diet         [] Ventilator  []  Follow-up     Previous Recommendations:   [] Implemented          [] Not Implemented          [x] Not Applicable    Previous Goal:   [] Met              [] Progressing Towards Goal              [] Not Progressing Towards Goal   [x] Not Applicable            Emely Johnson, [address] [facility]  Pager [phone]  Phone [phone]

## 2019-07-01 NOTE — TELEPHONE ENCOUNTER
Denise Vasques. She said patient was admitted to [facility]. She has a compression fracture and plan for kypho tomorrow. Patient is in a lot of pain and wants this done. Patient off eliquis yesterday and today. Lesley asked if 48 hours was long enough to hold. She also asked if okay to do with pacemaker. Reviewed notes in chart EmrPARIS Jacobortho) and the physicians at [facility] are are monitoring patient well and have this under control. I told [facility] this should be fine. The physican performing the procedure would not do it if they did not feel they could control the bleeding after and 48 hours is usually adequate. She asked if I could message Dr. Aniyah Nettles to review and call her back if he disagreed. I told her I would do so. She denied further questions or concerns at the time.

## 2019-07-01 NOTE — PROGRESS NOTES
Problem: Self Care Deficits Care Plan (Adult)  Goal: *Acute Goals and Plan of Care (Insert Text)  Description  Occupational Therapy Goals  Initiated 6/30/2019    1. Patient will perform lower body dressing with supervision/set-up using AE PRN within 7 days. 2.  Patient will perform toilet transfer with modified independence using most appropriate DME within 7 days. 3.  Patient will perform all aspects of toileting at supervision/set-up within 7 days. 4.  Patient will don/doff back brace at supervision/set-up within 7 days. 5.  Patient will verbalize/demonstrate 3/3 back precautions during ADL tasks without cues within 7 days. Outcome: Progressing Towards Goal   OCCUPATIONAL THERAPY TREATMENT  Patient: Edel Echavarria (51 y.o. female)  Date: 7/1/2019  Diagnosis: Acute back pain [M54.9] Lumbar compression fracture [facility])       Precautions: Fall, Back No bending, no lifting greater than 5 lbs, no twisting, log-roll technique, repositioning every 20-30 min except when sleeping  Chart, occupational therapy assessment, plan of care, and goals were reviewed. ASSESSMENT:  Cleared by RN to see pt for therapy session. Pt received supine in bed, needing to use the restroom. Reviewed back precautions and pt verbalized understanding, but required frequent cues to adhere during session. Pt declined to wear LSO brace, reported it increases her back pain. Mod A for supine>sit with max cueing for logroll technique. Overall min A for functional transfers, several cues to avoid twisting during toilet transfer. CGA-min A for toilet hygiene and brief standing grooming ADL. Pt declined sitting in chair, returned to sidelying in bed at end of session. At this time pt's functional performance is well below her baseline due to significant back pain and decreased balance, endurance and strength. Pending progress and medical course recommend rehab at discharge.   Progression toward goals:  ?          Improving appropriately and progressing toward goals  ? Improving slowly and progressing toward goals  ? Not making progress toward goals and plan of care will be adjusted     PLAN:  Patient continues to benefit from skilled intervention to address the above impairments. Continue treatment per established plan of care. Discharge Recommendations:  Rehab  Further Equipment Recommendations for Discharge:  TBD      SUBJECTIVE:   Patient stated ? The brace doesn't help. ?  The patient stated 1/3 back precautions. Reviewed all 3 with patient. OBJECTIVE DATA SUMMARY:   Cognitive/Behavioral Status:  Neurologic State: Alert  Orientation Level: Oriented X4  Cognition: Follows commands  Safety/Judgement: Fall prevention, Decreased awareness of need for safety, Awareness of environment    Functional Mobility and Transfers for ADLs:  Bed Mobility:  Supine to Sit: Moderate assistance  Sit to Supine: Minimum assistance    Transfers:  Sit to Stand: Minimum assistance  Functional Transfers  Toilet Transfer : Minimum assistance; Adaptive equipment; Additional time        Balance:  Sitting: Intact  Standing: Impaired  Standing - Static: Constant support;Good  Standing - Dynamic : Fair    ADL Intervention and Instruction:       Grooming  Washing Hands: Contact guard assistance(standing at sink)         Toileting  Bowel Hygiene: Contact guard assistance;Minimum assistance(standing with 1 UE support)    Cognitive Retraining  Safety/Judgement: Fall prevention;Decreased awareness of need for safety; Awareness of environment  Toileting: Patient instructed on the benefits of using flushable wet wipes and toilet tongs if decreased reach or pain for amber care. Also, the benefits of a reacher to aid in clothing management.    Standing: Patient instructed and indicated understanding to walk up to sink/counter top/surfaces, step into walker, square off while using objects, slide objects along surfaces, to increase adherence to back precautions and fall prevention. Patient instructed to increase amount of time standing in order to increase independence and tolerance with ADLs. During prolonged standing, can open cabinet door or place foot on stool to decrease spinal pressure/increase pain. Patient instructed and indicated understanding the benefits of maintaining activity tolerance, functional mobility, and independence with self care tasks during acute stay  to ensure safe return home and to baseline. Encouraged patient to increase frequency and duration OOB, not sitting longer than 30 mins without marching/walking with staff, be out of bed for all meals, perform daily ADLs (as approved by RN/MD regarding bathing etc), and performing functional mobility to/from bathroom. Patient instruction and indicated understanding on body mechanics, ergonomics and gravitational force on the spine during different body positions to plan activities in prep for return home to complete instrumental ADLs and back to work safely. Pain:  Pain Scale 1: Numeric (0 - 10)  Pain Intensity 1: 0  Pain Location 1: Back  Pain Orientation 1: Lower  Pain Description 1: Aching  Pain Intervention(s) 1: Medication (see MAR)  Activity Tolerance:   Good  Please refer to the flowsheet for vital signs taken during this treatment. After treatment:   ? Patient left in no apparent distress sitting up in chair  ? Patient left in no apparent distress in bed  ? Call bell left within reach  ? Nursing notified  ? Caregiver present  ?  Bed alarm activated    COMMUNICATION/COLLABORATION:   The patient?s plan of care was discussed with: Physical Therapist and Registered Nurse    Brenda Guevara OT  Time Calculation: 23 mins

## 2019-07-02 ENCOUNTER — ANESTHESIA (OUTPATIENT)
Dept: INTERVENTIONAL RADIOLOGY/VASCULAR | Age: 84
DRG: 516 | End: 2019-07-02
Payer: MEDICARE

## 2019-07-02 ENCOUNTER — ANESTHESIA EVENT (OUTPATIENT)
Dept: INTERVENTIONAL RADIOLOGY/VASCULAR | Age: 84
DRG: 516 | End: 2019-07-02
Payer: MEDICARE

## 2019-07-02 ENCOUNTER — APPOINTMENT (OUTPATIENT)
Dept: INTERVENTIONAL RADIOLOGY/VASCULAR | Age: 84
DRG: 516 | End: 2019-07-02
Attending: NURSE PRACTITIONER
Payer: MEDICARE

## 2019-07-02 LAB
ANION GAP SERPL CALC-SCNC: 6 MMOL/L (ref 5–15)
BUN SERPL-MCNC: 20 MG/DL (ref 6–20)
BUN/CREAT SERPL: 22 (ref 12–20)
CALCIUM SERPL-MCNC: 8.6 MG/DL (ref 8.5–10.1)
CHLORIDE SERPL-SCNC: 103 MMOL/L (ref 97–108)
CO2 SERPL-SCNC: 28 MMOL/L (ref 21–32)
CREAT SERPL-MCNC: 0.92 MG/DL (ref 0.55–1.02)
GLUCOSE SERPL-MCNC: 109 MG/DL (ref 65–100)
MAGNESIUM SERPL-MCNC: 1.8 MG/DL (ref 1.6–2.4)
PHOSPHATE SERPL-MCNC: 2.8 MG/DL (ref 2.6–4.7)
POTASSIUM SERPL-SCNC: 4.6 MMOL/L (ref 3.5–5.1)
SODIUM SERPL-SCNC: 137 MMOL/L (ref 136–145)

## 2019-07-02 PROCEDURE — 80048 BASIC METABOLIC PNL TOTAL CA: CPT

## 2019-07-02 PROCEDURE — 65270000029 HC RM PRIVATE

## 2019-07-02 PROCEDURE — 74011000250 HC RX REV CODE- 250: Performed by: INTERNAL MEDICINE

## 2019-07-02 PROCEDURE — C1713 ANCHOR/SCREW BN/BN,TIS/BN: HCPCS

## 2019-07-02 PROCEDURE — 97116 GAIT TRAINING THERAPY: CPT

## 2019-07-02 PROCEDURE — 77030003666 HC NDL SPINAL BD -A

## 2019-07-02 PROCEDURE — 77030008543 HC TBNG MON PRSS MRTM -A

## 2019-07-02 PROCEDURE — 77030034842 HC TAMP SPN BN INFL EXP II KYPH -I

## 2019-07-02 PROCEDURE — 76060000033 HC ANESTHESIA 1 TO 1.5 HR

## 2019-07-02 PROCEDURE — 0QS03ZZ REPOSITION LUMBAR VERTEBRA, PERCUTANEOUS APPROACH: ICD-10-PCS | Performed by: RADIOLOGY

## 2019-07-02 PROCEDURE — 74011250636 HC RX REV CODE- 250/636

## 2019-07-02 PROCEDURE — 3E0R3BZ INTRODUCTION OF ANESTHETIC AGENT INTO SPINAL CANAL, PERCUTANEOUS APPROACH: ICD-10-PCS | Performed by: RADIOLOGY

## 2019-07-02 PROCEDURE — 84100 ASSAY OF PHOSPHORUS: CPT

## 2019-07-02 PROCEDURE — 97530 THERAPEUTIC ACTIVITIES: CPT

## 2019-07-02 PROCEDURE — 74011000250 HC RX REV CODE- 250: Performed by: RADIOLOGY

## 2019-07-02 PROCEDURE — 74011250636 HC RX REV CODE- 250/636: Performed by: INTERNAL MEDICINE

## 2019-07-02 PROCEDURE — 77030029065 HC DRSG HEMO QCLOT ZMED -B

## 2019-07-02 PROCEDURE — 22514 PERQ VERTEBRAL AUGMENTATION: CPT

## 2019-07-02 PROCEDURE — 0QU03JZ SUPPLEMENT LUMBAR VERTEBRA WITH SYNTHETIC SUBSTITUTE, PERCUTANEOUS APPROACH: ICD-10-PCS | Performed by: RADIOLOGY

## 2019-07-02 PROCEDURE — 36415 COLL VENOUS BLD VENIPUNCTURE: CPT

## 2019-07-02 PROCEDURE — 94640 AIRWAY INHALATION TREATMENT: CPT

## 2019-07-02 PROCEDURE — 64483 NJX AA&/STRD TFRM EPI L/S 1: CPT

## 2019-07-02 PROCEDURE — 74011250637 HC RX REV CODE- 250/637: Performed by: INTERNAL MEDICINE

## 2019-07-02 PROCEDURE — 3E0R33Z INTRODUCTION OF ANTI-INFLAMMATORY INTO SPINAL CANAL, PERCUTANEOUS APPROACH: ICD-10-PCS | Performed by: RADIOLOGY

## 2019-07-02 PROCEDURE — 74011636320 HC RX REV CODE- 636/320: Performed by: RADIOLOGY

## 2019-07-02 PROCEDURE — 83735 ASSAY OF MAGNESIUM: CPT

## 2019-07-02 PROCEDURE — 74011250636 HC RX REV CODE- 250/636: Performed by: RADIOLOGY

## 2019-07-02 RX ORDER — BUPIVACAINE HYDROCHLORIDE 5 MG/ML
10-30 INJECTION, SOLUTION EPIDURAL; INTRACAUDAL
Status: DISCONTINUED | OUTPATIENT
Start: 2019-07-02 | End: 2019-07-02 | Stop reason: HOSPADM

## 2019-07-02 RX ORDER — MIDAZOLAM HYDROCHLORIDE 1 MG/ML
INJECTION, SOLUTION INTRAMUSCULAR; INTRAVENOUS AS NEEDED
Status: DISCONTINUED | OUTPATIENT
Start: 2019-07-02 | End: 2019-07-02 | Stop reason: HOSPADM

## 2019-07-02 RX ORDER — LIDOCAINE HYDROCHLORIDE 10 MG/ML
1-20 INJECTION INFILTRATION; PERINEURAL
Status: DISCONTINUED | OUTPATIENT
Start: 2019-07-02 | End: 2019-07-02 | Stop reason: HOSPADM

## 2019-07-02 RX ORDER — LIDOCAINE HYDROCHLORIDE 10 MG/ML
0-10 INJECTION, SOLUTION EPIDURAL; INFILTRATION; INTRACAUDAL; PERINEURAL
Status: DISCONTINUED | OUTPATIENT
Start: 2019-07-02 | End: 2019-07-03 | Stop reason: HOSPADM

## 2019-07-02 RX ORDER — LIDOCAINE HYDROCHLORIDE 20 MG/ML
INJECTION, SOLUTION EPIDURAL; INFILTRATION; INTRACAUDAL; PERINEURAL AS NEEDED
Status: DISCONTINUED | OUTPATIENT
Start: 2019-07-02 | End: 2019-07-02 | Stop reason: HOSPADM

## 2019-07-02 RX ORDER — SODIUM CHLORIDE, SODIUM LACTATE, POTASSIUM CHLORIDE, CALCIUM CHLORIDE 600; 310; 30; 20 MG/100ML; MG/100ML; MG/100ML; MG/100ML
INJECTION, SOLUTION INTRAVENOUS
Status: DISCONTINUED | OUTPATIENT
Start: 2019-07-02 | End: 2019-07-02 | Stop reason: HOSPADM

## 2019-07-02 RX ORDER — PROPOFOL 10 MG/ML
INJECTION, EMULSION INTRAVENOUS
Status: DISCONTINUED | OUTPATIENT
Start: 2019-07-02 | End: 2019-07-02 | Stop reason: HOSPADM

## 2019-07-02 RX ORDER — DEXAMETHASONE SODIUM PHOSPHATE 10 MG/ML
10 INJECTION INTRAMUSCULAR; INTRAVENOUS
Status: DISCONTINUED | OUTPATIENT
Start: 2019-07-02 | End: 2019-07-03 | Stop reason: HOSPADM

## 2019-07-02 RX ORDER — FENTANYL CITRATE 50 UG/ML
INJECTION, SOLUTION INTRAMUSCULAR; INTRAVENOUS AS NEEDED
Status: DISCONTINUED | OUTPATIENT
Start: 2019-07-02 | End: 2019-07-02 | Stop reason: HOSPADM

## 2019-07-02 RX ORDER — PROPOFOL 10 MG/ML
INJECTION, EMULSION INTRAVENOUS AS NEEDED
Status: DISCONTINUED | OUTPATIENT
Start: 2019-07-02 | End: 2019-07-02 | Stop reason: HOSPADM

## 2019-07-02 RX ORDER — KETOROLAC TROMETHAMINE 30 MG/ML
15 INJECTION, SOLUTION INTRAMUSCULAR; INTRAVENOUS
Status: DISCONTINUED | OUTPATIENT
Start: 2019-07-02 | End: 2019-07-03 | Stop reason: HOSPADM

## 2019-07-02 RX ADMIN — ACETAMINOPHEN 650 MG: 325 TABLET ORAL at 22:09

## 2019-07-02 RX ADMIN — SENNOSIDES, DOCUSATE SODIUM 1 TABLET: 50; 8.6 TABLET, FILM COATED ORAL at 16:16

## 2019-07-02 RX ADMIN — Medication 10 ML: at 06:53

## 2019-07-02 RX ADMIN — POLYETHYLENE GLYCOL 3350 17 G: 17 POWDER, FOR SOLUTION ORAL at 16:16

## 2019-07-02 RX ADMIN — ARFORMOTEROL TARTRATE 15 MCG: 15 SOLUTION RESPIRATORY (INHALATION) at 21:20

## 2019-07-02 RX ADMIN — LIDOCAINE HYDROCHLORIDE 10 ML: 10 INJECTION, SOLUTION EPIDURAL; INFILTRATION; INTRACAUDAL; PERINEURAL at 12:04

## 2019-07-02 RX ADMIN — LIDOCAINE HYDROCHLORIDE 60 MG: 20 INJECTION, SOLUTION EPIDURAL; INFILTRATION; INTRACAUDAL; PERINEURAL at 11:25

## 2019-07-02 RX ADMIN — OXYCODONE HYDROCHLORIDE 5 MG: 5 TABLET ORAL at 03:43

## 2019-07-02 RX ADMIN — PROPOFOL 20 MG: 10 INJECTION, EMULSION INTRAVENOUS at 11:34

## 2019-07-02 RX ADMIN — BUDESONIDE 500 MCG: 0.5 INHALANT RESPIRATORY (INHALATION) at 21:20

## 2019-07-02 RX ADMIN — FENTANYL CITRATE 25 MCG: 50 INJECTION, SOLUTION INTRAMUSCULAR; INTRAVENOUS at 11:56

## 2019-07-02 RX ADMIN — SIMVASTATIN 20 MG: 20 TABLET, FILM COATED ORAL at 22:09

## 2019-07-02 RX ADMIN — CARVEDILOL 3.12 MG: 6.25 TABLET, FILM COATED ORAL at 16:16

## 2019-07-02 RX ADMIN — SODIUM CHLORIDE, SODIUM LACTATE, POTASSIUM CHLORIDE, CALCIUM CHLORIDE: 600; 310; 30; 20 INJECTION, SOLUTION INTRAVENOUS at 11:16

## 2019-07-02 RX ADMIN — MIDAZOLAM HYDROCHLORIDE 2 MG: 1 INJECTION, SOLUTION INTRAMUSCULAR; INTRAVENOUS at 11:16

## 2019-07-02 RX ADMIN — MIDAZOLAM HYDROCHLORIDE 1 MG: 1 INJECTION, SOLUTION INTRAMUSCULAR; INTRAVENOUS at 11:56

## 2019-07-02 RX ADMIN — BUDESONIDE 500 MCG: 0.5 INHALANT RESPIRATORY (INHALATION) at 07:12

## 2019-07-02 RX ADMIN — DEXAMETHASONE SODIUM PHOSPHATE 10 MG: 10 INJECTION, SOLUTION INTRAMUSCULAR; INTRAVENOUS at 12:18

## 2019-07-02 RX ADMIN — TRAMADOL HYDROCHLORIDE 50 MG: 50 TABLET, FILM COATED ORAL at 01:33

## 2019-07-02 RX ADMIN — PROPOFOL 25 MCG/KG/MIN: 10 INJECTION, EMULSION INTRAVENOUS at 11:34

## 2019-07-02 RX ADMIN — IOPAMIDOL 20 ML: 408 INJECTION, SOLUTION INTRATHECAL at 12:18

## 2019-07-02 RX ADMIN — KETOROLAC TROMETHAMINE 15 MG: 30 INJECTION, SOLUTION INTRAMUSCULAR at 08:34

## 2019-07-02 RX ADMIN — VANCOMYCIN HYDROCHLORIDE 1000 MG: 1 INJECTION, POWDER, LYOPHILIZED, FOR SOLUTION INTRAVENOUS at 10:49

## 2019-07-02 RX ADMIN — BUPIVACAINE HYDROCHLORIDE 150 MG: 5 INJECTION, SOLUTION EPIDURAL; INTRACAUDAL; PERINEURAL at 12:05

## 2019-07-02 RX ADMIN — ACETAMINOPHEN 650 MG: 325 TABLET ORAL at 16:16

## 2019-07-02 RX ADMIN — Medication 10 ML: at 16:17

## 2019-07-02 RX ADMIN — ARFORMOTEROL TARTRATE 15 MCG: 15 SOLUTION RESPIRATORY (INHALATION) at 07:12

## 2019-07-02 NOTE — PROGRESS NOTES
TRANSFER - IN REPORT:    Verbal report received froM ED on Miles Herbert  being received from [facility] for routine progression of care      Report consisted of patients Situation, Background, Assessment and   Recommendations(SBAR). Information from the following report(s) SBAR and Procedure Summary was reviewed with the receiving nurse. Opportunity for questions and clarification was provided. Assessment completed upon patients arrival to unit and care assumed.

## 2019-07-02 NOTE — ANESTHESIA PREPROCEDURE EVALUATION
Relevant Problems   No relevant active problems       Anesthetic History   No history of anesthetic complications            Review of Systems / Medical History  Patient summary reviewed and nursing notes reviewed    Pulmonary    COPD: moderate      Shortness of breath      Comments: Uses oxygen at night and prn   Neuro/Psych             Comments: Migraines Cardiovascular    Hypertension: well controlled  Valvular problems/murmurs: tricuspid insufficiency and mitral insufficiency      Dysrhythmias : atrial fibrillation  Pacemaker and hyperlipidemia    Exercise tolerance: <4 METS  Comments: Pacemaker battery needs replacement in 4 months  Pulmonary hypertension  Ischemic cardiomyopathy   GI/Hepatic/Renal     GERD: well controlled          Comments: Swallowing difficulty Endo/Other        Arthritis and cancer    Comments: Colon resection Other Findings   Comments: Fractured vertebra: L3           Physical Exam    Airway  Mallampati: II    Neck ROM: normal range of motion   Mouth opening: Normal     Cardiovascular    Rhythm: regular  Rate: normal         Dental  No notable dental hx       Pulmonary  Breath sounds clear to auscultation               Abdominal         Other Findings            Anesthetic Plan    ASA: 3  Anesthesia type: MAC            Anesthetic plan and risks discussed with: Patient      Informed consent obtained.

## 2019-07-02 NOTE — H&P
Radiology History and Physical    Patient: Sho Chaudhry 80 y.o. female       Chief Complaint: Abdominal Pain and Back Pain      History of Present Illness: [id number]  History:    Past Medical History:   Diagnosis Date    Arthritis     Atrial fibrillation ([facility] [address]     av node ablation 5/22/98 with placement of CPI #[id number] dual chamber pacemaker subsequently replaced with a single chamber medtronic #[id number] single chamber pacemaker 7/25/05    Cancer ([facility] [address] 1970's    colon cancer w/ resection    COPD     Depression     GERD (gastroesophageal reflux disease)     Hyperlipidemia     Hypertension     Ischemic cardiomyopathy     Migraine headache     Orthostatic hypotension     RADHA (obstructive sleep apnea)     Pacemaker 5/22/98    AV node ablation /pacemaker placement utilizing CPI # [id number] dual chamber system, medtronic #[id number] single chamber device placed 7/22/05    Pulmonary hypertension ([facility] [address] 9/26/2011    RVSP 50 on echo 8/14    Thyroid disease     Valvular heart disease     mild-mod MR/TR     Family History   Problem Relation Age of Onset    Diabetes Mother     Heart Disease Mother     Heart Attack Mother     Heart Disease Father     Stroke Sister     Breast Cancer Sister [address]    Cancer Maternal Aunt         uterus    Diabetes Maternal Uncle     Breast Cancer Daughter 61     Social History     Socioeconomic History    Marital status:      Spouse name: Not on file    Number of children: Not on file    Years of education: Not on file    Highest education level: Not on file   Occupational History    Not on file   Social Needs    Financial resource strain: Not on file    Food insecurity:     Worry: Not on file     Inability: Not on file    Transportation needs:     Medical: Not on file     Non-medical: Not on file   Tobacco Use    Smoking status: Passive Smoke Exposure - Never Smoker    Smokeless tobacco: Never Used   Substance and Sexual Activity    Alcohol use: No     Alcohol/week: 0.0 oz    Drug use: No    Sexual activity: Never   Lifestyle    Physical activity:     Days per week: Not on file     Minutes per session: Not on file    Stress: Not on file   Relationships    Social connections:     Talks on phone: Not on file     Gets together: Not on file     Attends Spiritism service: Not on file     Active member of club or organization: Not on file     Attends meetings of clubs or organizations: Not on file     Relationship status: Not on file    Intimate partner violence:     Fear of current or ex partner: Not on file     Emotionally abused: Not on file     Physically abused: Not on file     Forced sexual activity: Not on file   Other Topics Concern    Not on file   Social History Narrative    Not on file       Allergies:    Allergies   Allergen Reactions    Cardizem [Diltiazem Hcl] Hives    Codeine Nausea and Vomiting    Darvocet A500 [Propoxyphene N-Acetaminophen] Nausea and Vomiting    Diltiazem Hives    Labetalol Nausea and Vomiting     Dizzy/Disoriented     Pcn [Penicillins] Swelling     Tongue Swelling   Has previously tolerated cephalexin per daughter    Primidone Other (comments)     Lightheaded/unsteady gait    Sulfa (Sulfonamide Antibiotics) Nausea and Vomiting       Current Medications:  Current Facility-Administered Medications   Medication Dose Route Frequency    ketorolac (TORADOL) injection 15 mg  15 mg IntraVENous Q6H PRN    lidocaine (XYLOCAINE) 10 mg/mL (1 %) injection 1-20 mL  1-20 mL SubCUTAneous Multiple    iopamidol (ISOVUE-M 200) 41 % contrast solution 20 mL  20 mL Intra artICUlar RAD ONCE    bupivacaine (PF) (MARCAINE) 0.5 % (5 mg/mL) injection  mg  10-30 mL SubCUTAneous Multiple    vancomycin (VANCOCIN) 1,000 mg in 0.9% sodium chloride (MBP/ADV) 250 mL  1,000 mg IntraVENous ONCE    polyethylene glycol (MIRALAX) packet 17 g  17 g Oral BID    bisacodyl (DULCOLAX) suppository 10 mg  10 mg Rectal DAILY PRN    famotidine (PEPCID) tablet 20 mg  20 mg Oral DAILY    sodium chloride (NS) flush 5-40 mL  5-40 mL IntraVENous Q8H    sodium chloride (NS) flush 5-40 mL  5-40 mL IntraVENous PRN    acetaminophen (TYLENOL) tablet 650 mg  650 mg Oral Q6H PRN    naloxone (NARCAN) injection 0.4 mg  0.4 mg IntraVENous PRN    traMADol (ULTRAM) tablet 50 mg  50 mg Oral Q6H PRN    lidocaine 4 % patch 1 Patch  1 Patch TransDERmal Q24H    senna-docusate (PERICOLACE) 8.6-50 mg per tablet 1 Tab  1 Tab Oral BID    carvedilol (COREG) tablet 3.125 mg  3.125 mg Oral BID WITH MEALS    cholecalciferol (VITAMIN D3) tablet 2,000 Units  2,000 Units Oral DAILY    levothyroxine (SYNTHROID) tablet 88 mcg  88 mcg Oral ACB    therapeutic multivitamin (THERAGRAN) tablet 1 Tab  1 Tab Oral DAILY    predniSONE (DELTASONE) tablet 10 mg  10 mg Oral EVERY OTHER DAY    simvastatin (ZOCOR) tablet 20 mg  20 mg Oral QHS    sertraline (ZOLOFT) tablet 50 mg  50 mg Oral DAILY    budesonide (PULMICORT) 500 mcg/2 ml nebulizer suspension  500 mcg Nebulization BID RT    arformoterol (BROVANA) neb solution 15 mcg  15 mcg Nebulization BID RT    albuterol-ipratropium (DUO-NEB) 2.5 MG-0.5 MG/3 ML  3 mL Nebulization Q6H PRN    predniSONE (DELTASONE) tablet 5 mg  5 mg Oral EVERY OTHER DAY        Physical Exam:  Blood pressure 141/77, pulse 83, temperature 98.9 °F (37.2 °C), resp. rate 12, height 5' 2\" (1.575 m), weight 68.5 kg (151 lb), last menstrual period 02/26/1970, SpO2 94 %.   GENERAL: alert, cooperative, no distress, appears stated age, LUNG: clear to auscultation bilaterally, HEART: regular rate and rhythm, S1, S2 normal, no murmur, click, rub or gallop      Alerts:    Hospital Problems  Date Reviewed: 6/30/2019          Codes Class Noted POA    Hematuria ICD-10-CM: R31.9  ICD-9-CM: 599.70  6/30/2019 Yes        Hypertension (Chronic) ICD-10-CM: I10  ICD-9-CM: 401.9  6/29/2019 Yes        Constipation ICD-10-CM: K59.00  ICD-9-CM: 564.00  6/29/2019 Yes        * (Principal) Lumbar compression fracture [facility]) ICD-10-CM: S32.000A  ICD-9-CM: 805.4  6/29/2019 Yes        COPD (chronic obstructive pulmonary disease) (HCC) (Chronic) ICD-10-CM: J44.9  ICD-9-CM: 496  1/13/2019 Yes        Restrictive lung disease (Chronic) ICD-10-CM: J98.4  ICD-9-CM: 518.89  12/14/2015 Yes        Hypothyroidism due to acquired atrophy of thyroid (Chronic) ICD-10-CM: E03.4  ICD-9-CM: 244.8, 246.8  11/6/2015 Yes        CKD (chronic kidney disease) stage 3, GFR 30-59 ml/min (HCC) (Chronic) ICD-10-CM: N18.3  ICD-9-CM: 585.3  2/9/2015 Yes        Colon cancer ([facility] [address] (Chronic) ICD-10-CM: C18.9  ICD-9-CM: 153.9  2/26/2014 Yes    Overview Addendum 2/26/2014 11:12 AM by Mena Bloch, MD     [facility]  [facility]             Pulmonary hypertension ([facility] [address] (Chronic) ICD-10-CM: I27.20  ICD-9-CM: 416.8  9/26/2011 Yes        Atrial fibrillation ([facility] [address] (Chronic) ICD-10-CM: I48.91  ICD-9-CM: 427.31  Unknown Yes    Overview Signed 10/21/2010  8:16 AM by Toya Valadez     av node ablation 5/22/98 with placement of CPI #[id number] dual chamber pacemaker subsequently replaced with a single chamber medtronic #[id number] single chamber pacemaker 7/25/05                   Laboratory:      Recent Labs     07/02/19  0401 07/01/19  0512 06/30/19  0435   HGB  --  14.0 14.4   HCT  --  41.7 43.7   WBC  --  8.2 8.5   PLT  --  128* 119*   INR  --   --  1.3*   BUN 20 25* 24*   CREA 0.92 1.13* 1.07*   K 4.6 3.9 4.0         Plan of Care/Planned Procedure:  Risks, benefits, and alternatives reviewed with patient and she agrees to proceed with the procedure.        Anika Mcmanus MD

## 2019-07-02 NOTE — PROGRESS NOTES
TRANSFER - OUT REPORT:    Verbal report given to DAYMARICRUZ on [facility] being transferred to [facility] for routine post - op       Report consisted of patient's Situation, Background, Assessment and   Recommendations(SBAR). Information from the following report(s) Procedure Summary was reviewed with the receiving nurse. Opportunity for questions and clarification was provided.       Patient transported with:   Registered Nurse

## 2019-07-02 NOTE — PROGRESS NOTES
Chart reviewed. Pt off the floor for Kyphoplasty L3. Will f/u later when available and appropriate. Thanks!

## 2019-07-02 NOTE — ANESTHESIA POSTPROCEDURE EVALUATION
* No procedures listed *. MAC    Anesthesia Post Evaluation      Multimodal analgesia: multimodal analgesia not used between 6 hours prior to anesthesia start to PACU discharge  Patient location during evaluation: PACU  Patient participation: complete - patient participated  Level of consciousness: awake and alert  Pain score: 0  Pain management: adequate  Airway patency: patent  Anesthetic complications: no  Cardiovascular status: hemodynamically stable and acceptable  Respiratory status: acceptable  Hydration status: acceptable  Comments: Patient seen and evaluated; no concerns. Post anesthesia nausea and vomiting:  none      Vitals Value Taken Time   /91 7/2/2019  1:28 PM   Temp     Pulse 81 7/2/2019  1:36 PM   Resp 21 7/2/2019  1:36 PM   SpO2 94 % 7/2/2019  1:36 PM   Vitals shown include unvalidated device data.

## 2019-07-02 NOTE — PROGRESS NOTES
4313    Patient arrived. ID and allergies verified verbally with patient. Pt voices understanding of procedure to be performed. Consent obtained. Pt prepped for procedure. Kyphoplasty of L3    1120    TRANSFER - OUT REPORT:    Verbal report given to Arsen Roth RN (name) on Yanni Cos  being transferred to  [facility] (unit) for routine progression of care       Report consisted of patients Situation, Background, Assessment and   Recommendations(SBAR). Information from the following report(s) SBAR was reviewed with the receiving nurse. Lines:       Opportunity for questions and clarification was provided. Patient transported with:   Registered Nurse      [address] REPORT:    Verbal report received from Arsen Roth RN(name) on Yanni Cos  being received from  [facility] (unit) for routine progression of care      Report consisted of patients Situation, Background, Assessment and   Recommendations(SBAR). Information from the following report(s) SBAR was reviewed with the receiving nurse. Opportunity for questions and clarification was provided. Assessment completed upon patients arrival to unit and care assumed. 1340    TRANSFER - OUT REPORT:    Verbal report given to Marlyviky Ortega 5F(name) on Yanni Cos  being transferred to [address] [place] [address] (unit) for routine progression of care       Report consisted of patients Situation, Background, Assessment and   Recommendations(SBAR). Information from the following report(s) SBAR was reviewed with the receiving nurse. Lines:   Peripheral IV 07/01/19 Posterior;Right Forearm (Active)   Site Assessment Clean, dry, & intact 7/2/2019  8:00 AM   Phlebitis Assessment 0 7/2/2019  8:00 AM   Infiltration Assessment 0 7/2/2019  8:00 AM   Dressing Status Clean, dry, & intact 7/2/2019  8:00 AM   Dressing Type Transparent 7/2/2019  8:00 AM   Hub Color/Line Status Blue; Infusing 7/2/2019  8:00 AM   Action Taken Open ports on tubing capped 7/2/2019  8:00 AM Alcohol Cap Used Yes 7/2/2019  8:00 AM        Opportunity for questions and clarification was provided.       Patient transported with:   Registered Nurse

## 2019-07-02 NOTE — PROGRESS NOTES
Bedside and Verbal shift change report given to Melvin Moser (oncoming nurse) by Jake Garcia (offgoing nurse). Report included the following information SBAR, Kardex, Procedure Summary, Intake/Output, MAR and Recent Results.

## 2019-07-02 NOTE — PROGRESS NOTES
Pt resting in in bed at the time of assessment. Lungs clear to auscultation. Heart sounds normal and regular. Abdomen soft, round, tender with palpation, and non distended. Bowel sounds present in all quadrants. Denies any pain or further needs at this time. Family at bedside. Will continue to monitor. 3239- Pt up to bathroom with family at the bedside. Pt with back pain, PO tramadol and oxycodone administered per family request. Family refusing dilaudid IV citing hallucinations after admin. Currently pt reports feeling better sitting on commode. Pt denies further need for pain medication at this time. Will follow up.

## 2019-07-02 NOTE — PROGRESS NOTES
TRANSFER - OUT REPORT:    Verbal report given to DAYBREAK PAIGE LANDON on Sil Liriano being transferred to [facility] for routine post - op       Report consisted of patient's Situation, Background, Assessment and   Recommendations(SBAR). Information from the following report(s) Procedure Summary was reviewed with the receiving nurse. Opportunity for questions and clarification was provided.       Patient transported with:   Registered Nurse

## 2019-07-02 NOTE — PROGRESS NOTES
07/02/19 1400   Vitals   Temp 97.5 °F (36.4 °C)   Temp Source Oral   Pulse (Heart Rate) 84   Heart Rate Source Monitor   Resp Rate 18   O2 Sat (%) 94 %   Level of Consciousness Alert   /68   MAP (Calculated) 84   BP 1 Location Left arm   BP 1 Method Automatic   BP Patient Position At rest   MEWS Score 1   Pain 1   Pain Scale 1 Numeric (0 - 10)   Pain Intensity 1 0   Patient Stated Pain Goal 0   Patient received from IR s/p Kypoplasty in stable condition. She is laying comfortably in bed, call bell provided. Will continue to monitor.

## 2019-07-02 NOTE — PROGRESS NOTES
Problem: Mobility Impaired (Adult and Pediatric)  Goal: *Acute Goals and Plan of Care (Insert Text)  Description  Physical Therapy Goals  Initiated 6/29/2019    1. Patient will move from supine to sit and sit to supine  and roll side to side in bed with supervision/set-up within 7 days. 2. Patient will perform sit to stand with supervision/set-up within 7 days. 3. Patient will ambulate with supervision/set-up for 100 feet with the least restrictive device within 7 days. 4. Patient will verbalize and demonstrate understanding of spinal precautions (No bending, lifting greater than 5 lbs, or twisting; log-roll technique; frequent repositioning as instructed) within  days. Outcome: Progressing Towards Goal  Note:   PHYSICAL THERAPY TREATMENT  Patient: Asaf Mata (57 y.o. female)  Date: 7/2/2019  Diagnosis: Acute back pain [M54.9] Lumbar compression fracture [facility])       Precautions: Fall, Back  Chart, physical therapy assessment, plan of care and goals were reviewed. ASSESSMENT:  Pt received in bed, two family members at bedside. Reporting improved pain control after today's kyphoplasty 3/10 at rest. Reviewed log rolling technique for bed mobility requiring Min A to come to sitting EOB. Denies dizziness. lightheadedness with positional change. Gait training x 50' with CGA/SBA using RW for steadying No LOB. BR tranfers with CGA, Mod I for amber hygiene. Pt fatigued with session and assisted BTB with Min A for log rolling technique. Bolstered in hooklying for pt comfort. Progression toward goals:  ?    Improving appropriately and progressing toward goals  ? Improving slowly and progressing toward goals  ? Not making progress toward goals and plan of care will be adjusted     PLAN:  Patient continues to benefit from skilled intervention to address the above impairments. Continue treatment per established plan of care.   Discharge Recommendations:  Rehab and Home Health pending progress  Further Equipment Recommendations for Discharge:  none      SUBJECTIVE:   Patient stated ?i am tired but pain is better. ?    OBJECTIVE DATA SUMMARY:   Critical Behavior:  Neurologic State: Alert  Orientation Level: Oriented X4  Cognition: Follows commands  Safety/Judgement: Fall prevention, Decreased awareness of need for safety, Awareness of environment  Functional Mobility Training:  Bed Mobility:     Supine to Sit: Minimum assistance  Sit to Supine: Minimum assistance           Transfers:  Sit to Stand: Contact guard assistance  Stand to Sit: Contact guard assistance                             Balance:  Sitting: Intact  Standing: Intact; With support  Standing - Static: Constant support;Good  Standing - Dynamic : Good  Ambulation/Gait Training:  Distance (ft): 50 Feet (ft)  Assistive Device: Walker, rolling;Gait belt  Ambulation - Level of Assistance: Contact guard assistance;Stand-by assistance        Gait Abnormalities: Decreased step clearance              Speed/Melanie: Pace decreased (<100 feet/min)                       Stairs:              Neuro Re-Education:    Therapeutic Exercises:     Pain:  Pain Scale 1: Numeric (0 - 10)  Pain Intensity 1: 0  Pain Location 1: Back  Pain Orientation 1: Lower  Pain Description 1: Aching  Pain Intervention(s) 1: Medication (see MAR)  Activity Tolerance:   good  Please refer to the flowsheet for vital signs taken during this treatment. After treatment:   ?    Patient left in no apparent distress sitting up in chair  ? Patient left in no apparent distress in bed  ? Call bell left within reach  ? Nursing notified  ? Caregiver present  ?     Bed alarm activated    COMMUNICATION/COLLABORATION:   The patient?s plan of care was discussed with: Registered Nurse    Katerina Trejo   Time Calculation: 29 mins

## 2019-07-02 NOTE — PROGRESS NOTES
Nate [facility]  [address] [facility], [address] [place] [address]  [phone]      Medical Progress Note      NAME: Blessing Gomez   :  1932  MRM:  [id number]    Date/Time: 2019         Subjective:     Chief Complaint:  Patient was seen and examined by me. Chart reviewed. Back pain much better after kyphoplasty        Objective:       Vitals:       Last 24hrs VS reviewed since prior progress note.  Most recent are:    Visit Vitals  /68 (BP 1 Location: Left arm, BP Patient Position: At rest)   Pulse 84   Temp 97.5 °F (36.4 °C)   Resp 18   Ht 5' 2\" (1.575 m)   Wt 68.5 kg (151 lb)   SpO2 94%   BMI 27.62 kg/m²     SpO2 Readings from Last 6 Encounters:   19 94%   19 95%   19 94%   19 95%   19 94%   19 98%    O2 Flow Rate (L/min): 2 l/min       Intake/Output Summary (Last 24 hours) at 2019 1421  Last data filed at 2019 1600  Gross per 24 hour   Intake    Output 0 ml   Net 0 ml        Exam:     Physical Exam:    Gen:  Elderly, frail, NAD  HEENT:  Pink conjunctivae, PERRL, hearing intact to voice, moist mucous membranes  Neck:  Supple, without masses, thyroid non-tender  Resp:  No accessory muscle use, clear breath sounds without wheezes rales or rhonchi  Card:  No murmurs, normal S1, S2 without thrills, bruits or peripheral edema  Abd:  Soft, non-tender, non-distended, normoactive bowel sounds are present  Musc:  No cyanosis or clubbing, lower back pain to palpation, ROM  Skin:  No rashes   Neuro:  Cranial nerves 3-12 are grossly intact, follows commands appropriately  Psych:  Good insight, oriented to person, place and time, alert    Medications Reviewed: (see below)    Lab Data Reviewed: (see below)    ______________________________________________________________________    Medications:     Current Facility-Administered Medications   Medication Dose Route Frequency    ketorolac (TORADOL) injection 15 mg  15 mg IntraVENous Q6H PRN    dexamethasone (PF) (DECADRON) injection 10 mg  10 mg Intra artICUlar Multiple    lidocaine (PF) (XYLOCAINE) 10 mg/mL (1 %) injection 0-10 mL  0-10 mL IntraDERMal Multiple    polyethylene glycol (MIRALAX) packet 17 g  17 g Oral BID    bisacodyl (DULCOLAX) suppository 10 mg  10 mg Rectal DAILY PRN    famotidine (PEPCID) tablet 20 mg  20 mg Oral DAILY    sodium chloride (NS) flush 5-40 mL  5-40 mL IntraVENous Q8H    sodium chloride (NS) flush 5-40 mL  5-40 mL IntraVENous PRN    acetaminophen (TYLENOL) tablet 650 mg  650 mg Oral Q6H PRN    naloxone (NARCAN) injection 0.4 mg  0.4 mg IntraVENous PRN    traMADol (ULTRAM) tablet 50 mg  50 mg Oral Q6H PRN    lidocaine 4 % patch 1 Patch  1 Patch TransDERmal Q24H    senna-docusate (PERICOLACE) 8.6-50 mg per tablet 1 Tab  1 Tab Oral BID    carvedilol (COREG) tablet 3.125 mg  3.125 mg Oral BID WITH MEALS    cholecalciferol (VITAMIN D3) tablet 2,000 Units  2,000 Units Oral DAILY    levothyroxine (SYNTHROID) tablet 88 mcg  88 mcg Oral ACB    therapeutic multivitamin (THERAGRAN) tablet 1 Tab  1 Tab Oral DAILY    predniSONE (DELTASONE) tablet 10 mg  10 mg Oral EVERY OTHER DAY    simvastatin (ZOCOR) tablet 20 mg  20 mg Oral QHS    sertraline (ZOLOFT) tablet 50 mg  50 mg Oral DAILY    budesonide (PULMICORT) 500 mcg/2 ml nebulizer suspension  500 mcg Nebulization BID RT    arformoterol (BROVANA) neb solution 15 mcg  15 mcg Nebulization BID RT    albuterol-ipratropium (DUO-NEB) 2.5 MG-0.5 MG/3 ML  3 mL Nebulization Q6H PRN    predniSONE (DELTASONE) tablet 5 mg  5 mg Oral EVERY OTHER DAY          Lab Review:     Recent Labs     07/01/19  0512 06/30/19  0435   WBC 8.2 8.5   HGB 14.0 14.4   HCT 41.7 43.7   * 119*     Recent Labs     07/02/19  0401 07/01/19  0512 06/30/19  0435    134* 137   K 4.6 3.9 4.0    100 101   CO2 28 29 30   * 114* 132*   BUN 20 25* 24*   CREA 0.92 1.13* 1.07*   CA 8.6 8.7 8.8   MG 1.8 1.8 1.7   PHOS 2.8 3.3 2.8   ALB --   --  3.3*   TBILI  --   --  0.9   SGOT  --   --  18   ALT  --   --  16   INR  --   --  1.3*     No results found for: GLUCPOC       Assessment / Plan:     Principal Problem:    79 yo hx of HTN, afib s/p pacer, ICM EF 30-35%, COPD, CKD 3, colon CA s/p partial colectomy, presented w/ back pain, L3 compression fx, debility    1) L3 compression fracture/acute back pain: Now improved, s/p kyphoplasty 07/02. Unable to tolerate brace. Start IV toradol prn, cont tramadol prn pain. Ortho following. Cont PT/OT    2) Hematuria: now resolved. Likely from traumatic valentine (valentine out). Urology recommended outpatient f/u (w/ Dr. Bryant)    3) Afib: s/p pacer. Holding Eliquis for kypho. Resume in AM    4) COPD: stable. Cont LABA/ICS    5) HTN: cont coreg    6) CKD 3: stable.   Cont to monitor     Total time spent with patient: 20 min                  Care Plan discussed with: Patient, family, nursing, ortho    Discussed:  Care Plan    Prophylaxis:  SCD's    Disposition:  [facility]/Rehab           ___________________________________________________    Attending Physician: Jaimee Huizar MD

## 2019-07-03 ENCOUNTER — HOSPITAL ENCOUNTER (OUTPATIENT)
Dept: REHABILITATION | Age: 84
End: 2019-07-23
Attending: PHYSICAL MEDICINE & REHABILITATION | Admitting: PHYSICAL MEDICINE & REHABILITATION

## 2019-07-03 VITALS
SYSTOLIC BLOOD PRESSURE: 110 MMHG | HEIGHT: 62 IN | OXYGEN SATURATION: 94 % | DIASTOLIC BLOOD PRESSURE: 60 MMHG | RESPIRATION RATE: 19 BRPM | WEIGHT: 151 LBS | TEMPERATURE: 97.9 F | HEART RATE: 90 BPM | BODY MASS INDEX: 27.79 KG/M2

## 2019-07-03 LAB
ANION GAP SERPL CALC-SCNC: 7 MMOL/L (ref 5–15)
BUN SERPL-MCNC: 28 MG/DL (ref 6–20)
BUN/CREAT SERPL: 27 (ref 12–20)
CALCIUM SERPL-MCNC: 8.6 MG/DL (ref 8.5–10.1)
CHLORIDE SERPL-SCNC: 101 MMOL/L (ref 97–108)
CO2 SERPL-SCNC: 26 MMOL/L (ref 21–32)
CREAT SERPL-MCNC: 1.02 MG/DL (ref 0.55–1.02)
ERYTHROCYTE [DISTWIDTH] IN BLOOD BY AUTOMATED COUNT: 13.1 % (ref 11.5–14.5)
GLUCOSE SERPL-MCNC: 160 MG/DL (ref 65–100)
HCT VFR BLD AUTO: 38.5 % (ref 35–47)
HGB BLD-MCNC: 13.1 G/DL (ref 11.5–16)
MAGNESIUM SERPL-MCNC: 1.9 MG/DL (ref 1.6–2.4)
MCH RBC QN AUTO: 31 PG (ref 26–34)
MCHC RBC AUTO-ENTMCNC: 34 G/DL (ref 30–36.5)
MCV RBC AUTO: 91.2 FL (ref 80–99)
NRBC # BLD: 0 K/UL (ref 0–0.01)
NRBC BLD-RTO: 0 PER 100 WBC
PHOSPHATE SERPL-MCNC: 3 MG/DL (ref 2.6–4.7)
PLATELET # BLD AUTO: 143 K/UL (ref 150–400)
PMV BLD AUTO: 11.5 FL (ref 8.9–12.9)
POTASSIUM SERPL-SCNC: 4.6 MMOL/L (ref 3.5–5.1)
RBC # BLD AUTO: 4.22 M/UL (ref 3.8–5.2)
SODIUM SERPL-SCNC: 134 MMOL/L (ref 136–145)
WBC # BLD AUTO: 7.7 K/UL (ref 3.6–11)

## 2019-07-03 PROCEDURE — 74011250637 HC RX REV CODE- 250/637: Performed by: INTERNAL MEDICINE

## 2019-07-03 PROCEDURE — 74011636637 HC RX REV CODE- 636/637: Performed by: INTERNAL MEDICINE

## 2019-07-03 PROCEDURE — 85027 COMPLETE CBC AUTOMATED: CPT

## 2019-07-03 PROCEDURE — 74011000250 HC RX REV CODE- 250: Performed by: INTERNAL MEDICINE

## 2019-07-03 PROCEDURE — 94760 N-INVAS EAR/PLS OXIMETRY 1: CPT

## 2019-07-03 PROCEDURE — 83735 ASSAY OF MAGNESIUM: CPT

## 2019-07-03 PROCEDURE — 84100 ASSAY OF PHOSPHORUS: CPT

## 2019-07-03 PROCEDURE — 97535 SELF CARE MNGMENT TRAINING: CPT

## 2019-07-03 PROCEDURE — 36415 COLL VENOUS BLD VENIPUNCTURE: CPT

## 2019-07-03 PROCEDURE — 80048 BASIC METABOLIC PNL TOTAL CA: CPT

## 2019-07-03 PROCEDURE — 94640 AIRWAY INHALATION TREATMENT: CPT

## 2019-07-03 RX ORDER — LOPERAMIDE HYDROCHLORIDE 2 MG/1
2 CAPSULE ORAL ONCE
Status: COMPLETED | OUTPATIENT
Start: 2019-07-03 | End: 2019-07-03

## 2019-07-03 RX ORDER — TRAMADOL HYDROCHLORIDE 50 MG/1
50 TABLET ORAL
Qty: 30 TAB | Refills: 0 | Status: SHIPPED | OUTPATIENT
Start: 2019-07-03 | End: 2019-07-08

## 2019-07-03 RX ADMIN — ARFORMOTEROL TARTRATE 15 MCG: 15 SOLUTION RESPIRATORY (INHALATION) at 08:56

## 2019-07-03 RX ADMIN — TRAMADOL HYDROCHLORIDE 50 MG: 50 TABLET, FILM COATED ORAL at 10:54

## 2019-07-03 RX ADMIN — FAMOTIDINE 20 MG: 20 TABLET ORAL at 08:16

## 2019-07-03 RX ADMIN — Medication 1 CAPSULE: at 12:06

## 2019-07-03 RX ADMIN — ACETAMINOPHEN 650 MG: 325 TABLET ORAL at 08:16

## 2019-07-03 RX ADMIN — Medication 10 ML: at 06:19

## 2019-07-03 RX ADMIN — SERTRALINE HYDROCHLORIDE 50 MG: 50 TABLET ORAL at 08:16

## 2019-07-03 RX ADMIN — LEVOTHYROXINE SODIUM 88 MCG: 88 TABLET ORAL at 06:17

## 2019-07-03 RX ADMIN — PREDNISONE 10 MG: 5 TABLET ORAL at 08:16

## 2019-07-03 RX ADMIN — THERA TABS 1 TABLET: TAB at 08:16

## 2019-07-03 RX ADMIN — CARVEDILOL 3.12 MG: 6.25 TABLET, FILM COATED ORAL at 08:16

## 2019-07-03 RX ADMIN — BUDESONIDE 500 MCG: 0.5 INHALANT RESPIRATORY (INHALATION) at 08:56

## 2019-07-03 RX ADMIN — LOPERAMIDE HYDROCHLORIDE 2 MG: 2 CAPSULE ORAL at 12:06

## 2019-07-03 RX ADMIN — VITAMIN D, TAB 1000IU (100/BT) 2000 UNITS: 25 TAB at 08:16

## 2019-07-03 NOTE — PROGRESS NOTES
Problem: Falls - Risk of  Goal: *Absence of Falls  Description  Document Farhad Zelaya Fall Risk and appropriate interventions in the flowsheet. Outcome: Progressing Towards Goal     Problem: Patient Education: Go to Patient Education Activity  Goal: Patient/Family Education  Outcome: Progressing Towards Goal     Problem: Patient Education: Go to Patient Education Activity  Goal: Patient/Family Education  Outcome: Progressing Towards Goal     Problem: Pressure Injury - Risk of  Goal: *Prevention of pressure injury  Description  Document Deric Scale and appropriate interventions in the flowsheet.   Outcome: Progressing Towards Goal     Problem: Patient Education: Go to Patient Education Activity  Goal: Patient/Family Education  Outcome: Progressing Towards Goal     Problem: Patient Education: Go to Patient Education Activity  Goal: Patient/Family Education  Outcome: Progressing Towards Goal     Problem: General Medical Care Plan  Goal: *Vital signs within specified parameters  Outcome: Progressing Towards Goal  Goal: *Labs within defined limits  Outcome: Progressing Towards Goal  Goal: *Absence of infection signs and symptoms  Outcome: Progressing Towards Goal  Goal: *Optimal pain control at patient's stated goal  Outcome: Progressing Towards Goal  Goal: *Skin integrity maintained  Outcome: Progressing Towards Goal  Goal: *Fluid volume balance  Outcome: Progressing Towards Goal  Goal: *Optimize nutritional status  Outcome: Progressing Towards Goal  Goal: *Anxiety reduced or absent  Outcome: Progressing Towards Goal  Goal: *Progressive mobility and function (eg: ADL's)  Outcome: Progressing Towards Goal     Problem: Patient Education: Go to Patient Education Activity  Goal: Patient/Family Education  Outcome: Progressing Towards Goal

## 2019-07-03 NOTE — PROGRESS NOTES
Spiritual Care Assessment/Progress Note  [address]      NAME: Noel Guevara      MRN: [id number]  AGE: 80 y.o.  SEX: female  Latter day Affiliation: Samaritan   Language: English     7/3/2019     Total Time (in minutes): 20     Spiritual Assessment begun in [facility] POST SURG ORT 2 through conversation with:         [x]Patient        [x] Family    [] Friend(s)        Reason for Consult: Initial visit     Spiritual beliefs: (Please include comment if needed)     [x] Identifies with a juan tradition: Samaritan        [] Supported by a juan community:            [] Claims no spiritual orientation:           [] Seeking spiritual identity:                [] Adheres to an individual form of spirituality:           [] Not able to assess:                           Identified resources for coping:      [] Prayer                               [] Music                  [] Guided Imagery     [x] Family/friends                 [] Pet visits     [] Devotional reading                         [] Unknown     [] Other:                                           Interventions offered during this visit: (See comments for more details)    Patient Interventions: Catharsis/review of pertinent events in supportive environment, Coping skills reviewed/reinforced, Affirmation of juan           Plan of Care:     [x] Support spiritual and/or cultural needs    [] Support AMD and/or advance care planning process      [] Support grieving process   [] Coordinate Rites and/or Rituals    [] Coordination with community clergy   [] No spiritual needs identified at this time   [] Detailed Plan of Care below (See Comments)  [] Make referral to Music Therapy  [] Make referral to Pet Therapy     [] Make referral to Addiction services  [] Make referral to [facility]  [] Make referral to Spiritual Care Partner  [] No future visits requested        [x] Follow up visits as needed     Comments:     visited patient, Wilfrid Hernández, for an initial spiritual assessment on the Post. Surgical Ortho floor. Erasmo Cervantes was awake, alert, and lying in bed when the  came to visit. Her daughter was sitting at the bedside.  introduced herself and extended support through active listening and pastoral presence. Erasmo Cervantes openly shared her illness story, discussed the challenges of aging, and briefly provided life review. She is looking forward to going to Rehab to gain strength before she goes back home.  continued to be a supportive presence as Erasmo Cervantes discussed her juan background, shared stories of gratitude regarding her life, and expressed her desire to embrace all the time she has left.  confirmed this desire.  conducted a brief spiritual assessment. Erasmo Crevantes is Jehovah's witness, but has been unable to attend Mormonism for some time. She finds comfort in some of the local Mormonism streaming she has access to.  inquired how she could best suppot  Erasmo Cervantes and her daughter during this time. Erasmo Cervantes expressed no additional needs and thanked the  for stopping by.  will follow up as able and/or needed. Steve Crowe. Francis Pozo.      Paging Service: [phone])

## 2019-07-03 NOTE — PROGRESS NOTES
Problem: Self Care Deficits Care Plan (Adult)  Goal: *Acute Goals and Plan of Care (Insert Text)  Description  Occupational Therapy Goals  Initiated 6/30/2019    1. Patient will perform lower body dressing with supervision/set-up using AE PRN within 7 days. 2.  Patient will perform toilet transfer with modified independence using most appropriate DME within 7 days. 3.  Patient will perform all aspects of toileting at supervision/set-up within 7 days. 4.  Patient will don/doff back brace at supervision/set-up within 7 days. 5.  Patient will verbalize/demonstrate 3/3 back precautions during ADL tasks without cues within 7 days. Outcome: Progressing Towards Goal   OCCUPATIONAL THERAPY TREATMENT  Patient: Nate Bill (20 y.o. female)  Date: 7/3/2019  Diagnosis: Acute back pain [M54.9] Lumbar compression fracture [facility])       Precautions: Fall, Back  Chart, occupational therapy assessment, plan of care, and goals were reviewed. ASSESSMENT:  Patient with kyphoplasty yesterday and agreeable to activity at this time. Patient reports pain improved but still with discomfort. She is able to log roll to R side with SBA but requires min assist to move into sitting. Once sitting patient with good attempt to don socks using cross leg technique, however though she is able to cross legs today, she is unable to reach feet without increasing discomfort to back. Patient assisted. Sit to stand with CGA and transfers to bathroom with CGA. Patient agreeable to stand at sink for grooming/hygiene and patient manages with CGA initially. With prolonged standing patient reports increased back pain and requires min assist to return to EOB. Once seated patient returned to supine with min assist.  Patient lives alone and currently not able to perform ADLs without assistance. Recommend rehab setting at discharge. Progression toward goals:  ?       Improving appropriately and progressing toward goals  ?        Improving slowly and progressing toward goals  ? Not making progress toward goals and plan of care will be adjusted     PLAN:  Patient continues to benefit from skilled intervention to address the above impairments. Continue treatment per established plan of care. Discharge Recommendations:  Rehab  Further Equipment Recommendations for Discharge:  TBD      SUBJECTIVE:   Patient stated ? Its hurting a little more . ? OBJECTIVE DATA SUMMARY:   Cognitive/Behavioral Status:  Neurologic State: Alert  Orientation Level: Oriented X4  Cognition: Follows commands             Functional Mobility and Transfers for ADLs:  Bed Mobility:  Supine to Sit: Minimum assistance  Sit to Supine: Minimum assistance  Scooting: Stand-by assistance    Transfers:  Sit to Stand: Contact guard assistance  Functional Transfers  Bathroom Mobility: Contact guard assistance       Balance:  Sitting: Intact  Standing: With support  Standing - Static: Constant support  Standing - Dynamic : Fair(after standing at sink)    ADL Intervention:       Grooming  Grooming Assistance: Contact guard assistance(verbal cues for back precautions)  Washing Hands: Contact guard assistance  Cues: Verbal cues provided                   Lower Body Dressing Assistance  Socks: Moderate assistance  Leg Crossed Method Used: Yes(but unable to reach feet)  Position Performed: Seated edge of bed  Cues: Physical assistance;Verbal cues provided              Neuro Re-Education:           Therapeutic Exercises:     Pain:  Pain Scale 1: Numeric (0 - 10)  Pain Intensity 1: 0  Pain Location 1: Back  Pain Orientation 1: Lower  Pain Description 1: Aching; Sore  Pain Intervention(s) 1: Medication (see MAR)  Activity Tolerance:   VSS  Please refer to the flowsheet for vital signs taken during this treatment. After treatment:   ? Patient left in no apparent distress sitting up in chair  ? Patient left in no apparent distress in bed  ? Call bell left within reach  ? Nursing notified  ? Caregiver present  ?  Bed alarm activated    COMMUNICATION/COLLABORATION:   The patient?s plan of care was discussed with: Physical Therapy Assistant and Registered Nurse    Sunni Stokes OTR/L  Time Calculation: 26 mins

## 2019-07-03 NOTE — DISCHARGE SUMMARY
Nate Whitaker [facility] 79  [address] [facility], [id number] [place] [address]  [phone]    Physician Discharge Summary     Patient ID:  Harvinder Huerta  [id number]  80 y.o.  2/6/1932    Admit date: 6/29/2019    Discharge date and time: 7/3/2019    Admission Diagnoses: Acute back pain [M54.9]    Discharge Diagnoses:  Principal Diagnosis Lumbar compression fracture [facility])                                            Principal Problem:    Lumbar compression fracture ([facility] (6/29/2019)    Active Problems:    Atrial fibrillation (HCC) ()      Overview: av node ablation 5/22/98 with placement of CPI #[id number] dual chamber       pacemaker subsequently replaced with a single chamber medtronic #[id number]       single chamber pacemaker 7/25/05      Pulmonary hypertension ([facility] (9/26/2011)      Colon cancer ([facility] (2/26/2014)      Overview: Partial colectomy      Petey Allen      CKD (chronic kidney disease) stage 3, GFR 30-59 ml/min ([facility]) (2/9/2015)      Hypothyroidism due to acquired atrophy of thyroid (11/6/2015)      Restrictive lung disease (12/14/2015)      COPD (chronic obstructive pulmonary disease) ([facility] (1/13/2019)      Hypertension (6/29/2019)      Constipation (6/29/2019)      Hematuria (6/30/2019)           Hospital Course:     81 yo hx of HTN, afib s/p pacer, ICM EF 30-35%, COPD, CKD 3, colon CA s/p partial colectomy, presented w/ back pain, L3 compression fx, debility     1) L3 compression fracture/acute back pain: Now improved, s/p kyphoplasty 07/02. Unable to tolerate brace. Cont tramadol prn pain. Ortho was following. Cont PT/OT at [facility]     2) Hematuria: now resolved. Likely from traumatic valentine (valentine out). Urology recommended outpatient f/u (w/ Dr. Sheila Brady)     3) Afib: s/p pacer. Cont Coreg, Eliquis     4) COPD: stable. Cont LABA/ICS     5) HTN: cont coreg     6) CKD 3: stable.   Cont to monitor     PCP: Emir Carias MD     Consults: Orthopedic Surgery    Significant Diagnostic Studies: kyphoplasty    Discharge Exam:  Physical Exam:    Gen:  Elderly, frail, NAD  HEENT:  Pink conjunctivae, PERRL, hearing intact to voice, moist mucous membranes  Neck:  Supple, without masses, thyroid non-tender  Resp:  No accessory muscle use, clear breath sounds without wheezes rales or rhonchi  Card:  No murmurs, normal S1, S2 without thrills, bruits or peripheral edema  Abd:  Soft, non-tender, non-distended, normoactive bowel sounds are present  Musc:  No cyanosis or clubbing, lower back pain to palpation, ROM  Skin:  No rashes   Neuro:  Cranial nerves 3-12 are grossly intact, follows commands appropriately  Psych:  Good insight, oriented to person, place and time, alert    Disposition: [facility]  Discharge Condition: Stable    Patient Instructions:   Current Discharge Medication List      START taking these medications    Details   L. acidoph & paracasei- S therm- Bifido (MADAN-Q/RISAQUAD) 8 billion cell cap cap Take 1 Cap by mouth daily. Qty: 30 Cap, Refills: 0      traMADol (ULTRAM) 50 mg tablet Take 1 Tab by mouth every six (6) hours as needed for Pain for up to 5 days. Max Daily Amount: 200 mg. Qty: 30 Tab, Refills: 0    Associated Diagnoses: Intractable pain         CONTINUE these medications which have CHANGED    Details   loperamide (IMODIUM) 1 mg/5 mL solution Take 10 mL by mouth daily as needed for Diarrhea. Qty: 60 mL, Refills: 0         CONTINUE these medications which have NOT CHANGED    Details   !! acetaminophen (TYLENOL EXTRA STRENGTH) 500 mg tablet Take 500 mg by mouth daily (with lunch). multivitamins (CHEWABLE-AMOS) chew Take 1 Tab by mouth daily. !! predniSONE (DELTASONE) 10 mg tablet Take 5 mg by mouth every other day. lidocaine (LIDODERM) 5 % 1 Patch by TransDERmal route every twenty-four (24) hours. Apply patch to the affected area for 12 hours a day and remove for 12 hours a day.       raNITIdine (ZANTAC) 150 mg tablet TAKE 1 TABLET BY MOUTH TWICE A DAY  Qty: 180 Tab, Refills: 1 furosemide (LASIX) 40 mg tablet Take 20 mg by mouth daily. Daily as needed for swelling       apixaban (ELIQUIS) 5 mg tablet Take 1 Tab by mouth two (2) times a day. Stop coumadin now (last dose 5/22). Hold blood thinners 4 days. Start eliquis Monday 5/27. Qty: 180 Tab, Refills: 3    Associated Diagnoses: Atrial fibrillation, unspecified type (HCC)      carvedilol (COREG) 3.125 mg tablet TAKE 1 TABLET BY MOUTH TWICE A DAY WITH MEALS  Qty: 180 Tab, Refills: 1    Associated Diagnoses: Atrial fibrillation, unspecified type (HCC)      cholecalciferol, vitamin D3, (VITAMIN D3) 2,000 unit tab Take 2,000 Units by mouth daily. alendronate (FOSAMAX) 70 mg tablet Take 1 Tab by mouth every seven (7) days. Qty: 12 Tab, Refills: 3    Associated Diagnoses: Osteopenia, unspecified location      ! ! predniSONE (DELTASONE) 5 mg tablet Take 10 mg by mouth every other day. albuterol (PROVENTIL HFA, VENTOLIN HFA, PROAIR HFA) 90 mcg/actuation inhaler Take 1 Puff by inhalation every six (6) hours as needed for Wheezing. levothyroxine (SYNTHROID) 88 mcg tablet Take 1 Tab by mouth Daily (before breakfast). Qty: 30 Tab, Refills: 5    Associated Diagnoses: Hypothyroidism due to acquired atrophy of thyroid      ZOLOFT 50 mg tablet TAKE 1 TABLET BY MOUTH EVERY DAY  Qty: 90 Tab, Refills: 2    Associated Diagnoses: Anxiety and depression      simvastatin (ZOCOR) 20 mg tablet TAKE 1 TABLET BY MOUTH NIGHTLY  Qty: 90 Tab, Refills: 3    Associated Diagnoses: Mixed hyperlipidemia      baclofen (LIORESAL) 10 mg tablet Take 5 mg by mouth two (2) times a day. Associated Diagnoses: Chronic midline low back pain without sciatica      DENTA 5000 PLUS 1.1 % crea Apply 1 mg to affected area as needed (to prevent cavities). Refills: 2      ADVAIR DISKUS 250-50 mcg/dose diskus inhaler Take 1 Puff by inhalation two (2) times a day. Associated Diagnoses: Atrial fibrillation ([address]      ! ! acetaminophen (TYLENOL) 500 mg tablet Take 1,000 mg by mouth two (2) times a day. (2) Tablet in am (1) tablet in pm (2) Tablet pm  Refills: 0    Associated Diagnoses: Fever; Viral URI with cough      tiotropium (SPIRIVA WITH HANDIHALER) 18 mcg inhalation capsule Take 1 Cap by inhalation daily. Associated Diagnoses: Atrial fibrillation ([address] Complete heart block ([address]       ! ! - Potential duplicate medications found. Please discuss with provider.       STOP taking these medications       multivitamin (ONE A DAY) tablet Comments:   Reason for Stopping:             Activity: Activity as tolerated  Diet: Regular Diet  Wound Care: As directed    Follow-up with  Follow-up Information     Follow up With Specialties Details Why Contact Info    [facility] 104  [address]    Herbie Lundberg MD Internal Medicine Schedule an appointment as soon as possible for a visit in 2 weeks  [facility] 320 250  [facility] 69 [facility] [address] [place] [address]  [phone]      Nancy Bello MD Urology Schedule an appointment as soon as possible for a visit in 2 weeks for hematuria [facility]  [phone]            Follow-up tests/labs none    Signed:  Mike Fernandes MD  7/3/2019  11:41 AM    I spent 33 min on discharge

## 2019-07-03 NOTE — PROGRESS NOTES
Report given to Luis Felipe Chavira at [address]. Patient will be transported via wheelchair at 1530. Family notified of discharge.

## 2019-07-03 NOTE — PROGRESS NOTES
CM met with pt's daughter and [facility] liaison, Kurt Masters. Will resend referral to [address] [place] [address] for review. CM also contacted [facility]. Pt has been accepted.   Yanira Kate LCSW

## 2019-07-03 NOTE — PROGRESS NOTES
Pt has been accepted by [facility] for rehab, and they can take pt today after 3pm.  Nursing, please call report to [phone]. Thank you.   [facility], LCSW

## 2019-07-03 NOTE — DISCHARGE INSTRUCTIONS
HOSPITALIST DISCHARGE INSTRUCTIONS  NAME: Rickey Smallwood   :  1932   MRN:  [id number]     Date/Time:  7/3/2019 11:39 AM    ADMIT DATE: 2019     DISCHARGE DATE: 7/3/2019     ADMITTING DIAGNOSIS:  L3 compression fracture s/p kyphoplasty    DISCHARGE DIAGNOSIS:  same    MEDICATIONS:  See after visit summary       · It is important that you take the medication exactly as they are prescribed. · Keep your medication in the bottles provided by the pharmacist and keep a list of the medication names, dosages, and times to be taken in your wallet. · Do not take other medications without consulting your doctor     Pain Management: per above medications    What to do at Home    Recommended diet:  Regular Diet    Recommended activity: Activity as tolerated    1) Return to the hospital if you feel worse    2) If you experience any of the following symptoms then please call your primary care physician or return to the emergency room if you cannot get hold of your doctor:  Fever, chills, nausea, vomiting, diarrhea, change in mentation, falling, bleeding, shortness of breath, chest pain, severe headache, severe abdominal pain,     3) Follow up with your doctors    Follow Up: Follow-up Information     Follow up With Specialties Details Why Contact [address]    Nir Randhawa MD Internal Medicine Schedule an appointment as soon as possible for a visit in 2 weeks  [address] [place] [address]  [phone]      Sujata Baez MD Urology Schedule an appointment as soon as possible for a visit in 2 weeks for hematuria [address] [address]  [phone]              Information obtained by :  I understand that if any problems occur once I am at home I am to contact my physician.     I understand and acknowledge receipt of the instructions indicated above. Physician's or R.N.'s Signature                                                                  Date/Time                                                                                                                                              Patient or Representative Signature                                                          Date/Time    Patient Education        Compression Fracture of the Spine: Care Instructions  Your Care Instructions    A compression fracture happens when the front part of a spinal bone breaks and collapses. A fall or other accident can cause it. A minor injury or moving the wrong way can cause a break if you have thin or brittle bones (osteoporosis). These types of breaks will heal in 8 to 10 weeks. You will need rest and pain medicines. Your doctor may recommend physical therapy. Some doctors recommend that certain people with compression fractures wear braces. Your doctor also may treat thin or brittle bones. You may need surgery if you have lasting pain or if the bone presses on the spinal cord or nerves. You heal best when you take good care of yourself. Eat a variety of healthy foods, and don't smoke. Follow-up care is a key part of your treatment and safety. Be sure to make and go to all appointments, and call your doctor if you are having problems. It's also a good idea to know your test results and keep a list of the medicines you take. How can you care for yourself at home? · Be safe with medicines. Read and follow all instructions on the label. ? If the doctor gave you a prescription medicine for pain, take it as prescribed. ? If you are not taking a prescription pain medicine, ask your doctor if you can take an over-the-counter medicine.   · Talk to your doctor about how to make your bones stronger. You may need medicines or a change in what you eat. · Be active only as directed by your doctor. When should you call for help? Call 911 anytime you think you may need emergency care. For example, call if:    · You are unable to move an arm or a leg at all.   [facility] your doctor now or seek immediate medical care if:    · You have new or worse symptoms in your arms, legs, belly, or buttocks. Symptoms may include:  ? Numbness or tingling. ? Weakness. ? Pain.     · You lose bladder or bowel control.     · You have belly pain, bloating, vomiting, or nausea.    Watch closely for changes in your health, and be sure to contact your doctor if:    · You do not get better as expected. Where can you learn more? Go to http://markel-nicolasa.info/. Enter P445 in the search box to learn more about \"Compression Fracture of the Spine: Care Instructions. \"  Current as of: September 20, 2018  Content Version: 11.9  © 9024-6016 [employer]. Care instructions adapted under license by [employer] (which disclaims liability or warranty for this information). If you have questions about a medical condition or this instruction, always ask your healthcare professional. [place] [address] any warranty or liability for your use of this information.

## 2019-07-03 NOTE — PROGRESS NOTES
I have reviewed discharge instructions with the caregiver (POA). The caregiver (POA) verbalized understanding.

## 2019-07-04 ENCOUNTER — DOCUMENTATION ONLY (OUTPATIENT)
Dept: CARDIOLOGY CLINIC | Age: 84
End: 2019-07-04

## 2019-07-04 LAB
ALBUMIN SERPL-MCNC: 3.4 G/DL (ref 3.5–5)
ALBUMIN/GLOB SERPL: 1 {RATIO} (ref 1.1–2.2)
ALP SERPL-CCNC: 82 U/L (ref 45–117)
ALT SERPL-CCNC: 31 U/L (ref 12–78)
ANION GAP SERPL CALC-SCNC: 5 MMOL/L (ref 5–15)
AST SERPL-CCNC: 29 U/L (ref 15–37)
BASOPHILS # BLD: 0 K/UL (ref 0–0.1)
BASOPHILS NFR BLD: 0 % (ref 0–1)
BILIRUB SERPL-MCNC: 0.8 MG/DL (ref 0.2–1)
BUN SERPL-MCNC: 29 MG/DL (ref 6–20)
BUN/CREAT SERPL: 29 (ref 12–20)
CALCIUM SERPL-MCNC: 9 MG/DL (ref 8.5–10.1)
CHLORIDE SERPL-SCNC: 104 MMOL/L (ref 97–108)
CO2 SERPL-SCNC: 26 MMOL/L (ref 21–32)
CREAT SERPL-MCNC: 0.99 MG/DL (ref 0.55–1.02)
DIFFERENTIAL METHOD BLD: ABNORMAL
EOSINOPHIL # BLD: 0.1 K/UL (ref 0–0.4)
EOSINOPHIL NFR BLD: 1 % (ref 0–7)
ERYTHROCYTE [DISTWIDTH] IN BLOOD BY AUTOMATED COUNT: 13.5 % (ref 11.5–14.5)
GLOBULIN SER CALC-MCNC: 3.3 G/DL (ref 2–4)
GLUCOSE SERPL-MCNC: 104 MG/DL (ref 65–100)
HCT VFR BLD AUTO: 39.4 % (ref 35–47)
HGB BLD-MCNC: 13.1 G/DL (ref 11.5–16)
IMM GRANULOCYTES # BLD AUTO: 0.1 K/UL (ref 0–0.04)
IMM GRANULOCYTES NFR BLD AUTO: 1 % (ref 0–0.5)
LYMPHOCYTES # BLD: 1.2 K/UL (ref 0.8–3.5)
LYMPHOCYTES NFR BLD: 11 % (ref 12–49)
MCH RBC QN AUTO: 30.8 PG (ref 26–34)
MCHC RBC AUTO-ENTMCNC: 33.2 G/DL (ref 30–36.5)
MCV RBC AUTO: 92.7 FL (ref 80–99)
MONOCYTES # BLD: 0.7 K/UL (ref 0–1)
MONOCYTES NFR BLD: 6 % (ref 5–13)
NEUTS SEG # BLD: 8.9 K/UL (ref 1.8–8)
NEUTS SEG NFR BLD: 81 % (ref 32–75)
NRBC # BLD: 0 K/UL (ref 0–0.01)
NRBC BLD-RTO: 0 PER 100 WBC
PLATELET # BLD AUTO: 149 K/UL (ref 150–400)
PMV BLD AUTO: 11.9 FL (ref 8.9–12.9)
POTASSIUM SERPL-SCNC: 4.4 MMOL/L (ref 3.5–5.1)
PROT SERPL-MCNC: 6.7 G/DL (ref 6.4–8.2)
RBC # BLD AUTO: 4.25 M/UL (ref 3.8–5.2)
SODIUM SERPL-SCNC: 135 MMOL/L (ref 136–145)
WBC # BLD AUTO: 10.9 K/UL (ref 3.6–11)

## 2019-07-04 PROCEDURE — 80053 COMPREHEN METABOLIC PANEL: CPT

## 2019-07-04 PROCEDURE — 85025 COMPLETE CBC W/AUTO DIFF WBC: CPT

## 2019-07-04 PROCEDURE — 36415 COLL VENOUS BLD VENIPUNCTURE: CPT

## 2019-07-04 NOTE — PROGRESS NOTES
Daughter Gagandeep Gama called about the patient's back pain  [facility] did CT scan and found lumbar fracture and she had surgery  Dr Estephanie Appiah approved. Injection and kyphoplasty was done  Now she has hip pain and daughter is concerned about DVT because she was off NOAC. She is now at [address] [place] [address]  She said she talked with her mother's current physician  I am not sure if I can do anything because she is current inpatient at [address] [place] [address].

## 2019-07-05 ENCOUNTER — PATIENT OUTREACH (OUTPATIENT)
Dept: INTERNAL MEDICINE CLINIC | Age: 84
End: 2019-07-05

## 2019-07-05 NOTE — PROGRESS NOTES
Transition of Care Coordination/Hospital to Post Acute Facility:     Date/Time:  2019 8:10 AM    Patient was admitted to [place] [address] on 19 for treatment of compression fx L3, s/p kyphoplasty. Patient was discharged 7/3/19 to [address] [place] [address] for continuation of care. Inpatient RRAT score: 11    Top Challenges reviewed           Method of communication with care team :none     Nurse Navigator(NN) spoke with nurseTrudi to provide introduction to self and explanation of the Nurse Navigator Role. Verified name and  as patient identifiers. Nurse Trudi was unable to speak right now,  NN provided nurse with work cell number, [phone] for return call. Patient presented to ED with c/o lower abdominal pain and back pain x 2 weeks 2/2 lumbar compression fracture with difficult ambulation. Ortho consulted. Possible kyphoplasty if no improvement with PT using Lumbar sacral orthosis (LSO brace). Patient unable to tolerate LSO brace. Kyphoplasty performed (19)    Of note, patient had an episode of hematuria likely do to traumatic valentine insertion. Urology consulted. Recommended O/P cystoscopy for complete [address] workup. Discussed and reviewed  anticipated length of stay, discharge summary, medication reconciliation. Per Trudi, patient is alert and c/o back, leg, and hip pain. Reports patient receiving Nucynta for pain management as well as Lidoderm patch. Reports VSS, 127/67 84.     ACP:   Does the patient have a current ACP (including DDNR):  yes  Does the post acute facility have a copy of the patients ACP:  N/A    Medication(s):   New Medications at Discharge: L. acidoph & paracasei- S therm- Bifido 8 billion cell Cap cap (MADAN-Q/RISAQUAD)  traMADol 50 mg tablet (ULTRAM)  Changed Medications at Discharge: loperamide 1 mg/5 mL solution (IMODIUM)  Discontinued Medications at Discharge: NA     PCP/Specialist follow up:   Future Appointments   Date Time Provider Cuca Willis 7/23/2019  1:30 PM PACEMAKER3, [address]   9/24/2019 10:00 AM Mendoza Parisi  [address]   2/20/2020 10:20 AM Jaxon Arellano  [address]        Opportunity to ask questions was provided. Contact information was provided for future reference or further questions. Will continue to monitor.

## 2019-07-06 DIAGNOSIS — E03.4 HYPOTHYROIDISM DUE TO ACQUIRED ATROPHY OF THYROID: ICD-10-CM

## 2019-07-07 RX ORDER — LEVOTHYROXINE SODIUM 88 UG/1
TABLET ORAL
Qty: 30 TAB | Refills: 5 | Status: SHIPPED | OUTPATIENT
Start: 2019-07-07 | End: 2020-01-10

## 2019-07-08 LAB
ANION GAP SERPL CALC-SCNC: 6 MMOL/L (ref 5–15)
BUN SERPL-MCNC: 20 MG/DL (ref 6–20)
BUN/CREAT SERPL: 21 (ref 12–20)
CALCIUM SERPL-MCNC: 9.2 MG/DL (ref 8.5–10.1)
CHLORIDE SERPL-SCNC: 104 MMOL/L (ref 97–108)
CO2 SERPL-SCNC: 29 MMOL/L (ref 21–32)
CREAT SERPL-MCNC: 0.96 MG/DL (ref 0.55–1.02)
ERYTHROCYTE [DISTWIDTH] IN BLOOD BY AUTOMATED COUNT: 13.2 % (ref 11.5–14.5)
GLUCOSE SERPL-MCNC: 87 MG/DL (ref 65–100)
HCT VFR BLD AUTO: 41.5 % (ref 35–47)
HGB BLD-MCNC: 13.9 G/DL (ref 11.5–16)
MCH RBC QN AUTO: 30.5 PG (ref 26–34)
MCHC RBC AUTO-ENTMCNC: 33.5 G/DL (ref 30–36.5)
MCV RBC AUTO: 91 FL (ref 80–99)
NRBC # BLD: 0 K/UL (ref 0–0.01)
NRBC BLD-RTO: 0 PER 100 WBC
PLATELET # BLD AUTO: 184 K/UL (ref 150–400)
PMV BLD AUTO: 11.2 FL (ref 8.9–12.9)
POTASSIUM SERPL-SCNC: 4.4 MMOL/L (ref 3.5–5.1)
RBC # BLD AUTO: 4.56 M/UL (ref 3.8–5.2)
SODIUM SERPL-SCNC: 139 MMOL/L (ref 136–145)
WBC # BLD AUTO: 7 K/UL (ref 3.6–11)

## 2019-07-08 PROCEDURE — 80048 BASIC METABOLIC PNL TOTAL CA: CPT

## 2019-07-08 PROCEDURE — 85027 COMPLETE CBC AUTOMATED: CPT

## 2019-07-08 PROCEDURE — 36415 COLL VENOUS BLD VENIPUNCTURE: CPT

## 2019-07-11 LAB
ANION GAP SERPL CALC-SCNC: 4 MMOL/L (ref 5–15)
BUN SERPL-MCNC: 25 MG/DL (ref 6–20)
BUN/CREAT SERPL: 23 (ref 12–20)
CALCIUM SERPL-MCNC: 9.3 MG/DL (ref 8.5–10.1)
CHLORIDE SERPL-SCNC: 99 MMOL/L (ref 97–108)
CO2 SERPL-SCNC: 33 MMOL/L (ref 21–32)
CREAT SERPL-MCNC: 1.09 MG/DL (ref 0.55–1.02)
ERYTHROCYTE [DISTWIDTH] IN BLOOD BY AUTOMATED COUNT: 13.2 % (ref 11.5–14.5)
GLUCOSE SERPL-MCNC: 92 MG/DL (ref 65–100)
HCT VFR BLD AUTO: 44.4 % (ref 35–47)
HGB BLD-MCNC: 14.5 G/DL (ref 11.5–16)
MCH RBC QN AUTO: 30.1 PG (ref 26–34)
MCHC RBC AUTO-ENTMCNC: 32.7 G/DL (ref 30–36.5)
MCV RBC AUTO: 92.3 FL (ref 80–99)
NRBC # BLD: 0 K/UL (ref 0–0.01)
NRBC BLD-RTO: 0 PER 100 WBC
PLATELET # BLD AUTO: 203 K/UL (ref 150–400)
PMV BLD AUTO: 11.1 FL (ref 8.9–12.9)
POTASSIUM SERPL-SCNC: 4.3 MMOL/L (ref 3.5–5.1)
RBC # BLD AUTO: 4.81 M/UL (ref 3.8–5.2)
SODIUM SERPL-SCNC: 136 MMOL/L (ref 136–145)
WBC # BLD AUTO: 7.4 K/UL (ref 3.6–11)

## 2019-07-11 PROCEDURE — 85027 COMPLETE CBC AUTOMATED: CPT

## 2019-07-11 PROCEDURE — 36415 COLL VENOUS BLD VENIPUNCTURE: CPT

## 2019-07-11 PROCEDURE — 80048 BASIC METABOLIC PNL TOTAL CA: CPT

## 2019-07-15 LAB
ANION GAP SERPL CALC-SCNC: 4 MMOL/L (ref 5–15)
BUN SERPL-MCNC: 26 MG/DL (ref 6–20)
BUN/CREAT SERPL: 25 (ref 12–20)
CALCIUM SERPL-MCNC: 9.3 MG/DL (ref 8.5–10.1)
CHLORIDE SERPL-SCNC: 103 MMOL/L (ref 97–108)
CO2 SERPL-SCNC: 32 MMOL/L (ref 21–32)
CREAT SERPL-MCNC: 1.05 MG/DL (ref 0.55–1.02)
ERYTHROCYTE [DISTWIDTH] IN BLOOD BY AUTOMATED COUNT: 13.6 % (ref 11.5–14.5)
GLUCOSE SERPL-MCNC: 81 MG/DL (ref 65–100)
HCT VFR BLD AUTO: 43 % (ref 35–47)
HGB BLD-MCNC: 14.4 G/DL (ref 11.5–16)
MCH RBC QN AUTO: 31 PG (ref 26–34)
MCHC RBC AUTO-ENTMCNC: 33.5 G/DL (ref 30–36.5)
MCV RBC AUTO: 92.5 FL (ref 80–99)
NRBC # BLD: 0 K/UL (ref 0–0.01)
NRBC BLD-RTO: 0 PER 100 WBC
PLATELET # BLD AUTO: 191 K/UL (ref 150–400)
PMV BLD AUTO: 10.9 FL (ref 8.9–12.9)
POTASSIUM SERPL-SCNC: 4.6 MMOL/L (ref 3.5–5.1)
RBC # BLD AUTO: 4.65 M/UL (ref 3.8–5.2)
SODIUM SERPL-SCNC: 139 MMOL/L (ref 136–145)
WBC # BLD AUTO: 8 K/UL (ref 3.6–11)

## 2019-07-15 PROCEDURE — 36415 COLL VENOUS BLD VENIPUNCTURE: CPT

## 2019-07-15 PROCEDURE — 85027 COMPLETE CBC AUTOMATED: CPT

## 2019-07-15 PROCEDURE — 80048 BASIC METABOLIC PNL TOTAL CA: CPT

## 2019-07-18 LAB
ANION GAP SERPL CALC-SCNC: 5 MMOL/L (ref 5–15)
BUN SERPL-MCNC: 28 MG/DL (ref 6–20)
BUN/CREAT SERPL: 24 (ref 12–20)
CALCIUM SERPL-MCNC: 9.4 MG/DL (ref 8.5–10.1)
CHLORIDE SERPL-SCNC: 102 MMOL/L (ref 97–108)
CO2 SERPL-SCNC: 31 MMOL/L (ref 21–32)
CREAT SERPL-MCNC: 1.16 MG/DL (ref 0.55–1.02)
ERYTHROCYTE [DISTWIDTH] IN BLOOD BY AUTOMATED COUNT: 13.3 % (ref 11.5–14.5)
GLUCOSE SERPL-MCNC: 95 MG/DL (ref 65–100)
HCT VFR BLD AUTO: 45.4 % (ref 35–47)
HGB BLD-MCNC: 15 G/DL (ref 11.5–16)
MCH RBC QN AUTO: 30.2 PG (ref 26–34)
MCHC RBC AUTO-ENTMCNC: 33 G/DL (ref 30–36.5)
MCV RBC AUTO: 91.5 FL (ref 80–99)
NRBC # BLD: 0 K/UL (ref 0–0.01)
NRBC BLD-RTO: 0 PER 100 WBC
PLATELET # BLD AUTO: 206 K/UL (ref 150–400)
PMV BLD AUTO: 11.2 FL (ref 8.9–12.9)
POTASSIUM SERPL-SCNC: 4.1 MMOL/L (ref 3.5–5.1)
RBC # BLD AUTO: 4.96 M/UL (ref 3.8–5.2)
SODIUM SERPL-SCNC: 138 MMOL/L (ref 136–145)
WBC # BLD AUTO: 8.1 K/UL (ref 3.6–11)

## 2019-07-18 PROCEDURE — 85027 COMPLETE CBC AUTOMATED: CPT

## 2019-07-18 PROCEDURE — 80048 BASIC METABOLIC PNL TOTAL CA: CPT

## 2019-07-18 PROCEDURE — 36415 COLL VENOUS BLD VENIPUNCTURE: CPT

## 2019-07-20 RX ORDER — CHOLESTYRAMINE 4 G/9G
POWDER, FOR SUSPENSION ORAL
Qty: 378 G | Refills: 1 | Status: SHIPPED | OUTPATIENT
Start: 2019-07-20 | End: 2019-08-01

## 2019-07-22 LAB
ANION GAP SERPL CALC-SCNC: 5 MMOL/L (ref 5–15)
BUN SERPL-MCNC: 25 MG/DL (ref 6–20)
BUN/CREAT SERPL: 25 (ref 12–20)
CALCIUM SERPL-MCNC: 9.4 MG/DL (ref 8.5–10.1)
CHLORIDE SERPL-SCNC: 101 MMOL/L (ref 97–108)
CO2 SERPL-SCNC: 31 MMOL/L (ref 21–32)
CREAT SERPL-MCNC: 1.01 MG/DL (ref 0.55–1.02)
ERYTHROCYTE [DISTWIDTH] IN BLOOD BY AUTOMATED COUNT: 13.4 % (ref 11.5–14.5)
GLUCOSE SERPL-MCNC: 87 MG/DL (ref 65–100)
HCT VFR BLD AUTO: 41.2 % (ref 35–47)
HGB BLD-MCNC: 13.4 G/DL (ref 11.5–16)
MCH RBC QN AUTO: 30.5 PG (ref 26–34)
MCHC RBC AUTO-ENTMCNC: 32.5 G/DL (ref 30–36.5)
MCV RBC AUTO: 93.6 FL (ref 80–99)
NRBC # BLD: 0 K/UL (ref 0–0.01)
NRBC BLD-RTO: 0 PER 100 WBC
PLATELET # BLD AUTO: 156 K/UL (ref 150–400)
PMV BLD AUTO: 11.5 FL (ref 8.9–12.9)
POTASSIUM SERPL-SCNC: 4 MMOL/L (ref 3.5–5.1)
RBC # BLD AUTO: 4.4 M/UL (ref 3.8–5.2)
SODIUM SERPL-SCNC: 137 MMOL/L (ref 136–145)
WBC # BLD AUTO: 7.5 K/UL (ref 3.6–11)

## 2019-07-22 PROCEDURE — 36415 COLL VENOUS BLD VENIPUNCTURE: CPT

## 2019-07-22 PROCEDURE — 80048 BASIC METABOLIC PNL TOTAL CA: CPT

## 2019-07-22 PROCEDURE — 85027 COMPLETE CBC AUTOMATED: CPT

## 2019-07-25 ENCOUNTER — CLINICAL SUPPORT (OUTPATIENT)
Dept: CARDIOLOGY CLINIC | Age: 84
End: 2019-07-25

## 2019-07-25 DIAGNOSIS — Z95.0 CARDIAC PACEMAKER IN SITU: Primary | ICD-10-CM

## 2019-08-01 ENCOUNTER — TELEPHONE (OUTPATIENT)
Dept: INTERNAL MEDICINE CLINIC | Age: 84
End: 2019-08-01

## 2019-08-01 ENCOUNTER — HOSPITAL ENCOUNTER (OUTPATIENT)
Dept: LAB | Age: 84
Discharge: HOME | End: 2019-08-01
Payer: MEDICARE

## 2019-08-01 ENCOUNTER — OFFICE VISIT (OUTPATIENT)
Dept: INTERNAL MEDICINE CLINIC | Age: 84
End: 2019-08-01

## 2019-08-01 VITALS
RESPIRATION RATE: 16 BRPM | BODY MASS INDEX: 27.97 KG/M2 | HEART RATE: 82 BPM | SYSTOLIC BLOOD PRESSURE: 114 MMHG | HEIGHT: 62 IN | DIASTOLIC BLOOD PRESSURE: 60 MMHG | OXYGEN SATURATION: 96 % | WEIGHT: 152 LBS | TEMPERATURE: 98.1 F

## 2019-08-01 DIAGNOSIS — Z09 HOSPITAL DISCHARGE FOLLOW-UP: Primary | ICD-10-CM

## 2019-08-01 DIAGNOSIS — I48.20 CHRONIC ATRIAL FIBRILLATION (HCC): Chronic | ICD-10-CM

## 2019-08-01 DIAGNOSIS — I71.40 ABDOMINAL AORTIC ANEURYSM (AAA) WITHOUT RUPTURE: ICD-10-CM

## 2019-08-01 DIAGNOSIS — L89.312 PRESSURE INJURY OF RIGHT BUTTOCK, STAGE 2 (HCC): ICD-10-CM

## 2019-08-01 DIAGNOSIS — I10 ESSENTIAL HYPERTENSION: Chronic | ICD-10-CM

## 2019-08-01 DIAGNOSIS — J43.8 OTHER EMPHYSEMA (HCC): Chronic | ICD-10-CM

## 2019-08-01 DIAGNOSIS — Z98.890 S/P KYPHOPLASTY: ICD-10-CM

## 2019-08-01 DIAGNOSIS — E03.4 HYPOTHYROIDISM DUE TO ACQUIRED ATROPHY OF THYROID: Chronic | ICD-10-CM

## 2019-08-01 DIAGNOSIS — R31.9 HEMATURIA, UNSPECIFIED TYPE: ICD-10-CM

## 2019-08-01 DIAGNOSIS — M81.0 OSTEOPOROSIS, UNSPECIFIED OSTEOPOROSIS TYPE, UNSPECIFIED PATHOLOGICAL FRACTURE PRESENCE: ICD-10-CM

## 2019-08-01 DIAGNOSIS — S32.030S CLOSED COMPRESSION FRACTURE OF THIRD LUMBAR VERTEBRA, SEQUELA: ICD-10-CM

## 2019-08-01 PROBLEM — F33.9 RECURRENT DEPRESSION (HCC): Chronic | Status: RESOLVED | Noted: 2018-01-08 | Resolved: 2019-08-01

## 2019-08-01 PROCEDURE — 80048 BASIC METABOLIC PNL TOTAL CA: CPT

## 2019-08-01 PROCEDURE — 36415 COLL VENOUS BLD VENIPUNCTURE: CPT

## 2019-08-01 PROCEDURE — 84443 ASSAY THYROID STIM HORMONE: CPT

## 2019-08-01 PROCEDURE — 81003 URINALYSIS AUTO W/O SCOPE: CPT

## 2019-08-01 NOTE — TELEPHONE ENCOUNTER
Aysha Nance called extremely concerned about some information on the pts AVS and requesting to speak with the nurse right away. Please call back. Thanks.

## 2019-08-01 NOTE — TELEPHONE ENCOUNTER
Brandie  is concerned about dx: AAA listed in AVS. Brandie  states they were never informed of this dx. Please review abdominal CT results from 19 and advise Brandie [place] if this needs to be addressed.

## 2019-08-01 NOTE — TELEPHONE ENCOUNTER
Patient's daughter was concerned regarding AAA diagnosis. I discussed that this was found on abdominal CT. This is previously been seen and has not changed much. We will monitor this over time with imaging. Patient voiced understanding and was reassured.

## 2019-08-01 NOTE — PROGRESS NOTES
Sharon Duran is a 80 y.o. female who presents today for Transitions Of Care  . She has a history of   Patient Active Problem List   Diagnosis Code    Mixed hyperlipidemia E78.2    Atrial fibrillation ([facility] I48.91    Mitral regurgitation I34.0    Pulmonary hypertension ([facility]) I27.20    Anxiety and depression F41.9, F32.9    Colon cancer ([facility] C18.9    Hypothyroid E03.9    CKD (chronic kidney disease) stage 3, GFR 30-59 ml/min ([facility]) N18.3    Hypothyroidism due to acquired atrophy of thyroid E03.4    Osteopenia M85.80    Restrictive lung disease J98.4    Ischemic cardiomyopathy I25.5    Cardiomyopathy ([facility] I42.9    Psoriasis L40.9    Pacemaker Z95.0    Latent tuberculosis by blood test R76.11    Frequent falls R29.6    COPD (chronic obstructive pulmonary disease) ([facility]) J44.9    Chronic respiratory failure with hypoxia ([facility]) J96.11    Long term systemic steroid user Z79.52    Hypertension I10    AAA (abdominal aortic aneurysm) ([facility]) I71.4    Constipation K59.00    Lumbar compression fracture ([facility]) S32.000A    Hematuria R31.9   . Today patient is here for transition of care visit. [facility]: Patient was hospitalized at [place] [address] from June 29 to July 3. She was then discharged to rehab at [facility] where she remained until 7/23/19. Our nurse navigator has been in touch with patient and did contact them on 5 July. She has helped facilitate this follow-up. Patient was hospitalized due to acute onset of back pain. She did end up having kyphoplasty. She was found to have an acute compression fracture of L3. Since having kyphoplasty she notes that her pain has been well controlled. .    She is back at home alone and is being seen by home health with [facility]. Will be getting PT. Osteoporosis: Patient has stopped her Fosamax since the hospitalization. We discussed options for treatment of osteoporosis. She is opted to try Prolia.   We will get her set up    Decubitus ulcer: Noted on her right buttocks. Patient notes she is not sure when this developed. She notes some mild discomfort in the area. Daughters mentioned that it looks much better and that the home health nurse has addressed it. Hematuria: during hospitalization. Thought to be due to traumatic catheterization. We will repeat UA today    Afib/CM: still on eliquis. BB and eliquis. COPD: breathing has been stable. Still on prednisone but has been able to cut it down to 5 mg. Infrarenal abdominal aneurysm: Noted again on imaging. Noted to be stable in size. ROS  Review of Systems   Constitutional: Positive for malaise/fatigue (Improving). Negative for chills, fever and weight loss. HENT: Negative for congestion and sore throat. Eyes: Negative for blurred vision, double vision and photophobia. Respiratory: Negative for cough, hemoptysis, sputum production and shortness of breath. Cardiovascular: Negative for chest pain, palpitations, orthopnea and leg swelling. Gastrointestinal: Negative for abdominal pain, constipation, diarrhea, heartburn, nausea and vomiting. Genitourinary: Negative for dysuria, frequency, hematuria and urgency. Musculoskeletal: Negative for joint pain, myalgias and neck pain. Skin: Positive for rash. Neurological: Negative. Negative for headaches. Endo/Heme/Allergies: Does not bruise/bleed easily. Psychiatric/Behavioral: Negative for depression, hallucinations, memory loss, substance abuse and suicidal ideas. Visit Vitals  /60 (BP 1 Location: Left arm, BP Patient Position: Sitting)   Pulse 82   Temp 98.1 °F (36.7 °C) (Oral)   Resp 16   Ht 5' 2\" (1.575 m)   Wt 152 lb (68.9 kg)   SpO2 96%   BMI 27.80 kg/m²       Physical Exam   Constitutional: She is oriented to person, place, and time. She appears well-developed and well-nourished. No distress. HENT:   Head: Normocephalic and atraumatic.    Right Ear: External ear normal.   Left Ear: External ear normal.   Eyes: Pupils are equal, round, and reactive to light. EOM are normal.   Neck: Normal range of motion. Neck supple. No thyromegaly present. Cardiovascular: Normal rate and regular rhythm. No murmur heard. Pulmonary/Chest: Effort normal and breath sounds normal. She has no wheezes. Abdominal: Soft. Bowel sounds are normal. She exhibits no distension. Musculoskeletal: She exhibits no edema. No spinal point tenderness. Good range of motion of both legs. Neurological: She is alert and oriented to person, place, and time. No cranial nerve deficit. Skin: Skin is warm and dry. 2 small stage II decubitus ulcers noted. No surrounding cellulitis. No signs of infection. Psychiatric: She has a normal mood and affect. Her behavior is normal.         Current Outpatient Medications   Medication Sig    SYNTHROID 88 mcg tablet TAKE 1 TABLET BY MOUTH EVERY DAY BEFORE BREAKFAST    loperamide (IMODIUM) 1 mg/5 mL solution Take 10 mL by mouth daily as needed for Diarrhea.  acetaminophen (TYLENOL EXTRA STRENGTH) 500 mg tablet Take 500 mg by mouth daily (with lunch).  multivitamins (CHEWABLE-AMOS) chew Take 1 Tab by mouth daily.  lidocaine (LIDODERM) 5 % 1 Patch by TransDERmal route every twenty-four (24) hours. Apply patch to the affected area for 12 hours a day and remove for 12 hours a day.  raNITIdine (ZANTAC) 150 mg tablet TAKE 1 TABLET BY MOUTH TWICE A DAY    furosemide (LASIX) 40 mg tablet Take 20 mg by mouth daily. Daily as needed for swelling     apixaban (ELIQUIS) 5 mg tablet Take 1 Tab by mouth two (2) times a day. Stop coumadin now (last dose 5/22). Hold blood thinners 4 days. Start eliquis Monday 5/27.  carvedilol (COREG) 3.125 mg tablet TAKE 1 TABLET BY MOUTH TWICE A DAY WITH MEALS    cholecalciferol, vitamin D3, (VITAMIN D3) 2,000 unit tab Take 2,000 Units by mouth daily.     predniSONE (DELTASONE) 5 mg tablet Take 10 mg by mouth every other day.  albuterol (PROVENTIL HFA, VENTOLIN HFA, PROAIR HFA) 90 mcg/actuation inhaler Take 1 Puff by inhalation every six (6) hours as needed for Wheezing.  ZOLOFT 50 mg tablet TAKE 1 TABLET BY MOUTH EVERY DAY    simvastatin (ZOCOR) 20 mg tablet TAKE 1 TABLET BY MOUTH NIGHTLY    baclofen (LIORESAL) 10 mg tablet Take 5 mg by mouth two (2) times a day.  DENTA 5000 PLUS 1.1 % crea Apply 1 mg to affected area as needed (to prevent cavities).  ADVAIR DISKUS 250-50 mcg/dose diskus inhaler Take 1 Puff by inhalation two (2) times a day.  tiotropium (SPIRIVA WITH HANDIHALER) 18 mcg inhalation capsule Take 1 Cap by inhalation daily.  alendronate (FOSAMAX) 70 mg tablet Take 1 Tab by mouth every seven (7) days. (Patient taking differently: Take 70 mg by mouth every Sunday.)     No current facility-administered medications for this visit.          Past Medical History:   Diagnosis Date    Arthritis     Atrial fibrillation ([address]     av node ablation 5/22/98 with placement of CPI #[id number] dual chamber pacemaker subsequently replaced with a single chamber medtronic #[id number] single chamber pacemaker 7/25/05    Cancer ([address] 1970's    colon cancer w/ resection    COPD     Depression     GERD (gastroesophageal reflux disease)     Hyperlipidemia     Hypertension     Ischemic cardiomyopathy     Migraine headache     Orthostatic hypotension     RADHA (obstructive sleep apnea)     Pacemaker 5/22/98    AV node ablation /pacemaker placement utilizing CPI # [id number] dual chamber system, medtronic #[id number] single chamber device placed 7/22/05    Pulmonary hypertension ([address] 9/26/2011    RVSP 50 on echo 8/14    Thyroid disease     Valvular heart disease     mild-mod MR/TR      Past Surgical History:   Procedure Laterality Date    BREAST SURGERY PROCEDURE UNLISTED      benign breast tumor excision right breast    HX BREAST BIOPSY Right 2002    neg; surgical bx    HX COLECTOMY  1974    colon    HX KNEE REPLACEMENT right TKA    HX PACEMAKER      HX TUBAL LIGATION      IR KYPHOPLASTY LUMBAR  7/2/2019    TOTAL KNEE ARTHROPLASTY      total on R, partial on L      Social History     Tobacco Use    Smoking status: Passive Smoke Exposure - Never Smoker    Smokeless tobacco: Never Used   Substance Use Topics    Alcohol use: No     Alcohol/week: 0.0 standard drinks      Family History   Problem Relation Age of Onset    Diabetes Mother     Heart Disease Mother     Heart Attack Mother     Heart Disease Father     Stroke Sister     Breast Cancer Sister 80    Cancer Maternal Aunt         uterus    Diabetes Maternal Uncle     Breast Cancer Daughter 61        Allergies   Allergen Reactions    Cardizem [Diltiazem Hcl] Hives    Codeine Nausea and Vomiting    Darvocet A500 [Propoxyphene N-Acetaminophen] Nausea and Vomiting    Diltiazem Hives    Labetalol Nausea and Vomiting     Dizzy/Disoriented     Pcn [Penicillins] Swelling     Tongue Swelling   Has previously tolerated cephalexin per daughter    Primidone Other (comments)     Lightheaded/unsteady gait    Sulfa (Sulfonamide Antibiotics) Nausea and Vomiting        Assessment/Plan  Diagnoses and all orders for this visit:    1. Hospital discharge follow-up -overall patient is doing well. She is back at home. She does have home health and physical therapy coming to work with her. She also has wound care to take care of her decubitus ulcers. They do not look infected. Continue wound care to the area    2. Abdominal aortic aneurysm (AAA) without rupture ([address] -discussed that this is virtually unchanged and likely of no concern    3. Chronic atrial fibrillation (HCC) -patient is on Eliquis. 4. Essential hypertension -blood pressure well controlled  -     TSH 3RD GENERATION  -     METABOLIC PANEL, BASIC    5. Other emphysema ([address] -patient is on chronic prednisone. [facility].   She has been able to decrease her prednisone usage to 5 mg every other day. 6. Closed compression fracture of third lumbar vertebra, sequela -is post kyphoplasty. Pain has resolved    7. S/P kyphoplasty    8. Hypothyroidism due to acquired atrophy of thyroid -repeat TSH  -     TSH 3RD GENERATION    9. Hematuria, unspecified type-thought to be secondary to traumatic [place]. Repeat UA today  -     URINALYSIS W/ RFLX MICROSCOPIC    10. Pressure injury of right buttock, stage 2 -discussed home health providing wound care    11. Osteoporosis, unspecified osteoporosis type, unspecified pathological fracture presence -patient was on Fosamax. Patient is still on chronic prednisone. We discussed changing her to Prolia. She agrees. -     denosumab (PROLIA) 60 mg/mL injection; 1 mL by SubCUTAneous route every 6 months. Dank Fontana MD  8/1/2019    This note was created with the help of speech recognition software Cherylene Santee) and may contain some 'sound alike' errors.

## 2019-08-02 ENCOUNTER — DOCUMENTATION ONLY (OUTPATIENT)
Dept: INTERNAL MEDICINE CLINIC | Age: 84
End: 2019-08-02

## 2019-08-02 ENCOUNTER — TELEPHONE (OUTPATIENT)
Dept: INTERNAL MEDICINE CLINIC | Age: 84
End: 2019-08-02

## 2019-08-02 NOTE — PROGRESS NOTES
Order for Briana [facility].   [phone].

## 2019-08-02 NOTE — TELEPHONE ENCOUNTER
Uma with [facility] called requesting that  follow patient for home health. Please call Uma to advise.    [phone]

## 2019-08-02 NOTE — TELEPHONE ENCOUNTER
Florence Valle Speech Therapist with [facility] states per patient's daughter, they would like to only focus on physical therapy at this time.

## 2019-08-03 LAB
APPEARANCE UR: CLEAR
BILIRUB UR QL STRIP: NEGATIVE
BUN SERPL-MCNC: 25 MG/DL (ref 8–27)
BUN/CREAT SERPL: 25 (ref 12–28)
CALCIUM SERPL-MCNC: 9.6 MG/DL (ref 8.7–10.3)
CHLORIDE SERPL-SCNC: 99 MMOL/L (ref 96–106)
CO2 SERPL-SCNC: 26 MMOL/L (ref 20–29)
COLOR UR: YELLOW
CREAT SERPL-MCNC: 0.99 MG/DL (ref 0.57–1)
GLUCOSE SERPL-MCNC: 168 MG/DL (ref 65–99)
GLUCOSE UR QL: NEGATIVE
HGB UR QL STRIP: NEGATIVE
INTERPRETATION: NORMAL
KETONES UR QL STRIP: NEGATIVE
LEUKOCYTE ESTERASE UR QL STRIP: NEGATIVE
MICRO URNS: NORMAL
NITRITE UR QL STRIP: NEGATIVE
PH UR STRIP: 5.5 [PH] (ref 5–7.5)
POTASSIUM SERPL-SCNC: 4.3 MMOL/L (ref 3.5–5.2)
PROT UR QL STRIP: NEGATIVE
SODIUM SERPL-SCNC: 143 MMOL/L (ref 134–144)
SP GR UR: 1.01 (ref 1–1.03)
TSH SERPL DL<=0.005 MIU/L-ACNC: 2.95 UIU/ML (ref 0.45–4.5)
UROBILINOGEN UR STRIP-MCNC: 0.2 MG/DL (ref 0.2–1)

## 2019-08-08 ENCOUNTER — TELEPHONE (OUTPATIENT)
Dept: INTERNAL MEDICINE CLINIC | Age: 84
End: 2019-08-08

## 2019-08-08 NOTE — TELEPHONE ENCOUNTER
Patient's daughter states the lab work that was posted on my chart indicates high glucose, wants to know if PCP can send out a script for glucose monitor and supplies so they can check her BS periodically, daughter is  requesting  Free style lite for patient ( she uses the same brand ) .        She can be reached at [phone]

## 2019-08-09 NOTE — TELEPHONE ENCOUNTER
BG has been within normal range previously. Pt's daughter states Pt was not fasting for labs this time. Advised Sharla that without a dx we are unable to order glucometer at this time. Juanita Banuelos verbalized understanding and states Pt will fast for next f/u to recheck BG at that time.

## 2019-08-23 ENCOUNTER — OFFICE VISIT (OUTPATIENT)
Dept: INTERNAL MEDICINE CLINIC | Age: 84
End: 2019-08-23

## 2019-08-23 VITALS
TEMPERATURE: 98.5 F | HEIGHT: 62 IN | OXYGEN SATURATION: 97 % | RESPIRATION RATE: 16 BRPM | BODY MASS INDEX: 27.97 KG/M2 | HEART RATE: 81 BPM | SYSTOLIC BLOOD PRESSURE: 108 MMHG | DIASTOLIC BLOOD PRESSURE: 62 MMHG | WEIGHT: 152 LBS

## 2019-08-23 DIAGNOSIS — L89.312 PRESSURE INJURY OF RIGHT BUTTOCK, STAGE 2 (HCC): ICD-10-CM

## 2019-08-23 DIAGNOSIS — J43.8 OTHER EMPHYSEMA (HCC): Chronic | ICD-10-CM

## 2019-08-23 DIAGNOSIS — M81.0 OSTEOPOROSIS, UNSPECIFIED OSTEOPOROSIS TYPE, UNSPECIFIED PATHOLOGICAL FRACTURE PRESENCE: ICD-10-CM

## 2019-08-23 DIAGNOSIS — R73.9 BLOOD GLUCOSE ELEVATED: ICD-10-CM

## 2019-08-23 DIAGNOSIS — I10 ESSENTIAL HYPERTENSION: Chronic | ICD-10-CM

## 2019-08-23 DIAGNOSIS — G89.29 CHRONIC MIDLINE LOW BACK PAIN WITHOUT SCIATICA: ICD-10-CM

## 2019-08-23 DIAGNOSIS — E03.4 HYPOTHYROIDISM DUE TO ACQUIRED ATROPHY OF THYROID: Chronic | ICD-10-CM

## 2019-08-23 DIAGNOSIS — M54.50 CHRONIC MIDLINE LOW BACK PAIN WITHOUT SCIATICA: ICD-10-CM

## 2019-08-23 DIAGNOSIS — S32.030S CLOSED COMPRESSION FRACTURE OF THIRD LUMBAR VERTEBRA, SEQUELA: Primary | ICD-10-CM

## 2019-08-23 LAB — HBA1C MFR BLD HPLC: 5.7 %

## 2019-08-23 RX ORDER — DICLOFENAC SODIUM 10 MG/G
GEL TOPICAL 4 TIMES DAILY
Qty: 100 G | Refills: 3 | Status: SHIPPED | OUTPATIENT
Start: 2019-08-23 | End: 2019-11-23 | Stop reason: SDUPTHER

## 2019-08-23 NOTE — PROGRESS NOTES
Lina Kirk is a 80 y.o. female who presents today for Hospital Follow Up and Back Pain  . She has a history of   Patient Active Problem List   Diagnosis Code    Mixed hyperlipidemia E78.2    Atrial fibrillation ([facility] I48.91    Mitral regurgitation I34.0    Pulmonary hypertension ([facility]) I27.20    Anxiety and depression F41.9, F32.9    Colon cancer ([facility] C18.9    Hypothyroid E03.9    CKD (chronic kidney disease) stage 3, GFR 30-59 ml/min ([facility]) N18.3    Osteopenia M85.80    Restrictive lung disease J98.4    Ischemic cardiomyopathy I25.5    Cardiomyopathy ([facility] I42.9    Psoriasis L40.9    Pacemaker Z95.0    Latent tuberculosis by blood test R76.11    Frequent falls R29.6    COPD (chronic obstructive pulmonary disease) ([facility]) J44.9    Chronic respiratory failure with hypoxia ([facility]) J96.11    Long term systemic steroid user Z79.52    Hypertension I10    AAA (abdominal aortic aneurysm) ([facility]) I71.4    Constipation K59.00    Lumbar compression fracture ([facility]) S32.000A    Hematuria R31.9    Osteoporosis M81.0    Pressure injury of right buttock, stage 2 L89.312    Chronic midline low back pain without sciatica M54.5, G89.29   . Today patient is here for follow-up. Decubitus Ulcer: Noted at last visit. We did add wound care to her home health. Notes that this is healing. Daughter shows me a picture. No signs of infection. Patient denies any pain to the area. L3 compression fracture: Status post kyphoplasty. Overall she notes that the pain is controlled pretty well controlled but she is having some soreness to lower back/over sacral spine. This is been present since compression fracture and not getting any better or worse. Still working with physical therapy. . Taking 2500mg/day of acetaminophen. Elevated BG: noted on non-fasting blood work. Given the fact that she is on chronic steroids we will check an A1c today.     COPD: Patient continues to take prednisone but at a lower dose 5/10mg on alternating days. She is still on oxygen at night. Breathing is been stable. Hypothyroidism: TSH was normal at last check. Hypertension - Stable. Hypertension ROS: taking medications as instructed, no medication side effects noted, no TIA's, no chest pain on exertion, no dyspnea on exertion, no swelling of ankles     reports that she is a non-smoker but has been exposed to tobacco smoke. She has never used smokeless tobacco.    reports that she does not drink alcohol. BP Readings from Last 2 Encounters:   08/23/19 108/62   08/01/19 114/60     Osteoporosis: Patient will be starting Prolia soon. She has been in touch with the rheumatology office. ROS  Review of Systems   Constitutional: Negative for chills, fever and weight loss. HENT: Negative for congestion, ear discharge, ear pain, hearing loss, sore throat and tinnitus. Eyes: Negative for blurred vision, double vision and photophobia. Respiratory: Negative for cough, hemoptysis, sputum production and shortness of breath. Cardiovascular: Negative for chest pain, palpitations and leg swelling. Gastrointestinal: Negative for abdominal pain, constipation, diarrhea, heartburn, nausea and vomiting. Genitourinary: Negative for dysuria, frequency and urgency. Musculoskeletal: Positive for back pain. Negative for joint pain, myalgias and neck pain. Skin: Positive for rash. Neurological: Negative. Negative for headaches. Endo/Heme/Allergies: Does not bruise/bleed easily. Psychiatric/Behavioral: Negative for memory loss and suicidal ideas. Visit Vitals  /62 (BP 1 Location: Left arm, BP Patient Position: Sitting)   Pulse 81   Temp 98.5 °F (36.9 °C) (Oral)   Resp 16   Ht 5' 2\" (1.575 m)   Wt 152 lb (68.9 kg)   SpO2 97%   BMI 27.80 kg/m²       Physical Exam   Constitutional: She is oriented to person, place, and time. She appears well-developed and well-nourished. No distress.    HENT:   Head: Normocephalic and atraumatic. Neck: Normal range of motion. Neck supple. No thyromegaly present. Cardiovascular: Normal rate and regular rhythm. No murmur heard. Pulmonary/Chest: Effort normal. No stridor. No respiratory distress. She has no wheezes. No egophony, distant breath sounds   Abdominal: Soft. Bowel sounds are normal. She exhibits no distension. Musculoskeletal: She exhibits no edema. Generalized pain to the lower sacral/lower lumbar area. No skin changes no point tenderness on exam.  Normal strength testing to lower extremities. Neurological: She is alert and oriented to person, place, and time. No cranial nerve deficit. Skin: Skin is warm and dry. Decubitus ulcer dressed. Psychiatric: She has a normal mood and affect. Her behavior is normal.         Current Outpatient Medications   Medication Sig    diclofenac (VOLTAREN) 1 % gel Apply  to affected area four (4) times daily.  denosumab (PROLIA) 60 mg/mL injection 1 mL by SubCUTAneous route every 6 months.  SYNTHROID 88 mcg tablet TAKE 1 TABLET BY MOUTH EVERY DAY BEFORE BREAKFAST    loperamide (IMODIUM) 1 mg/5 mL solution Take 10 mL by mouth daily as needed for Diarrhea.  acetaminophen (TYLENOL EXTRA STRENGTH) 500 mg tablet Take 500 mg by mouth daily (with lunch).  multivitamins (CHEWABLE-AMOS) chew Take 1 Tab by mouth daily.  lidocaine (LIDODERM) 5 % 1 Patch by TransDERmal route every twenty-four (24) hours. Apply patch to the affected area for 12 hours a day and remove for 12 hours a day.  raNITIdine (ZANTAC) 150 mg tablet TAKE 1 TABLET BY MOUTH TWICE A DAY    furosemide (LASIX) 40 mg tablet Take 20 mg by mouth daily. Daily as needed for swelling     apixaban (ELIQUIS) 5 mg tablet Take 1 Tab by mouth two (2) times a day. Stop coumadin now (last dose 5/22). Hold blood thinners 4 days. Start eliquis Monday 5/27.     carvedilol (COREG) 3.125 mg tablet TAKE 1 TABLET BY MOUTH TWICE A DAY WITH MEALS    cholecalciferol, vitamin D3, (VITAMIN D3) 2,000 unit tab Take 2,000 Units by mouth daily.  predniSONE (DELTASONE) 5 mg tablet Take 10 mg by mouth every other day.  albuterol (PROVENTIL HFA, VENTOLIN HFA, PROAIR HFA) 90 mcg/actuation inhaler Take 1 Puff by inhalation every six (6) hours as needed for Wheezing.  ZOLOFT 50 mg tablet TAKE 1 TABLET BY MOUTH EVERY DAY    simvastatin (ZOCOR) 20 mg tablet TAKE 1 TABLET BY MOUTH NIGHTLY    baclofen (LIORESAL) 10 mg tablet Take 5 mg by mouth two (2) times a day.  DENTA 5000 PLUS 1.1 % crea Apply 1 mg to affected area as needed (to prevent cavities).  ADVAIR DISKUS 250-50 mcg/dose diskus inhaler Take 1 Puff by inhalation two (2) times a day.  tiotropium (SPIRIVA WITH HANDIHALER) 18 mcg inhalation capsule Take 1 Cap by inhalation daily. No current facility-administered medications for this visit.          Past Medical History:   Diagnosis Date    Arthritis     Atrial fibrillation ([address]     av node ablation 5/22/98 with placement of CPI #[id number] dual chamber pacemaker subsequently replaced with a single chamber medtronic #[id number] single chamber pacemaker 7/25/05    Cancer ([address] 1970's    colon cancer w/ resection    COPD     Depression     GERD (gastroesophageal reflux disease)     Hyperlipidemia     Hypertension     Ischemic cardiomyopathy     Migraine headache     Orthostatic hypotension     RADHA (obstructive sleep apnea)     Pacemaker 5/22/98    AV node ablation /pacemaker placement utilizing CPI # [id number] dual chamber system, medtronic #[id number] single chamber device placed 7/22/05    Pulmonary hypertension ([address] 9/26/2011    RVSP 50 on echo 8/14    Thyroid disease     Valvular heart disease     mild-mod MR/TR      Past Surgical History:   Procedure Laterality Date    BREAST SURGERY PROCEDURE UNLISTED      benign breast tumor excision right breast    HX BREAST BIOPSY Right 2002    neg; surgical bx    HX COLECTOMY  1974    colon    HX KNEE REPLACEMENT      right TKA    HX PACEMAKER      HX TUBAL LIGATION      IR KYPHOPLASTY LUMBAR  7/2/2019    TOTAL KNEE ARTHROPLASTY      total on R, partial on L      Social History     Tobacco Use    Smoking status: Passive Smoke Exposure - Never Smoker    Smokeless tobacco: Never Used   Substance Use Topics    Alcohol use: No     Alcohol/week: 0.0 standard drinks      Family History   Problem Relation Age of Onset    Diabetes Mother     Heart Disease Mother     Heart Attack Mother     Heart Disease Father     Stroke Sister     Breast Cancer Sister [address]    Cancer Maternal Aunt         uterus    Diabetes Maternal Uncle     Breast Cancer Daughter 61        Allergies   Allergen Reactions    Cardizem [Diltiazem Hcl] Hives    Codeine Nausea and Vomiting    Darvocet A500 [Propoxyphene N-Acetaminophen] Nausea and Vomiting    Diltiazem Hives    Labetalol Nausea and Vomiting     Dizzy/Disoriented     Pcn [Penicillins] Swelling     Tongue Swelling   Has previously tolerated cephalexin per daughter    Primidone Other (comments)     Lightheaded/unsteady gait    Sulfa (Sulfonamide Antibiotics) Nausea and Vomiting        Assessment/Plan  Diagnoses and all orders for this visit:    1. Closed compression fracture of third lumbar vertebra, sequela -still some pain in the sacral area. Unchanged from previous imaging. We discussed continue acetaminophen and trying topical diclofenac. Good Rx coupon provided in case insurance does not cover it  -     diclofenac (VOLTAREN) 1 % gel; Apply  to affected area four (4) times daily. 2. Hypothyroidism due to acquired atrophy of thyroid -levels been stable    3. Other emphysema ([address] -patient alternating 5 and 10 mg prednisone every other day. Continuing to follow with pulmonary    4. Pressure injury of right buttock, stage 2 -working with home health wound care. Area looking better    5.  Chronic midline low back pain without sciatica -try topical anti-inflammatory  -     diclofenac (VOLTAREN) 1 % gel; Apply  to affected area four (4) times daily. 6. Blood glucose elevated -A1c nondiabetic range. -     AMB POC HEMOGLOBIN A1C    7. Essential hypertension -stable continue current therapy    8. Osteoporosis, unspecified osteoporosis type, unspecified pathological fracture presence -she will be starting Prolia soon        Follow-up and Dispositions    · Return in about 3 months (around 11/23/2019) for CPE. Janessa Khan MD  8/23/2019    This note was created with the help of speech recognition software Jairon Tabor) and may contain some 'sound alike' errors.

## 2019-08-26 ENCOUNTER — TELEPHONE (OUTPATIENT)
Dept: INTERNAL MEDICINE CLINIC | Age: 84
End: 2019-08-26

## 2019-08-26 NOTE — TELEPHONE ENCOUNTER
[place] Nazario with [address] is requesting verbal order for 2 more visit .  States she's almost healed, phone# [phone]

## 2019-09-19 DIAGNOSIS — I25.5 ISCHEMIC CARDIOMYOPATHY: ICD-10-CM

## 2019-09-19 RX ORDER — FUROSEMIDE 40 MG/1
TABLET ORAL
Qty: 90 TAB | Refills: 2 | Status: ON HOLD | OUTPATIENT
Start: 2019-09-19 | End: 2020-06-09

## 2019-09-24 ENCOUNTER — OFFICE VISIT (OUTPATIENT)
Dept: CARDIOLOGY CLINIC | Age: 84
End: 2019-09-24

## 2019-09-24 ENCOUNTER — CLINICAL SUPPORT (OUTPATIENT)
Dept: CARDIOLOGY CLINIC | Age: 84
End: 2019-09-24

## 2019-09-24 VITALS
HEIGHT: 62 IN | RESPIRATION RATE: 16 BRPM | HEART RATE: 72 BPM | OXYGEN SATURATION: 96 % | DIASTOLIC BLOOD PRESSURE: 60 MMHG | SYSTOLIC BLOOD PRESSURE: 104 MMHG | BODY MASS INDEX: 28.56 KG/M2 | WEIGHT: 155.2 LBS

## 2019-09-24 DIAGNOSIS — I48.0 PAROXYSMAL ATRIAL FIBRILLATION (HCC): ICD-10-CM

## 2019-09-24 DIAGNOSIS — I27.20 PULMONARY HYPERTENSION (HCC): ICD-10-CM

## 2019-09-24 DIAGNOSIS — E78.2 MIXED HYPERLIPIDEMIA: ICD-10-CM

## 2019-09-24 DIAGNOSIS — Z95.0 CARDIAC PACEMAKER IN SITU: Primary | ICD-10-CM

## 2019-09-24 DIAGNOSIS — I42.0 DILATED CARDIOMYOPATHY (HCC): Primary | ICD-10-CM

## 2019-09-24 DIAGNOSIS — I95.1 ORTHOSTATIC HYPOTENSION: ICD-10-CM

## 2019-09-24 RX ORDER — UMECLIDINIUM 62.5 UG/1
AEROSOL, POWDER ORAL
Refills: 0 | COMMUNITY
Start: 2019-08-18 | End: 2019-09-24

## 2019-09-24 NOTE — PROGRESS NOTES
HISTORY OF PRESENT ILLNESS  Marcellus Lee is a 80 y.o. female     SUMMARY:   Problem List  Date Reviewed: 9/24/2019          Codes Class Noted    Osteoporosis ICD-10-CM: M81.0  ICD-9-CM: 733.00  8/23/2019        Pressure injury of right buttock, stage 2 ICD-10-CM: A64.690  ICD-9-CM: 707.05, 707.22  8/23/2019        Chronic midline low back pain without sciatica ICD-10-CM: M54.5, G89.29  ICD-9-CM: 724.2, 338.29  8/23/2019        Hematuria ICD-10-CM: R31.9  ICD-9-CM: 599.70  6/30/2019        Hypertension (Chronic) ICD-10-CM: I10  ICD-9-CM: 401.9  6/29/2019        AAA (abdominal aortic aneurysm) (HCC) (Chronic) ICD-10-CM: I71.4  ICD-9-CM: 441.4  6/29/2019        Constipation ICD-10-CM: K59.00  ICD-9-CM: 564.00  6/29/2019        Lumbar compression fracture ([facility] [address] ICD-10-CM: S32.000A  ICD-9-CM: 805.4  6/29/2019        Long term systemic steroid user (Chronic) ICD-10-CM: Z79.52  ICD-9-CM: V58.65  3/21/2019        Frequent falls (Chronic) ICD-10-CM: R29.6  ICD-9-CM: V15.88  1/13/2019        COPD (chronic obstructive pulmonary disease) (HCC) (Chronic) ICD-10-CM: J44.9  ICD-9-CM: 496  1/13/2019        Chronic respiratory failure with hypoxia (HCC) (Chronic) ICD-10-CM: J96.11  ICD-9-CM: 518.83, 799.02  1/13/2019        Latent tuberculosis by blood test ICD-10-CM: R76.11  ICD-9-CM: 790.6  1/22/2018        Pacemaker (Chronic) ICD-10-CM: Z95.0  ICD-9-CM: V45.01  7/24/2017        Psoriasis (Chronic) ICD-10-CM: L40.9  ICD-9-CM: 696.1  3/23/2017        Cardiomyopathy ([address] (Chronic) ICD-10-CM: I42.9  ICD-9-CM: 425.4  9/20/2016    Overview Addendum 9/20/2016  8:45 AM by Hermes Scales MD     8/12 normal lexiscan cardiolyte, lvef 68%  8/16 echo lvef 35% multiple wall motion abnormalities, melissa, mod mr, mild ai, mod tr pa pressure 36mm  8/16 lexiscan mod reversible anterior defect, mostly fixed apical defect.  lvef 35%             Ischemic cardiomyopathy (Chronic) ICD-10-CM: I25.5  ICD-9-CM: 414.8  Unknown        Restrictive lung disease (Chronic) ICD-10-CM: J98.4  ICD-9-CM: 518.89  12/14/2015        Osteopenia ICD-10-CM: M85.80  ICD-9-CM: 733.90  11/23/2015        CKD (chronic kidney disease) stage 3, GFR 30-59 ml/min (HCC) (Chronic) ICD-10-CM: N18.3  ICD-9-CM: 585.3  2/9/2015        Anxiety and depression (Chronic) ICD-10-CM: F41.9, F32.9  ICD-9-CM: 300.00, 311  2/26/2014        Colon cancer (HCC) (Chronic) ICD-10-CM: C18.9  ICD-9-CM: 153.9  2/26/2014    Overview Addendum 2/26/2014 11:12 AM by Seth Jackson     Partial colectomy  Petey Alejandro             Hypothyroid (Chronic) ICD-10-CM: E03.9  ICD-9-CM: 244.9  2/26/2014        Pulmonary hypertension (HCC) (Chronic) ICD-10-CM: I27.20  ICD-9-CM: 416.8  9/26/2011        Atrial fibrillation (HCC) (Chronic) ICD-10-CM: I48.91  ICD-9-CM: 427.31  Unknown    Overview Signed 10/21/2010  8:16 AM by Mercedes Schafer     av node ablation 5/22/98 with placement of CPI #[id number] dual chamber pacemaker subsequently replaced with a single chamber medtronic #[id number] single chamber pacemaker 7/25/05             Mitral regurgitation (Chronic) ICD-10-CM: I34.0  ICD-9-CM: 424.0  10/21/2010        Mixed hyperlipidemia (Chronic) ICD-10-CM: O43.2  ICD-9-CM: 272.2  10/18/2010              Current Outpatient Medications on File Prior to Visit   Medication Sig    Oxygen 2 liters via NC at night.  furosemide (LASIX) 40 mg tablet TAKE 1 TABLET BY MOUTH DAILY.  diclofenac (VOLTAREN) 1 % gel Apply  to affected area four (4) times daily.  SYNTHROID 88 mcg tablet TAKE 1 TABLET BY MOUTH EVERY DAY BEFORE BREAKFAST    loperamide (IMODIUM) 1 mg/5 mL solution Take 10 mL by mouth daily as needed for Diarrhea.  acetaminophen (TYLENOL EXTRA STRENGTH) 500 mg tablet Take 1,000 mg by mouth two (2) times a day.  multivitamins (CHEWABLE-AMOS) chew Take 1 Tab by mouth daily.  lidocaine (LIDODERM) 5 % 1 Patch by TransDERmal route every twenty-four (24) hours.  Apply patch to the affected area for 12 hours a day and remove for 12 hours a day.  raNITIdine (ZANTAC) 150 mg tablet TAKE 1 TABLET BY MOUTH TWICE A DAY    apixaban (ELIQUIS) 5 mg tablet Take 1 Tab by mouth two (2) times a day. Stop coumadin now (last dose 5/22). Hold blood thinners 4 days. Start eliquis Monday 5/27. (Patient taking differently: Take 5 mg by mouth two (2) times a day.)    carvedilol (COREG) 3.125 mg tablet TAKE 1 TABLET BY MOUTH TWICE A DAY WITH MEALS    cholecalciferol, vitamin D3, (VITAMIN D3) 2,000 unit tab Take 2,000 Units by mouth daily.  predniSONE (DELTASONE) 5 mg tablet Take 10 mg by mouth every other day.  albuterol (PROVENTIL HFA, VENTOLIN HFA, PROAIR HFA) 90 mcg/actuation inhaler Take 1 Puff by inhalation every six (6) hours as needed for Wheezing.  ZOLOFT 50 mg tablet TAKE 1 TABLET BY MOUTH EVERY DAY    simvastatin (ZOCOR) 20 mg tablet TAKE 1 TABLET BY MOUTH NIGHTLY    baclofen (LIORESAL) 10 mg tablet Take 5 mg by mouth two (2) times a day.  DENTA 5000 PLUS 1.1 % crea Apply 1 mg to affected area as needed (to prevent cavities).  ADVAIR DISKUS 250-50 mcg/dose diskus inhaler Take 1 Puff by inhalation two (2) times a day.  tiotropium (SPIRIVA WITH HANDIHALER) 18 mcg inhalation capsule Take 1 Cap by inhalation daily.  denosumab (PROLIA) 60 mg/mL injection 1 mL by SubCUTAneous route every 6 months. No current facility-administered medications on file prior to visit.         CARDIOLOGY STUDIES TO DATE:  Medtronic [id number] EN Pulse pacemaker, implanted 7/2005  Echo 2008 - EF 60%, mild LAE, mild AR, mild to mod MR, mild TR, mild PA HTN  Echo 2009 - EF 55%, bilateral atrial enlargement, mild MR, trace AR, mod TR, mild PA HTN  Lexiscan cardiolite 10/15/09 - normal EF 51-71 %  Echo 10/21/10 - EF 55-60%, bilateral atrial enlargement, mild concentric LVH, mild to mod MR, mild AI, mod to severe TI, pulmonary pressures mildly elevated 35mmhg  Echo 7/31/12 - EF 55-60%, ventricular septal paradoxical motion, LA markedly dilated, RA mild to mod dilated, mod MR, mod TR, mod PA HTN  Lexiscan 8/14/12 - normal EF 68%  Lexiscan 12/17/12 - normal, EF 65%  Pacemaker 8/21/13 - generator change, single chamber Medtronic, model # [id number]. Pacemaker Pocket Revision 1/24/14   Echo 8/20/14 - LVEF 45%, akinesis of distal lat and inf walls, mod-marked LAE, mild-mod MR, RVSP 50  MUGA Scan 8/29/14 - LVEF 53%  4/17 echo lvef 40% with apical hypo, melissa, mild to mod mr and tr without pul htn, mild ai  2/19 echo lvef 35%. Lvh, mild as/ai, mild decreased rvef, lae, mild mod mr, mod tr with pa pressure 55mm    Chief Complaint   Patient presents with    Cardiomyopathy     HPI :  Ms. Deepthi Hicks is doing pretty well. She has had some recent dizziness, which sounds more like a balance issue than an orthostatic issue, but that being said, as usual her blood pressure is very, very low. She was in the hospital a couple of months ago with back pain and ended up with a kyphoplasty and then spent significant time in [address] [place] [address] and now is getting around with a cane. She needs to have a bunch of teeth pulled and some dentures made in the next month or two and she is worried about her pacemaker battery.         CARDIAC ROS:   negative for chest pain, palpitations, syncope, orthopnea, paroxysmal nocturnal dyspnea, exertional chest pressure/discomfort, claudication, lower extremity edema    Family History   Problem Relation Age of Onset    Diabetes Mother     Heart Disease Mother     Heart Attack Mother     Heart Disease Father     Stroke Sister     Breast Cancer Sister 80    Cancer Maternal Aunt         uterus    Diabetes Maternal Uncle     Breast Cancer Daughter 61       Past Medical History:   Diagnosis Date    Arthritis     Atrial fibrillation ([facility] [address]     av node ablation 5/22/98 with placement of CPI #[id number] dual chamber pacemaker subsequently replaced with a single chamber medtronic #[id number] single chamber pacemaker 7/25/05    Cancer ([facility] [address] 1970's    colon cancer w/ resection    COPD     Depression     GERD (gastroesophageal reflux disease)     Hyperlipidemia     Hypertension     Ischemic cardiomyopathy     Migraine headache     Orthostatic hypotension     RADHA (obstructive sleep apnea)     Pacemaker 5/22/98    AV node ablation /pacemaker placement utilizing CPI # [id number] dual chamber system, medtronic #[id number] single chamber device placed 7/22/05    Pulmonary hypertension ([address] 9/26/2011    RVSP 50 on echo 8/14    Thyroid disease     Valvular heart disease     mild-mod MR/TR       GENERAL ROS:  A comprehensive review of systems was negative except for that written in the HPI.     Visit Vitals  /60 (BP 1 Location: Right arm, BP Patient Position: Sitting)   Pulse 72   Resp 16   Ht 5' 2\" (1.575 m)   Wt 155 lb 3.2 oz (70.4 kg)   LMP 02/26/1970   SpO2 96%   BMI 28.39 kg/m²       Wt Readings from Last 3 Encounters:   09/24/19 155 lb 3.2 oz (70.4 kg)   08/23/19 152 lb (68.9 kg)   08/01/19 152 lb (68.9 kg)            BP Readings from Last 3 Encounters:   09/24/19 104/60   08/23/19 108/62   08/01/19 114/60       PHYSICAL EXAM  General appearance: alert, cooperative, no distress, appears stated age  Neurologic: Alert and oriented X 3  Neck: supple, symmetrical, trachea midline, no adenopathy, no carotid bruit and no JVD  Lungs: clear to auscultation bilaterally  Heart: regular rate and rhythm, S1, S2 normal, no murmur, click, rub or gallop  Extremities: extremities normal, atraumatic, no cyanosis or edema    Lab Results   Component Value Date/Time    Cholesterol, total 141 09/11/2018 03:10 PM    Cholesterol, total 247 (H) 09/12/2017 09:53 AM    Cholesterol, total 173 08/22/2016 08:45 AM    Cholesterol, total 157 08/11/2015 12:14 PM    Cholesterol, total 157 02/09/2015 08:34 AM    HDL Cholesterol 49 09/11/2018 03:10 PM    HDL Cholesterol 79 09/12/2017 09:53 AM    HDL Cholesterol 46 08/22/2016 08:45 AM    HDL Cholesterol 47 08/11/2015 12:14 PM    HDL Cholesterol 46 02/09/2015 08:34 AM    LDL, calculated 68 09/11/2018 03:10 PM    LDL, calculated 124 (H) 09/12/2017 09:53 AM    LDL, calculated 99 08/22/2016 08:45 AM    LDL, calculated 86 08/11/2015 12:14 PM    LDL, calculated 81 02/09/2015 08:34 AM    Triglyceride 119 09/11/2018 03:10 PM    Triglyceride 221 (H) 09/12/2017 09:53 AM    Triglyceride 140 08/22/2016 08:45 AM    Triglyceride 121 08/11/2015 12:14 PM    Triglyceride 149 02/09/2015 08:34 AM     ASSESSMENT  Ms. Kingsley Baptiste is stable and asymptomatic at this point, fairly well compensated. With her low blood pressure, we cannot push her medicines any further. I reassured her that we would make sure her pacemaker generator was changed before the battery stopped working. current treatment plan is effective, no change in therapy  lab results and schedule of future lab studies reviewed with patient  reviewed diet, exercise and weight control    Encounter Diagnoses   Name Primary?  Dilated cardiomyopathy (HCC) Yes    Orthostatic hypotension     Mixed hyperlipidemia     Pulmonary hypertension (HCC)     Paroxysmal atrial fibrillation (HCC)      Orders Placed This Encounter    DISCONTD: INCRUSE ELLIPTA 62.5 mcg/actuation inhaler    Oxygen       Follow-up and Dispositions    · Return in about 4 months (around 1/24/2020).          [address], MD  9/24/2019

## 2019-10-10 DIAGNOSIS — I48.91 ATRIAL FIBRILLATION, UNSPECIFIED TYPE (HCC): ICD-10-CM

## 2019-10-10 RX ORDER — CARVEDILOL 3.12 MG/1
TABLET ORAL
Qty: 180 TAB | Refills: 1 | Status: ON HOLD | OUTPATIENT
Start: 2019-10-10 | End: 2020-04-23

## 2019-10-31 ENCOUNTER — TELEPHONE (OUTPATIENT)
Dept: CARDIOLOGY CLINIC | Age: 84
End: 2019-10-31

## 2019-10-31 ENCOUNTER — CLINICAL SUPPORT (OUTPATIENT)
Dept: CARDIOLOGY CLINIC | Age: 84
End: 2019-10-31

## 2019-10-31 DIAGNOSIS — Z95.0 CARDIAC PACEMAKER IN SITU: Primary | ICD-10-CM

## 2019-10-31 NOTE — TELEPHONE ENCOUNTER
Cony Pierre (PM clinic) sent me a message stating patient was in office for PM check and mentioned she had some occasional chest discomfort and other concerns she wanted to discuss with Dr. Gema White. She said patient may want sooner f/u. Called patient. Spoke with [employer]. Appointment scheduled for 11/7. They will call if needed sooner. [employer] denied further questions or concerns.

## 2019-10-31 NOTE — PROGRESS NOTES
See scanned Pacemaker Report in Chart Review. Chargeable visit.   (billed interrogation, near ROBBIE, no remotes)

## 2019-11-07 ENCOUNTER — OFFICE VISIT (OUTPATIENT)
Dept: CARDIOLOGY CLINIC | Age: 84
End: 2019-11-07

## 2019-11-07 ENCOUNTER — TELEPHONE (OUTPATIENT)
Dept: INTERNAL MEDICINE CLINIC | Age: 84
End: 2019-11-07

## 2019-11-07 VITALS
RESPIRATION RATE: 18 BRPM | HEIGHT: 62 IN | DIASTOLIC BLOOD PRESSURE: 72 MMHG | SYSTOLIC BLOOD PRESSURE: 118 MMHG | BODY MASS INDEX: 28.89 KG/M2 | HEART RATE: 83 BPM | OXYGEN SATURATION: 97 % | WEIGHT: 157 LBS

## 2019-11-07 DIAGNOSIS — I27.20 PULMONARY HYPERTENSION (HCC): ICD-10-CM

## 2019-11-07 DIAGNOSIS — N18.30 CKD (CHRONIC KIDNEY DISEASE) STAGE 3, GFR 30-59 ML/MIN (HCC): ICD-10-CM

## 2019-11-07 DIAGNOSIS — I10 ESSENTIAL HYPERTENSION: ICD-10-CM

## 2019-11-07 DIAGNOSIS — J43.8 OTHER EMPHYSEMA (HCC): ICD-10-CM

## 2019-11-07 DIAGNOSIS — I42.0 DILATED CARDIOMYOPATHY (HCC): ICD-10-CM

## 2019-11-07 DIAGNOSIS — I48.20 CHRONIC ATRIAL FIBRILLATION (HCC): Primary | ICD-10-CM

## 2019-11-07 DIAGNOSIS — E78.2 MIXED HYPERLIPIDEMIA: ICD-10-CM

## 2019-11-07 RX ORDER — NITROGLYCERIN 0.4 MG/1
0.4 TABLET SUBLINGUAL
Qty: 1 BOTTLE | Refills: 1 | Status: SHIPPED | OUTPATIENT
Start: 2019-11-07 | End: 2020-05-14

## 2019-11-07 RX ORDER — SIMVASTATIN 20 MG/1
TABLET, FILM COATED ORAL
Qty: 90 TAB | Refills: 3 | Status: SHIPPED | OUTPATIENT
Start: 2019-11-07 | End: 2020-05-14

## 2019-11-07 NOTE — PROGRESS NOTES
HISTORY OF PRESENT ILLNESS  Carol Baker is a 80 y.o. female     SUMMARY:   Problem List  Date Reviewed: 11/6/2019          Codes Class Noted    Osteoporosis ICD-10-CM: M81.0  ICD-9-CM: 733.00  8/23/2019        Pressure injury of right buttock, stage 2 ([facility] ICD-10-CM: Z92.980  ICD-9-CM: 707.05, 707.22  8/23/2019        Chronic midline low back pain without sciatica ICD-10-CM: M54.5, G89.29  ICD-9-CM: 724.2, 338.29  8/23/2019        Hematuria ICD-10-CM: R31.9  ICD-9-CM: 599.70  6/30/2019        Hypertension (Chronic) ICD-10-CM: I10  ICD-9-CM: 401.9  6/29/2019        AAA (abdominal aortic aneurysm) ([facility] (Chronic) ICD-10-CM: I71.4  ICD-9-CM: 441.4  6/29/2019        Constipation ICD-10-CM: K59.00  ICD-9-CM: 564.00  6/29/2019        Lumbar compression fracture ([facility] ICD-10-CM: S32.000A  ICD-9-CM: 805.4  6/29/2019        Long term systemic steroid user (Chronic) ICD-10-CM: Z79.52  ICD-9-CM: V58.65  3/21/2019        Frequent falls (Chronic) ICD-10-CM: R29.6  ICD-9-CM: V15.88  1/13/2019        COPD (chronic obstructive pulmonary disease) ([facility] (Chronic) ICD-10-CM: J44.9  ICD-9-CM: 204  1/13/2019        Chronic respiratory failure with hypoxia ([facility] (Chronic) ICD-10-CM: J96.11  ICD-9-CM: 518.83, 799.02  1/13/2019        Latent tuberculosis by blood test ICD-10-CM: Z22.7  ICD-9-CM: 790.6  1/22/2018        Pacemaker (Chronic) ICD-10-CM: Z95.0  ICD-9-CM: V45.01  7/24/2017        Psoriasis (Chronic) ICD-10-CM: L40.9  ICD-9-CM: 696.1  3/23/2017        Cardiomyopathy ([address] (Chronic) ICD-10-CM: I42.9  ICD-9-CM: 425.4  9/20/2016    Overview Addendum 9/20/2016  8:45 AM by Jessica Buchanan MD     8/12 normal lexiscan cardiolyte, lvef 68%  8/16 echo lvef 35% multiple wall motion abnormalities, melissa, mod mr, mild ai, mod tr pa pressure 36mm  8/16 lexiscan mod reversible anterior defect, mostly fixed apical defect.  lvef 35%             Ischemic cardiomyopathy (Chronic) ICD-10-CM: I25.5  ICD-9-CM: 414.8  Unknown Restrictive lung disease (Chronic) ICD-10-CM: J98.4  ICD-9-CM: 518.89  12/14/2015        Osteopenia ICD-10-CM: M85.80  ICD-9-CM: 733.90  11/23/2015        CKD (chronic kidney disease) stage 3, GFR 30-59 ml/min (HCC) (Chronic) ICD-10-CM: N18.3  ICD-9-CM: 585.3  2/9/2015        Anxiety and depression (Chronic) ICD-10-CM: F41.9, F32.9  ICD-9-CM: 300.00, 311  2/26/2014        Colon cancer (HCC) (Chronic) ICD-10-CM: C18.9  ICD-9-CM: 153.9  2/26/2014    Overview Addendum 2/26/2014 11:12 AM by Shira Merino     Partial colectomy  Petey Allen             Hypothyroid (Chronic) ICD-10-CM: E03.9  ICD-9-CM: 244.9  2/26/2014        Pulmonary hypertension (HCC) (Chronic) ICD-10-CM: I27.20  ICD-9-CM: 416.8  9/26/2011        Atrial fibrillation (HCC) (Chronic) ICD-10-CM: I48.91  ICD-9-CM: 427.31  Unknown    Overview Signed 10/21/2010  8:16 AM by Jonnie Lee     av node ablation 5/22/98 with placement of CPI #[id number] dual chamber pacemaker subsequently replaced with a single chamber medtronic #[id number] single chamber pacemaker 7/25/05             Mitral regurgitation (Chronic) ICD-10-CM: I34.0  ICD-9-CM: 424.0  10/21/2010        Mixed hyperlipidemia (Chronic) ICD-10-CM: Y68.9  ICD-9-CM: 272.2  10/18/2010              Current Outpatient Medications on File Prior to Visit   Medication Sig    ZOLOFT 50 mg tablet TAKE 1 TABLET BY MOUTH EVERY DAY    carvedilol (COREG) 3.125 mg tablet TAKE 1 TABLET BY MOUTH TWICE A DAY WITH MEALS    Oxygen 2 liters via NC at night. And as needed    furosemide (LASIX) 40 mg tablet TAKE 1 TABLET BY MOUTH DAILY. (Patient taking differently: 40 mg. Reports taking 1/2 tab)    diclofenac (VOLTAREN) 1 % gel Apply  to affected area four (4) times daily.  SYNTHROID 88 mcg tablet TAKE 1 TABLET BY MOUTH EVERY DAY BEFORE BREAKFAST    loperamide (IMODIUM) 1 mg/5 mL solution Take 10 mL by mouth daily as needed for Diarrhea.     acetaminophen (TYLENOL EXTRA STRENGTH) 500 mg tablet Take 1,000 mg by mouth every six (6) hours as needed.  multivitamins (CHEWABLE-AMOS) chew Take 1 Tab by mouth daily.  lidocaine (LIDODERM) 5 % 1 Patch by TransDERmal route every twenty-four (24) hours. Apply patch to the affected area for 12 hours a day and remove for 12 hours a day.  raNITIdine (ZANTAC) 150 mg tablet TAKE 1 TABLET BY MOUTH TWICE A DAY    apixaban (ELIQUIS) 5 mg tablet Take 1 Tab by mouth two (2) times a day. Stop coumadin now (last dose 5/22). Hold blood thinners 4 days. Start eliquis Monday 5/27. (Patient taking differently: Take 5 mg by mouth two (2) times a day.)    cholecalciferol, vitamin D3, (VITAMIN D3) 2,000 unit tab Take 2,000 Units by mouth daily.  predniSONE (DELTASONE) 5 mg tablet Take 10 mg by mouth every other day. 5mg & 10mg alternating days    albuterol (PROVENTIL HFA, VENTOLIN HFA, PROAIR HFA) 90 mcg/actuation inhaler Take 1 Puff by inhalation every six (6) hours as needed for Wheezing.  simvastatin (ZOCOR) 20 mg tablet TAKE 1 TABLET BY MOUTH NIGHTLY    baclofen (LIORESAL) 10 mg tablet Take 5 mg by mouth two (2) times a day.  DENTA 5000 PLUS 1.1 % crea Apply 1 mg to affected area as needed (to prevent cavities).  ADVAIR DISKUS 250-50 mcg/dose diskus inhaler Take 1 Puff by inhalation two (2) times a day.  tiotropium (SPIRIVA WITH HANDIHALER) 18 mcg inhalation capsule Take 1 Cap by inhalation daily.  denosumab (PROLIA) 60 mg/mL injection 1 mL by SubCUTAneous route every 6 months. No current facility-administered medications on file prior to visit.         CARDIOLOGY STUDIES TO DATE:  Medtronic [id number] EN Pulse pacemaker, implanted 7/2005  Echo 2008 - EF 60%, mild LAE, mild AR, mild to mod MR, mild TR, mild PA HTN  Echo 2009 - EF 55%, bilateral atrial enlargement, mild MR, trace AR, mod TR, mild PA HTN  Lexiscan cardiolite 10/15/09 - normal EF 51-71 %  Echo 10/21/10 - EF 55-60%, bilateral atrial enlargement, mild concentric LVH, mild to mod MR, mild AI, mod to severe TI, pulmonary pressures mildly elevated 35mmhg  Echo 7/31/12 - EF 55-60%, ventricular septal paradoxical motion, LA markedly dilated, RA mild to mod dilated, mod MR, mod TR, mod PA HTN  Lexiscan 8/14/12 - normal EF 68%  Lexiscan 12/17/12 - normal, EF 65%  Pacemaker 8/21/13 - generator change, single chamber Medtronic, model # ADSR01. Pacemaker Pocket Revision 1/24/14   Echo 8/20/14 - LVEF 45%, akinesis of distal lat and inf walls, mod-marked LAE, mild-mod MR, RVSP 50  MUGA Scan 8/29/14 - LVEF 53%  4/17 echo lvef 40% with apical hypo, melissa, mild to mod mr and tr without pul htn, mild ai  2/19 echo lvef 35%. Lvh, mild as/ai, mild decreased rvef, lae, mild mod mr, mod tr with pa pressure 55mm    Chief Complaint   Patient presents with    Cardiomyopathy     HPI :  Ms. Redd Eldridge has had some concerns over the last few weeks about some exertional and nonexertional chest tightness, neck and shoulder discomfort. When this happens, it will last just a couple of minutes. Her real complaint is of shortness of breath with activity, which is nothing new, but it is becoming more limiting. Her leg edema is under good control and she has not had any falls. She needs to have a bunch of teeth extracted and she and her daughters are worried about this and the risk of infection and the fact that she also is going to need her pacemaker generator replaced in the next few months.       CARDIAC ROS:   negative for palpitations, syncope, orthopnea, paroxysmal nocturnal dyspnea, exertional chest pressure/discomfort, claudication    Family History   Problem Relation Age of Onset    Diabetes Mother     Heart Disease Mother     Heart Attack Mother     Heart Disease Father     Stroke Sister     Breast Cancer Sister 80    Cancer Maternal Aunt         uterus    Diabetes Maternal Uncle     Breast Cancer Daughter 61       Past Medical History:   Diagnosis Date    Arthritis     Atrial fibrillation ([address]     av node ablation 5/22/98 with placement of CPI #[id number] dual chamber pacemaker subsequently replaced with a single chamber medtronic #[id number] single chamber pacemaker 7/25/05    Cancer ([facility] [address] 1970's    colon cancer w/ resection    COPD     Depression     GERD (gastroesophageal reflux disease)     Hyperlipidemia     Hypertension     Ischemic cardiomyopathy     Migraine headache     Orthostatic hypotension     RADHA (obstructive sleep apnea)     Pacemaker 5/22/98    AV node ablation /pacemaker placement utilizing CPI # [id number] dual chamber system, medtronic #[id number] single chamber device placed 7/22/05    Pulmonary hypertension ([facility] [address] 9/26/2011    RVSP 50 on echo 8/14    Thyroid disease     Valvular heart disease     mild-mod MR/TR       GENERAL ROS:  A comprehensive review of systems was negative except for that written in the HPI.     Visit Vitals  /72 (BP 1 Location: Right arm, BP Patient Position: Sitting)   Pulse 83   Resp 18   Ht 5' 2\" (1.575 m)   Wt 157 lb (71.2 kg)   LMP 02/26/1970   SpO2 97%   BMI 28.72 kg/m²       Wt Readings from Last 3 Encounters:   11/07/19 157 lb (71.2 kg)   09/24/19 155 lb 3.2 oz (70.4 kg)   08/23/19 152 lb (68.9 kg)            BP Readings from Last 3 Encounters:   11/07/19 118/72   09/24/19 104/60   08/23/19 108/62       PHYSICAL EXAM  General appearance: alert, cooperative, no distress, appears stated age  Neurologic: Alert and oriented X 3  Neck: supple, symmetrical, trachea midline, no adenopathy, no carotid bruit and no JVD  Lungs: clear to auscultation bilaterally  Heart: regular rate and rhythm, S1, S2 normal, no murmur, click, rub or gallop  Extremities: edema tr, support hose in place    Lab Results   Component Value Date/Time    Cholesterol, total 141 09/11/2018 03:10 PM    Cholesterol, total 247 (H) 09/12/2017 09:53 AM    Cholesterol, total 173 08/22/2016 08:45 AM    Cholesterol, total 157 08/11/2015 12:14 PM    Cholesterol, total 157 02/09/2015 08:34 AM    HDL Cholesterol 49 09/11/2018 03:10 PM    HDL Cholesterol 79 09/12/2017 09:53 AM    HDL Cholesterol 46 08/22/2016 08:45 AM    HDL Cholesterol 47 08/11/2015 12:14 PM    HDL Cholesterol 46 02/09/2015 08:34 AM    LDL, calculated 68 09/11/2018 03:10 PM    LDL, calculated 124 (H) 09/12/2017 09:53 AM    LDL, calculated 99 08/22/2016 08:45 AM    LDL, calculated 86 08/11/2015 12:14 PM    LDL, calculated 81 02/09/2015 08:34 AM    Triglyceride 119 09/11/2018 03:10 PM    Triglyceride 221 (H) 09/12/2017 09:53 AM    Triglyceride 140 08/22/2016 08:45 AM    Triglyceride 121 08/11/2015 12:14 PM    Triglyceride 149 02/09/2015 08:34 AM     ASSESSMENT  Ms. Forbes's chest pain could be angina. Fortunately, it is not very frequent nor prolonged. I do not think she is a candidate for any invasive cardiac studies at this time and increasing her medications is going to be difficult given her baseline low blood pressure and her problems with orthostasis. I think for now we will give her a prescription for sublingual Nitroglycerin to use only if the pain does not resolve within a few minutes and cautioned her and her daughters about the fact that it might lower her blood pressure. She will keep her usual followup with us in January. current treatment plan is effective, no change in therapy  lab results and schedule of future lab studies reviewed with patient  reviewed diet, exercise and weight control    Encounter Diagnoses   Name Primary?  Chronic atrial fibrillation Yes    Essential hypertension     Other emphysema (HCC)     Dilated cardiomyopathy (HCC)     CKD (chronic kidney disease) stage 3, GFR 30-59 ml/min (HCC)     Pulmonary hypertension ([facility]     Mixed hyperlipidemia      No orders of the defined types were placed in this encounter. Follow-up and Dispositions    · Return in about 4 months (around 3/7/2020).          Lizzy Garcia MD  11/7/2019

## 2019-11-07 NOTE — TELEPHONE ENCOUNTER
[facility]) for patient called again to report that she does not need DR. Joshua/nurse to refill medication. Boaz Fischer stated that she located doctor who prescribed medication for patient.

## 2019-11-07 NOTE — TELEPHONE ENCOUNTER
Authur Lombard is requesting a refill on Simvastatin as pt is completely out. Dr. Maria Ybarra originally prescribed it but asking if Dr. Lexie Carrillo can continue refilling medication as Katy Morton believes Dr. Maria Ybarra was a physician in a hospital pt saw last year. Pt upcoming appt is 11/18/19. Please contact Rosa once refill has been sent to [facility]) or if Dr. Lexie Carrillo cannot prescribe. Thanks.

## 2019-11-18 ENCOUNTER — HOSPITAL ENCOUNTER (OUTPATIENT)
Dept: LAB | Age: 84
Discharge: HOME | End: 2019-11-18

## 2019-11-18 ENCOUNTER — OFFICE VISIT (OUTPATIENT)
Dept: INTERNAL MEDICINE CLINIC | Age: 84
End: 2019-11-18

## 2019-11-18 ENCOUNTER — HOSPITAL ENCOUNTER (OUTPATIENT)
Dept: GENERAL RADIOLOGY | Age: 84
Discharge: HOME | End: 2019-11-18
Attending: INTERNAL MEDICINE
Payer: MEDICARE

## 2019-11-18 VITALS
HEIGHT: 62 IN | WEIGHT: 159 LBS | SYSTOLIC BLOOD PRESSURE: 122 MMHG | TEMPERATURE: 97.9 F | HEART RATE: 90 BPM | RESPIRATION RATE: 16 BRPM | DIASTOLIC BLOOD PRESSURE: 70 MMHG | BODY MASS INDEX: 29.26 KG/M2 | OXYGEN SATURATION: 97 %

## 2019-11-18 DIAGNOSIS — Z13.31 SCREENING FOR DEPRESSION: ICD-10-CM

## 2019-11-18 DIAGNOSIS — E03.4 HYPOTHYROIDISM DUE TO ACQUIRED ATROPHY OF THYROID: Chronic | ICD-10-CM

## 2019-11-18 DIAGNOSIS — I25.5 ISCHEMIC CARDIOMYOPATHY: Chronic | ICD-10-CM

## 2019-11-18 DIAGNOSIS — N18.30 CKD (CHRONIC KIDNEY DISEASE) STAGE 3, GFR 30-59 ML/MIN (HCC): Chronic | ICD-10-CM

## 2019-11-18 DIAGNOSIS — Z71.89 ADVANCED DIRECTIVES, COUNSELING/DISCUSSION: ICD-10-CM

## 2019-11-18 DIAGNOSIS — E78.2 MIXED HYPERLIPIDEMIA: Chronic | ICD-10-CM

## 2019-11-18 DIAGNOSIS — Z00.00 MEDICARE ANNUAL WELLNESS VISIT, SUBSEQUENT: Primary | ICD-10-CM

## 2019-11-18 DIAGNOSIS — I10 ESSENTIAL HYPERTENSION: Chronic | ICD-10-CM

## 2019-11-18 DIAGNOSIS — L89.312 PRESSURE INJURY OF RIGHT BUTTOCK, STAGE 2 (HCC): ICD-10-CM

## 2019-11-18 DIAGNOSIS — M54.50 ACUTE MIDLINE LOW BACK PAIN WITHOUT SCIATICA: ICD-10-CM

## 2019-11-18 DIAGNOSIS — E55.9 VITAMIN D DEFICIENCY: ICD-10-CM

## 2019-11-18 DIAGNOSIS — I48.20 CHRONIC ATRIAL FIBRILLATION (HCC): Chronic | ICD-10-CM

## 2019-11-18 DIAGNOSIS — M81.0 OSTEOPOROSIS, UNSPECIFIED OSTEOPOROSIS TYPE, UNSPECIFIED PATHOLOGICAL FRACTURE PRESENCE: ICD-10-CM

## 2019-11-18 DIAGNOSIS — L30.9 ECZEMA, UNSPECIFIED TYPE: ICD-10-CM

## 2019-11-18 LAB
25(OH)D3 SERPL-MCNC: 26 NG/ML (ref 30–100)
ANION GAP SERPL CALC-SCNC: 5 MMOL/L (ref 5–15)
BASOPHILS # BLD: 0 K/UL (ref 0–0.1)
BASOPHILS NFR BLD: 0 % (ref 0–1)
BUN SERPL-MCNC: 30 MG/DL (ref 6–20)
BUN/CREAT SERPL: 26 (ref 12–20)
CALCIUM SERPL-MCNC: 9.7 MG/DL (ref 8.5–10.1)
CHLORIDE SERPL-SCNC: 105 MMOL/L (ref 97–108)
CHOLEST SERPL-MCNC: 199 MG/DL
CO2 SERPL-SCNC: 32 MMOL/L (ref 21–32)
CREAT SERPL-MCNC: 1.16 MG/DL (ref 0.55–1.02)
DIFFERENTIAL METHOD BLD: ABNORMAL
EOSINOPHIL # BLD: 0.1 K/UL (ref 0–0.4)
EOSINOPHIL NFR BLD: 1 % (ref 0–7)
ERYTHROCYTE [DISTWIDTH] IN BLOOD BY AUTOMATED COUNT: 12.9 % (ref 11.5–14.5)
GLUCOSE SERPL-MCNC: 148 MG/DL (ref 65–100)
HCT VFR BLD AUTO: 42.1 % (ref 35–47)
HDLC SERPL-MCNC: 65 MG/DL
HDLC SERPL: 3.1 {RATIO} (ref 0–5)
HGB BLD-MCNC: 13.1 G/DL (ref 11.5–16)
IMM GRANULOCYTES # BLD AUTO: 0.1 K/UL (ref 0–0.04)
IMM GRANULOCYTES NFR BLD AUTO: 1 % (ref 0–0.5)
LDLC SERPL CALC-MCNC: 97.4 MG/DL (ref 0–100)
LIPID PROFILE,FLP: ABNORMAL
LYMPHOCYTES # BLD: 0.8 K/UL (ref 0.8–3.5)
LYMPHOCYTES NFR BLD: 9 % (ref 12–49)
MCH RBC QN AUTO: 31.6 PG (ref 26–34)
MCHC RBC AUTO-ENTMCNC: 31.1 G/DL (ref 30–36.5)
MCV RBC AUTO: 101.4 FL (ref 80–99)
MONOCYTES # BLD: 0.6 K/UL (ref 0–1)
MONOCYTES NFR BLD: 7 % (ref 5–13)
NEUTS SEG # BLD: 7.4 K/UL (ref 1.8–8)
NEUTS SEG NFR BLD: 82 % (ref 32–75)
NRBC # BLD: 0 K/UL (ref 0–0.01)
NRBC BLD-RTO: 0 PER 100 WBC
PLATELET # BLD AUTO: 135 K/UL (ref 150–400)
PMV BLD AUTO: 12.3 FL (ref 8.9–12.9)
POTASSIUM SERPL-SCNC: 4.2 MMOL/L (ref 3.5–5.1)
RBC # BLD AUTO: 4.15 M/UL (ref 3.8–5.2)
SODIUM SERPL-SCNC: 142 MMOL/L (ref 136–145)
TRIGL SERPL-MCNC: 183 MG/DL (ref ?–150)
TSH SERPL DL<=0.05 MIU/L-ACNC: 3.17 UIU/ML (ref 0.36–3.74)
VLDLC SERPL CALC-MCNC: 36.6 MG/DL
WBC # BLD AUTO: 9.1 K/UL (ref 3.6–11)

## 2019-11-18 PROCEDURE — 72100 X-RAY EXAM L-S SPINE 2/3 VWS: CPT

## 2019-11-18 RX ORDER — KETOCONAZOLE 20 MG/ML
SHAMPOO TOPICAL
Qty: 120 ML | Refills: 2 | Status: SHIPPED | OUTPATIENT
Start: 2019-11-18 | End: 2020-05-14

## 2019-11-18 NOTE — ACP (ADVANCE CARE PLANNING)
Advance Care Planning    Advance Care Planning (ACP) Provider Conversation Snapshot    Date of ACP Conversation: 11/18/19  Persons included in Conversation:  patient and family  Length of ACP Conversation in minutes:  <16 minutes (Non-Billable)    Authorized Decision Maker (if patient is incapable of making informed decisions):    This person is:   Healthcare Agent/Medical Power of  under Advance Directive            For Patients with Decision Making Capacity:   Values/Goals: Exploration of values, goals, and preferences if recovery is not expected, even with continued medical treatment in the event of:  Imminent death  Severe, permanent brain injury    Conversation Outcomes / Follow-Up Plan:   Reviewed existing Advance Directive

## 2019-11-18 NOTE — PROGRESS NOTES
Ha Gleason is a 80 y.o. female who presents today for Hypertension; Hypothyroidism; Osteoporosis; Anxiety; and Depression  . She has a history of   Patient Active Problem List   Diagnosis Code    Mixed hyperlipidemia E78.2    Atrial fibrillation ([facility] I48.91    Mitral regurgitation I34.0    Pulmonary hypertension ([facility]) I27.20    Anxiety and depression F41.9, F32.9    Colon cancer ([facility] C18.9    Hypothyroid E03.9    CKD (chronic kidney disease) stage 3, GFR 30-59 ml/min ([facility]) N18.3    Osteopenia M85.80    Restrictive lung disease J98.4    Ischemic cardiomyopathy I25.5    Cardiomyopathy ([facility] I42.9    Psoriasis L40.9    Pacemaker Z95.0    Latent tuberculosis by blood test Z22.7    Frequent falls R29.6    COPD (chronic obstructive pulmonary disease) ([facility]) J44.9    Chronic respiratory failure with hypoxia ([facility]) J96.11    Long term systemic steroid user Z79.52    Hypertension I10    AAA (abdominal aortic aneurysm) ([facility]) I71.4    Constipation K59.00    Lumbar compression fracture ([facility]) S32.000A    Hematuria R31.9    Osteoporosis M81.0    Pressure injury of right buttock, stage 2 ([facility]) L89.312    Chronic midline low back pain without sciatica M54.5, G89.29   . Today patient is here for follow-up. Medardo Rosales Hypothyroidism: Patient is taking replacement thyroid hormone. She is due for repeat testing. Decubitus ulcer: Patient notes that this has completely healed. Status post kyphoplasty/osteoporosis: We decided to start her on Prolia at last visit. Since she notes she has been having worsening lower back pain. No radiating pain to her legs. Some help with lidocaine. Continuing to be physically active. Hyperlipidemia  Currently she takes 20 mg of zocor  ROS: taking medications as instructed, no medication side effects noted  No new myalgias, no joint pains, no weakness  No TIA's, no chest pain on exertion, no dyspnea on exertion, no swelling of ankles.    Lab Results   Component Value Date/Time    Cholesterol, total 141 09/11/2018 03:10 PM    HDL Cholesterol 49 09/11/2018 03:10 PM    LDL, calculated 68 09/11/2018 03:10 PM    VLDL, calculated 24 09/11/2018 03:10 PM    Triglyceride 119 09/11/2018 03:10 PM     ICM: Continue to follow with cardiology. She is on Lasix and carvedilol. Volume status been stable. Has not needed any Nitro. Planning to have pacer replaced soon. Her breathing has been stable. Health maintenance hx includes:  Exercise: moderately active. Form of exercise: exercise. Diet: generally follows a low fat low cholesterol diet  Social: living at [facility]. Two sons and two daughters. Screening:    Colon cancer screening: N/A   Breast cancer screening: N/A   Cervical cancer screening: N/A   Osteoporosis screening:  Last BMD:  Osteoporosis. Holding due to oral issues. Immunizations:     Immunization History   Administered Date(s) Administered    Influenza High Dose Vaccine PF 10/01/2014, 09/24/2015, 09/29/2016, 09/21/2017, 10/05/2018    Influenza Vaccine 12/01/2013    Influenza Vaccine (Tri) Adjuvanted 10/09/2019    Influenza Vaccine Split 10/12/2012    Pneumococcal Conjugate (PCV-13) 11/23/2015    Pneumococcal Polysaccharide (PPSV-23) 02/27/2015    Pneumococcal Vaccine (Pcv) 12/15/2010    TB Skin Test (PPD) 01/01/2001    TB Skin Test (PPD) Intradermal 01/16/2018    TD Vaccine 04/02/2012    Tdap 01/02/2019    Varicella Virus Vaccine Live 09/12/2012    Zoster Vaccine, Live 12/01/2013      Immunization status: up to date and documented. ROS  Review of Systems   Constitutional: Negative for chills, fever, malaise/fatigue and weight loss. Eyes: Negative for blurred vision, double vision, photophobia and pain. Respiratory: Negative for cough and shortness of breath. Cardiovascular: Negative for chest pain, palpitations and leg swelling.    Gastrointestinal: Negative for abdominal pain, constipation, diarrhea, heartburn, nausea and vomiting. Genitourinary: Negative for dysuria, frequency, hematuria and urgency. Musculoskeletal: Positive for back pain. Negative for falls, joint pain, myalgias and neck pain. Neurological: Negative. Endo/Heme/Allergies: Does not bruise/bleed easily. Psychiatric/Behavioral: Negative for depression. The patient is not nervous/anxious. Visit Vitals  /70 (BP 1 Location: Left arm, BP Patient Position: Sitting)   Pulse 90   Temp 97.9 °F (36.6 °C) (Oral)   Resp 16   Ht 5' 2\" (1.575 m)   Wt 159 lb (72.1 kg)   SpO2 97%   BMI 29.08 kg/m²       Physical Exam   Constitutional: She is oriented to person, place, and time. She appears well-developed and well-nourished. No distress. HENT:   Head: Normocephalic and atraumatic. Right Ear: External ear normal.   Left Ear: External ear normal.   Neck: Normal range of motion. Neck supple. No thyromegaly present. Cardiovascular: Normal rate and regular rhythm. No murmur heard. Pulmonary/Chest: Effort normal and breath sounds normal. She has no wheezes. Distant but clear   Abdominal: Soft. Bowel sounds are normal. She exhibits no distension. Musculoskeletal: She exhibits no edema. Point tenderness over lower lumbar spine. No physical abnormalities. Neurological: She is alert and oriented to person, place, and time. No cranial nerve deficit. Skin: Skin is warm and dry. Psychiatric: She has a normal mood and affect. Her behavior is normal.         Current Outpatient Medications   Medication Sig    ketoconazole (NIZORAL) 2 % shampoo Apply 5 to 10 mL to wet scalp, lather, leave on 3 to 5 minutes, and rinse; apply once every 1 to 2 weeks    simvastatin (ZOCOR) 20 mg tablet TAKE 1 TABLET BY MOUTH NIGHTLY    nitroglycerin (NITROSTAT) 0.4 mg SL tablet 1 Tab by SubLINGual route every five (5) minutes as needed for Chest Pain. Up to 3 doses.     ZOLOFT 50 mg tablet TAKE 1 TABLET BY MOUTH EVERY DAY    carvedilol (COREG) 3.125 mg tablet TAKE 1 TABLET BY MOUTH TWICE A DAY WITH MEALS    Oxygen 2 liters via NC at night. And as needed    furosemide (LASIX) 40 mg tablet TAKE 1 TABLET BY MOUTH DAILY. (Patient taking differently: 40 mg. Reports taking 1/2 tab)    diclofenac (VOLTAREN) 1 % gel Apply  to affected area four (4) times daily.  SYNTHROID 88 mcg tablet TAKE 1 TABLET BY MOUTH EVERY DAY BEFORE BREAKFAST    loperamide (IMODIUM) 1 mg/5 mL solution Take 10 mL by mouth daily as needed for Diarrhea.  acetaminophen (TYLENOL EXTRA STRENGTH) 500 mg tablet Take 1,000 mg by mouth every six (6) hours as needed.  multivitamins (CHEWABLE-AMOS) chew Take 1 Tab by mouth daily.  lidocaine (LIDODERM) 5 % 1 Patch by TransDERmal route every twenty-four (24) hours. Apply patch to the affected area for 12 hours a day and remove for 12 hours a day.  raNITIdine (ZANTAC) 150 mg tablet TAKE 1 TABLET BY MOUTH TWICE A DAY    apixaban (ELIQUIS) 5 mg tablet Take 1 Tab by mouth two (2) times a day. Stop coumadin now (last dose 5/22). Hold blood thinners 4 days. Start eliquis Monday 5/27. (Patient taking differently: Take 5 mg by mouth two (2) times a day.)    cholecalciferol, vitamin D3, (VITAMIN D3) 2,000 unit tab Take 2,000 Units by mouth daily.  predniSONE (DELTASONE) 5 mg tablet Take 10 mg by mouth every other day. 5mg & 10mg alternating days    albuterol (PROVENTIL HFA, VENTOLIN HFA, PROAIR HFA) 90 mcg/actuation inhaler Take 1 Puff by inhalation every six (6) hours as needed for Wheezing.  baclofen (LIORESAL) 10 mg tablet Take 5 mg by mouth two (2) times a day.  DENTA 5000 PLUS 1.1 % crea Apply 1 mg to affected area as needed (to prevent cavities).  ADVAIR DISKUS 250-50 mcg/dose diskus inhaler Take 1 Puff by inhalation two (2) times a day.  tiotropium (SPIRIVA WITH HANDIHALER) 18 mcg inhalation capsule Take 1 Cap by inhalation daily.  denosumab (PROLIA) 60 mg/mL injection 1 mL by SubCUTAneous route every 6 months.      No current facility-administered medications for this visit.          Past Medical History:   Diagnosis Date    Arthritis     Atrial fibrillation ([facility]     av node ablation 5/22/98 with placement of CPI #[id number] dual chamber pacemaker subsequently replaced with a single chamber medtronic #[id number] single chamber pacemaker 7/25/05    Cancer ([facility] 1970's    colon cancer w/ resection    COPD     Depression     GERD (gastroesophageal reflux disease)     Hyperlipidemia     Hypertension     Ischemic cardiomyopathy     Migraine headache     Orthostatic hypotension     RADHA (obstructive sleep apnea)     Pacemaker 5/22/98    AV node ablation /pacemaker placement utilizing CPI # [id number] dual chamber system, medtronic #[id number] single chamber device placed 7/22/05    Pulmonary hypertension ([facility] 9/26/2011    RVSP 50 on echo 8/14    Thyroid disease     Valvular heart disease     mild-mod MR/TR      Past Surgical History:   Procedure Laterality Date    BREAST SURGERY PROCEDURE UNLISTED      benign breast tumor excision right breast    HX BREAST BIOPSY Right 2002    neg; surgical bx    HX COLECTOMY  1974    colon    HX KNEE REPLACEMENT      right TKA    HX PACEMAKER      HX TUBAL LIGATION      IR KYPHOPLASTY LUMBAR  7/2/2019    TOTAL KNEE ARTHROPLASTY      total on R, partial on L      Social History     Tobacco Use    Smoking status: Passive Smoke Exposure - Never Smoker    Smokeless tobacco: Never Used   Substance Use Topics    Alcohol use: No     Alcohol/week: 0.0 standard drinks      Family History   Problem Relation Age of Onset    Diabetes Mother     Heart Disease Mother     Heart Attack Mother     Heart Disease Father     Stroke Sister     Breast Cancer Sister 80    Cancer Maternal Aunt         uterus    Diabetes Maternal Uncle     Breast Cancer Daughter 61        Allergies   Allergen Reactions    Cardizem [Diltiazem Hcl] Hives    Codeine Nausea and Vomiting    Darvocet A500 [Propoxyphene N-Acetaminophen] Nausea and Vomiting    Diltiazem Hives    Labetalol Nausea and Vomiting     Dizzy/Disoriented     Pcn [Penicillins] Swelling     Tongue Swelling   Has previously tolerated cephalexin per daughter    Primidone Other (comments)     Lightheaded/unsteady gait    Sulfa (Sulfonamide Antibiotics) Nausea and Vomiting        Assessment/Plan  Diagnoses and all orders for this visit:    1. Medicare annual wellness visit, subsequent -health maintenance is up-to-date. Marj Lynda was counseled on age-appropriate/ guideline-based risk prevention behaviors and screening for a 80y.o. year old   female . We also discussed adjustments in screening based on family history if necessary. Printed instructions for preventative screening guidelines and healthy behaviors given to patient with after visit summary. 2. Mixed hyperlipidemia - repeat  -     LIPID PANEL; Future    3. CKD (chronic kidney disease) stage 3, GFR 30-59 ml/min ([facility]) - has been stable. -     CBC WITH AUTOMATED DIFF; Future  -     METABOLIC PANEL, BASIC; Future    4. Osteoporosis, unspecified osteoporosis type, unspecified pathological fracture presence -patient holding off on Prolia as oral surgeon states that she cannot be on this if she has her teeth worked on. There were awaiting need to have her pacemaker changed out before having dental work done. We discussed continuing to avoid falls and doing physical therapy. 5. Pressure injury of right buttock, stage 2 ([address] -resolved    6. Hypothyroidism due to acquired atrophy of thyroid -repeat  -     TSH 3RD GENERATION; Future    7. Chronic atrial fibrillation -on anticoagulation    8. Essential hypertension -blood pressure well controlled    9. Ischemic cardiomyopathy -no volume overload symptoms. 10. Vitamin D deficiency  -     VITAMIN D, 25 HYDROXY; Future    11. Advanced directives, counseling/discussion    12.  Screening for depression-doing well with low-dose SSRI  -     DEPRESSION SCREEN ANNUAL    13. Acute midline low back pain without sciatica -given point tenderness over lumbar spine suggest getting x-ray today. If negative suggest continuing to take acetaminophen 1000 mg up to 3 times a day and being physically active. -     XR SPINE LUMB 2 OR 3 V; Future    14. Eczema, unspecified type -for her scalp  -     ketoconazole (NIZORAL) 2 % shampoo; Apply 5 to 10 mL to wet scalp, lather, leave on 3 to 5 minutes, and rinse; apply once every 1 to 2 weeks        Follow-up and Dispositions    · Return in about 3 months (around 2/18/2020). Rosa Gaona MD  11/18/2019    This note was created with the help of speech recognition software [employer]) and may contain some 'sound alike' errors. This is the Subsequent Medicare Annual Wellness Exam, performed 12 months or more after the Initial AWV or the last Subsequent AWV    I have reviewed the patient's medical history in detail and updated the computerized patient record.      History     Patient Active Problem List   Diagnosis Code    Mixed hyperlipidemia E78.2    Atrial fibrillation ([facility] I48.91    Mitral regurgitation I34.0    Pulmonary hypertension ([facility] I27.20    Anxiety and depression F41.9, F32.9    Colon cancer ([facility] C18.9    Hypothyroid E03.9    CKD (chronic kidney disease) stage 3, GFR 30-59 ml/min ([facility] N18.3    Osteopenia M85.80    Restrictive lung disease J98.4    Ischemic cardiomyopathy I25.5    Cardiomyopathy ([facility] I42.9    Psoriasis L40.9    Pacemaker Z95.0    Latent tuberculosis by blood test Z22.7    Frequent falls R29.6    COPD (chronic obstructive pulmonary disease) ([facility] J44.9    Chronic respiratory failure with hypoxia ([facility] J96.11    Long term systemic steroid user Z79.52    Hypertension I10    AAA (abdominal aortic aneurysm) ([facility] I71.4    Constipation K59.00    Lumbar compression fracture ([facility] S32.000A    Hematuria R31.9    Osteoporosis M81.0    Pressure injury of right buttock, stage 2 ([facility] W41.005    Chronic midline low back pain without sciatica M54.5, G89.29     Past Medical History:   Diagnosis Date    Arthritis     Atrial fibrillation ([facility]     av node ablation 5/22/98 with placement of CPI #[id number] dual chamber pacemaker subsequently replaced with a single chamber medtronic #[id number] single chamber pacemaker 7/25/05    Cancer ([facility] 1970's    colon cancer w/ resection    COPD     Depression     GERD (gastroesophageal reflux disease)     Hyperlipidemia     Hypertension     Ischemic cardiomyopathy     Migraine headache     Orthostatic hypotension     RADHA (obstructive sleep apnea)     Pacemaker 5/22/98    AV node ablation /pacemaker placement utilizing CPI # [id number] dual chamber system, medtronic #[id number] single chamber device placed 7/22/05    Pulmonary hypertension ([facility] 9/26/2011    RVSP 50 on echo 8/14    Thyroid disease     Valvular heart disease     mild-mod MR/TR      Past Surgical History:   Procedure Laterality Date    BREAST SURGERY PROCEDURE UNLISTED      benign breast tumor excision right breast    HX BREAST BIOPSY Right 2002    neg; surgical bx    HX COLECTOMY  1974    colon    HX KNEE REPLACEMENT      right TKA    HX PACEMAKER      HX TUBAL LIGATION      IR KYPHOPLASTY LUMBAR  7/2/2019    TOTAL KNEE ARTHROPLASTY      total on R, partial on L     Current Outpatient Medications   Medication Sig Dispense Refill    ketoconazole (NIZORAL) 2 % shampoo Apply 5 to 10 mL to wet scalp, lather, leave on 3 to 5 minutes, and rinse; apply once every 1 to 2 weeks 120 mL 2    simvastatin (ZOCOR) 20 mg tablet TAKE 1 TABLET BY MOUTH NIGHTLY 90 Tab 3    nitroglycerin (NITROSTAT) 0.4 mg SL tablet 1 Tab by SubLINGual route every five (5) minutes as needed for Chest Pain. Up to 3 doses.  1 Bottle 1    ZOLOFT 50 mg tablet TAKE 1 TABLET BY MOUTH EVERY DAY 90 Tab 1    carvedilol (COREG) 3.125 mg tablet TAKE 1 TABLET BY MOUTH TWICE A DAY WITH MEALS 180 Tab 1    Oxygen 2 liters via NC at night. And as needed      furosemide (LASIX) 40 mg tablet TAKE 1 TABLET BY MOUTH DAILY. (Patient taking differently: 40 mg. Reports taking 1/2 tab) 90 Tab 2    diclofenac (VOLTAREN) 1 % gel Apply  to affected area four (4) times daily. 100 g 3    SYNTHROID 88 mcg tablet TAKE 1 TABLET BY MOUTH EVERY DAY BEFORE BREAKFAST 30 Tab 5    loperamide (IMODIUM) 1 mg/5 mL solution Take 10 mL by mouth daily as needed for Diarrhea. 60 mL 0    acetaminophen (TYLENOL EXTRA STRENGTH) 500 mg tablet Take 1,000 mg by mouth every six (6) hours as needed.  multivitamins (CHEWABLE-AMOS) chew Take 1 Tab by mouth daily.  lidocaine (LIDODERM) 5 % 1 Patch by TransDERmal route every twenty-four (24) hours. Apply patch to the affected area for 12 hours a day and remove for 12 hours a day.  raNITIdine (ZANTAC) 150 mg tablet TAKE 1 TABLET BY MOUTH TWICE A  Tab 1    apixaban (ELIQUIS) 5 mg tablet Take 1 Tab by mouth two (2) times a day. Stop coumadin now (last dose 5/22). Hold blood thinners 4 days. Start eliquis Monday 5/27. (Patient taking differently: Take 5 mg by mouth two (2) times a day.) 180 Tab 3    cholecalciferol, vitamin D3, (VITAMIN D3) 2,000 unit tab Take 2,000 Units by mouth daily.  predniSONE (DELTASONE) 5 mg tablet Take 10 mg by mouth every other day. 5mg & 10mg alternating days      albuterol (PROVENTIL HFA, VENTOLIN HFA, PROAIR HFA) 90 mcg/actuation inhaler Take 1 Puff by inhalation every six (6) hours as needed for Wheezing.  baclofen (LIORESAL) 10 mg tablet Take 5 mg by mouth two (2) times a day.  DENTA 5000 PLUS 1.1 % crea Apply 1 mg to affected area as needed (to prevent cavities). 2    ADVAIR DISKUS 250-50 mcg/dose diskus inhaler Take 1 Puff by inhalation two (2) times a day.  tiotropium (SPIRIVA WITH HANDIHALER) 18 mcg inhalation capsule Take 1 Cap by inhalation daily.  denosumab (PROLIA) 60 mg/mL injection 1 mL by SubCUTAneous route every 6 months.  1 Each 3 Allergies   Allergen Reactions    Cardizem [Diltiazem Hcl] Hives    Codeine Nausea and Vomiting    Darvocet A500 [Propoxyphene N-Acetaminophen] Nausea and Vomiting    Diltiazem Hives    Labetalol Nausea and Vomiting     Dizzy/Disoriented     Pcn [Penicillins] Swelling     Tongue Swelling   Has previously tolerated cephalexin per daughter    Primidone Other (comments)     Lightheaded/unsteady gait    Sulfa (Sulfonamide Antibiotics) Nausea and Vomiting       Family History   Problem Relation Age of Onset    Diabetes Mother     Heart Disease Mother     Heart Attack Mother     Heart Disease Father     Stroke Sister     Breast Cancer Sister 80    Cancer Maternal Aunt         uterus    Diabetes Maternal Uncle     Breast Cancer Daughter 61     Social History     Tobacco Use    Smoking status: Passive Smoke Exposure - Never Smoker    Smokeless tobacco: Never Used   Substance Use Topics    Alcohol use: No     Alcohol/week: 0.0 standard drinks       Depression Risk Factor Screening:     3 most recent PHQ Screens 11/18/2019   Little interest or pleasure in doing things Not at all   Feeling down, depressed, irritable, or hopeless Not at all   Total Score PHQ 2 0   Trouble falling or staying asleep, or sleeping too much Not at all   Feeling tired or having little energy Not at all   Poor appetite, weight loss, or overeating Not at all   Feeling bad about yourself - or that you are a failure or have let yourself or your family down Not at all   Trouble concentrating on things such as school, work, reading, or watching TV Not at all   Moving or speaking so slowly that other people could have noticed; or the opposite being so fidgety that others notice Not at all   Thoughts of being better off dead, or hurting yourself in some way Not at all   PHQ 9 Score 0   How difficult have these problems made it for you to do your work, take care of your home and get along with others Not difficult at all       Alcohol Risk Factor Screening:   Do you average 1 drink per night or more than 7 drinks a week:  No    On any one occasion in the past three months have you have had more than 3 drinks containing alcohol:  No      Functional Ability and Level of Safety:   Hearing: Hearing is good. Activities of Daily Living: The home contains: handrails and grab bars  Patient does total self care    Ambulation: with difficulty, uses a walker    Fall Risk:  Fall Risk Assessment, last 12 mths 11/18/2019   Able to walk? Yes   Fall in past 12 months? No   Fall with injury? -   Number of falls in past 12 months -   Fall Risk Score -       Abuse Screen:  Patient is not abused    Cognitive Screening   Has your family/caregiver stated any concerns about your memory: no      Patient Care Team   Patient Care Team:  Mendoza Powell MD as PCP - General (Internal Medicine)  Mendoza Powell MD as PCP - [facility] Empaneled Provider  Latonia Pruett MD (Pulmonary Disease)  Sea Paige MD (Cardiology)  Louella Goodell, MD as Consulting Provider (Cardiology)  Gustavo Canela RN as Ambulatory Care Manager    Assessment/Plan   Education and counseling provided:  Are appropriate based on today's review and evaluation  End-of-Life planning (with patient's consent)  Pneumococcal Vaccine  Bone mass measurement (DEXA)    Diagnoses and all orders for this visit:    1. Medicare annual wellness visit, subsequent    2. Mixed hyperlipidemia  -     LIPID PANEL; Future    3. CKD (chronic kidney disease) stage 3, GFR 30-59 ml/min ([facility])  -     CBC WITH AUTOMATED DIFF; Future  -     METABOLIC PANEL, BASIC; Future    4. Osteoporosis, unspecified osteoporosis type, unspecified pathological fracture presence    5. Pressure injury of right buttock, stage 2 ([address]    6. Hypothyroidism due to acquired atrophy of thyroid  -     TSH 3RD GENERATION; Future    7. Chronic atrial fibrillation    8. Essential hypertension    9.  Ischemic cardiomyopathy    10. Vitamin D deficiency  -     VITAMIN D, 25 HYDROXY; Future    11. Advanced directives, counseling/discussion    12. Screening for depression  -     [place] [id number]    13. Acute midline low back pain without sciatica  -     XR SPINE LUMB 2 OR 3 V; Future    14. Eczema, unspecified type  -     ketoconazole (NIZORAL) 2 % shampoo;  Apply 5 to 10 mL to wet scalp, lather, leave on 3 to 5 minutes, and rinse; apply once every 1 to 2 weeks        Health Maintenance Due   Topic Date Due    Shingrix Vaccine Age 50> (1 of 2) 02/06/1982    GLAUCOMA SCREENING Q2Y  12/08/2018

## 2019-11-18 NOTE — PATIENT INSTRUCTIONS
Medicare Wellness Visit, Female The best way to live healthy is to have a lifestyle where you eat a well-balanced diet, exercise regularly, limit alcohol use, and quit all forms of tobacco/nicotine, if applicable. Regular preventive services are another way to keep healthy. Preventive services (vaccines, screening tests, monitoring & exams) can help personalize your care plan, which helps you manage your own care. Screening tests can find health problems at the earliest stages, when they are easiest to treat. [facility] follows the current, evidence-based guidelines published by the [facility] ([facility]) when recommending preventive services for our patients. Because we follow these guidelines, sometimes recommendations change over time as research supports it. (For example, mammograms used to be recommended annually. Even though Medicare will still pay for an annual mammogram, the newer guidelines recommend a mammogram every two years for women of average risk). Of course, you and your doctor may decide to screen more often for some diseases, based on your risk and your co-morbidities (chronic disease you are already diagnosed with). Preventive services for you include: - Medicare offers their members a free annual wellness visit, which is time for you and your primary care provider to discuss and plan for your preventive service needs. Take advantage of this benefit every year! 
-All adults over the age of 72 should receive the recommended pneumonia vaccines. Current USPSTF guidelines recommend a series of two vaccines for the best pneumonia protection.  
-All adults should have a flu vaccine yearly and a tetanus vaccine every 10 years.  
-All adults age 48 and older should receive the shingles vaccines (series of two vaccines). -All adults age 38-68 who are overweight should have a diabetes screening test once every three years. -All adults born between [birth year] and 1965 should be screened once for Hepatitis C. 
-Other screening tests and preventive services for persons with diabetes include: an eye exam to screen for diabetic retinopathy, a kidney function test, a foot exam, and stricter control over your cholesterol.  
-Cardiovascular screening for adults with routine risk involves an electrocardiogram (ECG) at intervals determined by your doctor.  
-Colorectal cancer screenings should be done for adults age 54-65 with no increased risk factors for colorectal cancer. There are a number of acceptable methods of screening for this type of cancer. Each test has its own benefits and drawbacks. Discuss with your doctor what is most appropriate for you during your annual wellness visit. The different tests include: colonoscopy (considered the best screening method), a fecal occult blood test, a fecal DNA test, and sigmoidoscopy. 
 
-A bone mass density test is recommended when a woman turns 65 to screen for osteoporosis. This test is only recommended one time, as a screening. Some providers will use this same test as a disease monitoring tool if you already have osteoporosis. -Breast cancer screenings are recommended every other year for women of normal risk, age 54-69. 
-Cervical cancer screenings for women over age 72 are only recommended with certain risk factors. Here is a list of your current Health Maintenance items (your personalized list of preventive services) with a due date: 
Health Maintenance Due Topic Date Due  Shingles Vaccine (1 of 2) 02/06/1982  Glaucoma Screening   12/08/2018 [facility] Annual Well Visit  09/12/2019

## 2019-11-29 ENCOUNTER — HOSPITAL ENCOUNTER (OUTPATIENT)
Dept: LAB | Age: 84
Discharge: HOME | End: 2019-11-29

## 2019-11-29 ENCOUNTER — OFFICE VISIT (OUTPATIENT)
Dept: INTERNAL MEDICINE CLINIC | Age: 84
End: 2019-11-29

## 2019-11-29 VITALS
OXYGEN SATURATION: 97 % | WEIGHT: 159 LBS | DIASTOLIC BLOOD PRESSURE: 74 MMHG | RESPIRATION RATE: 16 BRPM | HEART RATE: 88 BPM | TEMPERATURE: 97.5 F | HEIGHT: 62 IN | BODY MASS INDEX: 29.26 KG/M2 | SYSTOLIC BLOOD PRESSURE: 117 MMHG

## 2019-11-29 DIAGNOSIS — R30.0 DYSURIA: Primary | ICD-10-CM

## 2019-11-29 DIAGNOSIS — R30.0 DYSURIA: ICD-10-CM

## 2019-11-29 DIAGNOSIS — E03.4 HYPOTHYROIDISM DUE TO ACQUIRED ATROPHY OF THYROID: ICD-10-CM

## 2019-11-29 DIAGNOSIS — I10 ESSENTIAL HYPERTENSION: ICD-10-CM

## 2019-11-29 DIAGNOSIS — R42 DIZZINESS: ICD-10-CM

## 2019-11-29 DIAGNOSIS — E78.2 MIXED HYPERLIPIDEMIA: ICD-10-CM

## 2019-11-29 DIAGNOSIS — I48.20 CHRONIC ATRIAL FIBRILLATION (HCC): ICD-10-CM

## 2019-11-29 LAB
BILIRUB UR QL STRIP: NEGATIVE
GLUCOSE UR-MCNC: NEGATIVE MG/DL
KETONES P FAST UR STRIP-MCNC: NEGATIVE MG/DL
PH UR STRIP: 6.5 [PH] (ref 4.6–8)
PROT UR QL STRIP: NEGATIVE
SP GR UR STRIP: 1.02 (ref 1–1.03)
UA UROBILINOGEN AMB POC: NORMAL (ref 0.2–1)
URINALYSIS CLARITY POC: CLEAR
URINALYSIS COLOR POC: YELLOW
URINE BLOOD POC: NORMAL
URINE LEUKOCYTES POC: NEGATIVE
URINE NITRITES POC: NEGATIVE

## 2019-11-29 RX ORDER — RANITIDINE 150 MG/1
TABLET, FILM COATED ORAL
Qty: 180 TAB | Refills: 1 | Status: ON HOLD | OUTPATIENT
Start: 2019-11-29 | End: 2020-04-15 | Stop reason: SINTOL

## 2019-11-29 NOTE — PROGRESS NOTES
HISTORY OF PRESENT ILLNESS  Lee Frankel is a 80 y.o. female. HPI  She is having repeat urine today as daughter was worried she was a little more confused but back and forth for awhile     She has afib and a pacemaker and taking the eliquis; she is scheduled for battery replacement but it has been stable ;s sirena Reyse and  and checking her pacemaker every month       Subjective:   Lee Frankel is a 80 y.o. female with hypertension. Hypertension ROS: taking medications as instructed, no medication side effects noted, no TIA's, no chest pain on exertion, no dyspnea on exertion, no swelling of ankles. New concerns: stable. SUBJECTIVE: Lee Frankel is a 80 y.o. female here for follow up of hypothyroidism. Lab Results   Component Value Date/Time    TSH 3.17 11/18/2019 11:29 AM     Thyroid ROS: denies fatigue, weight changes, heat/cold intolerance, bowel/skin changes or CVS symptoms. She has been eating a little more sweets than she is use to and we are wondering if that is playing a role    Review of Systems   Constitutional: Negative. Negative for chills, diaphoresis, fever, malaise/fatigue and weight loss. HENT: Negative for congestion, nosebleeds and tinnitus. Eyes: Negative for blurred vision, double vision and photophobia. Respiratory: Negative for cough, hemoptysis, sputum production, shortness of breath and wheezing. Cardiovascular: Negative for chest pain, palpitations, orthopnea, claudication, leg swelling and PND. Gastrointestinal: Negative for abdominal pain, blood in stool, constipation, diarrhea, heartburn, melena, nausea and vomiting. Genitourinary: Negative for dysuria, frequency, hematuria and urgency. Musculoskeletal: Negative for back pain, joint pain, myalgias and neck pain. Skin: Negative for itching and rash. Neurological: Negative for dizziness, tingling, sensory change, speech change, focal weakness, weakness and headaches.    Endo/Heme/Allergies: Negative for polydipsia. Does not bruise/bleed easily. Psychiatric/Behavioral: Negative for depression. The patient is not nervous/anxious and does not have insomnia. Physical Exam  Vitals signs and nursing note reviewed. Constitutional:       Appearance: She is well-developed. HENT:      Head: Normocephalic and atraumatic. Right Ear: External ear normal.      Left Ear: External ear normal.      Nose: Nose normal.   Neck:      Musculoskeletal: Normal range of motion and neck supple. Thyroid: No thyroid mass or thyromegaly. Vascular: No carotid bruit or JVD. Cardiovascular:      Rate and Rhythm: Regular rhythm. Pulses: Normal pulses. Heart sounds: Normal heart sounds, S1 normal and S2 normal. No murmur. No friction rub. No gallop. Pulmonary:      Effort: Pulmonary effort is normal.      Breath sounds: Normal breath sounds. Abdominal:      General: Bowel sounds are normal.      Palpations: Abdomen is soft. Musculoskeletal: Normal range of motion. Skin:     General: Skin is warm and dry. Neurological:      Mental Status: She is alert and oriented to person, place, and time. Psychiatric:         Behavior: Behavior normal.         Thought Content: Thought content normal.         Judgment: Judgment normal.         ASSESSMENT and PLAN  Diagnoses and all orders for this visit:    1. Dysuria  -     AMB POC URINALYSIS DIP STICK AUTO W/O MICRO  -     CULTURE, URINE; Future    2. Chronic atrial fibrillation-cont with current meds, elquis as doing and seeing     3. Essential hypertension- at goal    4. Mixed hyperlipidemia-stable on meds     5. Hypothyroidism due to acquired atrophy of thyroid-cont current dose doing well last TSH good    6.  Dizziness-she has been increasing her sweet comsumption and last sugar a little high- she may be having a little high/low from that as she has been eating a little more- we will back off on sweets and stay hydrated and if not better follow up     This note will not be viewable in [address]         lab results and schedule of future lab studies reviewed with patient  reviewed diet, exercise and weight control  cardiovascular risk and specific lipid/LDL goals reviewed  reviewed medications and side effects in detail

## 2019-12-01 LAB
BACTERIA SPEC CULT: NORMAL
CC UR VC: NORMAL
SERVICE CMNT-IMP: NORMAL

## 2019-12-11 ENCOUNTER — TELEPHONE (OUTPATIENT)
Dept: CARDIOLOGY CLINIC | Age: 84
End: 2019-12-11

## 2019-12-11 NOTE — TELEPHONE ENCOUNTER
Keith Baeza with [address] would like to speak with someone in the pacemaker clinic, no further details were given.      Phone: [phone]

## 2019-12-12 ENCOUNTER — CLINICAL SUPPORT (OUTPATIENT)
Dept: CARDIOLOGY CLINIC | Age: 84
End: 2019-12-12

## 2019-12-12 DIAGNOSIS — Z95.0 CARDIAC PACEMAKER IN SITU: Primary | ICD-10-CM

## 2019-12-18 ENCOUNTER — TELEPHONE (OUTPATIENT)
Dept: CARDIOLOGY CLINIC | Age: 84
End: 2019-12-18

## 2019-12-18 NOTE — TELEPHONE ENCOUNTER
Patient's daughter, would like to speak with someone in regards to patient's pacemaker. She requested to have Dr. Trixie Morris nurse call her back.      Phone: [phone]

## 2019-12-18 NOTE — TELEPHONE ENCOUNTER
Returned call; discussed her mom's device, ROBBIE (what it means), past experiences with mom's generator change (pocket revision after). The estimated battery longevity is just that, an estimate. Told her I always go on the lower end, bring in as needed to assess. Remotes at this stage are helpful; prefers coming in. We will re-assess in January. Mom's dental work should be discussed with the MD. Yuval Amezquita stated there are concerns with prednisone usage, infection risks, etc. She felt better after our discussion. She was told 9 months of battery life after we spoke last week, which raised concerns.

## 2019-12-18 NOTE — TELEPHONE ENCOUNTER
Denise Vasques. She wanted to discuss different things she has heard about needing PM battery replacement. Patient wants to get dentures, but is holding off because of this. I told her I would discuss with PM clinic and Dr. Ilsa Howell nurse and one of us will call her back. Called Ben and let her know what Samson Cabezas and Magda Denis told me. She requested a call from Magda Denis today or tomorrow. I told her I would give Magda Denis message. She denied further questions for me at this time.

## 2020-01-07 ENCOUNTER — TELEPHONE (OUTPATIENT)
Dept: INTERNAL MEDICINE CLINIC | Age: 85
End: 2020-01-07

## 2020-01-07 DIAGNOSIS — M54.50 CHRONIC MIDLINE LOW BACK PAIN WITHOUT SCIATICA: Primary | ICD-10-CM

## 2020-01-07 DIAGNOSIS — G89.29 CHRONIC MIDLINE LOW BACK PAIN WITHOUT SCIATICA: Primary | ICD-10-CM

## 2020-01-07 NOTE — TELEPHONE ENCOUNTER
Anna with [facility] called requesting verbal orders for patient to receive PT & OT. Please call Anna to advise if PCP is agreeable.    [phone]

## 2020-01-10 DIAGNOSIS — E03.4 HYPOTHYROIDISM DUE TO ACQUIRED ATROPHY OF THYROID: ICD-10-CM

## 2020-01-10 RX ORDER — LEVOTHYROXINE SODIUM 88 UG/1
TABLET ORAL
Qty: 90 TAB | Refills: 1 | Status: SHIPPED | OUTPATIENT
Start: 2020-01-10 | End: 2020-05-12

## 2020-01-24 ENCOUNTER — CLINICAL SUPPORT (OUTPATIENT)
Dept: CARDIOLOGY CLINIC | Age: 85
End: 2020-01-24

## 2020-01-24 ENCOUNTER — OFFICE VISIT (OUTPATIENT)
Dept: CARDIOLOGY CLINIC | Age: 85
End: 2020-01-24

## 2020-01-24 VITALS
RESPIRATION RATE: 18 BRPM | HEART RATE: 63 BPM | WEIGHT: 161.8 LBS | HEIGHT: 62 IN | DIASTOLIC BLOOD PRESSURE: 66 MMHG | SYSTOLIC BLOOD PRESSURE: 114 MMHG | BODY MASS INDEX: 29.77 KG/M2 | OXYGEN SATURATION: 94 %

## 2020-01-24 DIAGNOSIS — I10 ESSENTIAL HYPERTENSION: ICD-10-CM

## 2020-01-24 DIAGNOSIS — J43.8 OTHER EMPHYSEMA (HCC): ICD-10-CM

## 2020-01-24 DIAGNOSIS — Z95.0 CARDIAC PACEMAKER IN SITU: Primary | ICD-10-CM

## 2020-01-24 DIAGNOSIS — I48.0 PAROXYSMAL ATRIAL FIBRILLATION (HCC): Primary | ICD-10-CM

## 2020-01-24 DIAGNOSIS — E78.2 MIXED HYPERLIPIDEMIA: ICD-10-CM

## 2020-01-24 DIAGNOSIS — I25.5 ISCHEMIC CARDIOMYOPATHY: ICD-10-CM

## 2020-01-24 NOTE — PROGRESS NOTES
HISTORY OF PRESENT ILLNESS  Tari Self is a 80 y.o. female     SUMMARY:   Problem List  Date Reviewed: 1/23/2020          Codes Class Noted    Osteoporosis ICD-10-CM: M81.0  ICD-9-CM: 733.00  8/23/2019        Pressure injury of right buttock, stage 2 ([facility] ICD-10-CM: K56.493  ICD-9-CM: 707.05, 707.22  8/23/2019        Chronic midline low back pain without sciatica ICD-10-CM: M54.5, G89.29  ICD-9-CM: 724.2, 338.29  8/23/2019        Hematuria ICD-10-CM: R31.9  ICD-9-CM: 599.70  6/30/2019        Hypertension (Chronic) ICD-10-CM: I10  ICD-9-CM: 401.9  6/29/2019        AAA (abdominal aortic aneurysm) ([facility] (Chronic) ICD-10-CM: I71.4  ICD-9-CM: 441.4  6/29/2019        Constipation ICD-10-CM: K59.00  ICD-9-CM: 564.00  6/29/2019        Lumbar compression fracture ([facility] ICD-10-CM: S32.000A  ICD-9-CM: 805.4  6/29/2019        Long term systemic steroid user (Chronic) ICD-10-CM: Z79.52  ICD-9-CM: V58.65  3/21/2019        Frequent falls (Chronic) ICD-10-CM: R29.6  ICD-9-CM: V15.88  1/13/2019        COPD (chronic obstructive pulmonary disease) ([facility] (Chronic) ICD-10-CM: J44.9  ICD-9-CM: 172  1/13/2019        Chronic respiratory failure with hypoxia ([facility] (Chronic) ICD-10-CM: J96.11  ICD-9-CM: 518.83, 799.02  1/13/2019        Latent tuberculosis by blood test ICD-10-CM: Z22.7  ICD-9-CM: 790.6  1/22/2018        Pacemaker (Chronic) ICD-10-CM: Z95.0  ICD-9-CM: V45.01  7/24/2017        Psoriasis (Chronic) ICD-10-CM: L40.9  ICD-9-CM: 696.1  3/23/2017        Cardiomyopathy ([address] (Chronic) ICD-10-CM: I42.9  ICD-9-CM: 425.4  9/20/2016    Overview Addendum 9/20/2016  8:45 AM by Abiola Wilkerson MD     8/12 normal lexiscan cardiolyte, lvef 68%  8/16 echo lvef 35% multiple wall motion abnormalities, melissa, mod mr, mild ai, mod tr pa pressure 36mm  8/16 lexiscan mod reversible anterior defect, mostly fixed apical defect.  lvef 35%             Ischemic cardiomyopathy (Chronic) ICD-10-CM: I25.5  ICD-9-CM: 414.8  Unknown Restrictive lung disease (Chronic) ICD-10-CM: J98.4  ICD-9-CM: 518.89  12/14/2015        Osteopenia ICD-10-CM: M85.80  ICD-9-CM: 733.90  11/23/2015        CKD (chronic kidney disease) stage 3, GFR 30-59 ml/min (HCC) (Chronic) ICD-10-CM: N18.3  ICD-9-CM: 585.3  2/9/2015        Anxiety and depression (Chronic) ICD-10-CM: F41.9, F32.9  ICD-9-CM: 300.00, 311  2/26/2014        Colon cancer (HCC) (Chronic) ICD-10-CM: C18.9  ICD-9-CM: 153.9  2/26/2014    Overview Addendum 2/26/2014 11:12 AM by Lauren Bennett     Partial colectomy  Petey Alejandro             Hypothyroid (Chronic) ICD-10-CM: E03.9  ICD-9-CM: 244.9  2/26/2014        Pulmonary hypertension (HCC) (Chronic) ICD-10-CM: I27.20  ICD-9-CM: 416.8  9/26/2011        Atrial fibrillation (HCC) (Chronic) ICD-10-CM: I48.91  ICD-9-CM: 427.31  Unknown    Overview Signed 10/21/2010  8:16 AM by Luis Wilkins     av node ablation 5/22/98 with placement of CPI #[id number] dual chamber pacemaker subsequently replaced with a single chamber medtronic #[id number] single chamber pacemaker 7/25/05             Mitral regurgitation (Chronic) ICD-10-CM: I34.0  ICD-9-CM: 424.0  10/21/2010        Mixed hyperlipidemia (Chronic) ICD-10-CM: U10.1  ICD-9-CM: 272.2  10/18/2010              Current Outpatient Medications on File Prior to Visit   Medication Sig    SYNTHROID 88 mcg tablet TAKE 1 TABLET BY MOUTH EVERY DAY BEFORE BREAKFAST    raNITIdine (ZANTAC) 150 mg tablet TAKE 1 TABLET BY MOUTH TWICE A DAY    diclofenac (VOLTAREN) 1 % gel Apply 2 g to affected area four (4) times daily.  ketoconazole (NIZORAL) 2 % shampoo Apply 5 to 10 mL to wet scalp, lather, leave on 3 to 5 minutes, and rinse; apply once every 1 to 2 weeks    simvastatin (ZOCOR) 20 mg tablet TAKE 1 TABLET BY MOUTH NIGHTLY    nitroglycerin (NITROSTAT) 0.4 mg SL tablet 1 Tab by SubLINGual route every five (5) minutes as needed for Chest Pain. Up to 3 doses.     ZOLOFT 50 mg tablet TAKE 1 TABLET BY MOUTH EVERY DAY    carvedilol (COREG) 3.125 mg tablet TAKE 1 TABLET BY MOUTH TWICE A DAY WITH MEALS    Oxygen 2 liters via NC at night. And as needed    furosemide (LASIX) 40 mg tablet TAKE 1 TABLET BY MOUTH DAILY. (Patient taking differently: 40 mg. Reports taking 1/2 tab)    loperamide (IMODIUM) 1 mg/5 mL solution Take 10 mL by mouth daily as needed for Diarrhea.  acetaminophen (TYLENOL EXTRA STRENGTH) 500 mg tablet Take 1,000 mg by mouth every six (6) hours as needed.  multivitamins (CHEWABLE-AMOS) chew Take 1 Tab by mouth daily.  lidocaine (LIDODERM) 5 % 1 Patch by TransDERmal route every twenty-four (24) hours. Apply patch to the affected area for 12 hours a day and remove for 12 hours a day.  apixaban (ELIQUIS) 5 mg tablet Take 1 Tab by mouth two (2) times a day. Stop coumadin now (last dose 5/22). Hold blood thinners 4 days. Start eliquis Monday 5/27. (Patient taking differently: Take 5 mg by mouth two (2) times a day.)    cholecalciferol, vitamin D3, (VITAMIN D3) 2,000 unit tab Take 2,000 Units by mouth daily.  predniSONE (DELTASONE) 5 mg tablet Take 10 mg by mouth daily.  albuterol (PROVENTIL HFA, VENTOLIN HFA, PROAIR HFA) 90 mcg/actuation inhaler Take 1 Puff by inhalation every six (6) hours as needed for Wheezing.  baclofen (LIORESAL) 10 mg tablet Take 5 mg by mouth two (2) times a day.  DENTA 5000 PLUS 1.1 % crea Apply 1 mg to affected area as needed (to prevent cavities).  ADVAIR DISKUS 250-50 mcg/dose diskus inhaler Take 1 Puff by inhalation two (2) times a day.  tiotropium (SPIRIVA WITH HANDIHALER) 18 mcg inhalation capsule Take 1 Cap by inhalation daily. No current facility-administered medications on file prior to visit.         CARDIOLOGY STUDIES TO DATE:  Medtronic [id number] EN Pulse pacemaker, implanted 7/2005  Echo 2008 - EF 60%, mild LAE, mild AR, mild to mod MR, mild TR, mild PA HTN  Echo 2009 - EF 55%, bilateral atrial enlargement, mild MR, trace AR, mod TR, mild PA HTN  Lexiscan cardiolite 10/15/09 - normal EF 51-71 %  Echo 10/21/10 - EF 55-60%, bilateral atrial enlargement, mild concentric LVH, mild to mod MR, mild AI, mod to severe TI, pulmonary pressures mildly elevated 35mmhg  Echo 7/31/12 - EF 55-60%, ventricular septal paradoxical motion, LA markedly dilated, RA mild to mod dilated, mod MR, mod TR, mod PA HTN  Lexiscan 8/14/12 - normal EF 68%  Lexiscan 12/17/12 - normal, EF 65%  Pacemaker 8/21/13 - generator change, single chamber Medtronic, model # [id number]. Pacemaker Pocket Revision 1/24/14   Echo 8/20/14 - LVEF 45%, akinesis of distal lat and inf walls, mod-marked LAE, mild-mod MR, RVSP 50  MUGA Scan 8/29/14 - LVEF 53%  4/17 echo lvef 40% with apical hypo, melissa, mild to mod mr and tr without pul htn, mild ai  2/19 echo lvef 35%. Lvh, mild as/ai, mild decreased rvef, lae, mild mod mr, mod tr with pa pressure 55mm    Chief Complaint   Patient presents with    Irregular Heart Beat     HPI :  A few weeks ago Ms. Mamta Sanchez was feeling sort of down in the dumps week short of breath and so forth and Dr. Aina Valenzuela increased her prednisone to 10 mg a day and that has really improved her. She is getting home physical therapy and she has had no falls. She still gets short of breath but not like it was a month or so ago. She and her daughter still have concerns about the timing of her generator replacement for pacemaker and the extensive dental work that she needs to have done in the risk of infection particularly since she is on prednisone.   CARDIAC ROS:   negative for chest pain, palpitations, syncope, orthopnea, paroxysmal nocturnal dyspnea, exertional chest pressure/discomfort, claudication, lower extremity edema    Family History   Problem Relation Age of Onset    Diabetes Mother     Heart Disease Mother     Heart Attack Mother     Heart Disease Father     Stroke Sister     Breast Cancer Sister 80    Cancer Maternal Aunt         uterus    Diabetes Maternal Uncle     Breast Cancer Daughter 61       Past Medical History:   Diagnosis Date    Arthritis     Atrial fibrillation ([facility]     av node ablation 5/22/98 with placement of CPI #[id number] dual chamber pacemaker subsequently replaced with a single chamber medtronic #[id number] single chamber pacemaker 7/25/05    Cancer ([facility] 1970's    colon cancer w/ resection    COPD     Depression     GERD (gastroesophageal reflux disease)     Hyperlipidemia     Hypertension     Ischemic cardiomyopathy     Migraine headache     Orthostatic hypotension     RADHA (obstructive sleep apnea)     Pacemaker 5/22/98    AV node ablation /pacemaker placement utilizing CPI # [id number] dual chamber system, medtronic #[id number] single chamber device placed 7/22/05    Pulmonary hypertension ([facility] 9/26/2011    RVSP 50 on echo 8/14    Thyroid disease     Valvular heart disease     mild-mod MR/TR       GENERAL ROS:  A comprehensive review of systems was negative except for that written in the HPI.     Visit Vitals  /66 (BP 1 Location: Right arm, BP Patient Position: Sitting)   Pulse 63   Resp 18   Ht 5' 2\" (1.575 m)   Wt 161 lb 12.8 oz (73.4 kg)   LMP 02/26/1970   SpO2 94%   BMI 29.59 kg/m²       Wt Readings from Last 3 Encounters:   01/24/20 161 lb 12.8 oz (73.4 kg)   11/29/19 159 lb (72.1 kg)   11/18/19 159 lb (72.1 kg)            BP Readings from Last 3 Encounters:   01/24/20 114/66   11/29/19 117/74   11/18/19 122/70       PHYSICAL EXAM  General appearance: alert, cooperative, no distress, appears stated age  Neurologic: Alert and oriented X 3  Neck: supple, symmetrical, trachea midline, no adenopathy, no carotid bruit and no JVD  Lungs: clear to auscultation bilaterally  Heart: regular rate and rhythm, S1, S2 normal, no murmur, click, rub or gallop  Extremities: varicose veins noted    Lab Results   Component Value Date/Time    Cholesterol, total 199 11/18/2019 11:29 AM    Cholesterol, total 141 09/11/2018 03:10 PM    Cholesterol, total 247 (H) 09/12/2017 09:53 AM Cholesterol, total 173 08/22/2016 08:45 AM    Cholesterol, total 157 08/11/2015 12:14 PM    HDL Cholesterol 65 11/18/2019 11:29 AM    HDL Cholesterol 49 09/11/2018 03:10 PM    HDL Cholesterol 79 09/12/2017 09:53 AM    HDL Cholesterol 46 08/22/2016 08:45 AM    HDL Cholesterol 47 08/11/2015 12:14 PM    LDL, calculated 97.4 11/18/2019 11:29 AM    LDL, calculated 68 09/11/2018 03:10 PM    LDL, calculated 124 (H) 09/12/2017 09:53 AM    LDL, calculated 99 08/22/2016 08:45 AM    LDL, calculated 86 08/11/2015 12:14 PM    Triglyceride 183 (H) 11/18/2019 11:29 AM    Triglyceride 119 09/11/2018 03:10 PM    Triglyceride 221 (H) 09/12/2017 09:53 AM    Triglyceride 140 08/22/2016 08:45 AM    Triglyceride 121 08/11/2015 12:14 PM    CHOL/HDL Ratio 3.1 11/18/2019 11:29 AM     ASSESSMENT :      Ms. Sivan Love is stable and mostly asymptomatic on a reasonable medical regimen and needs no cardiac testing at this time. She is due to see Dr. Buzz Gupta next month and they will talk again about the timing of everything that she needs to have done. current treatment plan is effective, no change in therapy  lab results and schedule of future lab studies reviewed with patient  reviewed diet, exercise and weight control    Encounter Diagnoses   Name Primary?  Paroxysmal atrial fibrillation (HCC) Yes    Essential hypertension     Other emphysema (HCC)     Ischemic cardiomyopathy     Mixed hyperlipidemia      No orders of the defined types were placed in this encounter. Follow-up and Dispositions    · Return in about 6 months (around 7/24/2020).          Rosalva Arrieta MD  1/24/2020

## 2020-01-24 NOTE — PROGRESS NOTES
See scanned Pacemaker Report in Chart Review. Chargeable visit.   Interrogation billed; [facility]

## 2020-01-26 NOTE — PROGRESS NOTES

## 2020-01-29 ENCOUNTER — DOCUMENTATION ONLY (OUTPATIENT)
Dept: CARDIOLOGY CLINIC | Age: 85
End: 2020-01-29

## 2020-01-29 NOTE — PROGRESS NOTES
Pacemaker is near ROBBIE, 100% RV pacing  Future Appointments   Date Time Provider [place]   2/18/2020 11:15 AM Arnold Strong MD [address]   2/20/2020 10:15 AM [id number], [address]   2/20/2020 10:20 AM Devora Copeland  [address]   5/29/2020 10:00 AM Vivien Gomez  [address]

## 2020-02-18 ENCOUNTER — HOSPITAL ENCOUNTER (OUTPATIENT)
Dept: LAB | Age: 85
Discharge: HOME | End: 2020-02-18

## 2020-02-18 ENCOUNTER — OFFICE VISIT (OUTPATIENT)
Dept: INTERNAL MEDICINE CLINIC | Age: 85
End: 2020-02-18

## 2020-02-18 VITALS
HEART RATE: 85 BPM | WEIGHT: 159 LBS | TEMPERATURE: 98.1 F | HEIGHT: 62 IN | OXYGEN SATURATION: 97 % | BODY MASS INDEX: 29.26 KG/M2 | SYSTOLIC BLOOD PRESSURE: 128 MMHG | RESPIRATION RATE: 16 BRPM | DIASTOLIC BLOOD PRESSURE: 70 MMHG
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
## 2020-02-18 DIAGNOSIS — F32.A ANXIETY AND DEPRESSION: Chronic | ICD-10-CM

## 2020-02-18 DIAGNOSIS — I10 ESSENTIAL HYPERTENSION: Chronic | ICD-10-CM

## 2020-02-18 DIAGNOSIS — E03.4 HYPOTHYROIDISM DUE TO ACQUIRED ATROPHY OF THYROID: Primary | Chronic | ICD-10-CM

## 2020-02-18 DIAGNOSIS — L98.9 SKIN LESION: ICD-10-CM

## 2020-02-18 DIAGNOSIS — I71.40 ABDOMINAL AORTIC ANEURYSM (AAA) WITHOUT RUPTURE: ICD-10-CM

## 2020-02-18 DIAGNOSIS — E03.4 HYPOTHYROIDISM DUE TO ACQUIRED ATROPHY OF THYROID: Chronic | ICD-10-CM

## 2020-02-18 DIAGNOSIS — F41.9 ANXIETY AND DEPRESSION: Chronic | ICD-10-CM

## 2020-02-18 DIAGNOSIS — R29.898 LEFT HAND WEAKNESS: ICD-10-CM

## 2020-02-18 DIAGNOSIS — M81.0 OSTEOPOROSIS, UNSPECIFIED OSTEOPOROSIS TYPE, UNSPECIFIED PATHOLOGICAL FRACTURE PRESENCE: ICD-10-CM

## 2020-02-18 PROBLEM — L89.312 PRESSURE INJURY OF RIGHT BUTTOCK, STAGE 2 (HCC): Status: RESOLVED | Noted: 2019-08-23 | Resolved: 2020-02-18

## 2020-02-18 LAB
ANION GAP SERPL CALC-SCNC: 4 MMOL/L (ref 5–15)
BASOPHILS # BLD: 0 K/UL (ref 0–0.1)
BASOPHILS NFR BLD: 0 % (ref 0–1)
BUN SERPL-MCNC: 40 MG/DL (ref 6–20)
BUN/CREAT SERPL: 32 (ref 12–20)
CALCIUM SERPL-MCNC: 9.8 MG/DL (ref 8.5–10.1)
CHLORIDE SERPL-SCNC: 105 MMOL/L (ref 97–108)
CO2 SERPL-SCNC: 31 MMOL/L (ref 21–32)
CREAT SERPL-MCNC: 1.24 MG/DL (ref 0.55–1.02)
DIFFERENTIAL METHOD BLD: NORMAL
EOSINOPHIL # BLD: 0.1 K/UL (ref 0–0.4)
EOSINOPHIL NFR BLD: 1 % (ref 0–7)
ERYTHROCYTE [DISTWIDTH] IN BLOOD BY AUTOMATED COUNT: 12.9 % (ref 11.5–14.5)
GLUCOSE SERPL-MCNC: 125 MG/DL (ref 65–100)
HCT VFR BLD AUTO: 43.2 % (ref 35–47)
HGB BLD-MCNC: 13.9 G/DL (ref 11.5–16)
IMM GRANULOCYTES # BLD AUTO: 0 K/UL (ref 0–0.04)
IMM GRANULOCYTES NFR BLD AUTO: 0 % (ref 0–0.5)
LYMPHOCYTES # BLD: 1.3 K/UL (ref 0.8–3.5)
LYMPHOCYTES NFR BLD: 16 % (ref 12–49)
MCH RBC QN AUTO: 31.6 PG (ref 26–34)
MCHC RBC AUTO-ENTMCNC: 32.2 G/DL (ref 30–36.5)
MCV RBC AUTO: 98.2 FL (ref 80–99)
MONOCYTES # BLD: 0.7 K/UL (ref 0–1)
MONOCYTES NFR BLD: 8 % (ref 5–13)
NEUTS SEG # BLD: 6.3 K/UL (ref 1.8–8)
NEUTS SEG NFR BLD: 75 % (ref 32–75)
NRBC # BLD: 0 K/UL (ref 0–0.01)
NRBC BLD-RTO: 0 PER 100 WBC
PLATELET # BLD AUTO: 150 K/UL (ref 150–400)
PMV BLD AUTO: 12.5 FL (ref 8.9–12.9)
POTASSIUM SERPL-SCNC: 4.3 MMOL/L (ref 3.5–5.1)
RBC # BLD AUTO: 4.4 M/UL (ref 3.8–5.2)
SODIUM SERPL-SCNC: 140 MMOL/L (ref 136–145)
TSH SERPL DL<=0.05 MIU/L-ACNC: 4 UIU/ML (ref 0.36–3.74)
WBC # BLD AUTO: 8.5 K/UL (ref 3.6–11)

## 2020-02-18 NOTE — PROGRESS NOTES
Ruthann Walls is a 80 y.o. female who presents today for Chronic Kidney Disease; Hypertension; Hypothyroidism; and Cholesterol Problem  . She has a history of   Patient Active Problem List   Diagnosis Code    Mixed hyperlipidemia E78.2    Atrial fibrillation ([facility] I48.91    Mitral regurgitation I34.0    Anxiety and depression F41.9, F32.9    [facility] cancer ([facility] C18.9    Hypothyroid E03.9    CKD (chronic kidney disease) stage 3, GFR 30-59 ml/min ([facility]) N18.3    Osteopenia M85.80    Restrictive lung disease J98.4    Ischemic cardiomyopathy I25.5    Psoriasis L40.9    Pacemaker Z95.0    Latent tuberculosis by blood test Z22.7    Frequent falls R29.6    COPD (chronic obstructive pulmonary disease) ([facility]) J44.9    Chronic respiratory failure with hypoxia ([facility]) J96.11    Long term systemic steroid user Z79.52    Hypertension I10    AAA (abdominal aortic aneurysm) ([facility]) I71.4    Constipation K59.00    Lumbar compression fracture ([facility]) S32.000A    Hematuria R31.9    Osteoporosis M81.0    Chronic midline low back pain without sciatica M54.5, G89.29   . Today patient is here for follow-up. .     She recently celebrated her 80th birthday. Noting more L hand weakness and dropping some things. More clumsiness. Denies any numbness or tingling. Denies any pain. Did have a mechanical fall. Did bruise her back a bit. Does note that her balance is a bit off. This is worsened since physical therapy has stopped. She does note that she has the ability to do some balance training where she lives. She will asked them to start this. Otherwise we could order physical therapy. Hypothyroidism: Patient continues to take levothyroxine. We will repeat these levels    Hypertension - BP is stable.    Hypertension ROS: taking medications as instructed, no medication side effects noted, no TIA's, no chest pain on exertion, no dyspnea on exertion, no swelling of ankles     reports that she is a non-smoker but has been exposed to tobacco smoke. She has never used smokeless tobacco.    reports no history of alcohol use. BP Readings from Last 2 Encounters:   02/18/20 128/70   01/24/20 114/66     ICM/Afib: Volume status is stable. She continues to follow with cardiology. Following closely with cardiology regarding battery life. Anxiety and depression: Patient continues to take low-dose SSRI. She notes that overall her mood is stable. COPD: On low dose prednisone as well as Advair and Spiriva. Osteoporosis: Plan was to start Prolia but given ongoing dental issues they have held off on this. Skin breakdown: Has recurrent sores to her back. No signs of infection. We discussed how to manage she is at home. If these persist or get worse would ask for home health and wound care to come see her. Does have a small infrarenal AAA. Last evaluated in June 2019. ROS  Review of Systems   Constitutional: Negative for chills, fever and weight loss. HENT: Negative for congestion and sore throat. Eyes: Negative for blurred vision, double vision and photophobia. Respiratory: Negative for cough, hemoptysis, sputum production and shortness of breath. Cardiovascular: Negative for chest pain, palpitations and leg swelling. Gastrointestinal: Negative for abdominal pain, constipation, diarrhea, heartburn, nausea and vomiting. Genitourinary: Negative for dysuria, frequency and urgency. Musculoskeletal: Positive for falls. Negative for back pain, joint pain, myalgias and neck pain. Skin: Negative for rash. Areas of skin breakdown to right mid back into upper buttocks. Neurological: Positive for dizziness (occasional with positional changes. ). Negative for headaches. Started have episodes of left hand weakness. Endo/Heme/Allergies: Does not bruise/bleed easily. Psychiatric/Behavioral: Negative for memory loss and suicidal ideas.        Visit Vitals  /70 (BP 1 Location: Right arm, BP Patient Position: Sitting)   Pulse 85   Temp 98.1 °F (36.7 °C) (Oral)   Resp 16   Ht 5' 2\" (1.575 m)   Wt 159 lb (72.1 kg)   SpO2 97%   BMI 29.08 kg/m²       Physical Exam  Constitutional:       General: She is not in acute distress. Appearance: She is well-developed. HENT:      Head: Normocephalic and atraumatic. Right Ear: Tympanic membrane normal.      Left Ear: Tympanic membrane normal.   Neck:      Musculoskeletal: Normal range of motion and neck supple. Thyroid: No thyromegaly. Cardiovascular:      Rate and Rhythm: Normal rate and regular rhythm. Heart sounds: No murmur. Comments: Pacemaker present  Pulmonary:      Effort: Pulmonary effort is normal.      Breath sounds: Normal breath sounds. No wheezing. Comments: No basilar crackles  Abdominal:      General: Bowel sounds are normal. There is no distension. Palpations: Abdomen is soft. Musculoskeletal: Normal range of motion. General: No swelling. Comments: No obvious abnormalities to left hand though she does seem to have some decreased muscle mass that hand compared to right. Slightly decreased  strength. Skin:     General: Skin is warm and dry. Comments: What appears to be a small cyst versus area of skin breakdown to mid upper right back. Neurological:      Mental Status: She is alert and oriented to person, place, and time. Cranial Nerves: No cranial nerve deficit. Psychiatric:         Behavior: Behavior normal.           Current Outpatient Medications   Medication Sig    SYNTHROID 88 mcg tablet TAKE 1 TABLET BY MOUTH EVERY DAY BEFORE BREAKFAST    raNITIdine (ZANTAC) 150 mg tablet TAKE 1 TABLET BY MOUTH TWICE A DAY    diclofenac (VOLTAREN) 1 % gel Apply 2 g to affected area four (4) times daily.     ketoconazole (NIZORAL) 2 % shampoo Apply 5 to 10 mL to wet scalp, lather, leave on 3 to 5 minutes, and rinse; apply once every 1 to 2 weeks    simvastatin (ZOCOR) 20 mg tablet TAKE 1 TABLET BY MOUTH NIGHTLY    nitroglycerin (NITROSTAT) 0.4 mg SL tablet 1 Tab by SubLINGual route every five (5) minutes as needed for Chest Pain. Up to 3 doses.  ZOLOFT 50 mg tablet TAKE 1 TABLET BY MOUTH EVERY DAY    carvedilol (COREG) 3.125 mg tablet TAKE 1 TABLET BY MOUTH TWICE A DAY WITH MEALS    Oxygen 2 liters via NC at night. And as needed    furosemide (LASIX) 40 mg tablet TAKE 1 TABLET BY MOUTH DAILY. (Patient taking differently: 40 mg.)    loperamide (IMODIUM) 1 mg/5 mL solution Take 10 mL by mouth daily as needed for Diarrhea.  acetaminophen (TYLENOL EXTRA STRENGTH) 500 mg tablet Take 1,000 mg by mouth every six (6) hours as needed.  multivitamins (CHEWABLE-AMOS) chew Take 1 Tab by mouth daily.  lidocaine (LIDODERM) 5 % 1 Patch by TransDERmal route every twenty-four (24) hours. Apply patch to the affected area for 12 hours a day and remove for 12 hours a day.  apixaban (ELIQUIS) 5 mg tablet Take 1 Tab by mouth two (2) times a day. Stop coumadin now (last dose 5/22). Hold blood thinners 4 days. Start eliquis Monday 5/27. (Patient taking differently: Take 5 mg by mouth two (2) times a day.)    cholecalciferol, vitamin D3, (VITAMIN D3) 2,000 unit tab Take 2,000 Units by mouth daily.  predniSONE (DELTASONE) 5 mg tablet Take 10 mg by mouth daily.  albuterol (PROVENTIL HFA, VENTOLIN HFA, PROAIR HFA) 90 mcg/actuation inhaler Take 1 Puff by inhalation every six (6) hours as needed for Wheezing.  baclofen (LIORESAL) 10 mg tablet Take 10 mg by mouth two (2) times a day.  DENTA 5000 PLUS 1.1 % crea Apply 1 mg to affected area as needed (to prevent cavities).  ADVAIR DISKUS 250-50 mcg/dose diskus inhaler Take 1 Puff by inhalation two (2) times a day.  tiotropium (SPIRIVA WITH HANDIHALER) 18 mcg inhalation capsule Take 1 Cap by inhalation daily. No current facility-administered medications for this visit.          Past Medical History:   Diagnosis Date    Arthritis     Atrial fibrillation ([facility]     av node ablation 5/22/98 with placement of CPI #[id number] dual chamber pacemaker subsequently replaced with a single chamber medtronic #[id number] single chamber pacemaker 7/25/05    Cancer ([facility] 1970's    colon cancer w/ resection    COPD     Depression     GERD (gastroesophageal reflux disease)     Hyperlipidemia     Hypertension     Ischemic cardiomyopathy     Migraine headache     Orthostatic hypotension     RADHA (obstructive sleep apnea)     Pacemaker 5/22/98    AV node ablation /pacemaker placement utilizing CPI # [id number] dual chamber system, medtronic #[id number] single chamber device placed 7/22/05    Pulmonary hypertension ([facility] 9/26/2011    RVSP 50 on echo 8/14    Thyroid disease     Valvular heart disease     mild-mod MR/TR      Past Surgical History:   Procedure Laterality Date    BREAST SURGERY PROCEDURE UNLISTED      benign breast tumor excision right breast    HX BREAST BIOPSY Right 2002    neg; surgical bx    HX COLECTOMY  1974    colon    HX KNEE REPLACEMENT      right TKA    HX PACEMAKER      HX TUBAL LIGATION      IR KYPHOPLASTY LUMBAR  7/2/2019    TOTAL KNEE ARTHROPLASTY      total on R, partial on L      Social History     Tobacco Use    Smoking status: Passive Smoke Exposure - Never Smoker    Smokeless tobacco: Never Used   Substance Use Topics    Alcohol use: No     Alcohol/week: 0.0 standard drinks      Family History   Problem Relation Age of Onset    Diabetes Mother     Heart Disease Mother     Heart Attack Mother     Heart Disease Father     Stroke Sister     Breast Cancer Sister 80    Cancer Maternal Aunt         uterus    Diabetes Maternal Uncle     Breast Cancer Daughter 61        Allergies   Allergen Reactions    Cardizem [Diltiazem Hcl] Hives    Codeine Nausea and Vomiting    Darvocet A500 [Propoxyphene N-Acetaminophen] Nausea and Vomiting    Diltiazem Hives    Labetalol Nausea and Vomiting     Dizzy/Disoriented     Pcn [Penicillins] Swelling     Tongue Swelling   Has previously tolerated cephalexin per daughter    Primidone Other (comments)     Lightheaded/unsteady gait    Sulfa (Sulfonamide Antibiotics) Nausea and Vomiting        Assessment/Plan  Diagnoses and all orders for this visit:    1. Hypothyroidism due to acquired atrophy of thyroid-repeat levels today.  -     TSH 3RD GENERATION; Future  -     CBC WITH AUTOMATED DIFF; Future  -     METABOLIC PANEL, BASIC; Future    2. Anxiety and depression-mental health doing well. 4. Essential hypertension-blood pressure stable. -     TSH 3RD GENERATION; Future  -     CBC WITH AUTOMATED DIFF; Future  -     METABOLIC PANEL, BASIC; Future    5. Abdominal aortic aneurysm (AAA) without rupture (HCC)-small. 6. Osteoporosis, unspecified osteoporosis type, unspecified pathological fracture presence-holding off on any therapy given planned oral procedure. 7. Left hand weakness-patient with signs or symptoms of carpal tunnel. Will send to hand orthopedist  -     REFERRAL TO ORTHOPEDICS    8. Fall, sequela-mechanical.  I believe that she would benefit from balance training    9. Skin lesion-areas of skin breakdown to her back. Family will perform wound care and if this persist will ask home health to assist us. Follow-up and Dispositions    · Return in about 6 months (around 8/18/2020). Bonnie Mckinney MD  2/18/2020    This note was created with the help of speech recognition software [employer]) and may contain some 'sound alike' errors.

## 2020-02-20 ENCOUNTER — OFFICE VISIT (OUTPATIENT)
Dept: CARDIOLOGY CLINIC | Age: 85
End: 2020-02-20

## 2020-02-20 ENCOUNTER — CLINICAL SUPPORT (OUTPATIENT)
Dept: CARDIOLOGY CLINIC | Age: 85
End: 2020-02-20

## 2020-02-20 VITALS
HEART RATE: 87 BPM | DIASTOLIC BLOOD PRESSURE: 74 MMHG | HEIGHT: 62 IN | SYSTOLIC BLOOD PRESSURE: 128 MMHG | RESPIRATION RATE: 14 BRPM | BODY MASS INDEX: 29.08 KG/M2

## 2020-02-20 DIAGNOSIS — I34.0 MITRAL VALVE INSUFFICIENCY, UNSPECIFIED ETIOLOGY: ICD-10-CM

## 2020-02-20 DIAGNOSIS — N18.30 CKD (CHRONIC KIDNEY DISEASE) STAGE 3, GFR 30-59 ML/MIN (HCC): ICD-10-CM

## 2020-02-20 DIAGNOSIS — I25.5 ISCHEMIC CARDIOMYOPATHY: ICD-10-CM

## 2020-02-20 DIAGNOSIS — I10 ESSENTIAL HYPERTENSION: ICD-10-CM

## 2020-02-20 DIAGNOSIS — I44.7 LBBB (LEFT BUNDLE BRANCH BLOCK): ICD-10-CM

## 2020-02-20 DIAGNOSIS — J43.9 PULMONARY EMPHYSEMA, UNSPECIFIED EMPHYSEMA TYPE (HCC): ICD-10-CM

## 2020-02-20 DIAGNOSIS — Z95.0 CARDIAC PACEMAKER IN SITU: Primary | ICD-10-CM

## 2020-02-20 DIAGNOSIS — I48.20 CHRONIC ATRIAL FIBRILLATION (HCC): ICD-10-CM

## 2020-02-20 DIAGNOSIS — I44.2 CHB (COMPLETE HEART BLOCK) (HCC): ICD-10-CM

## 2020-02-20 NOTE — PROGRESS NOTES
Cardiac Electrophysiology OFFICE Note Subjective:  
  
Grisel Sheffield is a 80 y.o. 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 Melida Dillard. Device check today shows proper lead & generator function. Generator longevity estimated 8 months, though now at 2.63V. Pacer dependent. Known dilated cardiomyopathy, last LVEF 31-35% in 02/2019. NYHA II-III chronic systolic CHF, notes shortness of breath with activities around her apartment, mainly stays in wheelchair when out & about. GDMT includes carvedilol, furosemide, but no ACEi/ARB due to renal function. History COPD, uses supplemental oxygen nightly, sometimes during the day with activity. Multiple inhalers, is on prednisone po. No falls since having home PT, but still feels unsteady on her feet. She & daughter vocalize concern regarding timing of pacemaker ROBBIE & extensive dental work (extraction x 6), concerned regarding risk of infection. They would like pacemaker generator change (+/- upgrade to biventricular PM) as soon as possible. On Eliquis 5 mg po bid, denies bleeding issues. Previous: 
Echo (02/21/2019): LVEF 31-35%, mod concentric LVH, inconclusive LV diastolic function. RV global systolic function mildly reduced. RA cavity size mod dilated. Mild to mod MR, mild MAC. Mild AS, mild AR. Mod TR, mod PH. Mod elevated CVP. ECG (06/29/2020): AS-, QRSd 170 ms. Lexiscan cardiolite stress (08/24/2016): Large size, mod to high grade, partially reversible defect involving mid-distal anterior wall & apex. Extent of ischemia moderate in LAD territory. LVEF 35% with apical akinesis. Dr. Collin Luna is primary cardiologist. 
 
S/p AV node ablation & PM implant 1998. Lumbar fracture 07/2019, had kyphoplasty & injection. Lives at [facility]. Problem List  Date Reviewed: 1/23/2020 Codes Class Noted Osteoporosis ICD-10-CM: M81.0 ICD-9-CM: 733.00  8/23/2019 Chronic midline low back pain without sciatica ICD-10-CM: M54.5, G89.29 ICD-9-CM: 724.2, 338.29  8/23/2019 Hematuria ICD-10-CM: R31.9 ICD-9-CM: 599.70  6/30/2019 Hypertension (Chronic) ICD-10-CM: I10 
ICD-9-CM: 401.9  6/29/2019 AAA (abdominal aortic aneurysm) (HCC) (Chronic) ICD-10-CM: I71.4 ICD-9-CM: 441.4  6/29/2019 Constipation ICD-10-CM: K59.00 ICD-9-CM: 564.00  6/29/2019 Lumbar compression fracture (HCC) ICD-10-CM: S32.000A ICD-9-CM: 805.4  6/29/2019 Long term systemic steroid user (Chronic) ICD-10-CM: Z79.52 
ICD-9-CM: V58.65  3/21/2019 Frequent falls (Chronic) ICD-10-CM: R29.6 ICD-9-CM: V15.88  1/13/2019 COPD (chronic obstructive pulmonary disease) (HCC) (Chronic) ICD-10-CM: J44.9 ICD-9-CM: 498  1/13/2019 Chronic respiratory failure with hypoxia (HCC) (Chronic) ICD-10-CM: J96.11 
ICD-9-CM: 518.83, 799.02  1/13/2019 Latent tuberculosis by blood test ICD-10-CM: Z22.7 ICD-9-CM: 790.6  1/22/2018 Pacemaker (Chronic) ICD-10-CM: Z95.0 ICD-9-CM: V45.01  7/24/2017 Psoriasis (Chronic) ICD-10-CM: L40.9 ICD-9-CM: 696.1  3/23/2017 Ischemic cardiomyopathy (Chronic) ICD-10-CM: I25.5 ICD-9-CM: 414.8  Unknown Restrictive lung disease (Chronic) ICD-10-CM: J98.4 ICD-9-CM: 518.89  12/14/2015 Osteopenia ICD-10-CM: M85.80 ICD-9-CM: 733.90  11/23/2015 CKD (chronic kidney disease) stage 3, GFR 30-59 ml/min (HCC) (Chronic) ICD-10-CM: N18.3 ICD-9-CM: 585.3  2/9/2015 Anxiety and depression (Chronic) ICD-10-CM: F41.9, F32.9 ICD-9-CM: 300.00, 311  2/26/2014 Colon cancer (HCC) (Chronic) ICD-10-CM: C18.9 ICD-9-CM: 153.9  2/26/2014 Overview Addendum 2/26/2014 11:12 AM by Lauren Bennett Partial colectomy Petey Allen Hypothyroid (Chronic) ICD-10-CM: E03.9 ICD-9-CM: 244.9  2/26/2014 Atrial fibrillation (HCC) (Chronic) ICD-10-CM: I48.91 
ICD-9-CM: 427.31  Unknown Overview Signed 10/21/2010  8:16 AM by Aamir Gabriel  
  av node ablation 5/22/98 with placement of CPI #[id number] dual chamber pacemaker subsequently replaced with a single chamber medtronic #[id number] single chamber pacemaker 7/25/05 Mitral regurgitation (Chronic) ICD-10-CM: I34.0 ICD-9-CM: 424.0  10/21/2010 Mixed hyperlipidemia (Chronic) ICD-10-CM: P80.7 ICD-9-CM: 272.2  10/18/2010 Current Outpatient Medications Medication Sig Dispense Refill  
 SYNTHROID 88 mcg tablet TAKE 1 TABLET BY MOUTH EVERY DAY BEFORE BREAKFAST 90 Tab 1  raNITIdine (ZANTAC) 150 mg tablet TAKE 1 TABLET BY MOUTH TWICE A  Tab 1  
 diclofenac (VOLTAREN) 1 % gel Apply 2 g to affected area four (4) times daily. 100 g 3  
 ketoconazole (NIZORAL) 2 % shampoo Apply 5 to 10 mL to wet scalp, lather, leave on 3 to 5 minutes, and rinse; apply once every 1 to 2 weeks 120 mL 2  
 simvastatin (ZOCOR) 20 mg tablet TAKE 1 TABLET BY MOUTH NIGHTLY 90 Tab 3  
 nitroglycerin (NITROSTAT) 0.4 mg SL tablet 1 Tab by SubLINGual route every five (5) minutes as needed for Chest Pain. Up to 3 doses. 1 Bottle 1  
 ZOLOFT 50 mg tablet TAKE 1 TABLET BY MOUTH EVERY DAY 90 Tab 1  carvedilol (COREG) 3.125 mg tablet TAKE 1 TABLET BY MOUTH TWICE A DAY WITH MEALS 180 Tab 1  
 Oxygen 2 liters via NC at night. And as needed  furosemide (LASIX) 40 mg tablet TAKE 1 TABLET BY MOUTH DAILY. (Patient taking differently: 20 mg.) 90 Tab 2  
 loperamide (IMODIUM) 1 mg/5 mL solution Take 10 mL by mouth daily as needed for Diarrhea. 60 mL 0  
 acetaminophen (TYLENOL EXTRA STRENGTH) 500 mg tablet Take 1,000 mg by mouth every six (6) hours as needed.  multivitamins (CHEWABLE-AMOS) chew Take 1 Tab by mouth daily.     
 lidocaine (LIDODERM) 5 % 1 Patch by TransDERmal route every twenty-four (24) hours. Apply patch to the affected area for 12 hours a day and remove for 12 hours a day.  apixaban (ELIQUIS) 5 mg tablet Take 1 Tab by mouth two (2) times a day. Stop coumadin now (last dose 5/22). Hold blood thinners 4 days. Start eliquis Monday 5/27. (Patient taking differently: Take 5 mg by mouth two (2) times a day.) 180 Tab 3  cholecalciferol, vitamin D3, (VITAMIN D3) 2,000 unit tab Take 2,000 Units by mouth daily.  predniSONE (DELTASONE) 5 mg tablet Take 10 mg by mouth daily.  albuterol (PROVENTIL HFA, VENTOLIN HFA, PROAIR HFA) 90 mcg/actuation inhaler Take 1 Puff by inhalation every six (6) hours as needed for Wheezing.  baclofen (LIORESAL) 10 mg tablet Take 5 mg by mouth two (2) times a day.  DENTA 5000 PLUS 1.1 % crea Apply 1 mg to affected area as needed (to prevent cavities). 2  
 ADVAIR DISKUS 250-50 mcg/dose diskus inhaler Take 1 Puff by inhalation two (2) times a day.  tiotropium (SPIRIVA WITH HANDIHALER) 18 mcg inhalation capsule Take 1 Cap by inhalation daily. Allergies Allergen Reactions  Cardizem [Diltiazem Hcl] Hives  Codeine Nausea and Vomiting  Darvocet A500 [Propoxyphene N-Acetaminophen] Nausea and Vomiting  Diltiazem Hives  Labetalol Nausea and Vomiting Dizzy/Disoriented  Pcn [Penicillins] Swelling Tongue Swelling Has previously tolerated cephalexin per daughter  Primidone Other (comments) Lightheaded/unsteady gait  Sulfa (Sulfonamide Antibiotics) Nausea and Vomiting Past Medical History:  
Diagnosis Date  Arthritis  Atrial fibrillation ([address]   
 av node ablation 5/22/98 with placement of CPI #[id number] dual chamber pacemaker subsequently replaced with a single chamber medtronic #[id number] single chamber pacemaker 7/25/05  Cancer [facility]) [id number]  
 colon cancer w/ resection  COPD  Depression  GERD (gastroesophageal reflux disease)  Hyperlipidemia  Hypertension  Ischemic cardiomyopathy  Migraine headache  Orthostatic hypotension  RADHA (obstructive sleep apnea)  Pacemaker 5/22/98 AV node ablation /pacemaker placement utilizing CP # [id number] dual chamber system, medtronic #[id number] single chamber device placed 7/22/05  Pulmonary hypertension ([address] 9/26/2011 RVSP 50 on echo 8/14  Thyroid disease  Valvular heart disease   
 mild-mod MR/TR Past Surgical History:  
Procedure Laterality Date  BREAST SURGERY PROCEDURE UNLISTED    
 benign breast tumor excision right breast  
 HX BREAST BIOPSY Right 2002  
 neg; surgical bx  HX COLECTOMY  1974  
 colon  HX KNEE REPLACEMENT    
 right TKA  HX PACEMAKER    
 HX TUBAL LIGATION    
 IR KYPHOPLASTY LUMBAR  7/2/2019  TOTAL KNEE ARTHROPLASTY    
 total on R, partial on L Family History Problem Relation Age of Onset  Diabetes Mother  Heart Disease Mother  Heart Attack Mother  Heart Disease Father  Stroke Sister  Breast Cancer Sister 80  
 Cancer Maternal Aunt   
     uterus  Diabetes Maternal Uncle  Breast Cancer Daughter 61 Social History Tobacco Use  Smoking status: Passive Smoke Exposure - Never Smoker  Smokeless tobacco: Never Used Substance Use Topics  Alcohol use: No  
  Alcohol/week: 0.0 standard drinks Review of Systems:  
Constitutional: Negative for fever, chills, weight loss, + malaise/fatigue. HEENT: Negative for nosebleeds, vision changes. + dental pain/ Respiratory: Negative for cough, hemoptysis Cardiovascular: Negative for chest pain, palpitations, no orthopnea with supplemental oxygen, no claudication, leg swelling, syncope, and no PND. + JIMENEZ Gastrointestinal: Negative for nausea, vomiting, + chronic diarrhea, no blood in stool and no melena. Genitourinary: Negative for dysuria, and hematuria. Musculoskeletal: Negative for myalgias, arthralgia. Skin: Negative for rash. Heme: Does not bleed or bruise easily. Neurological: Negative for speech change and focal weakness Objective:  
 
Visit Vitals /74 (BP 1 Location: Left arm, BP Patient Position: Sitting) Pulse 87 Resp 14 Ht 5' 2\" (1.575 m) LMP 02/26/1970 BMI 29.08 kg/m² Physical Exam:  
Constitutional: Well-developed and well-nourished. No respiratory distress. Head: Normocephalic and atraumatic. Eyes: Pupils are equal, round ENT: Hearing normal. 
Neck: Supple. No JVD present. Cardiovascular: Normal rate, regular rhythm. Exam reveals no gallop and no friction rub. 2/6 systolic murmur. Pulmonary/Chest: Effort normal and breath sounds normal. No wheezes. Abdominal: Soft, no tenderness. Musculoskeletal: No edema. Neurological: Alert,oriented. Skin: Skin is warm and dry. Left chest pacer site well healed. Psychiatric: Normal mood and affect. Behavior is normal. Judgment and thought content normal.   
 
 
Assessment/Plan: ICD-10-CM ICD-9-CM 1. Cardiac pacemaker in situ Z95.0 V45.01   
2. Essential hypertension I10 401.9 3. Ischemic cardiomyopathy I25.5 414.8 4. Chronic atrial fibrillation I48.20 427.31   
5. LBBB (left bundle branch block) I44.7 426.3 6. Mitral valve insufficiency, unspecified etiology I34.0 424.0 7. CHB (complete heart block) ([facility]) I44.2 426.0 Device check today shows proper lead & generator function. Generator longevity estimated 8 months, though now at 2.63V. Pacer dependent. Last LVEF in 02/2019 30-35%, NYHA II-III. Appropriate GDMT, but no ACEi/ARB due to renal dysfunction. Will repeat echo for LVEF prior to generator change. Will likely perform upgrade to biventricular pacemaker +/- ICD. Discussed risks/benefits of procedure. Will determine exact procedure after echo. Continue Eliquis for embolic CVA prophylaxis. Future Appointments Date Time Provider Cuca Willis 4/9/2020 10:15 AM [id number], [address] [facility]  
5/29/2020 10:00 AM Argelia Hammond  [address] Thank you for involving me in this patient's care and please call with further concerns or questions. Ilir Estrada M.D. Electrophysiology/Cardiology [facility] [place] [address] , [facility] [address] 
[phone]                                        [phone]

## 2020-02-23 NOTE — PROGRESS NOTES
Cardiac Electrophysiology OFFICE Note     Subjective:      Danette Lindsay is a 80 y.o. 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 Javier Rosas. Device check today shows proper lead & generator function. Generator longevity battery is at 2.63V, ROBBIE. She is pacer dependent. Known dilated cardiomyopathy, last LVEF 31-35% in 02/2019. NYHA II-III chronic systolic CHF, notes shortness of breath with activities around her apartment, mainly stays in wheelchair when out & about. GDMT includes carvedilol, furosemide, but no ACEi/ARB due to renal function. History COPD, uses supplemental oxygen nightly, sometimes during the day with activity. Multiple inhalers, is on prednisone po. No falls since having home PT, but still feels unsteady on her feet. She & daughter vocalize concern regarding timing of pacemaker ROBBIE & extensive dental work (extraction x 6), concerned regarding risk of infection. They would like pacemaker generator change (+/- upgrade to biventricular PM) as soon as possible. On Eliquis 5 mg po bid, denies bleeding issues. Previous:  Echo (02/21/2019): LVEF 31-35%, mod concentric LVH, inconclusive LV diastolic function. RV global systolic function mildly reduced. RA cavity size mod dilated. Mild to mod MR, mild MAC. Mild AS, mild AR. Mod TR, mod PH. Mod elevated CVP. ECG (06/29/2020): AS-, QRSd 170 ms. Lexiscan cardiolite stress (08/24/2016): Large size, mod to high grade, partially reversible defect involving mid-distal anterior wall & apex. Extent of ischemia moderate in LAD territory. LVEF 35% with apical akinesis. Dr. Claude Matias is primary cardiologist.    S/p AV node ablation & PM implant 1998. Lumbar fracture 07/2019, had kyphoplasty & injection. Lives at [facility].     Problem List  Date Reviewed: 2/20/2020          Codes Class Noted    Osteoporosis ICD-10-CM: M81.0  ICD-9-CM: 733.00  8/23/2019        Chronic midline low back pain without sciatica ICD-10-CM: M54.5, G89.29  ICD-9-CM: 724.2, 338.29  8/23/2019        Hematuria ICD-10-CM: R31.9  ICD-9-CM: 599.70  6/30/2019        Hypertension (Chronic) ICD-10-CM: I10  ICD-9-CM: 401.9  6/29/2019        AAA (abdominal aortic aneurysm) (HCC) (Chronic) ICD-10-CM: I71.4  ICD-9-CM: 441.4  6/29/2019        Constipation ICD-10-CM: K59.00  ICD-9-CM: 564.00  6/29/2019        Lumbar compression fracture ([facility] ICD-10-CM: S32.000A  ICD-9-CM: 805.4  6/29/2019        Long term systemic steroid user (Chronic) ICD-10-CM: Z79.52  ICD-9-CM: V58.65  3/21/2019        Frequent falls (Chronic) ICD-10-CM: R29.6  ICD-9-CM: V15.88  1/13/2019        COPD (chronic obstructive pulmonary disease) (HCC) (Chronic) ICD-10-CM: J44.9  ICD-9-CM: 496  1/13/2019        Chronic respiratory failure with hypoxia (HCC) (Chronic) ICD-10-CM: J96.11  ICD-9-CM: 518.83, 799.02  1/13/2019        Latent tuberculosis by blood test ICD-10-CM: Z22.7  ICD-9-CM: 790.6  1/22/2018        Pacemaker (Chronic) ICD-10-CM: Z95.0  ICD-9-CM: V45.01  7/24/2017        Psoriasis (Chronic) ICD-10-CM: L40.9  ICD-9-CM: 696.1  3/23/2017        Ischemic cardiomyopathy (Chronic) ICD-10-CM: I25.5  ICD-9-CM: 414.8  Unknown        Restrictive lung disease (Chronic) ICD-10-CM: J98.4  ICD-9-CM: 518.89  12/14/2015        Osteopenia ICD-10-CM: M85.80  ICD-9-CM: 733.90  11/23/2015        CKD (chronic kidney disease) stage 3, GFR 30-59 ml/min (HCC) (Chronic) ICD-10-CM: N18.3  ICD-9-CM: 585.3  2/9/2015        Anxiety and depression (Chronic) ICD-10-CM: F41.9, F32.9  ICD-9-CM: 300.00, 311  2/26/2014        Colon cancer ([facility] [address] (Chronic) ICD-10-CM: C18.9  ICD-9-CM: 153.9  2/26/2014    Overview Addendum 2/26/2014 11:12 AM by Cherie Lennox     Partial colectomy  Petey Allen             Hypothyroid (Chronic) ICD-10-CM: E03.9  ICD-9-CM: 244.9  2/26/2014        Atrial fibrillation (HCC) (Chronic) ICD-10-CM: I48.91  ICD-9-CM: 427.31  Unknown    Overview Signed 10/21/2010  8:16 AM by Kerry Pak     av node ablation 5/22/98 with placement of CPI #[id number] dual chamber pacemaker subsequently replaced with a single chamber medtronic #[id number] single chamber pacemaker 7/25/05             Mitral regurgitation (Chronic) ICD-10-CM: I34.0  ICD-9-CM: 424.0  10/21/2010        Mixed hyperlipidemia (Chronic) ICD-10-CM: R67.6  ICD-9-CM: 272.2  10/18/2010              Current Outpatient Medications   Medication Sig Dispense Refill    SYNTHROID 88 mcg tablet TAKE 1 TABLET BY MOUTH EVERY DAY BEFORE BREAKFAST 90 Tab 1    raNITIdine (ZANTAC) 150 mg tablet TAKE 1 TABLET BY MOUTH TWICE A  Tab 1    diclofenac (VOLTAREN) 1 % gel Apply 2 g to affected area four (4) times daily. 100 g 3    ketoconazole (NIZORAL) 2 % shampoo Apply 5 to 10 mL to wet scalp, lather, leave on 3 to 5 minutes, and rinse; apply once every 1 to 2 weeks 120 mL 2    simvastatin (ZOCOR) 20 mg tablet TAKE 1 TABLET BY MOUTH NIGHTLY 90 Tab 3    nitroglycerin (NITROSTAT) 0.4 mg SL tablet 1 Tab by SubLINGual route every five (5) minutes as needed for Chest Pain. Up to 3 doses. 1 Bottle 1    ZOLOFT 50 mg tablet TAKE 1 TABLET BY MOUTH EVERY DAY 90 Tab 1    carvedilol (COREG) 3.125 mg tablet TAKE 1 TABLET BY MOUTH TWICE A DAY WITH MEALS 180 Tab 1    Oxygen 2 liters via NC at night. And as needed      furosemide (LASIX) 40 mg tablet TAKE 1 TABLET BY MOUTH DAILY. (Patient taking differently: 20 mg.) 90 Tab 2    loperamide (IMODIUM) 1 mg/5 mL solution Take 10 mL by mouth daily as needed for Diarrhea. 60 mL 0    acetaminophen (TYLENOL EXTRA STRENGTH) 500 mg tablet Take 1,000 mg by mouth every six (6) hours as needed.  multivitamins (CHEWABLE-AMOS) chew Take 1 Tab by mouth daily.  lidocaine (LIDODERM) 5 % 1 Patch by TransDERmal route every twenty-four (24) hours. Apply patch to the affected area for 12 hours a day and remove for 12 hours a day.  apixaban (ELIQUIS) 5 mg tablet Take 1 Tab by mouth two (2) times a day. Stop coumadin now (last dose 5/22). Hold blood thinners 4 days. Start eliquis Monday 5/27. (Patient taking differently: Take 5 mg by mouth two (2) times a day.) 180 Tab 3    cholecalciferol, vitamin D3, (VITAMIN D3) 2,000 unit tab Take 2,000 Units by mouth daily.  predniSONE (DELTASONE) 5 mg tablet Take 10 mg by mouth daily.  albuterol (PROVENTIL HFA, VENTOLIN HFA, PROAIR HFA) 90 mcg/actuation inhaler Take 1 Puff by inhalation every six (6) hours as needed for Wheezing.  baclofen (LIORESAL) 10 mg tablet Take 5 mg by mouth two (2) times a day.  DENTA 5000 PLUS 1.1 % crea Apply 1 mg to affected area as needed (to prevent cavities). 2    ADVAIR DISKUS 250-50 mcg/dose diskus inhaler Take 1 Puff by inhalation two (2) times a day.  tiotropium (SPIRIVA WITH HANDIHALER) 18 mcg inhalation capsule Take 1 Cap by inhalation daily.        Allergies   Allergen Reactions    Cardizem [Diltiazem Hcl] Hives    Codeine Nausea and Vomiting    Darvocet A500 [Propoxyphene N-Acetaminophen] Nausea and Vomiting    Diltiazem Hives    Labetalol Nausea and Vomiting     Dizzy/Disoriented     Pcn [Penicillins] Swelling     Tongue Swelling   Has previously tolerated cephalexin per daughter    Primidone Other (comments)     Lightheaded/unsteady gait    Sulfa (Sulfonamide Antibiotics) Nausea and Vomiting     Past Medical History:   Diagnosis Date    Arthritis     Atrial fibrillation ([address]     av node ablation 5/22/98 with placement of CPI #[id number] dual chamber pacemaker subsequently replaced with a single chamber medtronic #[id number] single chamber pacemaker 7/25/05    Cancer ([address] 1970's    colon cancer w/ resection    COPD     Depression     GERD (gastroesophageal reflux disease)     Hyperlipidemia     Hypertension     Ischemic cardiomyopathy     Migraine headache     Orthostatic hypotension     RADHA (obstructive sleep apnea)     Pacemaker 5/22/98    AV node ablation /pacemaker placement utilizing CPI # [id number] dual chamber system, medtronic #[id number] single chamber device placed 7/22/05    Pulmonary hypertension ([address] 9/26/2011    RVSP 50 on echo 8/14    Thyroid disease     Valvular heart disease     mild-mod MR/TR     Past Surgical History:   Procedure Laterality Date    BREAST SURGERY PROCEDURE UNLISTED      benign breast tumor excision right breast    HX BREAST BIOPSY Right 2002    neg; surgical bx    HX COLECTOMY  1974    colon    HX KNEE REPLACEMENT      right TKA    HX PACEMAKER      HX TUBAL LIGATION      IR KYPHOPLASTY LUMBAR  7/2/2019    TOTAL KNEE ARTHROPLASTY      total on R, partial on L     Family History   Problem Relation Age of Onset    Diabetes Mother     Heart Disease Mother     Heart Attack Mother     Heart Disease Father     Stroke Sister     Breast Cancer Sister 80    Cancer Maternal Aunt         uterus    Diabetes Maternal Uncle     Breast Cancer Daughter 61     Social History     Tobacco Use    Smoking status: Passive Smoke Exposure - Never Smoker    Smokeless tobacco: Never Used   Substance Use Topics    Alcohol use: No     Alcohol/week: 0.0 standard drinks        Review of Systems:   Constitutional: Negative for fever, chills, weight loss, + malaise/fatigue. HEENT: Negative for nosebleeds, vision changes. + dental pain/  Respiratory: Negative for cough, hemoptysis  Cardiovascular: Negative for chest pain, palpitations, no orthopnea with supplemental oxygen, no claudication, leg swelling, syncope, and no PND. + JIMENEZ  Gastrointestinal: Negative for nausea, vomiting, + chronic diarrhea, no blood in stool and no melena. Genitourinary: Negative for dysuria, and hematuria. Musculoskeletal: Negative for myalgias, arthralgia. Skin: Negative for rash. Heme: Does not bleed or bruise easily.    Neurological: Negative for speech change and focal weakness     Objective:     Visit Vitals  /74 (BP 1 Location: Left arm, BP Patient Position: Sitting)   Pulse 87   Resp 14   Ht 5' 2\" (1.575 m)   LMP 02/26/1970   BMI 29.08 kg/m²      Physical Exam:   Constitutional: Well-developed and well-nourished. No respiratory distress. Head: Normocephalic and atraumatic. Eyes: Pupils are equal, round  ENT: Hearing normal.  Neck: Supple. No JVD present. Cardiovascular: Normal rate, regular rhythm. Exam reveals no gallop and no friction rub. 2/6 systolic murmur. Pulmonary/Chest: Effort normal and breath sounds normal. No wheezes. Abdominal: Soft, no tenderness. Musculoskeletal: No edema. Neurological: Alert,oriented. Skin: Skin is warm and dry. Left chest pacer site well healed. Psychiatric: Normal mood and affect. Behavior is normal. Judgment and thought content normal.        Assessment/Plan:       ICD-10-CM ICD-9-CM    1. Cardiac pacemaker in situ Z95.0 V45.01 ECHO ADULT COMPLETE   2. Essential hypertension I10 401.9 ECHO ADULT COMPLETE   3. Ischemic cardiomyopathy I25.5 414.8 ECHO ADULT COMPLETE   4. Chronic atrial fibrillation I48.20 427.31 ECHO ADULT COMPLETE   5. LBBB (left bundle branch block) I44.7 426.3 ECHO ADULT COMPLETE   6. Mitral valve insufficiency, unspecified etiology I34.0 424.0 ECHO ADULT COMPLETE   7. CHB (complete heart block) ([facility]) I44.2 426.0 ECHO ADULT COMPLETE   8. CKD (chronic kidney disease) stage 3, GFR 30-59 ml/min ([facility]) N18.3 585.3    9. Pulmonary emphysema, unspecified emphysema type ([address] J43.9 492.8        Device check today shows proper lead & generator function. Generator is  at 2.63V, ROBBIE level. She is Pacer dependent. Last LVEF in 02/2019 30-35%, NYHA II-III. Appropriate GDMT, but no ACEi/ARB due to renal dysfunction, CKD. Will repeat echo for LVEF prior to generator change. We discussed upgrade to biventricular pacemaker +/- ICD. Discussed risks/benefits of procedure. Will determine procedure type, time after echo is done.     Continue Eliquis for embolic CVA prophylaxis. Future Appointments   Date Time Provider Cuca Willis   3/5/2020  2:00 PM VASCULAR, [address]   4/9/2020 10:15 AM PACEMAKER3, SHERRIE LLAMAS SCHED   5/29/2020 10:00 AM Darshana Moyer  [address]       Thank you for involving me in this patient's care and please call with further concerns or questions. Sil Rubio M.D.   Electrophysiology/Cardiology  [facility]  [address], [address]  [address]                             [address] [place] [address]  [phone]

## 2020-02-27 ENCOUNTER — PATIENT MESSAGE (OUTPATIENT)
Dept: INTERNAL MEDICINE CLINIC | Age: 85
End: 2020-02-27

## 2020-02-27 DIAGNOSIS — Z51.89 WOUND CHECK, ABSCESS: Primary | ICD-10-CM

## 2020-02-27 RX ORDER — DOXYCYCLINE 100 MG/1
100 TABLET ORAL 2 TIMES DAILY
Qty: 14 TAB | Refills: 0 | Status: SHIPPED | OUTPATIENT
Start: 2020-02-27 | End: 2020-03-05

## 2020-02-27 NOTE — TELEPHONE ENCOUNTER
From: James Pena  To: Pepper Baptiste MD  Sent: 2/27/2020 9:25 AM EST  Subject: Prescription Question    Message from [facility]. Angeles Po Angeles Po Angeles Forbes's daughter. During my mom's last visit /Dr. Roque Mckenna we showed pictures of potential cyst on mom's back and lower buttocks. ... Dr. Roque Mckenna suggested if it got worse to let let him know. .. I'm hopeful you can see the pictures attached we are wondering whether we should take her to the ER or bring her to see you or put Neosporin on them and put her on an antibiotic. If antibiotic is the route please take note of all of her allergies . Angeles Aguilera ...thank you    Pls advise  Komal Rojas [phone]

## 2020-03-02 ENCOUNTER — TELEPHONE (OUTPATIENT)
Dept: CARDIOLOGY CLINIC | Age: 85
End: 2020-03-02

## 2020-03-02 NOTE — TELEPHONE ENCOUNTER
Called Lesley pt daughterTwo patient identifiers confirmed. Manisha Brannon wanted me to relay what was said in Mychart massage to Dr. Del Gambino. Notified Manisha Brannon I would send Yorder message directly to him. Manisha Brannon  verbalized understanding of information discussed w/ no further questions at this time.

## 2020-03-06 ENCOUNTER — OFFICE VISIT (OUTPATIENT)
Dept: INTERNAL MEDICINE CLINIC | Age: 85
End: 2020-03-06

## 2020-03-06 ENCOUNTER — TELEPHONE (OUTPATIENT)
Dept: INTERNAL MEDICINE CLINIC | Age: 85
End: 2020-03-06

## 2020-03-06 ENCOUNTER — DOCUMENTATION ONLY (OUTPATIENT)
Dept: INTERNAL MEDICINE CLINIC | Age: 85
End: 2020-03-06

## 2020-03-06 VITALS
BODY MASS INDEX: 29.63 KG/M2 | WEIGHT: 161 LBS | RESPIRATION RATE: 16 BRPM | TEMPERATURE: 98.1 F | OXYGEN SATURATION: 96 % | HEART RATE: 86 BPM | SYSTOLIC BLOOD PRESSURE: 106 MMHG | DIASTOLIC BLOOD PRESSURE: 62 MMHG | HEIGHT: 62 IN

## 2020-03-06 DIAGNOSIS — I25.5 ISCHEMIC CARDIOMYOPATHY: Chronic | ICD-10-CM

## 2020-03-06 DIAGNOSIS — L03.90 CELLULITIS, UNSPECIFIED CELLULITIS SITE: ICD-10-CM

## 2020-03-06 DIAGNOSIS — L89.90 PRESSURE INJURY OF SKIN, UNSPECIFIED INJURY STAGE, UNSPECIFIED LOCATION: Primary | ICD-10-CM

## 2020-03-06 DIAGNOSIS — R53.81 PHYSICAL DECONDITIONING: ICD-10-CM

## 2020-03-06 RX ORDER — CEPHALEXIN 500 MG/1
500 CAPSULE ORAL 3 TIMES DAILY
Qty: 21 CAP | Refills: 0 | Status: SHIPPED | OUTPATIENT
Start: 2020-03-06 | End: 2020-03-13

## 2020-03-06 NOTE — TELEPHONE ENCOUNTER
Ruma Morales states they do not accept pts insurance and she could not find anyone other home health company that does. Thanks. Main number: [phone].

## 2020-03-06 NOTE — PROGRESS NOTES
Angelia Mccarthy is a 80 y.o. female who presents today for Skin Problem  . She has a history of   Patient Active Problem List   Diagnosis Code    Mixed hyperlipidemia E78.2    Atrial fibrillation ([facility] [address] I48.91    Mitral regurgitation I34.0    Anxiety and depression F41.9, F32.9    Colon cancer ([facility] C18.9    Hypothyroid E03.9    CKD (chronic kidney disease) stage 3, GFR 30-59 ml/min ([facility]) N18.3    Osteopenia M85.80    Restrictive lung disease J98.4    Ischemic cardiomyopathy I25.5    Psoriasis L40.9    Pacemaker Z95.0    Latent tuberculosis by blood test Z22.7    Frequent falls R29.6    COPD (chronic obstructive pulmonary disease) ([facility]) J44.9    Chronic respiratory failure with hypoxia ([facility]) J96.11    Long term systemic steroid user Z79.52    Hypertension I10    AAA (abdominal aortic aneurysm) ([facility]) I71.4    Constipation K59.00    Lumbar compression fracture ([facility]) S32.000A    Hematuria R31.9    Osteoporosis M81.0    Chronic midline low back pain without sciatica M54.5, G89.29   . Today patient is here for an acute visit. .     Problem visit:  Angelia Mccarthy is here for complaint of recurrent skin lesions and sores. .  Given the appearance and pus we placed her on doxycycline on February 27. Since she notes that this has had a couple new lesions. They know she may be allergic to new underwear that they have purchased. They will change her back to previous underwear. Denies any systemic signs or symptoms of infection. Denies any pain to the area unless she is sitting on that area for long periods of time. Having more issues with stregnth and balance. Having more problems getting out of bed. Strength has decreased a bit. The family asked about other DME that may reduce her risk for falling. She will be working one-on-one with physical therapy where she lives. We will try to get occupational therapy to evaluate her. Visit.     Patient does had an echocardiogram done yesterday for further evaluation of her heart and in preparation to have her pacemaker changed out with considerations for ICD placement. Final results are not in yet. She notes that her breathing has been stable. ROS  Review of Systems   Constitutional: Negative for chills, fever and weight loss. HENT: Negative for congestion and sore throat. Eyes: Negative for blurred vision, double vision and photophobia. Respiratory: Negative for cough and shortness of breath. Cardiovascular: Negative for chest pain, palpitations and leg swelling. Gastrointestinal: Negative for abdominal pain, constipation, diarrhea, heartburn, nausea and vomiting. Genitourinary: Negative for dysuria, frequency and urgency. Musculoskeletal: Negative for joint pain and myalgias. Skin: Positive for rash. Neurological: Negative. Negative for headaches. Endo/Heme/Allergies: Does not bruise/bleed easily. Psychiatric/Behavioral: Negative for memory loss and suicidal ideas. Visit Vitals  /62 (BP 1 Location: Right arm, BP Patient Position: Sitting)   Pulse 86   Temp 98.1 °F (36.7 °C) (Oral)   Resp 16   Ht 5' 2\" (1.575 m)   Wt 161 lb (73 kg)   SpO2 96%   BMI 29.45 kg/m²       Physical Exam  Constitutional:       Appearance: She is well-developed. HENT:      Head: Normocephalic and atraumatic. Cardiovascular:      Rate and Rhythm: Normal rate and regular rhythm. Heart sounds: No murmur. Pulmonary:      Effort: Pulmonary effort is normal. No respiratory distress. Musculoskeletal:        Legs:       Comments: Several small sores where noted. No pus. Skin breakdwon. Skin:     General: Skin is warm and dry. Neurological:      Mental Status: She is alert and oriented to person, place, and time. Psychiatric:         Behavior: Behavior normal.           Current Outpatient Medications   Medication Sig    cephALEXin (KEFLEX) 500 mg capsule Take 1 Cap by mouth three (3) times daily for 7 days.     SYNTHROID 88 mcg tablet TAKE 1 TABLET BY MOUTH EVERY DAY BEFORE BREAKFAST    raNITIdine (ZANTAC) 150 mg tablet TAKE 1 TABLET BY MOUTH TWICE A DAY    diclofenac (VOLTAREN) 1 % gel Apply 2 g to affected area four (4) times daily.  ketoconazole (NIZORAL) 2 % shampoo Apply 5 to 10 mL to wet scalp, lather, leave on 3 to 5 minutes, and rinse; apply once every 1 to 2 weeks    simvastatin (ZOCOR) 20 mg tablet TAKE 1 TABLET BY MOUTH NIGHTLY    nitroglycerin (NITROSTAT) 0.4 mg SL tablet 1 Tab by SubLINGual route every five (5) minutes as needed for Chest Pain. Up to 3 doses.  ZOLOFT 50 mg tablet TAKE 1 TABLET BY MOUTH EVERY DAY    carvedilol (COREG) 3.125 mg tablet TAKE 1 TABLET BY MOUTH TWICE A DAY WITH MEALS    Oxygen 2 liters via NC at night. And as needed    furosemide (LASIX) 40 mg tablet TAKE 1 TABLET BY MOUTH DAILY. (Patient taking differently: 20 mg.)    loperamide (IMODIUM) 1 mg/5 mL solution Take 10 mL by mouth daily as needed for Diarrhea.  acetaminophen (TYLENOL EXTRA STRENGTH) 500 mg tablet Take 1,000 mg by mouth every six (6) hours as needed.  multivitamins (CHEWABLE-AMOS) chew Take 1 Tab by mouth daily.  lidocaine (LIDODERM) 5 % 1 Patch by TransDERmal route every twenty-four (24) hours. Apply patch to the affected area for 12 hours a day and remove for 12 hours a day.  apixaban (ELIQUIS) 5 mg tablet Take 1 Tab by mouth two (2) times a day. Stop coumadin now (last dose 5/22). Hold blood thinners 4 days. Start eliquis Monday 5/27. (Patient taking differently: Take 5 mg by mouth two (2) times a day.)    cholecalciferol, vitamin D3, (VITAMIN D3) 2,000 unit tab Take 2,000 Units by mouth daily.  predniSONE (DELTASONE) 5 mg tablet Take 10 mg by mouth daily.  albuterol (PROVENTIL HFA, VENTOLIN HFA, PROAIR HFA) 90 mcg/actuation inhaler Take 1 Puff by inhalation every six (6) hours as needed for Wheezing.  baclofen (LIORESAL) 10 mg tablet Take 5 mg by mouth two (2) times a day.     DENTA 5000 PLUS 1.1 % crea Apply 1 mg to affected area as needed (to prevent cavities).  ADVAIR DISKUS 250-50 mcg/dose diskus inhaler Take 1 Puff by inhalation two (2) times a day.  tiotropium (SPIRIVA WITH HANDIHALER) 18 mcg inhalation capsule Take 1 Cap by inhalation daily. No current facility-administered medications for this visit.          Past Medical History:   Diagnosis Date    Arthritis     Atrial fibrillation ([address]     av node ablation 5/22/98 with placement of CPI #[id number] dual chamber pacemaker subsequently replaced with a single chamber medtronic #[id number] single chamber pacemaker 7/25/05    Cancer ([address] 1970's    colon cancer w/ resection    COPD     Depression     GERD (gastroesophageal reflux disease)     Hyperlipidemia     Hypertension     Ischemic cardiomyopathy     Migraine headache     Orthostatic hypotension     RADHA (obstructive sleep apnea)     Pacemaker 5/22/98    AV node ablation /pacemaker placement utilizing CPI # [id number] dual chamber system, medtronic #[id number] single chamber device placed 7/22/05    Pulmonary hypertension ([facility] 9/26/2011    RVSP 50 on echo 8/14    Thyroid disease     Valvular heart disease     mild-mod MR/TR      Past Surgical History:   Procedure Laterality Date    BREAST SURGERY PROCEDURE UNLISTED      benign breast tumor excision right breast    HX BREAST BIOPSY Right 2002    neg; surgical bx    HX COLECTOMY  1974    colon    HX KNEE REPLACEMENT      right TKA    HX PACEMAKER      HX TUBAL LIGATION      IR KYPHOPLASTY LUMBAR  7/2/2019    TOTAL KNEE ARTHROPLASTY      total on R, partial on L      Social History     Tobacco Use    Smoking status: Passive Smoke Exposure - Never Smoker    Smokeless tobacco: Never Used   Substance Use Topics    Alcohol use: No     Alcohol/week: 0.0 standard drinks      Family History   Problem Relation Age of Onset    Diabetes Mother     Heart Disease Mother     Heart Attack Mother     Heart Disease Father     Stroke Sister     Breast Cancer Sister 80    Cancer Maternal Aunt         uterus    Diabetes Maternal Uncle     Breast Cancer Daughter 61        Allergies   Allergen Reactions    Cardizem [Diltiazem Hcl] Hives    Codeine Nausea and Vomiting    Darvocet A500 [Propoxyphene N-Acetaminophen] Nausea and Vomiting    Diltiazem Hives    Labetalol Nausea and Vomiting     Dizzy/Disoriented     Pcn [Penicillins] Swelling     Tongue Swelling   Has previously tolerated cephalexin per daughter    Primidone Other (comments)     Lightheaded/unsteady gait    Sulfa (Sulfonamide Antibiotics) Nausea and Vomiting        Assessment/Plan  Diagnoses and all orders for this visit:    1. Pressure injury of skin, unspecified injury stage, unspecified location-given appearance of open wounds will  placed on Keflex for 7 days. No pus present. Will put in referral for wound care to come see her at home. If these lesions multiplier persist would suggest seeing dermatology. This may have started with a contact dermatitis his underwear has changed. Patient will go back to original underwear. No systemic signs or symptoms of infection.  -     REFERRAL TO HOME HEALTH    2. Cellulitis, unspecified cellulitis site  -     REFERRAL TO HOME HEALTH  -     cephALEXin (KEFLEX) 500 mg capsule; Take 1 Cap by mouth three (3) times daily for 7 days. 3. Physical deconditioning-patient having more issues getting out of bed. She has not sustained any falls but notes that she is having more difficulty. We will ask Occupational Therapy to come evaluate her home. She is planning on starting one-on-one PT in her community. 4. Ischemic cardiomyopathy-patient being evaluated for ICD when she needs her pacemaker battery changed out. Cordelia Samuel MD  3/6/2020    This note was created with the help of speech recognition software [facility]) and may contain some 'sound alike' errors.

## 2020-03-06 NOTE — PROGRESS NOTES
[facility] has been faxed to [facility].

## 2020-03-09 ENCOUNTER — HOME HEALTH ADMISSION (OUTPATIENT)
Dept: HOME HEALTH SERVICES | Facility: HOME HEALTH | Age: 85
End: 2020-03-09

## 2020-03-09 NOTE — PROGRESS NOTES
Echo with LVEF 20-25%  Pacemaker is ROBBIE  She is RV pacing dependent   Upgrade to biventricular pacemaker/AICD if she wants defibrillator otherwise biventricular pacemaker  She and her daughter had wanted upgrade

## 2020-03-13 ENCOUNTER — TELEPHONE (OUTPATIENT)
Dept: INTERNAL MEDICINE CLINIC | Age: 85
End: 2020-03-13

## 2020-03-13 NOTE — TELEPHONE ENCOUNTER
Received notification from [facility] [address] that Pt has refused [facility] services d/t corona virus risk.

## 2020-03-17 ENCOUNTER — OFFICE VISIT (OUTPATIENT)
Dept: CARDIOLOGY CLINIC | Age: 85
End: 2020-03-17

## 2020-03-17 ENCOUNTER — TELEPHONE (OUTPATIENT)
Dept: CARDIOLOGY CLINIC | Age: 85
End: 2020-03-17

## 2020-03-17 DIAGNOSIS — Z95.0 CARDIAC PACEMAKER IN SITU: Primary | ICD-10-CM

## 2020-03-17 DIAGNOSIS — I25.5 ISCHEMIC CARDIOMYOPATHY: ICD-10-CM

## 2020-03-17 NOTE — PROGRESS NOTES
No show today because of COVID 19 infection risk  I did not want her to come to office for discussion about what she may need  I called Elliot Rose at [phone] and discussed with her as I was asked and we agreed that she will have biventricular pacemaker upgrade as soon as possible in the next 30 days because her pacemaker is ROBBIE and she is RV pacing dependent    My nurse will work with her to set this up

## 2020-03-17 NOTE — TELEPHONE ENCOUNTER
Called Pt.daughter Desiree Resendiz Two patient identifiers confirmed. Desiree Resendiz is hippa verified. Notified Lesley that Dr. Anna Marie Bales would like Mrs. Daria Bolton to stay home and he would call them to discuss procedure details. Desiree Resendiz stated that she is going to call her sister who is coming into town to talk to Dr. Anna Marie Bales as well. Desiree Resendiz stated that she will get back to us after she talks to her.

## 2020-03-18 ENCOUNTER — TELEPHONE (OUTPATIENT)
Dept: CARDIOLOGY CLINIC | Age: 85
End: 2020-03-18

## 2020-03-18 RX ORDER — CHLORHEXIDINE GLUCONATE 4 G/100ML
1 SOLUTION TOPICAL
Qty: 118 ML | Refills: 0 | Status: SHIPPED | OUTPATIENT
Start: 2020-03-18 | End: 2020-03-18

## 2020-03-18 NOTE — TELEPHONE ENCOUNTER
Elliot Rose pt daughter called back. Notified Jessi Whitehead of date and time of procedure. Notified Jessi Whitehead I will send her instructions for the procedure over Phybridge. Jessi Whitehead stated she will has a few more questions but will send them over Tackkt as well. Pt verbalized understanding of information discussed w/ no further questions at this time.

## 2020-03-18 NOTE — TELEPHONE ENCOUNTER
----- Message from Loco Jackson MD sent at 3/17/2020  6:27 PM EDT -----  Pls called Alejandro Townsend at [phone] and schedule for biventricular pacemaker upgrade as soon as possible in the next 30 days because her pacemaker is ROBBIE and she is RV pacing dependent  I have discussed with Vikash Head and we do not have much more time to delay  We do not need to bring her in April 9 unless it is for wound check

## 2020-03-26 ENCOUNTER — TELEPHONE (OUTPATIENT)
Dept: CARDIOLOGY CLINIC | Age: 85
End: 2020-03-26

## 2020-03-26 NOTE — TELEPHONE ENCOUNTER
Spoke with MsAriadne Carine Goods two identifiers confirmed. Notified Ms. Castellanos about needing to rescheduled for procedure on 4/3/2020. Scheduled pt to come to office for device check 3/27/2020 to check battery life. Pt verbalized understanding of information discussed w/ no further questions at this time.

## 2020-03-27 ENCOUNTER — OFFICE VISIT (OUTPATIENT)
Dept: CARDIOLOGY CLINIC | Age: 85
End: 2020-03-27

## 2020-03-27 ENCOUNTER — CLINICAL SUPPORT (OUTPATIENT)
Dept: CARDIOLOGY CLINIC | Age: 85
End: 2020-03-27

## 2020-03-27 DIAGNOSIS — I44.7 LBBB (LEFT BUNDLE BRANCH BLOCK): ICD-10-CM

## 2020-03-27 DIAGNOSIS — Z95.0 CARDIAC PACEMAKER IN SITU: Primary | ICD-10-CM

## 2020-03-27 DIAGNOSIS — I25.5 CARDIOMYOPATHY, ISCHEMIC: Primary | ICD-10-CM

## 2020-03-27 DIAGNOSIS — Z95.0 CARDIAC PACEMAKER IN SITU: ICD-10-CM

## 2020-03-27 NOTE — PROGRESS NOTES
Pacemaker checked in clinic today. Generator longevity estimated 6 months. Spoke with Dr. Tari Rojas via phone, & he advises recheck in 1 month. Biventricular pacemaker upgrade canceled for now. Extensive discussion (>40 minutes) today regarding defibrillator vs pacemaker, including risks/benefits. Discussed that they would like patient admitted overnight after device implant. No further questions.

## 2020-04-15 ENCOUNTER — HOSPITAL ENCOUNTER (INPATIENT)
Age: 85
LOS: 7 days | Discharge: NURSING, REHAB OR LONG-TERM CARE | DRG: 300 | End: 2020-04-22
Attending: EMERGENCY MEDICINE | Admitting: INTERNAL MEDICINE
Payer: MEDICARE

## 2020-04-15 ENCOUNTER — APPOINTMENT (OUTPATIENT)
Dept: VASCULAR SURGERY | Age: 85
DRG: 300 | End: 2020-04-15
Attending: EMERGENCY MEDICINE
Payer: MEDICARE

## 2020-04-15 ENCOUNTER — APPOINTMENT (OUTPATIENT)
Dept: GENERAL RADIOLOGY | Age: 85
DRG: 300 | End: 2020-04-15
Attending: EMERGENCY MEDICINE
Payer: MEDICARE

## 2020-04-15 DIAGNOSIS — L03.119 CELLULITIS OF FOOT: Primary | ICD-10-CM

## 2020-04-15 DIAGNOSIS — I48.91 ATRIAL FIBRILLATION, UNSPECIFIED TYPE (HCC): ICD-10-CM

## 2020-04-15 DIAGNOSIS — L97.518 NON-PRESSURE CHRONIC ULCER OF OTHER PART OF RIGHT FOOT WITH OTHER SPECIFIED SEVERITY (HCC): ICD-10-CM

## 2020-04-15 DIAGNOSIS — I99.8 VASCULAR INSUFFICIENCY OF EXTREMITY: ICD-10-CM

## 2020-04-15 PROBLEM — L08.9 FOOT INFECTION: Status: ACTIVE | Noted: 2020-04-15

## 2020-04-15 LAB
ALBUMIN SERPL-MCNC: 3.2 G/DL (ref 3.5–5)
ALBUMIN/GLOB SERPL: 0.9 {RATIO} (ref 1.1–2.2)
ALP SERPL-CCNC: 52 U/L (ref 45–117)
ALT SERPL-CCNC: 22 U/L (ref 12–78)
ANION GAP SERPL CALC-SCNC: 7 MMOL/L (ref 5–15)
AST SERPL-CCNC: 25 U/L (ref 15–37)
BASOPHILS # BLD: 0 K/UL (ref 0–0.1)
BASOPHILS NFR BLD: 0 % (ref 0–1)
BILIRUB SERPL-MCNC: 0.7 MG/DL (ref 0.2–1)
BUN SERPL-MCNC: 27 MG/DL (ref 6–20)
BUN/CREAT SERPL: 21 (ref 12–20)
CALCIUM SERPL-MCNC: 8.7 MG/DL (ref 8.5–10.1)
CHLORIDE SERPL-SCNC: 106 MMOL/L (ref 97–108)
CO2 SERPL-SCNC: 27 MMOL/L (ref 21–32)
COMMENT, HOLDF: NORMAL
CREAT SERPL-MCNC: 1.31 MG/DL (ref 0.55–1.02)
DIFFERENTIAL METHOD BLD: ABNORMAL
EOSINOPHIL # BLD: 0 K/UL (ref 0–0.4)
EOSINOPHIL NFR BLD: 0 % (ref 0–7)
ERYTHROCYTE [DISTWIDTH] IN BLOOD BY AUTOMATED COUNT: 12.4 % (ref 11.5–14.5)
GLOBULIN SER CALC-MCNC: 3.7 G/DL (ref 2–4)
GLUCOSE SERPL-MCNC: 253 MG/DL (ref 65–100)
HCT VFR BLD AUTO: 40.4 % (ref 35–47)
HGB BLD-MCNC: 13 G/DL (ref 11.5–16)
IMM GRANULOCYTES # BLD AUTO: 0.1 K/UL (ref 0–0.04)
IMM GRANULOCYTES NFR BLD AUTO: 1 % (ref 0–0.5)
LACTATE SERPL-SCNC: 3.3 MMOL/L (ref 0.4–2)
LYMPHOCYTES # BLD: 0.6 K/UL (ref 0.8–3.5)
LYMPHOCYTES NFR BLD: 6 % (ref 12–49)
MCH RBC QN AUTO: 31.3 PG (ref 26–34)
MCHC RBC AUTO-ENTMCNC: 32.2 G/DL (ref 30–36.5)
MCV RBC AUTO: 97.1 FL (ref 80–99)
MONOCYTES # BLD: 0.4 K/UL (ref 0–1)
MONOCYTES NFR BLD: 4 % (ref 5–13)
NEUTS SEG # BLD: 8.4 K/UL (ref 1.8–8)
NEUTS SEG NFR BLD: 89 % (ref 32–75)
NRBC # BLD: 0 K/UL (ref 0–0.01)
NRBC BLD-RTO: 0 PER 100 WBC
PLATELET # BLD AUTO: 157 K/UL (ref 150–400)
PMV BLD AUTO: 12.1 FL (ref 8.9–12.9)
POTASSIUM SERPL-SCNC: 4 MMOL/L (ref 3.5–5.1)
PROT SERPL-MCNC: 6.9 G/DL (ref 6.4–8.2)
RBC # BLD AUTO: 4.16 M/UL (ref 3.8–5.2)
RBC MORPH BLD: ABNORMAL
SAMPLES BEING HELD,HOLD: NORMAL
SODIUM SERPL-SCNC: 140 MMOL/L (ref 136–145)
TSH SERPL DL<=0.05 MIU/L-ACNC: 2.72 UIU/ML (ref 0.36–3.74)
WBC # BLD AUTO: 9.5 K/UL (ref 3.6–11)

## 2020-04-15 PROCEDURE — 93922 UPR/L XTREMITY ART 2 LEVELS: CPT

## 2020-04-15 PROCEDURE — 83605 ASSAY OF LACTIC ACID: CPT

## 2020-04-15 PROCEDURE — 65270000029 HC RM PRIVATE

## 2020-04-15 PROCEDURE — 74011250636 HC RX REV CODE- 250/636: Performed by: EMERGENCY MEDICINE

## 2020-04-15 PROCEDURE — 80053 COMPREHEN METABOLIC PANEL: CPT

## 2020-04-15 PROCEDURE — 74011000250 HC RX REV CODE- 250: Performed by: EMERGENCY MEDICINE

## 2020-04-15 PROCEDURE — 73620 X-RAY EXAM OF FOOT: CPT

## 2020-04-15 PROCEDURE — 84443 ASSAY THYROID STIM HORMONE: CPT

## 2020-04-15 PROCEDURE — 74011000250 HC RX REV CODE- 250: Performed by: INTERNAL MEDICINE

## 2020-04-15 PROCEDURE — 85025 COMPLETE CBC W/AUTO DIFF WBC: CPT

## 2020-04-15 PROCEDURE — 99285 EMERGENCY DEPT VISIT HI MDM: CPT

## 2020-04-15 PROCEDURE — 77030029684 HC NEB SM VOL KT MONA -A

## 2020-04-15 PROCEDURE — 87040 BLOOD CULTURE FOR BACTERIA: CPT

## 2020-04-15 PROCEDURE — 36415 COLL VENOUS BLD VENIPUNCTURE: CPT

## 2020-04-15 PROCEDURE — 94640 AIRWAY INHALATION TREATMENT: CPT

## 2020-04-15 PROCEDURE — 74011250636 HC RX REV CODE- 250/636: Performed by: INTERNAL MEDICINE

## 2020-04-15 RX ORDER — BUDESONIDE 0.5 MG/2ML
500 INHALANT ORAL
Status: DISCONTINUED | OUTPATIENT
Start: 2020-04-15 | End: 2020-04-22 | Stop reason: HOSPADM

## 2020-04-15 RX ORDER — SERTRALINE HYDROCHLORIDE 50 MG/1
50 TABLET, FILM COATED ORAL DAILY
Status: DISCONTINUED | OUTPATIENT
Start: 2020-04-16 | End: 2020-04-22 | Stop reason: HOSPADM

## 2020-04-15 RX ORDER — ACETAMINOPHEN 500 MG
1000 TABLET ORAL 2 TIMES DAILY
COMMUNITY
End: 2020-05-21 | Stop reason: SDUPTHER

## 2020-04-15 RX ORDER — ACETAMINOPHEN 325 MG/1
650 TABLET ORAL
Status: DISCONTINUED | OUTPATIENT
Start: 2020-04-15 | End: 2020-04-22 | Stop reason: HOSPADM

## 2020-04-15 RX ORDER — CARVEDILOL 3.12 MG/1
3.12 TABLET ORAL 2 TIMES DAILY WITH MEALS
Status: DISCONTINUED | OUTPATIENT
Start: 2020-04-16 | End: 2020-04-22 | Stop reason: HOSPADM

## 2020-04-15 RX ORDER — ARFORMOTEROL TARTRATE 15 UG/2ML
15 SOLUTION RESPIRATORY (INHALATION)
Status: DISCONTINUED | OUTPATIENT
Start: 2020-04-15 | End: 2020-04-22 | Stop reason: HOSPADM

## 2020-04-15 RX ORDER — HEPARIN SODIUM 5000 [USP'U]/ML
5000 INJECTION, SOLUTION INTRAVENOUS; SUBCUTANEOUS EVERY 8 HOURS
Status: DISCONTINUED | OUTPATIENT
Start: 2020-04-15 | End: 2020-04-22

## 2020-04-15 RX ORDER — ACETAMINOPHEN 500 MG
500 TABLET ORAL
Status: DISCONTINUED | OUTPATIENT
Start: 2020-04-16 | End: 2020-04-22 | Stop reason: HOSPADM

## 2020-04-15 RX ORDER — DICLOFENAC SODIUM 10 MG/G
2 GEL TOPICAL
COMMUNITY
End: 2020-05-14

## 2020-04-15 RX ORDER — SODIUM CHLORIDE 0.9 % (FLUSH) 0.9 %
5-40 SYRINGE (ML) INJECTION AS NEEDED
Status: DISCONTINUED | OUTPATIENT
Start: 2020-04-15 | End: 2020-04-22 | Stop reason: HOSPADM

## 2020-04-15 RX ORDER — IPRATROPIUM BROMIDE AND ALBUTEROL SULFATE 2.5; .5 MG/3ML; MG/3ML
3 SOLUTION RESPIRATORY (INHALATION)
Status: DISCONTINUED | OUTPATIENT
Start: 2020-04-15 | End: 2020-04-22 | Stop reason: HOSPADM

## 2020-04-15 RX ORDER — ACETAMINOPHEN 500 MG
500 TABLET ORAL
COMMUNITY
End: 2020-06-15

## 2020-04-15 RX ORDER — OXYCODONE AND ACETAMINOPHEN 5; 325 MG/1; MG/1
1 TABLET ORAL
Status: DISCONTINUED | OUTPATIENT
Start: 2020-04-15 | End: 2020-04-22 | Stop reason: HOSPADM

## 2020-04-15 RX ORDER — PREDNISONE 5 MG/1
10 TABLET ORAL DAILY
Status: DISCONTINUED | OUTPATIENT
Start: 2020-04-16 | End: 2020-04-22 | Stop reason: HOSPADM

## 2020-04-15 RX ORDER — LEVOTHYROXINE SODIUM 88 UG/1
88 TABLET ORAL
Status: DISCONTINUED | OUTPATIENT
Start: 2020-04-16 | End: 2020-04-21

## 2020-04-15 RX ORDER — SODIUM CHLORIDE 0.9 % (FLUSH) 0.9 %
5-40 SYRINGE (ML) INJECTION EVERY 8 HOURS
Status: DISCONTINUED | OUTPATIENT
Start: 2020-04-15 | End: 2020-04-22 | Stop reason: HOSPADM

## 2020-04-15 RX ORDER — METRONIDAZOLE 500 MG/100ML
500 INJECTION, SOLUTION INTRAVENOUS EVERY 12 HOURS
Status: DISCONTINUED | OUTPATIENT
Start: 2020-04-15 | End: 2020-04-21

## 2020-04-15 RX ADMIN — VANCOMYCIN HYDROCHLORIDE 1750 MG: 10 INJECTION, POWDER, LYOPHILIZED, FOR SOLUTION INTRAVENOUS at 17:27

## 2020-04-15 RX ADMIN — WATER 2 G: 1 INJECTION INTRAMUSCULAR; INTRAVENOUS; SUBCUTANEOUS at 17:14

## 2020-04-15 RX ADMIN — Medication 10 ML: at 21:15

## 2020-04-15 RX ADMIN — HEPARIN SODIUM 5000 UNITS: 5000 INJECTION INTRAVENOUS; SUBCUTANEOUS at 21:14

## 2020-04-15 RX ADMIN — Medication 10 ML: at 22:00

## 2020-04-15 RX ADMIN — METRONIDAZOLE 500 MG: 500 INJECTION, SOLUTION INTRAVENOUS at 20:47

## 2020-04-15 RX ADMIN — BUDESONIDE 500 MCG: 0.5 INHALANT RESPIRATORY (INHALATION) at 22:19

## 2020-04-15 RX ADMIN — ARFORMOTEROL TARTRATE 15 MCG: 15 SOLUTION RESPIRATORY (INHALATION) at 22:19

## 2020-04-15 NOTE — ED NOTES
TRANSFER - OUT REPORT:    Verbal report given to MICHAEL Jacob(name) on Kimberley Whyetgo  being transferred to 5 th floor(unit) for routine progression of care       Report consisted of patients Situation, Background, Assessment and   Recommendations(SBAR). Information from the following report(s) SBAR, ED Summary and MAR was reviewed with the receiving nurse. Lines:   Peripheral IV 04/15/20 Right Antecubital (Active)   Site Assessment Clean, dry, & intact 4/15/2020  5:12 PM   Phlebitis Assessment 0 4/15/2020  5:12 PM   Infiltration Assessment 0 4/15/2020  5:12 PM   Dressing Status Clean, dry, & intact 4/15/2020  5:12 PM   Dressing Type Transparent 4/15/2020  5:12 PM   Hub Color/Line Status Pink;Flushed 4/15/2020  5:12 PM        Opportunity for questions and clarification was provided.       Patient transported with:   O2 @ 2 liters  Tech

## 2020-04-15 NOTE — Clinical Note
Vessel: right AA w/ Runoff. Power injection to the artery. PSI = 800. Rate of rise = 0.5 sec. Injection rate = 20 mL/sec. Total injected volume = 20 mL.

## 2020-04-15 NOTE — PROGRESS NOTES
4/15/2020  4:28 PM  Case management note    Spoke with daughter after receiving call from patient advocate. Daughter is waiting in parking lot. I told her MD order labs and some testing. As results come in we will be able to let her know more. She asked if I would talk to other daughter and she is also going to call. Will continue to follow. Be Ady BSRT    Reason for Admission:   Cellulitis of foot    EVAL information from chart, MD and daughter. Patient lives indpendently at [facility], her daughter check on her. She has been seeing Dr. Po Byers who sent her to hospital for possible admission. Patient had been walking with walker but lately scooting with her transport w/c according to moisés  Patient has w/c and rollator. Patient came to ED with glasses, false teeth upper/ partial on lower. She also has one hearing aid in. Patient was suppose to get new pacemaker soon. [facility]. Daughter Blanquita Dewitt is the one that I communicated with [phone], she confirmed information                  RUR Score:     18%             PCP: First and Last name:  Steven Roper   [facility] of Practice:  [facility]   Are you a current patient: Yes/No: yes   Approximate date of last visit: 5 weeks    Do you (patient/family) have any concerns for transition/discharge? Patient lives independently at [facility]. She has very supportive family              Plan for utilizing home health: In the past had been open to [facility] out patient/ home therapy    Current Advanced Directive/Advance Care Plan:  DNR/ AD on file            Transition of Care Plan:          1. Home with family assistance  2. PT/OT eval  3.  CM to follow until discharge    Care Management Interventions  PCP Verified by CM: Yes(dr. Gloria Briseno)  Mode of Transport at Discharge: Self  Transition of Care Consult (CM Consult): Discharge Planning  Current Support Network: Lives Alone  Confirm Follow Up Transport: Family  The Plan for Transition of Care is Related to the Following Treatment Goals : cellulitis of foot  Discharge Location  Discharge Placement: Home with family assistance         Laura Hernandez

## 2020-04-15 NOTE — PROGRESS NOTES
TRANSFER - IN REPORT:    Verbal report received from Anesthesia, Rn(name) on Hetal Taylor  being received from  ER(unit) for routine progression of care      Report consisted of patients Situation, Background, Assessment and   Recommendations(SBAR). Information from the following report(s) SBAR, Kardex, Intake/Output, MAR, Accordion, Recent Results and Med Rec Status was reviewed with the receiving nurse. Opportunity for questions and clarification was provided. Assessment completed upon patients arrival to unit and care assumed.

## 2020-04-15 NOTE — ED NOTES
Patient reports trouble swallowing at home, takes her mediation with apple sauce. No water/straw or big peices of food.

## 2020-04-15 NOTE — ED TRIAGE NOTES
Patient presents with her daugher [phone]) who reports the patient has been seeing Dr Mark Morton for what started as right heel pain. The patient also had an ingrown toenail cut out of a toe on the right foot approx 1 month ago (3/17/20) and the pain in the toe and heel has continued to get worse since. Pt was directed to the ED for evaluation and possible admission with a vascular consult.

## 2020-04-15 NOTE — ED PROVIDER NOTES
Mrs. Vesta Lange is an 70-year-old female who presents the ER with complaints of right foot pain. She says that her pain in her right foot has steadily gotten worse over the last month. 1 month ago, she had an ingrown toenail cut out of her right great toe. She said that her right foot is started hurting more more over the last month. She has had more difficulty walking on it because of the pain. She has been seen by a \"foot doctor. \"  She has been on 2 courses of Keflex. She is due for her last dose of the second course tomorrow. She saw the foot doctor today, who sent her into the ER. He was apparently worried about blood flow to the right foot. She denies fevers. No nausea or vomiting. No chills. No runny nose, sore throat, or cough. She denies any other complaints.            Past Medical History:   Diagnosis Date    Arthritis     Atrial fibrillation ([address]     av node ablation 5/22/98 with placement of CPI #[id number] dual chamber pacemaker subsequently replaced with a single chamber medtronic #[id number] single chamber pacemaker 7/25/05    Cancer ([address] 1970's    colon cancer w/ resection    COPD     Depression     GERD (gastroesophageal reflux disease)     Hyperlipidemia     Hypertension     Ischemic cardiomyopathy     Migraine headache     Orthostatic hypotension     RADHA (obstructive sleep apnea)     Pacemaker 5/22/98    AV node ablation /pacemaker placement utilizing CPI # [id number] dual chamber system, medtronic #[id number] single chamber device placed 7/22/05    Pulmonary hypertension ([address] 9/26/2011    RVSP 50 on echo 8/14    Thyroid disease     Valvular heart disease     mild-mod MR/TR       Past Surgical History:   Procedure Laterality Date    BREAST SURGERY PROCEDURE UNLISTED      benign breast tumor excision right breast    HX BREAST BIOPSY Right 2002    neg; surgical bx    HX COLECTOMY  1974    colon    HX KNEE REPLACEMENT      right TKA    HX PACEMAKER      HX TUBAL LIGATION      IR KYPHOPLASTY LUMBAR  7/2/2019    TOTAL KNEE ARTHROPLASTY      total on R, partial on L         Family History:   Problem Relation Age of Onset    Diabetes Mother     Heart Disease Mother     Heart Attack Mother     Heart Disease Father     Stroke Sister     Breast Cancer Sister [address]    Cancer Maternal Aunt         uterus    Diabetes Maternal Uncle     Breast Cancer Daughter 61       Social History     Socioeconomic History    Marital status:      Spouse name: Not on file    Number of children: Not on file    Years of education: Not on file    Highest education level: Not on file   Occupational History    Not on file   Social Needs    Financial resource strain: Not on file    Food insecurity     Worry: Not on file     Inability: Not on file    Transportation needs     Medical: Not on file     Non-medical: Not on file   Tobacco Use    Smoking status: Passive Smoke Exposure - Never Smoker    Smokeless tobacco: Never Used   Substance and Sexual Activity    Alcohol use: No     Alcohol/week: 0.0 standard drinks    Drug use: No    Sexual activity: Never   Lifestyle    Physical activity     Days per week: Not on file     Minutes per session: Not on file    Stress: Not on file   Relationships    Social connections     Talks on phone: Not on file     Gets together: Not on file     Attends [employer]ism service: Not on file     Active member of club or organization: Not on file     Attends meetings of clubs or organizations: Not on file     Relationship status: Not on file    Intimate partner violence     Fear of current or ex partner: Not on file     Emotionally abused: Not on file     Physically abused: Not on file     Forced sexual activity: Not on file   Other Topics Concern    Not on file   Social History Narrative    Not on file         ALLERGIES: Cardizem [diltiazem hcl]; Codeine; Darvocet a500 [propoxyphene n-acetaminophen]; Diltiazem; Labetalol; Pcn [penicillins];  Primidone; and Sulfa (sulfonamide antibiotics)    Review of Systems   Constitutional: Negative for chills and fever. HENT: Negative for rhinorrhea and sore throat. Respiratory: Negative for cough and shortness of breath. Cardiovascular: Negative for chest pain. Gastrointestinal: Negative for abdominal pain, diarrhea, nausea and vomiting. Genitourinary: Negative for dysuria and urgency. Musculoskeletal:        Foot pain   Skin: Positive for rash and wound. Neurological: Negative for dizziness, weakness and light-headedness.        Vitals:    04/15/20 1514 04/15/20 1539   BP: 122/59    Pulse: 81    Resp: 16    Temp: 98 °F (36.7 °C)    SpO2: 99% 97%   Weight: 72.6 kg (160 lb)    Height: 5' 5\" (1.651 m)             Physical Exam     Const:  No acute distress, well developed, well nourished  Head:  Atraumatic, normocephalic  Eyes:  PERRL, conjunctiva normal, no scleral icterus  Neck:  Supple, trachea midline  Cardiovascular:  Regular rate  Resp:  No resp distress, no increased work of breathing  Abd:  non-distended  :  Deferred  MSK: Erythema and tenderness of the right foot extending from the toes to the distal right lower leg, an area of necrosis on the distal left great toe, areas of skin breakdown and tenderness on the posterior heel, patient has no feeling to the distal right great toe, cap refill greater than 5 seconds of the right foot, right foot is cold to the touch  Neuro:  Alert and oriented x3, no cranial nerve defect  Skin: See MSK  Psych: normal mood and affect, behavior is normal, judgement and thought content is normal        MDM  Number of Diagnoses or Management Options  Cellulitis of foot:   Vascular insufficiency of extremity:      Amount and/or Complexity of Data Reviewed  Clinical lab tests: ordered and reviewed  Tests in the radiology section of CPT®: ordered and reviewed  Review and summarize past medical records: yes    Patient Progress  Patient progress: stable         Procedures        Hospitalist Perfect Serve for Admission  5:03 PM    ED Room Number: ER10/10  Patient Name and age:  Fanta Eduardo 80 y.o.  female  Working Diagnosis:   1. Cellulitis of foot    2. Vascular insufficiency of extremity      COVID-19 Suspicion:  no  Readmission: no  Isolation Requirements:  no  Recommended Level of Care:  med/surg  Code Status:  Full Code  Department:[facility] ED - [phone]  Other:  ABIs pending, obvious chronically worsening vascular insufficiency       Ms. Melani Huizar is an 70-year-old female who presents to the ER pain in her right foot. Wound on her foot with vascular insufficiency to her foot. She has an area of gangrene at the tip of her foot. No signs of osteo-all on x-ray. I will cover her with antibiotics.   To be evaluated for admission by the hospitalist.

## 2020-04-15 NOTE — Clinical Note
Vessel: right PTA. Power injection to the artery. PSI = 800. Rate of rise = 0.5 sec. Injection rate = 5 mL/sec. Total injected volume = 10 mL.

## 2020-04-15 NOTE — PROGRESS NOTES
Bedside and Verbal shift change report given to  Thom Nye Rn (oncoming nurse) by  Daisy Trinh (offgoing nurse). Report included the following information SBAR, Kardex, Intake/Output, MAR, Accordion, Recent Results and Med Rec Status. Patient has not arrived to the floor yet.

## 2020-04-15 NOTE — PROGRESS NOTES
[address] Dosing Services: Antimicrobial Stewardship Daily Doc 4/15/2020    Consult for antibiotic dosing of Vancomycin/Cefepime by Dr. Winsome Montana  Indication:SSTI, poss complicated w/ vascular insufficiency, area of gangrene tip RT foot  - No osteo on Xray  Day of Therapy 1    Ht Readings from Last 1 Encounters:   04/15/20 165.1 cm (65\")        Wt Readings from Last 1 Encounters:   04/15/20 72.6 kg (160 lb)      Vancomycin therapy:  Current maintenance dose: Initial dosing  Dose calculated to approximate a therapeutic trough of 15-20 mcg/mL. Last trough level: Initiald osing, no vancomycin trough history   Plan for level / Adjustment in Therapy: Will order vancomycin 1750 mg (~25 mg/kg) x 1 dose followed by 1000 mg (~15 mg/kg/dose) Q24 hours for calculated trough ~18 mcg/ml d/t mild LARISSA and CrCl ~29 ml/min. Plan for trough prior to 3rd dose. Low threshold to dose by levels if renal fxn worsens. Dose administration notes:   Doses given appropriately as scheduled    Date Dose & Interval Measured (mcg/mL) Extrapolated (mcg/mL)   ? ? ? ?   ? ? ? ?   ? ? ? ? Non-Kinetic Antimicrobial Dosing Regimen:   Current Regimen:  Cefepime 2 grams q8 hrs  Recommendation: Cefepime dose adjusted to 2 grams Q12 hrs for CrCl 30-60 ml/min. Anticipate renal fxn improvement, if CrCl <30 ml/min in AM, plan to adjust dose to 2 grams Q24 hours  Dose administration notes:   Doses given appropriately as scheduled    Other Antimicrobial   (not dosed by pharmacist) None   Cultures 4/15 Blood (paired): Pending   Serum Creatinine Lab Results   Component Value Date/Time    Creatinine 1.31 (H) 04/15/2020 03:58 PM         Creatinine Clearance Estimated Creatinine Clearance: 29.6 mL/min (A) (based on SCr of 1.31 mg/dL (H)).      Temp Temp: 98 °F (36.7 °C)       WBC Lab Results   Component Value Date/Time    WBC 9.5 04/15/2020 03:58 PM        H/H Lab Results   Component Value Date/Time    HGB 13.0 04/15/2020 03:58 PM        Platelets    Lab Results   Component Value Date/Time    PLATELET 043 27/73/9338 03:58 PM          Thanks,  Pharmacist SAAD CarpenterD Contact information: [phone]

## 2020-04-15 NOTE — Clinical Note
TRANSFER - OUT REPORT:     Verbal report given to: Priscila. Report consisted of patient's Situation, Background, Assessment and   Recommendations(SBAR). Opportunity for questions and clarification was provided. Patient transported with a Registered Nurse. Patient transported to: [facility].

## 2020-04-15 NOTE — H&P
[facility]  [address], [address]  [phone]    Admission History and Physical      NAME:  Michele Cardona   :   1932   MRN:  [id number]     PCP:  Leodan Koch MD     Date of service:  4/15/2020         Subjective:     CHIEF COMPLAINT: Right foot pain, redness    HISTORY OF PRESENT ILLNESS:     Ms. Vesta Lange is a 80 y.o.  female who is admitted with right foot infection/PAD. Ms. Vesta Lange  with past medical history of chronic A. fib status post pacemaker, colon cancer, COPD, depression, GERD, hyperlipidemia, hypertension, presented to ER complaining of right foot pain, which started about a month ago. The pain got worse progressively, worsened with movement or touching. It started with an ingrown toenail on the right toe and later it gets worse progressively. Patient has difficulty walking. She was seen by podiatry and finished 2 course of Keflex. She was seen by her podiatrist, Dr. Zoë Banda today and was referred to ER.          Past Medical History:   Diagnosis Date    Arthritis     Atrial fibrillation ([address]     av node ablation 98 with placement of CPI #[id number] dual chamber pacemaker subsequently replaced with a single chamber medtronic #[id number] single chamber pacemaker 05    Cancer ([facility] [address] 1970's    colon cancer w/ resection    COPD     Depression     GERD (gastroesophageal reflux disease)     Hyperlipidemia     Hypertension     Ischemic cardiomyopathy     Migraine headache     Orthostatic hypotension     RADHA (obstructive sleep apnea)     Pacemaker 98    AV node ablation /pacemaker placement utilizing CPI # [id number] dual chamber system, medtronic #[id number] single chamber device placed 05    Pulmonary hypertension ([address] 2011    RVSP 50 on echo     Thyroid disease     Valvular heart disease     mild-mod MR/TR        Past Surgical History:   Procedure Laterality Date    BREAST SURGERY PROCEDURE UNLISTED benign breast tumor excision right breast    HX BREAST BIOPSY Right 2002    neg; surgical bx    HX COLECTOMY  1974    colon    HX KNEE REPLACEMENT      right TKA    HX PACEMAKER      HX TUBAL LIGATION      IR KYPHOPLASTY LUMBAR  7/2/2019    TOTAL KNEE ARTHROPLASTY      total on R, partial on L       Social History     Tobacco Use    Smoking status: Passive Smoke Exposure - Never Smoker    Smokeless tobacco: Never Used   Substance Use Topics    Alcohol use: No     Alcohol/week: 0.0 standard drinks        Family History   Problem Relation Age of Onset    Diabetes Mother     Heart Disease Mother     Heart Attack Mother     Heart Disease Father     Stroke Sister     Breast Cancer Sister 80    Cancer Maternal Aunt         uterus    Diabetes Maternal Uncle     Breast Cancer Daughter 61        Allergies   Allergen Reactions    Cardizem [Diltiazem Hcl] Hives    Codeine Nausea and Vomiting    Darvocet A500 [Propoxyphene N-Acetaminophen] Nausea and Vomiting    Diltiazem Hives    Labetalol Nausea and Vomiting     Dizzy/Disoriented     Pcn [Penicillins] Swelling     Tongue Swelling   Has previously tolerated cephalexin per daughter    Primidone Other (comments)     Lightheaded/unsteady gait    Sulfa (Sulfonamide Antibiotics) Nausea and Vomiting        Prior to Admission medications    Medication Sig Start Date End Date Taking?  Authorizing Provider   SYNTHROID 88 mcg tablet TAKE 1 TABLET BY MOUTH EVERY DAY BEFORE BREAKFAST 1/10/20  Yes Leodan Koch MD   raNITIdine (ZANTAC) 150 mg tablet TAKE 1 TABLET BY MOUTH TWICE A DAY 11/29/19  Yes Leodan Koch MD   simvastatin (ZOCOR) 20 mg tablet TAKE 1 TABLET BY MOUTH NIGHTLY 11/7/19  Yes Kaylee Silva III, MD   ZOLOFT 50 mg tablet TAKE 1 TABLET BY MOUTH EVERY DAY 11/5/19  Yes Leodan Koch MD   carvedilol (COREG) 3.125 mg tablet TAKE 1 TABLET BY MOUTH TWICE A DAY WITH MEALS 10/10/19  Yes Janette Wagner MD   furosemide (LASIX) 40 mg tablet TAKE 1 TABLET BY MOUTH DAILY. Patient taking differently: 20 mg. 9/19/19  Yes Juana Maldonado MD   acetaminophen (TYLENOL EXTRA STRENGTH) 500 mg tablet Take 1,000 mg by mouth every six (6) hours as needed. 2 tab 500mg in AM, 2 tab 500mg mid day, 1 tab 500 mg at night   Yes Provider, Historical   apixaban (ELIQUIS) 5 mg tablet Take 1 Tab by mouth two (2) times a day. Stop coumadin now (last dose 5/22). Hold blood thinners 4 days. Start eliquis Monday 5/27. Patient taking differently: Take 5 mg by mouth two (2) times a day. 5/23/19  Yes Juana Maldonado MD   predniSONE (DELTASONE) 5 mg tablet Take 10 mg by mouth daily. Yes Provider, Historical   baclofen (LIORESAL) 10 mg tablet Take 5 mg by mouth two (2) times a day. 11/2/17  Yes Provider, Historical   diclofenac (VOLTAREN) 1 % gel Apply 2 g to affected area four (4) times daily. 11/23/19   Nicolasa Blanco MD   ketoconazole (NIZORAL) 2 % shampoo Apply 5 to 10 mL to wet scalp, lather, leave on 3 to 5 minutes, and rinse; apply once every 1 to 2 weeks 11/18/19   Nicolasa Blanco MD   nitroglycerin (NITROSTAT) 0.4 mg SL tablet 1 Tab by SubLINGual route every five (5) minutes as needed for Chest Pain. Up to 3 doses. 11/7/19   Ramya Kruger III, MD   Oxygen 2 liters via NC at night. And as needed    Provider, Historical   loperamide (IMODIUM) 1 mg/5 mL solution Take 10 mL by mouth daily as needed for Diarrhea. 7/3/19   Chacorta Mueller MD   multivitamins (CHEWABLE-AMOS) chew Take 1 Tab by mouth daily. Provider, Historical   lidocaine (LIDODERM) 5 % 1 Patch by TransDERmal route every twenty-four (24) hours. Apply patch to the affected area for 12 hours a day and remove for 12 hours a day. Provider, Historical   cholecalciferol, vitamin D3, (VITAMIN D3) 2,000 unit tab Take 2,000 Units by mouth daily.     Provider, Historical   albuterol (PROVENTIL HFA, VENTOLIN HFA, PROAIR HFA) 90 mcg/actuation inhaler Take 1 Puff by inhalation every six (6) hours as needed for Wheezing. Provider, Historical   DENTA 5000 PLUS 1.1 % crea Apply 1 mg to affected area as needed (to prevent cavities). 8/10/17   Provider, Historical   ADVAIR DISKUS 250-50 mcg/dose diskus inhaler Take 1 Puff by inhalation two (2) times a day. 1/12/15   Provider, Historical   tiotropium (SPIRIVA WITH HANDIHALER) 18 mcg inhalation capsule Take 1 Cap by inhalation daily. Provider, Historical         Review of Systems:  (bold if positive, if negative)    Gen:  Eyes:  ENT:  CVS:  Pulm:  GI:  :  MS:  Pain,Skin:  Psych:  Endo:  Hem:  Renal:  Neuro:            Objective:      VITALS:    Vital signs reviewed; most recent are:    Visit Vitals  /56   Pulse 81   Temp 98 °F (36.7 °C)   Resp 16   Ht 5' 5\" (1.651 m)   Wt 72.6 kg (160 lb)   SpO2 98%   BMI 26.63 kg/m²     SpO2 Readings from Last 6 Encounters:   04/15/20 98%   03/06/20 96%   02/18/20 97%   01/24/20 94%   11/29/19 97%   11/18/19 97%    O2 Flow Rate (L/min): 2 l/min   No intake or output data in the 24 hours ending 04/15/20 1726         Exam:     Physical Exam:    Gen:  Well-developed, well-nourished, in no acute distress  HEENT:  Pink conjunctivae, PERRL, hearing intact to voice, moist mucous membranes  Neck:  Supple, without masses, thyroid non-tender  Resp:  No accessory muscle use, clear breath sounds without wheezes rales or rhonchi  Card:  No murmurs, normal S1, S2 without thrills, bruits or peripheral edema  Abd:  Soft, non-tender, non-distended, normoactive bowel sounds are present, no palpable organomegaly and no detectable hernias  Lymph:  No cervical or inguinal adenopathy  Musc:  No cyanosis or clubbing  Skin: Right foot hyperemic and dark discoloration on the right great toe.   Pulse is absent on the left foot   Neuro:  Cranial nerves are grossly intact, no focal motor weakness, follows commands appropriately  Psych:  Good insight, oriented to person, place and time, alert       Labs:    Recent Labs     04/15/20  8098 WBC 9.5   HGB 13.0   HCT 40.4        Recent Labs     04/15/20  1558      K 4.0      CO2 27   *   BUN 27*   CREA 1.31*   CA 8.7   ALB 3.2*   TBILI 0.7   SGOT 25   ALT 22     No results found for: GLUCPOC  No results for input(s): PH, PCO2, PO2, HCO3, FIO2 in the last 72 hours. No results for input(s): INR, INREXT in the last 72 hours. Telemetry reviewed:   paced       Assessment/Plan:    1. RT foot cellulitis (4/15/2020)/ulcer /gangrene of right great toe. X-ray of the foot result noted. Will admit to medical and start broad-spectrum antibiotics. Consult podiatry, Dr. Silvia Etienne. Pain management. Wound care specialist consult. 2.  PAD. Right foot is cold, pedal pulse is not palpable on th RT. Check SHAUNA and consult vascular surgery. 3.   chronic atrial fibrillation ([facility] [address] s/p PPM/  Hypertension (6/29/2019) . There is a plan to replace her pacemaker. hold Eliquis in case surgical intervention is needed. Continue Coreg. Rate is well controlled       4. Anxiety and depression (2/26/2014). Continue Zoloft    5. Hypothyroid (2/26/2014). Continue Synthroid. Check TSH    6.  CKD (chronic kidney disease) stage 3, GFR 30-59 ml/min ([facility]) (2/9/2015). Stable creatinine. Avoid nephrotoxic drugs    7. COPD (chronic obstructive pulmonary disease) ([facility]) (1/13/2019)/ Chronic respiratory failure with hypoxia ([facility] [address] (1/13/2019). Stable not wheezing.,  Supplemental oxygen as needed.   DuoNeb, Brovana and Pulmicort        CODE STATUS: Partial(does not want intubation)          Previous medical records reviewed     Risk of deterioration: high      Total time spent with patient: 14 [phone] discussed with: Patient, Family, Nursing Staff and >50% of time spent in counseling and coordination of care    Discussed:  Care Plan    Prophylaxis:  heparin    Probable Disposition:  Home w/Family           ___________________________________________________    Attending Physician: Roby Hussein MD

## 2020-04-15 NOTE — PROGRESS NOTES
Pharmacy Dosing Services:     Cefepime dose adjusted from 2 gm IV Q8h to 2 gm IV Q12h per P&T protocol.  - CrCl close to 30 ml/min    Thank you,  Lenora Hinton, PharmD

## 2020-04-15 NOTE — Clinical Note
Vessel: right popliteal. Power injection to the artery. PSI = 800. Rate of rise = 0.5 sec. Injection rate = 5 mL/sec. Total injected volume = 10 mL.

## 2020-04-15 NOTE — PROGRESS NOTES
Advance Care Planning (ACP) Provider Note - Comprehensive      Date of ACP Conversation:  04/15/20    Persons included in Conversation:  patient   Length of ACP Conversation in minutes:  16 minutes     Authorized Decision Maker (if patient is incapable of making informed decisions): This person is: Mrs Aaron Kimble for ALL Patients with Decision Making Capacity:  Importance of advance care planning, including choosing a healthcare agent to communicate patient's healthcare decisions if patient lost the ability to make decisions. Review of Existing Advance Directive:  Patient and family do not have a current advance directive however pt confirms that his wife would serve as his MPOA     Active Diagnoses: Foot infection    These active diagnoses are of sufficient risk that focused discussion on advance care planning is indicated in order to allow the patient to thoughtfully consider personal goals of care; and, if situations arise that prevent the ability to personally give input, to ensure appropriate representation of their personal desires for different levels and aggressiveness of care. For Serious or Chronic Illness:  Understanding of medical condition     Reviewed pt's clinical status. [place] has multiple medical problems including chronic A. fib status post pacemaker, colon cancer, COPD, depression, GERD, hyperlipidemia, hypertension, and is being admitted for RT foot infection and PAD. We reviewed our treatment plan and anticipated discharge plans. Further, we discussed pt's wishes on how she would like to proceed (aggressive/heroic resuscitation vs not intervening and allowing nature takes its course) if she were to suffer cardiopulmonary arrest.    Understanding of CPR, goals and expected outcomes, benefits and burdens discussed.   Information on CPR success provided (many factors lower a patients chance of survival, including advanced age, performance status, malignancy, and presence of multiple comorbidities); Individual asked to communicate understanding of information in his/her own words.     Patient made it very clear to me that she would not want to be intubated, but other measures to save her is ok in the event of cardiopulmonary arrest, including chest compressions, defibrillation,   She is alert and oriented x 4       Interventions Provided:  Referral made for ACP follow-up assistance to: Palliative care

## 2020-04-15 NOTE — PROGRESS NOTES
[facility]: MED RECONCILIATION    Comments/Recommendations:   Reviewed PTA medications with patient's son and daughter (Agustin Cedillo) over the phone. Family confirms PTA medications. Reviewed with patient that she took her AM medications but is a poor historian regarding her medications. Below list confirmed w/ family and is consistent with patient's Rx fill history. Patient recently completed a course of cephalexin 500 mg TID x 7 days, and tolerated. Updated allergies. Information obtained from: Patient's family, RxQuery, Chart review    Allergies: Cardizem [diltiazem hcl]; Codeine; Darvocet a500 [propoxyphene n-acetaminophen]; Diltiazem; Labetalol; Pcn [penicillins]; Primidone; and Sulfa (sulfonamide antibiotics)    Prior to Admission Medications:     Medication Documentation Review Audit       Reviewed by Iza Jerez, PHARMD (Pharmacist) on 04/15/20 at 1933      Medication Sig Documenting Provider Last Dose Status Taking?   acetaminophen (TYLENOL) 500 mg tablet Take 1,000 mg by mouth two (2) times a day. Acetaminophen 1000 mg morning and afternoon, 500 mg at bedtime Provider, Historical 4/15/2020 AM Active Yes   acetaminophen (TYLENOL) 500 mg tablet Take 500 mg by mouth nightly. Acetaminophen 1000 mg morning and afternoon, 500 mg at bedtime Provider, Historical 4/14/2020 Active Yes   ADVAIR DISKUS 250-50 mcg/dose diskus inhaler Take 1 Puff by inhalation two (2) times a day. Provider, Historical 4/15/2020 Active Yes           Med Note (Memo Panchal Sep 12, 2017  8:47 AM)     albuterol (PROVENTIL HFA, VENTOLIN HFA, PROAIR HFA) 90 mcg/actuation inhaler Take 1 Puff by inhalation every six (6) hours as needed for Wheezing. Provider, Historical 4/8/2020 Active Yes   apixaban (ELIQUIS) 5 mg tablet Take 1 Tab by mouth two (2) times a day. Stop coumadin now (last dose 5/22). Hold blood thinners 4 days. Start eliquis Monday 5/27.  Prince Fadumo MD 4/15/2020 AM Active Yes   baclofen (LIORESAL) 10 mg tablet Take 5 mg by mouth two (2) times a day. Provider, Historical 4/15/2020 AM Active Yes           Med Note (Taina Marie Nov 6, 2017 11:28 AM)     carvedilol (COREG) 3.125 mg tablet TAKE 1 TABLET BY MOUTH TWICE A DAY WITH MEALS Daniel Valdez MD 4/15/2020 AM Active Yes   cholecalciferol, vitamin D3, (VITAMIN D3) 2,000 unit tab Take 2,000 Units by mouth daily. Provider, Historical 4/15/2020 AM Active Yes   diclofenac (Voltaren) 1 % gel Apply 2 g to affected area every six (6) hours as needed for Pain. Provider, Historical 4/8/2020 Active Yes   furosemide (LASIX) 40 mg tablet TAKE 1 TABLET BY MOUTH DAILY. Daniel Valdez MD 4/15/2020 Active Yes   ketoconazole (NIZORAL) 2 % shampoo Apply 5 to 10 mL to wet scalp, lather, leave on 3 to 5 minutes, and rinse; apply once every 1 to 2 weeks Nahid Smith MD  Active Yes   lidocaine (LIDODERM) 5 % 1 Patch by TransDERmal route daily as needed. Apply patch to the affected area for 12 hours a day and remove for 12 hours a day. Provider, Historical 4/8/2020 Active Yes           Med Note (Violet George Apr 15, 2020  7:31 PM)     loperamide (IMODIUM) 1 mg/5 mL solution Take 10 mL by mouth daily as needed for Diarrhea. Penelope Joyner MD 4/8/2020 Active Yes           Med Note (Thomas Delacruz Apr 15, 2020  7:31 PM)     multivitamins (CHEWABLE-AMOS) chew Take 1 Tab by mouth daily. Provider, Historical 4/15/2020 Active Yes   nitroglycerin (NITROSTAT) 0.4 mg SL tablet 1 Tab by SubLINGual route every five (5) minutes as needed for Chest Pain. Up to 3 doses. Daniel Valdez MD  Active Yes   predniSONE (DELTASONE) 5 mg tablet Take 10 mg by mouth daily.  Provider, Historical 4/15/2020 Active Yes           Med Note (JOSE JACKSON Aug 1, 2019  9:27 AM)     raNITIdine (ZANTAC) 150 mg tablet TAKE 1 TABLET BY MOUTH TWICE A Cookie MD Suki 4/15/2020 AM Active Yes   simvastatin (ZOCOR) 20 mg tablet TAKE 1 TABLET BY MOUTH NIGHTLY Marcella Perkins III, MD 4/14/2020 Active Yes   SYNTHROID 88 mcg tablet TAKE 1 TABLET BY MOUTH EVERY DAY BEFORE Ozzy Jimenez MD 4/15/2020 Active Yes   tiotropium (SPIRIVA WITH HANDIHALER) 18 mcg inhalation capsule Take 1 Cap by inhalation daily.  Provider, Historical 4/15/2020 Active Yes   ZOLOFT 50 mg tablet TAKE 1 TABLET BY MOUTH EVERY DAY Bianca Ramirez MD 4/15/2020 Active Yes                  Thanks,  Mukesh Parra, PHARMD   Contact: [phone]

## 2020-04-15 NOTE — Clinical Note
Vessel: right SFA. Power injection to the artery. Single view taken. PSI = 800. Rate of rise = 0.5 sec. Injection rate = 5 mL/sec. Total injected volume = 10 mL.

## 2020-04-15 NOTE — Clinical Note
TRANSFER - IN REPORT:     Verbal report received from: Bedside RN. Report consisted of patient's Situation, Background, Assessment and   Recommendations(SBAR). Opportunity for questions and clarification was provided. Assessment completed upon patient's arrival to unit and care assumed. Patient transported with a Registered Nurse and [address] [place] [address] / [facility].

## 2020-04-16 ENCOUNTER — APPOINTMENT (OUTPATIENT)
Dept: CT IMAGING | Age: 85
DRG: 300 | End: 2020-04-16
Payer: MEDICARE

## 2020-04-16 ENCOUNTER — TELEPHONE (OUTPATIENT)
Dept: CARDIOLOGY CLINIC | Age: 85
End: 2020-04-16

## 2020-04-16 LAB
ANION GAP SERPL CALC-SCNC: 4 MMOL/L (ref 5–15)
BUN SERPL-MCNC: 28 MG/DL (ref 6–20)
BUN/CREAT SERPL: 28 (ref 12–20)
CALCIUM SERPL-MCNC: 8.4 MG/DL (ref 8.5–10.1)
CHLORIDE SERPL-SCNC: 109 MMOL/L (ref 97–108)
CO2 SERPL-SCNC: 29 MMOL/L (ref 21–32)
CREAT SERPL-MCNC: 0.99 MG/DL (ref 0.55–1.02)
ERYTHROCYTE [DISTWIDTH] IN BLOOD BY AUTOMATED COUNT: 12.6 % (ref 11.5–14.5)
GLUCOSE SERPL-MCNC: 136 MG/DL (ref 65–100)
HCT VFR BLD AUTO: 37.9 % (ref 35–47)
HGB BLD-MCNC: 12.3 G/DL (ref 11.5–16)
LACTATE SERPL-SCNC: 1.7 MMOL/L (ref 0.4–2)
LEFT TBI: 0.74
LEFT TOE PRESSURE: 112 MMHG
MCH RBC QN AUTO: 31.4 PG (ref 26–34)
MCHC RBC AUTO-ENTMCNC: 32.5 G/DL (ref 30–36.5)
MCV RBC AUTO: 96.7 FL (ref 80–99)
NRBC # BLD: 0 K/UL (ref 0–0.01)
NRBC BLD-RTO: 0 PER 100 WBC
PLATELET # BLD AUTO: 139 K/UL (ref 150–400)
PMV BLD AUTO: 11.8 FL (ref 8.9–12.9)
POTASSIUM SERPL-SCNC: 3.7 MMOL/L (ref 3.5–5.1)
RBC # BLD AUTO: 3.92 M/UL (ref 3.8–5.2)
RIGHT ARM BP: 152 MMHG
SODIUM SERPL-SCNC: 142 MMOL/L (ref 136–145)
WBC # BLD AUTO: 8.6 K/UL (ref 3.6–11)

## 2020-04-16 PROCEDURE — 74011250636 HC RX REV CODE- 250/636: Performed by: EMERGENCY MEDICINE

## 2020-04-16 PROCEDURE — 74011000250 HC RX REV CODE- 250: Performed by: INTERNAL MEDICINE

## 2020-04-16 PROCEDURE — 80048 BASIC METABOLIC PNL TOTAL CA: CPT

## 2020-04-16 PROCEDURE — 85027 COMPLETE CBC AUTOMATED: CPT

## 2020-04-16 PROCEDURE — 36415 COLL VENOUS BLD VENIPUNCTURE: CPT

## 2020-04-16 PROCEDURE — 92610 EVALUATE SWALLOWING FUNCTION: CPT

## 2020-04-16 PROCEDURE — 83605 ASSAY OF LACTIC ACID: CPT

## 2020-04-16 PROCEDURE — 74011250637 HC RX REV CODE- 250/637: Performed by: INTERNAL MEDICINE

## 2020-04-16 PROCEDURE — 97162 PT EVAL MOD COMPLEX 30 MIN: CPT

## 2020-04-16 PROCEDURE — 97530 THERAPEUTIC ACTIVITIES: CPT

## 2020-04-16 PROCEDURE — 74011000258 HC RX REV CODE- 258: Performed by: EMERGENCY MEDICINE

## 2020-04-16 PROCEDURE — 77030038269 HC DRN EXT URIN PURWCK BARD -A

## 2020-04-16 PROCEDURE — 65270000029 HC RM PRIVATE

## 2020-04-16 PROCEDURE — 94640 AIRWAY INHALATION TREATMENT: CPT

## 2020-04-16 PROCEDURE — 74011636320 HC RX REV CODE- 636/320: Performed by: STUDENT IN AN ORGANIZED HEALTH CARE EDUCATION/TRAINING PROGRAM

## 2020-04-16 PROCEDURE — 74011636637 HC RX REV CODE- 636/637: Performed by: INTERNAL MEDICINE

## 2020-04-16 PROCEDURE — 74011250636 HC RX REV CODE- 250/636: Performed by: INTERNAL MEDICINE

## 2020-04-16 PROCEDURE — 75635 CT ANGIO ABDOMINAL ARTERIES: CPT

## 2020-04-16 RX ORDER — LOPERAMIDE HYDROCHLORIDE 2 MG/1
2 CAPSULE ORAL
Status: DISCONTINUED | OUTPATIENT
Start: 2020-04-16 | End: 2020-04-17

## 2020-04-16 RX ORDER — HYDROMORPHONE HYDROCHLORIDE 1 MG/ML
0.5 INJECTION, SOLUTION INTRAMUSCULAR; INTRAVENOUS; SUBCUTANEOUS
Status: DISCONTINUED | OUTPATIENT
Start: 2020-04-16 | End: 2020-04-22 | Stop reason: HOSPADM

## 2020-04-16 RX ORDER — IODIXANOL 320 MG/ML
150 INJECTION, SOLUTION INTRAVASCULAR
Status: COMPLETED | OUTPATIENT
Start: 2020-04-16 | End: 2020-04-16

## 2020-04-16 RX ADMIN — ARFORMOTEROL TARTRATE 15 MCG: 15 SOLUTION RESPIRATORY (INHALATION) at 07:35

## 2020-04-16 RX ADMIN — PREDNISONE 10 MG: 5 TABLET ORAL at 08:24

## 2020-04-16 RX ADMIN — LOPERAMIDE HYDROCHLORIDE 2 MG: 2 CAPSULE ORAL at 16:14

## 2020-04-16 RX ADMIN — CEFEPIME 2 G: 20 INJECTION, POWDER, FOR SOLUTION INTRAVENOUS at 05:12

## 2020-04-16 RX ADMIN — ARFORMOTEROL TARTRATE 15 MCG: 15 SOLUTION RESPIRATORY (INHALATION) at 19:50

## 2020-04-16 RX ADMIN — Medication 10 ML: at 14:00

## 2020-04-16 RX ADMIN — HEPARIN SODIUM 5000 UNITS: 5000 INJECTION INTRAVENOUS; SUBCUTANEOUS at 05:11

## 2020-04-16 RX ADMIN — ACETAMINOPHEN 500 MG: 500 TABLET ORAL at 00:08

## 2020-04-16 RX ADMIN — ACETAMINOPHEN 650 MG: 325 TABLET ORAL at 14:25

## 2020-04-16 RX ADMIN — CEFEPIME 2 G: 20 INJECTION, POWDER, FOR SOLUTION INTRAVENOUS at 19:11

## 2020-04-16 RX ADMIN — BUDESONIDE 500 MCG: 0.5 INHALANT RESPIRATORY (INHALATION) at 07:35

## 2020-04-16 RX ADMIN — Medication 10 ML: at 21:29

## 2020-04-16 RX ADMIN — BUDESONIDE 500 MCG: 0.5 INHALANT RESPIRATORY (INHALATION) at 19:50

## 2020-04-16 RX ADMIN — METRONIDAZOLE 500 MG: 500 INJECTION, SOLUTION INTRAVENOUS at 21:23

## 2020-04-16 RX ADMIN — ACETAMINOPHEN 500 MG: 500 TABLET ORAL at 21:23

## 2020-04-16 RX ADMIN — VANCOMYCIN HYDROCHLORIDE 1250 MG: 1.25 INJECTION, POWDER, LYOPHILIZED, FOR SOLUTION INTRAVENOUS at 16:15

## 2020-04-16 RX ADMIN — HEPARIN SODIUM 5000 UNITS: 5000 INJECTION INTRAVENOUS; SUBCUTANEOUS at 21:23

## 2020-04-16 RX ADMIN — CARVEDILOL 3.12 MG: 3.12 TABLET, FILM COATED ORAL at 00:08

## 2020-04-16 RX ADMIN — Medication 10 ML: at 05:12

## 2020-04-16 RX ADMIN — METRONIDAZOLE 500 MG: 500 INJECTION, SOLUTION INTRAVENOUS at 08:24

## 2020-04-16 RX ADMIN — CARVEDILOL 3.12 MG: 3.12 TABLET, FILM COATED ORAL at 08:24

## 2020-04-16 RX ADMIN — LEVOTHYROXINE SODIUM 88 MCG: 88 TABLET ORAL at 06:32

## 2020-04-16 RX ADMIN — HEPARIN SODIUM 5000 UNITS: 5000 INJECTION INTRAVENOUS; SUBCUTANEOUS at 14:26

## 2020-04-16 RX ADMIN — Medication 1 CAPSULE: at 14:25

## 2020-04-16 RX ADMIN — CARVEDILOL 3.12 MG: 3.12 TABLET, FILM COATED ORAL at 16:14

## 2020-04-16 RX ADMIN — IODIXANOL 40 ML: 320 INJECTION, SOLUTION INTRAVASCULAR at 12:16

## 2020-04-16 RX ADMIN — SERTRALINE HYDROCHLORIDE 50 MG: 50 TABLET ORAL at 08:24

## 2020-04-16 NOTE — PROGRESS NOTES
Bedside shift change report given to Becka Paulson RN (oncoming nurse) by Nikhil Man RN (offgoing nurse). Report included the following information Davon BONNER and FREDDIE Arrington

## 2020-04-16 NOTE — PROGRESS NOTES
Nutrition Assessment:    RECOMMENDATIONS/INTERVENTION(S):   1. Continue regular diet. 2. Add chocolate Ensure Enlive BID to promote adequate protein intake for wound healing. 3. Continue to monitor intakes, wt changes, labs, skin. ASSESSMENT:   4/16: Pt assessed for RN referral for PI. Pt admitted with cellulitis of foot. PMH includes CKD, COPD, HTN. BMI 26.6, WNL for age. Pt reports fair appetite. Says she ate eggs, cereal, some peaches for breakfast this AM. Pt with multiple PIs noted. Pt likes Ensure and is agreeable to receiving it while here- will add BID. No significant weight changes noted per EMR. Labs- -253, Ca 8.4. MEds- cefepime, flagyl, prednisone. Diet Order: Regular  % Eaten:  No data found. Pertinent Medications: [x] Reviewed    Labs: [x] Reviewed    Anthropometrics: Height: 5' 5\" (165.1 cm) Weight: 72.6 kg (160 lb)    IBW (%IBW):   ( ) UBW (%UBW):   (  %)      BMI: Body mass index is 26.63 kg/m². This BMI is indicative of:   [] Underweight    [x] Normal    [] Overweight    []  Obesity    []  Extreme Obesity (BMI>40)  Estimated Nutrition Needs (Based on): 1503 Kcals/day(1156 x 1.3 AF) , 73 g(1.2-1.5 g/kg) Protein  Carbohydrate: At Least 130 g/day  Fluids: 1503 mL/day (1 ml/kcal)    Last BM: 41/6   [x]Active     []Hyperactive  []Hypoactive       [] Absent   BS  Skin:    [] Intact   [] Incision  [x] Breakdown: stage 2 heel PI, unstageable toe PI, stage 1 back PI  [] DTI   [] Tears/Excoriation/Abrasion  [x]Edema: 2+ pitting RLE [] Other:      Wt Readings from Last 30 Encounters:   04/15/20 72.6 kg (160 lb)   03/06/20 73 kg (161 lb)   03/05/20 72.1 kg (159 lb)   02/18/20 72.1 kg (159 lb)   01/24/20 73.4 kg (161 lb 12.8 oz)   11/29/19 72.1 kg (159 lb)   11/18/19 72.1 kg (159 lb)   11/07/19 71.2 kg (157 lb)   09/24/19 70.4 kg (155 lb 3.2 oz)   08/23/19 68.9 kg (152 lb)   08/01/19 68.9 kg (152 lb)   06/29/19 68.5 kg (151 lb)   05/23/19 67.1 kg (148 lb)   03/21/19 67.1 kg (148 lb) 03/08/19 69.8 kg (153 lb 12.8 oz)   02/21/19 66.7 kg (147 lb)   02/21/19 66.8 kg (147 lb 3.2 oz)   02/07/19 67.1 kg (148 lb)   01/13/19 64.4 kg (142 lb)   12/20/18 64.4 kg (142 lb)   11/28/18 66.7 kg (147 lb)   09/11/18 69.4 kg (153 lb)   08/10/18 69.9 kg (154 lb)   08/07/18 68 kg (150 lb)   06/26/18 70.8 kg (156 lb)   06/14/18 71.7 kg (158 lb)   06/13/18 71.7 kg (158 lb)   06/07/18 71.2 kg (157 lb)   04/17/18 71.6 kg (157 lb 14.4 oz)   03/19/18 74.2 kg (163 lb 8 oz)      NUTRITION DIAGNOSES:   Problem:  Increased nutrient needs     Etiology: related to increased demand for protein      Signs/Symptoms: as evidenced by stage 2 heel PI, unstageable toe PI, stage 1 back PI      NUTRITION INTERVENTIONS:  Meals/Snacks: General/healthful diet   Supplements: Commercial supplement              GOAL:   PO intake >50% meals + ONS meeting estimated protein needs for wound healing next 3-5 days    Cultural, Yazidi, or Ethnic Dietary Needs: None     EDUCATION & DISCHARGE NEEDS:    [x] None Identified   [] Identified and Education Provided/Documented   [] Identified and Pt declined/was not appropriate      [] Interdisciplinary Care Plan Reviewed/Documented    [x] Discharge Needs: regular diet   [] No Nutrition Related Discharge Needs    NUTRITION RISK:   Pt Is At Nutrition Risk  [x]     No Nutrition Risk Identified  []       PT SEEN FOR:    []  MD Consult: []Calorie Count      []Diabetic Diet Education        []Diet Education     []Electrolyte Management     []General Nutrition Management and Supplements     []Management of Tube Feeding     []TPN Recommendations    [x]  RN Referral:  []MST score >=2     []Enteral/Parenteral Nutrition PTA     []Pregnant: Gestational DM or Multigestation                 [x] Pressure Ulcer    []  Low BMI      []  Length of Stay       [] Dysphagia Diet         [] Ventilator  []  Follow-up     Previous Recommendations:   [] Implemented          [] Not Implemented          [x] Not Applicable    Previous Goal:   [] Met              [] Progressing Towards Goal              [] Not Progressing Towards Goal   [x] Not Applicable            Archie Gar, RDN  Pager [phone]  Phone [phone]

## 2020-04-16 NOTE — TELEPHONE ENCOUNTER
Pacemaker was checked March 27 2020 and she has 6 months average longevity so I put off BIV pacer upgrade since it is not ROBBIE yet  If she needs vascular surgery, magnet can be applied if she needs electrocautery above umbilicus

## 2020-04-16 NOTE — TELEPHONE ENCOUNTER
I called and left message for Rosina Mayer  If cardiac participation is needed, one  hospital assigned cardiologist can see her for Dr Rodriguez Dun  Her pacemaker should be ok for now, without need to do something to it

## 2020-04-16 NOTE — PROGRESS NOTES
Bedside and Verbal shift change report given to Deep Quarles RN (oncoming nurse) by Fabi Mcgowan RN (offgoing nurse). Report included the following information SBAR, Kardex, MAR and Accordion.

## 2020-04-16 NOTE — PROGRESS NOTES
Problem: Mobility Impaired (Adult and Pediatric)  Goal: *Acute Goals and Plan of Care (Insert Text)  Description: FUNCTIONAL STATUS PRIOR TO ADMISSION: Patient was modified independent using a rolling walker for functional mobility. HOME SUPPORT PRIOR TO ADMISSION: The patient lived with home care staff. Physical Therapy Goals  Initiated 4/16/2020  1. Patient will move from supine to sit and sit to supine  in bed with modified independence within 7 day(s). 2.  Patient will transfer from bed to chair and chair to bed with minimal assistance/contact guard assist using the least restrictive device within 7 day(s). 3.  Patient will perform sit to stand with minimal assistance/contact guard assist within 7 day(s). 4.  Patient will ambulate with minimal assistance/contact guard assist for 50 feet with the least restrictive device within 7 day(s). Outcome: Progressing Towards Goal  Note:   PHYSICAL THERAPY EVALUATION  Patient: Jordy Cao (9 y.o. female)  Date: 4/16/2020  Primary Diagnosis: Cellulitis of foot [L03.119]  Foot infection [L08.9]        Precautions:   Fall      ASSESSMENT  Based on the objective data described below, the patient presents with decreased standing tolerance, pain and decreased activity tolerance following admission for Right LE wound. Patient prior to admission ambulate with RW. Patient today with increased Right LE pain and unable to weight bearing on the LE. Patient able to stand pivot with RW to chair with MOD A. Current Level of Function Impacting Discharge (mobility/balance): MOD A and unable to weight bear on RIght LE due to pain    Functional Outcome Measure: The patient scored Total: 50/100 on the Barthel Index which is indicative of 50% impaired ability to care for basic self needs/dependency on others.      Other factors to consider for discharge: lives in assisted living     Patient will benefit from skilled therapy intervention to address the above noted impairments. PLAN :  Recommendations and Planned Interventions: bed mobility training, transfer training, gait training, therapeutic exercises, and therapeutic activities      Frequency/Duration: Patient will be followed by physical therapy:  5 times a week to address goals. Recommendation for discharge: (in order for the patient to meet his/her long term goals)  Therapy up to 5 days/week in SNF setting    This discharge recommendation:  A follow-up discussion with the attending provider and/or case management is planned    IF patient discharges home will need the following DME: patient owns DME required for discharge         SUBJECTIVE:   Patient stated we can try.     OBJECTIVE DATA SUMMARY:   HISTORY:    Past Medical History:   Diagnosis Date    Arthritis     Atrial fibrillation ([facility] [address]     av node ablation 5/22/98 with placement of CPI #[id number] dual chamber pacemaker subsequently replaced with a single chamber medtronic #[id number] single chamber pacemaker 7/25/05    Cancer ([facility] [address] 1970's    colon cancer w/ resection    COPD     Depression     GERD (gastroesophageal reflux disease)     Hyperlipidemia     Hypertension     Ischemic cardiomyopathy     Migraine headache     Orthostatic hypotension     RADHA (obstructive sleep apnea)     Pacemaker 5/22/98    AV node ablation /pacemaker placement utilizing CPI # [id number] dual chamber system, medtronic #[id number] single chamber device placed 7/22/05    Pulmonary hypertension ([facility] [address] 9/26/2011    RVSP 50 on echo 8/14    Thyroid disease     Valvular heart disease     mild-mod MR/TR     Past Surgical History:   Procedure Laterality Date    BREAST SURGERY PROCEDURE UNLISTED      benign breast tumor excision right breast    HX BREAST BIOPSY Right 2002    neg; surgical bx    HX COLECTOMY  1974    colon    HX KNEE REPLACEMENT      right TKA    HX PACEMAKER      HX TUBAL LIGATION      IR KYPHOPLASTY LUMBAR  7/2/2019    TOTAL KNEE ARTHROPLASTY      total on R, partial on L       Personal factors and/or comorbidities impacting plan of care: see above    Home Situation  Home Environment: Independent living([facility])  One/Two Story Residence: One story  Living Alone: Yes  Support Systems: Assisted living, Family member(s)  Patient Expects to be Discharged to[de-identified] Skilled nursing facility  Current DME Used/Available at Home: Gretta Matter, rolling(transport chair)    EXAMINATION/PRESENTATION/DECISION MAKING:   Critical Behavior:  Neurologic State: Alert  Orientation Level: Oriented X4  Cognition: Appropriate decision making     Hearing: Auditory  Auditory Impairment: Hard of hearing, bilateral  Hearing Aids/Status: Not functioning  Skin:  all exposed intact- wound on right LE  Edema: none noted  Range Of Motion:  AROM: Within functional limits           PROM: Within functional limits           Strength:    Strength: Generally decreased, functional                    Tone & Sensation:   Tone: Normal              Sensation: Intact               Coordination:  Coordination: Generally decreased, functional  Vision:      Functional Mobility:  Bed Mobility:  Rolling: Minimum assistance  Supine to Sit: Minimum assistance        Transfers:  Sit to Stand: Moderate assistance  Stand to Sit: Minimum assistance        Bed to Chair: Moderate assistance              Balance:      Ambulation/Gait Training:   unable                Functional Measure:  Barthel Index:    Bathin  Bladder: 5  Bowels: 10  Groomin  Dressin  Feeding: 10  Mobility: 5  Stairs: 0  Toilet Use: 5  Transfer (Bed to Chair and Back): 5  Total: 50/100       The Barthel ADL Index: Guidelines  1. The index should be used as a record of what a patient does, not as a record of what a patient could do. 2. The main aim is to establish degree of independence from any help, physical or verbal, however minor and for whatever reason. 3. The need for supervision renders the patient not independent.   4. A patient's performance should be established using the best available evidence. Asking the patient, friends/relatives and nurses are the usual sources, but direct observation and common sense are also important. However direct testing is not needed. 5. Usually the patient's performance over the preceding 24-48 hours is important, but occasionally longer periods will be relevant. 6. Middle categories imply that the patient supplies over 50 per cent of the effort. 7. Use of aids to be independent is allowed. William Alford., Barthel, DAriadneW. (9614). Functional evaluation: the Barthel Index. [address] [facility] (14)2. Asim Carbajal marc RADHA Page, Day Bermeo., Darryl Plata., [place], [address] (1999). Measuring the change indisability after inpatient rehabilitation; comparison of the responsiveness of the Barthel Index and Functional [place] Measure. Journal of Neurology, Neurosurgery, and Psychiatry, 66(4), 737-907. oLw López, N.J.A, SARA Rodriguez, & Bonifacio Reina, M.A. (2004.) Assessment of post-stroke quality of life in cost-effectiveness studies: The usefulness of the Barthel Index and the EuroQoL-5D. Quality of Life Research, 15, 887-87        Physical Therapy Evaluation Charge Determination   History Examination Presentation Decision-Making   HIGH Complexity :3+ comorbidities / personal factors will impact the outcome/ POC  MEDIUM Complexity : 3 Standardized tests and measures addressing body structure, function, activity limitation and / or participation in recreation  MEDIUM Complexity : Evolving with changing characteristics  Other outcome measures barthel index  MEDIUM      Based on the above components, the patient evaluation is determined to be of the following complexity level: MEDIUM    Pain Rating:  Right LE and unable to bear weight due to pain    Activity Tolerance:   Fair  Please refer to the flowsheet for vital signs taken during this treatment.     After treatment patient left in no apparent distress:   Sitting in chair, Call bell within reach, and Bed / chair alarm activated    COMMUNICATION/EDUCATION:   The patients plan of care was discussed with: Occupational therapist and Registered nurse. Fall prevention education was provided and the patient/caregiver indicated understanding., Patient/family have participated as able in goal setting and plan of care. , and Patient/family agree to work toward stated goals and plan of care.     Thank you for this referral.  Ada Root, PT, DPT   Time Calculation: 19 mins

## 2020-04-16 NOTE — TELEPHONE ENCOUNTER
----- Message from Angelito Castillo LPN sent at 7/07/4784  7:46 AM EDT -----  Regarding: FW: Update Medical Information  Contact: [phone]    ----- Message -----  From: Edi Cantu  Sent: 4/16/2020   7:45 AM EDT  To: Dayna Fu Nurse Pool  Subject: Update Medical Information                       Fidelin [place] ([birth year]) in [facility] ER approx 3pm yesterday/Wednesday 4/14/20. Admitted to room 505. Please doctors notes.   Thank you  [employer]  [phone]

## 2020-04-16 NOTE — PROGRESS NOTES
[facility] Dosing Services: Antimicrobial Stewardship Daily Doc 4/16/2020    Consult for antibiotic dosing of Vancomycin/Cefepime by Dr. Susi Vergara  Indication:SSTI, poss complicated w/ vascular insufficiency, area of gangrene tip RT foot  Day of Therapy 2    Vancomycin therapy:  Current maintenance dose: 1000mg q 24 hrs  Dose calculated to approximate a therapeutic trough of 15-20 mcg/mL. Last trough level:   Date Dose & Interval Measured (mcg/mL) Extrapolated (mcg/mL)   ? ? ? ?   ? ? ? ?   ? ? ? ? Plan for level / Adjustment in Therapy:   Improved renal function. Empirically adjusted regimen to 1250mg every 24 hr  Ordered trough level for 1630 on 4/17/20    Dose administration notes:   Doses given appropriately as scheduled      Non-Kinetic Antimicrobial Dosing Regimen:   Current Regimen:  Cefepime 2 grams q12 hrs  Recommendation: continue same  Dose administration notes:   Doses given appropriately as scheduled    Other Antimicrobial   (not dosed by pharmacist) Metronidazole 500mg IV every 12 hrs   Cultures 4/15 Blood (paired): Pending   Serum Creatinine Lab Results   Component Value Date/Time    Creatinine 0.99 04/16/2020 12:30 AM         Creatinine Clearance Estimated Creatinine Clearance: 39.2 mL/min (based on SCr of 0.99 mg/dL). Temp Temp: 97.9 °F (36.6 °C)       WBC Lab Results   Component Value Date/Time    WBC 8.6 04/16/2020 12:30 AM        H/H Lab Results   Component Value Date/Time    HGB 12.3 04/16/2020 12:30 AM        Platelets    Lab Results   Component Value Date/Time    PLATELET 834 (L) 86/29/9189 12:30 AM          Thanks,  Pharmacist Kevin Moore [place] [address] information: [phone]

## 2020-04-16 NOTE — PROGRESS NOTES
SPEECH PATHOLOGY BEDSIDE SWALLOW EVALUATION/DISCHARGE  Patient: Pierre Freire (54 y.o. female)  Date: 4/16/2020  Primary Diagnosis: Cellulitis of foot [L03.119]  Foot infection [L08.9]       Precautions: aspiration       ASSESSMENT :  Based on the objective data described below, the patient presents with functional swallow. She has a h/o pill dysphagia and uses \"pill glide spray\" at home. She currently takes pills with applesauce in hospital.   Admitted for cellulitis of foot. .  Skilled acute therapy provided by a speech-language pathologist is not indicated at this time. PLAN :  Recommendations:  Ok for regular diet, thin liquids. Discharge Recommendations: None     SUBJECTIVE:   Patient stated I take the pills in that-pointing to applesauce.     OBJECTIVE:     Past Medical History:   Diagnosis Date    Arthritis     Atrial fibrillation ([address]     av node ablation 5/22/98 with placement of CPI #[id number] dual chamber pacemaker subsequently replaced with a single chamber medtronic #[id number] single chamber pacemaker 7/25/05    Cancer ([address] 1970's    colon cancer w/ resection    COPD     Depression     GERD (gastroesophageal reflux disease)     Hyperlipidemia     Hypertension     Ischemic cardiomyopathy     Migraine headache     Orthostatic hypotension     RADHA (obstructive sleep apnea)     Pacemaker 5/22/98    AV node ablation /pacemaker placement utilizing CPI # [id number] dual chamber system, medtronic #[id number] single chamber device placed 7/22/05    Pulmonary hypertension ([address] 9/26/2011    RVSP 50 on echo 8/14    Thyroid disease     Valvular heart disease     mild-mod MR/TR     Past Surgical History:   Procedure Laterality Date    BREAST SURGERY PROCEDURE UNLISTED      benign breast tumor excision right breast    HX BREAST BIOPSY Right 2002    neg; surgical bx    HX COLECTOMY  1974    colon    HX KNEE REPLACEMENT      right TKA    HX PACEMAKER      HX TUBAL LIGATION      IR KYPHOPLASTY LUMBAR 7/2/2019    TOTAL KNEE ARTHROPLASTY      total on R, partial on L     Prior Level of Function/Home Situation:   Home Situation  Home Environment: Independent living  One/Two Story Residence: One story  Living Alone: Yes  Support Systems: Child(luis fernando)  Patient Expects to be Discharged to[de-identified] Independent living facility  Current DME Used/Available at Home: [address] prior to admission: regular, thins  Current Diet:  Regualr, thins   Cognitive and Communication Status:  Neurologic State: Alert  Orientation Level: Oriented X4  Cognition: Appropriate decision making  Perception: Appears intact  Perseveration: No perseveration noted     Oral Assessment:     P.O. Trials:  Patient Position: upright in bed  Vocal quality prior to P.O.: No impairment  Consistency Presented: Thin liquid  How Presented: Self-fed/presented;Straw;Successive swallows     Bolus Acceptance: No impairment(with thins in multiple sips)  Bolus Formation/Control: No impairment     Propulsion: No impairment  Oral Residue: None     Laryngeal Elevation: Functional  Aspiration Signs/Symptoms: None  Pharyngeal Phase Characteristics: No impairment, issues, or problems       She was seen in Jan 2019 by SLP: normal O-P swallow. X pills. SLP suspected GERD. RN currently has no concerns about dysphagia          NOMS:   The NOMS functional outcome measure was used to quantify this patient's level of swallowing impairment. Based on the NOMS, the patient was determined to be at level 6 for swallow function     NOMS Swallowing Levels:  Level 1 (CN): NPO  Level 2 (CM): NPO but takes consistency in therapy  Level 3 (CL): Takes less than 50% of nutrition p.o. and continues with nonoral feedings; and/or safe with mod cues; and/or max diet restriction  Level 4 (CK):  Safe swallow but needs mod cues; and/or mod diet restriction; and/or still requires some nonoral feeding/supplements  Level 5 (CJ): Safe swallow with min diet restriction; and/or needs min cues  Level 6 (CI): Independent with p.o.; rare cues; usually self cues; may need to avoid some foods or needs extra time  Level 7 ([address] [place] [address]): Independent for all p.o.  JONATHAN. (2003). National Outcomes Measurement System (NOMS): Adult Speech-Language Pathology User's Guide. Pain:  Pain Scale 1: Numeric (0 - 10)  Pain Intensity 1: 0     After treatment:   Patient left in no apparent distress in bed and Nursing notified    COMMUNICATION/EDUCATION:   Patient was educated regarding her deficit(s) of h/o dysphagia  as this relates to her diagnosis of foot cellulitis. She demonstrated Excellent understanding as evidenced by discussion. .    The patient's plan of care including recommendations, planned interventions, and recommended diet changes were discussed with: Registered nurse.      Thank you for this referral.  MALENA Melton  Time Calculation: 10 mins

## 2020-04-16 NOTE — PROGRESS NOTES
Pt refused yellow gripper socks at this time. Fall band on arm. Bed alarm on and working. Pt educated on fall precautions.

## 2020-04-16 NOTE — PROGRESS NOTES
Patient was given drink in the ER and food and drink afterr arrival to the floor. She did not have any problems with her food and drink. While dong her dysphagia history after she arrived here, she said that she has had some problems with swallowing in the past but  not now. Message sent to Dr. Paloma Palmer to see if he wants a Speech order and if he wishes to change the diet order.

## 2020-04-16 NOTE — WOUND CARE
Wound care consult: 
Initial visit; Consulted for \"Rt. Foot wound. \" 
 
Patient sitting up in chair, no distress. Assessment All skin folds and bony prominences assessed, turned with staff assistance. All wounds and skin conditions present on admission. Right heel: Noted dark necrotic tissue to posterior heel over bony prominences. No drainage or odor, blanching redness amber-wound skin. Patient states that she does not know what happened to cause it. Right anterior and medial great toe: Noted dark necrotic tissue, no drainage noted. Blanching redness amber-wound. Toe nail remains intact. Right buttock: Scattered red areas that appear as a resolving rash. Skin is not raised over red areas. Some peeling skin noted on the rash. No drainage. Amber-wound skin is intact. Treatment Right heel and right great toe: Apply betadine daily and let dry. Float heel or use Heel boot (boot when in bed). Repositioned in chair Heels floated, heelboot provided for pt. Recommendations/Plan Turn, reposition every 2 hours as tolerated, float heels, place in heel boots while in bed. Incontinent care with comfort shields. Apply Zinc to all open areas, moisture barrier as needed. Reconsult as needed.

## 2020-04-16 NOTE — PROGRESS NOTES
1518:  Met with pt for snf choices. Pt requested her Brittney Justin, ([phone]) be called  for choice. Her dtr would prefer pt to go to [facility] if at all possible. She stated pt had done very well there in the past.  MICHAEL Reynaga    CM Note:  Transition of Care Plan:     RUR-22%-moderate      1. PT recommending snf  2. PT/OT/SLP evals  3. Wound care consult  4. Podiatry and vascular surgery consults  5. CM to follow for any needs.   MICHAEL Reynaga

## 2020-04-16 NOTE — PROGRESS NOTES
Nate Whitaker [facility]  [address] [facility], [place], [address] [place] [address]  [phone]      Medical Progress Note      NAME: Gary Hughes   :  1932  MRM:  [id number]    Date/Time of service: 2020  9:56 AM       Subjective:     Chief Complaint:  Patient was personally seen and examined by me during this time period. Chart reviewed. Denied fevers, chills. R foot feeling better. Spoke with daughter on phone       Objective:       Vitals:       Last 24hrs VS reviewed since prior progress note. Most recent are:    Visit Vitals  /73 (BP 1 Location: Right arm, BP Patient Position: At rest)   Pulse 88   Temp 97.6 °F (36.4 °C)   Resp 18   Ht 5' 5\" (1.651 m)   Wt 72.6 kg (160 lb)   SpO2 94%   BMI 26.63 kg/m²     SpO2 Readings from Last 6 Encounters:   20 94%   20 96%   20 97%   20 94%   19 97%   19 97%    O2 Flow Rate (L/min): 2 l/min   No intake or output data in the 24 hours ending 20 0956     Exam:     Physical Exam:    Gen:  Elderly, frail, NAD  HEENT:  Pink conjunctivae, PERRL, hearing intact to voice, moist mucous membranes  Neck:  Supple, without masses, thyroid non-tender  Resp:  No accessory muscle use, clear breath sounds without wheezes rales or rhonchi  Card:  No murmurs, normal S1, S2 without thrills, bruits or peripheral edema  Abd:  Soft, non-tender, non-distended, normoactive bowel sounds are present  Musc:  No cyanosis or clubbing  Skin:  R foot erythema improving.   Ulcer on R 1st toe and heel  Neuro:  Cranial nerves 3-12 are grossly intact, follows commands appropriately  Psych:  Good insight, oriented to person, place and time, alert    Medications Reviewed: (see below)    Lab Data Reviewed: (see below)    ______________________________________________________________________    Medications:     Current Facility-Administered Medications   Medication Dose Route Frequency    vancomycin (VANCOCIN) 1,250 mg in 0.9% sodium chloride (MBP/ADV) 250 mL  1,250 mg IntraVENous Q24H    [START ON 4/17/2020] Vancomycin- Level at 430pm on 4/17/20   Other ONCE    cefepime (MAXIPIME) 2 g in 0.9% sodium chloride (MBP/ADV) 100 mL  2 g IntraVENous Q12H    sodium chloride (NS) flush 5-40 mL  5-40 mL IntraVENous Q8H    sodium chloride (NS) flush 5-40 mL  5-40 mL IntraVENous PRN    heparin (porcine) injection 5,000 Units  5,000 Units SubCUTAneous Q8H    acetaminophen (TYLENOL) tablet 650 mg  650 mg Oral Q6H PRN    oxyCODONE-acetaminophen (PERCOCET) 5-325 mg per tablet 1 Tab  1 Tab Oral Q6H PRN    albuterol-ipratropium (DUO-NEB) 2.5 MG-0.5 MG/3 ML  3 mL Nebulization Q4H PRN    arformoteroL (BROVANA) neb solution 15 mcg  15 mcg Nebulization BID RT    budesonide (PULMICORT) 500 mcg/2 ml nebulizer suspension  500 mcg Nebulization BID RT    metroNIDAZOLE (FLAGYL) IVPB premix 500 mg  500 mg IntraVENous Q12H    acetaminophen (TYLENOL) tablet 500 mg  500 mg Oral QHS    carvediloL (COREG) tablet 3.125 mg  3.125 mg Oral BID WITH MEALS    predniSONE (DELTASONE) tablet 10 mg  10 mg Oral DAILY    levothyroxine (SYNTHROID) tablet 88 mcg  88 mcg Oral ACB    sertraline (ZOLOFT) tablet 50 mg  50 mg Oral DAILY          Lab Review:     Recent Labs     04/16/20  0030 04/15/20  1558   WBC 8.6 9.5   HGB 12.3 13.0   HCT 37.9 40.4   * 157     Recent Labs     04/16/20  0030 04/15/20  1558    140   K 3.7 4.0   * 106   CO2 29 27   * 253*   BUN 28* 27*   CREA 0.99 1.31*   CA 8.4* 8.7   ALB  --  3.2*   TBILI  --  0.7   SGOT  --  25   ALT  --  22     No results found for: GLUCPOC       Assessment / Plan:     81 yo hx of HTN, afib s/p pacer, ICM EF 30-35%, COPD, CKD 3, colon CA s/p partial colectomy, presented w/ right foot ulcer, cellulitis, toe gangrene, PVD    1) R foot cellulitis/heel ulcer/1st toe gangrene: improving. Not septic. Xray neg for osteo. Unable to perform MRI due to pacer. Will cont IV Vanc/cefepime/flagyl.   Defer further wound management to Podiatry and wound team    2) PAD: poor pulse on R foot. Awaiting SHAUNA. Consult Vascular    3) HTN/chronic afib: s/p pacer. Plan to replace pacer by Dr. Tamiko Miller in the next month. Will cont coreg. Hold Eliquis in case of surgery    4) COPD: stable. Cont LABA/ICS, nebs prn    5) Hypothyroid: cont synthroid    6) Anxiety/depression: cont Zoloft    7) CKD 3: Cr. Stable. Will monitor     Code: Partial (does not want intubation).   Daughter Petra Mac, [phone]    Total time spent with patient: 39 Minutes **I personally saw and examined the patient during this time period**                 Care Plan discussed with: Patient, nursing    Discussed:  Care Plan    Prophylaxis:  Hep SQ    Disposition:   PT, OT, RN           ___________________________________________________    Attending Physician: Caren Gilliam MD

## 2020-04-17 ENCOUNTER — TELEPHONE (OUTPATIENT)
Dept: CARDIOLOGY CLINIC | Age: 85
End: 2020-04-17

## 2020-04-17 ENCOUNTER — APPOINTMENT (OUTPATIENT)
Dept: GENERAL RADIOLOGY | Age: 85
DRG: 300 | End: 2020-04-17
Attending: INTERNAL MEDICINE
Payer: MEDICARE

## 2020-04-17 LAB
ANION GAP SERPL CALC-SCNC: 3 MMOL/L (ref 5–15)
BUN SERPL-MCNC: 24 MG/DL (ref 6–20)
BUN/CREAT SERPL: 28 (ref 12–20)
CALCIUM SERPL-MCNC: 8.5 MG/DL (ref 8.5–10.1)
CHLORIDE SERPL-SCNC: 108 MMOL/L (ref 97–108)
CO2 SERPL-SCNC: 29 MMOL/L (ref 21–32)
CREAT SERPL-MCNC: 0.86 MG/DL (ref 0.55–1.02)
DATE LAST DOSE: ABNORMAL
ERYTHROCYTE [DISTWIDTH] IN BLOOD BY AUTOMATED COUNT: 12.4 % (ref 11.5–14.5)
GLUCOSE SERPL-MCNC: 113 MG/DL (ref 65–100)
HCT VFR BLD AUTO: 35.8 % (ref 35–47)
HGB BLD-MCNC: 11.5 G/DL (ref 11.5–16)
LACTATE SERPL-SCNC: 1.2 MMOL/L (ref 0.4–2)
MAGNESIUM SERPL-MCNC: 1.6 MG/DL (ref 1.6–2.4)
MCH RBC QN AUTO: 31.2 PG (ref 26–34)
MCHC RBC AUTO-ENTMCNC: 32.1 G/DL (ref 30–36.5)
MCV RBC AUTO: 97 FL (ref 80–99)
NRBC # BLD: 0 K/UL (ref 0–0.01)
NRBC BLD-RTO: 0 PER 100 WBC
PHOSPHATE SERPL-MCNC: 3.1 MG/DL (ref 2.6–4.7)
PLATELET # BLD AUTO: 131 K/UL (ref 150–400)
PMV BLD AUTO: 11.8 FL (ref 8.9–12.9)
POTASSIUM SERPL-SCNC: 4.1 MMOL/L (ref 3.5–5.1)
RBC # BLD AUTO: 3.69 M/UL (ref 3.8–5.2)
REPORTED DOSE,DOSE: ABNORMAL UNITS
REPORTED DOSE/TIME,TMG: ABNORMAL
SODIUM SERPL-SCNC: 140 MMOL/L (ref 136–145)
VANCOMYCIN TROUGH SERPL-MCNC: 15.2 UG/ML (ref 5–10)
WBC # BLD AUTO: 7.6 K/UL (ref 3.6–11)

## 2020-04-17 PROCEDURE — 76937 US GUIDE VASCULAR ACCESS: CPT

## 2020-04-17 PROCEDURE — 94640 AIRWAY INHALATION TREATMENT: CPT

## 2020-04-17 PROCEDURE — 65270000029 HC RM PRIVATE

## 2020-04-17 PROCEDURE — 97530 THERAPEUTIC ACTIVITIES: CPT

## 2020-04-17 PROCEDURE — 77030038269 HC DRN EXT URIN PURWCK BARD -A

## 2020-04-17 PROCEDURE — 77030018786 HC NDL GD F/USND BARD -B

## 2020-04-17 PROCEDURE — 74011636637 HC RX REV CODE- 636/637: Performed by: INTERNAL MEDICINE

## 2020-04-17 PROCEDURE — 80048 BASIC METABOLIC PNL TOTAL CA: CPT

## 2020-04-17 PROCEDURE — 83605 ASSAY OF LACTIC ACID: CPT

## 2020-04-17 PROCEDURE — 36415 COLL VENOUS BLD VENIPUNCTURE: CPT

## 2020-04-17 PROCEDURE — C1751 CATH, INF, PER/CENT/MIDLINE: HCPCS

## 2020-04-17 PROCEDURE — 74011250636 HC RX REV CODE- 250/636: Performed by: EMERGENCY MEDICINE

## 2020-04-17 PROCEDURE — 97535 SELF CARE MNGMENT TRAINING: CPT

## 2020-04-17 PROCEDURE — 83735 ASSAY OF MAGNESIUM: CPT

## 2020-04-17 PROCEDURE — 74011250636 HC RX REV CODE- 250/636: Performed by: INTERNAL MEDICINE

## 2020-04-17 PROCEDURE — 97110 THERAPEUTIC EXERCISES: CPT

## 2020-04-17 PROCEDURE — 74011000258 HC RX REV CODE- 258: Performed by: EMERGENCY MEDICINE

## 2020-04-17 PROCEDURE — 97165 OT EVAL LOW COMPLEX 30 MIN: CPT

## 2020-04-17 PROCEDURE — 84100 ASSAY OF PHOSPHORUS: CPT

## 2020-04-17 PROCEDURE — 74011250637 HC RX REV CODE- 250/637: Performed by: INTERNAL MEDICINE

## 2020-04-17 PROCEDURE — 74011000250 HC RX REV CODE- 250: Performed by: INTERNAL MEDICINE

## 2020-04-17 PROCEDURE — 80202 ASSAY OF VANCOMYCIN: CPT

## 2020-04-17 PROCEDURE — 85027 COMPLETE CBC AUTOMATED: CPT

## 2020-04-17 RX ORDER — PSEUDOEPHED/ACETAMINOPHEN/CPM 30-500-2MG
2 TABLET ORAL
Status: DISCONTINUED | OUTPATIENT
Start: 2020-04-17 | End: 2020-04-17

## 2020-04-17 RX ORDER — PSEUDOEPHED/ACETAMINOPHEN/CPM 30-500-2MG
2 TABLET ORAL
Status: DISCONTINUED | OUTPATIENT
Start: 2020-04-17 | End: 2020-04-19

## 2020-04-17 RX ADMIN — CARVEDILOL 3.12 MG: 3.12 TABLET, FILM COATED ORAL at 08:38

## 2020-04-17 RX ADMIN — VANCOMYCIN HYDROCHLORIDE 1250 MG: 1.25 INJECTION, POWDER, LYOPHILIZED, FOR SOLUTION INTRAVENOUS at 16:23

## 2020-04-17 RX ADMIN — Medication 10 ML: at 15:15

## 2020-04-17 RX ADMIN — LOPERAMIDE HYDROCHLORIDE 2 MG: 2 CAPSULE ORAL at 08:38

## 2020-04-17 RX ADMIN — Medication 1 CAPSULE: at 08:38

## 2020-04-17 RX ADMIN — CEFEPIME 2 G: 20 INJECTION, POWDER, FOR SOLUTION INTRAVENOUS at 05:55

## 2020-04-17 RX ADMIN — METRONIDAZOLE 500 MG: 500 INJECTION, SOLUTION INTRAVENOUS at 22:51

## 2020-04-17 RX ADMIN — LOPERAMIDE HYDROCHLORIDE 2 MG: 2 CAPSULE ORAL at 15:22

## 2020-04-17 RX ADMIN — LEVOTHYROXINE SODIUM 88 MCG: 88 TABLET ORAL at 05:56

## 2020-04-17 RX ADMIN — BUDESONIDE 500 MCG: 0.5 INHALANT RESPIRATORY (INHALATION) at 19:52

## 2020-04-17 RX ADMIN — HEPARIN SODIUM 5000 UNITS: 5000 INJECTION INTRAVENOUS; SUBCUTANEOUS at 05:56

## 2020-04-17 RX ADMIN — ARFORMOTEROL TARTRATE 15 MCG: 15 SOLUTION RESPIRATORY (INHALATION) at 07:30

## 2020-04-17 RX ADMIN — BUDESONIDE 500 MCG: 0.5 INHALANT RESPIRATORY (INHALATION) at 07:30

## 2020-04-17 RX ADMIN — SERTRALINE HYDROCHLORIDE 50 MG: 50 TABLET ORAL at 08:38

## 2020-04-17 RX ADMIN — ARFORMOTEROL TARTRATE 15 MCG: 15 SOLUTION RESPIRATORY (INHALATION) at 19:52

## 2020-04-17 RX ADMIN — CEFEPIME 2 G: 20 INJECTION, POWDER, FOR SOLUTION INTRAVENOUS at 22:06

## 2020-04-17 RX ADMIN — Medication 10 ML: at 05:57

## 2020-04-17 RX ADMIN — Medication 10 ML: at 22:00

## 2020-04-17 RX ADMIN — HEPARIN SODIUM 5000 UNITS: 5000 INJECTION INTRAVENOUS; SUBCUTANEOUS at 21:23

## 2020-04-17 RX ADMIN — HEPARIN SODIUM 5000 UNITS: 5000 INJECTION INTRAVENOUS; SUBCUTANEOUS at 15:15

## 2020-04-17 RX ADMIN — METRONIDAZOLE 500 MG: 500 INJECTION, SOLUTION INTRAVENOUS at 08:39

## 2020-04-17 RX ADMIN — CARVEDILOL 3.12 MG: 3.12 TABLET, FILM COATED ORAL at 16:23

## 2020-04-17 RX ADMIN — PREDNISONE 10 MG: 5 TABLET ORAL at 08:38

## 2020-04-17 NOTE — PROGRESS NOTES
[address] [address] Pharmacy Dosing Services: Indication:SSTI, poss complicated w/ vascular insufficiency, area of gangrene tip RT foot  Day of Therapy 3     Vancomycin therapy:  Current maintenance dose: 1250mg q 24 hrs  Goal trough 15-20 mcg/ml. Last trough level 15.2 mcg/ml drawn timely. Plan: therapeutic - continue current dose    Pharmacy to follow daily.   Pharmacist [facility]

## 2020-04-17 NOTE — PROGRESS NOTES
MIDLINE PLACEMENT NOTE    Midline catheters are NOT central lines. Approved midline products are peripheral infusion devices with the tip terminating in the arm at or below the axillary vein, distal to the shoulder. The tip DOES NOT ENTER THE CENTRAL VASCULATURE. A Midline is for reliable short term (less than 30 days) vascular access. PRE-PROCEDURE VERIFICATION  Correct Procedure: yes  Correct Site:  yes  Temperature: Temp: 97.6 °F (36.4 °C), Temperature Source: Temp Source: Oral  Recent Labs     04/17/20  0121   BUN 24*   CREA 0.86   *   WBC 7.6     Allergies: Cardizem [diltiazem hcl]; Codeine; Darvocet a500 [propoxyphene n-acetaminophen]; Diltiazem; Labetalol; Pcn [penicillins]; Primidone; and Sulfa (sulfonamide antibiotics)  Education materials for Midline Care given: yes. See Patient Education activity for further details. Midline Booklet placed at bedside: yes    Midline Catheter Maintenance: For complete information reference Policy # [id number]    A. Dressing Changes       1. Assess the dressing in the first 24 hours for accumulation of blood, fluid or moisture beneath the dressing. During dressing changes, assess the external length of the catheter to determine if migration of the catheter has occurred. Periodically confirm catheter placement, tip location, patency and security of dressing. Dressing changes should be done every 7 days, and PRN using sterile technique if integrity of dressing is compromised. Apply new dressing per hospital policy. Caution: Do not use scissors to remove dressing to minimize the risk of cutting the  Catheter. B. Flushing       1. Flush each lumen of the catheter with 10 mL of sterile saline every 12 hours or after each use. In addition, lock each lumen of the catheter with sterile saline. Note: Flush with 20 mL of sterile saline after blood therapy   Caution: DO NOT USE A SYRINGE SMALLER THAN 10 mL TO FLUSH AND CONFIRM PATENCY.   Patency should be assessed with a 10 mL or larger syringe and  sterile saline. Upon confirmation of patency, administration of medication should be  given in a syringe appropriately sized for the dose. Do not infuse against resistance. C. Blood Draws:        1. Stop infusion, draw off and waste 10 ml of blood. Draw sample with 10 mL syringe or          greater. DO NOT USE VACUTAINER . Transfer with appropriate device to lab tubes. Flush        with 20 ml NS.  D. Occluded or Partially Occluded Catheter       1. Catheters that present resistance to flushing and aspiration may be partially or completely  occluded. Do not flush against resistance. PROCEDURE DETAIL:    Verbal consent was obtained and all questions were answered related to risks and benefits. A Midline was inserted as a sterile procedure using ultrasound and Modified Seldinger Technique for vascular access. The following documentation is in addition to the Midline properties in the lines/airways Doc Flow Sheet:  Lot #: [id number]  Lidocaine 1% administered intradermally :yes  Internal Catheter Total Length: 11 (cm)   Vein Selection for Midline:left brachial  Sterile CVC Insertion Bundle was used to place line yes  Complication Related to Insertion:no    Prior to use, line patency MUST be verified by flushing at least a 10 mL syringe. Line should flush easily without resistance, and withdrawal of brisk blood return to verify patency. Line is okay to use: yes        (Remove Midline by:   May 17, 2020   )  Srinath Hong RN

## 2020-04-17 NOTE — PROGRESS NOTES
[facility] [place] [facility]  [address] [facility], [address] [place] [address]  [phone]      Medical Progress Note      NAME: Jamie Beck   :  1932  MRM:  [id number]    Date/Time of service: 2020  9:56 AM       Subjective:     Chief Complaint:  Patient was personally seen and examined by me during this time period. Chart reviewed. Still with R foot pain       Objective:       Vitals:       Last 24hrs VS reviewed since prior progress note. Most recent are:    Visit Vitals  /74 (BP 1 Location: Right arm, BP Patient Position: At rest)   Pulse 83   Temp 97.9 °F (36.6 °C)   Resp 16   Ht 5' (1.524 m)   Wt 72 kg (158 lb 11.7 oz)   SpO2 99%   BMI 31.00 kg/m²     SpO2 Readings from Last 6 Encounters:   20 99%   20 96%   20 97%   20 94%   19 97%   19 97%    O2 Flow Rate (L/min): 2 l/min       Intake/Output Summary (Last 24 hours) at 2020 0901  Last data filed at 2020 0555  Gross per 24 hour   Intake 980 ml   Output 300 ml   Net 680 ml        Exam:     Physical Exam:    Gen:  Elderly, frail, NAD  HEENT:  Pink conjunctivae, PERRL, hearing intact to voice, moist mucous membranes  Neck:  Supple, without masses, thyroid non-tender  Resp:  No accessory muscle use, clear breath sounds without wheezes rales or rhonchi  Card:  No murmurs, normal S1, S2 without thrills, bruits or peripheral edema  Abd:  Soft, non-tender, non-distended, normoactive bowel sounds are present  Musc:  No cyanosis or clubbing  Skin:  R foot w/o erythema.   Ulcer on R 1st toe and heel  Neuro:  Cranial nerves 3-12 are grossly intact, follows commands appropriately  Psych:  fair insight, oriented to person, place and time, alert    Medications Reviewed: (see below)    Lab Data Reviewed: (see below)    ______________________________________________________________________    Medications:     Current Facility-Administered Medications   Medication Dose Route Frequency    vancomycin (VANCOCIN) 1,250 mg in 0.9% sodium chloride (MBP/ADV) 250 mL  1,250 mg IntraVENous Q24H    Vancomycin- Level at 430pm on 4/17/20   Other ONCE    lactobac ac& pc-s.therm-b.anim (MADAN Q/RISAQUAD)  1 Cap Oral DAILY    HYDROmorphone (DILAUDID) syringe 0.5 mg  0.5 mg IntraVENous Q4H PRN    loperamide (IMODIUM) capsule 2 mg  2 mg Oral Q6H PRN    cefepime (MAXIPIME) 2 g in 0.9% sodium chloride (MBP/ADV) 100 mL  2 g IntraVENous Q12H    sodium chloride (NS) flush 5-40 mL  5-40 mL IntraVENous Q8H    sodium chloride (NS) flush 5-40 mL  5-40 mL IntraVENous PRN    heparin (porcine) injection 5,000 Units  5,000 Units SubCUTAneous Q8H    acetaminophen (TYLENOL) tablet 650 mg  650 mg Oral Q6H PRN    oxyCODONE-acetaminophen (PERCOCET) 5-325 mg per tablet 1 Tab  1 Tab Oral Q6H PRN    albuterol-ipratropium (DUO-NEB) 2.5 MG-0.5 MG/3 ML  3 mL Nebulization Q4H PRN    arformoteroL (BROVANA) neb solution 15 mcg  15 mcg Nebulization BID RT    budesonide (PULMICORT) 500 mcg/2 ml nebulizer suspension  500 mcg Nebulization BID RT    metroNIDAZOLE (FLAGYL) IVPB premix 500 mg  500 mg IntraVENous Q12H    acetaminophen (TYLENOL) tablet 500 mg  500 mg Oral QHS    carvediloL (COREG) tablet 3.125 mg  3.125 mg Oral BID WITH MEALS    predniSONE (DELTASONE) tablet 10 mg  10 mg Oral DAILY    levothyroxine (SYNTHROID) tablet 88 mcg  88 mcg Oral ACB    sertraline (ZOLOFT) tablet 50 mg  50 mg Oral DAILY          Lab Review:     Recent Labs     04/17/20  0121 04/16/20  0030 04/15/20  1558   WBC 7.6 8.6 9.5   HGB 11.5 12.3 13.0   HCT 35.8 37.9 40.4   * 139* 157     Recent Labs     04/17/20  0121 04/16/20  0030 04/15/20  1558    142 140   K 4.1 3.7 4.0    109* 106   CO2 29 29 27   * 136* 253*   BUN 24* 28* 27*   CREA 0.86 0.99 1.31*   CA 8.5 8.4* 8.7   MG 1.6  --   --    PHOS 3.1  --   --    ALB  --   --  3.2*   TBILI  --   --  0.7   SGOT  --   --  25   ALT  --   --  22     No results found for: GLUCPOC Assessment / Plan:     81 yo hx of HTN, afib s/p pacer, ICM EF 30-35%, COPD, CKD 3, colon CA s/p partial colectomy, presented w/ right foot ulcer, cellulitis, toe gangrene, PVD    1) R foot cellulitis/heel ulcer/1st toe gangrene: improving. Not septic. Xray neg for osteo. Unable to perform MRI due to pacer. Will cont IV Vanc/cefepime/flagyl. Use IV dilaudid prn severe pain. Cont wound care with betadine, heel boot. Further management per wound management team and Podiatry team    2) PAD: poor pulse on R foot. Vascular to eval    3) HTN/chronic afib: s/p pacer. Plan to replace pacer by Dr. Tamiko Miller in the next month. Will cont coreg. Hold Eliquis in case of vascular procedures    4) COPD: stable. Cont LABA/ICS, nebs prn    5) Hypothyroid: cont synthroid    6) Anxiety/depression: cont Zoloft    7) CKD 3: Cr. Stable. Will monitor     8) Debility: cont PT/OT. Will need SNF    Code: Partial (does not want intubation).   Daughter Petra Mac, [phone]    Total time spent with patient: 28 Minutes **I personally saw and examined the patient during this time period**                 Care Plan discussed with: Patient, nursing    Discussed:  Care Plan    Prophylaxis:  Hep SQ    Disposition:  SNF pending           ___________________________________________________    Attending Physician: Caren Gilliam MD

## 2020-04-17 NOTE — PROGRESS NOTES
Problem: Self Care Deficits Care Plan (Adult)  Goal: *Acute Goals and Plan of Care (Insert Text)  Description:   FUNCTIONAL STATUS PRIOR TO ADMISSION: Patient was modified independent using a walker for functional mobility. HOME SUPPORT: The patient lived in an independent living community with local amenities, including restaurants which pt reports using frequently. Occupational Therapy Goals  Initiated 4/17/2020  1. Patient will perform brief standing grooming with minimal assistance within 7 day(s). 2.  Patient will perform lower body dressing with minimal assistance using AE prn within 7 day(s). 3.  Patient will perform toilet transfers to [facility] with minimal assistance using most appropriate DME within 7 day(s). 4.  Patient will perform all aspects of toileting with minimal assistance within 7 day(s). 5.  Patient will participate in upper extremity therapeutic exercise/activities with modified independence for 10 minutes within 7 day(s). 7.  Patient will utilize energy conservation techniques during functional activities with verbal cues within 7 day(s). Outcome: Progressing Towards Goal    OCCUPATIONAL THERAPY EVALUATION  Patient: Mera Foley (74 y.o. female)  Date: 4/17/2020  Primary Diagnosis: Cellulitis of foot [L03.119]  Foot infection [L08.9]  Procedure(s) (LRB):  AORTAGRAM ABDOMINAL (N/A)  ANGIOGRAPHY LOWER EXT RIGHT (N/A)     Precautions: Fall    ASSESSMENT  Based on the objective data described below, the patient presents with impaired sitting balance, functional mobility, and fine motor skills as well as pain in R foot following admission for foot infection. Pt received supine in bed with instructions from RN to minimize movement of LUE following midline placement - min assist x2 persons with additional time to move to seated EOB. Pt with noticeable lean to L side - when cued, pt briefly able to sit upright at midline. Per pt report, concerned about recently placed line in L arm. Pt is very anxious, but is receptive to education and encouragement and is motivated to participate in therapy. Pt requested OT speak to 2 daughters to update on her performance - spoke briefly over phone and answered all questions. Pending pt progress and activity tolerance in the acute care setting, recommending SNF vs. inpatient rehab. Current Level of Function Impacting Discharge (ADLs/self-care): min-mod assist x2 for bed mobility; set-up for upper body ADLs; max-total assist for lower body ADLs    Functional Outcome Measure: The patient scored 50/100 on the Barthel Index. Other factors to consider for discharge: lives alone     Patient will benefit from skilled therapy intervention to address the above noted impairments. PLAN :  Recommendations and Planned Interventions: self care training, functional mobility training, therapeutic exercise, balance training, therapeutic activities, cognitive retraining, endurance activities, patient education, home safety training, and family training/education    Frequency/Duration: Patient will be followed by occupational therapy 5 times a week to address goals. Recommendation for discharge: (in order for the patient to meet his/her long term goals)  TBD: therapy up to 5 days/week in SNF setting vs rehab    This discharge recommendation:  Has not yet been discussed the attending provider and/or case management    IF patient discharges home will need the following DME: TBD       SUBJECTIVE:   Patient stated if I call my daughter, will you talk to her and tell her how I'm doing?     OBJECTIVE DATA SUMMARY:   HISTORY:   Past Medical History:   Diagnosis Date    Arthritis     Atrial fibrillation ([facility]     av node ablation 5/22/98 with placement of CPI #[id number] dual chamber pacemaker subsequently replaced with a single chamber medtronic #[id number] single chamber pacemaker 7/25/05    [facility]    colon cancer w/ resection    COPD     Depression     GERD (gastroesophageal reflux disease)     History of vascular access device 04/17/2020    4F MIDLINE PLACED BY EMIL Cazares RN LEFT BRACHIAL, 13CM    Hyperlipidemia     Hypertension     Ischemic cardiomyopathy     Migraine headache     Orthostatic hypotension     RADHA (obstructive sleep apnea)     Pacemaker 5/22/98    AV node ablation /pacemaker placement utilizing CPI # [id number] dual chamber system, medtronic #[id number] single chamber device placed 7/22/05    Pulmonary hypertension ([address] 9/26/2011    RVSP 50 on echo 8/14    Thyroid disease     Valvular heart disease     mild-mod MR/TR     Past Surgical History:   Procedure Laterality Date    BREAST SURGERY PROCEDURE UNLISTED      benign breast tumor excision right breast    HX BREAST BIOPSY Right 2002    neg; surgical bx    HX COLECTOMY  1974    colon    HX KNEE REPLACEMENT      right TKA    HX PACEMAKER      HX TUBAL LIGATION      IR KYPHOPLASTY LUMBAR  7/2/2019    TOTAL KNEE ARTHROPLASTY      total on R, partial on L       Expanded or extensive additional review of patient history:     Home Situation  Home Environment: Independent living([facility]; apartment)  One/Two Story Residence: One story  Living Alone: Yes  Support Systems: Child(luis fernando)  Patient Expects to be Discharged to[de-identified] Independent living facility  Current DME Used/Available at Home: Shower chair, Raised toilet seat, Grab bars, Oxygen, portable, CPAP, Wheelchair  Tub or Shower Type: Shower    Hand dominance: Left    EXAMINATION OF PERFORMANCE DEFICITS:  Cognitive/Behavioral Status:  Neurologic State: Alert; Appropriate for age  Orientation Level: Oriented X4  Cognition: Appropriate decision making; Appropriate safety awareness; Appropriate for age attention/concentration; Follows commands;Memory loss  Perception: Appears intact  Perseveration: No perseveration noted  Safety/Judgement: Awareness of environment; Fall prevention;Home safety; Insight into deficits    Skin:       R foot skin impaired 2/2 gangrene on heel and large toe. Hearing: Auditory  Auditory Impairment: Hard of hearing, bilateral  Hearing Aids/Status: With patient    Vision/Perceptual:    Acuity: Within Defined Limits    Corrective Lenses: Reading glasses    Range of Motion:  AROM: Generally decreased, functional  PROM: Generally decreased, functional    Strength:  Strength: Generally decreased, functional     Coordination:  Coordination: Generally decreased, functional  Pt with overt tremor in B hands - reports this is recent baseline. Pt states she is unable to use clothing fasteners and has a very difficult time writing. Tone & Sensation:  Tone: Normal  Sensation: Intact    Balance:  Sitting: High guard  Sitting - Static: Fair (occasional)  Sitting - Dynamic: Poor (constant support)    Functional Mobility and Transfers for ADLs:  Bed Mobility:  Supine to Sit: Minimum assistance; Moderate assistance;Assist x2  Sit to Supine: Moderate assistance;Assist x2  Scooting: Moderate assistance    Transfers:   Not tested today 2/2 pt just having    ADL Assessment:  Feeding: Setup;Modified independent    Oral Facial Hygiene/Grooming: Setup;Modified Independent    Bathing: Maximum assistance; Additional time    Upper Body Dressing: Setup;Stand-by assistance    Lower Body Dressing: Setup;Maximum assistance; Total assistance    Toileting: Maximum assistance(bed level)     ADL Intervention and task modifications:   Cognitive Retraining  Safety/Judgement: Awareness of environment; Fall prevention;Home safety; Insight into deficits    Therapeutic Exercise:  Pt briefly educated on BUE exercises to complete to improve strength and endurance. Functional Measure:  Barthel Index:    Bathin  Bladder: 5  Bowels: 10  Groomin  Dressin  Feeding: 10  Mobility: 5  Stairs: 0  Toilet Use: 5  Transfer (Bed to Chair and Back): 5  Total: 50/100        The Barthel ADL Index: Guidelines  1.  The index should be used as a record of what a patient does, not as a record of what a patient could do. 2. The main aim is to establish degree of independence from any help, physical or verbal, however minor and for whatever reason. 3. The need for supervision renders the patient not independent. 4. A patient's performance should be established using the best available evidence. Asking the patient, friends/relatives and nurses are the usual sources, but direct observation and common sense are also important. However direct testing is not needed. 5. Usually the patient's performance over the preceding 24-48 hours is important, but occasionally longer periods will be relevant. 6. Middle categories imply that the patient supplies over 50 per cent of the effort. 7. Use of aids to be independent is allowed. Sangeeta Solis., Barthel, D.W. (2947). Functional evaluation: the Barthel Index. [facility] (14)2. Elijah Garcia marc RADHA Page, Guillermo Aguirre., Andria Macedo., Serafina, [address] [place] [address] (1999). Measuring the change indisability after inpatient rehabilitation; comparison of the responsiveness of the Barthel Index and Functional [place] Measure. Journal of Neurology, Neurosurgery, and Psychiatry, 66(4), 004-157. Raheem Alanis, N.J.A, SARA Rodriguez, & Jaki Gamez, MLYNDA. (2004.) Assessment of post-stroke quality of life in cost-effectiveness studies: The usefulness of the Barthel Index and the EuroQoL-5D.  Quality of Life Research, 15, 199-19         Occupational Therapy Evaluation Charge Determination   History Examination Decision-Making   LOW Complexity : Brief history review  LOW Complexity : 1-3 performance deficits relating to physical, cognitive , or psychosocial skils that result in activity limitations and / or participation restrictions  LOW Complexity : No comorbidities that affect functional and no verbal or physical assistance needed to complete eval tasks       Based on the above components, the patient evaluation is determined to be of the following complexity level: LOW   Pain Rating:  Pt reporting pain in R foot (heel and large toe). Activity Tolerance:   Fair  Please refer to the flowsheet for vital signs taken during this treatment. After treatment patient left in no apparent distress:    Supine in bed, Call bell within reach, Bed / chair alarm activated, and Side rails x 3    COMMUNICATION/EDUCATION:   The patients plan of care was discussed with: Physical therapist and Registered nurse. Home safety education was provided and the patient/caregiver indicated understanding., Patient/family have participated as able in goal setting and plan of care. , and Patient/family agree to work toward stated goals and plan of care. This patients plan of care is appropriate for delegation to [facility].     Thank you for this referral.  Luiza Gracia, OT  Time Calculation: 22 mins

## 2020-04-17 NOTE — ROUTINE PROCESS
Bedside shift change report given to MICHAEL Velazquez (oncoming nurse) by Cuca Hdez RN (offgoing nurse). Report included the following information SBAR and Kardex.

## 2020-04-17 NOTE — PROGRESS NOTES
Problem: Mobility Impaired (Adult and Pediatric)  Goal: *Acute Goals and Plan of Care (Insert Text)  Description: FUNCTIONAL STATUS PRIOR TO ADMISSION: Patient was modified independent using a rolling walker for functional mobility. HOME SUPPORT PRIOR TO ADMISSION: The patient lived with home care staff. Physical Therapy Goals  Initiated 4/16/2020  1. Patient will move from supine to sit and sit to supine  in bed with modified independence within 7 day(s). 2.  Patient will transfer from bed to chair and chair to bed with minimal assistance/contact guard assist using the least restrictive device within 7 day(s). 3.  Patient will perform sit to stand with minimal assistance/contact guard assist within 7 day(s). 4.  Patient will ambulate with minimal assistance/contact guard assist for 50 feet with the least restrictive device within 7 day(s). Note:   PHYSICAL THERAPY TREATMENT  Patient: Altagracia Gonzalez (21 y.o. female)  Date: 4/17/2020  Diagnosis: Cellulitis of foot [L03.119]  Foot infection [L08.9]   <principal problem not specified>  Procedure(s) (LRB):  AORTAGRAM ABDOMINAL (N/A)  ANGIOGRAPHY LOWER EXT RIGHT (N/A)    Precautions: Fall  Chart, physical therapy assessment, plan of care and goals were reviewed. ASSESSMENT  Patient continues with skilled PT services. Pt just back from a procedure and is not to use right UE for a period of time. Pt reports sitting up in chair earlier today. Pt agreed to and performed supine to sit with min to mod assist of 2. Pt sitting unsupported but leaning to the left secondary to back issues. Pt performed LE exercise sitting on EOB. Pt returned to supine with mod assist of 2. Pt was positioned sitting up in bed. Pt tolerated treatment fairly well. Continue goals. Current Level of Function Impacting Discharge (mobility/balance): Pt is mod assist of 2 for bed mobility.              PLAN :  Patient continues to benefit from skilled intervention to address the above impairments. Continue treatment per established plan of care. to address goals. Recommendation for discharge: (in order for the patient to meet his/her long term goals)  Therapy up to 5 days/week in SNF setting    This discharge recommendation:  Has been made in collaboration with the attending provider and/or case management    IF patient discharges home will need the following DME: to be determined (TBD)       SUBJECTIVE:       OBJECTIVE DATA SUMMARY:   Critical Behavior:  Neurologic State: Alert, Appropriate for age  Orientation Level: Oriented X4  Cognition: Appropriate decision making, Appropriate safety awareness, Appropriate for age attention/concentration, Follows commands, Memory loss  Safety/Judgement: Awareness of environment, Fall prevention, Home safety, Insight into deficits  Functional Mobility Training:  Bed Mobility:     Supine to Sit: Minimum assistance; Moderate assistance;Assist x2  Sit to Supine: Moderate assistance;Assist x2                   Balance:  Sitting: High guard  Sitting - Static: Fair (occasional)  Sitting - Dynamic: Poor (constant support)                 Therapeutic Exercises:   Pt performed LE exercise with cues. Activity Tolerance:   Good and Fair  Please refer to the flowsheet for vital signs taken during this treatment.     After treatment patient left in no apparent distress:   Supine in bed    COMMUNICATION/COLLABORATION:   The patients plan of care was discussed with: Physical therapist.     Easton Jimenez PTA   Time Calculation: 23 mins

## 2020-04-17 NOTE — PROGRESS NOTES
1210:  CM Note:  Transition of Care Plan:     RUR-22%-moderate      1. PT recommending snf; family wants [facility]--referral sent  2. PT/OT/SLP   3. Wound care following  4. Podiatry following  5. Vascular surgery following; plan for angio on Monday  GERALD Real RN CM Note:  A referral was sent in [facility] to [facility] to be evaluated for IPR.   MICHAEL Reynaga

## 2020-04-17 NOTE — ROUTINE PROCESS
Bedside and Verbal shift change report given to Rios Kang (oncoming nurse) by Arjun Haley (offgoing nurse). Report included the following information SBAR and Kardex.

## 2020-04-17 NOTE — TELEPHONE ENCOUNTER
Called Pt Daughter Ambrosio patient identifiers confirmed. Vera Gant stated the pt called her and told her Dr. Luis Adkins was having PPM put in on Monday at [facility]. Robert Vazquez Notified Vera Gant that Dr. Mikie Ayala will not be doing pacemaker on Monday she is just having the vascular surgery. Vera Gant verbalized understanding of information discussed w/ no further questions at this time.

## 2020-04-17 NOTE — TELEPHONE ENCOUNTER
Patient's daughter, Providence Boast, states patient is at [facility] and she is under the impression that Dr. Augustina Branch is going to be doing a procedure on her on Monday.  She is confused and wants to clarify    Phone: [phone]

## 2020-04-17 NOTE — PROGRESS NOTES
Problem: Falls - Risk of  Goal: *Absence of Falls  Outcome: Progressing Towards Goal  Note: Fall Risk Interventions:  Mobility Interventions: Bed/chair exit alarm, Communicate number of staff needed for ambulation/transfer, Patient to call before getting OOB         Medication Interventions: Bed/chair exit alarm, Patient to call before getting OOB    Elimination Interventions: Bed/chair exit alarm, Call light in reach, Patient to call for help with toileting needs    History of Falls Interventions: Bed/chair exit alarm, Door open when patient unattended, Investigate reason for fall, Room close to nurse's station

## 2020-04-18 LAB — CREAT SERPL-MCNC: 0.92 MG/DL (ref 0.55–1.02)

## 2020-04-18 PROCEDURE — 94640 AIRWAY INHALATION TREATMENT: CPT

## 2020-04-18 PROCEDURE — 74011250636 HC RX REV CODE- 250/636: Performed by: INTERNAL MEDICINE

## 2020-04-18 PROCEDURE — 65270000029 HC RM PRIVATE

## 2020-04-18 PROCEDURE — 74011000250 HC RX REV CODE- 250: Performed by: INTERNAL MEDICINE

## 2020-04-18 PROCEDURE — 74011250636 HC RX REV CODE- 250/636: Performed by: EMERGENCY MEDICINE

## 2020-04-18 PROCEDURE — 77030038269 HC DRN EXT URIN PURWCK BARD -A

## 2020-04-18 PROCEDURE — 74011250637 HC RX REV CODE- 250/637: Performed by: INTERNAL MEDICINE

## 2020-04-18 PROCEDURE — 36415 COLL VENOUS BLD VENIPUNCTURE: CPT

## 2020-04-18 PROCEDURE — 74011636637 HC RX REV CODE- 636/637: Performed by: INTERNAL MEDICINE

## 2020-04-18 PROCEDURE — 74011000258 HC RX REV CODE- 258: Performed by: EMERGENCY MEDICINE

## 2020-04-18 PROCEDURE — 82565 ASSAY OF CREATININE: CPT

## 2020-04-18 PROCEDURE — 94760 N-INVAS EAR/PLS OXIMETRY 1: CPT

## 2020-04-18 RX ADMIN — LEVOTHYROXINE SODIUM 88 MCG: 88 TABLET ORAL at 06:52

## 2020-04-18 RX ADMIN — METRONIDAZOLE 500 MG: 500 INJECTION, SOLUTION INTRAVENOUS at 22:17

## 2020-04-18 RX ADMIN — BUDESONIDE 500 MCG: 0.5 INHALANT RESPIRATORY (INHALATION) at 19:49

## 2020-04-18 RX ADMIN — BUDESONIDE 500 MCG: 0.5 INHALANT RESPIRATORY (INHALATION) at 07:27

## 2020-04-18 RX ADMIN — Medication 1 CAPSULE: at 08:32

## 2020-04-18 RX ADMIN — METRONIDAZOLE 500 MG: 500 INJECTION, SOLUTION INTRAVENOUS at 10:18

## 2020-04-18 RX ADMIN — CARVEDILOL 3.12 MG: 3.12 TABLET, FILM COATED ORAL at 17:55

## 2020-04-18 RX ADMIN — HEPARIN SODIUM 5000 UNITS: 5000 INJECTION INTRAVENOUS; SUBCUTANEOUS at 21:32

## 2020-04-18 RX ADMIN — ARFORMOTEROL TARTRATE 15 MCG: 15 SOLUTION RESPIRATORY (INHALATION) at 19:49

## 2020-04-18 RX ADMIN — CARVEDILOL 3.12 MG: 3.12 TABLET, FILM COATED ORAL at 08:32

## 2020-04-18 RX ADMIN — CEFEPIME 2 G: 20 INJECTION, POWDER, FOR SOLUTION INTRAVENOUS at 21:32

## 2020-04-18 RX ADMIN — PREDNISONE 10 MG: 5 TABLET ORAL at 08:32

## 2020-04-18 RX ADMIN — ACETAMINOPHEN 650 MG: 325 TABLET ORAL at 20:47

## 2020-04-18 RX ADMIN — ACETAMINOPHEN 650 MG: 325 TABLET ORAL at 04:41

## 2020-04-18 RX ADMIN — HEPARIN SODIUM 5000 UNITS: 5000 INJECTION INTRAVENOUS; SUBCUTANEOUS at 06:52

## 2020-04-18 RX ADMIN — HEPARIN SODIUM 5000 UNITS: 5000 INJECTION INTRAVENOUS; SUBCUTANEOUS at 13:19

## 2020-04-18 RX ADMIN — SERTRALINE HYDROCHLORIDE 50 MG: 50 TABLET ORAL at 08:32

## 2020-04-18 RX ADMIN — Medication 10 ML: at 21:34

## 2020-04-18 RX ADMIN — VANCOMYCIN HYDROCHLORIDE 1250 MG: 1.25 INJECTION, POWDER, LYOPHILIZED, FOR SOLUTION INTRAVENOUS at 17:55

## 2020-04-18 RX ADMIN — Medication 10 ML: at 06:56

## 2020-04-18 RX ADMIN — ARFORMOTEROL TARTRATE 15 MCG: 15 SOLUTION RESPIRATORY (INHALATION) at 07:27

## 2020-04-18 RX ADMIN — CEFEPIME 2 G: 20 INJECTION, POWDER, FOR SOLUTION INTRAVENOUS at 10:18

## 2020-04-18 RX ADMIN — LOPERAMIDE HCL 2 MG: 1 SOLUTION ORAL at 03:06

## 2020-04-18 RX ADMIN — LOPERAMIDE HCL 2 MG: 1 SOLUTION ORAL at 17:22

## 2020-04-18 NOTE — PROGRESS NOTES
[facility]   Podiatric Surgery  Progress Note    Date: April 18, 2020  Patient: Iglesia Eaton  MR #: [id number]  CSN #: [id number]    CC:  Right foot pain is improved per the patient. She states that she sees a difference in the color of her foot. Of note, patient continues to have pressure to the right heel, she was noted to have her heel resting on her left leg. Blood pressure 122/68, pulse 84, temperature 98.1 °F (36.7 °C), resp. rate 16, height 5' (1.524 m), weight 72 kg (158 lb 11.7 oz), last menstrual period 02/26/1970, SpO2 93 %.     Intake/Output Summary (Last 24 hours) at 4/18/2020 1149  Last data filed at 4/17/2020 2002  Gross per 24 hour   Intake 120 ml   Output 400 ml   Net -280 ml         Medical History:  Past Medical History:   Diagnosis Date    Arthritis     Atrial fibrillation ([address]     av node ablation 5/22/98 with placement of CPI #[id number] dual chamber pacemaker subsequently replaced with a single chamber medtronic #[id number] single chamber pacemaker 7/25/05    Cancer ([address] 1970's    colon cancer w/ resection    COPD     Depression     GERD (gastroesophageal reflux disease)     History of vascular access device 04/17/2020    4F MIDLINE PLACED BY EMIL Mireles RN LEFT BRACHIAL, 13CM    Hyperlipidemia     Hypertension     Ischemic cardiomyopathy     Migraine headache     Orthostatic hypotension     RADHA (obstructive sleep apnea)     Pacemaker 5/22/98    AV node ablation /pacemaker placement utilizing CPI # [id number] dual chamber system, medtronic #[id number] single chamber device placed 7/22/05    Pulmonary hypertension ([address] 9/26/2011    RVSP 50 on echo 8/14    Thyroid disease     Valvular heart disease     mild-mod MR/TR     Past Surgical History:   Procedure Laterality Date    BREAST SURGERY PROCEDURE UNLISTED      benign breast tumor excision right breast    HX BREAST BIOPSY Right 2002    neg; surgical bx    HX COLECTOMY  1974    colon    HX KNEE REPLACEMENT      right TKA    HX PACEMAKER      HX TUBAL LIGATION      IR KYPHOPLASTY LUMBAR  7/2/2019    TOTAL KNEE ARTHROPLASTY      total on R, partial on L     [unfilled]  Allergies   Allergen Reactions    Cardizem [Diltiazem Hcl] Hives    Codeine Nausea and Vomiting    Darvocet A500 [Propoxyphene N-Acetaminophen] Nausea and Vomiting    Diltiazem Hives    Labetalol Nausea and Vomiting     Dizzy/Disoriented     Pcn [Penicillins] Swelling     Tongue Swelling   Has tolerated cephalexin recently 4/7/20     Primidone Other (comments)     Lightheaded/unsteady gait    Sulfa (Sulfonamide Antibiotics) Nausea and Vomiting     Family History   Problem Relation Age of Onset    Diabetes Mother     Heart Disease Mother     Heart Attack Mother     Heart Disease Father     Stroke Sister     Breast Cancer Sister 80    Cancer Maternal Aunt         uterus    Diabetes Maternal Uncle     Breast Cancer Daughter 61     Social History     Tobacco Use   Smoking Status Passive Smoke Exposure - Never Smoker   Smokeless Tobacco Never Used     Social History     Substance and Sexual Activity   Drug Use No     Social History     Substance and Sexual Activity   Alcohol Use No    Alcohol/week: 0.0 standard drinks       Labs:  Lab Results   Component Value Date/Time    WBC 7.6 04/17/2020 01:21 AM    HGB 11.5 04/17/2020 01:21 AM    HCT 35.8 04/17/2020 01:21 AM    MCV 97.0 04/17/2020 01:21 AM    PLATELET 313 (L) 19/39/8995 01:21 AM     Lab Results   Component Value Date/Time    Sodium 140 04/17/2020 01:21 AM    Potassium 4.1 04/17/2020 01:21 AM    Chloride 108 04/17/2020 01:21 AM    CO2 29 04/17/2020 01:21 AM    Phosphorus 3.1 04/17/2020 01:21 AM    BUN 24 (H) 04/17/2020 01:21 AM    Calcium 8.5 04/17/2020 01:21 AM      Lab Results   Component Value Date/Time    Glucose 113 (H) 04/17/2020 01:21 AM     Lab Results   Component Value Date/Time    INR 1.3 (H) 06/30/2019 04:35 AM    No components found for: PTT  Recent Labs     04/17/20  0121 04/16/20  0030 04/15/20  1558   * 136* 253*       Physical Exam:  General appearance: alert, cooperative, no distress, appears stated age    Lower Extremity Exam:  Vascular:    Dorsalis Pedis Pulse: absent  Posterior Tibialis Pulse: absent  Skin Temp: cool to the RIGHT foot and distal leg; warmer on the LEFT foot and leg   Extremity Edema: non-pitting edema right foot  Varicosities: PRESENT    Neurological:  Epicritic and Protective Sensations: Intact  Deep Pain Response: intact    Dermatological:  Ulceration: no acute changes to the right hallux as it remains dark/necrotic tuft distally and plantarlly; no pus or purulence, no fluctuance noted; multiple small areas to the RIGHT heel dark/necrotic ulcers over posterior heel; no drainage, no pus, no mal-odor; no fluctuance noted  Rash: absent  Erythema: unchanged   Calor: negative    Musculoskeletal:  Gait and Station: antalgic  Muscle Strength of Major Muscle Groups: diminished on the right secondary to guarding   Range of Motion: decreased ROM to the RIGHT 1st MTP joint     Impression:  1. Peripheral arterial disease  2. Necrotic ulcer right great toe and heel    Plan:  Patient seen and evaluated at bedside.   - Stressed to the patient that she has to avoid pressure to her right heel. - Dressing changes daily with betadine to the right heel and right great toe and offloading or abd pad to the heel. - Angio planned for Monday by Vascular surgery. - Likely small and large vessel disease and healing will be an issue - recommend conservative management. - Offload bilateral heels/feet on pillows with heels floating as patient does not tolerate the offloading boots. - WBAT. - Will follow intermittently. Sachin Farrell, [address] (o)  4/18/2020  11:49 AM

## 2020-04-18 NOTE — PROGRESS NOTES
Problem: Falls - Risk of  Goal: *Absence of Falls  Outcome: Progressing Towards Goal  Note: Fall Risk Interventions:  Mobility Interventions: Bed/chair exit alarm, Patient to call before getting OOB         Medication Interventions: Teach patient to arise slowly    Elimination Interventions:  Toilet paper/wipes in reach, Patient to call for help with toileting needs    History of Falls Interventions: Door open when patient unattended, Room close to nurse's station

## 2020-04-18 NOTE — PROGRESS NOTES
Bedside shift change report given to [facility], RN (oncoming nurse) by Nubia Wells RN (offgoing nurse). Report included the following information SBAR, Kardex, ED Summary, Procedure Summary, MAR and Recent Results.

## 2020-04-18 NOTE — ROUTINE PROCESS
Bedside and Verbal shift change report given to MICHAEL Manzano (oncoming nurse) by Tanja Durán RN (offgoing nurse). Report included the following information SBAR, Kardex and Quality Measures.

## 2020-04-18 NOTE — PROGRESS NOTES
Nate Whitaker [facility]  [address] [facility], [address] [place] [address]  [phone]      Medical Progress Note      NAME: Exira El ELISEB:  1932  MRM:  [id number]    Date/Time of service: 2020  9:56 AM       Subjective:     Chief Complaint:  Patient was personally seen and examined by me during this time period. Chart reviewed. No fevers, chills. Doing well       Objective:       Vitals:       Last 24hrs VS reviewed since prior progress note. Most recent are:    Visit Vitals  /68 (BP 1 Location: Right arm, BP Patient Position: Sitting)   Pulse 84   Temp 98.1 °F (36.7 °C)   Resp 16   Ht 5' (1.524 m)   Wt 72 kg (158 lb 11.7 oz)   SpO2 93%   BMI 31.00 kg/m²     SpO2 Readings from Last 6 Encounters:   20 93%   20 96%   20 97%   20 94%   19 97%   19 97%    O2 Flow Rate (L/min): 2 l/min       Intake/Output Summary (Last 24 hours) at 2020 0920  Last data filed at 2020  Gross per 24 hour   Intake 120 ml   Output 400 ml   Net -280 ml        Exam:     Physical Exam:    Gen:  Elderly, frail, NAD  HEENT:  Pink conjunctivae, PERRL, hearing intact to voice, moist mucous membranes  Neck:  Supple, without masses, thyroid non-tender  Resp:  No accessory muscle use, clear breath sounds without wheezes rales or rhonchi  Card:  No murmurs, normal S1, S2 without thrills, bruits or peripheral edema  Abd:  Soft, non-tender, non-distended, normoactive bowel sounds are present  Musc:  No cyanosis or clubbing  Skin:  R foot w/o erythema.   Ulcer on R 1st toe and heel  Neuro:  Cranial nerves 3-12 are grossly intact, follows commands appropriately  Psych:  fair insight, oriented to person, place and time, alert    Medications Reviewed: (see below)    Lab Data Reviewed: (see below)    ______________________________________________________________________    Medications:     Current Facility-Administered Medications   Medication Dose Route Frequency    loperamide (IMODIUM) 1 mg/7.5 mL oral solution 2 mg  2 mg Oral Q6H PRN    vancomycin (VANCOCIN) 1,250 mg in 0.9% sodium chloride (MBP/ADV) 250 mL  1,250 mg IntraVENous Q24H    lactobac ac& pc-s.therm-b.anim (MADAN Q/RISAQUAD)  1 Cap Oral DAILY    HYDROmorphone (DILAUDID) syringe 0.5 mg  0.5 mg IntraVENous Q4H PRN    cefepime (MAXIPIME) 2 g in 0.9% sodium chloride (MBP/ADV) 100 mL  2 g IntraVENous Q12H    sodium chloride (NS) flush 5-40 mL  5-40 mL IntraVENous Q8H    sodium chloride (NS) flush 5-40 mL  5-40 mL IntraVENous PRN    heparin (porcine) injection 5,000 Units  5,000 Units SubCUTAneous Q8H    acetaminophen (TYLENOL) tablet 650 mg  650 mg Oral Q6H PRN    oxyCODONE-acetaminophen (PERCOCET) 5-325 mg per tablet 1 Tab  1 Tab Oral Q6H PRN    albuterol-ipratropium (DUO-NEB) 2.5 MG-0.5 MG/3 ML  3 mL Nebulization Q4H PRN    arformoteroL (BROVANA) neb solution 15 mcg  15 mcg Nebulization BID RT    budesonide (PULMICORT) 500 mcg/2 ml nebulizer suspension  500 mcg Nebulization BID RT    metroNIDAZOLE (FLAGYL) IVPB premix 500 mg  500 mg IntraVENous Q12H    acetaminophen (TYLENOL) tablet 500 mg  500 mg Oral QHS    carvediloL (COREG) tablet 3.125 mg  3.125 mg Oral BID WITH MEALS    predniSONE (DELTASONE) tablet 10 mg  10 mg Oral DAILY    levothyroxine (SYNTHROID) tablet 88 mcg  88 mcg Oral ACB    sertraline (ZOLOFT) tablet 50 mg  50 mg Oral DAILY          Lab Review:     Recent Labs     04/17/20  0121 04/16/20  0030 04/15/20  1558   WBC 7.6 8.6 9.5   HGB 11.5 12.3 13.0   HCT 35.8 37.9 40.4   * 139* 157     Recent Labs     04/18/20  0256 04/17/20  0121 04/16/20  0030 04/15/20  1558   NA  --  140 142 140   K  --  4.1 3.7 4.0   CL  --  108 109* 106   CO2  --  29 29 27   GLU  --  113* 136* 253*   BUN  --  24* 28* 27*   CREA 0.92 0.86 0.99 1.31*   CA  --  8.5 8.4* 8.7   MG  --  1.6  --   --    PHOS  --  3.1  --   --    ALB  --   --   --  3.2*   TBILI  --   --   --  0.7   SGOT  --   --   --  25   ALT  -- --   --  22     No results found for: Reynold Payton / Plan:     79 yo hx of HTN, afib s/p pacer, ICM EF 30-35%, COPD, CKD 3, colon CA s/p partial colectomy, presented w/ right foot ulcer, cellulitis, toe gangrene, PVD    1) R foot cellulitis/heel ulcer/1st toe gangrene: improving. Not septic. Xray neg for osteo. Unable to perform MRI due to pacer. Will cont empiric IV Vanc/cefepime/flagyl. Use IV dilaudid prn severe pain. Cont wound care with betadine, heel boot. Further management per wound management team and Podiatry team    2) PAD: poor pulse on R foot. Plan for Angio on 04/20 by Vascular    3) HTN/chronic afib: s/p pacer. Plan to replace pacer by Dr. Kael Langofrd in the next month. Will cont coreg. Hold Eliquis for vascular procedures    4) COPD: stable. Cont LABA/ICS, nebs prn    5) Hypothyroid: cont synthroid    6) Anxiety/depression: cont Zoloft    7) CKD 3: Cr. Stable. Will monitor     8) Debility: cont PT/OT. Will need SNF vs Rehab    Code: Partial (does not want intubation).   Daughter Anel Weathers, [phone]    Total time spent with patient: 27 Minutes **I personally saw and examined the patient during this time period**                 Care Plan discussed with: Patient, nursing    Discussed:  Care Plan    Prophylaxis:  Hep SQ    Disposition:  SNF pending           ___________________________________________________    Attending Physician: Bert Cole MD

## 2020-04-19 LAB — CREAT SERPL-MCNC: 0.94 MG/DL (ref 0.55–1.02)

## 2020-04-19 PROCEDURE — 74011250636 HC RX REV CODE- 250/636: Performed by: INTERNAL MEDICINE

## 2020-04-19 PROCEDURE — 94760 N-INVAS EAR/PLS OXIMETRY 1: CPT

## 2020-04-19 PROCEDURE — 74011250637 HC RX REV CODE- 250/637: Performed by: INTERNAL MEDICINE

## 2020-04-19 PROCEDURE — 74011000250 HC RX REV CODE- 250: Performed by: INTERNAL MEDICINE

## 2020-04-19 PROCEDURE — 94640 AIRWAY INHALATION TREATMENT: CPT

## 2020-04-19 PROCEDURE — 82565 ASSAY OF CREATININE: CPT

## 2020-04-19 PROCEDURE — 97110 THERAPEUTIC EXERCISES: CPT | Performed by: OCCUPATIONAL THERAPIST

## 2020-04-19 PROCEDURE — 77010033678 HC OXYGEN DAILY

## 2020-04-19 PROCEDURE — 97535 SELF CARE MNGMENT TRAINING: CPT | Performed by: OCCUPATIONAL THERAPIST

## 2020-04-19 PROCEDURE — 74011250636 HC RX REV CODE- 250/636: Performed by: EMERGENCY MEDICINE

## 2020-04-19 PROCEDURE — 74011000258 HC RX REV CODE- 258: Performed by: EMERGENCY MEDICINE

## 2020-04-19 PROCEDURE — 65270000029 HC RM PRIVATE

## 2020-04-19 PROCEDURE — 74011636637 HC RX REV CODE- 636/637: Performed by: INTERNAL MEDICINE

## 2020-04-19 PROCEDURE — 36415 COLL VENOUS BLD VENIPUNCTURE: CPT

## 2020-04-19 RX ORDER — ATORVASTATIN CALCIUM 10 MG/1
10 TABLET, FILM COATED ORAL DAILY
Status: DISCONTINUED | OUTPATIENT
Start: 2020-04-19 | End: 2020-04-22 | Stop reason: HOSPADM

## 2020-04-19 RX ORDER — BACLOFEN 10 MG/1
5 TABLET ORAL 2 TIMES DAILY
Status: DISCONTINUED | OUTPATIENT
Start: 2020-04-19 | End: 2020-04-22 | Stop reason: HOSPADM

## 2020-04-19 RX ORDER — THERA TABS 400 MCG
1 TAB ORAL DAILY
Status: DISCONTINUED | OUTPATIENT
Start: 2020-04-19 | End: 2020-04-22 | Stop reason: HOSPADM

## 2020-04-19 RX ORDER — PSEUDOEPHED/ACETAMINOPHEN/CPM 30-500-2MG
2 TABLET ORAL AS NEEDED
Status: DISCONTINUED | OUTPATIENT
Start: 2020-04-19 | End: 2020-04-22

## 2020-04-19 RX ADMIN — METRONIDAZOLE 500 MG: 500 INJECTION, SOLUTION INTRAVENOUS at 09:17

## 2020-04-19 RX ADMIN — CARVEDILOL 3.12 MG: 3.12 TABLET, FILM COATED ORAL at 09:16

## 2020-04-19 RX ADMIN — Medication 1 CAPSULE: at 09:16

## 2020-04-19 RX ADMIN — CEFEPIME 2 G: 20 INJECTION, POWDER, FOR SOLUTION INTRAVENOUS at 11:28

## 2020-04-19 RX ADMIN — ACETAMINOPHEN 500 MG: 500 TABLET ORAL at 00:07

## 2020-04-19 RX ADMIN — OXYCODONE HYDROCHLORIDE AND ACETAMINOPHEN 1 TABLET: 5; 325 TABLET ORAL at 12:56

## 2020-04-19 RX ADMIN — SERTRALINE HYDROCHLORIDE 50 MG: 50 TABLET ORAL at 09:16

## 2020-04-19 RX ADMIN — THERA TABS 1 TABLET: TAB at 09:17

## 2020-04-19 RX ADMIN — HEPARIN SODIUM 5000 UNITS: 5000 INJECTION INTRAVENOUS; SUBCUTANEOUS at 05:54

## 2020-04-19 RX ADMIN — PREDNISONE 10 MG: 5 TABLET ORAL at 09:16

## 2020-04-19 RX ADMIN — BUDESONIDE 500 MCG: 0.5 INHALANT RESPIRATORY (INHALATION) at 20:07

## 2020-04-19 RX ADMIN — HEPARIN SODIUM 5000 UNITS: 5000 INJECTION INTRAVENOUS; SUBCUTANEOUS at 13:58

## 2020-04-19 RX ADMIN — BACLOFEN 5 MG: 10 TABLET ORAL at 09:16

## 2020-04-19 RX ADMIN — VANCOMYCIN HYDROCHLORIDE 1250 MG: 1.25 INJECTION, POWDER, LYOPHILIZED, FOR SOLUTION INTRAVENOUS at 17:38

## 2020-04-19 RX ADMIN — CEFEPIME 2 G: 20 INJECTION, POWDER, FOR SOLUTION INTRAVENOUS at 22:32

## 2020-04-19 RX ADMIN — BUDESONIDE 500 MCG: 0.5 INHALANT RESPIRATORY (INHALATION) at 07:32

## 2020-04-19 RX ADMIN — OXYCODONE HYDROCHLORIDE AND ACETAMINOPHEN 1 TABLET: 5; 325 TABLET ORAL at 20:44

## 2020-04-19 RX ADMIN — ACETAMINOPHEN 500 MG: 500 TABLET ORAL at 22:31

## 2020-04-19 RX ADMIN — METRONIDAZOLE 500 MG: 500 INJECTION, SOLUTION INTRAVENOUS at 22:32

## 2020-04-19 RX ADMIN — BACLOFEN 5 MG: 10 TABLET ORAL at 17:37

## 2020-04-19 RX ADMIN — LEVOTHYROXINE SODIUM 88 MCG: 88 TABLET ORAL at 06:39

## 2020-04-19 RX ADMIN — Medication 10 ML: at 06:00

## 2020-04-19 RX ADMIN — LOPERAMIDE HCL 2 MG: 1 SOLUTION ORAL at 20:50

## 2020-04-19 RX ADMIN — Medication 10 ML: at 22:00

## 2020-04-19 RX ADMIN — CARVEDILOL 3.12 MG: 3.12 TABLET, FILM COATED ORAL at 17:37

## 2020-04-19 RX ADMIN — ARFORMOTEROL TARTRATE 15 MCG: 15 SOLUTION RESPIRATORY (INHALATION) at 07:32

## 2020-04-19 RX ADMIN — ATORVASTATIN CALCIUM 10 MG: 10 TABLET, FILM COATED ORAL at 09:16

## 2020-04-19 RX ADMIN — ARFORMOTEROL TARTRATE 15 MCG: 15 SOLUTION RESPIRATORY (INHALATION) at 20:07

## 2020-04-19 RX ADMIN — ACETAMINOPHEN 650 MG: 325 TABLET ORAL at 05:54

## 2020-04-19 NOTE — PROGRESS NOTES
Nate Whitaker [facility]  [address] [facility], [address] [place] [address]  [phone]      Medical Progress Note      NAME: Brittani Brannon   :  1932  MRM:  [id number]    Date/Time of service: 2020  9:56 AM       Subjective:     Chief Complaint:  Patient was personally seen and examined by me during this time period. Chart reviewed. C/o chronic back pain       Objective:       Vitals:       Last 24hrs VS reviewed since prior progress note. Most recent are:    Visit Vitals  /73 (BP 1 Location: Right arm, BP Patient Position: At rest)   Pulse 83   Temp 97.5 °F (36.4 °C)   Resp 17   Ht 5' (1.524 m)   Wt 72 kg (158 lb 11.7 oz)   SpO2 98%   BMI 31.00 kg/m²     SpO2 Readings from Last 6 Encounters:   20 98%   20 96%   20 97%   20 94%   19 97%   19 97%    O2 Flow Rate (L/min): 3 l/min       Intake/Output Summary (Last 24 hours) at 2020 0851  Last data filed at 2020 0314  Gross per 24 hour   Intake 120 ml   Output 650 ml   Net -530 ml        Exam:     Physical Exam:    Gen:  Elderly, frail, NAD  HEENT:  Pink conjunctivae, PERRL, hearing intact to voice, moist mucous membranes  Neck:  Supple, without masses, thyroid non-tender  Resp:  No accessory muscle use, clear breath sounds without wheezes rales or rhonchi  Card:  No murmurs, normal S1, S2 without thrills, bruits or peripheral edema  Abd:  Soft, non-tender, non-distended, normoactive bowel sounds are present  Musc:  No cyanosis or clubbing  Skin:  R foot w/o erythema.   Ulcer on R 1st toe and heel  Neuro:  Cranial nerves 3-12 are grossly intact, follows commands appropriately  Psych:  fair insight, oriented to person, place and time, alert    Medications Reviewed: (see below)    Lab Data Reviewed: (see below)    ______________________________________________________________________    Medications:     Current Facility-Administered Medications   Medication Dose Route Frequency    baclofen (LIORESAL) tablet 5 mg  5 mg Oral BID    therapeutic multivitamin (THERAGRAN) tablet 1 Tab  1 Tab Oral DAILY    atorvastatin (LIPITOR) tablet 10 mg  10 mg Oral DAILY    loperamide (IMODIUM) 1 mg/7.5 mL oral solution 2 mg  2 mg Oral Q6H PRN    vancomycin (VANCOCIN) 1,250 mg in 0.9% sodium chloride (MBP/ADV) 250 mL  1,250 mg IntraVENous Q24H    lactobac ac& pc-s.therm-b.anim (MADAN Q/RISAQUAD)  1 Cap Oral DAILY    HYDROmorphone (DILAUDID) syringe 0.5 mg  0.5 mg IntraVENous Q4H PRN    cefepime (MAXIPIME) 2 g in 0.9% sodium chloride (MBP/ADV) 100 mL  2 g IntraVENous Q12H    sodium chloride (NS) flush 5-40 mL  5-40 mL IntraVENous Q8H    sodium chloride (NS) flush 5-40 mL  5-40 mL IntraVENous PRN    heparin (porcine) injection 5,000 Units  5,000 Units SubCUTAneous Q8H    acetaminophen (TYLENOL) tablet 650 mg  650 mg Oral Q6H PRN    oxyCODONE-acetaminophen (PERCOCET) 5-325 mg per tablet 1 Tab  1 Tab Oral Q6H PRN    albuterol-ipratropium (DUO-NEB) 2.5 MG-0.5 MG/3 ML  3 mL Nebulization Q4H PRN    arformoteroL (BROVANA) neb solution 15 mcg  15 mcg Nebulization BID RT    budesonide (PULMICORT) 500 mcg/2 ml nebulizer suspension  500 mcg Nebulization BID RT    metroNIDAZOLE (FLAGYL) IVPB premix 500 mg  500 mg IntraVENous Q12H    acetaminophen (TYLENOL) tablet 500 mg  500 mg Oral QHS    carvediloL (COREG) tablet 3.125 mg  3.125 mg Oral BID WITH MEALS    predniSONE (DELTASONE) tablet 10 mg  10 mg Oral DAILY    levothyroxine (SYNTHROID) tablet 88 mcg  88 mcg Oral ACB    sertraline (ZOLOFT) tablet 50 mg  50 mg Oral DAILY          Lab Review:     Recent Labs     04/17/20  0121   WBC 7.6   HGB 11.5   HCT 35.8   *     Recent Labs     04/19/20  0048 04/18/20  0256 04/17/20  0121   NA  --   --  140   K  --   --  4.1   CL  --   --  108   CO2  --   --  29   GLU  --   --  113*   BUN  --   --  24*   CREA 0.94 0.92 0.86   CA  --   --  8.5   MG  --   --  1.6   PHOS  --   --  3.1     No results found for: [address] [place] [address] Assessment / Plan:     79 yo hx of HTN, afib s/p pacer, ICM EF 30-35%, COPD, CKD 3, colon CA s/p partial colectomy, presented w/ right foot ulcer, cellulitis, toe gangrene, PVD    1) R foot cellulitis/heel ulcer/1st toe gangrene: improving. Not septic. Xray neg for osteo. Unable to perform MRI due to pacer. Will cont empiric IV Vanc/cefepime/flagyl. Can wean to oral abx after vascular procedure. Use IV dilaudid prn severe pain. Cont wound care with betadine, heel boot. Further management per wound management team and Podiatry team    2) PAD: poor pulse on R foot. Plan for Angio on 04/20 by Vascular. Cont statin    3) HTN/chronic afib: s/p pacer. Plan to replace pacer by Dr. Shirlene Mccall in the next month. Will cont coreg. Holding Eliquis for vascular procedures    4) COPD: stable. Cont LABA/ICS, nebs prn    5) Hypothyroid: cont synthroid    6) Anxiety/depression: cont Zoloft    7) CKD 3: Cr. Stable. Will monitor     8) Debility: cont PT/OT. Will need SNF vs Rehab    Code: Partial (does not want intubation).   Daughter Po Herrera, [phone]    Total time spent with patient: 22 Minutes **I personally saw and examined the patient during this time period**                 Care Plan discussed with: Patient, nursing    Discussed:  Care Plan    Prophylaxis:  Hep SQ    Disposition:  SNF pending           ___________________________________________________    Attending Physician: Yohana Dillard MD

## 2020-04-19 NOTE — PROGRESS NOTES
The [address] [place] [address]   Podiatric Surgery  Progress Note    Date: April 19, 2020  Patient: Edi Cantu  MR #: [id number]  [facility] #: [id number]    CC: continued pain to the right foot/heel    Blood pressure 126/72, pulse 85, temperature 98.2 °F (36.8 °C), resp. rate 16, height 5' (1.524 m), weight 72 kg (158 lb 11.7 oz), last menstrual period 02/26/1970, SpO2 97 %.     Intake/Output Summary (Last 24 hours) at 4/19/2020 1303  Last data filed at 4/19/2020 0314  Gross per 24 hour   Intake 120 ml   Output 650 ml   Net -530 ml         Medical History:  Past Medical History:   Diagnosis Date    Arthritis     Atrial fibrillation ([address]     av node ablation 5/22/98 with placement of CPI #[id number] dual chamber pacemaker subsequently replaced with a single chamber medtronic #[id number] single chamber pacemaker 7/25/05    Cancer ([address] 1970's    colon cancer w/ resection    COPD     Depression     GERD (gastroesophageal reflux disease)     History of vascular access device 04/17/2020    4F MIDLINE PLACED BY EMIL Torres RN LEFT BRACHIAL, 13CM    Hyperlipidemia     Hypertension     Ischemic cardiomyopathy     Migraine headache     Orthostatic hypotension     RADHA (obstructive sleep apnea)     Pacemaker 5/22/98    AV node ablation /pacemaker placement utilizing CPI # [id number] dual chamber system, medtronic #[id number] single chamber device placed 7/22/05    Pulmonary hypertension ([address] 9/26/2011    RVSP 50 on echo 8/14    Thyroid disease     Valvular heart disease     mild-mod MR/TR     Past Surgical History:   Procedure Laterality Date    BREAST SURGERY PROCEDURE UNLISTED      benign breast tumor excision right breast    HX BREAST BIOPSY Right 2002    neg; surgical bx    HX COLECTOMY  1974    colon    HX KNEE REPLACEMENT      right TKA    HX PACEMAKER      HX TUBAL LIGATION      IR KYPHOPLASTY LUMBAR  7/2/2019    TOTAL KNEE ARTHROPLASTY      total on R, partial on L     [unfilled]  Allergies   Allergen Reactions    Cardizem [Diltiazem Hcl] Hives    Codeine Nausea and Vomiting    Darvocet A500 [Propoxyphene N-Acetaminophen] Nausea and Vomiting    Diltiazem Hives    Labetalol Nausea and Vomiting     Dizzy/Disoriented     Pcn [Penicillins] Swelling     Tongue Swelling   Has tolerated cephalexin recently 4/7/20     Primidone Other (comments)     Lightheaded/unsteady gait    Sulfa (Sulfonamide Antibiotics) Nausea and Vomiting     Family History   Problem Relation Age of Onset    Diabetes Mother     Heart Disease Mother     Heart Attack Mother     Heart Disease Father     Stroke Sister     Breast Cancer Sister 80    Cancer Maternal Aunt         uterus    Diabetes Maternal Uncle     Breast Cancer Daughter 61     Social History     Tobacco Use   Smoking Status Passive Smoke Exposure - Never Smoker   Smokeless Tobacco Never Used     Social History     Substance and Sexual Activity   Drug Use No     Social History     Substance and Sexual Activity   Alcohol Use No    Alcohol/week: 0.0 standard drinks       Labs:  Lab Results   Component Value Date/Time    WBC 7.6 04/17/2020 01:21 AM    HGB 11.5 04/17/2020 01:21 AM    HCT 35.8 04/17/2020 01:21 AM    MCV 97.0 04/17/2020 01:21 AM    PLATELET 979 (L) 32/22/6799 01:21 AM     Lab Results   Component Value Date/Time    Sodium 140 04/17/2020 01:21 AM    Potassium 4.1 04/17/2020 01:21 AM    Chloride 108 04/17/2020 01:21 AM    CO2 29 04/17/2020 01:21 AM    Phosphorus 3.1 04/17/2020 01:21 AM    BUN 24 (H) 04/17/2020 01:21 AM    Calcium 8.5 04/17/2020 01:21 AM      Lab Results   Component Value Date/Time    Glucose 113 (H) 04/17/2020 01:21 AM     Lab Results   Component Value Date/Time    INR 1.3 (H) 06/30/2019 04:35 AM    No components found for: PTT  Recent Labs     04/17/20  0121   *       Physical Exam:  General appearance: alert, cooperative, no distress, appears stated age    Lower Extremity Exam:  Vascular:    Dorsalis Pedis Pulse: absent  Posterior Tibialis Pulse: absent  Skin Temp: cool to the RIGHT foot and distal leg; warmer on the LEFT foot and leg   Extremity Edema: non-pitting edema right foot  Varicosities: PRESENT    Neurological:  Epicritic and Protective Sensations: Intact  Deep Pain Response: intact    Dermatological:  Ulceration: no acute changes to the right hallux as it remains dark/necrotic tuft distally and plantarlly; no pus or purulence, no fluctuance noted; multiple small areas to the RIGHT heel dark/necrotic ulcers over posterior heel; no drainage, no pus, no mal-odor; no fluctuance noted  Rash: absent  Erythema: unchanged   Calor: negative    Musculoskeletal:  Gait and Station: antalgic  Muscle Strength of Major Muscle Groups: diminished on the right secondary to guarding   Range of Motion: decreased ROM to the RIGHT 1st MTP joint     Impression:  1. Peripheral arterial disease  2. Necrotic ulcer right great toe and heel    Plan:  Patient seen and evaluated at bedside.   - Continue offloading of right heel and right great toe; Dressing changes daily with betadine to the right heel and right great toe and offloading or abd pad to the heel. - Angio planned for Monday by Vascular surgery. - Likely small and large vessel disease and healing will be an issue - recommend conservative management. - Float heels when in bed on pillows. - WBAT. - Will follow intermittently. Sachin Aviles, [address] (o)  4/19/2020  1:03 PM

## 2020-04-19 NOTE — ROUTINE PROCESS
Bedside shift change report given to Bud Hurd RN (oncoming nurse) by Nikko He RN (offgoing nurse). Report included the following information SBAR and Kardex.

## 2020-04-19 NOTE — ROUTINE PROCESS
Call made to Dr. Adriana Hunter regarding heparin. Heparin to be held overnight in preparation for procedure tomorrow.

## 2020-04-19 NOTE — PROGRESS NOTES
Problem: Self Care Deficits Care Plan (Adult)  Goal: *Acute Goals and Plan of Care (Insert Text)  Description:   FUNCTIONAL STATUS PRIOR TO ADMISSION: Patient was modified independent using a walker for functional mobility. HOME SUPPORT: The patient lived in an independent living community with local amenities, including restaurants which pt reports using frequently. Occupational Therapy Goals  Initiated 4/17/2020  1. Patient will perform brief standing grooming with minimal assistance within 7 day(s). 2.  Patient will perform lower body dressing with minimal assistance using AE prn within 7 day(s). 3.  Patient will perform toilet transfers to [facility] with minimal assistance using most appropriate DME within 7 day(s). 4.  Patient will perform all aspects of toileting with minimal assistance within 7 day(s). 5.  Patient will participate in upper extremity therapeutic exercise/activities with modified independence for 10 minutes within 7 day(s). 7.  Patient will utilize energy conservation techniques during functional activities with verbal cues within 7 day(s). Outcome: Progressing Towards Goal     OCCUPATIONAL THERAPY TREATMENT  Patient: Rosalina Moore (27 y.o. female)  Date: 4/19/2020  Diagnosis: Cellulitis of foot [L03.119]  Foot infection [L08.9]   <principal problem not specified>  Procedure(s) (LRB):  AORTAGRAM ABDOMINAL (N/A)  ANGIOGRAPHY LOWER EXT RIGHT (N/A)    Precautions: Fall  Chart, occupational therapy assessment, plan of care, and goals were reviewed. ASSESSMENT  Patient continues with skilled OT services and is progressing towards goals. She remains limited by decreased strength, endurance, mobility, balance in standing, safety and pain in RLE. Per podiatrist's notes pt is WBAT and B heels are to be floated in bed with no WB. She is scheduled for angio of RLE tomorrow.       Current Level of Function Impacting Discharge (ADLs): mod A LE ADLs and toileting, SBA bed mobility and mod A amb using RW and WBAT due to RLE pain    Other factors to consider for discharge: pt scheduled for angio of RLE tomorrow         PLAN :  Patient continues to benefit from skilled intervention to address the above impairments. Continue treatment per established plan of care. to address goals. Recommend with staff: Recommend with nursing patient to complete as able in order to maintain strength, endurance and independence: ADLs with min A/setup and OOB to chair 3x/day and mobilizing to the bathroom for toileting with mod assist. Thank you for your assistance. Recommend next OT session: BUE AROM exer, toilet/BSC transfers and toileting, standing balance    Recommendation for discharge: (in order for the patient to meet his/her long term goals)  Therapy 3 hours per day 5-7 days per week    This discharge recommendation:  Has not yet been discussed the attending provider and/or case management    IF patient discharges home will need the following DME: TBD       SUBJECTIVE:   Patient stated I am ok.     OBJECTIVE DATA SUMMARY:   Cognitive/Behavioral Status:  Neurologic State: Alert; Appropriate for age  Orientation Level: Oriented X4  Cognition: Appropriate decision making; Appropriate for age attention/concentration; Follows commands  Perception: Appears intact  Perseveration: No perseveration noted  Safety/Judgement: Awareness of environment; Fall prevention; Insight into deficits    Functional Mobility and Transfers for ADLs:  Bed Mobility:  Supine to Sit: Supervision; Additional time  Scooting: Stand-by assistance    Transfers:  Sit to Stand: Minimum assistance; Additional time;Assist x1(RW)  Functional Transfers  Toilet Transfer : Moderate assistance; Additional time;Assist x1(RW and minimal wt through RUE)  Cues: Physical assistance; Tactile cues provided;Verbal cues provided;Visual cues provided  Adaptive Equipment: Bedside commode;Walker (comment)(rolling)  Bed to Chair: Moderate assistance; Additional time;Assist x1(RW and minimal wt through RUE)    Balance:  Sitting: Intact  Standing: Impaired; With support(RW)  Standing - Static: Fair;Constant support  Standing - Dynamic : Fair;Constant support    ADL Intervention:  Lower Body Dressing Assistance  Dressing Assistance: Moderate assistance  Underpants: Moderate assistance(A to thread over toes and pull over hips- imp balance)  Socks: Set-up; Supervision(LLE only)  Slip on Shoes with Back: Set-up; Supervision(LLE only)  Leg Crossed Method Used: Yes(LLE)  Position Performed: Seated edge of bed  Cues: Don;Doff;Verbal cues provided;Visual cues provided  Adaptive Equipment Used: Walker    Toileting  Toileting Assistance: Moderate assistance  Bladder Hygiene: Supervision;Set-up  Bowel Hygiene: Set-up; Supervision  Clothing Management: Moderate assistance(unable to maintain balance and manage pants)  Cues: Tactile cues provided;Verbal cues provided;Visual cues provided  Adaptive Equipment: Walker(BSC)    Cognitive Retraining  Safety/Judgement: Awareness of environment; Fall prevention; Insight into deficits    Therapeutic Exercises:   Educated pt on benefits of BUE exer to increase her strength and endurance for all ADLs. Pt then performed B shoulder flexion- 16 reps then 10 reps, abduction- 10 reps then 8 reps, horizontal abduction- 8 reps then 6 reps and chest presses 18 reps then 10 reps with rest breaks between each exer set. Encouraged pt to perform at least 2x a day and she agreed. Pain:  6/10 R foot    Activity Tolerance:   Fair and requires frequent rest breaks  Please refer to the flowsheet for vital signs taken during this treatment. After treatment patient left in no apparent distress:   Sitting in chair, Call bell within reach, and Bed / chair alarm activated    COMMUNICATION/COLLABORATION:   The patients plan of care was discussed with: Registered nurse.      Kaylee Kayser, OT  Time Calculation: 27 mins

## 2020-04-20 ENCOUNTER — TELEPHONE (OUTPATIENT)
Dept: CARDIOLOGY CLINIC | Age: 85
End: 2020-04-20

## 2020-04-20 LAB
ANION GAP SERPL CALC-SCNC: 5 MMOL/L (ref 5–15)
BUN SERPL-MCNC: 16 MG/DL (ref 6–20)
BUN/CREAT SERPL: 19 (ref 12–20)
CALCIUM SERPL-MCNC: 8.5 MG/DL (ref 8.5–10.1)
CHLORIDE SERPL-SCNC: 110 MMOL/L (ref 97–108)
CO2 SERPL-SCNC: 24 MMOL/L (ref 21–32)
CREAT SERPL-MCNC: 0.84 MG/DL (ref 0.55–1.02)
ERYTHROCYTE [DISTWIDTH] IN BLOOD BY AUTOMATED COUNT: 12.6 % (ref 11.5–14.5)
GLUCOSE SERPL-MCNC: 100 MG/DL (ref 65–100)
HCT VFR BLD AUTO: 35.1 % (ref 35–47)
HGB BLD-MCNC: 11.4 G/DL (ref 11.5–16)
INR PPP: 1.3 (ref 0.9–1.1)
MAGNESIUM SERPL-MCNC: 1.5 MG/DL (ref 1.6–2.4)
MCH RBC QN AUTO: 31.4 PG (ref 26–34)
MCHC RBC AUTO-ENTMCNC: 32.5 G/DL (ref 30–36.5)
MCV RBC AUTO: 96.7 FL (ref 80–99)
NRBC # BLD: 0 K/UL (ref 0–0.01)
NRBC BLD-RTO: 0 PER 100 WBC
PHOSPHATE SERPL-MCNC: 2.6 MG/DL (ref 2.6–4.7)
PLATELET # BLD AUTO: 127 K/UL (ref 150–400)
PMV BLD AUTO: 11.4 FL (ref 8.9–12.9)
POTASSIUM SERPL-SCNC: 4.2 MMOL/L (ref 3.5–5.1)
PROTHROMBIN TIME: 13 SEC (ref 9–11.1)
RBC # BLD AUTO: 3.63 M/UL (ref 3.8–5.2)
SODIUM SERPL-SCNC: 139 MMOL/L (ref 136–145)
WBC # BLD AUTO: 7.7 K/UL (ref 3.6–11)

## 2020-04-20 PROCEDURE — 94760 N-INVAS EAR/PLS OXIMETRY 1: CPT

## 2020-04-20 PROCEDURE — 80048 BASIC METABOLIC PNL TOTAL CA: CPT

## 2020-04-20 PROCEDURE — C1769 GUIDE WIRE: HCPCS

## 2020-04-20 PROCEDURE — 65270000029 HC RM PRIVATE

## 2020-04-20 PROCEDURE — 99153 MOD SED SAME PHYS/QHP EA: CPT

## 2020-04-20 PROCEDURE — C1894 INTRO/SHEATH, NON-LASER: HCPCS

## 2020-04-20 PROCEDURE — 74011250637 HC RX REV CODE- 250/637: Performed by: INTERNAL MEDICINE

## 2020-04-20 PROCEDURE — 77030015395 HC CATH ANGI DX SVU ANGI -C

## 2020-04-20 PROCEDURE — 74011000258 HC RX REV CODE- 258: Performed by: EMERGENCY MEDICINE

## 2020-04-20 PROCEDURE — C1760 CLOSURE DEV, VASC: HCPCS

## 2020-04-20 PROCEDURE — 74011250636 HC RX REV CODE- 250/636: Performed by: EMERGENCY MEDICINE

## 2020-04-20 PROCEDURE — B4101ZZ FLUOROSCOPY OF ABDOMINAL AORTA USING LOW OSMOLAR CONTRAST: ICD-10-PCS

## 2020-04-20 PROCEDURE — 74011250636 HC RX REV CODE- 250/636: Performed by: INTERNAL MEDICINE

## 2020-04-20 PROCEDURE — 99152 MOD SED SAME PHYS/QHP 5/>YRS: CPT

## 2020-04-20 PROCEDURE — 85610 PROTHROMBIN TIME: CPT

## 2020-04-20 PROCEDURE — 77030016702 HC CATH ANGI DX MRN1 ANGI -B

## 2020-04-20 PROCEDURE — 74011636320 HC RX REV CODE- 636/320

## 2020-04-20 PROCEDURE — 74011000250 HC RX REV CODE- 250: Performed by: INTERNAL MEDICINE

## 2020-04-20 PROCEDURE — 77030038269 HC DRN EXT URIN PURWCK BARD -A

## 2020-04-20 PROCEDURE — 77030029065 HC DRSG HEMO QCLOT ZMED -B

## 2020-04-20 PROCEDURE — 84100 ASSAY OF PHOSPHORUS: CPT

## 2020-04-20 PROCEDURE — 75625 CONTRAST EXAM ABDOMINL AORTA: CPT

## 2020-04-20 PROCEDURE — 74011636637 HC RX REV CODE- 636/637: Performed by: INTERNAL MEDICINE

## 2020-04-20 PROCEDURE — 74011000250 HC RX REV CODE- 250

## 2020-04-20 PROCEDURE — 83735 ASSAY OF MAGNESIUM: CPT

## 2020-04-20 PROCEDURE — B41F1ZZ FLUOROSCOPY OF RIGHT LOWER EXTREMITY ARTERIES USING LOW OSMOLAR CONTRAST: ICD-10-PCS

## 2020-04-20 PROCEDURE — 85027 COMPLETE CBC AUTOMATED: CPT

## 2020-04-20 PROCEDURE — 75710 ARTERY X-RAYS ARM/LEG: CPT

## 2020-04-20 PROCEDURE — 77030004561 HC CATH ANGI DX COBRA ANGI -B

## 2020-04-20 PROCEDURE — 74011250636 HC RX REV CODE- 250/636

## 2020-04-20 PROCEDURE — 36415 COLL VENOUS BLD VENIPUNCTURE: CPT

## 2020-04-20 PROCEDURE — 94640 AIRWAY INHALATION TREATMENT: CPT

## 2020-04-20 RX ORDER — MIDAZOLAM HYDROCHLORIDE 1 MG/ML
.5-1 INJECTION, SOLUTION INTRAMUSCULAR; INTRAVENOUS
Status: DISCONTINUED | OUTPATIENT
Start: 2020-04-20 | End: 2020-04-20 | Stop reason: HOSPADM

## 2020-04-20 RX ORDER — MAGNESIUM SULFATE HEPTAHYDRATE 40 MG/ML
2 INJECTION, SOLUTION INTRAVENOUS ONCE
Status: COMPLETED | OUTPATIENT
Start: 2020-04-20 | End: 2020-04-20

## 2020-04-20 RX ORDER — LIDOCAINE HYDROCHLORIDE 10 MG/ML
10-30 INJECTION INFILTRATION; PERINEURAL
Status: DISCONTINUED | OUTPATIENT
Start: 2020-04-20 | End: 2020-04-20 | Stop reason: HOSPADM

## 2020-04-20 RX ORDER — FENTANYL CITRATE 50 UG/ML
25-50 INJECTION, SOLUTION INTRAMUSCULAR; INTRAVENOUS
Status: DISCONTINUED | OUTPATIENT
Start: 2020-04-20 | End: 2020-04-20 | Stop reason: HOSPADM

## 2020-04-20 RX ORDER — HEPARIN SODIUM 1000 [USP'U]/ML
1000-5000 INJECTION, SOLUTION INTRAVENOUS; SUBCUTANEOUS
Status: DISCONTINUED | OUTPATIENT
Start: 2020-04-20 | End: 2020-04-20 | Stop reason: HOSPADM

## 2020-04-20 RX ORDER — HEPARIN SODIUM 200 [USP'U]/100ML
500 INJECTION, SOLUTION INTRAVENOUS CONTINUOUS
Status: DISCONTINUED | OUTPATIENT
Start: 2020-04-20 | End: 2020-04-20 | Stop reason: HOSPADM

## 2020-04-20 RX ADMIN — LIDOCAINE HYDROCHLORIDE 15 ML: 10 INJECTION, SOLUTION INFILTRATION; PERINEURAL at 12:20

## 2020-04-20 RX ADMIN — CARVEDILOL 3.12 MG: 3.12 TABLET, FILM COATED ORAL at 09:15

## 2020-04-20 RX ADMIN — ATORVASTATIN CALCIUM 10 MG: 10 TABLET, FILM COATED ORAL at 09:15

## 2020-04-20 RX ADMIN — HEPARIN SODIUM 500 UNITS/HR: 200 INJECTION, SOLUTION INTRAVENOUS at 12:00

## 2020-04-20 RX ADMIN — LOPERAMIDE HCL 2 MG: 1 SOLUTION ORAL at 09:15

## 2020-04-20 RX ADMIN — HEPARIN SODIUM 5000 UNITS: 5000 INJECTION INTRAVENOUS; SUBCUTANEOUS at 22:37

## 2020-04-20 RX ADMIN — Medication 10 ML: at 06:00

## 2020-04-20 RX ADMIN — Medication 1 CAPSULE: at 09:14

## 2020-04-20 RX ADMIN — MIDAZOLAM 1 MG: 1 INJECTION INTRAMUSCULAR; INTRAVENOUS at 12:23

## 2020-04-20 RX ADMIN — METRONIDAZOLE 500 MG: 500 INJECTION, SOLUTION INTRAVENOUS at 11:30

## 2020-04-20 RX ADMIN — ACETAMINOPHEN 500 MG: 500 TABLET ORAL at 22:37

## 2020-04-20 RX ADMIN — LOPERAMIDE HCL 2 MG: 1 SOLUTION ORAL at 17:50

## 2020-04-20 RX ADMIN — THERA TABS 1 TABLET: TAB at 09:14

## 2020-04-20 RX ADMIN — METRONIDAZOLE 500 MG: 500 INJECTION, SOLUTION INTRAVENOUS at 22:37

## 2020-04-20 RX ADMIN — Medication 10 ML: at 15:01

## 2020-04-20 RX ADMIN — BACLOFEN 5 MG: 10 TABLET ORAL at 09:15

## 2020-04-20 RX ADMIN — CARVEDILOL 3.12 MG: 3.12 TABLET, FILM COATED ORAL at 17:25

## 2020-04-20 RX ADMIN — VANCOMYCIN HYDROCHLORIDE 1250 MG: 1.25 INJECTION, POWDER, LYOPHILIZED, FOR SOLUTION INTRAVENOUS at 17:25

## 2020-04-20 RX ADMIN — FENTANYL CITRATE 25 MCG: 50 INJECTION, SOLUTION INTRAMUSCULAR; INTRAVENOUS at 12:23

## 2020-04-20 RX ADMIN — HEPARIN SODIUM 3000 UNITS: 1000 INJECTION INTRAVENOUS; SUBCUTANEOUS at 12:46

## 2020-04-20 RX ADMIN — CEFEPIME 2 G: 20 INJECTION, POWDER, FOR SOLUTION INTRAVENOUS at 22:37

## 2020-04-20 RX ADMIN — Medication 10 ML: at 22:00

## 2020-04-20 RX ADMIN — PREDNISONE 10 MG: 5 TABLET ORAL at 09:14

## 2020-04-20 RX ADMIN — OXYCODONE HYDROCHLORIDE AND ACETAMINOPHEN 1 TABLET: 5; 325 TABLET ORAL at 09:15

## 2020-04-20 RX ADMIN — LEVOTHYROXINE SODIUM 88 MCG: 88 TABLET ORAL at 06:43

## 2020-04-20 RX ADMIN — ARFORMOTEROL TARTRATE 15 MCG: 15 SOLUTION RESPIRATORY (INHALATION) at 07:29

## 2020-04-20 RX ADMIN — SERTRALINE HYDROCHLORIDE 50 MG: 50 TABLET ORAL at 09:14

## 2020-04-20 RX ADMIN — MAGNESIUM SULFATE HEPTAHYDRATE 2 G: 40 INJECTION, SOLUTION INTRAVENOUS at 09:16

## 2020-04-20 RX ADMIN — HEPARIN SODIUM 5000 UNITS: 5000 INJECTION INTRAVENOUS; SUBCUTANEOUS at 15:01

## 2020-04-20 RX ADMIN — IOPAMIDOL 100 ML: 755 INJECTION, SOLUTION INTRAVENOUS at 12:00

## 2020-04-20 RX ADMIN — BUDESONIDE 500 MCG: 0.5 INHALANT RESPIRATORY (INHALATION) at 07:29

## 2020-04-20 RX ADMIN — BACLOFEN 5 MG: 10 TABLET ORAL at 17:24

## 2020-04-20 RX ADMIN — BUDESONIDE 500 MCG: 0.5 INHALANT RESPIRATORY (INHALATION) at 20:16

## 2020-04-20 RX ADMIN — CEFEPIME 2 G: 20 INJECTION, POWDER, FOR SOLUTION INTRAVENOUS at 10:40

## 2020-04-20 RX ADMIN — ARFORMOTEROL TARTRATE 15 MCG: 15 SOLUTION RESPIRATORY (INHALATION) at 20:16

## 2020-04-20 NOTE — PROGRESS NOTES
Bedside shift change report given to Emelina Sewell RN and Joaquin Silva RN (oncoming nurse) by MICHAEL Aj (offgoing nurse). Report included the following information SBAR, Kardex, Procedure Summary, MAR and Accordion.    [address]

## 2020-04-20 NOTE — PROGRESS NOTES
TRANSFER - IN REPORT:    Verbal report received from [address]  RN(name) on Brenda Nichols  being received from [facility]GIO [facility](unit) for routine post - op      Report consisted of patients Situation, Background, Assessment and   Recommendations(SBAR). Information from the following report(s) Procedure Summary was reviewed with the receiving nurse. Opportunity for questions and clarification was provided. Assessment completed upon patients arrival to unit and care assumed. 1:58 PM  TRANSFER - OUT REPORT:    Verbal report given to Edwin Padilla  RN(name) on Brenda Nichols  being transferred to 5th floor 505(unit) for routine progression of care       Report consisted of patients Situation, Background, Assessment and   Recommendations(SBAR). Information from the following report(s) Procedure Summary, Recent Results and Cardiac Rhythm PACED was reviewed with the receiving nurse. Lines:       Opportunity for questions and clarification was provided.       Patient transported with:   Registered Nurse

## 2020-04-20 NOTE — PROGRESS NOTES
CM Note:  Transition of Care Plan:     RUR-25%-moderate      1. PT recommending snf--daughter wants [facility]; referral sent  2. PT/OT following   3. Wound care: Dressing changes daily with betadine to the right heel and right great toe and offloading or abd pad to the heel. 4. Podiatry and vascular surgery following--for angio today  5. Has a midline cath  6. CM to follow for any needs.   MICHAEL Reynaga

## 2020-04-20 NOTE — TELEPHONE ENCOUNTER
Verified patient with two types of identifiers. Spoke with Ori Zamorano and notified of MD recommendations. Patient's other daughter, Rolando Hendricks, in the back asked if upgrading the device will improve blood flow to patient's foot. Explained to [facility] the reason for upgrading the patient's device. Rolando Hendricks requested that Dr. Rashawn Byers call back personally to discuss patient.  Will notify MD.

## 2020-04-20 NOTE — PROGRESS NOTES
Right leg angiogram done without difficulty  Good flow to mid lower leg   No identifiable vessels in the foot  Recommend conservative management  May ultimately require BKA  Will discuss with family.

## 2020-04-20 NOTE — TELEPHONE ENCOUNTER
Verified patient with two types of identifiers. Spoke with Nestor Sepulveda, Verified on HIPAA. Patient had Angiogram today that went well; however, Dr. Manuel Dudley states that patient may need a BKA. Nestor Sepulveda wanted to know Dr. Vashti Garza recommendation of this regarding her device and timing. Dr. Manuel Dudley told Nestor Sepulveda that procedure did not have to be soon and can wait. Nestor Sepulveda really wanting to have device checked while patient is in the hospital. Nestor Sepulveda finding it very difficult to be away from her mother, due to [place], trying to make these decisions.  Will ask MD for recommendations regarding device

## 2020-04-20 NOTE — TELEPHONE ENCOUNTER
The device does not require reprogramming  Hers has enough battery for surgery  On surgery day, pacemaker rep usually comes to check pacemaker if surgeon wants to. Try to limit infection for all involved so only one check when it is really needed.   The rep or any visitor can be COVID carrier to her

## 2020-04-20 NOTE — PROGRESS NOTES
[facility]  [address] [facility], [address] [place] [address]  [phone]      Medical Progress Note      NAME: [place] ELISEB:  1932  MRM:  [id number]    Date/Time of service: 2020  9:56 AM       Subjective:     Chief Complaint: \"I'm okay\"     Pt seen and examined. Anxious about angio today. No complaints other than R foot pain        Objective:       Vitals:       Last 24hrs VS reviewed since prior progress note. Most recent are:    Visit Vitals  /65 (BP 1 Location: Right arm, BP Patient Position: At rest)   Pulse 90   Temp 97.9 °F (36.6 °C)   Resp 16   Ht 5' (1.524 m)   Wt 72 kg (158 lb 11.7 oz)   SpO2 92%   BMI 31.00 kg/m²     SpO2 Readings from Last 6 Encounters:   20 92%   20 96%   20 97%   20 94%   19 97%   19 97%    O2 Flow Rate (L/min): 3 l/min       Intake/Output Summary (Last 24 hours) at 2020 1136  Last data filed at 2020 0153  Gross per 24 hour   Intake    Output 300 ml   Net -300 ml        Exam:     Physical Exam:    Gen:  Elderly, frail, NAD  HEENT:  Pink conjunctivae, PERRL, hearing intact to voice, moist mucous membranes  Neck:  Supple, without masses, thyroid non-tender  Resp:  No accessory muscle use, clear breath sounds without wheezes rales or rhonchi  Card:  No murmurs, normal S1, S2 without thrills, bruits or peripheral edema  Abd:  Soft, non-tender, non-distended, normoactive bowel sounds are present  Musc:  No cyanosis or clubbing  Skin:  R foot w/o erythema. Ulcer on R 1st toe and heel.  Poor distal pulse in RLE   Neuro:  Cranial nerves 3-12 are grossly intact, follows commands appropriately  Psych:  fair insight, oriented to person, place and time, alert    Medications Reviewed: (see below)    Lab Data Reviewed: (see below)    ______________________________________________________________________    Medications:     Current Facility-Administered Medications   Medication Dose Route Frequency    baclofen (LIORESAL) tablet 5 mg  5 mg Oral BID    therapeutic multivitamin (THERAGRAN) tablet 1 Tab  1 Tab Oral DAILY    atorvastatin (LIPITOR) tablet 10 mg  10 mg Oral DAILY    loperamide (IMODIUM) 1 mg/7.5 mL oral solution 2 mg  2 mg Oral PRN    vancomycin (VANCOCIN) 1,250 mg in 0.9% sodium chloride (MBP/ADV) 250 mL  1,250 mg IntraVENous Q24H    lactobac ac& pc-s.therm-b.anim (MADAN Q/RISAQUAD)  1 Cap Oral DAILY    HYDROmorphone (DILAUDID) syringe 0.5 mg  0.5 mg IntraVENous Q4H PRN    cefepime (MAXIPIME) 2 g in 0.9% sodium chloride (MBP/ADV) 100 mL  2 g IntraVENous Q12H    sodium chloride (NS) flush 5-40 mL  5-40 mL IntraVENous Q8H    sodium chloride (NS) flush 5-40 mL  5-40 mL IntraVENous PRN    heparin (porcine) injection 5,000 Units  5,000 Units SubCUTAneous Q8H    acetaminophen (TYLENOL) tablet 650 mg  650 mg Oral Q6H PRN    oxyCODONE-acetaminophen (PERCOCET) 5-325 mg per tablet 1 Tab  1 Tab Oral Q6H PRN    albuterol-ipratropium (DUO-NEB) 2.5 MG-0.5 MG/3 ML  3 mL Nebulization Q4H PRN    arformoteroL (BROVANA) neb solution 15 mcg  15 mcg Nebulization BID RT    budesonide (PULMICORT) 500 mcg/2 ml nebulizer suspension  500 mcg Nebulization BID RT    metroNIDAZOLE (FLAGYL) IVPB premix 500 mg  500 mg IntraVENous Q12H    acetaminophen (TYLENOL) tablet 500 mg  500 mg Oral QHS    carvediloL (COREG) tablet 3.125 mg  3.125 mg Oral BID WITH MEALS    predniSONE (DELTASONE) tablet 10 mg  10 mg Oral DAILY    levothyroxine (SYNTHROID) tablet 88 mcg  88 mcg Oral ACB    sertraline (ZOLOFT) tablet 50 mg  50 mg Oral DAILY          Lab Review:     Recent Labs     04/20/20 0353   WBC 7.7   HGB 11.4*   HCT 35.1   *     Recent Labs     04/20/20  0353 04/19/20  0048 04/18/20  0256     --   --    K 4.2  --   --    *  --   --    CO2 24  --   --      --   --    BUN 16  --   --    CREA 0.84 0.94 0.92   CA 8.5  --   --    MG 1.5*  --   --    PHOS 2.6  --   --    INR 1.3*  --   --      No results found for: Tammy Armor / Plan:   79 yo hx of HTN, afib s/p pacer, ICM EF 30-35%, COPD, CKD 3, colon CA s/p partial colectomy, presented w/ right foot ulcer, cellulitis, toe gangrene, PVD    1) R foot cellulitis/heel ulcer/1st toe gangrene: improving. Not septic. Xray neg for osteo. Unable to perform MRI due to pacer. Will cont empiric IV Vanc/cefepime/flagyl. Podiatry and vascular on board     2) PAD: poor pulse on R foot. Plan for Angio on 04/20 by Vascular. Cont statin    3) HTN/chronic afib: s/p pacer. Plan to replace pacer by Dr. Funmi Baker in the next month. Will cont coreg. Holding Eliquis for vascular procedures. Family requesting pacer check now     4) COPD: stable.   -cont LABA/ICS, nebs prn    5) Hypothyroid: stable  -cont synthroid    6) Anxiety/depression: stable   -cont Zoloft    7) CKD 3: Cr. Stable  -monitor     8) Debility:   -PT. OT on board   -likely will need placement     9) HypoMg  -replete     Code: Partial (does not want intubation).   Daughter Trevor Mensah, [phone]    Total time spent with patient: [phone] [facility]30 discussed with: Patient, nursing    Discussed:  Care Plan    Prophylaxis:  Hep SQ    Disposition:  SNF pending           ___________________________________________________    Attending Physician: Ke Rhodes MD

## 2020-04-20 NOTE — PROGRESS NOTES
Bedside and Verbal shift change report given to Lurdes Cavazos (oncoming nurse) by Annelle Duverney (offgoing nurse). Report included the following information SBAR, Kardex, Procedure Summary, Intake/Output, MAR, Recent Results and Med Rec Status.

## 2020-04-20 NOTE — TELEPHONE ENCOUNTER
Patient's daughter, Ori Zamorano, is calling to make Dr. Rashawn Byers aware that the patient is in [facility] and would like to speak with someone regarding the patient's pacemaker. Please advise.     Phone #: [phone]  Thanks

## 2020-04-21 ENCOUNTER — DOCUMENTATION ONLY (OUTPATIENT)
Dept: CARDIOLOGY CLINIC | Age: 85
End: 2020-04-21

## 2020-04-21 LAB
ANION GAP SERPL CALC-SCNC: 6 MMOL/L (ref 5–15)
BACTERIA SPEC CULT: NORMAL
BACTERIA SPEC CULT: NORMAL
BASOPHILS # BLD: 0 K/UL (ref 0–0.1)
BASOPHILS NFR BLD: 0 % (ref 0–1)
BUN SERPL-MCNC: 19 MG/DL (ref 6–20)
BUN/CREAT SERPL: 23 (ref 12–20)
CALCIUM SERPL-MCNC: 8.6 MG/DL (ref 8.5–10.1)
CHLORIDE SERPL-SCNC: 105 MMOL/L (ref 97–108)
CO2 SERPL-SCNC: 24 MMOL/L (ref 21–32)
CREAT SERPL-MCNC: 0.81 MG/DL (ref 0.55–1.02)
DIFFERENTIAL METHOD BLD: ABNORMAL
EOSINOPHIL # BLD: 0.1 K/UL (ref 0–0.4)
EOSINOPHIL NFR BLD: 2 % (ref 0–7)
ERYTHROCYTE [DISTWIDTH] IN BLOOD BY AUTOMATED COUNT: 12.6 % (ref 11.5–14.5)
GLUCOSE SERPL-MCNC: 98 MG/DL (ref 65–100)
HCT VFR BLD AUTO: 35.1 % (ref 35–47)
HGB BLD-MCNC: 11.2 G/DL (ref 11.5–16)
IMM GRANULOCYTES # BLD AUTO: 0.1 K/UL (ref 0–0.04)
IMM GRANULOCYTES NFR BLD AUTO: 1 % (ref 0–0.5)
LYMPHOCYTES # BLD: 0.7 K/UL (ref 0.8–3.5)
LYMPHOCYTES NFR BLD: 9 % (ref 12–49)
MAGNESIUM SERPL-MCNC: 1.8 MG/DL (ref 1.6–2.4)
MCH RBC QN AUTO: 31.3 PG (ref 26–34)
MCHC RBC AUTO-ENTMCNC: 31.9 G/DL (ref 30–36.5)
MCV RBC AUTO: 98 FL (ref 80–99)
MONOCYTES # BLD: 0.7 K/UL (ref 0–1)
MONOCYTES NFR BLD: 9 % (ref 5–13)
NEUTS SEG # BLD: 5.8 K/UL (ref 1.8–8)
NEUTS SEG NFR BLD: 78 % (ref 32–75)
NRBC # BLD: 0 K/UL (ref 0–0.01)
NRBC BLD-RTO: 0 PER 100 WBC
PLATELET # BLD AUTO: 119 K/UL (ref 150–400)
PMV BLD AUTO: 12 FL (ref 8.9–12.9)
POTASSIUM SERPL-SCNC: 4.1 MMOL/L (ref 3.5–5.1)
RBC # BLD AUTO: 3.58 M/UL (ref 3.8–5.2)
SERVICE CMNT-IMP: NORMAL
SERVICE CMNT-IMP: NORMAL
SODIUM SERPL-SCNC: 135 MMOL/L (ref 136–145)
WBC # BLD AUTO: 7.4 K/UL (ref 3.6–11)

## 2020-04-21 PROCEDURE — 36415 COLL VENOUS BLD VENIPUNCTURE: CPT

## 2020-04-21 PROCEDURE — 74011250637 HC RX REV CODE- 250/637: Performed by: INTERNAL MEDICINE

## 2020-04-21 PROCEDURE — 94640 AIRWAY INHALATION TREATMENT: CPT

## 2020-04-21 PROCEDURE — 85025 COMPLETE CBC W/AUTO DIFF WBC: CPT

## 2020-04-21 PROCEDURE — 83735 ASSAY OF MAGNESIUM: CPT

## 2020-04-21 PROCEDURE — 74011636637 HC RX REV CODE- 636/637: Performed by: INTERNAL MEDICINE

## 2020-04-21 PROCEDURE — 80048 BASIC METABOLIC PNL TOTAL CA: CPT

## 2020-04-21 PROCEDURE — 65270000029 HC RM PRIVATE

## 2020-04-21 PROCEDURE — 97530 THERAPEUTIC ACTIVITIES: CPT

## 2020-04-21 PROCEDURE — 97535 SELF CARE MNGMENT TRAINING: CPT

## 2020-04-21 PROCEDURE — 74011000250 HC RX REV CODE- 250: Performed by: INTERNAL MEDICINE

## 2020-04-21 PROCEDURE — 97110 THERAPEUTIC EXERCISES: CPT

## 2020-04-21 PROCEDURE — 77010033678 HC OXYGEN DAILY

## 2020-04-21 PROCEDURE — 94760 N-INVAS EAR/PLS OXIMETRY 1: CPT

## 2020-04-21 PROCEDURE — 74011250636 HC RX REV CODE- 250/636: Performed by: INTERNAL MEDICINE

## 2020-04-21 RX ORDER — CEPHALEXIN 250 MG/1
500 CAPSULE ORAL EVERY 12 HOURS
Status: DISCONTINUED | OUTPATIENT
Start: 2020-04-21 | End: 2020-04-22 | Stop reason: HOSPADM

## 2020-04-21 RX ORDER — LEVOTHYROXINE SODIUM 88 UG/1
88 TABLET ORAL
Status: DISCONTINUED | OUTPATIENT
Start: 2020-04-22 | End: 2020-04-22 | Stop reason: HOSPADM

## 2020-04-21 RX ORDER — LANOLIN ALCOHOL/MO/W.PET/CERES
3 CREAM (GRAM) TOPICAL
Status: DISCONTINUED | OUTPATIENT
Start: 2020-04-21 | End: 2020-04-22 | Stop reason: HOSPADM

## 2020-04-21 RX ADMIN — ARFORMOTEROL TARTRATE 15 MCG: 15 SOLUTION RESPIRATORY (INHALATION) at 08:38

## 2020-04-21 RX ADMIN — HEPARIN SODIUM 5000 UNITS: 5000 INJECTION INTRAVENOUS; SUBCUTANEOUS at 05:42

## 2020-04-21 RX ADMIN — BUDESONIDE 500 MCG: 0.5 INHALANT RESPIRATORY (INHALATION) at 08:38

## 2020-04-21 RX ADMIN — THERA TABS 1 TABLET: TAB at 08:06

## 2020-04-21 RX ADMIN — BUDESONIDE 500 MCG: 0.5 INHALANT RESPIRATORY (INHALATION) at 19:53

## 2020-04-21 RX ADMIN — PREDNISONE 10 MG: 5 TABLET ORAL at 08:06

## 2020-04-21 RX ADMIN — CEPHALEXIN 500 MG: 250 CAPSULE ORAL at 10:54

## 2020-04-21 RX ADMIN — LEVOTHYROXINE SODIUM 88 MCG: 88 TABLET ORAL at 05:42

## 2020-04-21 RX ADMIN — HEPARIN SODIUM 5000 UNITS: 5000 INJECTION INTRAVENOUS; SUBCUTANEOUS at 21:31

## 2020-04-21 RX ADMIN — MELATONIN TAB 3 MG 3 MG: 3 TAB at 00:15

## 2020-04-21 RX ADMIN — Medication 10 ML: at 05:43

## 2020-04-21 RX ADMIN — CARVEDILOL 3.12 MG: 3.12 TABLET, FILM COATED ORAL at 18:03

## 2020-04-21 RX ADMIN — ARFORMOTEROL TARTRATE 15 MCG: 15 SOLUTION RESPIRATORY (INHALATION) at 19:53

## 2020-04-21 RX ADMIN — BACLOFEN 5 MG: 10 TABLET ORAL at 08:07

## 2020-04-21 RX ADMIN — ACETAMINOPHEN 500 MG: 500 TABLET ORAL at 21:31

## 2020-04-21 RX ADMIN — CARVEDILOL 3.12 MG: 3.12 TABLET, FILM COATED ORAL at 08:07

## 2020-04-21 RX ADMIN — Medication 1 CAPSULE: at 08:06

## 2020-04-21 RX ADMIN — CEPHALEXIN 500 MG: 250 CAPSULE ORAL at 21:31

## 2020-04-21 RX ADMIN — BACLOFEN 5 MG: 10 TABLET ORAL at 18:02

## 2020-04-21 RX ADMIN — ATORVASTATIN CALCIUM 10 MG: 10 TABLET, FILM COATED ORAL at 08:06

## 2020-04-21 RX ADMIN — Medication 10 ML: at 14:05

## 2020-04-21 RX ADMIN — HEPARIN SODIUM 5000 UNITS: 5000 INJECTION INTRAVENOUS; SUBCUTANEOUS at 14:05

## 2020-04-21 RX ADMIN — SERTRALINE HYDROCHLORIDE 50 MG: 50 TABLET ORAL at 08:06

## 2020-04-21 RX ADMIN — OXYCODONE HYDROCHLORIDE AND ACETAMINOPHEN 1 TABLET: 5; 325 TABLET ORAL at 08:06

## 2020-04-21 RX ADMIN — Medication 10 ML: at 21:32

## 2020-04-21 NOTE — PROGRESS NOTES
I called Ms Aurelia Montalvo her daughter at her request  I left her message as below since she did not answer call  I gave my recommendation about pacemaker and her surgery and with her foot cellulitis infection I would not recommend pacer gen change or biv pacer upgrade now  She needs to follow Dr Myra Claude recommendation of PAD and leg infection from hospitalist attending or ID specialist

## 2020-04-21 NOTE — PROGRESS NOTES
Spiritual Care Assessment/Progress Note  [address]      NAME: Daxa Ontiveros      MRN: [id number]  AGE: 80 y.o. SEX: female  Alevism Affiliation: Scientology   Language: English     4/21/2020     Total Time (in minutes): 16     Spiritual Assessment begun in [facility] 5M1 MED SURG 1 through conversation with:         [x]Patient        [] Family    [] Friend(s)        Reason for Consult: Initial/Spiritual assessment, patient floor     Spiritual beliefs: (Please include comment if needed)     [x] Identifies with a juan tradition:         [] Supported by a juan community:            [] Claims no spiritual orientation:           [] Seeking spiritual identity:                [] Adheres to an individual form of spirituality:           [x] Not able to assess:                           Identified resources for coping:      [] Prayer                               [] Music                  [] Guided Imagery     [x] Family/friends                 [] Pet visits     [] Devotional reading                         [] Unknown     [] Other:                                               Interventions offered during this visit: (See comments for more details)    Patient Interventions: Other (comment)(Unable to assess)           Plan of Care:     [] Support spiritual and/or cultural needs    [] Support AMD and/or advance care planning process      [] Support grieving process   [] Coordinate Rites and/or Rituals    [] Coordination with community clergy   [] No spiritual needs identified at this time   [] Detailed Plan of Care below (See Comments)  [] Make referral to Music Therapy  [] Make referral to Pet Therapy     [] Make referral to Addiction services  [] Make referral to [facility]  [] Make referral to Spiritual Care Partner  [] No future visits requested        [x] Follow up visits as needed     Comments:  visited Mrs. Lui Tan for an initial spiritual assessment on the Med. Surgical unit.  Mrs Lui Tan was sitting in her recliner sleeping. She did not become alert to the 's presence.  left a spiritual care card on Mrs Forbes's bedside table. 's will follow up as able and/or needed  Steve Crowe. Rosalba Tariq.      Paging Service: [phone])

## 2020-04-21 NOTE — ACP (ADVANCE CARE PLANNING)
Advance Care Planning Note Name: Pina Ghotra YOB: 1932 MRN: [id number] Admission Date: 4/15/2020  3:11 PM  
 
Date of discussion:  4/21/2020 Active Diagnoses: Active Problems: 
  Atrial fibrillation (HCC) () Overview: av node ablation 5/22/98 with placement of CPI #[id number] dual chamber  
    pacemaker subsequently replaced with a single chamber medtronic #[id number]  
    single chamber pacemaker 7/25/05 Anxiety and depression (2/26/2014) Hypothyroid (2/26/2014) CKD (chronic kidney disease) stage 3, GFR 30-59 ml/min (HCC) (2/9/2015) Pacemaker (7/24/2017) COPD (chronic obstructive pulmonary disease) ([address] (1/13/2019) Chronic respiratory failure with hypoxia ([address] (1/13/2019) Hypertension (6/29/2019) AAA (abdominal aortic aneurysm) ([facility] (6/29/2019) Cellulitis of foot (4/15/2020) Foot infection (4/15/2020) These active diagnoses are of sufficient risk that focused discussion on advance care planning is indicated in order to allow the patient to thoughtfully consider personal goals of care; and, if situations arise that prevent the ability to personally give input, to ensure appropriate representation of their personal desires for different levels and aggressiveness of care. Discussion:  
 
Persons present and participating in discussion:  
  
Discussion: *** Time Spent:  
 
Total time spent face-to-face in education and discussion: *** minutes.   
  
 
  
 
Joni Martin MD

## 2020-04-21 NOTE — PROGRESS NOTES
Nutrition Assessment:    RECOMMENDATIONS/INTERVENTION(S):   1. Changed diet to dental soft as pt c/o having issues chewing her food. 2. Add berry Ensure Clear BID to promote adequate protein intake for wound healing. 3. Continue to monitor intakes, wt changes, labs, skin. ASSESSMENT:   4/21: F/u. Pt reports poor po intake d/t having issues chewing food. Says she has some \"bad teeth. \" Pt interested in receiving dental soft diet. Says she ate one pancake for breakfast this AM. Ensure Enlive d/c'd as pt says it hurt her stomach. Likes juice, agreeable to trying Ensure Clear instead. Will ad BID. No c/o N/V. Pt provided food preferences, added to diet order. Labs- Na 135. Meds- cefepime, Marya-Q, flagyl, prednisone. 4/16: Pt assessed for RN referral for PI. Pt admitted with cellulitis of foot. PMH includes CKD, COPD, HTN. BMI 26.6, WNL for age. Pt reports fair appetite. Says she ate eggs, cereal, some peaches for breakfast this AM. Pt with multiple PIs noted. Pt likes Ensure and is agreeable to receiving it while here- will add BID. No significant weight changes noted per EMR. Labs- -253, Ca 8.4. MEds- cefepime, flagyl, prednisone. Diet Order: Regular  % Eaten:  No data found. Pertinent Medications: [x] Reviewed    Labs: [x] Reviewed    Anthropometrics: Height: 5' (152.4 cm) Weight: 72 kg (158 lb 11.7 oz)    IBW (%IBW):   ( ) UBW (%UBW):   (  %)      BMI: Body mass index is 31 kg/m². This BMI is indicative of:   [] Underweight    [x] Normal    [] Overweight    []  Obesity    []  Extreme Obesity (BMI>40)  Estimated Nutrition Needs (Based on): 1503 Kcals/day(1156 x 1.3 AF) , 73 g(1.2-1.5 g/kg) Protein  Carbohydrate:  At Least 130 g/day  Fluids: 1503 mL/day (1 ml/kcal)    Last BM: 4/20 (loose)   [x]Active     []Hyperactive  []Hypoactive       [] Absent   BS  Skin:    [] Intact   [] Incision  [x] Breakdown: stage 2 heel PI, unstageable toe PI, stage 1 back PI  [] DTI   [] Tears/Excoriation/Abrasion  []Edema:  [] Other: Wt Readings from Last 30 Encounters:   04/15/20 72 kg (158 lb 11.7 oz)   03/06/20 73 kg (161 lb)   03/05/20 72.1 kg (159 lb)   02/18/20 72.1 kg (159 lb)   01/24/20 73.4 kg (161 lb 12.8 oz)   11/29/19 72.1 kg (159 lb)   11/18/19 72.1 kg (159 lb)   11/07/19 71.2 kg (157 lb)   09/24/19 70.4 kg (155 lb 3.2 oz)   08/23/19 68.9 kg (152 lb)   08/01/19 68.9 kg (152 lb)   06/29/19 68.5 kg (151 lb)   05/23/19 67.1 kg (148 lb)   03/21/19 67.1 kg (148 lb)   03/08/19 69.8 kg (153 lb 12.8 oz)   02/21/19 66.7 kg (147 lb)   02/21/19 66.8 kg (147 lb 3.2 oz)   02/07/19 67.1 kg (148 lb)   01/13/19 64.4 kg (142 lb)   12/20/18 64.4 kg (142 lb)   11/28/18 66.7 kg (147 lb)   09/11/18 69.4 kg (153 lb)   08/10/18 69.9 kg (154 lb)   08/07/18 68 kg (150 lb)   06/26/18 70.8 kg (156 lb)   06/14/18 71.7 kg (158 lb)   06/13/18 71.7 kg (158 lb)   06/07/18 71.2 kg (157 lb)   04/17/18 71.6 kg (157 lb 14.4 oz)   03/19/18 74.2 kg (163 lb 8 oz)      NUTRITION DIAGNOSES:   Problem:  Increased nutrient needs     Etiology: related to increased demand for protein      Signs/Symptoms: as evidenced by stage 2 heel PI, unstageable toe PI, stage 1 back PI      4/21: Nutrition dx continues.     NUTRITION INTERVENTIONS:  Meals/Snacks: General/healthful diet   Supplements: Commercial supplement              GOAL:   PO intake >50% meals + ONS meeting estimated protein needs for wound healing next 1-3 days    Cultural, Yazidi, or Ethnic Dietary Needs: None     EDUCATION & DISCHARGE NEEDS:    [x] None Identified   [] Identified and Education Provided/Documented   [] Identified and Pt declined/was not appropriate      [] Interdisciplinary Care Plan Reviewed/Documented    [x] Discharge Needs: regular diet   [] No Nutrition Related Discharge Needs    NUTRITION RISK:   Pt Is At Nutrition Risk  [x]     No Nutrition Risk Identified  []       PT SEEN FOR:    []  MD Consult: []Calorie Count      []Diabetic Diet Education        []Diet Education     []Electrolyte Management     []General Nutrition Management and Supplements     []Management of Tube Feeding     []TPN Recommendations    []  RN Referral:  []MST score >=2     []Enteral/Parenteral Nutrition PTA     []Pregnant: Gestational DM or Multigestation                 [] Pressure Ulcer    []  Low BMI      []  Length of Stay       [] Dysphagia Diet         [] Ventilator  [x]  Follow-up     Previous Recommendations:   [x] Implemented          [] Not Implemented          [] Not Applicable    Previous Goal:   [] Met              [x] Progressing Towards Goal              [] Not Progressing Towards Goal   [] Not Applicable            Joslyn Culver, [address] [facility]  Pager [phone]  Phone [phone]

## 2020-04-21 NOTE — PROGRESS NOTES
Bedside and Verbal shift change report given to [employer] (oncoming nurse) by Domenica Kirk (offgoing nurse). Report included the following information Kardex.

## 2020-04-21 NOTE — ROUTINE PROCESS
Bedside and Verbal shift change report given to [address] MICHAEL Perez (oncoming nurse) by Rosa Maria Whitten RN (offgoing nurse). Report included the following information SBAR, Intake/Output, MAR and Recent Results.

## 2020-04-21 NOTE — PROGRESS NOTES
The [address] [place] [address]   Podiatric Surgery  Progress Note    Date: April 21, 2020  Patient: Hetal Vazquez  MR #: [id number]  [facility] #: [id number]    CC: states she has very little pain to the foot    Blood pressure 126/69, pulse 77, temperature 98.4 °F (36.9 °C), resp. rate 18, height 5' (1.524 m), weight 72 kg (158 lb 11.7 oz), last menstrual period 02/26/1970, SpO2 95 %.     Intake/Output Summary (Last 24 hours) at 4/21/2020 1745  Last data filed at 4/21/2020 1644  Gross per 24 hour   Intake 450 ml   Output    Net 450 ml         Medical History:  Past Medical History:   Diagnosis Date    Arthritis     Atrial fibrillation ([address]     av node ablation 5/22/98 with placement of CPI #[id number] dual chamber pacemaker subsequently replaced with a single chamber medtronic #[id number] single chamber pacemaker 7/25/05    Cancer ([facility] 1970's    colon cancer w/ resection    COPD     Depression     GERD (gastroesophageal reflux disease)     History of vascular access device 04/17/2020    4F MIDLINE PLACED BY EMIL Clements RN LEFT BRACHIAL, 13CM    Hyperlipidemia     Hypertension     Ischemic cardiomyopathy     Migraine headache     Orthostatic hypotension     RADHA (obstructive sleep apnea)     Pacemaker 5/22/98    AV node ablation /pacemaker placement utilizing CPI # [id number] dual chamber system, medtronic #[id number] single chamber device placed 7/22/05    Pulmonary hypertension ([address] 9/26/2011    RVSP 50 on echo 8/14    Thyroid disease     Valvular heart disease     mild-mod MR/TR     Past Surgical History:   Procedure Laterality Date    BREAST SURGERY PROCEDURE UNLISTED      benign breast tumor excision right breast    HX BREAST BIOPSY Right 2002    neg; surgical bx    HX COLECTOMY  1974    colon    HX KNEE REPLACEMENT      right TKA    HX PACEMAKER      HX TUBAL LIGATION      IR KYPHOPLASTY LUMBAR  7/2/2019    TOTAL KNEE ARTHROPLASTY      total on R, partial on L     [unfilled]  Allergies   Allergen Reactions    Cardizem [Diltiazem Hcl] Hives    Codeine Nausea and Vomiting    Darvocet A500 [Propoxyphene N-Acetaminophen] Nausea and Vomiting    Diltiazem Hives    Labetalol Nausea and Vomiting     Dizzy/Disoriented     Pcn [Penicillins] Swelling     Tongue Swelling   Has tolerated cephalexin recently 4/7/20     Primidone Other (comments)     Lightheaded/unsteady gait    Sulfa (Sulfonamide Antibiotics) Nausea and Vomiting     Family History   Problem Relation Age of Onset    Diabetes Mother     Heart Disease Mother     Heart Attack Mother     Heart Disease Father     Stroke Sister     Breast Cancer Sister 80    Cancer Maternal Aunt         uterus    Diabetes Maternal Uncle     Breast Cancer Daughter 61     Social History     Tobacco Use   Smoking Status Passive Smoke Exposure - Never Smoker   Smokeless Tobacco Never Used     Social History     Substance and Sexual Activity   Drug Use No     Social History     Substance and Sexual Activity   Alcohol Use No    Alcohol/week: 0.0 standard drinks       Labs:  Lab Results   Component Value Date/Time    WBC 7.4 04/21/2020 01:53 AM    HGB 11.2 (L) 04/21/2020 01:53 AM    HCT 35.1 04/21/2020 01:53 AM    MCV 98.0 04/21/2020 01:53 AM    PLATELET 971 (L) 84/63/4975 01:53 AM     Lab Results   Component Value Date/Time    Sodium 135 (L) 04/21/2020 01:53 AM    Potassium 4.1 04/21/2020 01:53 AM    Chloride 105 04/21/2020 01:53 AM    CO2 24 04/21/2020 01:53 AM    Phosphorus 2.6 04/20/2020 03:53 AM    BUN 19 04/21/2020 01:53 AM    Calcium 8.6 04/21/2020 01:53 AM      Lab Results   Component Value Date/Time    Glucose 98 04/21/2020 01:53 AM     Lab Results   Component Value Date/Time    INR 1.3 (H) 04/20/2020 03:53 AM    No components found for: PTT  Recent Labs     04/21/20  0153 04/20/20  0353   GLU 98 100       Physical Exam:  General appearance: alert, cooperative, no distress, appears stated age    Lower Extremity Exam:  Vascular:    Dorsalis Pedis Pulse: absent  Posterior Tibialis Pulse: absent  Skin Temp: cool to the RIGHT foot and distal leg; warmer on the LEFT foot and leg   Extremity Edema: non-pitting edema right foot  Varicosities: PRESENT    Neurological:  Epicritic and Protective Sensations: Intact  Deep Pain Response: intact    Dermatological:  Ulceration: Not evaluated today as dressings are clean,dry and intact; right hallux as it remains dark/necrotic tuft distally and plantarlly; no pus or purulence, no fluctuance noted; multiple small areas to the RIGHT heel dark/necrotic ulcers over posterior heel; no drainage, no pus, no mal-odor; no fluctuance noted  Rash: absent  Erythema: unchanged   Calor: negative    Musculoskeletal:  Gait and Station: antalgic  Muscle Strength of Major Muscle Groups: diminished on the right secondary to guarding   Range of Motion: decreased ROM to the RIGHT 1st MTP joint     Impression:  1. Peripheral arterial disease  2. Necrotic ulcer right great toe and heel    Plan:  Patient seen and evaluated at bedside.   - Continue offloading of right heel and right great toe; Recommend continuing dressing changes daily/ every other day with betadine to the right heel and right great toe and offloading or abd pad to the heel.   - Local wound care vs BKA per vascular as patient does not have any identifiable vessels in the right foot. - Float heels when in bed on pillows. - WBAT. - Will follow intermittently and as needed. Sachin Cornejo, [address] (o)  4/21/2020  5:44 PM

## 2020-04-21 NOTE — PROGRESS NOTES
65  Pt stating she wants to refuse her medications. RN attempted to explain importance of medications/ascertain why pt wanted to refuse meds. Pt began crying, stating that none of the treatments/medications are doing any good and that she does not want to keep having them. Stated her procedure today did not work and that she does not think she will be able to tolerate therapy. Pt then called daughter and spoke with her about wanting to cease medications/treatment. Daughter convinced pt to continue taking her medications for tonight. Pt's daughters (2) placed on visitation list for pt, will visit in AM to speak with their mother re: care decisions.

## 2020-04-21 NOTE — PROGRESS NOTES
4/21/2020   CARE MANAGEMENT NOTE:  (cross coverage)  CM reviewed EMR and handoff received from previous . Pt was admitted with right foot cellulitis, and 1st toe gangrene. Also PAD with pt possibly needing BKA. Reportedly, pt resides [facility] at [facility]. Pt's dtr, Sukhdeep Maza ([phone]) is her primary family contact. RUR 25%; LOS 5 days    Transition Plan of Care:  1. [facility] following - they await progression with PT/OT before rendering a decision   2. SNF is backup plan    CM will continue to follow pt for definitive discharge plan [facility] vs SNF.   Mike

## 2020-04-21 NOTE — PROGRESS NOTES
Problem: Mobility Impaired (Adult and Pediatric)  Goal: *Acute Goals and Plan of Care (Insert Text)  Description: FUNCTIONAL STATUS PRIOR TO ADMISSION: Patient was modified independent using a rolling walker for functional mobility. HOME SUPPORT PRIOR TO ADMISSION: The patient lived with home care staff. Physical Therapy Goals  Initiated 4/16/2020  1. Patient will move from supine to sit and sit to supine  in bed with modified independence within 7 day(s). 2.  Patient will transfer from bed to chair and chair to bed with minimal assistance/contact guard assist using the least restrictive device within 7 day(s). 3.  Patient will perform sit to stand with minimal assistance/contact guard assist within 7 day(s). 4.  Patient will ambulate with minimal assistance/contact guard assist for 50 feet with the least restrictive device within 7 day(s). Note:   PHYSICAL THERAPY TREATMENT  Patient: Mercedes Blue (7 y.o. female)  Date: 4/21/2020  Diagnosis: Cellulitis of foot [L03.119]  Foot infection [L08.9]   <principal problem not specified>  Procedure(s) (LRB):  AORTAGRAM ABDOMINAL (N/A)  ANGIOGRAPHY LOWER EXT RIGHT (N/A) 1 Day Post-Op  Precautions: Fall  Chart, physical therapy assessment, plan of care and goals were reviewed. ASSESSMENT  Patient continues with skilled PT services. Pt comes to stand from chair with min assist of 2. Pt took 4 steps to bedside commode with RW mod assist of 2. Pt SPT to bed from commode min to mod assist of 2 with RW. Pt was decrease WB on right. Pt sit to supine with min assist of 2. Current Level of Function Impacting Discharge (mobility/balance): Pt is min assist to mod of 2 for tranfers. PLAN :  Patient continues to benefit from skilled intervention to address the above impairments. Continue treatment per established plan of care. to address goals.     Recommendation for discharge: (in order for the patient to meet his/her long term goals)  Therapy up to 5 days/week in SNF setting    This discharge recommendation:  Has been made in collaboration with the attending provider and/or case management    IF patient discharges home will need the following DME: rolling walker       SUBJECTIVE:       OBJECTIVE DATA SUMMARY:   Critical Behavior:  Neurologic State: Alert  Orientation Level: Oriented X4  Cognition: Follows commands  Safety/Judgement: Awareness of environment, Fall prevention, Insight into deficits  Functional Mobility Training:  Bed Mobility:        Sit to Supine: Assist x2;Minimum assistance           Transfers:  Sit to Stand: Minimum assistance;Assist x2  Stand to Sit: Minimum assistance      SPT chair to commode with RW min to mod assist of 2. Balance:  Sitting: Intact  Sitting - Static: Good (unsupported); Fair (occasional)  Standing: With support  Standing - Static: Fair            Activity Tolerance:   Fair  Please refer to the flowsheet for vital signs taken during this treatment.     After treatment patient left in no apparent distress:   Supine in bed    COMMUNICATION/COLLABORATION:   The patients plan of care was discussed with: Physical therapist.     Jeff Beverly PTA   Time Calculation: 38 mins

## 2020-04-21 NOTE — PROGRESS NOTES
Nate Whitaker [facility]  [address] [facility], [place], [place], [address] [place] [address]  [phone]      Medical Progress Note      NAME: Pierre Freire   :  1932  MRM:  [id number]    Date/Time of service: 2020  9:56 AM       Subjective:   \"I don't know\"     Pt is seen and examined. No complaints       Objective:       Vitals:       Last 24hrs VS reviewed since prior progress note. Most recent are:    Visit Vitals  /69 (BP 1 Location: Right arm, BP Patient Position: At rest)   Pulse 66   Temp 97.7 °F (36.5 °C)   Resp 18   Ht 5' (1.524 m)   Wt 72 kg (158 lb 11.7 oz)   SpO2 91%   BMI 31.00 kg/m²     SpO2 Readings from Last 6 Encounters:   20 91%   20 96%   20 97%   20 94%   19 97%   19 97%    O2 Flow Rate (L/min): 2 l/min     No intake or output data in the 24 hours ending 20 0926     Exam:     Physical Exam:    Gen:  Elderly, frail, NAD  HEENT:  Pink conjunctivae, PERRL, hearing intact to voice, moist mucous membranes  Neck:  Supple, without masses, thyroid non-tender  Resp:  No accessory muscle use, clear breath sounds without wheezes rales or rhonchi  Card:  No murmurs, normal S1, S2 without thrills, bruits or peripheral edema  Abd:  Soft, non-tender, non-distended, normoactive bowel sounds are present  Musc:  No cyanosis or clubbing  Skin:  R foot w/o erythema. Ulcer on R 1st toe and heel.  Poor distal pulse in RLE   Neuro:  Cranial nerves 3-12 are grossly intact, follows commands appropriately  Psych:  fair insight, oriented to person, place and time, alert    Medications Reviewed: (see below)    Lab Data Reviewed: (see below)    ______________________________________________________________________    Medications:     Current Facility-Administered Medications   Medication Dose Route Frequency    melatonin tablet 3 mg  3 mg Oral QHS PRN    baclofen (LIORESAL) tablet 5 mg  5 mg Oral BID    therapeutic multivitamin (THERAGRAN) tablet 1 Tab  1 Tab Oral DAILY    atorvastatin (LIPITOR) tablet 10 mg  10 mg Oral DAILY    loperamide (IMODIUM) 1 mg/7.5 mL oral solution 2 mg  2 mg Oral PRN    vancomycin (VANCOCIN) 1,250 mg in 0.9% sodium chloride (MBP/ADV) 250 mL  1,250 mg IntraVENous Q24H    lactobac ac& pc-s.therm-b.anim (MADAN Q/RISAQUAD)  1 Cap Oral DAILY    HYDROmorphone (DILAUDID) syringe 0.5 mg  0.5 mg IntraVENous Q4H PRN    cefepime (MAXIPIME) 2 g in 0.9% sodium chloride (MBP/ADV) 100 mL  2 g IntraVENous Q12H    sodium chloride (NS) flush 5-40 mL  5-40 mL IntraVENous Q8H    sodium chloride (NS) flush 5-40 mL  5-40 mL IntraVENous PRN    heparin (porcine) injection 5,000 Units  5,000 Units SubCUTAneous Q8H    acetaminophen (TYLENOL) tablet 650 mg  650 mg Oral Q6H PRN    oxyCODONE-acetaminophen (PERCOCET) 5-325 mg per tablet 1 Tab  1 Tab Oral Q6H PRN    albuterol-ipratropium (DUO-NEB) 2.5 MG-0.5 MG/3 ML  3 mL Nebulization Q4H PRN    arformoteroL (BROVANA) neb solution 15 mcg  15 mcg Nebulization BID RT    budesonide (PULMICORT) 500 mcg/2 ml nebulizer suspension  500 mcg Nebulization BID RT    metroNIDAZOLE (FLAGYL) IVPB premix 500 mg  500 mg IntraVENous Q12H    acetaminophen (TYLENOL) tablet 500 mg  500 mg Oral QHS    carvediloL (COREG) tablet 3.125 mg  3.125 mg Oral BID WITH MEALS    predniSONE (DELTASONE) tablet 10 mg  10 mg Oral DAILY    levothyroxine (SYNTHROID) tablet 88 mcg  88 mcg Oral ACB    sertraline (ZOLOFT) tablet 50 mg  50 mg Oral DAILY          Lab Review:     Recent Labs     04/21/20  0153 04/20/20  0353   WBC 7.4 7.7   HGB 11.2* 11.4*   HCT 35.1 35.1   * 127*     Recent Labs     04/21/20  0153 04/20/20  0353 04/19/20  0048   * 139  --    K 4.1 4.2  --     110*  --    CO2 24 24  --    GLU 98 100  --    BUN 19 16  --    CREA 0.81 0.84 0.94   CA 8.6 8.5  --    MG 1.8 1.5*  --    PHOS  --  2.6  --    INR  --  1.3*  --      No results found for: GLUCPOC       Assessment / Plan:   79 yo hx of HTN, afib s/p pacer, ICM EF 30-35%, COPD, CKD 3, colon CA s/p partial colectomy, presented w/ right foot ulcer, cellulitis, toe gangrene, PVD    1) R foot cellulitis/heel ulcer/1st toe gangrene: improving. Not septic. Xray neg for osteo. Unable to perform MRI due to pacer. Will cont empiric IV Vanc/cefepime/flagyl. Podiatry and vascular on board. Will begin de-escalating antibiotics     2) PAD: poor pulse on R foot. S/o angio. Per vascular no indentifiable vessels in the foot     3) HTN/chronic afib: s/p pacer. Plan to replace pacer by Dr. Idalia Serna in the next month. Will cont coreg  -resume eliquis when ok with vascular    4) COPD: stable.   -cont LABA/ICS, nebs prn    5) Hypothyroid: stable  -cont synthroid    6) Anxiety/depression: stable   -cont Zoloft    7) CKD 3: Cr. Stable  -monitor     8) Debility:   -PT. OT on board   -? [facility] v. SNF     9) HypoMg  -repleted on 4/20. Monitor     Code: Partial (does not want intubation).   Daughter William Post, [phone]    Total time spent with patient: 22 [address] discussed with: Patient, nursing    Discussed:  Care Plan    Prophylaxis:  Hep SQ    Disposition:  [facility] v. SNF            ___________________________________________________    Attending Physician: Aurelia Weir MD

## 2020-04-21 NOTE — PROGRESS NOTES
Problem: Self Care Deficits Care Plan (Adult)  Goal: *Acute Goals and Plan of Care (Insert Text)  Description:   FUNCTIONAL STATUS PRIOR TO ADMISSION: Patient was modified independent using a walker for functional mobility. HOME SUPPORT: The patient lived in an independent living community with local amenities, including restaurants which pt reports using frequently. Occupational Therapy Goals  Initiated 4/17/2020  1. Patient will perform brief standing grooming with minimal assistance within 7 day(s). 2.  Patient will perform lower body dressing with minimal assistance using AE prn within 7 day(s). 3.  Patient will perform toilet transfers to [facility] with minimal assistance using most appropriate DME within 7 day(s). 4.  Patient will perform all aspects of toileting with minimal assistance within 7 day(s). 5.  Patient will participate in upper extremity therapeutic exercise/activities with modified independence for 10 minutes within 7 day(s). 7.  Patient will utilize energy conservation techniques during functional activities with verbal cues within 7 day(s). Outcome: Progressing Towards Goal   OCCUPATIONAL THERAPY TREATMENT  Patient: Celena Chacon (89 y.o. female)  Date: 4/21/2020  Diagnosis: Cellulitis of foot [L03.119]  Foot infection [L08.9]   <principal problem not specified>  Procedure(s) (LRB):  AORTAGRAM ABDOMINAL (N/A)  ANGIOGRAPHY LOWER EXT RIGHT (N/A) 1 Day Post-Op  Precautions: Fall  Chart, occupational therapy assessment, plan of care, and goals were reviewed. ASSESSMENT  Patient continues with skilled OT services and is progressing towards goals. Pt stating needing to use the BSC, moderate assist for transfer and able to perform seated hygiene. She also washed her face and arms before return to bed. She than performed UE exercises with verbalizations of arthritis \"all over. \" Pt with good effort. Current Level of Function Impacting Discharge (ADLs):  Moderate assist to transfer to [facility], seated bladder hygiene    Other factors to consider for discharge:           PLAN :  Patient continues to benefit from skilled intervention to address the above impairments. Continue treatment per established plan of care. to address goals. Recommend with staff: Out of bed for meals and activity    Recommend next OT session: Cont towards goals    Recommendation for discharge: (in order for the patient to meet his/her long term goals)  Therapy up to 5 days/week in SNF setting    This discharge recommendation:  Has not yet been discussed the attending provider and/or case management    IF patient discharges home will need the following DME: Pt has DME       SUBJECTIVE:   Patient stated I am just so weak    OBJECTIVE DATA SUMMARY:   Cognitive/Behavioral Status:  Neurologic State: Alert  Orientation Level: Oriented X4  Cognition: Follows commands             Functional Mobility and Transfers for ADLs:  Bed Mobility:       Transfers:  Sit to Stand: Moderate assistance  Functional Transfers  Toilet Transfer :  Moderate assistance  Cues: Physical assistance;Verbal cues provided  Adaptive Equipment: Bedside commode;Walker (comment)       Balance:  Sitting: Intact  Standing: Impaired    ADL Intervention:            Upper Body Bathing  Bathing Assistance: Minimum assistance  Position Performed: Seated in chair       Therapeutic Exercises:     EXERCISE   Sets   Reps   Active Active Assist   Passive   Comments   Finger flex/ext 1 10 [x]           []           []              Forearm supination/pronation 1 10 [x]           []           []              Elbow flex/ext 1 10 [x]           []           []              Chest presses 1 10 [x]           []           []                 []           []           []                 []           []           []                 []           []           []                 []           []           []                 []           []           []                 [] []           []                 []           []           []                   Activity Tolerance:   Fair  Please refer to the flowsheet for vital signs taken during this treatment.     After treatment patient left in no apparent distress:   Supine in bed    COMMUNICATION/COLLABORATION:   The patients plan of care was discussed with: Physical therapy assistant and Occupational therapist.     JANIE Youssef  Time Calculation: 25 mins

## 2020-04-22 ENCOUNTER — TELEPHONE (OUTPATIENT)
Dept: CARDIOLOGY CLINIC | Age: 85
End: 2020-04-22

## 2020-04-22 ENCOUNTER — HOSPITAL ENCOUNTER (OUTPATIENT)
Dept: REHABILITATION | Age: 85
End: 2020-05-06
Attending: PHYSICAL MEDICINE & REHABILITATION | Admitting: PHYSICAL MEDICINE & REHABILITATION

## 2020-04-22 VITALS
HEART RATE: 81 BPM | SYSTOLIC BLOOD PRESSURE: 116 MMHG | HEIGHT: 60 IN | TEMPERATURE: 97.7 F | OXYGEN SATURATION: 94 % | RESPIRATION RATE: 18 BRPM | BODY MASS INDEX: 31.16 KG/M2 | DIASTOLIC BLOOD PRESSURE: 69 MMHG | WEIGHT: 158.73 LBS

## 2020-04-22 PROCEDURE — 74011636637 HC RX REV CODE- 636/637: Performed by: INTERNAL MEDICINE

## 2020-04-22 PROCEDURE — 77010033678 HC OXYGEN DAILY

## 2020-04-22 PROCEDURE — 94640 AIRWAY INHALATION TREATMENT: CPT

## 2020-04-22 PROCEDURE — 74011000250 HC RX REV CODE- 250: Performed by: INTERNAL MEDICINE

## 2020-04-22 PROCEDURE — 74011250636 HC RX REV CODE- 250/636: Performed by: INTERNAL MEDICINE

## 2020-04-22 PROCEDURE — 74011250637 HC RX REV CODE- 250/637: Performed by: INTERNAL MEDICINE

## 2020-04-22 PROCEDURE — 94760 N-INVAS EAR/PLS OXIMETRY 1: CPT

## 2020-04-22 RX ORDER — ASCORBIC ACID 250 MG
250 TABLET ORAL 2 TIMES DAILY
Qty: 60 TAB | Refills: 0 | Status: SHIPPED | OUTPATIENT
Start: 2020-04-22 | End: 2020-05-14

## 2020-04-22 RX ORDER — ASCORBIC ACID 500 MG
500 TABLET ORAL 2 TIMES DAILY
Status: DISCONTINUED | OUTPATIENT
Start: 2020-04-22 | End: 2020-04-22 | Stop reason: HOSPADM

## 2020-04-22 RX ORDER — PSEUDOEPHED/ACETAMINOPHEN/CPM 30-500-2MG
2 TABLET ORAL
Status: DISCONTINUED | OUTPATIENT
Start: 2020-04-22 | End: 2020-04-22 | Stop reason: HOSPADM

## 2020-04-22 RX ORDER — FUROSEMIDE 40 MG/1
40 TABLET ORAL DAILY
Status: DISCONTINUED | OUTPATIENT
Start: 2020-04-22 | End: 2020-04-22 | Stop reason: HOSPADM

## 2020-04-22 RX ORDER — OXYCODONE HYDROCHLORIDE 5 MG/1
5 TABLET ORAL
Qty: 30 TAB | Refills: 0 | Status: SHIPPED | OUTPATIENT
Start: 2020-04-22 | End: 2020-05-14

## 2020-04-22 RX ORDER — OXYCODONE AND ACETAMINOPHEN 5; 325 MG/1; MG/1
1 TABLET ORAL
Qty: 30 TAB | Refills: 0 | Status: SHIPPED | OUTPATIENT
Start: 2020-04-22 | End: 2020-04-22

## 2020-04-22 RX ORDER — CEPHALEXIN 500 MG/1
500 CAPSULE ORAL EVERY 12 HOURS
Qty: 10 CAP | Refills: 0 | Status: SHIPPED | OUTPATIENT
Start: 2020-04-22 | End: 2020-04-27

## 2020-04-22 RX ADMIN — ARFORMOTEROL TARTRATE 15 MCG: 15 SOLUTION RESPIRATORY (INHALATION) at 07:58

## 2020-04-22 RX ADMIN — Medication 10 ML: at 05:25

## 2020-04-22 RX ADMIN — OXYCODONE HYDROCHLORIDE AND ACETAMINOPHEN 500 MG: 500 TABLET ORAL at 09:03

## 2020-04-22 RX ADMIN — BUDESONIDE 500 MCG: 0.5 INHALANT RESPIRATORY (INHALATION) at 07:58

## 2020-04-22 RX ADMIN — SERTRALINE HYDROCHLORIDE 50 MG: 50 TABLET ORAL at 09:03

## 2020-04-22 RX ADMIN — CEPHALEXIN 500 MG: 250 CAPSULE ORAL at 09:03

## 2020-04-22 RX ADMIN — PREDNISONE 10 MG: 5 TABLET ORAL at 09:03

## 2020-04-22 RX ADMIN — APIXABAN 5 MG: 5 TABLET, FILM COATED ORAL at 10:55

## 2020-04-22 RX ADMIN — OXYCODONE HYDROCHLORIDE AND ACETAMINOPHEN 1 TABLET: 5; 325 TABLET ORAL at 00:46

## 2020-04-22 RX ADMIN — BACLOFEN 5 MG: 10 TABLET ORAL at 09:03

## 2020-04-22 RX ADMIN — HEPARIN SODIUM 5000 UNITS: 5000 INJECTION INTRAVENOUS; SUBCUTANEOUS at 05:24

## 2020-04-22 RX ADMIN — Medication 1 CAPSULE: at 09:02

## 2020-04-22 RX ADMIN — OXYCODONE HYDROCHLORIDE AND ACETAMINOPHEN 1 TABLET: 5; 325 TABLET ORAL at 09:03

## 2020-04-22 RX ADMIN — THERA TABS 1 TABLET: TAB at 09:03

## 2020-04-22 RX ADMIN — CARVEDILOL 3.12 MG: 3.12 TABLET, FILM COATED ORAL at 09:03

## 2020-04-22 RX ADMIN — FUROSEMIDE 40 MG: 40 TABLET ORAL at 10:55

## 2020-04-22 RX ADMIN — Medication 2 MG: at 09:02

## 2020-04-22 RX ADMIN — ATORVASTATIN CALCIUM 10 MG: 10 TABLET, FILM COATED ORAL at 09:03

## 2020-04-22 RX ADMIN — LEVOTHYROXINE SODIUM 88 MCG: 0.09 TABLET ORAL at 06:41

## 2020-04-22 RX ADMIN — Medication 2 MG: at 13:21

## 2020-04-22 NOTE — PROGRESS NOTES
Bedside and Verbal shift change report given to [address] RN (oncoming nurse) by Johnathan Suarez (offgoing nurse). Report included the following information SBAR, Kardex, MAR and Accordion.

## 2020-04-22 NOTE — PROGRESS NOTES
Pharmacist Discharge Medication Reconciliation    Discharge Provider: Dr. Sonia Lara       Discharge Medications:      My Medications        START taking these medications        Instructions Each Dose to Equal Morning Noon Evening Bedtime   ascorbic acid (vitamin C) 250 mg tablet  Commonly known as:  VITAMIN C    Your last dose was: Your next dose is: Take 1 Tab by mouth two (2) times a day. 250 mg                 cephALEXin 500 mg capsule  Commonly known as:  KEFLEX    Your last dose was: Your next dose is: Take 1 Cap by mouth every twelve (12) hours for 5 days. 500 mg                 L. acidoph & paracasei- S therm- Bifido 8 billion cell Cap cap  Commonly known as:  MADAN-Q/RISAQUAD  Start taking on:  April 23, 2020    Your last dose was: Your next dose is: Take 1 Cap by mouth daily. 1 Cap                 OTHER(NON-FORMULARY)    Your last dose was: Your next dose is:          Right heel and right great toe: Apply betadine daily and let dry. Float heel or use Heel boot (boot when in bed). Gently wrap R foot in Kerlix to keep clean and dry. Change daily                  oxyCODONE IR 5 mg immediate release tablet  Commonly known as:  Roxicodone    Your last dose was: Your next dose is: Take 1 Tab by mouth every six (6) hours as needed for Pain for up to 30 days. Max Daily Amount: 20 mg.   5 mg                        CONTINUE taking these medications        Instructions Each Dose to Equal Morning Noon Evening Bedtime   * acetaminophen 500 mg tablet  Commonly known as:  TYLENOL    Your last dose was: Your next dose is: Take 1,000 mg by mouth two (2) times a day. Acetaminophen 1000 mg morning and afternoon, 500 mg at bedtime   1,000 mg                 * acetaminophen 500 mg tablet  Commonly known as:  TYLENOL    Your last dose was: Your next dose is: Take 500 mg by mouth nightly.  Acetaminophen 1000 mg morning and afternoon, 500 mg at bedtime   500 mg                 Advair Diskus 250-50 mcg/dose diskus inhaler  Generic drug:  fluticasone propion-salmeteroL    Your last dose was: Your next dose is: Take 1 Puff by inhalation two (2) times a day. 1 Puff                 albuterol 90 mcg/actuation inhaler  Commonly known as:  PROVENTIL HFA, VENTOLIN HFA, PROAIR HFA    Your last dose was: Your next dose is: Take 1 Puff by inhalation every six (6) hours as needed for Wheezing. 1 Puff                 apixaban 5 mg tablet  Commonly known as:  ELIQUIS    Your last dose was: Your next dose is: Take 1 Tab by mouth two (2) times a day. Stop coumadin now (last dose 5/22). Hold blood thinners 4 days. Start eliquis Monday 5/27.   5 mg                 baclofen 10 mg tablet  Commonly known as:  LIORESAL    Your last dose was: Your next dose is: Take 5 mg by mouth two (2) times a day. 5 mg                 carvediloL 3.125 mg tablet  Commonly known as:  COREG    Your last dose was: Your next dose is:          TAKE 1 TABLET BY MOUTH TWICE A DAY WITH MEALS                  Chewable-Erica Chew  Generic drug:  multivitamins    Your last dose was: Your next dose is: Take 1 Tab by mouth daily. 1 Tab                 furosemide 40 mg tablet  Commonly known as:  LASIX    Your last dose was: Your next dose is:          TAKE 1 TABLET BY MOUTH DAILY. ketoconazole 2 % shampoo  Commonly known as:  NIZORAL    Your last dose was: Your next dose is:          Apply 5 to 10 mL to wet scalp, lather, leave on 3 to 5 minutes, and rinse; apply once every 1 to 2 weeks                  Lidoderm 5 %  Generic drug:  lidocaine    Your last dose was: Your next dose is:          1 Patch by TransDERmal route daily as needed. Apply patch to the affected area for 12 hours a day and remove for 12 hours a day.    1 Patch                 loperamide 1 mg/5 mL solution  Commonly known as:  IMODIUM    Your last dose was: Your next dose is: Take 10 mL by mouth daily as needed for Diarrhea.   2 mg                 nitroglycerin 0.4 mg SL tablet  Commonly known as:  NITROSTAT    Your last dose was: Your next dose is:          1 Tab by SubLINGual route every five (5) minutes as needed for Chest Pain. Up to 3 doses. 0.4 mg                 predniSONE 5 mg tablet  Commonly known as:  DELTASONE    Your last dose was: Your next dose is: Take 10 mg by mouth daily. 10 mg                 simvastatin 20 mg tablet  Commonly known as:  ZOCOR    Your last dose was: Your next dose is:          TAKE 1 TABLET BY MOUTH NIGHTLY                  Spiriva with HandiHaler 18 mcg inhalation capsule  Generic drug:  tiotropium    Your last dose was: Your next dose is: Take 1 Cap by inhalation daily. 1 Cap                 Synthroid 88 mcg tablet  Generic drug:  levothyroxine    Your last dose was: Your next dose is:          TAKE 1 TABLET BY MOUTH EVERY DAY BEFORE BREAKFAST                  Vitamin D3 50 mcg (2,000 unit) Tab  Generic drug:  cholecalciferol (vitamin D3)    Your last dose was: Your next dose is: Take 2,000 Units by mouth daily. 2,000 Units                 Voltaren 1 % Gel  Generic drug:  diclofenac    Your last dose was: Your next dose is:          Apply 2 g to affected area every six (6) hours as needed for Pain. 2 g                 Zoloft 50 mg tablet  Generic drug:  sertraline    Your last dose was: Your next dose is:          TAKE 1 TABLET BY MOUTH EVERY DAY                        * This list has 2 medication(s) that are the same as other medications prescribed for you. Read the directions carefully, and ask your doctor or other care provider to review them with you. Where to Get Your Medications        Information on where to get these meds will be given to you by the nurse or doctor.     Ask your nurse or doctor about these medications  ascorbic acid (vitamin C) 250 mg tablet  cephALEXin 500 mg capsule  L. acidoph & paracasei- S therm- Bifido 8 billion cell Cap cap  OTHER(NON-FORMULARY)  oxyCODONE IR 5 mg immediate release tablet       Roc Dial Pharmacist

## 2020-04-22 NOTE — PROGRESS NOTES
Daughter Xiomara called, stating that she called Dr. Deion Murphy with cardiology and that he told her that Mrs. Kashif Marie needs to be on antibiotics for at least 2-4 weeks to make sure her infections is clear prior to pace maker exchange. Dr. Ninfa Mcmullen made aware.

## 2020-04-22 NOTE — PROGRESS NOTES
4/22/2020   CARE MANAGEMENT NOTE:  (cross coverage)  CM reviewed EMR. Pt was admitted with right foot cellulitis, and 1st toe gangrene. Reportedly, pt resides alone at [facility]. Pt's dtr, Po Herrera ([phone]) is her primary family contact.     RUR 25%; LOS 6 days     Transition Plan of Care:  1. [facility] has accepted pt for inpt rehab and bed is available today. [facility] liaison notified dtr, Christo Mcdonnell. Pt is aware and agreeable to plan as well. 2.  RN, please call report to [phone] and a bed will be assigned at that time. No further needs identified at this writing.   ESTEPHANIA Lafleur

## 2020-04-22 NOTE — DISCHARGE INSTRUCTIONS
HOSPITALIST DISCHARGE INSTRUCTIONS  NAME: Nancy Hawk   :  1932   MRN:  [id number]     Date/Time:  2020 11:51 AM    ADMIT DATE: 4/15/2020     DISCHARGE DATE: 2020     ADMITTING DIAGNOSIS:  R toe and heel ulcer  Cellulitis   PAD    DISCHARGE DIAGNOSIS:  As above     MEDICATIONS:  Right heel and right great toe: Apply betadine daily and let dry. Float heel or use Heel boot (boot when in bed). Gently wrap R foot in Kerlix to keep clean and dry. Change daily      · It is important that you take the medication exactly as they are prescribed. · Keep your medication in the bottles provided by the pharmacist and keep a list of the medication names, dosages, and times to be taken in your wallet. · Do not take other medications without consulting your doctor. Pain Management: per above medications    What to do at Home    Recommended diet:  Resume previous diet    Recommended activity: As per podiatry     If you experience any of the following symptoms then please call your primary care physician or return to the emergency room if you cannot get hold of your doctor:  Fever, chills, nausea, vomiting, diarrhea, change in mentation, falling, bleeding, shortness of breath, ***    Follow Up:  Please follow-up with podiatry or vascular surgery in 1-2 weeks     Information obtained by :  I understand that if any problems occur once I am at home I am to contact my physician. I understand and acknowledge receipt of the instructions indicated above.                                                                                                                                            Physician's or R.N.'s Signature                                                                  Date/Time                                                                                                                                              Patient or Representative Signature Date/Time

## 2020-04-22 NOTE — DISCHARGE SUMMARY
Physician Discharge Summary     Patient ID:  Kimberley Richardson  [id number]  80 y.o.  2/6/1932    Admit date: 4/15/2020    Discharge date and time: 4/22/2020    Admission Diagnoses: Cellulitis of foot [L03.119]  Foot infection [L08.9]    Discharge Diagnoses/Hospital Course   81 yo hx of HTN, afib s/p pacer, ICM EF 30-35%, COPD, CKD 3, colon CA s/p partial colectomy, presented w/ right foot ulcer, cellulitis, toe gangrene, PVD     1) R foot cellulitis/heel ulcer/1st toe gangrene: NOT septic. No e/o osteomyelitis on x-ray. Unable to perform MRI due to pacer. S/p IV antibiotics and now on Keflex. S/p podiatry eval and angio by vascular surgery with no identifiable vessels. 2) PAD: poor pulse on R foot. S/o angio. Per vascular no indentifiable vessels in the foot. As per vascular, if #1 continues to worsen or not heal, would need BKA which pt would likely not be amenable to.      3) HTN/chronic afib: s/p pacer. Plan to replace pacer by Dr. Amado Rizvi in the next month. Will cont coreg and now back on Eliquis. Defer pacer change to Dr. Amado Rizvi. Cellulitis improved. No bacteremia     4) COPD: stable. Resume home meds at discharge      5) Hypothyroid: stable on levothyroxine     6) Anxiety/depression: stable on Zoloft    7) CKD 3: Cr. Stable.  On Lasix      8) Debility: SAH at discharge     PCP: Peace Richards MD     Consults: Vascular and podiatry    Discharge Exam:  Visit Vitals  /64 (BP 1 Location: Right arm, BP Patient Position: At rest)   Pulse 81   Temp 98.2 °F (36.8 °C)   Resp 18   Ht 5' (1.524 m)   Wt 72 kg (158 lb 11.7 oz)   SpO2 94%   BMI 31.00 kg/m²     Gen:  Elderly, frail, NAD  HEENT:  Pink conjunctivae, PERRL, hearing intact to voice, moist mucous membranes  Neck:  Supple, without masses, thyroid non-tender  Resp:  No accessory muscle use, clear breath sounds without wheezes rales or rhonchi  Card:  No murmurs, normal S1, S2 without thrills, bruits or peripheral edema  Abd:  Soft, non-tender, non-distended, normoactive bowel sounds are present  Musc:  No cyanosis or clubbing  Skin:  R foot w/o erythema. Ulcer on R 1st toe and heel. Poor distal pulse in RLE   Neuro:  Cranial nerves 3-12 are grossly intact, follows commands appropriately  Psych:  fair insight, oriented to person, place and time, alert    Disposition: [facility]     Patient Instructions:   Current Discharge Medication List      START taking these medications    Details   ascorbic acid, vitamin C, (VITAMIN C) 250 mg tablet Take 1 Tab by mouth two (2) times a day. Qty: 60 Tab, Refills: 0      cephALEXin (KEFLEX) 500 mg capsule Take 1 Cap by mouth every twelve (12) hours for 5 days. Qty: 10 Cap, Refills: 0      L. acidoph & paracasei- S therm- Bifido (MADAN-Q/RISAQUAD) 8 billion cell cap cap Take 1 Cap by mouth daily. Qty: 30 Cap, Refills: 0      oxyCODONE IR (Roxicodone) 5 mg immediate release tablet Take 1 Tab by mouth every six (6) hours as needed for Pain for up to 30 days. Max Daily Amount: 20 mg.  Qty: 30 Tab, Refills: 0    Associated Diagnoses: Cellulitis of foot      OTHER,NON-FORMULARY, Right heel and right great toe: Apply betadine daily and let dry. Float heel or use Heel boot (boot when in bed). Gently wrap R foot in Kerlix to keep clean and dry. Change daily  Qty: 1 Each, Refills: 0         CONTINUE these medications which have NOT CHANGED    Details   !! acetaminophen (TYLENOL) 500 mg tablet Take 1,000 mg by mouth two (2) times a day. Acetaminophen 1000 mg morning and afternoon, 500 mg at bedtime      !! acetaminophen (TYLENOL) 500 mg tablet Take 500 mg by mouth nightly. Acetaminophen 1000 mg morning and afternoon, 500 mg at bedtime      diclofenac (Voltaren) 1 % gel Apply 2 g to affected area every six (6) hours as needed for Pain.       SYNTHROID 88 mcg tablet TAKE 1 TABLET BY MOUTH EVERY DAY BEFORE BREAKFAST  Qty: 90 Tab, Refills: 1    Associated Diagnoses: Hypothyroidism due to acquired atrophy of thyroid      ketoconazole (NIZORAL) 2 % shampoo Apply 5 to 10 mL to wet scalp, lather, leave on 3 to 5 minutes, and rinse; apply once every 1 to 2 weeks  Qty: 120 mL, Refills: 2    Associated Diagnoses: Eczema, unspecified type      simvastatin (ZOCOR) 20 mg tablet TAKE 1 TABLET BY MOUTH NIGHTLY  Qty: 90 Tab, Refills: 3    Associated Diagnoses: Mixed hyperlipidemia; Essential hypertension; Other emphysema ([address] Dilated cardiomyopathy ([address] CKD (chronic kidney disease) stage 3, GFR 30-59 ml/min ([address] Pulmonary hypertension ([address] Chronic atrial fibrillation      nitroglycerin (NITROSTAT) 0.4 mg SL tablet 1 Tab by SubLINGual route every five (5) minutes as needed for Chest Pain. Up to 3 doses. Qty: 1 Bottle, Refills: 1    Associated Diagnoses: Essential hypertension; Other emphysema ([address] Dilated cardiomyopathy ([address] CKD (chronic kidney disease) stage 3, GFR 30-59 ml/min ([address] Pulmonary hypertension ([address] Chronic atrial fibrillation; Mixed hyperlipidemia      ZOLOFT 50 mg tablet TAKE 1 TABLET BY MOUTH EVERY DAY  Qty: 90 Tab, Refills: 1    Associated Diagnoses: Anxiety and depression      carvedilol (COREG) 3.125 mg tablet TAKE 1 TABLET BY MOUTH TWICE A DAY WITH MEALS  Qty: 180 Tab, Refills: 1    Associated Diagnoses: Atrial fibrillation, unspecified type (HCC)      furosemide (LASIX) 40 mg tablet TAKE 1 TABLET BY MOUTH DAILY. Qty: 90 Tab, Refills: 2    Associated Diagnoses: Ischemic cardiomyopathy      loperamide (IMODIUM) 1 mg/5 mL solution Take 10 mL by mouth daily as needed for Diarrhea. Qty: 60 mL, Refills: 0      multivitamins (CHEWABLE-AMOS) chew Take 1 Tab by mouth daily. lidocaine (LIDODERM) 5 % 1 Patch by TransDERmal route daily as needed. Apply patch to the affected area for 12 hours a day and remove for 12 hours a day.        apixaban (ELIQUIS) 5 mg tablet Take 1 Tab by mouth two (2) times a day    Associated Diagnoses: Atrial fibrillation, unspecified type (HCC)      cholecalciferol, vitamin D3, (VITAMIN D3) 2,000 unit tab Take 2,000 Units by mouth daily. predniSONE (DELTASONE) 5 mg tablet Take 10 mg by mouth daily. albuterol (PROVENTIL HFA, VENTOLIN HFA, PROAIR HFA) 90 mcg/actuation inhaler Take 1 Puff by inhalation every six (6) hours as needed for Wheezing. baclofen (LIORESAL) 10 mg tablet Take 5 mg by mouth two (2) times a day. Associated Diagnoses: Chronic midline low back pain without sciatica      ADVAIR DISKUS 250-50 mcg/dose diskus inhaler Take 1 Puff by inhalation two (2) times a day. Associated Diagnoses: Atrial fibrillation (HCC)      tiotropium (SPIRIVA WITH HANDIHALER) 18 mcg inhalation capsule Take 1 Cap by inhalation daily. Associated Diagnoses: Atrial fibrillation ([address] Complete heart block ([address]       ! ! - Potential duplicate medications found. Please discuss with provider. STOP taking these medications       raNITIdine (ZANTAC) 150 mg tablet Comments: DRUG RECALL   Reason for Stopping:             - Continue offloading of right heel and right great toe; Recommend continuing dressing changes daily/ every other day with betadine to the right heel and right great toe and offloading or abd pad to the heel. - Float heels when in bed on pillows. - WBAT.     Follow-up with podiatry or vascular surgery in 1-2 weeks     Approximate time spent in patient care on day of discharge: 35 minutes     Signed:  Syed Kimbrough MD  4/22/2020  11:52 AM

## 2020-04-22 NOTE — PROGRESS NOTES
CMS Note  4/22/2020    Patient received and signed their 2nd IMM letter. Patient was given a copy for their record.   Neil Snow CMS

## 2020-04-22 NOTE — TELEPHONE ENCOUNTER
----- Message from Latanya Rowe RN sent at 4/22/2020  9:01 AM EDT -----  Regarding: FW: Update Medical Information  Contact: [phone]    ----- Message -----  From: Altagracia Gonzalez  Sent: 4/22/2020   8:59 AM EDT  To: Sandra Rodriguez Pool  Subject: Update Medical Information                       Good morning   My sister, Kasey Rogers, received your message yesterday regarding my mother and pacemaker status    We have met before, I'm also a daughter of Rainer Quarles and would like clarification of your comment \"infection needs to be cleared up before pacemaker change\"    Are you referring to infection on foot areas such as where toe incision was and heel or are you referring to her PAD as it relates to her minimal circulation in her right foot? Mom has elected to engage physical therapy and wound/pain management as opposed to amputation at this time    We anticipate her being transferred to rehab for this purpose following hospital discharge    Please call me - [phone].  Meredith Duvall  Thank you

## 2020-04-22 NOTE — PROGRESS NOTES
Per RN, family member said that Dr. Camilla Black stated pt needed to be on Keflex for several weeks prior to pacer change.  Reached out to Dr. Camilla Black and he said that this was not relayed to the family

## 2020-04-22 NOTE — PROGRESS NOTES
Report called in SBAR format to [address] at [phone]    Ben Daughter [phone] called informed

## 2020-04-22 NOTE — TELEPHONE ENCOUNTER
I called and talked to Carlos  She now understands that if the leg infection does not heal she can get osteomyelitis and sepsis and therefore focus should be to monitor antibiotic treatment response in the next few weeks since family does not want amputation at this time  No biv pacer upgrade procedure should be attempted at this time

## 2020-04-22 NOTE — PROGRESS NOTES
Midline removed. Discharge instruction given and gone over with Pt. Opportunity to ask questions given.

## 2020-04-23 DIAGNOSIS — I48.91 ATRIAL FIBRILLATION, UNSPECIFIED TYPE (HCC): ICD-10-CM

## 2020-04-23 LAB
ALBUMIN SERPL-MCNC: 2.8 G/DL (ref 3.5–5)
ALBUMIN/GLOB SERPL: 0.7 {RATIO} (ref 1.1–2.2)
ALP SERPL-CCNC: 52 U/L (ref 45–117)
ALT SERPL-CCNC: 22 U/L (ref 12–78)
ANION GAP SERPL CALC-SCNC: 7 MMOL/L (ref 5–15)
AST SERPL-CCNC: 25 U/L (ref 15–37)
BASOPHILS # BLD: 0 K/UL (ref 0–0.1)
BASOPHILS NFR BLD: 0 % (ref 0–1)
BILIRUB SERPL-MCNC: 0.4 MG/DL (ref 0.2–1)
BUN SERPL-MCNC: 18 MG/DL (ref 6–20)
BUN/CREAT SERPL: 19 (ref 12–20)
CALCIUM SERPL-MCNC: 8.5 MG/DL (ref 8.5–10.1)
CHLORIDE SERPL-SCNC: 106 MMOL/L (ref 97–108)
CO2 SERPL-SCNC: 27 MMOL/L (ref 21–32)
CREAT SERPL-MCNC: 0.95 MG/DL (ref 0.55–1.02)
DIFFERENTIAL METHOD BLD: ABNORMAL
EOSINOPHIL # BLD: 0.1 K/UL (ref 0–0.4)
EOSINOPHIL NFR BLD: 2 % (ref 0–7)
ERYTHROCYTE [DISTWIDTH] IN BLOOD BY AUTOMATED COUNT: 12.8 % (ref 11.5–14.5)
GLOBULIN SER CALC-MCNC: 3.8 G/DL (ref 2–4)
GLUCOSE SERPL-MCNC: 107 MG/DL (ref 65–100)
HCT VFR BLD AUTO: 37.2 % (ref 35–47)
HGB BLD-MCNC: 12.2 G/DL (ref 11.5–16)
IMM GRANULOCYTES # BLD AUTO: 0.1 K/UL (ref 0–0.04)
IMM GRANULOCYTES NFR BLD AUTO: 2 % (ref 0–0.5)
LYMPHOCYTES # BLD: 1 K/UL (ref 0.8–3.5)
LYMPHOCYTES NFR BLD: 11 % (ref 12–49)
MAGNESIUM SERPL-MCNC: 1.5 MG/DL (ref 1.6–2.4)
MCH RBC QN AUTO: 31.7 PG (ref 26–34)
MCHC RBC AUTO-ENTMCNC: 32.8 G/DL (ref 30–36.5)
MCV RBC AUTO: 96.6 FL (ref 80–99)
MONOCYTES # BLD: 0.9 K/UL (ref 0–1)
MONOCYTES NFR BLD: 10 % (ref 5–13)
NEUTS SEG # BLD: 6.5 K/UL (ref 1.8–8)
NEUTS SEG NFR BLD: 75 % (ref 32–75)
NRBC # BLD: 0 K/UL (ref 0–0.01)
NRBC BLD-RTO: 0 PER 100 WBC
PLATELET # BLD AUTO: 142 K/UL (ref 150–400)
PMV BLD AUTO: 11.6 FL (ref 8.9–12.9)
POTASSIUM SERPL-SCNC: 4.2 MMOL/L (ref 3.5–5.1)
PROT SERPL-MCNC: 6.6 G/DL (ref 6.4–8.2)
RBC # BLD AUTO: 3.85 M/UL (ref 3.8–5.2)
SODIUM SERPL-SCNC: 140 MMOL/L (ref 136–145)
WBC # BLD AUTO: 8.6 K/UL (ref 3.6–11)

## 2020-04-23 PROCEDURE — 85025 COMPLETE CBC W/AUTO DIFF WBC: CPT

## 2020-04-23 PROCEDURE — 80053 COMPREHEN METABOLIC PANEL: CPT

## 2020-04-23 PROCEDURE — 83735 ASSAY OF MAGNESIUM: CPT

## 2020-04-23 PROCEDURE — 36415 COLL VENOUS BLD VENIPUNCTURE: CPT

## 2020-04-23 RX ORDER — CARVEDILOL 3.12 MG/1
TABLET ORAL
Qty: 180 TAB | Refills: 1 | Status: SHIPPED | OUTPATIENT
Start: 2020-04-23 | End: 2021-03-04 | Stop reason: SDUPTHER

## 2020-04-23 NOTE — OP NOTES
Nate Whitaker [facility] 79  OPERATIVE REPORT    Name:  Jeevan Dennison  MR#:  [id number]  :  1932  ACCOUNT #:  [de-identified]  DATE OF SERVICE:  2020    PREOPERATIVE DIAGNOSIS:  Peripheral vascular disease with ulceration. POSTOPERATIVE DIAGNOSIS:  Peripheral vascular disease with ulceration. PROCEDURE PERFORMED:  1. Sheath placement in the left common femoral artery for the purposes of angiography. 2.  Abdominal aortogram.  3.  Third-order catheterization of right anterior tibial artery with right lower extremity runoff. SURGEON:  Juan Clark MD    ASSISTANT:  None. ANESTHESIA:  IV sedation with local anesthesia. COMPLICATIONS:  None. SPECIMENS REMOVED:  None. IMPLANTS:  None. ESTIMATED BLOOD LOSS:  Minimal.    INDICATION FOR THE PROCEDURE:  The patient is an elderly female with wounds to her right foot and evidence of arterial insufficiency. Decision was made to take her to the cath lab for angiographic evaluation. TECHNICAL DETAILS:  The patient was taken to the cath lab. Once a suitable level of anesthesia was introduced, she was prepped and draped in typical sterile fashion. Percutaneous access to the left common femoral artery was achieved and wire sheath was placed. Wire and catheter were introduced in the abdominal aorta and an aorta and pelvic aortogram was performed. This showed no evidence of significant disease. Catheter was used to select out the right common iliac, external iliac and common femoral artery. Through this, a right lower extremity runoff was performed. Catheter was exchanged for a long sheath and a simple catheter was used to select out the popliteal and then the anterior tibial artery. Through this, an anterior tibial artery arteriogram was performed. The patient had good blood flow from the femoral down to the anterior tibial artery but an abrupt cutoff just above the ankle with no nameable foot vessels seen.   Sheath was removed. Angio-Seal used for hemostasis. She tolerated the procedure well. There were no obvious complications. She was transferred to the recovery area in good condition.       Roslynn Rubinstein, MD      [id number]  D:  04/23/2020 9:21  T:  04/23/2020 10:23  JOB #:  [id number]

## 2020-04-23 NOTE — TELEPHONE ENCOUNTER
Requested Prescriptions     Signed Prescriptions Disp Refills    carvediloL (COREG) 3.125 mg tablet 180 Tab 1     Sig: TAKE 1 TABLET BY MOUTH TWICE A DAY WITH MEALS     Authorizing Provider: Ashwini Mckoy User: Filiberto Paredes    apixaban (Eliquis) 5 mg tablet 180 Tab 1     Sig: Take 1 Tab by mouth two (2) times a day.      Authorizing Provider: Ashwini Urbina     Ordering User: Filiberto Paredes    Per Dr. White Remedies verbal orders

## 2020-04-27 DIAGNOSIS — F32.A ANXIETY AND DEPRESSION: Chronic | ICD-10-CM

## 2020-04-27 DIAGNOSIS — F41.9 ANXIETY AND DEPRESSION: Chronic | ICD-10-CM

## 2020-04-27 LAB
ANION GAP SERPL CALC-SCNC: 8 MMOL/L (ref 5–15)
BUN SERPL-MCNC: 21 MG/DL (ref 6–20)
BUN/CREAT SERPL: 23 (ref 12–20)
CALCIUM SERPL-MCNC: 9.1 MG/DL (ref 8.5–10.1)
CHLORIDE SERPL-SCNC: 99 MMOL/L (ref 97–108)
CO2 SERPL-SCNC: 31 MMOL/L (ref 21–32)
CREAT SERPL-MCNC: 0.92 MG/DL (ref 0.55–1.02)
ERYTHROCYTE [DISTWIDTH] IN BLOOD BY AUTOMATED COUNT: 13 % (ref 11.5–14.5)
GLUCOSE SERPL-MCNC: 116 MG/DL (ref 65–100)
HCT VFR BLD AUTO: 37.1 % (ref 35–47)
HGB BLD-MCNC: 12.2 G/DL (ref 11.5–16)
MAGNESIUM SERPL-MCNC: 1.6 MG/DL (ref 1.6–2.4)
MCH RBC QN AUTO: 31.4 PG (ref 26–34)
MCHC RBC AUTO-ENTMCNC: 32.9 G/DL (ref 30–36.5)
MCV RBC AUTO: 95.4 FL (ref 80–99)
NRBC # BLD: 0 K/UL (ref 0–0.01)
NRBC BLD-RTO: 0 PER 100 WBC
PLATELET # BLD AUTO: 180 K/UL (ref 150–400)
PMV BLD AUTO: 11.7 FL (ref 8.9–12.9)
POTASSIUM SERPL-SCNC: 4 MMOL/L (ref 3.5–5.1)
RBC # BLD AUTO: 3.89 M/UL (ref 3.8–5.2)
SODIUM SERPL-SCNC: 138 MMOL/L (ref 136–145)
WBC # BLD AUTO: 9.5 K/UL (ref 3.6–11)

## 2020-04-27 PROCEDURE — 80048 BASIC METABOLIC PNL TOTAL CA: CPT

## 2020-04-27 PROCEDURE — 85027 COMPLETE CBC AUTOMATED: CPT

## 2020-04-27 PROCEDURE — 83735 ASSAY OF MAGNESIUM: CPT

## 2020-04-27 PROCEDURE — 36415 COLL VENOUS BLD VENIPUNCTURE: CPT

## 2020-04-27 RX ORDER — SERTRALINE HCL 50 MG
TABLET ORAL
Qty: 90 TAB | Refills: 1 | Status: SHIPPED | OUTPATIENT
Start: 2020-04-27 | End: 2020-06-19

## 2020-04-30 LAB
ANION GAP SERPL CALC-SCNC: 8 MMOL/L (ref 5–15)
BUN SERPL-MCNC: 27 MG/DL (ref 6–20)
BUN/CREAT SERPL: 29 (ref 12–20)
CALCIUM SERPL-MCNC: 9.2 MG/DL (ref 8.5–10.1)
CHLORIDE SERPL-SCNC: 98 MMOL/L (ref 97–108)
CO2 SERPL-SCNC: 30 MMOL/L (ref 21–32)
CREAT SERPL-MCNC: 0.93 MG/DL (ref 0.55–1.02)
ERYTHROCYTE [DISTWIDTH] IN BLOOD BY AUTOMATED COUNT: 12.5 % (ref 11.5–14.5)
GLUCOSE SERPL-MCNC: 110 MG/DL (ref 65–100)
HCT VFR BLD AUTO: 38.6 % (ref 35–47)
HGB BLD-MCNC: 12.8 G/DL (ref 11.5–16)
MAGNESIUM SERPL-MCNC: 1.8 MG/DL (ref 1.6–2.4)
MCH RBC QN AUTO: 31.4 PG (ref 26–34)
MCHC RBC AUTO-ENTMCNC: 33.2 G/DL (ref 30–36.5)
MCV RBC AUTO: 94.8 FL (ref 80–99)
NRBC # BLD: 0 K/UL (ref 0–0.01)
NRBC BLD-RTO: 0 PER 100 WBC
PLATELET # BLD AUTO: 217 K/UL (ref 150–400)
PMV BLD AUTO: 11.3 FL (ref 8.9–12.9)
POTASSIUM SERPL-SCNC: 4.4 MMOL/L (ref 3.5–5.1)
RBC # BLD AUTO: 4.07 M/UL (ref 3.8–5.2)
SODIUM SERPL-SCNC: 136 MMOL/L (ref 136–145)
WBC # BLD AUTO: 10.3 K/UL (ref 3.6–11)

## 2020-04-30 PROCEDURE — 80048 BASIC METABOLIC PNL TOTAL CA: CPT

## 2020-04-30 PROCEDURE — 83735 ASSAY OF MAGNESIUM: CPT

## 2020-04-30 PROCEDURE — 85027 COMPLETE CBC AUTOMATED: CPT

## 2020-04-30 PROCEDURE — 36415 COLL VENOUS BLD VENIPUNCTURE: CPT

## 2020-05-01 PROCEDURE — 87635 SARS-COV-2 COVID-19 AMP PRB: CPT

## 2020-05-02 LAB
SARS-COV-2, COV2: NOT DETECTED
SPECIMEN SOURCE, FCOV2M: NORMAL

## 2020-05-04 ENCOUNTER — TELEPHONE (OUTPATIENT)
Dept: CARDIOLOGY CLINIC | Age: 85
End: 2020-05-04

## 2020-05-04 LAB
ANION GAP SERPL CALC-SCNC: 8 MMOL/L (ref 5–15)
BUN SERPL-MCNC: 31 MG/DL (ref 6–20)
BUN/CREAT SERPL: 34 (ref 12–20)
CALCIUM SERPL-MCNC: 9 MG/DL (ref 8.5–10.1)
CHLORIDE SERPL-SCNC: 98 MMOL/L (ref 97–108)
CO2 SERPL-SCNC: 30 MMOL/L (ref 21–32)
CREAT SERPL-MCNC: 0.9 MG/DL (ref 0.55–1.02)
ERYTHROCYTE [DISTWIDTH] IN BLOOD BY AUTOMATED COUNT: 12.3 % (ref 11.5–14.5)
GLUCOSE SERPL-MCNC: 107 MG/DL (ref 65–100)
HCT VFR BLD AUTO: 37.5 % (ref 35–47)
HGB BLD-MCNC: 12.4 G/DL (ref 11.5–16)
MAGNESIUM SERPL-MCNC: 1.6 MG/DL (ref 1.6–2.4)
MCH RBC QN AUTO: 31.2 PG (ref 26–34)
MCHC RBC AUTO-ENTMCNC: 33.1 G/DL (ref 30–36.5)
MCV RBC AUTO: 94.2 FL (ref 80–99)
NRBC # BLD: 0 K/UL (ref 0–0.01)
NRBC BLD-RTO: 0 PER 100 WBC
PLATELET # BLD AUTO: 201 K/UL (ref 150–400)
PMV BLD AUTO: 11.6 FL (ref 8.9–12.9)
POTASSIUM SERPL-SCNC: 4 MMOL/L (ref 3.5–5.1)
RBC # BLD AUTO: 3.98 M/UL (ref 3.8–5.2)
SODIUM SERPL-SCNC: 136 MMOL/L (ref 136–145)
WBC # BLD AUTO: 9.1 K/UL (ref 3.6–11)

## 2020-05-04 PROCEDURE — 80048 BASIC METABOLIC PNL TOTAL CA: CPT

## 2020-05-04 PROCEDURE — 36415 COLL VENOUS BLD VENIPUNCTURE: CPT

## 2020-05-04 PROCEDURE — 85027 COMPLETE CBC AUTOMATED: CPT

## 2020-05-04 PROCEDURE — 83735 ASSAY OF MAGNESIUM: CPT

## 2020-05-04 NOTE — TELEPHONE ENCOUNTER
Hers is an elective procedure for outpatient, not inpatient transfer. Currently we refrain from transferring patient unless it is emergency procedure and cannot be done at the other facility.  I cannot accept transfer over because of convenience

## 2020-05-04 NOTE — TELEPHONE ENCOUNTER
----- Message from Angle Mishra RN sent at 5/4/2020  7:42 AM EDT -----  Regarding: FW: Non-Urgent Medical Question  Contact: [phone]    ----- Message -----  From: Pierre Freire  Sent: 5/2/2020   5:27 AM EDT  To: Shan [facility]ors Nurse Pool  Subject: Non-Urgent Medical Question                      I am at [address] having transferred here on 4/22 from [facility]  for continued rehab following vascular study by Dr Peder Holter     Discharge from [address] [place] [address] is likely within next week.  Recent bloodwork looks good    I understand hospitals can now resume surgeries and I would like to get scheduled for the pacemaker changeout we had postponed from early April    Ideally, I would like to transfer directly from [facility] to [facility] for the procedure     You may contact Dr Mamta Christianson of [facility] with any questions on my current health @ [phone]    Please contact my daughter Edenilson Serna @[phone] to discuss this as soon as possible  Thank you

## 2020-05-08 ENCOUNTER — TELEPHONE (OUTPATIENT)
Dept: CARDIOLOGY CLINIC | Age: 85
End: 2020-05-08

## 2020-05-08 NOTE — TELEPHONE ENCOUNTER
Lm to rs 5/29/20 appt with Dr. Nisreen Simons, pt needs VV or push out after 7/1/20, if pt needs VV please message [place] to schedule

## 2020-05-12 ENCOUNTER — HOSPITAL ENCOUNTER (OUTPATIENT)
Dept: WOUND CARE | Age: 85
Discharge: HOME | End: 2020-05-12
Payer: MEDICARE

## 2020-05-12 ENCOUNTER — TELEPHONE (OUTPATIENT)
Dept: CARDIOLOGY CLINIC | Age: 85
End: 2020-05-12

## 2020-05-12 VITALS
SYSTOLIC BLOOD PRESSURE: 122 MMHG | DIASTOLIC BLOOD PRESSURE: 75 MMHG | RESPIRATION RATE: 18 BRPM | TEMPERATURE: 97.8 F | HEART RATE: 82 BPM

## 2020-05-12 PROCEDURE — 74011000250 HC RX REV CODE- 250: Performed by: PODIATRIST

## 2020-05-12 PROCEDURE — 99204 OFFICE O/P NEW MOD 45 MIN: CPT

## 2020-05-12 RX ORDER — CEPHALEXIN 500 MG/1
500 CAPSULE ORAL 4 TIMES DAILY
COMMUNITY
End: 2020-05-21

## 2020-05-12 RX ORDER — FAMOTIDINE 20 MG/1
20 TABLET, FILM COATED ORAL 2 TIMES DAILY
COMMUNITY
End: 2020-05-14

## 2020-05-12 RX ORDER — LEVOTHYROXINE SODIUM 88 UG/1
88 TABLET ORAL
COMMUNITY
End: 2020-06-27

## 2020-05-12 RX ORDER — LANOLIN ALCOHOL/MO/W.PET/CERES
400 CREAM (GRAM) TOPICAL DAILY
COMMUNITY
End: 2020-05-14

## 2020-05-12 RX ADMIN — Medication: at 08:44

## 2020-05-12 NOTE — WOUND CARE
Discharge Condition: Stable Pain: 0 Ambulatory Status: Wheelchair Discharge Destination: Home Transportation: Car Accompanied by: Self Discharge instructions reviewed with Family/Caregiver  and copy or written instructions have been provided. All questions/concerns have been addressed at this time.

## 2020-05-12 NOTE — TELEPHONE ENCOUNTER
Verified patient with two types of identifiers. Spoke with patient's daughter and notified that as of now we will have keep patient's in office appointment scheduled as is currently 5/21/20, . [facility] patient has been feeling well lately. Patient went in for first wound care treatment today and was told that their treatment plan would be pretty extensive 5 days a week for 5 weeks. [facility] wound care department suggested they discuss having the biventricular pacemaker upgrade prior to starting treatment. Will notify MD for recommendations.

## 2020-05-12 NOTE — TELEPHONE ENCOUNTER
Patients daughter would like to speak with Paul Owusu about the possibility of moving the patients appointments with Dr Segundo marcial.      Phone: [phone]

## 2020-05-12 NOTE — WOUND CARE
Wound Center History and Physical 
 
Subjective: Chief Complaint: 
Farhad Garcia is a @AGE female who presents with toes great toe wound of  weeks duration. Referred by:  Dr. Kiran Cornejo HPI:  
Wound caused by: arterial insuffciency Current wound care: 
Offloading wound: no Appetite: good Wound associated pain: mild Diabetic: - 
Smoker: - 
ROS: no N/V, no T/chills; no local rash Past Medical History:  
Diagnosis Date  Arthritis  Atrial fibrillation ([facility] [address]   
 av node ablation 5/22/98 with placement of CPI #[id number] dual chamber pacemaker subsequently replaced with a single chamber medtronic #[id number] single chamber pacemaker 7/25/05  Cancer [facility]) [id number]  
 colon cancer w/ resection  COPD  Depression  GERD (gastroesophageal reflux disease)  History of vascular access device 04/17/2020  
 4F MIDLINE PLACED BY EMIL GUNN RN LEFT BRACHIAL, 13CM  Hyperlipidemia  Hypertension  Ischemic cardiomyopathy  Migraine headache  Orthostatic hypotension  RADHA (obstructive sleep apnea)  Pacemaker 5/22/98 AV node ablation /pacemaker placement utilizing CPI # [id number] dual chamber system, medtronic #[id number] single chamber device placed 7/22/05  Pulmonary hypertension ([facility] [address] 9/26/2011 RVSP 50 on echo 8/14  Thyroid disease  Valvular heart disease   
 mild-mod MR/TR Past Surgical History:  
Procedure Laterality Date  BREAST SURGERY PROCEDURE UNLISTED    
 benign breast tumor excision right breast  
 HX BREAST BIOPSY Right 2002  
 neg; surgical bx  HX COLECTOMY  1974  
 colon  HX KNEE REPLACEMENT    
 right TKA  HX PACEMAKER    
 HX TUBAL LIGATION    
 IR KYPHOPLASTY LUMBAR  7/2/2019  TOTAL KNEE ARTHROPLASTY    
 total on R, partial on L Family History Problem Relation Age of Onset  Diabetes Mother  Heart Disease Mother  Heart Attack Mother  Heart Disease Father  Stroke Sister  Breast Cancer Sister 80  Cancer Maternal Aunt   
     uterus  Diabetes Maternal Uncle  Breast Cancer Daughter 61 Social History Tobacco Use  Smoking status: Passive Smoke Exposure - Never Smoker  Smokeless tobacco: Never Used Substance Use Topics  Alcohol use: No  
  Alcohol/week: 0.0 standard drinks Prior to Admission medications Medication Sig Start Date End Date Taking? Authorizing Provider  
tuberculin (AplisoL) 5 tub. unit /0.1 mL injection 5 Units by IntraDERMal route Once. Yes Provider, Historical  
cephALEXin (KEFLEX) 500 mg capsule Take 500 mg by mouth four (4) times daily. Yes Provider, Historical  
magnesium oxide (MAG-OX) 400 mg tablet Take 400 mg by mouth daily. Yes Provider, Historical  
famotidine (Pepcid) 20 mg tablet Take 20 mg by mouth two (2) times a day. Yes Provider, Historical  
levothyroxine (SYNTHROID) 88 mcg tablet Take 88 mcg by mouth Daily (before breakfast). Yes Provider, Historical  
Zoloft 50 mg tablet TAKE 1 TABLET BY MOUTH EVERY DAY 4/27/20   Wilton Mota MD  
carvediloL (COREG) 3.125 mg tablet TAKE 1 TABLET BY MOUTH TWICE A DAY WITH MEALS 4/23/20   Sinan Carlos III, MD  
apixaban (Eliquis) 5 mg tablet Take 1 Tab by mouth two (2) times a day. 4/23/20   Serene Childs MD  
ascorbic acid, vitamin C, (VITAMIN C) 250 mg tablet Take 1 Tab by mouth two (2) times a day. 4/22/20   Manuelito Roque MD  
L. acidoph & paracasei- S therm- Bifido (MADAN-Q/RISAQUAD) 8 billion cell cap cap Take 1 Cap by mouth daily. 4/23/20   Manuelito Roque MD  
oxyCODONE IR (Roxicodone) 5 mg immediate release tablet Take 1 Tab by mouth every six (6) hours as needed for Pain for up to 30 days. Max Daily Amount: 20 mg. 4/22/20 5/22/20  Manuelito Roque MD  
OTHER,NON-FORMULARY, Right heel and right great toe: Apply betadine daily and let dry. Float heel or use Heel boot (boot when in bed).  Gently wrap R foot in Kerlix to keep clean and dry. Change daily 4/22/20   Adry Burnett MD  
acetaminophen (TYLENOL) 500 mg tablet Take 1,000 mg by mouth two (2) times a day. Acetaminophen 1000 mg morning and afternoon, 500 mg at bedtime    Provider, Historical  
acetaminophen (TYLENOL) 500 mg tablet Take 500 mg by mouth nightly. Acetaminophen 1000 mg morning and afternoon, 500 mg at bedtime    Provider, Historical  
diclofenac (Voltaren) 1 % gel Apply 2 g to affected area every six (6) hours as needed for Pain. Provider, Historical  
ketoconazole (NIZORAL) 2 % shampoo Apply 5 to 10 mL to wet scalp, lather, leave on 3 to 5 minutes, and rinse; apply once every 1 to 2 weeks 11/18/19   Mukesh Antony MD  
simvastatin (ZOCOR) 20 mg tablet TAKE 1 TABLET BY MOUTH NIGHTLY 11/7/19   Nettie Hendrickson MD  
nitroglycerin (NITROSTAT) 0.4 mg SL tablet 1 Tab by SubLINGual route every five (5) minutes as needed for Chest Pain. Up to 3 doses. 11/7/19   Vernadine Cogan III, MD  
furosemide (LASIX) 40 mg tablet TAKE 1 TABLET BY MOUTH DAILY. 9/19/19   Nettie Hendrickson MD  
multivitamins (CHEWABLE-AMOS) chew Take 1 Tab by mouth daily. Provider, Historical  
lidocaine (LIDODERM) 5 % 1 Patch by TransDERmal route daily as needed. Apply patch to the affected area for 12 hours a day and remove for 12 hours a day. Provider, Historical  
cholecalciferol, vitamin D3, (VITAMIN D3) 2,000 unit tab Take 2,000 Units by mouth daily. Provider, Historical  
predniSONE (DELTASONE) 5 mg tablet Take 10 mg by mouth daily. Provider, Historical  
albuterol (PROVENTIL HFA, VENTOLIN HFA, PROAIR HFA) 90 mcg/actuation inhaler Take 1 Puff by inhalation every six (6) hours as needed for Wheezing. Provider, Historical  
baclofen (LIORESAL) 10 mg tablet Take 5 mg by mouth two (2) times a day.  11/2/17   Provider, Historical  
ADVAIR DISKUS 250-50 mcg/dose diskus inhaler Take 1 Puff by inhalation two (2) times a day. 1/12/15   Provider, Historical  
tiotropium (SPIRIVA WITH HANDIHALER) 18 mcg inhalation capsule Take 1 Cap by inhalation daily. Provider, Historical  
 
Allergies Allergen Reactions  Cardizem [Diltiazem Hcl] Hives  Codeine Nausea and Vomiting  Darvocet A500 [Propoxyphene N-Acetaminophen] Nausea and Vomiting  Diltiazem Hives  Labetalol Nausea and Vomiting Dizzy/Disoriented  Pcn [Penicillins] Swelling Tongue Swelling Has tolerated cephalexin recently 4/7/20  Primidone Other (comments) Lightheaded/unsteady gait  Sulfa (Sulfonamide Antibiotics) Nausea and Vomiting Review of Systems: A comprehensive review of systems was negative except for that written in the History of Present Illness. Objective:  
 
Physical Exam:  
  
General: well developed, well nourished, pleasant , NAD. Hygiene good Psych: cooperative. Pleasant. No anxiety or depression. Normal mood and affect. Neuro: alert and oriented to person/place/situation. Otherwise nonfocal. 
HEENT: Normocephalic, atraumatic. EOMI. Conjunctiva clear. No scleral icterus. Neck: Normal range of motion. No masses. Chest: Good air entry bilaterally. Respirations nonlabored Abdomen: Soft, nontender, nondistended, normoactive bowel sounds Lower extremities: color normal; temperature normal. Hair growth is not present. Calves are supple, nontender, approximately equally sized in comparison. Capillary refill <3 sec Focussed Lower Extremity Exam: 
Vascular exam: 
Right lower extremity: mild  edema, foot ,  
DP pulse : 0 
PT pulse: 0 Nails dystrophic Left lower extremity: mild  edema, foot ,  
DP pulse : 0 
PT pulse: 0 Nails dystrophic Ulcer Description:  
Etiology: ischemic Location: toes great toe Measurement: in cm  Pre/post debridement mainly covers distal tip of toe from HIPJ forward but extending more proximally on the medial side of the toe. Ulcer on posterior heel Ulcer bed: Necrotic eschar Periwound: Reddened Exudate: None: wound tissue dry Data Review: No results found for this or any previous visit (from the past 24 hour(s)). Assessment:  
 
80 y.o. female with toes great toe ischemic ulcer. Ulcer R Heel and R Hallux PVD with ischemia/ gangrene R Hallux Plan: Wound debridement - 
Dressing: Betadine/gauze Frequency: every other day. Discussed keeping foot in dependant position and avoiding caffeine. Multi-podis boot (bunny boot) / offload heel. She has had a vascular consult with severe ischemia on R and mild on L. Vascular referred here. Will do HBO work-up. Sounds like she needs a pacemaker. Had extensive discussion with Pt and daughter and another daughter on the phone in coordinating all this info. Lots of questions were asked. Patient understood and agrees with plan. Questions answered. Weekly visits and serial debridements also discussed. Follow up with me in 1 week Signed By: Alex Bedolla DPM   
 May 12, 2020

## 2020-05-12 NOTE — WOUND CARE
05/12/20 3632 Wound Toe (Comment  which one) Right Great Toe #1 Date First Assessed/Time First Assessed: 05/12/20 0838   Present on Hospital Admission: Yes  Location: Toe (Comment  which one)  Wound Location Orientation: Right  Wound Description: Great Toe #1 Dressing Status Removed Dressing Type Gauze;Gauze wrap (alondra) Wound Length (cm) 4 cm Wound Width (cm) 4.5 cm Wound Depth (cm) 1 cm Wound Surface Area (cm^2) 18 cm^2 Wound Volume (cm^3) 18 cm^3 Condition of Base [place];Eschar [facility]) Condition of Edges Open Drainage Amount Moderate Drainage Color Serosanguinous Wound Odor None Naty-wound Assessment Intact; Pink Wound Heel Right #2 Date First Assessed/Time First Assessed: 05/12/20 0839   Present on Hospital Admission: Yes  Location: Heel  Wound Location Orientation: Right  Wound Description: #2 Dressing Status Removed Dressing Type Gauze Wound Length (cm) 3.2 cm Wound Width (cm) 1.5 cm Wound Depth (cm) 0.1 cm Wound Surface Area (cm^2) 4.8 cm^2 Wound Volume (cm^3) 0.48 cm^3 Condition of Base Eschar Drainage Amount None Wound Odor None Naty-wound Assessment Intact Visit Vitals /75 Pulse 82 Temp 97.8 °F (36.6 °C) Resp 18 LMP 02/26/1970

## 2020-05-13 RX ORDER — SODIUM CHLORIDE 0.9 % (FLUSH) 0.9 %
5-40 SYRINGE (ML) INJECTION AS NEEDED
Status: CANCELLED | OUTPATIENT
Start: 2020-05-13

## 2020-05-13 RX ORDER — SODIUM CHLORIDE 0.9 % (FLUSH) 0.9 %
5-40 SYRINGE (ML) INJECTION EVERY 8 HOURS
Status: CANCELLED | OUTPATIENT
Start: 2020-05-13

## 2020-05-13 NOTE — TELEPHONE ENCOUNTER
Verified patient with two types of identifiers. Spoke with Xiomara verified on HIPAA and notified that we can move patient's appointment up to tomorrow at 3:30 pm. We will tentatively schedule procedure for 5/22/20 at 2:30 pm per cath lab schedule. Xiomara to call her sister who will be bringing patient and call back to confirm appointment date.

## 2020-05-13 NOTE — TELEPHONE ENCOUNTER
Verified patient with two types of identifiers. Patient agreed to appointment tomorrow and procedure on 5/22/20. Notified I will review pre procedure instructions with patient and messi tomorrow. Patient's daugher verbalized understanding and will call with any other questions.

## 2020-05-14 ENCOUNTER — CLINICAL SUPPORT (OUTPATIENT)
Dept: CARDIOLOGY CLINIC | Age: 85
End: 2020-05-14

## 2020-05-14 ENCOUNTER — OFFICE VISIT (OUTPATIENT)
Dept: CARDIOLOGY CLINIC | Age: 85
End: 2020-05-14

## 2020-05-14 VITALS
RESPIRATION RATE: 16 BRPM | HEART RATE: 83 BPM | BODY MASS INDEX: 31.02 KG/M2 | DIASTOLIC BLOOD PRESSURE: 60 MMHG | SYSTOLIC BLOOD PRESSURE: 110 MMHG | OXYGEN SATURATION: 93 % | WEIGHT: 158 LBS | HEIGHT: 60 IN

## 2020-05-14 DIAGNOSIS — I44.2 CHB (COMPLETE HEART BLOCK) (HCC): ICD-10-CM

## 2020-05-14 DIAGNOSIS — Z95.0 CARDIAC PACEMAKER IN SITU: Primary | ICD-10-CM

## 2020-05-14 DIAGNOSIS — I44.7 LBBB (LEFT BUNDLE BRANCH BLOCK): ICD-10-CM

## 2020-05-14 DIAGNOSIS — I42.0 DILATED CARDIOMYOPATHY (HCC): ICD-10-CM

## 2020-05-14 DIAGNOSIS — I73.9 PAD (PERIPHERAL ARTERY DISEASE) (HCC): ICD-10-CM

## 2020-05-14 DIAGNOSIS — I48.19 PERSISTENT ATRIAL FIBRILLATION (HCC): ICD-10-CM

## 2020-05-14 DIAGNOSIS — I48.20 CHRONIC ATRIAL FIBRILLATION (HCC): ICD-10-CM

## 2020-05-14 DIAGNOSIS — N18.30 CKD (CHRONIC KIDNEY DISEASE) STAGE 3, GFR 30-59 ML/MIN (HCC): ICD-10-CM

## 2020-05-14 RX ORDER — CHLORHEXIDINE GLUCONATE 4 G/100ML
SOLUTION TOPICAL
Qty: 1 BOTTLE | Refills: 0 | Status: SHIPPED | OUTPATIENT
Start: 2020-05-14 | End: 2020-05-23

## 2020-05-14 NOTE — PATIENT INSTRUCTIONS
Your Biventricular Pacemaker Upgrade procedure has been scheduled for 5/22/20 at 2:30, at [facility]. 
 
Please report to Admitting Department by 12:30 pm, or 2 hours prior to your scheduled procedure. Please bring a list of your current medications and medication bottles, if able, to the hospital on this day. You will be unable to drive after your procedure so please make sure to bring someone with you to your procedure. You will need to have nothing to eat or drink after breakfast the day of your procedure. You may have small sips of water, if needed, to take with your medication. You should stop your medication, Eliquis, 1 day prior to your scheduled procedure. After your procedure, you will need to follow up with Dr. Betty Tavera nurse for a wound and device check. Your follow-up appointment has been scheduled for 6/5/20 at 10:15 am.  
 
Hibiclens 4% topical solution has been ordered and sent into your pharmacy Patient it start Hibiclens application 5 days prior to procedure date Directions Hibiclens 4%: Start cleanse 5 days prior to procedure 1. Rinse area (upper chest and upper arms) with water. 2. Apply minimum amount necessary to scrub the upper chest area from shoulder/neck to mid line of chest and to below the nipple each of  5 nights before the day of the procedure 3. Let solution dry.

## 2020-05-14 NOTE — PROGRESS NOTES
Cardiac Electrophysiology OFFICE Note     Subjective:      Daxa Ontiveros is a 80 y.o. 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 Inder Krishnan. She is scheduled for upgrade to biventricular pacemaker on 05/22/2020, delayed due to COVID-19 considerations. Device check 03/27/2020 showed proper lead & generator function. Generator close to RRT, not triggered ROBBIE. Battery impedance 2.64V. She is pacer dependent. Today check estimated battery about 5 months left  She is currently at the [address] and getting right leg hyperbaric oxygen therapy next week. Her daughters Joselito Cisneros is in office today and her other daughter Renetta Parker 81Yoselin called in  They wanted her to have BIV pacemaker upgrade to improve LVEF and perfusion before she goes into hyperbaric treatment    Known dilated cardiomyopathy, LVEF 20-25% in 03/2020. NYHA II-III chronic systolic CHF, notes shortness of breath with activities around her apartment, mainly stays in wheelchair when out & about. 03/05/20   ECHO ADULT COMPLETE 03/09/2020 3/9/2020    Narrative · Normal left ventricular cavity size. Upper normal wall thickness. There   is severe global hypokinesis with regional variations. Ejection fraction   is estimated to be 20-25%. · Abnormal right ventricular septal motion consistent with RV pacemaker. Pacer/ICD present. · Severely dilated left atrium. · Moderately dilated right atrium. · Aortic valve leaflet calcification present with mild stenosis. Dimensionless index measures 0.436. Mild aortic valve regurgitation is   present. · Mitral valve thickening. Mild mitral annular calcification. Mild to   moderate mitral valve regurgitation is present. · Moderate tricuspid valve regurgitation is present. · Mild to moderate pulmonary hypertension. Pulmonary arterial systolic   pressure is 45 mmHg.         Signed by: Dudley Costello MD       GDMT includes carvedilol, furosemide, but no ACEi/ARB due to renal function. Recent hospital admission at [facility] for right foot ulcer, toe gangrene, PAD. Not septic, no osteomyelitis. BKA discussed, but opted against.  Discharged on po Keflex until 5/20/2020. History COPD, uses supplemental oxygen nightly, sometimes during the day with activity. Multiple inhalers, is on prednisone po. On Eliquis 5 mg po bid, denies bleeding issues. Previous:  Admitted at [facility] 04/15/2020-04/22/2020 for right foot ulcer, cellulitis, toe gangrene, PAD with poor pulse on right foot. Not septic, no osteomyelitis on xray. IV antibiotics given, discharged on Keflex po. Abdominal aortogram with catheterization of right anterior tibial artery with RLE runoff. Good flow from femoral down to anterior tibial artery, but abrupt cutoff just above the ankle with no nameable foot vessels seen. Echo (03/05/2020): LVEF 20-25%, upper normal wall thickness, severe global hypokinesis with regional variations. Severely dilated LA, mod dilated RA. Mild MAC, mild to mod MR. Mild AS, mild AR. Mod TR. Mild to mod PH. ECG (06/29/2020): AS-, QRSd 170 ms. Lexiscan cardiolite stress (08/24/2016): Large size, mod to high grade, partially reversible defect involving mid-distal anterior wall & apex. Mod extent ischemia LAD territory. LVEF 35%, apical akinesis. Dr. Sayda Ward is primary cardiologist.    Seen by Dr. Paul Shah (vascular). S/p AV node ablation & PM implant 1998. Lumbar fracture 07/2019, had kyphoplasty & injection. Lives at [facility].     Problem List  Date Reviewed: 3/17/2020          Codes Class Noted    Cellulitis of foot ICD-10-CM: L70.797  ICD-9-CM: 682.7  4/15/2020        Foot infection ICD-10-CM: L08.9  ICD-9-CM: 686.9  4/15/2020        Osteoporosis ICD-10-CM: M81.0  ICD-9-CM: 733.00  8/23/2019        Chronic midline low back pain without sciatica ICD-10-CM: M54.5, G89.29  ICD-9-CM: 724.2, 338.29  8/23/2019        Hematuria ICD-10-CM: R31.9  ICD-9-CM: 599.70  6/30/2019        Hypertension (Chronic) ICD-10-CM: I10  ICD-9-CM: 401.9  6/29/2019        AAA (abdominal aortic aneurysm) (HCC) (Chronic) ICD-10-CM: I71.4  ICD-9-CM: 441.4  6/29/2019        Constipation ICD-10-CM: K59.00  ICD-9-CM: 564.00  6/29/2019        Lumbar compression fracture ([facility] [address] ICD-10-CM: S32.000A  ICD-9-CM: 805.4  6/29/2019        Long term systemic steroid user (Chronic) ICD-10-CM: Z79.52  ICD-9-CM: V58.65  3/21/2019        Frequent falls (Chronic) ICD-10-CM: R29.6  ICD-9-CM: V15.88  1/13/2019        COPD (chronic obstructive pulmonary disease) (HCC) (Chronic) ICD-10-CM: J44.9  ICD-9-CM: 496  1/13/2019        Chronic respiratory failure with hypoxia (HCC) (Chronic) ICD-10-CM: J96.11  ICD-9-CM: 518.83, 799.02  1/13/2019        Latent tuberculosis by blood test ICD-10-CM: Z22.7  ICD-9-CM: 790.6  1/22/2018        Pacemaker (Chronic) ICD-10-CM: Z95.0  ICD-9-CM: V45.01  7/24/2017        Psoriasis (Chronic) ICD-10-CM: L40.9  ICD-9-CM: 696.1  3/23/2017        Ischemic cardiomyopathy (Chronic) ICD-10-CM: I25.5  ICD-9-CM: 414.8  Unknown        Restrictive lung disease (Chronic) ICD-10-CM: J98.4  ICD-9-CM: 518.89  12/14/2015        Osteopenia ICD-10-CM: M85.80  ICD-9-CM: 733.90  11/23/2015        CKD (chronic kidney disease) stage 3, GFR 30-59 ml/min (HCC) (Chronic) ICD-10-CM: N18.3  ICD-9-CM: 585.3  2/9/2015        Anxiety and depression (Chronic) ICD-10-CM: F41.9, F32.9  ICD-9-CM: 300.00, 311  2/26/2014        Colon cancer ([facility] [address] (Chronic) ICD-10-CM: C18.9  ICD-9-CM: 153.9  2/26/2014    Overview Addendum 2/26/2014 11:12 AM by Ginger Ferrara     Partial colectomy  Petey Allen             Hypothyroid (Chronic) ICD-10-CM: E03.9  ICD-9-CM: 244.9  2/26/2014        Atrial fibrillation (HCC) (Chronic) ICD-10-CM: I48.91  ICD-9-CM: 427.31  Unknown    Overview Signed 10/21/2010  8:16 AM by Jennifer Ellsworth     av node ablation 5/22/98 with placement of CPI #[id number] dual chamber pacemaker subsequently replaced with a single chamber medtronic #[id number] single chamber pacemaker 7/25/05             Mitral regurgitation (Chronic) ICD-10-CM: I34.0  ICD-9-CM: 424.0  10/21/2010        Mixed hyperlipidemia (Chronic) ICD-10-CM: V60.0  ICD-9-CM: 272.2  10/18/2010              Current Outpatient Medications   Medication Sig Dispense Refill    tuberculin (AplisoL) 5 tub. unit /0.1 mL injection 5 Units by IntraDERMal route Once.  cephALEXin (KEFLEX) 500 mg capsule Take 500 mg by mouth four (4) times daily.  magnesium oxide (MAG-OX) 400 mg tablet Take 400 mg by mouth daily.  famotidine (Pepcid) 20 mg tablet Take 20 mg by mouth two (2) times a day.  levothyroxine (SYNTHROID) 88 mcg tablet Take 88 mcg by mouth Daily (before breakfast).  Zoloft 50 mg tablet TAKE 1 TABLET BY MOUTH EVERY DAY 90 Tab 1    carvediloL (COREG) 3.125 mg tablet TAKE 1 TABLET BY MOUTH TWICE A DAY WITH MEALS 180 Tab 1    apixaban (Eliquis) 5 mg tablet Take 1 Tab by mouth two (2) times a day. 180 Tab 1    ascorbic acid, vitamin C, (VITAMIN C) 250 mg tablet Take 1 Tab by mouth two (2) times a day. 60 Tab 0    L. acidoph & paracasei- S therm- Bifido (MADAN-Q/RISAQUAD) 8 billion cell cap cap Take 1 Cap by mouth daily. 30 Cap 0    oxyCODONE IR (Roxicodone) 5 mg immediate release tablet Take 1 Tab by mouth every six (6) hours as needed for Pain for up to 30 days. Max Daily Amount: 20 mg. 30 Tab 0    OTHER,NON-FORMULARY, Right heel and right great toe: Apply betadine daily and let dry. Float heel or use Heel boot (boot when in bed). Gently wrap R foot in Kerlix to keep clean and dry. Change daily 1 Each 0    acetaminophen (TYLENOL) 500 mg tablet Take 1,000 mg by mouth two (2) times a day. Acetaminophen 1000 mg morning and afternoon, 500 mg at bedtime      acetaminophen (TYLENOL) 500 mg tablet Take 500 mg by mouth nightly.  Acetaminophen 1000 mg morning and afternoon, 500 mg at bedtime  diclofenac (Voltaren) 1 % gel Apply 2 g to affected area every six (6) hours as needed for Pain.  ketoconazole (NIZORAL) 2 % shampoo Apply 5 to 10 mL to wet scalp, lather, leave on 3 to 5 minutes, and rinse; apply once every 1 to 2 weeks 120 mL 2    simvastatin (ZOCOR) 20 mg tablet TAKE 1 TABLET BY MOUTH NIGHTLY 90 Tab 3    nitroglycerin (NITROSTAT) 0.4 mg SL tablet 1 Tab by SubLINGual route every five (5) minutes as needed for Chest Pain. Up to 3 doses. 1 Bottle 1    furosemide (LASIX) 40 mg tablet TAKE 1 TABLET BY MOUTH DAILY. 90 Tab 2    multivitamins (CHEWABLE-AMOS) chew Take 1 Tab by mouth daily.  lidocaine (LIDODERM) 5 % 1 Patch by TransDERmal route daily as needed. Apply patch to the affected area for 12 hours a day and remove for 12 hours a day.  cholecalciferol, vitamin D3, (VITAMIN D3) 2,000 unit tab Take 2,000 Units by mouth daily.  predniSONE (DELTASONE) 5 mg tablet Take 10 mg by mouth daily.  albuterol (PROVENTIL HFA, VENTOLIN HFA, PROAIR HFA) 90 mcg/actuation inhaler Take 1 Puff by inhalation every six (6) hours as needed for Wheezing.  baclofen (LIORESAL) 10 mg tablet Take 5 mg by mouth two (2) times a day.  ADVAIR DISKUS 250-50 mcg/dose diskus inhaler Take 1 Puff by inhalation two (2) times a day.  tiotropium (SPIRIVA WITH HANDIHALER) 18 mcg inhalation capsule Take 1 Cap by inhalation daily.        Allergies   Allergen Reactions    Cardizem [Diltiazem Hcl] Hives    Codeine Nausea and Vomiting    Darvocet A500 [Propoxyphene N-Acetaminophen] Nausea and Vomiting    Diltiazem Hives    Labetalol Nausea and Vomiting     Dizzy/Disoriented     Pcn [Penicillins] Swelling     Tongue Swelling   Has tolerated cephalexin recently 4/7/20     Primidone Other (comments)     Lightheaded/unsteady gait    Sulfa (Sulfonamide Antibiotics) Nausea and Vomiting     Past Medical History:   Diagnosis Date    Arthritis     Atrial fibrillation (HCC)     av node ablation 5/22/98 with placement of CPI #[id number] dual chamber pacemaker subsequently replaced with a single chamber medtronic #[id number] single chamber pacemaker 7/25/05    Cancer ([facility] [address] 1970's    colon cancer w/ resection    COPD     Depression     GERD (gastroesophageal reflux disease)     History of vascular access device 04/17/2020    4F MIDLINE PLACED BY EMIL Quintana RN LEFT BRACHIAL, 13CM    Hyperlipidemia     Hypertension     Ischemic cardiomyopathy     Migraine headache     Orthostatic hypotension     RADHA (obstructive sleep apnea)     Pacemaker 5/22/98    AV node ablation /pacemaker placement utilizing CPI # [id number] dual chamber system, medtronic #[id number] single chamber device placed 7/22/05    Pulmonary hypertension ([address] 9/26/2011    RVSP 50 on echo 8/14    Thyroid disease     Valvular heart disease     mild-mod MR/TR     Past Surgical History:   Procedure Laterality Date    BREAST SURGERY PROCEDURE UNLISTED      benign breast tumor excision right breast    HX BREAST BIOPSY Right 2002    neg; surgical bx    HX COLECTOMY  1974    colon    HX KNEE REPLACEMENT      right TKA    HX PACEMAKER      HX TUBAL LIGATION      IR KYPHOPLASTY LUMBAR  7/2/2019    TOTAL KNEE ARTHROPLASTY      total on R, partial on L     Family History   Problem Relation Age of Onset    Diabetes Mother     Heart Disease Mother     Heart Attack Mother     Heart Disease Father     Stroke Sister     Breast Cancer Sister 80    Cancer Maternal Aunt         uterus    Diabetes Maternal Uncle     Breast Cancer Daughter 61     Social History     Tobacco Use    Smoking status: Passive Smoke Exposure - Never Smoker    Smokeless tobacco: Never Used   Substance Use Topics    Alcohol use: No     Alcohol/week: 0.0 standard drinks        Review of Systems:   Constitutional: Negative for fever, chills, weight loss, + malaise/fatigue. HEENT: Negative for nosebleeds, vision changes.    Respiratory: Negative for cough, hemoptysis  Cardiovascular: Negative for chest pain, palpitations, no orthopnea with supplemental oxygen, no claudication, leg swelling, syncope, and no PND. + JIMENEZ  Gastrointestinal: Negative for nausea, vomiting, + chronic diarrhea, no blood in stool and no melena. Genitourinary: Negative for dysuria, and hematuria. Musculoskeletal: Negative for myalgias, arthralgia. Skin: Negative for rash. + right toe & heel wounds  Heme: Does not bleed or bruise easily. Neurological: Negative for speech change and focal weakness     Objective:     Visit Vitals  /60 (BP 1 Location: Left arm, BP Patient Position: Sitting)   Pulse 83   Resp 16   Ht 5' (1.524 m)   Wt 158 lb (71.7 kg)   SpO2 93%   BMI 30.86 kg/m²        Physical Exam:   Constitutional: Well-developed and well-nourished. No respiratory distress. Head: Normocephalic and atraumatic. Eyes: Pupils are equal, round  ENT: Hearing normal.  Neck: Supple. No JVD present. Cardiovascular: Normal rate, regular rhythm. Exam reveals no gallop and no friction rub. 2/6 systolic murmur. Pulmonary/Chest: Effort normal and breath sounds normal. No wheezes. Abdominal: Soft, no tenderness. Musculoskeletal: No edema. Right leg in foot brace  Neurological: Alert,oriented. Skin: Skin is warm and dry. Left chest pacer site unremarkable  Psychiatric: Normal mood and affect. Behavior is normal. Judgment and thought content normal.        Assessment/Plan:       ICD-10-CM ICD-9-CM    1. Cardiac pacemaker in situ Z95.0 V45.01    2. LBBB (left bundle branch block) I44.7 426.3    3. Chronic atrial fibrillation (HCC) I48.20 427.31    4. CKD (chronic kidney disease) stage 3, GFR 30-59 ml/min ([facility]) N18.3 585.3    5. CHB (complete heart block) ([facility]) I44.2 426.0    6. Persistent atrial fibrillation ([facility]) I48.19 427.31    7. Dilated cardiomyopathy ([facility]) I42.0 425.4    8.  PAD (peripheral artery disease) ([facility]) I73.9 443.9        Medtronic dual chamber pacemaker check 03/27/2020 showed proper lead & generator function. Generator close to RRT, not triggered ROBBIE. Battery impedance 2.64V. She is pacer dependent. Last LVEF in 03/2020 20-25%, NYHA III. Appropriate GDMT, but no ACEi/ARB due to renal dysfunction, CKD. S/p recent hospitalization & antibiotic therapy for right toe & foot wound, PAD. Not septic, no osteomyelitis noted. She is making plans for daily wound care     Risks/benefits of upgrade to biventricular pacemaker reviewed, had initially been postponed due to COVID-19 restrictions. She agrees to proceed as currently scheduled. She and her children understands COVID risk and wants to go for procedure next Friday    The patient was counseled at length about the risks of berenice Covid-19 during their perioperative period and any recovery window from their procedure. The patient was made aware that berenice Covid-19  may worsen their prognosis for recovering from their procedure and lend to a higher morbidity and/or mortality risk. All material risks, benefits, and reasonable alternatives including postponing the procedure were discussed. The patient does wish to proceed with the procedure at this time. Risks involve device implant include but are not limited to bleeding, infection, valvular damage, heart attack, stroke, lung collapse (pneumothorax or hemothorax), heart collapse (pericardial tamponade), heart perforation, kidney failure, death. Elective or emergency surgery may be required to repair some of these complications. Prolonged hospitalization would be required. General anesthesia may be required for the procedure. Denies bleeding problems, on Eliquis for embolic CVA prophylaxis.   Hold eliquis one day before procedure    Future Appointments   Date Time Provider Cuca Willis   5/14/2020  3:30  [facility], [address] [address] [facility]   5/14/2020  3:40 PM Graham Marino  [address]   6/2/2020 10:00 AM Yuli Jorgensen DPM [address]   6/5/2020 10:15 AM PACEMAKER3, [address]   6/5/2020 10:30 AM HOLTER, [address]   8/4/2020 11:15 AM Esther Joshua MD [address]       Thank you for involving me in this patient's care and please call with further concerns or questions. Adriana Webb M.D.   Electrophysiology/Cardiology  [facility] [place]  [facility] [address] [place] [address]  [address] [facility], [address] [place] [address]  [phone]

## 2020-05-14 NOTE — H&P (VIEW-ONLY)
Cardiac Electrophysiology OFFICE Note Subjective:  
  
Zenovia Meckel is a 80 y.o. 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 Ev Hurt. She is scheduled for upgrade to biventricular pacemaker on 05/22/2020, delayed due to COVID-19 considerations. Device check 03/27/2020 showed proper lead & generator function. Generator close to RRT, not triggered ROBBIE. Battery impedance 2.64V. She is pacer dependent. Today check estimated battery about 5 months left She is currently at the [address] and getting right leg hyperbaric oxygen therapy next week. Her daughters Max Serra is in office today and her other daughter Darshana James called in They wanted her to have BIV pacemaker upgrade to improve LVEF and perfusion before she goes into hyperbaric treatment Known dilated cardiomyopathy, LVEF 20-25% in 03/2020. NYHA II-III chronic systolic CHF, notes shortness of breath with activities around her apartment, mainly stays in wheelchair when out & about. 03/05/20 ECHO ADULT COMPLETE 03/09/2020 3/9/2020 Narrative · Normal left ventricular cavity size. Upper normal wall thickness. There  
is severe global hypokinesis with regional variations. Ejection fraction  
is estimated to be 20-25%. · Abnormal right ventricular septal motion consistent with RV pacemaker. Pacer/ICD present. · Severely dilated left atrium. · Moderately dilated right atrium. · Aortic valve leaflet calcification present with mild stenosis. Dimensionless index measures 0.436. Mild aortic valve regurgitation is  
present. · Mitral valve thickening. Mild mitral annular calcification. Mild to  
moderate mitral valve regurgitation is present. · Moderate tricuspid valve regurgitation is present. · Mild to moderate pulmonary hypertension. Pulmonary arterial systolic  
pressure is 45 mmHg.  
   
  Signed by: Nery Branch MD  
 
 
 GDMT includes carvedilol, furosemide, but no ACEi/ARB due to renal function. Recent hospital admission at [facility] for right foot ulcer, toe gangrene, PAD. Not septic, no osteomyelitis. BKA discussed, but opted against.  Discharged on po Keflex until 5/20/2020. History COPD, uses supplemental oxygen nightly, sometimes during the day with activity. Multiple inhalers, is on prednisone po. On Eliquis 5 mg po bid, denies bleeding issues. Previous: 
Admitted at [facility] 04/15/2020-04/22/2020 for right foot ulcer, cellulitis, toe gangrene, PAD with poor pulse on right foot. Not septic, no osteomyelitis on xray. IV antibiotics given, discharged on Keflex po. Abdominal aortogram with catheterization of right anterior tibial artery with RLE runoff. Good flow from femoral down to anterior tibial artery, but abrupt cutoff just above the ankle with no nameable foot vessels seen. Echo (03/05/2020): LVEF 20-25%, upper normal wall thickness, severe global hypokinesis with regional variations. Severely dilated LA, mod dilated RA. Mild MAC, mild to mod MR. Mild AS, mild AR. Mod TR. Mild to mod PH. ECG (06/29/2020): AS-, QRSd 170 ms. Lexiscan cardiolite stress (08/24/2016): Large size, mod to high grade, partially reversible defect involving mid-distal anterior wall & apex. Mod extent ischemia LAD territory. LVEF 35%, apical akinesis. Dr. Faustina Roach is primary cardiologist. 
 
Seen by Dr. Kinjal Lockett (vascular). S/p AV node ablation & PM implant 1998. Lumbar fracture 07/2019, had kyphoplasty & injection. Lives at [facility]. Problem List  Date Reviewed: 3/17/2020 Codes Class Noted Cellulitis of foot ICD-10-CM: L03.119 ICD-9-CM: 682.7  4/15/2020 Foot infection ICD-10-CM: L08.9 ICD-9-CM: 686.9  4/15/2020 Osteoporosis ICD-10-CM: M81.0 ICD-9-CM: 733.00  8/23/2019  Chronic midline low back pain without sciatica ICD-10-CM: M54.5, G89.29 
 ICD-9-CM: 724.2, 338.29  8/23/2019 Hematuria ICD-10-CM: R31.9 ICD-9-CM: 599.70  6/30/2019 Hypertension (Chronic) ICD-10-CM: I10 
ICD-9-CM: 401.9  6/29/2019 AAA (abdominal aortic aneurysm) (HCC) (Chronic) ICD-10-CM: I71.4 ICD-9-CM: 441.4  6/29/2019 Constipation ICD-10-CM: K59.00 ICD-9-CM: 564.00  6/29/2019 Lumbar compression fracture (HCC) ICD-10-CM: S32.000A ICD-9-CM: 805.4  6/29/2019 Long term systemic steroid user (Chronic) ICD-10-CM: Z79.52 
ICD-9-CM: V58.65  3/21/2019 Frequent falls (Chronic) ICD-10-CM: R29.6 ICD-9-CM: V15.88  1/13/2019 COPD (chronic obstructive pulmonary disease) (HCC) (Chronic) ICD-10-CM: J44.9 ICD-9-CM: 496  1/13/2019 Chronic respiratory failure with hypoxia (HCC) (Chronic) ICD-10-CM: J96.11 
ICD-9-CM: 518.83, 799.02  1/13/2019 Latent tuberculosis by blood test ICD-10-CM: Z22.7 ICD-9-CM: 790.6  1/22/2018 Pacemaker (Chronic) ICD-10-CM: Z95.0 ICD-9-CM: V45.01  7/24/2017 Psoriasis (Chronic) ICD-10-CM: L40.9 ICD-9-CM: 696.1  3/23/2017 Ischemic cardiomyopathy (Chronic) ICD-10-CM: I25.5 ICD-9-CM: 414.8  Unknown Restrictive lung disease (Chronic) ICD-10-CM: J98.4 ICD-9-CM: 518.89  12/14/2015 Osteopenia ICD-10-CM: M85.80 ICD-9-CM: 733.90  11/23/2015 CKD (chronic kidney disease) stage 3, GFR 30-59 ml/min (HCC) (Chronic) ICD-10-CM: N18.3 ICD-9-CM: 585.3  2/9/2015 Anxiety and depression (Chronic) ICD-10-CM: F41.9, F32.9 ICD-9-CM: 300.00, 311  2/26/2014 Colon cancer (HCC) (Chronic) ICD-10-CM: C18.9 ICD-9-CM: 153.9  2/26/2014 Overview Addendum 2/26/2014 11:12 AM by Isabel Flores Partial colectomy Petey Allen Hypothyroid (Chronic) ICD-10-CM: E03.9 ICD-9-CM: 244.9  2/26/2014 Atrial fibrillation (HCC) (Chronic) ICD-10-CM: I48.91 
ICD-9-CM: 427.31  Unknown Overview Signed 10/21/2010  8:16 AM by Sharad Powell av node ablation 5/22/98 with placement of CPI #[id number] dual chamber pacemaker subsequently replaced with a single chamber medtronic #[id number] single chamber pacemaker 7/25/05 Mitral regurgitation (Chronic) ICD-10-CM: I34.0 ICD-9-CM: 424.0  10/21/2010 Mixed hyperlipidemia (Chronic) ICD-10-CM: F95.5 ICD-9-CM: 272.2  10/18/2010 Current Outpatient Medications Medication Sig Dispense Refill  tuberculin (AplisoL) 5 tub. unit /0.1 mL injection 5 Units by IntraDERMal route Once.  cephALEXin (KEFLEX) 500 mg capsule Take 500 mg by mouth four (4) times daily.  magnesium oxide (MAG-OX) 400 mg tablet Take 400 mg by mouth daily.  famotidine (Pepcid) 20 mg tablet Take 20 mg by mouth two (2) times a day.  levothyroxine (SYNTHROID) 88 mcg tablet Take 88 mcg by mouth Daily (before breakfast).  Zoloft 50 mg tablet TAKE 1 TABLET BY MOUTH EVERY DAY 90 Tab 1  carvediloL (COREG) 3.125 mg tablet TAKE 1 TABLET BY MOUTH TWICE A DAY WITH MEALS 180 Tab 1  
 apixaban (Eliquis) 5 mg tablet Take 1 Tab by mouth two (2) times a day. 180 Tab 1  
 ascorbic acid, vitamin C, (VITAMIN C) 250 mg tablet Take 1 Tab by mouth two (2) times a day. 60 Tab 0  
 L. acidoph & paracasei- S therm- Bifido (MADAN-Q/RISAQUAD) 8 billion cell cap cap Take 1 Cap by mouth daily. 30 Cap 0  
 oxyCODONE IR (Roxicodone) 5 mg immediate release tablet Take 1 Tab by mouth every six (6) hours as needed for Pain for up to 30 days. Max Daily Amount: 20 mg. 30 Tab 0  
 OTHER,NON-FORMULARY, Right heel and right great toe: Apply betadine daily and let dry. Float heel or use Heel boot (boot when in bed). Gently wrap R foot in Kerlix to keep clean and dry. Change daily 1 Each 0  
 acetaminophen (TYLENOL) 500 mg tablet Take 1,000 mg by mouth two (2) times a day. Acetaminophen 1000 mg morning and afternoon, 500 mg at bedtime  acetaminophen (TYLENOL) 500 mg tablet Take 500 mg by mouth nightly. Acetaminophen 1000 mg morning and afternoon, 500 mg at bedtime  diclofenac (Voltaren) 1 % gel Apply 2 g to affected area every six (6) hours as needed for Pain.  ketoconazole (NIZORAL) 2 % shampoo Apply 5 to 10 mL to wet scalp, lather, leave on 3 to 5 minutes, and rinse; apply once every 1 to 2 weeks 120 mL 2  
 simvastatin (ZOCOR) 20 mg tablet TAKE 1 TABLET BY MOUTH NIGHTLY 90 Tab 3  
 nitroglycerin (NITROSTAT) 0.4 mg SL tablet 1 Tab by SubLINGual route every five (5) minutes as needed for Chest Pain. Up to 3 doses. 1 Bottle 1  
 furosemide (LASIX) 40 mg tablet TAKE 1 TABLET BY MOUTH DAILY. 90 Tab 2  
 multivitamins (CHEWABLE-AMOS) chew Take 1 Tab by mouth daily.  lidocaine (LIDODERM) 5 % 1 Patch by TransDERmal route daily as needed. Apply patch to the affected area for 12 hours a day and remove for 12 hours a day.  cholecalciferol, vitamin D3, (VITAMIN D3) 2,000 unit tab Take 2,000 Units by mouth daily.  predniSONE (DELTASONE) 5 mg tablet Take 10 mg by mouth daily.  albuterol (PROVENTIL HFA, VENTOLIN HFA, PROAIR HFA) 90 mcg/actuation inhaler Take 1 Puff by inhalation every six (6) hours as needed for Wheezing.  baclofen (LIORESAL) 10 mg tablet Take 5 mg by mouth two (2) times a day.  ADVAIR DISKUS 250-50 mcg/dose diskus inhaler Take 1 Puff by inhalation two (2) times a day.  tiotropium (SPIRIVA WITH HANDIHALER) 18 mcg inhalation capsule Take 1 Cap by inhalation daily. Allergies Allergen Reactions  Cardizem [Diltiazem Hcl] Hives  Codeine Nausea and Vomiting  Darvocet A500 [Propoxyphene N-Acetaminophen] Nausea and Vomiting  Diltiazem Hives  Labetalol Nausea and Vomiting Dizzy/Disoriented  Pcn [Penicillins] Swelling Tongue Swelling Has tolerated cephalexin recently 4/7/20  Primidone Other (comments) Lightheaded/unsteady gait  Sulfa (Sulfonamide Antibiotics) Nausea and Vomiting Past Medical History:  
Diagnosis Date  Arthritis  Atrial fibrillation ([facility] [address]   
 av node ablation 5/22/98 with placement of CPI #[id number] dual chamber pacemaker subsequently replaced with a single chamber medtronic #[id number] single chamber pacemaker 7/25/05  Cancer [facility]) [id number]  
 colon cancer w/ resection  COPD  Depression  GERD (gastroesophageal reflux disease)  History of vascular access device 04/17/2020  
 4F MIDLINE PLACED BY EMIL GUNN RN LEFT BRACHIAL, 13CM  Hyperlipidemia  Hypertension  Ischemic cardiomyopathy  Migraine headache  Orthostatic hypotension  RADHA (obstructive sleep apnea)  Pacemaker 5/22/98 AV node ablation /pacemaker placement utilizing CPI # [id number] dual chamber system, medtronic #[id number] single chamber device placed 7/22/05  Pulmonary hypertension ([facility] [address] 9/26/2011 RVSP 50 on echo 8/14  Thyroid disease  Valvular heart disease   
 mild-mod MR/TR Past Surgical History:  
Procedure Laterality Date  BREAST SURGERY PROCEDURE UNLISTED    
 benign breast tumor excision right breast  
 HX BREAST BIOPSY Right 2002  
 neg; surgical bx  HX COLECTOMY  1974  
 colon  HX KNEE REPLACEMENT    
 right TKA  HX PACEMAKER    
 HX TUBAL LIGATION    
 IR KYPHOPLASTY LUMBAR  7/2/2019  TOTAL KNEE ARTHROPLASTY    
 total on R, partial on L Family History Problem Relation Age of Onset  Diabetes Mother  Heart Disease Mother  Heart Attack Mother  Heart Disease Father  Stroke Sister  Breast Cancer Sister 80  
 Cancer Maternal Aunt   
     uterus  Diabetes Maternal Uncle  Breast Cancer Daughter 61 Social History Tobacco Use  Smoking status: Passive Smoke Exposure - Never Smoker  Smokeless tobacco: Never Used Substance Use Topics  Alcohol use: No  
  Alcohol/week: 0.0 standard drinks Review of Systems:  
Constitutional: Negative for fever, chills, weight loss, + malaise/fatigue. HEENT: Negative for nosebleeds, vision changes. Respiratory: Negative for cough, hemoptysis Cardiovascular: Negative for chest pain, palpitations, no orthopnea with supplemental oxygen, no claudication, leg swelling, syncope, and no PND. + JIMENEZ Gastrointestinal: Negative for nausea, vomiting, + chronic diarrhea, no blood in stool and no melena. Genitourinary: Negative for dysuria, and hematuria. Musculoskeletal: Negative for myalgias, arthralgia. Skin: Negative for rash. + right toe & heel wounds Heme: Does not bleed or bruise easily. Neurological: Negative for speech change and focal weakness Objective:  
 
Visit Vitals /60 (BP 1 Location: Left arm, BP Patient Position: Sitting) Pulse 83 Resp 16 Ht 5' (1.524 m) Wt 158 lb (71.7 kg) SpO2 93% BMI 30.86 kg/m² Physical Exam:  
Constitutional: Well-developed and well-nourished. No respiratory distress. Head: Normocephalic and atraumatic. Eyes: Pupils are equal, round ENT: Hearing normal. 
Neck: Supple. No JVD present. Cardiovascular: Normal rate, regular rhythm. Exam reveals no gallop and no friction rub. 2/6 systolic murmur. Pulmonary/Chest: Effort normal and breath sounds normal. No wheezes. Abdominal: Soft, no tenderness. Musculoskeletal: No edema. Right leg in foot brace Neurological: Alert,oriented. Skin: Skin is warm and dry. Left chest pacer site unremarkable Psychiatric: Normal mood and affect. Behavior is normal. Judgment and thought content normal.   
 
 
Assessment/Plan: ICD-10-CM ICD-9-CM 1. Cardiac pacemaker in situ Z95.0 V45.01   
2. LBBB (left bundle branch block) I44.7 426.3 3. Chronic atrial fibrillation (HCC) I48.20 427.31   
4. CKD (chronic kidney disease) stage 3, GFR 30-59 ml/min (HCC) N18.3 585.3 5. CHB (complete heart block) (HCC) I44.2 426.0 6. Persistent atrial fibrillation (HCC) I48.19 427.31   
7. Dilated cardiomyopathy (HCC) I42.0 425.4 8. PAD (peripheral artery disease) ([facility]) I73.9 443.9 Medtronic dual chamber pacemaker check 03/27/2020 showed proper lead & generator function. Generator close to RRT, not triggered ROBBIE. Battery impedance 2.64V. She is pacer dependent. Last LVEF in 03/2020 20-25%, NYHA III. Appropriate GDMT, but no ACEi/ARB due to renal dysfunction, CKD. S/p recent hospitalization & antibiotic therapy for right toe & foot wound, PAD. Not septic, no osteomyelitis noted. She is making plans for daily wound care Risks/benefits of upgrade to biventricular pacemaker reviewed, had initially been postponed due to COVID-19 restrictions. She agrees to proceed as currently scheduled. She and her children understands COVID risk and wants to go for procedure next Friday The patient was counseled at length about the risks of berenice Covid-19 during their perioperative period and any recovery window from their procedure. The patient was made aware that berenice Covid-19  may worsen their prognosis for recovering from their procedure and lend to a higher morbidity and/or mortality risk. All material risks, benefits, and reasonable alternatives including postponing the procedure were discussed. The patient does wish to proceed with the procedure at this time. Risks involve device implant include but are not limited to bleeding, infection, valvular damage, heart attack, stroke, lung collapse (pneumothorax or hemothorax), heart collapse (pericardial tamponade), heart perforation, kidney failure, death. Elective or emergency surgery may be required to repair some of these complications. Prolonged hospitalization would be required. General anesthesia may be required for the procedure. Denies bleeding problems, on Eliquis for embolic CVA prophylaxis. Hold eliquis one day before procedure Future Appointments Date Time Provider Cuca Willis 5/14/2020  3:30 PM PACEMAKER3, [address]  
5/14/2020  3:40 PM Aramis Perez  [address]  
6/2/2020 10:00 AM Erika Rivers DPM [address]  
6/5/2020 10:15  [place], [address]  
6/5/2020 10:30 AM HOLTER, [address]  
8/4/2020 11:15 AM Tamiko Ma MD [address] Thank you for involving me in this patient's care and please call with further concerns or questions. Enedelia Grace M.D. Electrophysiology/Cardiology [facility] [place] [address], [address] 
[phone]                                        [phone]

## 2020-05-18 ENCOUNTER — TELEPHONE (OUTPATIENT)
Dept: CARDIOLOGY CLINIC | Age: 85
End: 2020-05-18

## 2020-05-18 NOTE — TELEPHONE ENCOUNTER
Per our clinical supervisor patient does not need to tested for COVID again pre-procedure. Patient has 2 negative COVID tests (1) 5/1/20 and the (2) the [facility] had patient test. They also said that from their information, inpatient COVID tests are, on average, coming back in less than 24 hours. Attempted to reach patient by telephone. A message was left for return call.

## 2020-05-18 NOTE — TELEPHONE ENCOUNTER
Verified patient with two types of identifiers. Spoke with Nicko Ellis who states that they would still like to move forward with procedure on Friday, 5/22/20, at 2:30 pm. Nicko Ellis states patient will be going back to her apartment at [address] [place] [address] as opposed to the [address] [facility]. Nicko Ellis states [address] [place] [address] is requiring 1 negative COVID test prior to return. Patient still requesting to spend the night after her procedure. Patient has 2 recent negative COVID tests one on 5/1/20 ant another on 5/7/20. Checking with administration to see if further testing is needed prior to procedure. Daughter also asking the turn around time for COVID testing while admitting. Will ask administration as well can return call to daughter.

## 2020-05-18 NOTE — TELEPHONE ENCOUNTER
Patients daughter is requesting to speak with Dede Newell regarding the patients procedure scheduled for Friday.      Phone: [phone] O-L Flap Text: The defect edges were debeveled with a #15 scalpel blade.  Given the location of the defect, shape of the defect and the proximity to free margins an O-L flap was deemed most appropriate.  Using a sterile surgical marker, an appropriate advancement flap was drawn incorporating the defect and placing the expected incisions within the relaxed skin tension lines where possible.    The area thus outlined was incised deep to adipose tissue with a #15 scalpel blade.  The skin margins were undermined to an appropriate distance in all directions utilizing iris scissors.

## 2020-05-19 ENCOUNTER — PATIENT MESSAGE (OUTPATIENT)
Dept: INTERNAL MEDICINE CLINIC | Age: 85
End: 2020-05-19

## 2020-05-19 NOTE — TELEPHONE ENCOUNTER
Verified patient with two types of identifiers. Notified patient's daughter of below information. Patient verbalized understanding and will call with any other questions.

## 2020-05-19 NOTE — WOUND CARE
This is an addendum to last weeks note to include the icd-10 codes. PVD  i73.9 Gangrene R Hallux  i96 Ulcer R Hallux (non-blanchable erythema/gangrene) with skin layer - L97.511

## 2020-05-20 NOTE — TELEPHONE ENCOUNTER
F/u with Pt's daughter and advised face to face would be required. She states she will call office back to schedule VV for 5/21/20.

## 2020-05-20 NOTE — TELEPHONE ENCOUNTER
Per [address] test not needed prior to procedure. Patient will return to apartment at [address] [place] [address] after procedure.

## 2020-05-20 NOTE — TELEPHONE ENCOUNTER
As long as the [facility] will take her back Saturday because family wants her to stay over Friday night at [facility] test before procedure is now required by [facility] 4 days before the procedure

## 2020-05-20 NOTE — TELEPHONE ENCOUNTER
From: Gary Hughes  To: Lorna Agrawal MD  Sent: 5/19/2020 5:53 AM EDT  Subject: Non-Urgent Medical Question    I have returned home yesterday having spent past month at [facility], [facility] and most recently the [address] [facility] [place] [address]    I am scheduled for this coming Friday for pacemaker changeout at [facility]     I need a hospital style bed     Ive been thinking about this for some time but its apparent now I am having difficulty pulling myself up to get out of bed at home   I will not be able to pull myself after the pacemaker procedure. Is it possible for you to order the bed thru a Medicare provider?   Please contact my daughter Servando Rivera  @ [phone]  Thank you

## 2020-05-21 ENCOUNTER — VIRTUAL VISIT (OUTPATIENT)
Dept: INTERNAL MEDICINE CLINIC | Age: 85
End: 2020-05-21

## 2020-05-21 ENCOUNTER — DOCUMENTATION ONLY (OUTPATIENT)
Dept: INTERNAL MEDICINE CLINIC | Age: 85
End: 2020-05-21

## 2020-05-21 VITALS
WEIGHT: 158 LBS | HEART RATE: 82 BPM | HEIGHT: 60 IN | OXYGEN SATURATION: 96 % | TEMPERATURE: 96.8 F | SYSTOLIC BLOOD PRESSURE: 131 MMHG | BODY MASS INDEX: 31.02 KG/M2 | DIASTOLIC BLOOD PRESSURE: 73 MMHG

## 2020-05-21 DIAGNOSIS — F32.A ANXIETY AND DEPRESSION: Chronic | ICD-10-CM

## 2020-05-21 DIAGNOSIS — I96 GANGRENE OF RIGHT FOOT (HCC): Primary | ICD-10-CM

## 2020-05-21 DIAGNOSIS — I25.5 ISCHEMIC CARDIOMYOPATHY: Chronic | ICD-10-CM

## 2020-05-21 DIAGNOSIS — F41.9 ANXIETY AND DEPRESSION: Chronic | ICD-10-CM

## 2020-05-21 DIAGNOSIS — I73.9 PAD (PERIPHERAL ARTERY DISEASE) (HCC): ICD-10-CM

## 2020-05-21 DIAGNOSIS — I48.20 CHRONIC ATRIAL FIBRILLATION (HCC): Chronic | ICD-10-CM

## 2020-05-21 DIAGNOSIS — I10 ESSENTIAL HYPERTENSION: Chronic | ICD-10-CM

## 2020-05-21 DIAGNOSIS — L03.119 CELLULITIS OF FOOT: ICD-10-CM

## 2020-05-21 RX ORDER — SIMVASTATIN 20 MG/1
TABLET, FILM COATED ORAL
Status: ON HOLD | COMMUNITY
End: 2020-07-08

## 2020-05-21 RX ORDER — OXYCODONE AND ACETAMINOPHEN 5; 325 MG/1; MG/1
1 TABLET ORAL
COMMUNITY
End: 2020-05-21

## 2020-05-21 RX ORDER — OXYCODONE HYDROCHLORIDE 5 MG/1
5 TABLET ORAL
COMMUNITY
End: 2020-06-04 | Stop reason: SDUPTHER

## 2020-05-21 NOTE — PROGRESS NOTES
Rosalina Moore was seen on 5/21/2020 using synchronous (real-time) audio-video technology; doxy. me. Consent: Rosalina Moore, who was seen by synchronous (real-time) audio-video technology, and/or her healthcare decision maker, is aware that this patient-initiated, Telehealth encounter on 5/21/2020 is a billable service, with coverage as determined by her insurance carrier. She is aware that she may receive a bill and has provided verbal consent to proceed: Yes. I was in the office while conducting this encounter. Rosalina Moore is a 80 y.o. female who presents today for New Order  . She has a history of   Patient Active Problem List   Diagnosis Code    Mixed hyperlipidemia E78.2    Atrial fibrillation ([facility] I48.91    Mitral regurgitation I34.0    Anxiety and depression F41.9, F32.9    Colon cancer ([facility] C18.9    Hypothyroid E03.9    CKD (chronic kidney disease) stage 3, GFR 30-59 ml/min ([facility]) N18.3    Osteopenia M85.80    Restrictive lung disease J98.4    Ischemic cardiomyopathy I25.5    Psoriasis L40.9    Pacemaker Z95.0    Latent tuberculosis by blood test Z22.7    Frequent falls R29.6    COPD (chronic obstructive pulmonary disease) ([facility]) J44.9    Chronic respiratory failure with hypoxia ([facility]) J96.11    Long term systemic steroid user Z79.52    Hypertension I10    AAA (abdominal aortic aneurysm) ([facility]) I71.4    Constipation K59.00    Lumbar compression fracture ([facility]) S32.000A    Hematuria R31.9    Osteoporosis M81.0    Chronic midline low back pain without sciatica M54.5, G89.29    Cellulitis of foot L03.119    Foot infection L08.9   . Today patient is being seen for follow-up and evaluation for hospital bed. Patient was hospitalized in mid April to the 22nd due to gangrene and cellulitis of R foot. Patient was noted to have significant peripheral vascular disease and on angiogram there was no foot vessel seen as there was an abrupt cut off just above the ankle. Patient was placed on IV antibiotics and then transition to oral.  They discussed with her that if her foot worsen she would need a below-knee amputation which patient reports she was not interested in. Blood cultures remained negative. She was sent to rehab where she remained until the 18th of this month. Taking PRN oxycodone and tylenol. Has nursing with [facility] and PT/Wound Care with [facility]. We will make sure the Occupational Therapy also visits patient to help her with equipment she may need for her ADLs. Rating has been stable since being home    Currently patient is back at home. She is having significant issues pulling herself out of bed. In addition she will be having her leads changed out for her pacemaker tomorrow which will lead to her not being able to use her upper extremities quite as much as she is. She is currently not supposed to ambulate on her right foot. I agree with ordering a hospital bed for her to help her be able to remain as active as possible. Notes that she is not ambulating due to this foot. Hypertension- BP has been stable. Hypertension ROS: taking medications as instructed, no medication side effects noted, no TIA's, no chest pain on exertion, no dyspnea on exertion, no swelling of ankles     reports that she is a non-smoker but has been exposed to tobacco smoke. She has never used smokeless tobacco.    reports no history of alcohol use. BP Readings from Last 2 Encounters:   05/21/20 131/73   05/14/20 110/60     Afib/CM: volume status has remained stable. Some edema in L leg. Patient and daughters do note that she is a bit anxious with all the changes of occurred but overall she is managing her symptoms. ROS  Review of Systems   Constitutional: Negative for chills, fever and weight loss. HENT: Negative for congestion and sore throat. Eyes: Negative for blurred vision, double vision and photophobia.    Respiratory: Negative for cough and shortness of breath. Cardiovascular: Positive for leg swelling. Negative for chest pain, palpitations, orthopnea and claudication. Gastrointestinal: Negative for abdominal pain, constipation, diarrhea, heartburn, nausea and vomiting. Genitourinary: Negative for dysuria, frequency and urgency. Musculoskeletal: Positive for joint pain. Negative for myalgias. Skin: Negative for rash. Neurological: Negative. Negative for headaches. Endo/Heme/Allergies: Does not bruise/bleed easily. Psychiatric/Behavioral: Negative for memory loss and suicidal ideas. The patient is nervous/anxious. The patient does not have insomnia. Visit Vitals  /73 (BP 1 Location: Left arm, BP Patient Position: Sitting)   Pulse 82   Temp 96.8 °F (36 °C) (Oral)   Ht 5' (1.524 m)   Wt 158 lb (71.7 kg)   SpO2 96%   BMI 30.86 kg/m²       Physical Exam  Constitutional:       Appearance: Normal appearance. HENT:      Head: Normocephalic and atraumatic. Pulmonary:      Effort: Pulmonary effort is normal.   Musculoskeletal:      Comments: Unable to visualize right foot due to poor video quality. Neurological:      General: No focal deficit present. Mental Status: She is alert. Psychiatric:         Mood and Affect: Mood normal.         Behavior: Behavior normal.           Current Outpatient Medications   Medication Sig    simvastatin (ZOCOR) 20 mg tablet Take  by mouth nightly.  oxyCODONE IR (ROXICODONE) 5 mg immediate release tablet Take 5 mg by mouth every four (4) hours as needed for Pain.  chlorhexidine (Hibiclens) 4 % liquid Apply to the upper chest area from shoulder/neck to mid line of chest and to below the nipple every day, 5 days prior to the procedure. '    levothyroxine (SYNTHROID) 88 mcg tablet Take 88 mcg by mouth Daily (before breakfast).     Zoloft 50 mg tablet TAKE 1 TABLET BY MOUTH EVERY DAY    carvediloL (COREG) 3.125 mg tablet TAKE 1 TABLET BY MOUTH TWICE A DAY WITH MEALS    apixaban (Eliquis) 5 mg tablet Take 1 Tab by mouth two (2) times a day.  acetaminophen (TYLENOL) 500 mg tablet Take 500 mg by mouth nightly. Acetaminophen 1000 mg morning and afternoon, 500 mg at bedtime    furosemide (LASIX) 40 mg tablet TAKE 1 TABLET BY MOUTH DAILY.  multivitamins (CHEWABLE-AMOS) chew Take 1 Tab by mouth daily.  lidocaine (LIDODERM) 5 % 1 Patch by TransDERmal route daily as needed. Apply patch to the affected area for 12 hours a day and remove for 12 hours a day.  predniSONE (DELTASONE) 5 mg tablet Take 10 mg by mouth daily.  albuterol (PROVENTIL HFA, VENTOLIN HFA, PROAIR HFA) 90 mcg/actuation inhaler Take 1 Puff by inhalation every six (6) hours as needed for Wheezing.  baclofen (LIORESAL) 10 mg tablet Take 5 mg by mouth two (2) times a day.  ADVAIR DISKUS 250-50 mcg/dose diskus inhaler Take 1 Puff by inhalation two (2) times a day.  tiotropium (SPIRIVA WITH HANDIHALER) 18 mcg inhalation capsule Take 1 Cap by inhalation daily. No current facility-administered medications for this visit.          Past Medical History:   Diagnosis Date    Arthritis     Atrial fibrillation ([address]     av node ablation 5/22/98 with placement of CPI #[id number] dual chamber pacemaker subsequently replaced with a single chamber medtronic #[id number] single chamber pacemaker 7/25/05    Cancer ([facility] 1970's    colon cancer w/ resection    COPD     Depression     GERD (gastroesophageal reflux disease)     History of vascular access device 04/17/2020    4F MIDLINE PLACED BY EMIL Garcia RN LEFT BRACHIAL, 13CM    Hyperlipidemia     Hypertension     Ischemic cardiomyopathy     Migraine headache     Orthostatic hypotension     RADHA (obstructive sleep apnea)     Pacemaker 5/22/98    AV node ablation /pacemaker placement utilizing CPI # [id number] dual chamber system, medtronic #[id number] single chamber device placed 7/22/05    Pulmonary hypertension ([facility] [address] 9/26/2011    RVSP 50 on echo 8/14    Thyroid disease     Valvular heart disease     mild-mod MR/TR      Past Surgical History:   Procedure Laterality Date    BREAST SURGERY PROCEDURE UNLISTED      benign breast tumor excision right breast    HX BREAST BIOPSY Right 2002    neg; surgical bx    HX COLECTOMY  1974    colon    HX KNEE REPLACEMENT      right TKA    HX PACEMAKER      HX TUBAL LIGATION      IR KYPHOPLASTY LUMBAR  7/2/2019    TOTAL KNEE ARTHROPLASTY      total on R, partial on L      Social History     Tobacco Use    Smoking status: Passive Smoke Exposure - Never Smoker    Smokeless tobacco: Never Used   Substance Use Topics    Alcohol use: No     Alcohol/week: 0.0 standard drinks      Family History   Problem Relation Age of Onset    Diabetes Mother     Heart Disease Mother     Heart Attack Mother     Heart Disease Father     Stroke Sister     Breast Cancer Sister 80    Cancer Maternal Aunt         uterus    Diabetes Maternal Uncle     Breast Cancer Daughter 61        Allergies   Allergen Reactions    Cardizem [Diltiazem Hcl] Hives    Codeine Nausea and Vomiting    Darvocet A500 [Propoxyphene N-Acetaminophen] Nausea and Vomiting    Diltiazem Hives    Labetalol Nausea and Vomiting     Dizzy/Disoriented     Pcn [Penicillins] Swelling     Tongue Swelling   Has tolerated cephalexin recently 4/7/20     Primidone Other (comments)     Lightheaded/unsteady gait    Sulfa (Sulfonamide Antibiotics) Nausea and Vomiting        Assessment/Plan  Diagnoses and all orders for this visit:    1. Gangrene of right foot (HCC)-patient's daughter ischemic changes seem to be improving. She is still experiencing some discomfort to this foot. She is unable to bear weight on this foot. She does have wound care come in to see her. Given her inability to ambulate and her upcoming lead change for her cardiac device I believe that a hospital bed will help her and protect her from injuring herself. She does have a good bit of nursing help at home.   We discussed infectious symptoms and to reach out to us if these occur. 2. Essential hypertension-relatively stable    3. Anxiety and depression-anxiety is a bit up but overall doing well. 4. PAD (peripheral artery disease) (HCC)-unable to improve circulation of that foot. 5. Cellulitis of foot-off oral antibiotics. No signs of systemic infection. 6. Chronic atrial fibrillation (HCC)-scheduled for generator change tomorrow. 7. Ischemic cardiomyopathy-him status appears to be stable. Archie Palencia is a 80 y.o. female being evaluated by a video visit encounter for concerns as above. A caregiver was present when appropriate. Due to this being a TeleHealth encounter (During FARBK-35 public health emergency), evaluation of the following organ systems was limited: Vitals/Constitutional/EENT/Resp/CV/GI//MS/Neuro/Skin/Heme-Lymph-Imm. Pursuant to the emergency declaration under the [facility]5 waiver authority and the [employer] Act, this Virtual  Visit was conducted, with patient's (and/or legal guardian's) consent, to reduce the patient's risk of exposure to COVID-19 and provide necessary medical care. Services were provided through a video synchronous discussion virtually to substitute for in-person clinic visit. Patient and provider were located at their individual homes. Marco A Evangelista MD  5/21/2020    This note was created with the help of speech recognition software Sindhu Jackson) and may contain some 'sound alike' errors.

## 2020-05-22 ENCOUNTER — APPOINTMENT (OUTPATIENT)
Dept: GENERAL RADIOLOGY | Age: 85
End: 2020-05-22
Attending: NURSE PRACTITIONER
Payer: MEDICARE

## 2020-05-22 ENCOUNTER — HOSPITAL ENCOUNTER (OUTPATIENT)
Age: 85
Setting detail: OBSERVATION
Discharge: HOME | End: 2020-05-23
Attending: INTERNAL MEDICINE | Admitting: INTERNAL MEDICINE
Payer: MEDICARE

## 2020-05-22 DIAGNOSIS — I25.5 CARDIOMYOPATHY, ISCHEMIC: ICD-10-CM

## 2020-05-22 PROBLEM — Z95.0 STATUS POST BIVENTRICULAR PACEMAKER: Status: ACTIVE | Noted: 2020-05-22

## 2020-05-22 PROBLEM — Z95.0 BIVENTRICULAR CARDIAC PACEMAKER IN SITU: Status: ACTIVE | Noted: 2020-05-22

## 2020-05-22 PROBLEM — R00.1 BRADYCARDIA: Status: ACTIVE | Noted: 2020-05-22

## 2020-05-22 PROBLEM — I42.0 DILATED CARDIOMYOPATHY (HCC): Status: ACTIVE | Noted: 2020-05-22

## 2020-05-22 PROCEDURE — 77030018673: Performed by: INTERNAL MEDICINE

## 2020-05-22 PROCEDURE — 74011250636 HC RX REV CODE- 250/636: Performed by: INTERNAL MEDICINE

## 2020-05-22 PROCEDURE — 77030018547 HC SUT ETHBND1 J&J -B: Performed by: INTERNAL MEDICINE

## 2020-05-22 PROCEDURE — 99218 HC RM OBSERVATION: CPT

## 2020-05-22 PROCEDURE — C1781 MESH (IMPLANTABLE): HCPCS | Performed by: INTERNAL MEDICINE

## 2020-05-22 PROCEDURE — 99153 MOD SED SAME PHYS/QHP EA: CPT | Performed by: INTERNAL MEDICINE

## 2020-05-22 PROCEDURE — 74011000250 HC RX REV CODE- 250: Performed by: INTERNAL MEDICINE

## 2020-05-22 PROCEDURE — 99152 MOD SED SAME PHYS/QHP 5/>YRS: CPT | Performed by: INTERNAL MEDICINE

## 2020-05-22 PROCEDURE — 77030028700 HC BLD TISS PLSM MEDT -E: Performed by: INTERNAL MEDICINE

## 2020-05-22 PROCEDURE — 74011000272 HC RX REV CODE- 272: Performed by: INTERNAL MEDICINE

## 2020-05-22 PROCEDURE — 77030031139 HC SUT VCRL2 J&J -A: Performed by: INTERNAL MEDICINE

## 2020-05-22 PROCEDURE — C1887 CATHETER, GUIDING: HCPCS | Performed by: INTERNAL MEDICINE

## 2020-05-22 PROCEDURE — 87635 SARS-COV-2 COVID-19 AMP PRB: CPT

## 2020-05-22 PROCEDURE — 71045 X-RAY EXAM CHEST 1 VIEW: CPT

## 2020-05-22 PROCEDURE — C1769 GUIDE WIRE: HCPCS | Performed by: INTERNAL MEDICINE

## 2020-05-22 PROCEDURE — 33225 L VENTRIC PACING LEAD ADD-ON: CPT | Performed by: INTERNAL MEDICINE

## 2020-05-22 PROCEDURE — C1894 INTRO/SHEATH, NON-LASER: HCPCS | Performed by: INTERNAL MEDICINE

## 2020-05-22 PROCEDURE — C1751 CATH, INF, PER/CENT/MIDLINE: HCPCS | Performed by: INTERNAL MEDICINE

## 2020-05-22 PROCEDURE — C1893 INTRO/SHEATH, FIXED,NON-PEEL: HCPCS | Performed by: INTERNAL MEDICINE

## 2020-05-22 PROCEDURE — C1900 LEAD, CORONARY VENOUS: HCPCS | Performed by: INTERNAL MEDICINE

## 2020-05-22 PROCEDURE — 74011250637 HC RX REV CODE- 250/637: Performed by: INTERNAL MEDICINE

## 2020-05-22 PROCEDURE — 33229 REMV&REPLC PM GEN MULT LEADS: CPT | Performed by: INTERNAL MEDICINE

## 2020-05-22 PROCEDURE — C1892 INTRO/SHEATH,FIXED,PEEL-AWAY: HCPCS | Performed by: INTERNAL MEDICINE

## 2020-05-22 PROCEDURE — C1733 CATH, EP, OTHR THAN COOL-TIP: HCPCS | Performed by: INTERNAL MEDICINE

## 2020-05-22 PROCEDURE — C2621 PMKR, OTHER THAN SING/DUAL: HCPCS | Performed by: INTERNAL MEDICINE

## 2020-05-22 PROCEDURE — 74011636320 HC RX REV CODE- 636/320: Performed by: INTERNAL MEDICINE

## 2020-05-22 PROCEDURE — 77030033819 HC SELCT VEIN WORLEY MRTM -C: Performed by: INTERNAL MEDICINE

## 2020-05-22 DEVICE — LEAD 429888 MRI CANT US
Type: IMPLANTABLE DEVICE | Status: FUNCTIONAL
Brand: ATTAIN PERFORMA™ MRI SURESCAN™

## 2020-05-22 DEVICE — CRTP W4TR01 PERCEPTA QUAD CRTP MRI US
Type: IMPLANTABLE DEVICE | Status: FUNCTIONAL
Brand: PERCEPTA™ QUAD CRT-P MRI SURESCAN™

## 2020-05-22 DEVICE — ENVELOPE CMRM6133 ABSORB LRG US
Type: IMPLANTABLE DEVICE | Status: FUNCTIONAL
Brand: TYRX™

## 2020-05-22 RX ORDER — LIDOCAINE HYDROCHLORIDE 10 MG/ML
INJECTION INFILTRATION; PERINEURAL AS NEEDED
Status: DISCONTINUED | OUTPATIENT
Start: 2020-05-22 | End: 2020-05-22 | Stop reason: HOSPADM

## 2020-05-22 RX ORDER — SODIUM CHLORIDE 0.9 % (FLUSH) 0.9 %
5-40 SYRINGE (ML) INJECTION EVERY 8 HOURS
Status: DISCONTINUED | OUTPATIENT
Start: 2020-05-22 | End: 2020-05-23 | Stop reason: HOSPADM

## 2020-05-22 RX ORDER — PREDNISONE 10 MG/1
10 TABLET ORAL DAILY
Status: DISCONTINUED | OUTPATIENT
Start: 2020-05-23 | End: 2020-05-23 | Stop reason: HOSPADM

## 2020-05-22 RX ORDER — HYDROCODONE BITARTRATE AND ACETAMINOPHEN 5; 325 MG/1; MG/1
1 TABLET ORAL
Status: DISCONTINUED | OUTPATIENT
Start: 2020-05-22 | End: 2020-05-23 | Stop reason: HOSPADM

## 2020-05-22 RX ORDER — SODIUM CHLORIDE 0.9 % (FLUSH) 0.9 %
5-40 SYRINGE (ML) INJECTION AS NEEDED
Status: DISCONTINUED | OUTPATIENT
Start: 2020-05-22 | End: 2020-05-23 | Stop reason: HOSPADM

## 2020-05-22 RX ORDER — BACLOFEN 10 MG/1
5 TABLET ORAL 2 TIMES DAILY
Status: DISCONTINUED | OUTPATIENT
Start: 2020-05-22 | End: 2020-05-23 | Stop reason: HOSPADM

## 2020-05-22 RX ORDER — ATORVASTATIN CALCIUM 10 MG/1
10 TABLET, FILM COATED ORAL
Status: DISCONTINUED | OUTPATIENT
Start: 2020-05-22 | End: 2020-05-23 | Stop reason: HOSPADM

## 2020-05-22 RX ORDER — CLINDAMYCIN HYDROCHLORIDE 150 MG/1
150 CAPSULE ORAL 3 TIMES DAILY
Qty: 15 CAP | Refills: 0 | Status: SHIPPED | OUTPATIENT
Start: 2020-05-22 | End: 2020-06-15

## 2020-05-22 RX ORDER — FUROSEMIDE 20 MG/1
20 TABLET ORAL DAILY
Status: DISCONTINUED | OUTPATIENT
Start: 2020-05-23 | End: 2020-05-23 | Stop reason: HOSPADM

## 2020-05-22 RX ORDER — ALBUTEROL SULFATE 0.83 MG/ML
2.5 SOLUTION RESPIRATORY (INHALATION)
Status: DISCONTINUED | OUTPATIENT
Start: 2020-05-22 | End: 2020-05-23 | Stop reason: HOSPADM

## 2020-05-22 RX ORDER — ONDANSETRON 2 MG/ML
4 INJECTION INTRAMUSCULAR; INTRAVENOUS
Status: DISCONTINUED | OUTPATIENT
Start: 2020-05-22 | End: 2020-05-23 | Stop reason: HOSPADM

## 2020-05-22 RX ORDER — LIDOCAINE 4 G/100G
1 PATCH TOPICAL DAILY PRN
Status: DISCONTINUED | OUTPATIENT
Start: 2020-05-22 | End: 2020-05-23 | Stop reason: HOSPADM

## 2020-05-22 RX ORDER — LEVOTHYROXINE SODIUM 88 UG/1
88 TABLET ORAL
Status: DISCONTINUED | OUTPATIENT
Start: 2020-05-23 | End: 2020-05-23 | Stop reason: HOSPADM

## 2020-05-22 RX ORDER — LOSARTAN POTASSIUM 25 MG/1
25 TABLET ORAL DAILY
Qty: 30 TAB | Refills: 3 | Status: SHIPPED | OUTPATIENT
Start: 2020-05-23 | End: 2021-09-10 | Stop reason: ALTCHOICE

## 2020-05-22 RX ORDER — CEPHALEXIN 250 MG/1
250 CAPSULE ORAL 3 TIMES DAILY
Status: DISCONTINUED | OUTPATIENT
Start: 2020-05-23 | End: 2020-05-23 | Stop reason: HOSPADM

## 2020-05-22 RX ORDER — LOSARTAN POTASSIUM 25 MG/1
25 TABLET ORAL DAILY
Status: DISCONTINUED | OUTPATIENT
Start: 2020-05-23 | End: 2020-05-23 | Stop reason: HOSPADM

## 2020-05-22 RX ORDER — ACETAMINOPHEN 325 MG/1
650 TABLET ORAL
Status: DISCONTINUED | OUTPATIENT
Start: 2020-05-22 | End: 2020-05-23 | Stop reason: HOSPADM

## 2020-05-22 RX ORDER — CARVEDILOL 3.12 MG/1
3.12 TABLET ORAL 2 TIMES DAILY WITH MEALS
Status: DISCONTINUED | OUTPATIENT
Start: 2020-05-22 | End: 2020-05-23 | Stop reason: HOSPADM

## 2020-05-22 RX ORDER — SERTRALINE HYDROCHLORIDE 50 MG/1
50 TABLET, FILM COATED ORAL DAILY
Status: DISCONTINUED | OUTPATIENT
Start: 2020-05-23 | End: 2020-05-23 | Stop reason: HOSPADM

## 2020-05-22 RX ORDER — MIDAZOLAM HYDROCHLORIDE 1 MG/ML
INJECTION, SOLUTION INTRAMUSCULAR; INTRAVENOUS AS NEEDED
Status: DISCONTINUED | OUTPATIENT
Start: 2020-05-22 | End: 2020-05-22 | Stop reason: HOSPADM

## 2020-05-22 RX ORDER — OXYCODONE HYDROCHLORIDE 5 MG/1
5 TABLET ORAL
Status: DISCONTINUED | OUTPATIENT
Start: 2020-05-22 | End: 2020-05-23 | Stop reason: HOSPADM

## 2020-05-22 RX ORDER — ACETAMINOPHEN 325 MG/1
650 TABLET ORAL
Status: DISCONTINUED | OUTPATIENT
Start: 2020-05-22 | End: 2020-05-22 | Stop reason: SDUPTHER

## 2020-05-22 RX ORDER — FENTANYL CITRATE 50 UG/ML
INJECTION, SOLUTION INTRAMUSCULAR; INTRAVENOUS AS NEEDED
Status: DISCONTINUED | OUTPATIENT
Start: 2020-05-22 | End: 2020-05-22 | Stop reason: HOSPADM

## 2020-05-22 RX ORDER — IPRATROPIUM BROMIDE 0.5 MG/2.5ML
0.5 SOLUTION RESPIRATORY (INHALATION)
Status: DISCONTINUED | OUTPATIENT
Start: 2020-05-22 | End: 2020-05-23 | Stop reason: HOSPADM

## 2020-05-22 RX ORDER — CLINDAMYCIN HYDROCHLORIDE 150 MG/1
150 CAPSULE ORAL 3 TIMES DAILY
Status: DISCONTINUED | OUTPATIENT
Start: 2020-05-22 | End: 2020-05-22 | Stop reason: ALTCHOICE

## 2020-05-22 RX ORDER — ACETAMINOPHEN 500 MG
500 TABLET ORAL
Status: DISCONTINUED | OUTPATIENT
Start: 2020-05-22 | End: 2020-05-23 | Stop reason: HOSPADM

## 2020-05-22 RX ADMIN — CARVEDILOL 3.12 MG: 3.12 TABLET, FILM COATED ORAL at 18:55

## 2020-05-22 RX ADMIN — ATORVASTATIN CALCIUM 10 MG: 10 TABLET, FILM COATED ORAL at 22:16

## 2020-05-22 RX ADMIN — ACETAMINOPHEN 500 MG: 500 TABLET ORAL at 22:15

## 2020-05-22 RX ADMIN — Medication 10 ML: at 22:16

## 2020-05-22 RX ADMIN — BACLOFEN 5 MG: 10 TABLET ORAL at 18:55

## 2020-05-22 NOTE — PROGRESS NOTES
Cardiac Cath Lab Procedure Area Arrival Note:    Jordy Cao arrived to Cardiac Cath Lab, Procedure Area. Patient identifiers verified with NAME and DATE OF BIRTH. Procedure verified with patient. Consent forms verified. Allergies verified. Patient informed of procedure and plan of care. Questions answered with review. Patient voiced understanding of procedure and plan of care. Patient on cardiac monitor, non-invasive blood pressure, SPO2 monitor. On ra, placed on O2 @ 2 lpm via nc. IV of ns on pump at 25 ml/hr. Patient status doing well without problems. Patient is A&Ox 3. Patient reports pain in right foot. #6, pain meds given     Patient medicated during procedure with orders obtained and verified by Dr. Camilla Black. Refer to patients Cardiac Cath Lab PROCEDURE REPORT for vital signs, assessment, status, and response during procedure, printed at end of case. Printed report on chart or scanned into chart.

## 2020-05-22 NOTE — PROCEDURES
Cardiac Procedure Note   Patient: Mercedes Blue  MRN: [id number]  SSN: [id number]   YOB: 1932 Age: 80 y.o.   Sex: female    Date of Procedure: 5/22/2020   Pre-procedure Diagnosis: dilated cardiomyopathy third degree av block, atrial fibrillation, PAD, pacemaker near ROBBIE  Post-procedure Diagnosis: same  Procedure:   LV lead implant  Upgrade of dual chamber pacemaker to biventricular pacemaker  :  Dr. Bela Bales MD    Assistant(s):  None  Anesthesia: Moderate Sedation   Estimated Blood Loss: Less than 20 mL   Specimens Removed: old Medtronic dual chamber pacemaker  Findings: only posterolateral CS branch is large enough for LV lead implant  It ended at lateral wall abruptly so distal end is short  LV lead was dislodged 3 times and was reimplanted  Left phrenic nerve stim with LV1-2 pacing  Family requested observation overnight   She goes to [facility] assisted living tomorrow and they require one additional negative COVID test  She had been tested at [facility] and at [facility] before    Her daughter Wilton Carpenter will take her to [facility] tomorrow  Complications: None   Implants:  Medtronic BIV pacemaker  Signed by:  Bela Bales MD  5/22/2020  4:40 PM

## 2020-05-22 NOTE — Clinical Note
A Bovie was used. Mode: monopolar. Coagulation Settin. Cut Settin. Site (pad location): lateral thigh. Laterality: left.

## 2020-05-22 NOTE — Clinical Note
A venogram was performed on the coronary sinus. Injected with multiple hand injections.  Done  Via PA catheter placed in CS

## 2020-05-22 NOTE — DISCHARGE INSTRUCTIONS
PATIENT INSTRUCTIONS POST-PACEMAKER IMPLANT  Resume eliquis 5/23/2020 evening dose and from there on  Try losartan to improve heart function     1. No heavy lifting or exercises with the left arm for 4 weeks. This includes the following:  Do not raise arm above the shoulder level to comb hair, pull on clothes, etc... Do not use the affected arm to pull up or push up from a seated or laying  down position. Do not allow anyone else to pull on the affected arm. 2.  Keep site clean & dry. No showers until after follow up. Dressing is typically removed in clinic at follow up appointment, but you may remove yourself if you develop skin irritation associated with dressing. Try to remove within a week  Steri strips can be removed same time    3. Do not drive for 3 days. 4.  Call Dr. Gene Dewitt at [phone] if you experience any of the following symptoms:  1. Redness at the pacemaker site  2. Swelling at or around the pacemaker or in the left arm  3. Pain around the pacemaker  4. Dizziness, lightheadedness, fainting spells  5. Lack of energy  6. Shortness of breath  7. Rapid heart rate  8. Chest or muscle twitches    5. Follow-up with Dr. Gene Dewitt as scheduled below. Future Appointments   Date Time Provider Cuca Willis   6/2/2020 10:00 AM Safia Siddiqui DPM [address] [facility]   6/5/2020 10:15 AM Naomi Montanez [address] [facility]   6/5/2020 10:30 AM Wound checkSHERRIE   8/4/2020 11:15 AM Nicolasa Blanco MD [address]     6. You may use tylenol pain and losartan medication and ice pack for pain relief as needed. You may wear the sling as a reminder to keep your arm below the your shoulder. 7.  A prescription for antibiotics was sent electronically to your pharmacy on record. Please pick it up & take as directed. Nova Hayden M.D.  [facility]  Electrophysiology/Cardiology  [facility] [place]  [address], [facility] [address] [facility] Claudia Santamaria, [address] [place] [address]  [phone]

## 2020-05-22 NOTE — PROGRESS NOTES
Cardiac Cath Lab Recovery Arrival Note:      Hetal Vazquez arrived to Cardiac Cath Lab, Recovery Area. Staff introduced to patient. Patient identifiers verified with NAME and DATE OF BIRTH. Procedure verified with patient. Consent forms reviewed and signed by patient or authorized representative and verified. Allergies verified. Patient and family oriented to department. Patient and family informed of procedure and plan of care. Questions answered with review. Patient prepped for procedure, per orders from physician, prior to arrival.    Patient on cardiac monitor, non-invasive blood pressure, SPO2 monitor. On RA. Patient is A&Ox 3. Patient reports no c/o's. Patient in stretcher, in low position, with side rails up, call bell within reach, patient instructed to call if assistance as needed. Patient prep in: [address], [place] [address]. Patient family has pager # n/a  Family in: hospital.   Prep by: ESTEPHANIA Garcia

## 2020-05-22 NOTE — PROGRESS NOTES
TRANSFER - IN REPORT:    Verbal report received from Jeremy Herrera on Altagracia Gonzalez  being received from procedural area for routine progression of care. Report consisted of patients Situation, Background, Assessment and Recommendations(SBAR). Information from the following report(s) Procedure Summary, MAR and Recent Results was reviewed with the receiving clinician. Opportunity for questions and clarification was provided. Assessment completed upon patients arrival to [address] [place] [address] and care assumed. Cardiac Cath Lab Recovery Arrival Note:    Altagracia Gonzalez arrived to [facility] recovery area. Patient procedure= BIV ICD. Patient on cardiac monitor, non-invasive blood pressure, SPO2 monitor. On  or O2 @ 2 lpm via NC.  IV  of NS on pump at 25 ml/hr. Patient status doing well without problems. Patient is A&Ox 3. Patient reports no c/o's. PROCEDURE SITE CHECK:    Procedure site:without any bleeding and hematoma, no pain/discomfort reported at procedure site. No change in patient status. Continue to monitor patient and status.

## 2020-05-22 NOTE — PROGRESS NOTES
1724hrs: TRANSFER - IN REPORT:  Verbal report received from Cecy Yun RN(name) on Rosalina Moore  being received from Cardiac Cath Lab (unit) for routine post - op  Report consisted of patients Situation, Background, Assessment and Recommendations(SBAR). Information from the following report(s) SBAR, Procedure Summary, MAR, Accordion and Med Rec Status was reviewed with the receiving nurse. Opportunity for questions and clarification was provided. Assessment completed upon patients arrival to unit and care assumed. 1744hrs:  Patient assisted from stretcher to bed. Drowsy but easily arousable, answers questions appropriately and follows commands. V paced on the monitor in the 80's, SpO2 % on room air, will continue to monitor given patient's level of drowsiness. Left upper chest wall pacemaker site is dry, intact, no hematoma or oozing noted. No complaints of pain at this time. Primary Nurse Akila Bryson RN and Regina Barksdale RN performed a dual skin assessment on this patient     Impairment noted- see wound doc flow sheet--PTA: patient reports having ingrown toenail on right foot \"I take good care of my feet. I don't know what happened. \"  Right foot is covered with telfa and gauze wrap. MD is following patient outpatient for this issue. All other skin noted to be intact. Patient wearing brief but it's dry. Deric score is 18    Will follow up with patient to complete patient database when she is less drowsy.

## 2020-05-22 NOTE — PROGRESS NOTES
TRANSFER - OUT REPORT:    Verbal report given to Richi Kaur on Michele Cardona being transferred to  for routine progression of care       Report consisted of patients Situation, Background, Assessment and   Recommendations(SBAR). Information from the following report(s) SBAR, Procedure Summary and MAR was reviewed with the receiving nurse. Opportunity for questions and clarification was provided.

## 2020-05-22 NOTE — INTERVAL H&P NOTE
Update History & Physical    The Patient's History and Physical of May 22, 2020,   it was reviewed with the patient and I examined the patient. There was no change. The surgical site was confirmed by the patient and me. Plan:  The risk, benefits, expected outcome, and alternative to the recommended procedure have been discussed with the patient. Patient understands and wants to proceed with the procedure. The patient was counseled at length about the risks of berenice Covid-19 during their perioperative period and any recovery window from their procedure. The patient was made aware that berenice Covid-19  may worsen their prognosis for recovering from their procedure and lend to a higher morbidity and/or mortality risk. All material risks, benefits, and reasonable alternatives including postponing the procedure were discussed. The patient does wish to proceed with the procedure at this time.         Electronically signed by Pastora Sanford MD on 5/22/2020 at 1:14 PM

## 2020-05-22 NOTE — Clinical Note
Left chest prepped with ChloraPrep Wet prep solution applied at: 1407. Wet prep solution dried at: 1410. Wet prep elapsed drying time: 3 mins.

## 2020-05-22 NOTE — PROGRESS NOTES
Verbal report given to Shabnam(name) on NAME  being transferred to [facility](unit) for routine progression of care       Report consisted of patients Situation, Background, Assessment and   Recommendations(SBAR). Information from the following report(s) Procedure Summary, MAR and Recent Results was reviewed with the receiving nurse. Lines:       Opportunity for questions and clarification was provided.       Patient transported with:   [employer]

## 2020-05-22 NOTE — PROGRESS NOTES
Her daughter Darshana James is person to take her to [facility] tomorrow  She said one COVID test is needed instead of 2 after she talked to them  Patient had been tested negative before  Dr Bre Powers will discharge her tomorrow

## 2020-05-23 VITALS
DIASTOLIC BLOOD PRESSURE: 59 MMHG | OXYGEN SATURATION: 97 % | HEIGHT: 63 IN | SYSTOLIC BLOOD PRESSURE: 109 MMHG | TEMPERATURE: 98.1 F | WEIGHT: 154.76 LBS | HEART RATE: 80 BPM | RESPIRATION RATE: 18 BRPM | BODY MASS INDEX: 27.42 KG/M2

## 2020-05-23 LAB
SARS-COV-2, COV2: NOT DETECTED
SPECIMEN SOURCE, FCOV2M: NORMAL

## 2020-05-23 PROCEDURE — 74011636637 HC RX REV CODE- 636/637: Performed by: INTERNAL MEDICINE

## 2020-05-23 PROCEDURE — A9270 NON-COVERED ITEM OR SERVICE: HCPCS | Performed by: INTERNAL MEDICINE

## 2020-05-23 PROCEDURE — 74011250637 HC RX REV CODE- 250/637: Performed by: INTERNAL MEDICINE

## 2020-05-23 PROCEDURE — 96374 THER/PROPH/DIAG INJ IV PUSH: CPT

## 2020-05-23 PROCEDURE — 99218 HC RM OBSERVATION: CPT

## 2020-05-23 PROCEDURE — 74011250636 HC RX REV CODE- 250/636: Performed by: NURSE PRACTITIONER

## 2020-05-23 PROCEDURE — 74011250637 HC RX REV CODE- 250/637: Performed by: NURSE PRACTITIONER

## 2020-05-23 RX ADMIN — LEVOTHYROXINE SODIUM 88 MCG: 88 TABLET ORAL at 06:55

## 2020-05-23 RX ADMIN — CARVEDILOL 3.12 MG: 3.12 TABLET, FILM COATED ORAL at 08:52

## 2020-05-23 RX ADMIN — PREDNISONE 10 MG: 10 TABLET ORAL at 08:52

## 2020-05-23 RX ADMIN — HYDROCODONE BITARTRATE AND ACETAMINOPHEN 1 TABLET: 5; 325 TABLET ORAL at 01:06

## 2020-05-23 RX ADMIN — LOSARTAN POTASSIUM 25 MG: 25 TABLET, FILM COATED ORAL at 08:51

## 2020-05-23 RX ADMIN — SERTRALINE HYDROCHLORIDE 50 MG: 50 TABLET ORAL at 08:52

## 2020-05-23 RX ADMIN — FUROSEMIDE 20 MG: 20 TABLET ORAL at 08:51

## 2020-05-23 RX ADMIN — VANCOMYCIN HYDROCHLORIDE 1000 MG: 1 INJECTION, POWDER, LYOPHILIZED, FOR SOLUTION INTRAVENOUS at 03:43

## 2020-05-23 RX ADMIN — BACLOFEN 5 MG: 10 TABLET ORAL at 08:52

## 2020-05-23 NOTE — PROGRESS NOTES
0910hrs: RN called lab to locate results of COVID test from 5/22/2020. Per , the test is \"being run. \"  It will \"take a few more hours. \"    1140hrs:  Discharge instructions reviewed with patient and two of her daughters via telephone. Opportunity for questions and clarification provided.

## 2020-05-23 NOTE — DISCHARGE SUMMARY
Cardiology Discharge Summary               Patient ID:  Gary Hughes  [id number]  58 y.o.  2/6/1932    Admit Date: 5/22/2020    Discharge Date: 5/22/2020     Admitting Physician: Dhiraj Lizarraga MD     Discharge Physician: Dhiraj Lizarraga MD    Admission Diagnoses: Cardiomyopathy, ischemic [I25.5]; Biventricular cardiac pacemaker in situ [Z95.0]; Bradycardia [R00.1]    Discharge Diagnoses: Principal Problem:    Status post biventricular pacemaker (5/22/2020)      Overview: 5/22/2020 Medtronic    Active Problems:    Bradycardia (5/22/2020)      Atrial fibrillation (HCC) ()      Overview: av node ablation 5/22/98 with placement of CPI #[id number] dual chamber       pacemaker subsequently replaced with a single chamber medtronic #[id number]       single chamber pacemaker 7/25/05      Third degree AV block ([facility] (10/21/2010)      CKD (chronic kidney disease) stage 3, GFR 30-59 ml/min ([facility]) (2/9/2015)      Hypertension (6/29/2019)      Biventricular cardiac pacemaker in situ (5/22/2020)      Dilated cardiomyopathy ([facility] (5/22/2020)        Discharge Condition: Stable    Cardiology Procedures this Admission:  biventricular pacemaker upgrade    Hospital Course: she was admitted for observation overnight after upgrading near [facility] pacemaker to biventricular pacemaker  She is pacing dependent with LVEF 20-25%, chronic atrial fibrillation  She had PAD and was recently admitted to [facility], treated for leg infection and discharged to the [facility] but now is transferring to [facility] tomorrow so they asked for a negative COVID test from [facility]    Saw and evaluated pt and agree with above assessment and plan. Ok to SNF.   Serene Shi MD    Consults: None    Discharge Exam:     Visit Vitals  /52 (BP 1 Location: Right arm, BP Patient Position: At rest)   Pulse 80   Temp 97.5 °F (36.4 °C)   Resp 18   Ht 5' 3\" (1.6 m)   Wt 163 lb 2.3 oz (74 kg)   LMP 02/26/1970   SpO2 96%   Breastfeeding No   BMI 28.90 kg/m²     General Appearance:  Well developed, well nourished,alert and oriented x 3, and individual in no acute distress. Ears/Nose/Mouth/Throat:   Hearing grossly normal.         Neck: Supple. Chest:   Lungs clear to auscultation bilaterally. Cardiovascular:  Regular rhythm, no murmur. Abdomen:   Soft, non-tender   Extremities: No edema bilaterally. Skin: Warm and dry. Left sided BIV pacer               Disposition: long term care facility    Patient Instructions:   Current Discharge Medication List      START taking these medications    Details   clindamycin (CLEOCIN) 150 mg capsule Take 1 Cap by mouth three (3) times daily. Indications: new pacemaker  Qty: 15 Cap, Refills: 0      losartan (COZAAR) 25 mg tablet Take 1 Tab by mouth daily. Indications: chronic heart failure  Qty: 30 Tab, Refills: 3         CONTINUE these medications which have NOT CHANGED    Details   simvastatin (ZOCOR) 20 mg tablet Take  by mouth nightly. oxyCODONE IR (ROXICODONE) 5 mg immediate release tablet Take 5 mg by mouth every four (4) hours as needed for Pain.      levothyroxine (SYNTHROID) 88 mcg tablet Take 88 mcg by mouth Daily (before breakfast). Zoloft 50 mg tablet TAKE 1 TABLET BY MOUTH EVERY DAY  Qty: 90 Tab, Refills: 1    Associated Diagnoses: Anxiety and depression      carvediloL (COREG) 3.125 mg tablet TAKE 1 TABLET BY MOUTH TWICE A DAY WITH MEALS  Qty: 180 Tab, Refills: 1    Associated Diagnoses: Atrial fibrillation, unspecified type (HCC)      acetaminophen (TYLENOL) 500 mg tablet Take 500 mg by mouth nightly. Acetaminophen 1000 mg morning and afternoon, 500 mg at bedtime      furosemide (LASIX) 40 mg tablet TAKE 1 TABLET BY MOUTH DAILY. Qty: 90 Tab, Refills: 2    Associated Diagnoses: Ischemic cardiomyopathy      multivitamins (CHEWABLE-AMOS) chew Take 1 Tab by mouth daily. lidocaine (LIDODERM) 5 % 1 Patch by TransDERmal route daily as needed. Apply patch to the affected area for 12 hours a day and remove for 12 hours a day. predniSONE (DELTASONE) 5 mg tablet Take 10 mg by mouth daily. albuterol (PROVENTIL HFA, VENTOLIN HFA, PROAIR HFA) 90 mcg/actuation inhaler Take 1 Puff by inhalation every six (6) hours as needed for Wheezing. baclofen (LIORESAL) 10 mg tablet Take 5 mg by mouth two (2) times a day. Associated Diagnoses: Chronic midline low back pain without sciatica      ADVAIR DISKUS 250-50 mcg/dose diskus inhaler Take 1 Puff by inhalation two (2) times a day. Associated Diagnoses: Atrial fibrillation (HCC)      tiotropium (SPIRIVA WITH HANDIHALER) 18 mcg inhalation capsule Take 1 Cap by inhalation daily. Associated Diagnoses: Atrial fibrillation ([address] Complete heart block (HCC)      apixaban (Eliquis) 5 mg tablet Take 1 Tab by mouth two (2) times a day. Qty: 180 Tab, Refills: 1    Associated Diagnoses: Atrial fibrillation, unspecified type ([address]         STOP taking these medications       chlorhexidine (Hibiclens) 4 % liquid Comments:   Reason for Stopping:              Referenced discharge instructions provided by nursing for diet and activity.     Follow-up    Future Appointments   Date Time Provider Cuca Willis   6/2/2020 10:00 AM Radha Sam DPM [address] [facility] 500   6/5/2020 10:15  [facility], [address] [facility]   6/5/2020 10:30 AM Wound check, SHERRIE PARKER   8/4/2020 11:15 AM Peace Richards MD [address]       Signed:  Juan Pride MD  5/22/2020  10:22 PM

## 2020-05-23 NOTE — PROGRESS NOTES
Observation notice provided in writing to patient and/or caregiver as well as verbal explanation of the policy. Patients who are in outpatient status also receive the Observation notice. CM spoke with patient's daughter Renetta Nesbitt regarding Obs Letters. She agreed to her mother signing them. Renetta Nesbitt said she will provide transportation home. No further discharge needs identified. Devang Rosales MSA, RN,CRM    [facility] faxed the COVID-19 negative result to [phone] per Chriss Draft to Kuldeep Douglass, Asst Director at [address] [place] [address]. [facility] also notified patient's daughter Renetta Nesbitt of fax.    Devang Rosales MSA, RN, CRM

## 2020-05-23 NOTE — PROGRESS NOTES
1930; Bedside shift change report given to Abigail Pham (oncoming nurse) by Ulises Last (offgoing nurse). Report included the following information SBAR, Kardex, Intake/Output, MAR, Recent Results, and Cardiac Rhythm paced . Problem: Falls - Risk of  Goal: *Absence of Falls  Description: Document Francisco Sol Fall Risk and appropriate interventions in the flowsheet. Outcome: Progressing Towards Goal  Note: Fall Risk Interventions:  Mobility Interventions: Communicate number of staff needed for ambulation/transfer, Patient to call before getting OOB, Utilize walker, cane, or other assistive device         Medication Interventions: Patient to call before getting OOB, Teach patient to arise slowly    Elimination Interventions: Call light in reach, Patient to call for help with toileting needs              Problem: Pressure Injury - Risk of  Goal: *Prevention of pressure injury  Description: Document Deric Scale and appropriate interventions in the flowsheet.   Outcome: Progressing Towards Goal  Note: Pressure Injury Interventions:  Sensory Interventions: Keep linens dry and wrinkle-free, Maintain/enhance activity level, Minimize linen layers, Monitor skin under medical devices    Moisture Interventions: Check for incontinence Q2 hours and as needed, Apply protective barrier, creams and emollients    Activity Interventions: Increase time out of bed, Pressure redistribution bed/mattress(bed type)    Mobility Interventions: Pressure redistribution bed/mattress (bed type)    Nutrition Interventions: Document food/fluid/supplement intake, Offer support with meals,snacks and hydration                     Problem: Cath Lab Procedures: Post-Cath Day of Procedure (Initiate SCIP Measures for Post-Op Care)  Goal: *Procedure site is without bleeding and signs of infection six hours post sheath removal  Outcome: Progressing Towards Goal  Note: No s/s of complications  Goal: *Hemodynamically stable  Outcome: Progressing Towards Goal  Note: VSS  Goal: *Optimal pain control at patient's stated goal  Outcome: Progressing Towards Goal  Note: No c/o pain.

## 2020-05-26 ENCOUNTER — TELEPHONE (OUTPATIENT)
Dept: INTERNAL MEDICINE CLINIC | Age: 85
End: 2020-05-26

## 2020-05-26 ENCOUNTER — TELEPHONE (OUTPATIENT)
Dept: CARDIOLOGY CLINIC | Age: 85
End: 2020-05-26

## 2020-05-26 NOTE — TELEPHONE ENCOUNTER
Pt daughter is calling asking if the time could be changed on her appt on June 5 please call her [phone]

## 2020-05-26 NOTE — TELEPHONE ENCOUNTER
----- Message from Betzaida Horowitz sent at 5/26/2020 10:06 AM EDT -----  Regarding: DR Malu Oneil / TELEPHONE  General Message/Vendor Calls    Sharla hernandez is requesting to add an EKG to the testing required.         Callback required   Best contact number(s): [phone]              Betzaida Horowitz

## 2020-05-26 NOTE — TELEPHONE ENCOUNTER
R/C to Pt's daughter and advised EKG, A1c and chest X-ray can be done / ordered by Dr Rocky Riddle if needed, or cardiology. Dale Lopez verbalized understanding and will discuss at f/u.

## 2020-05-26 NOTE — TELEPHONE ENCOUNTER
Noted.     Future Appointments   Date Time Provider Cuca Rendoni   6/4/2020 10:00 AM Rani Orozco MD [address]   6/4/2020  2:00 PM Walter Ace MD [address] [place] [address] [place] [address]   6/5/2020 10:15 AM PACEMAKER3, [address]   6/5/2020 10:30 AM HOLTER, [address]   8/4/2020 11:15 AM Rani Orozco MD [address] [facility] 256

## 2020-05-27 RX ORDER — RANITIDINE 150 MG/1
TABLET, FILM COATED ORAL
Qty: 180 TAB | Refills: 1 | Status: SHIPPED | OUTPATIENT
Start: 2020-05-27 | End: 2020-05-29

## 2020-05-28 ENCOUNTER — DOCUMENTATION ONLY (OUTPATIENT)
Dept: INTERNAL MEDICINE CLINIC | Age: 85
End: 2020-05-28

## 2020-05-28 ENCOUNTER — TELEPHONE (OUTPATIENT)
Dept: INTERNAL MEDICINE CLINIC | Age: 85
End: 2020-05-28

## 2020-05-28 NOTE — TELEPHONE ENCOUNTER
Received call from Mackenzie Hence, Pt's wound care nurse, [phone]. Pt has been receiving wound care for blisters in R foot. Largest blister on top of her foot has popped, but there is no s/s of infection. Mackenzie Hence reports swelling in R leg from knee to ankle, started today. Skin appears tight and leg is noticeable larger than L leg. Denies any heat or redness. Pt has been taking Lasix 40 mg daily and elevating leg. Fredrick Cantu as per provider to keep Pt on same dose of Lasix, elevate leg and continue monitoring for s/s infection. Pt to go to ED if she develops fever or chills for possible infection. Mackenzie Hence verbalized understanding.

## 2020-05-28 NOTE — PROGRESS NOTES
Detailed written order for hospital bed, letter of medical necessity and supporting paperwork have been faxed to [facility].

## 2020-05-29 ENCOUNTER — TELEPHONE (OUTPATIENT)
Dept: INTERNAL MEDICINE CLINIC | Age: 85
End: 2020-05-29

## 2020-05-29 RX ORDER — FAMOTIDINE 20 MG/1
20 TABLET, FILM COATED ORAL 2 TIMES DAILY
Qty: 180 TAB | Refills: 1 | Status: ON HOLD | OUTPATIENT
Start: 2020-05-29 | End: 2020-07-08

## 2020-05-29 NOTE — TELEPHONE ENCOUNTER
Received notification from [address]  that ranitidine has been d/c, recommneded alternative is famotidine. Please send Rx to pharmacy.

## 2020-05-29 NOTE — TELEPHONE ENCOUNTER
----- Message from Lindon Severance sent at 5/29/2020  8:40 AM EDT -----  Regarding: Dr. Angelia Mercado Message/Vendor Calls    Caller's first and last name:Lindsey from [address] [place] [address]      Reason for call: found a stage 2 pressure ulcer      Callback required yes/no and why:yes      Best contact number(s):[phone]      Details to clarify the request:Please call Aleksandr Laws back she found a pressure ulcer      Lindon Severance

## 2020-05-29 NOTE — TELEPHONE ENCOUNTER
Harsh Baron reports small, 1\"x \" open area in Pt's R sacrum with scant drainage, Pt denies pain. Caregiver has placed a Mepilex dressing on ulcer. Provided verbal order for Harsh Baron to provide wound care, she states she will continue applying Mepilex dressing every 3-5 days, changing prn and wound care measures once a week.

## 2020-05-30 ENCOUNTER — TELEPHONE (OUTPATIENT)
Dept: INTERNAL MEDICINE CLINIC | Age: 85
End: 2020-05-30

## 2020-05-30 NOTE — TELEPHONE ENCOUNTER
Joesph Pappas with wound care pages regarding Mrs. Forbes's foot. Seems that her toes have become more gangrenous. Dark and discolored. Denies any significant pain. She suggested going to ER, but daughter wanted to have my opinion. I agree with wound care to have her admitted and re-evaluated to see if anything can be done to save toes/foot. She will let me know if Mrs. Kashif Marie refuses and we can try aggressive outpt evaluation.

## 2020-06-01 ENCOUNTER — TELEPHONE (OUTPATIENT)
Dept: CARDIOLOGY CLINIC | Age: 85
End: 2020-06-01

## 2020-06-01 ENCOUNTER — HOSPITAL ENCOUNTER (OUTPATIENT)
Dept: LAB | Age: 85
Discharge: HOME | End: 2020-06-01

## 2020-06-01 ENCOUNTER — TELEPHONE (OUTPATIENT)
Dept: INTERNAL MEDICINE CLINIC | Age: 85
End: 2020-06-01

## 2020-06-01 ENCOUNTER — HOSPITAL ENCOUNTER (OUTPATIENT)
Dept: WOUND CARE | Age: 85
Discharge: HOME | End: 2020-06-01
Admitting: PODIATRIST
Payer: MEDICARE

## 2020-06-01 VITALS
HEART RATE: 85 BPM | SYSTOLIC BLOOD PRESSURE: 102 MMHG | DIASTOLIC BLOOD PRESSURE: 64 MMHG | TEMPERATURE: 45.5 F | RESPIRATION RATE: 18 BRPM

## 2020-06-01 DIAGNOSIS — I96 GANGRENE OF RIGHT FOOT (HCC): ICD-10-CM

## 2020-06-01 DIAGNOSIS — I96 GANGRENE OF FOOT (HCC): Primary | ICD-10-CM

## 2020-06-01 DIAGNOSIS — R73.09 ELEVATED HEMOGLOBIN A1C: ICD-10-CM

## 2020-06-01 LAB
EST. AVERAGE GLUCOSE BLD GHB EST-MCNC: 143 MG/DL
HBA1C MFR BLD: 6.6 % (ref 4–5.6)

## 2020-06-01 PROCEDURE — 17250 CHEM CAUT OF GRANLTJ TISSUE: CPT

## 2020-06-01 PROCEDURE — 74011000250 HC RX REV CODE- 250: Performed by: FAMILY MEDICINE

## 2020-06-01 RX ADMIN — Medication: at 14:44

## 2020-06-01 NOTE — TELEPHONE ENCOUNTER
Pt daughter Delvin Jackman is calling about her legs swelling the home health feels that she needs to be put back on the antibotic for a little longer please call her at [phone]

## 2020-06-01 NOTE — TELEPHONE ENCOUNTER
----- Message from Tatiana Hodge sent at 6/1/2020 11:58 AM EDT -----  Regarding: Dr. Don Dennison telephone  Appointment not available    Caller's first and last name and relationship to patient (if not the patient):  Virginia Ramirez pt's mother    Best contact number:  [phone]    Preferred date and time:  Today as soon as possible     Scheduled appointment date and time:  none    Reason for appointment:  Lab work to check A1C    Details to clarify the request: pt's daughter states if they are unable to come in today if they can go to [facility] to get it done       Tatiana Hodge

## 2020-06-01 NOTE — TELEPHONE ENCOUNTER
Verified patient with two types of identifiers. Spoke with Rene Chase, verified on HIPAA who states that patient has noticed increased swelling to her lower extremities. Patient denies any SOB. Patient states when she puts her legs up at night, in the morning the swelling is better. Verified patient is taking Lasix 40 mg daily. Will ask MD/NP for possible short term increase in diuretic.

## 2020-06-01 NOTE — WOUND CARE
06/01/20 1436 Wound Toe (Comment  which one) Right Great Toe #1 Date First Assessed/Time First Assessed: 05/12/20 0838   Present on Hospital Admission: Yes  Location: Toe (Comment  which one)  Wound Location Orientation: Right  Wound Description: Great Toe #1 Dressing Status Removed Dressing Type Gauze;Gauze wrap (alondra) (betadine) Wound Length (cm) 4 cm Wound Width (cm) 4.5 cm Wound Depth (cm) 0.1 cm Wound Surface Area (cm^2) 18 cm^2 Wound Volume (cm^3) 1.8 cm^3 Change in Wound Size % 0 Condition of Base Eschar;Pink Drainage Amount Small Drainage Color Serosanguinous Wound Odor Mild Naty-wound Assessment Black;Blanchable erythema Wound Heel Right #2 Date First Assessed/Time First Assessed: 05/12/20 0839   Present on Hospital Admission: Yes  Location: Heel  Wound Location Orientation: Right  Wound Description: #2 Dressing Status Removed Dressing Type Gauze;Gauze wrap (alondra) (betadine) Wound Length (cm) 3.5 cm Wound Width (cm) 2.1 cm Wound Depth (cm) 0.1 cm Wound Surface Area (cm^2) 7.35 cm^2 Wound Volume (cm^3) 0.74 cm^3 Change in Wound Size % -53.12 Condition of Base Eschar;Slough Drainage Amount Small Drainage Color Serous Wound Odor None Naty-wound Assessment Intact;Pink;Painful Visit Vitals /64 Pulse 85 Temp (!) 45.5 °F (7.5 °C) Resp 18 LMP 02/26/1970

## 2020-06-01 NOTE — TELEPHONE ENCOUNTER
Per Les Navarro, recent A1c is required for wound care. Order has been placed in [address] [place] [address]. Les Navarro is aware.

## 2020-06-01 NOTE — DISCHARGE INSTRUCTIONS
Discharge Instructions for  [facility]  [address] [place], [address] [facility]  Telephone: [phone]    NAME:  Michael Lipmaurisio OF BIRTH:  2/6/1932  ACCOUNT NUMBER :  [de-identified]  DATE:  5/12/2020    Wound Cleansing:   Do not scrub or use excessive force. Cleanse wound prior to applying a clean dressing with:      [] Cleanse wound with: {palomo cleansers:70599}     [] May Shower at Discharge     []  Shower on days of dressing change, remove dressing first    [] Keep Wound Dry in Shower (may purchase a cast cover if needed)     Topical Treatments:  Do not apply lotions, creams, or ointments to wound bed unless directed. [] Apply moisturizing lotion {palomo lotions :56561} to skin surrounding the wound prior to dressing change.  [] Apply antifungal ointment to skin surrounding the wound prior to dressing change.  [] Apply thin film of Zinc barrier ointment to skin immediately around wound. [] Other: {Palomo medication :85149}     Dressings:           Wound Location Right great toe, Right heel     [x] Apply Primary Dressing:       [] MediHoney Gel  [] Alginate   [] Alginate with Silver  [] Alginate        [] Collagen [] Collagen with Silver {palomo collagen :04569}   []  Moisten saline gauze     [] Hydrocolloid   [] MediHoney Alginate [] Foam with Silver   [] Foam   [] Hydrofera Blue    [] Mepilex Border    [] Moisten with Saline [] Hydrogel [] Mepitel     [] Bactroban/Mupirocin [] Polysporin  [x] Other:  Betadine wet to dry  [] Pack wound loosely with  [] Iodoform   [] Plain Packing  [] Other     [x] Cover and Secure with:     [x] Gauze [x] Johnny [] Kerlix   [] Ace Wrap [x] Cover Roll Tape [] ABD     [] Other:    Avoid contact of tape with skin.   [x] Change dressing: [] Daily    [x] Every Other Day [] Three times per week   [] Once a week [] Do Not Change Dressing   [] Other:      Off-Loading:   [x] Off-loading when [] walking  [x] in bed [x] sitting  [] Total non-weight bearing  [] Right Leg  [] Left Leg   [x] Assistive Device [] Walker [] Cane  [] Wheelchair  [] Crutches   [] Surgical shoe    [] Podus Boot(s)   [x] Foam Boot(s)  [] Roll About    [] Cast Boot [] CROW Boot  [] Other:    Dietary:  [x] Increase Protein: examples ( Meat, cheese, eggs, greek yogurt, premier protein drink, fish, nuts )        Activity:  [x] Activity as tolerated:  [] Patient has no activity restrictions     [] Strict Bedrest: [] Remain off Work:     [] May return to full duty work:                                   [] Return to work with restrictions:             Return Appointment:    [] Nurse visit scheduled in *** day(s) or *** week(s)   [x] Return Appointment: With Dr. Martha Strange  in  [address] [place] [address])  [] Ordered tests: {[facility] testing :92322}     Electronically signed on 5/12/2020 at [address] [place] [address]: Should you experience any significant changes in your wound(s) or have questions about your wound care, please contact the [facility] Main at [address] [place] [address] 8:00 am - 4:30. If you need help with your wound outside these hours and cannot wait until we are again available, contact your PCP or go to the hospital emergency room. PLEASE NOTE: IF YOU ARE UNABLE TO OBTAIN WOUND SUPPLIES, CONTINUE TO USE THE SUPPLIES YOU HAVE AVAILABLE UNTIL YOU ARE ABLE TO REACH US. IT IS MOST IMPORTANT TO KEEP THE WOUND COVERED AT ALL TIMES.      Physician Signature:_______________________ Dr. Martha Strange

## 2020-06-01 NOTE — WOUND CARE
06/01/20 1550 Wound Toe (Comment  which one) Right Great Toe #1 Date First Assessed/Time First Assessed: 05/12/20 0838   Present on Hospital Admission: Yes  Location: Toe (Comment  which one)  Wound Location Orientation: Right  Wound Description: Great Toe #1 Dressing Type Applied Gauze;Gauze wrap (alondra) (betadine wet to dry) Wound Heel Right #2 Date First Assessed/Time First Assessed: 05/12/20 0839   Present on Hospital Admission: Yes  Location: Heel  Wound Location Orientation: Right  Wound Description: #2 Dressing Type Applied Gauze;Gauze wrap (alondra) (betadine wet to dry) Procedure Tolerated Well Wound Toe (Comment  which one) Right  second toe #3 Date First Assessed/Time First Assessed: 06/01/20 1531   Present on Hospital Admission: Yes  Location: Toe (Comment  which one)  Wound Location Orientation: Right  Wound Description:  second toe #3 Dressing Type Applied Gauze;Gauze wrap (alondra) (betadine wet to dry) Procedure Tolerated Well Wound Toe (Comment  which one) Right 3rd toe#4 Date First Assessed/Time First Assessed: 06/01/20 1531   Present on Hospital Admission: Yes  Location: Toe (Comment  which one)  Wound Location Orientation: Right  Wound Description: 3rd toe#4 Dressing Type Applied Gauze;Gauze wrap (alondra) (betadine wet to dry) Procedure Tolerated Well Wound Toe (Comment  which one) Right 4th toe #5 Date First Assessed/Time First Assessed: 06/01/20 1533   Present on Hospital Admission: Yes  Location: Toe (Comment  which one)  Wound Location Orientation: Right  Wound Description: 4th toe #5 Dressing Type Applied Gauze;Gauze wrap (alondra) (betadine wet to dry) Wound Toe (Comment  which one) Right 5th toe#6 Date First Assessed/Time First Assessed: 06/01/20 1534   Present on Hospital Admission: Yes  Location: Toe (Comment  which one)  Wound Location Orientation: Right  Wound Description: 5th toe#6 Dressing Type Applied Gauze;Gauze wrap (alondra) (betadine wet to dry) Procedure Tolerated Well Discharge Condition: Stable Pain: 0 Ambulatory Status: Wheelchair Discharge Destination: Home Transportation: Car Accompanied by: Family/Caregiver Discharge instructions reviewed with Family/Caregiver  and copy or written instructions have been provided. All questions/concerns have been addressed at this time.

## 2020-06-02 NOTE — TELEPHONE ENCOUNTER
Chart review shows that Dr. Nancy Davis is concerned regarding right foot possibly becoming more gangrenous. We can increase furosemide to 60 mg po daily for the next 3 days to see if that impacts swelling, but encourage close follow up with Dr. Nancy Davis for known foot wound. Lack of mobility may also be contributing to lower extremity edema.

## 2020-06-02 NOTE — TELEPHONE ENCOUNTER
Attempted to reach Renetta Parker [phone] by telephone. VM currently full. Will try reaching [facility]       Verified patient with two types of identifiers. Spoke with [facility], verified on HIPAA. Notified Lesley to increase Furosemide to 60 mg for 3 days and then back to 40 mg daily. Patient's daughter verbalized understanding and will call with any other questions.

## 2020-06-04 ENCOUNTER — HOSPITAL ENCOUNTER (OUTPATIENT)
Dept: GENERAL RADIOLOGY | Age: 85
Discharge: HOME | End: 2020-06-04
Attending: INTERNAL MEDICINE
Payer: MEDICARE

## 2020-06-04 ENCOUNTER — HOSPITAL ENCOUNTER (OUTPATIENT)
Dept: WOUND CARE | Age: 85
Discharge: HOME | End: 2020-06-04
Payer: MEDICARE

## 2020-06-04 ENCOUNTER — APPOINTMENT (OUTPATIENT)
Dept: GENERAL RADIOLOGY | Age: 85
DRG: 853 | End: 2020-06-04
Attending: EMERGENCY MEDICINE
Payer: MEDICARE

## 2020-06-04 ENCOUNTER — OFFICE VISIT (OUTPATIENT)
Dept: INTERNAL MEDICINE CLINIC | Age: 85
End: 2020-06-04

## 2020-06-04 ENCOUNTER — TELEPHONE (OUTPATIENT)
Dept: CARDIOLOGY CLINIC | Age: 85
End: 2020-06-04

## 2020-06-04 ENCOUNTER — HOSPITAL ENCOUNTER (INPATIENT)
Age: 85
LOS: 11 days | Discharge: NURSING, REHAB OR LONG-TERM CARE | DRG: 853 | End: 2020-06-15
Attending: EMERGENCY MEDICINE | Admitting: FAMILY MEDICINE
Payer: MEDICARE

## 2020-06-04 ENCOUNTER — APPOINTMENT (OUTPATIENT)
Dept: ULTRASOUND IMAGING | Age: 85
DRG: 853 | End: 2020-06-04
Attending: FAMILY MEDICINE
Payer: MEDICARE

## 2020-06-04 VITALS
WEIGHT: 157 LBS | SYSTOLIC BLOOD PRESSURE: 90 MMHG | DIASTOLIC BLOOD PRESSURE: 52 MMHG | HEIGHT: 63 IN | TEMPERATURE: 98.2 F | OXYGEN SATURATION: 96 % | RESPIRATION RATE: 16 BRPM | BODY MASS INDEX: 27.82 KG/M2 | HEART RATE: 80 BPM

## 2020-06-04 VITALS
SYSTOLIC BLOOD PRESSURE: 95 MMHG | TEMPERATURE: 97.8 F | HEART RATE: 70 BPM | DIASTOLIC BLOOD PRESSURE: 60 MMHG | RESPIRATION RATE: 16 BRPM

## 2020-06-04 DIAGNOSIS — Z02.89 ENCOUNTER FOR COMPLETION OF FORM WITH PATIENT: ICD-10-CM

## 2020-06-04 DIAGNOSIS — I73.9 PVD (PERIPHERAL VASCULAR DISEASE) (HCC): Primary | ICD-10-CM

## 2020-06-04 DIAGNOSIS — Z89.511 S/P BKA (BELOW KNEE AMPUTATION) UNILATERAL, RIGHT (HCC): ICD-10-CM

## 2020-06-04 DIAGNOSIS — Z11.1 TUBERCULOSIS SCREENING: ICD-10-CM

## 2020-06-04 DIAGNOSIS — E11.51 TYPE 2 DIABETES MELLITUS WITH PERIPHERAL VASCULAR DISEASE (HCC): ICD-10-CM

## 2020-06-04 DIAGNOSIS — I73.9 PERIPHERAL VASCULAR DISEASE (HCC): ICD-10-CM

## 2020-06-04 DIAGNOSIS — Z85.038 HISTORY OF COLON CANCER: ICD-10-CM

## 2020-06-04 DIAGNOSIS — I48.91 ATRIAL FIBRILLATION, UNSPECIFIED TYPE (HCC): Chronic | ICD-10-CM

## 2020-06-04 DIAGNOSIS — D72.829 LEUKOCYTOSIS, UNSPECIFIED TYPE: ICD-10-CM

## 2020-06-04 DIAGNOSIS — J43.9 PULMONARY EMPHYSEMA, UNSPECIFIED EMPHYSEMA TYPE (HCC): Chronic | ICD-10-CM

## 2020-06-04 DIAGNOSIS — Z95.0 PACEMAKER: Chronic | ICD-10-CM

## 2020-06-04 DIAGNOSIS — R50.9 FEVER, UNSPECIFIED FEVER CAUSE: ICD-10-CM

## 2020-06-04 DIAGNOSIS — E87.20 LACTIC ACID INCREASED: ICD-10-CM

## 2020-06-04 DIAGNOSIS — L03.115 CELLULITIS OF RIGHT FOOT: Primary | ICD-10-CM

## 2020-06-04 DIAGNOSIS — I96 GANGRENE (HCC): ICD-10-CM

## 2020-06-04 PROBLEM — E11.21 TYPE 2 DIABETES WITH NEPHROPATHY (HCC): Status: ACTIVE | Noted: 2020-06-04

## 2020-06-04 PROBLEM — E11.9 TYPE 2 DIABETES MELLITUS WITHOUT COMPLICATION, UNSPECIFIED WHETHER LONG TERM INSULIN USE (HCC): Status: ACTIVE | Noted: 2020-06-04

## 2020-06-04 PROBLEM — L03.90 CELLULITIS: Status: ACTIVE | Noted: 2020-06-04

## 2020-06-04 LAB
ALBUMIN SERPL-MCNC: 2.8 G/DL (ref 3.5–5)
ALBUMIN/GLOB SERPL: 0.7 {RATIO} (ref 1.1–2.2)
ALP SERPL-CCNC: 121 U/L (ref 45–117)
ALT SERPL-CCNC: 88 U/L (ref 12–78)
ANION GAP SERPL CALC-SCNC: 10 MMOL/L (ref 5–15)
APAP SERPL-MCNC: 8 UG/ML (ref 10–30)
AST SERPL-CCNC: 118 U/L (ref 15–37)
BASOPHILS # BLD: 0 K/UL (ref 0–0.1)
BASOPHILS NFR BLD: 0 % (ref 0–1)
BILIRUB SERPL-MCNC: 1.1 MG/DL (ref 0.2–1)
BUN SERPL-MCNC: 33 MG/DL (ref 6–20)
BUN/CREAT SERPL: 23 (ref 12–20)
CALCIUM SERPL-MCNC: 9 MG/DL (ref 8.5–10.1)
CHLORIDE SERPL-SCNC: 96 MMOL/L (ref 97–108)
CO2 SERPL-SCNC: 24 MMOL/L (ref 21–32)
COMMENT, HOLDF: NORMAL
CREAT SERPL-MCNC: 1.44 MG/DL (ref 0.55–1.02)
DIFFERENTIAL METHOD BLD: ABNORMAL
EOSINOPHIL # BLD: 0 K/UL (ref 0–0.4)
EOSINOPHIL NFR BLD: 0 % (ref 0–7)
ERYTHROCYTE [DISTWIDTH] IN BLOOD BY AUTOMATED COUNT: 12.3 % (ref 11.5–14.5)
EST. AVERAGE GLUCOSE BLD GHB EST-MCNC: 146 MG/DL
GLOBULIN SER CALC-MCNC: 4 G/DL (ref 2–4)
GLUCOSE SERPL-MCNC: 161 MG/DL (ref 65–100)
HAV IGM SER QL: NONREACTIVE
HBA1C MFR BLD: 6.7 % (ref 4–5.6)
HBV CORE IGM SER QL: NONREACTIVE
HBV SURFACE AG SER QL: <0.1 INDEX
HBV SURFACE AG SER QL: NEGATIVE
HCT VFR BLD AUTO: 38.4 % (ref 35–47)
HCV AB SERPL QL IA: NONREACTIVE
HCV COMMENT,HCGAC: NORMAL
HGB BLD-MCNC: 12.5 G/DL (ref 11.5–16)
IMM GRANULOCYTES # BLD AUTO: 0.1 K/UL (ref 0–0.04)
IMM GRANULOCYTES NFR BLD AUTO: 1 % (ref 0–0.5)
LACTATE SERPL-SCNC: 2.2 MMOL/L (ref 0.4–2)
LYMPHOCYTES # BLD: 0.7 K/UL (ref 0.8–3.5)
LYMPHOCYTES NFR BLD: 5 % (ref 12–49)
MCH RBC QN AUTO: 30.3 PG (ref 26–34)
MCHC RBC AUTO-ENTMCNC: 32.6 G/DL (ref 30–36.5)
MCV RBC AUTO: 93 FL (ref 80–99)
MONOCYTES # BLD: 0.8 K/UL (ref 0–1)
MONOCYTES NFR BLD: 6 % (ref 5–13)
NEUTS SEG # BLD: 11.8 K/UL (ref 1.8–8)
NEUTS SEG NFR BLD: 88 % (ref 32–75)
NRBC # BLD: 0 K/UL (ref 0–0.01)
NRBC BLD-RTO: 0 PER 100 WBC
PLATELET # BLD AUTO: 150 K/UL (ref 150–400)
PMV BLD AUTO: 12 FL (ref 8.9–12.9)
POTASSIUM SERPL-SCNC: 3.9 MMOL/L (ref 3.5–5.1)
PROT SERPL-MCNC: 6.8 G/DL (ref 6.4–8.2)
RBC # BLD AUTO: 4.13 M/UL (ref 3.8–5.2)
RBC MORPH BLD: ABNORMAL
SAMPLES BEING HELD,HOLD: NORMAL
SODIUM SERPL-SCNC: 130 MMOL/L (ref 136–145)
SP1: NORMAL
SP2: NORMAL
SP3: NORMAL
WBC # BLD AUTO: 13.4 K/UL (ref 3.6–11)

## 2020-06-04 PROCEDURE — 76705 ECHO EXAM OF ABDOMEN: CPT

## 2020-06-04 PROCEDURE — 83605 ASSAY OF LACTIC ACID: CPT

## 2020-06-04 PROCEDURE — 87040 BLOOD CULTURE FOR BACTERIA: CPT

## 2020-06-04 PROCEDURE — 99214 OFFICE O/P EST MOD 30 MIN: CPT

## 2020-06-04 PROCEDURE — 74011000258 HC RX REV CODE- 258: Performed by: EMERGENCY MEDICINE

## 2020-06-04 PROCEDURE — 99285 EMERGENCY DEPT VISIT HI MDM: CPT

## 2020-06-04 PROCEDURE — 96374 THER/PROPH/DIAG INJ IV PUSH: CPT

## 2020-06-04 PROCEDURE — 83036 HEMOGLOBIN GLYCOSYLATED A1C: CPT

## 2020-06-04 PROCEDURE — 65660000000 HC RM CCU STEPDOWN

## 2020-06-04 PROCEDURE — 74011250636 HC RX REV CODE- 250/636: Performed by: EMERGENCY MEDICINE

## 2020-06-04 PROCEDURE — 71046 X-RAY EXAM CHEST 2 VIEWS: CPT

## 2020-06-04 PROCEDURE — 80307 DRUG TEST PRSMV CHEM ANLYZR: CPT

## 2020-06-04 PROCEDURE — 80074 ACUTE HEPATITIS PANEL: CPT

## 2020-06-04 PROCEDURE — 73630 X-RAY EXAM OF FOOT: CPT

## 2020-06-04 PROCEDURE — 80053 COMPREHEN METABOLIC PANEL: CPT

## 2020-06-04 PROCEDURE — 85025 COMPLETE CBC W/AUTO DIFF WBC: CPT

## 2020-06-04 PROCEDURE — 36415 COLL VENOUS BLD VENIPUNCTURE: CPT

## 2020-06-04 RX ORDER — HEPARIN SODIUM 5000 [USP'U]/ML
5000 INJECTION, SOLUTION INTRAVENOUS; SUBCUTANEOUS EVERY 8 HOURS
Status: DISPENSED | OUTPATIENT
Start: 2020-06-04 | End: 2020-06-08

## 2020-06-04 RX ORDER — DEXTROSE MONOHYDRATE 100 MG/ML
0-250 INJECTION, SOLUTION INTRAVENOUS AS NEEDED
Status: DISCONTINUED | OUTPATIENT
Start: 2020-06-04 | End: 2020-06-06

## 2020-06-04 RX ORDER — VANCOMYCIN 1.75 GRAM/500 ML IN 0.9 % SODIUM CHLORIDE INTRAVENOUS
1750 ONCE
Status: COMPLETED | OUTPATIENT
Start: 2020-06-04 | End: 2020-06-05

## 2020-06-04 RX ORDER — INSULIN LISPRO 100 [IU]/ML
INJECTION, SOLUTION INTRAVENOUS; SUBCUTANEOUS EVERY 6 HOURS
Status: DISCONTINUED | OUTPATIENT
Start: 2020-06-05 | End: 2020-06-06

## 2020-06-04 RX ORDER — OXYCODONE HYDROCHLORIDE 5 MG/1
5 TABLET ORAL
Qty: 120 TAB | Refills: 0 | Status: SHIPPED | OUTPATIENT
Start: 2020-06-04 | End: 2020-06-15

## 2020-06-04 RX ORDER — MORPHINE SULFATE 2 MG/ML
1 INJECTION, SOLUTION INTRAMUSCULAR; INTRAVENOUS
Status: COMPLETED | OUTPATIENT
Start: 2020-06-04 | End: 2020-06-04

## 2020-06-04 RX ORDER — HYDROMORPHONE HYDROCHLORIDE 1 MG/ML
1 INJECTION, SOLUTION INTRAMUSCULAR; INTRAVENOUS; SUBCUTANEOUS
Status: DISCONTINUED | OUTPATIENT
Start: 2020-06-04 | End: 2020-06-07

## 2020-06-04 RX ORDER — MAGNESIUM SULFATE 100 %
4 CRYSTALS MISCELLANEOUS AS NEEDED
Status: DISCONTINUED | OUTPATIENT
Start: 2020-06-04 | End: 2020-06-06

## 2020-06-04 RX ORDER — METRONIDAZOLE 500 MG/100ML
500 INJECTION, SOLUTION INTRAVENOUS EVERY 12 HOURS
Status: COMPLETED | OUTPATIENT
Start: 2020-06-04 | End: 2020-06-11

## 2020-06-04 RX ADMIN — CEFEPIME HYDROCHLORIDE 2 G: 2 INJECTION, POWDER, FOR SOLUTION INTRAVENOUS at 18:50

## 2020-06-04 RX ADMIN — MORPHINE SULFATE 1 MG: 2 INJECTION, SOLUTION INTRAMUSCULAR; INTRAVENOUS at 20:05

## 2020-06-04 RX ADMIN — SODIUM CHLORIDE 1000 ML: 900 INJECTION, SOLUTION INTRAVENOUS at 18:32

## 2020-06-04 RX ADMIN — VANCOMYCIN HYDROCHLORIDE 1750 MG: 10 INJECTION, POWDER, LYOPHILIZED, FOR SOLUTION INTRAVENOUS at 23:34

## 2020-06-04 NOTE — LETTER
6/4/2020 3:33 PM 
 
Ms. Heller [facility] [phone] [facility] [phone] Current Outpatient Medications:  
  oxyCODONE IR (ROXICODONE) 5 mg immediate release tablet, Take 1 Tab by mouth every four (4) hours as needed for Pain for up to 30 days. Max Daily Amount: 30 mg., Disp: 120 Tab, Rfl: 0 
  famotidine (PEPCID) 20 mg tablet, Take 1 Tab by mouth two (2) times a day., Disp: 180 Tab, Rfl: 1   clindamycin (CLEOCIN) 150 mg capsule, Take 1 Cap by mouth three (3) times daily. Indications: new pacemaker, Disp: 15 Cap, Rfl: 0 
  losartan (COZAAR) 25 mg tablet, Take 1 Tab by mouth daily. Indications: chronic heart failure, Disp: 30 Tab, Rfl: 3 
  simvastatin (ZOCOR) 20 mg tablet, Take  by mouth nightly., Disp: , Rfl:  
  levothyroxine (SYNTHROID) 88 mcg tablet, Take 88 mcg by mouth Daily (before breakfast). , Disp: , Rfl:  
  Zoloft 50 mg tablet, TAKE 1 TABLET BY MOUTH EVERY DAY, Disp: 90 Tab, Rfl: 1   carvediloL (COREG) 3.125 mg tablet, TAKE 1 TABLET BY MOUTH TWICE A DAY WITH MEALS, Disp: 180 Tab, Rfl: 1 
  apixaban (Eliquis) 5 mg tablet, Take 1 Tab by mouth two (2) times a day., Disp: 180 Tab, Rfl: 1 
  acetaminophen (TYLENOL) 500 mg tablet, Take 500 mg by mouth nightly. Acetaminophen 1000 mg morning and afternoon, 500 mg at bedtime, Disp: , Rfl:  
  furosemide (LASIX) 40 mg tablet, TAKE 1 TABLET BY MOUTH DAILY. , Disp: 90 Tab, Rfl: 2 
  multivitamins (CHEWABLE-AMOS) chew, Take 1 Tab by mouth daily. , Disp: , Rfl:  
  lidocaine (LIDODERM) 5 %, 1 Patch by TransDERmal route daily as needed. Apply patch to the affected area for 12 hours a day and remove for 12 hours a day. , Disp: , Rfl:  
  predniSONE (DELTASONE) 5 mg tablet, Take 10 mg by mouth daily. , Disp: , Rfl:  
  albuterol (PROVENTIL HFA, VENTOLIN HFA, PROAIR HFA) 90 mcg/actuation inhaler, Take 1 Puff by inhalation every six (6) hours as needed for Wheezing., Disp: , Rfl:  
   baclofen (LIORESAL) 10 mg tablet, Take 5 mg by mouth two (2) times a day., Disp: , Rfl:   ADVAIR DISKUS 250-50 mcg/dose diskus inhaler, Take 1 Puff by inhalation two (2) times a day., Disp: , Rfl:  
  tiotropium (SPIRIVA WITH HANDIHALER) 18 mcg inhalation capsule, Take 1 Cap by inhalation daily. , Disp: , Rfl:  
 
 
 
 
If there are questions or concerns please have the patient contact our office. Sincerely, Quentin Dean MD

## 2020-06-04 NOTE — WOUND CARE
History and Physical 
 
Patient: Phan Tyson MRN: [id number]  SSN: [id number] YOB: 1932  Age: 80 y.o. Sex: female Subjective: Chief Complaint:  Wounds on heel and toes. Phan Tyson is an 80 y.o. female who presents for evaluation and management of wounds on her heel and toes. Patient's daughter is here giving history as well. Pt states that she developed these wounds approximately 6 weeks ago and that they began after the removal of an ingrown toenail, has been using betadine for treatment and states nothing makes them better or worse.  Pt's daughter states that she has seen Dr. Barrett Litten and, based upon studies he has performed, has recommended amputation as there is no way that he can open the arterial occlusions. Pt does have pain with the wounds that she treats with Percocet. Pt was scheduled for Clinic on Wednesday but was told to come sooner by a Nurse. Pt was also instructed to begin HBOT. 
  
 
Past Medical History:  
Diagnosis Date  Arthritis  Atrial fibrillation ([address]   
 av node ablation 5/22/98 with placement of CPI #[id number] dual chamber pacemaker subsequently replaced with a single chamber medtronic #[id number] single chamber pacemaker 7/25/05  Cancer [facility]) [id number]  
 colon cancer w/ resection  COPD  Depression  GERD (gastroesophageal reflux disease)  History of vascular access device 04/17/2020  
 4F MIDLINE PLACED BY EMIL GUNN RN LEFT BRACHIAL, 13CM  Hyperlipidemia  Hypertension  Ischemic cardiomyopathy  Migraine headache  Orthostatic hypotension  RADHA (obstructive sleep apnea)  Pacemaker 5/22/98 AV node ablation /pacemaker placement utilizing CPI # [id number] dual chamber system, medtronic #[id number] single chamber device placed 7/22/05  Pulmonary hypertension ([address] 9/26/2011 RVSP 50 on echo 8/14  Thyroid disease  Valvular heart disease   
 mild-mod MR/TR Past Surgical History: Procedure Laterality Date  BREAST SURGERY PROCEDURE UNLISTED    
 benign breast tumor excision right breast  
 HX BREAST BIOPSY Right 2002  
 neg; surgical bx  HX COLECTOMY  1974  
 colon  HX KNEE REPLACEMENT    
 right TKA  HX PACEMAKER    
 HX TUBAL LIGATION    
 IR KYPHOPLASTY LUMBAR  7/2/2019  DC INSJ ELTRD CAR BYRON SYS TM INSJ DFB/PM PLS GEN N/A 5/22/2020 Lv Lead Placement performed by Dominick Douglass MD at [address] CATH LAB  DC REMVL PERM PM PLS GEN W/REPL PLSE GEN MULT LEAD N/A 5/22/2020 REMOVE & REPLACE PPM GEN BIV MULTI LEADS performed by Dominick Douglass MD at [address] CATH LAB  TOTAL KNEE ARTHROPLASTY    
 total on R, partial on L Family History Problem Relation Age of Onset  Diabetes Mother  Heart Disease Mother  Heart Attack Mother  Heart Disease Father  Stroke Sister  Breast Cancer Sister 80  
 Cancer Maternal Aunt   
     uterus  Diabetes Maternal Uncle  Breast Cancer Daughter 61 Social History Tobacco Use  Smoking status: Passive Smoke Exposure - Never Smoker  Smokeless tobacco: Never Used Substance Use Topics  Alcohol use: No  
  Alcohol/week: 0.0 standard drinks Prior to Admission medications Medication Sig Start Date End Date Taking? Authorizing Provider  
famotidine (PEPCID) 20 mg tablet Take 1 Tab by mouth two (2) times a day. 5/29/20   Nahid Smith MD  
clindamycin (CLEOCIN) 150 mg capsule Take 1 Cap by mouth three (3) times daily. Indications: new pacemaker 5/22/20   Dominick Douglass MD  
losartan (COZAAR) 25 mg tablet Take 1 Tab by mouth daily. Indications: chronic heart failure 5/23/20   Dominick Douglass MD  
simvastatin (ZOCOR) 20 mg tablet Take  by mouth nightly. Provider, Historical  
oxyCODONE IR (ROXICODONE) 5 mg immediate release tablet Take 5 mg by mouth every four (4) hours as needed for Pain.     Provider, Historical  
 levothyroxine (SYNTHROID) 88 mcg tablet Take 88 mcg by mouth Daily (before breakfast). Provider, Historical  
Zoloft 50 mg tablet TAKE 1 TABLET BY MOUTH EVERY DAY 4/27/20   Shelby Mosqueda MD  
carvediloL (COREG) 3.125 mg tablet TAKE 1 TABLET BY MOUTH TWICE A DAY WITH MEALS 4/23/20   Murphy Shukla III, MD  
apixaban (Eliquis) 5 mg tablet Take 1 Tab by mouth two (2) times a day. 4/23/20   Morales Shankar MD  
acetaminophen (TYLENOL) 500 mg tablet Take 500 mg by mouth nightly. Acetaminophen 1000 mg morning and afternoon, 500 mg at bedtime    Provider, Historical  
furosemide (LASIX) 40 mg tablet TAKE 1 TABLET BY MOUTH DAILY. 9/19/19   Morales Shankar MD  
multivitamins (CHEWABLE-AMOS) chew Take 1 Tab by mouth daily. Provider, Historical  
lidocaine (LIDODERM) 5 % 1 Patch by TransDERmal route daily as needed. Apply patch to the affected area for 12 hours a day and remove for 12 hours a day. Provider, Historical  
predniSONE (DELTASONE) 5 mg tablet Take 10 mg by mouth daily. Provider, Historical  
albuterol (PROVENTIL HFA, VENTOLIN HFA, PROAIR HFA) 90 mcg/actuation inhaler Take 1 Puff by inhalation every six (6) hours as needed for Wheezing. Provider, Historical  
baclofen (LIORESAL) 10 mg tablet Take 5 mg by mouth two (2) times a day. 11/2/17   Provider, Historical  
ADVAIR DISKUS 250-50 mcg/dose diskus inhaler Take 1 Puff by inhalation two (2) times a day. 1/12/15   Provider, Historical  
tiotropium (SPIRIVA WITH HANDIHALER) 18 mcg inhalation capsule Take 1 Cap by inhalation daily. Provider, Historical  
  
 
Allergies Allergen Reactions  Cardizem [Diltiazem Hcl] Hives  Codeine Nausea and Vomiting  Darvocet A500 [Propoxyphene N-Acetaminophen] Nausea and Vomiting  Diltiazem Hives  Labetalol Nausea and Vomiting Dizzy/Disoriented  Pcn [Penicillins] Swelling Tongue Swelling Has tolerated cephalexin recently 4/7/20  Primidone Other (comments) Lightheaded/unsteady gait  Sulfa (Sulfonamide Antibiotics) Nausea and Vomiting Review of Systems: 
Constitutional: negative Eyes: negative Ears, Nose, Mouth, Throat, and Face: negative Respiratory: negative Cardiovascular: negative Gastrointestinal: negative Integument/Breast: wounds on heel and toes Musculoskeletal:negative Neurological: negative Behavioral/Psychiatric: negative Objective:  
 
Vitals:  
 06/01/20 1435 BP: 102/64 Pulse: 85 Resp: 18 Temp: (!) 45.5 °F (7.5 °C) Physical Exam: 
GENERAL: alert, cooperative, no distress, appears stated age EYE: conjunctivae/corneas clear, EOM's intact THROAT & NECK: normal and no erythema or exudates noted.  LUNG: clear to auscultation bilaterally HEART: regular rate and rhythm ABDOMEN: soft, non-tender, non-distended, no guarding  EXTREMITIES:  moves all, bilateral lower extremities show mild edema, good sensation, and are nontender, left lower extremity has palpable 1+ DP and normal capillary refill of 3 seconds, right lower extremity has no palpable pulse and no doppler signal 
SKIN: wounds on right heel and right toes NEUROLOGIC: AOx3 PSYCHIATRIC: non focal 
  
RIGHT HEEL WOUND 
  
Etiology:  Pressure (Stage IV). Objective:  Palliation. Wound Size (L x W x D):  3.5x2.1x0.1 cm Surface Area:  7.35 cm2 Exudate:  Serous light. Red:  15%. Yellow Necrotic:  0%. Skin:  0%. Eschar:  85%. Wound Progress:  N/A.   
 
PROCEDURE:  CAUTERIZATION FOR HYPERGRANULATED TISSUE Chemical cauterization of hypergranulated tissue performed on right heel wound with topical anesthetic to facilitate healing. No complications or bleeding noted. RIGHT FIRST TOE WOUND 
  
Etiology:  Arterial. 
Antonella Mimi. Wound Size (L x W x D):  3.2x2x0.1 cm Surface Area:  6.4 cm2 Exudate:  None. Red:  0%. Yellow Necrotic:  0%. Skin:  0%. Eschar:  100% (Dry Gangrene).  
Wound Progress:  N/A.   
 
RIGHT SECOND TOE WOUND 
  
 Greg Sea. Wound Size (L x W x D):  1.7x1x0.1 cm Surface Area:  1.7 cm2 Exudate:  None. Red:  0%. Yellow Necrotic:  0%. Skin:  0%. Eschar:  100% (Dry Gangrene). Wound Progress:  N/A.   
 
RIGHT THIRD TOE WOUND 
  
Etiology:  Arterial. 
[facility]. Wound Size (L x W x D):  1x1x0.1 cm Surface Area:  1 cm2 Exudate:  None. Red:  0%. Yellow Necrotic:  0%. Skin:  0%. Eschar:  100% (Dry Gangrene). Wound Progress:  N/A.   
 
RIGHT FOURTH TOE WOUND 
  
Etiology:  Arterial. 
[facility]. Wound Size (L x W x D):  1.6x1x0.1 cm Surface Area:  1.6 cm2 Exudate:  None. Red:  0%. Yellow Necrotic:  0%. Skin:  0%. Eschar:  100% (Dry Gangrene). Wound Progress:  N/A.   
 
RIGHT FIFTH TOE WOUND 
  
Etiology:  Arterial. 
[facility]. Wound Size (L x W x D):  1.8x1.5x0.1 cm Surface Area:  2.7 cm2 Exudate:  None. Red:  0%. Yellow Necrotic:  0%. Skin:  0%. Eschar:  100% (Dry Gangrene). Wound Progress:  N/A.   
 
 
 
 
 
Assessment:  
 
Pressure ulcer of right heel, unstageable-L89.610 Gangrene, not elsewhere classified-I96 Hospital Problems  Date Reviewed: 5/14/2020 None Plan:  
 
Time taken to speak with the Nurse about the patient prior to the visit, reviewed the records of the patient, extensively discussed with the patient and her daughter the history of the wounds, the previous visit with Dr. Yanely Huddleston, addressed questions about the use of HBOT and the indications, the pathophysiology of pressure areas, the concept of offloading and the use of offloading, the inabilily to heal if there is not adequate blood flow, the choice of dressings and why, the plan of care, etc... Discussed with patient and her daughter to go to the ER now.   Both voice complete understanding and do not want to go to the ER as they have spoken with Dr. Yanely Huddleston about this and all agree that nothing could be accomplished there. Answered questions for both. Discussed with Nurse the plan of care, etc... D/w pt risks of follow up given COVID-19 concerns.  Pt voices complete understanding and would like to follow up as noted below. Care and coordination 35 minutes. 
  
Clinic Wound Care:  [place] all wounds liberally with betadine, place betadine soaked gauze on all wounds, wrap loosely with Johnny.  
  
Home Wound Care:  [place] all wounds liberally with betadine, place betadine soaked gauze on all wounds, wrap loosely with Johnny q o day. 
  
Offload woundx. 
  
RTC Thursday with Dr. Alex Ivey. 
  
Pt and daughter instructed to contact Dr. Rosalia Lantigua for Vascular Management. All questions answered to patient's satisfaction. 
  
Written instructions given to patient. 
  
Patient voices complete understanding. Signed By: Lilia Flannery MD   
 June 4, 2020

## 2020-06-04 NOTE — PROGRESS NOTES
Zenovia Meckel is a 80 y.o. female who presents today for Diabetes and Wound Check  . She has a history of   Patient Active Problem List   Diagnosis Code    Mixed hyperlipidemia E78.2    Atrial fibrillation ([facility] I48.91    Mitral regurgitation I34.0    Third degree AV block ([facility]) I44.2    Anxiety and depression F41.9, F32.9    Colon cancer ([facility] C18.9    Hypothyroid E03.9    CKD (chronic kidney disease) stage 3, GFR 30-59 ml/min ([facility]) N18.3    Osteopenia M85.80    Restrictive lung disease J98.4    Ischemic cardiomyopathy I25.5    Psoriasis L40.9    Pacemaker Z95.0    Latent tuberculosis by blood test Z22.7    Frequent falls R29.6    COPD (chronic obstructive pulmonary disease) ([facility]) J44.9    Chronic respiratory failure with hypoxia ([facility]) J96.11    Long term systemic steroid user Z79.52    Hypertension I10    AAA (abdominal aortic aneurysm) ([facility]) I71.4    Constipation K59.00    Lumbar compression fracture ([facility]) S32.000A    Hematuria R31.9    Osteoporosis M81.0    Chronic midline low back pain without sciatica M54.5, G89.29    Cellulitis of foot L03.119    Foot infection L08.9    Status post biventricular pacemaker Z95.0    Biventricular cardiac pacemaker in situ Z95.0    Bradycardia R00.1    Dilated cardiomyopathy ([facility]) I42.0    Type 2 diabetes mellitus without complication, unspecified whether long term insulin use ([facility]) E11.9    Type 2 diabetes with nephropathy ([facility]) E11.21    Type 2 diabetes mellitus with peripheral vascular disease ([facility]) E11.51   . Today patient is here for f/u. Peripheral vascular disease: Patient was hospitalized due to gangrene and severe peripheral vascular disease to her right lower extremity. Angiogram was done but they were not able to identify any vessels in the right foot. Suggest considerations for below-knee amputation if gangrene persisted.   She has been receiving wound care and last weekend wound care called me due to concerns of worsening gangrene. Patient was not interested in being seen in the emergency room. Working with wound care and trying to get hyperbarics covered. Pain can still be quite severe. She is taking oxycodone almost every 4 hours. This is making her drowsy which she does not like. Will get second opinion with Vascular surgery next week. .   Plan to have her at the [place] at [facility] in the next couple weeks. Needs PT. She has been back at her place at [facility] since discharge from rehab and most recently hospitalization. .     Status post replacement of biventricular pacer: Patient recently had battery change by Dr. Rey Yoder. Has done well with this. IFG: Patient's A1c is slightly up to 6.6. We discussed that at this level no need for medications. We have put in for a hospital bed for her home as she has difficulty getting in and out of bed. Patient would like to readdress her advance care plan and her DNR status. We will get her in touch with advance care .    ROS  Review of Systems   Constitutional: Negative for chills, fever and weight loss. HENT: Negative for congestion and sore throat. Eyes: Negative for blurred vision, double vision and photophobia. Respiratory: Negative for cough and shortness of breath. Cardiovascular: Negative for chest pain, palpitations and leg swelling. Gastrointestinal: Negative for abdominal pain, constipation, diarrhea, heartburn, nausea and vomiting. Genitourinary: Negative for dysuria, frequency and urgency. Musculoskeletal: Positive for joint pain. Negative for back pain, myalgias and neck pain. Skin: Negative for rash. Neurological: Negative. Negative for headaches. Endo/Heme/Allergies: Does not bruise/bleed easily. Psychiatric/Behavioral: Negative for memory loss and suicidal ideas.        Visit Vitals  BP 90/52 (BP 1 Location: Left arm, BP Patient Position: Sitting)   Pulse 80   Temp 98.2 °F (36.8 °C) (Oral) Resp 16   Ht 5' 3\" (1.6 m)   Wt 157 lb (71.2 kg)   SpO2 96%   BMI 27.81 kg/m²       Physical Exam  Constitutional:       General: She is not in acute distress. Appearance: She is well-developed. HENT:      Head: Normocephalic and atraumatic. Neck:      Musculoskeletal: Normal range of motion and neck supple. Thyroid: No thyromegaly. Cardiovascular:      Rate and Rhythm: Normal rate. Rhythm irregular. Heart sounds: No murmur. Pulmonary:      Effort: Pulmonary effort is normal.      Breath sounds: Normal breath sounds. No wheezing. Comments: Distant but clear breath sounds  Abdominal:      General: Bowel sounds are normal. There is no distension. Palpations: Abdomen is soft. Skin:     General: Skin is warm and dry. Comments: Right foot dressed but pictures show ulcer on heel as well as darkening toes. Left ankle and leg somewhat cold to touch. Not swollen. Not warm. Does not appear cellulitic. Neurological:      Mental Status: She is alert and oriented to person, place, and time. Cranial Nerves: No cranial nerve deficit. Psychiatric:         Behavior: Behavior normal.           Current Outpatient Medications   Medication Sig    oxyCODONE IR (ROXICODONE) 5 mg immediate release tablet Take 1 Tab by mouth every four (4) hours as needed for Pain for up to 30 days. Max Daily Amount: 30 mg.    famotidine (PEPCID) 20 mg tablet Take 1 Tab by mouth two (2) times a day.  clindamycin (CLEOCIN) 150 mg capsule Take 1 Cap by mouth three (3) times daily. Indications: new pacemaker    losartan (COZAAR) 25 mg tablet Take 1 Tab by mouth daily. Indications: chronic heart failure    simvastatin (ZOCOR) 20 mg tablet Take  by mouth nightly.  levothyroxine (SYNTHROID) 88 mcg tablet Take 88 mcg by mouth Daily (before breakfast).     Zoloft 50 mg tablet TAKE 1 TABLET BY MOUTH EVERY DAY    carvediloL (COREG) 3.125 mg tablet TAKE 1 TABLET BY MOUTH TWICE A DAY WITH MEALS    apixaban (Eliquis) 5 mg tablet Take 1 Tab by mouth two (2) times a day.  acetaminophen (TYLENOL) 500 mg tablet Take 500 mg by mouth nightly. Acetaminophen 1000 mg morning and afternoon, 500 mg at bedtime    furosemide (LASIX) 40 mg tablet TAKE 1 TABLET BY MOUTH DAILY.  multivitamins (CHEWABLE-AMOS) chew Take 1 Tab by mouth daily.  lidocaine (LIDODERM) 5 % 1 Patch by TransDERmal route daily as needed. Apply patch to the affected area for 12 hours a day and remove for 12 hours a day.  predniSONE (DELTASONE) 5 mg tablet Take 10 mg by mouth daily.  albuterol (PROVENTIL HFA, VENTOLIN HFA, PROAIR HFA) 90 mcg/actuation inhaler Take 1 Puff by inhalation every six (6) hours as needed for Wheezing.  baclofen (LIORESAL) 10 mg tablet Take 5 mg by mouth two (2) times a day.  ADVAIR DISKUS 250-50 mcg/dose diskus inhaler Take 1 Puff by inhalation two (2) times a day.  tiotropium (SPIRIVA WITH HANDIHALER) 18 mcg inhalation capsule Take 1 Cap by inhalation daily. No current facility-administered medications for this visit.          Past Medical History:   Diagnosis Date    Arthritis     Atrial fibrillation ([address]     av node ablation 5/22/98 with placement of CPI #[id number] dual chamber pacemaker subsequently replaced with a single chamber medtronic #[id number] single chamber pacemaker 7/25/05    Cancer ([address] 1970's    colon cancer w/ resection    COPD     Depression     GERD (gastroesophageal reflux disease)     History of vascular access device 04/17/2020    4F MIDLINE PLACED BY EMIL Alejandro RN LEFT BRACHIAL, 13CM    Hyperlipidemia     Hypertension     Ischemic cardiomyopathy     Migraine headache     Orthostatic hypotension     RADHA (obstructive sleep apnea)     Pacemaker 5/22/98    AV node ablation /pacemaker placement utilizing CPI # [id number] dual chamber system, medtronic #[id number] single chamber device placed 7/22/05    Pulmonary hypertension ([facility] 9/26/2011    RVSP 50 on echo 8/14    Thyroid disease  Valvular heart disease     mild-mod MR/TR      Past Surgical History:   Procedure Laterality Date    BREAST SURGERY PROCEDURE UNLISTED      benign breast tumor excision right breast    HX BREAST BIOPSY Right 2002    neg; surgical bx    HX COLECTOMY  1974    colon    HX KNEE REPLACEMENT      right TKA    HX PACEMAKER      HX PACEMAKER PLACEMENT  05/2020    HX TUBAL LIGATION      IR KYPHOPLASTY LUMBAR  7/2/2019    ND INSJ ELTRD CAR BYRON SYS TM INSJ DFB/PM PLS GEN N/A 5/22/2020    Lv Lead Placement performed by Jonas Eduardo MD at [address] CATH LAB    ND REMVL PERM PM PLS GEN W/REPL PLSE GEN MULT LEAD N/A 5/22/2020    REMOVE & REPLACE PPM GEN BIV MULTI LEADS performed by Jonas Eduardo MD at [address] CATH LAB    TOTAL KNEE ARTHROPLASTY      total on R, partial on L      Social History     Tobacco Use    Smoking status: Passive Smoke Exposure - Never Smoker    Smokeless tobacco: Never Used   Substance Use Topics    Alcohol use: No     Alcohol/week: 0.0 standard drinks      Family History   Problem Relation Age of Onset    Diabetes Mother     Heart Disease Mother     Heart Attack Mother     Heart Disease Father     Stroke Sister     Breast Cancer Sister 80    Cancer Maternal Aunt         uterus    Diabetes Maternal Uncle     Breast Cancer Daughter 61        Allergies   Allergen Reactions    Cardizem [Diltiazem Hcl] Hives    Codeine Nausea and Vomiting    Darvocet A500 [Propoxyphene N-Acetaminophen] Nausea and Vomiting    Diltiazem Hives    Labetalol Nausea and Vomiting     Dizzy/Disoriented     Pcn [Penicillins] Swelling     Tongue Swelling   Has tolerated cephalexin recently 4/7/20     Primidone Other (comments)     Lightheaded/unsteady gait    Sulfa (Sulfonamide Antibiotics) Nausea and Vomiting        Assessment/Plan  Diagnoses and all orders for this visit:    1. PVD (peripheral vascular disease) (HCC)-try to get hyperbarics as well as second opinion on vascular surgery.   Patient notes that she is not at the point where she would like to consider amputation. Continue pain management though I am concerned as her pain is getting worse. I agree with admission to facility that can provide more care. -     oxyCODONE IR (ROXICODONE) 5 mg immediate release tablet; Take 1 Tab by mouth every four (4) hours as needed for Pain for up to 30 days. Max Daily Amount: 30 mg.    2. Pulmonary emphysema, unspecified emphysema type (HCC)-breathing stable    3. Type 2 diabetes mellitus with peripheral vascular disease (HCC)-new diagnosis based on A1c.    4. Gangrene (HCC)-likely cause of pain  -     oxyCODONE IR (ROXICODONE) 5 mg immediate release tablet; Take 1 Tab by mouth every four (4) hours as needed for Pain for up to 30 days. Max Daily Amount: 30 mg.  -     REFERRAL TO [facility]    5. Encounter for completion of form with patient-patient would like to revisit her CODE STATUS. 6. Tuberculosis screening-for admission  -     XR CHEST PA LAT; Future            Gabriele Guzman MD  6/4/2020    This note was created with the help of speech recognition software Delano Santamaria) and may contain some 'sound alike' errors.

## 2020-06-04 NOTE — TELEPHONE ENCOUNTER
Bree balderrama/ [facility] DANIS stated that they will be working the patient up for Hyperbaric Oxygen Therapy due to an Ischemic foot and will need cardiac clearance. Please advise.     Phone #: [phone]  Thanks

## 2020-06-04 NOTE — DISCHARGE INSTRUCTIONS
Discharge Instructions for  [facility]  [address] [place], [address] [facility]  Telephone: [phone]    NAME:  Ranjit More OF BIRTH:  2/6/1932  ACCOUNT NUMBER :  [de-identified]  DATE:  6/1/2020    Home Health:  ALL ABOUT CARE    Wound Cleansing:   Do not scrub or use excessive force. Cleanse wound prior to applying a clean dressing with:      [] Cleanse wound with: {palomo cleansers:99788}     [] May Shower at Discharge     []  Shower on days of dressing change, remove dressing first    [x] Keep Wound Dry in Shower (may purchase a cast cover if needed)     Dressings:           Wound Location Right , Right heel     [x] Apply Primary Dressing:       [] MediHoney Gel  [] Alginate   [] Alginate with Silver  [] Alginate        [] Collagen [] Collagen with Silver {palomo collagen :40838}   []  Moisten saline gauze     [] Hydrocolloid   [] MediHoney Alginate [] Foam with Silver   [] Foam   [] Hydrofera Blue    [] Mepilex Border    [] Moisten with Saline [] Hydrogel [] Mepitel     [] Bactroban/Mupirocin [] Polysporin  [x] Other: Betadine paint on toe wounds, then place betadine soaked gauze on toes and heel wound  [] Pack wound loosely with  [] Iodoform   [] Plain Packing  [] Other     [x] Cover and Secure with:     [x] Gauze [x] Johnny [] Kerlix   [] Ace Wrap [x] Cover Roll Tape [] ABD     [] Other:    Avoid contact of tape with skin.   [x] Change dressing: [] Daily    [x] Every Other Day [] Three times per week   [] Once a week [] Do Not Change Dressing   [] Other:      Off-Loading:   [x] Off-loading when [] walking  [x] in bed [x] sitting  [] Total non-weight bearing  [] Right Leg  [] Left Leg   [x] Assistive Device [] Walker [] Cane  [] Wheelchair  [] Crutches   [] Surgical shoe    [] Podus Boot(s)   [x] Foam Boot(s)  [] Roll About    [] Cast Boot [] Deri Butter  [] Other:    Dietary:  [x] Increase Protein: examples ( Meat, cheese, eggs, greek yogurt, premier protein drink, fish, nuts )        Activity:  [x] Activity as tolerated:  [] Patient has no activity restrictions     [] Strict Bedrest: [] Remain off Work:     [] May return to full duty work:                                   [] Return to work with restrictions:             Return Appointment:    [] Nurse visit scheduled in *** day(s) or *** week(s)   [x] Return Appointment: With Dr Arsenio Quezada on Thursday  [] Ordered tests: {[place] testing :91449}     Electronically signed on 6/1/2020    [address] [place] [address] Information: Should you experience any significant changes in your wound(s) or have questions about your wound care, please contact the [address] [place] [address] at [address] [place] [address] 8:00 am - 4:30. If you need help with your wound outside these hours and cannot wait until we are again available, contact your PCP or go to the hospital emergency room. PLEASE NOTE: IF YOU ARE UNABLE TO OBTAIN WOUND SUPPLIES, CONTINUE TO USE THE SUPPLIES YOU HAVE AVAILABLE UNTIL YOU ARE ABLE TO REACH US. IT IS MOST IMPORTANT TO KEEP THE WOUND COVERED AT ALL TIMES.      Physician Signature:_______________________ Dr. Irene Zamorano

## 2020-06-04 NOTE — ED PROVIDER NOTES
Patient is an 70-year-old female with history of peripheral vascular disease, atrial fibrillation status post pacemaker placement, colon cancer, COPD, hyperlipidemia, hypertension, ischemic cardiomyopathy, obstructive pace sleep apnea presenting to emergency department upon referral from wound care specialist for evaluation of wound on the right foot. Wound is very painful to the patient. Her daughter is in ED and aids and history. 6 weeks ago patient had an ingrown toenail on the right great toe, and has been worsening since. She was admitted for right foot cellulitis on April 15, 2020, and had a right anterior tibial catheterization for peripheral vascular disease done by Dr. Sherri Mills. She is followed by Dr. Trini Lopez for podiatry, and has discussed wound therapy versus amputation. Wound care specialist evaluated her today and further to emergency department for worsening infection. Patient is not a known diabetic. Patient had a fever of 100 yesterday, denies chills sweats, denies chest pain, shortness breath, abdominal pain, nausea, vomiting, diarrhea, or any other medical points at this time.            Past Medical History:   Diagnosis Date    Arthritis     Atrial fibrillation ([address]     av node ablation 5/22/98 with placement of CPI #[id number] dual chamber pacemaker subsequently replaced with a single chamber medtronic #[id number] single chamber pacemaker 7/25/05    Cancer ([address] 1970's    colon cancer w/ resection    COPD     Depression     GERD (gastroesophageal reflux disease)     History of vascular access device 04/17/2020    4F MIDLINE PLACED BY EMIL Amor RN LEFT BRACHIAL, 13CM    Hyperlipidemia     Hypertension     Ischemic cardiomyopathy     Migraine headache     Orthostatic hypotension     RADHA (obstructive sleep apnea)     Pacemaker 5/22/98    AV node ablation /pacemaker placement utilizing CPI # [id number] dual chamber system, medtronic #[id number] single chamber device placed 7/22/05    Pulmonary hypertension ([facility] 9/26/2011    RVSP 50 on echo 8/14    Thyroid disease     Valvular heart disease     mild-mod MR/TR       Past Surgical History:   Procedure Laterality Date    BREAST SURGERY PROCEDURE UNLISTED      benign breast tumor excision right breast    HX BREAST BIOPSY Right 2002    neg; surgical bx    HX COLECTOMY  1974    colon    HX KNEE REPLACEMENT      right TKA    HX PACEMAKER      HX PACEMAKER PLACEMENT  05/2020    HX TUBAL LIGATION      IR KYPHOPLASTY LUMBAR  7/2/2019    MS INSJ ELTRD CAR BYRON SYS TM INSJ DFB/PM PLS GEN N/A 5/22/2020    Lv Lead Placement performed by Tamika Chance MD at [address] CATH LAB    MS REMVL PERM PM PLS GEN W/REPL PLSE GEN MULT LEAD N/A 5/22/2020    REMOVE & REPLACE PPM GEN BIV MULTI LEADS performed by Tamiak Chance MD at [address] CATH LAB    TOTAL KNEE ARTHROPLASTY      total on R, partial on L         Family History:   Problem Relation Age of Onset    Diabetes Mother     Heart Disease Mother     Heart Attack Mother     Heart Disease Father     Stroke Sister     Breast Cancer Sister 80    Cancer Maternal Aunt         uterus    Diabetes Maternal Uncle     Breast Cancer Daughter 61       Social History     Socioeconomic History    Marital status:      Spouse name: Not on file    Number of children: Not on file    Years of education: Not on file    Highest education level: Not on file   Occupational History    Not on file   Social Needs    Financial resource strain: Not on file    Food insecurity     Worry: Not on file     Inability: Not on file    Transportation needs     Medical: Not on file     Non-medical: Not on file   Tobacco Use    Smoking status: Passive Smoke Exposure - Never Smoker    Smokeless tobacco: Never Used   Substance and Sexual Activity    Alcohol use: No     Alcohol/week: 0.0 standard drinks    Drug use: No    Sexual activity: Never   Lifestyle    Physical activity     Days per week: Not on file Minutes per session: Not on file    Stress: Not on file   Relationships    Social connections     Talks on phone: Not on file     Gets together: Not on file     Attends Gnosticist service: Not on file     Active member of club or organization: Not on file     Attends meetings of clubs or organizations: Not on file     Relationship status: Not on file    Intimate partner violence     Fear of current or ex partner: Not on file     Emotionally abused: Not on file     Physically abused: Not on file     Forced sexual activity: Not on file   Other Topics Concern     Service Not Asked    Blood Transfusions Not Asked    Caffeine Concern Not Asked    Occupational Exposure Not Asked   Yessica [place] Hazards Not Asked    Sleep Concern Not Asked    Stress Concern Not Asked    Weight Concern Not Asked    Special Diet Not Asked    Back Care Not Asked    Exercise Not Asked    Bike Helmet Not Asked   [address] Not Asked    Self-Exams Not Asked   Social History Narrative    Not on file         ALLERGIES: Cardizem [diltiazem hcl]; Codeine; Darvocet a500 [propoxyphene n-acetaminophen]; Diltiazem; Labetalol; Pcn [penicillins]; Primidone; and Sulfa (sulfonamide antibiotics)    Review of Systems   Constitutional: Negative for chills and fever. HENT: Negative for ear pain and sore throat. Eyes: Negative for visual disturbance. Respiratory: Negative for cough and shortness of breath. Cardiovascular: Negative for chest pain. Gastrointestinal: Negative for abdominal pain. Genitourinary: Negative for flank pain. Musculoskeletal: Negative for back pain. Skin: Positive for wound. Negative for color change. Neurological: Negative for dizziness and headaches. Psychiatric/Behavioral: Negative for confusion. Vitals:    06/04/20 1722   BP: (!) 88/47   Pulse: 80   Resp: 16   Temp: 97.9 °F (36.6 °C)   SpO2: 95%            Physical Exam  Vitals signs and nursing note reviewed.    Constitutional: General: She is not in acute distress. Appearance: Normal appearance. She is not ill-appearing. HENT:      Head: Normocephalic and atraumatic. Mouth/Throat:      Pharynx: Oropharynx is clear. Eyes:      Extraocular Movements: Extraocular movements intact. Conjunctiva/sclera: Conjunctivae normal.   Neck:      Musculoskeletal: No neck rigidity. Cardiovascular:      Rate and Rhythm: Normal rate and regular rhythm. Pulmonary:      Effort: Pulmonary effort is normal.      Breath sounds: Normal breath sounds. Abdominal:      Palpations: Abdomen is soft. Tenderness: There is no abdominal tenderness. Musculoskeletal: Normal range of motion. Skin:     General: Skin is warm and dry. Findings: Wound present. Comments: Visible necrotic tissue to all 5 right toes. Large ulcer on the dorsum of the foot. Erythema of the right foot extending proximally to the right mid calf. Sensation loss to the plantar aspect of all 5 toes. Remainder of foot is extremely tender. She is able to slightly move the right great toe, remainder of the toes she is unable to move. Unable to palpate pulses in the right foot. Neurological:      General: No focal deficit present. Mental Status: She is alert and oriented to person, place, and time. Psychiatric:         Mood and Affect: Mood normal.                      MDM  Number of Diagnoses or Management Options  Cellulitis of right foot:   Lactic acid increased:   Leukocytosis, unspecified type:   Diagnosis management comments: Patient is alert, normotensive, afebrile. She is able to speak with me in complete sentences without difficulty. Daughter is at bedside and aids in patient history. History of ongoing right foot cellulitis due to peripheral vascular disease, per chart review she had a right anterior tibial catheterization done by Dr. Kourtney Lawrence on April 15 and has been followed by podiatry with Dr. Sharron Ross and wound care since. Patient referred to ED today from wound care for worsening cellulitis. Necrosis to the right great toe, all 5 toes in the right foot, with blistering to the dorsum of the foot, and erythema to the foot tracking proximally to the right mid calf. Area is extremely tender to patient. Sensory deficits to the plantar aspects of all 5 toes. Unable to palpate pulses in the right foot. See attached photos for details. Plan: CBC, CMP, lactate, xray foot, podiatry consult, vascular consult, IVFs, IV antibiotics, monitor, admit to hospitalist service        Amount and/or Complexity of Data Reviewed  Clinical lab tests: reviewed  Tests in the radiology section of CPT®: reviewed  Tests in the medicine section of CPT®: reviewed      ED Course as of Jun 04 1902   Thu Jun 04, 2020 1856 LACTIC ACID(!!):    Lactic acid 2.2(!!) [EH]   1856 CBC WITH AUTOMATED DIFF(!):    WBC 13.4(!)   RBC 4.13   HGB 12.5   HCT 38.4   MCV 93.0   MCH 30.3   MCHC 32.6   RDW 12.3   PLATELET 222   MPV 02.2   NRBC 0.0   ABSOLUTE NRBC 0.00   NEUTROPHILS 88(!)   LYMPHOCYTES 5(!)   MONOCYTES 6   EOSINOPHILS 0   BASOPHILS 0   IMMATURE GRANULOCYTES 1(!)   ABS. NEUTROPHILS 11.8(!)   ABS. LYMPHOCYTES 0.7(!)   ABS. MONOCYTES 0.8   ABS. EOSINOPHILS 0.0   ABS. BASOPHILS 0.0   ABS. IMM. GRANS. 0.1(!)   DF SMEAR SCANNED   RBC COMMENTS NORMOCYTIC, NORMOCHROMIC [EH]   0540 METABOLIC PANEL, COMPREHENSIVE(!):    Sodium 130(!)   Potassium 3.9   Chloride 96(!)   CO2 24   Anion gap 10   Glucose 161(!)   BUN 33(!)   Creatinine 1.44(!)   BUN/Creatinine ratio 23(!)   GFR est AA 42(!)   GFR est non-AA 34(!)   Calcium 9.0   Bilirubin, total 1.1(!)   ALT 88(!)   (!)   Alk. phosphatase 121(!)   Protein, total 6.8   Albumin 2.8(!)   Globulin 4.0   A-G Ratio 0.7(!) [EH]   1900 Blood pressure       [EH]   1902 IMPRESSION: No plain film evidence of acute osteomyelitis. Dorsal soft tissue  swelling.    XR FOOT RT MIN 3 V [EH]      ED Course User Index  [EH] Loreto Thomas BAY DIAMOND       Hospitalist Perfect Serve for Admission  7:12 PM    ED Room Number: NG80/43  Patient Name and age:  Navid Dunn 80 y.o.  female  Working Diagnosis:   1. Cellulitis of right foot    2. Lactic acid increased    3. Leukocytosis, unspecified type        COVID-19 Suspicion:  no    Code Status:  Full Code  Readmission: no  Isolation Requirements:  no  Recommended Level of Care:  telemetry  Department:[facility] Adult ED - 21   Other:  Peripheral vascular disease with worsening right foot cellulitis. Vascular surgeon: Kaitlin Collins. Podiatry consulted, Dr. Hadley Mckee follows. IV cefepime and vancomycin started. CONSULT NOTE:  8:14 PM Domenico Barajas PA-C spoke with Dr. José Antonio David, Consult for podiatry. Discussed available diagnostic tests and clinical findings. He is in agreement with care plans as outlined. Dr. José Antonio David recommends consulting in house podiatry as he does not have privileges here. Patient is admitted to hospital for further work-up, observation, and management. Hospitalist notified and agrees. Patient informed of current management plan. All questions answered at this time. Will continue to monitor patient while in ED.          Procedures

## 2020-06-04 NOTE — ED TRIAGE NOTES
Sent from the wound care center for progression of her right foot wound. She is hypotensive in triage.

## 2020-06-04 NOTE — TELEPHONE ENCOUNTER
Verified patient with two types of identifiers. Spoke with Aram Schwab, verified on HIPAA. Aram Schwab states patient is on her way to the [facility] ER as directed by Wound Care MD. Patient has wound check post Biv PPM upgrade. Will notify MD. Will cancel appointment for tomorrow. Patient's duaghter verbalized understanding and will call with any other questions.

## 2020-06-04 NOTE — WOUND CARE
06/04/20 1545 Wound Foot Right;Distal  
Date First Assessed/Time First Assessed: 06/04/20 1443   Present on Hospital Admission: Yes  Location: Foot  Wound Location Orientation: Right;Distal  
Dressing Type Applied Gauze;ABD pad;Gauze wrap (alondra) (betadine ) Procedure Tolerated Fair Wound Heel Right #2 Date First Assessed/Time First Assessed: 05/12/20 0839   Present on Hospital Admission: Yes  Location: Heel  Wound Location Orientation: Right  Wound Description: #2 Dressing Type Applied Gauze;Gauze wrap (alondra) (betadine) Procedure Tolerated Fair Wound Toe (Comment  which one) Right  second toe #3 Date First Assessed/Time First Assessed: 06/01/20 1531   Present on Hospital Admission: Yes  Location: Toe (Comment  which one)  Wound Location Orientation: Right  Wound Description:  second toe #3 Dressing Type Applied Gauze;Gauze wrap (alondra) (betadine) Procedure Tolerated Fair Wound Toe (Comment  which one) Right 3rd toe#4 Date First Assessed/Time First Assessed: 06/01/20 1531   Present on Hospital Admission: Yes  Location: Toe (Comment  which one)  Wound Location Orientation: Right  Wound Description: 3rd toe#4 Dressing Type Applied Gauze;Gauze wrap (alondra) (betadine) Procedure Tolerated Fair Wound Toe (Comment  which one) Right 4th toe #5 Date First Assessed/Time First Assessed: 06/01/20 1533   Present on Hospital Admission: Yes  Location: Toe (Comment  which one)  Wound Location Orientation: Right  Wound Description: 4th toe #5 Dressing Type Applied Gauze;Gauze wrap (alondra) (betadine) Procedure Tolerated Fair Wound Toe (Comment  which one) Right 5th toe#6 Date First Assessed/Time First Assessed: 06/01/20 1534   Present on Hospital Admission: Yes  Location: Toe (Comment  which one)  Wound Location Orientation: Right  Wound Description: 5th toe#6 Dressing Type Applied Gauze;Gauze wrap (alondra) (betadine) Procedure Tolerated Fair Wound Toe (Comment  which one) Right Great Toe #1 Date First Assessed/Time First Assessed: 05/12/20 0838   Present on Hospital Admission: Yes  Location: Toe (Comment  which one)  Wound Location Orientation: Right  Wound Description: Great Toe #1 Dressing Type Applied Gauze;Gauze wrap (alondra) (betadine) Procedure Tolerated Fair Discharge Condition: Stable Pain: 6 Ambulatory Status: Wheelchair Discharge Destination: Home Transportation: Car Accompanied by: Family/Caregiver Discharge instructions reviewed with Patient and Family/Caregiver  and copy or written instructions have been provided. All questions/concerns have been addressed at this time.

## 2020-06-04 NOTE — WOUND CARE
06/04/20 1439 Wound Foot Right;Distal  
Date First Assessed/Time First Assessed: 06/04/20 1443   Present on Hospital Admission: Yes  Location: Foot  Wound Location Orientation: Right;Distal  
Dressing Status Old drainage Dressing Type  
(betadine gauze tape) Wound Length (cm) 10 cm Wound Width (cm) 22 cm Wound Depth (cm) 0.1 cm Wound Surface Area (cm^2) 220 cm^2 Wound Volume (cm^3) 22 cm^3 Condition of Base Purple;Slough;Eschar;White Cleansing and Cleansing Agents  Normal saline Wound Heel Right #2 Date First Assessed/Time First Assessed: 05/12/20 0839   Present on Hospital Admission: Yes  Location: Heel  Wound Location Orientation: Right  Wound Description: #2 Dressing Status Old drainage Dressing Type  
(betadine gauze tape) Wound Length (cm) 3.5 cm Wound Width (cm) 1.5 cm Wound Depth (cm) 0.1 cm Wound Surface Area (cm^2) 5.25 cm^2 Wound Volume (cm^3) 0.52 cm^3 Change in Wound Size % -9.38 Condition of Base Eschar Drainage Amount Small Drainage Color Serous Wound Odor None Visit Vitals BP 95/60 Pulse 70 Temp 97.8 °F (36.6 °C) Resp 16 LMP 02/26/1970

## 2020-06-04 NOTE — WOUND CARE
06/04/20 1439 Wound Foot Right;Distal  
Date First Assessed/Time First Assessed: 06/04/20 1443   Present on Hospital Admission: Yes  Location: Foot  Wound Location Orientation: Right;Distal  
Dressing Status Old drainage Dressing Type  
(betadine gauze tape) Wound Length (cm) 10 cm Wound Width (cm) 22 cm Wound Depth (cm) 0.1 cm Wound Surface Area (cm^2) 220 cm^2 Wound Volume (cm^3) 22 cm^3 Condition of Base Purple;Slough;Eschar;White Cleansing and Cleansing Agents  Normal saline Wound Heel Right #2 Date First Assessed/Time First Assessed: 05/12/20 0839   Present on Hospital Admission: Yes  Location: Heel  Wound Location Orientation: Right  Wound Description: #2 Dressing Status Old drainage Dressing Type  
(betadine gauze tape) Wound Length (cm) 3.5 cm Wound Width (cm) 1.5 cm Wound Depth (cm) 0.1 cm Wound Surface Area (cm^2) 5.25 cm^2 Wound Volume (cm^3) 0.52 cm^3 Change in Wound Size % -9.38 Condition of Base Eschar Drainage Amount Small Drainage Color Serous Wound Odor None Visit Vitals LMP 02/26/1970

## 2020-06-04 NOTE — DISCHARGE INSTRUCTIONS
Discharge Instructions for  [facility]  [address] [place], [address] [facility]  Telephone: [phone]    NAME:  Celena Chacon  YOB: 1932  ACCOUNT NUMBER :  [de-identified]  DATE:  6/4/2020    Home Health:  ALL ABOUT CARE    Wound Cleansing:   Do not scrub or use excessive force. Cleanse wound prior to applying a clean dressing with:      [] Cleanse wound with: {stacia cleansers:04122}     [] May Shower at Discharge     []  Shower on days of dressing change, remove dressing first    [x] Keep Wound Dry in Shower (may purchase a cast cover if needed)     Dressings:           Wound Location Right heel and all toes     [x] Apply Primary Dressing:       [x] Other: place betadine soaked gauze on toes and heel wound    [x] Cover and Secure with:     [x] Gauze [x] Johnny [] Kerlix   [] Ace Wrap [x] Cover Roll Tape [] ABD     [] Other:    Avoid contact of tape with skin.     [x] Change dressing: [x] Daily    [] Every Other Day [] Three times per week   [] Once a week [] Do Not Change Dressing   [] Other:      Off-Loading:   [x] Off-loading when [] walking  [x] in bed [x] sitting  [] Total non-weight bearing  [] Right Leg  [] Left Leg   [x] Assistive Device [] Walker [] Cane  [] Wheelchair  [] Crutches   [] Surgical shoe    [] Podus Boot(s)   [x] Foam Boot(s)  [] Roll About    [] Cast Boot []  Pretzel  [] Other:    Dietary:  [x] Increase Protein: examples ( Meat, cheese, eggs, greek yogurt, premier protein drink, fish, nuts )     Activity:  [x] Activity as tolerated:  [] Patient has no activity restrictions     [] Strict Bedrest: [] Remain off Work:     [] May return to full duty work:                                   [] Return to work with restrictions:             Return Appointment:    [] Nurse visit scheduled in *** day(s) or *** week(s)   [x] Return Appointment: With   [] Ordered tests: {stacia testing :85185}     Electronically signed on 6/4/2020    Wound Care Center Information: Should you experience any significant changes in your wound(s) or have questions about your wound care, please contact the [address] at [address] [place] [address] 8:00 am - 4:30. If you need help with your wound outside these hours and cannot wait until we are again available, contact your PCP or go to the hospital emergency room. PLEASE NOTE: IF YOU ARE UNABLE TO OBTAIN WOUND SUPPLIES, CONTINUE TO USE THE SUPPLIES YOU HAVE AVAILABLE UNTIL YOU ARE ABLE TO REACH US. IT IS MOST IMPORTANT TO KEEP THE WOUND COVERED AT ALL TIMES.      Physician Signature:_______________________ Dr. Zeenat Thomas

## 2020-06-05 ENCOUNTER — TELEPHONE (OUTPATIENT)
Dept: INTERNAL MEDICINE CLINIC | Age: 85
End: 2020-06-05

## 2020-06-05 ENCOUNTER — DOCUMENTATION ONLY (OUTPATIENT)
Dept: CARDIOLOGY CLINIC | Age: 85
End: 2020-06-05

## 2020-06-05 PROBLEM — I96 GANGRENE OF FOOT (HCC): Status: ACTIVE | Noted: 2020-06-05

## 2020-06-05 LAB
ANION GAP SERPL CALC-SCNC: 9 MMOL/L (ref 5–15)
BASOPHILS # BLD: 0 K/UL (ref 0–0.1)
BASOPHILS NFR BLD: 0 % (ref 0–1)
BUN SERPL-MCNC: 27 MG/DL (ref 6–20)
BUN/CREAT SERPL: 25 (ref 12–20)
CALCIUM SERPL-MCNC: 8.4 MG/DL (ref 8.5–10.1)
CHLORIDE SERPL-SCNC: 102 MMOL/L (ref 97–108)
CO2 SERPL-SCNC: 23 MMOL/L (ref 21–32)
CREAT SERPL-MCNC: 1.09 MG/DL (ref 0.55–1.02)
DIFFERENTIAL METHOD BLD: ABNORMAL
EOSINOPHIL # BLD: 0.1 K/UL (ref 0–0.4)
EOSINOPHIL NFR BLD: 1 % (ref 0–7)
ERYTHROCYTE [DISTWIDTH] IN BLOOD BY AUTOMATED COUNT: 12.1 % (ref 11.5–14.5)
GLUCOSE BLD STRIP.AUTO-MCNC: 108 MG/DL (ref 65–100)
GLUCOSE BLD STRIP.AUTO-MCNC: 137 MG/DL (ref 65–100)
GLUCOSE BLD STRIP.AUTO-MCNC: 140 MG/DL (ref 65–100)
GLUCOSE BLD STRIP.AUTO-MCNC: 142 MG/DL (ref 65–100)
GLUCOSE BLD STRIP.AUTO-MCNC: 163 MG/DL (ref 65–100)
GLUCOSE BLD STRIP.AUTO-MCNC: 99 MG/DL (ref 65–100)
GLUCOSE SERPL-MCNC: 124 MG/DL (ref 65–100)
HCT VFR BLD AUTO: 36 % (ref 35–47)
HGB BLD-MCNC: 11.7 G/DL (ref 11.5–16)
IMM GRANULOCYTES # BLD AUTO: 0.1 K/UL (ref 0–0.04)
IMM GRANULOCYTES NFR BLD AUTO: 1 % (ref 0–0.5)
LACTATE SERPL-SCNC: 1.8 MMOL/L (ref 0.4–2)
LYMPHOCYTES # BLD: 1.1 K/UL (ref 0.8–3.5)
LYMPHOCYTES NFR BLD: 10 % (ref 12–49)
MCH RBC QN AUTO: 30.3 PG (ref 26–34)
MCHC RBC AUTO-ENTMCNC: 32.5 G/DL (ref 30–36.5)
MCV RBC AUTO: 93.3 FL (ref 80–99)
MONOCYTES # BLD: 0.8 K/UL (ref 0–1)
MONOCYTES NFR BLD: 7 % (ref 5–13)
NEUTS SEG # BLD: 9.2 K/UL (ref 1.8–8)
NEUTS SEG NFR BLD: 81 % (ref 32–75)
NRBC # BLD: 0 K/UL (ref 0–0.01)
NRBC BLD-RTO: 0 PER 100 WBC
PLATELET # BLD AUTO: 152 K/UL (ref 150–400)
PMV BLD AUTO: 12 FL (ref 8.9–12.9)
POTASSIUM SERPL-SCNC: 3.7 MMOL/L (ref 3.5–5.1)
RBC # BLD AUTO: 3.86 M/UL (ref 3.8–5.2)
SERVICE CMNT-IMP: ABNORMAL
SERVICE CMNT-IMP: NORMAL
SODIUM SERPL-SCNC: 134 MMOL/L (ref 136–145)
WBC # BLD AUTO: 11.3 K/UL (ref 3.6–11)

## 2020-06-05 PROCEDURE — 74011000258 HC RX REV CODE- 258: Performed by: FAMILY MEDICINE

## 2020-06-05 PROCEDURE — 74011250636 HC RX REV CODE- 250/636: Performed by: NURSE PRACTITIONER

## 2020-06-05 PROCEDURE — 74011636637 HC RX REV CODE- 636/637: Performed by: FAMILY MEDICINE

## 2020-06-05 PROCEDURE — 65660000000 HC RM CCU STEPDOWN

## 2020-06-05 PROCEDURE — 85025 COMPLETE CBC W/AUTO DIFF WBC: CPT

## 2020-06-05 PROCEDURE — 74011250637 HC RX REV CODE- 250/637: Performed by: FAMILY MEDICINE

## 2020-06-05 PROCEDURE — 80048 BASIC METABOLIC PNL TOTAL CA: CPT

## 2020-06-05 PROCEDURE — 94760 N-INVAS EAR/PLS OXIMETRY 1: CPT

## 2020-06-05 PROCEDURE — 65270000029 HC RM PRIVATE

## 2020-06-05 PROCEDURE — 36415 COLL VENOUS BLD VENIPUNCTURE: CPT

## 2020-06-05 PROCEDURE — 82962 GLUCOSE BLOOD TEST: CPT

## 2020-06-05 PROCEDURE — 74011250636 HC RX REV CODE- 250/636: Performed by: FAMILY MEDICINE

## 2020-06-05 RX ORDER — ATORVASTATIN CALCIUM 10 MG/1
10 TABLET, FILM COATED ORAL
Status: DISCONTINUED | OUTPATIENT
Start: 2020-06-05 | End: 2020-06-15 | Stop reason: HOSPADM

## 2020-06-05 RX ORDER — PSEUDOEPHED/ACETAMINOPHEN/CPM 30-500-2MG
2 TABLET ORAL
Status: DISCONTINUED | OUTPATIENT
Start: 2020-06-05 | End: 2020-06-15 | Stop reason: HOSPADM

## 2020-06-05 RX ORDER — SODIUM CHLORIDE 0.9 % (FLUSH) 0.9 %
5-40 SYRINGE (ML) INJECTION EVERY 8 HOURS
Status: DISCONTINUED | OUTPATIENT
Start: 2020-06-05 | End: 2020-06-15 | Stop reason: HOSPADM

## 2020-06-05 RX ORDER — CARVEDILOL 3.12 MG/1
3.12 TABLET ORAL 2 TIMES DAILY WITH MEALS
Status: DISCONTINUED | OUTPATIENT
Start: 2020-06-05 | End: 2020-06-15 | Stop reason: HOSPADM

## 2020-06-05 RX ORDER — ONDANSETRON 2 MG/ML
4 INJECTION INTRAMUSCULAR; INTRAVENOUS
Status: DISCONTINUED | OUTPATIENT
Start: 2020-06-05 | End: 2020-06-15 | Stop reason: HOSPADM

## 2020-06-05 RX ORDER — SERTRALINE HYDROCHLORIDE 50 MG/1
50 TABLET, FILM COATED ORAL EVERY EVENING
Status: DISCONTINUED | OUTPATIENT
Start: 2020-06-05 | End: 2020-06-15 | Stop reason: HOSPADM

## 2020-06-05 RX ORDER — OXYCODONE HYDROCHLORIDE 5 MG/1
5-10 TABLET ORAL
Status: DISCONTINUED | OUTPATIENT
Start: 2020-06-05 | End: 2020-06-07

## 2020-06-05 RX ORDER — SODIUM CHLORIDE 0.9 % (FLUSH) 0.9 %
5-40 SYRINGE (ML) INJECTION AS NEEDED
Status: DISCONTINUED | OUTPATIENT
Start: 2020-06-05 | End: 2020-06-15 | Stop reason: HOSPADM

## 2020-06-05 RX ORDER — OXYCODONE HYDROCHLORIDE 5 MG/1
5 TABLET ORAL
Status: DISCONTINUED | OUTPATIENT
Start: 2020-06-05 | End: 2020-06-05 | Stop reason: SDUPTHER

## 2020-06-05 RX ORDER — SODIUM CHLORIDE 9 MG/ML
75 INJECTION, SOLUTION INTRAVENOUS CONTINUOUS
Status: DISCONTINUED | OUTPATIENT
Start: 2020-06-05 | End: 2020-06-05

## 2020-06-05 RX ORDER — BALSAM PERU/CASTOR OIL
OINTMENT (GRAM) TOPICAL EVERY 8 HOURS
Status: DISCONTINUED | OUTPATIENT
Start: 2020-06-05 | End: 2020-06-15 | Stop reason: HOSPADM

## 2020-06-05 RX ORDER — ACETAMINOPHEN 325 MG/1
650 TABLET ORAL
Status: DISCONTINUED | OUTPATIENT
Start: 2020-06-05 | End: 2020-06-08

## 2020-06-05 RX ORDER — FAMOTIDINE 20 MG/1
20 TABLET, FILM COATED ORAL DAILY
Status: DISCONTINUED | OUTPATIENT
Start: 2020-06-05 | End: 2020-06-12

## 2020-06-05 RX ORDER — LEVOTHYROXINE SODIUM 88 UG/1
88 TABLET ORAL
Status: DISCONTINUED | OUTPATIENT
Start: 2020-06-05 | End: 2020-06-15 | Stop reason: HOSPADM

## 2020-06-05 RX ORDER — LIDOCAINE 4 G/100G
PATCH TOPICAL DAILY PRN
Status: DISCONTINUED | OUTPATIENT
Start: 2020-06-05 | End: 2020-06-15 | Stop reason: HOSPADM

## 2020-06-05 RX ORDER — VANCOMYCIN HYDROCHLORIDE
1250 ONCE
Status: DISCONTINUED | OUTPATIENT
Start: 2020-06-05 | End: 2020-06-05 | Stop reason: DRUGHIGH

## 2020-06-05 RX ADMIN — Medication 10 ML: at 23:26

## 2020-06-05 RX ADMIN — Medication 10 ML: at 14:00

## 2020-06-05 RX ADMIN — OXYCODONE 5 MG: 5 TABLET ORAL at 05:53

## 2020-06-05 RX ADMIN — CEFEPIME HYDROCHLORIDE 2 G: 2 INJECTION, POWDER, FOR SOLUTION INTRAVENOUS at 18:26

## 2020-06-05 RX ADMIN — METRONIDAZOLE 500 MG: 500 INJECTION, SOLUTION INTRAVENOUS at 01:34

## 2020-06-05 RX ADMIN — ATORVASTATIN CALCIUM 10 MG: 10 TABLET, FILM COATED ORAL at 23:14

## 2020-06-05 RX ADMIN — HEPARIN SODIUM 5000 UNITS: 5000 INJECTION INTRAVENOUS; SUBCUTANEOUS at 18:27

## 2020-06-05 RX ADMIN — METRONIDAZOLE 500 MG: 500 INJECTION, SOLUTION INTRAVENOUS at 12:00

## 2020-06-05 RX ADMIN — CASTOR OIL AND BALSAM, PERU: 788; 87 OINTMENT TOPICAL at 19:56

## 2020-06-05 RX ADMIN — ACETAMINOPHEN 650 MG: 325 TABLET, FILM COATED ORAL at 16:23

## 2020-06-05 RX ADMIN — HYDROMORPHONE HYDROCHLORIDE 1 MG: 1 INJECTION, SOLUTION INTRAMUSCULAR; INTRAVENOUS; SUBCUTANEOUS at 18:33

## 2020-06-05 RX ADMIN — OXYCODONE 5 MG: 5 TABLET ORAL at 11:50

## 2020-06-05 RX ADMIN — INSULIN LISPRO 2 UNITS: 100 INJECTION, SOLUTION INTRAVENOUS; SUBCUTANEOUS at 12:40

## 2020-06-05 RX ADMIN — OXYCODONE 5 MG: 5 TABLET ORAL at 23:14

## 2020-06-05 RX ADMIN — ATORVASTATIN CALCIUM 10 MG: 10 TABLET, FILM COATED ORAL at 01:34

## 2020-06-05 RX ADMIN — HYDROMORPHONE HYDROCHLORIDE 1 MG: 1 INJECTION, SOLUTION INTRAMUSCULAR; INTRAVENOUS; SUBCUTANEOUS at 00:07

## 2020-06-05 RX ADMIN — METRONIDAZOLE 500 MG: 500 INJECTION, SOLUTION INTRAVENOUS at 23:10

## 2020-06-05 RX ADMIN — HEPARIN SODIUM 5000 UNITS: 5000 INJECTION INTRAVENOUS; SUBCUTANEOUS at 01:33

## 2020-06-05 RX ADMIN — LEVOTHYROXINE SODIUM 88 MCG: 88 TABLET ORAL at 07:10

## 2020-06-05 RX ADMIN — HEPARIN SODIUM 5000 UNITS: 5000 INJECTION INTRAVENOUS; SUBCUTANEOUS at 11:48

## 2020-06-05 RX ADMIN — FAMOTIDINE 20 MG: 20 TABLET ORAL at 11:48

## 2020-06-05 RX ADMIN — ACETAMINOPHEN 650 MG: 325 TABLET, FILM COATED ORAL at 00:07

## 2020-06-05 RX ADMIN — ACETAMINOPHEN 650 MG: 325 TABLET, FILM COATED ORAL at 19:49

## 2020-06-05 RX ADMIN — VANCOMYCIN HYDROCHLORIDE 1000 MG: 1 INJECTION, POWDER, LYOPHILIZED, FOR SOLUTION INTRAVENOUS at 23:00

## 2020-06-05 RX ADMIN — SODIUM CHLORIDE 75 ML/HR: 900 INJECTION, SOLUTION INTRAVENOUS at 00:55

## 2020-06-05 RX ADMIN — CARVEDILOL 3.12 MG: 3.12 TABLET, FILM COATED ORAL at 11:48

## 2020-06-05 RX ADMIN — CARVEDILOL 3.12 MG: 3.12 TABLET, FILM COATED ORAL at 18:33

## 2020-06-05 RX ADMIN — OXYCODONE 5 MG: 5 TABLET ORAL at 16:23

## 2020-06-05 NOTE — PROGRESS NOTES
Daughter called my nurse and informed me that she would come to ER and may need amputation of leg  She has Medtronic biventricular pacemaker and LV Medtronic lead but RV lead is [employer]  No adjustment is needed if surgery is below umbilicus

## 2020-06-05 NOTE — H&P
[facility]  HISTORY AND PHYSICAL    Name:  Martell Braswell  MR#:  [id number]  :  1932  ACCOUNT #:  [de-identified]  ADMIT DATE:  2020      CHIEF COMPLAINT:  Foot pain. HISTORY OF PRESENT ILLNESS:  The patient is an 77-year-old female with past medical history of hyperlipidemia, atrial fibrillation, third-degree AV block, anxiety, depression, colon cancer, hypothyroidism, CKD stage III, osteopenia, ischemic cardiomyopathy, status post pacemaker placement, COPD, AAA, hypertension, osteoporosis, wound of the foot, bradycardia, dilated cardiomyopathy, type 2 diabetes, presents to the hospital with the above-mentioned symptom. History was obtained from the patient as well as her daughter. The daughter reports that the patient has had a wound in her leg for the last many months. The patient saw Dr. Carmelo Vera, underwent an angiogram about 3 weeks back. There was not one particular vessel that was identified in the right foot that could cause her symptoms. Recommendation made for below-knee amputation if gangrene persisted, but the patient refused. The patient was sent home, went for her wound care check today and they were concerned that her pain as well as her wound have gotten worse. There has been increased area of gangrenous tissue, and the patient was sent to the hospital for further management and evaluation. The patient's daughter reports that the patient does not have any fever per se. She has had some poor appetite, but no other complaints or problems. The patient's daughter denies the patient having any headache, blurry vision, sore throat, trouble swallowing, trouble with speech, chest pain, shortness of breath, cough, fever, chills, abdominal pain, constipation, diarrhea, urinary symptoms, focal or generalized neurological weakness, falls, injuries,  hematemesis, melena, hemoptysis, hematuria, or any other concerns or problems.   The patient reports that the patient has been tested \"many times for COVID-19 and all the times it has been negative. \"    PAST MEDICAL HISTORY:  See above. ALLERGIES:  CARDIZEM, CODEINE, DARVOCET, DILTIAZEM, LABETALOL, PENICILLIN, PRIMIDONE, AND SULFA. HOME MEDICATIONS:  Currently, the patient is on:  1. Oxycodone 5 mg every 4 hours as needed. 2.  Pepcid 20 mg b.i.d.  3.  Clindamycin 150 mg b.i.d.  4.  Zocor 20 mg nightly. 5.  Losartan 25 mg daily. 6.  Levothyroxine 88 mcg daily. 7.  Zoloft 50 mg daily. 8.  Coreg 3.125 mg b.i.d.  9.  Eliquis 5 mg b.i.d. 10.  Acetaminophen. 11.  Lidocaine 5% daily as needed. 12.  Baclofen 10 mg daily. 13.  Advair Diskus. SOCIAL HISTORY:  Denies tobacco abuse. No alcohol use or IV drug abuse. Lives at home. REVIEW OF SYSTEMS:  All systems were reviewed and found to be essentially negative except for the symptoms mentioned above. PHYSICAL EXAMINATION:  VITAL SIGNS:  Temperature 97.9, pulse 80, respiratory rate 25, blood pressure 112/48, pulse ox 98% on room air. GENERAL:  Alert x3, awake, mildly distressed, pleasant female, appears to be stated age. HEENT:  Pupils equal and reactive to light. Dry mucous membranes. Tympanic membranes clear. NECK:  Supple. No JVD. CHEST:  Clear to auscultation bilaterally. CORONARY:  S1 and S2 heard. ABDOMEN:  Soft, nontender, and nondistended. Bowel sounds are physiological.  EXTREMITIES:  Right lower extremity shows ulcer on the heel as well as significantly darkened toes. All the toes have extremely poor pulses. Left ankle and left leg somewhat cold to touch, not swollen, not warm. NEURO/PSYCH:  Pleasant mood and affect. Cranial nerves II-XII grossly intact. Decreased sensation in the bilateral legs today. Moves all four. Strength 5/5. SKIN:  Warm. LABORATORY DATA:  White count 13.4, hemoglobin 12.5, hematocrit 38.4, platelets 329.   Sodium 130, potassium 3.9, chloride 106, bicarb 24, anion gap 10, glucose 161, BUN , creatine 1.44, calcium 9.0.  Bili total 1.1, ALT 88, , alk phos 121, lactic acid 2.2. X-ray of the chest shows no evidence of acute osteomyelitis. ASSESSMENT AND PLAN:  1. Gangrene of the right foot. The patient will be admitted on telemetry bed. Start the patient on broad-spectrum IV antibiotics, vascular surgery consult. Continue Eliquis, pain control, supportive care. May consider getting ankle-brachial index . We will discuss with Vascular Surgery, get venous duplex. Further intervention per hospital course. Continue to closely monitor. Blood cultures were drawn. 2.  Hypotension, likely hypokalemia. We will provide gentle IV hydration. The patient does not appear to be septic, but does have mildly elevated lactic acid. Currently stable. We will hold antihypertensives. Continue to closely monitor. Further intervention per hospital course. Reassess as needed. 3.  Acute kidney injury, likely from gangrene of right foot and hypokalemia. Avoid nephrotoxic medications and renally dose all other medications. Continue to closely monitor. Further intervention per hospital course. Reassess as needed. We will trend creatinine, and if elevation persists, may consider getting renal imaging. 4.  Elevated liver function tests. We will get acetaminophen level, IV fluids, . We will get hepatitis panel and further intervention per hospital course. Continue to closely monitor. 5.  Diabetes. The patient will be on sliding scale NovoLog insulin, Accu-Cheks, diet control, and close monitoring. Further intervention per hospital course and reassess as needed. 6.  Chronic obstructive pulmonary disease. DuoNeb p.r.n. We will continue to closely monitor. Further intervention per hospital course. 7.  Atrial fibrillation. Continue home medication. We will hold Eliquis for now. Please see below. 8.  I had a discussion with Dr. Lulu Lutz from Vascular Surgery.   He recommends not getting a venous duplex done at this point of time and also holding the Eliquis as the patient will be going for surgery. We will do the same and we will continue to monitor on a telemetry bed. 9.  Hypothyroidism. Continue home medication. 10.  Gastrointestinal and deep vein thrombosis prophylaxis. The patient will be on heparin.         Lulu Connor MD      [id number]  D:  06/04/2020 20:49  T:  06/05/2020 0:26  JOB #:  [id number]

## 2020-06-05 NOTE — PROGRESS NOTES
Primary Nurse Chris Whitehead and Rigoberto Kaur RN performed a dual skin assessment on this patient Impairment noted- see wound doc flow sheet  Deric score is 16.  R foot wound, skin tear R buttock

## 2020-06-05 NOTE — PROGRESS NOTES
Day #1 of cefepime  Indication:  gangrene rt foot  Current regimen:  2 gm q8h  Abx regimen: metronidazole, cefepime  Recent Labs     20  1731   WBC 13.4*   CREA 1.44*   BUN 33*     Est CrCl: ~25 ml/min  Temp (24hrs), Av °F (36.7 °C), Min:97.8 °F (36.6 °C), Max:98.2 °F (36.8 °C)      Plan: Change to 2 gm q24hr per renal dosing protocol for creatinine clearance 11-29 ml/min. Pharmacy to monitor daily     **close i-vent after initial review. Remove this text prior to posting to note.

## 2020-06-05 NOTE — PROGRESS NOTES
TRANSFER - IN REPORT:    Verbal report received from [place] (name) on Edi Cantu  being received from ED (unit) for routine progression of care      Report consisted of patients Situation, Background, Assessment and   Recommendations(SBAR). Information from the following report(s) SBAR, Kardex, Intake/Output, MAR and Recent Results was reviewed with the receiving nurse. Opportunity for questions and clarification was provided. Assessment completed upon patients arrival to unit and care assumed.

## 2020-06-05 NOTE — PROGRESS NOTES
Vascular:    Patient declines amputation at this point. Please call if she reconsiders or has questions.

## 2020-06-05 NOTE — TELEPHONE ENCOUNTER
Attempted to call patient regarding mother's declining hospitalization. Did speak with wound care doctor this morning. Will try calling again later today or tomorrow.

## 2020-06-05 NOTE — PROGRESS NOTES
Pharmacist Note - Vancomycin Dosing    Consult provided for this 80 y.o. female for indication of worsening cellulitis and necrosis to the right great toe. Antibiotic regimen(s): Vanc + Cefepime + Metronidazole    Recent Labs     20  1731   WBC 13.4*   CREA 1.44*   BUN 33*     Height: 160 cm  Weight: 71 kg  Est CrCl: 26 ml/min  Temp (24hrs), Av °F (36.7 °C), Min:97.8 °F (36.6 °C), Max:98.2 °F (36.8 °C)    Goal trough = 10 - 15 mcg/mL    Therapy will be initiated with a loading dose of 1750 mg IV x 1  Next dose vs level after review of am labs (SrCr is elevated from recent admission)  Pharmacy to follow patient daily and order levels / make dose adjustments as appropriate.

## 2020-06-05 NOTE — PROGRESS NOTES
Bedside shift change report given to Anisa (oncoming nurse) by Khris Bean (offgoing nurse). Report included the following information SBAR, Kardex, Intake/Output, MAR and Recent Results.

## 2020-06-05 NOTE — PROGRESS NOTES
Evaluated pt in ED due to increased pain R LE. Pt is adamant that she NOT have an amputation despite long discussion of risks and benefits. Pt verbalized understanding that her condition is likely not going to resolve w medical treatment alone and that she is at significant risk of worsening. Pt expressed desire to be a DNR but would like comfort and supportive measures. Daughter Nathanael Vallecillo at bedside and was part of discussion. Her questions were answered and she verbalizes understanding of all. She can be reached at [phone] if necessary.

## 2020-06-05 NOTE — CONSULTS
Biventricular pacemaker is checked and showed proper function   Dependent   Both RV and LV leads have pacing threshold under 1.25 V @ 0.4 ms

## 2020-06-05 NOTE — PROGRESS NOTES
Wound Center  ProgressNote     Subjective:     Chief Complaint:  Phan Tyson is a 80 y.o.  female  with R/ foot wound of few months duration. HPI:     Patient known to clinic for 1+ month  Started with gangrene of great toe  But last few weeks acute progression to entire forefoot being affected  HBO being considered as an option for limb salvage  A1c done early this week with A1c indicative of DM2, so patient has had DM2 for at least 3 months    Today foot is worse  Hence fit into my schedule to be seen    Wound caused by: PAD  Current wound care: betadine wet to dry  Offloading wound: yes  Elevating legs: yes    Appetite: poor  Wound associated pain: intense  Diabetic: yes  Smoker: no  ROS: no N/V/D, no T/chills; no local rash, no chest pain or shortness of breath, no headache or dizzyness    Review of Systems:  A comprehensive review of systems was negative except for that written in the History of Present Illness. Objective:     Physical Exam:   See flowsheet / nursing notes for vitals  Visit Vitals  BP 95/60   Pulse 70   Temp 97.8 °F (36.6 °C)   Resp 16     General: NAD. Hygiene good, patient in mild distress due to pain in foot. Also appears VERY pale  Psych: calm  Neuro: alert and oriented to person/place/situation. Otherwise nonfocal.  Derm: Normal  turgor for age, dry skin  Lower extremities: color normal; temperature normal. Hair growth is not present. Calves are supple, nontender, approximately equally sized in comparison. R/ foot- wound with all toes afected either gangrene or pre gangrene extending to forefoot  'wet gangrene - open draining blisters of forefoot  See picture  Foot cold and pale           Data Review:   Angio 4/24 by Dr Kriss Quiroga  The patient had good blood flow from the femoral down to the anterior tibial artery but an abrupt cutoff just above the ankle with no nameable foot vessels seen.              Assessment/Plan     80 y.o. female with severePAD from ankle and below    Gangrene, wet gangrene, Diabetic Ramirez grade 4 of right foot  Severe PAD    Patient showing signs of early sepsis- very pale, low BP    Long discussion with patient and daughter   has no blood flow below the ankle  HBO along will not help  Has f/u with Dr Javid Hood - second vascular opinion one week from today    Given signs of sepsis and wet gangrene, patient needs to go to ER now  Unfortuntely, only option is amputation    Patient adamantly against amputation    Discussed in detail and multiple times/ways    First adamant against going to hosp- preferred waiting till next Thursday for vascular appt  Then when did agree- were trying to figure out which hosp to go to as they wanted a someone other than Dr Isra Dawkins to see the foot so they could get a second opinion    Finally agreed to go to Er- [facility] be going [facility]  I called PCP and discussed this with him as well    72 min spent with patient/sdtr (>50% of which in counseling)          Procedure:     n/a        -------    Past Medical History:   Diagnosis Date    Arthritis     Atrial fibrillation ([address]     av node ablation 5/22/98 with placement of CPI #[id number] dual chamber pacemaker subsequently replaced with a single chamber medtronic #[id number] single chamber pacemaker 7/25/05    Cancer ([address] 1970's    colon cancer w/ resection    COPD     Depression     GERD (gastroesophageal reflux disease)     History of vascular access device 04/17/2020    4F MIDLINE PLACED BY EMIL Prince RN LEFT BRACHIAL, 13CM    Hyperlipidemia     Hypertension     Ischemic cardiomyopathy     Migraine headache     Orthostatic hypotension     RADHA (obstructive sleep apnea)     Pacemaker 5/22/98    AV node ablation /pacemaker placement utilizing CPI # [id number] dual chamber system, medtronic #[id number] single chamber device placed 7/22/05    Pulmonary hypertension ([address] 9/26/2011    RVSP 50 on echo 8/14    Thyroid disease     Valvular heart disease     mild-mod MR/TR      Past Surgical History:   Procedure Laterality Date    BREAST SURGERY PROCEDURE UNLISTED      benign breast tumor excision right breast    HX BREAST BIOPSY Right 2002    neg; surgical bx    HX COLECTOMY  1974    colon    HX KNEE REPLACEMENT      right TKA    HX PACEMAKER      HX PACEMAKER PLACEMENT  05/2020    HX TUBAL LIGATION      IR KYPHOPLASTY LUMBAR  7/2/2019    OR INSJ ELTRD CAR BYRON SYS TM INSJ DFB/PM PLS GEN N/A 5/22/2020    Lv Lead Placement performed by Eduard Hewitt MD at [address] CATH LAB    OR REMVL PERM PM PLS GEN W/REPL PLSE GEN MULT LEAD N/A 5/22/2020    REMOVE & REPLACE PPM GEN BIV MULTI LEADS performed by Eduard Hewitt MD at [address] CATH LAB    TOTAL KNEE ARTHROPLASTY      total on R, partial on L     Family History   Problem Relation Age of Onset    Diabetes Mother     Heart Disease Mother     Heart Attack Mother     Heart Disease Father     Stroke Sister     Breast Cancer Sister 80    Cancer Maternal Aunt         uterus    Diabetes Maternal Uncle     Breast Cancer Daughter 61      Social History     Tobacco Use    Smoking status: Passive Smoke Exposure - Never Smoker    Smokeless tobacco: Never Used   Substance Use Topics    Alcohol use: No     Alcohol/week: 0.0 standard drinks       Prior to Admission medications    Medication Sig Start Date End Date Taking? Authorizing Provider   oxyCODONE IR (ROXICODONE) 5 mg immediate release tablet Take 1 Tab by mouth every four (4) hours as needed for Pain for up to 30 days. Max Daily Amount: 30 mg. 6/4/20 7/4/20  Micky Jean-Baptiste MD   famotidine (PEPCID) 20 mg tablet Take 1 Tab by mouth two (2) times a day. 5/29/20   Micky Jean-Baptiste MD   clindamycin (CLEOCIN) 150 mg capsule Take 1 Cap by mouth three (3) times daily. Indications: new pacemaker 5/22/20   Eduard Hewitt MD   losartan (COZAAR) 25 mg tablet Take 1 Tab by mouth daily.  Indications: chronic heart failure 5/23/20   Eduard Hewitt MD   simvastatin (ZOCOR) 20 mg tablet Take by mouth nightly. Provider, Historical   levothyroxine (SYNTHROID) 88 mcg tablet Take 88 mcg by mouth Daily (before breakfast). Provider, Historical   Zoloft 50 mg tablet TAKE 1 TABLET BY MOUTH EVERY DAY 4/27/20   Khushi Wagner MD   carvediloL (COREG) 3.125 mg tablet TAKE 1 TABLET BY MOUTH TWICE A DAY WITH MEALS 4/23/20   iLzzeth Cheema III, MD   apixaban (Eliquis) 5 mg tablet Take 1 Tab by mouth two (2) times a day. 4/23/20   Tahmina Bautista MD   acetaminophen (TYLENOL) 500 mg tablet Take 500 mg by mouth nightly. Acetaminophen 1000 mg morning and afternoon, 500 mg at bedtime    Provider, Historical   furosemide (LASIX) 40 mg tablet TAKE 1 TABLET BY MOUTH DAILY. 9/19/19   Tahmina Bautista MD   multivitamins (CHEWABLE-AMOS) chew Take 1 Tab by mouth daily. Provider, Historical   lidocaine (LIDODERM) 5 % 1 Patch by TransDERmal route daily as needed. Apply patch to the affected area for 12 hours a day and remove for 12 hours a day. Provider, Historical   predniSONE (DELTASONE) 5 mg tablet Take 10 mg by mouth daily. Provider, Historical   albuterol (PROVENTIL HFA, VENTOLIN HFA, PROAIR HFA) 90 mcg/actuation inhaler Take 1 Puff by inhalation every six (6) hours as needed for Wheezing. Provider, Historical   baclofen (LIORESAL) 10 mg tablet Take 5 mg by mouth two (2) times a day. 11/2/17   Provider, Historical   ADVAIR DISKUS 250-50 mcg/dose diskus inhaler Take 1 Puff by inhalation two (2) times a day. 1/12/15   Provider, Historical   tiotropium (SPIRIVA WITH HANDIHALER) 18 mcg inhalation capsule Take 1 Cap by inhalation daily.     Provider, Historical     Allergies   Allergen Reactions    Cardizem [Diltiazem Hcl] Hives    Codeine Nausea and Vomiting    Darvocet A500 [Propoxyphene N-Acetaminophen] Nausea and Vomiting    Diltiazem Hives    Labetalol Nausea and Vomiting     Dizzy/Disoriented     Pcn [Penicillins] Swelling     Tongue Swelling   Has tolerated cephalexin recently 4/7/20     Primidone Other (comments)     Lightheaded/unsteady gait    Sulfa (Sulfonamide Antibiotics) Nausea and Vomiting          Signed By: Mansi Carney MD     June 5, 2020

## 2020-06-05 NOTE — WOUND CARE
Wound Care Note:  
 
New consult placed by nurse request for gangrene right foot, skin tear left arm Chart shows: 
Admitted for cellulitis Past Medical History:  
Diagnosis Date  Arthritis  Atrial [address] [address]   
 av node ablation 5/22/98 with placement of CPI #[id number] dual chamber pacemaker subsequently replaced with a single chamber medtronic #[id number] single chamber pacemaker 7/25/05  Cancer [facility]) [id number]  
 colon cancer w/ resection  COPD  Depression  GERD (gastroesophageal reflux disease)  History of vascular access device 04/17/2020  
 4F MIDLINE PLACED BY EMIL GUNN RN LEFT BRACHIAL, 13CM  Hyperlipidemia  Hypertension  Ischemic cardiomyopathy  Migraine headache  Orthostatic hypotension  RADHA (obstructive sleep apnea)  Pacemaker 5/22/98 AV node ablation /pacemaker placement utilizing CPI # [id number] dual chamber system, medtronic #[id number] single chamber device placed 7/22/05  Pulmonary hypertension ([facility] [address] 9/26/2011 RVSP 50 on echo 8/14  Thyroid disease  Valvular heart disease   
 mild-mod MR/TR  
 
WBC = 11.3 on 6/5/20 Admitted from [facility] Assessment:  
Patient is alert and talking, continent with some assistance needed in repositioning. Wound photography imported from media tab. Bed: [facility] Patient wearing briefs for incontinence. Diet: Cardiac regular Patient reports no pain. Left heel, bilateral buttocks, and sacral skin intact and with blanchable erythema. No palpable DP pulse in right foot. 1. POA right foot with wet gangrene to distal dorsal foot and dry gangrene to tips of all toes, dorsal foot with serous blister, scant serous drainage, foot with red/purple discoloration and discoloration extends to distal half of lower leg. Foot painted with Betadine, covered with gauze and roll gauze. 2. POA right heel with unstageable pressure injury, wound bed is black echar, no drainage, amber-wound with red/purple discoloration. Betadine, 4 x 4 and roll gauze applied. Patient is deciding if she wants her foot amputated, Dr. Shawanda Abdullahi saw patient this morning and she declined but advised this nurse she is still deciding. 3.  POA right buttock with small skin tear approximately 0.7 cm x 0.7 cm x 0.1 cm, wound bed is pink, no drainage, wound edges are open, amber-wound intact. Z guard paste to be ordered. Spoke with Dr. Ry Mack, wound care orders obtained. Assisted patient on and off bedpan where patient voided. Patient repositioned on left side, bed placed in chair position for comfort to right foot. Heels offloaded on pillows. Recommendations:   
Right foot/heel- Daily paint with Betadine, cover with 4 x 4's and secure with roll gauze and tape. Right buttock- Every 12 hours apply Z guard paste. Sacrum, bilateral buttocks and LEFT heel- Every 8 hours liberally apply Venelex ointment. Skin Care & Pressure Prevention: 
Minimize layers of linen/pads under patient to optimize support surface. Turn/reposition approximately every 2 hours and offload heels. Manage incontinence / promote continence Nourishing Skin Cream to dry skin, minimize use of briefs when able Discussed above plan with patient & MICHAEL Lange Transition of Care: Plan to follow as needed while admitted to hospital. 
 
SANGEETA Anne, RN, [facility], [facility]. 
office [phone] 
pager [phone] or call  to page

## 2020-06-05 NOTE — CONSULTS
Infectious Disease Consult Note    Reason for Consult: Antibiotic recommendation for concerns for foot infection on the right  Date of Consultation: June 5, 2020  Date of Admission: 6/4/2020  Referring Physician: Dr. Idalia Ferrell       HPI:      Ms Torsten Cook is a 80-year-old lady with a history of atrial fibrillation status post recent upgrade to biventricular pacemaker in May 2020, colon cancer with resection history, peripheral vascular disease who was admitted on 6/4/2018 with concerns of right foot pain and wound. . Patient is not a very good historian but says that she has had chronic issues with right foot. She recalls that she has had issues for the past 6 months. She admits to having pain in the right foot but cannot expand furthermore. She says she is mostly in the wheelchair and has not been ambulating. I called her daughter Ms Les Navarro to get more history but went to [facility] and could not reach. From chart review, she was  febrile with a T-max of 102.1 yesterday. Labs indicate leukocytosis that is improving with neutrophilic predominance. Creatinine is downtrending. She has elevated LFTs. Blood cultures on 6/4/2020 is pending. There are no wound cultures at this time. She is currently on vancomycin, cefepime and Flagyl. She has had right foot x-ray that has been read as no plain film evidence of osteomyelitis.     She denies any nausea vomiting diarrhea,  cough or upper respiratory symptoms    Of note her SARS-CoV-2 has been undetectable on 5/1 and 5/22/2020        Past Medical History:  Past Medical History:   Diagnosis Date    Arthritis     Atrial fibrillation ([address]     av node ablation 5/22/98 with placement of CPI #[id number] dual chamber pacemaker subsequently replaced with a single chamber medtronic #[id number] single chamber pacemaker 7/25/05    Cancer ([address] 1970's    colon cancer w/ resection    COPD     Depression     GERD (gastroesophageal reflux disease)     History of vascular access device 04/17/2020    4F MIDLINE PLACED BY EMIL Chavez RN LEFT BRACHIAL, 13CM    Hyperlipidemia     Hypertension     Ischemic cardiomyopathy     Migraine headache     Orthostatic hypotension     RADHA (obstructive sleep apnea)     Pacemaker 5/22/98    AV node ablation /pacemaker placement utilizing CPI # [id number] dual chamber system, medtronic #[id number] single chamber device placed 7/22/05    Pulmonary hypertension ([address] 9/26/2011    RVSP 50 on echo 8/14    Thyroid disease     Valvular heart disease     mild-mod MR/TR         Surgical History:  Past Surgical History:   Procedure Laterality Date    BREAST SURGERY PROCEDURE UNLISTED      benign breast tumor excision right breast    HX BREAST BIOPSY Right 2002    neg; surgical bx    HX COLECTOMY  1974    colon    HX KNEE REPLACEMENT      right TKA    HX PACEMAKER      HX PACEMAKER PLACEMENT  05/2020    HX TUBAL LIGATION      IR KYPHOPLASTY LUMBAR  7/2/2019    OR INSJ ELTRD CAR BYRON SYS TM INSJ DFB/PM PLS GEN N/A 5/22/2020    Lv Lead Placement performed by Naga Ambriz MD at [address] CATH LAB    OR REMVL PERM PM PLS GEN W/REPL PLSE GEN MULT LEAD N/A 5/22/2020    REMOVE & REPLACE PPM GEN BIV MULTI LEADS performed by Naga Ambriz MD at [address] CATH LAB    TOTAL KNEE ARTHROPLASTY      total on R, partial on L         Family History:   Family History   Problem Relation Age of Onset    Diabetes Mother     Heart Disease Mother     Heart Attack Mother     Heart Disease Father     Stroke Sister     Breast Cancer Sister 80    Cancer Maternal Aunt         uterus    Diabetes Maternal Uncle     Breast Cancer Daughter 61         Social History:     Patient denies alcohol, smoking or illicit substance abuse    Allergies:   Allergies   Allergen Reactions    Cardizem [Diltiazem Hcl] Hives    Codeine Nausea and Vomiting    Darvocet A500 [Propoxyphene N-Acetaminophen] Nausea and Vomiting    Diltiazem Hives    Labetalol Nausea and Vomiting Dizzy/Disoriented     Pcn [Penicillins] Swelling     Tongue Swelling   Has tolerated cephalexin recently 4/7/20     Primidone Other (comments)     Lightheaded/unsteady gait    Sulfa (Sulfonamide Antibiotics) Nausea and Vomiting         Review of Systems:    Attempted to obtain 10 point review of systems  Chronic right foot and pain, fevers  Pertinent positives included HPI  Otherwise negative        Medications:  No current facility-administered medications on file prior to encounter. Current Outpatient Medications on File Prior to Encounter   Medication Sig Dispense Refill    oxyCODONE IR (ROXICODONE) 5 mg immediate release tablet Take 1 Tab by mouth every four (4) hours as needed for Pain for up to 30 days. Max Daily Amount: 30 mg. 120 Tab 0    famotidine (PEPCID) 20 mg tablet Take 1 Tab by mouth two (2) times a day. 180 Tab 1    clindamycin (CLEOCIN) 150 mg capsule Take 1 Cap by mouth three (3) times daily. Indications: new pacemaker 15 Cap 0    losartan (COZAAR) 25 mg tablet Take 1 Tab by mouth daily. Indications: chronic heart failure 30 Tab 3    simvastatin (ZOCOR) 20 mg tablet Take  by mouth nightly.  levothyroxine (SYNTHROID) 88 mcg tablet Take 88 mcg by mouth Daily (before breakfast).  Zoloft 50 mg tablet TAKE 1 TABLET BY MOUTH EVERY DAY 90 Tab 1    carvediloL (COREG) 3.125 mg tablet TAKE 1 TABLET BY MOUTH TWICE A DAY WITH MEALS 180 Tab 1    apixaban (Eliquis) 5 mg tablet Take 1 Tab by mouth two (2) times a day. 180 Tab 1    acetaminophen (TYLENOL) 500 mg tablet Take 500 mg by mouth nightly. Acetaminophen 1000 mg morning and afternoon, 500 mg at bedtime      furosemide (LASIX) 40 mg tablet TAKE 1 TABLET BY MOUTH DAILY. 90 Tab 2    multivitamins (CHEWABLE-AMOS) chew Take 1 Tab by mouth daily.  lidocaine (LIDODERM) 5 % 1 Patch by TransDERmal route daily as needed. Apply patch to the affected area for 12 hours a day and remove for 12 hours a day.        predniSONE (DELTASONE) 5 mg tablet Take 10 mg by mouth daily.  albuterol (PROVENTIL HFA, VENTOLIN HFA, PROAIR HFA) 90 mcg/actuation inhaler Take 1 Puff by inhalation every six (6) hours as needed for Wheezing.  baclofen (LIORESAL) 10 mg tablet Take 5 mg by mouth two (2) times a day.  ADVAIR DISKUS 250-50 mcg/dose diskus inhaler Take 1 Puff by inhalation two (2) times a day.  tiotropium (SPIRIVA WITH HANDIHALER) 18 mcg inhalation capsule Take 1 Cap by inhalation daily.          Current Facility-Administered Medications:     carvediloL (COREG) tablet 3.125 mg, 3.125 mg, Oral, BID WITH MEALS, Jose Raul Regalado MD, 3.125 mg at 06/05/20 1148    famotidine (PEPCID) tablet 20 mg, 20 mg, Oral, DAILY, Pineda Cartagena MD, 20 mg at 06/05/20 1148    levothyroxine (SYNTHROID) tablet 88 mcg, 88 mcg, Oral, ACB, Pineda Cartagena MD, 88 mcg at 06/05/20 0710    lidocaine 4 % patch, , TransDERmal, DAILY PRN, Pineda Cartagena MD    oxyCODONE IR (ROXICODONE) tablet 5 mg, 5 mg, Oral, Q4H PRN, Pineda Cartagena MD, 5 mg at 06/05/20 1150    atorvastatin (LIPITOR) tablet 10 mg, 10 mg, Oral, QHS, Julien Gray MD, 10 mg at 06/05/20 0134    sertraline (ZOLOFT) tablet 50 mg, 50 mg, Oral, QPM, Pineda Cartagena MD    sodium chloride (NS) flush 5-40 mL, 5-40 mL, IntraVENous, Q8H, Jose Raul Gray MD    sodium chloride (NS) flush 5-40 mL, 5-40 mL, IntraVENous, PRN, Pineda Cartagena MD    acetaminophen (TYLENOL) tablet 650 mg, 650 mg, Oral, Q4H PRN, Pineda Cartagena MD, 650 mg at 06/05/20 0007    ondansetron (ZOFRAN) injection 4 mg, 4 mg, IntraVENous, Q4H PRN, [place]petrona Goodrich NP    vancomycin (VANCOCIN) 1,000 mg in 0.9% sodium chloride (MBP/ADV) 250 mL, 1,000 mg, IntraVENous, Q24H, Matt Nunez MD    balsam peru-castor oiL (VENELEX) ointment, , Topical, Q8H, Matt Nunez MD    cefepime (MAXIPIME) 2 g in 0.9% sodium chloride (MBP/ADV) 100 mL, 2 g, IntraVENous, Q24H, Jose Raul Gray MD    metroNIDAZOLE (FLAGYL) IVPB premix 500 mg, 500 mg, IntraVENous, Q12H, Lisbet Barraza MD, Last Rate: 100 mL/hr at 06/05/20 1200, 500 mg at 06/05/20 1200    glucose chewable tablet 16 g, 4 Tab, Oral, PRN, Lisbet Barraza MD    glucagon (GLUCAGEN) injection 1 mg, 1 mg, IntraMUSCular, PRN, Lisbet Barraza MD    dextrose 10% infusion 0-250 mL, 0-250 mL, IntraVENous, PRN, Lisbet Barraza MD    insulin lispro (HUMALOG) injection, , SubCUTAneous, Q6H, Lisbet Barraza MD, 2 Units at 06/05/20 1240    heparin (porcine) injection 5,000 Units, 5,000 Units, SubCUTAneous, Q8H, Lisbet Barraza MD, 5,000 Units at 06/05/20 1148    Vancomycin- pharmacy to dose, , Other, Rx Dosing/Monitoring, Lisbet Barraza MD    HYDROmorphone (PF) (DILAUDID) injection 1 mg, 1 mg, IntraVENous, Q3H PRN, [place] Nail, NP, 1 mg at 06/05/20 0007        Physical Exam:    Vitals:   Patient Vitals for the past 24 hrs:   Temp Pulse Resp BP SpO2   06/05/20 1452 98.2 °F (36.8 °C) 82 15 108/67 94 %   06/05/20 1145 98.4 °F (36.9 °C) 80 16 132/57 97 %   06/05/20 0235 98.7 °F (37.1 °C) 80 20 95/60 92 %   06/05/20 0219 98.6 °F (37 °C) 80 22 91/55 90 %   06/05/20 0102 98.1 °F (36.7 °C) 80 20 98/56 97 %   06/05/20 0051 98.1 °F (36.7 °C) 80 22 (!) 72/40 97 %   06/04/20 2330  81 23 149/54 96 %   06/04/20 2328 (!) 102.1 °F (38.9 °C)       06/04/20 2200  80  118/42 97 %   06/04/20 2100  81 28 136/73 97 %   06/04/20 2000  80 22 102/45 92 %   06/04/20 1913  80 25 112/48 98 %   06/04/20 1909    112/48    06/04/20 1900  80 25 104/43 94 %   06/04/20 1722 97.9 °F (36.6 °C) 80 16 (!) 88/47 95 %   ·   · GEN: NAD  · HEENT: No scleral icterus, no thrush  · CV: S1, S2 heard regularly, + bandage on the left chest wall, no erythema around the site  · Lungs: Clear to auscultation bilaterally anteriorly  · Abdomen: soft, non distended, non tender  · Extremities: no edema  · Neuro: Alert, oriented to self, situation, moves all extremities to commands, verbal   · Skin: no rash, mild bruising noted R lower extremitie  · Psych: Non-tearful  · MSK: Dressing right foot, pictures reviewed from yesterday that are available in the chart        Labs:   Recent Results (from the past 24 hour(s))   SAMPLES BEING HELD    Collection Time: 06/04/20  5:30 PM   Result Value Ref Range    SAMPLES BEING HELD 1 RED, 1 BLUE     COMMENT        Add-on orders for these samples will be processed based on acceptable specimen integrity and analyte stability, which may vary by analyte. CULTURE, BLOOD, PAIRED    Collection Time: 06/04/20  5:30 PM   Result Value Ref Range    Special Requests: NO SPECIAL REQUESTS      Culture result: NO GROWTH AFTER 15 HOURS     ACETAMINOPHEN    Collection Time: 06/04/20  5:30 PM   Result Value Ref Range    Acetaminophen level 8 (L) 10 - 30 ug/mL   CBC WITH AUTOMATED DIFF    Collection Time: 06/04/20  5:31 PM   Result Value Ref Range    WBC 13.4 (H) 3.6 - 11.0 K/uL    RBC 4.13 3.80 - 5.20 M/uL    HGB 12.5 11.5 - 16.0 g/dL    HCT 38.4 35.0 - 47.0 %    MCV 93.0 80.0 - 99.0 FL    MCH 30.3 26.0 - 34.0 PG    MCHC 32.6 30.0 - 36.5 g/dL    RDW 12.3 11.5 - 14.5 %    PLATELET 294 802 - 854 K/uL    MPV 12.0 8.9 - 12.9 FL    NRBC 0.0 0  WBC    ABSOLUTE NRBC 0.00 0.00 - 0.01 K/uL    NEUTROPHILS 88 (H) 32 - 75 %    LYMPHOCYTES 5 (L) 12 - 49 %    MONOCYTES 6 5 - 13 %    EOSINOPHILS 0 0 - 7 %    BASOPHILS 0 0 - 1 %    IMMATURE GRANULOCYTES 1 (H) 0.0 - 0.5 %    ABS. NEUTROPHILS 11.8 (H) 1.8 - 8.0 K/UL    ABS. LYMPHOCYTES 0.7 (L) 0.8 - 3.5 K/UL    ABS. MONOCYTES 0.8 0.0 - 1.0 K/UL    ABS. EOSINOPHILS 0.0 0.0 - 0.4 K/UL    ABS. BASOPHILS 0.0 0.0 - 0.1 K/UL    ABS. IMM.  GRANS. 0.1 (H) 0.00 - 0.04 K/UL    DF SMEAR SCANNED      RBC COMMENTS NORMOCYTIC, NORMOCHROMIC     METABOLIC PANEL, COMPREHENSIVE    Collection Time: 06/04/20  5:31 PM   Result Value Ref Range    Sodium 130 (L) 136 - 145 mmol/L    Potassium 3.9 3.5 - 5.1 mmol/L    Chloride 96 (L) 97 - 108 mmol/L    CO2 24 21 - 32 mmol/L    Anion gap 10 5 - 15 mmol/L Glucose 161 (H) 65 - 100 mg/dL    BUN 33 (H) 6 - 20 MG/DL    Creatinine 1.44 (H) 0.55 - 1.02 MG/DL    BUN/Creatinine ratio 23 (H) 12 - 20      GFR est AA 42 (L) >60 ml/min/1.73m2    GFR est non-AA 34 (L) >60 ml/min/1.73m2    Calcium 9.0 8.5 - 10.1 MG/DL    Bilirubin, total 1.1 (H) 0.2 - 1.0 MG/DL    ALT (SGPT) 88 (H) 12 - 78 U/L    AST (SGOT) 118 (H) 15 - 37 U/L    Alk. phosphatase 121 (H) 45 - 117 U/L    Protein, total 6.8 6.4 - 8.2 g/dL    Albumin 2.8 (L) 3.5 - 5.0 g/dL    Globulin 4.0 2.0 - 4.0 g/dL    A-G Ratio 0.7 (L) 1.1 - 2.2     HEMOGLOBIN A1C WITH EAG    Collection Time: 06/04/20  5:31 PM   Result Value Ref Range    Hemoglobin A1c 6.7 (H) 4.0 - 5.6 %    Est. average glucose 146 mg/dL   HEPATITIS PANEL, ACUTE    Collection Time: 06/04/20  5:31 PM   Result Value Ref Range    Hepatitis A, IgM NONREACTIVE NR      __          Hepatitis B surface Ag <0.10 Index    Hep B surface Ag Interp. Negative NEG      __          Hepatitis B core, IgM NONREACTIVE NR      __          Hep C  virus Ab Interp.  NONREACTIVE NR      Hep C  virus Ab comment Method used is Siemens Advia Centaur     LACTIC ACID    Collection Time: 06/04/20  5:50 PM   Result Value Ref Range    Lactic acid 2.2 (HH) 0.4 - 2.0 MMOL/L   GLUCOSE, POC    Collection Time: 06/04/20 11:43 PM   Result Value Ref Range    Glucose (POC) 137 (H) 65 - 100 mg/dL    Performed by Rosio Vergara    LACTIC ACID    Collection Time: 06/04/20 11:44 PM   Result Value Ref Range    Lactic acid 1.8 0.4 - 2.0 MMOL/L   GLUCOSE, POC    Collection Time: 06/05/20  1:28 AM   Result Value Ref Range    Glucose (POC) 142 (H) 65 - 100 mg/dL    Performed by Jo-Ann Narrow    METABOLIC PANEL, BASIC    Collection Time: 06/05/20  4:51 AM   Result Value Ref Range    Sodium 134 (L) 136 - 145 mmol/L    Potassium 3.7 3.5 - 5.1 mmol/L    Chloride 102 97 - 108 mmol/L    CO2 23 21 - 32 mmol/L    Anion gap 9 5 - 15 mmol/L    Glucose 124 (H) 65 - 100 mg/dL    BUN 27 (H) 6 - 20 MG/DL    Creatinine 1.09 (H) 0.55 - 1.02 MG/DL    BUN/Creatinine ratio 25 (H) 12 - 20      GFR est AA 57 (L) >60 ml/min/1.73m2    GFR est non-AA 47 (L) >60 ml/min/1.73m2    Calcium 8.4 (L) 8.5 - 10.1 MG/DL   CBC WITH AUTOMATED DIFF    Collection Time: 06/05/20  4:51 AM   Result Value Ref Range    WBC 11.3 (H) 3.6 - 11.0 K/uL    RBC 3.86 3.80 - 5.20 M/uL    HGB 11.7 11.5 - 16.0 g/dL    HCT 36.0 35.0 - 47.0 %    MCV 93.3 80.0 - 99.0 FL    MCH 30.3 26.0 - 34.0 PG    MCHC 32.5 30.0 - 36.5 g/dL    RDW 12.1 11.5 - 14.5 %    PLATELET 202 872 - 972 K/uL    MPV 12.0 8.9 - 12.9 FL    NRBC 0.0 0  WBC    ABSOLUTE NRBC 0.00 0.00 - 0.01 K/uL    NEUTROPHILS 81 (H) 32 - 75 %    LYMPHOCYTES 10 (L) 12 - 49 %    MONOCYTES 7 5 - 13 %    EOSINOPHILS 1 0 - 7 %    BASOPHILS 0 0 - 1 %    IMMATURE GRANULOCYTES 1 (H) 0.0 - 0.5 %    ABS. NEUTROPHILS 9.2 (H) 1.8 - 8.0 K/UL    ABS. LYMPHOCYTES 1.1 0.8 - 3.5 K/UL    ABS. MONOCYTES 0.8 0.0 - 1.0 K/UL    ABS. EOSINOPHILS 0.1 0.0 - 0.4 K/UL    ABS. BASOPHILS 0.0 0.0 - 0.1 K/UL    ABS. IMM. GRANS. 0.1 (H) 0.00 - 0.04 K/UL    DF AUTOMATED     GLUCOSE, POC    Collection Time: 06/05/20  6:08 AM   Result Value Ref Range    Glucose (POC) 108 (H) 65 - 100 mg/dL    Performed by Kentrell Aguilar    GLUCOSE, POC    Collection Time: 06/05/20 12:08 PM   Result Value Ref Range    Glucose (POC) 163 (H) 65 - 100 mg/dL    Performed by Shruthi Koehler        Microbiology Data:       Blood: 6/4/20  Specimen Information: Blood        Component Value Ref Range & Units Status   Special Requests: NO SPECIAL REQUESTS    Preliminary   Culture result: NO GROWTH AFTER 15 HOURS    Preliminary   Result History           Pathology Results: 5/23/13  FINAL PATHOLOGIC DIAGNOSIS  1. Large bowel, descending, biopsy:  Tubular adenoma. 2. Large bowel, rectum, biopsy:  Hyperplastic polyp. Imaging:   [facility] US 6/4/20  FINDINGS:  LIVER:   The liver is normal in echotexture with no mass or other focal abnormality.  The  portal vein flow is hepatopedal.     GALLBLADDER:  The gallbladder is normal. There is no wall thickening or fluid around the  gallbladder.      COMMON BILE DUCT:  There is no biliary duct dilatation and the common duct measures 4.4 mm in  diameter.      PANCREAS:  The pancreatic head is normal.     KIDNEYS:  The right kidney demonstrates normal echogenicity with no mass, stone or  hydronephrosis. The right kidney measures 8.9 cm in length.     The body and tail of the pancreas, left kidney, spleen and retroperitoneum were  not evaluated on this right upper quadrant examination.     IMPRESSION  IMPRESSION: Normal right upper quadrant ultrasound       R foot Xray 6/4/20  FINDINGS: Three views of the right foot demonstrate soft tissue swelling over  the dorsum of the distal metatarsals with no bone destruction, periosteal  reaction, fracture or other acute osseous or articular abnormality. The soft  tissues are otherwise within normal limits. No significant change in  osteoarthritic change or diffuse osteopenia.     IMPRESSION  IMPRESSION: No plain film evidence of acute osteomyelitis. Dorsal soft tissue  Swelling. CT ABD ART W RunOff 4/16/20   FINDINGS:   VESSELS:  Aorta - Atherosclerotic disease of the abdominal aorta without aneurysm. Ectatic  infrarenal abdominal aorta. Major branch vessels - No significant celiac stenosis. Moderate to severe SMA  stenosis proximally. Moderate bilateral renal artery stenosis. Right - Patent common, internal, and external iliac arteries without significant  stenosis. Patent common femoral and proximal superficial femoral arteries  without significant stenosis. Mild calcified and noncalcified plaque in the mid  facial femoral artery without significant stenosis. Popliteal artery partially  obscured by right knee hardware. Mild stenosis at the origin of the anterior  tibial artery. Severe stenosis at the origin of the posterior tibial artery. Proximally occluded peroneal artery.  Intermittent nonopacification of the  posterior tibial artery. Distally diseased anterior tibial artery in the distal  lower leg. One-vessel runoff to the right foot via the anterior tibial artery. Left - patent common iliac, internal and external iliac arteries without  significant stenosis. Patent common femoral, superficial and deep femoral  arteries. Mild mid and distal SFA disease. Popliteal artery is patent without  significant stenosis. Mild proximal anterior tibial artery stenosis. Segmental  nonopacification of the posterior tibial and peroneal arteries. One-vessel  runoff via the anterior tibial artery.     LUNG BASES: Lung bases within normal limits. Heart size is enlarged. No  pericardial effusion.     ABDOMEN: Liver and gallbladder are unremarkable. Spleen is unremarkable. Adrenal  glands are within normal limits. Kidneys demonstrate cortical thinning and  lobulation consistent with chronic vascular disease. Common bile duct is  nondilated. Pancreas is normal.  The stomach, small and large bowel are normal in caliber. No colonic wall  thickening. Appendix is not visualized. No mesenteric lymphadenopathy or  ascites.     PELVIS: Uterus is age-appropriate. No free fluid in the pelvis. Urinary bladder  is normal.     BONES: Right knee replacement. Left knee hardware. Severe osteoarthritic changes  of the hip joints bilaterally. Vertebral augmentation changes at L3.     IMPRESSION  IMPRESSION:  1. Predominantly infrapopliteal atherosclerotic disease bilaterally with  segmental nonopacification of the anterior tibial, posterior tibial, and  peroneal arteries bilaterally. 2.  One-vessel runoff to both feet via the anterior tibial arteries bilaterally. 3.  Ectatic but nonaneurysmal abdominal aorta. 4.  No acute pathology in the visualized chest, abdomen, or pelvis. TTE 3/5/20  Interpretation Summary     · Normal left ventricular cavity size. Upper normal wall thickness.  There is severe global hypokinesis with regional variations. Ejection fraction is estimated to be 20-25%. · Abnormal right ventricular septal motion consistent with RV pacemaker. Pacer/ICD present. · Severely dilated left atrium. · Moderately dilated right atrium. · Aortic valve leaflet calcification present with mild stenosis. Dimensionless index measures 0.436. Mild aortic valve regurgitation is present. · Mitral valve thickening. Mild mitral annular calcification. Mild to moderate mitral valve regurgitation is present. · Moderate tricuspid valve regurgitation is present. · Mild to moderate pulmonary hypertension. Pulmonary arterial systolic pressure is 45 mmHg. Procedures:   7/2/19  Successful kyphoplasty of L3 vertebral body. 5/2020 Biventricular pacemaker upgrage     Assessment / Plan:       Ms Angelique Herrera is a 51-year-old lady with a history of atrial fibrillation status post recent upgrade to biventricular pacemaker in May 2020, colon cancer with resection history, peripheral vascular disease who was admitted on 6/4/2018 with concerns of right foot pain and wound. 1) R foot wound, PAD, fever,   Appears to have necrotic areas concerning for circulatory compromise of toes  Without adequate perfusion, antibiotic delivery to the site not optimal  Noted she is being seen by vascular surgery and surgical plans per team  Ms Angelique Herrera tells me that she wants to think more about amputation  She is currently on vancomycin, cefepime and Flagyl  She is at high risk for adverse effects from antibiotics, C. difficile and renal failure  Please obtain wound cultures. Superficial cultures are likely going to be colonized but would strive to narrow or target therapy with antibiotics given risk for adverse effects  Pain control per primary team  Monitor renal function closely  Check blood cultures and if positive at risk for seeding cardiac device  Would recommend goals of care conversation given her age and comorbidities    2) A.  Fib   status post cardiac pacemaker with biventricular upgrade in May 2020    3) history of colon cancer status post resection    4) peripheral vascular disease    5) LFT elevation noted hepatitis panel sent and pending  Continue to monitor    6) DVT prophylaxis    Attempted to contact her daughter and I placed a call today. Call went to voicemail       Thank for the opportunity to participate in the care of this patient. Please contact with questions or concerns.      Candace Mckeon, DO  3:35 PM

## 2020-06-05 NOTE — PROGRESS NOTES
RUR 30%    MASHA: Patient lives at [facility] in independent living. Patient is wheelchair bound. Patient has caregivers 24/7 through [facility] who assist with ADLs. Patient was open with [facility] for home health SN, PT/OT prior to admission. Noted patient is declining an amputation at this time. Noted ID consult. Care Management Interventions  PCP Verified by CM: Yes  Mode of Transport at Discharge: Other (see comment)(TBD)  Transition of Care Consult (CM Consult): Discharge Planning  Discharge Durable Medical Equipment: (patient uses wheelchair at home)  Current Support Network: Other, Family Lives Nearby, Has Personal Caregivers(patient lives in independent living at [facility]. has private duty care 24/7)  Confirm Follow Up Transport: Other (see comment)(TBD)  The Patient and/or Patient Representative was Provided with a Choice of Provider and Agrees with the Discharge Plan?: Yes  Freedom of Choice List was Provided with Basic Dialogue that Supports the Patient's Individualized Plan of Care/Goals, Treatment Preferences and Shares the Quality Data Associated with the Providers?: Yes  Discharge Location  Discharge Placement: Other:(TBD)     Reason for Admission:   cellulitis               RUR Score:     30%    PCP: First and Last name: Gisela Jones MD   Name of Practice[place] [place]   Are you a current patient: Yes/No:yes   Approximate date of last visit: yesterday   Can you do a virtual visit with your PCP:              Resources/supports as identified by patient/family:   Patient has caregivers 24/7 through [facility]. Also has supportive family, daughter and son who lives locally and a daughter Emmanuel Reece in [place]. Top Challenges facing patient (as identified by patient/family and CM): Finances/Medication cost?      Patient has Medicare A&B and [employer] secondary. No issues affording meds. Transportation?  TBD.  Patient is wheelchair bound.              Support system or lack thereof? See above. Living arrangements? Lives at [facility] in independent living, has 24/7 caregivers. Self-care/ADLs/Cognition? Patient is alert and oriented. She needs assistance with all ADLs. Wheelchair bound. Current Advanced Directive/Advance Care Plan:  Patient is DNR. AMD on file. Plan for utilizing home health:   Patient was open with [facility] for [facility] prior to admission. Transition of Care Plan:       TBD. Chart reviewed. CM met with patient and daughter Rajeev Pastrana at bedside. Patient resides at [facility] in independent living. She has paid caregivers 24/7 through [employer]. Family is very involved. Daughter does medication management. Patient gets prescriptions at [facility] in [place]. CM received call from [facility] with [facility] #[phone]. This patient was open with [facility] for home health prior to admission. CM will continue to follow.     JESSE Rivas/CRM

## 2020-06-05 NOTE — CONSULTS
Cardiac Electrophysiology Hospital Consultation Note     Subjective:      Zenovia Meckel is a 80 y.o. patient who is seen for follow up s/p recent Medtronic biventricular pacemaker upgrade (05/22/2020, plugged atrial port, has [employer] RV lead). She was admitted at [facility] on 06/04/2020, has had lower extremity wound for many months. No single vessel identified for revascularization, has been undergoing wound care. Recommendation for below knee amputation had been made if gangrene persisted, but patient refused at that time. Since then, pain & wound have worsened, now with increased gangrenous tissue. There is again discussion of right BKA. She denies chest pain, palpitations, PND, orthopnea, or syncope. ICM with LVEF 20-25% in 03/2020 despite GDMT. NYHA III chronic systolic CHF. Anticoagulated with Eliquis, denies bleeding issues.       Previous:  Admitted at [facility] 04/15/2020-04/22/2020 for right foot ulcer, cellulitis, toe gangrene, PAD with poor pulse on right foot. Not septic, no osteomyelitis on xray. IV antibiotics given, discharged on Keflex po.     Abdominal aortogram with catheterization of right anterior tibial artery with RLE runoff. Good flow from femoral down to anterior tibial artery, but abrupt cutoff just above the ankle with no nameable foot vessels seen.     Echo (03/05/2020): LVEF 20-25%, upper normal wall thickness, severe global hypokinesis with regional variations. Severely dilated LA, mod dilated RA. Mild MAC, mild to mod MR. Mild AS, mild AR. Mod TR. Mild to mod PH.     ECG (06/29/2020): AS-, QRSd 170 ms.     Lexiscan cardiolite stress (08/24/2016): Large size, mod to high grade, partially reversible defect involving mid-distal anterior wall & apex. Mod extent ischemia LAD territory.   LVEF 35%, apical akinesis.     Dr. Soco Corbett is primary cardiologist.     Seen by Dr. Reanna Gunter (vascular).     S/p AV node ablation & PM implant 1998.     Lumbar fracture 07/2019, had kyphoplasty & injection.     Lives at [facility].         Problem List  Date Reviewed: 5/14/2020          Codes Class Noted    Bradycardia ICD-10-CM: R00.1  ICD-9-CM: 427.89  5/22/2020        Gangrene of foot ([facility] ICD-10-CM: H50  ICD-9-CM: 785.4  6/5/2020        Type 2 diabetes mellitus without complication, unspecified whether long term insulin use ([facility] ICD-10-CM: E11.9  ICD-9-CM: 250.00  6/4/2020        Type 2 diabetes with nephropathy ([facility] ICD-10-CM: E11.21  ICD-9-CM: 250.40, 583.81  6/4/2020        Type 2 diabetes mellitus with peripheral vascular disease ([facility] ICD-10-CM: E11.51  ICD-9-CM: 250.70, 443.81  6/4/2020        Cellulitis ICD-10-CM: L03.90  ICD-9-CM: 682.9  6/4/2020        Status post biventricular pacemaker ICD-10-CM: Z95.0  ICD-9-CM: V45.01  5/22/2020    Overview Signed 5/22/2020  1:14 PM by Nery Branch MD     5/22/2020 Medtronic             Biventricular cardiac pacemaker in situ ICD-10-CM: Z95.0  ICD-9-CM: V45.01  5/22/2020        Dilated cardiomyopathy ([facility] ICD-10-CM: I42.0  ICD-9-CM: 425.4  5/22/2020        Cellulitis of foot ICD-10-CM: L03.119  ICD-9-CM: 682.7  4/15/2020        Foot infection ICD-10-CM: L08.9  ICD-9-CM: 686.9  4/15/2020        Osteoporosis ICD-10-CM: M81.0  ICD-9-CM: 733.00  8/23/2019        Chronic midline low back pain without sciatica ICD-10-CM: M54.5, G89.29  ICD-9-CM: 724.2, 338.29  8/23/2019        Hematuria ICD-10-CM: R31.9  ICD-9-CM: 599.70  6/30/2019        Hypertension (Chronic) ICD-10-CM: I10  ICD-9-CM: 401.9  6/29/2019        AAA (abdominal aortic aneurysm) ([facility] (Chronic) ICD-10-CM: I71.4  ICD-9-CM: 441.4  6/29/2019        Constipation ICD-10-CM: K59.00  ICD-9-CM: 564.00  6/29/2019        Lumbar compression fracture ([facility] ICD-10-CM: S32.000A  ICD-9-CM: 805.4  6/29/2019        Long term systemic steroid user (Chronic) ICD-10-CM: Z79.52  ICD-9-CM: V58.65  3/21/2019        Frequent falls (Chronic) ICD-10-CM: R29.6  ICD-9-CM: V15.88  1/13/2019 COPD (chronic obstructive pulmonary disease) (HCC) (Chronic) ICD-10-CM: J44.9  ICD-9-CM: 496  1/13/2019        Chronic respiratory failure with hypoxia (HCC) (Chronic) ICD-10-CM: J96.11  ICD-9-CM: 518.83, 799.02  1/13/2019        Latent tuberculosis by blood test ICD-10-CM: Z22.7  ICD-9-CM: 790.6  1/22/2018        Pacemaker (Chronic) ICD-10-CM: Z95.0  ICD-9-CM: V45.01  7/24/2017        Psoriasis (Chronic) ICD-10-CM: L40.9  ICD-9-CM: 696.1  3/23/2017        Ischemic cardiomyopathy (Chronic) ICD-10-CM: I25.5  ICD-9-CM: 414.8  Unknown        Restrictive lung disease (Chronic) ICD-10-CM: J98.4  ICD-9-CM: 518.89  12/14/2015        Osteopenia ICD-10-CM: M85.80  ICD-9-CM: 733.90  11/23/2015        CKD (chronic kidney disease) stage 3, GFR 30-59 ml/min (HCC) (Chronic) ICD-10-CM: N18.3  ICD-9-CM: 585.3  2/9/2015        Anxiety and depression (Chronic) ICD-10-CM: F41.9, F32.9  ICD-9-CM: 300.00, 311  2/26/2014        Colon cancer ([facility] (Chronic) ICD-10-CM: C18.9  ICD-9-CM: 153.9  2/26/2014    Overview Addendum 2/26/2014 11:12 AM by Demetrice Bridges     Partial colectomy  Petey Allen             Hypothyroid (Chronic) ICD-10-CM: E03.9  ICD-9-CM: 244.9  2/26/2014        Atrial fibrillation (HCC) (Chronic) ICD-10-CM: I48.91  ICD-9-CM: 427.31  Unknown    Overview Signed 10/21/2010  8:16 AM by Floyde Cornell     av node ablation 5/22/98 with placement of CPI #[id number] dual chamber pacemaker subsequently replaced with a single chamber medtronic #[id number] single chamber pacemaker 7/25/05             Mitral regurgitation (Chronic) ICD-10-CM: I34.0  ICD-9-CM: 424.0  10/21/2010        Third degree AV block ([facility] ICD-10-CM: I44.2  ICD-9-CM: 426.0  10/21/2010        Mixed hyperlipidemia (Chronic) ICD-10-CM: O32.7  ICD-9-CM: 272.2  10/18/2010            No current facility-administered medications on file prior to encounter.       Current Outpatient Medications on File Prior to Encounter   Medication Sig Dispense Refill    oxyCODONE IR (ROXICODONE) 5 mg immediate release tablet Take 1 Tab by mouth every four (4) hours as needed for Pain for up to 30 days. Max Daily Amount: 30 mg. 120 Tab 0    famotidine (PEPCID) 20 mg tablet Take 1 Tab by mouth two (2) times a day. 180 Tab 1    clindamycin (CLEOCIN) 150 mg capsule Take 1 Cap by mouth three (3) times daily. Indications: new pacemaker 15 Cap 0    losartan (COZAAR) 25 mg tablet Take 1 Tab by mouth daily. Indications: chronic heart failure 30 Tab 3    simvastatin (ZOCOR) 20 mg tablet Take  by mouth nightly.  levothyroxine (SYNTHROID) 88 mcg tablet Take 88 mcg by mouth Daily (before breakfast).  Zoloft 50 mg tablet TAKE 1 TABLET BY MOUTH EVERY DAY 90 Tab 1    carvediloL (COREG) 3.125 mg tablet TAKE 1 TABLET BY MOUTH TWICE A DAY WITH MEALS 180 Tab 1    apixaban (Eliquis) 5 mg tablet Take 1 Tab by mouth two (2) times a day. 180 Tab 1    acetaminophen (TYLENOL) 500 mg tablet Take 500 mg by mouth nightly. Acetaminophen 1000 mg morning and afternoon, 500 mg at bedtime      furosemide (LASIX) 40 mg tablet TAKE 1 TABLET BY MOUTH DAILY. 90 Tab 2    multivitamins (CHEWABLE-AMOS) chew Take 1 Tab by mouth daily.  lidocaine (LIDODERM) 5 % 1 Patch by TransDERmal route daily as needed. Apply patch to the affected area for 12 hours a day and remove for 12 hours a day.  predniSONE (DELTASONE) 5 mg tablet Take 10 mg by mouth daily.  albuterol (PROVENTIL HFA, VENTOLIN HFA, PROAIR HFA) 90 mcg/actuation inhaler Take 1 Puff by inhalation every six (6) hours as needed for Wheezing.  baclofen (LIORESAL) 10 mg tablet Take 5 mg by mouth two (2) times a day.  ADVAIR DISKUS 250-50 mcg/dose diskus inhaler Take 1 Puff by inhalation two (2) times a day.  tiotropium (SPIRIVA WITH HANDIHALER) 18 mcg inhalation capsule Take 1 Cap by inhalation daily.        Current Facility-Administered Medications   Medication Dose Route Frequency Provider Last Rate Last Dose    carvediloL (COREG) tablet 3.125 mg  3.125 mg Oral BID WITH MEALS Wili Hill MD   3.125 mg at 06/05/20 1148    famotidine (PEPCID) tablet 20 mg  20 mg Oral DAILY Wili Hill MD   20 mg at 06/05/20 1148    levothyroxine (SYNTHROID) tablet 88 mcg  88 mcg Oral ACB Wili Hill MD   88 mcg at 06/05/20 0710    lidocaine 4 % patch   TransDERmal DAILY PRN Wili Hill MD        oxyCODONE IR (ROXICODONE) tablet 5 mg  5 mg Oral Q4H PRN Wili Hill MD   5 mg at 06/05/20 1150    atorvastatin (LIPITOR) tablet 10 mg  10 mg Oral QHS Wili Hill MD   10 mg at 06/05/20 0134    sertraline (ZOLOFT) tablet 50 mg  50 mg Oral QPM Wili Hill MD        sodium chloride (NS) flush 5-40 mL  5-40 mL IntraVENous Q8H Wili Hill MD        sodium chloride (NS) flush 5-40 mL  5-40 mL IntraVENous PRN Wili Hill MD        acetaminophen (TYLENOL) tablet 650 mg  650 mg Oral Q4H PRN Wili Hill MD   650 mg at 06/05/20 0007    ondansetron (ZOFRAN) injection 4 mg  4 mg IntraVENous Q4H PRN Ethan Richey NP        vancomycin (VANCOCIN) 1,000 mg in 0.9% sodium chloride (MBP/ADV) 250 mL  1,000 mg IntraVENous Q24H Evalina Early, MD        balsam peru-castor oiL (VENELEX) ointment   Topical Q8H Crissy Almaguer MD        cefepime (MAXIPIME) 2 g in 0.9% sodium chloride (MBP/ADV) 100 mL  2 g IntraVENous Q24H Wili Hill MD        metroNIDAZOLE (FLAGYL) IVPB premix 500 mg  500 mg IntraVENous Q12H Wili Hill  mL/hr at 06/05/20 1200 500 mg at 06/05/20 1200    glucose chewable tablet 16 g  4 Tab Oral PRN Wili Hill MD        glucagon (GLUCAGEN) injection 1 mg  1 mg IntraMUSCular PRN Wili Hill MD        dextrose 10% infusion 0-250 mL  0-250 mL IntraVENous PRN Wili Hill MD        insulin lispro (HUMALOG) injection   SubCUTAneous Q6H Wili Hill MD   2 Units at 06/05/20 1240    heparin (porcine) injection 5,000 Units  5,000 Units SubCUTAneous Carmen Curtis MD   5,000 Units at 06/05/20 1148    Vancomycin- pharmacy to dose   Other Rx Dosing/Monitoring Conrad Joseph MD        HYDROmorphone (PF) (DILAUDID) injection 1 mg  1 mg IntraVENous Q3H PRN Tahira Massey NP   1 mg at 06/05/20 0007     Allergies   Allergen Reactions    Cardizem [Diltiazem Hcl] Hives    Codeine Nausea and Vomiting    Darvocet A500 [Propoxyphene N-Acetaminophen] Nausea and Vomiting    Diltiazem Hives    Labetalol Nausea and Vomiting     Dizzy/Disoriented     Pcn [Penicillins] Swelling     Tongue Swelling   Has tolerated cephalexin recently 4/7/20     Primidone Other (comments)     Lightheaded/unsteady gait    Sulfa (Sulfonamide Antibiotics) Nausea and Vomiting     Past Medical History:   Diagnosis Date    Arthritis     Atrial fibrillation ([address]     av node ablation 5/22/98 with placement of CPI #[id number] dual chamber pacemaker subsequently replaced with a single chamber medtronic #[id number] single chamber pacemaker 7/25/05    Cancer ([facility] 1970's    colon cancer w/ resection    COPD     Depression     GERD (gastroesophageal reflux disease)     History of vascular access device 04/17/2020    4F MIDLINE PLACED BY EMIL Clements RN LEFT BRACHIAL, 13CM    Hyperlipidemia     Hypertension     Ischemic cardiomyopathy     Migraine headache     Orthostatic hypotension     RADHA (obstructive sleep apnea)     Pacemaker 5/22/98    AV node ablation /pacemaker placement utilizing CPI # [id number] dual chamber system, medtronic #[id number] single chamber device placed 7/22/05    Pulmonary hypertension ([address] 9/26/2011    RVSP 50 on echo 8/14    Thyroid disease     Valvular heart disease     mild-mod MR/TR     Past Surgical History:   Procedure Laterality Date    BREAST SURGERY PROCEDURE UNLISTED      benign breast tumor excision right breast    HX BREAST BIOPSY Right 2002    neg; surgical bx    HX COLECTOMY  1974    colon    HX KNEE REPLACEMENT      right TKA    HX PACEMAKER      HX PACEMAKER PLACEMENT  05/2020    HX TUBAL LIGATION      IR KYPHOPLASTY LUMBAR  7/2/2019    WI INSJ ELTRD CAR BYRON SYS TM INSJ DFB/PM PLS GEN N/A 5/22/2020    Lv Lead Placement performed by Davon Schafer MD at [address], [facility] CATH LAB    WI REMVL PERM PM PLS GEN W/REPL PLSE GEN MULT LEAD N/A 5/22/2020    REMOVE & REPLACE PPM GEN BIV MULTI LEADS performed by Davon Schafer MD at [address] CATH LAB    TOTAL KNEE ARTHROPLASTY      total on R, partial on L     Family History   Problem Relation Age of Onset    Diabetes Mother     Heart Disease Mother     Heart Attack Mother     Heart Disease Father     Stroke Sister     Breast Cancer Sister 80    Cancer Maternal Aunt         uterus    Diabetes Maternal Uncle     Breast Cancer Daughter 61     Social History     Tobacco Use    Smoking status: Passive Smoke Exposure - Never Smoker    Smokeless tobacco: Never Used   Substance Use Topics    Alcohol use: No     Alcohol/week: 0.0 standard drinks        Review of Systems:   Constitutional: Negative for weight loss, + malaise/fatigue. + fever  HEENT: Negative for nosebleeds, vision changes. Respiratory: Negative for cough, hemoptysis  Cardiovascular: Negative for chest pain, palpitations, orthopnea, claudication, syncope, and PND. + chronic JIMENEZ. Gastrointestinal: Negative for nausea, vomiting, diarrhea, blood in stool and melena. Genitourinary: Negative for dysuria, and hematuria. Musculoskeletal: Negative for myalgias, + arthralgia. + RLE pain  Skin: Negative for rash. + RLE gangrene  Heme: Does not bleed or bruise easily. Neurological: Negative for speech change and focal weakness     Objective:     Visit Vitals  /67 (BP 1 Location: Right arm, BP Patient Position: At rest)   Pulse 82   Temp 98.2 °F (36.8 °C)   Resp 15   LMP 02/26/1970   SpO2 94%      Physical Exam:   Constitutional: Well-developed and well-nourished. No respiratory distress. Head: Normocephalic and atraumatic.    Eyes: Pupils are equal, round  ENT: Hearing normal  Neck: Supple. No JVD present. Cardiovascular: Normal rate, regular rhythm. Exam reveals no gallop and no friction rub. 2/6 systolic murmur. Pulmonary/Chest: Effort normal and breath sounds normal. No wheezes. Abdominal: Soft, no tenderness. Musculoskeletal: No edema. Gangrenous RLE  Neurological: Alert,oriented. Skin: Skin is warm and dry. Left chest pacer site healing well. Psychiatric: Normal mood and affect. Behavior is normal. Judgment and thought content normal.      Assessment/Plan:         Ms. Larry Villeda is s/p Medtronic biventricular pacemaker upgrade (05/22/2020, plugged atrial port, has [employer] RV lead). Site appears to be healing well, & device has shown proper function. Pacer dependent. Long term RLE wound, now more gangrenous. Vascular surgery again discussing possible RLE amputation. No adjustment of device necessary if surgery is below umbilicus. ICM with LVEF 20-25% in 03/2020, on appropriate GDMT. NYHA III chronic systolic CHF. Anticoagulated with Eliquis, denies bleeding issues.     Addendum from EP attending:  I have seen, examined patient, and discussed with nurse practitioner, registered nurse, reviewed, updated note and agree with the assessment and plan    I have been asked by her daughter to come by to see her  I had done Biventricular pacemaker upgrade for her recently since she was having worsening cardiomyopathy-ischemic type and chronic AFIB and her pacemaker was getting close to replacement time.   She was pacemaker dependent  Her PAD and leg infection had been addressed by Dr Imani Ruff before that  Now it seems to be worse and she will need amputation  Vital signs as above  Exam shows regular rhythm    Chest wall with biventricular pacer  Assessment and Plan:  Biventricular pacemaker check by medtronic has been discussed with me today and it showed proper function   She is pacing dependent but surgery is below umbilicus so does not need to be adjusted  She is at least at moderate risk for perioperative MI and CHF decompensation with vascular surgery. However the objective with her and her family have been trying to get her living comfortably so no other invasive cardiac procedure was planned beside the pacemaker change in the past.    May hold eliquis for surgery      Thank you for involving me in this patient's care and please call with further concerns or questions. Nova Hayden M.D.   Electrophysiology/Cardiology  [facility] [place]  [address] [place] [address] [address]  [phone]

## 2020-06-05 NOTE — PROGRESS NOTES
Repeat hgb 11.7 confirming diluted hgb  Will ambulate and if stable will discharge home; surgery for his left groin to be scheduled

## 2020-06-06 LAB
ANION GAP SERPL CALC-SCNC: 6 MMOL/L (ref 5–15)
BUN SERPL-MCNC: 20 MG/DL (ref 6–20)
BUN/CREAT SERPL: 22 (ref 12–20)
CALCIUM SERPL-MCNC: 8.7 MG/DL (ref 8.5–10.1)
CHLORIDE SERPL-SCNC: 106 MMOL/L (ref 97–108)
CO2 SERPL-SCNC: 24 MMOL/L (ref 21–32)
CREAT SERPL-MCNC: 0.92 MG/DL (ref 0.55–1.02)
GLUCOSE BLD STRIP.AUTO-MCNC: 80 MG/DL (ref 65–100)
GLUCOSE SERPL-MCNC: 135 MG/DL (ref 65–100)
POTASSIUM SERPL-SCNC: 4.7 MMOL/L (ref 3.5–5.1)
SERVICE CMNT-IMP: NORMAL
SODIUM SERPL-SCNC: 136 MMOL/L (ref 136–145)

## 2020-06-06 PROCEDURE — 80048 BASIC METABOLIC PNL TOTAL CA: CPT

## 2020-06-06 PROCEDURE — 77030038269 HC DRN EXT URIN PURWCK BARD -A

## 2020-06-06 PROCEDURE — 94760 N-INVAS EAR/PLS OXIMETRY 1: CPT

## 2020-06-06 PROCEDURE — 74011250637 HC RX REV CODE- 250/637: Performed by: FAMILY MEDICINE

## 2020-06-06 PROCEDURE — 65660000000 HC RM CCU STEPDOWN

## 2020-06-06 PROCEDURE — 36415 COLL VENOUS BLD VENIPUNCTURE: CPT

## 2020-06-06 PROCEDURE — 82962 GLUCOSE BLOOD TEST: CPT

## 2020-06-06 PROCEDURE — 74011250636 HC RX REV CODE- 250/636: Performed by: FAMILY MEDICINE

## 2020-06-06 PROCEDURE — 74011000258 HC RX REV CODE- 258: Performed by: FAMILY MEDICINE

## 2020-06-06 PROCEDURE — 87635 SARS-COV-2 COVID-19 AMP PRB: CPT

## 2020-06-06 RX ADMIN — OXYCODONE 5 MG: 5 TABLET ORAL at 20:26

## 2020-06-06 RX ADMIN — METRONIDAZOLE 500 MG: 500 INJECTION, SOLUTION INTRAVENOUS at 10:35

## 2020-06-06 RX ADMIN — CASTOR OIL AND BALSAM, PERU: 788; 87 OINTMENT TOPICAL at 01:31

## 2020-06-06 RX ADMIN — ACETAMINOPHEN 650 MG: 325 TABLET, FILM COATED ORAL at 14:29

## 2020-06-06 RX ADMIN — METRONIDAZOLE 500 MG: 500 INJECTION, SOLUTION INTRAVENOUS at 21:13

## 2020-06-06 RX ADMIN — CASTOR OIL AND BALSAM, PERU: 788; 87 OINTMENT TOPICAL at 06:54

## 2020-06-06 RX ADMIN — ATORVASTATIN CALCIUM 10 MG: 10 TABLET, FILM COATED ORAL at 21:15

## 2020-06-06 RX ADMIN — Medication 10 ML: at 06:56

## 2020-06-06 RX ADMIN — OXYCODONE 5 MG: 5 TABLET ORAL at 10:52

## 2020-06-06 RX ADMIN — OXYCODONE 5 MG: 5 TABLET ORAL at 06:53

## 2020-06-06 RX ADMIN — OXYCODONE 5 MG: 5 TABLET ORAL at 16:06

## 2020-06-06 RX ADMIN — HEPARIN SODIUM 5000 UNITS: 5000 INJECTION INTRAVENOUS; SUBCUTANEOUS at 01:29

## 2020-06-06 RX ADMIN — CASTOR OIL AND BALSAM, PERU: 788; 87 OINTMENT TOPICAL at 16:00

## 2020-06-06 RX ADMIN — CASTOR OIL AND BALSAM, PERU: 788; 87 OINTMENT TOPICAL at 21:16

## 2020-06-06 RX ADMIN — ACETAMINOPHEN 650 MG: 325 TABLET, FILM COATED ORAL at 06:53

## 2020-06-06 RX ADMIN — VANCOMYCIN HYDROCHLORIDE 1000 MG: 1 INJECTION, POWDER, LYOPHILIZED, FOR SOLUTION INTRAVENOUS at 22:27

## 2020-06-06 RX ADMIN — CEFEPIME HYDROCHLORIDE 2 G: 2 INJECTION, POWDER, FOR SOLUTION INTRAVENOUS at 19:12

## 2020-06-06 RX ADMIN — Medication 10 ML: at 20:26

## 2020-06-06 RX ADMIN — Medication 10 ML: at 22:28

## 2020-06-06 RX ADMIN — CARVEDILOL 3.12 MG: 3.12 TABLET, FILM COATED ORAL at 07:02

## 2020-06-06 RX ADMIN — ACETAMINOPHEN 650 MG: 325 TABLET, FILM COATED ORAL at 20:26

## 2020-06-06 RX ADMIN — SERTRALINE HYDROCHLORIDE 50 MG: 50 TABLET ORAL at 17:37

## 2020-06-06 RX ADMIN — HEPARIN SODIUM 5000 UNITS: 5000 INJECTION INTRAVENOUS; SUBCUTANEOUS at 17:37

## 2020-06-06 RX ADMIN — FAMOTIDINE 20 MG: 20 TABLET ORAL at 10:34

## 2020-06-06 RX ADMIN — LEVOTHYROXINE SODIUM 88 MCG: 88 TABLET ORAL at 06:53

## 2020-06-06 RX ADMIN — CARVEDILOL 3.12 MG: 3.12 TABLET, FILM COATED ORAL at 17:37

## 2020-06-06 RX ADMIN — HEPARIN SODIUM 5000 UNITS: 5000 INJECTION INTRAVENOUS; SUBCUTANEOUS at 10:34

## 2020-06-06 NOTE — PROGRESS NOTES
[address] [facility] Adult  Hospitalist Group                                                                                          Hospitalist Progress Note  Anselmo Velasco MD  Answering service: [phone] from in house phone        Date of Service:  2020  NAME:  Nancy Hawk  :  1932  MRN:  [id number]      Admission Summary:   The patient is an 80-year-old female with past medical history of hyperlipidemia, atrial fibrillation, third-degree AV block, anxiety, depression, colon cancer, hypothyroidism, CKD stage III, osteopenia, ischemic cardiomyopathy, status post pacemaker placement, COPD, AAA, hypertension, osteoporosis, wound of the foot, bradycardia, dilated cardiomyopathy, type 2 diabetes, presents to the hospital with the above-mentioned symptom. History was obtained from the patient as well as her daughter. The daughter reports that the patient has had a wound in her leg for the last many months. The patient saw Dr. Dennis Zuniga, underwent an angiogram about 3 weeks back. There was not one particular vessel that was identified in the right foot that could cause her symptoms. Recommendation made for below-knee amputation if gangrene persisted, but the patient refused. The patient was sent home, went for her wound care check today and they were concerned that her pain as well as her wound have gotten worse. There has been increased area of gangrenous tissue, and the patient was sent to the hospital for further management and evaluation. The patient's daughter reports that the patient does not have any fever per se. She has had some poor appetite, but no other complaints or problems.   The patient's daughter denies the patient having any headache, blurry vision, sore throat, trouble swallowing, trouble with speech, chest pain, shortness of breath, cough, fever, chills, abdominal pain, constipation, diarrhea, urinary symptoms, focal or generalized neurological weakness, falls, injuries,  hematemesis, melena, hemoptysis, hematuria, or any other concerns or problems. The patient reports that the patient has been tested \"many times for COVID-19 and all the times it has been negative. \"    Interval history / Subjective:   Patient reports right footpain- she is refusing amputation  Consulted from EP cardiology and ID placed today  Vascular surgery eval- noted is severe right foot ischemia     Assessment & Plan:     1. Gangrene of the right foot- due to ischemia and chronic wounds and wound infection  Vascular surgery recommending BKA  ID consulted for IV abx management  2. Hypotension,- multifactorial  Due to sepsis from R foot infection, CHF with EF of 20-25%  IVFluids overnight- stopped this am  Monitor BP  3. Acute kidney injury,  Avoid nephrotoxic medications   renally dose abx  Monitor creatinine- improved to 1.09 from 1.44  4. Elevated liver function tests. Suspected cause shock liver from hypotension  monitor  5. Diabetes. NovoLog insulin, Accu-Cheks, diet control, and close monitoring. 6.  COPD- PRN nebs  7. Atrial fibrillation. Continue home medication. resume Eliquis   Rate control. 8.recent pacemaker placement- family requesting EP consult for pacemaker eval  9. Hypothyroidism. Continue home medication. Code status: DNR  DVT prophylaxis: Heparin    Care Plan discussed with: Patient/Family  Anticipated Disposition: Home w/Family and SNF/LTC  Anticipated Discharge: Greater than 48 hours     Hospital Problems  Date Reviewed: 5/14/2020          Codes Class Noted POA    Cellulitis ICD-10-CM: L03.90  ICD-9-CM: 682.9  6/4/2020 Unknown                Review of Systems:   A comprehensive review of systems was negative except for that written in the HPI. Vital Signs:    Last 24hrs VS reviewed since prior progress note.  Most recent are:  Visit Vitals  /58   Pulse 82   Temp 98.2 °F (36.8 °C)   Resp 15   SpO2 94%       No intake or output data in the 24 hours ending 06/05/20 2312     Physical Examination:             Constitutional:  No acute distress, cooperative, pleasant    ENT:  Oral mucosa moist, oropharynx benign. Resp:  decreased breath sounds bilaterally. No wheezing/rhonchi/rales. CV:  IRR, 2/6 CANDELARIA, pacemaker    GI:  Soft, non distended, non tender. normoactive bowel sounds, no hepatosplenomegaly     Musculoskeletal: edemaand erythema of right foot- unable to palpate pulses in bilat LE,     Neurologic:  Moves all extremities. AAOx3, CN II-XII reviewed     Psych:  anxious   Skin- numerous wounds as documented by woundcare                 Data Review:    Review and/or order of clinical lab test  Review and/or order of tests in the radiology section of CPT  Review and/or order of tests in the medicine section of CPT      Labs:     Recent Labs     06/05/20 0451 06/04/20  1731   WBC 11.3* 13.4*   HGB 11.7 12.5   HCT 36.0 38.4    150     Recent Labs     06/05/20  0451 06/04/20  1731   * 130*   K 3.7 3.9    96*   CO2 23 24   BUN 27* 33*   CREA 1.09* 1.44*   * 161*   CA 8.4* 9.0     Recent Labs     06/04/20  1731   ALT 88*   *   TBILI 1.1*   TP 6.8   ALB 2.8*   GLOB 4.0     No results for input(s): INR, PTP, APTT, INREXT in the last 72 hours. No results for input(s): FE, TIBC, PSAT, FERR in the last 72 hours. Lab Results   Component Value Date/Time    Folate 14.6 08/11/2014 03:29 PM      No results for input(s): PH, PCO2, PO2 in the last 72 hours. No results for input(s): CPK, CKNDX, TROIQ in the last 72 hours.     No lab exists for component: CPKMB  Lab Results   Component Value Date/Time    Cholesterol, total 199 11/18/2019 11:29 AM    HDL Cholesterol 65 11/18/2019 11:29 AM    LDL, calculated 97.4 11/18/2019 11:29 AM    Triglyceride 183 (H) 11/18/2019 11:29 AM    CHOL/HDL Ratio 3.1 11/18/2019 11:29 AM     Lab Results   Component Value Date/Time    Glucose (POC) 99 06/05/2020 04:47 PM    Glucose (POC) 163 (H) 06/05/2020 12:08 PM    Glucose (POC) 108 (H) 06/05/2020 06:08 AM    Glucose (POC) 142 (H) 06/05/2020 01:28 AM    Glucose (POC) 137 (H) 06/04/2020 11:43 PM     Lab Results   Component Value Date/Time    Color Yellow 08/01/2019 10:49 AM    Appearance Clear 08/01/2019 10:49 AM    Specific gravity 1.009 06/29/2019 09:30 AM    Specific gravity 1.015 06/12/2017 04:36 PM    pH (UA) 5.5 08/01/2019 10:49 AM    Protein NEGATIVE  06/29/2019 09:30 AM    Glucose NEGATIVE  06/29/2019 09:30 AM    Ketone Negative 08/01/2019 10:49 AM    Bilirubin Negative 08/01/2019 10:49 AM    Urobilinogen 0.2 06/29/2019 09:30 AM    Nitrites Negative 08/01/2019 10:49 AM    Leukocyte Esterase Negative 08/01/2019 10:49 AM    Epithelial cells FEW 01/18/2019 04:00 AM    Bacteria NEGATIVE  01/18/2019 04:00 AM    WBC 0-4 01/18/2019 04:00 AM    RBC 0-5 01/18/2019 04:00 AM         Medications Reviewed:     Current Facility-Administered Medications   Medication Dose Route Frequency    carvediloL (COREG) tablet 3.125 mg  3.125 mg Oral BID WITH MEALS    famotidine (PEPCID) tablet 20 mg  20 mg Oral DAILY    levothyroxine (SYNTHROID) tablet 88 mcg  88 mcg Oral ACB    lidocaine 4 % patch   TransDERmal DAILY PRN    atorvastatin (LIPITOR) tablet 10 mg  10 mg Oral QHS    sertraline (ZOLOFT) tablet 50 mg  50 mg Oral QPM    sodium chloride (NS) flush 5-40 mL  5-40 mL IntraVENous Q8H    sodium chloride (NS) flush 5-40 mL  5-40 mL IntraVENous PRN    acetaminophen (TYLENOL) tablet 650 mg  650 mg Oral Q4H PRN    ondansetron (ZOFRAN) injection 4 mg  4 mg IntraVENous Q4H PRN    vancomycin (VANCOCIN) 1,000 mg in 0.9% sodium chloride (MBP/ADV) 250 mL  1,000 mg IntraVENous Q24H    balsam peru-castor oiL (VENELEX) ointment   Topical Q8H    loperamide (IMODIUM) 1 mg/7.5 mL oral solution 2 mg  2 mg Oral QID PRN    oxyCODONE IR (ROXICODONE) tablet 5-10 mg  5-10 mg Oral Q4H PRN    cefepime (MAXIPIME) 2 g in 0.9% sodium chloride (MBP/ADV) 100 mL  2 g IntraVENous Q24H    metroNIDAZOLE (FLAGYL) IVPB premix 500 mg  500 mg IntraVENous Q12H    glucose chewable tablet 16 g  4 Tab Oral PRN    glucagon (GLUCAGEN) injection 1 mg  1 mg IntraMUSCular PRN    dextrose 10% infusion 0-250 mL  0-250 mL IntraVENous PRN    insulin lispro (HUMALOG) injection   SubCUTAneous Q6H    heparin (porcine) injection 5,000 Units  5,000 Units SubCUTAneous Q8H    Vancomycin- pharmacy to dose   Other Rx Dosing/Monitoring    HYDROmorphone (PF) (DILAUDID) injection 1 mg  1 mg IntraVENous Q3H PRN     ______________________________________________________________________  EXPECTED LENGTH OF STAY: 3d 2h  ACTUAL LENGTH OF STAY:          1                 Akila Whitehead MD

## 2020-06-06 NOTE — CONSULTS
[address]    Name:  Jess Culver  MR#:  [id number]  :  1932  ACCOUNT #:  [de-identified]  DATE OF SERVICE:  2020      REASON FOR CONSULTATION:  Ischemic right foot. HISTORY:  The patient is an 75-year-old female with a number of chronic medical conditions, who was hospitalized 2 days ago with worsening right foot pain. She has known severe peripheral vascular disease and was hospitalized at [facility] 2 weeks ago. She was evaluated there by my partner Dr. Deena Lawrence. An arteriogram was done, which showed patent superficial femoral, popliteal and proximal tibial arteries with occlusion of all the tibial vessels in the distal lower leg and foot. She has dry gangrene of multiple toes. She was offered below-the-knee amputation as the only reasonable solution to her problem, which she declined. She is now readmitted with ongoing pain. She has no other specific complaints at this point. PAST MEDICAL HISTORY:  Atrial fibrillation, anxiety and depression, ischemic cardiomyopathy, COPD. ADMISSION MEDICATIONS:  Pepcid, Zocor, losartan, levothyroxine, Zoloft, Coreg, Eliquis. ALLERGIES:  CARDIZEM, CODEINE, DARVOCET, DILTIAZEM, LABETALOL, PENICILLIN, PRIMIDONE, SULFA DRUGS. SOCIAL HISTORY:  The patient lives at home with her family. FAMILY HISTORY:  Unremarkable. REVIEW OF SYSTEMS:  She has had no recent cardiac, respiratory, GI, , neurologic, hematologic or psychiatric complaints. PHYSICAL EXAMINATION:  GENERAL:  She is an elderly female in no distress. She is awake, alert and responsive. LUNGS:  Bilateral breath sounds. HEART:  Regular rate and rhythm. ABDOMEN:  Soft and nontender. EXTREMITIES:  She has dry gangrene of the right forefoot. NEUROLOGIC:  Exam is grossly normal.  Intact motor and sensory function. IMPRESSION:  The patient has non-reconstructable peripheral vascular disease with dry gangrene and ischemic rest pain of the right foot.   The only reasonable solution to her problem is below-knee amputation. She is now agreeable to proceed. We will make arrangements within the next couple of days.         Emily Crawford MD      [id number]  D:  06/06/2020 9:41  T:  06/06/2020 11:34  JOB #:  [id number]

## 2020-06-06 NOTE — PROGRESS NOTES
Patient's daughter, Susan Centeno, was inquiring about having her sister Wicho Carrizales) come and stay with patient overnight. Per daughter, patient is anxious about upcoming surgery and having family present may help. Contacted Nursing Supervisor, Zarina Morton, and approval from [employer] (Director) was given.

## 2020-06-06 NOTE — PROGRESS NOTES
I had discussed with her daughter Meredith Duvall and her last evening about risks and benefits of BKA and biventricular pacer check  The dressing and steri strips were removed by me  Wound heals properly

## 2020-06-06 NOTE — PROGRESS NOTES
Spiritual Care Assessment/Progress Note  [facility]      NAME: Fanta Eduardo      MRN: [id number]  AGE: 80 y.o.  SEX: female  Congregational Affiliation: Orthodox   Language: English     6/6/2020     Total Time (in minutes): 24     Spiritual Assessment begun in [address] through conversation with:         [x]Patient        [] Family    [] Friend(s)        Reason for Consult: Initial/Spiritual assessment, patient floor     Spiritual beliefs: (Please include comment if needed)     [x] Identifies with a juan tradition:  Orthodox       [] Supported by a juan community:            [] Claims no spiritual orientation:           [] Seeking spiritual identity:                [] Adheres to an individual form of spirituality:           [] Not able to assess:                           Identified resources for coping:      [x] Prayer                               [] Music                  [] Guided Imagery     [x] Family/friends                 [] Pet visits     [] Devotional reading                         [] Unknown     [] Other:                                              Interventions offered during this visit: (See comments for more details)    Patient Interventions: Affirmation of emotions/emotional suffering, Affirmation of juan, Catharsis/review of pertinent events in supportive environment, Coping skills reviewed/reinforced, Iconic (affirming the presence of God/Higher Power), Prayer (assurance of), Normalization of emotional/spiritual concerns           Plan of Care:     [] Support spiritual and/or cultural needs    [] Support AMD and/or advance care planning process      [] Support grieving process   [] Coordinate Rites and/or Rituals    [] Coordination with community clergy   [] No spiritual needs identified at this time   [] Detailed Plan of Care below (See Comments)  [] Make referral to Music Therapy  [] Make referral to Pet Therapy     [] Make referral to Addiction services  [] Make referral to [facility]  [] Make referral to Spiritual Care Partner  [] No future visits requested        [x] Follow up visits as needed     Comments:  visit for initial spiritual assessment. Received a call from the patient's daughter, Maddy Baez ([phone]). Xiomara spoke about her mother's health and hospitalization saying she has been able to be here with her mom, but had to return to [place] this evening and plans to be back here tomorrow. Says she just spoke to her mom and her mom appeared to be upset, felt she could use a  visit. Patient reclining in bed, good eye contact, friendly, appears uncomfortable at times. Provided spiritual presence and listening as she spoke of her current thoughts, feelings, and concerns. Patient stated she was not feeling very well a short while ago, but feels a little better. Said she has been through so much lately and thought she was handling everything well, but could no longer hold it in saying she burst.  When asked what is troubling her the most she said she was most concerned about pain. Says she has been experiencing pain to her foot for the past couple of months and that it typically began to hurt more in the evening around this time. Says the nurse has given her medication and it is beginning to work and she is beginning to feel better. Also said she is concerned about having her foot amputated on Monday. Says she is not sure how she is going to react to it being gone after the surgery, but says she knows she will make it through this after all. Said she is willing to lose her foot if it means the pain will be gone and that she will get better and be rid of her illness. Adding to this, she said, was the fact that she recently received a new pacemaker, which just added to the number of things she is dealing with. Says she feels better now that she was able to express her feelings and after talking about it now.   Says she has visited the chapel downstairs many times in the past for various family crises and emergencies. She listed a few of them telling the story of how she was able to cope through family, prayer, and her juan and reflecting upon the fact that she and her family made it through each of those saying this provides reassurance that she will also make it through this as well. A close family friend called during the visit and offered words of encouragement and love which brought comfort. Stormy Miramontes spoke of her juan repeating her favorite Scripture verses from memory. She requested prayer and a prayer was offered. She appeared comforted and encouraged as a result of this prayer and visit and expressed gratitude for this visit. Visited by Rev. Deacon Rodas MDiv, [facility], [facility]   paging service: [phone])

## 2020-06-06 NOTE — PROGRESS NOTES
Vascular:    Discussed situation with the patient's daughter yesterday. Patient now agreeable to BKA. Plan OR Monday.

## 2020-06-07 DIAGNOSIS — I25.5 ISCHEMIC CARDIOMYOPATHY: ICD-10-CM

## 2020-06-07 LAB
ANION GAP SERPL CALC-SCNC: 5 MMOL/L (ref 5–15)
BUN SERPL-MCNC: 14 MG/DL (ref 6–20)
BUN/CREAT SERPL: 19 (ref 12–20)
CALCIUM SERPL-MCNC: 8.2 MG/DL (ref 8.5–10.1)
CHLORIDE SERPL-SCNC: 101 MMOL/L (ref 97–108)
CO2 SERPL-SCNC: 26 MMOL/L (ref 21–32)
CREAT SERPL-MCNC: 0.74 MG/DL (ref 0.55–1.02)
ERYTHROCYTE [DISTWIDTH] IN BLOOD BY AUTOMATED COUNT: 12.2 % (ref 11.5–14.5)
GLUCOSE SERPL-MCNC: 113 MG/DL (ref 65–100)
HCT VFR BLD AUTO: 35.6 % (ref 35–47)
HGB BLD-MCNC: 11.2 G/DL (ref 11.5–16)
MCH RBC QN AUTO: 29.8 PG (ref 26–34)
MCHC RBC AUTO-ENTMCNC: 31.5 G/DL (ref 30–36.5)
MCV RBC AUTO: 94.7 FL (ref 80–99)
NRBC # BLD: 0 K/UL (ref 0–0.01)
NRBC BLD-RTO: 0 PER 100 WBC
PLATELET # BLD AUTO: 156 K/UL (ref 150–400)
PMV BLD AUTO: 11.3 FL (ref 8.9–12.9)
POTASSIUM SERPL-SCNC: 3.8 MMOL/L (ref 3.5–5.1)
RBC # BLD AUTO: 3.76 M/UL (ref 3.8–5.2)
SARS-COV-2, COV2: NOT DETECTED
SODIUM SERPL-SCNC: 132 MMOL/L (ref 136–145)
SPECIMEN SOURCE, FCOV2M: NORMAL
WBC # BLD AUTO: 9.6 K/UL (ref 3.6–11)

## 2020-06-07 PROCEDURE — 85027 COMPLETE CBC AUTOMATED: CPT

## 2020-06-07 PROCEDURE — 74011250637 HC RX REV CODE- 250/637: Performed by: FAMILY MEDICINE

## 2020-06-07 PROCEDURE — 36415 COLL VENOUS BLD VENIPUNCTURE: CPT

## 2020-06-07 PROCEDURE — 80048 BASIC METABOLIC PNL TOTAL CA: CPT

## 2020-06-07 PROCEDURE — 74011250636 HC RX REV CODE- 250/636: Performed by: FAMILY MEDICINE

## 2020-06-07 PROCEDURE — 74011000258 HC RX REV CODE- 258: Performed by: FAMILY MEDICINE

## 2020-06-07 PROCEDURE — 65660000000 HC RM CCU STEPDOWN

## 2020-06-07 RX ORDER — HYDROMORPHONE HYDROCHLORIDE 1 MG/ML
1 INJECTION, SOLUTION INTRAMUSCULAR; INTRAVENOUS; SUBCUTANEOUS
Status: DISCONTINUED | OUTPATIENT
Start: 2020-06-07 | End: 2020-06-11

## 2020-06-07 RX ORDER — OXYCODONE HYDROCHLORIDE 5 MG/1
10 TABLET ORAL
Status: DISCONTINUED | OUTPATIENT
Start: 2020-06-07 | End: 2020-06-11

## 2020-06-07 RX ORDER — OXYCODONE HYDROCHLORIDE 5 MG/1
15 TABLET ORAL
Status: DISCONTINUED | OUTPATIENT
Start: 2020-06-07 | End: 2020-06-08

## 2020-06-07 RX ADMIN — OXYCODONE HYDROCHLORIDE 5 MG: 5 TABLET ORAL at 18:35

## 2020-06-07 RX ADMIN — CARVEDILOL 3.12 MG: 3.12 TABLET, FILM COATED ORAL at 17:58

## 2020-06-07 RX ADMIN — ACETAMINOPHEN 650 MG: 325 TABLET, FILM COATED ORAL at 13:12

## 2020-06-07 RX ADMIN — LEVOTHYROXINE SODIUM 88 MCG: 88 TABLET ORAL at 06:00

## 2020-06-07 RX ADMIN — METRONIDAZOLE 500 MG: 500 INJECTION, SOLUTION INTRAVENOUS at 21:44

## 2020-06-07 RX ADMIN — CASTOR OIL AND BALSAM, PERU: 788; 87 OINTMENT TOPICAL at 05:57

## 2020-06-07 RX ADMIN — ACETAMINOPHEN 650 MG: 325 TABLET, FILM COATED ORAL at 17:58

## 2020-06-07 RX ADMIN — ACETAMINOPHEN 650 MG: 325 TABLET, FILM COATED ORAL at 09:12

## 2020-06-07 RX ADMIN — FAMOTIDINE 20 MG: 20 TABLET ORAL at 10:50

## 2020-06-07 RX ADMIN — CEFEPIME HYDROCHLORIDE 2 G: 2 INJECTION, POWDER, FOR SOLUTION INTRAVENOUS at 18:38

## 2020-06-07 RX ADMIN — ACETAMINOPHEN 650 MG: 325 TABLET, FILM COATED ORAL at 05:56

## 2020-06-07 RX ADMIN — ATORVASTATIN CALCIUM 10 MG: 10 TABLET, FILM COATED ORAL at 21:44

## 2020-06-07 RX ADMIN — METRONIDAZOLE 500 MG: 500 INJECTION, SOLUTION INTRAVENOUS at 09:14

## 2020-06-07 RX ADMIN — HEPARIN SODIUM 5000 UNITS: 5000 INJECTION INTRAVENOUS; SUBCUTANEOUS at 02:00

## 2020-06-07 RX ADMIN — HEPARIN SODIUM 5000 UNITS: 5000 INJECTION INTRAVENOUS; SUBCUTANEOUS at 09:14

## 2020-06-07 RX ADMIN — OXYCODONE 5 MG: 5 TABLET ORAL at 13:13

## 2020-06-07 RX ADMIN — CARVEDILOL 3.12 MG: 3.12 TABLET, FILM COATED ORAL at 06:47

## 2020-06-07 RX ADMIN — Medication 10 ML: at 22:00

## 2020-06-07 RX ADMIN — CASTOR OIL AND BALSAM, PERU: 788; 87 OINTMENT TOPICAL at 14:00

## 2020-06-07 RX ADMIN — CASTOR OIL AND BALSAM, PERU: 788; 87 OINTMENT TOPICAL at 22:00

## 2020-06-07 RX ADMIN — OXYCODONE 5 MG: 5 TABLET ORAL at 03:07

## 2020-06-07 RX ADMIN — OXYCODONE HYDROCHLORIDE 10 MG: 5 TABLET ORAL at 14:23

## 2020-06-07 RX ADMIN — OXYCODONE 5 MG: 5 TABLET ORAL at 00:33

## 2020-06-07 RX ADMIN — SERTRALINE HYDROCHLORIDE 50 MG: 50 TABLET ORAL at 17:58

## 2020-06-07 RX ADMIN — OXYCODONE 5 MG: 5 TABLET ORAL at 09:12

## 2020-06-07 RX ADMIN — Medication 10 ML: at 05:57

## 2020-06-07 NOTE — PROGRESS NOTES
[facility] Adult  Hospitalist Group                                                                                          Hospitalist Progress Note  Berlin Leach MD  Answering service: [phone] from in house phone        Date of Service:  2020  NAME:  Jerzy Terrell  :  1932  MRN:  [id number]      Admission Summary:   The patient is an 80-year-old female with past medical history of hyperlipidemia, atrial fibrillation, third-degree AV block, anxiety, depression, colon cancer, hypothyroidism, CKD stage III, osteopenia, ischemic cardiomyopathy, status post pacemaker placement, COPD, AAA, hypertension, osteoporosis, wound of the foot, bradycardia, dilated cardiomyopathy, type 2 diabetes, presents to the hospital with the above-mentioned symptom. History was obtained from the patient as well as her daughter. The daughter reports that the patient has had a wound in her leg for the last many months. The patient saw Dr. Zaina Arevalo, underwent an angiogram about 3 weeks back. There was not one particular vessel that was identified in the right foot that could cause her symptoms. Recommendation made for below-knee amputation if gangrene persisted, but the patient refused. The patient was sent home, went for her wound care check today and they were concerned that her pain as well as her wound have gotten worse. There has been increased area of gangrenous tissue, and the patient was sent to the hospital for further management and evaluation. The patient's daughter reports that the patient does not have any fever per se. She has had some poor appetite, but no other complaints or problems.   The patient's daughter denies the patient having any headache, blurry vision, sore throat, trouble swallowing, trouble with speech, chest pain, shortness of breath, cough, fever, chills, abdominal pain, constipation, diarrhea, urinary symptoms, focal or generalized neurological weakness, falls, injuries,  hematemesis, melena, hemoptysis, hematuria, or any other concerns or problems. The patient reports that the patient has been tested \"many times for COVID-19 and all the times it has been negative. \"    Interval history / Subjective:   Patient reports right footpain- she has agreed to amputation  EP cardiology and ID following  Vascular surgery eval- noted is severe right foot ischemia     Assessment & Plan:     1. Gangrene of the right foot- due to ischemia and chronic wounds and wound infection  Vascular surgery recommending BKA  ID consulted for IV abx management  2. Hypotension,- multifactorial  Due to sepsis from R foot infection, CHF with EF of 20-25%  IVFluids overnight- stopped this am  Monitor BP  3. Acute kidney injury,  Avoid nephrotoxic medications   renally dose abx  Monitor creatinine- improved to 1.09 from 1.44  4. Elevated liver function tests. Suspected cause shock liver from hypotension  monitor  5. Diabetes. NovoLog insulin, Accu-Cheks, diet control, and close monitoring. 6.  COPD- PRN nebs  7. Atrial fibrillation. Continue home medication. Eliquis stopped for planned surgery   Rate control. 8.recent pacemaker placement- family requesting EP eval- appreciate their input  9. Hypothyroidism. Continue home medication. Code status: DNR  DVT prophylaxis: Heparin    Care Plan discussed with: Patient/Family  Anticipated Disposition: Home w/Family and SNF/LTC  Anticipated Discharge: Greater than 48 hours     Hospital Problems  Date Reviewed: 5/14/2020          Codes Class Noted POA    Cellulitis ICD-10-CM: L03.90  ICD-9-CM: 682.9  6/4/2020 Unknown                Review of Systems:   A comprehensive review of systems was negative except for that written in the HPI. Vital Signs:    Last 24hrs VS reviewed since prior progress note.  Most recent are:  Visit Vitals  /74   Pulse 72   Temp 99 °F (37.2 °C)   Resp 16   Wt 76.6 kg (168 lb 12.8 oz)   SpO2 96% BMI 29.90 kg/m²         Intake/Output Summary (Last 24 hours) at 6/6/2020 2324  Last data filed at 6/6/2020 0607  Gross per 24 hour   Intake    Output 300 ml   Net -300 ml        Physical Examination:             Constitutional:  No acute distress, cooperative, pleasant    ENT:  Oral mucosa moist, oropharynx benign. Resp:  decreased breath sounds bilaterally. No wheezing/rhonchi/rales. CV:  IRR, 2/6 CANDELARIA, pacemaker    GI:  Soft, non distended, non tender. normoactive bowel sounds, no hepatosplenomegaly     Musculoskeletal: edemaand erythema of right foot- unable to palpate pulses in bilat LE,     Neurologic:  Moves all extremities. AAOx3, CN II-XII reviewed     Psych:  anxious   Skin- numerous wounds as documented by woundcare                 Data Review:    Review and/or order of clinical lab test  Review and/or order of tests in the radiology section of CPT  Review and/or order of tests in the medicine section of CPT      Labs:     Recent Labs     06/05/20  0451 06/04/20  1731   WBC 11.3* 13.4*   HGB 11.7 12.5   HCT 36.0 38.4    150     Recent Labs     06/06/20  0722 06/05/20  0451 06/04/20  1731    134* 130*   K 4.7 3.7 3.9    102 96*   CO2 24 23 24   BUN 20 27* 33*   CREA 0.92 1.09* 1.44*   * 124* 161*   CA 8.7 8.4* 9.0     Recent Labs     06/04/20  1731   ALT 88*   *   TBILI 1.1*   TP 6.8   ALB 2.8*   GLOB 4.0     No results for input(s): INR, PTP, APTT, INREXT, INREXT in the last 72 hours. No results for input(s): FE, TIBC, PSAT, FERR in the last 72 hours. Lab Results   Component Value Date/Time    Folate 14.6 08/11/2014 03:29 PM      No results for input(s): PH, PCO2, PO2 in the last 72 hours. No results for input(s): CPK, CKNDX, TROIQ in the last 72 hours.     No lab exists for component: CPKMB  Lab Results   Component Value Date/Time    Cholesterol, total 199 11/18/2019 11:29 AM    HDL Cholesterol 65 11/18/2019 11:29 AM    LDL, calculated 97.4 11/18/2019 11:29 AM Triglyceride 183 (H) 11/18/2019 11:29 AM    CHOL/HDL Ratio 3.1 11/18/2019 11:29 AM     Lab Results   Component Value Date/Time    Glucose (POC) 80 06/06/2020 06:04 AM    Glucose (POC) 140 (H) 06/05/2020 11:14 PM    Glucose (POC) 99 06/05/2020 04:47 PM    Glucose (POC) 163 (H) 06/05/2020 12:08 PM    Glucose (POC) 108 (H) 06/05/2020 06:08 AM     Lab Results   Component Value Date/Time    Color Yellow 08/01/2019 10:49 AM    Appearance Clear 08/01/2019 10:49 AM    Specific gravity 1.009 06/29/2019 09:30 AM    Specific gravity 1.015 06/12/2017 04:36 PM    pH (UA) 5.5 08/01/2019 10:49 AM    Protein NEGATIVE  06/29/2019 09:30 AM    Glucose NEGATIVE  06/29/2019 09:30 AM    Ketone Negative 08/01/2019 10:49 AM    Bilirubin Negative 08/01/2019 10:49 AM    Urobilinogen 0.2 06/29/2019 09:30 AM    Nitrites Negative 08/01/2019 10:49 AM    Leukocyte Esterase Negative 08/01/2019 10:49 AM    Epithelial cells FEW 01/18/2019 04:00 AM    Bacteria NEGATIVE  01/18/2019 04:00 AM    WBC 0-4 01/18/2019 04:00 AM    RBC 0-5 01/18/2019 04:00 AM         Medications Reviewed:     Current Facility-Administered Medications   Medication Dose Route Frequency    carvediloL (COREG) tablet 3.125 mg  3.125 mg Oral BID WITH MEALS    famotidine (PEPCID) tablet 20 mg  20 mg Oral DAILY    levothyroxine (SYNTHROID) tablet 88 mcg  88 mcg Oral ACB    lidocaine 4 % patch   TransDERmal DAILY PRN    atorvastatin (LIPITOR) tablet 10 mg  10 mg Oral QHS    sertraline (ZOLOFT) tablet 50 mg  50 mg Oral QPM    sodium chloride (NS) flush 5-40 mL  5-40 mL IntraVENous Q8H    sodium chloride (NS) flush 5-40 mL  5-40 mL IntraVENous PRN    acetaminophen (TYLENOL) tablet 650 mg  650 mg Oral Q4H PRN    ondansetron (ZOFRAN) injection 4 mg  4 mg IntraVENous Q4H PRN    vancomycin (VANCOCIN) 1,000 mg in 0.9% sodium chloride (MBP/ADV) 250 mL  1,000 mg IntraVENous Q24H    balsam peru-castor oiL (VENELEX) ointment   Topical Q8H    loperamide (IMODIUM) 1 mg/7.5 mL oral solution 2 mg  2 mg Oral QID PRN    oxyCODONE IR (ROXICODONE) tablet 5-10 mg  5-10 mg Oral Q4H PRN    cefepime (MAXIPIME) 2 g in 0.9% sodium chloride (MBP/ADV) 100 mL  2 g IntraVENous Q24H    metroNIDAZOLE (FLAGYL) IVPB premix 500 mg  500 mg IntraVENous Q12H    heparin (porcine) injection 5,000 Units  5,000 Units SubCUTAneous Q8H    Vancomycin- pharmacy to dose   Other Rx Dosing/Monitoring    HYDROmorphone (PF) (DILAUDID) injection 1 mg  1 mg IntraVENous Q3H PRN     ______________________________________________________________________  EXPECTED LENGTH OF STAY: 3d 2h  ACTUAL LENGTH OF STAY:          2                 Blayne Lucas MD

## 2020-06-07 NOTE — PROGRESS NOTES
Bedside and Verbal shift change report given to Magdalena Evans RN (oncoming nurse) by Dale Humphreys RN (offgoing nurse). Report included the following information SBAR, Kardex, Intake/Output, MAR and Recent Results.

## 2020-06-08 ENCOUNTER — ANESTHESIA (OUTPATIENT)
Dept: SURGERY | Age: 85
DRG: 853 | End: 2020-06-08
Payer: MEDICARE

## 2020-06-08 ENCOUNTER — TELEPHONE (OUTPATIENT)
Dept: INTERNAL MEDICINE CLINIC | Age: 85
End: 2020-06-08

## 2020-06-08 ENCOUNTER — ANESTHESIA EVENT (OUTPATIENT)
Dept: SURGERY | Age: 85
DRG: 853 | End: 2020-06-08
Payer: MEDICARE

## 2020-06-08 LAB
ANION GAP SERPL CALC-SCNC: 6 MMOL/L (ref 5–15)
BUN SERPL-MCNC: 12 MG/DL (ref 6–20)
BUN/CREAT SERPL: 16 (ref 12–20)
CALCIUM SERPL-MCNC: 8.5 MG/DL (ref 8.5–10.1)
CHLORIDE SERPL-SCNC: 104 MMOL/L (ref 97–108)
CO2 SERPL-SCNC: 24 MMOL/L (ref 21–32)
CREAT SERPL-MCNC: 0.75 MG/DL (ref 0.55–1.02)
DATE LAST DOSE: ABNORMAL
GLUCOSE SERPL-MCNC: 100 MG/DL (ref 65–100)
POTASSIUM SERPL-SCNC: 4.2 MMOL/L (ref 3.5–5.1)
REPORTED DOSE,DOSE: ABNORMAL UNITS
REPORTED DOSE/TIME,TMG: ABNORMAL
SODIUM SERPL-SCNC: 134 MMOL/L (ref 136–145)
VANCOMYCIN TROUGH SERPL-MCNC: 10.5 UG/ML (ref 5–10)

## 2020-06-08 PROCEDURE — 74011250636 HC RX REV CODE- 250/636: Performed by: FAMILY MEDICINE

## 2020-06-08 PROCEDURE — 74011000258 HC RX REV CODE- 258

## 2020-06-08 PROCEDURE — 0Y6H0Z1 DETACHMENT AT RIGHT LOWER LEG, HIGH, OPEN APPROACH: ICD-10-PCS

## 2020-06-08 PROCEDURE — 76060000032 HC ANESTHESIA 0.5 TO 1 HR

## 2020-06-08 PROCEDURE — 80202 ASSAY OF VANCOMYCIN: CPT

## 2020-06-08 PROCEDURE — 74011250637 HC RX REV CODE- 250/637: Performed by: FAMILY MEDICINE

## 2020-06-08 PROCEDURE — 77030018836 HC SOL IRR NACL ICUM -A

## 2020-06-08 PROCEDURE — 74011250637 HC RX REV CODE- 250/637

## 2020-06-08 PROCEDURE — 77030011640 HC PAD GRND REM COVD -A

## 2020-06-08 PROCEDURE — 74011250636 HC RX REV CODE- 250/636: Performed by: ANESTHESIOLOGY

## 2020-06-08 PROCEDURE — 77030010509 HC AIRWY LMA MSK TELE -A: Performed by: ANESTHESIOLOGY

## 2020-06-08 PROCEDURE — 77030008462 HC STPLR SKN PROX J&J -A

## 2020-06-08 PROCEDURE — 77030031139 HC SUT VCRL2 J&J -A

## 2020-06-08 PROCEDURE — 65660000000 HC RM CCU STEPDOWN

## 2020-06-08 PROCEDURE — 76210000016 HC OR PH I REC 1 TO 1.5 HR

## 2020-06-08 PROCEDURE — 88307 TISSUE EXAM BY PATHOLOGIST: CPT

## 2020-06-08 PROCEDURE — 77030018846 HC SOL IRR STRL H20 ICUM -A

## 2020-06-08 PROCEDURE — 74011250636 HC RX REV CODE- 250/636: Performed by: NURSE ANESTHETIST, CERTIFIED REGISTERED

## 2020-06-08 PROCEDURE — 77030025655 HC BLD SAW GIGLI BIOM -B

## 2020-06-08 PROCEDURE — 80048 BASIC METABOLIC PNL TOTAL CA: CPT

## 2020-06-08 PROCEDURE — 77030040922 HC BLNKT HYPOTHRM STRY -A

## 2020-06-08 PROCEDURE — 36415 COLL VENOUS BLD VENIPUNCTURE: CPT

## 2020-06-08 PROCEDURE — 77010033678 HC OXYGEN DAILY

## 2020-06-08 PROCEDURE — 88311 DECALCIFY TISSUE: CPT

## 2020-06-08 PROCEDURE — 76010000138 HC OR TIME 0.5 TO 1 HR

## 2020-06-08 PROCEDURE — 74011250636 HC RX REV CODE- 250/636

## 2020-06-08 PROCEDURE — 74011000250 HC RX REV CODE- 250: Performed by: NURSE ANESTHETIST, CERTIFIED REGISTERED

## 2020-06-08 RX ORDER — ONDANSETRON 2 MG/ML
INJECTION INTRAMUSCULAR; INTRAVENOUS AS NEEDED
Status: DISCONTINUED | OUTPATIENT
Start: 2020-06-08 | End: 2020-06-08 | Stop reason: HOSPADM

## 2020-06-08 RX ORDER — HYDROMORPHONE HYDROCHLORIDE 1 MG/ML
0.2 INJECTION, SOLUTION INTRAMUSCULAR; INTRAVENOUS; SUBCUTANEOUS
Status: DISCONTINUED | OUTPATIENT
Start: 2020-06-08 | End: 2020-06-08 | Stop reason: HOSPADM

## 2020-06-08 RX ORDER — MIDAZOLAM HYDROCHLORIDE 1 MG/ML
0.5 INJECTION, SOLUTION INTRAMUSCULAR; INTRAVENOUS
Status: DISCONTINUED | OUTPATIENT
Start: 2020-06-08 | End: 2020-06-08 | Stop reason: HOSPADM

## 2020-06-08 RX ORDER — FENTANYL CITRATE 50 UG/ML
INJECTION, SOLUTION INTRAMUSCULAR; INTRAVENOUS AS NEEDED
Status: DISCONTINUED | OUTPATIENT
Start: 2020-06-08 | End: 2020-06-08 | Stop reason: HOSPADM

## 2020-06-08 RX ORDER — OXYCODONE AND ACETAMINOPHEN 5; 325 MG/1; MG/1
1 TABLET ORAL
Status: DISCONTINUED | OUTPATIENT
Start: 2020-06-08 | End: 2020-06-13

## 2020-06-08 RX ORDER — ACETAMINOPHEN 325 MG/1
650 TABLET ORAL
Status: DISCONTINUED | OUTPATIENT
Start: 2020-06-08 | End: 2020-06-15 | Stop reason: HOSPADM

## 2020-06-08 RX ORDER — MIDAZOLAM HYDROCHLORIDE 1 MG/ML
1 INJECTION, SOLUTION INTRAMUSCULAR; INTRAVENOUS AS NEEDED
Status: DISCONTINUED | OUTPATIENT
Start: 2020-06-08 | End: 2020-06-08 | Stop reason: HOSPADM

## 2020-06-08 RX ORDER — ROPIVACAINE HYDROCHLORIDE 5 MG/ML
30 INJECTION, SOLUTION EPIDURAL; INFILTRATION; PERINEURAL ONCE
Status: DISCONTINUED | OUTPATIENT
Start: 2020-06-08 | End: 2020-06-08 | Stop reason: HOSPADM

## 2020-06-08 RX ORDER — SODIUM CHLORIDE, SODIUM LACTATE, POTASSIUM CHLORIDE, CALCIUM CHLORIDE 600; 310; 30; 20 MG/100ML; MG/100ML; MG/100ML; MG/100ML
75 INJECTION, SOLUTION INTRAVENOUS CONTINUOUS
Status: DISCONTINUED | OUTPATIENT
Start: 2020-06-08 | End: 2020-06-08 | Stop reason: HOSPADM

## 2020-06-08 RX ORDER — DIPHENHYDRAMINE HYDROCHLORIDE 50 MG/ML
12.5 INJECTION, SOLUTION INTRAMUSCULAR; INTRAVENOUS AS NEEDED
Status: DISCONTINUED | OUTPATIENT
Start: 2020-06-08 | End: 2020-06-08 | Stop reason: HOSPADM

## 2020-06-08 RX ORDER — LIDOCAINE HYDROCHLORIDE 20 MG/ML
INJECTION, SOLUTION EPIDURAL; INFILTRATION; INTRACAUDAL; PERINEURAL AS NEEDED
Status: DISCONTINUED | OUTPATIENT
Start: 2020-06-08 | End: 2020-06-08 | Stop reason: HOSPADM

## 2020-06-08 RX ORDER — KETAMINE HYDROCHLORIDE 10 MG/ML
INJECTION, SOLUTION INTRAMUSCULAR; INTRAVENOUS AS NEEDED
Status: DISCONTINUED | OUTPATIENT
Start: 2020-06-08 | End: 2020-06-08 | Stop reason: HOSPADM

## 2020-06-08 RX ORDER — SODIUM CHLORIDE 9 MG/ML
25 INJECTION, SOLUTION INTRAVENOUS CONTINUOUS
Status: DISCONTINUED | OUTPATIENT
Start: 2020-06-08 | End: 2020-06-08 | Stop reason: HOSPADM

## 2020-06-08 RX ORDER — PROPOFOL 10 MG/ML
INJECTION, EMULSION INTRAVENOUS AS NEEDED
Status: DISCONTINUED | OUTPATIENT
Start: 2020-06-08 | End: 2020-06-08 | Stop reason: HOSPADM

## 2020-06-08 RX ORDER — ACETAMINOPHEN 325 MG/1
650 TABLET ORAL ONCE
Status: DISCONTINUED | OUTPATIENT
Start: 2020-06-08 | End: 2020-06-08 | Stop reason: HOSPADM

## 2020-06-08 RX ORDER — MORPHINE SULFATE 10 MG/ML
2 INJECTION, SOLUTION INTRAMUSCULAR; INTRAVENOUS
Status: DISCONTINUED | OUTPATIENT
Start: 2020-06-08 | End: 2020-06-08 | Stop reason: HOSPADM

## 2020-06-08 RX ORDER — SODIUM CHLORIDE 0.9 % (FLUSH) 0.9 %
5-40 SYRINGE (ML) INJECTION EVERY 8 HOURS
Status: DISCONTINUED | OUTPATIENT
Start: 2020-06-08 | End: 2020-06-08 | Stop reason: HOSPADM

## 2020-06-08 RX ORDER — SODIUM CHLORIDE 0.9 % (FLUSH) 0.9 %
5-40 SYRINGE (ML) INJECTION AS NEEDED
Status: DISCONTINUED | OUTPATIENT
Start: 2020-06-08 | End: 2020-06-08 | Stop reason: HOSPADM

## 2020-06-08 RX ORDER — FENTANYL CITRATE 50 UG/ML
50 INJECTION, SOLUTION INTRAMUSCULAR; INTRAVENOUS AS NEEDED
Status: DISCONTINUED | OUTPATIENT
Start: 2020-06-08 | End: 2020-06-08 | Stop reason: HOSPADM

## 2020-06-08 RX ORDER — LIDOCAINE HYDROCHLORIDE 10 MG/ML
0.1 INJECTION, SOLUTION EPIDURAL; INFILTRATION; INTRACAUDAL; PERINEURAL AS NEEDED
Status: DISCONTINUED | OUTPATIENT
Start: 2020-06-08 | End: 2020-06-08 | Stop reason: HOSPADM

## 2020-06-08 RX ORDER — SODIUM CHLORIDE, SODIUM LACTATE, POTASSIUM CHLORIDE, CALCIUM CHLORIDE 600; 310; 30; 20 MG/100ML; MG/100ML; MG/100ML; MG/100ML
125 INJECTION, SOLUTION INTRAVENOUS CONTINUOUS
Status: DISCONTINUED | OUTPATIENT
Start: 2020-06-08 | End: 2020-06-08 | Stop reason: HOSPADM

## 2020-06-08 RX ORDER — FENTANYL CITRATE 50 UG/ML
25 INJECTION, SOLUTION INTRAMUSCULAR; INTRAVENOUS
Status: DISCONTINUED | OUTPATIENT
Start: 2020-06-08 | End: 2020-06-08 | Stop reason: HOSPADM

## 2020-06-08 RX ORDER — ONDANSETRON 2 MG/ML
4 INJECTION INTRAMUSCULAR; INTRAVENOUS AS NEEDED
Status: DISCONTINUED | OUTPATIENT
Start: 2020-06-08 | End: 2020-06-08 | Stop reason: HOSPADM

## 2020-06-08 RX ADMIN — METRONIDAZOLE 500 MG: 500 INJECTION, SOLUTION INTRAVENOUS at 21:01

## 2020-06-08 RX ADMIN — SERTRALINE HYDROCHLORIDE 50 MG: 50 TABLET ORAL at 17:05

## 2020-06-08 RX ADMIN — CASTOR OIL AND BALSAM, PERU: 788; 87 OINTMENT TOPICAL at 21:07

## 2020-06-08 RX ADMIN — METRONIDAZOLE 500 MG: 500 INJECTION, SOLUTION INTRAVENOUS at 11:14

## 2020-06-08 RX ADMIN — FENTANYL CITRATE 25 MCG: 50 INJECTION, SOLUTION INTRAMUSCULAR; INTRAVENOUS at 11:05

## 2020-06-08 RX ADMIN — VANCOMYCIN HYDROCHLORIDE 1000 MG: 1 INJECTION, POWDER, LYOPHILIZED, FOR SOLUTION INTRAVENOUS at 00:52

## 2020-06-08 RX ADMIN — KETAMINE HYDROCHLORIDE 25 MG: 10 INJECTION, SOLUTION INTRAMUSCULAR; INTRAVENOUS at 11:05

## 2020-06-08 RX ADMIN — OXYCODONE AND ACETAMINOPHEN 1 TABLET: 5; 325 TABLET ORAL at 15:07

## 2020-06-08 RX ADMIN — ONDANSETRON HYDROCHLORIDE 4 MG: 2 INJECTION, SOLUTION INTRAMUSCULAR; INTRAVENOUS at 11:35

## 2020-06-08 RX ADMIN — OXYCODONE HYDROCHLORIDE 5 MG: 5 TABLET ORAL at 20:58

## 2020-06-08 RX ADMIN — Medication 10 ML: at 21:07

## 2020-06-08 RX ADMIN — KETAMINE HYDROCHLORIDE 15 MG: 10 INJECTION, SOLUTION INTRAMUSCULAR; INTRAVENOUS at 11:20

## 2020-06-08 RX ADMIN — FENTANYL CITRATE 25 MCG: 50 INJECTION, SOLUTION INTRAMUSCULAR; INTRAVENOUS at 11:02

## 2020-06-08 RX ADMIN — OXYCODONE HYDROCHLORIDE 10 MG: 5 TABLET ORAL at 00:50

## 2020-06-08 RX ADMIN — OXYCODONE AND ACETAMINOPHEN 1 TABLET: 5; 325 TABLET ORAL at 19:19

## 2020-06-08 RX ADMIN — ATORVASTATIN CALCIUM 10 MG: 10 TABLET, FILM COATED ORAL at 21:03

## 2020-06-08 RX ADMIN — CEFEPIME HYDROCHLORIDE 2 G: 2 INJECTION, POWDER, FOR SOLUTION INTRAVENOUS at 18:52

## 2020-06-08 RX ADMIN — CARVEDILOL 3.12 MG: 3.12 TABLET, FILM COATED ORAL at 17:05

## 2020-06-08 RX ADMIN — CARVEDILOL 3.12 MG: 3.12 TABLET, FILM COATED ORAL at 08:59

## 2020-06-08 RX ADMIN — Medication 10 ML: at 06:00

## 2020-06-08 RX ADMIN — CASTOR OIL AND BALSAM, PERU: 788; 87 OINTMENT TOPICAL at 06:00

## 2020-06-08 RX ADMIN — SODIUM CHLORIDE, SODIUM LACTATE, POTASSIUM CHLORIDE, AND CALCIUM CHLORIDE 125 ML/HR: 600; 310; 30; 20 INJECTION, SOLUTION INTRAVENOUS at 10:29

## 2020-06-08 RX ADMIN — CASTOR OIL AND BALSAM, PERU: 788; 87 OINTMENT TOPICAL at 13:50

## 2020-06-08 RX ADMIN — LIDOCAINE HYDROCHLORIDE 80 MG: 20 INJECTION, SOLUTION EPIDURAL; INFILTRATION; INTRACAUDAL; PERINEURAL at 11:05

## 2020-06-08 RX ADMIN — MIDAZOLAM HYDROCHLORIDE 1 MG: 2 INJECTION, SOLUTION INTRAMUSCULAR; INTRAVENOUS at 10:41

## 2020-06-08 RX ADMIN — PROPOFOL 125 MG: 10 INJECTION, EMULSION INTRAVENOUS at 11:05

## 2020-06-08 NOTE — ANESTHESIA PREPROCEDURE EVALUATION
Relevant Problems   No relevant active problems       Anesthetic History   No history of anesthetic complications            Review of Systems / Medical History  Patient summary reviewed and nursing notes reviewed    Pulmonary    COPD: moderate      Shortness of breath      Comments: Uses oxygen at night and prn   Neuro/Psych             Comments: Migraines Cardiovascular    Hypertension: well controlled  Valvular problems/murmurs: tricuspid insufficiency and mitral insufficiency      Dysrhythmias : atrial fibrillation  Pacemaker and hyperlipidemia    Exercise tolerance: <4 METS  Comments: Pacemaker battery needs replacement in 4 months  Pulmonary hypertension  Ischemic cardiomyopathy   GI/Hepatic/Renal     GERD: well controlled          Comments: Swallowing difficulty Endo/Other        Arthritis and cancer  Pertinent negatives: No diabetes  Comments: Colon resection Other Findings   Comments: Fractured vertebra: L3           Physical Exam    Airway  Mallampati: II  TM Distance: > 6 cm  Neck ROM: normal range of motion   Mouth opening: Normal     Cardiovascular  Regular rate and rhythm,  S1 and S2 normal,  no murmur, click, rub, or gallop  Rhythm: regular  Rate: normal         Dental  No notable dental hx       Pulmonary  Breath sounds clear to auscultation               Abdominal  GI exam deferred       Other Findings            Anesthetic Plan    ASA: 3  Anesthesia type: general          Induction: Intravenous  Anesthetic plan and risks discussed with: Patient      Informed consent obtained.

## 2020-06-08 NOTE — PROGRESS NOTES
TRANSFER - IN REPORT:    Verbal report received from Renetta Nesbitt RN(name) on Jamie Beck  being received from [facility]) for routine post - op      Report consisted of patients Situation, Background, Assessment and   Recommendations(SBAR). Information from the following report(s) OR Summary, Procedure Summary, Intake/Output, MAR and Accordion was reviewed with the receiving nurse. Opportunity for questions and clarification was provided. Assessment completed upon patients arrival to unit and care assumed.

## 2020-06-08 NOTE — PROGRESS NOTES
ID follow up    Patient to OR for BKA   On Vancomycin, Cefepime Flagyl- continue perioperatively and stop in 24-48 hours post op if no signs of post op site infection     Monitor renal function closely. Pharmacy to renally dose antibiotics.      Will sign off    Call if any questions     Vaishali Lal,   10:06 AM

## 2020-06-08 NOTE — PROGRESS NOTES
[facility] Adult  Hospitalist Group                                                                                          Hospitalist Progress Note  Tara Holm MD  Answering service: [phone] from in house phone        Date of Service:  2020  NAME:  Pierre Freire  :  1932  MRN:  [id number]      Admission Summary:   The patient is an 51-year-old female with past medical history of hyperlipidemia, atrial fibrillation, third-degree AV block, anxiety, depression, colon cancer, hypothyroidism, CKD stage III, osteopenia, ischemic cardiomyopathy, status post pacemaker placement, COPD, AAA, hypertension, osteoporosis, wound of the foot, bradycardia, dilated cardiomyopathy, type 2 diabetes, presents to the hospital with the above-mentioned symptom. History was obtained from the patient as well as her daughter. The daughter reports that the patient has had a wound in her leg for the last many months. The patient saw Dr. Stephany Villalobos, underwent an angiogram about 3 weeks back. There was not one particular vessel that was identified in the right foot that could cause her symptoms. Recommendation made for below-knee amputation if gangrene persisted, but the patient refused. The patient was sent home, went for her wound care check today and they were concerned that her pain as well as her wound have gotten worse. There has been increased area of gangrenous tissue, and the patient was sent to the hospital for further management and evaluation. The patient's daughter reports that the patient does not have any fever per se. She has had some poor appetite, but no other complaints or problems.   The patient's daughter denies the patient having any headache, blurry vision, sore throat, trouble swallowing, trouble with speech, chest pain, shortness of breath, cough, fever, chills, abdominal pain, constipation, diarrhea, urinary symptoms, focal or generalized neurological weakness, falls, injuries,  hematemesis, melena, hemoptysis, hematuria, or any other concerns or problems. The patient reports that the patient has been tested \"many times for COVID-19 and all the times it has been negative. \"    Interval history / Subjective:   Patient reports right footpain- she has agreed to amputation  EP cardiology and ID following  Vascular surgery planning for R BKA today  Increased pain med doses yesterday with good results     Assessment & Plan:     1. Gangrene of the right foot- due to ischemia and chronic wounds and wound infection  Vascular surgery recommending BKA- planned for Monday 6/8/20  ID consulted for IV abx management  2. Hypotension,- multifactorial  Due to sepsis from R foot infection, CHF with EF of 20-25%  Monitor BP  3. Acute kidney injury,  Avoid nephrotoxic medications   renally dose abx  Monitor creatinine- improved to 1.09 from 1.44  4. Elevated liver function tests. Suspected cause shock liver from hypotension  monitor  5. Diabetes. NovoLog insulin, Accu-Cheks, diet control, and close monitoring. 6.  COPD- PRN nebs  7. Atrial fibrillation. Continue home medication. Eliquis stopped for planned surgery   Rate controlled. 8.recent pacemaker placement- family requesting EP eval- appreciate their input  9. Hypothyroidism. Continue home medication. 10. Hyponatremia- mild- monitor  11.  Mild cognitive impairment with high risk of postop delerium    Code status: was DNR, changed to [address] [address] on 6/8/20 at the request of patient and her daughter [place]    DVT prophylaxis: Heparin    Care Plan discussed with: Patient/Family  Anticipated Disposition: Home w/Family and SNF/LTC  Anticipated Discharge: Greater than 48 hours     Hospital Problems  Date Reviewed: 6/8/2020          Codes Class Noted POA    Cellulitis ICD-10-CM: L03.90  ICD-9-CM: 682.9  6/4/2020 Unknown                Review of Systems:   A comprehensive review of systems was negative except for that written in the HPI. Vital Signs:    Last 24hrs VS reviewed since prior progress note. Most recent are:  Visit Vitals  /62 (BP 1 Location: Right arm, BP Patient Position: At rest)   Pulse 80   Temp 98.3 °F (36.8 °C)   Resp 16   Wt 76.6 kg (168 lb 12.8 oz)   SpO2 96%   BMI 29.90 kg/m²     Patient Vitals for the past 24 hrs:   Temp Pulse Resp BP SpO2   06/08/20 1009 98.3 °F (36.8 °C)  16 149/62 96 %   06/08/20 0825 98 °F (36.7 °C) 80 16 134/69 95 %   06/08/20 0413 98.3 °F (36.8 °C) 81 18 151/68 100 %   06/07/20 2142 98.1 °F (36.7 °C) 81 16 121/72 99 %   06/07/20 1422     98 %   06/07/20 1416 98 °F (36.7 °C) 81 18 122/73 97 %       Intake/Output Summary (Last 24 hours) at 6/8/2020 1036  Last data filed at 6/8/2020 0702  Gross per 24 hour   Intake 358 ml   Output 500 ml   Net -142 ml        Physical Examination:             Constitutional:  No acute distress, cooperative, pleasant    ENT:  Oral mucosa moist, oropharynx benign. Resp:  decreased breath sounds bilaterally. No wheezing/rhonchi/rales. CV:  IRR, 2/6 CANDELARIA, pacemaker    GI:  Soft, non distended, non tender. normoactive bowel sounds, no hepatosplenomegaly     Musculoskeletal: edemaand erythema of right foot- unable to palpate pulses in bilat LE,     Neurologic:  Moves all extremities.   AAOx3, CN II-XII reviewed     Psych:  anxious   Skin- numerous wounds as documented by woundcare                 Data Review:    Review and/or order of clinical lab test  Review and/or order of tests in the radiology section of CPT  Review and/or order of tests in the medicine section of CPT      Labs:     Recent Labs     06/07/20  0212   WBC 9.6   HGB 11.2*   HCT 35.6        Recent Labs     06/08/20  0029 06/07/20  0212 06/06/20  0722   * 132* 136   K 4.2 3.8 4.7    101 106   CO2 24 26 24   BUN 12 14 20   CREA 0.75 0.74 0.92    113* 135*   CA 8.5 8.2* 8.7     No results for input(s): ALT, AP, TBIL, TBILI, TP, ALB, GLOB, GGT, AML, LPSE in the last 72 hours. No lab exists for component: SGOT, GPT, AMYP, HLPSE  No results for input(s): INR, PTP, APTT, INREXT, INREXT in the last 72 hours. No results for input(s): FE, TIBC, PSAT, FERR in the last 72 hours. Lab Results   Component Value Date/Time    Folate 14.6 08/11/2014 03:29 PM      No results for input(s): PH, PCO2, PO2 in the last 72 hours. No results for input(s): CPK, CKNDX, TROIQ in the last 72 hours.     No lab exists for component: CPKMB  Lab Results   Component Value Date/Time    Cholesterol, total 199 11/18/2019 11:29 AM    HDL Cholesterol 65 11/18/2019 11:29 AM    LDL, calculated 97.4 11/18/2019 11:29 AM    Triglyceride 183 (H) 11/18/2019 11:29 AM    CHOL/HDL Ratio 3.1 11/18/2019 11:29 AM     Lab Results   Component Value Date/Time    Glucose (POC) 80 06/06/2020 06:04 AM    Glucose (POC) 140 (H) 06/05/2020 11:14 PM    Glucose (POC) 99 06/05/2020 04:47 PM    Glucose (POC) 163 (H) 06/05/2020 12:08 PM    Glucose (POC) 108 (H) 06/05/2020 06:08 AM     Lab Results   Component Value Date/Time    Color Yellow 08/01/2019 10:49 AM    Appearance Clear 08/01/2019 10:49 AM    Specific gravity 1.009 06/29/2019 09:30 AM    Specific gravity 1.015 06/12/2017 04:36 PM    pH (UA) 5.5 08/01/2019 10:49 AM    Protein NEGATIVE  06/29/2019 09:30 AM    Glucose NEGATIVE  06/29/2019 09:30 AM    Ketone Negative 08/01/2019 10:49 AM    Bilirubin Negative 08/01/2019 10:49 AM    Urobilinogen 0.2 06/29/2019 09:30 AM    Nitrites Negative 08/01/2019 10:49 AM    Leukocyte Esterase Negative 08/01/2019 10:49 AM    Epithelial cells FEW 01/18/2019 04:00 AM    Bacteria NEGATIVE  01/18/2019 04:00 AM    WBC 0-4 01/18/2019 04:00 AM    RBC 0-5 01/18/2019 04:00 AM         Medications Reviewed:     Current Facility-Administered Medications   Medication Dose Route Frequency    lactated Ringers infusion  125 mL/hr IntraVENous CONTINUOUS    0.9% sodium chloride infusion  25 mL/hr IntraVENous CONTINUOUS    sodium chloride (NS) flush 5-40 mL  5-40 mL IntraVENous Q8H    sodium chloride (NS) flush 5-40 mL  5-40 mL IntraVENous PRN    lidocaine (PF) (XYLOCAINE) 10 mg/mL (1 %) injection 0.1 mL  0.1 mL SubCUTAneous PRN    fentaNYL citrate (PF) injection 50 mcg  50 mcg IntraVENous PRN    midazolam (VERSED) injection 1 mg  1 mg IntraVENous PRN    midazolam (VERSED) injection 1 mg  1 mg IntraVENous PRN    acetaminophen (TYLENOL) tablet 650 mg  650 mg Oral ONCE    ropivacaine (PF) (NAROPIN) 5 mg/mL (0.5 %) injection 150 mg  30 mL Peripheral Nerve Block ONCE    oxyCODONE IR (ROXICODONE) tablet 10 mg  10 mg Oral Q4H PRN    oxyCODONE IR (ROXICODONE) tablet 15 mg  15 mg Oral Q4H PRN    HYDROmorphone (PF) (DILAUDID) injection 1 mg  1 mg IntraVENous Q3H PRN    carvediloL (COREG) tablet 3.125 mg  3.125 mg Oral BID WITH MEALS    famotidine (PEPCID) tablet 20 mg  20 mg Oral DAILY    levothyroxine (SYNTHROID) tablet 88 mcg  88 mcg Oral ACB    lidocaine 4 % patch   TransDERmal DAILY PRN    atorvastatin (LIPITOR) tablet 10 mg  10 mg Oral QHS    sertraline (ZOLOFT) tablet 50 mg  50 mg Oral QPM    sodium chloride (NS) flush 5-40 mL  5-40 mL IntraVENous Q8H    sodium chloride (NS) flush 5-40 mL  5-40 mL IntraVENous PRN    acetaminophen (TYLENOL) tablet 650 mg  650 mg Oral Q4H PRN    ondansetron (ZOFRAN) injection 4 mg  4 mg IntraVENous Q4H PRN    vancomycin (VANCOCIN) 1,000 mg in 0.9% sodium chloride (MBP/ADV) 250 mL  1,000 mg IntraVENous Q24H    balsam peru-castor oiL (VENELEX) ointment   Topical Q8H    loperamide (IMODIUM) 1 mg/7.5 mL oral solution 2 mg  2 mg Oral QID PRN    cefepime (MAXIPIME) 2 g in 0.9% sodium chloride (MBP/ADV) 100 mL  2 g IntraVENous Q24H    metroNIDAZOLE (FLAGYL) IVPB premix 500 mg  500 mg IntraVENous Q12H    Vancomycin- pharmacy to dose   Other Rx Dosing/Monitoring     ______________________________________________________________________  EXPECTED LENGTH OF STAY: 3d 2h  ACTUAL LENGTH OF STAY:          4 Jae Vargas MD

## 2020-06-08 NOTE — PROGRESS NOTES
TRANSFER - OUT REPORT:    Verbal report given to Brenda Teran RN(name) on [facility] Pickup  being transferred to Holding(unit) for ordered procedure       Report consisted of patients Situation, Background, Assessment and   Recommendations(SBAR). Information from the following report(s) SBAR, Kardex, Intake/Output, MAR and Accordion was reviewed with the receiving nurse. Lines:   Peripheral IV 06/04/20 Left Arm (Active)   Site Assessment Clean, dry, & intact 6/7/2020  9:42 PM   Phlebitis Assessment 0 6/7/2020  9:42 PM   Infiltration Assessment 0 6/7/2020  9:42 PM   Dressing Status Clean, dry, & intact 6/7/2020  9:42 PM   Dressing Type Transparent;Tape 6/7/2020  9:42 PM   Hub Color/Line Status Infusing 6/7/2020  9:42 PM   Action Taken Open ports on tubing capped 6/7/2020  9:42 PM   Alcohol Cap Used Yes 6/7/2020  9:42 PM       Peripheral IV 06/04/20 Right Antecubital (Active)   Site Assessment Clean, dry, & intact 6/7/2020  9:42 PM   Phlebitis Assessment 0 6/7/2020  9:42 PM   Infiltration Assessment 0 6/7/2020  9:42 PM   Dressing Status Clean, dry, & intact 6/7/2020  9:42 PM   Dressing Type Transparent;Tape 6/7/2020  9:42 PM   Hub Color/Line Status Capped 6/7/2020  9:42 PM   Action Taken Open ports on tubing capped 6/7/2020  9:42 PM   Alcohol Cap Used Yes 6/7/2020  9:42 PM        Opportunity for questions and clarification was provided.       Patient transported with:   [employer]

## 2020-06-08 NOTE — OP NOTES
[address]   OPERATIVE REPORT    Name:  Felisha You  MR#:  [id number]  :  1932  ACCOUNT #:  [de-identified]  DATE OF SERVICE:  2020    PREOPERATIVE DIAGNOSIS:  Peripheral vascular disease with gangrene, right foot. POSTOPERATIVE DIAGNOSIS:  Peripheral vascular disease with gangrene, right foot. PROCEDURE PERFORMED:  Right below-knee amputation. SURGEON:  Ruben Patrick MD    ASSISTANT:  Kasey Rogers. ANESTHESIA:  General.    COMPLICATIONS:  None. SPECIMENS REMOVED:  Right lower leg and foot. IMPLANTS:  None. ESTIMATED BLOOD LOSS:  100 mL. INDICATIONS FOR PROCEDURE:  The patient is an 28-year-old female with severe peripheral vascular disease involving the mid and distal lower leg. She has a gangrenous forefoot and unreconstructable anatomy from a vascular standpoint. She will undergo below-knee amputation for intractable pain and tissue loss. DESCRIPTION OF PROCEDURE:  The patient's right leg was prepped and draped. A circumferential incision was made in the proximal to mid lower leg. This included a posterior skin and muscle flap. The soft tissues were divided anteriorly, medially, and laterally. The tibia and fibula were exposed. The tibia was divided with a giggly saw and the fibula with a bone cutter. The posterior soft tissues were then divided in the lower leg and foot removed. There was reasonable bleeding from the soft tissues. Hemostasis was obtained and the incision was closed with interrupted Vicryl subcutaneous and fascial sutures and skin staples. Dressings were applied and the patient was returned to the recovery room in stable condition.         Rg Willard MD      [id number]  D:  2020 11:53  T:  2020 13:27  JOB #:  [id number]

## 2020-06-08 NOTE — TELEPHONE ENCOUNTER
----- Message from Fareed Ortega sent at 6/5/2020  4:37 PM EDT -----  Regarding: Hali/General  Caller: Anisa (Nurse at [facility])    Reason: The patient would like to speak to the doctor about a plan of action.      Best contact number(s): [phone]

## 2020-06-08 NOTE — PROGRESS NOTES
Pharmacist Note - Vancomycin Dosing  Therapy day 5  Indication: worsening cellulitis and necrosis to the right great toe. - 6/4 XR foot- No plain film evidence of acute osteomyelitis. Current regimen: 1000 mg IV q 24 h    A Trough Level resulted at 10.5 mcg/mL which was obtained 25 hrs post-dose. Goal trough: 10 - 15 mcg/mL     Plan: Continue current regimen. Pharmacy will continue to monitor this patient daily for changes in clinical status and renal function.

## 2020-06-08 NOTE — PERIOP NOTES
TRANSFER - OUT REPORT:    Verbal report given to Musa Pizarro (name) on Leatha Brenner  being transferred to 56(unit) for routine post - op       Report consisted of patients Situation, Background, Assessment and   Recommendations(SBAR). Time Pre op antibiotic given:1114  Anesthesia Stop time: 9994  Dahl Present on Transfer to floor:n  Order for Dahl on Chart:n  Discharge Prescriptions with Chart:n    Information from the following report(s) SBAR, OR Summary, Intake/Output, MAR, Accordion and Recent Results was reviewed with the receiving nurse. Opportunity for questions and clarification was provided. Is the patient on 02? YES       L/Min 1       Other     Is the patient on a monitor? NO    Is the nurse transporting with the patient? NO    Surgical Waiting Area notified of patient's transfer from PACU?  YES      The following personal items collected during your admission accompanied patient upon transfer:   Dental Appliance:    Vision: Visual Aid: Glasses  Hearing Aid: Hearing Aid: Bilateral  Jewelry:    Clothing:    Other Valuables:    Valuables sent to safe:

## 2020-06-08 NOTE — PROGRESS NOTES
Bedside and Verbal shift change report given to [facility] BO (oncoming nurse) by Silva Bowling RN (offgoing nurse). Report included the following information SBAR, Kardex and MAR.

## 2020-06-08 NOTE — PROGRESS NOTES
Bedside and Verbal shift change report given to Matthew Ramírez RN (oncoming nurse) by Orestes Medeiros RN (offgoing nurse). Report included the following information SBAR, Kardex and MAR.

## 2020-06-08 NOTE — PROGRESS NOTES
responded to family request for prayer before surgery. Pt shared she maybe got too much sleep just the medicine she was on. Pt welcomed visit and pt's daughter Micki Bailey was at bedside. Pt shared that she dreamed she already had the surgery. Pt welcomed prayer and  support.  provided pastoral listening, support and prayer. Let them know of  availability and support. Please contact [phone] [facility] for further support.  follow up as needed.      [address] Yamila Bazan, MACE   [phone]

## 2020-06-08 NOTE — PROGRESS NOTES
for follow up visit. Pt's daughter was in the waiting area. She shared that they were hoping her sister Dilip Burroughs would be able to come be with her as they waited for their mom in surgery. Sharla shared how the pt chose [facility] because the the good care she would receive.  provided pastoral listening, support and assurance of prayer. Please contact [phone] [facility] for further support.  follow up as needed.      [address] Yamila Bazan, MACE   [phone]

## 2020-06-08 NOTE — ANESTHESIA POSTPROCEDURE EVALUATION
Procedure(s):  RIGHT AMPUTATION BELOW THE KNEE(BKA). general    Anesthesia Post Evaluation        Patient location during evaluation: PACU  Patient participation: complete - patient participated  Level of consciousness: awake and alert  Pain management: adequate  Airway patency: patent  Anesthetic complications: no  Cardiovascular status: acceptable  Respiratory status: acceptable  Hydration status: acceptable  Comments: I have seen and evaluated the patient and is ready for discharge. Yaquelin Rodgers MD    Post anesthesia nausea and vomiting:  none      INITIAL Post-op Vital signs:   Vitals Value Taken Time   /52 6/8/2020 12:05 PM   Temp 37.3 °C (99.1 °F) 6/8/2020 11:55 AM   Pulse 80 6/8/2020 12:12 PM   Resp 19 6/8/2020 12:12 PM   SpO2 100 % 6/8/2020 12:12 PM   Vitals shown include unvalidated device data.

## 2020-06-08 NOTE — BRIEF OP NOTE
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       Procedure(s):  RIGHT AMPUTATION BELOW THE KNEE(BKA)    Surgeon(s):  Kaylan Sandoval MD    Surgical Assistant: None    Anesthesia: General     Estimated Blood Loss (mL): less than 546     Complications: None    Specimens:   ID Type Source Tests Collected by Time Destination   1 : right below the knee amputation Fresh Leg, Right  Kaylan Sandoval MD 6/8/2020 1108 Pathology        Implants: * No implants in log *    Drains:   External Female Catheter 06/06/20 (Active)   Site Assessment Clean, dry, & intact 6/8/2020  7:02 AM   Repositioned Yes 6/8/2020  7:02 AM   Perineal Care Yes 6/8/2020  7:02 AM   Wick Changed Yes 6/8/2020  7:02 AM   Suction Canister/Tubing Changed No 6/8/2020  7:02 AM   Urine Output (mL) 500 ml 6/8/2020  7:02 AM       [REMOVED] External Female Catheter 04/17/20 (Removed)       Findings: NONE    Electronically Signed by Kalin Turner MD on 6/8/2020 at 11:49 AM

## 2020-06-08 NOTE — PROGRESS NOTES
[facility]  Hospitalist Group                                                                                          Hospitalist Progress Note  Bubba Ramos MD  Answering service: [phone] from in house phone        Date of Service:  2020  NAME:  Mera Foley  :  1932  MRN:  [id number]      Admission Summary:   The patient is an 42-year-old female with past medical history of hyperlipidemia, atrial fibrillation, third-degree AV block, anxiety, depression, colon cancer, hypothyroidism, CKD stage III, osteopenia, ischemic cardiomyopathy, status post pacemaker placement, COPD, AAA, hypertension, osteoporosis, wound of the foot, bradycardia, dilated cardiomyopathy, type 2 diabetes, presents to the hospital with the above-mentioned symptom. History was obtained from the patient as well as her daughter. The daughter reports that the patient has had a wound in her leg for the last many months. The patient saw Dr. Kinjal Lockett, underwent an angiogram about 3 weeks back. There was not one particular vessel that was identified in the right foot that could cause her symptoms. Recommendation made for below-knee amputation if gangrene persisted, but the patient refused. The patient was sent home, went for her wound care check today and they were concerned that her pain as well as her wound have gotten worse. There has been increased area of gangrenous tissue, and the patient was sent to the hospital for further management and evaluation. The patient's daughter reports that the patient does not have any fever per se. She has had some poor appetite, but no other complaints or problems.   The patient's daughter denies the patient having any headache, blurry vision, sore throat, trouble swallowing, trouble with speech, chest pain, shortness of breath, cough, fever, chills, abdominal pain, constipation, diarrhea, urinary symptoms, focal or generalized neurological weakness, falls, injuries,  hematemesis, melena, hemoptysis, hematuria, or any other concerns or problems. The patient reports that the patient has been tested \"many times for COVID-19 and all the times it has been negative. \"    Interval history / Subjective:   Patient reports right footpain- she has agreed to amputation  EP cardiology and ID following  Vascular surgery planning for R BKA on Monday  Increased pain med doses today     Assessment & Plan:     1. Gangrene of the right foot- due to ischemia and chronic wounds and wound infection  Vascular surgery recommending BKA- planned for Monday 6/8/20  ID consulted for IV abx management  2. Hypotension,- multifactorial  Due to sepsis from R foot infection, CHF with EF of 20-25%  Monitor BP  3. Acute kidney injury,  Avoid nephrotoxic medications   renally dose abx  Monitor creatinine- improved to 1.09 from 1.44  4. Elevated liver function tests. Suspected cause shock liver from hypotension  monitor  5. Diabetes. NovoLog insulin, Accu-Cheks, diet control, and close monitoring. 6.  COPD- PRN nebs  7. Atrial fibrillation. Continue home medication. Eliquis stopped for planned surgery   Rate control. 8.recent pacemaker placement- family requesting EP eval- appreciate their input  9. Hypothyroidism. Continue home medication. 10. Hyponatremia- mild- monitor    Code status: DNR  DVT prophylaxis: Heparin    Care Plan discussed with: Patient/Family  Anticipated Disposition: Home w/Family and SNF/LTC  Anticipated Discharge: Greater than 48 hours     Hospital Problems  Date Reviewed: 5/14/2020          Codes Class Noted POA    Cellulitis ICD-10-CM: L03.90  ICD-9-CM: 682.9  6/4/2020 Unknown                Review of Systems:   A comprehensive review of systems was negative except for that written in the HPI. Vital Signs:    Last 24hrs VS reviewed since prior progress note.  Most recent are:  Visit Vitals  /72 (BP 1 Location: Right arm, BP Patient Position: At rest)   Pulse 81   Temp 98.1 °F (36.7 °C)   Resp 16   Wt 76.6 kg (168 lb 12.8 oz)   SpO2 99%   BMI 29.90 kg/m²         Intake/Output Summary (Last 24 hours) at 6/7/2020 2315  Last data filed at 6/7/2020 1823  Gross per 24 hour   Intake 476 ml   Output 350 ml   Net 126 ml        Physical Examination:             Constitutional:  No acute distress, cooperative, pleasant    ENT:  Oral mucosa moist, oropharynx benign. Resp:  decreased breath sounds bilaterally. No wheezing/rhonchi/rales. CV:  IRR, 2/6 CANDELARIA, pacemaker    GI:  Soft, non distended, non tender. normoactive bowel sounds, no hepatosplenomegaly     Musculoskeletal: edemaand erythema of right foot- unable to palpate pulses in bilat LE,     Neurologic:  Moves all extremities. AAOx3, CN II-XII reviewed     Psych:  anxious   Skin- numerous wounds as documented by woundcare                 Data Review:    Review and/or order of clinical lab test  Review and/or order of tests in the radiology section of CPT  Review and/or order of tests in the medicine section of CPT      Labs:     Recent Labs     06/07/20 0212 06/05/20 0451   WBC 9.6 11.3*   HGB 11.2* 11.7   HCT 35.6 36.0    152     Recent Labs     06/07/20 0212 06/06/20 0722 06/05/20 0451   * 136 134*   K 3.8 4.7 3.7    106 102   CO2 26 24 23   BUN 14 20 27*   CREA 0.74 0.92 1.09*   * 135* 124*   CA 8.2* 8.7 8.4*     No results for input(s): ALT, AP, TBIL, TBILI, TP, ALB, GLOB, GGT, AML, LPSE in the last 72 hours. No lab exists for component: SGOT, GPT, AMYP, HLPSE  No results for input(s): INR, PTP, APTT, INREXT, INREXT in the last 72 hours. No results for input(s): FE, TIBC, PSAT, FERR in the last 72 hours. Lab Results   Component Value Date/Time    Folate 14.6 08/11/2014 03:29 PM      No results for input(s): PH, PCO2, PO2 in the last 72 hours. No results for input(s): CPK, CKNDX, TROIQ in the last 72 hours.     No lab exists for component: CPKMB  Lab Results Component Value Date/Time    Cholesterol, total 199 11/18/2019 11:29 AM    HDL Cholesterol 65 11/18/2019 11:29 AM    LDL, calculated 97.4 11/18/2019 11:29 AM    Triglyceride 183 (H) 11/18/2019 11:29 AM    CHOL/HDL Ratio 3.1 11/18/2019 11:29 AM     Lab Results   Component Value Date/Time    Glucose (POC) 80 06/06/2020 06:04 AM    Glucose (POC) 140 (H) 06/05/2020 11:14 PM    Glucose (POC) 99 06/05/2020 04:47 PM    Glucose (POC) 163 (H) 06/05/2020 12:08 PM    Glucose (POC) 108 (H) 06/05/2020 06:08 AM     Lab Results   Component Value Date/Time    Color Yellow 08/01/2019 10:49 AM    Appearance Clear 08/01/2019 10:49 AM    Specific gravity 1.009 06/29/2019 09:30 AM    Specific gravity 1.015 06/12/2017 04:36 PM    pH (UA) 5.5 08/01/2019 10:49 AM    Protein NEGATIVE  06/29/2019 09:30 AM    Glucose NEGATIVE  06/29/2019 09:30 AM    Ketone Negative 08/01/2019 10:49 AM    Bilirubin Negative 08/01/2019 10:49 AM    Urobilinogen 0.2 06/29/2019 09:30 AM    Nitrites Negative 08/01/2019 10:49 AM    Leukocyte Esterase Negative 08/01/2019 10:49 AM    Epithelial cells FEW 01/18/2019 04:00 AM    Bacteria NEGATIVE  01/18/2019 04:00 AM    WBC 0-4 01/18/2019 04:00 AM    RBC 0-5 01/18/2019 04:00 AM         Medications Reviewed:     Current Facility-Administered Medications   Medication Dose Route Frequency    Vanc trough due today 6/7 @ 23:00   Other ONCE    oxyCODONE IR (ROXICODONE) tablet 10 mg  10 mg Oral Q4H PRN    oxyCODONE IR (ROXICODONE) tablet 15 mg  15 mg Oral Q4H PRN    HYDROmorphone (PF) (DILAUDID) injection 1 mg  1 mg IntraVENous Q3H PRN    carvediloL (COREG) tablet 3.125 mg  3.125 mg Oral BID WITH MEALS    famotidine (PEPCID) tablet 20 mg  20 mg Oral DAILY    levothyroxine (SYNTHROID) tablet 88 mcg  88 mcg Oral ACB    lidocaine 4 % patch   TransDERmal DAILY PRN    atorvastatin (LIPITOR) tablet 10 mg  10 mg Oral QHS    sertraline (ZOLOFT) tablet 50 mg  50 mg Oral QPM    sodium chloride (NS) flush 5-40 mL  5-40 mL IntraVENous Q8H    sodium chloride (NS) flush 5-40 mL  5-40 mL IntraVENous PRN    acetaminophen (TYLENOL) tablet 650 mg  650 mg Oral Q4H PRN    ondansetron (ZOFRAN) injection 4 mg  4 mg IntraVENous Q4H PRN    vancomycin (VANCOCIN) 1,000 mg in 0.9% sodium chloride (MBP/ADV) 250 mL  1,000 mg IntraVENous Q24H    balsam peru-castor oiL (VENELEX) ointment   Topical Q8H    loperamide (IMODIUM) 1 mg/7.5 mL oral solution 2 mg  2 mg Oral QID PRN    cefepime (MAXIPIME) 2 g in 0.9% sodium chloride (MBP/ADV) 100 mL  2 g IntraVENous Q24H    metroNIDAZOLE (FLAGYL) IVPB premix 500 mg  500 mg IntraVENous Q12H    heparin (porcine) injection 5,000 Units  5,000 Units SubCUTAneous Q8H    Vancomycin- pharmacy to dose   Other Rx Dosing/Monitoring     ______________________________________________________________________  EXPECTED LENGTH OF STAY: 3d 2h  ACTUAL LENGTH OF STAY:          3                 Anselmo Velasco MD

## 2020-06-09 ENCOUNTER — DOCUMENTATION ONLY (OUTPATIENT)
Dept: CASE MANAGEMENT | Age: 85
End: 2020-06-09

## 2020-06-09 LAB
ANION GAP SERPL CALC-SCNC: 6 MMOL/L (ref 5–15)
BACTERIA SPEC CULT: NORMAL
BUN SERPL-MCNC: 12 MG/DL (ref 6–20)
BUN/CREAT SERPL: 17 (ref 12–20)
CALCIUM SERPL-MCNC: 8.1 MG/DL (ref 8.5–10.1)
CHLORIDE SERPL-SCNC: 105 MMOL/L (ref 97–108)
CO2 SERPL-SCNC: 25 MMOL/L (ref 21–32)
CREAT SERPL-MCNC: 0.72 MG/DL (ref 0.55–1.02)
GLUCOSE SERPL-MCNC: 109 MG/DL (ref 65–100)
MAGNESIUM SERPL-MCNC: 1.3 MG/DL (ref 1.6–2.4)
PHOSPHATE SERPL-MCNC: 2.5 MG/DL (ref 2.6–4.7)
POTASSIUM SERPL-SCNC: 4 MMOL/L (ref 3.5–5.1)
SERVICE CMNT-IMP: NORMAL
SODIUM SERPL-SCNC: 136 MMOL/L (ref 136–145)
T4 FREE SERPL-MCNC: 1 NG/DL (ref 0.8–1.5)
TSH SERPL DL<=0.05 MIU/L-ACNC: 9.35 UIU/ML (ref 0.36–3.74)

## 2020-06-09 PROCEDURE — 83735 ASSAY OF MAGNESIUM: CPT

## 2020-06-09 PROCEDURE — 97530 THERAPEUTIC ACTIVITIES: CPT

## 2020-06-09 PROCEDURE — 74011250637 HC RX REV CODE- 250/637

## 2020-06-09 PROCEDURE — 74011250637 HC RX REV CODE- 250/637: Performed by: FAMILY MEDICINE

## 2020-06-09 PROCEDURE — 74011000258 HC RX REV CODE- 258

## 2020-06-09 PROCEDURE — 51798 US URINE CAPACITY MEASURE: CPT

## 2020-06-09 PROCEDURE — 84443 ASSAY THYROID STIM HORMONE: CPT

## 2020-06-09 PROCEDURE — 80048 BASIC METABOLIC PNL TOTAL CA: CPT

## 2020-06-09 PROCEDURE — 36415 COLL VENOUS BLD VENIPUNCTURE: CPT

## 2020-06-09 PROCEDURE — 74011250636 HC RX REV CODE- 250/636

## 2020-06-09 PROCEDURE — 94760 N-INVAS EAR/PLS OXIMETRY 1: CPT

## 2020-06-09 PROCEDURE — 74011250636 HC RX REV CODE- 250/636: Performed by: FAMILY MEDICINE

## 2020-06-09 PROCEDURE — 65660000000 HC RM CCU STEPDOWN

## 2020-06-09 PROCEDURE — 97161 PT EVAL LOW COMPLEX 20 MIN: CPT

## 2020-06-09 PROCEDURE — 84439 ASSAY OF FREE THYROXINE: CPT

## 2020-06-09 PROCEDURE — 84100 ASSAY OF PHOSPHORUS: CPT

## 2020-06-09 RX ORDER — FUROSEMIDE 40 MG/1
TABLET ORAL
Qty: 90 TAB | Refills: 0 | Status: SHIPPED | OUTPATIENT
Start: 2020-06-09 | End: 2020-09-15

## 2020-06-09 RX ORDER — SODIUM CHLORIDE 9 MG/ML
50 INJECTION, SOLUTION INTRAVENOUS CONTINUOUS
Status: DISCONTINUED | OUTPATIENT
Start: 2020-06-09 | End: 2020-06-10

## 2020-06-09 RX ORDER — MAGNESIUM SULFATE HEPTAHYDRATE 40 MG/ML
2 INJECTION, SOLUTION INTRAVENOUS ONCE
Status: COMPLETED | OUTPATIENT
Start: 2020-06-09 | End: 2020-06-09

## 2020-06-09 RX ORDER — HYDROMORPHONE HYDROCHLORIDE 1 MG/ML
0.5 INJECTION, SOLUTION INTRAMUSCULAR; INTRAVENOUS; SUBCUTANEOUS ONCE
Status: ACTIVE | OUTPATIENT
Start: 2020-06-09 | End: 2020-06-10

## 2020-06-09 RX ADMIN — CARVEDILOL 3.12 MG: 3.12 TABLET, FILM COATED ORAL at 17:57

## 2020-06-09 RX ADMIN — APIXABAN 5 MG: 5 TABLET, FILM COATED ORAL at 10:25

## 2020-06-09 RX ADMIN — OXYCODONE HYDROCHLORIDE 10 MG: 5 TABLET ORAL at 18:38

## 2020-06-09 RX ADMIN — ACETAMINOPHEN 650 MG: 325 TABLET ORAL at 09:16

## 2020-06-09 RX ADMIN — CASTOR OIL AND BALSAM, PERU: 788; 87 OINTMENT TOPICAL at 21:07

## 2020-06-09 RX ADMIN — VANCOMYCIN HYDROCHLORIDE 1000 MG: 1 INJECTION, POWDER, LYOPHILIZED, FOR SOLUTION INTRAVENOUS at 00:09

## 2020-06-09 RX ADMIN — ATORVASTATIN CALCIUM 10 MG: 10 TABLET, FILM COATED ORAL at 21:07

## 2020-06-09 RX ADMIN — METRONIDAZOLE 500 MG: 500 INJECTION, SOLUTION INTRAVENOUS at 14:44

## 2020-06-09 RX ADMIN — CARVEDILOL 3.12 MG: 3.12 TABLET, FILM COATED ORAL at 07:31

## 2020-06-09 RX ADMIN — LEVOTHYROXINE SODIUM 88 MCG: 88 TABLET ORAL at 06:26

## 2020-06-09 RX ADMIN — OXYCODONE AND ACETAMINOPHEN 1 TABLET: 5; 325 TABLET ORAL at 06:26

## 2020-06-09 RX ADMIN — SODIUM CHLORIDE 50 ML/HR: 900 INJECTION, SOLUTION INTRAVENOUS at 19:17

## 2020-06-09 RX ADMIN — APIXABAN 5 MG: 5 TABLET, FILM COATED ORAL at 21:07

## 2020-06-09 RX ADMIN — Medication 10 ML: at 06:00

## 2020-06-09 RX ADMIN — Medication 10 ML: at 14:55

## 2020-06-09 RX ADMIN — CASTOR OIL AND BALSAM, PERU: 788; 87 OINTMENT TOPICAL at 06:00

## 2020-06-09 RX ADMIN — OXYCODONE AND ACETAMINOPHEN 1 TABLET: 5; 325 TABLET ORAL at 00:08

## 2020-06-09 RX ADMIN — OXYCODONE HYDROCHLORIDE 10 MG: 5 TABLET ORAL at 10:25

## 2020-06-09 RX ADMIN — CEFEPIME HYDROCHLORIDE 2 G: 2 INJECTION, POWDER, FOR SOLUTION INTRAVENOUS at 18:10

## 2020-06-09 RX ADMIN — SERTRALINE HYDROCHLORIDE 50 MG: 50 TABLET ORAL at 17:57

## 2020-06-09 RX ADMIN — FAMOTIDINE 20 MG: 20 TABLET ORAL at 09:16

## 2020-06-09 RX ADMIN — MAGNESIUM SULFATE IN WATER 2 G: 40 INJECTION, SOLUTION INTRAVENOUS at 13:33

## 2020-06-09 NOTE — TELEPHONE ENCOUNTER
Requested Prescriptions     Signed Prescriptions Disp Refills    furosemide (LASIX) 40 mg tablet 90 Tab 0     Sig: Take one tab by mouth daily or as advised by physician     Authorizing Provider: Jennifer Zhang     Ordering User: Ann-Marie Rich    Per Dr. Katie Marx verbal orders

## 2020-06-09 NOTE — PROGRESS NOTES
Vascular:    Complains of pain but looks comfortable    Leg dressed    Stable POD #1 - consult PT/OT/Case management    OK with me to resume inocencio

## 2020-06-09 NOTE — PROGRESS NOTES
Bedside and Verbal shift change report given to Luke Lewis RN (oncoming nurse) by Yajaira Mccracken RN (offgoing nurse). Report included the following information Procedure Summary, Intake/Output, MAR and Accordion.

## 2020-06-09 NOTE — PROGRESS NOTES
RUR 30%     MASHA: Patient lives at [facility] in independent living. Patient is wheelchair bound. Patient has caregivers 24/7 through [facility] who assist with ADLs. Patient was open with [facility] for home health SN, PT/OT prior to admission. Patient/daughter prefers patient go to [facility] for rehab. Referral pending. Chart reviewed. CM met with patient and daughter at bedside. Preference is for rehab at [facility]. CM sent referral via [employer]. CM will continue to follow. MERLINE spoke with Caity Carmona with [facility] [phone]. Caity Carmona spoke with the daughter and they are agreeable to patient going to the [facility] location. CM will continue to follow. CM received call from [address] [place] [address] colleague requesting AMD completion. Noted patient already has AMD on file naming her two sons as mPOA.       Eleanor Farrell, YELITZAW/cRM

## 2020-06-09 NOTE — PROGRESS NOTES
Referral received from PCP to extend opportunity of an ACP conversation with possible completion of an AMD.  This staff notes pt is in acute care. Made referral to ACP Activator to assess during hospitalization.   PHIL EngelW

## 2020-06-09 NOTE — TELEPHONE ENCOUNTER
Two pt identifiers confirmed. Spoke to [address] [facility] nurse) and she is no longer under her care. Anisa verbalized understanding of information discussed w/ no further questions at this time.

## 2020-06-09 NOTE — PROGRESS NOTES
Bedside and Verbal shift change report given to [address] [place] [address] Rd S (oncoming nurse) by Sonny ''REX''  RN (offgoing nurse). Report included the following information SBAR, Kardex and MAR.

## 2020-06-09 NOTE — PROGRESS NOTES
Occupational Therapy: defer    Chart reviewed, attempted OT evaluation. Patient received in bed, drowsy, and reported feeling tired. Patient's daughter present and reported patient just got comfortable/ in need of rest, requesting OT to defer at this time. Patient's daughter provided PLOF. Will defer OT at this time and will f/u as able and appropriate. PLOF: Patient was residing in [address] [place] [address] and was independent with ADLs and ambulating with RW prior to ~8 weeks ago. For ~8 weeks, patient has had a progressive functional decline d/t R foot wound. Immediately PTA, patient was non-ambulatory, required assistance for wheelchair transfers, and required assistance for bathing, dressing, and toileting. She has continued to reside in [address] [place] [address], has had 24/7 caregiver assistance for 2 weeks PTA, and was receiving PT/OT services.

## 2020-06-09 NOTE — PROGRESS NOTES
Problem: Mobility Impaired (Adult and Pediatric)  Goal: *Acute Goals and Plan of Care (Insert Text)  Description: FUNCTIONAL STATUS PRIOR TO ADMISSION: Patient was modified independent using a rolling walker, single point cane, wheelchair and other DME for functional mobility. HOME SUPPORT PRIOR TO ADMISSION: The patient lived alone in independent living apartment with potential assistance. Physical Therapy Goals  Initiated 6/9/2020  1. Patient will move from supine to sit and sit to supine , scoot up and down and roll side to side in bed with supervision/set-up within 7 day(s). 2.  Patient will transfer from bed to chair and chair to bed with moderate assistance  using the least restrictive device within 7 day(s). 3.  Patient will perform sit to stand with moderate assistance  within 7 day(s). 4.  Patient will sit EOB without support for 10 minutes within 7 day(s). Outcome: Progressing Towards Goal       PHYSICAL THERAPY EVALUATION  Patient: Jamie Beck (35 y.o. female)  Date: 6/9/2020  Primary Diagnosis: Cellulitis [L03.90]  Procedure(s) (LRB):  RIGHT AMPUTATION BELOW THE KNEE(BKA) (Right) 1 Day Post-Op   Precautions:   Fall, NWB      ASSESSMENT  Based on the objective data described below, the patient presents with decreased strength, balance, mobility and functional independence s/p BKA of R gangrenous foot. Pt had recently had a pacemaker implant. Pt lives in independent living facility [facility], dtr present and involved in care. Pt able to participate with ROM with minimal increase in pain, educated pt and dtr on ROM to perform throughout the day. Pt tolerates supine to sit with increased pain, able to balance without assist for 4 minutes then requires assist. Pt performed scooting R towards HOB with max A and able to clear IT from bed, WB through L foot x3.  Dtr expresses interest in [facility] prior to SNF, pt appears able to partcipate in 3hrs of therapy by DC    Current Level of Function Impacting Discharge (mobility/balance): max A    Functional Outcome Measure: The patient scored Total: 30/100 on the Barthel Index which is indicative of high impaired ability to care for basic self needs/dependency on others. Other factors to consider for discharge: daughter involved in care     Patient will benefit from skilled therapy intervention to address the above noted impairments. PLAN :  Recommendations and Planned Interventions: bed mobility training, transfer training, gait training, therapeutic exercises, neuromuscular re-education, patient and family training/education, and therapeutic activities      Frequency/Duration: Patient will be followed by physical therapy:  5 times a week to address goals. Recommendation for discharge: (in order for the patient to meet his/her long term goals)  Therapy 3 hours per day 5-7 days per week    This discharge recommendation:  Has been made in collaboration with the attending provider and/or case management    IF patient discharges home will need the following DME: patient owns DME required for discharge         SUBJECTIVE:   Patient stated i guess.     OBJECTIVE DATA SUMMARY:   HISTORY:    Past Medical History:   Diagnosis Date    Arthritis     Atrial fibrillation ([facility] [address]     av node ablation 5/22/98 with placement of CPI #[id number] dual chamber pacemaker subsequently replaced with a single chamber medtronic #[id number] single chamber pacemaker 7/25/05    Cancer ([facility] [address] 1970's    colon cancer w/ resection    COPD     Depression     GERD (gastroesophageal reflux disease)     History of vascular access device 04/17/2020    4F MIDLINE PLACED BY EMIL Carrion RN LEFT BRACHIAL, 13CM    Hyperlipidemia     Hypertension     Ischemic cardiomyopathy     Migraine headache     Orthostatic hypotension     RADHA (obstructive sleep apnea)     Pacemaker 5/22/98    AV node ablation /pacemaker placement utilizing CPI # [id number] dual chamber system, medtronic #[id number] single chamber device placed 7/22/05    Pulmonary hypertension ([address] 9/26/2011    RVSP 50 on echo 8/14    Thyroid disease     Valvular heart disease     mild-mod MR/TR     Past Surgical History:   Procedure Laterality Date    BREAST SURGERY PROCEDURE UNLISTED      benign breast tumor excision right breast    HX BREAST BIOPSY Right 2002    neg; surgical bx    HX COLECTOMY  1974    colon    HX KNEE REPLACEMENT      right TKA    HX PACEMAKER      HX PACEMAKER PLACEMENT  05/2020    HX TUBAL LIGATION      IR KYPHOPLASTY LUMBAR  7/2/2019    VT INSJ ELTRD CAR BYRON SYS TM INSJ DFB/PM PLS GEN N/A 5/22/2020    Lv Lead Placement performed by Juan Dan MD at [address] CATH LAB    VT REMVL PERM PM PLS GEN W/REPL PLSE GEN MULT LEAD N/A 5/22/2020    REMOVE & REPLACE PPM GEN BIV MULTI LEADS performed by Juan Dan MD at [address] CATH LAB    TOTAL KNEE ARTHROPLASTY      total on R, partial on L       Personal factors and/or comorbidities impacting plan of care:     Home Situation  Home Environment: Assisted living(independent living facility- [facility])  [address] [facility] [address] Name: [facility]  # Steps to Enter: 0  Wheelchair Ramp: Yes  One/Two Story Residence: One story  Living Alone: Yes(independent living with potential for assistance)  Support Systems: Assisted living, Family member(s)  Patient Expects to be Discharged to[de-identified] Skilled nursing facility(IRF vs SNF)  Current DME Used/Available at Home: Imelda Piles, quad, Imelda Piles, straight, Walker, rolling, Wheelchair, Shower chair, Grab bars, Other (comment), Raised toilet seat(transport chair)  Tub or Shower Type: Shower    EXAMINATION/PRESENTATION/DECISION MAKING:   Critical Behavior:  Neurologic State: Alert  Orientation Level: Oriented X4  Cognition: Appropriate decision making, Appropriate for age attention/concentration, Appropriate safety awareness, Follows commands     Hearing:     Skin:  intact  Edema: none  Range Of Motion:  AROM: Generally decreased, functional PROM: Generally decreased, functional           Strength:    Strength: Generally decreased, functional                    Tone & Sensation:   Tone: Normal              Sensation: Intact               Coordination:  Coordination: Generally decreased, functional  Vision:      Functional Mobility:  Bed Mobility:  Rolling: Minimum assistance  Supine to Sit: Moderate assistance  Sit to Supine: Maximum assistance  Scooting: Minimum assistance  Transfers:  Sit to Stand: Maximum assistance(clears IT, unable to stand)  Stand to Sit: Maximum assistance(clears IT, unable to stand)                       Balance:   Sitting: Intact  Standing: With support; Impaired  Standing - Static: Poor  Standing - Dynamic : Poor  Ambulation/Gait Training:  Right Side Weight Bearing: Non-weight bearing     Therapeutic Exercises:   AROM B abd/add, flex/ext, ankle pumps L    Functional Measure:  Barthel Index:    Bathin  Bladder: 5  Bowels: 10  Groomin  Dressin  Feeding: 10  Mobility: 0  Stairs: 0  Toilet Use: 0  Transfer (Bed to Chair and Back): 0  Total: 30/100       The Barthel ADL Index: Guidelines  1. The index should be used as a record of what a patient does, not as a record of what a patient could do. 2. The main aim is to establish degree of independence from any help, physical or verbal, however minor and for whatever reason. 3. The need for supervision renders the patient not independent. 4. A patient's performance should be established using the best available evidence. Asking the patient, friends/relatives and nurses are the usual sources, but direct observation and common sense are also important. However direct testing is not needed. 5. Usually the patient's performance over the preceding 24-48 hours is important, but occasionally longer periods will be relevant. 6. Middle categories imply that the patient supplies over 50 per cent of the effort. 7. Use of aids to be independent is allowed.     Margaret New., Barthel, D.W. (1965). Functional evaluation: the Barthel Index. [address] [facility] (14)2. RADHA Rojas, Yoli Cotto.Steven, [place], [address] (). Measuring the change indisability after inpatient rehabilitation; comparison of the responsiveness of the Barthel Index and Functional [place] Measure. Journal of Neurology, Neurosurgery, and Psychiatry, 66(4), 044-801. CHANELLE Dixon, SARA Rodriguez, & Zuleika Welsh M.A. (2004.) Assessment of post-stroke quality of life in cost-effectiveness studies: The usefulness of the Barthel Index and the EuroQoL-5D. Quality of Life Research, 15, 807-90        Physical Therapy Evaluation Charge Determination   History Examination Presentation Decision-Making   HIGH Complexity :3+ comorbidities / personal factors will impact the outcome/ POC  HIGH Complexity : 4+ Standardized tests and measures addressing body structure, function, activity limitation and / or participation in recreation  LOW Complexity : Stable, uncomplicated  Other outcome measures barthel  HIGH       Based on the above components, the patient evaluation is determined to be of the following complexity level: HIGH     Pain Ratin/10    Activity Tolerance:   Fair, SpO2 stable on RA, and requires rest breaks  Please refer to the flowsheet for vital signs taken during this treatment. After treatment patient left in no apparent distress:   Supine in bed, Call bell within reach, and Caregiver / family present    COMMUNICATION/EDUCATION:   The patients plan of care was discussed with: Registered nurse and Case management. Fall prevention education was provided and the patient/caregiver indicated understanding. and Patient/family have participated as able in goal setting and plan of care.     Thank you for this referral.  Sudhir White, PT   Time Calculation: 47 mins

## 2020-06-10 LAB
ANION GAP SERPL CALC-SCNC: 8 MMOL/L (ref 5–15)
BUN SERPL-MCNC: 11 MG/DL (ref 6–20)
BUN/CREAT SERPL: 15 (ref 12–20)
CALCIUM SERPL-MCNC: 8.3 MG/DL (ref 8.5–10.1)
CHLORIDE SERPL-SCNC: 104 MMOL/L (ref 97–108)
CO2 SERPL-SCNC: 23 MMOL/L (ref 21–32)
CREAT SERPL-MCNC: 0.75 MG/DL (ref 0.55–1.02)
GLUCOSE SERPL-MCNC: 120 MG/DL (ref 65–100)
MAGNESIUM SERPL-MCNC: 1.7 MG/DL (ref 1.6–2.4)
POTASSIUM SERPL-SCNC: 4.1 MMOL/L (ref 3.5–5.1)
SODIUM SERPL-SCNC: 135 MMOL/L (ref 136–145)

## 2020-06-10 PROCEDURE — 83735 ASSAY OF MAGNESIUM: CPT

## 2020-06-10 PROCEDURE — 97535 SELF CARE MNGMENT TRAINING: CPT | Performed by: OCCUPATIONAL THERAPIST

## 2020-06-10 PROCEDURE — 74011250636 HC RX REV CODE- 250/636

## 2020-06-10 PROCEDURE — 74011250637 HC RX REV CODE- 250/637

## 2020-06-10 PROCEDURE — 36415 COLL VENOUS BLD VENIPUNCTURE: CPT

## 2020-06-10 PROCEDURE — 74011250637 HC RX REV CODE- 250/637: Performed by: FAMILY MEDICINE

## 2020-06-10 PROCEDURE — 74011000258 HC RX REV CODE- 258

## 2020-06-10 PROCEDURE — 51798 US URINE CAPACITY MEASURE: CPT

## 2020-06-10 PROCEDURE — 77030005513 HC CATH URETH FOL11 MDII -B

## 2020-06-10 PROCEDURE — 65660000000 HC RM CCU STEPDOWN

## 2020-06-10 PROCEDURE — 80048 BASIC METABOLIC PNL TOTAL CA: CPT

## 2020-06-10 PROCEDURE — 97165 OT EVAL LOW COMPLEX 30 MIN: CPT | Performed by: OCCUPATIONAL THERAPIST

## 2020-06-10 RX ORDER — AMOXICILLIN 250 MG
2 CAPSULE ORAL 2 TIMES DAILY WITH MEALS
Status: DISCONTINUED | OUTPATIENT
Start: 2020-06-10 | End: 2020-06-15 | Stop reason: HOSPADM

## 2020-06-10 RX ORDER — ACETAMINOPHEN 500 MG
1000 TABLET ORAL 3 TIMES DAILY
Status: DISCONTINUED | OUTPATIENT
Start: 2020-06-10 | End: 2020-06-15 | Stop reason: HOSPADM

## 2020-06-10 RX ADMIN — CEFEPIME HYDROCHLORIDE 2 G: 2 INJECTION, POWDER, FOR SOLUTION INTRAVENOUS at 18:25

## 2020-06-10 RX ADMIN — CASTOR OIL AND BALSAM, PERU: 788; 87 OINTMENT TOPICAL at 22:00

## 2020-06-10 RX ADMIN — ATORVASTATIN CALCIUM 10 MG: 10 TABLET, FILM COATED ORAL at 21:37

## 2020-06-10 RX ADMIN — APIXABAN 5 MG: 5 TABLET, FILM COATED ORAL at 21:37

## 2020-06-10 RX ADMIN — CASTOR OIL AND BALSAM, PERU: 788; 87 OINTMENT TOPICAL at 06:18

## 2020-06-10 RX ADMIN — FAMOTIDINE 20 MG: 20 TABLET ORAL at 08:33

## 2020-06-10 RX ADMIN — Medication 10 ML: at 15:34

## 2020-06-10 RX ADMIN — ACETAMINOPHEN 1000 MG: 500 TABLET, FILM COATED ORAL at 21:37

## 2020-06-10 RX ADMIN — CARVEDILOL 3.12 MG: 3.12 TABLET, FILM COATED ORAL at 08:33

## 2020-06-10 RX ADMIN — CASTOR OIL AND BALSAM, PERU: 788; 87 OINTMENT TOPICAL at 14:30

## 2020-06-10 RX ADMIN — CARVEDILOL 3.12 MG: 3.12 TABLET, FILM COATED ORAL at 18:18

## 2020-06-10 RX ADMIN — DOCUSATE SODIUM 50MG AND SENNOSIDES 8.6MG 2 TABLET: 8.6; 5 TABLET, FILM COATED ORAL at 18:18

## 2020-06-10 RX ADMIN — OXYCODONE HYDROCHLORIDE 10 MG: 5 TABLET ORAL at 10:16

## 2020-06-10 RX ADMIN — ACETAMINOPHEN 650 MG: 325 TABLET ORAL at 02:37

## 2020-06-10 RX ADMIN — LEVOTHYROXINE SODIUM 88 MCG: 88 TABLET ORAL at 06:18

## 2020-06-10 RX ADMIN — VANCOMYCIN HYDROCHLORIDE 1000 MG: 1 INJECTION, POWDER, LYOPHILIZED, FOR SOLUTION INTRAVENOUS at 00:31

## 2020-06-10 RX ADMIN — OXYCODONE HYDROCHLORIDE 10 MG: 5 TABLET ORAL at 15:04

## 2020-06-10 RX ADMIN — SERTRALINE HYDROCHLORIDE 50 MG: 50 TABLET ORAL at 18:18

## 2020-06-10 RX ADMIN — METRONIDAZOLE 500 MG: 500 INJECTION, SOLUTION INTRAVENOUS at 15:13

## 2020-06-10 RX ADMIN — APIXABAN 5 MG: 5 TABLET, FILM COATED ORAL at 08:33

## 2020-06-10 RX ADMIN — METRONIDAZOLE 500 MG: 500 INJECTION, SOLUTION INTRAVENOUS at 02:32

## 2020-06-10 NOTE — PROGRESS NOTES
Problem: Self Care Deficits Care Plan (Adult)  Goal: *Acute Goals and Plan of Care (Insert Text)  Description:   FUNCTIONAL STATUS PRIOR TO ADMISSION: Patient was modified independent using a rolling walker, single point cane, wheelchair and other DME for functional mobility. HOME SUPPORT PRIOR TO ADMISSION: The patient lived alone in independent living apartment with potential assistance. Occupational Therapy Goals  Initiated 6/10/2020  1. Patient will perform lower body dressing with moderate assistance  within 7 day(s). 2.  Patient will perform toilet transfers with moderate assistance to drop arm BSC within 7 day(s). 3.  Patient will perform all aspects of toileting with moderate assistance  within 7 day(s). 4.  Patient will participate in upper extremity therapeutic exercise/activities with modified independence for 10 minutes within 7 day(s). 5.  Patient will utilize energy conservation techniques during functional activities with verbal and visual cues within 7 day(s). Outcome: Progressing Towards Goal     OCCUPATIONAL THERAPY EVALUATION  Patient: Jerzy Terrell (9 y.o. female)  Date: 6/10/2020  Primary Diagnosis: Cellulitis [L03.90]  Procedure(s) (LRB):  RIGHT AMPUTATION BELOW THE KNEE(BKA) (Right) 2 Days Post-Op   Precautions: Fall(R BKA)    ASSESSMENT  Based on the objective data described below, the patient presents with pain 10/10 RLE, decreased strength, endurance mobility, balance and safety POD 2 R BKA. She currently requires significant assistance for LE ADLs, toileting and functional mobility. Recommend rehab at discharge. Current Level of Function Impacting Discharge (ADLs/self-care): total A for LE ADLs and toileting, unable to participate in bed mobility due to 10/10 pain    Functional Outcome Measure: The patient scored Total: 35/100 on the Barthel Index outcome measure.         Other factors to consider for discharge: see above     Patient will benefit from skilled therapy intervention to address the above noted impairments. PLAN :  Recommendations and Planned Interventions: self care training, functional mobility training, therapeutic exercise, balance training, therapeutic activities, endurance activities, patient education, home safety training, and family training/education    Frequency/Duration: Patient will be followed by occupational therapy 5 times a week to address goals. Recommendation for discharge: (in order for the patient to meet his/her long term goals)  Therapy 3 hours per day 5-7 days per week    This discharge recommendation:  Has not yet been discussed the attending provider and/or case management    IF patient discharges home will need the following DME: TBD       SUBJECTIVE:   Patient stated I hurt so bad.     OBJECTIVE DATA SUMMARY:   HISTORY:   Past Medical History:   Diagnosis Date    Arthritis     Atrial fibrillation ([facility] [address]     av node ablation 5/22/98 with placement of CPI #[id number] dual chamber pacemaker subsequently replaced with a single chamber medtronic #[id number] single chamber pacemaker 7/25/05    Cancer ([facility] [address] 1970's    colon cancer w/ resection    COPD     Depression     GERD (gastroesophageal reflux disease)     History of vascular access device 04/17/2020    4F MIDLINE PLACED BY EMIL Cazares RN LEFT BRACHIAL, 13CM    Hyperlipidemia     Hypertension     Ischemic cardiomyopathy     Migraine headache     Orthostatic hypotension     RADHA (obstructive sleep apnea)     Pacemaker 5/22/98    AV node ablation /pacemaker placement utilizing CPI # [id number] dual chamber system, medtronic #[id number] single chamber device placed 7/22/05    Pulmonary hypertension ([facility] [address] 9/26/2011    RVSP 50 on echo 8/14    Thyroid disease     Valvular heart disease     mild-mod MR/TR     Past Surgical History:   Procedure Laterality Date    BREAST SURGERY PROCEDURE UNLISTED      benign breast tumor excision right breast    HX BREAST BIOPSY Right 2002    neg; surgical bx    HX COLECTOMY  1974    colon    HX KNEE REPLACEMENT      right TKA    HX PACEMAKER      HX PACEMAKER PLACEMENT  05/2020    HX TUBAL LIGATION      IR KYPHOPLASTY LUMBAR  7/2/2019    ID INSJ ELTRD CAR BYRON SYS TM INSJ DFB/PM PLS GEN N/A 5/22/2020    Lv Lead Placement performed by Davon Schafer MD at [address] CATH LAB    ID REMVL PERM PM PLS GEN W/REPL PLSE GEN MULT LEAD N/A 5/22/2020    REMOVE & REPLACE PPM GEN BIV MULTI LEADS performed by Davon Schafer MD at [address] CATH LAB    TOTAL KNEE ARTHROPLASTY      total on R, partial on L       Expanded or extensive additional review of patient history:     Home Situation  Home Environment: [place] [address] Name: Serena Muller  # Steps to Enter: 0  Wheelchair Ramp: Yes  One/Two Story Residence: One story  Living Alone: Yes  Support Systems: Child(luis fernando)  Patient Expects to be Discharged to[de-identified] Rehabilitation facility(pt wants IP rehab then SNF at discharge)  Current DME Used/Available at Home: Guzman Sinning, quad, Guzman Sinning, straight, Walker, rolling, Wheelchair, [address] chair, Grab bars, Other (comment), Raised toilet seat(transport chair)  Tub or Shower Type: Shower    Hand dominance: Right    EXAMINATION OF PERFORMANCE DEFICITS:  Cognitive/Behavioral Status:  Neurologic State: Drowsy  Orientation Level: Oriented X4  Cognition: Decreased attention/concentration; Follows commands  Perception: Appears intact  Perseveration: No perseveration noted  Safety/Judgement: Awareness of environment; Fall prevention;Decreased insight into deficits    Hearing: Auditory  Auditory Impairment: Hard of hearing, bilateral    Vision/Perceptual:    Acuity: Able to read clock/calendar on wall without difficulty; Able to read employee name badge without difficulty    Corrective Lenses: Glasses    Range of Motion:  AROM: Generally decreased, functional  PROM: Generally decreased, functional                      Strength:  Strength: Generally decreased, functional Coordination:  Coordination: Generally decreased, functional  Fine Motor Skills-Upper: Left Impaired;Right Impaired    Gross Motor Skills-Upper: Left Intact; Right Intact    Tone & Sensation:  Tone: Normal  Sensation: Intact(to light touch)                      Balance:  Sitting: Impaired; With support(due to pain)  Sitting - Static: Good (unsupported)  Sitting - Dynamic: Fair (occasional)  Standing: Impaired(unable to tolerate attempting)  Standing - Static: Poor  Standing - Dynamic : Poor    Functional Mobility and Transfers for ADLs:  Bed Mobility:  Supine to Sit: Total assistance  Scooting: Total assistance    Transfers:  Sit to Stand: (unable to tolerate attempting)  Toilet Transfer : Total assistance(bed level only due to pain)    ADL Assessment:  Feeding: Modified independent    Oral Facial Hygiene/Grooming: Setup;Supervision(seated)    Bathing: Moderate assistance(A to reach buttocks and L foot)    Upper Body Dressing: Supervision;Setup    Lower Body Dressing: Total assistance    Toileting: Total assistance    Cognitive Retraining  Safety/Judgement: Awareness of environment; Fall prevention;Decreased insight into deficits    Functional Measure:  Barthel Index:    Bathin  Bladder: 5  Bowels: 10  Groomin  Dressin  Feeding: 10  Mobility: 0  Stairs: 0  Toilet Use: 0  Transfer (Bed to Chair and Back): 0  Total: 35/100        The Barthel ADL Index: Guidelines  1. The index should be used as a record of what a patient does, not as a record of what a patient could do. 2. The main aim is to establish degree of independence from any help, physical or verbal, however minor and for whatever reason. 3. The need for supervision renders the patient not independent. 4. A patient's performance should be established using the best available evidence. Asking the patient, friends/relatives and nurses are the usual sources, but direct observation and common sense are also important.  However direct testing is not needed. 5. Usually the patient's performance over the preceding 24-48 hours is important, but occasionally longer periods will be relevant. 6. Middle categories imply that the patient supplies over 50 per cent of the effort. 7. Use of aids to be independent is allowed. Elyse Gleason., Barthel, D.W. (0090). Functional evaluation: the Barthel Index. [address] [facility] (14)2. RADHA Abdi, Janine White, Ricardo Magana., [place], [address] (1999). Measuring the change indisability after inpatient rehabilitation; comparison of the responsiveness of the Barthel Index and Functional [place] Measure. Journal of Neurology, Neurosurgery, and Psychiatry, 66(4), 285-549. CHANELLE Childress, SARA Rodriguez, & Imelda Ann M.A. (2004.) Assessment of post-stroke quality of life in cost-effectiveness studies: The usefulness of the Barthel Index and the EuroQoL-5D. Quality of Life Research, 15, 834-05         Occupational Therapy Evaluation Charge Determination   History Examination Decision-Making   LOW Complexity : Brief history review  HIGH Complexity : 5 or more performance deficits relating to physical, cognitive , or psychosocial skils that result in activity limitations and / or participation restrictions MEDIUM Complexity : Patient may present with comorbidities that affect occupational performnce. Miniml to moderate modification of tasks or assistance (eg, physical or verbal ) with assesment(s) is necessary to enable patient to complete evaluation       Based on the above components, the patient evaluation is determined to be of the following complexity level: LOW   Pain Rating:  10/10    Activity Tolerance:   Poor and requires frequent rest breaks  Please refer to the flowsheet for vital signs taken during this treatment.     After treatment patient left in no apparent distress:    Supine in bed, Call bell within reach, and Caregiver / family present    COMMUNICATION/EDUCATION:   The patients plan of care was discussed with: Physical therapist and Registered nurse. Home safety education was provided and the patient/caregiver indicated understanding., Patient/family have participated as able in goal setting and plan of care. , and Patient/family agree to work toward stated goals and plan of care. This patients plan of care is appropriate for delegation to [facility] [place].     Thank you for this referral.  Zack Corbett, OT  Time Calculation: 20 mins

## 2020-06-10 NOTE — PROGRESS NOTES
Bedside and Verbal shift change report given to MICHAEL Walls (oncoming nurse) by Bria Jones RN (offgoing nurse). Report included the following information SBAR.

## 2020-06-10 NOTE — PROGRESS NOTES
Bedside and Verbal shift change report given to Belia Li (oncoming nurse) by Alexander Weathers (offgoing nurse). Report included the following information SBAR and Kardex.

## 2020-06-10 NOTE — PROGRESS NOTES
[facility]                                                                                          Hospitalist Progress Note  Carolyn Lafleur MD  Answering service: [phone] OR [phone] from in house phone        Date of Service:  2020  NAME:  Kimberley Richardson  :  1932  MRN:  [id number]      Admission Summary:   The patient is an 40-year-old female with past medical history of hyperlipidemia, atrial fibrillation, third-degree AV block, anxiety, depression, colon cancer, hypothyroidism, CKD stage III, osteopenia, ischemic cardiomyopathy, status post pacemaker placement, COPD, AAA, hypertension, osteoporosis, wound of the foot, bradycardia, dilated cardiomyopathy, type 2 diabetes, presents to the hospital with the above-mentioned symptom. History was obtained from the patient as well as her daughter. The daughter reports that the patient has had a wound in her leg for the last many months. The patient saw Dr. Roc Cast, underwent an angiogram about 3 weeks back. There was not one particular vessel that was identified in the right foot that could cause her symptoms. Recommendation made for below-knee amputation if gangrene persisted, but the patient refused. The patient was sent home, went for her wound care check today and they were concerned that her pain as well as her wound have gotten worse. There has been increased area of gangrenous tissue, and the patient was sent to the hospital for further management and evaluation. The patient's daughter reports that the patient does not have any fever per se. She has had some poor appetite, but no other complaints or problems.   The patient's daughter denies the patient having any headache, blurry vision, sore throat, trouble swallowing, trouble with speech, chest pain, shortness of breath, cough, fever, chills, abdominal pain, constipation, diarrhea, urinary symptoms, focal or generalized neurological weakness, falls, injuries,  hematemesis, melena, hemoptysis, hematuria, or any other concerns or problems. The patient reports that the patient has been tested \"many times for COVID-19 and all the times it has been negative. 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 Gangrene of the right foot- due to ischemia and chronic wounds and wound infection  Vascular surgerycompleted R  BKA- on 6/8/20  ID consulted for IV abx management  2. Hypotension,- resolved  Due to sepsis from R foot infection, CHF with EF of 20-25%  Monitor BP- stable today  3. Acute kidney injury,  Avoid nephrotoxic medications   renally dose abx  Monitor creatinine- improved to 0.7 from 1.44  4. Elevated liver function tests. Suspected cause shock liver from hypotension  monitor  5. Diabetes. NovoLog insulin, Accu-Cheks, diet control, and close monitoring. 6.  COPD- PRN nebs  7. Atrial fibrillation. Continue home medication. Eliquis stopped for planned surgery   Rate controlled. 8.recent pacemaker placement- family requesting EP eval- appreciate their input  9. Hypothyroidism. Continue home medication. 10. Hyponatremia- resolved  11.  Mild cognitive impairment with mild postop delerium  12. hypomagnesemia- replete IV    Code status: was DNR, changed to [address] [place] [address] on 6/8/20 at the request of patient and her daughter Marlene Marino    DVT prophylaxis: Heparin    Care Plan discussed with: Patient/Family  Anticipated Disposition: Home w/Family and SNF/LTC  Anticipated Discharge: Greater than 48 hours     Hospital Problems  Date Reviewed: 6/8/2020          Codes Class Noted POA    Cellulitis ICD-10-CM: L03.90  ICD-9-CM: 682.9  6/4/2020 Unknown                Review of Systems:   A comprehensive review of systems was negative except for that written in the HPI. Vital Signs:    Last 24hrs VS reviewed since prior progress note. Most recent are:  Visit Vitals  /64 (BP 1 Location: Right arm, BP Patient Position: At rest)   Pulse 79   Temp 98.4 °F (36.9 °C)   Resp 16   Wt 76.6 kg (168 lb 12.8 oz)   SpO2 98%   BMI 29.90 kg/m²     Patient Vitals for the past 24 hrs:   Temp Pulse Resp BP SpO2   06/09/20 2043 98.4 °F (36.9 °C) 79 16 122/64 98 %   06/09/20 1638 98.6 °F (37 °C) 81 18 124/61 97 %   06/09/20 0704 98.1 °F (36.7 °C) 81 16 127/65 96 %   06/09/20 0454     94 %   06/09/20 0300 97.2 °F (36.2 °C) 80 15 127/76 99 %       Intake/Output Summary (Last 24 hours) at 6/9/2020 2320  Last data filed at 6/9/2020 1820  Gross per 24 hour   Intake    Output 650 ml   Net -650 ml        Physical Examination:             Constitutional:  No acute distress, cooperative, pleasant    ENT:  Oral mucosa moist, oropharynx benign. Resp:  decreased breath sounds bilaterally. No wheezing/rhonchi/rales. CV:  IRR, 2/6 CANDELARIA, pacemaker    GI:  Soft, non distended, non tender. normoactive bowel sounds, no hepatosplenomegaly     Musculoskeletal: Right BKA with dressing CDI    Neurologic:  Moves all extremities. AAOx3, CN II-XII reviewed     Psych:  anxious , demented with mild postop delerium  Skin- bruising of bilt UE      Data Review:    Review and/or order of clinical lab test  Review and/or order of tests in the radiology section of CPT  Review and/or order of tests in the medicine section of CPT      Labs:     Recent Labs     06/07/20  0212   WBC 9.6   HGB 11.2*   HCT 35.6        Recent Labs     06/09/20  0324 06/08/20  0029 06/07/20  0212    134* 132*   K 4.0 4.2 3.8    104 101   CO2 25 24 26   BUN 12 12 14   CREA 0.72 0.75 0.74   * 100 113*   CA 8.1* 8.5 8.2*   MG 1.3*  --   --    PHOS 2.5*  --   --      No results for input(s): ALT, AP, TBIL, TBILI, TP, ALB, GLOB, GGT, AML, LPSE in the last 72 hours.     No lab exists for component: SGOT, GPT, AMYP, HLPSE  No results for input(s): INR, PTP, APTT, INREXT, INREXT in the last 72 hours. No results for input(s): FE, TIBC, PSAT, FERR in the last 72 hours. Lab Results   Component Value Date/Time    Folate 14.6 08/11/2014 03:29 PM      No results for input(s): PH, PCO2, PO2 in the last 72 hours. No results for input(s): CPK, CKNDX, TROIQ in the last 72 hours.     No lab exists for component: CPKMB  Lab Results   Component Value Date/Time    Cholesterol, total 199 11/18/2019 11:29 AM    HDL Cholesterol 65 11/18/2019 11:29 AM    LDL, calculated 97.4 11/18/2019 11:29 AM    Triglyceride 183 (H) 11/18/2019 11:29 AM    CHOL/HDL Ratio 3.1 11/18/2019 11:29 AM     Lab Results   Component Value Date/Time    Glucose (POC) 80 06/06/2020 06:04 AM    Glucose (POC) 140 (H) 06/05/2020 11:14 PM    Glucose (POC) 99 06/05/2020 04:47 PM    Glucose (POC) 163 (H) 06/05/2020 12:08 PM    Glucose (POC) 108 (H) 06/05/2020 06:08 AM     Lab Results   Component Value Date/Time    Color Yellow 08/01/2019 10:49 AM    Appearance Clear 08/01/2019 10:49 AM    Specific gravity 1.009 06/29/2019 09:30 AM    Specific gravity 1.015 06/12/2017 04:36 PM    pH (UA) 5.5 08/01/2019 10:49 AM    Protein NEGATIVE  06/29/2019 09:30 AM    Glucose NEGATIVE  06/29/2019 09:30 AM    Ketone Negative 08/01/2019 10:49 AM    Bilirubin Negative 08/01/2019 10:49 AM    Urobilinogen 0.2 06/29/2019 09:30 AM    Nitrites Negative 08/01/2019 10:49 AM    Leukocyte Esterase Negative 08/01/2019 10:49 AM    Epithelial cells FEW 01/18/2019 04:00 AM    Bacteria NEGATIVE  01/18/2019 04:00 AM    WBC 0-4 01/18/2019 04:00 AM    RBC 0-5 01/18/2019 04:00 AM         Medications Reviewed:     Current Facility-Administered Medications   Medication Dose Route Frequency    apixaban (ELIQUIS) tablet 5 mg  5 mg Oral Q12H    HYDROmorphone (PF) (DILAUDID) injection 0.5 mg  0.5 mg IntraMUSCular ONCE    0.9% sodium chloride infusion  50 mL/hr IntraVENous CONTINUOUS    oxyCODONE-acetaminophen (PERCOCET) 5-325 mg per tablet 1 Tab  1 Tab Oral Q4H PRN    acetaminophen (TYLENOL) tablet 650 mg  650 mg Oral Q4H PRN    oxyCODONE IR (ROXICODONE) tablet 10 mg  10 mg Oral Q4H PRN    HYDROmorphone (PF) (DILAUDID) injection 1 mg  1 mg IntraVENous Q3H PRN    carvediloL (COREG) tablet 3.125 mg  3.125 mg Oral BID WITH MEALS    famotidine (PEPCID) tablet 20 mg  20 mg Oral DAILY    levothyroxine (SYNTHROID) tablet 88 mcg  88 mcg Oral ACB    lidocaine 4 % patch   TransDERmal DAILY PRN    atorvastatin (LIPITOR) tablet 10 mg  10 mg Oral QHS    sertraline (ZOLOFT) tablet 50 mg  50 mg Oral QPM    sodium chloride (NS) flush 5-40 mL  5-40 mL IntraVENous Q8H    sodium chloride (NS) flush 5-40 mL  5-40 mL IntraVENous PRN    ondansetron (ZOFRAN) injection 4 mg  4 mg IntraVENous Q4H PRN    vancomycin (VANCOCIN) 1,000 mg in 0.9% sodium chloride (MBP/ADV) 250 mL  1,000 mg IntraVENous Q24H    balsam peru-castor oiL (VENELEX) ointment   Topical Q8H    loperamide (IMODIUM) 1 mg/7.5 mL oral solution 2 mg  2 mg Oral QID PRN    cefepime (MAXIPIME) 2 g in 0.9% sodium chloride (MBP/ADV) 100 mL  2 g IntraVENous Q24H    metroNIDAZOLE (FLAGYL) IVPB premix 500 mg  500 mg IntraVENous Q12H    Vancomycin- pharmacy to dose   Other Rx Dosing/Monitoring     ______________________________________________________________________  EXPECTED LENGTH OF STAY: 6d 21h  ACTUAL LENGTH OF STAY:          5                 Karl Jensen MD

## 2020-06-10 NOTE — PROGRESS NOTES
RUR 30%     MASHA: A referral is pending with [facility] rehab. Patient lives at [facility] in independent living and has caregivers 24/7 through [facility].       CM called Erendira Shah with [facility] [phone] and left message to follow-up on referral.    JESSE Muhammad/CRM

## 2020-06-10 NOTE — WOUND CARE
WOUND CARE FOLLOW UP for sacrum. Patient has a purple, non-blanchable area measuring 4 cm x 5 cm x 0 cm, no open area, amber-wound intact, wound edges are closed. Venelex ointment applied and has been used since 6/5/20. Right buttock was not assessed today. Patient turned to left side, heels offloaded on pillows. Recommendation: 
Continue current wound care. Right buttock- Every 12 hours apply Z guard paste. 
  
Sacrum, bilateral buttocks and LEFT heel- Every 8 hours liberally apply Venelex ointment. YELITZA WalshN, RN, [facility] Office x [phone] Pager x [phone]

## 2020-06-10 NOTE — PROGRESS NOTES
Physical Therapy  Attempted to see patient at 11:15 for mobility. Patient was in excruciating and unable to participate at this time. Discussed PT POC with daughter and some education re: positioning & exercises. Recommended breathing exercises and provided with Incentive Spirometer. Will attempt to see later if pain is managed and patient is not groggy.   Sandra Parada

## 2020-06-10 NOTE — PROGRESS NOTES
RN spoke with Dr. Daniela Bran because waiting on IV team to re-establish IV access on patient, and patient still complaining of pain. Order received for dilaudid 0.5 mg IM once, but patient refused, saying that her pain was improving as she rested.

## 2020-06-10 NOTE — PROGRESS NOTES
RN spoke with Dr. Jaclyn Osuna because patient's Lujean Nikolay was only 300 mL over roughly a 10 hour time span (post-void bladder scan for 115 mL), and the patient was not drinking much fluid throughout the day. Order received to start patient on normal saline at 50 mL/hr continuously.

## 2020-06-11 ENCOUNTER — APPOINTMENT (OUTPATIENT)
Dept: VASCULAR SURGERY | Age: 85
DRG: 853 | End: 2020-06-11
Attending: NURSE PRACTITIONER
Payer: MEDICARE

## 2020-06-11 ENCOUNTER — TELEPHONE (OUTPATIENT)
Dept: CARDIOLOGY CLINIC | Age: 85
End: 2020-06-11

## 2020-06-11 ENCOUNTER — TELEPHONE (OUTPATIENT)
Dept: INTERNAL MEDICINE CLINIC | Age: 85
End: 2020-06-11

## 2020-06-11 LAB
ANION GAP SERPL CALC-SCNC: 6 MMOL/L (ref 5–15)
BASOPHILS # BLD: 0 K/UL (ref 0–0.1)
BASOPHILS NFR BLD: 0 % (ref 0–1)
BUN SERPL-MCNC: 12 MG/DL (ref 6–20)
BUN/CREAT SERPL: 19 (ref 12–20)
CALCIUM SERPL-MCNC: 8.1 MG/DL (ref 8.5–10.1)
CHLORIDE SERPL-SCNC: 107 MMOL/L (ref 97–108)
CO2 SERPL-SCNC: 20 MMOL/L (ref 21–32)
CREAT SERPL-MCNC: 0.63 MG/DL (ref 0.55–1.02)
DIFFERENTIAL METHOD BLD: ABNORMAL
EOSINOPHIL # BLD: 0.1 K/UL (ref 0–0.4)
EOSINOPHIL NFR BLD: 1 % (ref 0–7)
ERYTHROCYTE [DISTWIDTH] IN BLOOD BY AUTOMATED COUNT: 12.5 % (ref 11.5–14.5)
GLUCOSE SERPL-MCNC: 114 MG/DL (ref 65–100)
HCT VFR BLD AUTO: 34 % (ref 35–47)
HGB BLD-MCNC: 10.7 G/DL (ref 11.5–16)
IMM GRANULOCYTES # BLD AUTO: 0.1 K/UL (ref 0–0.04)
IMM GRANULOCYTES NFR BLD AUTO: 1 % (ref 0–0.5)
LYMPHOCYTES # BLD: 0.8 K/UL (ref 0.8–3.5)
LYMPHOCYTES NFR BLD: 9 % (ref 12–49)
MCH RBC QN AUTO: 29.8 PG (ref 26–34)
MCHC RBC AUTO-ENTMCNC: 31.5 G/DL (ref 30–36.5)
MCV RBC AUTO: 94.7 FL (ref 80–99)
MONOCYTES # BLD: 0.7 K/UL (ref 0–1)
MONOCYTES NFR BLD: 8 % (ref 5–13)
NEUTS SEG # BLD: 7.1 K/UL (ref 1.8–8)
NEUTS SEG NFR BLD: 81 % (ref 32–75)
NRBC # BLD: 0 K/UL (ref 0–0.01)
NRBC BLD-RTO: 0 PER 100 WBC
PLATELET # BLD AUTO: 220 K/UL (ref 150–400)
PMV BLD AUTO: 11.1 FL (ref 8.9–12.9)
POTASSIUM SERPL-SCNC: 4.4 MMOL/L (ref 3.5–5.1)
RBC # BLD AUTO: 3.59 M/UL (ref 3.8–5.2)
SODIUM SERPL-SCNC: 133 MMOL/L (ref 136–145)
WBC # BLD AUTO: 8.9 K/UL (ref 3.6–11)

## 2020-06-11 PROCEDURE — 97530 THERAPEUTIC ACTIVITIES: CPT

## 2020-06-11 PROCEDURE — 74011250636 HC RX REV CODE- 250/636

## 2020-06-11 PROCEDURE — 94760 N-INVAS EAR/PLS OXIMETRY 1: CPT

## 2020-06-11 PROCEDURE — 74011000258 HC RX REV CODE- 258

## 2020-06-11 PROCEDURE — 85025 COMPLETE CBC W/AUTO DIFF WBC: CPT

## 2020-06-11 PROCEDURE — 93970 EXTREMITY STUDY: CPT

## 2020-06-11 PROCEDURE — 36415 COLL VENOUS BLD VENIPUNCTURE: CPT

## 2020-06-11 PROCEDURE — 97535 SELF CARE MNGMENT TRAINING: CPT

## 2020-06-11 PROCEDURE — 74011250637 HC RX REV CODE- 250/637

## 2020-06-11 PROCEDURE — 80048 BASIC METABOLIC PNL TOTAL CA: CPT

## 2020-06-11 PROCEDURE — 74011250637 HC RX REV CODE- 250/637: Performed by: FAMILY MEDICINE

## 2020-06-11 PROCEDURE — 65660000000 HC RM CCU STEPDOWN

## 2020-06-11 RX ORDER — POLYETHYLENE GLYCOL 3350 17 G/17G
17 POWDER, FOR SOLUTION ORAL DAILY
Status: DISCONTINUED | OUTPATIENT
Start: 2020-06-11 | End: 2020-06-13

## 2020-06-11 RX ORDER — HYDROMORPHONE HYDROCHLORIDE 1 MG/ML
0.5 INJECTION, SOLUTION INTRAMUSCULAR; INTRAVENOUS; SUBCUTANEOUS
Status: DISCONTINUED | OUTPATIENT
Start: 2020-06-11 | End: 2020-06-15 | Stop reason: HOSPADM

## 2020-06-11 RX ORDER — BISACODYL 5 MG
5 TABLET, DELAYED RELEASE (ENTERIC COATED) ORAL
Status: COMPLETED | OUTPATIENT
Start: 2020-06-11 | End: 2020-06-11

## 2020-06-11 RX ADMIN — CARVEDILOL 3.12 MG: 3.12 TABLET, FILM COATED ORAL at 09:36

## 2020-06-11 RX ADMIN — BISACODYL 5 MG: 5 TABLET, COATED ORAL at 07:15

## 2020-06-11 RX ADMIN — APIXABAN 5 MG: 5 TABLET, FILM COATED ORAL at 21:14

## 2020-06-11 RX ADMIN — ACETAMINOPHEN 1000 MG: 500 TABLET, FILM COATED ORAL at 09:36

## 2020-06-11 RX ADMIN — VANCOMYCIN HYDROCHLORIDE 1000 MG: 1 INJECTION, POWDER, LYOPHILIZED, FOR SOLUTION INTRAVENOUS at 00:40

## 2020-06-11 RX ADMIN — CASTOR OIL AND BALSAM, PERU: 788; 87 OINTMENT TOPICAL at 06:00

## 2020-06-11 RX ADMIN — APIXABAN 5 MG: 5 TABLET, FILM COATED ORAL at 09:36

## 2020-06-11 RX ADMIN — CEFEPIME HYDROCHLORIDE 2 G: 2 INJECTION, POWDER, FOR SOLUTION INTRAVENOUS at 19:27

## 2020-06-11 RX ADMIN — ACETAMINOPHEN 1000 MG: 500 TABLET, FILM COATED ORAL at 23:00

## 2020-06-11 RX ADMIN — FAMOTIDINE 20 MG: 20 TABLET ORAL at 09:36

## 2020-06-11 RX ADMIN — OXYCODONE AND ACETAMINOPHEN 1 TABLET: 5; 325 TABLET ORAL at 17:56

## 2020-06-11 RX ADMIN — CASTOR OIL AND BALSAM, PERU: 788; 87 OINTMENT TOPICAL at 21:20

## 2020-06-11 RX ADMIN — CASTOR OIL AND BALSAM, PERU: 788; 87 OINTMENT TOPICAL at 15:07

## 2020-06-11 RX ADMIN — METRONIDAZOLE 500 MG: 500 INJECTION, SOLUTION INTRAVENOUS at 15:00

## 2020-06-11 RX ADMIN — OXYCODONE AND ACETAMINOPHEN 1 TABLET: 5; 325 TABLET ORAL at 09:36

## 2020-06-11 RX ADMIN — ATORVASTATIN CALCIUM 10 MG: 10 TABLET, FILM COATED ORAL at 21:14

## 2020-06-11 RX ADMIN — DOCUSATE SODIUM 50MG AND SENNOSIDES 8.6MG 2 TABLET: 8.6; 5 TABLET, FILM COATED ORAL at 09:37

## 2020-06-11 RX ADMIN — SERTRALINE HYDROCHLORIDE 50 MG: 50 TABLET ORAL at 17:08

## 2020-06-11 RX ADMIN — CARVEDILOL 3.12 MG: 3.12 TABLET, FILM COATED ORAL at 17:08

## 2020-06-11 RX ADMIN — LEVOTHYROXINE SODIUM 88 MCG: 88 TABLET ORAL at 06:39

## 2020-06-11 RX ADMIN — ACETAMINOPHEN 1000 MG: 500 TABLET, FILM COATED ORAL at 15:06

## 2020-06-11 RX ADMIN — METRONIDAZOLE 500 MG: 500 INJECTION, SOLUTION INTRAVENOUS at 02:54

## 2020-06-11 RX ADMIN — Medication 10 ML: at 19:32

## 2020-06-11 NOTE — PROGRESS NOTES
RN clarified with MD that patient was supposed to be a full code, since patient's daughter had mentioned this earlier to the nurse.

## 2020-06-11 NOTE — PROGRESS NOTES
1154:  RN notified Violetta Lopez NP of patient's swelling and redness present to bilateral upper extremities. Per Violetta Lopez NP she would come to assess the patient. Will continue to monitor patient. 1948: Bedside shift change report given to Lucero Ulrich (oncoming nurse) by Mis Salcido RN (offgoing nurse). Report included the following information SBAR, Kardex, Intake/Output, MAR and Recent Results.

## 2020-06-11 NOTE — WOUND CARE
Follow-up visit for sacrum. Previously seen by TAMI Turpin. Left buttock toward sacrum with diffuse mottling of burgundy and light purple. Venelex in use. Will continue to follow.   
MADISON BurgerN

## 2020-06-11 NOTE — PROGRESS NOTES
Problem: Self Care Deficits Care Plan (Adult)  Goal: *Acute Goals and Plan of Care (Insert Text)  Description:   FUNCTIONAL STATUS PRIOR TO ADMISSION: Patient was modified independent using a rolling walker, single point cane, wheelchair and other DME for functional mobility. HOME SUPPORT PRIOR TO ADMISSION: The patient lived alone in independent living apartment with potential assistance. Occupational Therapy Goals  Initiated 6/10/2020  1. Patient will perform lower body dressing with moderate assistance  within 7 day(s). 2.  Patient will perform toilet transfers with moderate assistance to drop arm BSC within 7 day(s). 3.  Patient will perform all aspects of toileting with moderate assistance  within 7 day(s). 4.  Patient will participate in upper extremity therapeutic exercise/activities with modified independence for 10 minutes within 7 day(s). 5.  Patient will utilize energy conservation techniques during functional activities with verbal and visual cues within 7 day(s). Outcome: Progressing Towards Goal     Occupational Therapy Note:     Pt received sitting up in bed with daughter present. Provided training and education for positioning post amputation, introduced to ace wrapping techniques, and introduced to desensitization techniques for right residual limb. Patient and daughter engaged with all training and education provided. Pt assisted with rolling in bed to reposition and for nursing to apply cream to buttocks. Repositioned with left heal floating and off loading her left buttocks. Will follow for continued intervention and training.        Kenna Lawler OT  Time Calculation: 23 mins

## 2020-06-11 NOTE — PROGRESS NOTES
RUR 30%     MASHA: Patient is s/p right BKA. [facility] as she does not meet the criteria. Patient lives at [facility] in independent living and has caregivers 24/7 through [facility]. Daughter prefers patient be discharged to the healthcare at [facility], however they cannot accept her until Monday. Facility is requesting another COVID test be ordered on Saturday. CM notified NP. CM received message from Betty at [facility]. They have denied patient as they feel she is more appropriate for SNF. CM attempted to meet with patient/daughter at bedside to discuss, daughter asked that CM come back at another time. CM met with daughter and notified of [facility] denial. Daughter would prefer patient go to the healthcare at [facility], however per daughter they cannot take the patient until Monday. NP is aware. CM sent referral to [facility] via [facility]. 2:49 PM: CM received notification from [facility] that they can accept patient Monday. They will require another negative COVID test for admission. CM notified NP.     Kurtis Hassan, BSW/CRM

## 2020-06-11 NOTE — TELEPHONE ENCOUNTER
----- Message from Taiwo Solis LPN sent at 0/90/5359 10:22 AM EDT -----  Regarding: FW: Update Medical Information  Contact: [phone]    ----- Message -----  From: Susan Benavides  Sent: 6/11/2020  10:17 AM EDT  To: Rick Baptiste  Subject: Update Medical Information                       Mom @[address]  She indicated to both [facility] and [facility] upon entering her recent stays that she wanted to override her DNR with a preference of partial code    She's been told to permanently change this dr Tripp Kimble needs to create an update doc and have her and him sign     Can this be done while she is here at [facility]   Please call Luda Panda (daughter)  [phone]  Thank you

## 2020-06-11 NOTE — TELEPHONE ENCOUNTER
LVM for MsAriadne Springesmer Carver at [facility] to R/C to office. F/u with Pt's daughter, Rosi Heard, she states she will contact MsAriadne Nikesmer Carver and call office back with fax #.

## 2020-06-11 NOTE — PROGRESS NOTES
made follow up visit to patients room on Ortho, per family request.The family request a  to visit their mother to check on her. Patient was lying in bed when  arrived. Patient told briefly about her medical history and that she had a foot amputation on Monday. She said she was feeling better, but was discouraged that she was wasn't feeling better yet. She still has some pain as well.  used active listening skills as Ania Azul spoke and provided words of comfort and encouragement.  provided pastoral care and support during this visit. Spiritual Care is available to follow up as needed. Rev.  Rachana Burns 68  Pediatric Speciality Staff   NICU & PICU Staff Pending sale to [facility] Staff   [address] [place] [address] [address]: [phone]/ X:[phone]  [address]  [email or web address]

## 2020-06-11 NOTE — TELEPHONE ENCOUNTER
Please see message below. I responded stating I sent to you, but that you were not in the office or at [facility] until 6/22.

## 2020-06-11 NOTE — PROGRESS NOTES
Bedside and Verbal shift change report given to Ada Knight (oncoming nurse) by Parrish Dooley (offgoing nurse). Report included the following information SBAR and Kardex.

## 2020-06-11 NOTE — PROGRESS NOTES
Vascular:    Pain from amputation improving. Leg dressed. Sleepy - will DC oxycodone 10 mg and use Percocet 5mg PRN. Following.

## 2020-06-11 NOTE — PROGRESS NOTES
[facility] Adult  Hospitalist Group                                                                                          Hospitalist Progress Note  Donovan Aguilera NP  Answering service: [phone] OR [phone] from in house phone        Date of Service:  2020  NAME:  Bj Swann  :  1932  MRN:  [id number]      Admission Summary:   The patient is an 70-year-old female with past medical history of hyperlipidemia, atrial fibrillation, third-degree AV block, anxiety, depression, colon cancer, hypothyroidism, CKD stage III, osteopenia, ischemic cardiomyopathy, status post pacemaker placement, COPD, AAA, hypertension, osteoporosis, wound of the foot, bradycardia, dilated cardiomyopathy, type 2 diabetes, presents to the hospital with the above-mentioned symptom.  History was obtained from the patient as well as her daughter. Dixie Weaver daughter reports that the patient has had a wound in her leg for the last many months.  The patient saw Dr. Carmelo Vera, underwent an angiogram about 3 weeks back. Linda Emeka was not one particular vessel that was identified in the right foot that could cause her symptoms.  Recommendation made for below-knee amputation if gangrene persisted, but the patient refused.  The patient was sent home, went for her wound care check today and they were concerned that her pain as well as her wound have gotten worse. Linda Emeka has been increased area of gangrenous tissue, and the patient was sent to the hospital for further management and evaluation.  The patient's daughter reports that the patient does not have any fever per se. [place]oacano Bailey has had some poor appetite, but no other complaints or problems.  The patient's daughter denies the patient having any headache, blurry vision, sore throat, trouble swallowing, trouble with speech, chest pain, shortness of breath, cough, fever, chills, abdominal pain, constipation, diarrhea, urinary symptoms, focal or generalized neurological weakness, falls, injuries,  hematemesis, melena, hemoptysis, hematuria, or any other concerns or problems.  The patient reports that the patient has been tested \"many times for COVID-19 and all the times it has been negative. \"    Interval history / Subjective:   Seen and examined patient. States that she is feeling fine. Right leg pain 3-4/10 in severity. Throbbing, does not radiate. Pain medication is working to control pain. Daughter at bedside. Updated her on patient's plan of care. Questions answered. No over night events noted   No acute distress noted   Patient denies any syncope, dizziness, shortness of breath, chest pain or tightness, nausea, vomiting or diarrhea. Assessment & Plan:     Gangrene of the right foot- due to ischemia and chronic wounds and wound infection  -Vascular surgery completed R  BKA- on 6/8/20  - ID consulted for IV abx management  - IV antibiotic to be completed today      Hypotension,- resolved  -Due to sepsis from R foot infection, CHF with EF of 20-25%  -Monitor BP      Acute kidney injury,  - Improving  -Avoid nephrotoxic medications   -renally dose abx  -Monitor creatinine-   - Creatinine 0.63 today       Elevated liver function tests.    Suspected cause shock liver from hypotension  Monitor    Diabetes.     -NovoLog insulin  -Accu-Cheks,   - Diet control      COPD  - PRN nebs       Atrial fibrillation.    -Continue home medication.     - Eliquis resumed postop, she is Rate controlled.       Recent pacemaker placement  - family requesting EP eval- appreciate their input       Hypothyroidism.    - Continue Levothyroxine     Hyponatremia-  -  mild and stable  - Monitor      Mild cognitive impairment with mild postop delerium  - back to baseline  Stable on home meds      Hypomagnesemia-   - Resolved   - Monitor     Constipation  - Resolved   - Continue stool softeners   - Had one large bowel movement today     Code status: Full   DVT prophylaxis: Heparin     Care Plan discussed with: Patient/Family, Nurse and   Anticipated Disposition: SNF/LTC  Anticipated Discharge: 24 hours to 48 hours     1230- Reassessed patient's arms. Will get bilateral duplex to rule out DVTs. Patient's daughter at bedside requesting patient's diet to be changed from Cardiac to Regular. Explained the cardiac diet will be more healthier for her but she still wanted it to be changed. Hospital Problems  Date Reviewed: 6/8/2020          Codes Class Noted POA    Cellulitis ICD-10-CM: L03.90  ICD-9-CM: 682.9  6/4/2020 Unknown                Review of Systems:   A comprehensive review of systems was negative except for that written in the HPI. Vital Signs:    Last 24hrs VS reviewed since prior progress note. Most recent are:  Visit Vitals  /69 (BP 1 Location: Right arm, BP Patient Position: At rest)   Pulse 80   Temp 98.8 °F (37.1 °C)   Resp 16   Wt 84.2 kg (185 lb 10 oz)   SpO2 97%   BMI 32.88 kg/m²         Intake/Output Summary (Last 24 hours) at 6/11/2020 0942  Last data filed at 6/11/2020 0300  Gross per 24 hour   Intake    Output 750 ml   Net -750 ml        Physical Examination:             Constitutional:  No acute distress, cooperative, pleasant, answers questions and follows commands appropriately. ENT:  Oral mucosa moist, oropharynx benign. Resp:   Decreased throughout. No wheezing/rhonchi/rales. No accessory muscle use   CV:   Pacemaker    GI:  Soft, non distended, non tender. normoactive bowel sounds, no hepatosplenomegaly     Musculoskeletal:  Edema noted to bilateral upper extremities, warm, 2+ pulses throughout. Ace bandage noted to right BKA- No swelling noted to stump. Bandage is CDI    Neurologic:  Moves all extremities with generalized weakness. AAOx3, CN II-XII reviewed     Psych:  Good insight, Not anxious nor agitated.        Data Review:    Review and/or order of clinical lab test      Labs:   No results for input(s): WBC, HGB, HCT, PLT, HGBEXT, HCTEXT, PLTEXT in the last 72 hours. Recent Labs     06/11/20  0322 06/10/20  0248 06/09/20  0324   * 135* 136   K 4.4 4.1 4.0    104 105   CO2 20* 23 25   BUN 12 11 12   CREA 0.63 0.75 0.72   * 120* 109*   CA 8.1* 8.3* 8.1*   MG  --  1.7 1.3*   PHOS  --   --  2.5*     No results for input(s): ALT, AP, TBIL, TBILI, TP, ALB, GLOB, GGT, AML, LPSE in the last 72 hours. No lab exists for component: SGOT, GPT, AMYP, HLPSE  No results for input(s): INR, PTP, APTT, INREXT in the last 72 hours. No results for input(s): FE, TIBC, PSAT, FERR in the last 72 hours. Lab Results   Component Value Date/Time    Folate 14.6 08/11/2014 03:29 PM      No results for input(s): PH, PCO2, PO2 in the last 72 hours. No results for input(s): CPK, CKNDX, TROIQ in the last 72 hours.     No lab exists for component: CPKMB  Lab Results   Component Value Date/Time    Cholesterol, total 199 11/18/2019 11:29 AM    HDL Cholesterol 65 11/18/2019 11:29 AM    LDL, calculated 97.4 11/18/2019 11:29 AM    Triglyceride 183 (H) 11/18/2019 11:29 AM    CHOL/HDL Ratio 3.1 11/18/2019 11:29 AM     Lab Results   Component Value Date/Time    Glucose (POC) 80 06/06/2020 06:04 AM    Glucose (POC) 140 (H) 06/05/2020 11:14 PM    Glucose (POC) 99 06/05/2020 04:47 PM    Glucose (POC) 163 (H) 06/05/2020 12:08 PM    Glucose (POC) 108 (H) 06/05/2020 06:08 AM     Lab Results   Component Value Date/Time    Color Yellow 08/01/2019 10:49 AM    Appearance Clear 08/01/2019 10:49 AM    Specific gravity 1.009 06/29/2019 09:30 AM    Specific gravity 1.015 06/12/2017 04:36 PM    pH (UA) 5.5 08/01/2019 10:49 AM    Protein NEGATIVE  06/29/2019 09:30 AM    Glucose NEGATIVE  06/29/2019 09:30 AM    Ketone Negative 08/01/2019 10:49 AM    Bilirubin Negative 08/01/2019 10:49 AM    Urobilinogen 0.2 06/29/2019 09:30 AM    Nitrites Negative 08/01/2019 10:49 AM    Leukocyte Esterase Negative 08/01/2019 10:49 AM    Epithelial cells FEW 01/18/2019 04:00 AM    Bacteria NEGATIVE  01/18/2019 04:00 AM    WBC 0-4 01/18/2019 04:00 AM    RBC 0-5 01/18/2019 04:00 AM         Medications Reviewed:     Current Facility-Administered Medications   Medication Dose Route Frequency    HYDROmorphone (PF) (DILAUDID) injection 0.5 mg  0.5 mg IntraVENous Q3H PRN    polyethylene glycol (MIRALAX) packet 17 g  17 g Oral DAILY    acetaminophen (TYLENOL) tablet 1,000 mg  1,000 mg Oral TID    senna-docusate (PERICOLACE) 8.6-50 mg per tablet 2 Tab  2 Tab Oral BID WITH MEALS    apixaban (ELIQUIS) tablet 5 mg  5 mg Oral Q12H    oxyCODONE-acetaminophen (PERCOCET) 5-325 mg per tablet 1 Tab  1 Tab Oral Q4H PRN    acetaminophen (TYLENOL) tablet 650 mg  650 mg Oral Q4H PRN    carvediloL (COREG) tablet 3.125 mg  3.125 mg Oral BID WITH MEALS    famotidine (PEPCID) tablet 20 mg  20 mg Oral DAILY    levothyroxine (SYNTHROID) tablet 88 mcg  88 mcg Oral ACB    lidocaine 4 % patch   TransDERmal DAILY PRN    atorvastatin (LIPITOR) tablet 10 mg  10 mg Oral QHS    sertraline (ZOLOFT) tablet 50 mg  50 mg Oral QPM    sodium chloride (NS) flush 5-40 mL  5-40 mL IntraVENous Q8H    sodium chloride (NS) flush 5-40 mL  5-40 mL IntraVENous PRN    ondansetron (ZOFRAN) injection 4 mg  4 mg IntraVENous Q4H PRN    vancomycin (VANCOCIN) 1,000 mg in 0.9% sodium chloride (MBP/ADV) 250 mL  1,000 mg IntraVENous Q24H    balsam peru-castor oiL (VENELEX) ointment   Topical Q8H    loperamide (IMODIUM) 1 mg/7.5 mL oral solution 2 mg  2 mg Oral QID PRN    cefepime (MAXIPIME) 2 g in 0.9% sodium chloride (MBP/ADV) 100 mL  2 g IntraVENous Q24H    metroNIDAZOLE (FLAGYL) IVPB premix 500 mg  500 mg IntraVENous Q12H    Vancomycin- pharmacy to dose   Other Rx Dosing/Monitoring     ______________________________________________________________________  EXPECTED LENGTH OF STAY: 6d 21h  ACTUAL LENGTH OF STAY:          [address], NP

## 2020-06-11 NOTE — PROGRESS NOTES
Problem: Mobility Impaired (Adult and Pediatric)  Goal: *Acute Goals and Plan of Care (Insert Text)  Description: FUNCTIONAL STATUS PRIOR TO ADMISSION: Patient was modified independent using a rolling walker, single point cane, wheelchair and other DME for functional mobility. HOME SUPPORT PRIOR TO ADMISSION: The patient lived alone in independent living apartment with potential assistance. Physical Therapy Goals  Initiated 6/9/2020  1. Patient will move from supine to sit and sit to supine , scoot up and down and roll side to side in bed with supervision/set-up within 7 day(s). 2.  Patient will transfer from bed to chair and chair to bed with moderate assistance  using the least restrictive device within 7 day(s). 3.  Patient will perform sit to stand with moderate assistance  within 7 day(s). 4.  Patient will sit EOB without support for 10 minutes within 7 day(s). Outcome: Progressing Towards Goal   PHYSICAL THERAPY TREATMENT  Patient: Jordy Cao (27 y.o. female)  Date: 6/11/2020  Diagnosis: Cellulitis [L03.90]   <principal problem not specified>  Procedure(s) (LRB):  RIGHT AMPUTATION BELOW THE KNEE(BKA) (Right) 3 Days Post-Op  Precautions: Fall(R BKA)  Chart, physical therapy assessment, plan of care and goals were reviewed. ASSESSMENT  Patient continues with skilled PT services and is progressing towards goals. Patient is very alert and pain is better managed today. Wanting to get up in chair. Able to move RLE with contact guard assistance. Performed bed mobility with minimal-moderate assistance of 2, bed rail, head of bed elevated. Able to maintain sitting unsupported easily. Attempted sit-stand with RW and maximal assistance of 2. First 2 attempts, could not straighten knee. 3rd attempt, with strong rocking,was able to stand at [facility] and maintain for 10 secs with maximal assistance of 2. Despite attempts, patient was unable to stand pivot to get into chair.  Returned to bed and bed placed in modified chair position. Patient very fatigued, but was cooperative and worked hard. OT to see this aternoon . Current Level of Function Impacting Discharge (mobility/balance): See above    Other factors to consider for discharge: Modified independent PLOF 6 weeks ago/Supportive family/A & O x 4/Motivated         PLAN :  Patient continues to benefit from skilled intervention to address the above impairments. Continue treatment per established plan of care. to address goals. Recommendation for discharge: (in order for the patient to meet his/her long term goals)  In Patient Rehab    This discharge recommendation:  Has been made in collaboration with the attending provider and/or case management    IF patient discharges home will need the following DME: to be determined (TBD)       SUBJECTIVE:   Patient stated Christine Him will try to get up.     OBJECTIVE DATA SUMMARY:   Critical Behavior:  Neurologic State: Alert  Orientation Level: Oriented X4  Cognition: Appropriate for age attention/concentration, Follows commands  Safety/Judgement: Awareness of environment, Fall prevention, Decreased insight into deficits  Functional Mobility Training:  Bed Mobility:  Rolling: Minimum assistance; Adaptive equipment  Supine to Sit: Minimum assistance; Moderate assistance;Assist x2  Sit to Supine: Minimum assistance; Moderate assistance;Assist x2  Scooting: Maximum assistance;Assist x2        Transfers:  Sit to Stand: Maximum assistance;Assist x2(performed x 3; 3rd try stood for 10 secs)  Stand to Sit: Moderate assistance;Assist x2                             Balance:  Sitting: Intact; Without support  Sitting - Static: Good (unsupported)  Sitting - Dynamic: Good (unsupported)  Standing: Impaired  Standing - Static: Constant support; Fair  Standing - Dynamic : Constant support;Poor  Ambulation/Gait Training:            Therapeutic Exercises:   Performing incentive spirometer    Pain Ratin/10    Activity Tolerance: Fair      After treatment patient left in no apparent distress:   Call bell within reach, Caregiver / family present, Side rails x 3 and bed placed in  modified chair positioin, nurse notified. COMMUNICATION/COLLABORATION:   The patients plan of care was discussed with: Occupational therapist and Registered nurse.      Nataliia Haro   Time Calculation: 35 mins

## 2020-06-11 NOTE — PROGRESS NOTES
[address] [facility] Adult  Hospitalist Group                                                                                          Hospitalist Progress Note  Andrew Alfonso MD  Answering service: [phone] from in house phone        Date of Service:  6/10/2020  NAME:  Hetal Vazquez  :  1932  MRN:  [id number]      Admission Summary:   The patient is an 60-year-old female with past medical history of hyperlipidemia, atrial fibrillation, third-degree AV block, anxiety, depression, colon cancer, hypothyroidism, CKD stage III, osteopenia, ischemic cardiomyopathy, status post pacemaker placement, COPD, AAA, hypertension, osteoporosis, wound of the foot, bradycardia, dilated cardiomyopathy, type 2 diabetes, presents to the hospital with the above-mentioned symptom. History was obtained from the patient as well as her daughter. The daughter reports that the patient has had a wound in her leg for the last many months. The patient saw Dr. Brandon Ochoa, underwent an angiogram about 3 weeks back. There was not one particular vessel that was identified in the right foot that could cause her symptoms. Recommendation made for below-knee amputation if gangrene persisted, but the patient refused. The patient was sent home, went for her wound care check today and they were concerned that her pain as well as her wound have gotten worse. There has been increased area of gangrenous tissue, and the patient was sent to the hospital for further management and evaluation. The patient's daughter reports that the patient does not have any fever per se. She has had some poor appetite, but no other complaints or problems.   The patient's daughter denies the patient having any headache, blurry vision, sore throat, trouble swallowing, trouble with speech, chest pain, shortness of breath, cough, fever, chills, abdominal pain, constipation, diarrhea, urinary symptoms, focal or generalized neurological weakness, falls, injuries,  hematemesis, melena, hemoptysis, hematuria, or any other concerns or problems. The patient reports that the patient has been tested \"many times for COVID-19 and all the times it has been negative. \"    Interval history / Subjective:   Patient reports right leg pain- s/p  Amputation, changed tylenol to scheduled TID  EP cardiology and ID following  Vascular surgery following s/p R BKA 6/8/20  Dahl placed due to urinary retention > 500ml and patient unable to void     Assessment & Plan:     1. Gangrene of the right foot- due to ischemia and chronic wounds and wound infection  Vascular surgerycompleted R  BKA- on 6/8/20  ID consulted for IV abx management  Plans to stop IV abx on 6/11/20  2. Hypotension,- resolved  Due to sepsis from R foot infection, CHF with EF of 20-25%  Monitor BP- stable today  3. Acute kidney injury,  Avoid nephrotoxic medications   renally dose abx  Monitor creatinine- improved to 0.7 from 1.44  4. Elevated liver function tests. Suspected cause shock liver from hypotension  monitor  5. Diabetes. NovoLog insulin, Accu-Cheks, diet control, and close monitoring. 6.  COPD- PRN nebs  7. Atrial fibrillation. Continue home medication. Eliquis resumed postop, she is Rate controlled. 8.recent pacemaker placement- family requesting EP eval- appreciate their input  9. Hypothyroidism. Continue home medication. 10. Hyponatremia- mild and stable  11. Mild cognitive impairment with mild postop delerium- back to baseline  Stable on home meds  12. hypomagnesemia- replete IV  13.  Constipation- started stool softeners today    Code status: was DNR, changed to FULL CODE on 6/8/20 at the request of patient and her daughter Pastora Ash    DVT prophylaxis: Heparin    Care Plan discussed with: Patient/Family  Anticipated Disposition: Home w/Family and SNF/LTC  Anticipated Discharge: Greater than 48 hours     Hospital Problems  Date Reviewed: 6/8/2020          Codes Class Noted POA Cellulitis ICD-10-CM: L03.90  ICD-9-CM: 682.9  6/4/2020 Unknown                Review of Systems:   A comprehensive review of systems was negative except for that written in the HPI. Vital Signs:    Last 24hrs VS reviewed since prior progress note. Most recent are:  Visit Vitals  /49 (BP 1 Location: Right arm, BP Patient Position: At rest)   Pulse 79   Temp 98.7 °F (37.1 °C)   Resp 16   Wt 76.6 kg (168 lb 12.8 oz)   SpO2 92%   BMI 29.90 kg/m²     Patient Vitals for the past 24 hrs:   Temp Pulse Resp BP SpO2   06/10/20 2029 98.7 °F (37.1 °C) 79 16 110/49 92 %   06/10/20 1818  80  133/65    06/10/20 1519 99 °F (37.2 °C) 80 16 149/67 100 %   06/10/20 0803 98.1 °F (36.7 °C) 80 15 145/74 94 %   06/10/20 0340 98 °F (36.7 °C)       06/10/20 0240 100.3 °F (37.9 °C) 80 16 136/63 93 %       Intake/Output Summary (Last 24 hours) at 6/10/2020 2133  Last data filed at 6/10/2020 6005  Gross per 24 hour   Intake    Output 500 ml   Net -500 ml        Physical Examination:             Constitutional:  No acute distress, cooperative, pleasant    ENT:  Oral mucosa moist, oropharynx benign. Resp:  decreased breath sounds bilaterally. No wheezing/rhonchi/rales. CV:  IRR, 2/6 CANDELARIA, pacemaker    GI:  Soft, non distended, non tender. normoactive bowel sounds, no hepatosplenomegaly     Musculoskeletal: Right BKA with dressing CDI    Neurologic:  Moves all extremities. AAOx3, CN II-XII reviewed     Psych:  anxious , demented with mild postop delerium  Skin- bruising of bilt UE      Data Review:    Review and/or order of clinical lab test  Review and/or order of tests in the radiology section of CPT  Review and/or order of tests in the medicine section of CPT      Labs:     No results for input(s): WBC, HGB, HCT, PLT, HGBEXT, HCTEXT, PLTEXT, HGBEXT, HCTEXT, PLTEXT in the last 72 hours.   Recent Labs     06/10/20  0248 06/09/20  0324 06/08/20  0029   * 136 134*   K 4.1 4.0 4.2    105 104   CO2 23 25 24 BUN 11 12 12   CREA 0.75 0.72 0.75   * 109* 100   CA 8.3* 8.1* 8.5   MG 1.7 1.3*  --    PHOS  --  2.5*  --      No results for input(s): ALT, AP, TBIL, TBILI, TP, ALB, GLOB, GGT, AML, LPSE in the last 72 hours. No lab exists for component: SGOT, GPT, AMYP, HLPSE  No results for input(s): INR, PTP, APTT, INREXT, INREXT in the last 72 hours. No results for input(s): FE, TIBC, PSAT, FERR in the last 72 hours. Lab Results   Component Value Date/Time    Folate 14.6 08/11/2014 03:29 PM      No results for input(s): PH, PCO2, PO2 in the last 72 hours. No results for input(s): CPK, CKNDX, TROIQ in the last 72 hours.     No lab exists for component: CPKMB  Lab Results   Component Value Date/Time    Cholesterol, total 199 11/18/2019 11:29 AM    HDL Cholesterol 65 11/18/2019 11:29 AM    LDL, calculated 97.4 11/18/2019 11:29 AM    Triglyceride 183 (H) 11/18/2019 11:29 AM    CHOL/HDL Ratio 3.1 11/18/2019 11:29 AM     Lab Results   Component Value Date/Time    Glucose (POC) 80 06/06/2020 06:04 AM    Glucose (POC) 140 (H) 06/05/2020 11:14 PM    Glucose (POC) 99 06/05/2020 04:47 PM    Glucose (POC) 163 (H) 06/05/2020 12:08 PM    Glucose (POC) 108 (H) 06/05/2020 06:08 AM     Lab Results   Component Value Date/Time    Color Yellow 08/01/2019 10:49 AM    Appearance Clear 08/01/2019 10:49 AM    Specific gravity 1.009 06/29/2019 09:30 AM    Specific gravity 1.015 06/12/2017 04:36 PM    pH (UA) 5.5 08/01/2019 10:49 AM    Protein NEGATIVE  06/29/2019 09:30 AM    Glucose NEGATIVE  06/29/2019 09:30 AM    Ketone Negative 08/01/2019 10:49 AM    Bilirubin Negative 08/01/2019 10:49 AM    Urobilinogen 0.2 06/29/2019 09:30 AM    Nitrites Negative 08/01/2019 10:49 AM    Leukocyte Esterase Negative 08/01/2019 10:49 AM    Epithelial cells FEW 01/18/2019 04:00 AM    Bacteria NEGATIVE  01/18/2019 04:00 AM    WBC 0-4 01/18/2019 04:00 AM    RBC 0-5 01/18/2019 04:00 AM         Medications Reviewed:     Current Facility-Administered Medications   Medication Dose Route Frequency    acetaminophen (TYLENOL) tablet 1,000 mg  1,000 mg Oral TID    senna-docusate (PERICOLACE) 8.6-50 mg per tablet 2 Tab  2 Tab Oral BID WITH MEALS    apixaban (ELIQUIS) tablet 5 mg  5 mg Oral Q12H    oxyCODONE-acetaminophen (PERCOCET) 5-325 mg per tablet 1 Tab  1 Tab Oral Q4H PRN    acetaminophen (TYLENOL) tablet 650 mg  650 mg Oral Q4H PRN    oxyCODONE IR (ROXICODONE) tablet 10 mg  10 mg Oral Q4H PRN    HYDROmorphone (PF) (DILAUDID) injection 1 mg  1 mg IntraVENous Q3H PRN    carvediloL (COREG) tablet 3.125 mg  3.125 mg Oral BID WITH MEALS    famotidine (PEPCID) tablet 20 mg  20 mg Oral DAILY    levothyroxine (SYNTHROID) tablet 88 mcg  88 mcg Oral ACB    lidocaine 4 % patch   TransDERmal DAILY PRN    atorvastatin (LIPITOR) tablet 10 mg  10 mg Oral QHS    sertraline (ZOLOFT) tablet 50 mg  50 mg Oral QPM    sodium chloride (NS) flush 5-40 mL  5-40 mL IntraVENous Q8H    sodium chloride (NS) flush 5-40 mL  5-40 mL IntraVENous PRN    ondansetron (ZOFRAN) injection 4 mg  4 mg IntraVENous Q4H PRN    vancomycin (VANCOCIN) 1,000 mg in 0.9% sodium chloride (MBP/ADV) 250 mL  1,000 mg IntraVENous Q24H    balsam peru-castor oiL (VENELEX) ointment   Topical Q8H    loperamide (IMODIUM) 1 mg/7.5 mL oral solution 2 mg  2 mg Oral QID PRN    cefepime (MAXIPIME) 2 g in 0.9% sodium chloride (MBP/ADV) 100 mL  2 g IntraVENous Q24H    metroNIDAZOLE (FLAGYL) IVPB premix 500 mg  500 mg IntraVENous Q12H    Vancomycin- pharmacy to dose   Other Rx Dosing/Monitoring     ______________________________________________________________________  EXPECTED LENGTH OF STAY: 6d 21h  ACTUAL LENGTH OF STAY:          6                 Reagan De La Cruz MD

## 2020-06-11 NOTE — PROGRESS NOTES
Bedside and Verbal shift change report given to Kenyon Fermin RN (oncoming nurse) by Reina Gandhi RN (offgoing nurse). Report included the following information SBAR.

## 2020-06-12 LAB
ERYTHROCYTE [DISTWIDTH] IN BLOOD BY AUTOMATED COUNT: 12.4 % (ref 11.5–14.5)
HCT VFR BLD AUTO: 34.1 % (ref 35–47)
HGB BLD-MCNC: 10.7 G/DL (ref 11.5–16)
MCH RBC QN AUTO: 30.1 PG (ref 26–34)
MCHC RBC AUTO-ENTMCNC: 31.4 G/DL (ref 30–36.5)
MCV RBC AUTO: 95.8 FL (ref 80–99)
NRBC # BLD: 0 K/UL (ref 0–0.01)
NRBC BLD-RTO: 0 PER 100 WBC
PLATELET # BLD AUTO: 229 K/UL (ref 150–400)
PMV BLD AUTO: 10.9 FL (ref 8.9–12.9)
RBC # BLD AUTO: 3.56 M/UL (ref 3.8–5.2)
WBC # BLD AUTO: 9 K/UL (ref 3.6–11)

## 2020-06-12 PROCEDURE — 85027 COMPLETE CBC AUTOMATED: CPT

## 2020-06-12 PROCEDURE — 97530 THERAPEUTIC ACTIVITIES: CPT

## 2020-06-12 PROCEDURE — 74011250637 HC RX REV CODE- 250/637

## 2020-06-12 PROCEDURE — 97535 SELF CARE MNGMENT TRAINING: CPT

## 2020-06-12 PROCEDURE — 65270000029 HC RM PRIVATE

## 2020-06-12 PROCEDURE — 74011250637 HC RX REV CODE- 250/637: Performed by: FAMILY MEDICINE

## 2020-06-12 PROCEDURE — 74011250636 HC RX REV CODE- 250/636

## 2020-06-12 PROCEDURE — 94760 N-INVAS EAR/PLS OXIMETRY 1: CPT

## 2020-06-12 PROCEDURE — 74011250637 HC RX REV CODE- 250/637: Performed by: NURSE PRACTITIONER

## 2020-06-12 PROCEDURE — 36415 COLL VENOUS BLD VENIPUNCTURE: CPT

## 2020-06-12 RX ORDER — FUROSEMIDE 40 MG/1
40 TABLET ORAL DAILY
Status: DISCONTINUED | OUTPATIENT
Start: 2020-06-12 | End: 2020-06-15 | Stop reason: HOSPADM

## 2020-06-12 RX ORDER — FAMOTIDINE 20 MG/1
20 TABLET, FILM COATED ORAL 2 TIMES DAILY
Status: DISCONTINUED | OUTPATIENT
Start: 2020-06-12 | End: 2020-06-15 | Stop reason: HOSPADM

## 2020-06-12 RX ADMIN — APIXABAN 5 MG: 5 TABLET, FILM COATED ORAL at 08:43

## 2020-06-12 RX ADMIN — SERTRALINE HYDROCHLORIDE 50 MG: 50 TABLET ORAL at 18:21

## 2020-06-12 RX ADMIN — OXYCODONE AND ACETAMINOPHEN 1 TABLET: 5; 325 TABLET ORAL at 14:08

## 2020-06-12 RX ADMIN — CARVEDILOL 3.12 MG: 3.12 TABLET, FILM COATED ORAL at 16:35

## 2020-06-12 RX ADMIN — Medication 10 ML: at 23:39

## 2020-06-12 RX ADMIN — ACETAMINOPHEN 1000 MG: 500 TABLET, FILM COATED ORAL at 22:37

## 2020-06-12 RX ADMIN — CARVEDILOL 3.12 MG: 3.12 TABLET, FILM COATED ORAL at 08:43

## 2020-06-12 RX ADMIN — ATORVASTATIN CALCIUM 10 MG: 10 TABLET, FILM COATED ORAL at 22:07

## 2020-06-12 RX ADMIN — OXYCODONE AND ACETAMINOPHEN 1 TABLET: 5; 325 TABLET ORAL at 09:36

## 2020-06-12 RX ADMIN — FAMOTIDINE 20 MG: 20 TABLET ORAL at 08:43

## 2020-06-12 RX ADMIN — Medication 10 ML: at 16:35

## 2020-06-12 RX ADMIN — ACETAMINOPHEN 1000 MG: 500 TABLET, FILM COATED ORAL at 16:35

## 2020-06-12 RX ADMIN — VANCOMYCIN HYDROCHLORIDE 1000 MG: 1 INJECTION, POWDER, LYOPHILIZED, FOR SOLUTION INTRAVENOUS at 01:30

## 2020-06-12 RX ADMIN — OXYCODONE AND ACETAMINOPHEN 1 TABLET: 5; 325 TABLET ORAL at 05:20

## 2020-06-12 RX ADMIN — APIXABAN 5 MG: 5 TABLET, FILM COATED ORAL at 22:07

## 2020-06-12 RX ADMIN — FAMOTIDINE 20 MG: 20 TABLET ORAL at 18:21

## 2020-06-12 RX ADMIN — CASTOR OIL AND BALSAM, PERU: 788; 87 OINTMENT TOPICAL at 23:39

## 2020-06-12 RX ADMIN — CASTOR OIL AND BALSAM, PERU: 788; 87 OINTMENT TOPICAL at 14:09

## 2020-06-12 RX ADMIN — DOCUSATE SODIUM 50MG AND SENNOSIDES 8.6MG 2 TABLET: 8.6; 5 TABLET, FILM COATED ORAL at 16:35

## 2020-06-12 RX ADMIN — CASTOR OIL AND BALSAM, PERU: 788; 87 OINTMENT TOPICAL at 06:00

## 2020-06-12 RX ADMIN — LEVOTHYROXINE SODIUM 88 MCG: 88 TABLET ORAL at 07:12

## 2020-06-12 RX ADMIN — OXYCODONE AND ACETAMINOPHEN 1 TABLET: 5; 325 TABLET ORAL at 18:21

## 2020-06-12 RX ADMIN — FUROSEMIDE 40 MG: 40 TABLET ORAL at 14:08

## 2020-06-12 NOTE — PROGRESS NOTES
Problem: Mobility Impaired (Adult and Pediatric)  Goal: *Acute Goals and Plan of Care (Insert Text)  Description: FUNCTIONAL STATUS PRIOR TO ADMISSION: Patient was modified independent using a rolling walker, single point cane, wheelchair and other DME for functional mobility. HOME SUPPORT PRIOR TO ADMISSION: The patient lived alone in independent living apartment with potential assistance. Physical Therapy Goals  Initiated 6/9/2020  1. Patient will move from supine to sit and sit to supine , scoot up and down and roll side to side in bed with supervision/set-up within 7 day(s). 2.  Patient will transfer from bed to chair and chair to bed with moderate assistance  using the least restrictive device within 7 day(s). 3.  Patient will perform sit to stand with moderate assistance  within 7 day(s). 4.  Patient will sit EOB without support for 10 minutes within 7 day(s). Outcome: Progressing Towards Goal  PHYSICAL THERAPY TREATMENT  Patient: Jose Franco (59 y.o. female)  Date: 6/12/2020  Diagnosis: Cellulitis [L03.90]   <principal problem not specified>  Procedure(s) (LRB):  RIGHT AMPUTATION BELOW THE KNEE(BKA) (Right) 4 Days Post-Op  Precautions: Fall(R BKA)  Chart, physical therapy assessment, plan of care and goals were reviewed. ASSESSMENT  Patient continues with skilled PT services and is progressing towards goals, however continues to require Max Ax2 for sit<>standx2. She was able to clear buttocks with both attempts to stand, however unable to come to full, upright position. Was able to transfer to EOB w/ Min A + additional time. SBA to EO and Mod Ax2 to [facility] while seated. Instructed pt BLE exercises to complete in bed and encouraged pt to complete throughout day (3x/day x5-8 reps as tolerated). Will continue to recommend SNF at d/c.      Current Level of Function Impacting Discharge (mobility/balance): Requires Ax2 to complete functional transfers    Other factors to consider for discharge: Recent BKA (R)          PLAN :  Patient continues to benefit from skilled intervention to address the above impairments. Continue treatment per established plan of care. to address goals. Recommendation for discharge: (in order for the patient to meet his/her long term goals)  Therapy up to 5 days/week in SNF setting    This discharge recommendation:  Has been made in collaboration with the attending provider and/or case management    IF patient discharges home will need the following DME: to be determined (TBD)       SUBJECTIVE:   Patient stated Ohh boy.     OBJECTIVE DATA SUMMARY:   Critical Behavior:  Neurologic State: Alert  Orientation Level: Oriented X4  Cognition: Appropriate for age attention/concentration  Safety/Judgement: Good awareness of safety precautions  Functional Mobility Training:  Bed Mobility:     Supine to Sit: Minimum assistance; Additional time;Bed Modified(HOB elevated)  Sit to Supine: Minimum assistance; Moderate assistance;Assist x2(Min Ax1 for LE's into bed; 2nd assist at trunk)  Scooting: Stand-by assistance; Moderate assistance; Additional time;Assist x2        Transfers:  Sit to Stand: Maximum assistance;Assist x2(bed slightly elevated)  Stand to Sit: Maximum assistance;Assist x2        Bed to Chair: (unable to progress to chair this date)                    Balance:  Sitting: Intact  Sitting - Static: Fair (occasional)  Sitting - Dynamic: Fair (occasional)  Standing: Impaired; With support  Standing - Static: Constant support  Standing - Dynamic : Constant support        Therapeutic Exercises:   High Cobb's position: quad sets, SLR (RLE)  Seated: LAQ x3   Supine: bridges x3   Pain Rating: Moderate pain with ace wrapping    Activity Tolerance:   Fair and observed SOB with activity  Please refer to the flowsheet for vital signs taken during this treatment.     After treatment patient left in no apparent distress:   Supine in bed, Heels elevated for pressure relief, Call bell within reach, and Side rails x 3    COMMUNICATION/COLLABORATION:   The patients plan of care was discussed with: Registered nurse.      Shyann DIAMOND Means,PTA   Time Calculation: 35 mins

## 2020-06-12 NOTE — TELEPHONE ENCOUNTER
I think I sent the [employer] message back to you, but may have gone to mrs celena sanchez. Not sure how they got that idea, makes no sense. I have texted the hospitalist and suggested he get palliative consult so they can go through everything step by step. She should not leave the hospital without having documentation regarding her wishes. thanks

## 2020-06-12 NOTE — PROGRESS NOTES
Problem: Self Care Deficits Care Plan (Adult)  Goal: *Acute Goals and Plan of Care (Insert Text)  Description:   FUNCTIONAL STATUS PRIOR TO ADMISSION: Patient was modified independent using a rolling walker, single point cane, wheelchair and other DME for functional mobility. HOME SUPPORT PRIOR TO ADMISSION: The patient lived alone in independent living apartment with potential assistance. Occupational Therapy Goals  Initiated 6/10/2020  1. Patient will perform lower body dressing with moderate assistance  within 7 day(s). 2.  Patient will perform toilet transfers with moderate assistance to drop arm BSC within 7 day(s). 3.  Patient will perform all aspects of toileting with moderate assistance  within 7 day(s). 4.  Patient will participate in upper extremity therapeutic exercise/activities with modified independence for 10 minutes within 7 day(s). 5.  Patient will utilize energy conservation techniques during functional activities with verbal and visual cues within 7 day(s). Outcome: Progressing Towards Goal    OCCUPATIONAL THERAPY TREATMENT  Patient: Krista Mckenna (35 y.o. female)  Date: 6/12/2020  Diagnosis: Cellulitis [L03.90]   <principal problem not specified>  Procedure(s) (LRB):  RIGHT AMPUTATION BELOW THE KNEE(BKA) (Right) 4 Days Post-Op  Precautions: Fall(R BKA)  Chart, occupational therapy assessment, plan of care, and goals were reviewed. ASSESSMENT  Patient continues with skilled OT services and is progressing towards goals. She progressed with sitting EOB and standing. She was able to complete standing activities with max A x 2 persons. Pt provided training for ace wrapping again and demonstrating good carry over of training from yesterday. She does report carry over of desensitization training as well provided yesterday. Patient making slow and steady progress overall with all activities.   Recommend discharge to rehab facility to complete training and education following amputation. For the weekend, encourage patient to sit EOB 2-3 times daily and bed in chair position for all meals. Encourage patient to engage with ADL activities including grooming and bathing as she is able. Current Level of Function Impacting Discharge (ADLs): max A x 2 persons    Other factors to consider for discharge: debility         PLAN :  Patient continues to benefit from skilled intervention to address the above impairments. Continue treatment per established plan of care. to address goals. Recommend with staff: bed in chair position    Recommend next OT session: grooming, bathing, and dressing    Recommendation for discharge: (in order for the patient to meet his/her long term goals)  Therapy up to 5 days/week in SNF setting    This discharge recommendation:  Has been made in collaboration with the attending provider and/or case management    IF patient discharges home will need the following DME: TBD       SUBJECTIVE:   Patient stated I am feeling alright.     OBJECTIVE DATA SUMMARY:   Cognitive/Behavioral Status:  Neurologic State: Alert  Orientation Level: Oriented X4  Cognition: Appropriate for age attention/concentration  Perception: Appears intact  Perseveration: No perseveration noted  Safety/Judgement: Good awareness of safety precautions    Functional Mobility and Transfers for ADLs:  Bed Mobility:  Supine to Sit: Additional time;Minimum assistance(HOB elevated)  Sit to Supine: Moderate assistance; Additional time    Transfers:  Sit to Stand: Maximum assistance;Assist x2     Bed to Chair: (unable to progress to chair this date)    Balance:  Sitting: Intact  Sitting - Static: Fair (occasional)  Sitting - Dynamic: Fair (occasional)  Standing: Impaired; Without support  Standing - Static: Constant support  Standing - Dynamic : Constant support    ADL Intervention:     Ace wrapping activities, desensitization, and progression with sitting EOB. Cognitive Retraining  Safety/Judgement: Good awareness of safety precautions      Pain:  Minimal pain    Activity Tolerance:   Good  Please refer to the flowsheet for vital signs taken during this treatment. After treatment patient left in no apparent distress:   Supine in bed, Heels elevated for pressure relief, and Call bell within reach    COMMUNICATION/COLLABORATION:   The patients plan of care was discussed with: Physical therapist and Registered nurse.      Michael Quinn OT  Time Calculation: 45 mins

## 2020-06-12 NOTE — PROGRESS NOTES
Vascular:    Slowly better, BKA incision OK. OK with me to stop antibiotics anytime. To rehab when ready.  Will follow up Monday

## 2020-06-12 NOTE — PROGRESS NOTES
[facility] Adult  Hospitalist Group                                                                                          Hospitalist Progress Note  Rona Chan NP  Answering service: [phone] from in house phone        Date of Service:  2020  NAME:  Zenovia Meckel  :  1932  MRN:  [id number]      Admission Summary:   The patient is an 60-year-old female with past medical history of hyperlipidemia, atrial fibrillation, third-degree AV block, anxiety, depression, colon cancer, hypothyroidism, CKD stage III, osteopenia, ischemic cardiomyopathy, status post pacemaker placement, COPD, AAA, hypertension, osteoporosis, wound of the foot, bradycardia, dilated cardiomyopathy, type 2 diabetes, presents to the hospital with the above-mentioned symptom.  History was obtained from the patient as well as her daughter. Ethan Estrada daughter reports that the patient has had a wound in her leg for the last many months.  The patient saw Dr. Reanna Gunter, underwent an angiogram about 3 weeks back. Livia Barksdale was not one particular vessel that was identified in the right foot that could cause her symptoms.  Recommendation made for below-knee amputation if gangrene persisted, but the patient refused.  The patient was sent home, went for her wound care check today and they were concerned that her pain as well as her wound have gotten worse. Livia Barksdale has been increased area of gangrenous tissue, and the patient was sent to the hospital for further management and evaluation.  The patient's daughter reports that the patient does not have any fever per se. Keven Singer has had some poor appetite, but no other complaints or problems.  The patient's daughter denies the patient having any headache, blurry vision, sore throat, trouble swallowing, trouble with speech, chest pain, shortness of breath, cough, fever, chills, abdominal pain, constipation, diarrhea, urinary symptoms, focal or generalized neurological weakness, falls, injuries,  hematemesis, melena, hemoptysis, hematuria, or any other concerns or problems.  The patient reports that the patient has been tested \"many times for COVID-19 and all the times it has been negative. \"    Interval history / Subjective:    Seen and examined patient. States that she is feeling good today. Denies any pain in right leg. Feels that she only needs pain medication over night. Daughter at the bedside. Discussed patient's plan of care. No overnight events noted. No acute distress noted.       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      Acute kidney injury,  - Improving  -Avoid nephrotoxic medications   -renally dose abx  -Monitor creatinine-   - Creatinine 0.63         Elevated liver function tests.    Suspected cause shock liver from hypotension  Monitor     Diabetes.     -NovoLog insulin  -Accu-Cheks,   - Diet control        COPD  - PRN nebs         Atrial fibrillation.    -Continue home medication.     - Eliquis resumed postop, she is Rate controlled.        Recent pacemaker placement  - family requesting EP eval- appreciate their input         Hypothyroidism.    - Continue Levothyroxine      Hyponatremia-  -  mild and stable  - Monitor       Mild cognitive impairment with mild postop delerium  - back to baseline  Stable on home meds        Hypomagnesemia-   - Resolved   - Monitor      Constipation  - Resolved   - Continue stool softeners   - Had one large bowel movement yesterday     Code status: Full   DVT prophylaxis: Heparin       Care Plan discussed with: Patient/Family, Nurse and   Anticipated Disposition: SNF/LTC and [facility]/Rehab  Anticipated Discharge: Greater than 48 hours     Hospital Problems  Date Reviewed: 6/8/2020          Codes Class Noted POA    Cellulitis ICD-10-CM: L03.90  ICD-9-CM: 682.9  6/4/2020 Unknown                Review of Systems:   A comprehensive review of systems was negative except for that written in the HPI. Vital Signs:    Last 24hrs VS reviewed since prior progress note. Most recent are:  Visit Vitals  /72 (BP 1 Location: Right arm, BP Patient Position: At rest)   Pulse 80   Temp 97.6 °F (36.4 °C)   Resp 16   Wt 82.7 kg (182 lb 5.1 oz)   SpO2 98%   BMI 32.30 kg/m²         Intake/Output Summary (Last 24 hours) at 6/12/2020 1606  Last data filed at 6/12/2020 1431  Gross per 24 hour   Intake    Output 1500 ml   Net -1500 ml        Physical Examination:             Constitutional:  No acute distress, cooperative, pleasant    ENT:  Oral mucosa moist, oropharynx benign. Resp:  CTA bilaterally. No wheezing/rhonchi/rales. No accessory muscle use   CV:  Paced normal rate, no murmurs, gallops, rubs    GI:  Soft, non distended, non tender. normoactive bowel sounds, no hepatosplenomegaly     Musculoskeletal:  No edema, warm, 2+ pulses throughout, Right BKA noted, dressing is CDI. Neurologic:  Moves all extremities. AAOx3, CN II-XII reviewed     Psych:  Good insight, Not anxious nor agitated. Data Review:    Review and/or order of clinical lab test      Labs:     Recent Labs     06/12/20  0143 06/11/20  1543   WBC 9.0 8.9   HGB 10.7* 10.7*   HCT 34.1* 34.0*    220     Recent Labs     06/11/20  0322 06/10/20  0248   * 135*   K 4.4 4.1    104   CO2 20* 23   BUN 12 11   CREA 0.63 0.75   * 120*   CA 8.1* 8.3*   MG  --  1.7     No results for input(s): ALT, AP, TBIL, TBILI, TP, ALB, GLOB, GGT, AML, LPSE in the last 72 hours. No lab exists for component: SGOT, GPT, AMYP, HLPSE  No results for input(s): INR, PTP, APTT, INREXT in the last 72 hours. No results for input(s): FE, TIBC, PSAT, FERR in the last 72 hours.    Lab Results   Component Value Date/Time    Folate 14.6 08/11/2014 03:29 PM      No results for input(s): PH, PCO2, PO2 in the last 72 hours. No results for input(s): CPK, CKNDX, TROIQ in the last 72 hours.     No lab exists for component: CPKMB  Lab Results   Component Value Date/Time    Cholesterol, total 199 11/18/2019 11:29 AM    HDL Cholesterol 65 11/18/2019 11:29 AM    LDL, calculated 97.4 11/18/2019 11:29 AM    Triglyceride 183 (H) 11/18/2019 11:29 AM    CHOL/HDL Ratio 3.1 11/18/2019 11:29 AM     Lab Results   Component Value Date/Time    Glucose (POC) 80 06/06/2020 06:04 AM    Glucose (POC) 140 (H) 06/05/2020 11:14 PM    Glucose (POC) 99 06/05/2020 04:47 PM    Glucose (POC) 163 (H) 06/05/2020 12:08 PM    Glucose (POC) 108 (H) 06/05/2020 06:08 AM     Lab Results   Component Value Date/Time    Color Yellow 08/01/2019 10:49 AM    Appearance Clear 08/01/2019 10:49 AM    Specific gravity 1.009 06/29/2019 09:30 AM    Specific gravity 1.015 06/12/2017 04:36 PM    pH (UA) 5.5 08/01/2019 10:49 AM    Protein NEGATIVE  06/29/2019 09:30 AM    Glucose NEGATIVE  06/29/2019 09:30 AM    Ketone Negative 08/01/2019 10:49 AM    Bilirubin Negative 08/01/2019 10:49 AM    Urobilinogen 0.2 06/29/2019 09:30 AM    Nitrites Negative 08/01/2019 10:49 AM    Leukocyte Esterase Negative 08/01/2019 10:49 AM    Epithelial cells FEW 01/18/2019 04:00 AM    Bacteria NEGATIVE  01/18/2019 04:00 AM    WBC 0-4 01/18/2019 04:00 AM    RBC 0-5 01/18/2019 04:00 AM         Medications Reviewed:     Current Facility-Administered Medications   Medication Dose Route Frequency    furosemide (LASIX) tablet 40 mg  40 mg Oral DAILY    famotidine (PEPCID) tablet 20 mg  20 mg Oral BID    HYDROmorphone (PF) (DILAUDID) injection 0.5 mg  0.5 mg IntraVENous Q3H PRN    polyethylene glycol (MIRALAX) packet 17 g  17 g Oral DAILY    acetaminophen (TYLENOL) tablet 1,000 mg  1,000 mg Oral TID    senna-docusate (PERICOLACE) 8.6-50 mg per tablet 2 Tab  2 Tab Oral BID WITH MEALS    apixaban (ELIQUIS) tablet 5 mg  5 mg Oral Q12H    oxyCODONE-acetaminophen (PERCOCET) 5-325 mg per tablet 1 Tab  1 Tab Oral Q4H PRN    acetaminophen (TYLENOL) tablet 650 mg  650 mg Oral Q4H PRN    carvediloL (COREG) tablet 3.125 mg  3.125 mg Oral BID WITH MEALS    levothyroxine (SYNTHROID) tablet 88 mcg  88 mcg Oral ACB    lidocaine 4 % patch   TransDERmal DAILY PRN    atorvastatin (LIPITOR) tablet 10 mg  10 mg Oral QHS    sertraline (ZOLOFT) tablet 50 mg  50 mg Oral QPM    sodium chloride (NS) flush 5-40 mL  5-40 mL IntraVENous Q8H    sodium chloride (NS) flush 5-40 mL  5-40 mL IntraVENous PRN    ondansetron (ZOFRAN) injection 4 mg  4 mg IntraVENous Q4H PRN    balsam peru-castor oiL (VENELEX) ointment   Topical Q8H    loperamide (IMODIUM) 1 mg/7.5 mL oral solution 2 mg  2 mg Oral QID PRN     ______________________________________________________________________  EXPECTED LENGTH OF STAY: 6d 21h  ACTUAL LENGTH OF STAY:          8                 Jameson Bellamy NP

## 2020-06-12 NOTE — TELEPHONE ENCOUNTER
Called Sharla and notified her of Dr. Zeferino Rojo message (in [place]: \"The hospitalist at [facility] can take of the paperwork necessary to confirm her code status. Looks like he initially made her dnr at her request and then according to the notes and requests of the daughters, switched her back to full code. There is no reason to not complete the necessary paperwork while she is in the hospital, I dont need to be involved. I will text the hospitalist if you can let family know\"). She said that was great and they will talk to the hospitalist today. Meredith Duvall stating patient is probably going to be discharged to an assisted living facility and asked if she should take her remote device setup to the assisted living. I asked Dr. Yaneli Ballesteros nurse, Kenyatta Calzada. He said she definitely should. Meredith Duvall stated she also would like to talk to one of the pacemaker nurses so I told her I would send a message to have one of them call her. Meredith Duvall denied further questions or concerns at this time.

## 2020-06-12 NOTE — PROGRESS NOTES
Bedside and Verbal shift change report given to Rere Kevin RN (oncoming nurse) by Nancy Ferrara RN (offgoing nurse). Report included the following information SBAR.

## 2020-06-13 PROCEDURE — 74011250636 HC RX REV CODE- 250/636

## 2020-06-13 PROCEDURE — 74011250637 HC RX REV CODE- 250/637

## 2020-06-13 PROCEDURE — 74011250637 HC RX REV CODE- 250/637: Performed by: NURSE PRACTITIONER

## 2020-06-13 PROCEDURE — 65270000029 HC RM PRIVATE

## 2020-06-13 PROCEDURE — 87635 SARS-COV-2 COVID-19 AMP PRB: CPT

## 2020-06-13 PROCEDURE — 74011250637 HC RX REV CODE- 250/637: Performed by: FAMILY MEDICINE

## 2020-06-13 RX ORDER — POLYETHYLENE GLYCOL 3350 17 G/17G
17 POWDER, FOR SOLUTION ORAL
Status: DISCONTINUED | OUTPATIENT
Start: 2020-06-13 | End: 2020-06-15 | Stop reason: HOSPADM

## 2020-06-13 RX ORDER — OXYCODONE HYDROCHLORIDE 5 MG/1
5 TABLET ORAL
Status: DISCONTINUED | OUTPATIENT
Start: 2020-06-13 | End: 2020-06-15 | Stop reason: HOSPADM

## 2020-06-13 RX ADMIN — CASTOR OIL AND BALSAM, PERU: 788; 87 OINTMENT TOPICAL at 15:49

## 2020-06-13 RX ADMIN — SERTRALINE HYDROCHLORIDE 50 MG: 50 TABLET ORAL at 17:45

## 2020-06-13 RX ADMIN — CARVEDILOL 3.12 MG: 3.12 TABLET, FILM COATED ORAL at 16:11

## 2020-06-13 RX ADMIN — FAMOTIDINE 20 MG: 20 TABLET ORAL at 09:49

## 2020-06-13 RX ADMIN — DOCUSATE SODIUM 50MG AND SENNOSIDES 8.6MG 2 TABLET: 8.6; 5 TABLET, FILM COATED ORAL at 16:10

## 2020-06-13 RX ADMIN — ACETAMINOPHEN 1000 MG: 500 TABLET, FILM COATED ORAL at 09:49

## 2020-06-13 RX ADMIN — OXYCODONE 5 MG: 5 TABLET ORAL at 19:41

## 2020-06-13 RX ADMIN — FUROSEMIDE 40 MG: 40 TABLET ORAL at 09:49

## 2020-06-13 RX ADMIN — OXYCODONE AND ACETAMINOPHEN 1 TABLET: 5; 325 TABLET ORAL at 07:14

## 2020-06-13 RX ADMIN — Medication 10 ML: at 21:20

## 2020-06-13 RX ADMIN — HYDROMORPHONE HYDROCHLORIDE 0.5 MG: 1 INJECTION, SOLUTION INTRAMUSCULAR; INTRAVENOUS; SUBCUTANEOUS at 11:22

## 2020-06-13 RX ADMIN — OXYCODONE 5 MG: 5 TABLET ORAL at 16:11

## 2020-06-13 RX ADMIN — CASTOR OIL AND BALSAM, PERU: 788; 87 OINTMENT TOPICAL at 21:20

## 2020-06-13 RX ADMIN — ATORVASTATIN CALCIUM 10 MG: 10 TABLET, FILM COATED ORAL at 21:19

## 2020-06-13 RX ADMIN — LEVOTHYROXINE SODIUM 88 MCG: 88 TABLET ORAL at 07:14

## 2020-06-13 RX ADMIN — FAMOTIDINE 20 MG: 20 TABLET ORAL at 17:45

## 2020-06-13 RX ADMIN — APIXABAN 5 MG: 5 TABLET, FILM COATED ORAL at 09:49

## 2020-06-13 RX ADMIN — CARVEDILOL 3.12 MG: 3.12 TABLET, FILM COATED ORAL at 09:49

## 2020-06-13 RX ADMIN — Medication 10 ML: at 16:12

## 2020-06-13 RX ADMIN — CASTOR OIL AND BALSAM, PERU: 788; 87 OINTMENT TOPICAL at 07:14

## 2020-06-13 RX ADMIN — DOCUSATE SODIUM 50MG AND SENNOSIDES 8.6MG 2 TABLET: 8.6; 5 TABLET, FILM COATED ORAL at 09:49

## 2020-06-13 RX ADMIN — ACETAMINOPHEN 1000 MG: 500 TABLET, FILM COATED ORAL at 17:45

## 2020-06-13 RX ADMIN — APIXABAN 5 MG: 5 TABLET, FILM COATED ORAL at 21:19

## 2020-06-13 NOTE — PROGRESS NOTES
Bedside shift change report given to Anisa (oncoming nurse) by Nick Valencia (offgoing nurse). Report included the following information SBAR, Kardex, Intake/Output, MAR and Recent Results.

## 2020-06-13 NOTE — PROGRESS NOTES
[address] [facility] Adult  Hospitalist Group                                                                                          Hospitalist Progress Note  Mindy Jesus NP  Answering service: [phone] from in house phone        Date of Service:  2020  NAME:  Navid Dunn  :  1932  MRN:  [id number]      Admission Summary:   The patient is an 27-year-old female with past medical history of hyperlipidemia, atrial fibrillation, third-degree AV block, anxiety, depression, colon cancer, hypothyroidism, CKD stage III, osteopenia, ischemic cardiomyopathy, status post pacemaker placement, COPD, AAA, hypertension, osteoporosis, wound of the foot, bradycardia, dilated cardiomyopathy, type 2 diabetes, presents to the hospital with the above-mentioned symptom.  History was obtained from the patient as well as her daughter. Eva Samano daughter reports that the patient has had a wound in her leg for the last many months.  The patient saw Dr. Kaitlin Collins, underwent an angiogram about 3 weeks back. Connie Chu was not one particular vessel that was identified in the right foot that could cause her symptoms.  Recommendation made for below-knee amputation if gangrene persisted, but the patient refused.  The patient was sent home, went for her wound care check today and they were concerned that her pain as well as her wound have gotten worse. Connie Chu has been increased area of gangrenous tissue, and the patient was sent to the hospital for further management and evaluation.  The patient's daughter reports that the patient does not have any fever per se. Britt Alvarado has had some poor appetite, but no other complaints or problems.  The patient's daughter denies the patient having any headache, blurry vision, sore throat, trouble swallowing, trouble with speech, chest pain, shortness of breath, cough, fever, chills, abdominal pain, constipation, diarrhea, urinary symptoms, focal or generalized neurological weakness, falls, injuries,  hematemesis, melena, hemoptysis, hematuria, or any other concerns or problems.  The patient reports that the patient has been tested \"many times for COVID-19 and all the times it has been negative. \"    Interval history / Subjective:   Seen and examined patient. Upon entry to the room, patient states she no longer wants to \"do this\". She states that at her age, she is tired and doesn't want to have to keep fighting to live a normal life. Discussed at length with her the options that she has. (Continuing care in the rehab or going home and being made comfortable). I explained to her that I do not feel that she is at the end of life but if she chooses to not further any care, that is her choice. She is worried about upsetting her children if she tells them that she no longer wants care. She asked that I come back to discuss this with her daughter Kady Mar when she arrives. Patient stated that she was told that she couldn't make her own decisions because her daughter Penelope Covarrubias has mPOA. Explain to patient that she (the patient) will be the one to make her own decisions while she is still able to, once she is unable to make decisions then Penelope Covarrubias would be the one to do that. Right stump dressing noted- clean dry and intact. Denies any pain. No acute distress noted. No overnight events noted. 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      Acute kidney injury,  - Improving  -Avoid nephrotoxic medications   -renally dose abx  -Monitor creatinine-   - Creatinine 0.63    - Dc serge today.  PVR.      Elevated liver function tests.    Suspected cause shock liver from hypotension  Monitor     Diabetes.     -NovoLog insulin  -Accu-Cheks,   - Diet control        COPD  - PRN nebs         Atrial fibrillation.    -Continue home medication.     - Eliquis resumed postop, she is Rate controlled.        Recent pacemaker placement  - family requesting EP eval- appreciate their input         Hypothyroidism.    - Continue Levothyroxine      Hyponatremia-  -  mild and stable  - Monitor       Mild cognitive impairment with mild postop delerium  - back to baseline  Stable on home meds        Hypomagnesemia-   - Resolved   - Monitor      Constipation  - Resolved   - Continue stool softeners   - Had one large bowel movement yesterday     Code status: Full   DVT prophylaxis: Heparin       Care Plan discussed with: Patient/Family and Nurse  Anticipated Disposition: SAH/Rehab  Anticipated Discharge: 24 hours to 48 hours     Covid test to be ordered. 1400- Patient's daughter Ben at the bedside. Discussed with patient and daughter, what the patient wishes to happen within her plan of care. Ben was understanding in noting what her mother wants and has told her mother that they will have a family meeting to discuss it when she gets out of rehab. Patient has agreed to continuing care in the rehab and to have a discussion with her family after she is discharged. Hospital Problems  Date Reviewed: 6/8/2020          Codes Class Noted POA    Cellulitis ICD-10-CM: L03.90  ICD-9-CM: 682.9  6/4/2020 Unknown                Review of Systems:   A comprehensive review of systems was negative except for that written in the HPI. Vital Signs:    Last 24hrs VS reviewed since prior progress note.  Most recent are:  Visit Vitals  /73   Pulse 83   Temp 97.9 °F (36.6 °C)   Resp 16   Wt 81.6 kg (180 lb)   SpO2 97%   BMI 31.89 kg/m²         Intake/Output Summary (Last 24 hours) at 6/13/2020 1651  Last data filed at 6/13/2020 1327  Gross per 24 hour   Intake    Output 1575 ml   Net -1575 ml        Physical Examination:             Constitutional: No acute distress, cooperative, pleasant    ENT:  Oral mucosa moist, oropharynx benign. Resp:  CTA bilaterally. No wheezing/rhonchi/rales. No accessory muscle use   CV:  Regular rhythm, normal rate, no murmurs, gallops, rubs    GI:  Soft, non distended, non tender. normoactive bowel sounds, no hepatosplenomegaly     Musculoskeletal:  No edema, warm, 2+ pulses throughout    Neurologic:  Moves all extremities. AAOx3, CN II-XII reviewed     Psych:  Poor insight        Data Review:    Review and/or order of clinical lab test      Labs:     Recent Labs     06/12/20  0143 06/11/20  1543   WBC 9.0 8.9   HGB 10.7* 10.7*   HCT 34.1* 34.0*    220     Recent Labs     06/11/20  0322   *   K 4.4      CO2 20*   BUN 12   CREA 0.63   *   CA 8.1*     No results for input(s): ALT, AP, TBIL, TBILI, TP, ALB, GLOB, GGT, AML, LPSE in the last 72 hours. No lab exists for component: SGOT, GPT, AMYP, HLPSE  No results for input(s): INR, PTP, APTT, INREXT in the last 72 hours. No results for input(s): FE, TIBC, PSAT, FERR in the last 72 hours. Lab Results   Component Value Date/Time    Folate 14.6 08/11/2014 03:29 PM      No results for input(s): PH, PCO2, PO2 in the last 72 hours. No results for input(s): CPK, CKNDX, TROIQ in the last 72 hours.     No lab exists for component: CPKMB  Lab Results   Component Value Date/Time    Cholesterol, total 199 11/18/2019 11:29 AM    HDL Cholesterol 65 11/18/2019 11:29 AM    LDL, calculated 97.4 11/18/2019 11:29 AM    Triglyceride 183 (H) 11/18/2019 11:29 AM    CHOL/HDL Ratio 3.1 11/18/2019 11:29 AM     Lab Results   Component Value Date/Time    Glucose (POC) 80 06/06/2020 06:04 AM    Glucose (POC) 140 (H) 06/05/2020 11:14 PM    Glucose (POC) 99 06/05/2020 04:47 PM    Glucose (POC) 163 (H) 06/05/2020 12:08 PM    Glucose (POC) 108 (H) 06/05/2020 06:08 AM     Lab Results   Component Value Date/Time    Color Yellow 08/01/2019 10:49 AM    Appearance Clear 08/01/2019 10:49 AM    Specific gravity 1.009 06/29/2019 09:30 AM    Specific gravity 1.015 06/12/2017 04:36 PM    pH (UA) 5.5 08/01/2019 10:49 AM    Protein NEGATIVE  06/29/2019 09:30 AM    Glucose NEGATIVE  06/29/2019 09:30 AM    Ketone Negative 08/01/2019 10:49 AM    Bilirubin Negative 08/01/2019 10:49 AM    Urobilinogen 0.2 06/29/2019 09:30 AM    Nitrites Negative 08/01/2019 10:49 AM    Leukocyte Esterase Negative 08/01/2019 10:49 AM    Epithelial cells FEW 01/18/2019 04:00 AM    Bacteria NEGATIVE  01/18/2019 04:00 AM    WBC 0-4 01/18/2019 04:00 AM    RBC 0-5 01/18/2019 04:00 AM         Medications Reviewed:     Current Facility-Administered Medications   Medication Dose Route Frequency    polyethylene glycol (MIRALAX) packet 17 g  17 g Oral DAILY PRN    oxyCODONE IR (ROXICODONE) tablet 5 mg  5 mg Oral Q4H PRN    furosemide (LASIX) tablet 40 mg  40 mg Oral DAILY    famotidine (PEPCID) tablet 20 mg  20 mg Oral BID    HYDROmorphone (PF) (DILAUDID) injection 0.5 mg  0.5 mg IntraVENous Q3H PRN    acetaminophen (TYLENOL) tablet 1,000 mg  1,000 mg Oral TID    senna-docusate (PERICOLACE) 8.6-50 mg per tablet 2 Tab  2 Tab Oral BID WITH MEALS    apixaban (ELIQUIS) tablet 5 mg  5 mg Oral Q12H    acetaminophen (TYLENOL) tablet 650 mg  650 mg Oral Q4H PRN    carvediloL (COREG) tablet 3.125 mg  3.125 mg Oral BID WITH MEALS    levothyroxine (SYNTHROID) tablet 88 mcg  88 mcg Oral ACB    lidocaine 4 % patch   TransDERmal DAILY PRN    atorvastatin (LIPITOR) tablet 10 mg  10 mg Oral QHS    sertraline (ZOLOFT) tablet 50 mg  50 mg Oral QPM    sodium chloride (NS) flush 5-40 mL  5-40 mL IntraVENous Q8H    sodium chloride (NS) flush 5-40 mL  5-40 mL IntraVENous PRN    ondansetron (ZOFRAN) injection 4 mg  4 mg IntraVENous Q4H PRN    balsam peru-castor oiL (VENELEX) ointment   Topical Q8H    loperamide (IMODIUM) 1 mg/7.5 mL oral solution 2 mg  2 mg Oral QID PRN ______________________________________________________________________  EXPECTED LENGTH OF STAY: 6d 21h  ACTUAL LENGTH OF STAY:          [address], NP

## 2020-06-13 NOTE — PROGRESS NOTES
Bedside shift change report given to Eva Mccain RN (oncoming nurse) by Dallas Thorne RN (offgoing nurse). Report included the following information SBAR, Kardex, Intake/Output, MAR and Recent Results.

## 2020-06-13 NOTE — PROGRESS NOTES
Problem: Pressure Injury - Risk of  Goal: *Prevention of pressure injury  Description: Document Deric Scale and appropriate interventions in the flowsheet. Outcome: Progressing Towards Goal  Note: Pressure Injury Interventions:  Sensory Interventions: Assess changes in LOC    Moisture Interventions: Absorbent underpads    Activity Interventions: Increase time out of bed, Pressure redistribution bed/mattress(bed type), PT/OT evaluation    Mobility Interventions: HOB 30 degrees or less, Pressure redistribution bed/mattress (bed type), PT/OT evaluation    Nutrition Interventions: Document food/fluid/supplement intake    Friction and Shear Interventions: HOB 30 degrees or less, Lift sheet, Minimize layers    Problem: Falls - Risk of  Goal: *Absence of Falls  Description: Document Darek Fall Risk and appropriate interventions in the flowsheet.   Outcome: Progressing Towards Goal  Note: Fall Risk Interventions:  Mobility Interventions: Communicate number of staff needed for ambulation/transfer, Patient to call before getting OOB, Utilize walker, cane, or other assistive device    Mentation Interventions: Door open when patient unattended, Evaluate medications/consider consulting pharmacy, More frequent rounding, Room close to nurse's station    Medication Interventions: Evaluate medications/consider consulting pharmacy, Patient to call before getting OOB, Teach patient to arise slowly    Elimination Interventions: Call light in reach, Patient to call for help with toileting needs, Toileting schedule/hourly rounds

## 2020-06-14 LAB
SARS-COV-2, COV2: NOT DETECTED
SOURCE, COVRS: NORMAL
SPECIMEN SOURCE, FCOV2M: NORMAL

## 2020-06-14 PROCEDURE — 65270000029 HC RM PRIVATE

## 2020-06-14 PROCEDURE — 74011250637 HC RX REV CODE- 250/637

## 2020-06-14 PROCEDURE — 74011250637 HC RX REV CODE- 250/637: Performed by: FAMILY MEDICINE

## 2020-06-14 PROCEDURE — 77030038269 HC DRN EXT URIN PURWCK BARD -A

## 2020-06-14 PROCEDURE — 74011250637 HC RX REV CODE- 250/637: Performed by: NURSE PRACTITIONER

## 2020-06-14 RX ADMIN — FAMOTIDINE 20 MG: 20 TABLET ORAL at 17:12

## 2020-06-14 RX ADMIN — ACETAMINOPHEN 1000 MG: 500 TABLET, FILM COATED ORAL at 17:12

## 2020-06-14 RX ADMIN — CARVEDILOL 3.12 MG: 3.12 TABLET, FILM COATED ORAL at 17:12

## 2020-06-14 RX ADMIN — SERTRALINE HYDROCHLORIDE 50 MG: 50 TABLET ORAL at 17:12

## 2020-06-14 RX ADMIN — OXYCODONE 5 MG: 5 TABLET ORAL at 21:23

## 2020-06-14 RX ADMIN — APIXABAN 5 MG: 5 TABLET, FILM COATED ORAL at 21:23

## 2020-06-14 RX ADMIN — OXYCODONE 5 MG: 5 TABLET ORAL at 06:10

## 2020-06-14 RX ADMIN — OXYCODONE 5 MG: 5 TABLET ORAL at 00:26

## 2020-06-14 RX ADMIN — FAMOTIDINE 20 MG: 20 TABLET ORAL at 08:18

## 2020-06-14 RX ADMIN — APIXABAN 5 MG: 5 TABLET, FILM COATED ORAL at 08:18

## 2020-06-14 RX ADMIN — CASTOR OIL AND BALSAM, PERU: 788; 87 OINTMENT TOPICAL at 06:11

## 2020-06-14 RX ADMIN — Medication 10 ML: at 21:24

## 2020-06-14 RX ADMIN — ACETAMINOPHEN 1000 MG: 500 TABLET, FILM COATED ORAL at 21:23

## 2020-06-14 RX ADMIN — LEVOTHYROXINE SODIUM 88 MCG: 88 TABLET ORAL at 06:10

## 2020-06-14 RX ADMIN — OXYCODONE 5 MG: 5 TABLET ORAL at 13:44

## 2020-06-14 RX ADMIN — OXYCODONE 5 MG: 5 TABLET ORAL at 17:12

## 2020-06-14 RX ADMIN — ACETAMINOPHEN 1000 MG: 500 TABLET, FILM COATED ORAL at 08:17

## 2020-06-14 RX ADMIN — DOCUSATE SODIUM 50MG AND SENNOSIDES 8.6MG 2 TABLET: 8.6; 5 TABLET, FILM COATED ORAL at 08:18

## 2020-06-14 RX ADMIN — FUROSEMIDE 40 MG: 40 TABLET ORAL at 08:18

## 2020-06-14 RX ADMIN — Medication 10 ML: at 06:10

## 2020-06-14 RX ADMIN — OXYCODONE 5 MG: 5 TABLET ORAL at 10:00

## 2020-06-14 RX ADMIN — LOPERAMIDE HCL 2 MG: 1 SOLUTION ORAL at 18:25

## 2020-06-14 RX ADMIN — CARVEDILOL 3.12 MG: 3.12 TABLET, FILM COATED ORAL at 08:18

## 2020-06-14 RX ADMIN — CASTOR OIL AND BALSAM, PERU: 788; 87 OINTMENT TOPICAL at 21:23

## 2020-06-14 RX ADMIN — ATORVASTATIN CALCIUM 10 MG: 10 TABLET, FILM COATED ORAL at 21:23

## 2020-06-14 NOTE — PROGRESS NOTES
[address] [facility] Adult  Hospitalist Group                                                                                          Hospitalist Progress Note  Danika Levy, YANA  Answering service: [phone] from in house phone        Date of Service:  2020  NAME:  Geovanni Wheeler  :  1932  MRN:  [id number]      Admission Summary:   The patient is an 80-year-old female with past medical history of hyperlipidemia, atrial fibrillation, third-degree AV block, anxiety, depression, colon cancer, hypothyroidism, CKD stage III, osteopenia, ischemic cardiomyopathy, status post pacemaker placement, COPD, AAA, hypertension, osteoporosis, wound of the foot, bradycardia, dilated cardiomyopathy, type 2 diabetes, presents to the hospital with the above-mentioned symptom.  History was obtained from the patient as well as her daughter. Eddi Batista daughter reports that the patient has had a wound in her leg for the last many months.  The patient saw Dr. Yamila Cerrato, underwent an angiogram about 3 weeks back. Franklin Buchanan was not one particular vessel that was identified in the right foot that could cause her symptoms.  Recommendation made for below-knee amputation if gangrene persisted, but the patient refused.  The patient was sent home, went for her wound care check today and they were concerned that her pain as well as her wound have gotten worse. Franklin Buchanan has been increased area of gangrenous tissue, and the patient was sent to the hospital for further management and evaluation.  The patient's daughter reports that the patient does not have any fever per se. Tanisha Logan has had some poor appetite, but no other complaints or problems.  The patient's daughter denies the patient having any headache, blurry vision, sore throat, trouble swallowing, trouble with speech, chest pain, shortness of breath, cough, fever, chills, abdominal pain, constipation, diarrhea, urinary symptoms, focal or generalized neurological weakness, falls, injuries,  hematemesis, melena, hemoptysis, hematuria, or any other concerns or problems.  The patient reports that the patient has been tested \"many times for COVID-19 and all the times it has been negative. \"    Interval history / Subjective:     Seen and examined patient. States that she is feeling good and had a great night of sleep. Becomes tearful when discussing rehab and the future. Son is at bedside. RN did dressing change to right stump. Incision appears to be healing well. Fanny approximated. Bruising noted to the surrounding area of the incision. No signs of infection noted. No acute distress noted. No overnight events noted.         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      Acute kidney injury,  -Resolved   -Avoid nephrotoxic medications   -renally dose abx  - Creatinine 0.63      Elevated liver function tests.    Suspected cause shock liver from hypotension  Monitor     Diabetes.     -NovoLog insulin  -Accu-Cheks,   - Diet control        COPD  - PRN nebs         Atrial fibrillation.    -Continue home medication.     - Eliquis resumed postop, she is Rate controlled.        Recent pacemaker placement  - family requesting EP eval- appreciate their input         Hypothyroidism.    - Continue Levothyroxine      Hyponatremia-  -  mild and stable  - Monitor       Mild cognitive impairment with mild postop delerium  - back to baseline  Stable on home meds        Hypomagnesemia-   - Resolved   - Monitor      Constipation  - Resolved   - Continue stool softeners       Code status: Full   DVT prophylaxis: Heparin     Care Plan discussed with: Patient/Family and Nurse  Anticipated Disposition: SAH/Rehab  Anticipated Discharge: Less than 24 hours     Covid 19- Negative     Hospital Problems  Date Reviewed: 6/8/2020          Codes Class Noted POA    Cellulitis ICD-10-CM: L03.90  ICD-9-CM: 682.9  6/4/2020 Unknown                Review of Systems:   A comprehensive review of systems was negative except for that written in the HPI. Vital Signs:    Last 24hrs VS reviewed since prior progress note. Most recent are:  Visit Vitals  /80   Pulse 80   Temp 98.3 °F (36.8 °C)   Resp 16   Wt 81.6 kg (180 lb)   SpO2 99%   BMI 31.89 kg/m²         Intake/Output Summary (Last 24 hours) at 6/14/2020 1427  Last data filed at 6/13/2020 1604  Gross per 24 hour   Intake    Output 600 ml   Net -600 ml        Physical Examination:             Constitutional:  No acute distress, cooperative, pleasant    ENT:  Oral mucosa moist, oropharynx benign. Resp:  CTA bilaterally. No wheezing/rhonchi/rales. No accessory muscle use   CV:  Paced,  normal rate, no murmurs, gallops, rubs    GI:  Soft, non distended, non tender. normoactive bowel sounds, no hepatosplenomegaly     Musculoskeletal:  No edema, warm, 2+ pulses throughout, Right BKA noted- Dressing CDI    Neurologic:  Moves all extremities. AAOx3, CN II-XII reviewed     Psych:  Good insight but becomes tearful        Data Review:    Review and/or order of clinical lab test      Labs:     Recent Labs     06/12/20  0143 06/11/20  1543   WBC 9.0 8.9   HGB 10.7* 10.7*   HCT 34.1* 34.0*    220     No results for input(s): NA, K, CL, CO2, BUN, CREA, GLU, CA, MG, PHOS, URICA in the last 72 hours. No results for input(s): ALT, AP, TBIL, TBILI, TP, ALB, GLOB, GGT, AML, LPSE in the last 72 hours. No lab exists for component: SGOT, GPT, AMYP, HLPSE  No results for input(s): INR, PTP, APTT, INREXT in the last 72 hours. No results for input(s): FE, TIBC, PSAT, FERR in the last 72 hours.    Lab Results   Component Value Date/Time    Folate 14.6 08/11/2014 03:29 PM      No results for input(s): PH, PCO2, PO2 in the last 72 hours. No results for input(s): CPK, CKNDX, TROIQ in the last 72 hours.     No lab exists for component: CPKMB  Lab Results   Component Value Date/Time    Cholesterol, total 199 11/18/2019 11:29 AM    HDL Cholesterol 65 11/18/2019 11:29 AM    LDL, calculated 97.4 11/18/2019 11:29 AM    Triglyceride 183 (H) 11/18/2019 11:29 AM    CHOL/HDL Ratio 3.1 11/18/2019 11:29 AM     Lab Results   Component Value Date/Time    Glucose (POC) 80 06/06/2020 06:04 AM    Glucose (POC) 140 (H) 06/05/2020 11:14 PM    Glucose (POC) 99 06/05/2020 04:47 PM    Glucose (POC) 163 (H) 06/05/2020 12:08 PM    Glucose (POC) 108 (H) 06/05/2020 06:08 AM     Lab Results   Component Value Date/Time    Color Yellow 08/01/2019 10:49 AM    Appearance Clear 08/01/2019 10:49 AM    Specific gravity 1.009 06/29/2019 09:30 AM    Specific gravity 1.015 06/12/2017 04:36 PM    pH (UA) 5.5 08/01/2019 10:49 AM    Protein NEGATIVE  06/29/2019 09:30 AM    Glucose NEGATIVE  06/29/2019 09:30 AM    Ketone Negative 08/01/2019 10:49 AM    Bilirubin Negative 08/01/2019 10:49 AM    Urobilinogen 0.2 06/29/2019 09:30 AM    Nitrites Negative 08/01/2019 10:49 AM    Leukocyte Esterase Negative 08/01/2019 10:49 AM    Epithelial cells FEW 01/18/2019 04:00 AM    Bacteria NEGATIVE  01/18/2019 04:00 AM    WBC 0-4 01/18/2019 04:00 AM    RBC 0-5 01/18/2019 04:00 AM         Medications Reviewed:     Current Facility-Administered Medications   Medication Dose Route Frequency    polyethylene glycol (MIRALAX) packet 17 g  17 g Oral DAILY PRN    oxyCODONE IR (ROXICODONE) tablet 5 mg  5 mg Oral Q4H PRN    furosemide (LASIX) tablet 40 mg  40 mg Oral DAILY    famotidine (PEPCID) tablet 20 mg  20 mg Oral BID    HYDROmorphone (PF) (DILAUDID) injection 0.5 mg  0.5 mg IntraVENous Q3H PRN    acetaminophen (TYLENOL) tablet 1,000 mg  1,000 mg Oral TID    senna-docusate (PERICOLACE) 8.6-50 mg per tablet 2 Tab  2 Tab Oral BID WITH MEALS    apixaban (ELIQUIS) tablet 5 mg  5 mg Oral Q12H    acetaminophen (TYLENOL) tablet 650 mg  650 mg Oral Q4H PRN    carvediloL (COREG) tablet 3.125 mg  3.125 mg Oral BID WITH MEALS    levothyroxine (SYNTHROID) tablet 88 mcg  88 mcg Oral ACB    lidocaine 4 % patch   TransDERmal DAILY PRN    atorvastatin (LIPITOR) tablet 10 mg  10 mg Oral QHS    sertraline (ZOLOFT) tablet 50 mg  50 mg Oral QPM    sodium chloride (NS) flush 5-40 mL  5-40 mL IntraVENous Q8H    sodium chloride (NS) flush 5-40 mL  5-40 mL IntraVENous PRN    ondansetron (ZOFRAN) injection 4 mg  4 mg IntraVENous Q4H PRN    balsam peru-castor oiL (VENELEX) ointment   Topical Q8H    loperamide (IMODIUM) 1 mg/7.5 mL oral solution 2 mg  2 mg Oral QID PRN     ______________________________________________________________________  EXPECTED LENGTH OF STAY: 6d 21h  ACTUAL LENGTH OF STAY:          [id number] [facility]

## 2020-06-15 VITALS
RESPIRATION RATE: 16 BRPM | WEIGHT: 175 LBS | BODY MASS INDEX: 31 KG/M2 | TEMPERATURE: 98 F | DIASTOLIC BLOOD PRESSURE: 70 MMHG | SYSTOLIC BLOOD PRESSURE: 155 MMHG | HEART RATE: 82 BPM | OXYGEN SATURATION: 98 %

## 2020-06-15 PROCEDURE — 74011250637 HC RX REV CODE- 250/637

## 2020-06-15 PROCEDURE — 74011250637 HC RX REV CODE- 250/637: Performed by: NURSE PRACTITIONER

## 2020-06-15 PROCEDURE — 77010033678 HC OXYGEN DAILY

## 2020-06-15 PROCEDURE — 74011000250 HC RX REV CODE- 250: Performed by: HOSPITALIST

## 2020-06-15 PROCEDURE — 94640 AIRWAY INHALATION TREATMENT: CPT

## 2020-06-15 PROCEDURE — 74011250637 HC RX REV CODE- 250/637: Performed by: FAMILY MEDICINE

## 2020-06-15 RX ORDER — ARFORMOTEROL TARTRATE 15 UG/2ML
15 SOLUTION RESPIRATORY (INHALATION)
Status: DISCONTINUED | OUTPATIENT
Start: 2020-06-15 | End: 2020-06-15 | Stop reason: HOSPADM

## 2020-06-15 RX ORDER — OXYCODONE HYDROCHLORIDE 5 MG/1
5 TABLET ORAL
Qty: 30 TAB | Refills: 0 | Status: SHIPPED | OUTPATIENT
Start: 2020-06-15 | End: 2020-06-20

## 2020-06-15 RX ORDER — AMOXICILLIN 250 MG
2 CAPSULE ORAL 2 TIMES DAILY WITH MEALS
Qty: 30 TAB | Refills: 0 | Status: ON HOLD | OUTPATIENT
Start: 2020-06-15 | End: 2020-07-08

## 2020-06-15 RX ORDER — BUDESONIDE 0.5 MG/2ML
500 INHALANT ORAL
Status: DISCONTINUED | OUTPATIENT
Start: 2020-06-15 | End: 2020-06-15 | Stop reason: HOSPADM

## 2020-06-15 RX ORDER — IPRATROPIUM BROMIDE AND ALBUTEROL SULFATE 2.5; .5 MG/3ML; MG/3ML
3 SOLUTION RESPIRATORY (INHALATION)
Status: DISCONTINUED | OUTPATIENT
Start: 2020-06-15 | End: 2020-06-15 | Stop reason: HOSPADM

## 2020-06-15 RX ORDER — ACETAMINOPHEN 500 MG
1000 TABLET ORAL
Qty: 50 TAB | Refills: 0 | Status: ON HOLD | OUTPATIENT
Start: 2020-06-15 | End: 2020-07-08

## 2020-06-15 RX ADMIN — APIXABAN 5 MG: 5 TABLET, FILM COATED ORAL at 08:47

## 2020-06-15 RX ADMIN — ACETAMINOPHEN 1000 MG: 500 TABLET, FILM COATED ORAL at 08:46

## 2020-06-15 RX ADMIN — OXYCODONE 5 MG: 5 TABLET ORAL at 01:47

## 2020-06-15 RX ADMIN — LOPERAMIDE HCL 2 MG: 1 SOLUTION ORAL at 13:35

## 2020-06-15 RX ADMIN — OXYCODONE 5 MG: 5 TABLET ORAL at 04:57

## 2020-06-15 RX ADMIN — OXYCODONE 5 MG: 5 TABLET ORAL at 08:46

## 2020-06-15 RX ADMIN — CASTOR OIL AND BALSAM, PERU: 788; 87 OINTMENT TOPICAL at 13:36

## 2020-06-15 RX ADMIN — OXYCODONE 5 MG: 5 TABLET ORAL at 13:24

## 2020-06-15 RX ADMIN — FUROSEMIDE 40 MG: 40 TABLET ORAL at 08:46

## 2020-06-15 RX ADMIN — ARFORMOTEROL TARTRATE 15 MCG: 15 SOLUTION RESPIRATORY (INHALATION) at 10:25

## 2020-06-15 RX ADMIN — LEVOTHYROXINE SODIUM 88 MCG: 88 TABLET ORAL at 04:56

## 2020-06-15 RX ADMIN — FAMOTIDINE 20 MG: 20 TABLET ORAL at 08:46

## 2020-06-15 RX ADMIN — CARVEDILOL 3.12 MG: 3.12 TABLET, FILM COATED ORAL at 08:46

## 2020-06-15 RX ADMIN — BUDESONIDE 500 MCG: 0.5 INHALANT RESPIRATORY (INHALATION) at 10:25

## 2020-06-15 RX ADMIN — CASTOR OIL AND BALSAM, PERU: 788; 87 OINTMENT TOPICAL at 04:57

## 2020-06-15 NOTE — TELEPHONE ENCOUNTER
Called Sharla. Unable to leave a VM. Mailbox is full. Looks like Mrs. Melania Kidd was provided with a new  [facility] unit during her implant. All she needs to do is plug it in. ..

## 2020-06-15 NOTE — PROGRESS NOTES
Occupational Therapy Note:  Spoke with CM and noted pt to be discharged this afternoon to SNF. Pt still without RLE limb guard s/p R BKA 6/8/20. Followed up with Dr. Sera Coles office and spoke with nurse, Gilford Reason at [phone]. She stated their prosthetist would be over prior to pt's discharge with one. RN notified as well as CM.   Peggy Watson, OTR/L, CBIS

## 2020-06-15 NOTE — PROGRESS NOTES
MASHA- D/c to [facility] today. This pt is being discharged to [facility] ([facility]) today. MERLINE spoke with Zach Sexton ([phone]) in admissions at [facility] to confirm that she can accept this pt today and she did confirm this. MERLINE met with pt and daughter to inform them of d/c and they are in agreement. MERLINE set up ambulance with [facility] for 3pm today. An envelope containing pt's kardex, MAR and AVS will be sent with pt to [facility]. Also, pt's nurse will call report.  Licha Leiva

## 2020-06-15 NOTE — DISCHARGE INSTRUCTIONS
Discharge Instructions       PATIENT ID: Zenovia Meckel  MRN: [id number]   YOB: 1932    DATE OF ADMISSION: 6/4/2020  6:09 PM    DATE OF DISCHARGE: 6/15/2020    PRIMARY CARE PROVIDER: Shelby Mosqueda MD     ATTENDING PHYSICIAN: Adelita Zacarias MD  DISCHARGING PROVIDER: Rona Chan NP    To contact this individual call [phone] and ask the  to page. If unavailable ask to be transferred the Adult Hospitalist Department. 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 [facility]  [address]  [facility] [address] 800 [address]   Skilled rehab. [place] [address]  Sivan Meier MD Internal Medicine Schedule an appointment as soon as possible for a visit for follow up  [address] [facility]  [phone]      Irene English MD Vascular Surgery In 2 weeks For follow up appointment. [address] [place] [address], S..  [phone]             ADDITIONAL CARE RECOMMENDATIONS:   Take medications as prescribed. Follow up with Dr. Richard Rai in 2 weeks or sooner if you are having any issues with your leg. You were restarted on Lasix, please follow up with you provider to have labs (BMP) drawn in 5-7 days to check kidney function. Your Baclofen has been stopped. Once your pain has improved and you are no longer take narcotics for pain control, follow up with your primary care provider to restart if needed.      DIET: Regular Diet      ACTIVITY: Per PT/OT     WOUND CARE:   Left sacrum- Please maintain Exuderm Hydrocolloid up to one week or change as needed with soiling or rolled edges. Please use adhesive remover wipes when changing. Right BKA- Every other day, cover with gauze, wrap with kerlex and apply ACE wrap. EQUIPMENT needed: Per PT/OT       DISCHARGE MEDICATIONS:   See Medication Reconciliation Form    · It is important that you take the medication exactly as they are prescribed. · Keep your medication in the bottles provided by the pharmacist and keep a list of the medication names, dosages, and times to be taken in your wallet. · Do not take other medications without consulting your doctor. NOTIFY YOUR PHYSICIAN FOR ANY OF THE FOLLOWING:   Fever over 101 degrees for 24 hours. Chest pain, shortness of breath, fever, chills, nausea, vomiting, diarrhea, change in mentation, falling, weakness, bleeding. Severe pain or pain not relieved by medications. Or, any other signs or symptoms that you may have questions about. DISPOSITION:    Home With:   OT  PT  HH  RN      X SNF/Inpatient Rehab/LTAC    Independent/assisted living    Hospice    Other:         Signed:   Saima López NP  6/15/2020  12:10 PM    WOUND CARE:  Left sacrum- Please maintain Exuderm Hydrocolloid up to one week or change as needed with soiling or rolled edges. Please use adhesive remover wipes when changing. Right BKA- Every other day, cover with gauze, wrap with kerlex and apply ACE wrap.

## 2020-06-15 NOTE — PROGRESS NOTES
Hospital to SNF SBAR Handoff - Pierre Dark                                                                        80 y.o.   female    [address]   Room: 562/01    [facility]  Unit Phone# :  [phone]      [place] [address]  Dept: [address]: [phone]                    SITUATION     Admitted:  6/4/2020         Attending Provider:  Fay Amaro MD       Consultations:  IP CONSULT TO CARDIOLOGY  IP CONSULT TO INFECTIOUS DISEASES    PCP:  Will Gonzalez MD   [phone]    Treatment Team: Attending Provider: Fay Amaro MD; Consulting Provider: Esa Butcher DPM; Consulting Provider: Hobert Runner, MD; Consulting Provider: Francisco Javier Albrecht MD; Care Manager: Jesus Weinstein; Consulting Provider: Gilford Hone, DO; Utilization Review: Tank Barrera; Care Manager: JESSE Turner    Admitting Dx:  Cellulitis [L03.90]       Principal Problem: <principal problem not specified>    7 Days Post-Op of   Procedure(s):  RIGHT AMPUTATION BELOW THE KNEE(BKA)   BY: Francisco Javier Albrecht MD             ON: 6/8/2020                  Code Status: Full Code                Advance Directives:   Advance Care Planning 6/10/2020   Confirm Advance Directive Yes, on file    (Send w/patient)   Verified       Isolation:  There are currently no Active Isolations       MDRO: No current active infections    Pain Medications given:  oxy 5mg    Last dose: 6/15/2020 at  [address] needed: no  Type of equipment:         BACKGROUND     Allergies:   Allergies   Allergen Reactions    Cardizem [Diltiazem Hcl] Hives    Codeine Nausea and Vomiting    Darvocet A500 [Propoxyphene N-Acetaminophen] Nausea and Vomiting    Diltiazem Hives    Labetalol Nausea and Vomiting     Dizzy/Disoriented     Pcn [Penicillins] Swelling     Tongue Swelling   Has tolerated cephalexin recently 4/7/20     Primidone Other (comments)     Lightheaded/unsteady gait    Sulfa (Sulfonamide Antibiotics) Nausea and Vomiting       Past Medical History:   Diagnosis Date    Arthritis     Atrial fibrillation ([facility] [address]     av node ablation 5/22/98 with placement of CPI #[id number] dual chamber pacemaker subsequently replaced with a single chamber medtronic #[id number] single chamber pacemaker 7/25/05    Cancer ([facility] [address] 1970's    colon cancer w/ resection    COPD     Depression     GERD (gastroesophageal reflux disease)     History of vascular access device 04/17/2020    4F MIDLINE PLACED BY EMIL Quan RN LEFT BRACHIAL, 13CM    Hyperlipidemia     Hypertension     Ischemic cardiomyopathy     Migraine headache     Orthostatic hypotension     RADHA (obstructive sleep apnea)     Pacemaker 5/22/98    AV node ablation /pacemaker placement utilizing CPI # [id number] dual chamber system, medtronic #[id number] single chamber device placed 7/22/05    Pulmonary hypertension ([facility] [address] 9/26/2011    RVSP 50 on echo 8/14    Thyroid disease     Valvular heart disease     mild-mod MR/TR       Past Surgical History:   Procedure Laterality Date    BREAST SURGERY PROCEDURE UNLISTED      benign breast tumor excision right breast    HX BREAST BIOPSY Right 2002    neg; surgical bx    HX COLECTOMY  1974    colon    HX KNEE REPLACEMENT      right TKA    HX PACEMAKER      HX PACEMAKER PLACEMENT  05/2020    HX TUBAL LIGATION      IR KYPHOPLASTY LUMBAR  7/2/2019    MA INSJ ELTRD CAR BYRON SYS TM INSJ DFB/PM PLS GEN N/A 5/22/2020    Lv Lead Placement performed by Sadaf Rivers MD at [address] CATH LAB    MA REMVL PERM PM PLS GEN W/REPL PLSE GEN MULT LEAD N/A 5/22/2020    REMOVE & REPLACE PPM GEN BIV MULTI LEADS performed by Sadaf Rivers MD at [address] CATH LAB    TOTAL KNEE ARTHROPLASTY      total on R, partial on L       Medications Prior to Admission   Medication Sig    carvediloL (COREG) 3.125 mg tablet TAKE 1 TABLET BY MOUTH TWICE A DAY WITH MEALS    oxyCODONE IR (ROXICODONE) 5 mg immediate release tablet Take 1 Tab by mouth every four (4) hours as needed for Pain for up to 30 days. Max Daily Amount: 30 mg.    famotidine (PEPCID) 20 mg tablet Take 1 Tab by mouth two (2) times a day.  clindamycin (CLEOCIN) 150 mg capsule Take 1 Cap by mouth three (3) times daily. Indications: new pacemaker    losartan (COZAAR) 25 mg tablet Take 1 Tab by mouth daily. Indications: chronic heart failure    simvastatin (ZOCOR) 20 mg tablet Take  by mouth nightly.  levothyroxine (SYNTHROID) 88 mcg tablet Take 88 mcg by mouth Daily (before breakfast).  Zoloft 50 mg tablet TAKE 1 TABLET BY MOUTH EVERY DAY    apixaban (Eliquis) 5 mg tablet Take 1 Tab by mouth two (2) times a day.  acetaminophen (TYLENOL) 500 mg tablet Take 500 mg by mouth nightly. Acetaminophen 1000 mg morning and afternoon, 500 mg at bedtime    multivitamins (CHEWABLE-AMOS) chew Take 1 Tab by mouth daily.  lidocaine (LIDODERM) 5 % 1 Patch by TransDERmal route daily as needed. Apply patch to the affected area for 12 hours a day and remove for 12 hours a day.  predniSONE (DELTASONE) 5 mg tablet Take 10 mg by mouth daily.  albuterol (PROVENTIL HFA, VENTOLIN HFA, PROAIR HFA) 90 mcg/actuation inhaler Take 1 Puff by inhalation every six (6) hours as needed for Wheezing.  baclofen (LIORESAL) 10 mg tablet Take 5 mg by mouth two (2) times a day.  ADVAIR DISKUS 250-50 mcg/dose diskus inhaler Take 1 Puff by inhalation two (2) times a day.  tiotropium (SPIRIVA WITH HANDIHALER) 18 mcg inhalation capsule Take 1 Cap by inhalation daily.        Hard scripts included in transfer packet yes    Vaccinations:    Immunization History   Administered Date(s) Administered    Influenza High Dose Vaccine PF 10/17/2014, 09/24/2015, 09/29/2016, 09/21/2017, 10/05/2018    Influenza Vaccine 12/01/2013    Influenza Vaccine (Tri) Adjuvanted 10/09/2019    Influenza Vaccine Split 10/12/2012    Pneumococcal Conjugate (PCV-13) 11/23/2015    Pneumococcal Polysaccharide (PPSV-23) 02/27/2015    Pneumococcal Vaccine (Pcv) 12/15/2010    TB Skin Test (PPD) 01/01/2001    TB Skin Test (PPD) Intradermal 01/16/2018    TD Vaccine 04/02/2012    Tdap 01/02/2019    Varicella Virus Vaccine Live 09/12/2012    Zoster Vaccine, Live 12/01/2013       Readmission Risks:    Known Risks: The Charlson CoMorbitiy Index tool is an evidenced based tool that has more automatic generated information. The tool looks at many different items such as the age of the patient, how many times they were admitted in the last calendar year, current length of stay in the hospital and their diagnosis. All of these items are pulled automatically from information documented in the chart from various places and will generate a score that predicts whether a patient is at low (less than 13), medium (13-20) or high (21 or greater) risk of being readmitted.         ASSESSMENT                Temp: 98 °F (36.7 °C) (06/15/20 0844) Pulse (Heart Rate): 82 (06/15/20 0844)     Resp Rate: 16 (06/15/20 0844)           BP: 155/70 (06/15/20 0844)     O2 Sat (%): 98 % (06/15/20 0844)     Weight: 79.4 kg (175 lb)    Height: (not recorded)       If above not within 1 hour of discharge:    BP:_____  P:____  R:____ T:_____ O2 Sat: ___%  O2: ______    Active Orders   Diet    DIET REGULAR         Orientation: oriented to time, place, person and situation     Active Behaviors: None                                   Active Lines/Drains:  (Peg Tube / Dahl / CL or S/L?): no    Urinary Status: Voiding, External catheter     Last BM: Last Bowel Movement Date: 06/15/20     Skin Integrity: Incision (comment)(R leg)             Mobility: Very limited   Weight Bearing Status: NWB (Non Weight Bearing)                Lab Results   Component Value Date/Time    Glucose 114 (H) 06/11/2020 03:22 AM    Hemoglobin A1c 6.7 (H) 06/04/2020 05:31 PM    INR 1.3 (H) 04/20/2020 03:53 AM    INR 1.3 (H) 06/30/2019 04:35 AM    HGB 10.7 (L) 06/12/2020 01:43 AM    HGB 10.7 (L) 06/11/2020 03:43 PM        RECOMMENDATION     See After Visit Summary (AVS) for:  · Discharge instructions  · After [address]   · Special equipment needed (entered pre-discharge by Care Management)  · Medication Reconciliation    · Follow up Appointment(s)         Report given/sent by:  Claire Gates                    Verbal report given to:  [facility]  FAXED to:           Estimated discharge time:  6/15/2020 at 1500

## 2020-06-15 NOTE — WOUND CARE
Wound Care Note: Follow-up visit for left sacrum and right buttocks Chart shows: 
Admitted for cellulitis s/p right BKA on 6/8/20 Past Medical History:  
Diagnosis Date  Arthritis  Atrial fibrillation ([address]   
 av node ablation 5/22/98 with placement of CPI #[id number] dual chamber pacemaker subsequently replaced with a single chamber medtronic #[id number] single chamber pacemaker 7/25/05  Cancer [facility]) [id number]  
 colon cancer w/ resection  COPD  Depression  GERD (gastroesophageal reflux disease)  History of vascular access device 04/17/2020  
 4F MIDLINE PLACED BY EMIL GUNN RN LEFT BRACHIAL, 13CM  Hyperlipidemia  Hypertension  Ischemic cardiomyopathy  Migraine headache  Orthostatic hypotension  RADHA (obstructive sleep apnea)  Pacemaker 5/22/98 AV node ablation /pacemaker placement utilizing CPI # [id number] dual chamber system, medtronic #[id number] single chamber device placed 7/22/05  Pulmonary hypertension ([facility] [address] 9/26/2011 RVSP 50 on echo 8/14  Thyroid disease  Valvular heart disease   
 mild-mod MR/TR  
 
WBC = 9.0 on 6/12/20 Admitted from [facility] Assessment:  
Patient is alert and talking, incontinent with some assistance needed inrepositioning. Bed: [facility] Patient wearing briefs for incontinence Diet: Regular Patient reports some pain in right leg; staff nurse to medicate. Left heel and left buttock skin intact without erythema. Right buttock with blanchable erythema in the area where the skin tear was. 1. POA right buttock has resolved. Left open to air. 2.  Left sacral wound is smaller measures 3 cm x 2.5 cm x 0.1 cm, wound bed is 50% pink and 50% superficial white, scant serous drainage, wound edges are open, amber-wound intact. Hydrocolloid applied. Patient repositioned on left side. Heel and right stump offloaded on pillows. Recommendations: Left sacrum- Please maintain Exuderm Hydrocolloid up to one week or change as needed with soiling or rolled edges. Please use adhesive remover wipes when changing. Right BKA- Every other day, cover with gauze, wrap with kerlex and apply ACE wrap. Skin Care & Pressure Prevention: 
Minimize layers of linen/pads under patient to optimize support surface. Turn/reposition approximately every 2 hours and offload heels. Manage incontinence / promote continence Nourishing Skin Cream to dry skin, minimize use of briefs when able Discussed above plan with patient & Martha Smallwood RN Transition of Care: Plan to follow as needed while admitted to hospital. 
 
YELITZA BurlesonN, RN, [facility] 
office [phone] 
pager [phone] or call  to page

## 2020-06-15 NOTE — PROGRESS NOTES
0730  Bedside and Verbal shift change report given to Selvin Lyman RN (oncoming nurse) by Ar Roque RN (offgoing nurse). Report included the following information SBAR, Kardex, ED Summary, OR Summary, Procedure Summary, Intake/Output, MAR and Recent Results.

## 2020-06-15 NOTE — PROGRESS NOTES
Transition of Care Plan to SNF/Rehab    SNF/Rehab Transition:  Patient has been accepted to [facility] and meets criteria for admission. Patient will transported by [facility] and expected to leave at 3pm    Communication to Patient/Family:  Met with patient and daughter and they are agreeable to the transition plan. Communication to SNF/Rehab:  Bedside RN, Samantha Reyes has been notified to update the transition plan to the facility and call report (phone number [phone]). Discharge information has been updated on the AVS.     Discharge instructions to be fax'd to facility at [facility] #). Nursing Please include all hard scripts for controlled substances, med rec and dc summary, and AVS in packet. Reviewed and confirmed with [facility], [facility], can manage the patient care needs for the following:     SNF/Rehab Transition:  Patient to follow-up with Home Health:  [facility], other  ,none)      Reviewed and confirmed with facility, [facility] they can manage the patient care needs for the following:     Karina Loo with (X) only those applicable:    Medication:  [x]  Medications will be available at the facility  []  IV Antibiotics   []  Controlled Substance - hard copy to be sent with patient   []  Weekly Labs   Documents:  [x] Hard RX  [x] MAR  [x] Kardex  [x] AVS  []Transfer Summary  [x]Discharge   Equipment:  []  CPAP/BiPAP  []  Wound Vacuum  []  Dahl or Urinary Device  []  PICC/Central Line  []  Nebulizer  []  Ventilator   Treatment:  []Isolation (for MRSA, VRE, etc.)  []Surgical Drain Management  []Tracheostomy Care  []Dressing Changes  []Dialysis with transportation and chair time   []PEG Care  []Oxygen  []Daily Weights for Heart Failure   Dietary:  []Any diet limitations  []Tube Feedings   []Total Parenteral Management (TPN)   Eligible for Medicaid Long Term Services and Supports  Yes:  [] Eligible for medical assistance or will become eligible within 180 days and UAI completed. [] Provider/Patient and/or support system has requested screening. [] UAI copy provided to patient or responsible party,   [] UAI unavailable at discharge will send once processed to SNF provider. [] UAI unavailable at discharged mailed to patient  No:   [x] Private pay and is not financially eligible for Medicaid within the next 180 days. [] Reside out-of-state.   [] A residents of a state owned/operated facility that is licensed  by [address] [employer] [facility] or [facility]  [] Enrollment in [facility] hospice services  [] [address] [place] [address]  [] Patient /Family declines to have screening completed or provide financial information for screening     Financial Resources:  Medicaid    [] Initiated and application pending   [] Full coverage     Advanced Care Plan:  []Surrogate Decision Maker of Care  []POA  []Communicated Code StatusFull   Other     Financial Resources:  Medicaid    [] Initiated and application pending   [] Full coverage     Advanced Care Plan:  []Surrogate Decision Maker of Care  []POA  []Communicated Code Status  (DDNR\", \"Full\")    Other

## 2020-06-15 NOTE — PROGRESS NOTES
Vascular:    Doing well in general. Incision intact - some darkening of skin along incision line - no treatment helpful - will see how it does. Follow up with me 3 weeks post op or sooner if questions.

## 2020-06-15 NOTE — DISCHARGE SUMMARY
Discharge Summary       PATIENT ID: Margaret Nettles  MRN: [id number]   YOB: 1932    DATE OF ADMISSION: 6/4/2020  6:09 PM    DATE OF DISCHARGE: 06/15/2020  PRIMARY CARE PROVIDER: Zoila Solis MD     ATTENDING PHYSICIAN: Magdalena Kern MD  DISCHARGING PROVIDER: Gregory Kingston NP    To contact this individual call [phone] and ask the  to page. If unavailable ask to be transferred the Adult Hospitalist Department.     CONSULTATIONS: IP CONSULT TO CARDIOLOGY  IP CONSULT TO INFECTIOUS DISEASES    PROCEDURES/SURGERIES: Procedure(s):  RIGHT AMPUTATION BELOW THE KNEE(BKA)    [id number] [facility] COURSE:   The patient is an 77-year-old female with past medical history of hyperlipidemia, atrial fibrillation, third-degree AV block, anxiety, depression, colon cancer, hypothyroidism, CKD stage III, osteopenia, ischemic cardiomyopathy, status post pacemaker placement, COPD, AAA, hypertension, osteoporosis, wound of the foot, bradycardia, dilated cardiomyopathy, type 2 diabetes, presents to the hospital with the above-mentioned symptom.  History was obtained from the patient as well as her daughter. Jennifer Vazquez daughter reports that the patient has had a wound in her leg for the last many months.  The patient saw Dr. Dov David, underwent an angiogram about 3 weeks back. Karen An was not one particular vessel that was identified in the right foot that could cause her symptoms.  Recommendation made for below-knee amputation if gangrene persisted, but the patient refused.  The patient was sent home, went for her wound care check today and they were concerned that her pain as well as her wound have gotten worse. [place] An has been increased area of gangrenous tissue, and the patient was sent to the hospital for further management and evaluation.  The patient's daughter reports that the patient does not have any fever per se.  She has had some poor appetite, but no other complaints or problems.  The patient's daughter denies the patient having any headache, blurry vision, sore throat, trouble swallowing, trouble with speech, chest pain, shortness of breath, cough, fever, chills, abdominal pain, constipation, diarrhea, urinary symptoms, focal or generalized neurological weakness, falls, injuries,  hematemesis, melena, hemoptysis, hematuria, or any other concerns or problems.  The patient reports that the patient has been tested \"many times for COVID-19 and all the times it has been negative. \"      06/08/20: Right below- knee amputation completed. Per vascular surgery note: DESCRIPTION OF PROCEDURE:  The patient's right leg was prepped and draped. A circumferential incision was made in the proximal to mid lower leg. This included a posterior skin and muscle flap. The soft tissues were divided anteriorly, medially, and laterally. The tibia and fibula were exposed. The tibia was divided with a giggly saw and the fibula with a bone cutter. The posterior soft tissues were then divided in the lower leg and foot removed. There was reasonable bleeding from the soft tissues. Hemostasis was obtained and the incision was closed with interrupted Vicryl subcutaneous and fascial sutures and skin staples. Dressings were applied and the patient was returned to the recovery room in stable condition. Bilateral upper extremity edema. 06/11/20 Duplex of upper extremities: No evidence of upper extremity vein thrombosis in the visualized vein segments bilaterally; however due to limited visualization of forearm veins isolated vein thrombus cannot be excluded. 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 - Pain control.   - Oxycodone 5mg rx given. - Have stopped Baclofen.  To follow up with primary care provider to restart if needed, after she has stopped take narcotics.       Sepsis, POA confirmed after study   - WBC 13.4, Lactic Acid 2.2  - Completed IV Vanc, Maxipime, Flagyl   - ID consulted. Hypotension,- resolved       Acute kidney injury,  -Resolved   -Avoid nephrotoxic medications   - Creatinine 0.63      Elevated liver function tests.    Suspected cause shock liver from hypotension  Monitor     Diabetes.     -NovoLog insulin  -Accu-Cheks,   - Diet control        COPD  - PRN nebs         Atrial fibrillation.    -Continue home medication.     - Eliquis resumed postop, she is Rate controlled.        Recent pacemaker placement           Hypothyroidism.    - Continue Levothyroxine      Hyponatremia-    - Monitor       Mild cognitive impairment with mild postop delerium  - back to baseline  Stable on home meds        Hypomagnesemia-   - Resolved   - Monitor      Constipation  - Resolved   - Continue stool softeners       Reviewed discharge instructions with patient and daughter Edenilson Serna who is at bedside. Questions answered. ADDITIONAL CARE RECOMMENDATIONS:   Take medications as prescribed. Follow up with Dr. Rogelio Patten in 2 weeks or sooner if you are having any issues with your leg. You were restarted on Lasix, please follow up with you provider to have labs (BMP) drawn in 5-7 days to check kidney function. Your Baclofen has been stopped. Once your pain has improved and you are no longer take narcotics for pain control, follow up with your primary care provider to restart if needed.        PENDING TEST RESULTS:   At the time of discharge the following test results are still pending: None     FOLLOW UP APPOINTMENTS:    Follow-up Information     Follow up With Specialties Details Why Contact Info    Will Gonzalez MD Internal Medicine   [address] [facility]  [address]  [facility] [address]   Skilled rehab. [address]  [facility]  [phone] Darell Chan MD Internal Medicine Schedule an appointment as soon as possible for a visit for follow up  [address] [facility] [phone]      Felisha Haq MD Vascular Surgery In 2 weeks For follow up appointment. [address] [place] [address]  [phone]               DIET: Regular Diet      ACTIVITY: Per PT/OT     WOUND CARE: Left sacrum- Please maintain Exuderm Hydrocolloid up to one week or change as needed with soiling or rolled edges. Please use adhesive remover wipes when changing. Right BKA- Every other day, cover with gauze, wrap with kerlex and apply ACE wrap. EQUIPMENT needed: None       DISCHARGE MEDICATIONS:  Current Discharge Medication List      START taking these medications    Details   senna-docusate (PERICOLACE) 8.6-50 mg per tablet Take 2 Tabs by mouth two (2) times daily (with meals). Qty: 30 Tab, Refills: 0         CONTINUE these medications which have CHANGED    Details   oxyCODONE IR (ROXICODONE) 5 mg immediate release tablet Take 1 Tab by mouth every four (4) hours as needed for Pain for up to 5 days. Max Daily Amount: 30 mg.  Qty: 30 Tab, Refills: 0    Associated Diagnoses: S/P BKA (below knee amputation) unilateral, right (HCC)      acetaminophen (TYLENOL) 500 mg tablet Take 2 Tabs by mouth every six (6) hours as needed for Pain. Indications: pain  Qty: 50 Tab, Refills: 0         CONTINUE these medications which have NOT CHANGED    Details   carvediloL (COREG) 3.125 mg tablet TAKE 1 TABLET BY MOUTH TWICE A DAY WITH MEALS  Qty: 180 Tab, Refills: 1    Associated Diagnoses: Atrial fibrillation, unspecified type (HCC)      furosemide (LASIX) 40 mg tablet Take one tab by mouth daily or as advised by physician  Qty: 90 Tab, Refills: 0    Associated Diagnoses: Ischemic cardiomyopathy      famotidine (PEPCID) 20 mg tablet Take 1 Tab by mouth two (2) times a day.   Qty: 180 Tab, Refills: 1      losartan (COZAAR) 25 mg tablet Take 1 Tab by mouth daily. Indications: chronic heart failure  Qty: 30 Tab, Refills: 3      simvastatin (ZOCOR) 20 mg tablet Take  by mouth nightly. levothyroxine (SYNTHROID) 88 mcg tablet Take 88 mcg by mouth Daily (before breakfast). Zoloft 50 mg tablet TAKE 1 TABLET BY MOUTH EVERY DAY  Qty: 90 Tab, Refills: 1    Associated Diagnoses: Anxiety and depression      apixaban (Eliquis) 5 mg tablet Take 1 Tab by mouth two (2) times a day. Qty: 180 Tab, Refills: 1    Associated Diagnoses: Atrial fibrillation, unspecified type (HCC)      multivitamins (CHEWABLE-AMOS) chew Take 1 Tab by mouth daily. lidocaine (LIDODERM) 5 % 1 Patch by TransDERmal route daily as needed. Apply patch to the affected area for 12 hours a day and remove for 12 hours a day. predniSONE (DELTASONE) 5 mg tablet Take 10 mg by mouth daily. albuterol (PROVENTIL HFA, VENTOLIN HFA, PROAIR HFA) 90 mcg/actuation inhaler Take 1 Puff by inhalation every six (6) hours as needed for Wheezing. baclofen (LIORESAL) 10 mg tablet Take 5 mg by mouth two (2) times a day. Associated Diagnoses: Chronic midline low back pain without sciatica      ADVAIR DISKUS 250-50 mcg/dose diskus inhaler Take 1 Puff by inhalation two (2) times a day. Associated Diagnoses: Atrial fibrillation (HCC)      tiotropium (SPIRIVA WITH HANDIHALER) 18 mcg inhalation capsule Take 1 Cap by inhalation daily. Associated Diagnoses: Atrial fibrillation ([address] Complete heart block (HCC)         STOP taking these medications       clindamycin (CLEOCIN) 150 mg capsule Comments:   Reason for Stopping:                 NOTIFY YOUR PHYSICIAN FOR ANY OF THE FOLLOWING:   Fever over 101 degrees for 24 hours. Chest pain, shortness of breath, fever, chills, nausea, vomiting, diarrhea, change in mentation, falling, weakness, bleeding. Severe pain or pain not relieved by medications. Or, any other signs or symptoms that you may have questions about.     DISPOSITION: Home With:   OT  PT  HH  RN      X Long term SNF/Inpatient Rehab    Independent/assisted living    Hospice    Other:       PATIENT CONDITION AT DISCHARGE:     Functional status    Poor    X Deconditioned     Independent      Cognition   X  Lucid     Forgetful     Dementia      Catheters/lines (plus indication)    Dahl     PICC     PEG    X None      Code status   X  Full code     DNR      PHYSICAL EXAMINATION AT DISCHARGE:  General:          Alert, cooperative, no distress, appears stated age. HEENT:           Atraumatic, anicteric sclerae, pink conjunctivae                          No oral ulcers, mucosa moist, throat clear, dentition fair  Neck:               Supple, symmetrical  Lungs:             Clear to auscultation bilaterally. No Wheezing or Rhonchi. No rales. Chest wall:      No tenderness  No Accessory muscle use. Heart:              Regular  rhythm,  No  murmur   No edema  Abdomen:        Soft, non-tender. Not distended. Bowel sounds normal  Extremities:     No cyanosis. No clubbing,                            Skin turgor normal, Capillary refill normal. Right AKA with dressing cdi. Staples intact. Skin:                Not pale. Not Jaundiced  No rashes   Psych:             Not anxious or agitated.   Neurologic:      Alert, moves all extremities with generalized weakness, answers questions appropriately and responds to commands       CHRONIC MEDICAL DIAGNOSES:  Problem List as of 6/15/2020 Date Reviewed: 6/8/2020          Codes Class Noted - Resolved    Bradycardia ICD-10-CM: R00.1  ICD-9-CM: 427.89  5/22/2020 - Present        Gangrene of foot ([facility] ICD-10-CM: D99  ICD-9-CM: 785.4  6/5/2020 - Present        Type 2 diabetes mellitus without complication, unspecified whether long term insulin use ([facility] ICD-10-CM: E11.9  ICD-9-CM: 250.00  6/4/2020 - Present        Type 2 diabetes with nephropathy ([facility] ICD-10-CM: E11.21  ICD-9-CM: 250.40, 583.81  6/4/2020 - Present        Type 2 diabetes mellitus with peripheral vascular disease ([facility] ICD-10-CM: E11.51  ICD-9-CM: 250.70, 443.81  6/4/2020 - Present        Cellulitis ICD-10-CM: L03.90  ICD-9-CM: 682.9  6/4/2020 - Present        Status post biventricular pacemaker ICD-10-CM: Z95.0  ICD-9-CM: V45.01  5/22/2020 - Present    Overview Signed 5/22/2020  1:14 PM by Aramis Perez MD     5/22/2020 [employer]             Biventricular cardiac pacemaker in situ ICD-10-CM: Z95.0  ICD-9-CM: V45.01  5/22/2020 - Present        Dilated cardiomyopathy ([facility] ICD-10-CM: I42.0  ICD-9-CM: 425.4  5/22/2020 - Present        Cellulitis of foot ICD-10-CM: L03.119  ICD-9-CM: 682.7  4/15/2020 - Present        Foot infection ICD-10-CM: L08.9  ICD-9-CM: 686.9  4/15/2020 - Present        Osteoporosis ICD-10-CM: M81.0  ICD-9-CM: 733.00  8/23/2019 - Present        Chronic midline low back pain without sciatica ICD-10-CM: M54.5, G89.29  ICD-9-CM: 724.2, 338.29  8/23/2019 - Present        Hematuria ICD-10-CM: R31.9  ICD-9-CM: 599.70  6/30/2019 - Present        Hypertension (Chronic) ICD-10-CM: I10  ICD-9-CM: 401.9  6/29/2019 - Present        AAA (abdominal aortic aneurysm) ([facility] (Chronic) ICD-10-CM: I71.4  ICD-9-CM: 441.4  6/29/2019 - Present        Constipation ICD-10-CM: K59.00  ICD-9-CM: 564.00  6/29/2019 - Present        Lumbar compression fracture ([facility] ICD-10-CM: S32.000A  ICD-9-CM: 805.4  6/29/2019 - Present        Long term systemic steroid user (Chronic) ICD-10-CM: Z79.52  ICD-9-CM: V58.65  3/21/2019 - Present        Frequent falls (Chronic) ICD-10-CM: R29.6  ICD-9-CM: V15.88  1/13/2019 - Present        COPD (chronic obstructive pulmonary disease) ([facility] (Chronic) ICD-10-CM: J44.9  ICD-9-CM: 833  1/13/2019 - Present        Chronic respiratory failure with hypoxia (HCC) (Chronic) ICD-10-CM: J96.11  ICD-9-CM: 518.83, 799.02  1/13/2019 - Present        Latent tuberculosis by blood test ICD-10-CM: Z22.7  ICD-9-CM: 790.6  1/22/2018 - Present        Pacemaker (Chronic) ICD-10-CM: Z95.0  ICD-9-CM: V45.01  7/24/2017 - Present        Psoriasis (Chronic) ICD-10-CM: L40.9  ICD-9-CM: 696.1  3/23/2017 - Present        Ischemic cardiomyopathy (Chronic) ICD-10-CM: I25.5  ICD-9-CM: 414.8  Unknown - Present        Restrictive lung disease (Chronic) ICD-10-CM: J98.4  ICD-9-CM: 518.89  12/14/2015 - Present        Osteopenia ICD-10-CM: M85.80  ICD-9-CM: 733.90  11/23/2015 - Present        CKD (chronic kidney disease) stage 3, GFR 30-59 ml/min (HCC) (Chronic) ICD-10-CM: N18.3  ICD-9-CM: 585.3  2/9/2015 - Present        Anxiety and depression (Chronic) ICD-10-CM: F41.9, F32.9  ICD-9-CM: 300.00, 311  2/26/2014 - Present        Colon cancer ([facility] (Chronic) ICD-10-CM: C18.9  ICD-9-CM: 153.9  2/26/2014 - Present    Overview Addendum 2/26/2014 11:12 AM by Anshul Townsend     Partial colectomy  Petey Allen             Hypothyroid (Chronic) ICD-10-CM: E03.9  ICD-9-CM: 244.9  2/26/2014 - Present        Atrial fibrillation (HCC) (Chronic) ICD-10-CM: I48.91  ICD-9-CM: 427.31  Unknown - Present    Overview Signed 10/21/2010  8:16 AM by Tejinder Joyner     av node ablation 5/22/98 with placement of CPI #[id number] dual chamber pacemaker subsequently replaced with a single chamber medtronic #[id number] single chamber pacemaker 7/25/05             Mitral regurgitation (Chronic) ICD-10-CM: I34.0  ICD-9-CM: 424.0  10/21/2010 - Present        Third degree AV block ([facility] ICD-10-CM: I44.2  ICD-9-CM: 426.0  10/21/2010 - Present        Mixed hyperlipidemia (Chronic) ICD-10-CM: E78.2  ICD-9-CM: 272.2  10/18/2010 - Present        RESOLVED: Pressure injury of right buttock, stage 2 ([facility] ICD-10-CM: H75.103  ICD-9-CM: 707.05, 707.22  8/23/2019 - 2/18/2020        RESOLVED: Hyperbilirubinemia ICD-10-CM: E80.6  ICD-9-CM: 782.4  6/29/2019 - 6/30/2019        RESOLVED: Right hip pain ICD-10-CM: M25.551  ICD-9-CM: 719.45  1/13/2019 - 6/30/2019        RESOLVED: Warfarin-induced coagulopathy ([facility] ICD-10-CM: S24.45, T45.515A  ICD-9-CM: 286.7, P675.1 1/13/2019 - 1/17/2019        RESOLVED: Recurrent depression (HCC) (Chronic) ICD-10-CM: F33.9  ICD-9-CM: 296.30  1/8/2018 - 8/1/2019        RESOLVED: Advanced care planning/counseling discussion ICD-10-CM: U98.61  ICD-9-CM: V65.49  5/11/2017 - 6/30/2019    Overview Signed 5/11/2017 12:44 PM by Rosa Mckeon RN     A copy of patient's completed Advanced Medical Directive is on file in the patient's medical record. NN reviewed Advanced Medical Directive document with patient & patient denies the need for changes/ updates. RESOLVED: Pseudophakia ICD-10-CM: Z96.1  ICD-9-CM: V43.1  11/14/2016 - 6/30/2019        RESOLVED: Cardiomyopathy ([facility] (Chronic) ICD-10-CM: I42.9  ICD-9-CM: 425.4  9/20/2016 - 2/18/2020    Overview Addendum 9/20/2016  8:45 AM by Gil Tubbs MD     8/12 normal lexiscan cardiolyte, lvef 68%  8/16 echo lvef 35% multiple wall motion abnormalities, melissa, mod mr, mild ai, mod tr pa pressure 36mm  8/16 lexiscan mod reversible anterior defect, mostly fixed apical defect.  lvef 35%             RESOLVED: Abnormal stress test ICD-10-CM: R94.39  ICD-9-CM: 794.39  9/13/2016 - 6/30/2019        RESOLVED: Chest pain ICD-10-CM: R07.9  ICD-9-CM: 786.50  8/10/2016 - 6/30/2019        RESOLVED: Nuclear cataract ICD-10-CM: BFH5765  ICD-9-CM: 366.16  6/6/2016 - 6/30/2019        RESOLVED: Posterior vitreous detachment ICD-10-CM: V39.397  ICD-9-CM: 379.21  6/6/2016 - 6/30/2019        RESOLVED: Hypothyroidism due to acquired atrophy of thyroid (Chronic) ICD-10-CM: E03.4  ICD-9-CM: 244.8, 246.8  11/6/2015 - 8/23/2019        RESOLVED: Pulmonary hypertension ([address] (Chronic) ICD-10-CM: I27.20  ICD-9-CM: 416.8  9/26/2011 - 2/18/2020        RESOLVED: Orthostatic hypotension ICD-10-CM: I95.1  ICD-9-CM: 458.0  10/21/2010 - 6/30/2019        RESOLVED: Chest pain, unspecified ICD-10-CM: R07.9  ICD-9-CM: 786.50  10/21/2010 - 6/30/2019        RESOLVED: Atrial fibrillation ([facility] ICD-10-CM: I48.91  ICD-9-CM: 427.31 10/18/2010 - 2/3/2014        RESOLVED: Complete heart block ([address] ICD-10-CM: I44.2  ICD-9-CM: 426.0  10/18/2010 - 2/3/2014              Greater than 45 minutes were spent with the patient on counseling and coordination of care    Signed:   Renetta Rosales NP  6/15/2020  12:25 PM

## 2020-06-17 NOTE — TELEPHONE ENCOUNTER
Spoke to Renetta Parker [id number]. She states that / Leidy's unit is connected at the assisted living facility . Remote was put in on schedule for tomorrow to make sure it works. Vika Beatty ad

## 2020-06-18 NOTE — TELEPHONE ENCOUNTER
Transmission came. Called Sharla to let her know all looks good. ..     Mailbox full- can not leave VM

## 2020-06-19 ENCOUNTER — OFFICE VISIT (OUTPATIENT)
Dept: CARDIOLOGY CLINIC | Age: 85
End: 2020-06-19

## 2020-06-19 ENCOUNTER — TELEPHONE (OUTPATIENT)
Dept: CARDIOLOGY CLINIC | Age: 85
End: 2020-06-19

## 2020-06-19 DIAGNOSIS — Z95.0 BIVENTRICULAR CARDIAC PACEMAKER IN SITU: Primary | ICD-10-CM

## 2020-06-19 DIAGNOSIS — F32.A ANXIETY AND DEPRESSION: Chronic | ICD-10-CM

## 2020-06-19 DIAGNOSIS — F41.9 ANXIETY AND DEPRESSION: Chronic | ICD-10-CM

## 2020-06-19 RX ORDER — SERTRALINE HYDROCHLORIDE 50 MG/1
75 TABLET, FILM COATED ORAL DAILY
Qty: 135 TAB | Refills: 1
Start: 2020-06-19

## 2020-06-19 NOTE — CDMP QUERY
Patient presented for gangrene of the foot due to ischemia with 
chronic wounds and wound infection. Patient documented to have a temp of 102.1°,,leukocytosis 13.4K, hypotension-88/47, and LARISSA on 6/4/20. 
f possible, please document in progress notes and discharge summary if you are evaluating and/or treating: 
 
=>Sepsis, POA confirmed after study =>Sepsis ruled out after study =>Sepsis still suspected after study 
=> other_________  
=> no further information can be provided The medical record reflects the following: 
  Risk Factors: 80 y.o. with hx. DM Clinical Indicators: 6/4-102.1 , 72/40, WBC 13.4, neutrophils 88, lactic acid 2.2 Per H&P-The patient does not appear to be septic, but does have mildly elevated lactic acid. Acute kidney injury, likely from gangrene of right foot and hypokalemia. 6/5-Per PN-Hypotension,- multifactorial, Due to sepsis from R foot infection 6/11-No further documentation of sepsis Treatment: Monitor labwork, vital signs,imaging, ID consult, IV vancocin qd, Maxipime iv every day, IV flagyl q12 hours 1. At least 2 SIRS Criteria (Systemic Inflammatory Response Syndrome) (CMS) 
-Temp > 100.9 or < 96.8 
-HR > 90 
-RR > 20  
-WBC > 12,000 or < 4,000 or > 10% bands 2. Documented suspected or confirmed source of infection. (CMS) 3. Documented Organ Dysfunction by any ONE of the following. (CMS) 
     
 the CMS guidelines 
-AMS (from baseline if known) -SBP<90 or MAP<65 
-Documentation of respiratory failure and use of either invasive mechanical ventilation (intubation) or non-invasive mechanical ventilation (CPAP/BIPAP) -Creat. > 2.0 (with no history of Chronic Kidney Disease) -Bilirubin > 2 mg / dl (with no history of liver disease) -PLT < 100,000 (with no history of thrombocytopenia) -INR > 1.5 or aPTT > 60 sec (for patients not on Warfarin therapy) -Lactic Acid > 2 mmol/ L

## 2020-06-19 NOTE — TELEPHONE ENCOUNTER
Verified patient with two types of identifiers. Spoke with nurse at [address] [facility] who states patient's site has some bruising and at the left later edge there is a \"protrusion. \" There is nothing visibly sticking out of the skin; no drainage. NP taking care of patient states it is not a suture it just looks raised more than the device at that edge. Nurse asked if she can send an email with picture. Notified I do not have a confidential email they can send the picture to. Notified the best way would be to send a photo to patient's daughter, Renetta Nesbitt or Quinn Gonsalez, and ask them to upload the photo to [email or web address]. Also notified that if that does not work we can always schedule patient to come into the office for further assessment. Nurse states she will discuss with NP and will call with any other questions.

## 2020-06-27 RX ORDER — LEVOTHYROXINE SODIUM 100 UG/1
100 TABLET ORAL
Qty: 90 TAB | Refills: 1
Start: 2020-06-27

## 2020-07-07 ENCOUNTER — HOSPITAL ENCOUNTER (INPATIENT)
Age: 85
LOS: 2 days | Discharge: NURSING, REHAB OR LONG-TERM CARE | DRG: 683 | End: 2020-07-10
Attending: EMERGENCY MEDICINE | Admitting: INTERNAL MEDICINE
Payer: MEDICARE

## 2020-07-07 ENCOUNTER — APPOINTMENT (OUTPATIENT)
Dept: CT IMAGING | Age: 85
DRG: 683 | End: 2020-07-07
Attending: EMERGENCY MEDICINE
Payer: MEDICARE

## 2020-07-07 DIAGNOSIS — E11.51 TYPE 2 DIABETES MELLITUS WITH PERIPHERAL VASCULAR DISEASE (HCC): ICD-10-CM

## 2020-07-07 DIAGNOSIS — I95.9 TRANSIENT HYPOTENSION: ICD-10-CM

## 2020-07-07 DIAGNOSIS — I96 GANGRENE OF FOOT (HCC): ICD-10-CM

## 2020-07-07 DIAGNOSIS — S88.119A: ICD-10-CM

## 2020-07-07 DIAGNOSIS — N17.9 AKI (ACUTE KIDNEY INJURY) (HCC): ICD-10-CM

## 2020-07-07 DIAGNOSIS — R11.2 NON-INTRACTABLE VOMITING WITH NAUSEA, UNSPECIFIED VOMITING TYPE: Primary | ICD-10-CM

## 2020-07-07 DIAGNOSIS — R10.9 LEFT SIDED ABDOMINAL PAIN: ICD-10-CM

## 2020-07-07 LAB
ALBUMIN SERPL-MCNC: 2.8 G/DL (ref 3.5–5)
ALBUMIN/GLOB SERPL: 0.7 {RATIO} (ref 1.1–2.2)
ALP SERPL-CCNC: 162 U/L (ref 45–117)
ALT SERPL-CCNC: 29 U/L (ref 12–78)
ANION GAP SERPL CALC-SCNC: 6 MMOL/L (ref 5–15)
AST SERPL-CCNC: 61 U/L (ref 15–37)
BASOPHILS # BLD: 0 K/UL (ref 0–0.1)
BASOPHILS NFR BLD: 1 % (ref 0–1)
BILIRUB SERPL-MCNC: 0.5 MG/DL (ref 0.2–1)
BUN SERPL-MCNC: 25 MG/DL (ref 6–20)
BUN/CREAT SERPL: 21 (ref 12–20)
CALCIUM SERPL-MCNC: 8.3 MG/DL (ref 8.5–10.1)
CHLORIDE SERPL-SCNC: 105 MMOL/L (ref 97–108)
CO2 SERPL-SCNC: 27 MMOL/L (ref 21–32)
CREAT SERPL-MCNC: 1.21 MG/DL (ref 0.55–1.02)
DIFFERENTIAL METHOD BLD: ABNORMAL
EOSINOPHIL # BLD: 0.2 K/UL (ref 0–0.4)
EOSINOPHIL NFR BLD: 2 % (ref 0–7)
ERYTHROCYTE [DISTWIDTH] IN BLOOD BY AUTOMATED COUNT: 13.7 % (ref 11.5–14.5)
GLOBULIN SER CALC-MCNC: 3.9 G/DL (ref 2–4)
GLUCOSE SERPL-MCNC: 138 MG/DL (ref 65–100)
HCT VFR BLD AUTO: 38.3 % (ref 35–47)
HGB BLD-MCNC: 12.3 G/DL (ref 11.5–16)
IMM GRANULOCYTES # BLD AUTO: 0.1 K/UL (ref 0–0.04)
IMM GRANULOCYTES NFR BLD AUTO: 1 % (ref 0–0.5)
INR PPP: 1.4 (ref 0.9–1.1)
LACTATE SERPL-SCNC: 1.7 MMOL/L (ref 0.4–2)
LIPASE SERPL-CCNC: 73 U/L (ref 73–393)
LYMPHOCYTES # BLD: 1 K/UL (ref 0.8–3.5)
LYMPHOCYTES NFR BLD: 12 % (ref 12–49)
MCH RBC QN AUTO: 29.7 PG (ref 26–34)
MCHC RBC AUTO-ENTMCNC: 32.1 G/DL (ref 30–36.5)
MCV RBC AUTO: 92.5 FL (ref 80–99)
MONOCYTES # BLD: 0.7 K/UL (ref 0–1)
MONOCYTES NFR BLD: 8 % (ref 5–13)
NEUTS SEG # BLD: 6.6 K/UL (ref 1.8–8)
NEUTS SEG NFR BLD: 76 % (ref 32–75)
NRBC # BLD: 0 K/UL (ref 0–0.01)
NRBC BLD-RTO: 0 PER 100 WBC
PLATELET # BLD AUTO: 223 K/UL (ref 150–400)
PMV BLD AUTO: 11.5 FL (ref 8.9–12.9)
POTASSIUM SERPL-SCNC: 4.9 MMOL/L (ref 3.5–5.1)
PROT SERPL-MCNC: 6.7 G/DL (ref 6.4–8.2)
PROTHROMBIN TIME: 14.3 SEC (ref 9–11.1)
RBC # BLD AUTO: 4.14 M/UL (ref 3.8–5.2)
SODIUM SERPL-SCNC: 138 MMOL/L (ref 136–145)
WBC # BLD AUTO: 8.5 K/UL (ref 3.6–11)

## 2020-07-07 PROCEDURE — 93005 ELECTROCARDIOGRAM TRACING: CPT

## 2020-07-07 PROCEDURE — 83605 ASSAY OF LACTIC ACID: CPT

## 2020-07-07 PROCEDURE — 85025 COMPLETE CBC W/AUTO DIFF WBC: CPT

## 2020-07-07 PROCEDURE — 99285 EMERGENCY DEPT VISIT HI MDM: CPT

## 2020-07-07 PROCEDURE — 85610 PROTHROMBIN TIME: CPT

## 2020-07-07 PROCEDURE — 36415 COLL VENOUS BLD VENIPUNCTURE: CPT

## 2020-07-07 PROCEDURE — 74011250636 HC RX REV CODE- 250/636: Performed by: EMERGENCY MEDICINE

## 2020-07-07 PROCEDURE — 74176 CT ABD & PELVIS W/O CONTRAST: CPT

## 2020-07-07 PROCEDURE — 80053 COMPREHEN METABOLIC PANEL: CPT

## 2020-07-07 PROCEDURE — 96374 THER/PROPH/DIAG INJ IV PUSH: CPT

## 2020-07-07 PROCEDURE — 83690 ASSAY OF LIPASE: CPT

## 2020-07-07 RX ORDER — ONDANSETRON 2 MG/ML
4 INJECTION INTRAMUSCULAR; INTRAVENOUS
Status: COMPLETED | OUTPATIENT
Start: 2020-07-07 | End: 2020-07-07

## 2020-07-07 RX ADMIN — ONDANSETRON 4 MG: 2 INJECTION INTRAMUSCULAR; INTRAVENOUS at 22:05

## 2020-07-07 RX ADMIN — SODIUM CHLORIDE 1000 ML: 900 INJECTION, SOLUTION INTRAVENOUS at 22:05

## 2020-07-08 PROBLEM — E86.0 DEHYDRATION: Status: ACTIVE | Noted: 2020-07-08

## 2020-07-08 PROBLEM — M81.0 OSTEOPOROSIS: Status: RESOLVED | Noted: 2019-08-23 | Resolved: 2020-07-08

## 2020-07-08 PROBLEM — L03.90 CELLULITIS: Status: RESOLVED | Noted: 2020-06-04 | Resolved: 2020-07-08

## 2020-07-08 PROBLEM — M54.50 CHRONIC MIDLINE LOW BACK PAIN WITHOUT SCIATICA: Status: RESOLVED | Noted: 2019-08-23 | Resolved: 2020-07-08

## 2020-07-08 PROBLEM — L08.9 FOOT INFECTION: Status: RESOLVED | Noted: 2020-04-15 | Resolved: 2020-07-08

## 2020-07-08 PROBLEM — N17.9 AKI (ACUTE KIDNEY INJURY) (HCC): Status: ACTIVE | Noted: 2020-07-08

## 2020-07-08 PROBLEM — R00.1 BRADYCARDIA: Status: RESOLVED | Noted: 2020-05-22 | Resolved: 2020-07-08

## 2020-07-08 PROBLEM — Z79.52 LONG TERM SYSTEMIC STEROID USER: Chronic | Status: RESOLVED | Noted: 2019-03-21 | Resolved: 2020-07-08

## 2020-07-08 PROBLEM — Z95.0 STATUS POST BIVENTRICULAR PACEMAKER: Status: RESOLVED | Noted: 2020-05-22 | Resolved: 2020-07-08

## 2020-07-08 PROBLEM — Z22.7 LATENT TUBERCULOSIS BY BLOOD TEST: Status: RESOLVED | Noted: 2018-01-22 | Resolved: 2020-07-08

## 2020-07-08 PROBLEM — R31.9 HEMATURIA: Status: RESOLVED | Noted: 2019-06-30 | Resolved: 2020-07-08

## 2020-07-08 PROBLEM — L03.119 CELLULITIS OF FOOT: Status: RESOLVED | Noted: 2020-04-15 | Resolved: 2020-07-08

## 2020-07-08 PROBLEM — I71.40 AAA (ABDOMINAL AORTIC ANEURYSM) (HCC): Chronic | Status: RESOLVED | Noted: 2019-06-29 | Resolved: 2020-07-08

## 2020-07-08 PROBLEM — S32.000A LUMBAR COMPRESSION FRACTURE (HCC): Status: RESOLVED | Noted: 2019-06-29 | Resolved: 2020-07-08

## 2020-07-08 PROBLEM — K59.00 CONSTIPATION: Status: RESOLVED | Noted: 2019-06-29 | Resolved: 2020-07-08

## 2020-07-08 PROBLEM — R11.2 NAUSEA & VOMITING: Status: ACTIVE | Noted: 2020-07-08

## 2020-07-08 PROBLEM — G89.29 CHRONIC MIDLINE LOW BACK PAIN WITHOUT SCIATICA: Status: RESOLVED | Noted: 2019-08-23 | Resolved: 2020-07-08

## 2020-07-08 LAB
APPEARANCE UR: ABNORMAL
ATRIAL RATE: 84 BPM
BACTERIA URNS QL MICRO: NEGATIVE /HPF
BILIRUB UR QL: NEGATIVE
CALCULATED R AXIS, ECG10: -114 DEGREES
CALCULATED T AXIS, ECG11: 50 DEGREES
CHOLEST SERPL-MCNC: 141 MG/DL
COLOR UR: ABNORMAL
COMMENT, HOLDF: NORMAL
DIAGNOSIS, 93000: NORMAL
EPITH CASTS URNS QL MICRO: ABNORMAL /LPF
GLUCOSE UR STRIP.AUTO-MCNC: NEGATIVE MG/DL
HDLC SERPL-MCNC: 35 MG/DL
HDLC SERPL: 4 {RATIO} (ref 0–5)
HGB UR QL STRIP: NEGATIVE
HYALINE CASTS URNS QL MICRO: ABNORMAL /LPF (ref 0–5)
KETONES UR QL STRIP.AUTO: NEGATIVE MG/DL
LDLC SERPL CALC-MCNC: 71.2 MG/DL (ref 0–100)
LEUKOCYTE ESTERASE UR QL STRIP.AUTO: ABNORMAL
LIPID PROFILE,FLP: ABNORMAL
NITRITE UR QL STRIP.AUTO: NEGATIVE
PH UR STRIP: 5 [PH] (ref 5–8)
PROT UR STRIP-MCNC: NEGATIVE MG/DL
Q-T INTERVAL, ECG07: 460 MS
QRS DURATION, ECG06: 186 MS
QTC CALCULATION (BEZET), ECG08: 534 MS
RBC #/AREA URNS HPF: ABNORMAL /HPF (ref 0–5)
SAMPLES BEING HELD,HOLD: NORMAL
SP GR UR REFRACTOMETRY: 1.02 (ref 1–1.03)
TRIGL SERPL-MCNC: 174 MG/DL (ref ?–150)
UROBILINOGEN UR QL STRIP.AUTO: 0.2 EU/DL (ref 0.2–1)
VENTRICULAR RATE, ECG03: 81 BPM
VLDLC SERPL CALC-MCNC: 34.8 MG/DL
WBC URNS QL MICRO: ABNORMAL /HPF (ref 0–4)
YEAST BUDDING URNS QL: PRESENT

## 2020-07-08 PROCEDURE — 87635 SARS-COV-2 COVID-19 AMP PRB: CPT

## 2020-07-08 PROCEDURE — 74011250636 HC RX REV CODE- 250/636: Performed by: INTERNAL MEDICINE

## 2020-07-08 PROCEDURE — 74011000250 HC RX REV CODE- 250: Performed by: INTERNAL MEDICINE

## 2020-07-08 PROCEDURE — 94760 N-INVAS EAR/PLS OXIMETRY 1: CPT

## 2020-07-08 PROCEDURE — 94640 AIRWAY INHALATION TREATMENT: CPT

## 2020-07-08 PROCEDURE — 36415 COLL VENOUS BLD VENIPUNCTURE: CPT

## 2020-07-08 PROCEDURE — 77030029684 HC NEB SM VOL KT MONA -A

## 2020-07-08 PROCEDURE — 80061 LIPID PANEL: CPT

## 2020-07-08 PROCEDURE — 99218 HC RM OBSERVATION: CPT

## 2020-07-08 PROCEDURE — 74011250637 HC RX REV CODE- 250/637: Performed by: INTERNAL MEDICINE

## 2020-07-08 PROCEDURE — 65270000029 HC RM PRIVATE

## 2020-07-08 PROCEDURE — 81001 URINALYSIS AUTO W/SCOPE: CPT

## 2020-07-08 RX ORDER — SERTRALINE HYDROCHLORIDE 50 MG/1
75 TABLET, FILM COATED ORAL DAILY
Status: DISCONTINUED | OUTPATIENT
Start: 2020-07-08 | End: 2020-07-10 | Stop reason: HOSPADM

## 2020-07-08 RX ORDER — SODIUM CHLORIDE 9 MG/ML
50 INJECTION, SOLUTION INTRAVENOUS CONTINUOUS
Status: DISCONTINUED | OUTPATIENT
Start: 2020-07-08 | End: 2020-07-10

## 2020-07-08 RX ORDER — CARVEDILOL 3.12 MG/1
3.12 TABLET ORAL 2 TIMES DAILY WITH MEALS
Status: DISCONTINUED | OUTPATIENT
Start: 2020-07-08 | End: 2020-07-10 | Stop reason: HOSPADM

## 2020-07-08 RX ORDER — BISMUTH SUBSALICYLATE 262 MG
1 TABLET,CHEWABLE ORAL DAILY
COMMUNITY

## 2020-07-08 RX ORDER — OXYCODONE AND ACETAMINOPHEN 5; 325 MG/1; MG/1
1 TABLET ORAL
Status: DISCONTINUED | OUTPATIENT
Start: 2020-07-08 | End: 2020-07-10 | Stop reason: HOSPADM

## 2020-07-08 RX ORDER — BUDESONIDE 0.5 MG/2ML
500 INHALANT ORAL
Status: DISCONTINUED | OUTPATIENT
Start: 2020-07-08 | End: 2020-07-10 | Stop reason: HOSPADM

## 2020-07-08 RX ORDER — HONEY 100 %
PASTE (ML) TOPICAL
COMMUNITY
End: 2021-11-15 | Stop reason: ALTCHOICE

## 2020-07-08 RX ORDER — LEVOTHYROXINE SODIUM 100 UG/1
100 TABLET ORAL
Status: DISCONTINUED | OUTPATIENT
Start: 2020-07-08 | End: 2020-07-10 | Stop reason: HOSPADM

## 2020-07-08 RX ORDER — AMOXICILLIN 250 MG
2 CAPSULE ORAL
COMMUNITY

## 2020-07-08 RX ORDER — ACETAMINOPHEN 325 MG/1
650 TABLET ORAL
Status: DISCONTINUED | OUTPATIENT
Start: 2020-07-08 | End: 2020-07-10 | Stop reason: HOSPADM

## 2020-07-08 RX ORDER — OXYCODONE HYDROCHLORIDE 5 MG/1
5 TABLET ORAL
Status: ON HOLD | COMMUNITY
End: 2020-07-10 | Stop reason: SDUPTHER

## 2020-07-08 RX ORDER — SODIUM CHLORIDE 0.9 % (FLUSH) 0.9 %
5-40 SYRINGE (ML) INJECTION EVERY 8 HOURS
Status: DISCONTINUED | OUTPATIENT
Start: 2020-07-08 | End: 2020-07-10 | Stop reason: HOSPADM

## 2020-07-08 RX ORDER — POLYETHYLENE GLYCOL 3350 17 G/17G
17 POWDER, FOR SOLUTION ORAL DAILY
COMMUNITY

## 2020-07-08 RX ORDER — PANTOPRAZOLE SODIUM 40 MG/1
40 TABLET, DELAYED RELEASE ORAL
Status: DISCONTINUED | OUTPATIENT
Start: 2020-07-08 | End: 2020-07-10 | Stop reason: HOSPADM

## 2020-07-08 RX ORDER — IPRATROPIUM BROMIDE AND ALBUTEROL SULFATE 2.5; .5 MG/3ML; MG/3ML
3 SOLUTION RESPIRATORY (INHALATION)
Status: DISCONTINUED | OUTPATIENT
Start: 2020-07-08 | End: 2020-07-10 | Stop reason: HOSPADM

## 2020-07-08 RX ORDER — MAG HYDROX/ALUMINUM HYD/SIMETH 200-200-20
30 SUSPENSION, ORAL (FINAL DOSE FORM) ORAL
COMMUNITY

## 2020-07-08 RX ORDER — CHLORHEXIDINE GLUCONATE 1.2 MG/ML
15 RINSE ORAL
Status: DISCONTINUED | OUTPATIENT
Start: 2020-07-08 | End: 2020-07-10 | Stop reason: HOSPADM

## 2020-07-08 RX ORDER — AMOXICILLIN 250 MG
2 CAPSULE ORAL 2 TIMES DAILY WITH MEALS
Status: DISCONTINUED | OUTPATIENT
Start: 2020-07-08 | End: 2020-07-08

## 2020-07-08 RX ORDER — ARFORMOTEROL TARTRATE 15 UG/2ML
15 SOLUTION RESPIRATORY (INHALATION)
Status: DISCONTINUED | OUTPATIENT
Start: 2020-07-08 | End: 2020-07-10 | Stop reason: HOSPADM

## 2020-07-08 RX ORDER — IPRATROPIUM BROMIDE 0.5 MG/2.5ML
0.5 SOLUTION RESPIRATORY (INHALATION)
Status: DISCONTINUED | OUTPATIENT
Start: 2020-07-08 | End: 2020-07-10 | Stop reason: HOSPADM

## 2020-07-08 RX ORDER — PREDNISONE 5 MG/1
10 TABLET ORAL DAILY
Status: DISCONTINUED | OUTPATIENT
Start: 2020-07-08 | End: 2020-07-08

## 2020-07-08 RX ORDER — GABAPENTIN 100 MG/1
100 CAPSULE ORAL EVERY 12 HOURS
Status: ON HOLD | COMMUNITY
End: 2020-07-10 | Stop reason: SDUPTHER

## 2020-07-08 RX ORDER — ATORVASTATIN CALCIUM 10 MG/1
10 TABLET, FILM COATED ORAL DAILY
Status: DISCONTINUED | OUTPATIENT
Start: 2020-07-08 | End: 2020-07-10 | Stop reason: HOSPADM

## 2020-07-08 RX ORDER — ACETAMINOPHEN 500 MG
1000 TABLET ORAL EVERY 8 HOURS
COMMUNITY

## 2020-07-08 RX ORDER — POLYETHYLENE GLYCOL 3350 17 G/17G
17 POWDER, FOR SOLUTION ORAL DAILY
Status: DISCONTINUED | OUTPATIENT
Start: 2020-07-08 | End: 2020-07-10 | Stop reason: HOSPADM

## 2020-07-08 RX ORDER — GABAPENTIN 100 MG/1
100 CAPSULE ORAL EVERY 12 HOURS
Status: DISCONTINUED | OUTPATIENT
Start: 2020-07-08 | End: 2020-07-10 | Stop reason: HOSPADM

## 2020-07-08 RX ORDER — ATORVASTATIN CALCIUM 10 MG/1
10 TABLET, FILM COATED ORAL DAILY
COMMUNITY

## 2020-07-08 RX ORDER — FAMOTIDINE 20 MG/1
20 TABLET, FILM COATED ORAL 2 TIMES DAILY
Status: DISCONTINUED | OUTPATIENT
Start: 2020-07-08 | End: 2020-07-08

## 2020-07-08 RX ORDER — ONDANSETRON 2 MG/ML
4 INJECTION INTRAMUSCULAR; INTRAVENOUS
Status: DISCONTINUED | OUTPATIENT
Start: 2020-07-08 | End: 2020-07-10 | Stop reason: HOSPADM

## 2020-07-08 RX ORDER — THERA TABS 400 MCG
1 TAB ORAL DAILY
Status: DISCONTINUED | OUTPATIENT
Start: 2020-07-08 | End: 2020-07-10 | Stop reason: HOSPADM

## 2020-07-08 RX ORDER — PANTOPRAZOLE SODIUM 40 MG/1
40 TABLET, DELAYED RELEASE ORAL DAILY
COMMUNITY
End: 2021-07-02 | Stop reason: SDUPTHER

## 2020-07-08 RX ORDER — BACLOFEN 10 MG/1
5 TABLET ORAL
COMMUNITY
End: 2020-07-10

## 2020-07-08 RX ORDER — AMOXICILLIN 250 MG
2 CAPSULE ORAL
Status: DISCONTINUED | OUTPATIENT
Start: 2020-07-08 | End: 2020-07-10 | Stop reason: HOSPADM

## 2020-07-08 RX ORDER — CHLORHEXIDINE GLUCONATE 1.2 MG/ML
15 RINSE ORAL 2 TIMES DAILY WITH MEALS
COMMUNITY

## 2020-07-08 RX ORDER — SODIUM CHLORIDE 0.9 % (FLUSH) 0.9 %
5-40 SYRINGE (ML) INJECTION AS NEEDED
Status: DISCONTINUED | OUTPATIENT
Start: 2020-07-08 | End: 2020-07-10 | Stop reason: HOSPADM

## 2020-07-08 RX ORDER — POVIDONE-IODINE 10 %
SOLUTION, NON-ORAL TOPICAL DAILY
Status: ON HOLD | COMMUNITY
End: 2020-10-09

## 2020-07-08 RX ADMIN — PANTOPRAZOLE SODIUM 40 MG: 40 TABLET, DELAYED RELEASE ORAL at 09:40

## 2020-07-08 RX ADMIN — Medication 10 ML: at 21:24

## 2020-07-08 RX ADMIN — LEVOTHYROXINE SODIUM 100 MCG: 0.1 TABLET ORAL at 09:40

## 2020-07-08 RX ADMIN — Medication 10 ML: at 01:36

## 2020-07-08 RX ADMIN — OXYCODONE AND ACETAMINOPHEN 1 TABLET: 5; 325 TABLET ORAL at 17:41

## 2020-07-08 RX ADMIN — IPRATROPIUM BROMIDE 0.5 MG: 0.5 SOLUTION RESPIRATORY (INHALATION) at 20:08

## 2020-07-08 RX ADMIN — APIXABAN 5 MG: 5 TABLET, FILM COATED ORAL at 17:41

## 2020-07-08 RX ADMIN — SERTRALINE HYDROCHLORIDE 75 MG: 50 TABLET ORAL at 09:40

## 2020-07-08 RX ADMIN — ARFORMOTEROL TARTRATE 15 MCG: 15 SOLUTION RESPIRATORY (INHALATION) at 20:08

## 2020-07-08 RX ADMIN — POLYETHYLENE GLYCOL 3350 17 G: 17 POWDER, FOR SOLUTION ORAL at 09:40

## 2020-07-08 RX ADMIN — ARFORMOTEROL TARTRATE 15 MCG: 15 SOLUTION RESPIRATORY (INHALATION) at 19:52

## 2020-07-08 RX ADMIN — THERA TABS 1 TABLET: TAB at 12:10

## 2020-07-08 RX ADMIN — CARVEDILOL 3.12 MG: 3.12 TABLET, FILM COATED ORAL at 16:32

## 2020-07-08 RX ADMIN — GABAPENTIN 100 MG: 100 CAPSULE ORAL at 09:40

## 2020-07-08 RX ADMIN — GABAPENTIN 100 MG: 100 CAPSULE ORAL at 21:23

## 2020-07-08 RX ADMIN — Medication 10 ML: at 16:19

## 2020-07-08 RX ADMIN — SODIUM CHLORIDE 100 ML/HR: 900 INJECTION, SOLUTION INTRAVENOUS at 18:48

## 2020-07-08 RX ADMIN — ACETAMINOPHEN 650 MG: 325 TABLET ORAL at 12:14

## 2020-07-08 RX ADMIN — BUDESONIDE 500 MCG: 0.5 INHALANT RESPIRATORY (INHALATION) at 20:08

## 2020-07-08 RX ADMIN — CARVEDILOL 3.12 MG: 3.12 TABLET, FILM COATED ORAL at 09:41

## 2020-07-08 RX ADMIN — APIXABAN 5 MG: 5 TABLET, FILM COATED ORAL at 09:40

## 2020-07-08 RX ADMIN — Medication 10 ML: at 05:05

## 2020-07-08 RX ADMIN — OXYCODONE AND ACETAMINOPHEN 1 TABLET: 5; 325 TABLET ORAL at 09:41

## 2020-07-08 RX ADMIN — CHLORHEXIDINE GLUCONATE 15 ML: 1.2 RINSE ORAL at 18:51

## 2020-07-08 RX ADMIN — SODIUM CHLORIDE 100 ML/HR: 900 INJECTION, SOLUTION INTRAVENOUS at 01:36

## 2020-07-08 RX ADMIN — ATORVASTATIN CALCIUM 10 MG: 10 TABLET, FILM COATED ORAL at 09:41

## 2020-07-08 NOTE — PROGRESS NOTES
7/8/2020  12:48 PM    Reason for Readmission:     LARISSA / Stump infection post BKA            RUR Score/Risk Level:      34% / High risk  Level 1:    [facility]     6/4/20-6/15/20: Cellulitis leading to BKA on 6/8/20. Observation notice provided in writing to patient and/or caregiver as well as verbal explanation of the policy. Patients who are in outpatient status also receive the Observation notice. Virtual reviewer communicated change to CM, which reflect outpatient observation order written prior to Written discharge order. Code 44 delivered to patient. CM met with the patient and provided to the patient the printed information about her outpatient observation status. The patient was given the flyer entitled, \"Medicare Outpatient Observation: Information & notification. \" All questions were answered, no additional discharge needs identified at this time and patient expects to return to their (home, assisted living facility, relatives home, etc.) after discharge today. Is a Care Conference indicated: TBD. Awaiting recommendations from Vascular. Current Advanced Directive/Advance Care Plan:  DNR. AD on file. Assessment:    Assessment conducted:   [x]In person with pt   []Via p/c with pt   [x]With family member in person. Who/Relation: [employer]     []With family member via p/c. Who/Relation:     Age: 80    Sex: [] Male [x]Female     Residency: []Private residence []Apartment []Assisted Living []LTC [x]Other: Lives in independent living at [facility], but is currently enrolled in [facility] for rehab. Exterior Steps: 0  Interior Steps: 0   Concerns:    Lives With: []With spouse []Other family members []Underage children [x]Alone [x]Other: Prior to [facility], pt lived alone but had personal care givers through [place]. Concerns: Pt requests to return back to [facility] for continued rehab.      Prior functioning:  []Independent []Dependent []Partial dependence   Pt requires assistance with: [x]Bathing [x]Dressing [x]Toileting [x]Ambulation    Concerns: Pt requests to return back to [facility] for continued rehab. Cognition: []Intact [x]Some spheres some of the time []Some spheres all of the time []All spheres all of the time. Concerns: No concerns reported. Prior DME required:  []None [x]RW (prior to infection \"when she was feeling well\") []Cane []Crutches []Bedside commode []CPAP []Home O2 (Liter/Provider: ) []Nebulizer   []Shower Chair [x]Wheelchair (post BKA) []Hospital Bed []Amelia []Stair lift []Rollator []Other:  Concerns: No concerns reported. DME available: []None []RW []Cane []Crutches []Bedside commode []CPAP []Home O2 (Liter/Provider: ) []Nebulizer   []Shower Chair []Wheelchair []Hospital Bed []Amelia []Stair lift []Rollator []Other:  Concerns: Pt requests to return back to [facility] for continued rehab. Rehab history: []None []Outpatient PT [x]Home Health (Provider/Date: [facility] / prior to BKA) [x]SNF (Provider/Date: [facility] / current) []IPR (Provider/Date: ) []LTC (Provider/Date: )   Concerns: Pt requests to return back to [facility] for continued rehab. PCP: Radha Malave MD   Name of Practice: [facility]   Current patient: [x]Yes []No   Approximate date of last visit: 6/4/20   Did you attend your follow up appointment (s): If not, why not: NA. Pt was in rehab following DC. Pt is scheduled for 8/14/20  Concerns: No concerns reported. Pharmacy:  NA      Financial/Difficulty affording medications: [x]No concerns reported []Yes, describe:       Resources/supports identified by patient/family: Primary supports are children and [place]    Concerns: No concerns reported. Transport: Pt will require either a wheelchair van or [facility] transport at discharge. Concerns:  No concerns reported.      Barriers/Additional Concerns: [facility] requires 2 negative COVID tests prior to pt being readmitted. Transition of Care Plan:    Based on readmission, the patient's previous Plan of Care   has been evaluated and/or modified. The current Transition of Care Plan is:             []Unable to determine at this time. Awaiting clinical progress, and disposition recommendations. [] Home with outpatient follow-up    [] Home with Outpatient PT and outpatient follow-up   Pt aware of OP appt? []Yes, Provider:    []Not scheduled   Transport provider:     [] Home with family assistance as needed and outpatient follow-up   Family able to assist:    Schedule:  Transport provider:      [] Home with Home Health   []Recommended and arranged. Provider:   []Recommended but patient refused. []Other treatment recommended. [x]SNF/IPR   -[x]Preferences given: James Casillas  []Listing provided and preferences requested   -Status: []Pending [x]Accepted: requires 2 negative COVID tests. -Auth required: []Yes [x]No    -Auth initiated date:   -3 midnight stay required: []Yes [x]No  Date satisfied:     [] Other:     Abhi Aceves MA      Care Management Interventions  PCP Verified by CM: Yes(Josiane)  Mode of Transport at Discharge: Other (see comment)(Family)  MyChart Signup: No  Discharge Durable Medical Equipment: No  Physical Therapy Consult: Yes  Occupational Therapy Consult: No  Speech Therapy Consult: No  Current Support Network: Has Personal Caregivers, Nursing Facility  Confirm Follow Up Transport: Family  The Plan for Transition of Care is Related to the Following Treatment Goals : LARISSA.  Stump infection  The Patient and/or Patient Representative was Provided with a Choice of Provider and Agrees with the Discharge Plan?: (S) Yes  Name of the Patient Representative Who was Provided with a Choice of Provider and Agrees with the Discharge Plan: Self  Freedom of Choice List was Provided with Basic Dialogue that Supports the Patient's Individualized Plan of Care/Goals, Treatment Preferences and Shares the Quality Data Associated with the Providers?: (S) Yes  Discharge Location  Discharge Placement: Skilled nursing facility

## 2020-07-08 NOTE — ACP (ADVANCE CARE PLANNING)
7/8/2020   Advance Care Planning     Advance Care Planning Activator (Inpatient)  Conversation Note      Date of ACP Conversation: 07/08/20     Conversation Conducted with:   Patient with Decision Making Capacity    ACP Activator: Phoenix Howe    *When Decision Maker makes decisions on behalf of the incapacitated patient: Decision Maker is asked to consider and make decisions based on patient values, known preferences, or best interests. Health Care Decision Maker:   Jose Langston Daughter [phone]       Care Preferences    Ventilation: \"If you were in your present state of health and suddenly became very ill and were unable to breathe on your own, what would your preference be about the use of a ventilator (breathing machine) if it were available to you? \"      If patient would desire the use of a ventilator (breathing machine), answer \"yes\", if not \"no\":no    \"If your health worsens and it becomes clear that your chance of recovery is unlikely, what would your preference be about the use of a ventilator (breathing machine) if it were available to you? \"     Would the patient desire the use of a ventilator (breathing machine)? NO      Resuscitation  \"CPR works best to restart the heart when there is a sudden event, like a heart attack, in someone who is otherwise healthy. Unfortunately, CPR does not typically restart the heart for people who have serious health conditions or who are very sick. \"    \"In the event your heart stopped as a result of an underlying serious health condition, would you want attempts to be made to restart your heart (answer \"yes\" for attempt to resuscitate) or would you prefer a natural death (answer \"no\" for do not attempt to resuscitate)? \" no      NOTE: If the patient has a valid advance directive AND now provides care preference(s) that are inconsistent with that prior directive, advise the patient to consider either: creating a new advance directive that complies with state-specific requirements; or, if that is not possible, orally revoking that prior directive in accordance with state-specific requirements, which must be documented in the EHR. [] Yes  [x] No   Educated Patient / Davida Boone regarding differences between Advance Directives and portable DNR orders.     Length of ACP Conversation in minutes:   10    Conversation Outcomes:  [] ACP discussion completed  [x] Existing advance directive reviewed with patient; no changes to patient's previously recorded wishes     [] New Advance Directive completed   [] Portable Do Not Resuscitate prepared for Provider review and signature  [] POLST/POST/MOLST/MOST prepared for Provider review and signature      Follow-up plan:    [] Schedule follow-up conversation to continue planning  [] Referred individual to Provider for additional questions/concerns   [] Advised patient/agent/surrogate to review completed ACP document and update if needed with changes in condition, patient preferences or care setting     [] This note routed to one or more involved healthcare providers

## 2020-07-08 NOTE — WOUND CARE
89y/o female with PMH including Afib (pacemaker), Colon Ca (resection), COPD, HTN, dyslipidemia, depression, hypothyroid, ischemic cardiomyopathy, pulmonary hypertension, and recent right BKA. Wound care consulted to evaluate skin injuries present on admission. Assessment: 
Patient is in bed with daughter and bedside RN in room. Daughter states the patient had a recent fall. She is also concerned about the surgical BKA site. Skin assessment finds a small 2x1cm abrasion to the left anterior forearm. There is a localized area of ecchymosis to the right shoulder and right upper buttock. The left inner glute/sacrum has a small approximate 1.5x1.5cm moist open pink wound bed (likely stage II/III injury) with red blanching amber-wound skin. The right BKA is scabbed anteriorly with staples intact. Surrounding skin has localized blanching erythema. Vascular surgeon came to the bedside for evaluation. Cleansed site with wound cleanser. Staples were removed to the BKA site and the non-adherant scabbed areas were removed. Applied betadine to the sacbbed areas. Covered with Xeroform and secured with Roll Gauze/tape. Specialty bed surface provided for patient. Bedside RN notified. Recommendation: 
Daily with skin care apply lotion to extremities and bony prominences for protection from friction/shear. Apply Zinc barrier ointment to the gluteal/amber-rogelio daily with skin care and as needed with moisture. Float heel and protect (with elbow/heel protectors). Have patient turn Q2h while in bed (with turn team). Protect from lines and devices. When up in chair use a waffle cushion and reposition every 20 minutes. To the Left Gluteal/Sacral injury - Cleanse and apply Zinc barrier BID. Continue to turn/reposition patient Q2h in bed. To the Left arm abrasion - Cleanse, Apply zinc to the amber-wound skin and cover with foam border dressing. Change every 3 days and check under dressing daily. To the right BKA site - Cleanse with betadine and apply Xeroform to cover the scabbed areas. Cover with Gauze and secure with Roll Gauze. Change dressing daily.   
 
Will Follow, Consult as needed,  
Jeanne WALSH RN [facility], [facility].

## 2020-07-08 NOTE — PROGRESS NOTES
1215: patient w/ temp of 100.1. Daughter requested tylenol. Tylenol administered. Encouraged patient to use incentive spirometer. Notified Dr. Cody Martinez. Will continue to monitor. Problem: Pressure Injury - Risk of  Goal: *Prevention of pressure injury  Description: Document Deric Scale and appropriate interventions in the flowsheet. Outcome: Progressing Towards Goal  Note: Pressure Injury Interventions:  Sensory Interventions: Assess changes in LOC, Assess need for specialty bed, Avoid rigorous massage over bony prominences, Check visual cues for pain, Keep linens dry and wrinkle-free, Minimize linen layers    Moisture Interventions: Absorbent underpads, Apply protective barrier, creams and emollients, Assess need for specialty bed    Activity Interventions: Assess need for specialty bed, Pressure redistribution bed/mattress(bed type), PT/OT evaluation    Mobility Interventions: Assess need for specialty bed, HOB 30 degrees or less, Pressure redistribution bed/mattress (bed type), Turn and reposition approx.  every two hours(pillow and wedges)    Nutrition Interventions: Document food/fluid/supplement intake, Discuss nutritional consult with provider, Offer support with meals,snacks and hydration    Friction and Shear Interventions: Apply protective barrier, creams and emollients, HOB 30 degrees or less, Lift sheet, Minimize layers    Problem: Patient Education: Go to Patient Education Activity  Goal: Patient/Family Education  Outcome: Progressing Towards Goal

## 2020-07-08 NOTE — ED PROVIDER NOTES
68-year-old female with history of atrial fibrillation, colon cancer with resection, COPD, depression, hyperlipidemia, hypertension, ischemic cardiomyopathy, pulmonary hypertension, and thyroid disease was in her normal state of health until earlier today when she developed some left-sided abdominal pain and vomited. Around this time, the facility staff took her blood pressure and said it was low for her, 88V systolic. EMS noted her blood pressure to be better, in the 1 teens to 120s. She has not vomited anymore, but has had some diarrhea. She has chronic diarrhea. No recent antibiotics or hospitalizations. She says the left side of her abdomen is still bothering her. No fever, no cough, no chest pain, no back pain. No urinary symptoms.            Past Medical History:   Diagnosis Date    Arthritis     Atrial fibrillation ([address]     av node ablation 5/22/98 with placement of CPI #[id number] dual chamber pacemaker subsequently replaced with a single chamber medtronic #[id number] single chamber pacemaker 7/25/05    Cancer ([address] 1970's    colon cancer w/ resection    COPD     Depression     GERD (gastroesophageal reflux disease)     History of vascular access device 04/17/2020    4F MIDLINE PLACED BY EMIL Alvarado RN LEFT BRACHIAL, 13CM    Hyperlipidemia     Hypertension     Ischemic cardiomyopathy     Migraine headache     Orthostatic hypotension     RADHA (obstructive sleep apnea)     Pacemaker 5/22/98    AV node ablation /pacemaker placement utilizing CPI # [id number] dual chamber system, medtronic #[id number] single chamber device placed 7/22/05    Pulmonary hypertension ([address] 9/26/2011    RVSP 50 on echo 8/14    Thyroid disease     Valvular heart disease     mild-mod MR/TR       Past Surgical History:   Procedure Laterality Date    BREAST SURGERY PROCEDURE UNLISTED      benign breast tumor excision right breast    HX BREAST BIOPSY Right 2002    neg; surgical bx    HX COLECTOMY  1974    colon    HX KNEE REPLACEMENT right TKA    HX PACEMAKER      HX PACEMAKER PLACEMENT  05/2020    HX TUBAL LIGATION      IR KYPHOPLASTY LUMBAR  7/2/2019    MI INSJ ELTRD CAR BYRON SYS TM INSJ DFB/PM PLS GEN N/A 5/22/2020    Lv Lead Placement performed by Irene Orozco MD at [address]  CATH LAB    MI REMVL PERM PM PLS GEN W/REPL PLSE GEN MULT LEAD N/A 5/22/2020    REMOVE & REPLACE PPM GEN BIV MULTI LEADS performed by Irene Orozco MD at [address]  CATH LAB    TOTAL KNEE ARTHROPLASTY      total on R, partial on L         Family History:   Problem Relation Age of Onset    Diabetes Mother     Heart Disease Mother     Heart Attack Mother     Heart Disease Father     Stroke Sister     Breast Cancer Sister 80    Cancer Maternal Aunt         uterus    Diabetes Maternal Uncle     Breast Cancer Daughter 61       Social History     Socioeconomic History    Marital status:      Spouse name: Not on file    Number of children: Not on file    Years of education: Not on file    Highest education level: Not on file   Occupational History    Not on file   Social Needs    Financial resource strain: Not on file    Food insecurity     Worry: Not on file     Inability: Not on file    Transportation needs     Medical: Not on file     Non-medical: Not on file   Tobacco Use    Smoking status: Passive Smoke Exposure - Never Smoker    Smokeless tobacco: Never Used   Substance and Sexual Activity    Alcohol use: No     Alcohol/week: 0.0 standard drinks    Drug use: No    Sexual activity: Never   Lifestyle    Physical activity     Days per week: Not on file     Minutes per session: Not on file    Stress: Not on file   Relationships    Social connections     Talks on phone: Not on file     Gets together: Not on file     Attends Hindu service: Not on file     Active member of club or organization: Not on file     Attends meetings of clubs or organizations: Not on file     Relationship status: Not on file    Intimate partner violence     Fear of current or ex partner: Not on file     Emotionally abused: Not on file     Physically abused: Not on file     Forced sexual activity: Not on file   Other Topics Concern     Service Not Asked    Blood Transfusions Not Asked    Caffeine Concern Not Asked    Occupational Exposure Not Asked    Hobby Hazards Not Asked    Sleep Concern Not Asked    Stress Concern Not Asked    Weight Concern Not Asked    Special Diet Not Asked    Back Care Not Asked    Exercise Not Asked    Bike Helmet Not Asked   [address] Not Asked    Self-Exams Not Asked   Social History Narrative    Not on file         ALLERGIES: Cardizem [diltiazem hcl]; Codeine; Darvocet a500 [propoxyphene n-acetaminophen]; Diltiazem; Labetalol; Pcn [penicillins]; Primidone; and Sulfa (sulfonamide antibiotics)    Review of Systems   Constitutional: Negative for fever. HENT: Negative for trouble swallowing. Eyes: Negative for visual disturbance. Respiratory: Negative for cough. Cardiovascular: Negative for chest pain. Gastrointestinal: Positive for abdominal pain, diarrhea, nausea and vomiting. Negative for blood in stool. Genitourinary: Negative for difficulty urinating. Musculoskeletal: Negative for back pain. Skin: Negative for rash. Neurological: Negative for headaches. Hematological: Does not bruise/bleed easily. Psychiatric/Behavioral: Negative for sleep disturbance. Vitals:    07/07/20 2051 07/07/20 2100 07/07/20 2115   BP: 124/71 119/51 120/54   Pulse: 80     Resp: 16     Temp: 98 °F (36.7 °C)     SpO2: 99% 97% 95%   Weight: 79.4 kg (175 lb)     Height: 5' 3\" (1.6 m)              Physical Exam  Constitutional:       Appearance: Normal appearance. HENT:      Head: Normocephalic. Nose: Nose normal.      Mouth/Throat:      Mouth: Mucous membranes are moist.   Eyes:      Extraocular Movements: Extraocular movements intact.       Conjunctiva/sclera: Conjunctivae normal. Cardiovascular:      Rate and Rhythm: Normal rate. Pulmonary:      Effort: Pulmonary effort is normal. No respiratory distress. Abdominal:      General: Abdomen is flat. Tenderness: There is abdominal tenderness. Musculoskeletal: Normal range of motion. Comments: Right-sided BKA with some necrosis distal tissue   Skin:     Findings: No rash. Neurological:      General: No focal deficit present. Mental Status: She is alert. Psychiatric:         Behavior: Behavior normal.          MDM  Number of Diagnoses or Management Options  Gangrene of foot [facility]):   Left sided abdominal pain:   Non-intractable vomiting with nausea, unspecified vomiting type:   Transient hypotension:   Diagnosis management comments: EKG 2142  Ventricularly paced rhythm at a rate of 81 with extreme right axis deviation  QRS and QTc both prolonged  No STEMI    Patient had significant abdominal tenderness and I am not entirely clear why her blood pressure dropped, but it seems to have come back up to normal values. It makes me wonder if she may have had a vagal episode. As I await the CT scan, my shift is come to an end, so we will sign out the patient to Dr. Ruthann Dahl. Hospitalist Fani Serve for Admission  11:41 PM    ED Room Number: ER06/06  Patient Name and age:  Valeria Vera 80 y.o.  female  Working Diagnosis:   1. Non-intractable vomiting with nausea, unspecified vomiting type    2. Gangrene of foot ([address]    3. Left sided abdominal pain    4. Transient hypotension    5. LARISSA (acute kidney injury) ([address]        COVID-19 Suspicion:  no    Code Status:  Do Not Resuscitate  Readmission: no  Isolation Requirements:  no  Recommended Level of Care:  med/surg  Department:[facility] - [phone]  Other:  Pt vomited and had low bp at facility. It's coming up nicely now, but she has larissa.            Procedures

## 2020-07-08 NOTE — PROGRESS NOTES
Problem: Pressure Injury - Risk of  Goal: *Prevention of pressure injury  Description: Document Deric Scale and appropriate interventions in the flowsheet. Outcome: Progressing Towards Goal  Note: Pressure Injury Interventions:  Sensory Interventions: Assess changes in LOC, Assess need for specialty bed, Avoid rigorous massage over bony prominences, Check visual cues for pain, Keep linens dry and wrinkle-free, Minimize linen layers    Moisture Interventions: Absorbent underpads, Apply protective barrier, creams and emollients, Assess need for specialty bed    Activity Interventions: Assess need for specialty bed, Pressure redistribution bed/mattress(bed type), PT/OT evaluation    Mobility Interventions: Assess need for specialty bed, HOB 30 degrees or less, Pressure redistribution bed/mattress (bed type), Turn and reposition approx. every two hours(pillow and wedges)    Nutrition Interventions: Document food/fluid/supplement intake, Discuss nutritional consult with provider, Offer support with meals,snacks and hydration    Friction and Shear Interventions: Apply protective barrier, creams and emollients, HOB 30 degrees or less, Lift sheet, Minimize layers                Problem: Patient Education: Go to Patient Education Activity  Goal: Patient/Family Education  Outcome: Progressing Towards Goal     Problem: Falls - Risk of  Goal: *Absence of Falls  Description: Document Darek Fall Risk and appropriate interventions in the flowsheet.   Outcome: Progressing Towards Goal  Note: Fall Risk Interventions:                 Elimination Interventions: Call light in reach, Patient to call for help with toileting needs, Stay With Me (per policy)              Problem: Acute Renal Failure: Day 1  Goal: Activity/Safety  Outcome: Progressing Towards Goal  Goal: Consults, if ordered  Outcome: Progressing Towards Goal  Goal: Diagnostic Test/Procedures  Outcome: Progressing Towards Goal  Goal: Nutrition/Diet  Outcome: Progressing Towards Goal  Goal: Discharge Planning  Outcome: Progressing Towards Goal  Goal: Medications  Outcome: Progressing Towards Goal  Goal: Respiratory  Outcome: Progressing Towards Goal  Goal: Treatments/Interventions/Procedures  Outcome: Progressing Towards Goal  Goal: Psychosocial  Outcome: Progressing Towards Goal  Goal: *Optimal pain control at patient's stated goal  Outcome: Progressing Towards Goal  Goal: *Urinary output within identified parameters  Outcome: Progressing Towards Goal  Goal: *Hemodynamically stable  Outcome: Progressing Towards Goal  Goal: *Tolerating diet  Outcome: Progressing Towards Goal     Problem: Acute Renal Failure: Discharge Outcomes  Goal: *Optimal pain control at patient's stated goal  Outcome: Progressing Towards Goal  Goal: *Urinary output within identified parameters  Outcome: Progressing Towards Goal  Goal: *Hemodynamically stable  Outcome: Progressing Towards Goal  Goal: *Tolerating diet  Outcome: Progressing Towards Goal  Goal: *Lab values stabilized  Outcome: Progressing Towards Goal  Goal: *Verbalizes understanding and describes medication purposes and frequencies  Outcome: Progressing Towards Goal  Goal: *Medication reconciliation  Outcome: Progressing Towards Goal     Problem: Amputation  Goal: *Progressive mobility and function  Outcome: Progressing Towards Goal  Goal: *Optimal pain control at patient's stated goal  Outcome: Progressing Towards Goal  Goal: *Absence of infection signs and symptoms  Outcome: Progressing Towards Goal  Goal: *Optimal residual limb healing  Outcome: Progressing Towards Goal

## 2020-07-08 NOTE — ED TRIAGE NOTES
Pt arrives via ems with a chief/complaint for hypotension. Pt is from [facility]. EMS was called for vomiting x1 after dinner and 2 low blood pressures. EMS systolic blood pressure in the 120s. Amputation below the knee on 6/8. Nurse at facility notices some redness around surgical site and wanted to follow up for possible sepsis due to necrosis. Denies fever, nausea, vomiting. Has appointment Thursday for possible above the knee amputation.

## 2020-07-08 NOTE — PROGRESS NOTES
[facility]    Medical Progress Note      NAME: Carlo Griffiths   :  1932  MRM:  [id number]    Date/Time of service 2020  11:00 AM          Assessment and Plan:     LARISSA (acute kidney injury) / Dehydration / CKD (chronic kidney disease) stage 3 - POA, mild, due to ppor PO intake, meds and GI loss. Hydrated overnight. Holding lasix and losartan    Nausea & vomiting - POA, unclear etiology. CT abdpelv was unremarkable. No sign of UTI on UA. Hydrate. ADAT. Laxatives. Prn zofran    Gangrene of foot / Debilitated patient / Frequent falls - Recent BKA. Consult to vascular to assess circulation and healing. Resume PT and SNF if cleared. Prn percocet, gabapentin, holding baclofen    Restrictive lung disease / COPD (chronic obstructive pulmonary disease) with chronic respiratory failure with hypoxia - Prn nebs. Oxygen. Tsosie Cheek in place of Advair, and prn duonebs    Ischemic cardiomyopathy / Dilated cardiomyopathy / Mitral regurgitation / Hypertension -  Stable. Continue coreg, statin, eliquis, holding ARB and diuretic    Type 2 diabetes with nephropathy and peripheral vascular disease - Diabetic diet and counseling. SSI per protocol. Not on home meds. Check A1c. Third degree AV block Biventricular cardiac pacemaker in situ / Atrial fibrillation s/p node ablation - Stable. Continue Eliqus, coreg    Anxiety and depression - Continue sertraline. This issue may contribute to symptoms of nausea and weakness    Hypothyroid - Continue synthroid and check TSH    Mixed hyperlipidemia - Atorvastatin    Osteopenia - Vit D and Ca as outpatient    GERD - PPI    Psoriasis - Not on meds       Subjective:     Chief Complaint:  Vomiting gone. Nausea better. Weak    ROS:  (bold if positive, if negative)    NauseaTolerating some PT  Tolerating some Diet        Objective:     Last 24hrs VS reviewed since prior progress note.  Most recent are:    Visit Vitals  /74 (BP 1 Location: Right arm, BP Patient Position: At rest)   Pulse 81   Temp 98.3 °F (36.8 °C)   Resp 18   Ht 5' 3\" (1.6 m)   Wt 79.4 kg (175 lb)   SpO2 94%   Breastfeeding No   BMI 31.00 kg/m²     SpO2 Readings from Last 6 Encounters:   07/08/20 94%   06/15/20 98%   06/04/20 96%   05/23/20 97%   05/21/20 96%   05/14/20 93%    O2 Flow Rate (L/min): 2 l/min(at night only)   No intake or output data in the 24 hours ending 07/08/20 1100     Physical Exam:    Gen:  Obese, in no acute distress  HEENT:  Pink conjunctivae, PERRL, hearing intact to voice, moist mucous membranes  Neck:  Supple, without masses, thyroid non-tender  Resp:  No accessory muscle use, clear breath sounds without wheezes rales or rhonchi  Card:  No murmurs, normal S1, S2 without thrills, bruits or peripheral edema  Abd:  Soft, non-tender, non-distended, normoactive bowel sounds are present, no mass  Lymph:  No cervical or inguinal adenopathy  Musc:  No cyanosis or clubbing, BKA dressing CDI  Skin:  No rashes or ulcers, skin turgor is good  Neuro:  Cranial nerves are grossly intact, pain and motor weakness, follows commands vaguely  Psych:  Poor insight, oriented to person, place and time, alert    Telemetry reviewed:   normal sinus rhythm, paced  __________________________________________________________________  Medications Reviewed: (see below)  Medications:     Current Facility-Administered Medications   Medication Dose Route Frequency    sodium chloride (NS) flush 5-40 mL  5-40 mL IntraVENous Q8H    sodium chloride (NS) flush 5-40 mL  5-40 mL IntraVENous PRN    acetaminophen (TYLENOL) tablet 650 mg  650 mg Oral Q4H PRN    ondansetron (ZOFRAN) injection 4 mg  4 mg IntraVENous Q6H PRN    0.9% sodium chloride infusion  100 mL/hr IntraVENous CONTINUOUS    apixaban (ELIQUIS) tablet 5 mg  5 mg Oral BID    carvediloL (COREG) tablet 3.125 mg  3.125 mg Oral BID WITH MEALS    levothyroxine (SYNTHROID) tablet 100 mcg  100 mcg Oral ACB    therapeutic multivitamin (THERAGRAN) tablet 1 Tab  1 Tab Oral DAILY    sertraline (ZOLOFT) tablet 75 mg  75 mg Oral DAILY    ipratropium (ATROVENT) 0.02 % nebulizer solution 0.5 mg  0.5 mg Nebulization Q4H PRN    oxyCODONE-acetaminophen (PERCOCET) 5-325 mg per tablet 1 Tab  1 Tab Oral Q4H PRN    pantoprazole (PROTONIX) tablet 40 mg  40 mg Oral ACB    atorvastatin (LIPITOR) tablet 10 mg  10 mg Oral DAILY    chlorhexidine (PERIDEX) 0.12 % mouthwash 15 mL  15 mL Oral BIDPC    polyethylene glycol (MIRALAX) packet 17 g  17 g Oral DAILY    gabapentin (NEURONTIN) capsule 100 mg  100 mg Oral Q12H    senna-docusate (PERICOLACE) 8.6-50 mg per tablet 2 Tab  2 Tab Oral BID PRN        Lab Data Reviewed: (see below)  Lab Review:     Recent Labs     07/07/20 2152   WBC 8.5   HGB 12.3   HCT 38.3        Recent Labs     07/07/20 2152      K 4.9      CO2 27   *   BUN 25*   CREA 1.21*   CA 8.3*   ALB 2.8*   TBILI 0.5   ALT 29   INR 1.4*     Lab Results   Component Value Date/Time    Glucose (POC) 80 06/06/2020 06:04 AM    Glucose (POC) 140 (H) 06/05/2020 11:14 PM    Glucose (POC) 99 06/05/2020 04:47 PM    Glucose (POC) 163 (H) 06/05/2020 12:08 PM    Glucose (POC) 108 (H) 06/05/2020 06:08 AM     No results for input(s): PH, PCO2, PO2, HCO3, FIO2 in the last 72 hours. Recent Labs     07/07/20 2152   INR 1.4*     All Micro Results     None          Other pertinent lab: none    Total time spent with patient: 39 Minutes I personally reviewed chart, notes, data and current medications in the medical record. I have personally examined and treated the patient at bedside during this period.                  Care Plan discussed with: Patient, Family, Care Manager, Nursing Staff, Consultant/Specialist and >50% of time spent in counseling and coordination of care    Discussed:  Care Plan and D/C Planning    Prophylaxis:  H2B/PPI    Disposition:  SNF/LTC           ___________________________________________________    Attending Physician: Danielle Mccollum Janiya Suazo MD

## 2020-07-08 NOTE — H&P
Nate Whitaker [facility] [address]  HISTORY AND PHYSICAL    Name:  Tierra Helm  MR#:  [id number]  :  1932  ACCOUNT #:  [de-identified]    DATE OF SERVICE:  2020    CHIEF COMPLAINT: Nausea, vomiting, and abdominal pain. HISTORY OF PRESENT ILLNESS: Please note most of the history was obtained from the nursing home and EMS records as the patient was a somewhat poor historian. An 41-year-old female with a past medical history significant for chronic atrial fibrillation, resection of colon cancer, COPD, primary hypertension, dyslipidemia, depression, ischemic cardiomyopathy, pulmonary hypertension, thyroid disease, and status post recent right below-knee amputation for gangrene was brought to the emergency room from the [facility] for complaints of nausea, vomiting, left-sided abdominal pain, and documented low blood pressure readings which started around dinnertime on 2020. Upon EMS's  arrival,  patient's blood pressure was noted to be improved from a systolic of 37F to the low 120s. However, due to the patient's intermittent diarrhea and abdominal pain, thought she should be evaluated further. Patient denied any associated headaches, dizziness, visual deficits, chest pains, palpitations, sore throat,cough, fevers, chills, bladder or bowel irregularities. PAST MEDICAL HISTORY:  1. Atrial fibrillation. 2.  Colon cancer. 3.  COPD. 4.  Depression. 5.  Primary hypertension. 6.  Dyslipidemia. 7.  Ischemic cardiomyopathy. 8.  Migraine headaches. 9.  Orthostatic hypotension. PAST SURGICAL HISTORY:  1. Colectomy. 2.  Right knee replacement. 3.  Pacemaker placement. 4.  Tubal ligation. 5.  Kyphoplasty. 6.  Total knee arthroplasty. MEDICATIONS:  Nursing home medications are as follows. 1.  Tylenol 500 mg two tablets p.o. every six hours as needed for pain. 2.  Advair Diskus 250/50 one puff inhaler twice daily.   3.  Albuterol inhaler one puff every six hours as needed for wheezing or shortness of breath. 4.  Eliquis 5 mg one tablet p.o. twice a day. 5.  Coreg 3.125 mg one tablet p.o. twice a day with meals. 6.  Pepcid 20 mg one tablet p.o. twice a day. 7.  Lasix 40 mg one tablet p.o. daily. 8.  Levothyroxine 100 mcg p.o. daily. 9.  Lidoderm 5% one patch transdermally topically, 12 hours on, 12 hours off. 10.  Losartan 25 mg one tablet p.o. daily. 11.  Multivitamins one tablet p.o. daily. 12.  Prednisone 10 mg p.o. daily. 13.  Naty-Colace 8.6/50 mg two tablets p.o. twice daily with meals. 14.  Zoloft 50 mg one-and-a-half tablet p.o. daily. 15.  Zocor 20 mg p.o. nightly. 16.  Spiriva 18 mcg inhaler daily. ALLERGIES:  1. CARDIZEM. 2.  CODEINE. 3.  DARVOCET. 4.  DILTIAZEM. 5.  LABETALOL. 6.  PENICILLIN. 7.  PRIMIDONE. 8.  SULFA DRUGS. SOCIAL HISTORY:  Significant for  patient residing at the [facility]. Patient is . Is a never smoker. No alcohol use. No recent known sick contacts. FAMILY HISTORY:  Reviewed from the old records and positive for diabetes and heart disease on both the maternal and paternal sides of the family, breast cancer in a sister. REVIEW OF SYSTEMS:  Attempted to conduct a complete system review, however, unsuccessful as the patient is a poor historian. Otherwise, as outlined in the history of the presenting illness. PHYSICAL EXAMINATION:  GENERAL:  The patient was not in any acute distress. VITAL SIGNS:  Blood pressure of 138/85, heart rate 88, respiratory rate 18, afebrile, saturating 96% on 2 L nasal cannula. HEENT:  Head exam, normocephalic. Pupils equal and reactive to light without any scleral icterus. ENT exam, ear, nose, throat clear. Dry buccal mucosa. NECK:  No jugular venous distention or carotid bruits. CARDIOVASCULAR:  S1, S2 present without any murmurs. RESPIRATORY:  Lungs with decreased air entry at both bases without any crepitations or rhonchi.   GI: Bowel sounds present. The abdomen is soft, nontender. No rebound, no guarding. GENITOURINARY:  No suprapubic or flank tenderness. MUSCULOSKELETAL:  About 1 to 2+ bipedal edema, left lower extremity. A right below-knee amputation with staples insitu and dry gangrenous to edges of stump. CNS:  The patient is awake, alert, however, not fully oriented. LABORATORY/RADIOGRAPHIC FINDINGS:     CT of the abdomen and pelvis without any acute intra-abdominal or intrapelvic process. 12-lead EKG with ventricular paced rhythm at 81 per minute. A metabolic panel with a sodium of 138, potassium 4.9, chloride 105, bicarb 27, BUN of 25, creatinine 1.21, glucose 138, calcium 8.3 with an albumin of 2.8. Total bilirubin 0.5, ALT 29, AST of 61, lipase of 73. CBC with a WBC of 8.5, 76% polys, hemoglobin 12.3, hematocrit 38.3 with a platelet count of 952. Urinalysis pending. ASSESSMENT/PLAN:  1. Hemodynamics:  Resolving hypotension most likely secondary to hypovolemia. IV fluids to maintain a mean arterial pressure of at least 65.  2.  Renal:  Acute kidney injury secondary to dehydration. IV fluids. Followup kidney function. 3.  Gastrointestinal:  Nonspecific abdominal pain without any acute intra-abdominal pathology as noted on CT abdomen and pelvis. As-needed IV analgesia. 4.  Cardiovascular:  Chronic rate-controlled atrial fibrillation. Primary hypertension. Dyslipidemia. Ischemic cardiomyopathy. History of pacemaker placement. No acute ischemia. As-needed Cardiology consult. 5.  Anticoagulation: on Eliquis for  history of chronic atrial fibrillation. 6.  Respiratory: Chronic obstructive pulmonary disease and obstructive sleep apnea without any acute respiratory failure. 7.  Endocrine:  Hypothyroidism, on Synthroid. TSH check. 8.  Musculoskeletal:  Status post right below-knee amputation for gangrene. Vascular Surgical followup. 9.  Dermatology:  Sacral pressure injury, present on admission. Daily wound care. Wound Care Nurse consult. 10. Prophylaxis for deep venous thrombosis. 11. Directives:  Full code with a very guarded prognosis. To be readdressed with the patient and medical power of  within 24 hours. In summary, an 66-year-old female with multiple medical problems including chronic atrial fibrillation on oral anticoagulation, primary hypertension, dyslipidemia, chronic obstructive pulmonary disease, depression, ischemic cardiomyopathy, pulmonary hypertension, and resected colon cancer, is being admitted to the inpatient level for relative hypotension secondary to hypovolemia, acute kidney injury secondary to dehydration, nonspecific abdominal pain, right below-knee amputation with associated dry gangrene, and overall worsening physical debility. Patient is at increased risk for further decompensation and will benefit from ongoing monitoring and management including with IV hydration, followup kidney function, optimization of pain control, and Vascular Surgical consult for reevaluation of the right below-knee amputation stump with associated dry gangrene. The entire admission plans discussed in detail with the patient. All questions and concerns were addressed. Patient's functional status prior to admission significant for  patient being nonambulatory. Total time for admission 50 minutes, of which at least 50% of the time was spent in plan of care and counseling of  patient.       Sincere Adams MD      [id number]  D:  07/08/2020 1:52  T:  07/08/2020 3:16  JOB #:  [id number]

## 2020-07-08 NOTE — ED NOTES
TRANSFER - OUT REPORT:    Verbal report given to Brit RODRIGUEZ(name) on Khadra Ortega  being transferred to 4 floor(unit) for routine progression of care       Report consisted of patients Situation, Background, Assessment and   Recommendations(SBAR). Information from the following report(s) SBAR, ED Summary, [facility] and Recent Results was reviewed with the receiving nurse. Lines:   Peripheral IV 07/07/20 Right Antecubital (Active)   Site Assessment Clean, dry, & intact 7/7/2020  9:56 PM   Phlebitis Assessment 0 7/7/2020  9:56 PM   Infiltration Assessment 0 7/7/2020  9:56 PM   Dressing Status Clean, dry, & intact 7/7/2020  9:56 PM   Dressing Type Transparent;Tape 7/7/2020  9:56 PM   Hub Color/Line Status Pink;Flushed 7/7/2020  9:56 PM   Action Taken Blood drawn 7/7/2020  9:56 PM        Opportunity for questions and clarification was provided.       Patient transported with:   Monitor  Registered Nurse  Tech

## 2020-07-08 NOTE — PROGRESS NOTES
[facility]: MED RECONCILIATION    Medications added:     Loperamide 7.5 mL PO Q8H PRN diarrhea  Atorvastatin 10 mg PO daily (replaced simvastatin)  Baclofen 5 mg PO QHS  Medihoney MWF applied to left buttocks  Peridex s/s BID with meals  Orajel QID PRN applied to gums  Oxycodone 5 mg PO Q4H PRN mod-sev pain  Miralax daily  Gabapentin 100 mg PO Q12H  Pantoprazole 40 mg PO daily  Betadine 10% daily applied to right BKA  Maalox PRN    Medications removed:    Famotidine 20 mg PO BID  Prednisone 10 mg PO daily  Simvastatin 20 mg QHS (now taking atorvastatin)    Medications adjusted:    Pericolace 2 tabs PO BID PRN (instead of scheduled)  APAP 1000 mg PO Q8H (scheduled instead of PRN)    Information obtained from: transfer paperwork from formerly [facility]    Significant PMH/Disease States:   Past Medical History:   Diagnosis Date    Arthritis     Atrial fibrillation ([facility] [address]     av node ablation 5/22/98 with placement of CPI #[id number] dual chamber pacemaker subsequently replaced with a single chamber medtronic #[id number] single chamber pacemaker 7/25/05    Cancer ([facility] [address] 1970's    colon cancer w/ resection    COPD     Depression     GERD (gastroesophageal reflux disease)     History of vascular access device 04/17/2020    4F MIDLINE PLACED BY EMIL Purdy RN LEFT BRACHIAL, 13CM    Hyperlipidemia     Hypertension     Ischemic cardiomyopathy     Migraine headache     Orthostatic hypotension     RADHA (obstructive sleep apnea)     Pacemaker 5/22/98    AV node ablation /pacemaker placement utilizing CPI # [id number] dual chamber system, medtronic #[id number] single chamber device placed 7/22/05    Pulmonary hypertension ([facility] [address] 9/26/2011    RVSP 50 on echo 8/14    Thyroid disease     Valvular heart disease     mild-mod MR/TR     Chief Complaint for this Admission:   Chief Complaint   Patient presents with    Hypotension     Allergies: Cardizem [diltiazem hcl]; Codeine; Darvocet a500 [propoxyphene n-acetaminophen]; Diltiazem;  Labetalol; Pcn [penicillins]; Primidone; and Sulfa (sulfonamide antibiotics)    Prior to Admission Medications:     Medication Documentation Review Audit       Reviewed by SAAD McguireD (Pharmacist) on 07/08/20 at 0905      Medication Sig Documenting Provider Last Dose Status Taking?   acetaminophen (TYLENOL) 500 mg tablet Take 1,000 mg by mouth every eight (8) hours. Provider, Historical 7/7/2020 Unknown time Active Yes   ADVAIR DISKUS 250-50 mcg/dose diskus inhaler Take 1 Puff by inhalation two (2) times a day. Provider, Historical 7/7/2020 pm Active Yes           Med Note ([place] Pae   Tue Sep 12, 2017  8:47 AM)     albuterol (PROVENTIL HFA, VENTOLIN HFA, PROAIR HFA) 90 mcg/actuation inhaler Take 1 Puff by inhalation every six (6) hours as needed for Wheezing. Provider, Historical  Active Yes   alum-mag hydroxide-simeth (Maalox Advanced) 200-200-20 mg/5 mL susp Take 30 mL by mouth every four (4) hours as needed for Indigestion. Provider, Historical  Active Yes   apixaban (Eliquis) 5 mg tablet Take 1 Tab by mouth two (2) times a day. Zack Salcedo MD 7/7/2020 pm Active Yes   atorvastatin (LIPITOR) 10 mg tablet Take 10 mg by mouth daily. Provider, Historical 7/7/2020 am Active Yes   baclofen (LIORESAL) 10 mg tablet Take 5 mg by mouth nightly. Provider, Historical 7/7/2020 pm Active Yes   benzocaine (ORAJEL) 20 % gel topical gel Apply  to affected area four (4) times daily as needed. Apply to gums Provider, Historical  Active Yes   carvediloL (COREG) 3.125 mg tablet TAKE 1 TABLET BY MOUTH TWICE A DAY WITH MEALS Zack Salcedo MD 7/7/2020 pm Active Yes   chlorhexidine (Peridex) 0.12 % solution 15 mL by Swish and Spit route two (2) times daily (with meals).  Provider, Historical 7/7/2020 pm Active Yes   furosemide (LASIX) 40 mg tablet Take one tab by mouth daily or as advised by physician Zack Salcedo MD 7/7/2020 am Active Yes   gabapentin (NEURONTIN) 100 mg capsule Take 100 mg by mouth every twelve (12) hours. Provider, Historical 7/7/2020 pm Active Yes   honey (MediHoney, honey,) 100 % topical paste Apply  to affected area every Monday, Wednesday, Friday. Apply to left buttocks Provider, Historical 7/6/2020 am Active Yes   levothyroxine (SYNTHROID) 100 mcg tablet Take 1 Tab by mouth Daily (before breakfast). Ines King MD 7/7/2020 am Active Yes   lidocaine (LIDODERM) 5 % 1 Patch by TransDERmal route daily as needed. Apply patch to the affected area for 12 hours a day and remove for 12 hours a day. Apply to lower back Provider, Historical  Active Yes           Med Note (Paola Spencer Apr 15, 2020  7:31 PM)     loperamide (IMODIUM) 1 mg/5 mL solution Take 1.5 tsp by mouth every eight (8) hours as needed for Diarrhea. Provider, Historical  Active Yes   losartan (COZAAR) 25 mg tablet Take 1 Tab by mouth daily. Indications: chronic heart failure Jonathon Zavala MD 7/7/2020 am Active Yes   multivitamin (ONE A DAY) tablet Take 1 Tab by mouth daily. Provider, Historical 7/7/2020 am Active Yes   oxyCODONE IR (ROXICODONE) 5 mg immediate release tablet Take 5 mg by mouth every four (4) hours as needed for Pain (moderate-severe pain). Provider, Historical  Active Yes   pantoprazole (PROTONIX) 40 mg tablet Take 40 mg by mouth daily. Provider, Historical 7/7/2020 am Active Yes   polyethylene glycol (Miralax) 17 gram packet Take 17 g by mouth daily. Provider, Historical 7/7/2020 am Active Yes   povidone-iodine (Betadine) 10 % external solution Apply  to affected area daily. Apply to right BKA Provider, Historical 7/7/2020 am Active Yes   senna-docusate (PERICOLACE) 8.6-50 mg per tablet Take 2 Tabs by mouth two (2) times daily as needed for Constipation. Provider, Historical  Active Yes   sertraline (ZOLOFT) 50 mg tablet Take 1.5 Tabs by mouth daily. Ines King MD 7/7/2020 am Active Yes   tiotropium (SPIRIVA WITH HANDIHALER) 18 mcg inhalation capsule Take 1 Cap by inhalation daily.  Provider, Historical 7/7/2020 am Active Yes                    Thank you for the consult,  Cosmo FranzDignity Health [facility]

## 2020-07-09 LAB
ALBUMIN SERPL-MCNC: 2.6 G/DL (ref 3.5–5)
ALBUMIN/GLOB SERPL: 0.7 {RATIO} (ref 1.1–2.2)
ALP SERPL-CCNC: 155 U/L (ref 45–117)
ALT SERPL-CCNC: 27 U/L (ref 12–78)
ANION GAP SERPL CALC-SCNC: 5 MMOL/L (ref 5–15)
AST SERPL-CCNC: 41 U/L (ref 15–37)
BASOPHILS # BLD: 0 K/UL (ref 0–0.1)
BASOPHILS NFR BLD: 0 % (ref 0–1)
BILIRUB SERPL-MCNC: 0.5 MG/DL (ref 0.2–1)
BUN SERPL-MCNC: 16 MG/DL (ref 6–20)
BUN/CREAT SERPL: 22 (ref 12–20)
CALCIUM SERPL-MCNC: 8.6 MG/DL (ref 8.5–10.1)
CHLORIDE SERPL-SCNC: 108 MMOL/L (ref 97–108)
CO2 SERPL-SCNC: 26 MMOL/L (ref 21–32)
CREAT SERPL-MCNC: 0.74 MG/DL (ref 0.55–1.02)
DIFFERENTIAL METHOD BLD: ABNORMAL
EOSINOPHIL # BLD: 0.2 K/UL (ref 0–0.4)
EOSINOPHIL NFR BLD: 2 % (ref 0–7)
ERYTHROCYTE [DISTWIDTH] IN BLOOD BY AUTOMATED COUNT: 13.6 % (ref 11.5–14.5)
GLOBULIN SER CALC-MCNC: 4 G/DL (ref 2–4)
GLUCOSE SERPL-MCNC: 119 MG/DL (ref 65–100)
HCT VFR BLD AUTO: 34.6 % (ref 35–47)
HGB BLD-MCNC: 10.9 G/DL (ref 11.5–16)
IMM GRANULOCYTES # BLD AUTO: 0.1 K/UL (ref 0–0.04)
IMM GRANULOCYTES NFR BLD AUTO: 1 % (ref 0–0.5)
LYMPHOCYTES # BLD: 1.2 K/UL (ref 0.8–3.5)
LYMPHOCYTES NFR BLD: 15 % (ref 12–49)
MAGNESIUM SERPL-MCNC: 1.4 MG/DL (ref 1.6–2.4)
MCH RBC QN AUTO: 29.3 PG (ref 26–34)
MCHC RBC AUTO-ENTMCNC: 31.5 G/DL (ref 30–36.5)
MCV RBC AUTO: 93 FL (ref 80–99)
MONOCYTES # BLD: 0.7 K/UL (ref 0–1)
MONOCYTES NFR BLD: 9 % (ref 5–13)
NEUTS SEG # BLD: 5.6 K/UL (ref 1.8–8)
NEUTS SEG NFR BLD: 73 % (ref 32–75)
NRBC # BLD: 0 K/UL (ref 0–0.01)
NRBC BLD-RTO: 0 PER 100 WBC
PHOSPHATE SERPL-MCNC: 2.7 MG/DL (ref 2.6–4.7)
PLATELET # BLD AUTO: 182 K/UL (ref 150–400)
PMV BLD AUTO: 11.4 FL (ref 8.9–12.9)
POTASSIUM SERPL-SCNC: 4.2 MMOL/L (ref 3.5–5.1)
PROT SERPL-MCNC: 6.6 G/DL (ref 6.4–8.2)
RBC # BLD AUTO: 3.72 M/UL (ref 3.8–5.2)
SARS-COV-2, COV2: NOT DETECTED
SODIUM SERPL-SCNC: 139 MMOL/L (ref 136–145)
SOURCE, COVRS: NORMAL
SPECIMEN SOURCE, FCOV2M: NORMAL
WBC # BLD AUTO: 7.8 K/UL (ref 3.6–11)

## 2020-07-09 PROCEDURE — 74011250637 HC RX REV CODE- 250/637: Performed by: INTERNAL MEDICINE

## 2020-07-09 PROCEDURE — 36415 COLL VENOUS BLD VENIPUNCTURE: CPT

## 2020-07-09 PROCEDURE — 74011250636 HC RX REV CODE- 250/636: Performed by: INTERNAL MEDICINE

## 2020-07-09 PROCEDURE — 85025 COMPLETE CBC W/AUTO DIFF WBC: CPT

## 2020-07-09 PROCEDURE — 97530 THERAPEUTIC ACTIVITIES: CPT

## 2020-07-09 PROCEDURE — 97162 PT EVAL MOD COMPLEX 30 MIN: CPT

## 2020-07-09 PROCEDURE — 80053 COMPREHEN METABOLIC PANEL: CPT

## 2020-07-09 PROCEDURE — 87635 SARS-COV-2 COVID-19 AMP PRB: CPT

## 2020-07-09 PROCEDURE — 94760 N-INVAS EAR/PLS OXIMETRY 1: CPT

## 2020-07-09 PROCEDURE — 65270000029 HC RM PRIVATE

## 2020-07-09 PROCEDURE — 97165 OT EVAL LOW COMPLEX 30 MIN: CPT

## 2020-07-09 PROCEDURE — 99218 HC RM OBSERVATION: CPT

## 2020-07-09 PROCEDURE — 84100 ASSAY OF PHOSPHORUS: CPT

## 2020-07-09 PROCEDURE — 83735 ASSAY OF MAGNESIUM: CPT

## 2020-07-09 PROCEDURE — 74011000250 HC RX REV CODE- 250: Performed by: INTERNAL MEDICINE

## 2020-07-09 PROCEDURE — 94640 AIRWAY INHALATION TREATMENT: CPT

## 2020-07-09 RX ORDER — MAGNESIUM SULFATE HEPTAHYDRATE 40 MG/ML
2 INJECTION, SOLUTION INTRAVENOUS ONCE
Status: COMPLETED | OUTPATIENT
Start: 2020-07-09 | End: 2020-07-09

## 2020-07-09 RX ADMIN — OXYCODONE AND ACETAMINOPHEN 1 TABLET: 5; 325 TABLET ORAL at 17:17

## 2020-07-09 RX ADMIN — Medication 10 ML: at 06:54

## 2020-07-09 RX ADMIN — CARVEDILOL 3.12 MG: 3.12 TABLET, FILM COATED ORAL at 16:37

## 2020-07-09 RX ADMIN — BUDESONIDE 500 MCG: 0.5 INHALANT RESPIRATORY (INHALATION) at 20:43

## 2020-07-09 RX ADMIN — ATORVASTATIN CALCIUM 10 MG: 10 TABLET, FILM COATED ORAL at 08:00

## 2020-07-09 RX ADMIN — CHLORHEXIDINE GLUCONATE 15 ML: 1.2 RINSE ORAL at 17:22

## 2020-07-09 RX ADMIN — ARFORMOTEROL TARTRATE 15 MCG: 15 SOLUTION RESPIRATORY (INHALATION) at 07:53

## 2020-07-09 RX ADMIN — APIXABAN 5 MG: 5 TABLET, FILM COATED ORAL at 08:00

## 2020-07-09 RX ADMIN — SODIUM CHLORIDE 50 ML/HR: 900 INJECTION, SOLUTION INTRAVENOUS at 21:21

## 2020-07-09 RX ADMIN — SODIUM CHLORIDE 100 ML/HR: 900 INJECTION, SOLUTION INTRAVENOUS at 06:32

## 2020-07-09 RX ADMIN — GABAPENTIN 100 MG: 100 CAPSULE ORAL at 21:20

## 2020-07-09 RX ADMIN — SERTRALINE HYDROCHLORIDE 75 MG: 50 TABLET ORAL at 08:00

## 2020-07-09 RX ADMIN — POLYETHYLENE GLYCOL 3350 17 G: 17 POWDER, FOR SOLUTION ORAL at 08:00

## 2020-07-09 RX ADMIN — OXYCODONE AND ACETAMINOPHEN 1 TABLET: 5; 325 TABLET ORAL at 12:54

## 2020-07-09 RX ADMIN — LEVOTHYROXINE SODIUM 100 MCG: 0.1 TABLET ORAL at 06:30

## 2020-07-09 RX ADMIN — CHLORHEXIDINE GLUCONATE 15 ML: 1.2 RINSE ORAL at 08:00

## 2020-07-09 RX ADMIN — MAGNESIUM SULFATE HEPTAHYDRATE 2 G: 40 INJECTION, SOLUTION INTRAVENOUS at 07:59

## 2020-07-09 RX ADMIN — OXYCODONE AND ACETAMINOPHEN 1 TABLET: 5; 325 TABLET ORAL at 06:30

## 2020-07-09 RX ADMIN — OXYCODONE AND ACETAMINOPHEN 1 TABLET: 5; 325 TABLET ORAL at 01:17

## 2020-07-09 RX ADMIN — Medication 10 ML: at 21:21

## 2020-07-09 RX ADMIN — APIXABAN 5 MG: 5 TABLET, FILM COATED ORAL at 17:18

## 2020-07-09 RX ADMIN — ARFORMOTEROL TARTRATE 15 MCG: 15 SOLUTION RESPIRATORY (INHALATION) at 20:43

## 2020-07-09 RX ADMIN — PANTOPRAZOLE SODIUM 40 MG: 40 TABLET, DELAYED RELEASE ORAL at 06:30

## 2020-07-09 RX ADMIN — GABAPENTIN 100 MG: 100 CAPSULE ORAL at 08:00

## 2020-07-09 RX ADMIN — CARVEDILOL 3.12 MG: 3.12 TABLET, FILM COATED ORAL at 08:00

## 2020-07-09 RX ADMIN — THERA TABS 1 TABLET: TAB at 08:00

## 2020-07-09 RX ADMIN — Medication 10 ML: at 13:07

## 2020-07-09 RX ADMIN — BUDESONIDE 500 MCG: 0.5 INHALANT RESPIRATORY (INHALATION) at 07:54

## 2020-07-09 NOTE — PROGRESS NOTES
Comprehensive Nutrition Assessment    Type and Reason for Visit: Initial    Nutrition Recommendations/Plan:   1. Continue Regular diet to promote adequate PO intakes. 2. Recommend Ensure Clear BID (480 kca, 16 gm protein) to promote adequate protein intake. 3. Monitor PO intakes, GI function, labs, skin integrity, and wt trends. Nutrition Assessment:      7/9: 81 y/o F admitted with LARISSA, n/v. Pt seen for RN screen for PU. PMH - afib, colon CA, COPD, depression, HTN, dyslipidemia, ischemic cardiomyopathy, pacemaker. Recent BKA. Spoke with pt and daughter at bedside. Pt with fair appetite, generally does not eat much at meals. Pt with 50% lunch intake. Pt typically consumes x3 meals/d + 2-3 Ensure Clear daily.  lb. BMI 31 c/w obesity. Weight Hx per EMR shows recent wt gain. NKFA. Pt reports some difficulties swallowing pills. Pt with n/v on admit, feeling better now. LBM 7/7. Stage 2 PI coccyx, increased protein needs to promote wound healing. Will continue Ensure Clear BID in-house. Labs - Mg 1.4 L,  H. Meds - NS 50 ml/hr, levothyroxine, pantoprazole, sertraline, multivitamin, magnesium sulfate. Wt Readings from Last 10 Encounters:   07/07/20 79.4 kg (175 lb)   06/15/20 79.4 kg (175 lb)   06/04/20 71.2 kg (157 lb)   05/23/20 70.2 kg (154 lb 12.2 oz)   05/21/20 71.7 kg (158 lb)   05/14/20 71.7 kg (158 lb)   04/15/20 72 kg (158 lb 11.7 oz)   03/06/20 73 kg (161 lb)   03/05/20 72.1 kg (159 lb)   02/18/20 72.1 kg (159 lb)   ]      Estimated Daily Nutrient Needs:  Energy (kcal):  1557(REE 1193 x 1.3)  Protein (g):  79 - 103 gm(1.0 - 1.3 gm/kg)       Fluid (ml/day):  4577(3 ml/kcal)    Nutrition Related Findings:   Stage 2 PI coccyx      Wounds:    Stage II, Surgical wound         Anthropometric Measures:  · Height:  5' 3\" (160 cm)  · Current Body Wt:  79.4 kg (175 lb 0.7 oz)   · Adjusted Body Weight:   ; Weight Adjustment for: No adjustment   · BMI Categories:  Obese class 1 (BMI 30.0-34. 9) Nutrition Diagnosis:   · Increased nutrient needs related to increased demand for energy/nutrients as evidenced by wounds      Nutrition Interventions:   Food and/or Nutrient Delivery: Continue current diet, Start oral nutrition supplement  Nutrition Education and Counseling: No recommendations at this time  Coordination of Nutrition Care: No recommendation at this time    Goals:  PO intakes >50% + ONS with improved skin integrity within 3-5 days       Nutrition Monitoring and Evaluation:   Behavioral-Environmental Outcomes:  N/A  Food/Nutrient Intake Outcomes: Diet advancement/tolerance, Supplement intake  Physical Signs/Symptoms Outcomes: Weight, Skin, GI status    Discharge Planning:    Continue oral nutrition supplement, Continue current diet     Electronically signed by Martha Barahona RDN on 7/9/2020 at 3:32 PM    Contact: [phone] (office); [phone] (pager)

## 2020-07-09 NOTE — PROGRESS NOTES
Problem: Mobility Impaired (Adult and Pediatric)  Goal: *Acute Goals and Plan of Care (Insert Text)  Description: FUNCTIONAL STATUS PRIOR TO ADMISSION: Pt admitted from SNF where she was receiving rehab for approximately 3 weeks s/p R BKA. . Prior to that time pt lived in [address] [place] [address]. Prior to admission pt reportedly performed assisted bed to/from wheelchair transfers (no RW use) and was not ambulating. HOME SUPPORT PRIOR TO ADMISSION: Pt received assistance from SNF staff for all transfers. Physical Therapy Goals  Initiated 7/9/2020  1. Patient will move from supine to sit and sit to supine  in bed with modified independence within 7 day(s). 2.  Patient will transfer from bed to chair and chair to bed with minimal assistance/contact guard assist using the least restrictive device within 7 day(s). 3.  Patient will perform sit to stand with minimal assistance/contact guard assist within 7 day(s). 4.  Patient will ambulate with minimal assistance/contact guard assist for 5 feet with the least restrictive device within 7 day(s). 5.  Patient will demonstrate good dynamic sitting balance x 5 mins within 7 days. Outcome: Progressing Towards Goal    PHYSICAL THERAPY EVALUATION  Patient: Makenna Cardona (47 y.o. female)  Date: 7/9/2020  Primary Diagnosis: LARISSA (acute kidney injury) ([facility] [address] [N17.9]  Nausea & vomiting [R11.2]        Precautions:   Fall      ASSESSMENT  Based on the objective data described below, the patient presents with generally decreased strength, impaired balance, decreased endurance, and limited functional mobility on day 2 of admission with LARISSA and infection at site of R BKA. Pt performs bed to chair transfer with up to maximum assistance x 2. Pt with history of fall at SNF approximately 1 week ago, during which she sustained bruise to R scapula and states she hit her head. Despite fear of falling she offers excellent participation.  She is able to reposition LUE during standing with support; recommend transfer and gait training using RW at next visit. She reports 1-2/10 pain at R LE. Current Level of Function Impacting Discharge (mobility/balance): up to maximum assistance x 2 for transfers    Functional Outcome Measure: The patient scored 25 on the Barthel outcome measure which is indicative of 75% functional impairment. Other factors to consider for discharge: none additional.     Patient will benefit from skilled therapy intervention to address the above noted impairments. PLAN :  Recommendations and Planned Interventions: bed mobility training, transfer training, gait training, therapeutic exercises, neuromuscular re-education, modalities, edema management/control, patient and family training/education, and therapeutic activities      Frequency/Duration: Patient will be followed by physical therapy:  5 times a week to address goals. Recommendation for discharge: (in order for the patient to meet his/her long term goals)  Therapy up to 5 days/week in SNF setting    This discharge recommendation:  A follow-up discussion with the attending provider and/or case management is planned    IF patient discharges home will need the following DME: to be determined (TBD)         SUBJECTIVE:   Patient stated I'll sit up for a little while, re: R residual limb symptoms. Pt received supine, agreeable to PT and cleared by RN.       OBJECTIVE DATA SUMMARY:   HISTORY:    Past Medical History:   Diagnosis Date    Arthritis     Atrial fibrillation ([facility] [address]     av node ablation 5/22/98 with placement of CPI #[id number] dual chamber pacemaker subsequently replaced with a single chamber medtronic #[id number] single chamber pacemaker 7/25/05    Cancer ([facility] [address] 1970's    colon cancer w/ resection    COPD     Depression     GERD (gastroesophageal reflux disease)     History of vascular access device 04/17/2020    4F MIDLINE PLACED BY EMIL Glaser RN LEFT BRACHIAL, 13CM    Hyperlipidemia     Hypertension Ischemic cardiomyopathy     Migraine headache     Orthostatic hypotension     RADHA (obstructive sleep apnea)     Pacemaker 5/22/98    AV node ablation /pacemaker placement utilizing CPI # [id number] dual chamber system, medtronic #[id number] single chamber device placed 7/22/05    Pulmonary hypertension ([address] 9/26/2011    RVSP 50 on echo 8/14    Thyroid disease     Valvular heart disease     mild-mod MR/TR     Past Surgical History:   Procedure Laterality Date    BREAST SURGERY PROCEDURE UNLISTED      benign breast tumor excision right breast    HX BREAST BIOPSY Right 2002    neg; surgical bx    HX COLECTOMY  1974    colon    HX KNEE REPLACEMENT      right TKA    HX PACEMAKER      HX PACEMAKER PLACEMENT  05/2020    HX TUBAL LIGATION      IR KYPHOPLASTY LUMBAR  7/2/2019    MA INSJ ELTRD CAR BYRON SYS TM INSJ DFB/PM PLS GEN N/A 5/22/2020    Lv Lead Placement performed by Adrian Dick MD at [address] CATH LAB    MA REMVL PERM PM PLS GEN W/REPL PLSE GEN MULT LEAD N/A 5/22/2020    REMOVE & REPLACE PPM GEN BIV MULTI LEADS performed by Adrian Dick MD at [address] CATH LAB    TOTAL KNEE ARTHROPLASTY      total on R, partial on L       Personal factors and/or comorbidities impacting plan of care: as above    Home Situation  Home Environment: Skilled nursing facility  One/Two Story Residence: One story  Living Alone: No  Support Systems: Child(luis fernando), Skilled nursing facility  Patient Expects to be Discharged to[de-identified] Skilled nursing facility  Current DME Used/Available at Home: Oxygen, portable, Walker, Wheelchair    EXAMINATION/PRESENTATION/DECISION MAKING:   Critical Behavior:  Neurologic State: Alert  Orientation Level: Oriented X4  Cognition: Appropriate decision making, Appropriate for age attention/concentration, Appropriate safety awareness, Follows commands     Hearing:   Auditory  Auditory Impairment: Hard of hearing, bilateral  Skin:  stump sleeve intact RLE; ecchymosis to R scapula  Edema: none noted  Range Of Motion: AROM generally decreased LEs. Strength:        Generally decreased throughout                 Tone & Sensation:          Normal LE tone                        Coordination:   WFL       Functional Mobility:  Bed Mobility:     Supine to Sit: Additional time;Minimum assistance;Bed Modified; Adaptive equipment     Scooting: Additional time;Contact guard assistance  Transfers:  Sit to Stand: Additional time; Moderate assistance;Assist x2  Stand to Sit: Additional time;Assist x2; Moderate assistance  Stand Pivot Transfers: Assist x2; Additional time; Moderate assistance;Maximum assistance                    Balance:   Sitting: Without support  Sitting - Static: Good (unsupported)  Sitting - Dynamic: Fair (occasional)  Standing: With support  Standing - Static: Poor  Standing - Dynamic : Poor  Ambulation/Gait Training:         Pt able to pivot LLE while standing with BUE support. Functional Measure:  Barthel Index:    Bathin  Bladder: 5  Bowels: 5  Groomin  Dressin  Feedin  Mobility: 0  Stairs: 0  Toilet Use: 5  Transfer (Bed to Chair and Back): 5  Total: 25/100       The Barthel ADL Index: Guidelines  1. The index should be used as a record of what a patient does, not as a record of what a patient could do. 2. The main aim is to establish degree of independence from any help, physical or verbal, however minor and for whatever reason. 3. The need for supervision renders the patient not independent. 4. A patient's performance should be established using the best available evidence. Asking the patient, friends/relatives and nurses are the usual sources, but direct observation and common sense are also important. However direct testing is not needed. 5. Usually the patient's performance over the preceding 24-48 hours is important, but occasionally longer periods will be relevant.   6. Middle categories imply that the patient supplies over 50 per cent of the effort. 7. Use of aids to be independent is allowed. Classie Serum., Barthel, D.W. (3656). Functional evaluation: the Barthel Index. [facility] (14)2. RADHA Cardenas, Trent Chamberlain., Ion Mirza., Tristen, [address] (). Measuring the change indisability after inpatient rehabilitation; comparison of the responsiveness of the Barthel Index and Functional [place] Measure. Journal of Neurology, Neurosurgery, and Psychiatry, 66(4), 273-553. Carter Sandhoff, N.J.A, SARA Rodriguez, & Du Cerrato M.A. (2004.) Assessment of post-stroke quality of life in cost-effectiveness studies: The usefulness of the Barthel Index and the EuroQoL-5D. Quality of Life Research, 15, 323-86            Physical Therapy Evaluation Charge Determination   History Examination Presentation Decision-Making   HIGH Complexity :3+ comorbidities / personal factors will impact the outcome/ POC  HIGH Complexity : 4+ Standardized tests and measures addressing body structure, function, activity limitation and / or participation in recreation  MEDIUM Complexity : Evolving with changing characteristics  Other outcome measures barthel  MEDIUM      Based on the above components, the patient evaluation is determined to be of the following complexity level: MEDIUM    Pain Ratin-2/10      Activity Tolerance:   Good; VSS  Please refer to the flowsheet for vital signs taken during this treatment. After treatment patient left in no apparent distress:   Sitting in chair, Call bell within reach, Bed / chair alarm activated, and Caregiver / family present    COMMUNICATION/EDUCATION:   The patients plan of care was discussed with: Registered nurse and Rehabilitation technician. Fall prevention education was provided and the patient/caregiver indicated understanding., Patient/family have participated as able in goal setting and plan of care. , and Patient/family agree to work toward stated goals and plan of care.     Thank you for this referral.  Nely Rodriguez, PT, DPT   Time Calculation: 18 mins

## 2020-07-09 NOTE — PROGRESS NOTES
Problem: Self Care Deficits Care Plan (Adult)  Goal: *Acute Goals and Plan of Care (Insert Text)  Description: FUNCTIONAL STATUS PRIOR TO ADMISSION: Pt resides in handicapped accessible apartment, with 0 FELIPE and walk-in shower with fixed bench and shower chair. Pt has DME needs met. Plans to discharge back to [facility] prior to return to apartment. Pt reports [place] at W/C level. HOME SUPPORT: The patient lived alone with family to provide PRN assistance. Occupational Therapy Goals  Initiated 7/9/2020  1. Patient will perform WC grooming with modified independence within 7 day(s). 2.  Patient will perform WC upper body dressing with modified independence within 7 day(s). 3.  Patient will perform EOB/RW lower body dressing with minimal assistance within 7 day(s). 4.  Patient will perform EOB to [facility] toilet transfers with minimal assist within 7 day(s). 5.  Patient will perform all aspects of toileting with minimal assistance within 7 day(s). 6.  Patient will participate in upper extremity therapeutic exercise/activities with supervision/set-up for 10 minutes within 7 day(s). 7.  Patient will utilize energy conservation techniques during functional activities with verbal cues Min within 7 day(s). Outcome: Progressing Towards Goal    OCCUPATIONAL THERAPY EVALUATION  Patient: Radha Yang (64 y.o. female)  Date: 7/9/2020  Primary Diagnosis: LARISSA (acute kidney injury) ([facility] [address] [N17.9]  Nausea & vomiting [R11.2]       Precautions:   Fall    ASSESSMENT  Based on the objective data described below, the patient presents with decreased strength/endurance, decreased mobility/balance, decreased activity tolerance and decreased full body reaching, all of which limits pt's ability to complete self-care routine at level congruent with PLOF. Currently, pt benefits from S/U for self-feeding and grooming, Min A for UB dressing and Max A for toileting, bathing and LE dressing.   Pt noted with L hand tremors during functional use. Pt reports she was at the [facility] for rehab prior to admission to hospital and desires to return back for continued therapy after discharged from hospital.  Pt's daughter present during today's therapy and verbalizes all DME needs fulfilled/being fulfilled by the [facility]. Pt's output during today's therapy was limited by fatigue. Pt benefits from skilled OT to address functional deficits in an overall effort at maximizing pt's highest level of safe functional independence prior to discharge. Current Level of Function Impacting Discharge (ADLs/self-care): S/U for self-feeding and grooming, Min A for UB dressing and Max A for toileting, bathing and LE dressing    Functional Outcome Measure: The patient scored 25/100 on the Barthel Index outcome measure. Other factors to consider for discharge: none     Patient will benefit from skilled therapy intervention to address the above noted impairments. PLAN :  Recommendations and Planned Interventions: self care training, functional mobility training, therapeutic exercise, balance training, therapeutic activities, endurance activities and patient education    Frequency/Duration: Patient will be followed by occupational therapy 5 times a week to address goals. Recommendation for discharge: (in order for the patient to meet his/her long term goals)  Therapy up to 5 days/week in SNF setting    This discharge recommendation:  Has not yet been discussed the attending provider and/or case management    IF patient discharges home will need the following DME: patient owns DME required for discharge       SUBJECTIVE:   Patient stated My left hand is always shaking.     OBJECTIVE DATA SUMMARY:   HISTORY:   Past Medical History:   Diagnosis Date    Arthritis     Atrial fibrillation ([address]     av node ablation 5/22/98 with placement of CPI #[id number] dual chamber pacemaker subsequently replaced with a single chamber medtronic #[id number] single chamber pacemaker 7/25/05    Cancer ([facility] [address] 1970's    colon cancer w/ resection    COPD     Depression     GERD (gastroesophageal reflux disease)     History of vascular access device 04/17/2020    4F MIDLINE PLACED BY EMIL Verma RN LEFT BRACHIAL, 13CM    Hyperlipidemia     Hypertension     Ischemic cardiomyopathy     Migraine headache     Orthostatic hypotension     RADHA (obstructive sleep apnea)     Pacemaker 5/22/98    AV node ablation /pacemaker placement utilizing CPI # [id number] dual chamber system, medtronic #[id number] single chamber device placed 7/22/05    Pulmonary hypertension ([facility] [address] 9/26/2011    RVSP 50 on echo 8/14    Thyroid disease     Valvular heart disease     mild-mod MR/TR     Past Surgical History:   Procedure Laterality Date    BREAST SURGERY PROCEDURE UNLISTED      benign breast tumor excision right breast    HX BREAST BIOPSY Right 2002    neg; surgical bx    HX COLECTOMY  1974    colon    HX KNEE REPLACEMENT      right TKA    HX PACEMAKER      HX PACEMAKER PLACEMENT  05/2020    HX TUBAL LIGATION      IR KYPHOPLASTY LUMBAR  7/2/2019    VT INSJ ELTRD CAR BYRON SYS TM INSJ DFB/PM PLS GEN N/A 5/22/2020    Lv Lead Placement performed by Jeanine Lanier MD at [address] CATH LAB    VT REMVL PERM PM PLS GEN W/REPL PLSE GEN MULT LEAD N/A 5/22/2020    REMOVE & REPLACE PPM GEN BIV MULTI LEADS performed by Jeanine Lanier MD at [address] CATH LAB    TOTAL KNEE ARTHROPLASTY      total on R, partial on L       Expanded or extensive additional review of patient history:     Home Situation  Home Environment: Apartment  # Steps to Enter: 0  One/Two Story Residence: One story  Living Alone: Yes  Support Systems: Child(luis fernando)  Patient Expects to be Discharged to[de-identified] Skilled nursing facility(room ready at [facility])  Current DME Used/Available at Home: Oxygen, portable, Wheelchair, Walker, rolling, Grab bars, Adaptive bathing aides, Adaptive dressing aides, Shower chair  Tub or Shower Type: Shower    Hand dominance: Left    EXAMINATION OF PERFORMANCE DEFICITS:  Cognitive/Behavioral Status:  Neurologic State: Alert;Drowsy  Orientation Level: Oriented X4  Cognition: Appropriate decision making; Appropriate for age attention/concentration; Appropriate safety awareness  Perception: Appears intact  Perseveration: No perseveration noted  Safety/Judgement: Insight into deficits; Awareness of environment    Hearing: Auditory  Auditory Impairment: Hard of hearing, bilateral    Vision/Perceptual:    Acuity: Within Defined Limits    Corrective Lenses: Reading glasses    Range of Motion:  AROM: Generally decreased, functional  PROM: Generally decreased, functional    Strength:  Strength: Generally decreased, functional     Coordination:  Fine Motor Skills-Upper: Left Impaired;Right Intact    Gross Motor Skills-Upper: Left Intact; Right Intact    Tone & Sensation:  Tone: Normal  Sensation: Intact    Balance:  Sitting: Without support  Sitting - Static: Good (unsupported)  Sitting - Dynamic: Fair (occasional)  Standing: With support  Standing - Static: Poor  Standing - Dynamic : Poor    Functional Mobility and Transfers for ADLs:  Bed Mobility:  Supine to Sit: Additional time;Minimum assistance;Bed Modified; Adaptive equipment  Scooting: Additional time;Contact guard assistance    Transfers:  Sit to Stand: Additional time; Moderate assistance;Assist x2  Stand to Sit: Additional time;Assist x2; Moderate assistance    ADL Assessment:  Feeding: Setup    Oral Facial Hygiene/Grooming: Setup    Bathing: Maximum assistance    Upper Body Dressing: Minimum assistance    Lower Body Dressing: Maximum assistance    Toileting: Maximum assistance    ADL Intervention and task modifications:  Pt outfitted with blue foam resistance block and deck of playing cards and was educated on bilateral integration, in-hand manipulation and digital flexion/extension challenges; good understanding verbalized.     Patient instructed and indicated understanding the benefits of maintaining activity tolerance, functional mobility, and independence with self care tasks during acute stay  to ensure safe return home and to baseline. Encouraged patient to increase frequency and duration OOB, be out of bed for all meals, perform daily ADLs (as approved by RN/MD regarding bathing etc), and performing functional mobility to/from [facility]. Cognitive Retraining  Safety/Judgement: Insight into deficits; Awareness of environment    Functional Measure:  Barthel Index:    Bathin  Bladder: 5  Bowels: 5  Groomin  Dressin  Feedin  Mobility: 0  Stairs: 0  Toilet Use: 5  Transfer (Bed to Chair and Back): 5  Total: 25/100        The Barthel ADL Index: Guidelines  1. The index should be used as a record of what a patient does, not as a record of what a patient could do. 2. The main aim is to establish degree of independence from any help, physical or verbal, however minor and for whatever reason. 3. The need for supervision renders the patient not independent. 4. A patient's performance should be established using the best available evidence. Asking the patient, friends/relatives and nurses are the usual sources, but direct observation and common sense are also important. However direct testing is not needed. 5. Usually the patient's performance over the preceding 24-48 hours is important, but occasionally longer periods will be relevant. 6. Middle categories imply that the patient supplies over 50 per cent of the effort. 7. Use of aids to be independent is allowed. Farhana Wilkins., Barthel, D.W. ([id number]). Functional evaluation: the Barthel Index. [address] [facility] (14)2. RADHA Stroud, Elige Heimlich., Christy Foy., Tristen, [address] [place] [address] (). Measuring the change indisability after inpatient rehabilitation; comparison of the responsiveness of the Barthel Index and Functional [place] Measure. Journal of Neurology, Neurosurgery, and Psychiatry, 66(4), 867-706.   Heather Lopez, CHANELLE, SARA Rodriguez, & Aurora Ascencio M.A. (2004.) Assessment of post-stroke quality of life in cost-effectiveness studies: The usefulness of the Barthel Index and the EuroQoL-5D. Quality of Life Research, 15, 376-19     Occupational Therapy Evaluation Charge Determination   History Examination Decision-Making   LOW Complexity : Brief history review  MEDIUM Complexity : 3-5 performance deficits relating to physical, cognitive , or psychosocial skils that result in activity limitations and / or participation restrictions LOW Complexity : No comorbidities that affect functional and no verbal or physical assistance needed to complete eval tasks       Based on the above components, the patient evaluation is determined to be of the following complexity level: LOW   Pain Rating:  3/10; nursing aware and following    Activity Tolerance:   Fair and requires rest breaks  Please refer to the flowsheet for vital signs taken during this treatment. After treatment patient left in no apparent distress:    Supine in bed, Call bell within reach, Bed / chair alarm activated, Caregiver / family present and Side rails x 3    COMMUNICATION/EDUCATION:   The patients plan of care was discussed with: Physical therapist and Registered nurse. Home safety education was provided and the patient/caregiver indicated understanding., Patient/family have participated as able in goal setting and plan of care. and Patient/family agree to work toward stated goals and plan of care. This patients plan of care is appropriate for delegation to [facility].     Thank you for this referral.  Trish Seals OT  Time Calculation: 34 mins

## 2020-07-09 NOTE — PROGRESS NOTES
Nate Whitaker [facility]  [address] [facility], [place], [address] [place] [address]  [phone]      Medical Progress Note      NAME: Kassie Harris   :  1932  MRM:  [id number]    Date/Time: 2020        Assessment / Plan:     LARISSA (acute kidney injury) / Dehydration / CKD (chronic kidney disease) stage 3:  improving on IVF, back to baseline. Decrease IVF. Diuretics on hold. ARB on hold     Nausea & vomiting: POA, unclear etiology. CT a/p showed no acute process. No UTI. Improving      Gangrene of foot: s/p recent BKA. Vascular surgery to follow up re: post-op care    Debility / frequent falls: PT/OT eval. DC planning. PT/OT recommend SNF placement    Restrictive lung disease / COPD (chronic obstructive pulmonary disease) with chronic respiratory failure with hypoxia: continue nebs PRN, ICS/LABA     Ischemic cardiomyopathy / Dilated cardiomyopathy / Mitral regurgitation / Hypertension: appears stable, continue Coreg, statin. ARB and diuretics on hold    Third degree AV block Biventricular cardiac pacemaker in situ / Atrial fibrillation s/p node ablation: continue Coreg and Eliquis     Type 2 diabetes with nephropathy and peripheral vascular disease: A1c showed good control at 6.7. SSI     Anxiety and depression: continue Zoloft     Hypothyroid: check TSH, continue LT4     Mixed hyperlipidemia: continue atorvastatin     Psoriasis: Not on meds    Total time spent: 35 minutes  Time spent in the care of this patient including reviewing records, discussing with nursing and/or other providers on the treatment team, obtaining history and examining the patient, and discussing treatment plans.                   Care Plan discussed with: Patient, Nursing Staff and >50% of time spent in counseling and coordination of care    Discussed:  Care Plan and D/C Planning    Prophylaxis:  Eliquis    Disposition:  SNF/LTC         Subjective:     Chief Complaint:  Follow up n/v    Chart/notes/labs/studies reviewed, patient examined. Feels better. Less n/v. No f/c          Objective:       Vitals:        Last 24hrs VS reviewed since prior progress note. Most recent are:    Visit Vitals  /79 (BP 1 Location: Right arm, BP Patient Position: At rest)   Pulse 82   Temp 97.7 °F (36.5 °C)   Resp 17   Ht 5' 3\" (1.6 m)   Wt 79.4 kg (175 lb)   SpO2 92%   Breastfeeding No   BMI 31.00 kg/m²     SpO2 Readings from Last 6 Encounters:   07/09/20 92%   06/15/20 98%   06/04/20 96%   05/23/20 97%   05/21/20 96%   05/14/20 93%    O2 Flow Rate (L/min): 2 l/min(PM only )       Intake/Output Summary (Last 24 hours) at 7/9/2020 1632  Last data filed at 7/9/2020 0454  Gross per 24 hour   Intake 2000 ml   Output 700 ml   Net 1300 ml          Exam:     Physical Exam:    Gen:  Elderly, frail, chronically ill-appearing. NAD. Daughter at bedside  HEENT:  Sclerae nonicteric, hearing intact to voice, mucous membranes moist  Neck:  Supple, without masses. Resp:  No accessory muscle use, CTAB without wheezes, rales, or rhonchi  Card: RRR, without m/r/g. No LE edema. Abd:  +bowel sounds, soft, NTTP, nondistended. Neuro: Face symmetric, tongue midline, speech fluent, follows commands appropriately  Psych:  Alert, oriented x 3.  Fair insight     Medications Reviewed: (see below)    Lab Data Reviewed: (see below)    ______________________________________________________________________    Medications:     Current Facility-Administered Medications   Medication Dose Route Frequency    sodium chloride (NS) flush 5-40 mL  5-40 mL IntraVENous Q8H    sodium chloride (NS) flush 5-40 mL  5-40 mL IntraVENous PRN    acetaminophen (TYLENOL) tablet 650 mg  650 mg Oral Q4H PRN    ondansetron (ZOFRAN) injection 4 mg  4 mg IntraVENous Q6H PRN    0.9% sodium chloride infusion  50 mL/hr IntraVENous CONTINUOUS    apixaban (ELIQUIS) tablet 5 mg  5 mg Oral BID    carvediloL (COREG) tablet 3.125 mg  3.125 mg Oral BID WITH MEALS    levothyroxine (SYNTHROID) tablet 100 mcg 100 mcg Oral ACB    therapeutic multivitamin (THERAGRAN) tablet 1 Tab  1 Tab Oral DAILY    sertraline (ZOLOFT) tablet 75 mg  75 mg Oral DAILY    ipratropium (ATROVENT) 0.02 % nebulizer solution 0.5 mg  0.5 mg Nebulization Q4H PRN    oxyCODONE-acetaminophen (PERCOCET) 5-325 mg per tablet 1 Tab  1 Tab Oral Q4H PRN    pantoprazole (PROTONIX) tablet 40 mg  40 mg Oral ACB    atorvastatin (LIPITOR) tablet 10 mg  10 mg Oral DAILY    chlorhexidine (PERIDEX) 0.12 % mouthwash 15 mL  15 mL Oral BIDPC    polyethylene glycol (MIRALAX) packet 17 g  17 g Oral DAILY    gabapentin (NEURONTIN) capsule 100 mg  100 mg Oral Q12H    senna-docusate (PERICOLACE) 8.6-50 mg per tablet 2 Tab  2 Tab Oral BID PRN    arformoteroL (BROVANA) neb solution 15 mcg  15 mcg Nebulization BID RT    budesonide (PULMICORT) 500 mcg/2 ml nebulizer suspension  500 mcg Nebulization BID RT    albuterol-ipratropium (DUO-NEB) 2.5 MG-0.5 MG/3 ML  3 mL Nebulization Q6H PRN            Lab Review:     Recent Labs     07/09/20  0204 07/07/20  2152   WBC 7.8 8.5   HGB 10.9* 12.3   HCT 34.6* 38.3    223     Recent Labs     07/09/20  0204 07/07/20  2152    138   K 4.2 4.9    105   CO2 26 27   * 138*   BUN 16 25*   CREA 0.74 1.21*   CA 8.6 8.3*   MG 1.4*  --    PHOS 2.7  --    ALB 2.6* 2.8*   ALT 27 29   INR  --  1.4*     No components found for: GLPOC  No results for input(s): PH, PCO2, PO2, HCO3, FIO2 in the last 72 hours.   Recent Labs     07/07/20  2152   INR 1.4*     Lab Results   Component Value Date/Time    Specimen Description: URINE 01/17/2014 10:02 AM     Lab Results   Component Value Date/Time    Culture result: NO GROWTH 5 DAYS 06/04/2020 05:30 PM    Culture result: NO GROWTH 6 DAYS 04/15/2020 05:05 PM    Culture result: NO GROWTH 6 DAYS 04/15/2020 03:58 PM              ___________________________________________________    Attending Physician: Jaime William MD

## 2020-07-10 VITALS
OXYGEN SATURATION: 95 % | BODY MASS INDEX: 31.01 KG/M2 | WEIGHT: 175 LBS | TEMPERATURE: 98.3 F | RESPIRATION RATE: 16 BRPM | DIASTOLIC BLOOD PRESSURE: 67 MMHG | HEART RATE: 80 BPM | SYSTOLIC BLOOD PRESSURE: 139 MMHG | HEIGHT: 63 IN

## 2020-07-10 LAB
ALBUMIN SERPL-MCNC: 2.5 G/DL (ref 3.5–5)
ALBUMIN/GLOB SERPL: 0.6 {RATIO} (ref 1.1–2.2)
ALP SERPL-CCNC: 149 U/L (ref 45–117)
ALT SERPL-CCNC: 21 U/L (ref 12–78)
ANION GAP SERPL CALC-SCNC: 6 MMOL/L (ref 5–15)
AST SERPL-CCNC: 28 U/L (ref 15–37)
BASOPHILS # BLD: 0 K/UL (ref 0–0.1)
BASOPHILS NFR BLD: 0 % (ref 0–1)
BILIRUB SERPL-MCNC: 0.5 MG/DL (ref 0.2–1)
BUN SERPL-MCNC: 12 MG/DL (ref 6–20)
BUN/CREAT SERPL: 20 (ref 12–20)
CALCIUM SERPL-MCNC: 9 MG/DL (ref 8.5–10.1)
CHLORIDE SERPL-SCNC: 109 MMOL/L (ref 97–108)
CO2 SERPL-SCNC: 24 MMOL/L (ref 21–32)
CREAT SERPL-MCNC: 0.6 MG/DL (ref 0.55–1.02)
DIFFERENTIAL METHOD BLD: ABNORMAL
EOSINOPHIL # BLD: 0.2 K/UL (ref 0–0.4)
EOSINOPHIL NFR BLD: 3 % (ref 0–7)
ERYTHROCYTE [DISTWIDTH] IN BLOOD BY AUTOMATED COUNT: 13.7 % (ref 11.5–14.5)
GLOBULIN SER CALC-MCNC: 3.9 G/DL (ref 2–4)
GLUCOSE SERPL-MCNC: 125 MG/DL (ref 65–100)
HCT VFR BLD AUTO: 33.8 % (ref 35–47)
HGB BLD-MCNC: 10.6 G/DL (ref 11.5–16)
IMM GRANULOCYTES # BLD AUTO: 0 K/UL (ref 0–0.04)
IMM GRANULOCYTES NFR BLD AUTO: 0 % (ref 0–0.5)
LYMPHOCYTES # BLD: 1 K/UL (ref 0.8–3.5)
LYMPHOCYTES NFR BLD: 14 % (ref 12–49)
MAGNESIUM SERPL-MCNC: 1.7 MG/DL (ref 1.6–2.4)
MCH RBC QN AUTO: 29.2 PG (ref 26–34)
MCHC RBC AUTO-ENTMCNC: 31.4 G/DL (ref 30–36.5)
MCV RBC AUTO: 93.1 FL (ref 80–99)
MONOCYTES # BLD: 0.5 K/UL (ref 0–1)
MONOCYTES NFR BLD: 8 % (ref 5–13)
NEUTS SEG # BLD: 5.1 K/UL (ref 1.8–8)
NEUTS SEG NFR BLD: 75 % (ref 32–75)
NRBC # BLD: 0 K/UL (ref 0–0.01)
NRBC BLD-RTO: 0 PER 100 WBC
PHOSPHATE SERPL-MCNC: 3.2 MG/DL (ref 2.6–4.7)
PLATELET # BLD AUTO: 180 K/UL (ref 150–400)
PMV BLD AUTO: 11.7 FL (ref 8.9–12.9)
POTASSIUM SERPL-SCNC: 4.4 MMOL/L (ref 3.5–5.1)
PROT SERPL-MCNC: 6.4 G/DL (ref 6.4–8.2)
RBC # BLD AUTO: 3.63 M/UL (ref 3.8–5.2)
SARS-COV-2, COV2: NOT DETECTED
SODIUM SERPL-SCNC: 139 MMOL/L (ref 136–145)
SOURCE, COVRS: NORMAL
SPECIMEN SOURCE, FCOV2M: NORMAL
T4 FREE SERPL-MCNC: 1.1 NG/DL (ref 0.8–1.5)
TSH SERPL DL<=0.05 MIU/L-ACNC: 4.14 UIU/ML (ref 0.36–3.74)
WBC # BLD AUTO: 6.8 K/UL (ref 3.6–11)

## 2020-07-10 PROCEDURE — 84443 ASSAY THYROID STIM HORMONE: CPT

## 2020-07-10 PROCEDURE — 85025 COMPLETE CBC W/AUTO DIFF WBC: CPT

## 2020-07-10 PROCEDURE — 65270000029 HC RM PRIVATE

## 2020-07-10 PROCEDURE — 80053 COMPREHEN METABOLIC PANEL: CPT

## 2020-07-10 PROCEDURE — 36415 COLL VENOUS BLD VENIPUNCTURE: CPT

## 2020-07-10 PROCEDURE — 74011250637 HC RX REV CODE- 250/637: Performed by: INTERNAL MEDICINE

## 2020-07-10 PROCEDURE — 99218 HC RM OBSERVATION: CPT

## 2020-07-10 PROCEDURE — 97535 SELF CARE MNGMENT TRAINING: CPT

## 2020-07-10 PROCEDURE — 94664 DEMO&/EVAL PT USE INHALER: CPT

## 2020-07-10 PROCEDURE — 94640 AIRWAY INHALATION TREATMENT: CPT

## 2020-07-10 PROCEDURE — 97110 THERAPEUTIC EXERCISES: CPT

## 2020-07-10 PROCEDURE — 97530 THERAPEUTIC ACTIVITIES: CPT

## 2020-07-10 PROCEDURE — 94760 N-INVAS EAR/PLS OXIMETRY 1: CPT

## 2020-07-10 PROCEDURE — 83735 ASSAY OF MAGNESIUM: CPT

## 2020-07-10 PROCEDURE — 84100 ASSAY OF PHOSPHORUS: CPT

## 2020-07-10 PROCEDURE — 74011000250 HC RX REV CODE- 250: Performed by: INTERNAL MEDICINE

## 2020-07-10 PROCEDURE — 74011250636 HC RX REV CODE- 250/636: Performed by: INTERNAL MEDICINE

## 2020-07-10 PROCEDURE — 84439 ASSAY OF FREE THYROXINE: CPT

## 2020-07-10 RX ORDER — SODIUM CHLORIDE 9 MG/ML
50 INJECTION, SOLUTION INTRAVENOUS CONTINUOUS
Status: DISCONTINUED | OUTPATIENT
Start: 2020-07-10 | End: 2020-07-10 | Stop reason: HOSPADM

## 2020-07-10 RX ORDER — NALOXONE HYDROCHLORIDE 4 MG/.1ML
SPRAY NASAL
Qty: 1 EACH | Refills: 0 | Status: ON HOLD | OUTPATIENT
Start: 2020-07-10 | End: 2020-10-09

## 2020-07-10 RX ORDER — GABAPENTIN 100 MG/1
100 CAPSULE ORAL EVERY 12 HOURS
Qty: 60 CAP | Refills: 0 | Status: SHIPPED | OUTPATIENT
Start: 2020-07-10 | End: 2021-03-04 | Stop reason: ALTCHOICE

## 2020-07-10 RX ORDER — OXYCODONE HYDROCHLORIDE 5 MG/1
5 TABLET ORAL
Qty: 15 TAB | Refills: 0 | Status: SHIPPED | OUTPATIENT
Start: 2020-07-10 | End: 2020-07-15

## 2020-07-10 RX ORDER — DOXYCYCLINE 100 MG/1
100 TABLET ORAL 2 TIMES DAILY
Qty: 14 TAB | Refills: 0 | Status: SHIPPED | OUTPATIENT
Start: 2020-07-10 | End: 2020-07-17

## 2020-07-10 RX ORDER — DOXYCYCLINE 100 MG/1
100 TABLET ORAL 2 TIMES DAILY
Qty: 14 TAB | Refills: 0 | Status: SHIPPED | OUTPATIENT
Start: 2020-07-10 | End: 2020-07-10 | Stop reason: SDUPTHER

## 2020-07-10 RX ADMIN — CHLORHEXIDINE GLUCONATE 15 ML: 1.2 RINSE ORAL at 10:20

## 2020-07-10 RX ADMIN — BUDESONIDE 500 MCG: 0.5 INHALANT RESPIRATORY (INHALATION) at 19:27

## 2020-07-10 RX ADMIN — THERA TABS 1 TABLET: TAB at 08:23

## 2020-07-10 RX ADMIN — ARFORMOTEROL TARTRATE 15 MCG: 15 SOLUTION RESPIRATORY (INHALATION) at 08:17

## 2020-07-10 RX ADMIN — OXYCODONE AND ACETAMINOPHEN 1 TABLET: 5; 325 TABLET ORAL at 14:25

## 2020-07-10 RX ADMIN — POLYETHYLENE GLYCOL 3350 17 G: 17 POWDER, FOR SOLUTION ORAL at 08:22

## 2020-07-10 RX ADMIN — ARFORMOTEROL TARTRATE 15 MCG: 15 SOLUTION RESPIRATORY (INHALATION) at 19:27

## 2020-07-10 RX ADMIN — CHLORHEXIDINE GLUCONATE 15 ML: 1.2 RINSE ORAL at 17:19

## 2020-07-10 RX ADMIN — GABAPENTIN 100 MG: 100 CAPSULE ORAL at 08:22

## 2020-07-10 RX ADMIN — Medication 10 ML: at 06:02

## 2020-07-10 RX ADMIN — BUDESONIDE 500 MCG: 0.5 INHALANT RESPIRATORY (INHALATION) at 08:17

## 2020-07-10 RX ADMIN — SODIUM CHLORIDE 50 ML/HR: 900 INJECTION, SOLUTION INTRAVENOUS at 10:21

## 2020-07-10 RX ADMIN — ACETAMINOPHEN 650 MG: 325 TABLET ORAL at 17:18

## 2020-07-10 RX ADMIN — SERTRALINE HYDROCHLORIDE 75 MG: 50 TABLET ORAL at 08:22

## 2020-07-10 RX ADMIN — OXYCODONE AND ACETAMINOPHEN 1 TABLET: 5; 325 TABLET ORAL at 08:23

## 2020-07-10 RX ADMIN — ATORVASTATIN CALCIUM 10 MG: 10 TABLET, FILM COATED ORAL at 08:22

## 2020-07-10 RX ADMIN — CARVEDILOL 3.12 MG: 3.12 TABLET, FILM COATED ORAL at 17:18

## 2020-07-10 RX ADMIN — Medication 10 ML: at 14:00

## 2020-07-10 RX ADMIN — CARVEDILOL 3.12 MG: 3.12 TABLET, FILM COATED ORAL at 08:22

## 2020-07-10 RX ADMIN — PANTOPRAZOLE SODIUM 40 MG: 40 TABLET, DELAYED RELEASE ORAL at 06:07

## 2020-07-10 RX ADMIN — OXYCODONE AND ACETAMINOPHEN 1 TABLET: 5; 325 TABLET ORAL at 19:56

## 2020-07-10 RX ADMIN — APIXABAN 5 MG: 5 TABLET, FILM COATED ORAL at 08:23

## 2020-07-10 RX ADMIN — LEVOTHYROXINE SODIUM 100 MCG: 0.1 TABLET ORAL at 06:07

## 2020-07-10 RX ADMIN — APIXABAN 5 MG: 5 TABLET, FILM COATED ORAL at 17:18

## 2020-07-10 NOTE — PROGRESS NOTES
Rediscussed with patient and daughter  She needs AKA   Not bad enough to force the issue  Will see in office in a week  Sooner for acute changes or problems.

## 2020-07-10 NOTE — PROGRESS NOTES
7/10/2020  RUR:  31%  Risk Level: []Low []Moderate [x]High  Value-based purchasing: [] Yes [x] No  Bundle patient: [] Yes [x] No   Specify:     Transition of care plan:  1. DC planned for today and DC order submitted. Vascular to follow up with pt outpatient for further planning of surgery. 2. Return to [facility]. [facility] accepted pt for return. 3. Outpatient follow-up.   4. [facility] ambulance transport scheduled for 6-6:30 PM.

## 2020-07-10 NOTE — PROGRESS NOTES
Problem: Pressure Injury - Risk of  Goal: *Prevention of pressure injury  Description: Document Deric Scale and appropriate interventions in the flowsheet.   Outcome: Progressing Towards Goal  Note: Pressure Injury Interventions:  Sensory Interventions: Assess changes in LOC, Discuss PT/OT consult with provider, Float heels, Keep linens dry and wrinkle-free    Moisture Interventions: Absorbent underpads    Activity Interventions: Increase time out of bed, Pressure redistribution bed/mattress(bed type)    Mobility Interventions: Pressure redistribution bed/mattress (bed type), HOB 30 degrees or less, PT/OT evaluation    Nutrition Interventions: Document food/fluid/supplement intake    Friction and Shear Interventions: HOB 30 degrees or less, Lift team/patient mobility team     Problem: Patient Education: Go to Patient Education Activity  Goal: Patient/Family Education  Outcome: Progressing Towards Goal

## 2020-07-10 NOTE — PROGRESS NOTES
7/10/2020  3:50 PM  Transition of Care Plan to SNF/Rehab    SNF/Rehab Transition:  [facility] has been accepted to [facility] and meets criteria for admission. CM confirmed with admissions with [facility] that pt's prior 3 night inpatient stay qualifies pt for return to SNF. Patient will transported by [facility] ambulance transport and expected to leave at 6:00-6:30 pm.    Communication to Patient/Family:  Met with patient and pt's DTR [facility]) and they are agreeable to the transition plan. Communication to SNF/Rehab:  Bedside RN has been notified to update the transition plan to the facility and call report (phone number [phone], ). Discharge information has been updated on the AVS.     Discharge instructions to be fax'd to facility at [facility] # [phone]). Patient has not been identified as part of the BCPI-A Program.      Nursing Please include all hard scripts for controlled substances, med rec and dc summary, and AVS in packet. SNF/Rehab Transition:  Patient to follow-up with Home Health if recommended at time of DC. PCP/Specialist: Aditya Carpenter    Reviewed and confirmed with facility, [facility], they can manage the patient care needs for the following:     Cyrus  with (X) only those applicable:    Medication:  [x]  Medications will be available at the facility  []  IV Antibiotics   [x]  Controlled Substance - hard copy to be sent with patient   []  Weekly Labs   Documents:  [x] Hard RX  [] MAR  [] Kardex  [] AVS  []Transfer Summary  []Discharge   Equipment:  []  CPAP/BiPAP  []  Wound Vacuum  []  Dahl or Urinary Device  []  PICC/Central Line  []  Nebulizer  []  Ventilator   Treatment:  []Isolation (for MRSA, VRE, etc.)  []Surgical Drain Management  []Tracheostomy Care  [x]Dressing Changes  []Dialysis with transportation and chair time.   []PEG Care  []Oxygen  []Daily Weights for Heart Failure   Dietary:  []Any diet limitations  []Tube Feedings   []Total Parenteral Management (TPN)   Eligible for Medicaid Long Term Services and Supports  Yes:  [] Eligible for medical assistance or will become eligible within 180 days and UAI completed. [] Provider/Patient and/or support system has requested screening. [] UAI copy provided to patient or responsible party. [] UAI unavailable at discharge will send once processed to SNF provider. [] UAI unavailable at discharged mailed to patient  No:   [x] Private pay and is not financially eligible for Medicaid within the next 180 days. [] Reside out-of-state. [] A residents of a state owned/operated facility that is licensed  by [facility] or [facility]  [] Enrollment in [facility] hospice services  [] [address]  [] Patient /Family declines to have screening completed or provide financial information for screening     Financial Resources:  Medicaid    [] Initiated and application pending   [] Full coverage     Advanced Care Plan:  []Surrogate Decision Maker of Care  []POA  [x]Communicated Code Status  (DDNR\", \"Full\")    Other           Care Management Interventions  PCP Verified by CM: Yes(Josiane)  Mode of Transport at Discharge: Other (see comment)(Family)  MyChart Signup: No  Discharge Durable Medical Equipment: No  Physical Therapy Consult: Yes  Occupational Therapy Consult: No  Speech Therapy Consult: No  Current Support Network: Has Personal Caregivers, Nursing Facility  Confirm Follow Up Transport: Family  The Plan for Transition of Care is Related to the Following Treatment Goals : LARISSA.  Stump infection  The Patient and/or Patient Representative was Provided with a Choice of Provider and Agrees with the Discharge Plan?: (S) Yes  Name of the Patient Representative Who was Provided with a Choice of Provider and Agrees with the Discharge Plan: Self  Freedom of Choice List was Provided with Basic Dialogue that Supports the Patient's Individualized Plan of Care/Goals, Treatment Preferences and Shares the Quality Data Associated with the Providers?: (S) Yes  Discharge Location  Discharge Placement: Skilled nursing facility

## 2020-07-10 NOTE — DISCHARGE SUMMARY
Physician Discharge Summary     Patient ID:  Faustina Vu  [id number]  82 y.o.  2/6/1932    Admit date: 7/7/2020    Discharge date: 7/10/2020    Admission Diagnoses: LARISSA (acute kidney injury) ([address] [N17.9]  Nausea & vomiting [R11.2]    Principal Discharge Diagnoses:    LARISSA      OTHER PROBLEMS ADDRESSEDS  Principal Diagnosis <principal problem not specified>                                            Active Problems:    Mixed hyperlipidemia (10/18/2010)      Atrial fibrillation (HCC) ()      Overview: av node ablation 5/22/98 with placement of CPI #[id number] dual chamber       pacemaker subsequently replaced with a single chamber medtronic #[id number]       single chamber pacemaker 7/25/05      Mitral regurgitation (10/21/2010)      Third degree AV block ([address] (10/21/2010)      Anxiety and depression (2/26/2014)      Hypothyroid (2/26/2014)      CKD (chronic kidney disease) stage 3, GFR 30-59 ml/min ([address] (2/9/2015)      Osteopenia (11/23/2015)      Restrictive lung disease (12/14/2015)      Ischemic cardiomyopathy ()      Psoriasis (3/23/2017)      Pacemaker (7/24/2017)      Frequent falls (1/13/2019)      COPD (chronic obstructive pulmonary disease) ([address] (1/13/2019)      Chronic respiratory failure with hypoxia ([address] (1/13/2019)      Hypertension (6/29/2019)      Biventricular cardiac pacemaker in situ (5/22/2020)      Dilated cardiomyopathy ([address] (5/22/2020)      Type 2 diabetes mellitus without complication, unspecified whether long term insulin use ([address] (6/4/2020)      Type 2 diabetes with nephropathy ([address] (6/4/2020)      Type 2 diabetes mellitus with peripheral vascular disease ([address] (6/4/2020)      Gangrene of foot ([address] (6/5/2020)      LARISSA (acute kidney injury) ([address] (7/8/2020)      Nausea & vomiting (7/8/2020)      Dehydration (7/8/2020)       Patient Active Problem List   Diagnosis Code    Mixed hyperlipidemia E78.2    Atrial fibrillation ([address] I48.91    Mitral regurgitation I34.0    Third degree AV block ([facility]) I44.2    Anxiety and depression F41.9, F32.9    Hypothyroid E03.9    CKD (chronic kidney disease) stage 3, GFR 30-59 ml/min ([facility]) N18.3    Osteopenia M85.80    Restrictive lung disease J98.4    Ischemic cardiomyopathy I25.5    Psoriasis L40.9    Pacemaker Z95.0    Frequent falls R29.6    COPD (chronic obstructive pulmonary disease) ([facility]) J44.9    Chronic respiratory failure with hypoxia ([facility]) J96.11    Hypertension I10    Biventricular cardiac pacemaker in situ Z95.0    Dilated cardiomyopathy ([facility]) I42.0    Type 2 diabetes mellitus without complication, unspecified whether long term insulin use ([facility]) E11.9    Type 2 diabetes with nephropathy ([facility]) E11.21    Type 2 diabetes mellitus with peripheral vascular disease ([facility]) E11.51    Gangrene of foot ([facility] I96    LARISSA (acute kidney injury) ([facility] N17.9    Nausea & vomiting R11.2    Dehydration E86.0         Hospital Course:   Ms. Skinny Moncada is a pleasant 81 yo F w/ hx of ICM, DM, PAD presenting with n/v, weakness, found to have LARISSA    LARISSA (acute kidney injury) / Dehydration / CKD (chronic kidney disease) stage 3: improved on IVF, back to baseline.      Nausea & vomiting: POA, unclear etiology. CT a/p showed no acute process. No UTI. Resolved      Gangrene of foot: s/p recent BKA. Lengthy discussion with vascular surgery and daughter at bedside. Vascular surgery cleared for discharge, advising need for AKA to be scheduled later on. Will DC home on PO abx. Red flags/return precautions provided      Debility / frequent falls: PT/OT eval and treat. For SNF placement     Restrictive lung disease / COPD (chronic obstructive pulmonary disease) with chronic respiratory failure with hypoxia: continue bronchodilators, ICS/LABA     Ischemic cardiomyopathy / Dilated cardiomyopathy / Mitral regurgitation / Hypertension: appears stable, continue Coreg, statin.  ARB and diuretics     Third degree AV block Biventricular cardiac pacemaker in situ / Atrial fibrillation s/p node ablation: continue Coreg and Eliquis     Type 2 diabetes with nephropathy and peripheral vascular disease: A1c showed good control at 6.7. SSI     Anxiety and depression: continue Zoloft     Hypothyroid: TSH borderline high however FT4 WNR, continue LT4     Mixed hyperlipidemia: continue atorvastatin     Psoriasis: Not on meds    Pt discharged in improved and stable condition. Procedures performed: see above    Imaging studies: see above  Ct Abd Pelv Wo Cont    Result Date: 7/7/2020  IMPRESSION: No acute process in the abdomen and pelvis. PCP: Trevon Quevedo MD    Consults: Vascular Surgery    Discharge Exam:  GEN: NAD  CV: RRR  RESP: CTAB  SKIN: mild redness around stump incision. Minimal drainage. [facility]    Patient Instructions:   Current Discharge Medication List      START taking these medications    Details   doxycycline (ADOXA) 100 mg tablet Take 1 Tab by mouth two (2) times a day for 7 days. Qty: 14 Tab, Refills: 0         CONTINUE these medications which have NOT CHANGED    Details   acetaminophen (TYLENOL) 500 mg tablet Take 1,000 mg by mouth every eight (8) hours. multivitamin (ONE A DAY) tablet Take 1 Tab by mouth daily. senna-docusate (PERICOLACE) 8.6-50 mg per tablet Take 2 Tabs by mouth two (2) times daily as needed for Constipation. loperamide (IMODIUM) 1 mg/5 mL solution Take 1.5 tsp by mouth every eight (8) hours as needed for Diarrhea. atorvastatin (LIPITOR) 10 mg tablet Take 10 mg by mouth daily. honey (MediHoney, honey,) 100 % topical paste Apply  to affected area every Monday, Wednesday, Friday. Apply to left buttocks      chlorhexidine (Peridex) 0.12 % solution 15 mL by Swish and Spit route two (2) times daily (with meals). benzocaine (ORAJEL) 20 % gel topical gel Apply  to affected area four (4) times daily as needed.  Apply to gums      oxyCODONE IR (ROXICODONE) 5 mg immediate release tablet Take 5 mg by mouth every four (4) hours as needed for Pain (moderate-severe pain). polyethylene glycol (Miralax) 17 gram packet Take 17 g by mouth daily. gabapentin (NEURONTIN) 100 mg capsule Take 100 mg by mouth every twelve (12) hours. pantoprazole (PROTONIX) 40 mg tablet Take 40 mg by mouth daily. povidone-iodine (Betadine) 10 % external solution Apply  to affected area daily. Apply to right BKA      alum-mag hydroxide-simeth (Maalox Advanced) 200-200-20 mg/5 mL susp Take 30 mL by mouth every four (4) hours as needed for Indigestion. levothyroxine (SYNTHROID) 100 mcg tablet Take 1 Tab by mouth Daily (before breakfast). Qty: 90 Tab, Refills: 1      sertraline (ZOLOFT) 50 mg tablet Take 1.5 Tabs by mouth daily. Qty: 135 Tab, Refills: 1    Associated Diagnoses: Anxiety and depression      furosemide (LASIX) 40 mg tablet Take one tab by mouth daily or as advised by physician  Qty: 90 Tab, Refills: 0    Associated Diagnoses: Ischemic cardiomyopathy      losartan (COZAAR) 25 mg tablet Take 1 Tab by mouth daily. Indications: chronic heart failure  Qty: 30 Tab, Refills: 3      carvediloL (COREG) 3.125 mg tablet TAKE 1 TABLET BY MOUTH TWICE A DAY WITH MEALS  Qty: 180 Tab, Refills: 1    Associated Diagnoses: Atrial fibrillation, unspecified type (HCC)      apixaban (Eliquis) 5 mg tablet Take 1 Tab by mouth two (2) times a day. Qty: 180 Tab, Refills: 1    Associated Diagnoses: Atrial fibrillation, unspecified type (HCC)      lidocaine (LIDODERM) 5 % 1 Patch by TransDERmal route daily as needed. Apply patch to the affected area for 12 hours a day and remove for 12 hours a day. Apply to lower back      albuterol (PROVENTIL HFA, VENTOLIN HFA, PROAIR HFA) 90 mcg/actuation inhaler Take 1 Puff by inhalation every six (6) hours as needed for Wheezing. ADVAIR DISKUS 250-50 mcg/dose diskus inhaler Take 1 Puff by inhalation two (2) times a day.     Associated Diagnoses: Atrial fibrillation (HCC)      tiotropium (SPIRIVA WITH HANDIHALER) 18 mcg inhalation capsule Take 1 Cap by inhalation daily. Associated Diagnoses: Atrial fibrillation ([address] Complete heart block (HCC)         STOP taking these medications       baclofen (LIORESAL) 10 mg tablet Comments:   Reason for Stopping:         multivitamins (CHEWABLE-AMOS) chew Comments:   Reason for Stopping:               Activity: See discharge instructions  Diet: See discharge instructions  Wound Care: See discharge instructions    Follow-up Information     Follow up With Specialties Details Why Riki Zendejas MD Internal Medicine In 3 days  [place] [address] Huber Duenas MD General and Vascular Surgery In 7 days  [address] Corene Habermann [address] [phone]            I spent 35 minutes on this discharge.     Signed:  Crissy Arambula MD  7/10/2020  8:40 AM    CC: PCP Dr. Minor Garcia

## 2020-07-10 NOTE — PHYSICIAN ADVISORY
Letter of Status Determination:  
Recommend hospitalization status upgraded from OBSERVATION  to INPATIENT  Status Pt Name:  Cammy Saha MR#  
MARBELLA # [id number] / 
[id number] Payor: Ronn Mendes / Plan: [address] / Product Type: Medicare /   
BINA#  [id number] Room and Hospital  410/01  @ [facility] Hospitalization date  7/7/2020  8:44 PM  
Current Attending Physician  Neo Benjamin MD  
Principal diagnosis  LARISSA Clinicals An 31-year-old female with a past medical history significant for chronic atrial fibrillation, resection of colon cancer, COPD, primary hypertension, dyslipidemia, depression, ischemic cardiomyopathy, pulmonary hypertension, thyroid disease, and status post recent right below-knee amputation for gangrene was brought to the emergency room from the [facility] for complaints of nausea, vomiting, left-sided abdominal pain, and documented low blood pressure readings which started around dinnertime on 07/07/2020 PT w/ LARISSA, on IVF, N/V, s/p recent BKA, may need additional surgical intervention [place] (MCG) criteria Does  NO apply M-[id number] STATUS DETERMINATION  inpatient The final decision of the patient's hospitalization status depends on the attending physician's judgment Additional comments Payor: Ronn Mendes / Plan: [address] / Product Type: Medicare /   
  
 
Marek Marquez MD 
Cell: [phone] Physician Advisor

## 2020-07-10 NOTE — PROGRESS NOTES
Problem: Mobility Impaired (Adult and Pediatric)  Goal: *Acute Goals and Plan of Care (Insert Text)  Description: FUNCTIONAL STATUS PRIOR TO ADMISSION: Pt admitted from SNF where she was receiving rehab for approximately 3 weeks s/p R BKA. Prior to that time pt lived in [address] [place] [address]. Prior to admission pt reportedly performed assisted bed to/from wheelchair transfers and was not ambulating. HOME SUPPORT PRIOR TO ADMISSION: Pt received assistance from SNF staff for all transfers. Physical Therapy Goals  Initiated 7/9/2020  1. Patient will move from supine to sit and sit to supine  in bed with modified independence within 7 day(s). 2.  Patient will transfer from bed to chair and chair to bed with minimal assistance/contact guard assist using the least restrictive device within 7 day(s). 3.  Patient will perform sit to stand with minimal assistance/contact guard assist within 7 day(s). 4.  Patient will ambulate with minimal assistance/contact guard assist for 5 feet with the least restrictive device within 7 day(s). 5.  Patient will demonstrate good dynamic sitting balance x 5 mins within 7 days. Outcome: Progressing Towards Goal  Note:   PHYSICAL THERAPY TREATMENT  Patient: Ritu Workman (89 y.o. female)  Date: 7/10/2020  Diagnosis: LARISSA (acute kidney injury) ([facility] [N17.9]  Nausea & vomiting [R11.2]  LARISSA (acute kidney injury) ([facility] [N17.9]   <principal problem not specified>       Precautions: Fall  Chart, physical therapy assessment, plan of care and goals were reviewed. ASSESSMENT  Patient continues with skilled PT services and progressing towards goals. Agreeable to participate with therapy, daughter at bedside. Reviewed bed level exercise. Min /Mod A x2 for sit<>stand using RW, verbal cues for upright posture. Fatigues quickly with activity. Pt requesting to return to bed d/t fatigue and pain. Current Level of Function Impacting Discharge (mobility/balance):  Mod A x 2    Other factors to consider for discharge:          PLAN :  Patient continues to benefit from skilled intervention to address the above impairments. Continue treatment per established plan of care. to address goals. Recommendation for discharge: (in order for the patient to meet his/her long term goals)  Therapy up to 5 days/week in SNF setting    This discharge recommendation:  Has been made in collaboration with the attending provider and/or case management    IF patient discharges home will need the following DME: to be determined (TBD)       SUBJECTIVE:   Patient stated i am a little tired.     OBJECTIVE DATA SUMMARY:   Critical Behavior:  Neurologic State: Alert  Orientation Level: Oriented X4  Cognition: Appropriate for age attention/concentration, Follows commands  Safety/Judgement: Insight into deficits, Awareness of environment  Functional Mobility Training:  Bed Mobility:     Supine to Sit: Contact guard assistance; Additional time;Assist x1  Sit to Supine: Contact guard assistance           Transfers:  Sit to Stand: Moderate assistance;Assist x2  Stand to Sit: Moderate assistance;Assist x2                             Balance:  Sitting: With support  Sitting - Static: Good (unsupported)  Sitting - Dynamic: Fair (occasional)  Standing: With support; Impaired  Standing - Static: Constant support; Fair  Ambulation/Gait Training:                                                        Stairs: Therapeutic Exercises:     Pain Ratin/10    Activity Tolerance:   Fair  Please refer to the flowsheet for vital signs taken during this treatment. After treatment patient left in no apparent distress:   Supine in bed, Call bell within reach, Bed / chair alarm activated, and Caregiver / family present    COMMUNICATION/COLLABORATION:   The patients plan of care was discussed with: Registered nurseAriadne Chacon   Time Calculation: 25 mins      .

## 2020-07-10 NOTE — PROGRESS NOTES
Problem: Self Care Deficits Care Plan (Adult)  Goal: *Acute Goals and Plan of Care (Insert Text)  Description: FUNCTIONAL STATUS PRIOR TO ADMISSION: Pt resides in handicapped accessible apartment, with 0 [address] and walk-in shower with fixed bench and shower chair. Pt has DME needs met. Plans to discharge back to [facility] prior to return to apartment. Pt reports [place] at W/C level. HOME SUPPORT: The patient lived alone with family to provide PRN assistance. Occupational Therapy Goals  Initiated 7/9/2020  1. Patient will perform WC grooming with modified independence within 7 day(s). 2.  Patient will perform WC upper body dressing with modified independence within 7 day(s). 3.  Patient will perform EOB/RW lower body dressing with minimal assistance within 7 day(s). 4.  Patient will perform EOB to [address] toilet transfers with minimal assist within 7 day(s). 5.  Patient will perform all aspects of toileting with minimal assistance within 7 day(s). 6.  Patient will participate in upper extremity therapeutic exercise/activities with supervision/set-up for 10 minutes within 7 day(s). 7.  Patient will utilize energy conservation techniques during functional activities with verbal cues Min within 7 day(s). OCCUPATIONAL THERAPY TREATMENT  Patient: Marj Howell (34 y.o. female)  Date: 7/10/2020  Diagnosis: LARISSA (acute kidney injury) ([facility] [N17.9]  Nausea & vomiting [R11.2]  LARISSA (acute kidney injury) ([facility] [N17.9]   <principal problem not specified>       Precautions: Fall  Chart, occupational therapy assessment, plan of care, and goals were reviewed. ASSESSMENT  Patient continues with skilled OT services and is progressing towards goals. Pt sat edge of bed to change gown. Sitting balance good. She stood 2 x for 20 seconds in prep for [address] transfers. Pt encouraged to cont to use green sponge for strengthening. No shaking observed of L UE today. Pt returned to supine, family present.     Current Level of Function Impacting Discharge (ADLs): Min assist Ub dress    Other factors to consider for discharge:          PLAN :  Patient continues to benefit from skilled intervention to address the above impairments. Continue treatment per established plan of care. to address goals. Recommend with staff: bed in chair position for Adl's, activity    Recommend next OT session: Cont towards goals    Recommendation for discharge: (in order for the patient to meet his/her long term goals)  Therapy up to 5 days/week in SNF setting    This discharge recommendation:  Has not yet been discussed the attending provider and/or case management    IF patient discharges home will need the following DME:        SUBJECTIVE:   Patient stated .    OBJECTIVE DATA SUMMARY:   Cognitive/Behavioral Status:  Neurologic State: Alert  Orientation Level: Oriented X4  Cognition: Appropriate for age attention/concentration             Functional Mobility and Transfers for ADLs:  Bed Mobility:  Supine to Sit: Contact guard assistance; Additional time;Assist x1  Sit to Supine: Contact guard assistance    Transfers:  Sit to Stand: Moderate assistance;Assist x2          Balance:  Sitting: With support  Sitting - Static: Good (unsupported)  Sitting - Dynamic: Fair (occasional)  Standing: With support; Impaired  Standing - Static: Constant support; Fair    ADL Intervention:                      Upper Body Dressing Assistance  Dressing Assistance: Minimum assistance  Hospital Gown: Minimum  assistance    Lower Body Dressing Assistance  Socks: Maximum assistance              Therapeutic Exercises:   Encouraged pt to use green sponge L hand for strengthening. Activity Tolerance:   Fair  Please refer to the flowsheet for vital signs taken during this treatment.     After treatment patient left in no apparent distress:   Supine in bed    COMMUNICATION/COLLABORATION:   The patients plan of care was discussed with: Physical therapy assistant and Occupational therapist.     HAFSA Kilpatrick/L  Time Calculation: 20 mins

## 2020-07-10 NOTE — DISCHARGE INSTRUCTIONS
HOSPITALIST DISCHARGE INSTRUCTIONS  NAME: Marc Beck   :  1932   MRN:  [id number]     Date/Time:  7/10/2020 8:37 AM    ADMIT DATE: 2020     DISCHARGE DATE: 7/10/2020     PRINCIPAL DISCHARGE DIAGNOSES:  Nausea and vomiting  Acute kidney injury, resolved  Recent below-the-knee amputation    MEDICATIONS:  · It is important that you take the medication exactly as they are prescribed. Note the changes and additions to your medications. Be sure you understand these changes before you are discharged today. · Keep your medication in the bottles provided by the pharmacist and keep a list of the medication names, dosages, and times to be taken in your wallet. · Do not take other medications without consulting your doctor. Pain Management: per above medications    What to do at Home    Recommended diet:  Cardiac Diet and Diabetic Diet    Recommended activity: PT/OT Eval and Treat    If you experience any of the following symptoms then please call your primary care physician or return to the emergency room if you cannot get hold of your doctor:  Fever, chills, wound redness, swelling, bleeding, discharge, signs of infection, severe abdominal pain, nausea, vomiting, diarrhea, confusion, weakness, severe chest pain, shortness of breath, or other severe concerning symptoms    Follow Up: Follow-up Information     Follow up With Specialties Details Why Magdalena Malave MD Internal Medicine In 3 days  [address]  [address] [place] [address]  [phone]      Jessica Rivera MD General and Vascular Surgery In 7 days  [address] [place] [address] [phone]              Information obtained by :  I understand that if any problems occur once I am at home I am to contact my physician. I understand and acknowledge receipt of the instructions indicated above. Physician's or R.N.'s Signature                                                                  Date/Time                                                                                                                                              Patient or Representative Signature                                                          Date/Time

## 2020-07-10 NOTE — PROGRESS NOTES
Report called to [facility] and given to nurse Mivlia Bedside aReport included the following information SBAR, Kardex, Intake/Output, MAR and Recent Results.  Nurse requested we send all paperwork with transport instead of faxing over

## 2020-07-10 NOTE — PROGRESS NOTES
Lengthy discussion with daughter and pt regarding R BKA wound. Dr. Bree Collins to see this afternoon to determine if further surgical intervention, including AKA, is needed. Will keep NPO at his recommendation.

## 2020-07-11 NOTE — PROGRESS NOTES
1930 - Pt and daughter met in room, pt c/o 7/10 pain, Percocet given pt resting quietly no other complaints at this time. Awaiting transport for discharge. 2000 - [facility] here for pt transport. MANOLO GIRON provided.

## 2020-07-13 ENCOUNTER — PATIENT OUTREACH (OUTPATIENT)
Dept: CASE MANAGEMENT | Age: 85
End: 2020-07-13

## 2020-07-29 ENCOUNTER — TELEPHONE (OUTPATIENT)
Dept: INTERNAL MEDICINE CLINIC | Age: 85
End: 2020-07-29

## 2020-07-29 NOTE — TELEPHONE ENCOUNTER
Patient's daughter is inquring if patient needs to keep her apt on 08/04, she can be reached at  [phone]

## 2020-07-30 NOTE — TELEPHONE ENCOUNTER
R/C to Pt's daughter, Cyrus Morris, on HIPAA. Pt is currently in rehab at [facility]. She may be discharged on 8/21/20 and has f/u scheduled with surgeon on 8/12/20. Advised Sharla to f/u for MASHA once Pt is d/c from rehab. Appt on 8/4/20 has been cancelled, Cyrus Gayew will call back to schedule MASHA.

## 2020-07-31 ENCOUNTER — PATIENT OUTREACH (OUTPATIENT)
Dept: CASE MANAGEMENT | Age: 85
End: 2020-07-31

## 2020-07-31 NOTE — PROGRESS NOTES
Per chart, patient may be discharged from [facility] on 8/21/2020. Transition of care outreach postponed for 14 days.

## 2020-08-10 ENCOUNTER — PATIENT OUTREACH (OUTPATIENT)
Dept: CASE MANAGEMENT | Age: 85
End: 2020-08-10

## 2020-08-10 NOTE — PROGRESS NOTES
CTN contact [facility] and confirmed patient is still at their facility. Patient completed 30 day Transitions of Care Coordination  program on 8/10/2020. Will resolve episode at this time.      Patients upcoming visits:    Future Appointments   Date Time Provider Cuca Willis   9/1/2020 10:45 AM [id number], SHERRIE CHRISTOPHER   9/1/2020 11:00 AM MD ANSLEY Rodriguez AMB

## 2020-09-13 DIAGNOSIS — I25.5 ISCHEMIC CARDIOMYOPATHY: ICD-10-CM

## 2020-09-15 RX ORDER — FUROSEMIDE 40 MG/1
TABLET ORAL
Qty: 90 TAB | Refills: 0 | Status: SHIPPED | OUTPATIENT
Start: 2020-09-15

## 2020-09-15 NOTE — TELEPHONE ENCOUNTER
Requested Prescriptions     Signed Prescriptions Disp Refills    furosemide (LASIX) 40 mg tablet 90 Tab 0     Sig: TAKE ONE TAB BY MOUTH DAILY OR AS ADVISED BY PHYSICIAN     Authorizing Provider: Zee Corona     Ordering User: Salena Castle    Per Dr. Chucky Sharpe verbal orders

## 2020-10-01 ENCOUNTER — OFFICE VISIT (OUTPATIENT)
Dept: CARDIOLOGY CLINIC | Age: 85
End: 2020-10-01
Payer: MEDICARE

## 2020-10-01 ENCOUNTER — TELEPHONE (OUTPATIENT)
Dept: CARDIOLOGY CLINIC | Age: 85
End: 2020-10-01

## 2020-10-01 ENCOUNTER — CLINICAL SUPPORT (OUTPATIENT)
Dept: CARDIOLOGY CLINIC | Age: 85
End: 2020-10-01
Payer: MEDICARE

## 2020-10-01 VITALS — SYSTOLIC BLOOD PRESSURE: 118 MMHG | DIASTOLIC BLOOD PRESSURE: 64 MMHG | BODY MASS INDEX: 31 KG/M2 | HEIGHT: 63 IN

## 2020-10-01 DIAGNOSIS — I44.2 CHB (COMPLETE HEART BLOCK) (HCC): ICD-10-CM

## 2020-10-01 DIAGNOSIS — I42.0 DILATED CARDIOMYOPATHY (HCC): ICD-10-CM

## 2020-10-01 DIAGNOSIS — I10 ESSENTIAL HYPERTENSION: ICD-10-CM

## 2020-10-01 DIAGNOSIS — Z95.0 BIVENTRICULAR CARDIAC PACEMAKER IN SITU: ICD-10-CM

## 2020-10-01 DIAGNOSIS — Z89.9 HX OF AMPUTATION: ICD-10-CM

## 2020-10-01 DIAGNOSIS — Z95.0 CARDIAC PACEMAKER IN SITU: Primary | ICD-10-CM

## 2020-10-01 DIAGNOSIS — Z95.0 BIVENTRICULAR CARDIAC PACEMAKER IN SITU: Primary | ICD-10-CM

## 2020-10-01 DIAGNOSIS — I48.20 CHRONIC ATRIAL FIBRILLATION (HCC): ICD-10-CM

## 2020-10-01 PROCEDURE — G8417 CALC BMI ABV UP PARAM F/U: HCPCS | Performed by: INTERNAL MEDICINE

## 2020-10-01 PROCEDURE — 93280 PM DEVICE PROGR EVAL DUAL: CPT | Performed by: INTERNAL MEDICINE

## 2020-10-01 PROCEDURE — 1101F PT FALLS ASSESS-DOCD LE1/YR: CPT | Performed by: INTERNAL MEDICINE

## 2020-10-01 PROCEDURE — G9717 DOC PT DX DEP/BP F/U NT REQ: HCPCS | Performed by: INTERNAL MEDICINE

## 2020-10-01 PROCEDURE — G0463 HOSPITAL OUTPT CLINIC VISIT: HCPCS | Performed by: INTERNAL MEDICINE

## 2020-10-01 PROCEDURE — 99214 OFFICE O/P EST MOD 30 MIN: CPT | Performed by: INTERNAL MEDICINE

## 2020-10-01 PROCEDURE — G8427 DOCREV CUR MEDS BY ELIG CLIN: HCPCS | Performed by: INTERNAL MEDICINE

## 2020-10-01 PROCEDURE — 1090F PRES/ABSN URINE INCON ASSESS: CPT | Performed by: INTERNAL MEDICINE

## 2020-10-01 PROCEDURE — G8536 NO DOC ELDER MAL SCRN: HCPCS | Performed by: INTERNAL MEDICINE

## 2020-10-01 NOTE — TELEPHONE ENCOUNTER
Patients daughter would like to speak with you further about the appointment that was scheduled for next week. Please advise. Patients daughter states that if it is possible she would like to speak with you today.  Thanks     Phone: [phone]

## 2020-10-01 NOTE — PROGRESS NOTES
Cardiac Electrophysiology OFFICE Note     Subjective:      Milton Valentin is a 80 y.o. patient who presents for follow up, is s/p Medtronic biventricular pacemaker (s/p upgrade 05/22/2020, [employer] RV lead 05/22/1998; atrial port is capped). Device check today shows proper lead & generator function. Generator longevity estimated 5 yrs, 8 mos. Pacer dependent. CRT 99.8%. Since 05/22/2020, no VT episodes noted on device. She reports that gait belt slid up near her pacemaker during rehab, has noted a protruding area at pacemaker site since then.  + tenderness when laying to the left side     Known dilated cardiomyopathy, LVEF 20-25% in 03/2020. On appropriate GDMT. NYHA II-III chronic systolic CHF, notes shortness of breath with rehab. 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 History COPD, uses supplemental oxygen nightly, sometimes during the day with activity. Multiple inhalers, is on prednisone po. On Eliquis 5 mg po bid, denies bleeding issues. Right Leg amputation site is not completely healed yet      Previous:  Admitted at [facility] 04/15/2020-04/22/2020 for right foot ulcer, cellulitis, toe gangrene, PAD with poor pulse on right foot. Not septic, no osteomyelitis on xray. IV antibiotics given, discharged on Keflex po. Abdominal aortogram with catheterization of right anterior tibial artery with RLE runoff. Good flow from femoral down to anterior tibial artery, but abrupt cutoff just above the ankle with no nameable foot vessels seen. Echo (03/05/2020): LVEF 20-25%, upper normal wall thickness, severe global hypokinesis with regional variations. Severely dilated LA, mod dilated RA. Mild MAC, mild to mod MR. Mild AS, mild AR. Mod TR. Mild to mod PH. ECG (06/29/2020): AS-, QRSd 170 ms. Lexiscan cardiolite stress (08/24/2016): Large size, mod to high grade, partially reversible defect involving mid-distal anterior wall & apex.   Mod extent ischemia LAD territory. LVEF 35%, apical akinesis. Dr. Tamy Means is primary cardiologist.    Seen by Dr. Claudia Collier (vascular). S/p AV node ablation & PM implant 1998. Lumbar fracture 07/2019, had kyphoplasty & injection. Lives at [facility].     Problem List  Date Reviewed: 10/1/2020          Codes Class Noted    LARISSA (acute kidney injury) ([facility] ICD-10-CM: N17.9  ICD-9-CM: 584.9  7/8/2020        Nausea & vomiting ICD-10-CM: R11.2  ICD-9-CM: 787.01  7/8/2020        Dehydration ICD-10-CM: E86.0  ICD-9-CM: 276.51  7/8/2020        Gangrene of foot ([facility] ICD-10-CM: Z28  ICD-9-CM: 785.4  6/5/2020        Type 2 diabetes mellitus without complication, unspecified whether long term insulin use ([facility] ICD-10-CM: E11.9  ICD-9-CM: 250.00  6/4/2020        Type 2 diabetes with nephropathy ([facility] ICD-10-CM: E11.21  ICD-9-CM: 250.40, 583.81  6/4/2020        Type 2 diabetes mellitus with peripheral vascular disease ([facility] ICD-10-CM: E11.51  ICD-9-CM: 250.70, 443.81  6/4/2020        Biventricular cardiac pacemaker in situ ICD-10-CM: Z95.0  ICD-9-CM: V45.01  5/22/2020        Dilated cardiomyopathy ([facility] ICD-10-CM: I42.0  ICD-9-CM: 425.4  5/22/2020        Hypertension (Chronic) ICD-10-CM: I10  ICD-9-CM: 401.9  6/29/2019        Frequent falls (Chronic) ICD-10-CM: R29.6  ICD-9-CM: V15.88  1/13/2019        COPD (chronic obstructive pulmonary disease) ([facility] (Chronic) ICD-10-CM: J44.9  ICD-9-CM: 978  1/13/2019        Chronic respiratory failure with hypoxia (HCC) (Chronic) ICD-10-CM: J96.11  ICD-9-CM: 518.83, 799.02  1/13/2019        Pacemaker (Chronic) ICD-10-CM: Z95.0  ICD-9-CM: V45.01  7/24/2017        Psoriasis (Chronic) ICD-10-CM: L40.9  ICD-9-CM: 696.1  3/23/2017        Ischemic cardiomyopathy (Chronic) ICD-10-CM: I25.5  ICD-9-CM: 414.8  Unknown        Restrictive lung disease (Chronic) ICD-10-CM: J98.4  ICD-9-CM: 518.89  12/14/2015        Osteopenia ICD-10-CM: M85.80  ICD-9-CM: 733.90  11/23/2015        CKD (chronic kidney disease) stage 3, GFR 30-59 ml/min (Chronic) ICD-10-CM: N18.30  ICD-9-CM: 585.3  2/9/2015        Anxiety and depression (Chronic) ICD-10-CM: F41.9, F32.9  ICD-9-CM: 300.00, 311  2/26/2014        Hypothyroid (Chronic) ICD-10-CM: E03.9  ICD-9-CM: 244.9  2/26/2014        Atrial fibrillation (HCC) (Chronic) ICD-10-CM: I48.91  ICD-9-CM: 427.31  Unknown    Overview Signed 10/21/2010  8:16 AM by Li Andrew     av node ablation 5/22/98 with placement of CPI #[id number] dual chamber pacemaker subsequently replaced with a single chamber medtronic #[id number] single chamber pacemaker 7/25/05             Mitral regurgitation (Chronic) ICD-10-CM: I34.0  ICD-9-CM: 424.0  10/21/2010        Third degree AV block (HCC) ICD-10-CM: I44.2  ICD-9-CM: 426.0  10/21/2010        Mixed hyperlipidemia (Chronic) ICD-10-CM: K04.4  ICD-9-CM: 272.2  10/18/2010              Current Outpatient Medications   Medication Sig Dispense Refill    furosemide (LASIX) 40 mg tablet TAKE ONE TAB BY MOUTH DAILY OR AS ADVISED BY PHYSICIAN 90 Tab 0    gabapentin (NEURONTIN) 100 mg capsule Take 1 Cap by mouth every twelve (12) hours. Max Daily Amount: 200 mg. 60 Cap 0    naloxone (Narcan) 4 mg/actuation nasal spray Use 1 spray intranasally, then discard. Repeat with new spray every 2 min as needed for opioid overdose symptoms, alternating nostrils. 1 Each 0    acetaminophen (TYLENOL) 500 mg tablet Take 1,000 mg by mouth every eight (8) hours.  multivitamin (ONE A DAY) tablet Take 1 Tab by mouth daily.  senna-docusate (PERICOLACE) 8.6-50 mg per tablet Take 2 Tabs by mouth two (2) times daily as needed for Constipation.  loperamide (IMODIUM) 1 mg/5 mL solution Take 1.5 tsp by mouth every eight (8) hours as needed for Diarrhea.  atorvastatin (LIPITOR) 10 mg tablet Take 10 mg by mouth daily.  honey (MediHoney, honey,) 100 % topical paste Apply  to affected area every Monday, Wednesday, Friday.  Apply to left buttocks      chlorhexidine (Peridex) 0.12 % solution 15 mL by Swish and Spit route two (2) times daily (with meals).  benzocaine (ORAJEL) 20 % gel topical gel Apply  to affected area four (4) times daily as needed. Apply to gums      polyethylene glycol (Miralax) 17 gram packet Take 17 g by mouth daily.  pantoprazole (PROTONIX) 40 mg tablet Take 40 mg by mouth daily.  alum-mag hydroxide-simeth (Maalox Advanced) 200-200-20 mg/5 mL susp Take 30 mL by mouth every four (4) hours as needed for Indigestion.  levothyroxine (SYNTHROID) 100 mcg tablet Take 1 Tab by mouth Daily (before breakfast). 90 Tab 1    sertraline (ZOLOFT) 50 mg tablet Take 1.5 Tabs by mouth daily. 135 Tab 1    losartan (COZAAR) 25 mg tablet Take 1 Tab by mouth daily. Indications: chronic heart failure 30 Tab 3    carvediloL (COREG) 3.125 mg tablet TAKE 1 TABLET BY MOUTH TWICE A DAY WITH MEALS 180 Tab 1    apixaban (Eliquis) 5 mg tablet Take 1 Tab by mouth two (2) times a day. 180 Tab 1    lidocaine (LIDODERM) 5 % 1 Patch by TransDERmal route daily as needed. Apply patch to the affected area for 12 hours a day and remove for 12 hours a day. Apply to lower back      albuterol (PROVENTIL HFA, VENTOLIN HFA, PROAIR HFA) 90 mcg/actuation inhaler Take 1 Puff by inhalation every six (6) hours as needed for Wheezing.  ADVAIR DISKUS 250-50 mcg/dose diskus inhaler Take 1 Puff by inhalation two (2) times a day.  tiotropium (SPIRIVA WITH HANDIHALER) 18 mcg inhalation capsule Take 1 Cap by inhalation daily.  povidone-iodine (Betadine) 10 % external solution Apply  to affected area daily.  Apply to right BKA       Allergies   Allergen Reactions    Cardizem [Diltiazem Hcl] Hives    Codeine Nausea and Vomiting    Darvocet A500 [Propoxyphene N-Acetaminophen] Nausea and Vomiting    Diltiazem Hives    Labetalol Nausea and Vomiting     Dizzy/Disoriented     Pcn [Penicillins] Swelling     Tongue Swelling   Has tolerated cephalexin recently 4/7/20     Primidone Other (comments)     Lightheaded/unsteady gait    Sulfa (Sulfonamide Antibiotics) Nausea and Vomiting     Past Medical History:   Diagnosis Date    Arthritis     Atrial fibrillation ([facility]     av node ablation 5/22/98 with placement of CPI #[id number] dual chamber pacemaker subsequently replaced with a single chamber medtronic #[id number] single chamber pacemaker 7/25/05    Cancer ([facility] 1970's    colon cancer w/ resection    COPD     Depression     GERD (gastroesophageal reflux disease)     History of vascular access device 04/17/2020    4F MIDLINE PLACED BY EMIL Villalpando RN LEFT BRACHIAL, 13CM    Hyperlipidemia     Hypertension     Ischemic cardiomyopathy     Migraine headache     Orthostatic hypotension     RADHA (obstructive sleep apnea)     Pacemaker 5/22/98    AV node ablation /pacemaker placement utilizing CPI # [id number] dual chamber system, medtronic #[id number] single chamber device placed 7/22/05    Pulmonary hypertension ([facility] 9/26/2011    RVSP 50 on echo 8/14    Thyroid disease     Valvular heart disease     mild-mod MR/TR     Past Surgical History:   Procedure Laterality Date    BREAST SURGERY PROCEDURE UNLISTED      benign breast tumor excision right breast    HX BREAST BIOPSY Right 2002    neg; surgical bx    HX COLECTOMY  1974    colon    HX KNEE REPLACEMENT      right TKA    HX PACEMAKER      HX PACEMAKER PLACEMENT  05/2020    HX TUBAL LIGATION      IR KYPHOPLASTY LUMBAR  7/2/2019    CO INSJ ELTRD CAR BYRON SYS TM INSJ DFB/PM PLS GEN N/A 5/22/2020    Lv Lead Placement performed by [place] MD at [address] CATH LAB    CO REMVL PERM PM PLS GEN W/REPL PLSE GEN MULT LEAD N/A 5/22/2020    REMOVE & REPLACE PPM GEN BIV MULTI LEADS performed by [place] MD at [address] CATH LAB    TOTAL KNEE ARTHROPLASTY      total on R, partial on L     Family History   Problem Relation Age of Onset    Diabetes Mother     Heart Disease Mother     Heart Attack Mother     Heart Disease Father    Laurent.Elder Stroke Sister     Breast Cancer Sister 80    Cancer Maternal Aunt         uterus    Diabetes Maternal Uncle     Breast Cancer Daughter 61     Social History     Tobacco Use    Smoking status: Passive Smoke Exposure - Never Smoker    Smokeless tobacco: Never Used   Substance Use Topics    Alcohol use: No     Alcohol/week: 0.0 standard drinks        Review of Systems:   Constitutional: Negative for fever, chills, weight loss   HEENT: Negative for nosebleeds, vision changes. Respiratory: Negative for cough, hemoptysis. Cardiovascular: Negative for chest pain, palpitations, no orthopnea with supplemental oxygen, no claudication, leg swelling, syncope, and no PND. Gastrointestinal: Negative for nausea, vomiting, + chronic diarrhea, no blood in stool and no melena. Genitourinary: Negative for dysuria, and hematuria. Musculoskeletal: Negative for myalgia, + shoulder arthralgia. S/p right BKA 06/2020. Skin: Negative for rash. Left sided biv pacer site with atrial port plug sticking to the lateral edge and painful  Heme: Does not bleed or bruise easily. Neurological: Negative for speech change and focal weakness     Objective:     Visit Vitals  /64   Ht 5' 3\" (1.6 m)   BMI 31.00 kg/m²        Physical Exam:   Constitutional: Well-developed and well-nourished. No respiratory distress. Head: Normocephalic and atraumatic. Eyes: Pupils are equal, round. ENT: Hearing normal.  Neck: Supple. No JVD present. Cardiovascular: Normal rate, regular rhythm. Exam reveals no gallop and no friction rub. 2/6 systolic murmur. Pulmonary/Chest: Effort normal and breath sounds normal. No wheezes. Abdominal: Soft, no tenderness. Musculoskeletal: No edema. Right BKA. Neurological: Alert,oriented. Skin: Skin is warm and dry. Left chest pacer site well healed, but atrial port plug pushing to the left lateral border. Psychiatric: Normal mood and affect.  Behavior is normal. Judgment and thought content normal. Assessment/Plan:       ICD-10-CM ICD-9-CM    1. Biventricular cardiac pacemaker in situ  Z95.0 V45.01    2. LBBB (left bundle branch block)  I44.7 426.3    3. Chronic atrial fibrillation (HCC)  I48.20 427.31    4. CHB (complete heart block) (HCC)  I44.2 426.0    5. Dilated cardiomyopathy ([facility])  I42.0 425.4    6. Essential hypertension  I10 401.9      Medtronic biventricular pacemaker (s/p upgrade 05/22/2020, [employer] RV lead 05/22/1998; atrial port is plugged) check today shows proper lead & generator function. Generator longevity estimated 5 yrs, 8 mos. Pacer dependent. CRT 99.8%. Since 05/22/2020, no VT episodes noted on device. Known dilated cardiomyopathy, LVEF 20-25% in 03/2020. On appropriate GDMT. NYHA II-III chronic systolic CHF. Repeat echo to determine whether LVEF has improved with CRT. BP well controlled. Atrial port plug protrusion to the lateral border of the pocket puts patient at risk for pocket erosion, infection. She is pacer dependent  Risks/benefits of pocket revision reviewed, & she agrees to proceed. Will plan to reduce LR to 60-70 bpm then    Continue Eliquis 5 mg po bid, denies bleeding issues. Hold it 2 days before procedure  She and her daughter agree to proceed    Future Appointments   Date Time Provider Cuca Willis   1/13/2021  3:00 PM REMOTE1, SHERRIE PANCHAL BS AMB   4/14/2021  1:15 PM REMOTE1, SHERRIE PANCHAL BS AMB   7/19/2021 11:45 AM REMOTE1, SHERRIE PANCHAL BS AMB   10/12/2021 10:15 AM PACEMAKER3, SHERRIE PANCHAL BS AMB   10/12/2021 10:20 AM MD ANSLEY Flanagan BS AMB       Thank you for involving me in this patient's care and please call with further concerns or questions. Jez Melendez M.D.   Electrophysiology/Cardiology  [facility] [place]  [address] , [facility]  [place] [phone]

## 2020-10-01 NOTE — PATIENT INSTRUCTIONS
Your Pocket revision procedure has been scheduled for 10/9/2020 at 8:00 am, at [facility]. 
 
Please report to Admitting Department by 6:15 am, or 2 hours prior to your scheduled procedure. Please bring a list of your current medications and medication bottles, if able, to the hospital on this day. You will be unable to drive after your procedure so please make sure to bring someone with you to your procedure. You will need to have nothing to eat or drink after midnight, the night prior to your procedure. You may have small sips of water, if needed, to take with your medication. You will need labs drawn prior to your procedure. Please go to [facility] to have this done with in the next couple of days. You should stop your medication, Eliquis, 2 days prior to your scheduled procedure. After your procedure, you will need to follow up with Dr. Leyda Jerome.  Your follow-up appointment has been scheduled for 10/23/2020 at 11:20 pm.

## 2020-10-01 NOTE — TELEPHONE ENCOUNTER
Called pt daughter  two patient identifiers confirmed. Daughter wanted to verify she will not have any arm restrictions after procedure. Notified Daughter that that pt will not have any arm restrictions after your procedure. Daughter stated that if there are any cancellations she would like to be called. Notifeid Daughter that I will give her a call if anyone cancels. Pt verbalized understanding of information discussed w/ no further questions at this time.

## 2020-10-01 NOTE — H&P (VIEW-ONLY)
Cardiac Electrophysiology OFFICE Note Subjective:  
  
Wilver Lanza is a 80 y.o. patient who presents for follow up, is s/p Medtronic biventricular pacemaker (s/p upgrade 05/22/2020, [place] [employer] RV lead 05/22/1998; atrial port is capped). Device check today shows proper lead & generator function. Generator longevity estimated 5 yrs, 8 mos. Pacer dependent. CRT 99.8%. Since 05/22/2020, no VT episodes noted on device. She reports that gait belt slid up near her pacemaker during rehab, has noted a protruding area at pacemaker site since then. 
+ tenderness when laying to the left side Known dilated cardiomyopathy, LVEF 20-25% in 03/2020. On appropriate GDMT. NYHA II-III chronic systolic CHF, notes shortness of breath with rehab. 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 History COPD, uses supplemental oxygen nightly, sometimes during the day with activity. Multiple inhalers, is on prednisone po. On Eliquis 5 mg po bid, denies bleeding issues. Right Leg amputation site is not completely healed yet Previous: 
Admitted at [facility] 04/15/2020-04/22/2020 for right foot ulcer, cellulitis, toe gangrene, PAD with poor pulse on right foot. Not septic, no osteomyelitis on xray. IV antibiotics given, discharged on Keflex po. Abdominal aortogram with catheterization of right anterior tibial artery with RLE runoff. Good flow from femoral down to anterior tibial artery, but abrupt cutoff just above the ankle with no nameable foot vessels seen. Echo (03/05/2020): LVEF 20-25%, upper normal wall thickness, severe global hypokinesis with regional variations. Severely dilated LA, mod dilated RA. Mild MAC, mild to mod MR. Mild AS, mild AR. Mod TR. Mild to mod PH. ECG (06/29/2020): AS-, QRSd 170 ms. Lexiscan cardiolite stress (08/24/2016): Large size, mod to high grade, partially reversible defect involving mid-distal anterior wall & apex. Mod extent ischemia LAD territory. LVEF 35%, apical akinesis. Dr. Ernestina Denise is primary cardiologist. 
 
Seen by Dr. Annalee Urban (vascular). S/p AV node ablation & PM implant 1998. Lumbar fracture 07/2019, had kyphoplasty & injection. Lives at [facility]. Problem List  Date Reviewed: 10/1/2020 Codes Class Noted LARISSA (acute kidney injury) ([facility] ICD-10-CM: N17.9 ICD-9-CM: 584.9  7/8/2020 Nausea & vomiting ICD-10-CM: R11.2 ICD-9-CM: 787.01  7/8/2020 Dehydration ICD-10-CM: E86.0 ICD-9-CM: 276.51  7/8/2020 Gangrene of foot ([facility] ICD-10-CM: P37 
ICD-9-CM: 785.4  6/5/2020 Type 2 diabetes mellitus without complication, unspecified whether long term insulin use (HCC) ICD-10-CM: E11.9 ICD-9-CM: 250.00  6/4/2020 Type 2 diabetes with nephropathy ([facility] ICD-10-CM: E11.21 
ICD-9-CM: 250.40, 583.81  6/4/2020 Type 2 diabetes mellitus with peripheral vascular disease ([facility] ICD-10-CM: E11.51 
ICD-9-CM: 250.70, 443.81  6/4/2020 Biventricular cardiac pacemaker in situ ICD-10-CM: Z95.0 ICD-9-CM: V45.01  5/22/2020 Dilated cardiomyopathy ([facility] ICD-10-CM: I42.0 ICD-9-CM: 425.4  5/22/2020 Hypertension (Chronic) ICD-10-CM: I10 
ICD-9-CM: 401.9  6/29/2019 Frequent falls (Chronic) ICD-10-CM: R29.6 ICD-9-CM: V15.88  1/13/2019 COPD (chronic obstructive pulmonary disease) (HCC) (Chronic) ICD-10-CM: J44.9 ICD-9-CM: 446  1/13/2019 Chronic respiratory failure with hypoxia (HCC) (Chronic) ICD-10-CM: J96.11 
ICD-9-CM: 518.83, 799.02  1/13/2019 Pacemaker (Chronic) ICD-10-CM: Z95.0 ICD-9-CM: V45.01  7/24/2017 Psoriasis (Chronic) ICD-10-CM: L40.9 ICD-9-CM: 696.1  3/23/2017 Ischemic cardiomyopathy (Chronic) ICD-10-CM: I25.5 ICD-9-CM: 414.8  Unknown Restrictive lung disease (Chronic) ICD-10-CM: J98.4 ICD-9-CM: 518.89  12/14/2015 Osteopenia ICD-10-CM: M85.80 ICD-9-CM: 733.90  11/23/2015 CKD (chronic kidney disease) stage 3, GFR 30-59 ml/min (Chronic) ICD-10-CM: N18.30 ICD-9-CM: 585.3  2/9/2015 Anxiety and depression (Chronic) ICD-10-CM: F41.9, F32.9 ICD-9-CM: 300.00, 311  2/26/2014 Hypothyroid (Chronic) ICD-10-CM: E03.9 ICD-9-CM: 244.9  2/26/2014 Atrial fibrillation (HCC) (Chronic) ICD-10-CM: I48.91 
ICD-9-CM: 427.31  Unknown Overview Signed 10/21/2010  8:16 AM by Cleatus Holstein  
  av node ablation 5/22/98 with placement of CPI #[id number] dual chamber pacemaker subsequently replaced with a single chamber medtronic #[id number] single chamber pacemaker 7/25/05 Mitral regurgitation (Chronic) ICD-10-CM: I34.0 ICD-9-CM: 424.0  10/21/2010 Third degree AV block (HCC) ICD-10-CM: I44.2 ICD-9-CM: 426.0  10/21/2010 Mixed hyperlipidemia (Chronic) ICD-10-CM: M93.4 ICD-9-CM: 272.2  10/18/2010 Current Outpatient Medications Medication Sig Dispense Refill  furosemide (LASIX) 40 mg tablet TAKE ONE TAB BY MOUTH DAILY OR AS ADVISED BY PHYSICIAN 90 Tab 0  
 gabapentin (NEURONTIN) 100 mg capsule Take 1 Cap by mouth every twelve (12) hours. Max Daily Amount: 200 mg. 60 Cap 0  
 naloxone (Narcan) 4 mg/actuation nasal spray Use 1 spray intranasally, then discard. Repeat with new spray every 2 min as needed for opioid overdose symptoms, alternating nostrils. 1 Each 0  
 acetaminophen (TYLENOL) 500 mg tablet Take 1,000 mg by mouth every eight (8) hours.  multivitamin (ONE A DAY) tablet Take 1 Tab by mouth daily.  senna-docusate (PERICOLACE) 8.6-50 mg per tablet Take 2 Tabs by mouth two (2) times daily as needed for Constipation.  loperamide (IMODIUM) 1 mg/5 mL solution Take 1.5 tsp by mouth every eight (8) hours as needed for Diarrhea.  atorvastatin (LIPITOR) 10 mg tablet Take 10 mg by mouth daily.  honey (MediHoney, honey,) 100 % topical paste Apply  to affected area every Monday, Wednesday, Friday. Apply to left buttocks  chlorhexidine (Peridex) 0.12 % solution 15 mL by Swish and Spit route two (2) times daily (with meals).  benzocaine (ORAJEL) 20 % gel topical gel Apply  to affected area four (4) times daily as needed. Apply to gums  polyethylene glycol (Miralax) 17 gram packet Take 17 g by mouth daily.  pantoprazole (PROTONIX) 40 mg tablet Take 40 mg by mouth daily.  alum-mag hydroxide-simeth (Maalox Advanced) 200-200-20 mg/5 mL susp Take 30 mL by mouth every four (4) hours as needed for Indigestion.  levothyroxine (SYNTHROID) 100 mcg tablet Take 1 Tab by mouth Daily (before breakfast). 90 Tab 1  
 sertraline (ZOLOFT) 50 mg tablet Take 1.5 Tabs by mouth daily. 135 Tab 1  
 losartan (COZAAR) 25 mg tablet Take 1 Tab by mouth daily. Indications: chronic heart failure 30 Tab 3  carvediloL (COREG) 3.125 mg tablet TAKE 1 TABLET BY MOUTH TWICE A DAY WITH MEALS 180 Tab 1  
 apixaban (Eliquis) 5 mg tablet Take 1 Tab by mouth two (2) times a day. 180 Tab 1  
 lidocaine (LIDODERM) 5 % 1 Patch by TransDERmal route daily as needed. Apply patch to the affected area for 12 hours a day and remove for 12 hours a day. Apply to lower back  albuterol (PROVENTIL HFA, VENTOLIN HFA, PROAIR HFA) 90 mcg/actuation inhaler Take 1 Puff by inhalation every six (6) hours as needed for Wheezing.  ADVAIR DISKUS 250-50 mcg/dose diskus inhaler Take 1 Puff by inhalation two (2) times a day.  tiotropium (SPIRIVA WITH HANDIHALER) 18 mcg inhalation capsule Take 1 Cap by inhalation daily.  povidone-iodine (Betadine) 10 % external solution Apply  to affected area daily. Apply to right BKA Allergies Allergen Reactions  Cardizem [Diltiazem Hcl] Hives  Codeine Nausea and Vomiting  Darvocet A500 [Propoxyphene N-Acetaminophen] Nausea and Vomiting  Diltiazem Hives  Labetalol Nausea and Vomiting Dizzy/Disoriented  Pcn [Penicillins] Swelling Tongue Swelling Has tolerated cephalexin recently 4/7/20  Primidone Other (comments) Lightheaded/unsteady gait  Sulfa (Sulfonamide Antibiotics) Nausea and Vomiting Past Medical History:  
Diagnosis Date  Arthritis  Atrial fibrillation ([facility]   
 av node ablation 5/22/98 with placement of CPI #[id number] dual chamber pacemaker subsequently replaced with a single chamber medtronic #[id number] single chamber pacemaker 7/25/05  Cancer [facility]) [id number]  
 colon cancer w/ resection  COPD  Depression  GERD (gastroesophageal reflux disease)  History of vascular access device 04/17/2020  
 4F MIDLINE PLACED BY EMIL GUNN RN LEFT BRACHIAL, 13CM  Hyperlipidemia  Hypertension  Ischemic cardiomyopathy  Migraine headache  Orthostatic hypotension  RADHA (obstructive sleep apnea)  Pacemaker 5/22/98 AV node ablation /pacemaker placement utilizing CPI # [id number] dual chamber system, medtronic #[id number] single chamber device placed 7/22/05  Pulmonary hypertension ([facility] 9/26/2011 RVSP 50 on echo 8/14  Thyroid disease  Valvular heart disease   
 mild-mod MR/TR Past Surgical History:  
Procedure Laterality Date  BREAST SURGERY PROCEDURE UNLISTED    
 benign breast tumor excision right breast  
 HX BREAST BIOPSY Right 2002  
 neg; surgical bx  HX COLECTOMY  1974  
 colon  HX KNEE REPLACEMENT    
 right TKA  HX PACEMAKER    
 HX PACEMAKER PLACEMENT  05/2020  HX TUBAL LIGATION    
 IR KYPHOPLASTY LUMBAR  7/2/2019  FL INSJ ELTRD CAR BYRON SYS TM INSJ DFB/PM PLS GEN N/A 5/22/2020 Lv Lead Placement performed by Margarito Candelaria MD at [address] CATH LAB  FL REMVL PERM PM PLS GEN W/REPL PLSE GEN MULT LEAD N/A 5/22/2020 REMOVE & REPLACE PPM GEN BIV MULTI LEADS performed by Margarito Candelaria MD at [address] CATH LAB  TOTAL KNEE ARTHROPLASTY    
 total on R, partial on L Family History Problem Relation Age of Onset  Diabetes Mother  Heart Disease Mother  Heart Attack Mother  Heart Disease Father  Stroke Sister  Breast Cancer Sister 80  
 Cancer Maternal Aunt   
     uterus  Diabetes Maternal Uncle  Breast Cancer Daughter 61 Social History Tobacco Use  Smoking status: Passive Smoke Exposure - Never Smoker  Smokeless tobacco: Never Used Substance Use Topics  Alcohol use: No  
  Alcohol/week: 0.0 standard drinks Review of Systems:  
Constitutional: Negative for fever, chills, weight loss HEENT: Negative for nosebleeds, vision changes. Respiratory: Negative for cough, hemoptysis. Cardiovascular: Negative for chest pain, palpitations, no orthopnea with supplemental oxygen, no claudication, leg swelling, syncope, and no PND. Gastrointestinal: Negative for nausea, vomiting, + chronic diarrhea, no blood in stool and no melena. Genitourinary: Negative for dysuria, and hematuria. Musculoskeletal: Negative for myalgia, + shoulder arthralgia. S/p right BKA 06/2020. Skin: Negative for rash. Left sided biv pacer site with atrial port plug sticking to the lateral edge and painful Heme: Does not bleed or bruise easily. Neurological: Negative for speech change and focal weakness Objective:  
 
Visit Vitals /64 Ht 5' 3\" (1.6 m) BMI 31.00 kg/m² Physical Exam:  
Constitutional: Well-developed and well-nourished. No respiratory distress. Head: Normocephalic and atraumatic. Eyes: Pupils are equal, round. ENT: Hearing normal. 
Neck: Supple. No JVD present. Cardiovascular: Normal rate, regular rhythm. Exam reveals no gallop and no friction rub. 2/6 systolic murmur. Pulmonary/Chest: Effort normal and breath sounds normal. No wheezes. Abdominal: Soft, no tenderness. Musculoskeletal: No edema. Right BKA. Neurological: Alert,oriented. Skin: Skin is warm and dry. Left chest pacer site well healed, but atrial port plug pushing to the left lateral border. Psychiatric: Normal mood and affect. Behavior is normal. Judgment and thought content normal.   
 
 
Assessment/Plan: ICD-10-CM ICD-9-CM 1. Biventricular cardiac pacemaker in situ  Z95.0 V45.01   
2. LBBB (left bundle branch block)  I44.7 426.3 3. Chronic atrial fibrillation (HCC)  I48.20 427.31   
4. CHB (complete heart block) (HCC)  I44.2 426.0 5. Dilated cardiomyopathy (HCC)  I42.0 425.4 6. Essential hypertension  I10 401.9 Medtronic biventricular pacemaker (s/p upgrade 05/22/2020, [place] RV lead 05/22/1998; atrial port is plugged) check today shows proper lead & generator function. Generator longevity estimated 5 yrs, 8 mos. Pacer dependent. CRT 99.8%. Since 05/22/2020, no VT episodes noted on device. Known dilated cardiomyopathy, LVEF 20-25% in 03/2020. On appropriate GDMT. NYHA II-III chronic systolic CHF. Repeat echo to determine whether LVEF has improved with CRT. BP well controlled. Atrial port plug protrusion to the lateral border of the pocket puts patient at risk for pocket erosion, infection. She is pacer dependent Risks/benefits of pocket revision reviewed, & she agrees to proceed. Will plan to reduce LR to 60-70 bpm then Continue Eliquis 5 mg po bid, denies bleeding issues. Hold it 2 days before procedure She and her daughter agree to proceed Future Appointments Date Time Provider Cuca Willis 1/13/2021  3:00 PM REMOTE1, SHERRIE TORRE AMB  
4/14/2021  1:15 PM REMOTE1, SHERRIE PANCHAL BS AMB  
7/19/2021 11:45 AM REMOTE1, SHERRIE PANCHAL BS AMB  
10/12/2021 10:15 AM PACEMAKER3, SHERRIE PANCHAL BS AMB  
10/12/2021 10:20 AM MD ANSLEY Bates BS AMB Thank you for involving me in this patient's care and please call with further concerns or questions. Kassidy Shah M.D. Electrophysiology/Cardiology [facility] [address], [address], [place] [address] [address], [address] [place] [id number] [facility] 
[phone]

## 2020-10-09 ENCOUNTER — DOCUMENTATION ONLY (OUTPATIENT)
Dept: CARDIOLOGY CLINIC | Age: 85
End: 2020-10-09

## 2020-10-09 ENCOUNTER — HOSPITAL ENCOUNTER (OUTPATIENT)
Age: 85
Setting detail: OUTPATIENT SURGERY
Discharge: HOME | End: 2020-10-09
Attending: INTERNAL MEDICINE | Admitting: INTERNAL MEDICINE
Payer: MEDICARE

## 2020-10-09 VITALS
HEIGHT: 63 IN | WEIGHT: 146 LBS | RESPIRATION RATE: 24 BRPM | BODY MASS INDEX: 25.87 KG/M2 | DIASTOLIC BLOOD PRESSURE: 61 MMHG | SYSTOLIC BLOOD PRESSURE: 137 MMHG | OXYGEN SATURATION: 99 % | TEMPERATURE: 97.5 F | HEART RATE: 80 BPM

## 2020-10-09 DIAGNOSIS — I44.2 THIRD DEGREE AV BLOCK (HCC): ICD-10-CM

## 2020-10-09 DIAGNOSIS — I42.9: ICD-10-CM

## 2020-10-09 DIAGNOSIS — Z95.0 BIVENTRICULAR CARDIAC PACEMAKER IN SITU: ICD-10-CM

## 2020-10-09 PROCEDURE — C1781 MESH (IMPLANTABLE): HCPCS | Performed by: INTERNAL MEDICINE

## 2020-10-09 PROCEDURE — 77030039046 HC PAD DEFIB RADIOTRNSPNT CNMD -B: Performed by: INTERNAL MEDICINE

## 2020-10-09 PROCEDURE — 74011250636 HC RX REV CODE- 250/636: Performed by: INTERNAL MEDICINE

## 2020-10-09 PROCEDURE — 77030040375: Performed by: INTERNAL MEDICINE

## 2020-10-09 PROCEDURE — 77030032060 HC PWDR HEMSTAT ARISTA ASRB 3GM BARD -C: Performed by: INTERNAL MEDICINE

## 2020-10-09 PROCEDURE — 74011000250 HC RX REV CODE- 250: Performed by: INTERNAL MEDICINE

## 2020-10-09 PROCEDURE — 99152 MOD SED SAME PHYS/QHP 5/>YRS: CPT | Performed by: INTERNAL MEDICINE

## 2020-10-09 PROCEDURE — 77030022704 HC SUT VLOC COVD -B: Performed by: INTERNAL MEDICINE

## 2020-10-09 PROCEDURE — 77030010507 HC ADH SKN DERMBND J&J -B: Performed by: INTERNAL MEDICINE

## 2020-10-09 PROCEDURE — 33222 RELOCATION POCKET PACEMAKER: CPT | Performed by: INTERNAL MEDICINE

## 2020-10-09 PROCEDURE — 74011000272 HC RX REV CODE- 272: Performed by: INTERNAL MEDICINE

## 2020-10-09 PROCEDURE — 99153 MOD SED SAME PHYS/QHP EA: CPT | Performed by: INTERNAL MEDICINE

## 2020-10-09 PROCEDURE — 77030028700 HC BLD TISS PLSM MEDT -E: Performed by: INTERNAL MEDICINE

## 2020-10-09 PROCEDURE — 2709999900 HC NON-CHARGEABLE SUPPLY: Performed by: INTERNAL MEDICINE

## 2020-10-09 DEVICE — ENVELOPE CMRM6133 ABSORB LRG MR
Type: IMPLANTABLE DEVICE | Status: FUNCTIONAL
Brand: TYRX™

## 2020-10-09 RX ORDER — SODIUM CHLORIDE 0.9 % (FLUSH) 0.9 %
5-40 SYRINGE (ML) INJECTION AS NEEDED
Status: CANCELLED | OUTPATIENT
Start: 2020-10-09

## 2020-10-09 RX ORDER — ACETAMINOPHEN 325 MG/1
650 TABLET ORAL
Status: CANCELLED | OUTPATIENT
Start: 2020-10-09

## 2020-10-09 RX ORDER — LIDOCAINE HYDROCHLORIDE 10 MG/ML
INJECTION INFILTRATION; PERINEURAL AS NEEDED
Status: DISCONTINUED | OUTPATIENT
Start: 2020-10-09 | End: 2020-10-09 | Stop reason: HOSPADM

## 2020-10-09 RX ORDER — SODIUM CHLORIDE 0.9 % (FLUSH) 0.9 %
5-40 SYRINGE (ML) INJECTION EVERY 8 HOURS
Status: CANCELLED | OUTPATIENT
Start: 2020-10-09

## 2020-10-09 RX ORDER — MIDAZOLAM HYDROCHLORIDE 1 MG/ML
INJECTION, SOLUTION INTRAMUSCULAR; INTRAVENOUS AS NEEDED
Status: DISCONTINUED | OUTPATIENT
Start: 2020-10-09 | End: 2020-10-09 | Stop reason: HOSPADM

## 2020-10-09 RX ORDER — FENTANYL CITRATE 50 UG/ML
INJECTION, SOLUTION INTRAMUSCULAR; INTRAVENOUS AS NEEDED
Status: DISCONTINUED | OUTPATIENT
Start: 2020-10-09 | End: 2020-10-09 | Stop reason: HOSPADM

## 2020-10-09 RX ORDER — CLINDAMYCIN HYDROCHLORIDE 150 MG/1
150 CAPSULE ORAL 3 TIMES DAILY
Qty: 15 CAP | Refills: 0 | Status: SHIPPED | OUTPATIENT
Start: 2020-10-09 | End: 2020-10-14

## 2020-10-09 NOTE — PROGRESS NOTES
Cardiac Cath Lab Recovery Arrival Note:      Pamela Cantu arrived to Cardiac Cath Lab, Recovery Area. Staff introduced to patient. Patient identifiers verified with NAME and DATE OF BIRTH. Procedure verified with patient. Consent forms reviewed and signed by patient or authorized representative and verified. Allergies verified. Patient and family oriented to department. Patient and family informed of procedure and plan of care. Questions answered with review. Patient prepped for procedure, per orders from physician, prior to arrival.  Pt arrived via w/c right BKA  Patient on cardiac monitor, non-invasive blood pressure, SPO2 monitor. On room air. Patient is A&Ox 4. Patient reports no complaints. Patient in stretcher, in low position, with side rails up, call bell within reach, patient instructed to call if assistance as needed. Patient prep in: [address], [place] [address]. Patient family has pager # [phone]  Family in: daughter in [address]    Prep by: Lilian Dubon RN    Pre pocket revision teaching reviewed with pt and daughter  Dr Ashia Michel in to talk with pt and daughter

## 2020-10-09 NOTE — PROCEDURES
Cardiac Procedure Note   Patient: Myriam Gauthier  MRN: [id number]  SSN: [id number]   YOB: 1932 Age: 80 y.o. Sex: female    Date of Procedure: 10/9/2020   Pre-procedure Diagnosis: Biventricular pacemaker pocket discomfort with a capped old lead pushed to the lateral side of the pocket  Post-procedure Diagnosis: same  Procedure: Biventricular pacemaker pocket revision  :  Dr. Devan Cottrell MD    Assistant(s):  None  Anesthesia: Moderate Sedation   Estimated Blood Loss: Less than 10 mL   Specimens Removed: None  Findings: The pocket was in large and extended inferiorly.   Antibiotic envelope was placed inside the pocket  Complications: None   Implants: Previous biventricular pacemaker, Medtronic  Signed by:  Devan Cottrell MD  10/9/2020  9:18 AM

## 2020-10-09 NOTE — PROGRESS NOTES
TRANSFER - IN REPORT:    Verbal report received from [facility] on Channing Benavidez  being received from procedure for routine progression of care. Report consisted of patients Situation, Background, Assessment and Recommendations(SBAR). Information from the following report(s) Procedure Summary, MAR, Recent Results and Med Rec Status was reviewed with the receiving clinician. Opportunity for questions and clarification was provided. Assessment completed upon patients arrival to [address] [place] [address] and care assumed. Cardiac Cath Lab Recovery Arrival Note:    Channing Benavidez arrived to [facility] recovery area. Patient procedure= pocket revision. Patient on cardiac monitor, non-invasive blood pressure, SPO2 monitor. On  O2 @ 2 lpm via n/c. IV  of vancomycin  on pump at 125 ml/hr. Patient status doing well without problems. Patient is A&Ox 4, but sleepy. Patient reports no complaints. PROCEDURE SITE CHECK:    Procedure site:without any bleeding and or hematoma, no pain/discomfort reported at procedure site. No change in patient status. Continue to monitor patient and status.     Ice bag to left chest wall

## 2020-10-09 NOTE — INTERVAL H&P NOTE
Update History & Physical 
 
The Patient's History and Physical of October 9, 2020 was reviewed with the patient and I examined the patient. There was no change. The surgical site was confirmed by the patient and me. Plan:  The risk, benefits, expected outcome, and alternative to the recommended procedure have been discussed with the patient. Patient understands and wants to proceed with the procedure.  
 
Electronically signed by Elsa Salinas MD on 10/9/2020 at 7:51 AM

## 2020-10-09 NOTE — DISCHARGE INSTRUCTIONS
Patient Instructions Post-Pacemaker Pocket Revision  Resume Eliquis tomorrow    1. If Aquacel dressing is in place and intact (forms complete seal at edges), you may shower. If you notice any gaps or open areas of the adhesive, sponge bath only. 2.  Aquacel dressing can be removed in 1 week, or if you prefer, it can be removed during your wound check visit in clinic. If you cannot come to the office, you may remove it by yourself  3. Do not rub/massage the site. 4.  Do not drive today    5. Call Dr. Cate Madison at [phone] if you experience any of the following symptoms:  Redness at the PM site  Swelling at or around the PM or in the left arm  Pain around the PM  Dizziness, lightheadedness, fainting spells  Lack of energy  Shortness of breath  Rapid heart rate  Chest or muscle twitches    6. Follow-up with in clinic for wound & pacemaker function check on 10/23/2020 as noted below. Future Appointments   Date Time Provider Cuca Willis   10/23/2020 11:20 AM PACEMAKER3, SHERRIE PANCHAL BS AMB   10/23/2020 11:30 AM WOUND CHECKS, SHERRIE PANCHAL BS AMB   1/13/2021  3:00 PM REMOTE1, SHERRIE PANCHAL BS AMB   4/14/2021  1:15 PM REMOTE1, SHERRIE PANCHAL BS AMB   7/19/2021 11:45 AM REMOTE1, SHERRIE PANCHAL BS AMB   10/12/2021 10:15 AM PACEMAKER3, SHERRIE FITZPATRICKREY BS AMB   10/12/2021 10:20 AM MD ANSLEY Nunn BS AMB     7. A prescription for antibiotics was sent electronically to your pharmacy on record. Please pick it up & take as directed. Zack Aragon M.D.  [facility]  Electrophysiology/Cardiology  [facility] [place]  [facility], [facility] [address], [address], [address]                             [address] [place] [address]  [phone]

## 2020-10-09 NOTE — PROGRESS NOTES
Cardiac Cath Lab Procedure Area Arrival Note:    Oda Primrose arrived to Cardiac Cath Lab, Procedure Area. Patient identifiers verified with NAME and DATE OF BIRTH. Procedure verified with patient. Consent forms verified. Allergies verified. Patient informed of procedure and plan of care. Questions answered with review. Patient voiced understanding of procedure and plan of care. Patient on cardiac monitor, non-invasive blood pressure, SPO2 monitor. On room air. Placed on   O2 @ 2 lpm via nasal cannula. IV of NS on pump at 25 ml/hr. Patient status doing well without problems. Patient is A&Ox 4. Patient reports no pain. Patient medicated during procedure with orders obtained and verified by Dr. Ijeoma Hoyos. Refer to patients Cardiac Cath Lab PROCEDURE REPORT for vital signs, assessment, status, and response during procedure, printed at end of case. Printed report on chart or scanned into chart.

## 2020-10-09 NOTE — PROGRESS NOTES
TRANSFER - OUT REPORT:    Verbal report given to Banner RN on Carmelina Dress being transferred to cath lab recovery for routine post - op       Report consisted of patients Situation, Background, Assessment and   Recommendations(SBAR). Information from the following report(s) Procedure Summary, Intake/Output, MAR and Cardiac Rhythm Paced was reviewed with the receiving nurse. Opportunity for questions and clarification was provided.

## 2020-10-09 NOTE — PROGRESS NOTES
Received labs dated 10/06/2020.     Na 141  K 4.36  Glu 135  BUN 26  Cr 1    WBC 7.4  Hgb 12.5  Hct 38.2  Plt 176

## 2020-10-13 ENCOUNTER — TELEPHONE (OUTPATIENT)
Dept: CARDIOLOGY CLINIC | Age: 85
End: 2020-10-13

## 2020-10-13 NOTE — TELEPHONE ENCOUNTER
Verified patient with two types of identifiers. Received a fax from [facility] wanting to know if patient had arm restrictions post procedure. Notified Jerrica at [phone] [facility] that patient only had a pocket revision on 10/9/20 so no left arm restrictions are in place. Asked Jerrica to just be mindful using the gait belt. Jerrica verbalized understanding and will call with any other questions.

## 2020-10-19 ENCOUNTER — TELEPHONE (OUTPATIENT)
Dept: CARDIOLOGY CLINIC | Age: 85
End: 2020-10-19

## 2020-10-19 DIAGNOSIS — I25.5 ISCHEMIC CARDIOMYOPATHY: Primary | ICD-10-CM

## 2020-10-19 NOTE — TELEPHONE ENCOUNTER
Last office note mentions repeat echo to reassess LVEF, last done 03/2020. Please try to schedule to coincide with her upcoming appt on 10/23/2020.

## 2020-10-19 NOTE — TELEPHONE ENCOUNTER
Pacemaker transmission would not show anything related to paresthesia  Just make sure she takes eliquis    Current Outpatient Medications on File Prior to Visit   Medication Sig Dispense Refill    furosemide (LASIX) 40 mg tablet TAKE ONE TAB BY MOUTH DAILY OR AS ADVISED BY PHYSICIAN 90 Tab 0    gabapentin (NEURONTIN) 100 mg capsule Take 1 Cap by mouth every twelve (12) hours. Max Daily Amount: 200 mg. 60 Cap 0    acetaminophen (TYLENOL) 500 mg tablet Take 1,000 mg by mouth every eight (8) hours.  multivitamin (ONE A DAY) tablet Take 1 Tab by mouth daily.  senna-docusate (PERICOLACE) 8.6-50 mg per tablet Take 2 Tabs by mouth two (2) times daily as needed for Constipation.  loperamide (IMODIUM) 1 mg/5 mL solution Take 1.5 tsp by mouth every eight (8) hours as needed for Diarrhea.  atorvastatin (LIPITOR) 10 mg tablet Take 10 mg by mouth daily.  honey (MediHoney, honey,) 100 % topical paste Apply  to affected area every Monday, Wednesday, Friday. Apply to left buttocks      chlorhexidine (Peridex) 0.12 % solution 15 mL by Swish and Spit route two (2) times daily (with meals).  benzocaine (ORAJEL) 20 % gel topical gel Apply  to affected area four (4) times daily as needed. Apply to gums      polyethylene glycol (Miralax) 17 gram packet Take 17 g by mouth daily.  pantoprazole (PROTONIX) 40 mg tablet Take 40 mg by mouth daily.  alum-mag hydroxide-simeth (Maalox Advanced) 200-200-20 mg/5 mL susp Take 30 mL by mouth every four (4) hours as needed for Indigestion.  levothyroxine (SYNTHROID) 100 mcg tablet Take 1 Tab by mouth Daily (before breakfast). 90 Tab 1    sertraline (ZOLOFT) 50 mg tablet Take 1.5 Tabs by mouth daily. 135 Tab 1    losartan (COZAAR) 25 mg tablet Take 1 Tab by mouth daily.  Indications: chronic heart failure 30 Tab 3    carvediloL (COREG) 3.125 mg tablet TAKE 1 TABLET BY MOUTH TWICE A DAY WITH MEALS 180 Tab 1    apixaban (Eliquis) 5 mg tablet Take 1 Tab by mouth two (2) times a day. 180 Tab 1    albuterol (PROVENTIL HFA, VENTOLIN HFA, PROAIR HFA) 90 mcg/actuation inhaler Take 1 Puff by inhalation every six (6) hours as needed for Wheezing.  ADVAIR DISKUS 250-50 mcg/dose diskus inhaler Take 1 Puff by inhalation two (2) times a day.  tiotropium (SPIRIVA WITH HANDIHALER) 18 mcg inhalation capsule Take 1 Cap by inhalation daily. No current facility-administered medications on file prior to visit.

## 2020-10-19 NOTE — TELEPHONE ENCOUNTER
Called pt daughter Zoe Caraballo two patient identifiers confirmed. Notified Zoe Caraballo about Echo on Friday after wound check and made sure pt was taking Eliquis. Pt verbalized understanding of information discussed w/ no further questions at this time.

## 2020-10-19 NOTE — TELEPHONE ENCOUNTER
Patient's daughter calling stating that patient had an hour of left arm numbness last night. Should patient sent over a transmission of pacemaker? Patient is scheduled to see pacemaker and have wound check this Friday. Please advise. Patient's daughter would like a call back from nurse.     Morgan # [phone]

## 2020-10-19 NOTE — TELEPHONE ENCOUNTER
Called pt daughter Reyna Arevalo two patient identifiers confirmed. Reyna Arevalo stated that the pt is living at [facility] and  felt left thumb numbness that turn into left arm numbness at 8 pm. Reyna Arevalo stated it only lasted for an hour and nimbleness went away. Reyna Arevalo stated that the pt was seen by a doctor or nurse practitioner from the facility and was told arm looked okay and they would just keep an eye on it and if it happened again to let them know. Leonelndjuanito Mickey stated that all her vital signs were taken this morning and were all normal. Notified Reyna Arevalo at this time she does not need to send a transmission at this time. Notified Reyna Mickey that if she has this happen again then she could send in a transmission at that time. Reyna Arevalo stated she also remembered that Dr. Tye Alva had talked to her about a Heart Ultra sound at last appointment. Reyna Mickey stated she would like to know if she needs to have it done now.

## 2020-10-23 ENCOUNTER — CLINICAL SUPPORT (OUTPATIENT)
Dept: CARDIOLOGY CLINIC | Age: 85
End: 2020-10-23

## 2020-10-23 ENCOUNTER — ANCILLARY PROCEDURE (OUTPATIENT)
Dept: CARDIOLOGY CLINIC | Age: 85
End: 2020-10-23
Payer: MEDICARE

## 2020-10-23 VITALS — WEIGHT: 146 LBS | HEIGHT: 63 IN | BODY MASS INDEX: 25.87 KG/M2

## 2020-10-23 DIAGNOSIS — R42 DIZZINESS: Primary | ICD-10-CM

## 2020-10-23 DIAGNOSIS — Z95.0 BIVENTRICULAR CARDIAC PACEMAKER IN SITU: Primary | ICD-10-CM

## 2020-10-23 DIAGNOSIS — I25.5 ISCHEMIC CARDIOMYOPATHY: ICD-10-CM

## 2020-10-23 PROCEDURE — 93308 TTE F-UP OR LMTD: CPT | Performed by: SPECIALIST

## 2020-10-23 NOTE — PROGRESS NOTES
Cardiac Electrophysiology Wound Check Note      Wound Check   Patient is here for wound check s/p pocket trevision. No fever, drainage   Physical Exam   Constitutional: well-developed and well-nourished. Skin: Left side pocket is healing without redness, drainage, hematoma. The wound is intact with skin glue. ASSESSMENT and PLAN   The incision is healing without redness, drainage, hematoma. Intact with skin glue.   The patient has finished their anti-biotic

## 2020-10-26 LAB
AV R PG: 78.63 MMHG
ECHO AO ROOT DIAM: 2.79 CM
ECHO AR MAX VEL PISA: 443.37 CM/S
ECHO AV AREA PEAK VELOCITY: 1.04 CM2
ECHO AV AREA VTI: 1.08 CM2
ECHO AV AREA/BSA PEAK VELOCITY: 0.6 CM2/M2
ECHO AV AREA/BSA VTI: 0.6 CM2/M2
ECHO AV MEAN GRADIENT: 7.19 MMHG
ECHO AV PEAK GRADIENT: 13.18 MMHG
ECHO AV PEAK VELOCITY: 181.54 CM/S
ECHO AV REGURGITANT PHT: 420.03 MS
ECHO AV VTI: 32.88 CM
ECHO LA MAJOR AXIS: 3.48 CM
ECHO LA MINOR AXIS: 2.06 CM
ECHO LV EDV A2C: 100.29 ML
ECHO LV EDV A4C: 67.87 ML
ECHO LV EDV BP: 84.1 ML (ref 56–104)
ECHO LV EDV INDEX A4C: 40.1 ML/M2
ECHO LV EDV INDEX BP: 49.7 ML/M2
ECHO LV EDV NDEX A2C: 59.3 ML/M2
ECHO LV EJECTION FRACTION A2C: 44 PERCENT
ECHO LV EJECTION FRACTION A4C: 31 PERCENT
ECHO LV EJECTION FRACTION BIPLANE: 37.2 PERCENT (ref 55–100)
ECHO LV ESV A2C: 55.98 ML
ECHO LV ESV A4C: 46.51 ML
ECHO LV ESV BP: 52.81 ML (ref 19–49)
ECHO LV ESV INDEX A2C: 33.1 ML/M2
ECHO LV ESV INDEX A4C: 27.5 ML/M2
ECHO LV ESV INDEX BP: 31.2 ML/M2
ECHO LV INTERNAL DIMENSION DIASTOLIC: 4.65 CM (ref 3.9–5.3)
ECHO LV INTERNAL DIMENSION SYSTOLIC: 2.94 CM
ECHO LV IVSD: 1.22 CM (ref 0.6–0.9)
ECHO LV MASS 2D: 223.6 G (ref 67–162)
ECHO LV MASS INDEX 2D: 132.2 G/M2 (ref 43–95)
ECHO LV POSTERIOR WALL DIASTOLIC: 1.3 CM (ref 0.6–0.9)
ECHO LVOT DIAM: 1.71 CM
ECHO LVOT PEAK GRADIENT: 2.73 MMHG
ECHO LVOT PEAK VELOCITY: 82.6 CM/S
ECHO LVOT SV: 35.6 ML
ECHO LVOT VTI: 15.52 CM
ECHO MV AREA PLAN: 2.77 CM2
ECHO MV AREA VTI: 1.69 CM2
ECHO MV MAX VELOCITY: 132.86 CM/S
ECHO MV MEAN GRADIENT: 2.26 MMHG
ECHO MV PEAK GRADIENT: 7.11 MMHG
ECHO MV VTI: 21.09 CM
ECHO PV MAX VELOCITY: 68.3 CM/S
ECHO PV PEAK INSTANTANEOUS GRADIENT SYSTOLIC: 1.87 MMHG
ECHO RA MINOR AXIS: 5.44 CM
ECHO RV INTERNAL DIMENSION: 4.29 CM
ECHO TV REGURGITANT MAX VELOCITY: 389.55 CM/S
ECHO TV REGURGITANT PEAK GRADIENT: 60.7 MMHG

## 2020-10-27 ENCOUNTER — TELEPHONE (OUTPATIENT)
Dept: CARDIOLOGY CLINIC | Age: 85
End: 2020-10-27

## 2020-10-27 NOTE — TELEPHONE ENCOUNTER
----- Message from Jonathan Hinds MD sent at 10/27/2020  6:58 AM EDT -----  Dr Travis Brewer confirmed LVEF better with biv pacing  No change in treatment plan

## 2020-10-27 NOTE — TELEPHONE ENCOUNTER
Called and spoke with patient's daughter. Verified patient's identity with two identifiers. Notified her of results. I stated [employer] also sent message in [place]. She will notify patient and denied further questions or concerns.

## 2020-10-27 NOTE — TELEPHONE ENCOUNTER
----- Message from Vipul Lee MD sent at 10/27/2020  6:58 AM EDT -----  Dr Suraj Blandon confirmed LVEF better with biv pacing  No change in treatment plan

## 2021-01-11 ENCOUNTER — TELEPHONE (OUTPATIENT)
Dept: CARDIOLOGY CLINIC | Age: 86
End: 2021-01-11

## 2021-01-11 NOTE — TELEPHONE ENCOUNTER
Called pt daughter Edenilson Kareem two patient identifiers confirmed. Edenilson Serna stated that the pt could not remember her phone number of how to call her. Edenilson Serna stated that after about an hour she was back to normal. Edenilson Serna stated that she had a nurse at the assisted living check her for a possible stroke. Edenilson Serna stated that she did not have any facial drooping at the time she checked her and was alert and oriented x3. Notified Edenilson Serna what a device check would show and that we have not received anything at this point, but will keep an eye out for somthing. Edenilson Serna verbalized understanding of information discussed w/ no further questions at this time.

## 2021-01-11 NOTE — TELEPHONE ENCOUNTER
Patient's daughter requesting to speak to you. States patient experienced temporary memory loss sometime last night to this morning and asked the assisted living to send a transmission from the heart device.     Phone: [phone]

## 2021-01-13 ENCOUNTER — OFFICE VISIT (OUTPATIENT)
Dept: CARDIOLOGY CLINIC | Age: 86
End: 2021-01-13
Payer: MEDICARE

## 2021-01-13 DIAGNOSIS — Z95.0 PRESENCE OF BIVENTRICULAR CARDIAC PACEMAKER: Primary | ICD-10-CM

## 2021-01-13 PROCEDURE — 93294 REM INTERROG EVL PM/LDLS PM: CPT | Performed by: INTERNAL MEDICINE

## 2021-01-13 PROCEDURE — 93296 REM INTERROG EVL PM/IDS: CPT | Performed by: INTERNAL MEDICINE

## 2021-01-13 NOTE — LETTER
1/13/2021 8:57 AM 
 
Ms. Rosalina Moore [address] [facility] [address] [facility] [id number] After careful review of your remote check of your implated device on 3-64-[birth year] I have concluded that your device is working properly. Your next: 
 * Automatic remote check ( from home) is scheduled for  4-14-21 *Only Medtronic pacemakers  must  sent a manual transmission. If you have any questions, please call [employer] [address] at [phone]. Additional Comments: ________________________________________________ 
 
__________________________________________________________________ 
 
__________________________________________________________________ Sincerely, Isaiah Treadwell MD [facility]

## 2021-03-04 ENCOUNTER — OFFICE VISIT (OUTPATIENT)
Dept: INTERNAL MEDICINE CLINIC | Age: 86
End: 2021-03-04
Payer: MEDICARE

## 2021-03-04 VITALS
OXYGEN SATURATION: 96 % | SYSTOLIC BLOOD PRESSURE: 124 MMHG | BODY MASS INDEX: 24.27 KG/M2 | RESPIRATION RATE: 18 BRPM | DIASTOLIC BLOOD PRESSURE: 76 MMHG | TEMPERATURE: 98.1 F | HEART RATE: 79 BPM | HEIGHT: 63 IN | WEIGHT: 137 LBS

## 2021-03-04 DIAGNOSIS — F41.9 ANXIETY AND DEPRESSION: ICD-10-CM

## 2021-03-04 DIAGNOSIS — E11.51 TYPE 2 DIABETES MELLITUS WITH PERIPHERAL VASCULAR DISEASE (HCC): ICD-10-CM

## 2021-03-04 DIAGNOSIS — I10 ESSENTIAL HYPERTENSION: ICD-10-CM

## 2021-03-04 DIAGNOSIS — E03.4 HYPOTHYROIDISM DUE TO ACQUIRED ATROPHY OF THYROID: ICD-10-CM

## 2021-03-04 DIAGNOSIS — G25.0 ESSENTIAL TREMOR: ICD-10-CM

## 2021-03-04 DIAGNOSIS — R42 DIZZINESS: ICD-10-CM

## 2021-03-04 DIAGNOSIS — J44.9 CHRONIC OBSTRUCTIVE PULMONARY DISEASE, UNSPECIFIED COPD TYPE (HCC): ICD-10-CM

## 2021-03-04 DIAGNOSIS — F32.A ANXIETY AND DEPRESSION: ICD-10-CM

## 2021-03-04 DIAGNOSIS — Z89.511 STATUS POST BELOW-KNEE AMPUTATION OF RIGHT LOWER EXTREMITY (HCC): Primary | ICD-10-CM

## 2021-03-04 DIAGNOSIS — I48.91 ATRIAL FIBRILLATION, UNSPECIFIED TYPE (HCC): ICD-10-CM

## 2021-03-04 PROBLEM — I42.0 DILATED CARDIOMYOPATHY (HCC): Status: RESOLVED | Noted: 2020-05-22 | Resolved: 2021-03-04

## 2021-03-04 PROBLEM — E11.21 TYPE 2 DIABETES WITH NEPHROPATHY (HCC): Status: RESOLVED | Noted: 2020-06-04 | Resolved: 2021-03-04

## 2021-03-04 LAB
ALBUMIN SERPL-MCNC: 3.7 G/DL (ref 3.5–5)
ALBUMIN/GLOB SERPL: 1.1 {RATIO} (ref 1.1–2.2)
ALP SERPL-CCNC: 94 U/L (ref 45–117)
ALT SERPL-CCNC: 25 U/L (ref 12–78)
ANION GAP SERPL CALC-SCNC: 7 MMOL/L (ref 5–15)
AST SERPL-CCNC: 25 U/L (ref 15–37)
BASOPHILS # BLD: 0 K/UL (ref 0–0.1)
BASOPHILS NFR BLD: 1 % (ref 0–1)
BILIRUB SERPL-MCNC: 0.8 MG/DL (ref 0.2–1)
BUN SERPL-MCNC: 33 MG/DL (ref 6–20)
BUN/CREAT SERPL: 33 (ref 12–20)
CALCIUM SERPL-MCNC: 9.8 MG/DL (ref 8.5–10.1)
CHLORIDE SERPL-SCNC: 108 MMOL/L (ref 97–108)
CO2 SERPL-SCNC: 26 MMOL/L (ref 21–32)
CREAT SERPL-MCNC: 0.99 MG/DL (ref 0.55–1.02)
DIFFERENTIAL METHOD BLD: NORMAL
EOSINOPHIL # BLD: 0.2 K/UL (ref 0–0.4)
EOSINOPHIL NFR BLD: 3 % (ref 0–7)
ERYTHROCYTE [DISTWIDTH] IN BLOOD BY AUTOMATED COUNT: 13.6 % (ref 11.5–14.5)
GLOBULIN SER CALC-MCNC: 3.3 G/DL (ref 2–4)
GLUCOSE SERPL-MCNC: 112 MG/DL (ref 65–100)
HCT VFR BLD AUTO: 37.4 % (ref 35–47)
HGB BLD-MCNC: 11.8 G/DL (ref 11.5–16)
IMM GRANULOCYTES # BLD AUTO: 0 K/UL (ref 0–0.04)
IMM GRANULOCYTES NFR BLD AUTO: 0 % (ref 0–0.5)
LYMPHOCYTES # BLD: 1 K/UL (ref 0.8–3.5)
LYMPHOCYTES NFR BLD: 15 % (ref 12–49)
MCH RBC QN AUTO: 29.8 PG (ref 26–34)
MCHC RBC AUTO-ENTMCNC: 31.6 G/DL (ref 30–36.5)
MCV RBC AUTO: 94.4 FL (ref 80–99)
MONOCYTES # BLD: 0.6 K/UL (ref 0–1)
MONOCYTES NFR BLD: 8 % (ref 5–13)
NEUTS SEG # BLD: 5.2 K/UL (ref 1.8–8)
NEUTS SEG NFR BLD: 73 % (ref 32–75)
NRBC # BLD: 0 K/UL (ref 0–0.01)
NRBC BLD-RTO: 0 PER 100 WBC
PLATELET # BLD AUTO: 169 K/UL (ref 150–400)
PMV BLD AUTO: 12.6 FL (ref 8.9–12.9)
POTASSIUM SERPL-SCNC: 5.1 MMOL/L (ref 3.5–5.1)
PROT SERPL-MCNC: 7 G/DL (ref 6.4–8.2)
RBC # BLD AUTO: 3.96 M/UL (ref 3.8–5.2)
SODIUM SERPL-SCNC: 141 MMOL/L (ref 136–145)
TSH SERPL DL<=0.05 MIU/L-ACNC: 0.88 UIU/ML (ref 0.36–3.74)
WBC # BLD AUTO: 7 K/UL (ref 3.6–11)

## 2021-03-04 PROCEDURE — 1101F PT FALLS ASSESS-DOCD LE1/YR: CPT | Performed by: INTERNAL MEDICINE

## 2021-03-04 PROCEDURE — G8420 CALC BMI NORM PARAMETERS: HCPCS | Performed by: INTERNAL MEDICINE

## 2021-03-04 PROCEDURE — G8536 NO DOC ELDER MAL SCRN: HCPCS | Performed by: INTERNAL MEDICINE

## 2021-03-04 PROCEDURE — G8427 DOCREV CUR MEDS BY ELIG CLIN: HCPCS | Performed by: INTERNAL MEDICINE

## 2021-03-04 PROCEDURE — 99215 OFFICE O/P EST HI 40 MIN: CPT | Performed by: INTERNAL MEDICINE

## 2021-03-04 PROCEDURE — 1090F PRES/ABSN URINE INCON ASSESS: CPT | Performed by: INTERNAL MEDICINE

## 2021-03-04 PROCEDURE — G0463 HOSPITAL OUTPT CLINIC VISIT: HCPCS | Performed by: INTERNAL MEDICINE

## 2021-03-04 PROCEDURE — G9717 DOC PT DX DEP/BP F/U NT REQ: HCPCS | Performed by: INTERNAL MEDICINE

## 2021-03-04 RX ORDER — ONDANSETRON 4 MG/1
4 TABLET, FILM COATED ORAL
COMMUNITY

## 2021-03-04 RX ORDER — DICLOFENAC SODIUM 10 MG/G
2 GEL TOPICAL
COMMUNITY
Start: 2020-11-13

## 2021-03-04 RX ORDER — TRAMADOL HYDROCHLORIDE 50 MG/1
50 TABLET ORAL
COMMUNITY

## 2021-03-04 RX ORDER — CARVEDILOL 3.12 MG/1
TABLET ORAL
Qty: 180 TAB | Refills: 1 | Status: SHIPPED | OUTPATIENT
Start: 2021-03-04 | End: 2021-09-21

## 2021-03-04 RX ORDER — BACLOFEN 5 MG/1
TABLET ORAL
COMMUNITY

## 2021-03-04 NOTE — PROGRESS NOTES
Gary Hughes is a 80 y.o. female who presents today for Dizziness  . She has a history of   Patient Active Problem List   Diagnosis Code    Mixed hyperlipidemia E78.2    Atrial fibrillation ([facility] [address] I48.91    Mitral regurgitation I34.0    Third degree AV block ([facility]) I44.2    Anxiety and depression F41.9, F32.9    Hypothyroid E03.9    CKD (chronic kidney disease) stage 3, GFR 30-59 ml/min N18.30    Osteopenia M85.80    Restrictive lung disease J98.4    Ischemic cardiomyopathy I25.5    Psoriasis L40.9    Pacemaker Z95.0    Frequent falls R29.6    COPD (chronic obstructive pulmonary disease) ([facility]) J44.9    Chronic respiratory failure with hypoxia ([facility]) J96.11    Hypertension I10    Biventricular cardiac pacemaker in situ Z95.0    Dilated cardiomyopathy ([facility]) I42.0    Type 2 diabetes mellitus without complication, unspecified whether long term insulin use ([facility]) E11.9    Type 2 diabetes with nephropathy ([facility]) E11.21    Type 2 diabetes mellitus with peripheral vascular disease ([facility]) E11.51    Gangrene of foot ([facility]) I96    LARISSA (acute kidney injury) ([facility]) N17.9    Nausea & vomiting R11.2    Dehydration E86.0   . Today patient is here for f/u. Dizziness: Patient reports that she has been having some mild dizziness in the morning. This seems to occur while she is getting up. Vitals are stable. Blood sugars have been well controlled for some time. Having more of an essential tremor. Upon review of medications it is not clear if she is still taking carvedilol. We discussed that this may help with her essential tremor if she has not. Status post below-knee amputation of right leg. Patient did have an amputation done in June 2020. This was very stressful event but she has recovered from this very well. She has been fitted with a prosthetic and has had some issues with skin blisters. Hypertension- BP is stable.    Hypertension ROS: taking medications as instructed, no medication side effects noted, no TIA's, no chest pain on exertion, no dyspnea on exertion, no swelling of ankles     reports that she is a non-smoker but has been exposed to tobacco smoke. She has never used smokeless tobacco.    reports no history of alcohol use. BP Readings from Last 2 Encounters:   03/04/21 124/76   10/09/20 137/61     HF: Continues to follow with cardiology. Did have her BiV pacer pocket changed in October. Denies any volume overload symptoms. Unclear if BB is still coming in. ET has worsened. Remains on Levothyroxine. Mental health has been doing well. She is on 75 mg of Zoloft. ROS  Review of Systems   Constitutional: Negative for chills, fever and weight loss. HENT: Negative for congestion, ear discharge, ear pain, hearing loss, nosebleeds, sinus pain, sore throat and tinnitus. Eyes: Negative for blurred vision, double vision and photophobia. Respiratory: Negative for cough, sputum production, shortness of breath and stridor. Cardiovascular: Negative for chest pain, palpitations, orthopnea, claudication and leg swelling. Gastrointestinal: Negative for abdominal pain, constipation, diarrhea, heartburn, nausea and vomiting. Genitourinary: Negative for dysuria, frequency and urgency. Musculoskeletal: Negative for joint pain and myalgias (occasional phantom pain). Skin: Negative for rash. Neurological: Positive for dizziness (occasional in the AM). Negative for tremors, sensory change, speech change and headaches. Endo/Heme/Allergies: Does not bruise/bleed easily. Psychiatric/Behavioral: Negative for depression (Stable), memory loss, substance abuse and suicidal ideas.        Visit Vitals  /76 (BP 1 Location: Left upper arm, BP Patient Position: Sitting, BP Cuff Size: Small adult)   Pulse 79   Temp 98.1 °F (36.7 °C) (Oral)   Resp 18   Ht 5' 3\" (1.6 m)   Wt 137 lb (62.1 kg)   SpO2 96%   BMI 24.27 kg/m²       Physical Exam  Constitutional:       Appearance: She is well-developed. HENT:      Head: Normocephalic and atraumatic. Right Ear: Tympanic membrane normal.      Left Ear: Tympanic membrane normal.      Nose: Nose normal.   Cardiovascular:      Rate and Rhythm: Normal rate and regular rhythm. Heart sounds: No murmur. Pulmonary:      Effort: Pulmonary effort is normal. No respiratory distress. Abdominal:      General: Abdomen is flat. Palpations: Abdomen is soft. Musculoskeletal:      Comments: Status post right below-knee amputation. Stump looks good. There is one area that appears that the skin is thinning a bit. Skin:     General: Skin is warm and dry. Neurological:      Mental Status: She is alert and oriented to person, place, and time. Psychiatric:         Behavior: Behavior normal.           Current Outpatient Medications   Medication Sig    diclofenac (VOLTAREN) 1 % gel Apply 2 g to affected area three (3) times daily as needed.  Lactobacillus acidophilus (PROBIOTIC PO) Take  by mouth.  ondansetron hcl (Zofran) 4 mg tablet Take 4 mg by mouth every eight (8) hours as needed for Nausea or Vomiting.  baclofen 5 mg tab Take  by mouth.  traMADoL (ULTRAM) 50 mg tablet Take 50 mg by mouth every six (6) hours as needed for Pain.  furosemide (LASIX) 40 mg tablet TAKE ONE TAB BY MOUTH DAILY OR AS ADVISED BY PHYSICIAN    acetaminophen (TYLENOL) 500 mg tablet Take 1,000 mg by mouth every eight (8) hours.  multivitamin (ONE A DAY) tablet Take 1 Tab by mouth daily.  senna-docusate (PERICOLACE) 8.6-50 mg per tablet Take 2 Tabs by mouth two (2) times daily as needed for Constipation.  loperamide (IMODIUM) 1 mg/5 mL solution Take 1.5 tsp by mouth every eight (8) hours as needed for Diarrhea.  atorvastatin (LIPITOR) 10 mg tablet Take 10 mg by mouth daily.  honey (MediHoney, honey,) 100 % topical paste Apply  to affected area every Monday, Wednesday, Friday.  Apply to left buttocks    chlorhexidine (Peridex) 0.12 % solution 15 mL by Swish and Spit route two (2) times daily (with meals).  benzocaine (ORAJEL) 20 % gel topical gel Apply  to affected area four (4) times daily as needed. Apply to gums    polyethylene glycol (Miralax) 17 gram packet Take 17 g by mouth daily.  pantoprazole (PROTONIX) 40 mg tablet Take 40 mg by mouth daily.  alum-mag hydroxide-simeth (Maalox Advanced) 200-200-20 mg/5 mL susp Take 30 mL by mouth every four (4) hours as needed for Indigestion.  levothyroxine (SYNTHROID) 100 mcg tablet Take 1 Tab by mouth Daily (before breakfast).  sertraline (ZOLOFT) 50 mg tablet Take 1.5 Tabs by mouth daily.  losartan (COZAAR) 25 mg tablet Take 1 Tab by mouth daily. Indications: chronic heart failure    apixaban (Eliquis) 5 mg tablet Take 1 Tab by mouth two (2) times a day.  albuterol (PROVENTIL HFA, VENTOLIN HFA, PROAIR HFA) 90 mcg/actuation inhaler Take 1 Puff by inhalation every six (6) hours as needed for Wheezing.  ADVAIR DISKUS 250-50 mcg/dose diskus inhaler Take 1 Puff by inhalation two (2) times a day.  tiotropium (SPIRIVA WITH HANDIHALER) 18 mcg inhalation capsule Take 1 Cap by inhalation daily.  gabapentin (NEURONTIN) 100 mg capsule Take 1 Cap by mouth every twelve (12) hours. Max Daily Amount: 200 mg.  carvediloL (COREG) 3.125 mg tablet TAKE 1 TABLET BY MOUTH TWICE A DAY WITH MEALS     No current facility-administered medications for this visit.          Past Medical History:   Diagnosis Date    Arthritis     Atrial fibrillation ([facility] [address]     av node ablation 5/22/98 with placement of CPI #[id number] dual chamber pacemaker subsequently replaced with a single chamber medtronic #[id number] single chamber pacemaker 7/25/05    Cancer ([facility] [address] 1970's    colon cancer w/ resection    COPD     Depression     GERD (gastroesophageal reflux disease)     History of vascular access device 04/17/2020    4F MIDLINE PLACED BY EMIL GUNN RN LEFT BRACHIAL, 13CM    Hyperlipidemia     Hypertension     Ischemic cardiomyopathy     Migraine headache     Orthostatic hypotension     RADHA (obstructive sleep apnea)     Pacemaker 5/22/98    AV node ablation /pacemaker placement utilizing CPI # [id number] dual chamber system, medtronic #[id number] single chamber device placed 7/22/05    Pulmonary hypertension ([address] 9/26/2011    RVSP 50 on echo 8/14    Thyroid disease     Valvular heart disease     mild-mod MR/TR      Past Surgical History:   Procedure Laterality Date    HX BREAST BIOPSY Right 2002    neg; surgical bx    HX KNEE REPLACEMENT      right TKA    HX PACEMAKER      HX PACEMAKER PLACEMENT  05/2020    HX TOTAL COLECTOMY  1974    colon    HX TUBAL LIGATION      IR KYPHOPLASTY LUMBAR  7/2/2019    IL BREAST SURGERY PROCEDURE UNLISTED      benign breast tumor excision right breast    IL INSJ ELTRD CAR BYRON SYS TM INSJ DFB/PM PLS GEN N/A 5/22/2020    Lv Lead Placement performed by Himanshu Joyner MD at [address] CATH LAB    IL RELOCATION OF SKIN POCKET FOR PACEMAKER N/A 10/9/2020    RELOCATE PACEMAKER POCKET performed by Himanshu Joyner MD at [address] CATH LAB    IL REMVL PERM PM PLS GEN W/REPL PLSE GEN MULT LEAD N/A 5/22/2020    REMOVE & REPLACE PPM GEN BIV MULTI LEADS performed by Himanshu Joyner MD at [address] CATH LAB    IL TOTAL KNEE ARTHROPLASTY      total on R, partial on L      Social History     Tobacco Use    Smoking status: Passive Smoke Exposure - Never Smoker    Smokeless tobacco: Never Used   Substance Use Topics    Alcohol use: No     Alcohol/week: 0.0 standard drinks      Family History   Problem Relation Age of Onset    Diabetes Mother     Heart Disease Mother     Heart Attack Mother     Heart Disease Father     Stroke Sister     Breast Cancer Sister 80    Cancer Maternal Aunt         uterus    Diabetes Maternal Uncle     Breast Cancer Daughter 61        Allergies   Allergen Reactions    Cardizem [Diltiazem Hcl] Hives    Codeine Nausea and Vomiting    Darvocet A500 [Propoxyphene N-Acetaminophen] Nausea and Vomiting    Diltiazem Hives    Labetalol Nausea and Vomiting     Dizzy/Disoriented     Pcn [Penicillins] Swelling     Tongue Swelling   Has tolerated cephalexin recently 4/7/20     Primidone Other (comments)     Lightheaded/unsteady gait    Sulfa (Sulfonamide Antibiotics) Nausea and Vomiting        Assessment/Plan  Diagnoses and all orders for this visit:    1. Status post below-knee amputation of right lower extremity (HCC)-having issues with skin breakdown with prosthetic. Overall no significant phantom pain. 2. 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 Essential hypertension-unclear if patient is still on carvedilol. Given her essential tremor would be reasonable to resume that as long as we are watching her blood pressures closely. She does have a BiV pacer.  -     METABOLIC PANEL, COMPREHENSIVE; Future  -     CBC WITH AUTOMATED DIFF; Future    4. Anxiety and depression-mental health is doing well. I believe she is doing much better since her amputation.  -     METABOLIC PANEL, COMPREHENSIVE; Future  -     CBC WITH AUTOMATED DIFF; Future    5. Dizziness-new phenomenon. Labs today. Vital seem to be stable. This may be inner ear in nature. -     CBC WITH AUTOMATED DIFF; Future    6. Essential tremor    7. Type 2 diabetes mellitus with peripheral vascular disease (HCC)-has been well controlled for some time. 8. COPD-remains on her inhalers. Rarely ever needing oxygen. Breathing has been doing well            Johnathon Huff MD  3/4/2021    This note was created with the help of speech recognition software Cristi Horton) and may contain some 'sound alike' errors.

## 2021-03-04 NOTE — TELEPHONE ENCOUNTER
Verified patient with two types of identifiers. Received a call from patient's daughter, Penelope Covarrubias, verified on HIPAA. Penelope Covarrubias states they are currently at her PCP and Radha Lambert gave them a print out of her medications and it does not have Coreg on the list. Penelope Covarrubias wanted to make sure she was still supposed to be taking. Notified that per d/c paper in October patient to continue Coreg 3.125 mg BID. Also reviewed echo results that LVEF has improved with BiV Pacing and change in plan. From our perspective we did not take patient off of Coreg. Penelope Coavrrubias asking I send in a refill for medication. VO per Dr. Cedric García. Verified pharmacy. Patient verbalized understanding and will call with any other questions.

## 2021-03-12 ENCOUNTER — TELEPHONE (OUTPATIENT)
Dept: INTERNAL MEDICINE CLINIC | Age: 86
End: 2021-03-12

## 2021-03-12 DIAGNOSIS — S88.111A: Primary | ICD-10-CM

## 2021-03-12 NOTE — TELEPHONE ENCOUNTER
----- Message from KIKO Yañez sent at 3/12/2021 12:04 PM EST -----  Regarding: Referral Request  Contact: [phone]  [facility] needs a referral for therapy  For prosthesis use, leg and balance strengthening     Please fax a copy to them at  [phone]    And post a copy on her my chart so we can print off a copy as well  Thank you

## 2021-04-06 ENCOUNTER — TELEPHONE (OUTPATIENT)
Dept: INTERNAL MEDICINE CLINIC | Age: 86
End: 2021-04-06

## 2021-04-06 NOTE — TELEPHONE ENCOUNTER
----- Message from [facility] sent at 4/6/2021  2:12 PM EDT -----  Regarding: Dr. Devang Salazar Message/Vendor Calls    Caller's first and last name: Pedro Dumont, daughter      Reason for call: P.T. at [facility] suggested pt needs occupational therapy as well.  Need order for this      Callback required yes/no and why: Yes, to discuss      Best contact number(s): [phone]      Details to clarify the request:  [address]

## 2021-04-07 ENCOUNTER — TELEPHONE (OUTPATIENT)
Dept: INTERNAL MEDICINE CLINIC | Age: 86
End: 2021-04-07

## 2021-04-07 DIAGNOSIS — S88.111A: Primary | ICD-10-CM

## 2021-04-07 NOTE — TELEPHONE ENCOUNTER
----- Message from KIKO Hazel sent at 4/7/2021  8:13 AM EDT -----  Regarding: Prescription Question  Contact: [phone]  I left a msg yesterday requesting an order for [facility]onique [facility] [place] [facility]   She has started her PT there and therapist feels she would benefit greatly with OT as well  Thank you  Sharla  Phone # there is [phone]    Not sure of fax but its sane as where you sent the PT order

## 2021-04-13 ENCOUNTER — TELEPHONE (OUTPATIENT)
Dept: CARDIOLOGY CLINIC | Age: 86
End: 2021-04-13

## 2021-04-13 NOTE — TELEPHONE ENCOUNTER
Verified patient with two types of identifiers. Notified Xiomara that for general teeth cleaning Eliquis does not need to be held. Xiomara states patient broke her tooth so she thinks they will just clean it and smooth the edges. Notified Xiomara to discuss plan with dentist today. If tooth needs to be extracted we will give recommendations regarding holding [address]. Patient's daughter verbalized understanding and will call with any other questions.

## 2021-04-13 NOTE — TELEPHONE ENCOUNTER
Hannah Dennison (pt's daughter) called stating the pt having her teeth extractions today and need to know if she need to stop taking her medications    Hannah Dennison can be reached at [phone]    [facility]

## 2021-04-14 ENCOUNTER — OFFICE VISIT (OUTPATIENT)
Dept: CARDIOLOGY CLINIC | Age: 86
End: 2021-04-14
Payer: MEDICARE

## 2021-04-14 DIAGNOSIS — Z95.0 CARDIAC PACEMAKER IN SITU: Primary | ICD-10-CM

## 2021-04-14 PROCEDURE — 93296 REM INTERROG EVL PM/IDS: CPT | Performed by: INTERNAL MEDICINE

## 2021-04-14 PROCEDURE — 93294 REM INTERROG EVL PM/LDLS PM: CPT | Performed by: INTERNAL MEDICINE

## 2021-05-12 ENCOUNTER — TELEPHONE (OUTPATIENT)
Dept: INTERNAL MEDICINE CLINIC | Age: 86
End: 2021-05-12

## 2021-05-12 NOTE — TELEPHONE ENCOUNTER
----- Message from Sonny Duong sent at 5/12/2021  8:03 AM EDT -----  Regarding: Dr. Miranda Whyte  Patient return call    Caller's first and last name and relationship (if not the patient): Sharla/daughter      Best contact number(s): [phone]      Whose call is being returned: Dr. Jc Ruiz      Details to clarify the request: Received a [employer] messages that Dr. Jc Ruiz would speak with her this morning.       Sonny Duong

## 2021-05-12 NOTE — TELEPHONE ENCOUNTER
The message was that he would speak to her tomorrow morning ( Thursday 5/13)  Have My Charted with daughter concerning this.

## 2021-05-13 ENCOUNTER — OFFICE VISIT (OUTPATIENT)
Dept: INTERNAL MEDICINE CLINIC | Age: 86
End: 2021-05-13
Payer: MEDICARE

## 2021-05-13 DIAGNOSIS — R53.1 WEAKNESS GENERALIZED: Primary | ICD-10-CM

## 2021-05-13 DIAGNOSIS — M79.605 LEFT LEG PAIN: ICD-10-CM

## 2021-05-13 DIAGNOSIS — S88.111A: ICD-10-CM

## 2021-05-13 DIAGNOSIS — L89.90 PRESSURE INJURY OF SKIN, UNSPECIFIED INJURY STAGE, UNSPECIFIED LOCATION: ICD-10-CM

## 2021-05-13 DIAGNOSIS — I10 ESSENTIAL HYPERTENSION: ICD-10-CM

## 2021-05-13 PROCEDURE — 99442 PR PHYS/QHP TELEPHONE EVALUATION 11-20 MIN: CPT | Performed by: INTERNAL MEDICINE

## 2021-05-13 NOTE — PROGRESS NOTES
Fidel Farmer  Identified pt with two pt identifiers(name and ). Chief Complaint   Patient presents with    Other     telephone call only visit with pt's daughter, Ayesha Marcos, to discuss concerns she is having in regards to her mother/Pt. 1. Have you been to the ER, urgent care clinic since your last visit? Hospitalized since your last visit? NO    2. Have you seen or consulted any other health care providers outside of the [address] [place] [address] since your last visit? Include any pap smears or colon screening. NO      Provider notified of reason for visit, vitals and flowsheets obtained on patients. Patient received paperwork for advance directive during previous visit but has not completed at this time     Reviewed record In preparation for visit, huddled with provider and have obtained necessary documentation      Health Maintenance Due   Topic    Eye Exam Retinal or Dilated     Shingrix Vaccine Age 49> (1 of 2)    Medicare Yearly Exam     Foot Exam Q1        Wt Readings from Last 3 Encounters:   21 137 lb (62.1 kg)   10/23/20 146 lb (66.2 kg)   10/09/20 146 lb (66.2 kg)     Temp Readings from Last 3 Encounters:   21 98.1 °F (36.7 °C) (Oral)   10/09/20 97.5 °F (36.4 °C)   07/10/20 98.3 °F (36.8 °C)     BP Readings from Last 3 Encounters:   21 124/76   10/09/20 137/61   10/01/20 118/64     Pulse Readings from Last 3 Encounters:   21 79   10/09/20 80   07/10/20 80     There were no vitals filed for this visit.       Learning Assessment:  :     Learning Assessment 2015   PRIMARY LEARNER Patient Guardian Patient Patient Child(luis fernando)   HIGHEST LEVEL OF EDUCATION - PRIMARY LEARNER  - - - SOME COLLEGE -   BARRIERS PRIMARY LEARNER - - - NONE -   CO-LEARNER CAREGIVER - - - No -   PRIMARY LANGUAGE ENGLISH ENGLISH ENGLISH ENGLISH ENGLISH   LEARNER PREFERENCE PRIMARY READING PICTURES - READING LISTENING   ANSWERED BY self Patient patient self Lesley Castellanos   RELATIONSHIP SELF SELF SELF SELF OTHER       Depression Screening:  :     3 most recent PHQ Screens 6/4/2020   Little interest or pleasure in doing things Not at all   Feeling down, depressed, irritable, or hopeless Not at all   Total Score PHQ 2 0   Trouble falling or staying asleep, or sleeping too much -   Feeling tired or having little energy -   Poor appetite, weight loss, or overeating -   Feeling bad about yourself - or that you are a failure or have let yourself or your family down -   Trouble concentrating on things such as school, work, reading, or watching TV -   Moving or speaking so slowly that other people could have noticed; or the opposite being so fidgety that others notice -   Thoughts of being better off dead, or hurting yourself in some way -   PHQ 9 Score -   How difficult have these problems made it for you to do your work, take care of your home and get along with others -       Fall Risk Assessment:  :     Fall Risk Assessment, last 12 mths 6/4/2020   Able to walk? Yes   Fall in past 12 months? No   Number of falls in past 12 months -   Fall with injury? -       Abuse Screening:  :     Abuse Screening Questionnaire 6/4/2020 5/21/2020 3/6/2020 2/18/2020 11/18/2019 8/1/2019 3/21/2019   Do you ever feel afraid of your partner? N N N N N N N   Are you in a relationship with someone who physically or mentally threatens you? N N N N N N N   Is it safe for you to go home?  Y Y Y Y Y Y Y       ADL Screening:  :     ADL Assessment 5/11/2017   Feeding yourself No Help Needed   Getting from bed to chair No Help Needed   Getting dressed No Help Needed   Bathing or showering No Help Needed   Walk across the room (includes cane/walker) No Help Needed   Using the telphone No Help Needed   Taking your medications Help Needed   Preparing meals Help Needed   Managing money (expenses/bills) Help Needed   Moderately strenuous housework (laundry) Help Needed   Shopping for personal items (toiletries/medicines) Help Needed   Shopping for groceries Help Needed   Driving Help Needed   Climbing a flight of stairs Help Needed   Getting to places beyond walking distances Help Needed         Medication reconciliation up to date and corrected with patient at this time.

## 2021-05-13 NOTE — PROGRESS NOTES
Carissa Hayden was seen on 5/13/2021 using synchronous (real-time) audio technology; telephone. Consent: Carissa Hayden, who was seen by synchronous (real-time) audio technology, and/or her healthcare decision maker, is aware that this patient-initiated, Telehealth encounter on 5/13/2021 is a billable service, with coverage as determined by her insurance carrier. She is aware that she may receive a bill and has provided verbal consent to proceed: Yes. I was in the office while conducting this encounter. Carissa Hayden is a 80 y.o. female who presents today for Other (telephone call with daughter, Anna Brooks )  . She has a history of   Patient Active Problem List   Diagnosis Code    Mixed hyperlipidemia E78.2    Mitral regurgitation I34.0    Anxiety and depression F41.9, F32.9    Hypothyroid E03.9    CKD (chronic kidney disease) stage 3, GFR 30-59 ml/min ([facility]) N18.30    Osteopenia M85.80    Restrictive lung disease J98.4    Ischemic cardiomyopathy I25.5    Psoriasis L40.9    Pacemaker Z95.0    Frequent falls R29.6    COPD (chronic obstructive pulmonary disease) ([facility] [address] [facility] J44.9    Chronic respiratory failure with hypoxia ([facility]) J96.11    Hypertension I10    Biventricular cardiac pacemaker in situ Z95.0    Type 2 diabetes mellitus without complication, unspecified whether long term insulin use ([facility]) E11.9    Gangrene of foot ([facility]) I96    LARISSA (acute kidney injury) ([facility]) N17.9    Nausea & vomiting R11.2    Dehydration E86.0   . Today patient is being seen for care discussion. Progression of care: daughter is concerned that her mother is having more difficulty with transportation to and from outpt rehab. [facility] and [facility]. It is taking her more effort and energy to go to outpt rehab. Some blistering at the site of prosthetic. Still in assisted living at [facility]. Having more issues with her L knee and weakness.  Will be seeing orthopedist for this knee. Does have a decubitus ulcer that has just been noticed. Does not seem to be infected at this time. .   Also having issues with her left foot. As this is where she places more pressure is they are concerned about calluses that are forming on her left foot. I discussed with the daughter that often times insurances do not approve patients going to inpatient rehab from an outpatient situation. I agree that Ms. Sloane Cuellar would benefit from inpatient rehab, which would allow her to concentrate on strengthening and using her prosthetic rather than using her energy for transferring and transportation to [facility]. Home health has historically not been very effective at helping her with ambulation and use of her prosthetic. She does report that her mother has voiced that she is not sure how much longer she can sustain the transportation at [facility] for her PT.    ROS  Review of Systems   Constitutional: Negative for chills, fever and weight loss. HENT: Negative for congestion and sore throat. Eyes: Negative for blurred vision, double vision and photophobia. Respiratory: Negative for cough, hemoptysis and shortness of breath. Cardiovascular: Negative for chest pain, palpitations, orthopnea and leg swelling. Gastrointestinal: Negative for abdominal pain, constipation, diarrhea, heartburn, nausea and vomiting. Genitourinary: Negative for dysuria, frequency, hematuria and urgency. Musculoskeletal: Positive for joint pain. Negative for myalgias. Skin: Negative for rash. Decubitus ulcer. Calluses on left foot. Mild ulceration to right lower extremity at site that comes in contact with prosthesis   Neurological: Negative. Negative for headaches. Endo/Heme/Allergies: Does not bruise/bleed easily. Psychiatric/Behavioral: Negative for depression, memory loss and suicidal ideas. There were no vitals taken for this visit. No flowsheet data found. Physical Exam      Current Outpatient Medications   Medication Sig    diclofenac (VOLTAREN) 1 % gel Apply 2 g to affected area three (3) times daily as needed.  Lactobacillus acidophilus (PROBIOTIC PO) Take  by mouth.  ondansetron hcl (Zofran) 4 mg tablet Take 4 mg by mouth every eight (8) hours as needed for Nausea or Vomiting.  baclofen 5 mg tab Take  by mouth.  traMADoL (ULTRAM) 50 mg tablet Take 50 mg by mouth every six (6) hours as needed for Pain.  carvediloL (COREG) 3.125 mg tablet TAKE 1 TABLET BY MOUTH TWICE A DAY WITH MEALS    furosemide (LASIX) 40 mg tablet TAKE ONE TAB BY MOUTH DAILY OR AS ADVISED BY PHYSICIAN    acetaminophen (TYLENOL) 500 mg tablet Take 1,000 mg by mouth every eight (8) hours.  multivitamin (ONE A DAY) tablet Take 1 Tab by mouth daily.  senna-docusate (PERICOLACE) 8.6-50 mg per tablet Take 2 Tabs by mouth two (2) times daily as needed for Constipation.  loperamide (IMODIUM) 1 mg/5 mL solution Take 1.5 tsp by mouth every eight (8) hours as needed for Diarrhea.  atorvastatin (LIPITOR) 10 mg tablet Take 10 mg by mouth daily.  honey (MediHoney, honey,) 100 % topical paste Apply  to affected area every Monday, Wednesday, Friday. Apply to left buttocks    chlorhexidine (Peridex) 0.12 % solution 15 mL by Swish and Spit route two (2) times daily (with meals).  benzocaine (ORAJEL) 20 % gel topical gel Apply  to affected area four (4) times daily as needed. Apply to gums    polyethylene glycol (Miralax) 17 gram packet Take 17 g by mouth daily.  pantoprazole (PROTONIX) 40 mg tablet Take 40 mg by mouth daily.  alum-mag hydroxide-simeth (Maalox Advanced) 200-200-20 mg/5 mL susp Take 30 mL by mouth every four (4) hours as needed for Indigestion.  levothyroxine (SYNTHROID) 100 mcg tablet Take 1 Tab by mouth Daily (before breakfast).  sertraline (ZOLOFT) 50 mg tablet Take 1.5 Tabs by mouth daily.     losartan (COZAAR) 25 mg tablet Take 1 Tab by mouth daily. Indications: chronic heart failure    apixaban (Eliquis) 5 mg tablet Take 1 Tab by mouth two (2) times a day.  albuterol (PROVENTIL HFA, VENTOLIN HFA, PROAIR HFA) 90 mcg/actuation inhaler Take 1 Puff by inhalation every six (6) hours as needed for Wheezing.  ADVAIR DISKUS 250-50 mcg/dose diskus inhaler Take 1 Puff by inhalation two (2) times a day.  tiotropium (SPIRIVA WITH HANDIHALER) 18 mcg inhalation capsule Take 1 Cap by inhalation daily. No current facility-administered medications for this visit.          Past Medical History:   Diagnosis Date    Arthritis     Atrial fibrillation ([facility] [address]     av node ablation 5/22/98 with placement of CPI #[id number] dual chamber pacemaker subsequently replaced with a single chamber medtronic #[id number] single chamber pacemaker 7/25/05    Cancer ([facility] [address] 1970's    colon cancer w/ resection    COPD     Depression     GERD (gastroesophageal reflux disease)     History of vascular access device 04/17/2020    4F MIDLINE PLACED BY EMIL Ramirez RN LEFT BRACHIAL, 13CM    Hyperlipidemia     Hypertension     Ischemic cardiomyopathy     Migraine headache     Orthostatic hypotension     RADHA (obstructive sleep apnea)     Pacemaker 5/22/98    AV node ablation /pacemaker placement utilizing CPI # [id number] dual chamber system, medtronic #[id number] single chamber device placed 7/22/05    Pulmonary hypertension ([facility] [address] 9/26/2011    RVSP 50 on echo 8/14    Thyroid disease     Valvular heart disease     mild-mod MR/TR      Past Surgical History:   Procedure Laterality Date    HX BREAST BIOPSY Right 2002    neg; surgical bx    HX KNEE REPLACEMENT      right TKA    HX PACEMAKER      HX PACEMAKER PLACEMENT  05/2020    HX TOTAL COLECTOMY  1974    colon    HX TUBAL LIGATION      IR KYPHOPLASTY LUMBAR  7/2/2019    MA BREAST SURGERY PROCEDURE UNLISTED      benign breast tumor excision right breast    MA INSJ ELTRD CAR BYRON SYS TM INSJ DFB/PM PLS GEN N/A 5/22/2020    Lv Lead Placement performed by Ok Blunt MD at [address]/s  CATH LAB    TX RELOCATION OF SKIN POCKET FOR PACEMAKER N/A 10/9/2020    RELOCATE PACEMAKER POCKET performed by Ok Blunt MD at [address]  CATH LAB    TX REMVL PERM PM PLS GEN W/REPL PLSE GEN MULT LEAD N/A 5/22/2020    REMOVE & REPLACE PPM GEN BIV MULTI LEADS [facility]  C[facility]    TX TOTAL KNEE ARTHROPLASTY      total on R, partial on L      Social History     Tobacco Use    Smoking status: Passive Smoke Exposure - Never Smoker    Smokeless tobacco: Never Used   Substance Use Topics    Alcohol use: No     Alcohol/week: 0.0 standard drinks      Family History   Problem Relation Age of Onset    Diabetes Mother     Heart Disease Mother     Heart Attack Mother     Heart Disease Father     Stroke Sister     Breast Cancer Sister 80    Cancer Maternal Aunt         uterus    Diabetes Maternal Uncle     Breast Cancer Daughter 61        Allergies   Allergen Reactions    Cardizem [Diltiazem Hcl] Hives    Codeine Nausea and Vomiting    Darvocet A500 [Propoxyphene N-Acetaminophen] Nausea and Vomiting    Diltiazem Hives    Labetalol Nausea and Vomiting     Dizzy/Disoriented     Pcn [Penicillins] Swelling     Tongue Swelling   Has tolerated cephalexin recently 4/7/20     Primidone Other (comments)     Lightheaded/unsteady gait    Sulfa (Sulfonamide Antibiotics) Nausea and Vomiting        Assessment/Plan  Diagnoses and all orders for this visit:    1. Weakness generalized-I agree that given the effort it takes to transport her to [facility] for physical therapy, that Ms. Arlin Perez would benefit from inpatient rehab. Not only has she had some recent skin breakdown, but her ability to participate in PT has been limited due to the physical fatigue of transportation. At this point I do not believe that she needs inpatient hospital care, but more rehab.   Home health has historically not been very effective at strengthening and at using her prosthetic. D[facility] will [facility] with [facility] to see if they will accept her and if so we will discuss with her insurance company to see if they will review her case. 2. Amputation of leg below knee, traumatic, right, initial encounter ([facility] [address]    3. Pressure injury of skin, unspecified injury stage, unspecified location    4. Essential hypertension    5. Left leg pain      16 minutes were spent over the phone with patient and family. Kael Stevens is a 80 y.o. female being evaluated by a video visit encounter for concerns as above. A caregiver was present when appropriate. Due to this being a TeleHealth encounter (During IFI-47 public health emergency), evaluation of the following organ systems was limited: Vitals/Constitutional/EENT/Resp/CV/GI//MS/Neuro/Skin/Heme-Lymph-Imm. Pursuant to the emergency declaration under the [facility] waiver authority and the [employer] Act, this Virtual  Visit was conducted, with patient's (and/or legal guardian's) consent, to reduce the patient's risk of exposure to COVID-19 and provide necessary medical care. Services were provided through a video synchronous discussion virtually to substitute for in-person clinic visit. Patient and provider were located at their individual homes. Felipe Becerril MD  5/13/2021    This note was created with the help of speech recognition software Deon Sanchez) and may contain some 'sound alike' errors.

## 2021-05-18 ENCOUNTER — TELEPHONE (OUTPATIENT)
Dept: INTERNAL MEDICINE CLINIC | Age: 86
End: 2021-05-18

## 2021-05-18 DIAGNOSIS — Z89.511 STATUS POST BELOW KNEE AMPUTATION, RIGHT (HCC): Primary | ICD-10-CM

## 2021-05-19 ENCOUNTER — PATIENT MESSAGE (OUTPATIENT)
Dept: INTERNAL MEDICINE CLINIC | Age: 86
End: 2021-05-19

## 2021-05-21 ENCOUNTER — TELEPHONE (OUTPATIENT)
Dept: INTERNAL MEDICINE CLINIC | Age: 86
End: 2021-05-21

## 2021-05-21 NOTE — TELEPHONE ENCOUNTER
----- Message from Denver Show sent at 5/21/2021  3:56 PM EDT -----  Regarding: /telephone  Contact: [phone]  General Message/Vendor Calls    Caller's first and last name: Rosina Wharton with [facility]      Reason for call: They called yesterday requesting the most recent office note and they still have not received it.        Callback required yes/no and why: No      Best contact number(s): [phone]      Details to clarify the request: N/A      Denver Show

## 2021-06-16 ENCOUNTER — TELEPHONE (OUTPATIENT)
Dept: INTERNAL MEDICINE CLINIC | Age: 86
End: 2021-06-16

## 2021-06-16 NOTE — TELEPHONE ENCOUNTER
Lola Greene a physical therapist with [facility] is calling to confirm the patient's diagnosis of diabetes. She said there is one small mention of it on her referral but she does not see it mentioned anywhere else. Please call back and advise.

## 2021-06-17 NOTE — TELEPHONE ENCOUNTER
Per Laura,PT- DM is not being addressed at all at the facility. Pt and daughter both express that the pt is not diabetic, however she noted the dx on paperwork that we sent her. States blood sugars are not being checked. Should they be? Ha Petit does not want this dx being brushed under the rug if it is related to her amputation.

## 2021-06-18 ENCOUNTER — PATIENT MESSAGE (OUTPATIENT)
Dept: INTERNAL MEDICINE CLINIC | Age: 86
End: 2021-06-18

## 2021-06-22 ENCOUNTER — TRANSCRIBE ORDER (OUTPATIENT)
Dept: SCHEDULING | Age: 86
End: 2021-06-22

## 2021-06-22 ENCOUNTER — TELEPHONE (OUTPATIENT)
Dept: INTERNAL MEDICINE CLINIC | Age: 86
End: 2021-06-22

## 2021-06-22 DIAGNOSIS — M86.172 ACUTE OSTEOMYELITIS OF LEFT ANKLE OR FOOT (HCC): Primary | ICD-10-CM

## 2021-06-22 NOTE — TELEPHONE ENCOUNTER
SIMONE    ----- Message from Radha Madsen sent at 6/22/2021 11:24 AM EDT -----  Regarding: MD Josiane/Telephone  General Message/Vendor Calls    Caller's first and last name: Eric Molina with [facility]. Reason for call: Increase PT. Callback required yes/no and why: Yes      Best contact number(s): [phone]      Details to clarify the request: Would like to increase frequency with PT, making tremendous progress. Continue twice a week for the next four weeks. Asking for verbal from pcp.       Radha Madsen

## 2021-06-22 NOTE — TELEPHONE ENCOUNTER
Faxed 6/4/2020 office note to DR JAMEL ARCINIEGA [facility], fax # [phone]. Office note states new DX of Type 2 DM based on elevated A1C.

## 2021-06-22 NOTE — TELEPHONE ENCOUNTER
----- Message from Mau Bautista LPN sent at 4/09/6411  2:10 PM EDT -----  Regarding: FW: /telephone  Contact: [phone]    ----- Message -----  From: Trejennifer Adolfo  Sent: 6/22/2021   1:58 PM EDT  To: Mau Bautista LPN  Subject: FW: Brijesh Rowley                          ----- Message -----  From: Alejandrina Zaidi  Sent: 6/22/2021   1:42 PM EDT  To: Im [facility] Office Pool  Subject: /telephone                            General Message/Vendor Calls    Caller's first and last name: Nettie Gamino With [facility]      Reason for call: They received an order for PT and OT, but they need to confirm that she does have a dx of diabetes. The patient and her family are stating that she has never been dx with diabetes.       Callback required yes/no and why: Yes      Best contact number(s): [phone]      Details to clarify the request: N/A      Becky Armenta

## 2021-06-25 ENCOUNTER — HOSPITAL ENCOUNTER (OUTPATIENT)
Dept: CT IMAGING | Age: 86
Discharge: HOME | End: 2021-06-25
Attending: FAMILY MEDICINE
Payer: MEDICARE

## 2021-06-25 DIAGNOSIS — M86.172 ACUTE OSTEOMYELITIS OF LEFT ANKLE OR FOOT (HCC): ICD-10-CM

## 2021-06-25 PROCEDURE — 73700 CT LOWER EXTREMITY W/O DYE: CPT

## 2021-07-02 RX ORDER — PANTOPRAZOLE SODIUM 40 MG/1
40 TABLET, DELAYED RELEASE ORAL 2 TIMES DAILY
Qty: 60 TABLET | Refills: 0 | Status: SHIPPED | OUTPATIENT
Start: 2021-07-02

## 2021-07-07 ENCOUNTER — TELEPHONE (OUTPATIENT)
Dept: INTERNAL MEDICINE CLINIC | Age: 86
End: 2021-07-07

## 2021-07-07 NOTE — TELEPHONE ENCOUNTER
PT with [facility] is requesting verbal orders to continue PT and to have a speech therapy eval.     She can be reached at [phone]

## 2021-07-19 ENCOUNTER — OFFICE VISIT (OUTPATIENT)
Dept: CARDIOLOGY CLINIC | Age: 86
End: 2021-07-19
Payer: MEDICARE

## 2021-07-19 DIAGNOSIS — Z95.0 CARDIAC PACEMAKER IN SITU: Primary | ICD-10-CM

## 2021-07-19 PROCEDURE — 93296 REM INTERROG EVL PM/IDS: CPT | Performed by: INTERNAL MEDICINE

## 2021-07-19 NOTE — LETTER
7/19/2021 9:55 AM    Ms. Watters Stager  [address] [place] [address]          After careful review of your remote check of your implated device on 0-36-29. I have concluded that your device is working properly. Your next: In clinic device check is scheduled for   10-12-21 at  10:15 AM.        If you have any questions, please call [employer] [address] at [phone].      Additional Comments: ________________________________________________    __________________________________________________________________    __________________________________________________________________              Trixie Torres MD [facility]

## 2021-07-19 NOTE — LETTER
2021 9:56 AM    Ms. Teodoro Venegas [address] [place] [address]      Dear Patient,    This letter is to inform you that as of  [facility] will no longer be sending out confirmation letters for remote transmissions through the [employer]. All letters in the future will be posted in an electronic medical records format therefore we highly encourage enrollment in [facility] patient's portal. Once enrolled you will have access to any letters we send as well as appointment information that can be found in your medical record. Our EP team would contact you via phone if there are significant abnormal findings so you can discuss with Dr. Emory Merlin further in office. Missed transmission letters will also be digitized in [employer] but we will continue to send those out through the mail as well. How to register for [facility]:    - Visit [employer]  - Click Create an Account  - Provide your name, address, and   - You will then be asked a short series of questions to verify your identity. This helps to ensure that no one else can access your information.   - If you have any further questions, please contact our office at [phone]. If you choose not to enroll in [facility] or do not have internet access, please kindly let device clinic know and we will accommodate your preference. We are grateful for your understanding and looking forward to this new, improved and more efficient way of communication.            Warm Regards,  [facility] Staff

## 2021-07-21 ENCOUNTER — TELEPHONE (OUTPATIENT)
Dept: INTERNAL MEDICINE CLINIC | Age: 86
End: 2021-07-21

## 2021-07-21 NOTE — TELEPHONE ENCOUNTER
Outgoing call to Alexander who states pt is busy this week and would like appt for speech therapy next week. VO given- she will fax over order.

## 2021-07-21 NOTE — TELEPHONE ENCOUNTER
Tao Douglas the speech therapist wanted a verbal order to move her speech evaluation therapy to next week as the patient is unavailable this week. Please call back and advise.

## 2021-07-31 NOTE — PROGRESS NOTES
Occupational Therapy Note:Pt off the floor for a procedure. Will attempt OT later as able. Name band;

## 2021-08-24 ENCOUNTER — TELEPHONE (OUTPATIENT)
Dept: INTERNAL MEDICINE CLINIC | Age: 86
End: 2021-08-24

## 2021-09-10 ENCOUNTER — OFFICE VISIT (OUTPATIENT)
Dept: INTERNAL MEDICINE CLINIC | Age: 86
End: 2021-09-10
Payer: MEDICARE

## 2021-09-10 VITALS
TEMPERATURE: 98.2 F | HEART RATE: 83 BPM | BODY MASS INDEX: 23.74 KG/M2 | HEIGHT: 63 IN | WEIGHT: 134 LBS | OXYGEN SATURATION: 94 % | SYSTOLIC BLOOD PRESSURE: 96 MMHG | RESPIRATION RATE: 16 BRPM | DIASTOLIC BLOOD PRESSURE: 58 MMHG

## 2021-09-10 DIAGNOSIS — R13.10 DYSPHAGIA, UNSPECIFIED TYPE: ICD-10-CM

## 2021-09-10 DIAGNOSIS — Z89.511 STATUS POST BELOW KNEE AMPUTATION, RIGHT (HCC): Primary | ICD-10-CM

## 2021-09-10 DIAGNOSIS — I10 ESSENTIAL HYPERTENSION: ICD-10-CM

## 2021-09-10 DIAGNOSIS — E11.69 TYPE 2 DIABETES MELLITUS WITH OTHER SPECIFIED COMPLICATION, UNSPECIFIED WHETHER LONG TERM INSULIN USE (HCC): ICD-10-CM

## 2021-09-10 DIAGNOSIS — M54.50 ACUTE RIGHT-SIDED LOW BACK PAIN, UNSPECIFIED WHETHER SCIATICA PRESENT: ICD-10-CM

## 2021-09-10 DIAGNOSIS — E03.4 HYPOTHYROIDISM DUE TO ACQUIRED ATROPHY OF THYROID: ICD-10-CM

## 2021-09-10 DIAGNOSIS — L08.9 SKIN INFECTION: ICD-10-CM

## 2021-09-10 PROCEDURE — G9717 DOC PT DX DEP/BP F/U NT REQ: HCPCS | Performed by: INTERNAL MEDICINE

## 2021-09-10 PROCEDURE — 99214 OFFICE O/P EST MOD 30 MIN: CPT | Performed by: INTERNAL MEDICINE

## 2021-09-10 PROCEDURE — G8420 CALC BMI NORM PARAMETERS: HCPCS | Performed by: INTERNAL MEDICINE

## 2021-09-10 PROCEDURE — 1090F PRES/ABSN URINE INCON ASSESS: CPT | Performed by: INTERNAL MEDICINE

## 2021-09-10 PROCEDURE — G8536 NO DOC ELDER MAL SCRN: HCPCS | Performed by: INTERNAL MEDICINE

## 2021-09-10 PROCEDURE — G0463 HOSPITAL OUTPT CLINIC VISIT: HCPCS | Performed by: INTERNAL MEDICINE

## 2021-09-10 PROCEDURE — 1101F PT FALLS ASSESS-DOCD LE1/YR: CPT | Performed by: INTERNAL MEDICINE

## 2021-09-10 PROCEDURE — G8427 DOCREV CUR MEDS BY ELIG CLIN: HCPCS | Performed by: INTERNAL MEDICINE

## 2021-09-10 RX ORDER — CALCIUM CARBONATE 750 MG/1
TABLET ORAL
COMMUNITY
Start: 2021-07-02

## 2021-09-10 RX ORDER — DOXYCYCLINE 100 MG/1
100 TABLET ORAL 2 TIMES DAILY
Qty: 10 TABLET | Refills: 0 | Status: SHIPPED | OUTPATIENT
Start: 2021-09-10 | End: 2021-09-15

## 2021-09-10 NOTE — PROGRESS NOTES
Suhail Desirer  Identified pt with two pt identifiers(name and ). Chief Complaint   Patient presents with    Follow-up     RM19// pt is presenting today with daughter Tod Melendez for back pain that comes and goes, throat pain when swallowing that goes into chest, and skin issues on back has a wound dr and derm dr coming in to evaluate, skin on amputated leg has a wound that heals and comes back       1. Have you been to the ER, urgent care clinic since your last visit? Hospitalized since your last visit? NO    2. Have you seen or consulted any other health care providers outside of the [address] [place] [address] since your last visit? Include any pap smears or colon screening. NO      Provider notified of reason for visit, vitals and flowsheets obtained on patients.      Patient received paperwork for advance directive during previous visit but has not completed at this time     Reviewed record In preparation for visit, huddled with provider and have obtained necessary documentation      Health Maintenance Due   Topic    Eye Exam Retinal or Dilated     Shingrix Vaccine Age 49> (1 of 2)    Medicare Yearly Exam     Foot Exam Q1     Lipid Screen     Flu Vaccine (1)       Wt Readings from Last 3 Encounters:   09/10/21 134 lb (60.8 kg)   21 137 lb (62.1 kg)   10/23/20 146 lb (66.2 kg)     Temp Readings from Last 3 Encounters:   09/10/21 98.2 °F (36.8 °C) (Oral)   21 98.1 °F (36.7 °C) (Oral)   10/09/20 97.5 °F (36.4 °C)     BP Readings from Last 3 Encounters:   09/10/21 (!) 96/58   21 124/76   10/09/20 137/61     Pulse Readings from Last 3 Encounters:   09/10/21 83   21 79   10/09/20 80     Vitals:    09/10/21 1201   BP: (!) 96/58   Pulse: 83   Resp: 16   Temp: 98.2 °F (36.8 °C)   TempSrc: Oral   SpO2: 94%   Weight: 134 lb (60.8 kg)   Height: 5' 3\" (1.6 m)   PainSc:   5   PainLoc: Back   LMP: 1970         Learning Assessment:  :     Learning Assessment 2015 8/11/2014 2/28/2014   PRIMARY LEARNER Patient Guardian Patient Patient Child(luis fernando)   HIGHEST LEVEL OF EDUCATION - PRIMARY LEARNER  - - - SOME COLLEGE -   BARRIERS PRIMARY LEARNER - - - NONE -   CO-LEARNER CAREGIVER - - - No -   PRIMARY LANGUAGE ENGLISH ENGLISH ENGLISH ENGLISH ENGLISH   LEARNER PREFERENCE PRIMARY READING PICTURES - READING LISTENING   ANSWERED BY self Patient patient self Lesley Castellanos   RELATIONSHIP SELF SELF SELF SELF OTHER       Depression Screening:  :     3 most recent PHQ Screens 6/4/2020   Little interest or pleasure in doing things Not at all   Feeling down, depressed, irritable, or hopeless Not at all   Total Score PHQ 2 0   Trouble falling or staying asleep, or sleeping too much -   Feeling tired or having little energy -   Poor appetite, weight loss, or overeating -   Feeling bad about yourself - or that you are a failure or have let yourself or your family down -   Trouble concentrating on things such as school, work, reading, or watching TV -   Moving or speaking so slowly that other people could have noticed; or the opposite being so fidgety that others notice -   Thoughts of being better off dead, or hurting yourself in some way -   PHQ 9 Score -   How difficult have these problems made it for you to do your work, take care of your home and get along with others -       Fall Risk Assessment:  :     Fall Risk Assessment, last 12 mths 9/10/2021   Able to walk? No   Fall in past 12 months? -   Number of falls in past 12 months -   Fall with injury? -       Abuse Screening:  :     Abuse Screening Questionnaire 6/4/2020 5/21/2020 3/6/2020 2/18/2020 11/18/2019 8/1/2019 3/21/2019   Do you ever feel afraid of your partner? N N N N N N N   Are you in a relationship with someone who physically or mentally threatens you? N N N N N N N   Is it safe for you to go home?  Y Y Y Y Y Y Y       ADL Screening:  :     ADL Assessment 5/11/2017   Feeding yourself No Help Needed   Getting from bed to chair No Help Needed   Getting dressed No Help Needed   Bathing or showering No Help Needed   Walk across the room (includes cane/walker) No Help Needed   Using the telphone No Help Needed   Taking your medications Help Needed   Preparing meals Help Needed   Managing money (expenses/bills) Help Needed   Moderately strenuous housework (laundry) Help Needed   Shopping for personal items (toiletries/medicines) Help Needed   Shopping for groceries Help Needed   Driving Help Needed   Climbing a flight of stairs Help Needed   Getting to places beyond walking distances Help Needed         Medication reconciliation up to date and corrected with patient at this time.

## 2021-09-10 NOTE — PROGRESS NOTES
Jamie Nolan is a 80 y.o. female who presents today for Follow-up (RM19// pt is presenting today with daughter Sohail Daniels for back pain that comes and goes, throat pain when swallowing that goes into chest, and skin issues on back has a wound dr and derm dr coming in to evaluate, skin on amputated leg has a wound that heals and comes back)  . She has a history of   Patient Active Problem List   Diagnosis Code    Mixed hyperlipidemia E78.2    Mitral regurgitation I34.0    Anxiety and depression F41.9, F32.9    Hypothyroid E03.9    CKD (chronic kidney disease) stage 3, GFR 30-59 ml/min ([facility]) N18.30    Osteopenia M85.80    Restrictive lung disease J98.4    Ischemic cardiomyopathy I25.5    Psoriasis L40.9    Pacemaker Z95.0    Frequent falls R29.6    [facility]) [facility] J44.9    Chronic respiratory failure with hypoxia ([facility]) J96.11    Hypertension I10    Biventricular cardiac pacemaker in situ Z95.0    Type 2 diabetes mellitus without complication, unspecified whether long term insulin use ([facility]) E11.9    Gangrene of foot ([facility]) I96    LARISSA (acute kidney injury) ([facility]) N17.9    Nausea & vomiting R11.2    Dehydration E86.0   . Today patient here for an acute visit. Low Back Pain:  Jamie Nolan is a 80 y.o. female who complains of low back pain on the right for 2 month(s), is positional with bending or lifting, without radiation down the legs. Severity of pain is 8 out of 10.  numbness, tingling, weakness is not present in bilateral  leg(s)/ foot. Precipitating factors: none recalled by the patient. Tylenol does seem to help. We discussed that she is sitting in a funny position since having had her amputation of her right leg. This may be placing more pressure on these muscles. She also does get some pain to her right elbow at times. The patient denies fevers, chills or sweats. The patient denies bowel/bladder incontinence and saddle numbness.       Recurrent Cysts: Patient does have recurrent cysts that seem to show up. Triggers include decreased frequency of bathing. Unclear what else may be triggering this besides pressure. 1 did express some pus the other day. Today none of the lesions look cellulitic. Dysphagia: Planning on doing a swallowing study. She is having her food cut real small. Twice daily PPI has been helping. Hypothyroidism:due to be repeated. Hypertension-low blood pressures recently. She is currently off her ARB and on half dose of Lasix. Volume status is stable     reports that she is a non-smoker but has been exposed to tobacco smoke. She has never used smokeless tobacco.    reports no history of alcohol use. BP Readings from Last 2 Encounters:   09/10/21 (!) 96/58   03/04/21 124/76       ROS  Review of Systems   Constitutional: Negative for chills, fever and weight loss. HENT: Negative for congestion and sore throat. Eyes: Negative for blurred vision, double vision and photophobia. Respiratory: Negative for cough and shortness of breath. Cardiovascular: Negative for chest pain, palpitations, orthopnea and leg swelling. Gastrointestinal: Negative for abdominal pain, constipation, diarrhea, heartburn, nausea and vomiting. Dysphagia   Genitourinary: Negative for dysuria, frequency and urgency. Musculoskeletal: Negative for joint pain and myalgias. Skin: Positive for rash. Cysts/abscesses to back   Neurological: Negative. Negative for headaches. Endo/Heme/Allergies: Does not bruise/bleed easily. Psychiatric/Behavioral: Negative for memory loss and suicidal ideas. Visit Vitals  BP (!) 96/58 (BP 1 Location: Right upper arm, BP Patient Position: Sitting, BP Cuff Size: Adult)   Pulse 83   Temp 98.2 °F (36.8 °C) (Oral)   Resp 16   Ht 5' 3\" (1.6 m)   Wt 134 lb (60.8 kg)   SpO2 94%   BMI 23.74 kg/m²       Physical Exam  Constitutional:       General: She is not in acute distress.      Appearance: She is well-developed. HENT:      Head: Normocephalic and atraumatic. Left Ear: Tympanic membrane normal.      Nose: Nose normal.   Neck:      Thyroid: No thyromegaly. Cardiovascular:      Rate and Rhythm: Normal rate and regular rhythm. Heart sounds: No murmur heard. Pulmonary:      Effort: Pulmonary effort is normal.      Breath sounds: Normal breath sounds. No wheezing. Abdominal:      General: Bowel sounds are normal. There is no distension. Palpations: Abdomen is soft. Musculoskeletal:      Cervical back: Normal range of motion and neck supple. Comments: S/p R BKA   Skin:     General: Skin is warm and dry. Comments: 3 lesions to back to on mid back in 1 to upper buttocks consistent with ruptured cyst versus healing abscesses. No obvious cellulitis, but some questionable drainage from middle lesion. Neurological:      Mental Status: She is alert and oriented to person, place, and time. Cranial Nerves: No cranial nerve deficit. Psychiatric:         Behavior: Behavior normal.           Current Outpatient Medications   Medication Sig    Tums 300 mg (750 mg) chewable tablet     pantoprazole (PROTONIX) 40 mg tablet Take 1 Tablet by mouth two (2) times a day.  diclofenac (VOLTAREN) 1 % gel Apply 2 g to affected area three (3) times daily as needed.  ondansetron hcl (Zofran) 4 mg tablet Take 4 mg by mouth every eight (8) hours as needed for Nausea or Vomiting.  baclofen 5 mg tab Take  by mouth.  traMADoL (ULTRAM) 50 mg tablet Take 50 mg by mouth every six (6) hours as needed for Pain.  carvediloL (COREG) 3.125 mg tablet TAKE 1 TABLET BY MOUTH TWICE A DAY WITH MEALS    furosemide (LASIX) 40 mg tablet TAKE ONE TAB BY MOUTH DAILY OR AS ADVISED BY PHYSICIAN    acetaminophen (TYLENOL) 500 mg tablet Take 1,000 mg by mouth every eight (8) hours.  senna-docusate (PERICOLACE) 8.6-50 mg per tablet Take 2 Tabs by mouth two (2) times daily as needed for Constipation.  loperamide (IMODIUM) 1 mg/5 mL solution Take 1.5 tsp by mouth every eight (8) hours as needed for Diarrhea.  atorvastatin (LIPITOR) 10 mg tablet Take 10 mg by mouth daily.  honey (MediHoney, honey,) 100 % topical paste Apply  to affected area every Monday, Wednesday, Friday. Apply to left buttocks    chlorhexidine (Peridex) 0.12 % solution 15 mL by Swish and Spit route two (2) times daily (with meals).  benzocaine (ORAJEL) 20 % gel topical gel Apply  to affected area four (4) times daily as needed. Apply to gums    alum-mag hydroxide-simeth (Maalox Advanced) 200-200-20 mg/5 mL susp Take 30 mL by mouth every four (4) hours as needed for Indigestion.  levothyroxine (SYNTHROID) 100 mcg tablet Take 1 Tab by mouth Daily (before breakfast).  apixaban (Eliquis) 5 mg tablet Take 1 Tab by mouth two (2) times a day.  albuterol (PROVENTIL HFA, VENTOLIN HFA, PROAIR HFA) 90 mcg/actuation inhaler Take 1 Puff by inhalation every six (6) hours as needed for Wheezing.  ADVAIR DISKUS 250-50 mcg/dose diskus inhaler Take 1 Puff by inhalation two (2) times a day.  tiotropium (SPIRIVA WITH HANDIHALER) 18 mcg inhalation capsule Take 1 Cap by inhalation daily.  Lactobacillus acidophilus (PROBIOTIC PO) Take  by mouth.  multivitamin (ONE A DAY) tablet Take 1 Tab by mouth daily.  polyethylene glycol (Miralax) 17 gram packet Take 17 g by mouth daily.  sertraline (ZOLOFT) 50 mg tablet Take 1.5 Tabs by mouth daily.  losartan (COZAAR) 25 mg tablet Take 1 Tab by mouth daily. Indications: chronic heart failure     No current facility-administered medications for this visit.         Past Medical History:   Diagnosis Date    Arthritis     [facility]     av node ablation 5/22/98 with placement of CPI #[id number] dual chamber pacemaker subsequently replaced with a single chamber medtronic #[id number] single chamber pacemaker 7/25/05    [facility]    colon cancer w/ resection    COPD     Depression     GERD (gastroesophageal reflux disease)     History of vascular access device 04/17/2020    4F MIDLINE PLACED BY EMIL Ghotra RN LEFT BRACHIAL, 13CM    Hyperlipidemia     Hypertension     Ischemic cardiomyopathy     Migraine headache     Orthostatic hypotension     RADHA (obstructive sleep apnea)     Pacemaker 5/22/98    AV node ablation /pacemaker placement utilizing CPI # [id number] dual chamber system, medtronic #[id number] single chamber device placed 7/22/05    Pulmonary hypertension ([address] 9/26/2011    RVSP 50 on echo 8/14    Thyroid disease     Valvular heart disease     mild-mod MR/TR      Past Surgical History:   Procedure Laterality Date    HX BREAST BIOPSY Right 2002    neg; surgical bx    HX KNEE REPLACEMENT      right TKA    HX PACEMAKER      HX PACEMAKER PLACEMENT  05/2020    HX TOTAL COLECTOMY  1974    colon    HX TUBAL LIGATION      IR KYPHOPLASTY LUMBAR  7/2/2019    VT BREAST SURGERY PROCEDURE UNLISTED      benign breast tumor excision right breast    VT INSJ ELTRD CAR BYRON SYS TM INSJ DFB/PM PLS GEN N/A 5/22/2020    Lv Lead Placement performed by Juan Sewell MD at [address] CATH LAB    VT RELOCATION OF SKIN POCKET FOR PACEMAKER N/A 10/9/2020    RELOCATE PACEMAKER POCKET performed by Juan Sewell MD at [address] CATH LAB    VT REMVL PERM PM PLS GEN W/REPL PLSE GEN MULT LEAD N/A 5/22/2020    REMOVE & REPLACE PPM GEN BIV MULTI LEADS performed by Juan Sewell MD at [address] CATH LAB    VT TOTAL KNEE ARTHROPLASTY      total on R, partial on L      Social History     Tobacco Use    Smoking status: Passive Smoke Exposure - Never Smoker    Smokeless tobacco: Never Used   Substance Use Topics    Alcohol use: No     Alcohol/week: 0.0 standard drinks      Family History   Problem Relation Age of Onset    Diabetes Mother     Heart Disease Mother     Heart Attack Mother     Heart Disease Father     Stroke Sister     Breast Cancer Sister 80    Cancer Maternal Aunt uterus    Diabetes Maternal Uncle     Breast Cancer Daughter 61        Allergies   Allergen Reactions    Cardizem [Diltiazem Hcl] Hives    Codeine Nausea and Vomiting    Darvocet A500 [Propoxyphene N-Acetaminophen] Nausea and Vomiting    Diltiazem Hives    Labetalol Nausea and Vomiting     Dizzy/Disoriented     Pcn [Penicillins] Swelling     Tongue Swelling   Has tolerated cephalexin recently 4/7/20     Primidone Other (comments)     Lightheaded/unsteady gait    Sulfa (Sulfonamide Antibiotics) Nausea and Vomiting        Assessment/Plan  Diagnoses and all orders for this visit:    1. Status post below knee amputation, right (HCC)-overall doing well. Currently not working with prosthetics as this was really irritating this leg. Still seeing vascular surgeon regularly. Still working on left foot wound. 2. Essential hypertension-off ARB. Some mild orthostatic symptoms, but much improved   -     METABOLIC PANEL, COMPREHENSIVE; Future  -     CBC WITH AUTOMATED DIFF; Future    3. Skin infection - back lesions may be due to infrequent bathing. We will see if maybe they can start bathing her a bit more frequently. Apply good hydrating lotion. Will place on 5 days of doxycycline to ensure that these lesions resolve-  -     doxycycline (ADOXA) 100 mg tablet; Take 1 Tablet by mouth two (2) times a day for 5 days. 4. Acute right-sided low back pain, unspecified whether sciatica present-symptoms not consistent with nephrolithiasis. We discussed that this very well could be muscular in nature given the posture that she sits and since her below-knee amputation on the right. She will work with occupational therapy and physical therapy to try to find a better sitting position. Continue 1000 mg acetaminophen 3 times daily and as needed tramadol. 5. Hypothyroidism due to acquired atrophy of thyroid-repeat levels  -     TSH 3RD GENERATION; Future  -     HEMOGLOBIN A1C WITH EAG; Future    6.  Type 2 diabetes mellitus with other specified complication, unspecified whether long term insulin use (HCC)-repeat  -     METABOLIC PANEL, COMPREHENSIVE; Future  -     CBC WITH AUTOMATED DIFF; Future  -     TSH 3RD GENERATION; Future  -     HEMOGLOBIN A1C WITH EAG; Future    7. Dysphagia, unspecified type-does have a swallowing study that they will be scheduling soon            Sarah Velasco MD  9/10/2021    This note was created with the help of speech recognition software [employer]ita Sin) and may contain some 'sound alike' errors.

## 2021-09-10 NOTE — PATIENT INSTRUCTIONS
Tylenol 1000mg 3x/day. Tramadol as needed at bedtime. PT or OT for sitting position. Doxycycline for 5 days.

## 2021-09-11 DIAGNOSIS — N30.90 RECURRENT CYSTITIS: ICD-10-CM

## 2021-09-11 DIAGNOSIS — M54.50 ACUTE RIGHT-SIDED LOW BACK PAIN, UNSPECIFIED WHETHER SCIATICA PRESENT: Primary | ICD-10-CM

## 2021-09-11 DIAGNOSIS — N17.9 AKI (ACUTE KIDNEY INJURY) (HCC): ICD-10-CM

## 2021-09-11 LAB
ALBUMIN SERPL-MCNC: 3.6 G/DL (ref 3.5–5)
ALBUMIN/GLOB SERPL: 1 {RATIO} (ref 1.1–2.2)
ALP SERPL-CCNC: 112 U/L (ref 45–117)
ALT SERPL-CCNC: 19 U/L (ref 12–78)
ANION GAP SERPL CALC-SCNC: 5 MMOL/L (ref 5–15)
AST SERPL-CCNC: 20 U/L (ref 15–37)
BASOPHILS # BLD: 0 K/UL (ref 0–0.1)
BASOPHILS NFR BLD: 1 % (ref 0–1)
BILIRUB SERPL-MCNC: 0.5 MG/DL (ref 0.2–1)
BUN SERPL-MCNC: 55 MG/DL (ref 6–20)
BUN/CREAT SERPL: 38 (ref 12–20)
CALCIUM SERPL-MCNC: 9.5 MG/DL (ref 8.5–10.1)
CHLORIDE SERPL-SCNC: 108 MMOL/L (ref 97–108)
CO2 SERPL-SCNC: 27 MMOL/L (ref 21–32)
CREAT SERPL-MCNC: 1.45 MG/DL (ref 0.55–1.02)
DIFFERENTIAL METHOD BLD: ABNORMAL
EOSINOPHIL # BLD: 0.1 K/UL (ref 0–0.4)
EOSINOPHIL NFR BLD: 2 % (ref 0–7)
ERYTHROCYTE [DISTWIDTH] IN BLOOD BY AUTOMATED COUNT: 13.2 % (ref 11.5–14.5)
EST. AVERAGE GLUCOSE BLD GHB EST-MCNC: 120 MG/DL
GLOBULIN SER CALC-MCNC: 3.7 G/DL (ref 2–4)
GLUCOSE SERPL-MCNC: 105 MG/DL (ref 65–100)
HBA1C MFR BLD: 5.8 % (ref 4–5.6)
HCT VFR BLD AUTO: 41.6 % (ref 35–47)
HGB BLD-MCNC: 13.4 G/DL (ref 11.5–16)
IMM GRANULOCYTES # BLD AUTO: 0 K/UL (ref 0–0.04)
IMM GRANULOCYTES NFR BLD AUTO: 1 % (ref 0–0.5)
LYMPHOCYTES # BLD: 1.2 K/UL (ref 0.8–3.5)
LYMPHOCYTES NFR BLD: 15 % (ref 12–49)
MCH RBC QN AUTO: 30.9 PG (ref 26–34)
MCHC RBC AUTO-ENTMCNC: 32.2 G/DL (ref 30–36.5)
MCV RBC AUTO: 95.9 FL (ref 80–99)
MONOCYTES # BLD: 0.6 K/UL (ref 0–1)
MONOCYTES NFR BLD: 7 % (ref 5–13)
NEUTS SEG # BLD: 5.8 K/UL (ref 1.8–8)
NEUTS SEG NFR BLD: 75 % (ref 32–75)
NRBC # BLD: 0 K/UL (ref 0–0.01)
NRBC BLD-RTO: 0 PER 100 WBC
PLATELET # BLD AUTO: 205 K/UL (ref 150–400)
PMV BLD AUTO: 12.8 FL (ref 8.9–12.9)
POTASSIUM SERPL-SCNC: 4.6 MMOL/L (ref 3.5–5.1)
PROT SERPL-MCNC: 7.3 G/DL (ref 6.4–8.2)
RBC # BLD AUTO: 4.34 M/UL (ref 3.8–5.2)
SODIUM SERPL-SCNC: 140 MMOL/L (ref 136–145)
TSH SERPL DL<=0.05 MIU/L-ACNC: 1.08 UIU/ML (ref 0.36–3.74)
WBC # BLD AUTO: 7.8 K/UL (ref 3.6–11)

## 2021-09-20 ENCOUNTER — TRANSCRIBE ORDER (OUTPATIENT)
Dept: SCHEDULING | Age: 86
End: 2021-09-20

## 2021-09-20 DIAGNOSIS — R13.10 DYSPHAGIA: Primary | ICD-10-CM

## 2021-09-23 ENCOUNTER — TRANSCRIBE ORDER (OUTPATIENT)
Dept: SCHEDULING | Age: 86
End: 2021-09-23

## 2021-09-24 ENCOUNTER — HOSPITAL ENCOUNTER (OUTPATIENT)
Dept: CT IMAGING | Age: 86
Discharge: HOME | End: 2021-09-24
Attending: INTERNAL MEDICINE
Payer: MEDICARE

## 2021-09-24 DIAGNOSIS — N30.90 RECURRENT CYSTITIS: ICD-10-CM

## 2021-09-24 DIAGNOSIS — N17.9 AKI (ACUTE KIDNEY INJURY) (HCC): ICD-10-CM

## 2021-09-24 DIAGNOSIS — M54.50 ACUTE RIGHT-SIDED LOW BACK PAIN, UNSPECIFIED WHETHER SCIATICA PRESENT: ICD-10-CM

## 2021-09-24 PROCEDURE — 74176 CT ABD & PELVIS W/O CONTRAST: CPT

## 2021-10-01 ENCOUNTER — HOSPITAL ENCOUNTER (OUTPATIENT)
Dept: GENERAL RADIOLOGY | Age: 86
Discharge: HOME | End: 2021-10-01
Attending: PHYSICIAN ASSISTANT
Payer: MEDICARE

## 2021-10-01 DIAGNOSIS — R13.10 DYSPHAGIA: ICD-10-CM

## 2021-10-01 PROCEDURE — 74220 X-RAY XM ESOPHAGUS 1CNTRST: CPT

## 2021-10-12 PROCEDURE — 93294 REM INTERROG EVL PM/LDLS PM: CPT | Performed by: INTERNAL MEDICINE

## 2021-10-13 ENCOUNTER — OFFICE VISIT (OUTPATIENT)
Dept: CARDIOLOGY CLINIC | Age: 86
End: 2021-10-13
Payer: MEDICARE

## 2021-10-13 DIAGNOSIS — Z95.0 PRESENCE OF BIVENTRICULAR CARDIAC PACEMAKER: Primary | ICD-10-CM

## 2021-10-13 PROCEDURE — 93296 REM INTERROG EVL PM/IDS: CPT | Performed by: INTERNAL MEDICINE

## 2021-10-13 NOTE — LETTER
10/13/2021 9:34 AM    Ms. Michele Cardona  [address] [place] [id number]          This letter confirms that we have received your scheduled remote check of your implanted     device on 10-13-21  . Our EP team will contact you via phone if there are significant abnormal    findings. Your next remote check from home is scheduled for 1-24-22  . **Please call to reschedule annual in clinic visit**                  If you have any questions, please call [address] at [phone].                Sincerely,    Ara Granados MD [facility]

## 2021-11-15 ENCOUNTER — VIRTUAL VISIT (OUTPATIENT)
Dept: INTERNAL MEDICINE CLINIC | Age: 86
End: 2021-11-15
Payer: MEDICARE

## 2021-11-15 DIAGNOSIS — S91.302A WOUND, OPEN, FOOT, LEFT, INITIAL ENCOUNTER: ICD-10-CM

## 2021-11-15 DIAGNOSIS — Z74.1 IMPAIRED MOBILITY AND PERSONAL CARE: ICD-10-CM

## 2021-11-15 DIAGNOSIS — I10 ESSENTIAL HYPERTENSION: ICD-10-CM

## 2021-11-15 DIAGNOSIS — Z74.09 IMPAIRED MOBILITY AND PERSONAL CARE: ICD-10-CM

## 2021-11-15 DIAGNOSIS — Z89.511 STATUS POST BELOW KNEE AMPUTATION, RIGHT (HCC): Primary | ICD-10-CM

## 2021-11-15 PROCEDURE — G8427 DOCREV CUR MEDS BY ELIG CLIN: HCPCS | Performed by: INTERNAL MEDICINE

## 2021-11-15 PROCEDURE — 99214 OFFICE O/P EST MOD 30 MIN: CPT | Performed by: INTERNAL MEDICINE

## 2021-11-15 PROCEDURE — G9717 DOC PT DX DEP/BP F/U NT REQ: HCPCS | Performed by: INTERNAL MEDICINE

## 2021-11-15 PROCEDURE — G8536 NO DOC ELDER MAL SCRN: HCPCS | Performed by: INTERNAL MEDICINE

## 2021-11-15 PROCEDURE — 1090F PRES/ABSN URINE INCON ASSESS: CPT | Performed by: INTERNAL MEDICINE

## 2021-11-15 PROCEDURE — G0463 HOSPITAL OUTPT CLINIC VISIT: HCPCS | Performed by: INTERNAL MEDICINE

## 2021-11-15 PROCEDURE — G8420 CALC BMI NORM PARAMETERS: HCPCS | Performed by: INTERNAL MEDICINE

## 2021-11-15 PROCEDURE — 1101F PT FALLS ASSESS-DOCD LE1/YR: CPT | Performed by: INTERNAL MEDICINE

## 2021-11-15 NOTE — PROGRESS NOTES
Margaret Nettles  Identified pt with two pt identifiers(name and ). Chief Complaint   Patient presents with    Follow-up     VV// pt is presenting today for DME assessment for wheelchairs and dicuss medications; weight loss/poor appetite,        1. Have you been to the ER, urgent care clinic since your last visit? Hospitalized since your last visit? NO    2. Have you seen or consulted any other health care providers outside of the [address] [place] [address] since your last visit? Include any pap smears or colon screening. NO      Provider notified of reason for visit, vitals and flowsheets obtained on patients. Patient received paperwork for advance directive during previous visit but has not completed at this time     Reviewed record In preparation for visit, huddled with provider and have obtained necessary documentation      Health Maintenance Due   Topic    Eye Exam Retinal or Dilated     Shingrix Vaccine Age 50> (1 of 2)    Medicare Yearly Exam     Foot Exam Q1     Lipid Screen     COVID-19 Vaccine (3 - Booster for Pfizer series)    Flu Vaccine (1)       Wt Readings from Last 3 Encounters:   09/10/21 134 lb (60.8 kg)   21 137 lb (62.1 kg)   10/23/20 146 lb (66.2 kg)     Temp Readings from Last 3 Encounters:   09/10/21 98.2 °F (36.8 °C) (Oral)   21 98.1 °F (36.7 °C) (Oral)   10/09/20 97.5 °F (36.4 °C)     BP Readings from Last 3 Encounters:   09/10/21 (!) 96/58   21 124/76   10/09/20 137/61     Pulse Readings from Last 3 Encounters:   09/10/21 83   21 79   10/09/20 80     There were no vitals filed for this visit.       Learning Assessment:  :     Learning Assessment 2015   PRIMARY LEARNER Patient Guardian Patient Patient Child(luis fernando)   HIGHEST LEVEL OF EDUCATION - PRIMARY LEARNER  - - - SOME COLLEGE -   BARRIERS PRIMARY LEARNER - - - NONE -   CO-LEARNER CAREGIVER - - - No -   PRIMARY LANGUAGE ENGLISH ENGLISH ENGLISH ENGLISH ENGLISH LEARNER PREFERENCE PRIMARY READING PICTURES - READING LISTENING   ANSWERED BY self Patient patient self Anel Weathers   RELATIONSHIP SELF SELF SELF SELF OTHER       Depression Screening:  :     3 most recent PHQ Screens 6/4/2020   Little interest or pleasure in doing things Not at all   Feeling down, depressed, irritable, or hopeless Not at all   Total Score PHQ 2 0   Trouble falling or staying asleep, or sleeping too much -   Feeling tired or having little energy -   Poor appetite, weight loss, or overeating -   Feeling bad about yourself - or that you are a failure or have let yourself or your family down -   Trouble concentrating on things such as school, work, reading, or watching TV -   Moving or speaking so slowly that other people could have noticed; or the opposite being so fidgety that others notice -   Thoughts of being better off dead, or hurting yourself in some way -   PHQ 9 Score -   How difficult have these problems made it for you to do your work, take care of your home and get along with others -       Fall Risk Assessment:  :     Fall Risk Assessment, last 12 mths 9/10/2021   Able to walk? No   Fall in past 12 months? -   Number of falls in past 12 months -   Fall with injury? -       Abuse Screening:  :     Abuse Screening Questionnaire 6/4/2020 5/21/2020 3/6/2020 2/18/2020 11/18/2019 8/1/2019 3/21/2019   Do you ever feel afraid of your partner? N N N N N N N   Are you in a relationship with someone who physically or mentally threatens you? N N N N N N N   Is it safe for you to go home?  Y Y Y Y Y Y Y       ADL Screening:  :     ADL Assessment 5/11/2017   Feeding yourself No Help Needed   Getting from bed to chair No Help Needed   Getting dressed No Help Needed   Bathing or showering No Help Needed   Walk across the room (includes cane/walker) No Help Needed   Using the telphone No Help Needed   Taking your medications Help Needed   Preparing meals Help Needed   Managing money (expenses/bills) Help Needed   Moderately strenuous housework (laundry) Help Needed   Shopping for personal items (toiletries/medicines) Help Needed   Shopping for groceries Help Needed   Driving Help Needed   Climbing a flight of stairs Help Needed   Getting to places beyond walking distances Help Needed         Medication reconciliation up to date and corrected with patient at this time.

## 2021-11-15 NOTE — PROGRESS NOTES
Michele Cardona was seen on 11/15/2021 using synchronous (real-time) audio-video technology; Doxy. me. Consent: Michele Cardona, who was seen by synchronous (real-time) audio-video technology, and/or her healthcare decision maker, is aware that this patient-initiated, Telehealth encounter on 11/15/2021 is a billable service, with coverage as determined by her insurance carrier. She is aware that she may receive a bill and has provided verbal consent to proceed: Yes. I was in the office while conducting this encounter. Michele Cardona is a 80 y.o. female who presents today for Follow-up (VV// pt is presenting today for DME assessment for wheelchairs and dicuss medications; weight loss/poor appetite, )  . She has a history of   Patient Active Problem List   Diagnosis Code    Mixed hyperlipidemia E78.2    Mitral regurgitation I34.0    Anxiety and depression F41.9, F32. A    Hypothyroid E03.9    CKD (chronic kidney disease) stage 3, GFR 30-59 ml/min ([facility]) N18.30    Osteopenia M85.80    Restrictive lung disease J98.4    Ischemic cardiomyopathy I25.5    Psoriasis L40.9    Pacemaker Z95.0    Frequent falls R29.6    COPD (chronic obstructive pulmonary disease) ([facility]) J44.9    Chronic respiratory failure with hypoxia ([facility]) J96.11    Hypertension I10    Biventricular cardiac pacemaker in situ Z95.0    Type 2 diabetes mellitus without complication, unspecified whether long term insulin use ([facility]) E11.9    Gangrene of foot ([facility]) I96    LARISSA (acute kidney injury) ([facility]) N17.9    Nausea & vomiting R11.2    Dehydration E86.0   . Today patient is being seen for follow up. she does not have other concerns. Limited mobility: S/p R BKA: Patient is in need of an electric wheelchair to help her with mobility. Mechanical wheelchair is not working for her. Has had some wounds on the outside of L foot. Has been seen by wound care. He believes that these are from movement and trauma.    Angio on L leg done by Vascular Surgeon showed no acute changes. She would benefit from having a power wheelchair. Has OT with [employer] at [id number] [facility]. Her ability to transfer and pivot has gotten a bit worse. Given her right below-knee amputation a scooter would be difficult to use. Other devices such as canes walkers and manual wheelchair out of question due to safety issues. Today she reports significant pain to her left foot with pressure. Weight has trended down and she does report that her appetite has been lower since she has been in pain. Denies any fevers chills or signs of infection. Blood pressure has been relatively stable. ROS  Review of Systems   Constitutional: Negative for chills, fever and weight loss. HENT: Negative for congestion and sore throat. Eyes: Negative for blurred vision, double vision and photophobia. Respiratory: Negative for cough and shortness of breath. Cardiovascular: Negative for chest pain, palpitations and leg swelling. Gastrointestinal: Negative for abdominal pain, constipation, diarrhea, heartburn, nausea and vomiting. Genitourinary: Negative for dysuria, frequency and urgency. Musculoskeletal: Positive for back pain and joint pain. Negative for myalgias. Skin: Negative for rash. Neurological: Negative. Negative for headaches. Endo/Heme/Allergies: Does not bruise/bleed easily. Psychiatric/Behavioral: Negative for memory loss and suicidal ideas. Mood low at times       There were no vitals taken for this visit. No flowsheet data found. Physical Exam  Constitutional:       Appearance: Normal appearance. HENT:      Head: Normocephalic and atraumatic. Pulmonary:      Effort: Pulmonary effort is normal.   Neurological:      General: No focal deficit present. Mental Status: She is alert.    Psychiatric:         Mood and Affect: Mood normal.         Behavior: Behavior normal.           Current Outpatient Medications   Medication Sig  carvediloL (COREG) 3.125 mg tablet TAKE 1 TABLET BY MOUTH TWICE A DAY WITH MEALS    Tums 300 mg (750 mg) chewable tablet     pantoprazole (PROTONIX) 40 mg tablet Take 1 Tablet by mouth two (2) times a day.  diclofenac (VOLTAREN) 1 % gel Apply 2 g to affected area three (3) times daily as needed.  Lactobacillus acidophilus (PROBIOTIC PO) Take  by mouth.  ondansetron hcl (Zofran) 4 mg tablet Take 4 mg by mouth every eight (8) hours as needed for Nausea or Vomiting.  baclofen 5 mg tab Take  by mouth.  traMADoL (ULTRAM) 50 mg tablet Take 50 mg by mouth every six (6) hours as needed for Pain.  furosemide (LASIX) 40 mg tablet TAKE ONE TAB BY MOUTH DAILY OR AS ADVISED BY PHYSICIAN (Patient taking differently: 20 mg. Take one tab by mouth daily or as advised by physician)   Abby Perez acetaminophen (TYLENOL) 500 mg tablet Take 1,000 mg by mouth every eight (8) hours.  multivitamin (ONE A DAY) tablet Take 1 Tab by mouth daily.  senna-docusate (PERICOLACE) 8.6-50 mg per tablet Take 2 Tabs by mouth two (2) times daily as needed for Constipation.  loperamide (IMODIUM) 1 mg/5 mL solution Take 1.5 tsp by mouth every eight (8) hours as needed for Diarrhea.  atorvastatin (LIPITOR) 10 mg tablet Take 10 mg by mouth daily.  chlorhexidine (Peridex) 0.12 % solution 15 mL by Swish and Spit route two (2) times daily (with meals).  benzocaine (ORAJEL) 20 % gel topical gel Apply  to affected area four (4) times daily as needed. Apply to gums    polyethylene glycol (Miralax) 17 gram packet Take 17 g by mouth daily.  alum-mag hydroxide-simeth (Maalox Advanced) 200-200-20 mg/5 mL susp Take 30 mL by mouth every four (4) hours as needed for Indigestion.  levothyroxine (SYNTHROID) 100 mcg tablet Take 1 Tab by mouth Daily (before breakfast).  sertraline (ZOLOFT) 50 mg tablet Take 1.5 Tabs by mouth daily.  apixaban (Eliquis) 5 mg tablet Take 1 Tab by mouth two (2) times a day.     albuterol (PROVENTIL HFA, VENTOLIN HFA, PROAIR HFA) 90 mcg/actuation inhaler Take 1 Puff by inhalation every six (6) hours as needed for Wheezing.  ADVAIR DISKUS 250-50 mcg/dose diskus inhaler Take 1 Puff by inhalation two (2) times a day.  tiotropium (SPIRIVA WITH HANDIHALER) 18 mcg inhalation capsule Take 1 Cap by inhalation daily.  honey (MediHoney, honey,) 100 % topical paste Apply  to affected area every Monday, Wednesday, Friday. Apply to left buttocks (Patient not taking: Reported on 11/15/2021)     No current facility-administered medications for this visit.         Past Medical History:   Diagnosis Date    Arthritis     Atrial fibrillation ([facility] [address]     av node ablation 5/22/98 with placement of CPI #[id number] dual chamber pacemaker subsequently replaced with a single chamber medtronic #[id number] single chamber pacemaker 7/25/05    Cancer ([facility] [address] 1970's    colon cancer w/ resection    COPD     Depression     GERD (gastroesophageal reflux disease)     History of vascular access device 04/17/2020    4F MIDLINE PLACED BY EMIL Currie RN LEFT BRACHIAL, 13CM    Hyperlipidemia     Hypertension     Ischemic cardiomyopathy     Migraine headache     Orthostatic hypotension     RADHA (obstructive sleep apnea)     Pacemaker 5/22/98    AV node ablation /pacemaker placement utilizing CPI # [id number] dual chamber system, medtronic #[id number] single chamber device placed 7/22/05    Pulmonary hypertension ([facility] [address] 9/26/2011    RVSP 50 on echo 8/14    Thyroid disease     Valvular heart disease     mild-mod MR/TR      Past Surgical History:   Procedure Laterality Date    HX BREAST BIOPSY Right 2002    neg; surgical bx    HX KNEE REPLACEMENT      right TKA    HX PACEMAKER      HX PACEMAKER PLACEMENT  05/2020    HX TOTAL COLECTOMY  1974    colon    HX TUBAL LIGATION      IR KYPHOPLASTY LUMBAR  7/2/2019    AR BREAST SURGERY PROCEDURE UNLISTED      benign breast tumor excision right breast    AR INSJ ELTRD CAR BYRON SYS TM INSJ DFB/PM PLS GEN N/A 5/22/2020    Lv Lead Placement performed by Katharine Sky MD at [address], [facility] CATH LAB    ME RELOCATION OF SKIN POCKET FOR PACEMAKER N/A 10/9/2020    RELOCATE PACEMAKER POCKET performed by Katharine Sky MD at [address], [facility] CATH LAB    ME REMVL PERM PM PLS GEN W/REPL PLSE GEN MULT LEAD N/A 5/22/2020    REMOVE & REPLACE PPM GEN BIV MULTI LEADS [facility] [address] [facility] [address] CATH LAB    ME TOTAL KNEE ARTHROPLASTY      total on R, partial on L      Social History     Tobacco Use    Smoking status: Passive Smoke Exposure - Never Smoker    Smokeless tobacco: Never Used   Substance Use Topics    Alcohol use: No     Alcohol/week: 0.0 standard drinks      Family History   Problem Relation Age of Onset    Diabetes Mother     Heart Disease Mother     Heart Attack Mother     Heart Disease Father     Stroke Sister     Breast Cancer Sister 80    Cancer Maternal Aunt         uterus    Diabetes Maternal Uncle     Breast Cancer Daughter 61        Allergies   Allergen Reactions    Cardizem [Diltiazem Hcl] Hives    Codeine Nausea and Vomiting    Darvocet A500 [Propoxyphene N-Acetaminophen] Nausea and Vomiting    Diltiazem Hives    Labetalol Nausea and Vomiting     Dizzy/Disoriented     Pcn [Penicillins] Swelling     Tongue Swelling   Has tolerated cephalexin recently 4/7/20     Primidone Other (comments)     Lightheaded/unsteady gait    Sulfa (Sulfonamide Antibiotics) Nausea and Vomiting        Assessment/Plan  Diagnoses and all orders for this visit:    1. Status post below knee amputation, right (HCC)-patient would benefit from a power wheelchair. She is currently working with occupational therapy. Manual wheelchair is leading to wounds to her left foot. Transferring and putting pressure on this foot is leading to more discomfort. 2. Essential hypertension-has been stable    3. Wound, open, foot, left, initial encounter-seeing wound care.     4. Impaired mobility and personal alycia Eaton is a 80 y.o. female being evaluated by a video visit encounter for concerns as above. A caregiver was present when appropriate. Due to this being a TeleHealth encounter (During [facility] public health emergency), evaluation of the following organ systems was limited: Vitals/Constitutional/EENT/Resp/CV/GI//MS/Neuro/Skin/Heme-Lymph-Imm. Pursuant to the emergency declaration under the [address] [place] [address], [facility] waiver authority and the [employer] Act, this Virtual  Visit was conducted, with patient's (and/or legal guardian's) consent, to reduce the patient's risk of exposure to COVID-19 and provide necessary medical care. Services were provided through a video synchronous discussion virtually to substitute for in-person clinic visit. Patient and provider were located at their individual homes. Tana Morgan MD  11/15/2021    This note was created with the help of speech recognition software [employer]) and may contain some 'sound alike' errors.

## 2021-11-16 ENCOUNTER — TELEPHONE (OUTPATIENT)
Dept: CARDIOLOGY CLINIC | Age: 86
End: 2021-11-16

## 2021-11-16 NOTE — TELEPHONE ENCOUNTER
Patient's daughter would like to know the patient is in an assisted living facility and doing well, please advise

## 2021-12-01 ENCOUNTER — TELEPHONE (OUTPATIENT)
Dept: INTERNAL MEDICINE CLINIC | Age: 86
End: 2021-12-01

## 2021-12-01 DIAGNOSIS — R53.1 WEAKNESS GENERALIZED: Primary | ICD-10-CM

## 2021-12-01 NOTE — TELEPHONE ENCOUNTER
Patients daughter was calling to say he had prescribed PT for patient a while ago - in late Oct it was suspended due to her declining health but now is stating she can resume and can we send in another order for PT to continue this again. Bactrim Pregnancy And Lactation Text: This medication is Pregnancy Category D and is known to cause fetal risk.  It is also excreted in breast milk.

## 2021-12-07 ENCOUNTER — PATIENT MESSAGE (OUTPATIENT)
Dept: INTERNAL MEDICINE CLINIC | Age: 86
End: 2021-12-07

## 2021-12-08 NOTE — TELEPHONE ENCOUNTER
From: Susan Benavides  To: Mona Fernandes MD  Sent: 12/7/2021 11:25 AM EST  Subject: Fax info for PT referral    Please fax the script for physical therapy to [facility]  Fax #[phone]    Thank you

## 2021-12-30 NOTE — Clinical Note
Symptoms: constipation but having abdominal pain as well     Onset: ongoing constipation   Location / description: small narrow pencil stool this morning.  Abdominal pain after eating yesterday.  None at time of call.  Feels like has to have a BM - pushes and has no stool.  Miralax, prune juice.  Hasn't eaten anything since noon yesterday.  Drinking a lot of fluids.    Ongoing problems with constipation - \"chronic constipation.\"  Usually if takes miralax it helps.  Isn't helping now.    Precipitating Factors: as above  Pain Scale (1-10), 10 highest: 0/10  Associated Symptoms: none  What improves/worsens symptoms: nothing/nothing  Symptom specific medications: miralax daily in pm.  LMP : No LMP recorded. Patient is postmenopausal.  Are you pregnant or breast feeding: no/no  Recent visits (last 3-4 weeks) for same reason or recent surgery:  none    PLAN:  See Provider within next few days    Patient/Caller agrees to follow recommendations.    Reason for Disposition  • Unable to have a bowel movement (BM) without laxative or enema    Protocols used: CONSTIPATION-A-AH       Vessel: right dorsalis pedis. Power injection to the artery. PSI = 800. Rate of rise = 0.5 sec. Injection rate = 5 mL/sec. Total injected volume = 20 mL.

## 2022-01-14 NOTE — ED NOTES
Bedside and Verbal shift change report given to Adina Calvillo (oncoming nurse) by Renetta Wise (offgoing nurse).
Code Sepsis called (fever, WBC, lactic, earlier hypotension).   Hospitalist NP to bedside to see patient as staff has concerns about her decreasing mental status, mottling, fever
Servando Rivera (Daughter) [phone]
TRANSFER - OUT REPORT:    Verbal report given to Kenzie Breaux (name) on Nelida Julien  being transferred to (unit) for routine progression of care       Report consisted of patients Situation, Background, Assessment and   Recommendations(SBAR). Information from the following report(s) SBAR, ED Summary, [facility] and Recent Results was reviewed with the receiving nurse. Lines:   Peripheral IV 06/04/20 Left Arm (Active)   Site Assessment Clean, dry, & intact 6/4/2020 11:28 PM   Phlebitis Assessment 0 6/4/2020 11:28 PM   Infiltration Assessment 0 6/4/2020 11:28 PM   Dressing Status Clean, dry, & intact 6/4/2020 11:28 PM   Hub Color/Line Status Pink;Capped;Flushed;Patent 6/4/2020 11:28 PM   Action Taken Blood drawn 6/4/2020 11:28 PM       Peripheral IV 06/04/20 Right Antecubital (Active)   Site Assessment Clean, dry, & intact 6/4/2020 11:29 PM   Phlebitis Assessment 0 6/4/2020 11:29 PM   Infiltration Assessment 0 6/4/2020 11:29 PM   Dressing Status Clean, dry, & intact 6/4/2020 11:29 PM   Hub Color/Line Status Pink;Capped;Flushed;Patent 6/4/2020 11:29 PM        Opportunity for questions and clarification was provided.
Wheelchair

## 2022-01-24 ENCOUNTER — OFFICE VISIT (OUTPATIENT)
Dept: CARDIOLOGY CLINIC | Age: 87
End: 2022-01-24
Payer: MEDICARE

## 2022-01-24 DIAGNOSIS — Z95.0 CARDIAC PACEMAKER IN SITU: Primary | ICD-10-CM

## 2022-01-24 PROCEDURE — 93296 REM INTERROG EVL PM/IDS: CPT | Performed by: INTERNAL MEDICINE

## 2022-01-24 NOTE — LETTER
1/24/2022 3:11 PM    Ms. Russo Notice  [address] [id number] [id number]        This letter confirms that we have received your scheduled remote check of your implanted     device on 1-24-22. Our EP team will contact you via phone if there are significant abnormal    findings. Your next remote check from home is scheduled for 5-9-22. If you have any questions, please call [address] [place] [address] at [phone].                Sincerely,    Dean Hernández MD [facility]

## 2022-02-07 ENCOUNTER — PATIENT MESSAGE (OUTPATIENT)
Dept: INTERNAL MEDICINE CLINIC | Age: 87
End: 2022-02-07

## 2022-02-07 RX ORDER — CLOBETASOL PROPIONATE 0.5 MG/G
CREAM TOPICAL 2 TIMES DAILY
Qty: 15 G | Refills: 0 | Status: SHIPPED | OUTPATIENT
Start: 2022-02-07

## 2022-02-18 ENCOUNTER — TELEPHONE (OUTPATIENT)
Dept: CARDIOLOGY CLINIC | Age: 87
End: 2022-02-18

## 2022-02-19 ENCOUNTER — PATIENT MESSAGE (OUTPATIENT)
Dept: CARDIOLOGY CLINIC | Age: 87
End: 2022-02-19

## 2022-02-21 ENCOUNTER — TELEPHONE (OUTPATIENT)
Dept: CARDIOLOGY CLINIC | Age: 87
End: 2022-02-21

## 2022-02-21 RX ORDER — CLOBETASOL PROPIONATE 0.5 MG/G
AEROSOL, FOAM TOPICAL 2 TIMES DAILY
Qty: 1 G | Refills: 0 | Status: SHIPPED | OUTPATIENT
Start: 2022-02-21 | End: 2022-04-14

## 2022-02-21 NOTE — TELEPHONE ENCOUNTER
Magali Segundo, got a busy signal. Anabel Carmona and MAURICIO saying we did not receive any transmission but I scheduled one to come over tomorrow am so we can check it out on Monday.
Mychart message sent with device findings
Patients daughter calling - wanting to see if a report/print out could be pulled from her moms pacemaker device. Overall her moms been feeling weak and not well for a few days and wanted us to have a look at the pacemaker. Would like a call back.      Rossi Major   [phone]
Spoke to pts daughter Dagmar Owens about her mom & not receiving the transmission. Told her that the [facility] will need to send a manual in order for us to see anything. She will call them & tell them to do it. Will be on the look out. She stated if issue to call pts other daughter Leora Del Rosario listed in chart as 1st contact & Lesley as 2nd.
Is This A New Presentation, Or A Follow-Up?: Rash

## 2022-02-22 NOTE — TELEPHONE ENCOUNTER
Patient's daughter called to request device check. Reported that patient has been feeling weak & not well for a few days & wanted us to have a look at the pacemaker. Since 01/23/2022 (last remote), no episodes noted on the device. Possible Optivol fluid accumulation 02/08/2022 to ongoing. All lead values WNL. CRT 94.53%, similar to previous. Echo (10/23/2020): LVEF 35-40%, upper normal wall thickness. Severely dilated LA, mod dilated RA. Mild MAC, mild to mod MR. Aortic valve sclerosis with leaflet calcification, peak grad 13 mmHg, mean grad 7 mmHg, DARRYN 1 cm2.; mild AS & mild AR. Mod TR. Mild to mod PH. Please repeat 2D echo to reassess LVEF & valves to determine whether this is contributing to her symptoms. Currently prescribed furosemide 40 mg po tabs, but last reported taking 20 mg po daily. Cr elevated in 09/2021, so don't want to increase long term, but she can take an additional 20 mg po daily x 3 days to see if she feels improvement in symptoms. Also on Eliquis; please check CBC to rule out anemia.

## 2022-02-22 NOTE — TELEPHONE ENCOUNTER
Daughter sent mychart message back stating Silver Figueroamo you  She has improved greatly over the weekend  We appreciate your reply. \"    Discussed with YANA Galeana, will hold off on further testing at this point. Sent [employer] message back asking them to notify our office if symptoms return for follow up testing.

## 2022-03-18 PROBLEM — N17.9 AKI (ACUTE KIDNEY INJURY) (HCC): Status: ACTIVE | Noted: 2020-07-08

## 2022-03-18 PROBLEM — R29.6 FREQUENT FALLS: Status: ACTIVE | Noted: 2019-01-13

## 2022-03-19 PROBLEM — I10 HYPERTENSION: Status: ACTIVE | Noted: 2019-06-29

## 2022-03-19 PROBLEM — I96 GANGRENE OF FOOT (HCC): Status: ACTIVE | Noted: 2020-06-05

## 2022-03-19 PROBLEM — L40.9 PSORIASIS: Status: ACTIVE | Noted: 2017-03-23

## 2022-03-19 PROBLEM — E11.9 TYPE 2 DIABETES MELLITUS WITHOUT COMPLICATION, UNSPECIFIED WHETHER LONG TERM INSULIN USE (HCC): Status: ACTIVE | Noted: 2020-06-04

## 2022-03-19 PROBLEM — R11.2 NAUSEA & VOMITING: Status: ACTIVE | Noted: 2020-07-08

## 2022-03-19 PROBLEM — E86.0 DEHYDRATION: Status: ACTIVE | Noted: 2020-07-08

## 2022-03-19 PROBLEM — J44.9 COPD (CHRONIC OBSTRUCTIVE PULMONARY DISEASE) (HCC): Status: ACTIVE | Noted: 2019-01-13

## 2022-03-19 PROBLEM — Z95.0 BIVENTRICULAR CARDIAC PACEMAKER IN SITU: Status: ACTIVE | Noted: 2020-05-22

## 2022-03-19 PROBLEM — Z95.0 PACEMAKER: Status: ACTIVE | Noted: 2017-07-24

## 2022-03-20 PROBLEM — J96.11 CHRONIC RESPIRATORY FAILURE WITH HYPOXIA (HCC): Status: ACTIVE | Noted: 2019-01-13

## 2022-04-14 RX ORDER — CLOBETASOL PROPIONATE 0.5 MG/G
AEROSOL, FOAM TOPICAL
Qty: 100 G | Refills: 0 | Status: SHIPPED | OUTPATIENT
Start: 2022-04-14

## 2022-04-18 ENCOUNTER — TELEPHONE (OUTPATIENT)
Dept: INTERNAL MEDICINE CLINIC | Age: 87
End: 2022-04-18

## 2022-04-18 NOTE — TELEPHONE ENCOUNTER
Pt daughter is calling stated she left a [employer] message asking if she can bring in her mother tomorrow for a follow up on her cellulitis. Pt daughter stated she will be visiting her mother tomorrow.  Please call back and advise

## 2022-04-25 NOTE — TELEPHONE ENCOUNTER
----- Message from Radu Sosa sent at 4/25/2022  1:40 PM EDT -----  Subject: Message to Provider    QUESTIONS  Information for Provider? Patient was exposed to positive Covid 19 person   last Friday, she has tested negative, does she have to quarantine for a   certain amount of time, has an up an coming appointment 4/ 29/22.  ---------------------------------------------------------------------------  --------------  Linda BURNHAM  What is the best way for the office to contact you? OK to leave message on   voicemail  Preferred Call Back Phone Number? [phone]  ---------------------------------------------------------------------------  --------------  SCRIPT ANSWERS  Relationship to Patient?  Self

## 2022-04-25 NOTE — TELEPHONE ENCOUNTER
Patient has been tested twice since last Friday all neg. covid test. Patient voiced understanding of signs and symptoms to watch for.

## 2022-04-29 ENCOUNTER — OFFICE VISIT (OUTPATIENT)
Dept: INTERNAL MEDICINE CLINIC | Age: 87
End: 2022-04-29
Payer: MEDICARE

## 2022-04-29 VITALS
TEMPERATURE: 98.2 F | SYSTOLIC BLOOD PRESSURE: 113 MMHG | RESPIRATION RATE: 16 BRPM | HEIGHT: 63 IN | OXYGEN SATURATION: 95 % | DIASTOLIC BLOOD PRESSURE: 71 MMHG | WEIGHT: 135 LBS | BODY MASS INDEX: 23.92 KG/M2 | HEART RATE: 81 BPM

## 2022-04-29 DIAGNOSIS — E11.69 TYPE 2 DIABETES MELLITUS WITH OTHER SPECIFIED COMPLICATION, UNSPECIFIED WHETHER LONG TERM INSULIN USE (HCC): ICD-10-CM

## 2022-04-29 DIAGNOSIS — L03.116 CELLULITIS OF LEFT LOWER EXTREMITY: Primary | ICD-10-CM

## 2022-04-29 DIAGNOSIS — J44.9 CHRONIC OBSTRUCTIVE PULMONARY DISEASE, UNSPECIFIED COPD TYPE (HCC): ICD-10-CM

## 2022-04-29 DIAGNOSIS — N18.30 STAGE 3 CHRONIC KIDNEY DISEASE, UNSPECIFIED WHETHER STAGE 3A OR 3B CKD (HCC): ICD-10-CM

## 2022-04-29 DIAGNOSIS — F32.A ANXIETY AND DEPRESSION: ICD-10-CM

## 2022-04-29 DIAGNOSIS — E03.4 HYPOTHYROIDISM DUE TO ACQUIRED ATROPHY OF THYROID: ICD-10-CM

## 2022-04-29 DIAGNOSIS — I10 ESSENTIAL HYPERTENSION: ICD-10-CM

## 2022-04-29 DIAGNOSIS — R10.32 LEFT LOWER QUADRANT ABDOMINAL PAIN: ICD-10-CM

## 2022-04-29 DIAGNOSIS — F41.9 ANXIETY AND DEPRESSION: ICD-10-CM

## 2022-04-29 DIAGNOSIS — Z89.511 STATUS POST BELOW KNEE AMPUTATION, RIGHT (HCC): ICD-10-CM

## 2022-04-29 LAB
ALBUMIN SERPL-MCNC: 3.5 G/DL (ref 3.5–5)
ALBUMIN/GLOB SERPL: 1 {RATIO} (ref 1.1–2.2)
ALP SERPL-CCNC: 94 U/L (ref 45–117)
ALT SERPL-CCNC: 26 U/L (ref 12–78)
ANION GAP SERPL CALC-SCNC: 5 MMOL/L (ref 5–15)
AST SERPL-CCNC: 24 U/L (ref 15–37)
BASOPHILS # BLD: 0 K/UL (ref 0–0.1)
BASOPHILS NFR BLD: 1 % (ref 0–1)
BILIRUB SERPL-MCNC: 0.8 MG/DL (ref 0.2–1)
BUN SERPL-MCNC: 31 MG/DL (ref 6–20)
BUN/CREAT SERPL: 31 (ref 12–20)
CALCIUM SERPL-MCNC: 9.4 MG/DL (ref 8.5–10.1)
CHLORIDE SERPL-SCNC: 108 MMOL/L (ref 97–108)
CO2 SERPL-SCNC: 28 MMOL/L (ref 21–32)
CREAT SERPL-MCNC: 1.01 MG/DL (ref 0.55–1.02)
DIFFERENTIAL METHOD BLD: NORMAL
EOSINOPHIL # BLD: 0.2 K/UL (ref 0–0.4)
EOSINOPHIL NFR BLD: 2 % (ref 0–7)
ERYTHROCYTE [DISTWIDTH] IN BLOOD BY AUTOMATED COUNT: 13.4 % (ref 11.5–14.5)
EST. AVERAGE GLUCOSE BLD GHB EST-MCNC: 120 MG/DL
GLOBULIN SER CALC-MCNC: 3.4 G/DL (ref 2–4)
GLUCOSE SERPL-MCNC: 115 MG/DL (ref 65–100)
HBA1C MFR BLD: 5.8 % (ref 4–5.6)
HCT VFR BLD AUTO: 39.9 % (ref 35–47)
HGB BLD-MCNC: 12.7 G/DL (ref 11.5–16)
IMM GRANULOCYTES # BLD AUTO: 0 K/UL (ref 0–0.04)
IMM GRANULOCYTES NFR BLD AUTO: 0 % (ref 0–0.5)
LYMPHOCYTES # BLD: 1.2 K/UL (ref 0.8–3.5)
LYMPHOCYTES NFR BLD: 14 % (ref 12–49)
MCH RBC QN AUTO: 30.8 PG (ref 26–34)
MCHC RBC AUTO-ENTMCNC: 31.8 G/DL (ref 30–36.5)
MCV RBC AUTO: 96.8 FL (ref 80–99)
MONOCYTES # BLD: 0.8 K/UL (ref 0–1)
MONOCYTES NFR BLD: 9 % (ref 5–13)
NEUTS SEG # BLD: 6.5 K/UL (ref 1.8–8)
NEUTS SEG NFR BLD: 74 % (ref 32–75)
NRBC # BLD: 0 K/UL (ref 0–0.01)
NRBC BLD-RTO: 0 PER 100 WBC
PLATELET # BLD AUTO: 160 K/UL (ref 150–400)
PMV BLD AUTO: 12.1 FL (ref 8.9–12.9)
POTASSIUM SERPL-SCNC: 4.4 MMOL/L (ref 3.5–5.1)
PROT SERPL-MCNC: 6.9 G/DL (ref 6.4–8.2)
RBC # BLD AUTO: 4.12 M/UL (ref 3.8–5.2)
SODIUM SERPL-SCNC: 141 MMOL/L (ref 136–145)
TSH SERPL DL<=0.05 MIU/L-ACNC: 0.81 UIU/ML (ref 0.36–3.74)
WBC # BLD AUTO: 8.8 K/UL (ref 3.6–11)

## 2022-04-29 PROCEDURE — 99215 OFFICE O/P EST HI 40 MIN: CPT | Performed by: INTERNAL MEDICINE

## 2022-04-29 PROCEDURE — G9717 DOC PT DX DEP/BP F/U NT REQ: HCPCS | Performed by: INTERNAL MEDICINE

## 2022-04-29 PROCEDURE — G8420 CALC BMI NORM PARAMETERS: HCPCS | Performed by: INTERNAL MEDICINE

## 2022-04-29 PROCEDURE — 1090F PRES/ABSN URINE INCON ASSESS: CPT | Performed by: INTERNAL MEDICINE

## 2022-04-29 PROCEDURE — G0463 HOSPITAL OUTPT CLINIC VISIT: HCPCS | Performed by: INTERNAL MEDICINE

## 2022-04-29 PROCEDURE — 1101F PT FALLS ASSESS-DOCD LE1/YR: CPT | Performed by: INTERNAL MEDICINE

## 2022-04-29 PROCEDURE — G8427 DOCREV CUR MEDS BY ELIG CLIN: HCPCS | Performed by: INTERNAL MEDICINE

## 2022-04-29 PROCEDURE — G8536 NO DOC ELDER MAL SCRN: HCPCS | Performed by: INTERNAL MEDICINE

## 2022-04-29 RX ORDER — BENZOCAINE/ZINC CL/BENZALK CHL 20 %-0.1 %
GEL (GRAM) MUCOUS MEMBRANE AS NEEDED
COMMUNITY

## 2022-04-29 RX ORDER — PRAMOXINE HCL/BENZALKONIUM CHL 1%-0.13%
SPRAY, NON-AEROSOL (ML) TOPICAL 2 TIMES DAILY
COMMUNITY

## 2022-04-29 NOTE — PROGRESS NOTES
Muna Marques is a 80 y.o. female who presents today for Follow-up (Cellulitis. Patient having abdominal pain when she has bowel movements for the past couple months. Patient states during the night her mouth gets really dry and her tongue swells up. Complains of acid reflux. )  . She has a history of   Patient Active Problem List   Diagnosis Code    Mixed hyperlipidemia E78.2    Mitral regurgitation I34.0    Anxiety and depression F41.9, F32. A    Hypothyroid E03.9    CKD (chronic kidney disease) stage 3, GFR 30-59 ml/min ([facility]) N18.30    Osteopenia M85.80    Restrictive lung disease J98.4    Ischemic cardiomyopathy I25.5    Psoriasis L40.9    Pacemaker Z95.0    Frequent falls R29.6    COPD (chronic obstructive pulmonary disease) ([facility]) J44.9    Chronic respiratory failure with hypoxia ([facility]) J96.11    Hypertension I10    Biventricular cardiac pacemaker in situ Z95.0    Type 2 diabetes mellitus with other specified complication, unspecified whether long term insulin use ([facility]) E11.69    Gangrene of foot ([facility]) I96    LARISSA (acute kidney injury) ([facility]) N17.9    Nausea & vomiting R11.2    Dehydration E86.0    Status post below knee amputation, right ([facility] Z89.511   . Today patient is here for follow-up. Breathing is stable with inhalers. Cellulitis: Patient recently had an infection/cellulitis of her left foot. She was seen by the wound care doctor because her living facility and was placed on clindamycin. Blood work done in late March did show a creatinine of 1.2 and a white blood cell count is elevated at 14.3. Since being on the antibiotics her leg cellulitis has resolved. Does have some mild irritation to lateral left toes but no skin breakdown. Patient is being monitored closely by nurses. Is not ambulatory and does not put pressure on her left foot very frequently. Of note she is doing better with her right prosthetic leg for transferring.   Does not walk or ambulate on this leg.    Having some abd pain with urination and defecation. Some mild constipation. Pain usually improved after BM or urination. No F/C. Does report some chronic constipation. Does take in a good bit of fiber through her supplements. This has been ongoing for several months. Not necessarily worsening. Hypertension- BP is stable. Hypertension ROS: taking medications as instructed, no medication side effects noted, no TIA's, no chest pain on exertion, no dyspnea on exertion, no swelling of ankles     reports that she is a non-smoker but has been exposed to tobacco smoke. She has never used smokeless tobacco.    reports no history of alcohol use. BP Readings from Last 2 Encounters:   04/29/22 113/71   09/10/21 (!) 96/58     DM: diet controlled. Repeat today. Hypothyroidism: We will repeat her TSH today     Mood is noted to be stable    ROS  Review of Systems   Constitutional: Negative for chills, fever and weight loss. HENT: Negative for congestion and sore throat. Eyes: Negative for blurred vision, double vision and photophobia. Respiratory: Negative for cough and shortness of breath. Cardiovascular: Negative for chest pain, palpitations and leg swelling. Gastrointestinal: Positive for abdominal pain, constipation and diarrhea. Negative for heartburn, nausea and vomiting. Genitourinary: Negative for dysuria, frequency and urgency. Musculoskeletal: Negative for joint pain and myalgias. S/p R amputation. Skin: Negative for rash. Neurological: Negative. Negative for headaches. Endo/Heme/Allergies: Does not bruise/bleed easily. Psychiatric/Behavioral: Negative for memory loss and suicidal ideas.        Visit Vitals  /71 (BP 1 Location: Left upper arm, BP Patient Position: Sitting, BP Cuff Size: Adult)   Pulse 81   Temp 98.2 °F (36.8 °C) (Oral)   Resp 16   Ht 5' 3\" (1.6 m)   Wt 135 lb (61.2 kg)   SpO2 95%   BMI 23.91 kg/m²       Physical Exam  Constitutional: Appearance: She is well-developed. HENT:      Head: Normocephalic and atraumatic. Cardiovascular:      Rate and Rhythm: Normal rate and regular rhythm. Heart sounds: No murmur heard. Pulmonary:      Effort: Pulmonary effort is normal. No respiratory distress. Abdominal:      General: Abdomen is flat. Palpations: Abdomen is soft. Comments: Mild tenderness to area noted. No guarding. Normal bowel sounds. Musculoskeletal:      Comments: Left leg with normal temperature. Skin looks healthy. Small areas of skin irritation to the lateral 2 toes. No skin breakdown. Status post right leg amputation. Prosthesis present. Slightly decreased sensation to left foot with microfilament. Skin:     General: Skin is warm and dry. Neurological:      Mental Status: She is alert and oriented to person, place, and time. Psychiatric:         Behavior: Behavior normal.           Current Outpatient Medications   Medication Sig    benzocaine-menthol-zinc chloride (Orajel 3X Mouth Sores) 20-0.1-0.15 % gel mucosal gel by Mucous Membrane route as needed.  Neomycin-Polymyxin-Pramoxine (Neosporin Plus Pain Relief) 3.5-10,000-10 mg-unit-mg/gram topical cream Apply  to affected area two (2) times a day.  clobetasoL (OLUX) 0.05 % topical foam APPLY A THIN FILM ON THE AFFECTED AREA 2 TIMES A DAY    clobetasoL (TEMOVATE) 0.05 % topical cream Apply  to affected area two (2) times a day.  carvediloL (COREG) 3.125 mg tablet TAKE 1 TABLET BY MOUTH TWICE A DAY WITH MEALS    Tums 300 mg (750 mg) chewable tablet     pantoprazole (PROTONIX) 40 mg tablet Take 1 Tablet by mouth two (2) times a day.  diclofenac (VOLTAREN) 1 % gel Apply 2 g to affected area three (3) times daily as needed.  Lactobacillus acidophilus (PROBIOTIC PO) Take  by mouth.  ondansetron hcl (Zofran) 4 mg tablet Take 4 mg by mouth every eight (8) hours as needed for Nausea or Vomiting.  baclofen 5 mg tab Take  by mouth.  traMADoL (ULTRAM) 50 mg tablet Take 50 mg by mouth every six (6) hours as needed for Pain.  furosemide (LASIX) 40 mg tablet TAKE ONE TAB BY MOUTH DAILY OR AS ADVISED BY PHYSICIAN (Patient taking differently: 20 mg. Take one tab by mouth daily or as advised by physician)   Jorden Safe acetaminophen (TYLENOL) 500 mg tablet Take 1,000 mg by mouth every eight (8) hours.  multivitamin (ONE A DAY) tablet Take 1 Tab by mouth daily.  senna-docusate (PERICOLACE) 8.6-50 mg per tablet Take 2 Tabs by mouth two (2) times daily as needed for Constipation.  loperamide (IMODIUM) 1 mg/5 mL solution Take 1.5 tsp by mouth every eight (8) hours as needed for Diarrhea.  atorvastatin (LIPITOR) 10 mg tablet Take 10 mg by mouth daily.  chlorhexidine (Peridex) 0.12 % solution 15 mL by Swish and Spit route two (2) times daily (with meals).  benzocaine (ORAJEL) 20 % gel topical gel Apply  to affected area four (4) times daily as needed. Apply to gums    polyethylene glycol (Miralax) 17 gram packet Take 17 g by mouth daily.  alum-mag hydroxide-simeth (Maalox Advanced) 200-200-20 mg/5 mL susp Take 30 mL by mouth every four (4) hours as needed for Indigestion.  levothyroxine (SYNTHROID) 100 mcg tablet Take 1 Tab by mouth Daily (before breakfast).  sertraline (ZOLOFT) 50 mg tablet Take 1.5 Tabs by mouth daily.  apixaban (Eliquis) 5 mg tablet Take 1 Tab by mouth two (2) times a day.  albuterol (PROVENTIL HFA, VENTOLIN HFA, PROAIR HFA) 90 mcg/actuation inhaler Take 1 Puff by inhalation every six (6) hours as needed for Wheezing.  ADVAIR DISKUS 250-50 mcg/dose diskus inhaler Take 1 Puff by inhalation two (2) times a day.  tiotropium (SPIRIVA WITH HANDIHALER) 18 mcg inhalation capsule Take 1 Cap by inhalation daily. No current facility-administered medications for this visit.         Past Medical History:   Diagnosis Date    Arthritis     Atrial fibrillation ([address]     av node ablation 5/22/98 with placement of CPI #[id number] dual chamber pacemaker subsequently replaced with a single chamber medtronic #[id number] single chamber pacemaker 7/25/05    Cancer [facility]) 1970's    colon cancer w/ resection    COPD     Depression     GERD (gastroesophageal reflux disease)     History of vascular access device 04/17/2020    4F MIDLINE PLACED BY EMIL Fernández RN LEFT BRACHIAL, 13CM    Hyperlipidemia     Hypertension     Ischemic cardiomyopathy     Migraine headache     Orthostatic hypotension     RADHA (obstructive sleep apnea)     Pacemaker 5/22/98    AV node ablation /pacemaker placement utilizing CPI # [id number] dual chamber system, medtronic #[id number] single chamber device placed 7/22/05    Pulmonary hypertension ([address] 9/26/2011    RVSP 50 on echo 8/14    Thyroid disease     Valvular heart disease     mild-mod MR/TR      Past Surgical History:   Procedure Laterality Date    HX BREAST BIOPSY Right 2002    neg; surgical bx    HX KNEE REPLACEMENT      right TKA    HX PACEMAKER      HX PACEMAKER PLACEMENT  05/2020    HX TOTAL COLECTOMY  1974    colon    HX TUBAL LIGATION      IR KYPHOPLASTY LUMBAR  7/2/2019    VA BREAST SURGERY PROCEDURE UNLISTED      benign breast tumor excision right breast    VA INSJ ELTRD CAR BYRON SYS TM INSJ DFB/PM PLS GEN N/A 5/22/2020    Lv Lead Placement performed by Neville Gabriel MD at [address] CATH LAB    VA RELOCATION OF SKIN POCKET FOR PACEMAKER N/A 10/9/2020    RELOCATE PACEMAKER POCKET performed by Neville Gabriel MD at [address] CATH LAB    VA REMVL PERM PM PLS GEN W/REPL PLSE GEN MULT LEAD N/A 5/22/2020    REMOVE & REPLACE PPM GEN BIV MULTI LEADS performed by Neville Gabriel MD at [address] [place] [address] CATH LAB    VA TOTAL KNEE ARTHROPLASTY      total on R, partial on L      Social History     Tobacco Use    Smoking status: Passive Smoke Exposure - Never Smoker    Smokeless tobacco: Never Used   Substance Use Topics    Alcohol use: No     Alcohol/week: 0.0 standard drinks      Family History   Problem Relation Age of Onset    Diabetes Mother     Heart Disease Mother     Heart Attack Mother     Heart Disease Father     Stroke Sister     Breast Cancer Sister 80    Cancer Maternal Aunt         uterus    Diabetes Maternal Uncle     Breast Cancer Daughter 61        Allergies   Allergen Reactions    Cardizem [Diltiazem Hcl] Hives    Codeine Nausea and Vomiting    Darvocet A500 [Propoxyphene N-Acetaminophen] Nausea and Vomiting    Diltiazem Hives    Labetalol Nausea and Vomiting     Dizzy/Disoriented     Pcn [Penicillins] Swelling     Tongue Swelling   Has tolerated cephalexin recently 4/7/20     Primidone Other (comments)     Lightheaded/unsteady gait    Sulfa (Sulfonamide Antibiotics) Nausea and Vomiting        Assessment/Plan  Diagnoses and all orders for this visit:    1. Cellulitis of left lower extremity-has completely resolved and is off antibiotics. Continue elevation of the leg    2. Status post below knee amputation, right ([facility])-overall doing well. Transferring with prosthesis    3. Chronic obstructive pulmonary disease, unspecified COPD type ([facility])-breathing has been stable    4. Stage 3 chronic kidney disease, unspecified whether stage 3a or 3b CKD ([facility])-kidney function recently stable  -     HEMOGLOBIN A1C WITH EAG; Future  -     CBC WITH AUTOMATED DIFF; Future    5. Type 2 diabetes mellitus with other specified complication, unspecified whether long term insulin use ([facility])-diet controlled  -     HEMOGLOBIN A1C WITH EAG; Future  -     CBC WITH AUTOMATED DIFF; Future    6. Hypothyroidism due to acquired atrophy of thyroid-repeat TSH  -     TSH 3RD GENERATION; Future    7. Left lower quadrant abdominal pain-unclear if this is from straining. Can consider increasing fiber supplements. Given duration of symptoms unlikely to be anything infectious. Continue to monitor.  -     METABOLIC PANEL, COMPREHENSIVE; Future    8. Essential hypertension-blood pressure stable.     9. Anxiety and depression-mental health doing pretty well. Continue SSRI            Tatum Willams MD  4/29/2022    This note was created with the help of speech recognition software [employer]) and may contain some 'sound alike' errors.

## 2022-05-09 ENCOUNTER — OFFICE VISIT (OUTPATIENT)
Dept: CARDIOLOGY CLINIC | Age: 87
End: 2022-05-09
Payer: MEDICARE

## 2022-05-09 DIAGNOSIS — Z95.0 CARDIAC PACEMAKER IN SITU: Primary | ICD-10-CM

## 2022-05-09 DIAGNOSIS — Z95.0 PRESENCE OF BIVENTRICULAR CARDIAC PACEMAKER: ICD-10-CM

## 2022-05-09 PROCEDURE — 93296 REM INTERROG EVL PM/IDS: CPT | Performed by: INTERNAL MEDICINE

## 2022-05-09 NOTE — LETTER
5/9/2022 1:38 PM    Ms. Franklin Floyd  [address] [place] [id number]            This letter confirms that we have received your scheduled remote check of your implanted     device on 5-9-22  . Our EP team will contact you via phone if there are significant abnormal    findings. Your next remote check from home is scheduled for 9-7-22  . If you have any questions, please call [address] [place] [address] Drive at [phone].                Sincerely,    Glennis Epley, MD [facility] [place]

## 2022-06-28 ENCOUNTER — TELEPHONE (OUTPATIENT)
Dept: INTERNAL MEDICINE CLINIC | Age: 87
End: 2022-06-28

## 2022-06-28 NOTE — TELEPHONE ENCOUNTER
----- Message from Marta Lloyd sent at 6/28/2022  8:35 AM EDT -----  Subject: Appointment Request    Reason for Call: Routine (Patient Request) No Script    QUESTIONS  Type of Appointment? Established Patient  Reason for appointment request? Available appointments did not meet   patient need  Additional Information for Provider? Patient would like to book an appt   before 7/12 if possible to fill out a physicality form  ---------------------------------------------------------------------------  --------------  CALL BACK INFO  What is the best way for the office to contact you? OK to leave message on   voicemail  Preferred Call Back Phone Number? [phone]  ---------------------------------------------------------------------------  --------------  SCRIPT ANSWERS  Relationship to Patient? Other  Representative Name? Nada   Additional information verified (besides Name and Date of Birth)? Address  (Is the patient requesting to see the provider for a procedure?)? No  (Is the patient requesting to see the provider urgently  today or   tomorrow. )? No  Have you been diagnosed with COVID-19 in the past 10 days? No  (Service Expert  click yes below to proceed with [place] As Usual   Scheduling)?  Yes

## 2022-07-12 ENCOUNTER — OFFICE VISIT (OUTPATIENT)
Dept: INTERNAL MEDICINE CLINIC | Age: 87
End: 2022-07-12
Payer: MEDICARE

## 2022-07-12 VITALS
SYSTOLIC BLOOD PRESSURE: 129 MMHG | OXYGEN SATURATION: 96 % | HEIGHT: 63 IN | BODY MASS INDEX: 24.63 KG/M2 | HEART RATE: 79 BPM | WEIGHT: 139 LBS | DIASTOLIC BLOOD PRESSURE: 76 MMHG | RESPIRATION RATE: 18 BRPM | TEMPERATURE: 98.2 F

## 2022-07-12 DIAGNOSIS — N18.30 STAGE 3 CHRONIC KIDNEY DISEASE, UNSPECIFIED WHETHER STAGE 3A OR 3B CKD (HCC): ICD-10-CM

## 2022-07-12 DIAGNOSIS — Z89.511 STATUS POST BELOW KNEE AMPUTATION, RIGHT (HCC): Primary | ICD-10-CM

## 2022-07-12 DIAGNOSIS — I10 ESSENTIAL HYPERTENSION: ICD-10-CM

## 2022-07-12 DIAGNOSIS — L02.92 BOIL: ICD-10-CM

## 2022-07-12 DIAGNOSIS — R10.32 LEFT LOWER QUADRANT ABDOMINAL PAIN: ICD-10-CM

## 2022-07-12 DIAGNOSIS — K21.9 GASTROESOPHAGEAL REFLUX DISEASE WITHOUT ESOPHAGITIS: ICD-10-CM

## 2022-07-12 DIAGNOSIS — E11.69 TYPE 2 DIABETES MELLITUS WITH OTHER SPECIFIED COMPLICATION, UNSPECIFIED WHETHER LONG TERM INSULIN USE (HCC): ICD-10-CM

## 2022-07-12 DIAGNOSIS — R13.10 DYSPHAGIA, UNSPECIFIED TYPE: ICD-10-CM

## 2022-07-12 DIAGNOSIS — E03.4 HYPOTHYROIDISM DUE TO ACQUIRED ATROPHY OF THYROID: ICD-10-CM

## 2022-07-12 PROCEDURE — G8420 CALC BMI NORM PARAMETERS: HCPCS | Performed by: INTERNAL MEDICINE

## 2022-07-12 PROCEDURE — G9717 DOC PT DX DEP/BP F/U NT REQ: HCPCS | Performed by: INTERNAL MEDICINE

## 2022-07-12 PROCEDURE — G8536 NO DOC ELDER MAL SCRN: HCPCS | Performed by: INTERNAL MEDICINE

## 2022-07-12 PROCEDURE — 1101F PT FALLS ASSESS-DOCD LE1/YR: CPT | Performed by: INTERNAL MEDICINE

## 2022-07-12 PROCEDURE — G8427 DOCREV CUR MEDS BY ELIG CLIN: HCPCS | Performed by: INTERNAL MEDICINE

## 2022-07-12 PROCEDURE — 1090F PRES/ABSN URINE INCON ASSESS: CPT | Performed by: INTERNAL MEDICINE

## 2022-07-12 PROCEDURE — 99215 OFFICE O/P EST HI 40 MIN: CPT | Performed by: INTERNAL MEDICINE

## 2022-07-12 PROCEDURE — G0463 HOSPITAL OUTPT CLINIC VISIT: HCPCS | Performed by: INTERNAL MEDICINE

## 2022-07-12 RX ORDER — KETOCONAZOLE 20 MG/ML
SHAMPOO TOPICAL
COMMUNITY
Start: 2022-06-02

## 2022-07-12 RX ORDER — TRIAMCINOLONE ACETONIDE 1 MG/G
CREAM TOPICAL
COMMUNITY
Start: 2022-06-02

## 2022-07-12 RX ORDER — CLINDAMYCIN HYDROCHLORIDE 300 MG/1
CAPSULE ORAL
COMMUNITY
Start: 2022-06-29 | End: 2022-07-12

## 2022-07-12 NOTE — PROGRESS NOTES
Hetal Vazquez  Identified pt with two pt identifiers(name and ). Chief Complaint   Patient presents with    Follow-up     RM21// pt presenting today for evaluation for Department of [facility]       1. Have you been to the ER, urgent care clinic since your last visit? Hospitalized since your last visit? NO    2. Have you seen or consulted any other health care providers outside of the [address] [place] [address] since your last visit? Include any pap smears or colon screening. NO      Provider notified of reason for visit, vitals and flowsheets obtained on patients.      Patient received paperwork for advance directive during previous visit but has not completed at this time     Reviewed record In preparation for visit, huddled with provider and have obtained necessary documentation      Health Maintenance Due   Topic    Eye Exam Retinal or Dilated     Shingrix Vaccine Age 50> (1 of 2)    Medicare Yearly Exam        Wt Readings from Last 3 Encounters:   22 139 lb (63 kg)   22 135 lb (61.2 kg)   09/10/21 134 lb (60.8 kg)     Temp Readings from Last 3 Encounters:   22 98.2 °F (36.8 °C) (Oral)   22 98.2 °F (36.8 °C) (Oral)   09/10/21 98.2 °F (36.8 °C) (Oral)     BP Readings from Last 3 Encounters:   22 129/76   22 113/71   09/10/21 (!) 96/58     Pulse Readings from Last 3 Encounters:   22 79   22 81   09/10/21 83     Vitals:    22 1127   BP: 129/76   Pulse: 79   Resp: 18   Temp: 98.2 °F (36.8 °C)   TempSrc: Oral   SpO2: 96%   Weight: 139 lb (63 kg)   Height: 5' 3\" (1.6 m)   PainSc:   0 - No pain   LMP: 1970         Learning Assessment:  :     Learning Assessment 2015   PRIMARY LEARNER Patient Guardian Patient Patient Child(luis fernando)   HIGHEST LEVEL OF EDUCATION - PRIMARY LEARNER  - - - [address] [place] [address] CAREGIVER - - - No -   PRIMARY LANGUAGE ENGLISH ENGLISH ENGLISH ENGLISH ENGLISH   LEARNER PREFERENCE PRIMARY READING PICTURES - READING LISTENING   ANSWERED BY self Patient patient self Lilly Burton   RELATIONSHIP SELF SELF SELF SELF OTHER       Depression Screening:  :     3 most recent [facility] Screens 4/29/2022   Little interest or pleasure in doing things Several days   Feeling down, depressed, irritable, or hopeless Several days   Total Score PHQ 2 2   Trouble falling or staying asleep, or sleeping too much -   Feeling tired or having little energy -   Poor appetite, weight loss, or overeating -   Feeling bad about yourself - or that you are a failure or have let yourself or your family down -   Trouble concentrating on things such as school, work, reading, or watching TV -   Moving or speaking so slowly that other people could have noticed; or the opposite being so fidgety that others notice -   Thoughts of being better off dead, or hurting yourself in some way -   PHQ 9 Score -   How difficult have these problems made it for you to do your work, take care of your home and get along with others -       Fall Risk Assessment:  :     Fall Risk Assessment, last 12 mths 9/10/2021   Able to walk? No   Fall in past 12 months? -   Number of falls in past 12 months -   Fall with injury? -       Abuse Screening:  :     Abuse Screening Questionnaire 6/4/2020 5/21/2020 3/6/2020 2/18/2020 11/18/2019 8/1/2019 3/21/2019   Do you ever feel afraid of your partner? N N N N N N N   Are you in a relationship with someone who physically or mentally threatens you? N N N N N N N   Is it safe for you to go home?  Y Y Y Y Y Y Y       ADL Screening:  :     ADL Assessment 5/11/2017   Feeding yourself No Help Needed   Getting from bed to chair No Help Needed   Getting dressed No Help Needed   Bathing or showering No Help Needed   Walk across the room (includes cane/walker) No Help Needed   Using the telphone No Help Needed   Taking your medications Help Needed   Preparing meals Help Needed Managing money (expenses/bills) Help Needed   Moderately strenuous housework (laundry) Help Needed   Shopping for personal items (toiletries/medicines) Help Needed   Shopping for groceries Help Needed   Driving Help Needed   Climbing a flight of stairs Help Needed   Getting to places beyond walking distances Help Needed         Medication reconciliation up to date and corrected with patient at this time.

## 2022-07-14 ENCOUNTER — HOSPITAL ENCOUNTER (OUTPATIENT)
Dept: CT IMAGING | Age: 87
Discharge: HOME | End: 2022-07-14
Attending: INTERNAL MEDICINE
Payer: MEDICARE

## 2022-07-14 DIAGNOSIS — R10.32 LEFT LOWER QUADRANT ABDOMINAL PAIN: ICD-10-CM

## 2022-07-14 PROCEDURE — 74176 CT ABD & PELVIS W/O CONTRAST: CPT

## 2022-09-06 PROCEDURE — 93294 REM INTERROG EVL PM/LDLS PM: CPT | Performed by: INTERNAL MEDICINE

## 2022-09-07 ENCOUNTER — OFFICE VISIT (OUTPATIENT)
Dept: CARDIOLOGY CLINIC | Age: 87
End: 2022-09-07
Payer: MEDICARE

## 2022-09-07 DIAGNOSIS — Z95.0 BIVENTRICULAR CARDIAC PACEMAKER IN SITU: Primary | ICD-10-CM

## 2022-09-07 NOTE — LETTER
9/28/2022 11:48 AM    Ms. Valerio [place] [id number] [facility]        Dear Ms. eJrzy Terrell,    We have received your recent remote monitor check of your implanted device on  9-7-22. Your remaining estimated battery life is 4 years and your device is working normally & appropriately. Your next remote monitor check is scheduled for  3- . This is NOT an in-clinic appointment. This transmission is sent from your home monitor. Please make sure your home monitor is plugged into power and within 10 feet of where you sleep. If you are using the phone applications, please make sure it is open on your smart phone. If you have difficulty sending a transmission, please do NOT call our office. Instead, call tech support for your device as they are better able to assist.    [facility] ([facility])   [phone]    If you have any questions, please call the Pacemaker/ICD clinic that follows you. We appreciate you staying remotely connected!         Sincerely,  [facility]

## 2022-09-28 NOTE — PROGRESS NOTES
Chargeable CRTP remote (MDT). Device functioning appropriately as programmed. 96.1% BIVP. See scanned documents in media manger.

## 2022-12-21 ENCOUNTER — OFFICE VISIT (OUTPATIENT)
Dept: CARDIOLOGY CLINIC | Age: 87
End: 2022-12-21
Payer: MEDICARE

## 2022-12-21 DIAGNOSIS — Z95.0 CARDIAC PACEMAKER IN SITU: Primary | ICD-10-CM

## 2023-01-23 ENCOUNTER — TELEPHONE (OUTPATIENT)
Dept: INTERNAL MEDICINE CLINIC | Age: 88
End: 2023-01-23

## 2023-01-23 NOTE — TELEPHONE ENCOUNTER
----- Message from Christos Rasta sent at 1/23/2023  1:35 PM EST -----  Subject: Message to Provider    QUESTIONS  Information for Provider? pt would like to be seen sooner than march,   would like to be called back if theres a cancellation or waiting list  ---------------------------------------------------------------------------  --------------  [address]  [phone]; OK to leave message on voicemail  ---------------------------------------------------------------------------  --------------  SCRIPT ANSWERS  Relationship to Patient? Other  Representative Name? Jolie Nuñez  Is the Representative on the appropriate HIPAA document in Epic?  Yes

## 2023-01-23 NOTE — TELEPHONE ENCOUNTER
01/23/2023--I called and spoke with patient's daughter Kelsey Cheema and offered her an appointment. She stated that she would have to check with transportation and call me back and let me know what day/time works.

## 2023-01-31 ENCOUNTER — TRANSCRIBE ORDER (OUTPATIENT)
Dept: SCHEDULING | Age: 88
End: 2023-01-31

## 2023-01-31 DIAGNOSIS — R13.10 DYSPHAGIA: Primary | ICD-10-CM

## 2023-02-02 ENCOUNTER — PATIENT MESSAGE (OUTPATIENT)
Dept: INTERNAL MEDICINE CLINIC | Age: 88
End: 2023-02-02

## 2023-02-02 NOTE — TELEPHONE ENCOUNTER
From: Rachel Reyes  To: Jared Godwin MD  Sent: 2/2/2023 2:00 PM EST  Subject: Rdaha Caputo.  Labwork    Attached is recent labwork

## 2023-03-02 ENCOUNTER — HOSPITAL ENCOUNTER (OUTPATIENT)
Dept: GENERAL RADIOLOGY | Age: 88
Discharge: HOME | End: 2023-03-02
Attending: NURSE PRACTITIONER
Payer: MEDICARE

## 2023-03-02 DIAGNOSIS — R13.10 DYSPHAGIA: ICD-10-CM

## 2023-03-02 PROCEDURE — 92611 MOTION FLUOROSCOPY/SWALLOW: CPT

## 2023-03-02 PROCEDURE — 74230 X-RAY XM SWLNG FUNCJ C+: CPT

## 2023-03-02 NOTE — PROGRESS NOTES
[facility]  [facility], [address] STUDY  Patient: Julieanne Hatchet (89 y.o. female)  Date: 3/2/2023  Referring Provider:  Otto Casillas    SUBJECTIVE:   Patient and her daughter report several weeks of sore throat and odynophagia. She does have a h/o Thrush and today appeared to have white spots on her R faucial pillars and posterior pharyngeal wall. MD: please eval for thrush. She c/o early satiety with solids and c/o residue in upper esophagus. She currently is not a candidate for barium esophagram due to unable to stand for precedure at age 80.      OBJECTIVE:   Past Medical History:   Past Medical History:   Diagnosis Date    Arthritis     Atrial fibrillation ([address]     av node ablation 5/22/98 with placement of CPI #[id number] dual chamber pacemaker subsequently replaced with a single chamber medtronic #[id number] single chamber pacemaker 7/25/05    Cancer ([address] 1970's    colon cancer w/ resection    COPD     Depression     GERD (gastroesophageal reflux disease)     History of vascular access device 04/17/2020    4F MIDLINE PLACED BY EMIL Powell RN LEFT BRACHIAL, 13CM    Hyperlipidemia     Hypertension     Ischemic cardiomyopathy     Migraine headache     Orthostatic hypotension     RADHA (obstructive sleep apnea)     Pacemaker 5/22/98    AV node ablation /pacemaker placement utilizing CPI # [id number] dual chamber system, medtronic #[id number] single chamber device placed 7/22/05    Pulmonary hypertension ([address] 9/26/2011    RVSP 50 on echo 8/14    Thyroid disease     Valvular heart disease     mild-mod MR/TR     Past Surgical History:   Procedure Laterality Date    HX BREAST BIOPSY Right 2002    neg; surgical bx    HX KNEE REPLACEMENT      right TKA    HX PACEMAKER      HX PACEMAKER PLACEMENT  05/2020    HX TOTAL COLECTOMY  1974    colon    HX TUBAL LIGATION      IR KYPHOPLASTY LUMBAR  7/2/2019    IA BREAST SURGERY PROCEDURE UNLISTED benign breast tumor excision right breast    UT INSJ ELTRD CAR BYRON SYS TM INSJ DFB/PM PLS GEN N/A 5/22/2020    Lv Lead Placement performed by Brijesh Aguilar MD at [address], [facility] CATH LAB    UT RELOCATION OF SKIN POCKET FOR PACEMAKER N/A 10/9/2020    RELOCATE PACEMAKER POCKET performed by Brijesh Aguilar MD at [address], [address] CATH LAB    UT REMVL PERM PM PLS GEN W/REPL PLSE GEN MULT LEAD N/A 5/22/2020    REMOVE & REPLACE PPM GEN BIV MULTI LEADS performed by Brijesh Aguilar MD at [address] CATH LAB    UT TOTAL KNEE ARTHROPLASTY      total on R, partial on L     Current Dietary Status:  soft, thins  Radiologist: Ej Moreno Views: Lateral;AP  Patient Position: upright in Hausted chair    Trial 1: Trial 2:   Consistency Presented: Thin liquid; Solid;Puree;Pill/Tablet     How Presented: Self-fed/presented;Spoon;Straw;Successive swallows      ORAL PHASE:      Bolus Acceptance: No impairment     Bolus Formation/Control: No impairment:    :     Propulsion: No impairment     Oral Residue: None  PHARYNGEAL PHASE:     Initiation of Swallow: No impairment     Timing: No impairment     Penetration: None     Aspiration/Timing: No evidence of aspiration     Pharyngeal Clearance: No residue     Attempted Modifications: Alternate liquids/solids                         Trial 3: Trial 4:    ESOPHAGEAL PHASE:   Noted pill stuck in upper esophagus. Did not clear with thins, but did clear with pudding. Small amount of solids stuck in upper esophagus as well. Noted possible narrowed area. PLease refer to radiologist's report.                    :    :                                                                        Decreased Tongue Base Retraction?: No  Laryngeal Elevation: WFL (within functional limits)  Aspiration/Penetration Score: 1 (No penetration or aspiration-Contrast does not enter the airway)  Pharyngeal Symmetry: Not assessed  Pharyngeal-Esophageal Segment: Decreased relaxation of upper esophageal segment (pill stuck in upper esophagus and so did small amounts of solids. Eventually cleared with pudding, but not thins.)               ASSESSMENT :  Based on the objective data described above, the patient presents with functional oral-pharyngeal swallow. She did have the pill stuck in her upper esophagus and the area appeared narrow. Please refer to pictures on the PACS system. Patient also c/o odynophagia. She has h/o candidiais in pharyngeal-esophageal tract and today SLP noted white spots on faucial pillars and posterior pharyngeal wall. [place] PLAN/RECOMMENDATIONS :  Ok for diet as tolerated. Continue pudding with pills. Refer to GI for possible EGD  MD to issa for oral-pharyngeal-esophageal thrush     COMMUNICATION/EDUCATION:   The above findings and recommendations were discussed with:  patient and her daughter  who verbalized understanding.     Thank you for this referral.  Richard Mcgarry, SLP  Time Calculation: 15 mins

## 2023-03-20 ENCOUNTER — TELEPHONE (OUTPATIENT)
Dept: CARDIOLOGY CLINIC | Age: 88
End: 2023-03-20

## 2023-03-20 NOTE — TELEPHONE ENCOUNTER
Faxed cardiac risk stratification to [facility] at fax number [phone]. Fax confirmation received.

## 2023-03-27 ENCOUNTER — OFFICE VISIT (OUTPATIENT)
Dept: CARDIOLOGY CLINIC | Age: 88
End: 2023-03-27

## 2023-03-27 DIAGNOSIS — Z95.0 CARDIAC PACEMAKER IN SITU: Primary | ICD-10-CM

## 2023-03-29 NOTE — PROGRESS NOTES
Daisy Andrew is a 80 y.o. female who presents today for Annual Wellness Visit  . She has a history of   Patient Active Problem List   Diagnosis Code    Mixed hyperlipidemia E78.2    Mitral regurgitation I34.0    Anxiety and depression F41.9, F32. A    Hypothyroid E03.9    Osteopenia M85.80    Restrictive lung disease J98.4    Ischemic cardiomyopathy I25.5    Psoriasis L40.9    Pacemaker Z95.0    Frequent falls R29.6    COPD (chronic obstructive pulmonary disease) ([facility]) J44.9    Chronic respiratory failure with hypoxia ([facility]) J96.11    Hypertension I10    Biventricular cardiac pacemaker in situ Z95.0    Type 2 diabetes mellitus with other specified complication, unspecified whether long term insulin use ([facility]) E11.69    Gangrene of foot ([facility] I96    LARISSA (acute kidney injury) ([facility] [address] N17.9    Nausea & vomiting R11.2    Dehydration E86.0    Status post below knee amputation, right ([facility] [address] Z89.511   . Today patient is here for CPE. Seeing GI due to Dysphagia and GERD. Swallowing study done 3/2. Since has an EGD scheduled. Possible dilation will occur. Pain with swallowing is worsening. Remains on levothyroxine. Last TSH is due. Weight is down. Soraida Nunez Has Pacer and volume symptoms have been stable. Taking Lasix regularly. In [facility]. Remains on eliquis and statin. No bleeding. Wounds and PVD has been stable. Some pain to L foot. Mood has been ok. Remains on low dose SSRI. Unclear if she is receiving this medication as it is not on her medicine list.  Outside records do show that its been filled monthly for the last 6 months. Patient does admit that her mood is often times low. We discussed increasing her SSRI to 100 mg    Health maintenance hx includes:  Exercise: not active. Form of exercise: Not much due to severe pain that comes with transferring. Diet:  Currently on a soft diet due to dysphagia. Social: living at [facility]. Two sons and two daughters. Screening:               Colon cancer screening: N/A              Breast cancer screening: N/A              Cervical cancer screening: N/A              Osteoporosis screening:  Last BMD:  Osteoporosis. Holding due to oral issues. Immunizations:     Immunization History   Administered Date(s) Administered    COVID-19, PFIZER PURPLE top, DILUTE for use, (age 15 y+), IM, 30mcg/0.3mL 01/14/2021, 02/04/2021, 10/26/2021    Influenza High Dose Vaccine PF 10/17/2014, 09/24/2015, 09/29/2016, 09/21/2017, 10/05/2018    Influenza Vaccine 12/01/2013    Influenza Vaccine (Tri) Adjuvanted (>65 Yrs FLUAD TRI 25845) 10/09/2019    Influenza Vaccine Split 10/12/2012    Pneumococcal Conjugate (PCV-13) 11/23/2015    Pneumococcal Polysaccharide (PPSV-23) 02/27/2015    Pneumococcal Vaccine (Pcv) 12/15/2010    TB Skin Test (PPD) 01/01/2001    TB Skin Test (PPD) Intradermal 01/16/2018    TD Vaccine 04/02/2012    Tdap 01/02/2019    Varicella Virus Vaccine Live 09/12/2012    Zoster Vaccine, Live 12/01/2013      Immunization status: up to date and documented. ROS  ROS    Visit Vitals  /70   Pulse 78   Temp 98.6 °F (37 °C)   Resp 16   Ht 5' 3\" (1.6 m)   Wt 114 lb (51.7 kg)   SpO2 97%   BMI 20.19 kg/m²       Physical Exam      Current Outpatient Medications   Medication Sig    triamcinolone acetonide (KENALOG) 0.1 % topical cream     ketoconazole (NIZORAL) 2 % shampoo     benzocaine-menthol-zinc chloride (Orajel 3X Mouth Sores) 20-0.1-0.15 % gel mucosal gel by Mucous Membrane route as needed. Neomycin-Polymyxin-Pramoxine (Neosporin Plus Pain Relief) 3.5-10,000-10 mg-unit-mg/gram topical cream Apply  to affected area two (2) times a day. clobetasoL (OLUX) 0.05 % topical foam APPLY A THIN FILM ON THE AFFECTED AREA 2 TIMES A DAY    clobetasoL (TEMOVATE) 0.05 % topical cream Apply  to affected area two (2) times a day.     carvediloL (COREG) 3.125 mg tablet TAKE 1 TABLET BY MOUTH TWICE A DAY WITH MEALS    Tums 300 mg (750 mg) chewable tablet     pantoprazole (PROTONIX) 40 mg tablet Take 1 Tablet by mouth two (2) times a day. diclofenac (VOLTAREN) 1 % gel Apply 2 g to affected area three (3) times daily as needed. Lactobacillus acidophilus (PROBIOTIC PO) Take  by mouth. ondansetron hcl (ZOFRAN) 4 mg tablet Take 4 mg by mouth every eight (8) hours as needed for Nausea or Vomiting.    baclofen 5 mg tab Take  by mouth. traMADoL (ULTRAM) 50 mg tablet Take 50 mg by mouth every six (6) hours as needed for Pain. furosemide (LASIX) 40 mg tablet TAKE ONE TAB BY MOUTH DAILY OR AS ADVISED BY PHYSICIAN (Patient taking differently: 20 mg. Take one tab by mouth daily or as advised by physician)    acetaminophen (TYLENOL) 500 mg tablet Take 1,000 mg by mouth every eight (8) hours. multivitamin (ONE A DAY) tablet Take 1 Tab by mouth daily. senna-docusate (PERICOLACE) 8.6-50 mg per tablet Take 2 Tabs by mouth two (2) times daily as needed for Constipation. loperamide (IMODIUM) 1 mg/5 mL solution Take 1.5 tsp by mouth every eight (8) hours as needed for Diarrhea. atorvastatin (LIPITOR) 10 mg tablet Take 10 mg by mouth daily. chlorhexidine (PERIDEX) 0.12 % solution 15 mL by Swish and Spit route two (2) times daily (with meals). benzocaine (ORAJEL) 20 % gel topical gel Apply  to affected area four (4) times daily as needed. Apply to gums    polyethylene glycol (MIRALAX) 17 gram packet Take 17 g by mouth daily. alum-mag hydroxide-simeth (MYLANTA) 200-200-20 mg/5 mL susp Take 30 mL by mouth every four (4) hours as needed for Indigestion. levothyroxine (SYNTHROID) 100 mcg tablet Take 1 Tab by mouth Daily (before breakfast). sertraline (ZOLOFT) 50 mg tablet Take 1.5 Tabs by mouth daily. apixaban (Eliquis) 5 mg tablet Take 1 Tab by mouth two (2) times a day. albuterol (PROVENTIL HFA, VENTOLIN HFA, PROAIR HFA) 90 mcg/actuation inhaler Take 1 Puff by inhalation every six (6) hours as needed for Wheezing.     [place] DISKUS 250-50 mcg/dose diskus inhaler Take 1 Puff by inhalation two (2) times a day. tiotropium (SPIRIVA) 18 mcg inhalation capsule Take 1 Cap by inhalation daily. No current facility-administered medications for this visit.         Past Medical History:   Diagnosis Date    Arthritis     Atrial fibrillation ([facility] [address]     av node ablation 5/22/98 with placement of CPI #[id number] dual chamber pacemaker subsequently replaced with a single chamber medtronic #[id number] single chamber pacemaker 7/25/05    Cancer ([facility] [address] 1970's    colon cancer w/ resection    COPD     Depression     GERD (gastroesophageal reflux disease)     History of vascular access device 04/17/2020    4F MIDLINE PLACED BY EMIL Landers RN LEFT BRACHIAL, 13CM    Hyperlipidemia     Hypertension     Ischemic cardiomyopathy     Migraine headache     Orthostatic hypotension     RADHA (obstructive sleep apnea)     Pacemaker 5/22/98    AV node ablation /pacemaker placement utilizing CPI # [id number] dual chamber system, medtronic #[id number] single chamber device placed 7/22/05    Pulmonary hypertension ([facility] [address] 9/26/2011    RVSP 50 on echo 8/14    Thyroid disease     Valvular heart disease     mild-mod MR/TR      Past Surgical History:   Procedure Laterality Date    HX BREAST BIOPSY Right 2002    neg; surgical bx    HX KNEE REPLACEMENT      right TKA    HX PACEMAKER      HX PACEMAKER PLACEMENT  05/2020    HX TOTAL COLECTOMY  1974    colon    HX TUBAL LIGATION      IR KYPHOPLASTY LUMBAR  7/2/2019    [place] ARTHRP KNE CONDYLE&PLATU MEDIAL&LAT COMPARTMENTS      total on R, partial on L    [place] INSJ ELTRD CAR BYRON SYS TM INSJ DFB/PM PLS GEN N/A 5/22/2020    Lv Lead Placement performed by Pierre Hinton MD at [address] CATH LAB    [place] RELOCATION OF SKIN POCKET FOR PACEMAKER N/A 10/9/2020    RELOCATE PACEMAKER POCKET performed by Pierre Hinton MD at [address] CATH LAB    [place] REMVL PERM PM PLS GEN W/REPL PLSE GEN MULT LEAD N/A 5/22/2020    REMOVE & REPLACE PPM GEN BIV MULTI LEADS performed by Tereza Ventura Flynn Aguayo MD at [address] HOUGH LAB    [place] UNLISTED PROCEDURE BREAST      benign breast tumor excision right breast      Social History     Tobacco Use    Smoking status: Never     Passive exposure: Yes    Smokeless tobacco: Never   Substance Use Topics    Alcohol use: No     Alcohol/week: 0.0 standard drinks      Family History   Problem Relation Age of Onset    Diabetes Mother     Heart Disease Mother     Heart Attack Mother     Heart Disease Father     Stroke Sister     Breast Cancer Sister 80    Cancer Maternal Aunt         uterus    Diabetes Maternal Uncle     Breast Cancer Daughter 61        Allergies   Allergen Reactions    Cardizem [Diltiazem Hcl] Hives    Codeine Nausea and Vomiting    Darvocet A500 [Propoxyphene N-Acetaminophen] Nausea and Vomiting    Diltiazem Hives    Labetalol Nausea and Vomiting     Dizzy/Disoriented     Pcn [Penicillins] Swelling     Tongue Swelling   Has tolerated cephalexin recently 4/7/20     Primidone Other (comments)     Lightheaded/unsteady gait    Sulfa (Sulfonamide Antibiotics) Nausea and Vomiting        Assessment/Plan  Diagnoses and all orders for this visit:    1. Medicare annual wellness visit, nabila- Jannie Dumont was counseled on age-appropriate/ guideline-based risk prevention behaviors and screening for a 80y.o. year old   female . We also discussed adjustments in screening based on family history if necessary. Printed instructions for preventative screening guidelines and healthy behaviors given to patient with after visit summary. 2. Primary hypertension-stable    3. Status post below knee amputation, right (HCC)-unable to participate in PT at this time due to pain and weakness. 4. Chronic obstructive pulmonary disease, unspecified COPD type (HCC)-breathing is stable    5. Type 2 diabetes mellitus with other specified complication, unspecified whether long term insulin use (HCC)-diet controlled  -     HEMOGLOBIN A1C WITH EAG;  Future  -     LIPID PANEL; Future  -     CBC WITH AUTOMATED DIFF; Future  -     METABOLIC PANEL, COMPREHENSIVE; Future    6. Ischemic cardiomyopathy    7. Dysphagia, unspecified type-seems to be greatly contributing to weight loss. 8. Pacemaker    9. Anxiety and depression-likely contributing to chronic weakness. Will increase Zoloft to 100 mg. Patient's daughter will make sure that she has been receiving her current dose as it is not on the med list  -     sertraline (ZOLOFT) 100 mg tablet; Take 1 Tablet by mouth daily. 10. Hypothyroidism due to acquired atrophy of thyroid  -     TSH 3RD GENERATION; Future    11. Mixed hyperlipidemia  -     LIPID PANEL; Future    12. Advanced directives, counseling/discussion    13. Gastroesophageal reflux disease without esophagitis    14. Weight loss-see above, hoping that if the dysphagia improves her weight will stabilize to start improving          Sagrario Greenfield MD  3/30/2023    This note was created with the help of speech recognition software Efrain Alvarado) and may contain some 'sound alike' errors. This is the Subsequent Medicare Annual Wellness Exam, performed 12 months or more after the Initial AWV or the last Subsequent AWV    I have reviewed the patient's medical history in detail and updated the computerized patient record. Assessment/Plan   Education and counseling provided:  Are appropriate based on today's review and evaluation    1. Primary hypertension  2. Status post below knee amputation, right ([address]  3. Chronic obstructive pulmonary disease, unspecified COPD type ([address]  4. Type 2 diabetes mellitus with other specified complication, unspecified whether long term insulin use ([address]  5. Ischemic cardiomyopathy  6. Dysphagia, unspecified type  7. Pacemaker  8. Anxiety and depression  9. Hypothyroidism due to acquired atrophy of thyroid  10. Mixed hyperlipidemia  11. Advanced directives, counseling/discussion  12.  Medicare annual wellness visit, subsequent       Depression Risk Factor Screening     3 most recent PHQ Screens 7/12/2022   Little interest or pleasure in doing things Not at all   Feeling down, depressed, irritable, or hopeless Not at all   Total Score PHQ 2 0   Trouble falling or staying asleep, or sleeping too much -   Feeling tired or having little energy -   Poor appetite, weight loss, or overeating -   Feeling bad about yourself - or that you are a failure or have let yourself or your family down -   Trouble concentrating on things such as school, work, reading, or watching TV -   Moving or speaking so slowly that other people could have noticed; or the opposite being so fidgety that others notice -   Thoughts of being better off dead, or hurting yourself in some way -   PHQ 9 Score -   How difficult have these problems made it for you to do your work, take care of your home and get along with others -       Alcohol & Drug Abuse Risk Screen    Do you average more than 1 drink per night or more than 7 drinks a week:  No    On any one occasion in the past three months have you have had more than 3 drinks containing alcohol:  No       Opioid Risk: (Low risk score <55, High risk score ?55)  Opioid risk score: 15      Click here to complete the Controlled Substance Monitoring SmartForm    Last PDMP [place] as Reviewed:  Review User Review Instant Review Result                  Functional Ability and Level of Safety    Hearing: Hearing is good. Activities of Daily Living: The home contains: At assisted living  Patient needs help with:  transportation, shopping, preparing meals, housework, managing medications, dressing, bathing, and hygiene      Ambulation: wheelchair bound     Fall Risk:  Fall Risk Assessment, last 12 mths 9/10/2021   Able to walk? No   Fall in past 12 months? -   Number of falls in past 12 months -   Fall with injury?  -      Abuse Screen:  Patient is not abused       Cognitive Screening    Has your family/caregiver stated any concerns about your memory: no      Health Maintenance Due     Health Maintenance Due   Topic Date Due    Shingles Vaccine (1 of 2) Never done    Lipid Screen  07/08/2021    COVID-19 Vaccine (4 - Booster for Pfizer series) 12/21/2021    Flu Vaccine (1) 08/01/2022       Patient Care Team   Patient Care Team:  Malachi Ramos MD as PCP - General (Internal Medicine Physician)  Malachi Ramos MD as PCP - [facility]  James Alvarado MD (Pulmonary Disease)  Ramon Parkinson MD (Cardiovascular Disease Physician)  Anuradha Baeza MD as Consulting Provider (Cardiovascular Disease Physician)    History     Patient Active Problem List   Diagnosis Code    Mixed hyperlipidemia E78.2    Mitral regurgitation I34.0    Anxiety and depression F41.9, F32. A    Hypothyroid E03.9    Osteopenia M85.80    Restrictive lung disease J98.4    Ischemic cardiomyopathy I25.5    Psoriasis L40.9    Pacemaker Z95.0    Frequent falls R29.6    COPD (chronic obstructive pulmonary disease) (HCC) J44.9    Chronic respiratory failure with hypoxia ([facility] J96.11    Hypertension I10    Biventricular cardiac pacemaker in situ Z95.0    Type 2 diabetes mellitus with other specified complication, unspecified whether long term insulin use ([facility]) E11.69    Gangrene of foot ([facility]) I96    LARISSA (acute kidney injury) ([address] N17.9    Nausea & vomiting R11.2    Dehydration E86.0    Status post below knee amputation, right ([address] Z89.511     Past Medical History:   Diagnosis Date    Arthritis     Atrial fibrillation ([address]     av node ablation 5/22/98 with placement of CPI #[id number] dual chamber pacemaker subsequently replaced with a single chamber medtronic #[id number] single chamber pacemaker 7/25/05    Cancer ([address] 1970's    colon cancer w/ resection    COPD     Depression     GERD (gastroesophageal reflux disease)     History of vascular access device 04/17/2020    4F MIDLINE PLACED BY EMIL GUNN RN LEFT BRACHIAL, 13CM    Hyperlipidemia     Hypertension Ischemic cardiomyopathy     Migraine headache     Orthostatic hypotension     RADHA (obstructive sleep apnea)     Pacemaker 5/22/98    AV node ablation /pacemaker placement utilizing CPI # [id number] dual chamber system, medtronic #[id number] single chamber device placed 7/22/05    Pulmonary hypertension ([address] 9/26/2011    RVSP 50 on echo 8/14    Thyroid disease     Valvular heart disease     mild-mod MR/TR      Past Surgical History:   Procedure Laterality Date    HX BREAST BIOPSY Right 2002    neg; surgical bx    HX KNEE REPLACEMENT      right TKA    HX PACEMAKER      HX PACEMAKER PLACEMENT  05/2020    HX TOTAL COLECTOMY  1974    colon    HX TUBAL LIGATION      IR KYPHOPLASTY LUMBAR  7/2/2019    [place] ARTHRP KNE CONDYLE&PLATU MEDIAL&LAT COMPARTMENTS      total on R, partial on L    [place] INSJ ELTRD CAR BYRON SYS TM INSJ DFB/PM PLS GEN N/A 5/22/2020    Lv Lead Placement performed by Mindy Hairston MD at [address] [address] CATH LAB    [place] RELOCATION OF SKIN POCKET FOR PACEMAKER N/A 10/9/2020    RELOCATE PACEMAKER POCKET performed by Mindy Hairston MD at [address] [place] [address] CATH LAB    [place] REMVL PERM PM PLS GEN W/REPL PLSE GEN MULT LEAD N/A 5/22/2020    REMOVE & REPLACE PPM GEN BIV MULTI LEADS performed by Mindy Hairston MD at [address] [place] [address] CATH LAB    [place] UNLISTED PROCEDURE BREAST      benign breast tumor excision right breast     Current Outpatient Medications   Medication Sig Dispense Refill    triamcinolone acetonide (KENALOG) 0.1 % topical cream       ketoconazole (NIZORAL) 2 % shampoo       benzocaine-menthol-zinc chloride (Orajel 3X Mouth Sores) 20-0.1-0.15 % gel mucosal gel by Mucous Membrane route as needed. Neomycin-Polymyxin-Pramoxine (Neosporin Plus Pain Relief) 3.5-10,000-10 mg-unit-mg/gram topical cream Apply  to affected area two (2) times a day. clobetasoL (OLUX) 0.05 % topical foam APPLY A THIN FILM ON THE AFFECTED AREA 2 TIMES A  g 0    clobetasoL (TEMOVATE) 0.05 % topical cream Apply  to affected area two (2) times a day. 15 g 0    carvediloL (COREG) 3.125 mg tablet TAKE 1 TABLET BY MOUTH TWICE A DAY WITH MEALS 180 Tablet 1    Tums 300 mg (750 mg) chewable tablet       pantoprazole (PROTONIX) 40 mg tablet Take 1 Tablet by mouth two (2) times a day. 60 Tablet 0    diclofenac (VOLTAREN) 1 % gel Apply 2 g to affected area three (3) times daily as needed. Lactobacillus acidophilus (PROBIOTIC PO) Take  by mouth. ondansetron hcl (ZOFRAN) 4 mg tablet Take 4 mg by mouth every eight (8) hours as needed for Nausea or Vomiting.      baclofen 5 mg tab Take  by mouth. traMADoL (ULTRAM) 50 mg tablet Take 50 mg by mouth every six (6) hours as needed for Pain. furosemide (LASIX) 40 mg tablet TAKE ONE TAB BY MOUTH DAILY OR AS ADVISED BY PHYSICIAN (Patient taking differently: 40 mg. Take one tab by mouth daily or as advised by physician) 90 Tab 0    acetaminophen (TYLENOL) 500 mg tablet Take 1,000 mg by mouth every eight (8) hours. multivitamin (ONE A DAY) tablet Take 1 Tab by mouth daily. senna-docusate (PERICOLACE) 8.6-50 mg per tablet Take 2 Tabs by mouth two (2) times daily as needed for Constipation. loperamide (IMODIUM) 1 mg/5 mL solution Take 1.5 tsp by mouth every eight (8) hours as needed for Diarrhea. atorvastatin (LIPITOR) 10 mg tablet Take 10 mg by mouth daily. chlorhexidine (PERIDEX) 0.12 % solution 15 mL by Swish and Spit route two (2) times daily (with meals). benzocaine (ORAJEL) 20 % gel topical gel Apply  to affected area four (4) times daily as needed. Apply to gums      polyethylene glycol (MIRALAX) 17 gram packet Take 17 g by mouth daily. alum-mag hydroxide-simeth (MYLANTA) 200-200-20 mg/5 mL susp Take 30 mL by mouth every four (4) hours as needed for Indigestion. levothyroxine (SYNTHROID) 100 mcg tablet Take 1 Tab by mouth Daily (before breakfast). 90 Tab 1    sertraline (ZOLOFT) 50 mg tablet Take 1.5 Tabs by mouth daily.  135 Tab 1    apixaban (Eliquis) 5 mg tablet Take 1 Tab by mouth two (2) times a day. 180 Tab 1    albuterol (PROVENTIL HFA, VENTOLIN HFA, PROAIR HFA) 90 mcg/actuation inhaler Take 1 Puff by inhalation every six (6) hours as needed for Wheezing. [place] DISKUS 250-50 mcg/dose diskus inhaler Take 1 Puff by inhalation two (2) times a day. tiotropium (SPIRIVA) 18 mcg inhalation capsule Take 1 Cap by inhalation daily.        Allergies   Allergen Reactions    Cardizem [Diltiazem Hcl] Hives    Codeine Nausea and Vomiting    Darvocet A500 [Propoxyphene N-Acetaminophen] Nausea and Vomiting    Diltiazem Hives    Labetalol Nausea and Vomiting     Dizzy/Disoriented     Pcn [Penicillins] Swelling     Tongue Swelling   Has tolerated cephalexin recently 4/7/20     Primidone Other (comments)     Lightheaded/unsteady gait    Sulfa (Sulfonamide Antibiotics) Nausea and Vomiting       Family History   Problem Relation Age of Onset    Diabetes Mother     Heart Disease Mother     Heart Attack Mother     Heart Disease Father     Stroke Sister     Breast Cancer Sister 80    Cancer Maternal Aunt         uterus    Diabetes Maternal Uncle     Breast Cancer Daughter 61     Social History     Tobacco Use    Smoking status: Never     Passive exposure: Yes    Smokeless tobacco: Never   Substance Use Topics    Alcohol use: No     Alcohol/week: 0.0 standard drinks         Marcelo Graham MD

## 2023-03-30 ENCOUNTER — OFFICE VISIT (OUTPATIENT)
Dept: INTERNAL MEDICINE CLINIC | Age: 88
End: 2023-03-30

## 2023-03-30 VITALS
DIASTOLIC BLOOD PRESSURE: 70 MMHG | TEMPERATURE: 98.6 F | HEIGHT: 63 IN | HEART RATE: 78 BPM | RESPIRATION RATE: 16 BRPM | BODY MASS INDEX: 20.2 KG/M2 | SYSTOLIC BLOOD PRESSURE: 115 MMHG | OXYGEN SATURATION: 97 % | WEIGHT: 114 LBS

## 2023-03-30 DIAGNOSIS — E03.4 HYPOTHYROIDISM DUE TO ACQUIRED ATROPHY OF THYROID: ICD-10-CM

## 2023-03-30 DIAGNOSIS — J44.9 CHRONIC OBSTRUCTIVE PULMONARY DISEASE, UNSPECIFIED COPD TYPE (HCC): ICD-10-CM

## 2023-03-30 DIAGNOSIS — F41.9 ANXIETY AND DEPRESSION: ICD-10-CM

## 2023-03-30 DIAGNOSIS — F32.A ANXIETY AND DEPRESSION: ICD-10-CM

## 2023-03-30 DIAGNOSIS — Z00.00 MEDICARE ANNUAL WELLNESS VISIT, SUBSEQUENT: Primary | ICD-10-CM

## 2023-03-30 DIAGNOSIS — E78.2 MIXED HYPERLIPIDEMIA: ICD-10-CM

## 2023-03-30 DIAGNOSIS — Z95.0 PACEMAKER: ICD-10-CM

## 2023-03-30 DIAGNOSIS — R63.4 WEIGHT LOSS: ICD-10-CM

## 2023-03-30 DIAGNOSIS — E11.69 TYPE 2 DIABETES MELLITUS WITH OTHER SPECIFIED COMPLICATION, UNSPECIFIED WHETHER LONG TERM INSULIN USE (HCC): ICD-10-CM

## 2023-03-30 DIAGNOSIS — I10 PRIMARY HYPERTENSION: Chronic | ICD-10-CM

## 2023-03-30 DIAGNOSIS — I25.5 ISCHEMIC CARDIOMYOPATHY: Chronic | ICD-10-CM

## 2023-03-30 DIAGNOSIS — Z71.89 ADVANCED DIRECTIVES, COUNSELING/DISCUSSION: ICD-10-CM

## 2023-03-30 DIAGNOSIS — R13.10 DYSPHAGIA, UNSPECIFIED TYPE: ICD-10-CM

## 2023-03-30 DIAGNOSIS — Z89.511 STATUS POST BELOW KNEE AMPUTATION, RIGHT (HCC): ICD-10-CM

## 2023-03-30 DIAGNOSIS — K21.9 GASTROESOPHAGEAL REFLUX DISEASE WITHOUT ESOPHAGITIS: ICD-10-CM

## 2023-03-30 RX ORDER — SERTRALINE HYDROCHLORIDE 100 MG/1
100 TABLET, FILM COATED ORAL DAILY
Qty: 30 TABLET | Refills: 5 | Status: SHIPPED | OUTPATIENT
Start: 2023-03-30

## 2023-03-30 NOTE — PROGRESS NOTES
Chief Complaint   Patient presents with    Annual Wellness Visit     Visit Vitals  /70   Pulse 78   Temp 98.6 °F (37 °C)   Resp 16   Ht 5' 3\" (1.6 m)   Wt 114 lb (51.7 kg)   LMP 02/26/1970   SpO2 97%   BMI 20.19 kg/m²     1. Have you been to the ER, urgent care clinic since your last visit? Hospitalized since your last visit? No    2. Have you seen or consulted any other health care providers outside of the [address] [place] [address] since your last visit? Include any pap smears or colon screening.  No

## 2023-03-30 NOTE — PATIENT INSTRUCTIONS
Medicare Wellness Visit, Female     The best way to live healthy is to have a lifestyle where you eat a well-balanced diet, exercise regularly, limit alcohol use, and quit all forms of tobacco/nicotine, if applicable. Regular preventive services are another way to keep healthy. Preventive services (vaccines, screening tests, monitoring & exams) can help personalize your care plan, which helps you manage your own care. Screening tests can find health problems at the earliest stages, when they are easiest to treat. [facility] follows the current, evidence-based guidelines published by the [facility] ([facility]) when recommending preventive services for our patients. Because we follow these guidelines, sometimes recommendations change over time as research supports it. (For example, mammograms used to be recommended annually. Even though Medicare will still pay for an annual mammogram, the newer guidelines recommend a mammogram every two years for women of average risk). Of course, you and your doctor may decide to screen more often for some diseases, based on your risk and your co-morbidities (chronic disease you are already diagnosed with). Preventive services for you include:  - Medicare offers their members a free annual wellness visit, which is time for you and your primary care provider to discuss and plan for your preventive service needs.  Take advantage of this benefit every year!    -Over the age of 72 should receive the recommended pneumonia vaccines.    -All adults should have a flu vaccine yearly.  -All adults should have a tetanus vaccine every 10 years.   -Over the age 48 should receive the shingles vaccines.        -All adults should be screened once for Hepatitis C.  -All adults age 38-68 who are overweight should have a diabetes screening test once every three years.   -Other screening tests and preventive services for persons with diabetes include: an eye exam to screen for diabetic retinopathy, a kidney function test, a foot exam, and stricter control over your cholesterol.   -Cardiovascular screening for adults with routine risk involves an electrocardiogram (ECG) at intervals determined by your doctor.     -Colorectal cancer screenings should be done for adults age 39-70 with no increased risk factors for colorectal cancer. There are a number of acceptable methods of screening for this type of cancer. Each test has its own benefits and drawbacks. Discuss with your doctor what is most appropriate for you during your annual wellness visit. The different tests include: colonoscopy (considered the best screening method), a fecal occult blood test, a fecal DNA test, and sigmoidoscopy.    -Lung cancer screening is recommended annually with a low dose CT scan for adults between age 54 and 68, who have smoked at least 30 pack years (equivalent of 1 pack per day for 30 days), and who is a current smoker or quit less than 15 years ago.    -A bone mass density test is recommended when a woman turns 65 to screen for osteoporosis. This test is only recommended one time, as a screening. Some providers will use this same test as a disease monitoring tool if you already have osteoporosis. -Breast cancer screenings are recommended every other year for women of normal risk, age 54-69.    -Cervical cancer screenings for women over age 72 are only recommended with certain risk factors.      Here is a list of your current Health Maintenance items (your personalized list of preventive services) with a due date:  Health Maintenance Due   Topic Date Due    Shingles Vaccine (1 of 2) Never done    Cholesterol Test   07/08/2021    COVID-19 Vaccine (4 - Booster for Pfizer series) 12/21/2021    Yearly Flu Vaccine (1) 08/01/2022

## 2023-03-30 NOTE — ACP (ADVANCE CARE PLANNING)
Advance Care Planning     General Advance Care Planning (ACP) Conversation      Date of Conversation: 3/30/2023  Conducted with: Patient with Decision Making Capacity    Healthcare Decision Maker:   No healthcare decision makers have been documented. Click here to complete [address] including selection of the Healthcare Decision Maker Relationship (ie \"Primary\")    Today we documented Decision Maker(s) consistent with ACP documents on file. Content/Action Overview:    Has ACP document(s) on file - reflects the patient's care preferences    Length of Voluntary ACP Conversation in minutes:  <16 minutes (Non-Billable)    Dong Vasquez MD

## 2023-03-31 LAB
ALBUMIN SERPL-MCNC: 4 G/DL (ref 3.5–5)
ALBUMIN/GLOB SERPL: 1.2 (ref 1.1–2.2)
ALP SERPL-CCNC: 110 U/L (ref 45–117)
ALT SERPL-CCNC: 29 U/L (ref 12–78)
ANION GAP SERPL CALC-SCNC: 4 MMOL/L (ref 5–15)
AST SERPL-CCNC: 26 U/L (ref 15–37)
BASOPHILS # BLD: 0 K/UL (ref 0–0.1)
BASOPHILS NFR BLD: 1 % (ref 0–1)
BILIRUB SERPL-MCNC: 0.5 MG/DL (ref 0.2–1)
BUN SERPL-MCNC: 49 MG/DL (ref 6–20)
BUN/CREAT SERPL: 43 (ref 12–20)
CALCIUM SERPL-MCNC: 9.8 MG/DL (ref 8.5–10.1)
CHLORIDE SERPL-SCNC: 111 MMOL/L (ref 97–108)
CHOLEST SERPL-MCNC: 144 MG/DL
CO2 SERPL-SCNC: 25 MMOL/L (ref 21–32)
CREAT SERPL-MCNC: 1.13 MG/DL (ref 0.55–1.02)
DIFFERENTIAL METHOD BLD: ABNORMAL
EOSINOPHIL # BLD: 0.1 K/UL (ref 0–0.4)
EOSINOPHIL NFR BLD: 1 % (ref 0–7)
ERYTHROCYTE [DISTWIDTH] IN BLOOD BY AUTOMATED COUNT: 13.9 % (ref 11.5–14.5)
EST. AVERAGE GLUCOSE BLD GHB EST-MCNC: 108 MG/DL
GLOBULIN SER CALC-MCNC: 3.3 G/DL (ref 2–4)
GLUCOSE SERPL-MCNC: 114 MG/DL (ref 65–100)
HBA1C MFR BLD: 5.4 % (ref 4–5.6)
HCT VFR BLD AUTO: 42.9 % (ref 35–47)
HDLC SERPL-MCNC: 43 MG/DL
HDLC SERPL: 3.3 (ref 0–5)
HGB BLD-MCNC: 13.2 G/DL (ref 11.5–16)
IMM GRANULOCYTES # BLD AUTO: 0 K/UL (ref 0–0.04)
IMM GRANULOCYTES NFR BLD AUTO: 0 % (ref 0–0.5)
LDLC SERPL CALC-MCNC: 73.8 MG/DL (ref 0–100)
LYMPHOCYTES # BLD: 1.2 K/UL (ref 0.8–3.5)
LYMPHOCYTES NFR BLD: 19 % (ref 12–49)
MCH RBC QN AUTO: 29.9 PG (ref 26–34)
MCHC RBC AUTO-ENTMCNC: 30.8 G/DL (ref 30–36.5)
MCV RBC AUTO: 97.3 FL (ref 80–99)
MONOCYTES # BLD: 0.5 K/UL (ref 0–1)
MONOCYTES NFR BLD: 8 % (ref 5–13)
NEUTS SEG # BLD: 4.5 K/UL (ref 1.8–8)
NEUTS SEG NFR BLD: 71 % (ref 32–75)
NRBC # BLD: 0 K/UL (ref 0–0.01)
NRBC BLD-RTO: 0 PER 100 WBC
PLATELET # BLD AUTO: 153 K/UL (ref 150–400)
PMV BLD AUTO: 13 FL (ref 8.9–12.9)
POTASSIUM SERPL-SCNC: 4.6 MMOL/L (ref 3.5–5.1)
PROT SERPL-MCNC: 7.3 G/DL (ref 6.4–8.2)
RBC # BLD AUTO: 4.41 M/UL (ref 3.8–5.2)
SODIUM SERPL-SCNC: 140 MMOL/L (ref 136–145)
TRIGL SERPL-MCNC: 136 MG/DL (ref ?–150)
TSH SERPL DL<=0.05 MIU/L-ACNC: 0.26 UIU/ML (ref 0.36–3.74)
VLDLC SERPL CALC-MCNC: 27.2 MG/DL
WBC # BLD AUTO: 6.3 K/UL (ref 3.6–11)

## 2023-03-31 RX ORDER — LEVOTHYROXINE SODIUM 88 UG/1
88 TABLET ORAL
Qty: 30 TABLET | Refills: 5 | Status: SHIPPED | OUTPATIENT
Start: 2023-03-31

## 2023-04-03 ENCOUNTER — PATIENT MESSAGE (OUTPATIENT)
Dept: INTERNAL MEDICINE CLINIC | Age: 88
End: 2023-04-03

## 2023-04-03 PROBLEM — E11.21 TYPE 2 DIABETES WITH NEPHROPATHY (HCC): Status: RESOLVED | Noted: 2020-06-04 | Resolved: 2021-03-04

## 2023-04-03 PROBLEM — E11.51 TYPE 2 DIABETES MELLITUS WITH PERIPHERAL VASCULAR DISEASE (HCC): Status: RESOLVED | Noted: 2020-06-04 | Resolved: 2021-03-04

## 2023-04-06 ENCOUNTER — TELEPHONE (OUTPATIENT)
Dept: CARDIOLOGY CLINIC | Age: 88
End: 2023-04-06

## 2023-04-06 NOTE — TELEPHONE ENCOUNTER
Returned callback to Pts daughter. Said she is not there with her mother right now, but was requesting we schedule a transmission to come through on our end and said they come automatically that she does not need to do anything. Told her, I have a remote transmission scheduled for tomorrow and will call her back once its received.

## 2023-04-06 NOTE — TELEPHONE ENCOUNTER
Pt's daughter is calling to see if we can do a remote transmission on her mother's device to make sure everything is transmitting like it is suppose to.    [phone]

## 2023-04-07 ENCOUNTER — TELEPHONE (OUTPATIENT)
Dept: CARDIOLOGY CLINIC | Age: 88
End: 2023-04-07

## 2023-04-20 ENCOUNTER — OFFICE VISIT (OUTPATIENT)
Dept: INTERNAL MEDICINE CLINIC | Age: 88
End: 2023-04-20
Payer: MEDICARE

## 2023-04-20 VITALS
TEMPERATURE: 97.6 F | RESPIRATION RATE: 12 BRPM | HEIGHT: 63 IN | DIASTOLIC BLOOD PRESSURE: 76 MMHG | HEART RATE: 82 BPM | OXYGEN SATURATION: 98 % | SYSTOLIC BLOOD PRESSURE: 132 MMHG | BODY MASS INDEX: 19.92 KG/M2

## 2023-04-20 DIAGNOSIS — M25.50 ARTHRALGIA, UNSPECIFIED JOINT: Primary | ICD-10-CM

## 2023-04-20 DIAGNOSIS — F41.9 ANXIETY AND DEPRESSION: ICD-10-CM

## 2023-04-20 DIAGNOSIS — F32.A ANXIETY AND DEPRESSION: ICD-10-CM

## 2023-04-20 DIAGNOSIS — I10 PRIMARY HYPERTENSION: ICD-10-CM

## 2023-04-20 DIAGNOSIS — R13.10 DYSPHAGIA, UNSPECIFIED TYPE: ICD-10-CM

## 2023-04-20 DIAGNOSIS — Z89.511 STATUS POST BELOW KNEE AMPUTATION, RIGHT (HCC): ICD-10-CM

## 2023-04-20 DIAGNOSIS — E03.4 HYPOTHYROIDISM DUE TO ACQUIRED ATROPHY OF THYROID: ICD-10-CM

## 2023-04-20 PROCEDURE — G8536 NO DOC ELDER MAL SCRN: HCPCS | Performed by: INTERNAL MEDICINE

## 2023-04-20 PROCEDURE — G9717 DOC PT DX DEP/BP F/U NT REQ: HCPCS | Performed by: INTERNAL MEDICINE

## 2023-04-20 PROCEDURE — 99214 OFFICE O/P EST MOD 30 MIN: CPT | Performed by: INTERNAL MEDICINE

## 2023-04-20 PROCEDURE — G8427 DOCREV CUR MEDS BY ELIG CLIN: HCPCS | Performed by: INTERNAL MEDICINE

## 2023-04-20 PROCEDURE — 1090F PRES/ABSN URINE INCON ASSESS: CPT | Performed by: INTERNAL MEDICINE

## 2023-04-20 PROCEDURE — G0463 HOSPITAL OUTPT CLINIC VISIT: HCPCS | Performed by: INTERNAL MEDICINE

## 2023-04-20 PROCEDURE — G8420 CALC BMI NORM PARAMETERS: HCPCS | Performed by: INTERNAL MEDICINE

## 2023-04-20 PROCEDURE — 1101F PT FALLS ASSESS-DOCD LE1/YR: CPT | Performed by: INTERNAL MEDICINE

## 2023-04-20 RX ORDER — CALCIPOTRIENE 0.05 MG/ML
SOLUTION TOPICAL
COMMUNITY
Start: 2023-04-18

## 2023-04-20 RX ORDER — PREDNISONE 20 MG/1
TABLET ORAL
Qty: 15 TABLET | Refills: 0 | Status: SHIPPED | OUTPATIENT
Start: 2023-04-20

## 2023-04-20 RX ORDER — FLUOCINONIDE TOPICAL SOLUTION USP, 0.05% 0.5 MG/ML
SOLUTION TOPICAL
COMMUNITY
Start: 2023-04-18

## 2023-04-20 RX ORDER — FAMOTIDINE 20 MG/1
TABLET, FILM COATED ORAL
COMMUNITY
Start: 2023-03-28

## 2023-04-20 NOTE — PROGRESS NOTES
Beronica Strong is a 80 y.o. female who presents today for Arthritis (RM19// Pt presenting today to discuss medication for arthritis pain; had EDG 4/11 having some pain)  . She has a history of   Patient Active Problem List   Diagnosis Code    Mixed hyperlipidemia E78.2    Mitral regurgitation I34.0    Anxiety and depression F41.9, F32. A    Hypothyroid E03.9    Osteopenia M85.80    Restrictive lung disease J98.4    Ischemic cardiomyopathy I25.5    Psoriasis L40.9    Pacemaker Z95.0    Frequent falls R29.6    COPD (chronic obstructive pulmonary disease) ([facility]) J44.9    Chronic respiratory failure with hypoxia ([facility]) J96.11    Hypertension I10    Biventricular cardiac pacemaker in situ Z95.0    Type 2 diabetes mellitus with other specified complication, unspecified whether long term insulin use ([facility]) E11.69    Gangrene of foot ([facility] [address] I96    LARISSA (acute kidney injury) ([facility] [address] N17.9    Nausea & vomiting R11.2    Dehydration E86.0    Status post below knee amputation, right ([facility] [address] Z89.511   . Today patient is here for follow-up. Chronic joint pain: Across back and in shoulders with too much use. Sometimes to L leg. Affecting quality of life. She does have acetaminophen which she is scheduling. We also do have her on as needed tramadol, but not taking very regularly. PT is making her feel more arthritis, but is encouraged to regain tolerance. Family asking about potential prednisone to help. We discussed that this can help temporarily, but given the increase in PT we will try this. Mood still has his highs and lows. Patient did have an EGD done where they dilated her esophagus. Sugeyin dysmotility issues. Since this procedure she reports that her symptoms have slightly improved. Still some mild discomfort with swallowing. We did decrease her levothyroxine dose and the fact that she is lost some weight and she was mildly overtreated. Blood pressure remained stable today.       ROS  Review of Systems Constitutional:  Negative for chills, fever and weight loss. HENT:  Negative for congestion and sore throat. Eyes:  Negative for blurred vision, double vision and photophobia. Respiratory:  Negative for cough and shortness of breath. Cardiovascular:  Negative for chest pain, palpitations and leg swelling. Gastrointestinal:  Negative for abdominal pain, constipation, diarrhea, heartburn, nausea and vomiting. Genitourinary:  Negative for dysuria, frequency and urgency. Musculoskeletal:  Positive for back pain, joint pain and myalgias. Skin:  Negative for rash. Neurological: Negative. Negative for headaches. Endo/Heme/Allergies:  Does not bruise/bleed easily. Psychiatric/Behavioral:  Negative for memory loss and suicidal ideas. Visit Vitals  /76 (BP 1 Location: Left upper arm, BP Patient Position: Sitting, BP Cuff Size: Adult)   Pulse 82   Temp 97.6 °F (36.4 °C) (Oral)   Resp 12   Ht 5' 3\" (1.6 m)   SpO2 98%   BMI 19.92 kg/m²       Physical Exam  Constitutional:       Appearance: She is well-developed. HENT:      Head: Normocephalic and atraumatic. Cardiovascular:      Rate and Rhythm: Normal rate and regular rhythm. Heart sounds: No murmur heard. Pulmonary:      Effort: Pulmonary effort is normal. No respiratory distress. Musculoskeletal:      Comments: S/p R BKA   Skin:     General: Skin is warm and dry. Neurological:      Mental Status: She is alert and oriented to person, place, and time. Psychiatric:         Behavior: Behavior normal.         Current Outpatient Medications   Medication Sig    calcipotriene 0.005 % soln     famotidine (PEPCID) 20 mg tablet     fluocinoNIDE (LIDEX) 0.05 % external solution     levothyroxine (SYNTHROID) 88 mcg tablet Take 1 Tablet by mouth Daily (before breakfast). sertraline (ZOLOFT) 100 mg tablet Take 1 Tablet by mouth daily.     triamcinolone acetonide (KENALOG) 0.1 % topical cream     ketoconazole (NIZORAL) 2 % shampoo Neomycin-Polymyxin-Pramoxine (Neosporin Plus Pain Relief) 3.5-10,000-10 mg-unit-mg/gram topical cream Apply  to affected area two (2) times a day. clobetasoL (OLUX) 0.05 % topical foam APPLY A THIN FILM ON THE AFFECTED AREA 2 TIMES A DAY    clobetasoL (TEMOVATE) 0.05 % topical cream Apply  to affected area two (2) times a day. carvediloL (COREG) 3.125 mg tablet TAKE 1 TABLET BY MOUTH TWICE A DAY WITH MEALS    Tums 300 mg (750 mg) chewable tablet     pantoprazole (PROTONIX) 40 mg tablet Take 1 Tablet by mouth two (2) times a day. diclofenac (VOLTAREN) 1 % gel Apply 2 g to affected area three (3) times daily as needed. Lactobacillus acidophilus (PROBIOTIC PO) Take  by mouth. ondansetron hcl (ZOFRAN) 4 mg tablet Take 1 Tablet by mouth every eight (8) hours as needed for Nausea or Vomiting.    baclofen 5 mg tab Take  by mouth. traMADoL (ULTRAM) 50 mg tablet Take 1 Tablet by mouth every six (6) hours as needed for Pain. furosemide (LASIX) 40 mg tablet TAKE ONE TAB BY MOUTH DAILY OR AS ADVISED BY PHYSICIAN (Patient taking differently: 1 Tablet. Take one tab by mouth daily or as advised by physician)    acetaminophen (TYLENOL) 500 mg tablet Take 2 Tablets by mouth every eight (8) hours. multivitamin (ONE A DAY) tablet Take 1 Tablet by mouth daily. senna-docusate (PERICOLACE) 8.6-50 mg per tablet Take 2 Tablets by mouth two (2) times daily as needed for Constipation. loperamide (IMODIUM) 1 mg/5 mL solution Take 7.5 mL by mouth every eight (8) hours as needed for Diarrhea. atorvastatin (LIPITOR) 10 mg tablet Take 1 Tablet by mouth daily. chlorhexidine (PERIDEX) 0.12 % solution 15 mL by Swish and Spit route two (2) times daily (with meals). benzocaine (ORAJEL) 20 % gel topical gel Apply  to affected area four (4) times daily as needed. Apply to gums    polyethylene glycol (MIRALAX) 17 gram packet Take 1 Packet by mouth daily.     alum-mag hydroxide-simeth (MYLANTA) 200-200-20 mg/5 mL susp Take 30 mL by mouth every four (4) hours as needed for Indigestion. apixaban (Eliquis) 5 mg tablet Take 1 Tab by mouth two (2) times a day. albuterol (PROVENTIL HFA, VENTOLIN HFA, PROAIR HFA) 90 mcg/actuation inhaler Take 1 Puff by inhalation every six (6) hours as needed for Wheezing. ADVAIR DISKUS 250-50 mcg/dose diskus inhaler Take 1 Puff by inhalation two (2) times a day. tiotropium (SPIRIVA) 18 mcg inhalation capsule Take 1 Capsule by inhalation daily. No current facility-administered medications for this visit.         Past Medical History:   Diagnosis Date    Arthritis     Atrial fibrillation ([facility] [address]     av node ablation 5/22/98 with placement of CPI #[id number] dual chamber pacemaker subsequently replaced with a single chamber medtronic #[id number] single chamber pacemaker 7/25/05    Cancer ([facility] [address] 1970's    colon cancer w/ resection    COPD     Depression     GERD (gastroesophageal reflux disease)     History of vascular access device 04/17/2020    4F MIDLINE PLACED BY EMIL Macias RN LEFT BRACHIAL, 13CM    Hyperlipidemia     Hypertension     Ischemic cardiomyopathy     Migraine headache     Orthostatic hypotension     RADHA (obstructive sleep apnea)     Pacemaker 5/22/98    AV node ablation /pacemaker placement utilizing CPI # [id number] dual chamber system, medtronic #[id number] single chamber device placed 7/22/05    Pulmonary hypertension ([facility] [address] 9/26/2011    RVSP 50 on echo 8/14    Thyroid disease     Valvular heart disease     mild-mod MR/TR      Past Surgical History:   Procedure Laterality Date    HX BREAST BIOPSY Right 2002    neg; surgical bx    HX ENDOSCOPY  04/11/2022    Dr Herrera Means      right TKA    HX PACEMAKER      HX PACEMAKER PLACEMENT  05/2020    HX TOTAL COLECTOMY  1974    colon    HX TUBAL LIGATION      IR KYPHOPLASTY LUMBAR  07/02/2019    NC ARTHRP KNE CONDYLE&PLATU MEDIAL&LAT COMPARTMENTS      total on R, partial on L    NC INSJ ELTRD CAR BYRON SYS TM INSJ DFB/PM PLS GEN N/A 05/22/2020 Lv Lead Placement performed by Mitra Fernandez MD at [address] [facility] CATH LAB    AZ RELOCATION OF SKIN POCKET FOR PACEMAKER N/A 10/09/2020    RELOCATE PACEMAKER POCKET [facility] CATH LAB    AZ REMVL PERM PM PLS GEN W/REPL PLSE GEN MULT LEAD N/A 05/22/2020    REMOVE & REPLACE PPM GEN BIV MULTI LEADS performed by Mitra Fernandez MD at [address] [facility] [address], [facility] CATH LAB    AZ UNLISTED PROCEDURE BREAST      benign breast tumor excision right breast      Social History     Tobacco Use    Smoking status: Never     Passive exposure: Yes    Smokeless tobacco: Never   Substance Use Topics    Alcohol use: No     Alcohol/week: 0.0 standard drinks      Family History   Problem Relation Age of Onset    Diabetes Mother     Heart Disease Mother     Heart Attack Mother     Heart Disease Father     Stroke Sister     Breast Cancer Sister 80    Cancer Maternal Aunt         uterus    Diabetes Maternal Uncle     Breast Cancer Daughter 61        Allergies   Allergen Reactions    Cardizem [Diltiazem Hcl] Hives    Codeine Nausea and Vomiting    Darvocet A500 [Propoxyphene N-Acetaminophen] Nausea and Vomiting    Diltiazem Hives    Labetalol Nausea and Vomiting     Dizzy/Disoriented     Pcn [Penicillins] Swelling     Tongue Swelling   Has tolerated cephalexin recently 4/7/20     Primidone Other (comments)     Lightheaded/unsteady gait    Sulfa (Sulfonamide Antibiotics) Nausea and Vomiting        Assessment/Plan  Diagnoses and all orders for this visit:    1. Arthralgia, unspecified joint-given that she was doing conservative therapy we will place her on a short taper of prednisone to see if this helps with her myalgias. Patient continue scheduled Tylenol and encouraged to take more tramadol if necessary.  -     predniSONE (DELTASONE) 20 mg tablet; Take two tablets daily for 5 days followed by one tablet for 5 days. 2. Dysphagia, unspecified type-status post EGD and stretching. Still having some symptoms    3.  Primary hypertension-stable    4. Status post below knee amputation, right (HCC)-working with PT to wear prosthetic    5. Hypothyroidism due to acquired atrophy of thyroid-I have decreased her dose    6. Anxiety and depression-tolerating higher dose of SSRI            Chaim Montano MD  4/20/2023    This note was created with the help of speech recognition software [employer]) and may contain some 'sound alike' errors.

## 2023-04-20 NOTE — PROGRESS NOTES
Roselyn Evangelista  Identified pt with two pt identifiers(name and ). Chief Complaint   Patient presents with    Arthritis     RM19// Pt presenting today to discuss medication for arthritis pain; had EDG  having some pain       1. Have you been to the ER, urgent care clinic since your last visit? Hospitalized since your last visit? NO    2. Have you seen or consulted any other health care providers outside of the [address] [place] [address] since your last visit? Include any pap smears or colon screening. NO      Provider notified of reason for visit, vitals and flowsheets obtained on patients.      Patient received paperwork for advance directive during previous visit but has not completed at this time     Reviewed record In preparation for visit, huddled with provider and have obtained necessary documentation      Health Maintenance Due   Topic    Shingles Vaccine (1 of 2)    COVID-19 Vaccine (4 - Booster for Pfizer series)       Wt Readings from Last 3 Encounters:   23 112 lb 7 oz (51 kg)   23 114 lb (51.7 kg)   22 139 lb (63 kg)     Temp Readings from Last 3 Encounters:   23 97.6 °F (36.4 °C) (Oral)   23 97.7 °F (36.5 °C)   23 98.6 °F (37 °C)     BP Readings from Last 3 Encounters:   23 132/76   23 137/63   23 115/70     Pulse Readings from Last 3 Encounters:   23 82   23 80   23 78     Vitals:    23 0859   BP: 132/76   Pulse: 82   Resp: 12   Temp: 97.6 °F (36.4 °C)   TempSrc: Oral   SpO2: 98%   Height: 5' 3\" (1.6 m)   PainSc:   8   LMP: 1970         Learning Assessment:  :     Learning Assessment 2015   PRIMARY LEARNER Patient Guardian Patient Patient Child(luis fernando)   HIGHEST LEVEL OF EDUCATION - PRIMARY LEARNER  - - - [address] [place] [address] CAREGIVER - - - No -   PRIMARY LANGUAGE ENGLISH ENGLISH ENGLISH ENGLISH ENGLISH   LEARNER PREFERENCE PRIMARY READING PICTURES - READING LISTENING   ANSWERED BY self Patient patient self Heather Pires   RELATIONSHIP SELF SELF SELF SELF OTHER       Depression Screening:  :     3 most recent PHQ Screens 7/12/2022   Little interest or pleasure in doing things Not at all   Feeling down, depressed, irritable, or hopeless Not at all   Total Score PHQ 2 0   Trouble falling or staying asleep, or sleeping too much -   Feeling tired or having little energy -   Poor appetite, weight loss, or overeating -   Feeling bad about yourself - or that you are a failure or have let yourself or your family down -   Trouble concentrating on things such as school, work, reading, or watching TV -   Moving or speaking so slowly that other people could have noticed; or the opposite being so fidgety that others notice -   Thoughts of being better off dead, or hurting yourself in some way -   PHQ 9 Score -   How difficult have these problems made it for you to do your work, take care of your home and get along with others -       Fall Risk Assessment:  :     Fall Risk Assessment, last 12 mths 4/20/2023   Able to walk? No   Fall in past 12 months? -   Number of falls in past 12 months -   Fall with injury? -       Abuse Screening:  :     Abuse Screening Questionnaire 6/4/2020 5/21/2020 3/6/2020 2/18/2020 11/18/2019 8/1/2019 3/21/2019   Do you ever feel afraid of your partner? N N N N N N N   Are you in a relationship with someone who physically or mentally threatens you? N N N N N N N   Is it safe for you to go home?  Y Y Y Y Y Y Y       ADL Screening:  :     ADL Assessment 5/11/2017   Feeding yourself No Help Needed   Getting from bed to chair No Help Needed   Getting dressed No Help Needed   Bathing or showering No Help Needed   Walk across the room (includes cane/walker) No Help Needed   Using the telphone No Help Needed   Taking your medications Help Needed   Preparing meals Help Needed   Managing money (expenses/bills) Help Needed Moderately strenuous housework (laundry) Help Needed   Shopping for personal items (toiletries/medicines) Help Needed   Shopping for groceries Help Needed   Driving Help Needed   Climbing a flight of stairs Help Needed   Getting to places beyond walking distances Help Needed         Medication reconciliation up to date and corrected with patient at this time.

## 2023-05-06 NOTE — PROGRESS NOTES
Physical Therapy  Checked on patient who defers PT today as she is discharging to [facility] at [facility] [place] Patient/Caregiver provided printed discharge information.

## 2023-05-16 ENCOUNTER — OFFICE VISIT (OUTPATIENT)
Age: 88
End: 2023-05-16
Payer: MEDICARE

## 2023-05-16 VITALS
WEIGHT: 120 LBS | DIASTOLIC BLOOD PRESSURE: 77 MMHG | SYSTOLIC BLOOD PRESSURE: 125 MMHG | TEMPERATURE: 98.7 F | BODY MASS INDEX: 21.26 KG/M2 | RESPIRATION RATE: 15 BRPM | OXYGEN SATURATION: 98 % | HEART RATE: 80 BPM | HEIGHT: 63 IN

## 2023-05-16 DIAGNOSIS — N18.30 STAGE 3 CHRONIC KIDNEY DISEASE, UNSPECIFIED WHETHER STAGE 3A OR 3B CKD (HCC): ICD-10-CM

## 2023-05-16 DIAGNOSIS — I25.5 ISCHEMIC CARDIOMYOPATHY: ICD-10-CM

## 2023-05-16 DIAGNOSIS — I10 HYPERTENSION, UNSPECIFIED TYPE: ICD-10-CM

## 2023-05-16 DIAGNOSIS — E03.9 HYPOTHYROIDISM, UNSPECIFIED TYPE: ICD-10-CM

## 2023-05-16 DIAGNOSIS — I50.22 CHRONIC SYSTOLIC (CONGESTIVE) HEART FAILURE (HCC): ICD-10-CM

## 2023-05-16 DIAGNOSIS — M25.50 ARTHRALGIA, UNSPECIFIED JOINT: Primary | ICD-10-CM

## 2023-05-16 PROCEDURE — G8420 CALC BMI NORM PARAMETERS: HCPCS | Performed by: INTERNAL MEDICINE

## 2023-05-16 PROCEDURE — 1090F PRES/ABSN URINE INCON ASSESS: CPT | Performed by: INTERNAL MEDICINE

## 2023-05-16 PROCEDURE — G8427 DOCREV CUR MEDS BY ELIG CLIN: HCPCS | Performed by: INTERNAL MEDICINE

## 2023-05-16 PROCEDURE — 99214 OFFICE O/P EST MOD 30 MIN: CPT | Performed by: INTERNAL MEDICINE

## 2023-05-16 PROCEDURE — 1123F ACP DISCUSS/DSCN MKR DOCD: CPT | Performed by: INTERNAL MEDICINE

## 2023-05-16 PROCEDURE — 1036F TOBACCO NON-USER: CPT | Performed by: INTERNAL MEDICINE

## 2023-05-16 RX ORDER — PREDNISONE 10 MG/1
10 TABLET ORAL DAILY
Qty: 30 TABLET | Refills: 0 | Status: SHIPPED | OUTPATIENT
Start: 2023-05-16 | End: 2023-06-15

## 2023-05-16 RX ORDER — FAMOTIDINE 20 MG/1
TABLET, FILM COATED ORAL
COMMUNITY
Start: 2023-04-28

## 2023-05-16 RX ORDER — DESONIDE 0.5 MG/G
CREAM TOPICAL
COMMUNITY

## 2023-05-16 RX ORDER — LEVOTHYROXINE SODIUM 88 UG/1
TABLET ORAL
COMMUNITY
Start: 2023-04-24

## 2023-05-16 RX ORDER — SERTRALINE HYDROCHLORIDE 100 MG/1
TABLET, FILM COATED ORAL
COMMUNITY
Start: 2023-05-09

## 2023-05-16 RX ORDER — CALCIPOTRIENE 0.05 MG/ML
SOLUTION TOPICAL
COMMUNITY
Start: 2023-04-18

## 2023-05-16 SDOH — ECONOMIC STABILITY: FOOD INSECURITY: WITHIN THE PAST 12 MONTHS, YOU WORRIED THAT YOUR FOOD WOULD RUN OUT BEFORE YOU GOT MONEY TO BUY MORE.: NEVER TRUE

## 2023-05-16 SDOH — ECONOMIC STABILITY: HOUSING INSECURITY
IN THE LAST 12 MONTHS, WAS THERE A TIME WHEN YOU DID NOT HAVE A STEADY PLACE TO SLEEP OR SLEPT IN A SHELTER (INCLUDING NOW)?: NO

## 2023-05-16 SDOH — ECONOMIC STABILITY: INCOME INSECURITY: HOW HARD IS IT FOR YOU TO PAY FOR THE VERY BASICS LIKE FOOD, HOUSING, MEDICAL CARE, AND HEATING?: NOT VERY HARD

## 2023-05-16 SDOH — ECONOMIC STABILITY: FOOD INSECURITY: WITHIN THE PAST 12 MONTHS, THE FOOD YOU BOUGHT JUST DIDN'T LAST AND YOU DIDN'T HAVE MONEY TO GET MORE.: NEVER TRUE

## 2023-05-16 ASSESSMENT — ENCOUNTER SYMPTOMS
ABDOMINAL PAIN: 0
BACK PAIN: 0
CHEST TIGHTNESS: 0
COLOR CHANGE: 0
CONSTIPATION: 0
SHORTNESS OF BREATH: 0
DIARRHEA: 0

## 2023-05-16 ASSESSMENT — PATIENT HEALTH QUESTIONNAIRE - PHQ9
SUM OF ALL RESPONSES TO PHQ9 QUESTIONS 1 & 2: 1
SUM OF ALL RESPONSES TO PHQ QUESTIONS 1-9: 1
2. FEELING DOWN, DEPRESSED OR HOPELESS: 1
SUM OF ALL RESPONSES TO PHQ QUESTIONS 1-9: 1
1. LITTLE INTEREST OR PLEASURE IN DOING THINGS: 0

## 2023-05-16 NOTE — PROGRESS NOTES
Rita Cervantes  Identified pt with two pt identifiers(name and ). Chief Complaint   Patient presents with    Follow-up     RM18// Pt presenting today for follow-up for arthritis pain; pt states pain is not better       1. Have you been to the ER, urgent care clinic since your last visit? Hospitalized since your last visit? NO    2. Have you seen or consulted any other health care providers outside of the [address] [place] [address] since your last visit? Include any pap smears or colon screening. NO      Provider notified of reason for visit, vitals and flowsheets obtained on patients. Patient received paperwork for advance directive during previous visit but has not completed at this time     Reviewed record In preparation for visit, huddled with provider and have obtained necessary documentation      Health Maintenance Due   Topic    Shingles vaccine (2 of 3)    COVID-19 Vaccine (4 - Booster for Pfizer series)       Wt Readings from Last 3 Encounters:   23 120 lb (54.4 kg)   23 114 lb (51.7 kg)   22 139 lb (63 kg)     Temp Readings from Last 3 Encounters:   23 98.7 °F (37.1 °C) (Oral)     BP Readings from Last 3 Encounters:   23 125/77   23 132/76   23 115/70     Pulse Readings from Last 3 Encounters:   23 80   23 82   23 78     [unfilled]      Learning Assessment:  :     No flowsheet data found. Depression Screening:  :     No flowsheet data found. Fall Risk Assessment:  :     No flowsheet data found. Abuse Screening:  :     No flowsheet data found. ADL Screening:  :     No flowsheet data found. Medication reconciliation up to date and corrected with patient at this time.

## 2023-05-16 NOTE — PROGRESS NOTES
Tyrell Finney is a 80 y.o. female who presents today for Follow-up (RM18// Pt presenting today for follow-up for arthritis pain; pt states pain is not better)  . She has a history of   Patient Active Problem List   Diagnosis    Anxiety and depression    GUILLERMO (acute kidney injury) ([facility] [address]    Mixed hyperlipidemia    Frequent falls    Hypertension    Hypothyroid    Dehydration    Biventricular cardiac pacemaker in situ    Gangrene of foot (HCC)    Ischemic cardiomyopathy    Restrictive lung disease    Psoriasis    Nausea & vomiting    Pacemaker    Osteopenia    Mitral regurgitation    COPD (chronic obstructive pulmonary disease) (HCC)    Chronic respiratory failure with hypoxia ([facility])    Stage 3 chronic kidney disease, unspecified whether stage 3a or 3b CKD ([facility]    Status post below knee amputation, right ([facility] [address]    Type 2 diabetes mellitus with other specified complication, unspecified whether long term insulin use ([facility])    Chronic systolic (congestive) heart failure   . Today patient is here for follow-up. Hypothyroidism: The last visit we did decrease her dose to 88 mcg given the fact that she was borderline overtreated and losing weight. Since her weight has trended up. BMI today is above 20. Dysphagia has been stable, still with dry foods. Depression: We have raised her SSRI, sertraline, to 100 mg. Mood seems to be a bit better. Myalgias/joint pain: We did place her on a prednisone taper in hopes of relieving some of her myalgias while getting in with physical therapy. Some lower abd pain since sliding on floor several days. Has made some progress with PT and wearing her prosthetic. Asking if low dose steroid could help. Really feels better while on prednisone. We discussed considering a low-dose prednisone 10 mg daily. We discussed risk benefit. We will try this for 30 days. Blood pressure remained stable. Denies any volume overload symptoms.     ROS  Review of Systems

## 2023-06-06 DIAGNOSIS — M25.50 ARTHRALGIA, UNSPECIFIED JOINT: ICD-10-CM

## 2023-06-07 ENCOUNTER — TELEPHONE (OUTPATIENT)
Age: 88
End: 2023-06-07

## 2023-06-07 RX ORDER — PREDNISONE 10 MG/1
TABLET ORAL
Qty: 30 TABLET | Refills: 0 | Status: SHIPPED | OUTPATIENT
Start: 2023-06-07

## 2023-06-07 NOTE — PROGRESS NOTES
Nephrology Progress Note    Patient feels fair.  Hypoxic.    ROS:  Resp:negative  CV:negative  GI:negative  Gu:negative    MEDICATIONS:  Current Facility-Administered Medications   Medication Dose Route Frequency Provider Last Rate Last Admin   • enoxaparin (LOVENOX) injection 30 mg  30 mg Subcutaneous Daily eTd Colmenares MD   30 mg at 06/07/23 0929   • PARENTERAL NUTRITION - DIETITIAN/PHARMACIST MANAGED   Does not apply See Admin Instructions Amie Escalona, BRYANNA       • Fat Emulsion Plant Based (Soy) 20 % injection 250 mL  250 mL Intravenous Q24H Ted Colmenares MD       • sodium chloride (PF) 0.9 % injection 10 mL  10 mL Injection 2 times per day Ted Colmenares MD       • sodium chloride (PF) 0.9 % injection 10 mL  10 mL Injection 2 times per day Ted Colmenares MD       • fluconazole (DIFLUCAN) IVPB 200 mg  200 mg Intravenous Daily Anastos, Ana Paula,  mL/hr at 06/07/23 1717 200 mg at 06/07/23 1717   • meropenem (MERREM) 500 mg in sodium chloride 0.9 % 100 mL IVPB  500 mg Intravenous 4 times per day Anastos, Ana Paula,  mL/hr at 06/07/23 1644 500 mg at 06/07/23 1644   • guaiFENesin-DM (MUCINEX DM) 600-30 mg ER tablet 2 tablet  2 tablet Oral 2 times per day Vero Lorenzo CNP   2 tablet at 06/03/23 0919   • sodium chloride (PF) 0.9 % injection 2 mL  2 mL Intracatheter 2 times per day Vero Lorenzo CNP   2 mL at 06/07/23 0930          Physical Examination:  Vital Signs:    Visit Vitals  BP (!) 147/98   Pulse (!) 103   Temp 97.9 °F (36.6 °C) (Oral)   Resp 18   Ht 5' 8\" (1.727 m)   Wt 46.7 kg (103 lb)   SpO2 97%   BMI 15.66 kg/m²     General:  No acute distress.  Conversant.  Head:  Normocephalic-atraumatic.   Neck:  Trachea is midline.   Eyes:  Normal conjunctivae.  Pupils equal, round, reactive to light.    ENT: NGT in place  Cardiovascular:  S1, S2 auscultated.  Regular rate and rhythm.  Bilateral peripheral pulses are intact.   Respiratory:   Bilateral breath sounds heard.  Lungs clear to  Primary Nurse Georgina Trivedi and Vandana Arroyo RN performed a dual skin assessment on this patient No impairment noted  Deric score is 18  New right BKA, clean dry and intact dressing auscultation.  Symmetric chest wall expansion.  Respirations are non-labored.    Gastrointestinal:  Soft and nontender.  Non-distended.  Positive bowel sounds.    Genitourinary: No CVA tenderness.    Musculoskeletal:  No joint swelling.  Normal range of motion.   Skin: Warm and dry.  No rash noted.  Neurologic:   CN II-XII grossly intact.  Psychiatric:   Alert and oriented X 3.  Calm.  Cooperative.       I/O's    Intake/Output Summary (Last 24 hours) at 6/7/2023 1721  Last data filed at 6/7/2023 1551  Gross per 24 hour   Intake 3040.24 ml   Output 2225 ml   Net 815.24 ml       Labs:    Recent Labs   Lab 06/07/23  0737 06/06/23  1411 06/06/23  0536 06/05/23  0546   SODIUM 143  --  144 142   POTASSIUM 3.5 4.2 5.2* 4.0   CHLORIDE 108  --  109 105   CO2 32  --  28 30   BUN 23*  --  26* 19   CREATININE 0.86  --  0.77 0.75   GLUCOSE 88  --  98 91   CALCIUM 12.8*  --  12.8* 11.7*      Recent Labs   Lab 06/07/23  0737 06/06/23  0536 06/05/23  0546 06/04/23  0658 06/03/23  0603 06/02/23  1428   SODIUM 143 144 142   < > 133* 128*   CHLORIDE 108 109 105   < > 103 96*   CO2 32 28 30   < > 24 27   BUN 23* 26* 19   < > 15 14   CREATININE 0.86 0.77 0.75   < > 0.74 0.90   CALCIUM 12.8* 12.8* 11.7*   < > 10.0 10.6*   ALBUMIN  --   --  1.5*  --  1.5* 1.8*   BILIRUBIN  --   --  0.3  --  0.3 0.4   ALKPT  --   --  84  --  96 108   GPT  --   --  11  --  16 18   AST  --   --  12  --  18 16   GLUCOSE 88 98 91   < > 95 102*    < > = values in this interval not displayed.      Recent Labs   Lab 06/06/23  0536   WBC 43.8*   RBC 3.77*   HGB 8.1*   HCT 25.4*   *        Imaging    US VASC LOWER EXTREMITY VENOUS DUPLEX BILATERAL   Final Result   No evidence of bilateral lower extremity DVT.       Electronically Signed by: GENNARO MAN M.D.    Signed on: 6/7/2023 1:56 PM    Workstation ID: [id number]      XR CHEST AP OR PA   Final Result   1.  Increasing density in the right mid lung with areas of central lucency   suggesting worsening  consolidation with possible areas of necrosis or   abscess.  Consider necrotizing pneumonia.   2.  Increasing left lower lobe opacity suggesting small pleural effusion   with adjacent airspace disease.      Electronically Signed by: GENNARO MAN M.D.    Signed on: 6/7/2023 1:24 PM    Workstation ID: [id number]      XR CHEST AP OR PA   Final Result   1.   Redemonstrated is near-complete opacification of the right hemithorax   with moderate-sized adjacent pleural effusion.  Slight improved aeration of   the right lung appears present.   2.   Left basilar /retrocardiac atelectasis is similar.   3.   Nasogastric tube and severely dilated esophagus also again noted.      Electronically Signed by: CLARITA AGUILAR M.D.    Signed on: 6/6/2023 12:13 PM    Workstation ID: [id number]      EGD   Final Result      XR CHEST AP OR PA   Final Result      Similar opacification involving a majority of the right hemithorax, likely   related to a combination of esophageal dilatation and airspace disease.    There is no significant interval change since the prior exam.         Electronically Signed by: JOSÉ MIGUEL ESTEBAN M.D.    Signed on: 6/5/2023 11:15 AM    Workstation ID: [id number]      XR CHEST AP OR PA   Final Result   FINDINGS AND IMPRESSION:   Frontal view of the chest was obtained.        Enteric tube with tip below the diaphragm.      Redemonstration of near complete opacification of the right hemithorax.    Left lung is clear.      The cardiomediastinal silhouette is stable in appearance.       Electronically Signed by: GENNARO MAN M.D.    Signed on: 6/4/2023 9:37 AM    Workstation ID: [id number]      XR NASOGASTRIC TUBE CHECK ABDOMEN   Final Result   FINDINGS AND IMPRESSION:    Frontal view of the lower chest and upper abdomen was obtained.      Enteric tube is seen with tip at the GE junction.  Tube should be advanced.           Diffuse opacity throughout the right hemithorax.  Left lung is clear.             Electronically Signed by: GENNARO MAN M.D.    Signed on: 6/3/2023 3:17 PM    Workstation ID: [id number]      CTA CHEST PULMONARY EMBOLISM   Final Result   1.  Markedly distended esophagus containing fluid and debris up to the   thoracic inlet suggesting achalasia.  Conical correlation is advised.   2.  Patchy airspace consolidation throughout the right lung with loculated   fluid and air component in the consolidation suggesting necrotizing   pneumonia or pneumonia with pulmonary abscesses.   3.  Small right pleural effusion.   4.  Anasarca.      Electronically Signed by: GENNARO MAN M.D.    Signed on: 6/3/2023 11:25 AM    Workstation ID: [id number]      XR CHEST PA OR AP 1 VIEW   Final Result   FINDINGS/IMPRESSION: On this AP portable upright chest left lung is clear   and well-expanded. The heart and pulmonary vascularity are within normal   limits. There is however extensive dense consolidation seen throughout the   right lung with slight aeration of the right lower lung with a small   pleural effusion noted. Although this could represent dense diffuse   consolidative infiltrate the possibility of an underlying mass cannot be   excluded follow-up until resolution is recommended.      Electronically Signed by: OMAYRA LANZA M.D.    Signed on: 6/2/2023 3:31 PM    Workstation ID: [id number]      CTA CHEST PULMONARY EMBOLISM    (Results Pending)   CT CHEST W CONTRAST    (Results Pending)        Assessment and Plan:    1.  Hypercalcemia.  Likely due to a malignant process.  Pamidronate 60 mg IV  given yesterday.  Continue IV fluid.  We will decrease the rate to 50 mL/h as he will be starting on TPN.  Monitor serum chemistries.  Hold off on imaging studies until biopsy results are back.    2.  Vitamin D deficiency.  We will give him a dose of ergocalciferol once NG tube is removed.    3.  Weight loss/dilated esophagus.  Work-up ongoing.  He underwent EGD and biopsies.

## 2023-06-07 NOTE — TELEPHONE ENCOUNTER
Pt's daughter would like to discuss the pt's eliquis, the daughter is working with the pharmacy on how to reduce out of pocket cost for the medication. The pharmacist suggested Artemio Haynes, and the daughter would like to know what Dr. Oliver Moore thinks about this.        Kevin Luna (daughter)  [phone]

## 2023-06-07 NOTE — TELEPHONE ENCOUNTER
Verified patient with two types of identifiers. Spoke with Alton Lees verified on [address]. Celia states Shastaquis is over $400 for 30 day supply. Patient has not tried to qualify for patient assistance. Notified Alton Hummelr I could get them set up with the application and samples. The other option would be to try Xarelto; however, I do not think the price will be much different. Alton Hummelr states she would like to try for patient assistance. Will get samples and application together. Patient's daughter verbalized understanding and will call with any other questions.       Future Appointments   Date Time Provider Chris Lofton   6/28/2023  4:15 PM Vish Aggarwal BS AMB   8/10/2023 10:20 AM [email or web address] BS AMB   8/10/2023 10:40 AM MD BUDDY Pride BS AMB

## 2023-06-16 LAB
ALBUMIN SERPL-MCNC: 4.1 G/DL
ALP BLD-CCNC: 95 U/L
ALT SERPL-CCNC: 36 U/L
ANION GAP SERPL CALCULATED.3IONS-SCNC: 20.9 MMOL/L
AST SERPL-CCNC: 36 U/L
BILIRUB SERPL-MCNC: 0.79 MG/DL (ref 0.1–1.4)
BUN BLDV-MCNC: 50 MG/DL
CALCIUM SERPL-MCNC: 9.4 MG/DL
CHLORIDE BLD-SCNC: 99 MMOL/L
CO2: NORMAL
CREAT SERPL-MCNC: 0.98 MG/DL
EGFR: 64
GLUCOSE BLD-MCNC: 107 MG/DL
HCT VFR BLD CALC: 40.8 % (ref 36–46)
HEMOGLOBIN: 13.6 G/DL (ref 12–16)
PLATELET # BLD: 154 K/ΜL
POTASSIUM SERPL-SCNC: 3.9 MMOL/L
SODIUM BLD-SCNC: 143 MMOL/L
TOTAL PROTEIN: 6.5
WBC # BLD: 9 10^3/ML

## 2023-06-28 ENCOUNTER — NURSE ONLY (OUTPATIENT)
Age: 88
End: 2023-06-28
Payer: MEDICARE

## 2023-06-28 DIAGNOSIS — Z95.0 CARDIAC PACEMAKER IN SITU: Primary | ICD-10-CM

## 2023-06-28 PROCEDURE — 93296 REM INTERROG EVL PM/IDS: CPT | Performed by: INTERNAL MEDICINE

## 2023-08-08 ENCOUNTER — TELEPHONE (OUTPATIENT)
Age: 88
End: 2023-08-08

## 2023-08-08 NOTE — TELEPHONE ENCOUNTER
Pt daughter called and would like to speak to nurse concerning pt cleo,please advise    [phone]

## 2023-08-08 NOTE — TELEPHONE ENCOUNTER
Called daughter Wendi Storm, verified with PHI, but she did not answer and mailbox is full. Will call back later.

## 2023-08-08 NOTE — TELEPHONE ENCOUNTER
Verified patient with two types of identifiers. Spoke with patient's daughter verified on PHI. She states patient recently tested negative for COVID but a couple of people on her call tested positive. She wanted to know if she could still come to her appointment on Thursday. Notified daughter that as long as she tested negative and is not showing any symptoms prior to appointment she can still come to appointment as scheduled. Daughter states she will bring completed [employer]MS patient assistance form. Requested samples to get through decision period. VO per Dr. Rene Guevara will provide samples. Patient verbalized understanding and will call with any other questions.       Future Appointments   Date Time Provider [address]   8/10/2023 10:20 AM Michael GOODWIN BS AMB   8/10/2023 10:40 AM MD BUDDY Penaloza BS AMB   11/15/2023  8:30 AM CHICHO HILTON AMB

## 2023-08-09 ENCOUNTER — TELEPHONE (OUTPATIENT)
Age: 88
End: 2023-08-09

## 2023-08-09 NOTE — TELEPHONE ENCOUNTER
Left detailed message on patient's daughter's phone. States name on outgoing message. Discussed need to fax/mail form or can pick it up in office.

## 2023-08-09 NOTE — TELEPHONE ENCOUNTER
Pt's daughter is calling for another application form for assistance for Eliquis. Pt's daughter missed placed the last one. [email or web address] daughter email address    [phone]

## 2023-08-10 ENCOUNTER — ANCILLARY PROCEDURE (OUTPATIENT)
Age: 88
End: 2023-08-10
Payer: MEDICARE

## 2023-08-10 ENCOUNTER — OFFICE VISIT (OUTPATIENT)
Age: 88
End: 2023-08-10
Payer: MEDICARE

## 2023-08-10 ENCOUNTER — PROCEDURE VISIT (OUTPATIENT)
Age: 88
End: 2023-08-10

## 2023-08-10 VITALS
BODY MASS INDEX: 21.62 KG/M2 | OXYGEN SATURATION: 98 % | WEIGHT: 122 LBS | SYSTOLIC BLOOD PRESSURE: 136 MMHG | DIASTOLIC BLOOD PRESSURE: 68 MMHG | HEIGHT: 63 IN | HEART RATE: 78 BPM

## 2023-08-10 VITALS
HEART RATE: 89 BPM | HEIGHT: 63 IN | WEIGHT: 122 LBS | BODY MASS INDEX: 21.62 KG/M2 | SYSTOLIC BLOOD PRESSURE: 136 MMHG | DIASTOLIC BLOOD PRESSURE: 68 MMHG

## 2023-08-10 DIAGNOSIS — I50.22 CHRONIC SYSTOLIC HEART FAILURE (HCC): ICD-10-CM

## 2023-08-10 DIAGNOSIS — I48.11 LONGSTANDING PERSISTENT ATRIAL FIBRILLATION (HCC): ICD-10-CM

## 2023-08-10 DIAGNOSIS — I47.29 NSVT (NONSUSTAINED VENTRICULAR TACHYCARDIA) (HCC): ICD-10-CM

## 2023-08-10 DIAGNOSIS — Z95.0 BIVENTRICULAR CARDIAC PACEMAKER IN SITU: Primary | ICD-10-CM

## 2023-08-10 DIAGNOSIS — Z95.0 BIVENTRICULAR CARDIAC PACEMAKER IN SITU: ICD-10-CM

## 2023-08-10 DIAGNOSIS — I44.2 THIRD DEGREE AV BLOCK (HCC): ICD-10-CM

## 2023-08-10 DIAGNOSIS — D68.69 SECONDARY HYPERCOAGULABLE STATE (HCC): ICD-10-CM

## 2023-08-10 PROCEDURE — 93306 TTE W/DOPPLER COMPLETE: CPT

## 2023-08-10 PROCEDURE — 99214 OFFICE O/P EST MOD 30 MIN: CPT | Performed by: INTERNAL MEDICINE

## 2023-08-10 ASSESSMENT — PATIENT HEALTH QUESTIONNAIRE - PHQ9
SUM OF ALL RESPONSES TO PHQ QUESTIONS 1-9: 0
SUM OF ALL RESPONSES TO PHQ QUESTIONS 1-9: 0
2. FEELING DOWN, DEPRESSED OR HOPELESS: 0
1. LITTLE INTEREST OR PLEASURE IN DOING THINGS: 0
SUM OF ALL RESPONSES TO PHQ QUESTIONS 1-9: 0
SUM OF ALL RESPONSES TO PHQ QUESTIONS 1-9: 0
SUM OF ALL RESPONSES TO PHQ9 QUESTIONS 1 & 2: 0

## 2023-08-10 NOTE — PROGRESS NOTES
Heart Disease Father                 Review of Systems:    Constitutional: Negative for fever, chills, weight loss    HEENT: Negative for nosebleeds, vision changes. Respiratory: Negative for cough, hemoptysis. Cardiovascular: Negative for chest pain, palpitations, no orthopnea with supplemental oxygen, no claudication, leg swelling, syncope, and no PND. Gastrointestinal: Negative for nausea, vomiting, no blood in stool and no melena. Genitourinary: Negative for dysuria, and hematuria. Musculoskeletal: Negative for myalgia, + shoulder arthralgia. S/p right BKA 06/2020. Skin: Negative for rash. Left sided biv pacer site with atrial port plug     Heme: Does not bleed or bruise easily. Neurological: Negative for speech change and focal weakness           Objective:       Vitals:    08/10/23 1033   BP: 136/68   Pulse: 78   SpO2: 98%            Physical Exam:    Constitutional: Well-developed and well-nourished. No respiratory  distress. Head: Normocephalic and atraumatic. Eyes: Pupils are equal, round. ENT: Hearing normal.   Neck: Supple. No JVD present. Cardiovascular: Normal rate, regular rhythm. Exam reveals no gallop and no friction rub. 2/6 systolic murmur. Pulmonary/Chest: Effort normal and breath sounds normal. No wheezes. Abdominal: Soft, no tenderness. Musculoskeletal: No edema. Right BKA. Left leg dressing for wound care per daughter  Neurological: Alert,oriented. Skin: Skin is warm and dry. Left chest pacer site well healed   Psychiatric: Normal mood and affect. Behavior is normal. Judgment and thought content normal.               Assessment/Plan:            Diagnosis Orders   1. Biventricular cardiac pacemaker in situ  2D ECHO COMPLETE ADULT (TTE) W OR WO CONTR- Clinic Performed      2. Longstanding persistent atrial fibrillation (HCC)  2D ECHO COMPLETE ADULT (TTE) W OR WO CONTR- Clinic Performed      3.  Secondary hypercoagulable state ([address])  2D ECHO COMPLETE ADULT

## 2023-08-11 ENCOUNTER — TELEPHONE (OUTPATIENT)
Age: 88
End: 2023-08-11

## 2023-08-11 DIAGNOSIS — M25.50 ARTHRALGIA, UNSPECIFIED JOINT: ICD-10-CM

## 2023-08-11 LAB
ECHO AO ASC DIAM: 3.2 CM
ECHO AO ASCENDING AORTA INDEX: 2.04 CM/M2
ECHO AO ROOT DIAM: 3 CM
ECHO AO ROOT INDEX: 1.91 CM/M2
ECHO AR MAX VEL PISA: 4 M/S
ECHO AV AREA PEAK VELOCITY: 1.3 CM2
ECHO AV AREA VTI: 1.3 CM2
ECHO AV AREA/BSA PEAK VELOCITY: 0.8 CM2/M2
ECHO AV AREA/BSA VTI: 0.8 CM2/M2
ECHO AV MEAN GRADIENT: 12 MMHG
ECHO AV MEAN VELOCITY: 1.6 M/S
ECHO AV PEAK GRADIENT: 22 MMHG
ECHO AV PEAK VELOCITY: 2.3 M/S
ECHO AV REGURGITANT PHT: 361.7 MILLISECOND
ECHO AV VELOCITY RATIO: 0.43
ECHO AV VTI: 45.6 CM
ECHO BSA: 1.57 M2
ECHO EST RA PRESSURE: 3 MMHG
ECHO LA VOL 2C: 104 ML (ref 22–52)
ECHO LA VOL 2C: 96 ML (ref 22–52)
ECHO LA VOL 4C: 93 ML (ref 22–52)
ECHO LA VOL 4C: 98 ML (ref 22–52)
ECHO LA VOLUME AREA LENGTH: 102 ML
ECHO LA VOLUME INDEX AREA LENGTH: 65 ML/M2 (ref 16–34)
ECHO LV E' LATERAL VELOCITY: 8 CM/S
ECHO LV E' SEPTAL VELOCITY: 7 CM/S
ECHO LV EDV A2C: 111 ML
ECHO LV EDV A4C: 89 ML
ECHO LV EDV BP: 107 ML (ref 56–104)
ECHO LV EDV INDEX A4C: 57 ML/M2
ECHO LV EDV INDEX BP: 68 ML/M2
ECHO LV EDV NDEX A2C: 71 ML/M2
ECHO LV EJECTION FRACTION A2C: 52 %
ECHO LV EJECTION FRACTION A4C: 38 %
ECHO LV EJECTION FRACTION BIPLANE: 46 % (ref 55–100)
ECHO LV ESV A2C: 53 ML
ECHO LV ESV A4C: 55 ML
ECHO LV ESV BP: 57 ML (ref 19–49)
ECHO LV ESV INDEX A2C: 34 ML/M2
ECHO LV ESV INDEX A4C: 35 ML/M2
ECHO LV ESV INDEX BP: 36 ML/M2
ECHO LV FRACTIONAL SHORTENING: 24 % (ref 28–44)
ECHO LV INTERNAL DIMENSION DIASTOLE INDEX: 3.12 CM/M2
ECHO LV INTERNAL DIMENSION DIASTOLIC: 4.9 CM (ref 3.9–5.3)
ECHO LV INTERNAL DIMENSION SYSTOLIC INDEX: 2.36 CM/M2
ECHO LV INTERNAL DIMENSION SYSTOLIC: 3.7 CM
ECHO LV IVSD: 0.9 CM (ref 0.6–0.9)
ECHO LV MASS 2D: 153 G (ref 67–162)
ECHO LV MASS INDEX 2D: 97.4 G/M2 (ref 43–95)
ECHO LV POSTERIOR WALL DIASTOLIC: 0.9 CM (ref 0.6–0.9)
ECHO LV RELATIVE WALL THICKNESS RATIO: 0.37
ECHO LVOT AREA: 2.8 CM2
ECHO LVOT AV VTI INDEX: 0.45
ECHO LVOT DIAM: 1.9 CM
ECHO LVOT MEAN GRADIENT: 2 MMHG
ECHO LVOT PEAK GRADIENT: 4 MMHG
ECHO LVOT PEAK VELOCITY: 1 M/S
ECHO LVOT STROKE VOLUME INDEX: 36.8 ML/M2
ECHO LVOT SV: 57.8 ML
ECHO LVOT VTI: 20.4 CM
ECHO MV A VELOCITY: 0.4 M/S
ECHO MV AREA PHT: 4.4 CM2
ECHO MV AREA VTI: 2.7 CM2
ECHO MV E DECELERATION TIME (DT): 172.4 MS
ECHO MV E VELOCITY: 0.89 M/S
ECHO MV E/A RATIO: 2.23
ECHO MV E/E' LATERAL: 11.13
ECHO MV E/E' RATIO (AVERAGED): 11.92
ECHO MV E/E' SEPTAL: 12.71
ECHO MV LVOT VTI INDEX: 1.05
ECHO MV MAX VELOCITY: 1.2 M/S
ECHO MV MEAN GRADIENT: 2 MMHG
ECHO MV MEAN VELOCITY: 0.6 M/S
ECHO MV PEAK GRADIENT: 6 MMHG
ECHO MV PRESSURE HALF TIME (PHT): 50 MS
ECHO MV REGURGITANT ALIASING (NYQUIST) VELOCITY: 44 CM/S
ECHO MV REGURGITANT VELOCITY PISA: 5.4 M/S
ECHO MV REGURGITANT VTIA: 162.7 CM
ECHO MV VTI: 21.4 CM
ECHO RA AREA 4C: 27.8 CM2
ECHO RA END SYSTOLIC VOLUME APICAL 4 CHAMBER INDEX BSA: 64 ML/M2
ECHO RA VOLUME: 100 ML
ECHO RIGHT VENTRICULAR SYSTOLIC PRESSURE (RVSP): 42 MMHG
ECHO RV INTERNAL DIMENSION: 3.7 CM
ECHO RV TAPSE: 1.2 CM (ref 1.7–?)
ECHO TV REGURGITANT MAX VELOCITY: 3.13 M/S
ECHO TV REGURGITANT PEAK GRADIENT: 39 MMHG

## 2023-08-11 RX ORDER — PREDNISONE 10 MG/1
10 TABLET ORAL DAILY
Qty: 30 TABLET | Refills: 5 | Status: SHIPPED | OUTPATIENT
Start: 2023-08-11

## 2023-08-11 NOTE — TELEPHONE ENCOUNTER
08/11/2023--It looks like  had sent in the prescription in June for 30 days with 5 REFILLS, so there should still be refills on it, but I have sent it in again to the requested pharmacy.

## 2023-08-11 NOTE — TELEPHONE ENCOUNTER
[facility] From [address] [place] called stating the patient daughter would like another refill for a 30 day supply of:    predniSONE (DELTASONE) 10 MG tablet    She also requested a call back to [facility] to inform them if the medication can or can not be refill.  [address] [facility]  Fax: [address] [phone] - f [phone]

## 2023-08-14 ENCOUNTER — TELEPHONE (OUTPATIENT)
Age: 88
End: 2023-08-14

## 2023-08-14 NOTE — TELEPHONE ENCOUNTER
----- Message from Kaila Corona MD sent at 8/12/2023  4:08 PM EDT -----  LVEF 46%  Ok with current meds

## 2023-08-17 ENCOUNTER — TELEPHONE (OUTPATIENT)
Age: 88
End: 2023-08-17

## 2023-08-18 NOTE — TELEPHONE ENCOUNTER
Per Pia Aguilera, NP:  Yes, 2.5 mg po bid. Faxed completed and signed patient assistance application to [employer] @ [phone]. Confirmation received. Attempted to reach patient's daughter by telephone. A message was left for return call. [employer] message sent.

## 2023-08-18 NOTE — TELEPHONE ENCOUNTER
Called [facility] @ [phone]. Confirmed with Chico Banda regarding eliquis dose change. Verbalized understanding.

## 2023-08-18 NOTE — TELEPHONE ENCOUNTER
Spoke to patient's daughter, Glendell Angelucci. Name noted on permission to release information. Identifiers x 2. Informed the patient assistance form had been faxed. Discussed rationale to decrease eliquis dose to 2.5 mg every 12 hours. Requested that I fax new order to [facility]. Faxed to [phone]- Attn:  Corie Campo. Confirmation received.

## 2023-08-21 ENCOUNTER — CLINICAL DOCUMENTATION (OUTPATIENT)
Age: 88
End: 2023-08-21

## 2023-08-21 NOTE — PROGRESS NOTES
8 second NSVT  x3 on BIV pacer  Optivol index is up    LVEF has increased to 46%  Not on entresto or inpefa due to cost    Will ask her to change lasix 40 mg qd to bumex 1 mg qd    Current Outpatient Medications on File Prior to Visit   Medication Sig Dispense Refill    apixaban (ELIQUIS) 2.5 MG TABS tablet Take 1 tablet by mouth 2 times daily 180 tablet 3    predniSONE (DELTASONE) 10 MG tablet Take 1 tablet by mouth daily 30 tablet 5    famotidine (PEPCID) 20 MG tablet       desonide (DESOWEN) 0.05 % cream desonide 0.05 % topical cream      calcipotriene (DOVONEX) 0.005 % solution       levothyroxine (SYNTHROID) 88 MCG tablet       sertraline (ZOLOFT) 100 MG tablet       acetaminophen (TYLENOL) 500 MG tablet Take by mouth every 8 hours      albuterol sulfate HFA (PROVENTIL;VENTOLIN;PROAIR) 108 (90 Base) MCG/ACT inhaler Inhale 1 puff into the lungs every 6 hours as needed      aluminum & magnesium hydroxide-simethicone (MAALOX) 200-200-20 MG/5ML SUSP suspension Take by mouth every 4 hours as needed      atorvastatin (LIPITOR) 10 MG tablet Take by mouth daily      Baclofen (LIORESAL) 5 MG tablet Take by mouth      Benzocaine-Clove Oil 20 % GEL Apply topically 4 times daily as needed      calcium carbonate (TUMS SMOOTHIES) 750 MG chewable tablet ceived the following from [facility]: Outside name: Tums 300 mg (750 mg) chewable tablet      carvedilol (COREG) 3.125 MG tablet TAKE 1 TABLET BY MOUTH TWICE A DAY WITH MEALS      chlorhexidine (PERIDEX) 0.12 % solution Take by mouth 2 times daily (with meals)      clobetasol (OLUX) 0.05 % foam APPLY A THIN FILM ON THE AFFECTED AREA 2 TIMES A DAY      clobetasol (TEMOVATE) 0.05 % cream Apply topically 2 times daily      diclofenac sodium (VOLTAREN) 1 % GEL Apply topically 3 times daily as needed      fluticasone-salmeterol (ADVAIR) 250-50 MCG/ACT AEPB diskus inhaler Inhale 1 puff into the lungs 2 times daily      furosemide (LASIX) 40 MG tablet TAKE ONE TAB BY

## 2023-08-23 ENCOUNTER — TELEPHONE (OUTPATIENT)
Age: 88
End: 2023-08-23

## 2023-08-23 NOTE — TELEPHONE ENCOUNTER
----- Message from Narcisa Cummings MD sent at 8/21/2023  7:19 AM EDT -----  8 second NSVT  x3 on BIV pacer  Optivol index is up    LVEF has increased to 46%  Not on entresto or inpefa due to cost    Will ask her to change lasix 40 mg qd to bumex 1 mg qd

## 2023-08-30 ENCOUNTER — TELEPHONE (OUTPATIENT)
Age: 88
End: 2023-08-30

## 2023-08-30 DIAGNOSIS — M25.50 ARTHRALGIA, UNSPECIFIED JOINT: ICD-10-CM

## 2023-08-30 RX ORDER — PREDNISONE 10 MG/1
10 TABLET ORAL DAILY
Qty: 30 TABLET | Refills: 5 | Status: SHIPPED | OUTPATIENT
Start: 2023-08-30

## 2023-09-07 LAB
AVERAGE GLUCOSE: NORMAL
HBA1C MFR BLD: 6.6 %

## 2023-09-14 NOTE — TELEPHONE ENCOUNTER
Spoke to [facility] @  [place] regarding patient assistance. Need to inquire if patient filed tax return last year. Spoke to patient's daughter, Fer Guerrero. Identifiers x 2. States patient does not file a tax return. Spoke to Alton Brito @ [place] . Informed of the above.

## 2023-09-18 RX ORDER — LEVOTHYROXINE SODIUM 88 UG/1
TABLET ORAL
Qty: 30 TABLET | Refills: 0 | Status: SHIPPED | OUTPATIENT
Start: 2023-09-18

## 2023-09-18 NOTE — TELEPHONE ENCOUNTER
----- Message from Ashlee Thomas sent at 8/30/2023  8:52 AM EDT -----  Subject: Refill Request    QUESTIONS  Name of Medication? predniSONE (DELTASONE) 10 MG tablet  Patient-reported dosage and instructions? one 10 MG tablet, daily   How many days do you have left? 0  Preferred Pharmacy? [address] [address] phone number (if available)? [phone]  ---------------------------------------------------------------------------  --------------  Tamiko PAK  What is the best way for the office to contact you? OK to leave message on   voicemail  Preferred Call Back Phone Number? [phone]  ---------------------------------------------------------------------------  --------------  SCRIPT ANSWERS  Relationship to Patient? Other/Third Party  Representative Name? Jules Leger  Is the representative on the Communication Release of Information (MARIA DOLORES)   form in Epic?  Yes Hemigard Intro: Due to skin fragility and wound tension, it was decided to use HEMIGARD adhesive retention suture devices to permit a linear closure. The skin was cleaned and dried for a 6cm distance away from the wound. Excessive hair, if present, was removed to allow for adhesion.

## 2023-09-21 ENCOUNTER — OFFICE VISIT (OUTPATIENT)
Age: 88
End: 2023-09-21
Payer: MEDICARE

## 2023-09-21 VITALS
BODY MASS INDEX: 22.14 KG/M2 | RESPIRATION RATE: 16 BRPM | DIASTOLIC BLOOD PRESSURE: 75 MMHG | OXYGEN SATURATION: 93 % | WEIGHT: 125 LBS | HEART RATE: 81 BPM | TEMPERATURE: 97.7 F | SYSTOLIC BLOOD PRESSURE: 121 MMHG

## 2023-09-21 DIAGNOSIS — S81.802A WOUND OF LEFT LOWER EXTREMITY, INITIAL ENCOUNTER: Primary | ICD-10-CM

## 2023-09-21 DIAGNOSIS — E03.4 ATROPHY OF THYROID (ACQUIRED): ICD-10-CM

## 2023-09-21 DIAGNOSIS — M25.50 ARTHRALGIA, UNSPECIFIED JOINT: ICD-10-CM

## 2023-09-21 DIAGNOSIS — I50.22 CHRONIC SYSTOLIC (CONGESTIVE) HEART FAILURE (HCC): ICD-10-CM

## 2023-09-21 DIAGNOSIS — R41.0 CONFUSION: ICD-10-CM

## 2023-09-21 DIAGNOSIS — E11.69 TYPE 2 DIABETES MELLITUS WITH OTHER SPECIFIED COMPLICATION, UNSPECIFIED WHETHER LONG TERM INSULIN USE (HCC): ICD-10-CM

## 2023-09-21 DIAGNOSIS — R10.32 LEFT LOWER QUADRANT ABDOMINAL PAIN: ICD-10-CM

## 2023-09-21 DIAGNOSIS — I10 ESSENTIAL (PRIMARY) HYPERTENSION: ICD-10-CM

## 2023-09-21 PROCEDURE — 1036F TOBACCO NON-USER: CPT | Performed by: INTERNAL MEDICINE

## 2023-09-21 PROCEDURE — G8427 DOCREV CUR MEDS BY ELIG CLIN: HCPCS | Performed by: INTERNAL MEDICINE

## 2023-09-21 PROCEDURE — 1090F PRES/ABSN URINE INCON ASSESS: CPT | Performed by: INTERNAL MEDICINE

## 2023-09-21 PROCEDURE — 99215 OFFICE O/P EST HI 40 MIN: CPT | Performed by: INTERNAL MEDICINE

## 2023-09-21 PROCEDURE — G8420 CALC BMI NORM PARAMETERS: HCPCS | Performed by: INTERNAL MEDICINE

## 2023-09-21 PROCEDURE — 1123F ACP DISCUSS/DSCN MKR DOCD: CPT | Performed by: INTERNAL MEDICINE

## 2023-09-21 PROCEDURE — 3044F HG A1C LEVEL LT 7.0%: CPT | Performed by: INTERNAL MEDICINE

## 2023-09-21 ASSESSMENT — ENCOUNTER SYMPTOMS
CONSTIPATION: 0
SHORTNESS OF BREATH: 0
BACK PAIN: 0
CHEST TIGHTNESS: 0
DIARRHEA: 0
ABDOMINAL PAIN: 1

## 2023-09-21 NOTE — PATIENT INSTRUCTIONS
Needs to be seen by Dr Wade Darden - wound dr at [facility] for L leg. Tramadol as needed. Stool softener and laxative if needed. CBC today. Labs in 3 months before follow up.

## 2023-09-22 LAB
BASOPHILS # BLD: 0 K/UL (ref 0–0.1)
BASOPHILS NFR BLD: 0 % (ref 0–1)
DIFFERENTIAL METHOD BLD: ABNORMAL
EOSINOPHIL # BLD: 0.1 K/UL (ref 0–0.4)
EOSINOPHIL NFR BLD: 1 % (ref 0–7)
ERYTHROCYTE [DISTWIDTH] IN BLOOD BY AUTOMATED COUNT: 12.9 % (ref 11.5–14.5)
HCT VFR BLD AUTO: 40.7 % (ref 35–47)
HGB BLD-MCNC: 13 G/DL (ref 11.5–16)
IMM GRANULOCYTES # BLD AUTO: 0.1 K/UL (ref 0–0.04)
IMM GRANULOCYTES NFR BLD AUTO: 1 % (ref 0–0.5)
LYMPHOCYTES # BLD: 1.6 K/UL (ref 0.8–3.5)
LYMPHOCYTES NFR BLD: 17 % (ref 12–49)
MCH RBC QN AUTO: 28.3 PG (ref 26–34)
MCHC RBC AUTO-ENTMCNC: 31.9 G/DL (ref 30–36.5)
MCV RBC AUTO: 88.7 FL (ref 80–99)
MONOCYTES # BLD: 0.8 K/UL (ref 0–1)
MONOCYTES NFR BLD: 9 % (ref 5–13)
NEUTS SEG # BLD: 6.6 K/UL (ref 1.8–8)
NEUTS SEG NFR BLD: 72 % (ref 32–75)
NRBC # BLD: 0 K/UL (ref 0–0.01)
NRBC BLD-RTO: 0 PER 100 WBC
PLATELET # BLD AUTO: 190 K/UL (ref 150–400)
PMV BLD AUTO: 12 FL (ref 8.9–12.9)
RBC # BLD AUTO: 4.59 M/UL (ref 3.8–5.2)
WBC # BLD AUTO: 9.2 K/UL (ref 3.6–11)

## 2023-10-03 ENCOUNTER — TELEPHONE (OUTPATIENT)
Age: 88
End: 2023-10-03

## 2023-10-03 ENCOUNTER — PATIENT MESSAGE (OUTPATIENT)
Age: 88
End: 2023-10-03

## 2023-10-03 NOTE — TELEPHONE ENCOUNTER
Daughter called for status of the rx for eloquis? What's the next step? Will there be more samples?       Pauline # [phone]

## 2023-10-16 ENCOUNTER — HOSPITAL ENCOUNTER (EMERGENCY)
Facility: HOSPITAL | Age: 88
Discharge: HOME | End: 2023-10-16
Attending: EMERGENCY MEDICINE
Payer: MEDICARE

## 2023-10-16 ENCOUNTER — APPOINTMENT (OUTPATIENT)
Facility: HOSPITAL | Age: 88
End: 2023-10-16
Payer: MEDICARE

## 2023-10-16 VITALS
TEMPERATURE: 97.6 F | WEIGHT: 120 LBS | HEART RATE: 74 BPM | BODY MASS INDEX: 22.08 KG/M2 | SYSTOLIC BLOOD PRESSURE: 128 MMHG | OXYGEN SATURATION: 96 % | HEIGHT: 62 IN | DIASTOLIC BLOOD PRESSURE: 70 MMHG | RESPIRATION RATE: 16 BRPM

## 2023-10-16 DIAGNOSIS — M54.50 ACUTE LOW BACK PAIN, UNSPECIFIED BACK PAIN LATERALITY, UNSPECIFIED WHETHER SCIATICA PRESENT: Primary | ICD-10-CM

## 2023-10-16 LAB
ALBUMIN SERPL-MCNC: 3.1 G/DL (ref 3.5–5)
ALBUMIN/GLOB SERPL: 0.8 (ref 1.1–2.2)
ALP SERPL-CCNC: 90 U/L (ref 45–117)
ALT SERPL-CCNC: 19 U/L (ref 12–78)
ANION GAP SERPL CALC-SCNC: 4 MMOL/L (ref 5–15)
APPEARANCE UR: CLEAR
AST SERPL-CCNC: 16 U/L (ref 15–37)
BACTERIA URNS QL MICRO: NEGATIVE /HPF
BASOPHILS # BLD: 0 K/UL (ref 0–0.1)
BASOPHILS NFR BLD: 1 % (ref 0–1)
BILIRUB SERPL-MCNC: 0.8 MG/DL (ref 0.2–1)
BILIRUB UR QL: NEGATIVE
BUN SERPL-MCNC: 37 MG/DL (ref 6–20)
BUN/CREAT SERPL: 36 (ref 12–20)
CALCIUM SERPL-MCNC: 9.1 MG/DL (ref 8.5–10.1)
CHLORIDE SERPL-SCNC: 102 MMOL/L (ref 97–108)
CO2 SERPL-SCNC: 30 MMOL/L (ref 21–32)
COLOR UR: ABNORMAL
COMMENT:: NORMAL
CREAT SERPL-MCNC: 1.02 MG/DL (ref 0.55–1.02)
DIFFERENTIAL METHOD BLD: ABNORMAL
EOSINOPHIL # BLD: 0.2 K/UL (ref 0–0.4)
EOSINOPHIL NFR BLD: 2 % (ref 0–7)
EPITH CASTS URNS QL MICRO: ABNORMAL /LPF
ERYTHROCYTE [DISTWIDTH] IN BLOOD BY AUTOMATED COUNT: 13 % (ref 11.5–14.5)
GLOBULIN SER CALC-MCNC: 4 G/DL (ref 2–4)
GLUCOSE SERPL-MCNC: 138 MG/DL (ref 65–100)
GLUCOSE UR STRIP.AUTO-MCNC: NEGATIVE MG/DL
HCT VFR BLD AUTO: 40.9 % (ref 35–47)
HGB BLD-MCNC: 13 G/DL (ref 11.5–16)
HGB UR QL STRIP: NEGATIVE
HYALINE CASTS URNS QL MICRO: ABNORMAL /LPF (ref 0–2)
IMM GRANULOCYTES # BLD AUTO: 0.1 K/UL (ref 0–0.04)
IMM GRANULOCYTES NFR BLD AUTO: 1 % (ref 0–0.5)
KETONES UR QL STRIP.AUTO: NEGATIVE MG/DL
LEUKOCYTE ESTERASE UR QL STRIP.AUTO: ABNORMAL
LIPASE SERPL-CCNC: 25 U/L (ref 13–75)
LYMPHOCYTES # BLD: 1 K/UL (ref 0.8–3.5)
LYMPHOCYTES NFR BLD: 12 % (ref 12–49)
MAGNESIUM SERPL-MCNC: 1.4 MG/DL (ref 1.6–2.4)
MCH RBC QN AUTO: 27.6 PG (ref 26–34)
MCHC RBC AUTO-ENTMCNC: 31.8 G/DL (ref 30–36.5)
MCV RBC AUTO: 86.8 FL (ref 80–99)
MONOCYTES # BLD: 0.8 K/UL (ref 0–1)
MONOCYTES NFR BLD: 10 % (ref 5–13)
NEUTS SEG # BLD: 6.1 K/UL (ref 1.8–8)
NEUTS SEG NFR BLD: 74 % (ref 32–75)
NITRITE UR QL STRIP.AUTO: NEGATIVE
NRBC # BLD: 0 K/UL (ref 0–0.01)
NRBC BLD-RTO: 0 PER 100 WBC
PH UR STRIP: 5 (ref 5–8)
PLATELET # BLD AUTO: 190 K/UL (ref 150–400)
PMV BLD AUTO: 11.6 FL (ref 8.9–12.9)
POTASSIUM SERPL-SCNC: 3.8 MMOL/L (ref 3.5–5.1)
PROT SERPL-MCNC: 7.1 G/DL (ref 6.4–8.2)
PROT UR STRIP-MCNC: NEGATIVE MG/DL
RBC # BLD AUTO: 4.71 M/UL (ref 3.8–5.2)
RBC #/AREA URNS HPF: ABNORMAL /HPF (ref 0–5)
SODIUM SERPL-SCNC: 136 MMOL/L (ref 136–145)
SP GR UR REFRACTOMETRY: 1.01 (ref 1–1.03)
SPECIMEN HOLD: NORMAL
SPECIMEN HOLD: NORMAL
UROBILINOGEN UR QL STRIP.AUTO: 0.2 EU/DL (ref 0.2–1)
WBC # BLD AUTO: 8.2 K/UL (ref 3.6–11)
WBC URNS QL MICRO: ABNORMAL /HPF (ref 0–4)

## 2023-10-16 PROCEDURE — 6360000004 HC RX CONTRAST MEDICATION: Performed by: EMERGENCY MEDICINE

## 2023-10-16 PROCEDURE — 99285 EMERGENCY DEPT VISIT HI MDM: CPT

## 2023-10-16 PROCEDURE — 81001 URINALYSIS AUTO W/SCOPE: CPT

## 2023-10-16 PROCEDURE — 72128 CT CHEST SPINE W/O DYE: CPT

## 2023-10-16 PROCEDURE — 74177 CT ABD & PELVIS W/CONTRAST: CPT

## 2023-10-16 PROCEDURE — 6370000000 HC RX 637 (ALT 250 FOR IP): Performed by: EMERGENCY MEDICINE

## 2023-10-16 PROCEDURE — 83735 ASSAY OF MAGNESIUM: CPT

## 2023-10-16 PROCEDURE — 85025 COMPLETE CBC W/AUTO DIFF WBC: CPT

## 2023-10-16 PROCEDURE — 36415 COLL VENOUS BLD VENIPUNCTURE: CPT

## 2023-10-16 PROCEDURE — 80053 COMPREHEN METABOLIC PANEL: CPT

## 2023-10-16 PROCEDURE — 83690 ASSAY OF LIPASE: CPT

## 2023-10-16 RX ORDER — CYCLOBENZAPRINE HCL 5 MG
5 TABLET ORAL 2 TIMES DAILY PRN
Qty: 10 TABLET | Refills: 0 | Status: SHIPPED | OUTPATIENT
Start: 2023-10-16 | End: 2023-10-21

## 2023-10-16 RX ORDER — LANOLIN ALCOHOL/MO/W.PET/CERES
800 CREAM (GRAM) TOPICAL DAILY
Status: DISCONTINUED | OUTPATIENT
Start: 2023-10-16 | End: 2023-10-17 | Stop reason: HOSPADM

## 2023-10-16 RX ORDER — CYCLOBENZAPRINE HCL 5 MG
5 TABLET ORAL 2 TIMES DAILY PRN
Qty: 10 TABLET | Refills: 0 | Status: SHIPPED | OUTPATIENT
Start: 2023-10-16 | End: 2023-10-16 | Stop reason: SDUPTHER

## 2023-10-16 RX ORDER — LIDOCAINE 50 MG/G
PATCH TOPICAL
Qty: 10 PATCH | Refills: 0 | Status: SHIPPED | OUTPATIENT
Start: 2023-10-16

## 2023-10-16 RX ADMIN — Medication 800 MG: at 16:11

## 2023-10-16 RX ADMIN — IOPAMIDOL 90 ML: 755 INJECTION, SOLUTION INTRAVENOUS at 15:47

## 2023-10-16 ASSESSMENT — PAIN DESCRIPTION - LOCATION: LOCATION: BACK;FLANK

## 2023-10-16 ASSESSMENT — PAIN DESCRIPTION - ORIENTATION: ORIENTATION: RIGHT

## 2023-10-16 ASSESSMENT — LIFESTYLE VARIABLES
HOW OFTEN DO YOU HAVE A DRINK CONTAINING ALCOHOL: NEVER
HOW MANY STANDARD DRINKS CONTAINING ALCOHOL DO YOU HAVE ON A TYPICAL DAY: PATIENT DOES NOT DRINK

## 2023-10-16 ASSESSMENT — PAIN SCALES - GENERAL: PAINLEVEL_OUTOF10: 5

## 2023-10-16 ASSESSMENT — PAIN - FUNCTIONAL ASSESSMENT: PAIN_FUNCTIONAL_ASSESSMENT: 0-10

## 2023-10-16 NOTE — ED PROVIDER NOTES
Patient signed out to me by Dr. Brennan Musa, I reexamined the patient and reviewed her labs and imaging. CT imaging negative for new fracture or acute surgical or infectious pathology. Chronic compression fracture at L3 post kyphoplasty. She has some evidence of degenerative changes and lumbar spinal stenosis as well, but again no acute surgical pathology. Per Dr. Brennan Musa she has a nonfocal neurologic exam.  Using tramadol and Tylenol without relief at home. Dr. Brennan Musa advised having her continue Flexeril (I told her not to take this with tramadol), and I will also write for lidocaine patches. Explained her results and the plan of care with her family in detail at the bedside and they are agreeable to the plan of care. Will have her follow up with [facility] as per the plan communicated to me by the previous physician.      Hallie Esposito MD  10/16/23 0675

## 2023-10-16 NOTE — ED TRIAGE NOTES
Pt presents to the ER today via EMS. Pt chief complaint is that of back and right side. Pt states the pain with touch and movement. Pt denies any trauma or fall and states it just started hurting.

## 2023-10-16 NOTE — DISCHARGE INSTRUCTIONS
- No evidence of new fracture on your CT scans, however there are some degenerative changes and evidence of spinal stenosis  - Continue Tylenol, Flexeril, and lidocaine patches as needed for pain.   Stop the tramadol as you should not take it together with Flexeril.  - Follow up with [facility] as soon as possible to be reevaluated  - Return immediately to the ER for fevers, worsening back pain, weakness in an arm or a leg, abdominal pain or vomiting, difficulty urinating or having a bowel movement, or difficulty walking

## 2023-10-16 NOTE — ED PROVIDER NOTES
EMERGENCY DEPARTMENT PHYSICIAN NOTE     Patient: Adal Brown     Time of Service: 10/16/2023 12:24 PM     Chief complaint:   Chief Complaint   Patient presents with    Back Pain     Pt reports pain in the lower spine and off to the right side of that. HISTORY:  Patient is a 80 y.o. female who presents to the emergency department with complaints of right back pain.        Past Medical History:   Diagnosis Date    Arthritis     Atrial fibrillation ([address])     av node ablation 5/22/98 with placement of CPI #[id number] dual chamber pacemaker subsequently replaced with a single chamber medtronic #[id number] single chamber pacemaker 7/25/05    Cancer ([address]) 1970's    colon cancer w/ resection    Depression     GERD (gastroesophageal reflux disease)     History of vascular access device 04/17/2020    4F MIDLINE PLACED BY ALEJANDRA Arredondo RN LEFT BRACHIAL, 13CM    Hyperlipidemia     Hypertension     Ischemic cardiomyopathy     Migraine headache     Orthostatic hypotension     ZULEIMA (obstructive sleep apnea)     Pacemaker 5/22/98    AV node ablation /pacemaker placement utilizing CPI # [id number] dual chamber system, medtronic #[id number] single chamber device placed 7/22/05    Pulmonary hypertension ([address]) 9/26/2011    RVSP 50 on echo 8/14    Thyroid disease     Valvular heart disease     mild-mod MR/TR        Past Surgical History:   Procedure Laterality Date    BREAST BIOPSY Right 2002    neg; surgical bx    BREAST SURGERY      benign breast tumor excision right breast    CARDIAC CATHETERIZATION N/A 05/22/2020    REMOVE & REPLACE PPM GEN BIV MULTI LEADS performed by Dinesh Shelton MD at [facility] CATH LAB    CARDIAC SURGERY N/A 05/22/2020    Lv Lead Placement performed by Dinesh Shelton MD at [facility] CATH LAB    IR KYPHOPLASTY LUMBAR FIRST LEVEL  7/2/2019    IR KYPHOPLASTY LUMBAR FIRST LEVEL 7/2/2019 [facility]    IR KYPHOPLASTY LUMBAR FIRST LEVEL  07/02/2019    PACEMAKER      PACEMAKER PLACEMENT  05/2020    RELOCATION OF SKIN POCKET

## 2023-10-26 ENCOUNTER — OFFICE VISIT (OUTPATIENT)
Age: 88
End: 2023-10-26
Payer: MEDICARE

## 2023-10-26 VITALS
BODY MASS INDEX: 21.26 KG/M2 | SYSTOLIC BLOOD PRESSURE: 129 MMHG | HEART RATE: 84 BPM | OXYGEN SATURATION: 95 % | HEIGHT: 63 IN | RESPIRATION RATE: 18 BRPM | TEMPERATURE: 98.9 F | DIASTOLIC BLOOD PRESSURE: 81 MMHG

## 2023-10-26 DIAGNOSIS — Z89.511 STATUS POST BELOW KNEE AMPUTATION, RIGHT (HCC): ICD-10-CM

## 2023-10-26 DIAGNOSIS — M25.50 ARTHRALGIA, UNSPECIFIED JOINT: Primary | ICD-10-CM

## 2023-10-26 DIAGNOSIS — S81.802A WOUND OF LEFT LOWER EXTREMITY, INITIAL ENCOUNTER: ICD-10-CM

## 2023-10-26 DIAGNOSIS — F32.A ANXIETY AND DEPRESSION: ICD-10-CM

## 2023-10-26 DIAGNOSIS — F41.9 ANXIETY AND DEPRESSION: ICD-10-CM

## 2023-10-26 DIAGNOSIS — R11.2 NAUSEA AND VOMITING, UNSPECIFIED VOMITING TYPE: ICD-10-CM

## 2023-10-26 DIAGNOSIS — I10 HYPERTENSION, UNSPECIFIED TYPE: ICD-10-CM

## 2023-10-26 PROCEDURE — 99214 OFFICE O/P EST MOD 30 MIN: CPT | Performed by: INTERNAL MEDICINE

## 2023-10-26 PROCEDURE — 1090F PRES/ABSN URINE INCON ASSESS: CPT | Performed by: INTERNAL MEDICINE

## 2023-10-26 PROCEDURE — 1036F TOBACCO NON-USER: CPT | Performed by: INTERNAL MEDICINE

## 2023-10-26 PROCEDURE — G8420 CALC BMI NORM PARAMETERS: HCPCS | Performed by: INTERNAL MEDICINE

## 2023-10-26 PROCEDURE — G8484 FLU IMMUNIZE NO ADMIN: HCPCS | Performed by: INTERNAL MEDICINE

## 2023-10-26 PROCEDURE — 1123F ACP DISCUSS/DSCN MKR DOCD: CPT | Performed by: INTERNAL MEDICINE

## 2023-10-26 PROCEDURE — G8427 DOCREV CUR MEDS BY ELIG CLIN: HCPCS | Performed by: INTERNAL MEDICINE

## 2023-10-26 RX ORDER — FUROSEMIDE 40 MG/1
TABLET ORAL
COMMUNITY
Start: 2023-09-18

## 2023-10-26 RX ORDER — TRAMADOL HYDROCHLORIDE 50 MG/1
50 TABLET ORAL EVERY 8 HOURS PRN
COMMUNITY

## 2023-10-26 ASSESSMENT — ENCOUNTER SYMPTOMS
BACK PAIN: 1
CHEST TIGHTNESS: 0
SHORTNESS OF BREATH: 0
ABDOMINAL DISTENTION: 0
DIARRHEA: 0
CONSTIPATION: 0
ABDOMINAL PAIN: 1

## 2023-11-20 ENCOUNTER — TELEPHONE (OUTPATIENT)
Age: 88
End: 2023-11-20

## 2023-11-20 NOTE — TELEPHONE ENCOUNTER
Everett trejo is calling wanting to let the doctor know patient is having bilateral shoulder pain but mostly in her right shoulder. Everett trejo is giving patient Tramadol for the pain.

## 2023-11-21 ENCOUNTER — PATIENT MESSAGE (OUTPATIENT)
Age: 88
End: 2023-11-21

## 2023-11-21 DIAGNOSIS — M25.512 LEFT SHOULDER PAIN, UNSPECIFIED CHRONICITY: Primary | ICD-10-CM

## 2023-11-22 ENCOUNTER — TELEPHONE (OUTPATIENT)
Age: 88
End: 2023-11-22

## 2023-11-22 NOTE — TELEPHONE ENCOUNTER
Aime from [facility] is calling to check on the status of a X ray order they needed to be fax over for the patient. (Left shoulder)  F:[phone]  P:[phone] Ext. [phone]

## 2023-11-27 ENCOUNTER — TELEPHONE (OUTPATIENT)
Age: 88
End: 2023-11-27

## 2023-11-27 NOTE — TELEPHONE ENCOUNTER
11/27/2023--I received a call from Ryan at [facility] wanting to know if an Xray can be ordered for patient's left shoulder NOW and they can do it there at the facility today and then patient could bring results to her appointment on Wednesday. I let her know that I would check with the doctor and give her a call back. She verbalized understanding and had no further question's at that time.

## 2023-11-28 ENCOUNTER — PATIENT MESSAGE (OUTPATIENT)
Age: 88
End: 2023-11-28

## 2023-11-28 NOTE — TELEPHONE ENCOUNTER
From: Jeaneth Mart  To: Dr. Daniel Siu: 11/21/2023 12:49 PM EST  Subject: Yobany Hernandez shoulder    Mom still having some shoulder discomfort  I'm checking in on her several times a day by phone. Although she's testing cov~, She's being quarantined in her apt as 1 of her 3 lunch chums tested positive yesterday. Hopefully she will remain neg - she has had the recent Covid vaccine as well as flu shot    In the event, her shoulder pain continues. Will you want to get x-ray of the shoulder or a CT scan of the shoulder. ?   Thank you

## 2023-11-28 NOTE — TELEPHONE ENCOUNTER
11/28/2023--Xray order faxed over to [phone] and stated on there to please fax the results to us since patient has an appointment with  tomorrow 11/29.

## 2023-11-29 ENCOUNTER — OFFICE VISIT (OUTPATIENT)
Age: 88
End: 2023-11-29
Payer: COMMERCIAL

## 2023-11-29 ENCOUNTER — TELEPHONE (OUTPATIENT)
Age: 88
End: 2023-11-29

## 2023-11-29 VITALS
HEIGHT: 63 IN | OXYGEN SATURATION: 97 % | HEART RATE: 80 BPM | BODY MASS INDEX: 21.26 KG/M2 | TEMPERATURE: 98.2 F | SYSTOLIC BLOOD PRESSURE: 130 MMHG | DIASTOLIC BLOOD PRESSURE: 78 MMHG | RESPIRATION RATE: 16 BRPM

## 2023-11-29 DIAGNOSIS — F32.A ANXIETY AND DEPRESSION: ICD-10-CM

## 2023-11-29 DIAGNOSIS — F41.9 ANXIETY AND DEPRESSION: ICD-10-CM

## 2023-11-29 DIAGNOSIS — I10 ESSENTIAL (PRIMARY) HYPERTENSION: ICD-10-CM

## 2023-11-29 DIAGNOSIS — M25.512 ACUTE PAIN OF LEFT SHOULDER: Primary | ICD-10-CM

## 2023-11-29 DIAGNOSIS — K21.00 GASTROESOPHAGEAL REFLUX DISEASE WITH ESOPHAGITIS, UNSPECIFIED WHETHER HEMORRHAGE: ICD-10-CM

## 2023-11-29 PROCEDURE — 99214 OFFICE O/P EST MOD 30 MIN: CPT | Performed by: INTERNAL MEDICINE

## 2023-11-29 PROCEDURE — G8428 CUR MEDS NOT DOCUMENT: HCPCS | Performed by: INTERNAL MEDICINE

## 2023-11-29 PROCEDURE — 1123F ACP DISCUSS/DSCN MKR DOCD: CPT | Performed by: INTERNAL MEDICINE

## 2023-11-29 PROCEDURE — 1036F TOBACCO NON-USER: CPT | Performed by: INTERNAL MEDICINE

## 2023-11-29 PROCEDURE — 1090F PRES/ABSN URINE INCON ASSESS: CPT | Performed by: INTERNAL MEDICINE

## 2023-11-29 PROCEDURE — G8484 FLU IMMUNIZE NO ADMIN: HCPCS | Performed by: INTERNAL MEDICINE

## 2023-11-29 PROCEDURE — G8420 CALC BMI NORM PARAMETERS: HCPCS | Performed by: INTERNAL MEDICINE

## 2023-11-29 RX ORDER — DOCUSATE SODIUM 100 MG/1
100 CAPSULE, LIQUID FILLED ORAL DAILY
COMMUNITY

## 2023-11-29 RX ORDER — FLUOCINONIDE TOPICAL SOLUTION USP, 0.05% 0.5 MG/ML
SOLUTION TOPICAL DAILY
COMMUNITY

## 2023-11-29 RX ORDER — CYCLOBENZAPRINE HCL 5 MG
5 TABLET ORAL AS NEEDED
COMMUNITY

## 2023-11-29 RX ORDER — PANTOPRAZOLE SODIUM 40 MG/1
40 TABLET, DELAYED RELEASE ORAL 2 TIMES DAILY
Qty: 180 TABLET | Refills: 1 | Status: SHIPPED | OUTPATIENT
Start: 2023-11-29

## 2023-11-29 ASSESSMENT — ENCOUNTER SYMPTOMS
DIARRHEA: 0
ABDOMINAL PAIN: 0
BACK PAIN: 0
CHEST TIGHTNESS: 0
CONSTIPATION: 0
SHORTNESS OF BREATH: 0

## 2023-11-29 NOTE — PROGRESS NOTES
Clifford Nicolas is a 80 y.o. female who presents today for Follow-up and Arm Pain (Left arm pain; started 5-7 days; pt is here to discuss xray results )  . She has a history of   Patient Active Problem List   Diagnosis    Anxiety and depression    GUILLERMO (acute kidney injury) ([address])    Mixed hyperlipidemia    Frequent falls    Essential (primary) hypertension    Hypothyroid    Dehydration    Biventricular cardiac pacemaker in situ    Gangrene of foot (HCC)    Ischemic cardiomyopathy    Restrictive lung disease    Psoriasis    Nausea & vomiting    Pacemaker    Osteopenia    Mitral regurgitation    COPD (chronic obstructive pulmonary disease) ([facility])    Chronic respiratory failure with hypoxia ([facility])    Stage 3 chronic kidney disease, unspecified whether stage 3a or 3b CKD ([address])    Status post below knee amputation, right ([address])    Type 2 diabetes mellitus with other specified complication, unspecified whether long term insulin use ([facility])    Chronic systolic (congestive) heart failure    Secondary hypercoagulable state ([address])   . Today patient is here for an acute visit. Left shoulder pain: Patient experiencing worsening shoulder pain for about a week to 10 days. We did order an x-ray yesterday that was done which does show advanced glenohumeral degenerative changes, but otherwise no acute findings. .  Has been taking Tylenol along with as needed tramadol. Patient reports continued significant discomfort. Has seen orthopedist several years ago for the shoulder, but denies that this has been a chronic issue. Denies any injury that led to escalation in pain. Patient does note that she has been a bit more absent-minded. Bit more difficulties with understanding conversations. This seems to have started since taking tramadol more frequently. Mood continues to wax and wane. She remains on an SSRI. Continues to work with physical therapy for range of motion and stretching.     Blood pressures today are

## 2023-11-29 NOTE — PATIENT INSTRUCTIONS
Ortho On Call  [address] [facility]    [phone] - [address]  [phone] - Fax

## 2023-11-29 NOTE — TELEPHONE ENCOUNTER
It looks like she saw her PCP, who notes that there's significant glenohumeral degeneration, so the issue appears to be ortho in nature. Unable to assess lead placement with images provided. She can send a remote transmission if she wants us to verify proper function/placement.

## 2023-11-29 NOTE — TELEPHONE ENCOUNTER
11/29/2023--This user called and spoke with Radha Kumar at [facility] requesting the patient's Xray results. She stated that she just got them back and would fax them over to us (Provided the fax number), also she stated that the daughter was there this morning and she would send a copy with her as well.

## 2023-12-04 DIAGNOSIS — M25.512 LEFT SHOULDER PAIN, UNSPECIFIED CHRONICITY: Primary | ICD-10-CM

## 2023-12-04 RX ORDER — TRAMADOL HYDROCHLORIDE 50 MG/1
50 TABLET ORAL EVERY 8 HOURS PRN
Qty: 60 TABLET | Refills: 1 | Status: SHIPPED | OUTPATIENT
Start: 2023-12-04 | End: 2024-01-13

## 2023-12-12 ENCOUNTER — PATIENT MESSAGE (OUTPATIENT)
Age: 88
End: 2023-12-12

## 2023-12-12 PROBLEM — Z89.511 ACQUIRED ABSENCE OF RIGHT LEG BELOW KNEE (HCC): Status: ACTIVE | Noted: 2022-04-29

## 2023-12-12 PROBLEM — I48.20 CHRONIC ATRIAL FIBRILLATION (HCC): Status: ACTIVE | Noted: 2023-12-12

## 2023-12-13 ENCOUNTER — OFFICE VISIT (OUTPATIENT)
Age: 88
End: 2023-12-13
Payer: MEDICARE

## 2023-12-13 VITALS
SYSTOLIC BLOOD PRESSURE: 110 MMHG | BODY MASS INDEX: 21.26 KG/M2 | TEMPERATURE: 98.3 F | OXYGEN SATURATION: 95 % | HEIGHT: 63 IN | RESPIRATION RATE: 16 BRPM | HEART RATE: 80 BPM | DIASTOLIC BLOOD PRESSURE: 72 MMHG

## 2023-12-13 DIAGNOSIS — F41.9 ANXIETY AND DEPRESSION: ICD-10-CM

## 2023-12-13 DIAGNOSIS — Z89.511 ACQUIRED ABSENCE OF RIGHT LEG BELOW KNEE (HCC): ICD-10-CM

## 2023-12-13 DIAGNOSIS — Z95.0 BIVENTRICULAR CARDIAC PACEMAKER IN SITU: ICD-10-CM

## 2023-12-13 DIAGNOSIS — I25.5 ISCHEMIC CARDIOMYOPATHY: ICD-10-CM

## 2023-12-13 DIAGNOSIS — F32.A ANXIETY AND DEPRESSION: ICD-10-CM

## 2023-12-13 DIAGNOSIS — E11.69 TYPE 2 DIABETES MELLITUS WITH OTHER SPECIFIED COMPLICATION, UNSPECIFIED WHETHER LONG TERM INSULIN USE (HCC): ICD-10-CM

## 2023-12-13 DIAGNOSIS — M25.512 LEFT SHOULDER PAIN, UNSPECIFIED CHRONICITY: ICD-10-CM

## 2023-12-13 DIAGNOSIS — I48.20 CHRONIC ATRIAL FIBRILLATION (HCC): ICD-10-CM

## 2023-12-13 DIAGNOSIS — E03.9 HYPOTHYROIDISM, UNSPECIFIED TYPE: Primary | ICD-10-CM

## 2023-12-13 DIAGNOSIS — I10 ESSENTIAL (PRIMARY) HYPERTENSION: ICD-10-CM

## 2023-12-13 PROCEDURE — 1036F TOBACCO NON-USER: CPT | Performed by: INTERNAL MEDICINE

## 2023-12-13 PROCEDURE — G8420 CALC BMI NORM PARAMETERS: HCPCS | Performed by: INTERNAL MEDICINE

## 2023-12-13 PROCEDURE — G8428 CUR MEDS NOT DOCUMENT: HCPCS | Performed by: INTERNAL MEDICINE

## 2023-12-13 PROCEDURE — 3044F HG A1C LEVEL LT 7.0%: CPT | Performed by: INTERNAL MEDICINE

## 2023-12-13 PROCEDURE — 1090F PRES/ABSN URINE INCON ASSESS: CPT | Performed by: INTERNAL MEDICINE

## 2023-12-13 PROCEDURE — 1123F ACP DISCUSS/DSCN MKR DOCD: CPT | Performed by: INTERNAL MEDICINE

## 2023-12-13 PROCEDURE — 99214 OFFICE O/P EST MOD 30 MIN: CPT | Performed by: INTERNAL MEDICINE

## 2023-12-13 PROCEDURE — G8484 FLU IMMUNIZE NO ADMIN: HCPCS | Performed by: INTERNAL MEDICINE

## 2023-12-13 ASSESSMENT — ENCOUNTER SYMPTOMS
BACK PAIN: 0
ANAL BLEEDING: 0
ABDOMINAL PAIN: 0
BLOOD IN STOOL: 0
SHORTNESS OF BREATH: 0
CONSTIPATION: 1
DIARRHEA: 0
CHEST TIGHTNESS: 0
COLOR CHANGE: 0

## 2023-12-13 NOTE — PROGRESS NOTES
single chamber medtronic #[id number] single chamber pacemaker 7/25/05    Cancer ([address]) 1970's    colon cancer w/ resection    Depression     GERD (gastroesophageal reflux disease)     History of vascular access device 04/17/2020    4F MIDLINE PLACED BY ALEJANDRA Jones RN LEFT BRACHIAL, 13CM    Hyperlipidemia     Hypertension     Ischemic cardiomyopathy     Migraine headache     Orthostatic hypotension     ZULEIMA (obstructive sleep apnea)     Pacemaker 5/22/98    AV node ablation /pacemaker placement utilizing CPI # [id number] dual chamber system, medtronic #[id number] single chamber device placed 7/22/05    Pulmonary hypertension ([address]) 9/26/2011    RVSP 50 on echo 8/14    Thyroid disease     Valvular heart disease     mild-mod MR/TR      Past Surgical History:   Procedure Laterality Date    BREAST BIOPSY Right 2002    neg; surgical bx    BREAST SURGERY      benign breast tumor excision right breast    CARDIAC CATHETERIZATION N/A 05/22/2020    REMOVE & REPLACE PPM GEN BIV MULTI LEADS performed by Narcisa Cummings MD at [facility] CATH LAB    CARDIAC SURGERY N/A 05/22/2020    Lv Lead Placement [facility]    IR KYPHOPLASTY LUMBAR FIRST LEVEL  7/2/2019    IR KYPHOPLASTY LUMBAR FIRST LEVEL 7/2/2019 [facility] IR    IR KYPHOPLASTY LUMBAR FIRST LEVEL  07/02/2019    PACEMAKER      PACEMAKER PLACEMENT  05/2020    RELOCATION OF SKIN POCKET FOR PACEMAKER N/A 10/09/2020    RELOCATE PACEMAKER POCKET performed by Narcisa Cummings MD at [facility] CATH LAB    TOTAL COLECTOMY  1974    colon    TOTAL KNEE ARTHROPLASTY      right TKA    TOTAL KNEE ARTHROPLASTY      total on R, partial on L    TUBAL LIGATION      UPPER GASTROINTESTINAL ENDOSCOPY  04/11/2022    Dr Jackson Kan History     Tobacco Use    Smoking status: Never    Smokeless tobacco: Never   Substance Use Topics    Alcohol use: No     Alcohol/week: 0.0 standard drinks of alcohol      Family History   Problem Relation Age of Onset    Heart Attack Mother

## 2024-01-01 ENCOUNTER — PATIENT MESSAGE (OUTPATIENT)
Age: 89
End: 2024-01-01

## 2024-01-01 RX ORDER — NIRMATRELVIR AND RITONAVIR 300-100 MG
KIT ORAL
Qty: 30 TABLET | Refills: 0 | Status: SHIPPED | OUTPATIENT
Start: 2024-01-01 | End: 2024-01-01 | Stop reason: SDUPTHER

## 2024-01-01 RX ORDER — SERTRALINE HYDROCHLORIDE 100 MG/1
100 TABLET, FILM COATED ORAL DAILY
Qty: 90 TABLET | Refills: 1 | Status: SHIPPED | OUTPATIENT
Start: 2024-01-01 | End: 2024-03-19

## 2024-01-01 RX ORDER — NIRMATRELVIR AND RITONAVIR 300-100 MG
KIT ORAL
Qty: 30 TABLET | Refills: 0 | Status: SHIPPED | OUTPATIENT
Start: 2024-01-01

## 2024-01-02 NOTE — TELEPHONE ENCOUNTER
From: Irma Lea  To: Dr. Pk Goodman  Sent: 1/1/2024 10:52 AM EST  Subject: Irma Lea    Tested Covid * yesterday  Fairly mild symptoms  She will start paxlovid today - Eliquis will be held during the course of plaxlovid

## 2024-01-10 ENCOUNTER — TELEPHONE (OUTPATIENT)
Age: 89
End: 2024-01-10

## 2024-01-10 RX ORDER — BENZONATATE 100 MG/1
100 CAPSULE ORAL 3 TIMES DAILY PRN
Qty: 30 CAPSULE | Refills: 1 | Status: SHIPPED | OUTPATIENT
Start: 2024-01-10

## 2024-01-10 RX ORDER — BENZONATATE 100 MG/1
100 CAPSULE ORAL 3 TIMES DAILY PRN
COMMUNITY
End: 2024-01-10 | Stop reason: SDUPTHER

## 2024-01-10 NOTE — TELEPHONE ENCOUNTER
Everett Poole is calling requesting to speak with the nurse. Everett Poole states patient had covid but still has a cough. Everett Poole wants to know if the doctor could call in a cough medication. Please advise

## 2024-01-10 NOTE — TELEPHONE ENCOUNTER
01/10/2024--I called Lynn Poole and spoke with the nurse who called and let her know that [facility] and asked where it needed to go. She verbalized understanding and stated that it needs to go to [facility]. RX sent in and all question's answered at that time.

## 2024-01-11 ENCOUNTER — TELEPHONE (OUTPATIENT)
Age: 89
End: 2024-01-11

## 2024-01-11 DIAGNOSIS — Z11.1 TUBERCULOSIS SCREENING: Primary | ICD-10-CM

## 2024-01-11 NOTE — TELEPHONE ENCOUNTER
Everett Poole called they need an order for a chest xray and the forms that were sent for a transfer need to be completed and faxed back.    Dariana Paige [phone]     Fax: [phone]

## 2024-01-12 ENCOUNTER — TELEPHONE (OUTPATIENT)
Age: 89
End: 2024-01-12

## 2024-01-12 NOTE — TELEPHONE ENCOUNTER
01/12/2024--Order placed and will attempt to fax over to the number provided. Of note, that fax number has not been working.

## 2024-01-12 NOTE — TELEPHONE ENCOUNTER
01/12/2014--I called and spoke with Dariana and let her know that I have faxed it multiple times to the number that was on the forms and it did not go through (She stated that they have been having issues all week with that number), I let her know I attempted to fax to the other number she provided and it still did not go through. She stated that they must be having problems with that number as well and gave me another fax number which is [phone]. I let her know I will try to send everything to that number. She verbalized understanding and did not have any question's at that time.

## 2024-01-12 NOTE — TELEPHONE ENCOUNTER
Meliza the administer called from [facility] stated that she wanted to give the office the correct fax number which is listed below. She will like for the original paperwork to be resent to their office along with a order for a chest xray.       P:[phone]  F:[phone]

## 2024-01-13 ENCOUNTER — TELEPHONE (OUTPATIENT)
Age: 89
End: 2024-01-13

## 2024-01-13 NOTE — TELEPHONE ENCOUNTER
Paged. Daughter thinks that mom is having some confusion. No focal abnormality. Normal vitals. Recovering from COVID.   Discussed with [facility] nurse that we can order UA with Cx to check for UTI.

## 2024-01-15 ENCOUNTER — TELEPHONE (OUTPATIENT)
Age: 89
End: 2024-01-15

## 2024-01-15 NOTE — TELEPHONE ENCOUNTER
01/15/2024--I called and spoke with the charge nurse and let her know that I did receive the results and will give to . She verbalized understanding and had no question's at that time.

## 2024-01-15 NOTE — TELEPHONE ENCOUNTER
Renu the Charge Nurse called from [facility] and stated that the results from the chest X-ray are in an was faxed over to our office. She will like a call back to confirm     P:[phone] Ext. [phone]

## 2024-01-19 RX ORDER — GUAIFENESIN 600 MG/1
600 TABLET, EXTENDED RELEASE ORAL 2 TIMES DAILY
Qty: 10 TABLET | Refills: 0 | Status: SHIPPED | OUTPATIENT
Start: 2024-01-19 | End: 2024-01-24

## 2024-01-27 RX ORDER — MIRTAZAPINE 7.5 MG/1
7.5 TABLET, FILM COATED ORAL NIGHTLY
Qty: 30 TABLET | Refills: 5 | Status: SHIPPED | OUTPATIENT
Start: 2024-01-27

## 2024-01-31 ENCOUNTER — HOSPITAL ENCOUNTER (INPATIENT)
Facility: HOSPITAL | Age: 89
LOS: 19 days | Discharge: NURSING, REHAB OR LONG-TERM CARE | DRG: 314 | End: 2024-02-19
Attending: EMERGENCY MEDICINE | Admitting: HOSPITALIST
Payer: MEDICARE

## 2024-01-31 ENCOUNTER — TELEPHONE (OUTPATIENT)
Age: 89
End: 2024-01-31

## 2024-01-31 ENCOUNTER — APPOINTMENT (OUTPATIENT)
Facility: HOSPITAL | Age: 89
DRG: 314 | End: 2024-01-31
Payer: MEDICARE

## 2024-01-31 DIAGNOSIS — U07.1 COVID: ICD-10-CM

## 2024-01-31 DIAGNOSIS — G93.41 METABOLIC ENCEPHALOPATHY: ICD-10-CM

## 2024-01-31 DIAGNOSIS — R78.81 BACTEREMIA: ICD-10-CM

## 2024-01-31 DIAGNOSIS — A41.9 SEPTICEMIA (HCC): Primary | ICD-10-CM

## 2024-01-31 LAB
ALBUMIN SERPL-MCNC: 2 G/DL (ref 3.5–5)
ALBUMIN/GLOB SERPL: 0.4 (ref 1.1–2.2)
ALP SERPL-CCNC: 128 U/L (ref 45–117)
ALT SERPL-CCNC: 9 U/L (ref 12–78)
ANION GAP SERPL CALC-SCNC: 6 MMOL/L (ref 5–15)
APPEARANCE UR: ABNORMAL
AST SERPL-CCNC: 14 U/L (ref 15–37)
BACTERIA URNS QL MICRO: ABNORMAL /HPF
BASOPHILS # BLD: 0 K/UL (ref 0–0.1)
BASOPHILS NFR BLD: 0 % (ref 0–1)
BILIRUB SERPL-MCNC: 1 MG/DL (ref 0.2–1)
BILIRUB UR QL: NEGATIVE
BUN SERPL-MCNC: 36 MG/DL (ref 6–20)
BUN/CREAT SERPL: 34 (ref 12–20)
CALCIUM SERPL-MCNC: 8.3 MG/DL (ref 8.5–10.1)
CHLORIDE SERPL-SCNC: 103 MMOL/L (ref 97–108)
CO2 SERPL-SCNC: 28 MMOL/L (ref 21–32)
COLOR UR: ABNORMAL
CREAT SERPL-MCNC: 1.06 MG/DL (ref 0.55–1.02)
DIFFERENTIAL METHOD BLD: ABNORMAL
EOSINOPHIL # BLD: 0 K/UL (ref 0–0.4)
EOSINOPHIL NFR BLD: 0 % (ref 0–7)
EPITH CASTS URNS QL MICRO: ABNORMAL /LPF
ERYTHROCYTE [DISTWIDTH] IN BLOOD BY AUTOMATED COUNT: 13.7 % (ref 11.5–14.5)
FLUAV AG NPH QL IA: NEGATIVE
FLUBV AG NOSE QL IA: NEGATIVE
GLOBULIN SER CALC-MCNC: 4.6 G/DL (ref 2–4)
GLUCOSE BLD STRIP.AUTO-MCNC: 197 MG/DL (ref 65–117)
GLUCOSE BLD STRIP.AUTO-MCNC: 199 MG/DL (ref 65–117)
GLUCOSE SERPL-MCNC: 211 MG/DL (ref 65–100)
GLUCOSE UR STRIP.AUTO-MCNC: NEGATIVE MG/DL
HCT VFR BLD AUTO: 37.2 % (ref 35–47)
HGB BLD-MCNC: 12.2 G/DL (ref 11.5–16)
HGB UR QL STRIP: NEGATIVE
IMM GRANULOCYTES # BLD AUTO: 0.4 K/UL (ref 0–0.04)
IMM GRANULOCYTES NFR BLD AUTO: 2 % (ref 0–0.5)
KETONES UR QL STRIP.AUTO: NEGATIVE MG/DL
LACTATE BLD-SCNC: 1.61 MMOL/L (ref 0.4–2)
LACTATE SERPL-SCNC: 1.3 MMOL/L (ref 0.4–2)
LEUKOCYTE ESTERASE UR QL STRIP.AUTO: ABNORMAL
LYMPHOCYTES # BLD: 0.5 K/UL (ref 0.8–3.5)
LYMPHOCYTES NFR BLD: 3 % (ref 12–49)
MAGNESIUM SERPL-MCNC: 1 MG/DL (ref 1.6–2.4)
MCH RBC QN AUTO: 28.4 PG (ref 26–34)
MCHC RBC AUTO-ENTMCNC: 32.8 G/DL (ref 30–36.5)
MCV RBC AUTO: 86.5 FL (ref 80–99)
MONOCYTES # BLD: 0.7 K/UL (ref 0–1)
MONOCYTES NFR BLD: 4 % (ref 5–13)
NEUTS SEG # BLD: 16.6 K/UL (ref 1.8–8)
NEUTS SEG NFR BLD: 91 % (ref 32–75)
NITRITE UR QL STRIP.AUTO: NEGATIVE
NRBC # BLD: 0 K/UL (ref 0–0.01)
NRBC BLD-RTO: 0 PER 100 WBC
OTHER: ABNORMAL
PH UR STRIP: 5 (ref 5–8)
PLATELET # BLD AUTO: 167 K/UL (ref 150–400)
PMV BLD AUTO: 11.9 FL (ref 8.9–12.9)
POTASSIUM SERPL-SCNC: 3.6 MMOL/L (ref 3.5–5.1)
PROCALCITONIN SERPL-MCNC: 0.42 NG/ML
PROT SERPL-MCNC: 6.6 G/DL (ref 6.4–8.2)
PROT UR STRIP-MCNC: 30 MG/DL
RBC # BLD AUTO: 4.3 M/UL (ref 3.8–5.2)
RBC #/AREA URNS HPF: ABNORMAL /HPF (ref 0–5)
RBC MORPH BLD: ABNORMAL
SARS-COV-2 RDRP RESP QL NAA+PROBE: DETECTED
SERVICE CMNT-IMP: ABNORMAL
SERVICE CMNT-IMP: ABNORMAL
SODIUM SERPL-SCNC: 137 MMOL/L (ref 136–145)
SOURCE: ABNORMAL
SP GR UR REFRACTOMETRY: 1.02 (ref 1–1.03)
TROPONIN I SERPL HS-MCNC: 50 NG/L (ref 0–51)
TROPONIN I SERPL HS-MCNC: 65 NG/L (ref 0–51)
URINE CULTURE IF INDICATED: ABNORMAL
UROBILINOGEN UR QL STRIP.AUTO: 1 EU/DL (ref 0.2–1)
WBC # BLD AUTO: 18.2 K/UL (ref 3.6–11)
WBC URNS QL MICRO: ABNORMAL /HPF (ref 0–4)

## 2024-01-31 PROCEDURE — 83605 ASSAY OF LACTIC ACID: CPT

## 2024-01-31 PROCEDURE — 96375 TX/PRO/DX INJ NEW DRUG ADDON: CPT

## 2024-01-31 PROCEDURE — 1100000000 HC RM PRIVATE

## 2024-01-31 PROCEDURE — 2580000003 HC RX 258: Performed by: HOSPITALIST

## 2024-01-31 PROCEDURE — 87040 BLOOD CULTURE FOR BACTERIA: CPT

## 2024-01-31 PROCEDURE — 2580000003 HC RX 258: Performed by: EMERGENCY MEDICINE

## 2024-01-31 PROCEDURE — 82962 GLUCOSE BLOOD TEST: CPT

## 2024-01-31 PROCEDURE — 36415 COLL VENOUS BLD VENIPUNCTURE: CPT

## 2024-01-31 PROCEDURE — 87635 SARS-COV-2 COVID-19 AMP PRB: CPT

## 2024-01-31 PROCEDURE — 6360000002 HC RX W HCPCS: Performed by: HOSPITALIST

## 2024-01-31 PROCEDURE — 83735 ASSAY OF MAGNESIUM: CPT

## 2024-01-31 PROCEDURE — 85025 COMPLETE CBC W/AUTO DIFF WBC: CPT

## 2024-01-31 PROCEDURE — 6360000002 HC RX W HCPCS: Performed by: EMERGENCY MEDICINE

## 2024-01-31 PROCEDURE — 71045 X-RAY EXAM CHEST 1 VIEW: CPT

## 2024-01-31 PROCEDURE — 96361 HYDRATE IV INFUSION ADD-ON: CPT

## 2024-01-31 PROCEDURE — 96366 THER/PROPH/DIAG IV INF ADDON: CPT

## 2024-01-31 PROCEDURE — 84145 PROCALCITONIN (PCT): CPT

## 2024-01-31 PROCEDURE — 99285 EMERGENCY DEPT VISIT HI MDM: CPT

## 2024-01-31 PROCEDURE — 84484 ASSAY OF TROPONIN QUANT: CPT

## 2024-01-31 PROCEDURE — 81001 URINALYSIS AUTO W/SCOPE: CPT

## 2024-01-31 PROCEDURE — 80053 COMPREHEN METABOLIC PANEL: CPT

## 2024-01-31 PROCEDURE — 2060000000 HC ICU INTERMEDIATE R&B

## 2024-01-31 PROCEDURE — 87077 CULTURE AEROBIC IDENTIFY: CPT

## 2024-01-31 PROCEDURE — 87804 INFLUENZA ASSAY W/OPTIC: CPT

## 2024-01-31 PROCEDURE — 83036 HEMOGLOBIN GLYCOSYLATED A1C: CPT

## 2024-01-31 PROCEDURE — 96365 THER/PROPH/DIAG IV INF INIT: CPT

## 2024-01-31 PROCEDURE — 93005 ELECTROCARDIOGRAM TRACING: CPT | Performed by: EMERGENCY MEDICINE

## 2024-01-31 RX ORDER — INSULIN LISPRO 100 [IU]/ML
0-4 INJECTION, SOLUTION INTRAVENOUS; SUBCUTANEOUS
Status: DISCONTINUED | OUTPATIENT
Start: 2024-01-31 | End: 2024-02-01

## 2024-01-31 RX ORDER — ACETAMINOPHEN 650 MG/1
650 SUPPOSITORY RECTAL EVERY 6 HOURS PRN
Status: DISCONTINUED | OUTPATIENT
Start: 2024-01-31 | End: 2024-02-18

## 2024-01-31 RX ORDER — ONDANSETRON 2 MG/ML
4 INJECTION INTRAMUSCULAR; INTRAVENOUS EVERY 6 HOURS PRN
Status: DISCONTINUED | OUTPATIENT
Start: 2024-01-31 | End: 2024-01-31

## 2024-01-31 RX ORDER — SODIUM CHLORIDE 9 MG/ML
INJECTION, SOLUTION INTRAVENOUS CONTINUOUS
Status: DISCONTINUED | OUTPATIENT
Start: 2024-01-31 | End: 2024-02-01

## 2024-01-31 RX ORDER — ACETAMINOPHEN 325 MG/1
650 TABLET ORAL EVERY 6 HOURS PRN
Status: DISCONTINUED | OUTPATIENT
Start: 2024-01-31 | End: 2024-02-19 | Stop reason: HOSPADM

## 2024-01-31 RX ORDER — BUMETANIDE 1 MG/1
1 TABLET ORAL DAILY
Status: DISCONTINUED | OUTPATIENT
Start: 2024-02-01 | End: 2024-02-01

## 2024-01-31 RX ORDER — ONDANSETRON 4 MG/1
4 TABLET, ORALLY DISINTEGRATING ORAL EVERY 8 HOURS PRN
Status: DISCONTINUED | OUTPATIENT
Start: 2024-01-31 | End: 2024-01-31

## 2024-01-31 RX ORDER — POLYETHYLENE GLYCOL 3350 17 G/17G
17 POWDER, FOR SOLUTION ORAL DAILY PRN
Status: DISCONTINUED | OUTPATIENT
Start: 2024-01-31 | End: 2024-02-19 | Stop reason: HOSPADM

## 2024-01-31 RX ORDER — LEVOTHYROXINE SODIUM 88 UG/1
88 TABLET ORAL DAILY
Status: DISCONTINUED | OUTPATIENT
Start: 2024-02-01 | End: 2024-02-19 | Stop reason: HOSPADM

## 2024-01-31 RX ORDER — ALBUTEROL SULFATE 90 UG/1
1 AEROSOL, METERED RESPIRATORY (INHALATION) EVERY 6 HOURS PRN
Status: DISCONTINUED | OUTPATIENT
Start: 2024-01-31 | End: 2024-02-19 | Stop reason: HOSPADM

## 2024-01-31 RX ORDER — 0.9 % SODIUM CHLORIDE 0.9 %
1000 INTRAVENOUS SOLUTION INTRAVENOUS ONCE
Status: COMPLETED | OUTPATIENT
Start: 2024-01-31 | End: 2024-01-31

## 2024-01-31 RX ORDER — KETOROLAC TROMETHAMINE 15 MG/ML
15 INJECTION, SOLUTION INTRAMUSCULAR; INTRAVENOUS ONCE
Status: COMPLETED | OUTPATIENT
Start: 2024-01-31 | End: 2024-01-31

## 2024-01-31 RX ORDER — SODIUM CHLORIDE 0.9 % (FLUSH) 0.9 %
5-40 SYRINGE (ML) INJECTION PRN
Status: DISCONTINUED | OUTPATIENT
Start: 2024-01-31 | End: 2024-02-19 | Stop reason: HOSPADM

## 2024-01-31 RX ORDER — ATORVASTATIN CALCIUM 10 MG/1
10 TABLET, FILM COATED ORAL DAILY
Status: DISCONTINUED | OUTPATIENT
Start: 2024-02-01 | End: 2024-02-18

## 2024-01-31 RX ORDER — SERTRALINE HYDROCHLORIDE 25 MG/1
100 TABLET, FILM COATED ORAL DAILY
Status: DISCONTINUED | OUTPATIENT
Start: 2024-02-01 | End: 2024-02-06 | Stop reason: SDUPTHER

## 2024-01-31 RX ORDER — CARVEDILOL 3.12 MG/1
3.12 TABLET ORAL 2 TIMES DAILY WITH MEALS
Status: DISCONTINUED | OUTPATIENT
Start: 2024-01-31 | End: 2024-02-19 | Stop reason: HOSPADM

## 2024-01-31 RX ORDER — MAGNESIUM SULFATE 1 G/100ML
1000 INJECTION INTRAVENOUS ONCE
Status: COMPLETED | OUTPATIENT
Start: 2024-01-31 | End: 2024-01-31

## 2024-01-31 RX ORDER — SODIUM CHLORIDE 0.9 % (FLUSH) 0.9 %
5-40 SYRINGE (ML) INJECTION EVERY 12 HOURS SCHEDULED
Status: DISCONTINUED | OUTPATIENT
Start: 2024-01-31 | End: 2024-02-19 | Stop reason: HOSPADM

## 2024-01-31 RX ORDER — FENTANYL CITRATE 50 UG/ML
25 INJECTION, SOLUTION INTRAMUSCULAR; INTRAVENOUS EVERY 4 HOURS PRN
Status: DISCONTINUED | OUTPATIENT
Start: 2024-01-31 | End: 2024-02-06

## 2024-01-31 RX ORDER — SODIUM CHLORIDE 9 MG/ML
INJECTION, SOLUTION INTRAVENOUS PRN
Status: DISCONTINUED | OUTPATIENT
Start: 2024-01-31 | End: 2024-02-19 | Stop reason: HOSPADM

## 2024-01-31 RX ORDER — DEXTROSE MONOHYDRATE 100 MG/ML
INJECTION, SOLUTION INTRAVENOUS CONTINUOUS PRN
Status: DISCONTINUED | OUTPATIENT
Start: 2024-01-31 | End: 2024-02-01

## 2024-01-31 RX ORDER — FENTANYL CITRATE 50 UG/ML
50 INJECTION, SOLUTION INTRAMUSCULAR; INTRAVENOUS EVERY 4 HOURS PRN
Status: DISCONTINUED | OUTPATIENT
Start: 2024-01-31 | End: 2024-01-31

## 2024-01-31 RX ORDER — DEXAMETHASONE SODIUM PHOSPHATE 10 MG/ML
6 INJECTION, SOLUTION INTRAMUSCULAR; INTRAVENOUS EVERY 24 HOURS
Status: DISCONTINUED | OUTPATIENT
Start: 2024-01-31 | End: 2024-02-05

## 2024-01-31 RX ORDER — FENTANYL CITRATE 50 UG/ML
50 INJECTION, SOLUTION INTRAMUSCULAR; INTRAVENOUS ONCE
Status: COMPLETED | OUTPATIENT
Start: 2024-01-31 | End: 2024-01-31

## 2024-01-31 RX ORDER — CYCLOBENZAPRINE HCL 10 MG
5 TABLET ORAL 2 TIMES DAILY PRN
Status: DISCONTINUED | OUTPATIENT
Start: 2024-01-31 | End: 2024-02-19 | Stop reason: HOSPADM

## 2024-01-31 RX ORDER — INSULIN LISPRO 100 [IU]/ML
0-4 INJECTION, SOLUTION INTRAVENOUS; SUBCUTANEOUS NIGHTLY
Status: DISCONTINUED | OUTPATIENT
Start: 2024-01-31 | End: 2024-02-01

## 2024-01-31 RX ADMIN — KETOROLAC TROMETHAMINE 15 MG: 15 INJECTION, SOLUTION INTRAMUSCULAR; INTRAVENOUS at 14:46

## 2024-01-31 RX ADMIN — MAGNESIUM SULFATE HEPTAHYDRATE 1000 MG: 1 INJECTION, SOLUTION INTRAVENOUS at 15:58

## 2024-01-31 RX ADMIN — SODIUM CHLORIDE 1000 ML: 9 INJECTION, SOLUTION INTRAVENOUS at 14:47

## 2024-01-31 RX ADMIN — WATER 2000 MG: 1 INJECTION INTRAMUSCULAR; INTRAVENOUS; SUBCUTANEOUS at 14:46

## 2024-01-31 RX ADMIN — DEXAMETHASONE SODIUM PHOSPHATE 6 MG: 10 INJECTION, SOLUTION INTRAMUSCULAR; INTRAVENOUS at 18:20

## 2024-01-31 RX ADMIN — SODIUM CHLORIDE 1000 ML: 9 INJECTION, SOLUTION INTRAVENOUS at 16:00

## 2024-01-31 RX ADMIN — FENTANYL CITRATE 50 MCG: 50 INJECTION, SOLUTION INTRAMUSCULAR; INTRAVENOUS at 15:50

## 2024-01-31 RX ADMIN — SODIUM CHLORIDE: 9 INJECTION, SOLUTION INTRAVENOUS at 20:14

## 2024-01-31 ASSESSMENT — PAIN SCALES - WONG BAKER: WONGBAKER_NUMERICALRESPONSE: 6

## 2024-01-31 ASSESSMENT — PAIN - FUNCTIONAL ASSESSMENT: PAIN_FUNCTIONAL_ASSESSMENT: WONG-BAKER FACES

## 2024-01-31 ASSESSMENT — PAIN DESCRIPTION - ORIENTATION: ORIENTATION: LEFT

## 2024-01-31 ASSESSMENT — PAIN DESCRIPTION - LOCATION: LOCATION: HIP

## 2024-01-31 ASSESSMENT — PAIN SCALES - GENERAL: PAINLEVEL_OUTOF10: 8

## 2024-01-31 NOTE — ED PROVIDER NOTES
CONSULT TO EVAL    PROCEDURES:  Unless otherwise noted below, none     Procedures    FINAL IMPRESSION      1. Septicemia (HCC)    2. COVID    3. Metabolic encephalopathy          DISPOSITION/PLAN   DISPOSITION Admitted 01/31/2024 05:39:38 PM    PATIENT REFERRED TO:  No follow-up provider specified.    DISCHARGE MEDICATIONS:  New Prescriptions    No medications on file     Controlled Substances Monitoring:          No data to display                (Please note that portions of this note were completed with a voice recognition program.  Efforts were made to edit the dictations but occasionally words are mis-transcribed.)    Chirag Saunders MD (electronically signed)  Attending Emergency Physician            Chirag Saunders MD  01/31/24 3344

## 2024-01-31 NOTE — TELEPHONE ENCOUNTER
Lynn Poole called stating the patient had a temperature of 109, she gave the patient some tylenol and now the temperature is 99.6, patient has a loss of appetite and has lost 5lbs, she is negative for flu, patient is getting weaker and declining.

## 2024-01-31 NOTE — ED TRIAGE NOTES
Patient arrives from [facility] with cc of fatigue and fever. 650mg Tylenol given PTA. Patient has hx of UTI, CKD stage 3, and a fib. Patient arrives alert but lethargic, patient oriented to self.

## 2024-01-31 NOTE — H&P
hernias  Lymph:  No cervical or inguinal adenopathy  Musc: No cyanosis or clubbing  Skin: No rashes or ulcers, skin turgor is good  Neuro:  Cranial nerves are grossly intact, no focal motor weakness, follows commands appropriately  Psych:  Good insight, oriented to person, place and time, alert          _______________________________________________________________________  Care Plan discussed with:    Comments   Patient x Discussed with patient in room. POC outlined and Questions answered    Family      RN x    Care Manager                    Consultant:  theo MILLS MD   _______________________________________________________________________  Recommended Disposition:   Home with Family x   HH/PT/OT/RN    SNF/LTC    YAMILKA    ________________________________________________________________________  TOTAL TIME:  60 Minutes        Comments   >50% of visit spent in counseling and coordination of care  Chart reviewed  Discussion with patient and/or family and questions answered     ________________________________________________________________________  Signed: Robson Krishna MD        Procedures: see electronic medical records for all procedures/Xrays/labs and details which were not copied into this note but were reviewed prior to creation of Plan.

## 2024-02-01 ENCOUNTER — APPOINTMENT (OUTPATIENT)
Age: 89
DRG: 314 | End: 2024-02-01
Payer: MEDICARE

## 2024-02-01 PROBLEM — K21.9 GERD (GASTROESOPHAGEAL REFLUX DISEASE): Status: ACTIVE | Noted: 2024-02-01

## 2024-02-01 PROBLEM — G47.33 OSA (OBSTRUCTIVE SLEEP APNEA): Status: ACTIVE | Noted: 2024-02-01

## 2024-02-01 PROBLEM — E11.69 TYPE 2 DIABETES MELLITUS WITH OTHER SPECIFIED COMPLICATION, UNSPECIFIED WHETHER LONG TERM INSULIN USE (HCC): Status: RESOLVED | Noted: 2020-06-04 | Resolved: 2024-02-01

## 2024-02-01 PROBLEM — I27.20 PULMONARY HYPERTENSION (HCC): Status: ACTIVE | Noted: 2024-02-01

## 2024-02-01 PROBLEM — Z95.0 PACEMAKER: Status: RESOLVED | Noted: 2017-07-24 | Resolved: 2024-02-01

## 2024-02-01 PROBLEM — J12.82 PNEUMONIA DUE TO COVID-19 VIRUS: Status: ACTIVE | Noted: 2024-02-01

## 2024-02-01 PROBLEM — R11.2 NAUSEA & VOMITING: Status: RESOLVED | Noted: 2020-07-08 | Resolved: 2024-02-01

## 2024-02-01 PROBLEM — Z89.511 ACQUIRED ABSENCE OF RIGHT LEG BELOW KNEE (HCC): Status: RESOLVED | Noted: 2022-04-29 | Resolved: 2024-02-01

## 2024-02-01 PROBLEM — M19.90 ARTHRITIS: Status: ACTIVE | Noted: 2024-02-01

## 2024-02-01 PROBLEM — U07.1 PNEUMONIA DUE TO COVID-19 VIRUS: Status: ACTIVE | Noted: 2024-02-01

## 2024-02-01 PROBLEM — I96 GANGRENE OF FOOT (HCC): Status: RESOLVED | Noted: 2020-06-05 | Resolved: 2024-02-01

## 2024-02-01 PROBLEM — E83.42 HYPOMAGNESEMIA: Status: ACTIVE | Noted: 2024-02-01

## 2024-02-01 PROBLEM — R53.83 FATIGUE: Status: ACTIVE | Noted: 2024-02-01

## 2024-02-01 PROBLEM — I07.1 MILD TRICUSPID REGURGITATION: Status: ACTIVE | Noted: 2024-02-01

## 2024-02-01 PROBLEM — G43.909 MIGRAINES: Status: ACTIVE | Noted: 2024-02-01

## 2024-02-01 PROBLEM — A41.9 SEPSIS (HCC): Status: RESOLVED | Noted: 2024-01-31 | Resolved: 2024-02-01

## 2024-02-01 PROBLEM — D72.829 LEUKOCYTOSIS: Status: ACTIVE | Noted: 2024-02-01

## 2024-02-01 PROBLEM — R53.83 LETHARGY: Status: ACTIVE | Noted: 2024-02-01

## 2024-02-01 LAB
ANION GAP SERPL CALC-SCNC: 4 MMOL/L (ref 5–15)
BASOPHILS # BLD: 0 K/UL (ref 0–0.1)
BASOPHILS NFR BLD: 0 % (ref 0–1)
BUN SERPL-MCNC: 29 MG/DL (ref 6–20)
BUN/CREAT SERPL: 39 (ref 12–20)
CALCIUM SERPL-MCNC: 7.5 MG/DL (ref 8.5–10.1)
CHLORIDE SERPL-SCNC: 110 MMOL/L (ref 97–108)
CHOLEST SERPL-MCNC: 118 MG/DL
CO2 SERPL-SCNC: 27 MMOL/L (ref 21–32)
CREAT SERPL-MCNC: 0.74 MG/DL (ref 0.55–1.02)
CRP SERPL-MCNC: 25.2 MG/DL (ref 0–0.3)
D DIMER PPP FEU-MCNC: 1.88 MG/L FEU (ref 0–0.65)
DIFFERENTIAL METHOD BLD: ABNORMAL
EKG ATRIAL RATE: 82 BPM
EKG DIAGNOSIS: NORMAL
EKG Q-T INTERVAL: 466 MS
EKG QRS DURATION: 178 MS
EKG QTC CALCULATION (BAZETT): 550 MS
EKG R AXIS: 248 DEGREES
EKG T AXIS: 47 DEGREES
EKG VENTRICULAR RATE: 84 BPM
EOSINOPHIL # BLD: 0 K/UL (ref 0–0.4)
EOSINOPHIL NFR BLD: 0 % (ref 0–7)
ERYTHROCYTE [DISTWIDTH] IN BLOOD BY AUTOMATED COUNT: 13.7 % (ref 11.5–14.5)
EST. AVERAGE GLUCOSE BLD GHB EST-MCNC: 160 MG/DL
GLUCOSE BLD STRIP.AUTO-MCNC: 132 MG/DL (ref 65–117)
GLUCOSE BLD STRIP.AUTO-MCNC: 147 MG/DL (ref 65–117)
GLUCOSE BLD STRIP.AUTO-MCNC: 162 MG/DL (ref 65–117)
GLUCOSE SERPL-MCNC: 178 MG/DL (ref 65–100)
HBA1C MFR BLD: 7.2 % (ref 4–5.6)
HCT VFR BLD AUTO: 32.8 % (ref 35–47)
HDLC SERPL-MCNC: 30 MG/DL
HDLC SERPL: 3.9 (ref 0–5)
HGB BLD-MCNC: 10.4 G/DL (ref 11.5–16)
IMM GRANULOCYTES # BLD AUTO: 0.3 K/UL (ref 0–0.04)
IMM GRANULOCYTES NFR BLD AUTO: 2 % (ref 0–0.5)
LDLC SERPL CALC-MCNC: 60 MG/DL (ref 0–100)
LYMPHOCYTES # BLD: 0.5 K/UL (ref 0.8–3.5)
LYMPHOCYTES NFR BLD: 4 % (ref 12–49)
MAGNESIUM SERPL-MCNC: 1.3 MG/DL (ref 1.6–2.4)
MCH RBC QN AUTO: 27.8 PG (ref 26–34)
MCHC RBC AUTO-ENTMCNC: 31.7 G/DL (ref 30–36.5)
MCV RBC AUTO: 87.7 FL (ref 80–99)
MONOCYTES # BLD: 0.4 K/UL (ref 0–1)
MONOCYTES NFR BLD: 3 % (ref 5–13)
NEUTS SEG # BLD: 11.4 K/UL (ref 1.8–8)
NEUTS SEG NFR BLD: 91 % (ref 32–75)
NRBC # BLD: 0 K/UL (ref 0–0.01)
NRBC BLD-RTO: 0 PER 100 WBC
PHOSPHATE SERPL-MCNC: 3.7 MG/DL (ref 2.6–4.7)
PLATELET # BLD AUTO: 171 K/UL (ref 150–400)
PMV BLD AUTO: 11.7 FL (ref 8.9–12.9)
POTASSIUM SERPL-SCNC: 3.8 MMOL/L (ref 3.5–5.1)
PROCALCITONIN SERPL-MCNC: 0.21 NG/ML
RBC # BLD AUTO: 3.74 M/UL (ref 3.8–5.2)
RBC MORPH BLD: ABNORMAL
SERVICE CMNT-IMP: ABNORMAL
SODIUM SERPL-SCNC: 141 MMOL/L (ref 136–145)
TRIGL SERPL-MCNC: 140 MG/DL
TSH SERPL DL<=0.05 MIU/L-ACNC: 0.68 UIU/ML (ref 0.36–3.74)
VLDLC SERPL CALC-MCNC: 28 MG/DL
WBC # BLD AUTO: 12.6 K/UL (ref 3.6–11)

## 2024-02-01 PROCEDURE — 6360000002 HC RX W HCPCS: Performed by: HOSPITALIST

## 2024-02-01 PROCEDURE — 80048 BASIC METABOLIC PNL TOTAL CA: CPT

## 2024-02-01 PROCEDURE — 6370000000 HC RX 637 (ALT 250 FOR IP): Performed by: HOSPITALIST

## 2024-02-01 PROCEDURE — 99223 1ST HOSP IP/OBS HIGH 75: CPT | Performed by: NURSE PRACTITIONER

## 2024-02-01 PROCEDURE — 97161 PT EVAL LOW COMPLEX 20 MIN: CPT

## 2024-02-01 PROCEDURE — 80061 LIPID PANEL: CPT

## 2024-02-01 PROCEDURE — 85025 COMPLETE CBC W/AUTO DIFF WBC: CPT

## 2024-02-01 PROCEDURE — 94761 N-INVAS EAR/PLS OXIMETRY MLT: CPT

## 2024-02-01 PROCEDURE — 93010 ELECTROCARDIOGRAM REPORT: CPT | Performed by: SPECIALIST

## 2024-02-01 PROCEDURE — 2700000000 HC OXYGEN THERAPY PER DAY

## 2024-02-01 PROCEDURE — 83735 ASSAY OF MAGNESIUM: CPT

## 2024-02-01 PROCEDURE — 82962 GLUCOSE BLOOD TEST: CPT

## 2024-02-01 PROCEDURE — 97530 THERAPEUTIC ACTIVITIES: CPT

## 2024-02-01 PROCEDURE — 2580000003 HC RX 258: Performed by: HOSPITALIST

## 2024-02-01 PROCEDURE — 97535 SELF CARE MNGMENT TRAINING: CPT

## 2024-02-01 PROCEDURE — 97165 OT EVAL LOW COMPLEX 30 MIN: CPT

## 2024-02-01 PROCEDURE — 1100000000 HC RM PRIVATE

## 2024-02-01 PROCEDURE — 84443 ASSAY THYROID STIM HORMONE: CPT

## 2024-02-01 PROCEDURE — 85379 FIBRIN DEGRADATION QUANT: CPT

## 2024-02-01 PROCEDURE — 94640 AIRWAY INHALATION TREATMENT: CPT

## 2024-02-01 PROCEDURE — 6370000000 HC RX 637 (ALT 250 FOR IP): Performed by: INTERNAL MEDICINE

## 2024-02-01 PROCEDURE — 36415 COLL VENOUS BLD VENIPUNCTURE: CPT

## 2024-02-01 PROCEDURE — 84100 ASSAY OF PHOSPHORUS: CPT

## 2024-02-01 PROCEDURE — 84145 PROCALCITONIN (PCT): CPT

## 2024-02-01 PROCEDURE — 86140 C-REACTIVE PROTEIN: CPT

## 2024-02-01 RX ORDER — PANTOPRAZOLE SODIUM 40 MG/1
40 TABLET, DELAYED RELEASE ORAL 2 TIMES DAILY
Status: DISCONTINUED | OUTPATIENT
Start: 2024-02-01 | End: 2024-02-07

## 2024-02-01 RX ORDER — BENZONATATE 100 MG/1
100 CAPSULE ORAL 3 TIMES DAILY PRN
Status: DISCONTINUED | OUTPATIENT
Start: 2024-02-01 | End: 2024-02-19 | Stop reason: HOSPADM

## 2024-02-01 RX ORDER — MIRTAZAPINE 15 MG/1
7.5 TABLET, FILM COATED ORAL NIGHTLY
Status: DISCONTINUED | OUTPATIENT
Start: 2024-02-01 | End: 2024-02-19 | Stop reason: HOSPADM

## 2024-02-01 RX ORDER — BUDESONIDE 0.5 MG/2ML
0.5 INHALANT ORAL
Status: DISCONTINUED | OUTPATIENT
Start: 2024-02-01 | End: 2024-02-01

## 2024-02-01 RX ORDER — BACLOFEN 10 MG/1
5 TABLET ORAL 2 TIMES DAILY PRN
Status: DISCONTINUED | OUTPATIENT
Start: 2024-02-01 | End: 2024-02-19 | Stop reason: HOSPADM

## 2024-02-01 RX ORDER — ACETAMINOPHEN 500 MG
500 TABLET ORAL EVERY 6 HOURS PRN
Status: DISCONTINUED | OUTPATIENT
Start: 2024-02-01 | End: 2024-02-02

## 2024-02-01 RX ORDER — SENNA AND DOCUSATE SODIUM 50; 8.6 MG/1; MG/1
2 TABLET, FILM COATED ORAL NIGHTLY PRN
Status: DISCONTINUED | OUTPATIENT
Start: 2024-02-01 | End: 2024-02-19 | Stop reason: HOSPADM

## 2024-02-01 RX ORDER — FAMOTIDINE 20 MG/1
10 TABLET, FILM COATED ORAL 2 TIMES DAILY
Status: DISCONTINUED | OUTPATIENT
Start: 2024-02-01 | End: 2024-02-07

## 2024-02-01 RX ORDER — ARFORMOTEROL TARTRATE 15 UG/2ML
15 SOLUTION RESPIRATORY (INHALATION)
Status: DISCONTINUED | OUTPATIENT
Start: 2024-02-01 | End: 2024-02-01

## 2024-02-01 RX ADMIN — APIXABAN 2.5 MG: 2.5 TABLET, FILM COATED ORAL at 08:15

## 2024-02-01 RX ADMIN — ATORVASTATIN CALCIUM 10 MG: 20 TABLET, FILM COATED ORAL at 08:14

## 2024-02-01 RX ADMIN — MOMETASONE FUROATE AND FORMOTEROL FUMARATE DIHYDRATE 2 PUFF: 200; 5 AEROSOL RESPIRATORY (INHALATION) at 09:06

## 2024-02-01 RX ADMIN — PANTOPRAZOLE SODIUM 40 MG: 40 TABLET, DELAYED RELEASE ORAL at 08:37

## 2024-02-01 RX ADMIN — SERTRALINE 100 MG: 50 TABLET, FILM COATED ORAL at 08:14

## 2024-02-01 RX ADMIN — MIRTAZAPINE 7.5 MG: 15 TABLET, FILM COATED ORAL at 21:14

## 2024-02-01 RX ADMIN — SODIUM CHLORIDE, PRESERVATIVE FREE 10 ML: 5 INJECTION INTRAVENOUS at 08:15

## 2024-02-01 RX ADMIN — PANTOPRAZOLE SODIUM 40 MG: 40 TABLET, DELAYED RELEASE ORAL at 21:13

## 2024-02-01 RX ADMIN — DEXAMETHASONE SODIUM PHOSPHATE 6 MG: 10 INJECTION, SOLUTION INTRAMUSCULAR; INTRAVENOUS at 17:53

## 2024-02-01 RX ADMIN — SODIUM CHLORIDE, PRESERVATIVE FREE 10 ML: 5 INJECTION INTRAVENOUS at 21:17

## 2024-02-01 RX ADMIN — LEVOTHYROXINE SODIUM 88 MCG: 0.09 TABLET ORAL at 08:15

## 2024-02-01 RX ADMIN — BACLOFEN 5 MG: 10 TABLET ORAL at 08:37

## 2024-02-01 RX ADMIN — CARVEDILOL 3.12 MG: 3.12 TABLET, FILM COATED ORAL at 08:15

## 2024-02-01 RX ADMIN — APIXABAN 2.5 MG: 2.5 TABLET, FILM COATED ORAL at 21:13

## 2024-02-01 RX ADMIN — ACETAMINOPHEN 650 MG: 325 TABLET ORAL at 20:30

## 2024-02-01 RX ADMIN — FAMOTIDINE 10 MG: 20 TABLET ORAL at 21:13

## 2024-02-01 ASSESSMENT — PAIN SCALES - GENERAL
PAINLEVEL_OUTOF10: 0
PAINLEVEL_OUTOF10: 10

## 2024-02-01 ASSESSMENT — PAIN DESCRIPTION - ORIENTATION: ORIENTATION: LEFT

## 2024-02-01 NOTE — ACP (ADVANCE CARE PLANNING)
Code Status: DNR     Advance Care Planning:   Primary Decision Maker: Celia Jansen - Child - [phone]     Pt has incomplete AMD scanned into chart which lists sons as Medical POAs (signature does not have the required two witnesses); however, dtr Celia stated she is Medical POA, will upload copy of document to pt's [place].  Pt has DDNR on file dated 9/7/2017, was also noted to be DNR in paperwork sent by [facility].  Dtr confirmed DNR; code status was updated to reflect pt's wishes.

## 2024-02-01 NOTE — CARE COORDINATION
Initial Case Management Assessment       02/01/24 5835   Service Assessment   Patient Orientation Person   Cognition Alert   History Provided By Child/Family   Primary Caregiver Other (Comment)  (staff at [facility])   Support Systems Children   Patient's Healthcare Decision Maker is: Legal Next of Kin   PCP Verified by CM Yes  (Emiliano Lujan MD)   Last Visit to PCP Within last 3 months   Prior Functional Level Assistance with the following:;Bathing;Dressing;Toileting;Cooking;Housework;Shopping;Mobility   Current Functional Level Assistance with the following:;Bathing;Dressing;Toileting;Cooking;Housework;Shopping;Mobility   Can patient return to prior living arrangement Yes   Ability to make needs known: Good   Family able to assist with home care needs: No  (Pt lives in an [facility])   Would you like for me to discuss the discharge plan with any other family members/significant others, and if so, who? Yes  (Pauline/daughter)   Financial Resources Medicare   Community Resources None   Social/Functional History   Lives With Alone   Type of Home Assisted living   Home Layout One level   Home Access Level entry   Bathroom Toilet Standard   Bathroom Equipment Grab bars in shower;Toilet raiser;Shower chair   Home Equipment Grab bars;Electric scooter   Ambulation Assistance Non-ambulatory   Transfer Assistance Needs assistance   Active  No   Patient's  Info family or transport service   Occupation Retired   Discharge Planning   Type of Residence Assisted living   Living Arrangements Alone   Current Services Prior To Admission Durable Medical Equipment   Potential Assistance Needed N/A   DME Ordered? No   Potential Assistance Purchasing Medications No   Type of Home Care Services None   Patient expects to be discharged to: Assisted living   One/Two Story Residence One story   History of falls? 0   Services At/After Discharge   Transition of Care Consult (CM Consult) N/A   Services At/After Discharge None    Resource

## 2024-02-01 NOTE — ED NOTES
0230: Patient's family requesting to speak to Primary RN. Primary RN not available at the time, this RN spoke with daughter regarding her request.     0236:Patient's family member upset because patient has did not received any of her home medications. Patient's family member states that her sister is the patient's POA and will be calling the ER to speak to this RN regarding why the patient has not received her home medications. This RN taking over as Primary RN for patient care.     This RN notified the NP and requested for the patient's home medications to be ordered once medication reconciliation is completed.     Awaiting for patient's POA to call to do medication reconciliation.     0247: Spoke to patient's other daughter and updated her regarding the patient's home medications. Patient daughter states that she is okay with resuming patient's home medication today and later on tonight.    0255: Updated patient's family member in the room regarding what the patient's other daughter said. Provided patient with a cup of water and assisted her with sips.     0321: Spoke to the nurse at [facility] to update patient's medication reconciliation list. Medication Reconciliation completed, notified patient's daughter who is at bedside. Notified NP of completed medication list    0445: This RN collected patient's 0600 AM labs. Asked patient is her L hip was hurting, patient states 'no' and that she does not want any pain medication as of right now.  Patient requesting for sips of water. This RN assisted patient with sips of water. Patient tolerated well. Patient bed alarm remains on, locked and in low position.

## 2024-02-02 ENCOUNTER — APPOINTMENT (OUTPATIENT)
Facility: HOSPITAL | Age: 89
DRG: 314 | End: 2024-02-02
Payer: MEDICARE

## 2024-02-02 ENCOUNTER — PATIENT MESSAGE (OUTPATIENT)
Age: 89
End: 2024-02-02

## 2024-02-02 LAB
ALBUMIN SERPL-MCNC: 1.7 G/DL (ref 3.5–5)
ALBUMIN/GLOB SERPL: 0.4 (ref 1.1–2.2)
ALP SERPL-CCNC: 131 U/L (ref 45–117)
ALT SERPL-CCNC: 17 U/L (ref 12–78)
ANION GAP SERPL CALC-SCNC: 4 MMOL/L (ref 5–15)
AST SERPL-CCNC: 34 U/L (ref 15–37)
BILIRUB SERPL-MCNC: 0.5 MG/DL (ref 0.2–1)
BUN SERPL-MCNC: 32 MG/DL (ref 6–20)
BUN/CREAT SERPL: 46 (ref 12–20)
CALCIUM SERPL-MCNC: 8.1 MG/DL (ref 8.5–10.1)
CHLORIDE SERPL-SCNC: 111 MMOL/L (ref 97–108)
CO2 SERPL-SCNC: 27 MMOL/L (ref 21–32)
CREAT SERPL-MCNC: 0.69 MG/DL (ref 0.55–1.02)
CRP SERPL-MCNC: 15.1 MG/DL (ref 0–0.3)
D DIMER PPP FEU-MCNC: 1.4 MG/L FEU (ref 0–0.65)
ERYTHROCYTE [DISTWIDTH] IN BLOOD BY AUTOMATED COUNT: 13.9 % (ref 11.5–14.5)
GLOBULIN SER CALC-MCNC: 4.2 G/DL (ref 2–4)
GLUCOSE SERPL-MCNC: 188 MG/DL (ref 65–100)
HCT VFR BLD AUTO: 31.3 % (ref 35–47)
HGB BLD-MCNC: 10.3 G/DL (ref 11.5–16)
MAGNESIUM SERPL-MCNC: 1.6 MG/DL (ref 1.6–2.4)
MCH RBC QN AUTO: 28.3 PG (ref 26–34)
MCHC RBC AUTO-ENTMCNC: 32.9 G/DL (ref 30–36.5)
MCV RBC AUTO: 86 FL (ref 80–99)
NRBC # BLD: 0 K/UL (ref 0–0.01)
NRBC BLD-RTO: 0 PER 100 WBC
PHOSPHATE SERPL-MCNC: 2.6 MG/DL (ref 2.6–4.7)
PLATELET # BLD AUTO: 212 K/UL (ref 150–400)
PMV BLD AUTO: 11.4 FL (ref 8.9–12.9)
POTASSIUM SERPL-SCNC: 4.3 MMOL/L (ref 3.5–5.1)
PROCALCITONIN SERPL-MCNC: 0.13 NG/ML
PROT SERPL-MCNC: 5.9 G/DL (ref 6.4–8.2)
RBC # BLD AUTO: 3.64 M/UL (ref 3.8–5.2)
SODIUM SERPL-SCNC: 142 MMOL/L (ref 136–145)
WBC # BLD AUTO: 13.2 K/UL (ref 3.6–11)

## 2024-02-02 PROCEDURE — 85027 COMPLETE CBC AUTOMATED: CPT

## 2024-02-02 PROCEDURE — 73521 X-RAY EXAM HIPS BI 2 VIEWS: CPT

## 2024-02-02 PROCEDURE — 6370000000 HC RX 637 (ALT 250 FOR IP): Performed by: HOSPITALIST

## 2024-02-02 PROCEDURE — 6360000002 HC RX W HCPCS: Performed by: HOSPITALIST

## 2024-02-02 PROCEDURE — 85379 FIBRIN DEGRADATION QUANT: CPT

## 2024-02-02 PROCEDURE — 92610 EVALUATE SWALLOWING FUNCTION: CPT

## 2024-02-02 PROCEDURE — 86140 C-REACTIVE PROTEIN: CPT

## 2024-02-02 PROCEDURE — 84100 ASSAY OF PHOSPHORUS: CPT

## 2024-02-02 PROCEDURE — 2580000003 HC RX 258: Performed by: INTERNAL MEDICINE

## 2024-02-02 PROCEDURE — 84145 PROCALCITONIN (PCT): CPT

## 2024-02-02 PROCEDURE — 80053 COMPREHEN METABOLIC PANEL: CPT

## 2024-02-02 PROCEDURE — 6370000000 HC RX 637 (ALT 250 FOR IP): Performed by: INTERNAL MEDICINE

## 2024-02-02 PROCEDURE — 36415 COLL VENOUS BLD VENIPUNCTURE: CPT

## 2024-02-02 PROCEDURE — 1100000000 HC RM PRIVATE

## 2024-02-02 PROCEDURE — 2580000003 HC RX 258: Performed by: HOSPITALIST

## 2024-02-02 PROCEDURE — 83735 ASSAY OF MAGNESIUM: CPT

## 2024-02-02 PROCEDURE — 2700000000 HC OXYGEN THERAPY PER DAY

## 2024-02-02 PROCEDURE — 94640 AIRWAY INHALATION TREATMENT: CPT

## 2024-02-02 RX ORDER — BENZONATATE 100 MG/1
100 CAPSULE ORAL 3 TIMES DAILY PRN
Qty: 30 CAPSULE | Refills: 0 | Status: SHIPPED | OUTPATIENT
Start: 2024-02-02 | End: 2024-02-12

## 2024-02-02 RX ORDER — ACETAMINOPHEN 500 MG
1000 TABLET ORAL EVERY 8 HOURS SCHEDULED
Status: DISCONTINUED | OUTPATIENT
Start: 2024-02-02 | End: 2024-02-07

## 2024-02-02 RX ORDER — SODIUM CHLORIDE 9 MG/ML
INJECTION, SOLUTION INTRAVENOUS CONTINUOUS
Status: DISCONTINUED | OUTPATIENT
Start: 2024-02-02 | End: 2024-02-05

## 2024-02-02 RX ORDER — DEXAMETHASONE 6 MG/1
6 TABLET ORAL
Qty: 7 TABLET | Refills: 0 | Status: SHIPPED | OUTPATIENT
Start: 2024-02-02 | End: 2024-02-18 | Stop reason: HOSPADM

## 2024-02-02 RX ADMIN — SODIUM CHLORIDE: 9 INJECTION, SOLUTION INTRAVENOUS at 21:02

## 2024-02-02 RX ADMIN — APIXABAN 2.5 MG: 2.5 TABLET, FILM COATED ORAL at 20:55

## 2024-02-02 RX ADMIN — SODIUM CHLORIDE, PRESERVATIVE FREE 10 ML: 5 INJECTION INTRAVENOUS at 15:10

## 2024-02-02 RX ADMIN — DEXAMETHASONE SODIUM PHOSPHATE 6 MG: 10 INJECTION, SOLUTION INTRAMUSCULAR; INTRAVENOUS at 17:32

## 2024-02-02 RX ADMIN — PANTOPRAZOLE SODIUM 40 MG: 40 TABLET, DELAYED RELEASE ORAL at 20:54

## 2024-02-02 RX ADMIN — MIRTAZAPINE 7.5 MG: 15 TABLET, FILM COATED ORAL at 20:55

## 2024-02-02 RX ADMIN — CARVEDILOL 3.12 MG: 3.12 TABLET, FILM COATED ORAL at 17:31

## 2024-02-02 RX ADMIN — FENTANYL CITRATE 25 MCG: 50 INJECTION, SOLUTION INTRAMUSCULAR; INTRAVENOUS at 16:10

## 2024-02-02 RX ADMIN — FAMOTIDINE 10 MG: 20 TABLET ORAL at 20:54

## 2024-02-02 RX ADMIN — SODIUM CHLORIDE: 9 INJECTION, SOLUTION INTRAVENOUS at 14:20

## 2024-02-02 RX ADMIN — MOMETASONE FUROATE AND FORMOTEROL FUMARATE DIHYDRATE 2 PUFF: 200; 5 AEROSOL RESPIRATORY (INHALATION) at 19:51

## 2024-02-02 RX ADMIN — ACETAMINOPHEN 1000 MG: 325 TABLET ORAL at 15:09

## 2024-02-02 ASSESSMENT — PAIN DESCRIPTION - LOCATION: LOCATION: KNEE;LEG;HIP

## 2024-02-02 ASSESSMENT — PAIN DESCRIPTION - ORIENTATION: ORIENTATION: LEFT

## 2024-02-02 ASSESSMENT — PAIN SCALES - GENERAL: PAINLEVEL_OUTOF10: 7

## 2024-02-02 NOTE — DISCHARGE INSTRUCTIONS
week(s)        For questions regarding your Hospitalization or to contact the Hospital Medicine team, please call [phone].      Information obtained by :  I understand that if any problems occur once I am at home I am to contact my physician.    I understand and acknowledge receipt of the instructions indicated above.                                                                                                                                           Physician's or R.N.'s Signature                                                                  Date/Time                                                                                                                                              Patient or Representative Signature                                                          Date/Time

## 2024-02-02 NOTE — FLOWSHEET NOTE
Patient having difficulty w/ PO medication - coughing. NPO due to aspiration risk. Speech eval in AM. Nursing aware and will continue to monitor.

## 2024-02-02 NOTE — WOUND CARE
Wound Consult:  new consult Visit. Chart reviewed.  Consulted for buttock wound.  Spoke with patients nurse,  Elyse SCHUSTER.  Patient is resting on a hillrom bed with versacare mattress.  Left Heel off loaded with pillows.  Patient is awake, alert , appears some dementia; requires 2 assists to move side to side in bed.  Kane score 13.  Assessment:  POA Right buttock- 1x1x0.1cm- moist pink, slight serosanguinous drainage, surrounding pink, blanches. Partial thickness  Left buttock- blanching pink  Right BKA- scattered dry scabs , and 2x1x0.1cm dry scab on right knee.  Left heel- blanching pink,  Treatment:  Buttock- cleansed with blue wipes, sacral foam applied  Elevated left heel and BKA  Wound Recommendations:  Buttock- cleanse with blue wipe apply sacral foam, change every other days  Elevate left heel and BKA  Skin Care / PI Prevention Recommendations:  1. Minimize friction/shear: minimize layers of linen/pads under patient.  2. Off load pressure/reposition:  turn and reposition approximately every 2 hours; float heels with pillows or use off loading heel boots; waffle cushion for sitting; position wedge.  3. Manage Moisture - keep skin folds dry; incontinence skin care with incontinence wipes; zinc guard barrier ointment; purewick in use to help contain urine.  4. Continue to monitor nutrition, pain, and skin risk scale, and skin assessment.  Plan:  Spoke with Dr. Ibrahim regarding findings and proposed orders for treatment.  We will continue to reassess weekly and as needed.  Maliha Domingo RN  [facility], Wound / Ostomy Department  Wound Healing Office [phone]

## 2024-02-02 NOTE — CARE COORDINATION
2/2/2024   CARE MANAGEMENT NOTE:  CM reviewed EMR and handoff received from previous  ([facility]).  Pt was admitted with sepsis, DM, PVD, Afib, acute hypoxemic respiratory failure, and COVID.  Hx of right BKA.  Reportedly, pt is a resident of [facility]    RUR 18%    Transition Plan of Care:  PT/OT evals complete; pt is total assistance with bed mobility   CM sent a referral to [facility] to review for skilled. Pt has been accepted for SNF with admission on Monday, Feb 5 (SNF is unable to admit earlier 2/2 to multiple staff at [facility] have Covid.  Family is planning to have pt transition from residential to LTC in a few weeks  Outpatient follow up  Stretcher transport will be needed    CM will continue to follow pt until discharged.  Cookie

## 2024-02-03 PROCEDURE — 6370000000 HC RX 637 (ALT 250 FOR IP): Performed by: HOSPITALIST

## 2024-02-03 PROCEDURE — 6370000000 HC RX 637 (ALT 250 FOR IP): Performed by: INTERNAL MEDICINE

## 2024-02-03 PROCEDURE — 6360000002 HC RX W HCPCS: Performed by: HOSPITALIST

## 2024-02-03 PROCEDURE — 2580000003 HC RX 258: Performed by: INTERNAL MEDICINE

## 2024-02-03 PROCEDURE — 94640 AIRWAY INHALATION TREATMENT: CPT

## 2024-02-03 PROCEDURE — 94761 N-INVAS EAR/PLS OXIMETRY MLT: CPT

## 2024-02-03 PROCEDURE — 1100000000 HC RM PRIVATE

## 2024-02-03 PROCEDURE — 2580000003 HC RX 258: Performed by: HOSPITALIST

## 2024-02-03 PROCEDURE — 2700000000 HC OXYGEN THERAPY PER DAY

## 2024-02-03 RX ORDER — TRAMADOL HYDROCHLORIDE 50 MG/1
50 TABLET ORAL EVERY 6 HOURS PRN
Status: DISCONTINUED | OUTPATIENT
Start: 2024-02-03 | End: 2024-02-19 | Stop reason: HOSPADM

## 2024-02-03 RX ADMIN — PANTOPRAZOLE SODIUM 40 MG: 40 TABLET, DELAYED RELEASE ORAL at 09:12

## 2024-02-03 RX ADMIN — SERTRALINE 100 MG: 50 TABLET, FILM COATED ORAL at 09:12

## 2024-02-03 RX ADMIN — FAMOTIDINE 10 MG: 20 TABLET ORAL at 09:12

## 2024-02-03 RX ADMIN — ATORVASTATIN CALCIUM 10 MG: 20 TABLET, FILM COATED ORAL at 09:12

## 2024-02-03 RX ADMIN — ACETAMINOPHEN 1000 MG: 325 TABLET ORAL at 07:02

## 2024-02-03 RX ADMIN — MOMETASONE FUROATE AND FORMOTEROL FUMARATE DIHYDRATE 2 PUFF: 200; 5 AEROSOL RESPIRATORY (INHALATION) at 07:10

## 2024-02-03 RX ADMIN — SODIUM CHLORIDE: 9 INJECTION, SOLUTION INTRAVENOUS at 19:54

## 2024-02-03 RX ADMIN — MOMETASONE FUROATE AND FORMOTEROL FUMARATE DIHYDRATE 2 PUFF: 200; 5 AEROSOL RESPIRATORY (INHALATION) at 19:24

## 2024-02-03 RX ADMIN — FENTANYL CITRATE 25 MCG: 50 INJECTION, SOLUTION INTRAMUSCULAR; INTRAVENOUS at 16:33

## 2024-02-03 RX ADMIN — ACETAMINOPHEN 1000 MG: 325 TABLET ORAL at 00:14

## 2024-02-03 RX ADMIN — SODIUM CHLORIDE, PRESERVATIVE FREE 10 ML: 5 INJECTION INTRAVENOUS at 20:02

## 2024-02-03 RX ADMIN — SODIUM CHLORIDE: 9 INJECTION, SOLUTION INTRAVENOUS at 09:17

## 2024-02-03 RX ADMIN — ACETAMINOPHEN 1000 MG: 325 TABLET ORAL at 15:06

## 2024-02-03 RX ADMIN — PANTOPRAZOLE SODIUM 40 MG: 40 TABLET, DELAYED RELEASE ORAL at 19:56

## 2024-02-03 RX ADMIN — APIXABAN 2.5 MG: 2.5 TABLET, FILM COATED ORAL at 09:22

## 2024-02-03 RX ADMIN — MIRTAZAPINE 7.5 MG: 15 TABLET, FILM COATED ORAL at 19:57

## 2024-02-03 RX ADMIN — FAMOTIDINE 10 MG: 20 TABLET ORAL at 19:57

## 2024-02-03 RX ADMIN — APIXABAN 2.5 MG: 2.5 TABLET, FILM COATED ORAL at 19:56

## 2024-02-03 RX ADMIN — DEXAMETHASONE SODIUM PHOSPHATE 6 MG: 10 INJECTION, SOLUTION INTRAMUSCULAR; INTRAVENOUS at 16:40

## 2024-02-03 RX ADMIN — LEVOTHYROXINE SODIUM 88 MCG: 0.09 TABLET ORAL at 07:02

## 2024-02-03 RX ADMIN — CARVEDILOL 3.12 MG: 3.12 TABLET, FILM COATED ORAL at 09:12

## 2024-02-03 RX ADMIN — CARVEDILOL 3.12 MG: 3.12 TABLET, FILM COATED ORAL at 18:01

## 2024-02-03 RX ADMIN — SODIUM CHLORIDE, PRESERVATIVE FREE 10 ML: 5 INJECTION INTRAVENOUS at 09:23

## 2024-02-03 ASSESSMENT — PAIN SCALES - GENERAL
PAINLEVEL_OUTOF10: 10
PAINLEVEL_OUTOF10: 0

## 2024-02-03 ASSESSMENT — PAIN DESCRIPTION - ORIENTATION: ORIENTATION: RIGHT

## 2024-02-03 ASSESSMENT — PAIN DESCRIPTION - DESCRIPTORS: DESCRIPTORS: STABBING

## 2024-02-04 LAB
ALBUMIN SERPL-MCNC: 1.7 G/DL (ref 3.5–5)
ALBUMIN/GLOB SERPL: 0.4 (ref 1.1–2.2)
ALP SERPL-CCNC: 247 U/L (ref 45–117)
ALT SERPL-CCNC: 94 U/L (ref 12–78)
ANION GAP SERPL CALC-SCNC: 1 MMOL/L (ref 5–15)
ARTERIAL PATENCY WRIST A: ABNORMAL
AST SERPL-CCNC: 189 U/L (ref 15–37)
BASE DEFICIT BLDA-SCNC: 4.3 MMOL/L
BDY SITE: ABNORMAL
BILIRUB SERPL-MCNC: 0.6 MG/DL (ref 0.2–1)
BUN SERPL-MCNC: 27 MG/DL (ref 6–20)
BUN/CREAT SERPL: 38 (ref 12–20)
CALCIUM SERPL-MCNC: 7.9 MG/DL (ref 8.5–10.1)
CHLORIDE SERPL-SCNC: 118 MMOL/L (ref 97–108)
CO2 SERPL-SCNC: 25 MMOL/L (ref 21–32)
CREAT SERPL-MCNC: 0.71 MG/DL (ref 0.55–1.02)
CRP SERPL-MCNC: 10.8 MG/DL (ref 0–0.3)
D DIMER PPP FEU-MCNC: 1.85 MG/L FEU (ref 0–0.65)
ERYTHROCYTE [DISTWIDTH] IN BLOOD BY AUTOMATED COUNT: 14 % (ref 11.5–14.5)
GAS FLOW.O2 O2 DELIVERY SYS: 2 L/MIN
GLOBULIN SER CALC-MCNC: 4.2 G/DL (ref 2–4)
GLUCOSE SERPL-MCNC: 205 MG/DL (ref 65–100)
HCO3 BLDA-SCNC: 19 MMOL/L (ref 22–26)
HCT VFR BLD AUTO: 32.3 % (ref 35–47)
HGB BLD-MCNC: 10.2 G/DL (ref 11.5–16)
MAGNESIUM SERPL-MCNC: 1.5 MG/DL (ref 1.6–2.4)
MCH RBC QN AUTO: 28.2 PG (ref 26–34)
MCHC RBC AUTO-ENTMCNC: 31.6 G/DL (ref 30–36.5)
MCV RBC AUTO: 89.2 FL (ref 80–99)
NRBC # BLD: 0 K/UL (ref 0–0.01)
NRBC BLD-RTO: 0 PER 100 WBC
PCO2 BLDA: 30 MMHG (ref 35–45)
PH BLDA: 7.42 (ref 7.35–7.45)
PHOSPHATE SERPL-MCNC: 2.8 MG/DL (ref 2.6–4.7)
PLATELET # BLD AUTO: 156 K/UL (ref 150–400)
PMV BLD AUTO: 11.6 FL (ref 8.9–12.9)
PO2 BLDA: 75 MMHG (ref 80–100)
POTASSIUM SERPL-SCNC: 4.4 MMOL/L (ref 3.5–5.1)
PROCALCITONIN SERPL-MCNC: 0.37 NG/ML
PROT SERPL-MCNC: 5.9 G/DL (ref 6.4–8.2)
RBC # BLD AUTO: 3.62 M/UL (ref 3.8–5.2)
SAO2 % BLD: 95 % (ref 92–97)
SAO2% DEVICE SAO2% SENSOR NAME: ABNORMAL
SODIUM SERPL-SCNC: 144 MMOL/L (ref 136–145)
SPECIMEN SITE: ABNORMAL
WBC # BLD AUTO: 11.3 K/UL (ref 3.6–11)

## 2024-02-04 PROCEDURE — 2580000003 HC RX 258: Performed by: INTERNAL MEDICINE

## 2024-02-04 PROCEDURE — 87186 SC STD MICRODIL/AGAR DIL: CPT

## 2024-02-04 PROCEDURE — 2700000000 HC OXYGEN THERAPY PER DAY

## 2024-02-04 PROCEDURE — 6370000000 HC RX 637 (ALT 250 FOR IP): Performed by: INTERNAL MEDICINE

## 2024-02-04 PROCEDURE — 2060000000 HC ICU INTERMEDIATE R&B

## 2024-02-04 PROCEDURE — 86140 C-REACTIVE PROTEIN: CPT

## 2024-02-04 PROCEDURE — 6360000002 HC RX W HCPCS: Performed by: HOSPITALIST

## 2024-02-04 PROCEDURE — 80053 COMPREHEN METABOLIC PANEL: CPT

## 2024-02-04 PROCEDURE — 87077 CULTURE AEROBIC IDENTIFY: CPT

## 2024-02-04 PROCEDURE — 87040 BLOOD CULTURE FOR BACTERIA: CPT

## 2024-02-04 PROCEDURE — 87153 DNA/RNA SEQUENCING: CPT

## 2024-02-04 PROCEDURE — 83735 ASSAY OF MAGNESIUM: CPT

## 2024-02-04 PROCEDURE — 36415 COLL VENOUS BLD VENIPUNCTURE: CPT

## 2024-02-04 PROCEDURE — 94761 N-INVAS EAR/PLS OXIMETRY MLT: CPT

## 2024-02-04 PROCEDURE — 84145 PROCALCITONIN (PCT): CPT

## 2024-02-04 PROCEDURE — 82803 BLOOD GASES ANY COMBINATION: CPT

## 2024-02-04 PROCEDURE — 85027 COMPLETE CBC AUTOMATED: CPT

## 2024-02-04 PROCEDURE — 36600 WITHDRAWAL OF ARTERIAL BLOOD: CPT

## 2024-02-04 PROCEDURE — 2580000003 HC RX 258: Performed by: HOSPITALIST

## 2024-02-04 PROCEDURE — 2500000003 HC RX 250 WO HCPCS: Performed by: INTERNAL MEDICINE

## 2024-02-04 PROCEDURE — 85379 FIBRIN DEGRADATION QUANT: CPT

## 2024-02-04 PROCEDURE — 6370000000 HC RX 637 (ALT 250 FOR IP): Performed by: HOSPITALIST

## 2024-02-04 PROCEDURE — 94640 AIRWAY INHALATION TREATMENT: CPT

## 2024-02-04 PROCEDURE — 84100 ASSAY OF PHOSPHORUS: CPT

## 2024-02-04 RX ADMIN — SERTRALINE 100 MG: 50 TABLET, FILM COATED ORAL at 10:56

## 2024-02-04 RX ADMIN — POLYETHYLENE GLYCOL 3350 17 G: 17 POWDER, FOR SOLUTION ORAL at 10:57

## 2024-02-04 RX ADMIN — ACETAMINOPHEN 1000 MG: 325 TABLET ORAL at 21:55

## 2024-02-04 RX ADMIN — APIXABAN 2.5 MG: 2.5 TABLET, FILM COATED ORAL at 10:56

## 2024-02-04 RX ADMIN — TRAMADOL HYDROCHLORIDE 50 MG: 50 TABLET ORAL at 10:57

## 2024-02-04 RX ADMIN — ACETAMINOPHEN 1000 MG: 325 TABLET ORAL at 06:05

## 2024-02-04 RX ADMIN — SODIUM CHLORIDE, PRESERVATIVE FREE 10 ML: 5 INJECTION INTRAVENOUS at 21:55

## 2024-02-04 RX ADMIN — SODIUM CHLORIDE: 9 INJECTION, SOLUTION INTRAVENOUS at 16:38

## 2024-02-04 RX ADMIN — PANTOPRAZOLE SODIUM 40 MG: 40 TABLET, DELAYED RELEASE ORAL at 10:57

## 2024-02-04 RX ADMIN — FAMOTIDINE 10 MG: 20 TABLET ORAL at 21:53

## 2024-02-04 RX ADMIN — DEXAMETHASONE SODIUM PHOSPHATE 6 MG: 10 INJECTION, SOLUTION INTRAMUSCULAR; INTRAVENOUS at 18:54

## 2024-02-04 RX ADMIN — ACETAMINOPHEN 1000 MG: 325 TABLET ORAL at 15:29

## 2024-02-04 RX ADMIN — SODIUM CHLORIDE: 9 INJECTION, SOLUTION INTRAVENOUS at 06:09

## 2024-02-04 RX ADMIN — MOMETASONE FUROATE AND FORMOTEROL FUMARATE DIHYDRATE 2 PUFF: 200; 5 AEROSOL RESPIRATORY (INHALATION) at 21:51

## 2024-02-04 RX ADMIN — ACETAMINOPHEN 1000 MG: 325 TABLET ORAL at 01:24

## 2024-02-04 RX ADMIN — MOMETASONE FUROATE AND FORMOTEROL FUMARATE DIHYDRATE 2 PUFF: 200; 5 AEROSOL RESPIRATORY (INHALATION) at 07:41

## 2024-02-04 RX ADMIN — LEVOTHYROXINE SODIUM 88 MCG: 0.09 TABLET ORAL at 06:06

## 2024-02-04 RX ADMIN — MIRTAZAPINE 7.5 MG: 15 TABLET, FILM COATED ORAL at 21:54

## 2024-02-04 RX ADMIN — PANTOPRAZOLE SODIUM 40 MG: 40 TABLET, DELAYED RELEASE ORAL at 21:54

## 2024-02-04 RX ADMIN — FAMOTIDINE 10 MG: 20 TABLET ORAL at 10:57

## 2024-02-04 RX ADMIN — DOXYCYCLINE 100 MG: 100 INJECTION, POWDER, LYOPHILIZED, FOR SOLUTION INTRAVENOUS at 18:51

## 2024-02-04 RX ADMIN — ATORVASTATIN CALCIUM 10 MG: 20 TABLET, FILM COATED ORAL at 10:57

## 2024-02-04 RX ADMIN — CARVEDILOL 3.12 MG: 3.12 TABLET, FILM COATED ORAL at 10:56

## 2024-02-04 RX ADMIN — APIXABAN 2.5 MG: 2.5 TABLET, FILM COATED ORAL at 21:54

## 2024-02-04 ASSESSMENT — PAIN SCALES - GENERAL
PAINLEVEL_OUTOF10: 0
PAINLEVEL_OUTOF10: 3

## 2024-02-04 ASSESSMENT — PAIN DESCRIPTION - LOCATION
LOCATION: LEG
LOCATION: BACK;BUTTOCKS

## 2024-02-05 ENCOUNTER — APPOINTMENT (OUTPATIENT)
Facility: HOSPITAL | Age: 89
DRG: 314 | End: 2024-02-05
Payer: MEDICARE

## 2024-02-05 LAB
CRP SERPL-MCNC: 14.3 MG/DL (ref 0–0.3)
D DIMER PPP FEU-MCNC: 2.07 MG/L FEU (ref 0–0.65)
ERYTHROCYTE [DISTWIDTH] IN BLOOD BY AUTOMATED COUNT: 14.2 % (ref 11.5–14.5)
GLUCOSE BLD STRIP.AUTO-MCNC: 141 MG/DL (ref 65–117)
HCT VFR BLD AUTO: 32.1 % (ref 35–47)
HGB BLD-MCNC: 10.2 G/DL (ref 11.5–16)
MCH RBC QN AUTO: 28 PG (ref 26–34)
MCHC RBC AUTO-ENTMCNC: 31.8 G/DL (ref 30–36.5)
MCV RBC AUTO: 88.2 FL (ref 80–99)
NRBC # BLD: 0 K/UL (ref 0–0.01)
NRBC BLD-RTO: 0 PER 100 WBC
PLATELET # BLD AUTO: 120 K/UL (ref 150–400)
PMV BLD AUTO: 11.8 FL (ref 8.9–12.9)
PROCALCITONIN SERPL-MCNC: 1.39 NG/ML
RBC # BLD AUTO: 3.64 M/UL (ref 3.8–5.2)
SARS-COV-2 RNA RESP QL NAA+PROBE: ABNORMAL
SERVICE CMNT-IMP: ABNORMAL
SOURCE: ABNORMAL
WBC # BLD AUTO: 10.9 K/UL (ref 3.6–11)

## 2024-02-05 PROCEDURE — 82962 GLUCOSE BLOOD TEST: CPT

## 2024-02-05 PROCEDURE — 71045 X-RAY EXAM CHEST 1 VIEW: CPT

## 2024-02-05 PROCEDURE — 2580000003 HC RX 258: Performed by: HOSPITALIST

## 2024-02-05 PROCEDURE — 87635 SARS-COV-2 COVID-19 AMP PRB: CPT

## 2024-02-05 PROCEDURE — 2500000003 HC RX 250 WO HCPCS: Performed by: INTERNAL MEDICINE

## 2024-02-05 PROCEDURE — 2700000000 HC OXYGEN THERAPY PER DAY

## 2024-02-05 PROCEDURE — 6360000002 HC RX W HCPCS: Performed by: INTERNAL MEDICINE

## 2024-02-05 PROCEDURE — 36415 COLL VENOUS BLD VENIPUNCTURE: CPT

## 2024-02-05 PROCEDURE — 86140 C-REACTIVE PROTEIN: CPT

## 2024-02-05 PROCEDURE — 6370000000 HC RX 637 (ALT 250 FOR IP): Performed by: HOSPITALIST

## 2024-02-05 PROCEDURE — 84145 PROCALCITONIN (PCT): CPT

## 2024-02-05 PROCEDURE — 85027 COMPLETE CBC AUTOMATED: CPT

## 2024-02-05 PROCEDURE — 2060000000 HC ICU INTERMEDIATE R&B

## 2024-02-05 PROCEDURE — 85379 FIBRIN DEGRADATION QUANT: CPT

## 2024-02-05 PROCEDURE — 2580000003 HC RX 258: Performed by: INTERNAL MEDICINE

## 2024-02-05 PROCEDURE — 6370000000 HC RX 637 (ALT 250 FOR IP): Performed by: INTERNAL MEDICINE

## 2024-02-05 PROCEDURE — 94761 N-INVAS EAR/PLS OXIMETRY MLT: CPT

## 2024-02-05 PROCEDURE — 94640 AIRWAY INHALATION TREATMENT: CPT

## 2024-02-05 RX ORDER — FUROSEMIDE 10 MG/ML
20 INJECTION INTRAMUSCULAR; INTRAVENOUS 2 TIMES DAILY
Status: DISCONTINUED | OUTPATIENT
Start: 2024-02-05 | End: 2024-02-08

## 2024-02-05 RX ORDER — DEXTROSE MONOHYDRATE 100 MG/ML
INJECTION, SOLUTION INTRAVENOUS CONTINUOUS PRN
Status: DISCONTINUED | OUTPATIENT
Start: 2024-02-05 | End: 2024-02-18

## 2024-02-05 RX ORDER — DEXTROSE MONOHYDRATE 100 MG/ML
INJECTION, SOLUTION INTRAVENOUS CONTINUOUS PRN
Status: DISCONTINUED | OUTPATIENT
Start: 2024-02-05 | End: 2024-02-05 | Stop reason: SDUPTHER

## 2024-02-05 RX ORDER — DEXAMETHASONE SODIUM PHOSPHATE 10 MG/ML
6 INJECTION, SOLUTION INTRAMUSCULAR; INTRAVENOUS EVERY 12 HOURS
Status: DISCONTINUED | OUTPATIENT
Start: 2024-02-05 | End: 2024-02-07

## 2024-02-05 RX ADMIN — DOXYCYCLINE 100 MG: 100 INJECTION, POWDER, LYOPHILIZED, FOR SOLUTION INTRAVENOUS at 18:10

## 2024-02-05 RX ADMIN — ACETAMINOPHEN 650 MG: 325 TABLET ORAL at 13:53

## 2024-02-05 RX ADMIN — CARVEDILOL 3.12 MG: 3.12 TABLET, FILM COATED ORAL at 18:06

## 2024-02-05 RX ADMIN — APIXABAN 2.5 MG: 2.5 TABLET, FILM COATED ORAL at 22:02

## 2024-02-05 RX ADMIN — PANTOPRAZOLE SODIUM 40 MG: 40 TABLET, DELAYED RELEASE ORAL at 22:02

## 2024-02-05 RX ADMIN — MOMETASONE FUROATE AND FORMOTEROL FUMARATE DIHYDRATE 2 PUFF: 200; 5 AEROSOL RESPIRATORY (INHALATION) at 08:58

## 2024-02-05 RX ADMIN — APIXABAN 2.5 MG: 2.5 TABLET, FILM COATED ORAL at 08:57

## 2024-02-05 RX ADMIN — CARVEDILOL 3.12 MG: 3.12 TABLET, FILM COATED ORAL at 08:57

## 2024-02-05 RX ADMIN — FUROSEMIDE 20 MG: 10 INJECTION, SOLUTION INTRAMUSCULAR; INTRAVENOUS at 18:06

## 2024-02-05 RX ADMIN — DICLOFENAC SODIUM 2 G: 10 GEL TOPICAL at 08:57

## 2024-02-05 RX ADMIN — DICLOFENAC SODIUM 2 G: 10 GEL TOPICAL at 22:07

## 2024-02-05 RX ADMIN — ATORVASTATIN CALCIUM 10 MG: 20 TABLET, FILM COATED ORAL at 08:57

## 2024-02-05 RX ADMIN — PANTOPRAZOLE SODIUM 40 MG: 40 TABLET, DELAYED RELEASE ORAL at 08:57

## 2024-02-05 RX ADMIN — SODIUM CHLORIDE, PRESERVATIVE FREE 10 ML: 5 INJECTION INTRAVENOUS at 22:06

## 2024-02-05 RX ADMIN — SERTRALINE 100 MG: 50 TABLET, FILM COATED ORAL at 08:57

## 2024-02-05 RX ADMIN — DOXYCYCLINE 100 MG: 100 INJECTION, POWDER, LYOPHILIZED, FOR SOLUTION INTRAVENOUS at 06:16

## 2024-02-05 RX ADMIN — MIRTAZAPINE 7.5 MG: 15 TABLET, FILM COATED ORAL at 22:02

## 2024-02-05 RX ADMIN — MOMETASONE FUROATE AND FORMOTEROL FUMARATE DIHYDRATE 2 PUFF: 200; 5 AEROSOL RESPIRATORY (INHALATION) at 20:26

## 2024-02-05 RX ADMIN — ACETAMINOPHEN 1000 MG: 325 TABLET ORAL at 22:01

## 2024-02-05 RX ADMIN — DEXAMETHASONE SODIUM PHOSPHATE 6 MG: 10 INJECTION, SOLUTION INTRAMUSCULAR; INTRAVENOUS at 13:53

## 2024-02-05 RX ADMIN — FAMOTIDINE 10 MG: 20 TABLET ORAL at 08:57

## 2024-02-05 RX ADMIN — FAMOTIDINE 10 MG: 20 TABLET ORAL at 22:00

## 2024-02-05 RX ADMIN — LEVOTHYROXINE SODIUM 88 MCG: 0.09 TABLET ORAL at 06:15

## 2024-02-05 RX ADMIN — SODIUM CHLORIDE: 9 INJECTION, SOLUTION INTRAVENOUS at 03:55

## 2024-02-05 RX ADMIN — ACETAMINOPHEN 1000 MG: 325 TABLET ORAL at 06:15

## 2024-02-05 ASSESSMENT — PAIN SCALES - GENERAL: PAINLEVEL_OUTOF10: 1

## 2024-02-05 ASSESSMENT — PAIN DESCRIPTION - LOCATION: LOCATION: BACK;BUTTOCKS

## 2024-02-05 NOTE — CARE COORDINATION
Care Management Progress Note:   Patient to d/c to [facility] for rehab once medically stable for d/c. CM sent most recent chest xray results to facility per facility request. CM updated Daughter Pauline as well. CM following for needs.    ______________________  Penny MEZA, RN  Care Management  2/5/2024  2:04 PM

## 2024-02-06 LAB
ANION GAP SERPL CALC-SCNC: 5 MMOL/L (ref 5–15)
BUN SERPL-MCNC: 34 MG/DL (ref 6–20)
BUN/CREAT SERPL: 46 (ref 12–20)
CALCIUM SERPL-MCNC: 7.9 MG/DL (ref 8.5–10.1)
CHLORIDE SERPL-SCNC: 117 MMOL/L (ref 97–108)
CO2 SERPL-SCNC: 23 MMOL/L (ref 21–32)
CREAT SERPL-MCNC: 0.74 MG/DL (ref 0.55–1.02)
ERYTHROCYTE [DISTWIDTH] IN BLOOD BY AUTOMATED COUNT: 14.2 % (ref 11.5–14.5)
EST. AVERAGE GLUCOSE BLD GHB EST-MCNC: 171 MG/DL
GLUCOSE BLD STRIP.AUTO-MCNC: 159 MG/DL (ref 65–117)
GLUCOSE BLD STRIP.AUTO-MCNC: 160 MG/DL (ref 65–117)
GLUCOSE BLD STRIP.AUTO-MCNC: 178 MG/DL (ref 65–117)
GLUCOSE BLD STRIP.AUTO-MCNC: 200 MG/DL (ref 65–117)
GLUCOSE SERPL-MCNC: 185 MG/DL (ref 65–100)
HBA1C MFR BLD: 7.6 % (ref 4–5.6)
HCT VFR BLD AUTO: 31.7 % (ref 35–47)
HGB BLD-MCNC: 10.2 G/DL (ref 11.5–16)
MAGNESIUM SERPL-MCNC: 1 MG/DL (ref 1.6–2.4)
MCH RBC QN AUTO: 28.2 PG (ref 26–34)
MCHC RBC AUTO-ENTMCNC: 32.2 G/DL (ref 30–36.5)
MCV RBC AUTO: 87.6 FL (ref 80–99)
NRBC # BLD: 0 K/UL (ref 0–0.01)
NRBC BLD-RTO: 0 PER 100 WBC
PLATELET # BLD AUTO: 128 K/UL (ref 150–400)
PMV BLD AUTO: 12.2 FL (ref 8.9–12.9)
POTASSIUM SERPL-SCNC: 4.9 MMOL/L (ref 3.5–5.1)
RBC # BLD AUTO: 3.62 M/UL (ref 3.8–5.2)
SERVICE CMNT-IMP: ABNORMAL
SODIUM SERPL-SCNC: 145 MMOL/L (ref 136–145)
WBC # BLD AUTO: 11 K/UL (ref 3.6–11)

## 2024-02-06 PROCEDURE — 94640 AIRWAY INHALATION TREATMENT: CPT

## 2024-02-06 PROCEDURE — 6370000000 HC RX 637 (ALT 250 FOR IP): Performed by: HOSPITALIST

## 2024-02-06 PROCEDURE — 2580000003 HC RX 258: Performed by: HOSPITALIST

## 2024-02-06 PROCEDURE — 6370000000 HC RX 637 (ALT 250 FOR IP): Performed by: INTERNAL MEDICINE

## 2024-02-06 PROCEDURE — 97164 PT RE-EVAL EST PLAN CARE: CPT

## 2024-02-06 PROCEDURE — 6360000002 HC RX W HCPCS: Performed by: INTERNAL MEDICINE

## 2024-02-06 PROCEDURE — 2500000003 HC RX 250 WO HCPCS: Performed by: NURSE PRACTITIONER

## 2024-02-06 PROCEDURE — 82962 GLUCOSE BLOOD TEST: CPT

## 2024-02-06 PROCEDURE — 36415 COLL VENOUS BLD VENIPUNCTURE: CPT

## 2024-02-06 PROCEDURE — 2500000003 HC RX 250 WO HCPCS: Performed by: INTERNAL MEDICINE

## 2024-02-06 PROCEDURE — 2580000003 HC RX 258: Performed by: INTERNAL MEDICINE

## 2024-02-06 PROCEDURE — 80048 BASIC METABOLIC PNL TOTAL CA: CPT

## 2024-02-06 PROCEDURE — 97535 SELF CARE MNGMENT TRAINING: CPT | Performed by: OCCUPATIONAL THERAPIST

## 2024-02-06 PROCEDURE — 2700000000 HC OXYGEN THERAPY PER DAY

## 2024-02-06 PROCEDURE — 85027 COMPLETE CBC AUTOMATED: CPT

## 2024-02-06 PROCEDURE — 94761 N-INVAS EAR/PLS OXIMETRY MLT: CPT

## 2024-02-06 PROCEDURE — 83735 ASSAY OF MAGNESIUM: CPT

## 2024-02-06 PROCEDURE — 1100000000 HC RM PRIVATE

## 2024-02-06 PROCEDURE — 97530 THERAPEUTIC ACTIVITIES: CPT

## 2024-02-06 PROCEDURE — 97168 OT RE-EVAL EST PLAN CARE: CPT | Performed by: OCCUPATIONAL THERAPIST

## 2024-02-06 PROCEDURE — 83036 HEMOGLOBIN GLYCOSYLATED A1C: CPT

## 2024-02-06 RX ORDER — DOXYCYCLINE HYCLATE 100 MG
100 TABLET ORAL EVERY 12 HOURS SCHEDULED
Status: COMPLETED | OUTPATIENT
Start: 2024-02-06 | End: 2024-02-07

## 2024-02-06 RX ORDER — MAGNESIUM SULFATE HEPTAHYDRATE 40 MG/ML
2000 INJECTION, SOLUTION INTRAVENOUS ONCE
Status: COMPLETED | OUTPATIENT
Start: 2024-02-06 | End: 2024-02-06

## 2024-02-06 RX ADMIN — SERTRALINE 100 MG: 50 TABLET, FILM COATED ORAL at 10:21

## 2024-02-06 RX ADMIN — DEXAMETHASONE SODIUM PHOSPHATE 6 MG: 10 INJECTION, SOLUTION INTRAMUSCULAR; INTRAVENOUS at 12:24

## 2024-02-06 RX ADMIN — FUROSEMIDE 20 MG: 10 INJECTION, SOLUTION INTRAMUSCULAR; INTRAVENOUS at 17:19

## 2024-02-06 RX ADMIN — MIRTAZAPINE 7.5 MG: 15 TABLET, FILM COATED ORAL at 20:51

## 2024-02-06 RX ADMIN — ACETAMINOPHEN 1000 MG: 325 TABLET ORAL at 06:16

## 2024-02-06 RX ADMIN — CARVEDILOL 3.12 MG: 3.12 TABLET, FILM COATED ORAL at 10:21

## 2024-02-06 RX ADMIN — SODIUM CHLORIDE, PRESERVATIVE FREE 10 ML: 5 INJECTION INTRAVENOUS at 10:11

## 2024-02-06 RX ADMIN — PANTOPRAZOLE SODIUM 40 MG: 40 TABLET, DELAYED RELEASE ORAL at 10:10

## 2024-02-06 RX ADMIN — APIXABAN 2.5 MG: 2.5 TABLET, FILM COATED ORAL at 20:51

## 2024-02-06 RX ADMIN — DICLOFENAC SODIUM 2 G: 10 GEL TOPICAL at 20:52

## 2024-02-06 RX ADMIN — FAMOTIDINE 10 MG: 20 TABLET ORAL at 10:10

## 2024-02-06 RX ADMIN — SODIUM CHLORIDE, PRESERVATIVE FREE 10 ML: 5 INJECTION INTRAVENOUS at 20:51

## 2024-02-06 RX ADMIN — CARVEDILOL 3.12 MG: 3.12 TABLET, FILM COATED ORAL at 17:19

## 2024-02-06 RX ADMIN — DOXYCYCLINE HYCLATE 100 MG: 100 TABLET, COATED ORAL at 20:51

## 2024-02-06 RX ADMIN — DOXYCYCLINE 100 MG: 100 INJECTION, POWDER, LYOPHILIZED, FOR SOLUTION INTRAVENOUS at 06:20

## 2024-02-06 RX ADMIN — FAMOTIDINE 10 MG: 20 TABLET ORAL at 20:51

## 2024-02-06 RX ADMIN — ATORVASTATIN CALCIUM 10 MG: 20 TABLET, FILM COATED ORAL at 10:10

## 2024-02-06 RX ADMIN — PANTOPRAZOLE SODIUM 40 MG: 40 TABLET, DELAYED RELEASE ORAL at 20:51

## 2024-02-06 RX ADMIN — MAGNESIUM SULFATE HEPTAHYDRATE 2000 MG: 40 INJECTION, SOLUTION INTRAVENOUS at 10:13

## 2024-02-06 RX ADMIN — MOMETASONE FUROATE AND FORMOTEROL FUMARATE DIHYDRATE 2 PUFF: 200; 5 AEROSOL RESPIRATORY (INHALATION) at 08:49

## 2024-02-06 RX ADMIN — DEXAMETHASONE SODIUM PHOSPHATE 6 MG: 10 INJECTION, SOLUTION INTRAMUSCULAR; INTRAVENOUS at 01:49

## 2024-02-06 RX ADMIN — APIXABAN 2.5 MG: 2.5 TABLET, FILM COATED ORAL at 10:10

## 2024-02-06 RX ADMIN — MOMETASONE FUROATE AND FORMOTEROL FUMARATE DIHYDRATE 2 PUFF: 200; 5 AEROSOL RESPIRATORY (INHALATION) at 19:54

## 2024-02-06 RX ADMIN — FUROSEMIDE 20 MG: 10 INJECTION, SOLUTION INTRAMUSCULAR; INTRAVENOUS at 10:10

## 2024-02-06 RX ADMIN — LEVOTHYROXINE SODIUM 88 MCG: 0.09 TABLET ORAL at 06:16

## 2024-02-06 RX ADMIN — DICLOFENAC SODIUM 2 G: 10 GEL TOPICAL at 10:10

## 2024-02-06 RX ADMIN — ACETAMINOPHEN 1000 MG: 325 TABLET ORAL at 17:18

## 2024-02-06 ASSESSMENT — PAIN SCALES - PAIN ASSESSMENT IN ADVANCED DEMENTIA (PAINAD)
TOTALSCORE: 3
CONSOLABILITY: 1
BODYLANGUAGE: 0
FACIALEXPRESSION: 1
NEGVOCALIZATION: 1
BREATHING: 0

## 2024-02-06 ASSESSMENT — PAIN SCALES - GENERAL: PAINLEVEL_OUTOF10: 0

## 2024-02-06 ASSESSMENT — PAIN DESCRIPTION - DESCRIPTORS: DESCRIPTORS: ACHING

## 2024-02-06 ASSESSMENT — PAIN DESCRIPTION - FREQUENCY: FREQUENCY: CONTINUOUS

## 2024-02-06 ASSESSMENT — PAIN DESCRIPTION - ORIENTATION: ORIENTATION: RIGHT;LEFT

## 2024-02-06 ASSESSMENT — PAIN DESCRIPTION - LOCATION: LOCATION: ARM

## 2024-02-06 NOTE — CARE COORDINATION
Care Management Progress Note:   CM sent updated therapy notes to the [facility]. Patient dispo is SNF rehab once medically stable for d/c. CM following for needs.    ______________________  Penny MEZA, RN  Care Management  2/6/2024  3:48 PM

## 2024-02-06 NOTE — FLOWSHEET NOTE
Patient family member has called multiple times within a few hours with concerns about patients bruised, edematous bilateral arms.    Dr. Call was notified of family concerns and ordered warm compress for 20 minutes BID.      Patient family member stated she would like a message relayed to Dr. Call that \"if the swelling is not down in 24 hours she wants Infectious Disease consulted.\"     Dr. Call notified again and he stated he will thoroughly evaluate them in the AM. No new orders received.

## 2024-02-07 ENCOUNTER — APPOINTMENT (OUTPATIENT)
Facility: HOSPITAL | Age: 89
DRG: 314 | End: 2024-02-07
Payer: MEDICARE

## 2024-02-07 PROBLEM — E88.09 HYPOALBUMINEMIA: Status: ACTIVE | Noted: 2024-02-07

## 2024-02-07 PROBLEM — R78.81 BACTEREMIA: Status: ACTIVE | Noted: 2024-02-07

## 2024-02-07 PROBLEM — Z51.5 PALLIATIVE CARE ENCOUNTER: Status: ACTIVE | Noted: 2024-02-07

## 2024-02-07 PROBLEM — R06.02 SHORTNESS OF BREATH: Status: ACTIVE | Noted: 2024-02-07

## 2024-02-07 PROBLEM — A41.9 SEPTICEMIA (HCC): Status: ACTIVE | Noted: 2024-02-07

## 2024-02-07 PROBLEM — U07.1 COVID: Status: ACTIVE | Noted: 2024-02-07

## 2024-02-07 PROBLEM — R09.02 HYPOXIA: Status: ACTIVE | Noted: 2024-02-07

## 2024-02-07 LAB
ANION GAP SERPL CALC-SCNC: 4 MMOL/L (ref 5–15)
APPEARANCE UR: CLEAR
BACTERIA SPEC CULT: ABNORMAL
BACTERIA URNS QL MICRO: NEGATIVE /HPF
BILIRUB UR QL: NEGATIVE
BUN SERPL-MCNC: 39 MG/DL (ref 6–20)
BUN/CREAT SERPL: 57 (ref 12–20)
CALCIUM SERPL-MCNC: 8.8 MG/DL (ref 8.5–10.1)
CHLORIDE SERPL-SCNC: 112 MMOL/L (ref 97–108)
CO2 SERPL-SCNC: 23 MMOL/L (ref 21–32)
COLOR UR: NORMAL
CREAT SERPL-MCNC: 0.68 MG/DL (ref 0.55–1.02)
EPITH CASTS URNS QL MICRO: NORMAL /LPF
ERYTHROCYTE [DISTWIDTH] IN BLOOD BY AUTOMATED COUNT: 14.1 % (ref 11.5–14.5)
GLUCOSE BLD STRIP.AUTO-MCNC: 126 MG/DL (ref 65–117)
GLUCOSE BLD STRIP.AUTO-MCNC: 164 MG/DL (ref 65–117)
GLUCOSE BLD STRIP.AUTO-MCNC: 192 MG/DL (ref 65–117)
GLUCOSE BLD STRIP.AUTO-MCNC: 193 MG/DL (ref 65–117)
GLUCOSE SERPL-MCNC: 151 MG/DL (ref 65–100)
GLUCOSE UR STRIP.AUTO-MCNC: NEGATIVE MG/DL
HCT VFR BLD AUTO: 33.6 % (ref 35–47)
HGB BLD-MCNC: 11 G/DL (ref 11.5–16)
HGB UR QL STRIP: NEGATIVE
HYALINE CASTS URNS QL MICRO: NORMAL /LPF (ref 0–2)
KETONES UR QL STRIP.AUTO: NEGATIVE MG/DL
LEUKOCYTE ESTERASE UR QL STRIP.AUTO: NEGATIVE
MAGNESIUM SERPL-MCNC: 1.3 MG/DL (ref 1.6–2.4)
MCH RBC QN AUTO: 27.8 PG (ref 26–34)
MCHC RBC AUTO-ENTMCNC: 32.7 G/DL (ref 30–36.5)
MCV RBC AUTO: 85.1 FL (ref 80–99)
NITRITE UR QL STRIP.AUTO: NEGATIVE
NRBC # BLD: 0.03 K/UL (ref 0–0.01)
NRBC BLD-RTO: 0.2 PER 100 WBC
PH UR STRIP: 5 (ref 5–8)
PLATELET # BLD AUTO: 166 K/UL (ref 150–400)
PMV BLD AUTO: 12.3 FL (ref 8.9–12.9)
POTASSIUM SERPL-SCNC: 4.3 MMOL/L (ref 3.5–5.1)
PROT UR STRIP-MCNC: NEGATIVE MG/DL
RBC # BLD AUTO: 3.95 M/UL (ref 3.8–5.2)
RBC #/AREA URNS HPF: NORMAL /HPF (ref 0–5)
SERVICE CMNT-IMP: ABNORMAL
SODIUM SERPL-SCNC: 139 MMOL/L (ref 136–145)
SP GR UR REFRACTOMETRY: 1.01 (ref 1–1.03)
URINE CULTURE IF INDICATED: NORMAL
UROBILINOGEN UR QL STRIP.AUTO: 0.2 EU/DL (ref 0.2–1)
WBC # BLD AUTO: 13.2 K/UL (ref 3.6–11)
WBC URNS QL MICRO: NORMAL /HPF (ref 0–4)

## 2024-02-07 PROCEDURE — 99233 SBSQ HOSP IP/OBS HIGH 50: CPT | Performed by: NURSE PRACTITIONER

## 2024-02-07 PROCEDURE — C9113 INJ PANTOPRAZOLE SODIUM, VIA: HCPCS | Performed by: INTERNAL MEDICINE

## 2024-02-07 PROCEDURE — 6370000000 HC RX 637 (ALT 250 FOR IP): Performed by: INTERNAL MEDICINE

## 2024-02-07 PROCEDURE — 80048 BASIC METABOLIC PNL TOTAL CA: CPT

## 2024-02-07 PROCEDURE — 6360000002 HC RX W HCPCS: Performed by: NURSE PRACTITIONER

## 2024-02-07 PROCEDURE — 85027 COMPLETE CBC AUTOMATED: CPT

## 2024-02-07 PROCEDURE — 83735 ASSAY OF MAGNESIUM: CPT

## 2024-02-07 PROCEDURE — 82962 GLUCOSE BLOOD TEST: CPT

## 2024-02-07 PROCEDURE — 97535 SELF CARE MNGMENT TRAINING: CPT | Performed by: OCCUPATIONAL THERAPIST

## 2024-02-07 PROCEDURE — 99223 1ST HOSP IP/OBS HIGH 75: CPT | Performed by: INTERNAL MEDICINE

## 2024-02-07 PROCEDURE — 6360000002 HC RX W HCPCS: Performed by: INTERNAL MEDICINE

## 2024-02-07 PROCEDURE — 94761 N-INVAS EAR/PLS OXIMETRY MLT: CPT

## 2024-02-07 PROCEDURE — 94640 AIRWAY INHALATION TREATMENT: CPT

## 2024-02-07 PROCEDURE — 2580000003 HC RX 258: Performed by: INTERNAL MEDICINE

## 2024-02-07 PROCEDURE — 87040 BLOOD CULTURE FOR BACTERIA: CPT

## 2024-02-07 PROCEDURE — 2580000003 HC RX 258: Performed by: NURSE PRACTITIONER

## 2024-02-07 PROCEDURE — 2580000003 HC RX 258: Performed by: HOSPITALIST

## 2024-02-07 PROCEDURE — 97110 THERAPEUTIC EXERCISES: CPT | Performed by: OCCUPATIONAL THERAPIST

## 2024-02-07 PROCEDURE — 81001 URINALYSIS AUTO W/SCOPE: CPT

## 2024-02-07 PROCEDURE — 74018 RADEX ABDOMEN 1 VIEW: CPT

## 2024-02-07 PROCEDURE — 6370000000 HC RX 637 (ALT 250 FOR IP): Performed by: HOSPITALIST

## 2024-02-07 PROCEDURE — 36415 COLL VENOUS BLD VENIPUNCTURE: CPT

## 2024-02-07 PROCEDURE — A4216 STERILE WATER/SALINE, 10 ML: HCPCS | Performed by: INTERNAL MEDICINE

## 2024-02-07 PROCEDURE — 1100000000 HC RM PRIVATE

## 2024-02-07 RX ORDER — ENOXAPARIN SODIUM 100 MG/ML
1 INJECTION SUBCUTANEOUS 2 TIMES DAILY
Status: DISCONTINUED | OUTPATIENT
Start: 2024-02-07 | End: 2024-02-08

## 2024-02-07 RX ORDER — ONDANSETRON 2 MG/ML
4 INJECTION INTRAMUSCULAR; INTRAVENOUS EVERY 6 HOURS PRN
Status: DISCONTINUED | OUTPATIENT
Start: 2024-02-07 | End: 2024-02-19 | Stop reason: HOSPADM

## 2024-02-07 RX ORDER — DEXAMETHASONE SODIUM PHOSPHATE 10 MG/ML
6 INJECTION, SOLUTION INTRAMUSCULAR; INTRAVENOUS EVERY 24 HOURS
Status: DISCONTINUED | OUTPATIENT
Start: 2024-02-08 | End: 2024-02-09

## 2024-02-07 RX ORDER — MORPHINE SULFATE 2 MG/ML
1 INJECTION, SOLUTION INTRAMUSCULAR; INTRAVENOUS
Status: DISCONTINUED | OUTPATIENT
Start: 2024-02-07 | End: 2024-02-18

## 2024-02-07 RX ADMIN — ENOXAPARIN SODIUM 60 MG: 60 INJECTION SUBCUTANEOUS at 21:37

## 2024-02-07 RX ADMIN — SODIUM CHLORIDE, PRESERVATIVE FREE 10 ML: 5 INJECTION INTRAVENOUS at 09:00

## 2024-02-07 RX ADMIN — FUROSEMIDE 20 MG: 10 INJECTION, SOLUTION INTRAMUSCULAR; INTRAVENOUS at 10:24

## 2024-02-07 RX ADMIN — CARVEDILOL 3.12 MG: 3.12 TABLET, FILM COATED ORAL at 10:24

## 2024-02-07 RX ADMIN — ATORVASTATIN CALCIUM 10 MG: 20 TABLET, FILM COATED ORAL at 10:24

## 2024-02-07 RX ADMIN — FAMOTIDINE 10 MG: 20 TABLET ORAL at 10:23

## 2024-02-07 RX ADMIN — TRAMADOL HYDROCHLORIDE 50 MG: 50 TABLET ORAL at 10:24

## 2024-02-07 RX ADMIN — PANTOPRAZOLE SODIUM 40 MG: 40 INJECTION, POWDER, FOR SOLUTION INTRAVENOUS at 17:29

## 2024-02-07 RX ADMIN — DEXAMETHASONE SODIUM PHOSPHATE 6 MG: 10 INJECTION, SOLUTION INTRAMUSCULAR; INTRAVENOUS at 13:56

## 2024-02-07 RX ADMIN — ACETAMINOPHEN 1000 MG: 325 TABLET ORAL at 00:09

## 2024-02-07 RX ADMIN — SERTRALINE HYDROCHLORIDE 100 MG: 50 TABLET ORAL at 10:24

## 2024-02-07 RX ADMIN — AZITHROMYCIN MONOHYDRATE 500 MG: 500 INJECTION, POWDER, LYOPHILIZED, FOR SOLUTION INTRAVENOUS at 11:55

## 2024-02-07 RX ADMIN — MOMETASONE FUROATE AND FORMOTEROL FUMARATE DIHYDRATE 2 PUFF: 200; 5 AEROSOL RESPIRATORY (INHALATION) at 07:38

## 2024-02-07 RX ADMIN — APIXABAN 2.5 MG: 2.5 TABLET, FILM COATED ORAL at 10:28

## 2024-02-07 RX ADMIN — SODIUM CHLORIDE, PRESERVATIVE FREE 10 ML: 5 INJECTION INTRAVENOUS at 21:37

## 2024-02-07 RX ADMIN — ONDANSETRON 4 MG: 2 INJECTION INTRAMUSCULAR; INTRAVENOUS at 11:33

## 2024-02-07 RX ADMIN — PANTOPRAZOLE SODIUM 40 MG: 40 TABLET, DELAYED RELEASE ORAL at 10:24

## 2024-02-07 RX ADMIN — DICLOFENAC SODIUM 2 G: 10 GEL TOPICAL at 12:03

## 2024-02-07 RX ADMIN — FUROSEMIDE 20 MG: 10 INJECTION, SOLUTION INTRAMUSCULAR; INTRAVENOUS at 17:28

## 2024-02-07 RX ADMIN — ACETAMINOPHEN 1000 MG: 325 TABLET ORAL at 15:32

## 2024-02-07 RX ADMIN — DEXAMETHASONE SODIUM PHOSPHATE 6 MG: 10 INJECTION, SOLUTION INTRAMUSCULAR; INTRAVENOUS at 00:16

## 2024-02-07 RX ADMIN — DICLOFENAC SODIUM 2 G: 10 GEL TOPICAL at 21:36

## 2024-02-07 RX ADMIN — DOXYCYCLINE HYCLATE 100 MG: 100 TABLET, COATED ORAL at 10:24

## 2024-02-07 ASSESSMENT — PAIN DESCRIPTION - DESCRIPTORS: DESCRIPTORS: ACHING

## 2024-02-07 ASSESSMENT — PAIN SCALES - GENERAL
PAINLEVEL_OUTOF10: 8
PAINLEVEL_OUTOF10: 9

## 2024-02-07 ASSESSMENT — PAIN DESCRIPTION - LOCATION: LOCATION: OTHER (COMMENT)

## 2024-02-07 ASSESSMENT — PAIN - FUNCTIONAL ASSESSMENT: PAIN_FUNCTIONAL_ASSESSMENT: PREVENTS OR INTERFERES WITH MANY ACTIVE NOT PASSIVE ACTIVITIES

## 2024-02-07 NOTE — WOUND CARE
Wound Consult:  new consult Visit. Chart reviewed.  Consulted for heel DTI.  Spoke with patients nurse,  Georgie SCHUSTER at bedside.  Patient is resting on a hillrom bed with versacare mattress.  Left Heel off loaded with heel boot.  Patient is awake, alert, cooperative; requires 2 to move side to side in bed.  Kane score 13.  Assessment:  Left heel- 1.4x1x0.1cm- purple maroon non blanching surrounding blanching red.DTI    Left posterior heel- linear maroon,non blanching DTI  Buttocks-blanching red, macerated, denuding.   Right stump - blanching pink, patient complains of tenderness. dry brown scab.  Treatment:  Left heel- foam applied for protection, heel boot  Sacrum- cleansed with blue wipes, zinc guard applied and foam dressing  Wound Recommendations:  Left heel- foam dressing, change every 3 days elevate on pillow sor heel boot  Skin Care / PI Prevention Recommendations:  1. Minimize friction/shear: minimize layers of linen/pads under patient.  2. Off load pressure/reposition:  turn and reposition approximately every 2 hours; float heels with pillows or use off loading heel boots; waffle cushion for sitting; position wedge.  3. Manage Moisture - keep skin folds dry; incontinence skin care with incontinence wipes; zinc guard barrier ointment; appropriate sized briefs ; purewick in use to help contain urine.  4. Continue to monitor nutrition, pain, and skin risk scale, and skin assessment.  Plan:  Spoke with Dr. Call regarding findings and proposed orders for treatment.  We will continue to reassess weekly and as needed.  Maliha Domingo RN    [facility], Wound / Ostomy Department  Wound Healing Office [phone]

## 2024-02-08 ENCOUNTER — APPOINTMENT (OUTPATIENT)
Facility: HOSPITAL | Age: 89
DRG: 314 | End: 2024-02-08
Attending: INTERNAL MEDICINE
Payer: MEDICARE

## 2024-02-08 ENCOUNTER — APPOINTMENT (OUTPATIENT)
Facility: HOSPITAL | Age: 89
DRG: 314 | End: 2024-02-08
Payer: MEDICARE

## 2024-02-08 ENCOUNTER — PATIENT MESSAGE (OUTPATIENT)
Age: 89
End: 2024-02-08

## 2024-02-08 LAB
ANION GAP SERPL CALC-SCNC: 3 MMOL/L (ref 5–15)
BUN SERPL-MCNC: 39 MG/DL (ref 6–20)
BUN/CREAT SERPL: 51 (ref 12–20)
CALCIUM SERPL-MCNC: 8.4 MG/DL (ref 8.5–10.1)
CHLORIDE SERPL-SCNC: 109 MMOL/L (ref 97–108)
CO2 SERPL-SCNC: 27 MMOL/L (ref 21–32)
CREAT SERPL-MCNC: 0.77 MG/DL (ref 0.55–1.02)
ECHO AO ARCH DIAM: 1.8 CM
ECHO AO ASC DIAM: 2.6 CM
ECHO AO ASCENDING AORTA INDEX: 1.58 CM/M2
ECHO AO ROOT DIAM: 2.3 CM
ECHO AO ROOT INDEX: 1.39 CM/M2
ECHO AR MAX VEL PISA: 3.2 M/S
ECHO AV AREA PEAK VELOCITY: 0.9 CM2
ECHO AV AREA VTI: 0.9 CM2
ECHO AV AREA/BSA PEAK VELOCITY: 0.5 CM2/M2
ECHO AV AREA/BSA VTI: 0.5 CM2/M2
ECHO AV MEAN GRADIENT: 13 MMHG
ECHO AV MEAN VELOCITY: 1.7 M/S
ECHO AV PEAK GRADIENT: 26 MMHG
ECHO AV PEAK VELOCITY: 2.5 M/S
ECHO AV REGURGITANT PHT: 562.6 MILLISECOND
ECHO AV VELOCITY RATIO: 0.28
ECHO AV VTI: 44.4 CM
ECHO BSA: 1.67 M2
ECHO LA DIAMETER INDEX: 2.55 CM/M2
ECHO LA DIAMETER: 4.2 CM
ECHO LA TO AORTIC ROOT RATIO: 1.83
ECHO LA VOL A-L A4C: 117 ML (ref 22–52)
ECHO LA VOL MOD A4C: 112 ML (ref 22–52)
ECHO LA VOLUME INDEX A-L A4C: 71 ML/M2 (ref 16–34)
ECHO LA VOLUME INDEX MOD A4C: 68 ML/M2 (ref 16–34)
ECHO LV E' LATERAL VELOCITY: 8 CM/S
ECHO LV E' SEPTAL VELOCITY: 7 CM/S
ECHO LV EDV A4C: 61 ML
ECHO LV EDV INDEX A4C: 37 ML/M2
ECHO LV EJECTION FRACTION A4C: 33 %
ECHO LV ESV A4C: 41 ML
ECHO LV ESV INDEX A4C: 25 ML/M2
ECHO LV FRACTIONAL SHORTENING: 33 % (ref 28–44)
ECHO LV INTERNAL DIMENSION DIASTOLE INDEX: 2.55 CM/M2
ECHO LV INTERNAL DIMENSION DIASTOLIC: 4.2 CM (ref 3.9–5.3)
ECHO LV INTERNAL DIMENSION SYSTOLIC INDEX: 1.7 CM/M2
ECHO LV INTERNAL DIMENSION SYSTOLIC: 2.8 CM
ECHO LV IVSD: 0.8 CM (ref 0.6–0.9)
ECHO LV MASS 2D: 147 G (ref 67–162)
ECHO LV MASS INDEX 2D: 89.1 G/M2 (ref 43–95)
ECHO LV POSTERIOR WALL DIASTOLIC: 1.3 CM (ref 0.6–0.9)
ECHO LV RELATIVE WALL THICKNESS RATIO: 0.62
ECHO LVOT AREA: 3.1 CM2
ECHO LVOT AV VTI INDEX: 0.27
ECHO LVOT DIAM: 2 CM
ECHO LVOT MEAN GRADIENT: 1 MMHG
ECHO LVOT PEAK GRADIENT: 2 MMHG
ECHO LVOT PEAK VELOCITY: 0.7 M/S
ECHO LVOT STROKE VOLUME INDEX: 22.8 ML/M2
ECHO LVOT SV: 37.7 ML
ECHO LVOT VTI: 12 CM
ECHO MV AREA VTI: 2 CM2
ECHO MV E DECELERATION TIME (DT): 289.6 MS
ECHO MV E VELOCITY: 0.73 M/S
ECHO MV E/E' LATERAL: 9.13
ECHO MV E/E' RATIO (AVERAGED): 9.78
ECHO MV LVOT VTI INDEX: 1.61
ECHO MV MAX VELOCITY: 1.1 M/S
ECHO MV MEAN GRADIENT: 1 MMHG
ECHO MV MEAN VELOCITY: 0.5 M/S
ECHO MV PEAK GRADIENT: 5 MMHG
ECHO MV VTI: 19.3 CM
ECHO PV MAX VELOCITY: 0.8 M/S
ECHO PV PEAK GRADIENT: 2 MMHG
ECHO RV INTERNAL DIMENSION: 3.5 CM
ECHO RV TAPSE: 1.2 CM (ref 1.7–?)
ECHO TV REGURGITANT MAX VELOCITY: 3.3 M/S
ECHO TV REGURGITANT PEAK GRADIENT: 44 MMHG
ERYTHROCYTE [DISTWIDTH] IN BLOOD BY AUTOMATED COUNT: 14 % (ref 11.5–14.5)
GLUCOSE BLD STRIP.AUTO-MCNC: 109 MG/DL (ref 65–117)
GLUCOSE BLD STRIP.AUTO-MCNC: 183 MG/DL (ref 65–117)
GLUCOSE BLD STRIP.AUTO-MCNC: 212 MG/DL (ref 65–117)
GLUCOSE BLD STRIP.AUTO-MCNC: 95 MG/DL (ref 65–117)
GLUCOSE SERPL-MCNC: 169 MG/DL (ref 65–100)
HCT VFR BLD AUTO: 33.4 % (ref 35–47)
HGB BLD-MCNC: 10.6 G/DL (ref 11.5–16)
MAGNESIUM SERPL-MCNC: 1.1 MG/DL (ref 1.6–2.4)
MCH RBC QN AUTO: 27.7 PG (ref 26–34)
MCHC RBC AUTO-ENTMCNC: 31.7 G/DL (ref 30–36.5)
MCV RBC AUTO: 87.2 FL (ref 80–99)
NRBC # BLD: 0.02 K/UL (ref 0–0.01)
NRBC BLD-RTO: 0.2 PER 100 WBC
PLATELET # BLD AUTO: 145 K/UL (ref 150–400)
PMV BLD AUTO: 12.2 FL (ref 8.9–12.9)
POTASSIUM SERPL-SCNC: 4.2 MMOL/L (ref 3.5–5.1)
RBC # BLD AUTO: 3.83 M/UL (ref 3.8–5.2)
SERVICE CMNT-IMP: ABNORMAL
SERVICE CMNT-IMP: ABNORMAL
SERVICE CMNT-IMP: NORMAL
SERVICE CMNT-IMP: NORMAL
SODIUM SERPL-SCNC: 139 MMOL/L (ref 136–145)
WBC # BLD AUTO: 10.1 K/UL (ref 3.6–11)

## 2024-02-08 PROCEDURE — A4216 STERILE WATER/SALINE, 10 ML: HCPCS | Performed by: INTERNAL MEDICINE

## 2024-02-08 PROCEDURE — 80048 BASIC METABOLIC PNL TOTAL CA: CPT

## 2024-02-08 PROCEDURE — 94761 N-INVAS EAR/PLS OXIMETRY MLT: CPT

## 2024-02-08 PROCEDURE — 93306 TTE W/DOPPLER COMPLETE: CPT | Performed by: SPECIALIST

## 2024-02-08 PROCEDURE — 36415 COLL VENOUS BLD VENIPUNCTURE: CPT

## 2024-02-08 PROCEDURE — C9113 INJ PANTOPRAZOLE SODIUM, VIA: HCPCS | Performed by: INTERNAL MEDICINE

## 2024-02-08 PROCEDURE — 1100000000 HC RM PRIVATE

## 2024-02-08 PROCEDURE — 2580000003 HC RX 258: Performed by: HOSPITALIST

## 2024-02-08 PROCEDURE — 83735 ASSAY OF MAGNESIUM: CPT

## 2024-02-08 PROCEDURE — 6370000000 HC RX 637 (ALT 250 FOR IP): Performed by: INTERNAL MEDICINE

## 2024-02-08 PROCEDURE — 6370000000 HC RX 637 (ALT 250 FOR IP): Performed by: HOSPITALIST

## 2024-02-08 PROCEDURE — 92610 EVALUATE SWALLOWING FUNCTION: CPT

## 2024-02-08 PROCEDURE — 82962 GLUCOSE BLOOD TEST: CPT

## 2024-02-08 PROCEDURE — 94640 AIRWAY INHALATION TREATMENT: CPT

## 2024-02-08 PROCEDURE — 6360000002 HC RX W HCPCS: Performed by: INTERNAL MEDICINE

## 2024-02-08 PROCEDURE — 99233 SBSQ HOSP IP/OBS HIGH 50: CPT | Performed by: INTERNAL MEDICINE

## 2024-02-08 PROCEDURE — 2580000003 HC RX 258: Performed by: INTERNAL MEDICINE

## 2024-02-08 PROCEDURE — 87040 BLOOD CULTURE FOR BACTERIA: CPT

## 2024-02-08 PROCEDURE — 99233 SBSQ HOSP IP/OBS HIGH 50: CPT | Performed by: NURSE PRACTITIONER

## 2024-02-08 PROCEDURE — 85027 COMPLETE CBC AUTOMATED: CPT

## 2024-02-08 PROCEDURE — 71045 X-RAY EXAM CHEST 1 VIEW: CPT

## 2024-02-08 PROCEDURE — 93306 TTE W/DOPPLER COMPLETE: CPT

## 2024-02-08 RX ORDER — PANTOPRAZOLE SODIUM 40 MG/1
40 TABLET, DELAYED RELEASE ORAL
Status: DISCONTINUED | OUTPATIENT
Start: 2024-02-08 | End: 2024-02-10

## 2024-02-08 RX ORDER — FAMOTIDINE 20 MG/1
20 TABLET, FILM COATED ORAL NIGHTLY
Status: DISCONTINUED | OUTPATIENT
Start: 2024-02-08 | End: 2024-02-10

## 2024-02-08 RX ORDER — FUROSEMIDE 20 MG/1
20 TABLET ORAL DAILY
Status: DISCONTINUED | OUTPATIENT
Start: 2024-02-08 | End: 2024-02-17

## 2024-02-08 RX ADMIN — MORPHINE SULFATE 1 MG: 2 INJECTION, SOLUTION INTRAMUSCULAR; INTRAVENOUS at 13:46

## 2024-02-08 RX ADMIN — FAMOTIDINE 20 MG: 20 TABLET, FILM COATED ORAL at 20:16

## 2024-02-08 RX ADMIN — MOMETASONE FUROATE AND FORMOTEROL FUMARATE DIHYDRATE 2 PUFF: 200; 5 AEROSOL RESPIRATORY (INHALATION) at 21:14

## 2024-02-08 RX ADMIN — SODIUM CHLORIDE, PRESERVATIVE FREE 10 ML: 5 INJECTION INTRAVENOUS at 20:08

## 2024-02-08 RX ADMIN — PANTOPRAZOLE SODIUM 40 MG: 40 TABLET, DELAYED RELEASE ORAL at 16:32

## 2024-02-08 RX ADMIN — PANTOPRAZOLE SODIUM 40 MG: 40 INJECTION, POWDER, FOR SOLUTION INTRAVENOUS at 05:59

## 2024-02-08 RX ADMIN — DEXAMETHASONE SODIUM PHOSPHATE 6 MG: 10 INJECTION, SOLUTION INTRAMUSCULAR; INTRAVENOUS at 13:52

## 2024-02-08 RX ADMIN — CARVEDILOL 3.12 MG: 3.12 TABLET, FILM COATED ORAL at 16:32

## 2024-02-08 RX ADMIN — DICLOFENAC SODIUM 2 G: 10 GEL TOPICAL at 20:18

## 2024-02-08 RX ADMIN — SODIUM CHLORIDE, PRESERVATIVE FREE 10 ML: 5 INJECTION INTRAVENOUS at 08:21

## 2024-02-08 RX ADMIN — ENOXAPARIN SODIUM 60 MG: 60 INJECTION SUBCUTANEOUS at 08:20

## 2024-02-08 RX ADMIN — MORPHINE SULFATE 1 MG: 2 INJECTION, SOLUTION INTRAMUSCULAR; INTRAVENOUS at 01:55

## 2024-02-08 RX ADMIN — MOMETASONE FUROATE AND FORMOTEROL FUMARATE DIHYDRATE 2 PUFF: 200; 5 AEROSOL RESPIRATORY (INHALATION) at 08:52

## 2024-02-08 RX ADMIN — VANCOMYCIN HYDROCHLORIDE 1500 MG: 1.5 INJECTION, POWDER, LYOPHILIZED, FOR SOLUTION INTRAVENOUS at 12:06

## 2024-02-08 RX ADMIN — SODIUM CHLORIDE: 9 INJECTION, SOLUTION INTRAVENOUS at 12:00

## 2024-02-08 RX ADMIN — APIXABAN 2.5 MG: 2.5 TABLET, FILM COATED ORAL at 20:16

## 2024-02-08 RX ADMIN — FUROSEMIDE 20 MG: 10 INJECTION, SOLUTION INTRAMUSCULAR; INTRAVENOUS at 08:19

## 2024-02-08 RX ADMIN — DICLOFENAC SODIUM 2 G: 10 GEL TOPICAL at 08:18

## 2024-02-08 RX ADMIN — MORPHINE SULFATE 1 MG: 2 INJECTION, SOLUTION INTRAMUSCULAR; INTRAVENOUS at 17:25

## 2024-02-08 ASSESSMENT — PAIN SCALES - GENERAL
PAINLEVEL_OUTOF10: 7
PAINLEVEL_OUTOF10: 7
PAINLEVEL_OUTOF10: 3
PAINLEVEL_OUTOF10: 7
PAINLEVEL_OUTOF10: 3

## 2024-02-08 ASSESSMENT — PAIN DESCRIPTION - DESCRIPTORS
DESCRIPTORS: ACHING

## 2024-02-08 ASSESSMENT — PAIN DESCRIPTION - LOCATION
LOCATION: HIP

## 2024-02-08 ASSESSMENT — PAIN DESCRIPTION - ORIENTATION
ORIENTATION: RIGHT;LEFT

## 2024-02-08 NOTE — TELEPHONE ENCOUNTER
From: Irma Lea  To: Dr. Pk Goodman  Sent: 2/8/2024 3:11 PM EST  Subject: Venu pacemaker related    She is still at [facility]  Please give me a call @[phone]  Uma    I have questions about pacemaker discussion we've had with staff  Tu

## 2024-02-08 NOTE — ACP (ADVANCE CARE PLANNING)
Advanced care planning:      Discussed goals of care with patient and daughter at bedside.  [facility] the patients daughter Celia by phone at [phone].  Reviewed patients condition, previous treatment, and future treatment possibilities at length.  Though she is presently stable and possibly nearing discharge, her overall condition is poor and prognosis for meaningful rehab is also very guarded if not poor.  Especially if she is determined to have true bacteremia requiring pacemaker removal.  Therefore, palliative care has been consulted to discuss goals of care.  A family meeting is scheduled for tomorrow at 1:30.   For now, we will continue with aggressive treatment.  Morphine has been added prn for severe pain.  If the patient continues to show signs of discomfort or clinical deterioration, additional comfort measures should be offered for the family to consider while the goals of care are being addressed.        Code status:  DNR    Face to face time 20 mins

## 2024-02-08 NOTE — TELEPHONE ENCOUNTER
Verified patient with two types of identifiers. Spoke with patient's daughter verified on PHI. She states that they were speaking with the Palliative team today and they had mentioned that her infection may be coming from her pacemaker and they may have to take it out. Notified that they would need to consult Cardiology/EP if this was a concern. Notified that further testing would most likely be needed to determine if device needs to be removed. Celia denies any swelling, redness, drainage, etc from site. They are having a family meeting tomorrow with hospitalist, ID, & palliative care to discuss recent results. Advised Celia to discuss with MDs tomorrow. Patient verbalized understanding and will call with any other questions.

## 2024-02-08 NOTE — CARE COORDINATION
Transition of Care Plan:     RUR: 18%  Disposition: return to [facility] ([facility])  Transportation at Discharge: will need BLS   IM Medicare Letter: to be provided   Caregiver Contact:Pauline Kasper (Child)  [phone]     Per chart review, Pt is to return to [facility] once medically cleared.    Jim Arechiga, BS, CM  [facility] Care Manager  [phone]

## 2024-02-09 LAB
ANION GAP SERPL CALC-SCNC: 4 MMOL/L (ref 5–15)
BUN SERPL-MCNC: 40 MG/DL (ref 6–20)
BUN/CREAT SERPL: 51 (ref 12–20)
CALCIUM SERPL-MCNC: 7.8 MG/DL (ref 8.5–10.1)
CHLORIDE SERPL-SCNC: 107 MMOL/L (ref 97–108)
CO2 SERPL-SCNC: 26 MMOL/L (ref 21–32)
CREAT SERPL-MCNC: 0.79 MG/DL (ref 0.55–1.02)
ERYTHROCYTE [DISTWIDTH] IN BLOOD BY AUTOMATED COUNT: 13.9 % (ref 11.5–14.5)
GLUCOSE BLD STRIP.AUTO-MCNC: 140 MG/DL (ref 65–117)
GLUCOSE BLD STRIP.AUTO-MCNC: 142 MG/DL (ref 65–117)
GLUCOSE BLD STRIP.AUTO-MCNC: 161 MG/DL (ref 65–117)
GLUCOSE BLD STRIP.AUTO-MCNC: 178 MG/DL (ref 65–117)
GLUCOSE SERPL-MCNC: 158 MG/DL (ref 65–100)
HCT VFR BLD AUTO: 30.3 % (ref 35–47)
HGB BLD-MCNC: 9.9 G/DL (ref 11.5–16)
MAGNESIUM SERPL-MCNC: 1.2 MG/DL (ref 1.6–2.4)
MCH RBC QN AUTO: 28 PG (ref 26–34)
MCHC RBC AUTO-ENTMCNC: 32.7 G/DL (ref 30–36.5)
MCV RBC AUTO: 85.8 FL (ref 80–99)
NRBC # BLD: 0 K/UL (ref 0–0.01)
NRBC BLD-RTO: 0 PER 100 WBC
PLATELET # BLD AUTO: 112 K/UL (ref 150–400)
PMV BLD AUTO: 12.5 FL (ref 8.9–12.9)
POTASSIUM SERPL-SCNC: 4.3 MMOL/L (ref 3.5–5.1)
RBC # BLD AUTO: 3.53 M/UL (ref 3.8–5.2)
SERVICE CMNT-IMP: ABNORMAL
SODIUM SERPL-SCNC: 137 MMOL/L (ref 136–145)
WBC # BLD AUTO: 10 K/UL (ref 3.6–11)

## 2024-02-09 PROCEDURE — 36415 COLL VENOUS BLD VENIPUNCTURE: CPT

## 2024-02-09 PROCEDURE — 99497 ADVNCD CARE PLAN 30 MIN: CPT | Performed by: NURSE PRACTITIONER

## 2024-02-09 PROCEDURE — 2580000003 HC RX 258: Performed by: INTERNAL MEDICINE

## 2024-02-09 PROCEDURE — 2580000003 HC RX 258: Performed by: HOSPITALIST

## 2024-02-09 PROCEDURE — 82962 GLUCOSE BLOOD TEST: CPT

## 2024-02-09 PROCEDURE — 99233 SBSQ HOSP IP/OBS HIGH 50: CPT | Performed by: INTERNAL MEDICINE

## 2024-02-09 PROCEDURE — 94761 N-INVAS EAR/PLS OXIMETRY MLT: CPT

## 2024-02-09 PROCEDURE — 6370000000 HC RX 637 (ALT 250 FOR IP): Performed by: INTERNAL MEDICINE

## 2024-02-09 PROCEDURE — 6370000000 HC RX 637 (ALT 250 FOR IP): Performed by: HOSPITALIST

## 2024-02-09 PROCEDURE — 83735 ASSAY OF MAGNESIUM: CPT

## 2024-02-09 PROCEDURE — 85027 COMPLETE CBC AUTOMATED: CPT

## 2024-02-09 PROCEDURE — 99498 ADVNCD CARE PLAN ADDL 30 MIN: CPT | Performed by: NURSE PRACTITIONER

## 2024-02-09 PROCEDURE — 6360000002 HC RX W HCPCS: Performed by: INTERNAL MEDICINE

## 2024-02-09 PROCEDURE — 6360000002 HC RX W HCPCS

## 2024-02-09 PROCEDURE — 80048 BASIC METABOLIC PNL TOTAL CA: CPT

## 2024-02-09 PROCEDURE — 1100000000 HC RM PRIVATE

## 2024-02-09 PROCEDURE — 94640 AIRWAY INHALATION TREATMENT: CPT

## 2024-02-09 RX ORDER — MAGNESIUM SULFATE 1 G/100ML
1000 INJECTION INTRAVENOUS ONCE
Status: COMPLETED | OUTPATIENT
Start: 2024-02-09 | End: 2024-02-09

## 2024-02-09 RX ADMIN — ATORVASTATIN CALCIUM 10 MG: 20 TABLET, FILM COATED ORAL at 08:09

## 2024-02-09 RX ADMIN — APIXABAN 2.5 MG: 2.5 TABLET, FILM COATED ORAL at 20:12

## 2024-02-09 RX ADMIN — APIXABAN 2.5 MG: 2.5 TABLET, FILM COATED ORAL at 08:09

## 2024-02-09 RX ADMIN — MOMETASONE FUROATE AND FORMOTEROL FUMARATE DIHYDRATE 2 PUFF: 200; 5 AEROSOL RESPIRATORY (INHALATION) at 21:18

## 2024-02-09 RX ADMIN — MOMETASONE FUROATE AND FORMOTEROL FUMARATE DIHYDRATE 2 PUFF: 200; 5 AEROSOL RESPIRATORY (INHALATION) at 08:38

## 2024-02-09 RX ADMIN — DICLOFENAC SODIUM 2 G: 10 GEL TOPICAL at 20:16

## 2024-02-09 RX ADMIN — SODIUM CHLORIDE: 9 INJECTION, SOLUTION INTRAVENOUS at 05:11

## 2024-02-09 RX ADMIN — VANCOMYCIN HYDROCHLORIDE 1000 MG: 1 INJECTION, POWDER, LYOPHILIZED, FOR SOLUTION INTRAVENOUS at 12:05

## 2024-02-09 RX ADMIN — MAGNESIUM SULFATE HEPTAHYDRATE 1000 MG: 1 INJECTION, SOLUTION INTRAVENOUS at 05:13

## 2024-02-09 RX ADMIN — SODIUM CHLORIDE, PRESERVATIVE FREE 5 ML: 5 INJECTION INTRAVENOUS at 20:19

## 2024-02-09 RX ADMIN — CARVEDILOL 3.12 MG: 3.12 TABLET, FILM COATED ORAL at 16:12

## 2024-02-09 RX ADMIN — FAMOTIDINE 20 MG: 20 TABLET, FILM COATED ORAL at 20:13

## 2024-02-09 RX ADMIN — MIRTAZAPINE 7.5 MG: 15 TABLET, FILM COATED ORAL at 20:13

## 2024-02-09 RX ADMIN — CARVEDILOL 3.12 MG: 3.12 TABLET, FILM COATED ORAL at 08:09

## 2024-02-09 RX ADMIN — SODIUM CHLORIDE, PRESERVATIVE FREE 10 ML: 5 INJECTION INTRAVENOUS at 08:09

## 2024-02-09 RX ADMIN — FUROSEMIDE 20 MG: 20 TABLET ORAL at 08:08

## 2024-02-09 RX ADMIN — SODIUM CHLORIDE: 9 INJECTION, SOLUTION INTRAVENOUS at 12:04

## 2024-02-09 RX ADMIN — DICLOFENAC SODIUM 2 G: 10 GEL TOPICAL at 08:15

## 2024-02-09 RX ADMIN — PANTOPRAZOLE SODIUM 40 MG: 40 TABLET, DELAYED RELEASE ORAL at 16:12

## 2024-02-09 NOTE — SIGNIFICANT EVENT
Had a family meeting today with patient's 2 sons, and 2 daughters present in the room  Kena Lou NP from palliative care was present    Discussed with patient family in details patient's medical condition    Discussed with them that medical treatment, treatment plans  Including the cardiologist and infectious disease recommendations    Palliative care was present and discussed comfort measures with the patient, and the patient's family    ---------------  Conclusion    Family will discuss with charge nurse and with patient I will, their final decision to proceed with the plan.

## 2024-02-09 NOTE — WOUND CARE
Wound consult: re-consulted for left upper arm skin tear.  Spoke with patient's nurse Javed.  In with patient and family at bedside.  Patient seen by VIANNEY Jett on 2/7 for DTI to left heel. This has foam on it and is in off loading heel boot.  Patient with right BKA.  Left upper arm ~ 3 x 2 cm, skin tear with thin layer of epidermal flap that is not viable, wound base moist, violet/pink, serosanguinous to serous drainage, very fragile skin/some ecchymosis noted.  Treatment:  Foam dressing reapplied.  Patient asked for foot to be moisturized and did this for her.  Reapplied heel boot.  Recommendations:  Foam dressing for skin tear, change as needed or about every three days.  Plan:  Hand off to Dr. Andersen and updated orders.  Dariana Hayes RN, CWON

## 2024-02-09 NOTE — ACP (ADVANCE CARE PLANNING)
Advance Care Planning     Advance Care Planning (ACP) Physician/NP/PA Conversation    Date of Conversation: 2/9/2024    Family meeting with the patient's children~ Yaneli Yang Mark, and Cliff along with Dr. Andersen and  [place]    Family provided a detailed update of the patient's condition, options for care, risks of options, hospice care, and disposition. Family had many questions that were answered to the best of our abilities. The pt was able to participate but unable to make decisions so she deferred to the children.    Result of meeting:  Continue antibiotics for bacteremia  Family to continue discussions and will inform us if they decide on hospice in lieu of antibiotics  She would return to [place] for long term antibiotics or hospice. Family informed that discharge is anticipated next week    Length of Voluntary ACP Conversation in minutes:  65 minutes    EVA Gabriel - NP    Palliative Diagnosis  ACP  hypoalbuminemia  Delirium   Poor appetite  Palliative care encounter

## 2024-02-09 NOTE — CARE COORDINATION
2/9/24  3:41 PM    Care Management Daily Progress Note:    1. Septicemia (HCC)    2. COVID    3. Metabolic encephalopathy    4. Bacteremia      RUR: 18%  LOS: 9D    CM reviewed EMR and noted family meeting with palliative care to discuss GOC was planned for today. CM met with family following the meeting and they are discussing with patient and family their next steps. They are aware that [facility] could care for patient with IV abx if they decide to move forward with 6 weeks of abx. CM sent updated clinicals to [place] with the recent ID note and updated on possible plans for dc on Monday.     CM following for dc needs    Park Aden  Care Manager

## 2024-02-10 LAB
ANION GAP SERPL CALC-SCNC: 3 MMOL/L (ref 5–15)
BACTERIA SPEC CULT: ABNORMAL
BUN SERPL-MCNC: 28 MG/DL (ref 6–20)
BUN/CREAT SERPL: 49 (ref 12–20)
CALCIUM SERPL-MCNC: 8.3 MG/DL (ref 8.5–10.1)
CHLORIDE SERPL-SCNC: 107 MMOL/L (ref 97–108)
CO2 SERPL-SCNC: 29 MMOL/L (ref 21–32)
CREAT SERPL-MCNC: 0.57 MG/DL (ref 0.55–1.02)
ERYTHROCYTE [DISTWIDTH] IN BLOOD BY AUTOMATED COUNT: 13.7 % (ref 11.5–14.5)
GLUCOSE BLD STRIP.AUTO-MCNC: 111 MG/DL (ref 65–117)
GLUCOSE BLD STRIP.AUTO-MCNC: 115 MG/DL (ref 65–117)
GLUCOSE BLD STRIP.AUTO-MCNC: 126 MG/DL (ref 65–117)
GLUCOSE BLD STRIP.AUTO-MCNC: 149 MG/DL (ref 65–117)
GLUCOSE SERPL-MCNC: 115 MG/DL (ref 65–100)
HCT VFR BLD AUTO: 33.3 % (ref 35–47)
HGB BLD-MCNC: 10.8 G/DL (ref 11.5–16)
MAGNESIUM SERPL-MCNC: 1.2 MG/DL (ref 1.6–2.4)
MCH RBC QN AUTO: 28.1 PG (ref 26–34)
MCHC RBC AUTO-ENTMCNC: 32.4 G/DL (ref 30–36.5)
MCV RBC AUTO: 86.5 FL (ref 80–99)
NRBC # BLD: 0 K/UL (ref 0–0.01)
NRBC BLD-RTO: 0 PER 100 WBC
PLATELET # BLD AUTO: 139 K/UL (ref 150–400)
PMV BLD AUTO: 12.3 FL (ref 8.9–12.9)
POTASSIUM SERPL-SCNC: 4.3 MMOL/L (ref 3.5–5.1)
RBC # BLD AUTO: 3.85 M/UL (ref 3.8–5.2)
SERVICE CMNT-IMP: ABNORMAL
SERVICE CMNT-IMP: NORMAL
SERVICE CMNT-IMP: NORMAL
SODIUM SERPL-SCNC: 139 MMOL/L (ref 136–145)
VANCOMYCIN SERPL-MCNC: 15.2 UG/ML
WBC # BLD AUTO: 11.6 K/UL (ref 3.6–11)

## 2024-02-10 PROCEDURE — 2580000003 HC RX 258: Performed by: INTERNAL MEDICINE

## 2024-02-10 PROCEDURE — 6370000000 HC RX 637 (ALT 250 FOR IP): Performed by: HOSPITALIST

## 2024-02-10 PROCEDURE — 83735 ASSAY OF MAGNESIUM: CPT

## 2024-02-10 PROCEDURE — 94761 N-INVAS EAR/PLS OXIMETRY MLT: CPT

## 2024-02-10 PROCEDURE — 85027 COMPLETE CBC AUTOMATED: CPT

## 2024-02-10 PROCEDURE — C9113 INJ PANTOPRAZOLE SODIUM, VIA: HCPCS | Performed by: STUDENT IN AN ORGANIZED HEALTH CARE EDUCATION/TRAINING PROGRAM

## 2024-02-10 PROCEDURE — 2580000003 HC RX 258: Performed by: STUDENT IN AN ORGANIZED HEALTH CARE EDUCATION/TRAINING PROGRAM

## 2024-02-10 PROCEDURE — 6360000002 HC RX W HCPCS: Performed by: INTERNAL MEDICINE

## 2024-02-10 PROCEDURE — 6370000000 HC RX 637 (ALT 250 FOR IP): Performed by: STUDENT IN AN ORGANIZED HEALTH CARE EDUCATION/TRAINING PROGRAM

## 2024-02-10 PROCEDURE — 94640 AIRWAY INHALATION TREATMENT: CPT

## 2024-02-10 PROCEDURE — 2580000003 HC RX 258: Performed by: HOSPITALIST

## 2024-02-10 PROCEDURE — 1100000000 HC RM PRIVATE

## 2024-02-10 PROCEDURE — 6370000000 HC RX 637 (ALT 250 FOR IP): Performed by: INTERNAL MEDICINE

## 2024-02-10 PROCEDURE — A4216 STERILE WATER/SALINE, 10 ML: HCPCS | Performed by: STUDENT IN AN ORGANIZED HEALTH CARE EDUCATION/TRAINING PROGRAM

## 2024-02-10 PROCEDURE — 80202 ASSAY OF VANCOMYCIN: CPT

## 2024-02-10 PROCEDURE — 82962 GLUCOSE BLOOD TEST: CPT

## 2024-02-10 PROCEDURE — 80048 BASIC METABOLIC PNL TOTAL CA: CPT

## 2024-02-10 PROCEDURE — 6360000002 HC RX W HCPCS: Performed by: STUDENT IN AN ORGANIZED HEALTH CARE EDUCATION/TRAINING PROGRAM

## 2024-02-10 PROCEDURE — 36415 COLL VENOUS BLD VENIPUNCTURE: CPT

## 2024-02-10 RX ORDER — PREDNISONE 5 MG/1
5 TABLET ORAL DAILY
Status: DISCONTINUED | OUTPATIENT
Start: 2024-02-10 | End: 2024-02-19 | Stop reason: HOSPADM

## 2024-02-10 RX ADMIN — APIXABAN 2.5 MG: 2.5 TABLET, FILM COATED ORAL at 20:04

## 2024-02-10 RX ADMIN — SERTRALINE HYDROCHLORIDE 100 MG: 50 TABLET ORAL at 07:45

## 2024-02-10 RX ADMIN — ATORVASTATIN CALCIUM 10 MG: 20 TABLET, FILM COATED ORAL at 07:45

## 2024-02-10 RX ADMIN — VANCOMYCIN HYDROCHLORIDE 1000 MG: 1 INJECTION, POWDER, LYOPHILIZED, FOR SOLUTION INTRAVENOUS at 11:36

## 2024-02-10 RX ADMIN — DICLOFENAC SODIUM 2 G: 10 GEL TOPICAL at 07:53

## 2024-02-10 RX ADMIN — PANTOPRAZOLE SODIUM 40 MG: 40 INJECTION, POWDER, FOR SOLUTION INTRAVENOUS at 16:13

## 2024-02-10 RX ADMIN — CARVEDILOL 3.12 MG: 3.12 TABLET, FILM COATED ORAL at 15:48

## 2024-02-10 RX ADMIN — CARVEDILOL 3.12 MG: 3.12 TABLET, FILM COATED ORAL at 07:45

## 2024-02-10 RX ADMIN — FUROSEMIDE 20 MG: 20 TABLET ORAL at 07:45

## 2024-02-10 RX ADMIN — MIRTAZAPINE 7.5 MG: 15 TABLET, FILM COATED ORAL at 20:03

## 2024-02-10 RX ADMIN — SODIUM CHLORIDE, PRESERVATIVE FREE 5 ML: 5 INJECTION INTRAVENOUS at 20:06

## 2024-02-10 RX ADMIN — LEVOTHYROXINE SODIUM 88 MCG: 0.09 TABLET ORAL at 05:55

## 2024-02-10 RX ADMIN — MOMETASONE FUROATE AND FORMOTEROL FUMARATE DIHYDRATE 2 PUFF: 200; 5 AEROSOL RESPIRATORY (INHALATION) at 07:32

## 2024-02-10 RX ADMIN — APIXABAN 2.5 MG: 2.5 TABLET, FILM COATED ORAL at 07:45

## 2024-02-10 RX ADMIN — SODIUM CHLORIDE: 9 INJECTION, SOLUTION INTRAVENOUS at 11:35

## 2024-02-10 RX ADMIN — PREDNISONE 5 MG: 5 TABLET ORAL at 16:13

## 2024-02-10 RX ADMIN — DICLOFENAC SODIUM 2 G: 10 GEL TOPICAL at 20:05

## 2024-02-10 RX ADMIN — SODIUM CHLORIDE, PRESERVATIVE FREE 10 ML: 5 INJECTION INTRAVENOUS at 07:46

## 2024-02-11 LAB
ANION GAP SERPL CALC-SCNC: 3 MMOL/L (ref 5–15)
BACTERIA SPEC CULT: ABNORMAL
BACTERIA SPEC CULT: ABNORMAL
BUN SERPL-MCNC: 22 MG/DL (ref 6–20)
BUN/CREAT SERPL: 36 (ref 12–20)
CALCIUM SERPL-MCNC: 8.3 MG/DL (ref 8.5–10.1)
CHLORIDE SERPL-SCNC: 106 MMOL/L (ref 97–108)
CO2 SERPL-SCNC: 31 MMOL/L (ref 21–32)
CREAT SERPL-MCNC: 0.61 MG/DL (ref 0.55–1.02)
ERYTHROCYTE [DISTWIDTH] IN BLOOD BY AUTOMATED COUNT: 14 % (ref 11.5–14.5)
GLUCOSE BLD STRIP.AUTO-MCNC: 113 MG/DL (ref 65–117)
GLUCOSE BLD STRIP.AUTO-MCNC: 124 MG/DL (ref 65–117)
GLUCOSE BLD STRIP.AUTO-MCNC: 132 MG/DL (ref 65–117)
GLUCOSE BLD STRIP.AUTO-MCNC: 191 MG/DL (ref 65–117)
GLUCOSE SERPL-MCNC: 125 MG/DL (ref 65–100)
HCT VFR BLD AUTO: 34.5 % (ref 35–47)
HGB BLD-MCNC: 11.2 G/DL (ref 11.5–16)
MCH RBC QN AUTO: 28 PG (ref 26–34)
MCHC RBC AUTO-ENTMCNC: 32.5 G/DL (ref 30–36.5)
MCV RBC AUTO: 86.3 FL (ref 80–99)
NRBC # BLD: 0 K/UL (ref 0–0.01)
NRBC BLD-RTO: 0 PER 100 WBC
PLATELET # BLD AUTO: 158 K/UL (ref 150–400)
PMV BLD AUTO: 12.5 FL (ref 8.9–12.9)
POTASSIUM SERPL-SCNC: 4.1 MMOL/L (ref 3.5–5.1)
RBC # BLD AUTO: 4 M/UL (ref 3.8–5.2)
SERVICE CMNT-IMP: ABNORMAL
SERVICE CMNT-IMP: NORMAL
SODIUM SERPL-SCNC: 140 MMOL/L (ref 136–145)
WBC # BLD AUTO: 12.1 K/UL (ref 3.6–11)

## 2024-02-11 PROCEDURE — 6360000002 HC RX W HCPCS: Performed by: STUDENT IN AN ORGANIZED HEALTH CARE EDUCATION/TRAINING PROGRAM

## 2024-02-11 PROCEDURE — C9113 INJ PANTOPRAZOLE SODIUM, VIA: HCPCS | Performed by: STUDENT IN AN ORGANIZED HEALTH CARE EDUCATION/TRAINING PROGRAM

## 2024-02-11 PROCEDURE — 2580000003 HC RX 258: Performed by: INTERNAL MEDICINE

## 2024-02-11 PROCEDURE — 36415 COLL VENOUS BLD VENIPUNCTURE: CPT

## 2024-02-11 PROCEDURE — 85027 COMPLETE CBC AUTOMATED: CPT

## 2024-02-11 PROCEDURE — 94640 AIRWAY INHALATION TREATMENT: CPT

## 2024-02-11 PROCEDURE — 80048 BASIC METABOLIC PNL TOTAL CA: CPT

## 2024-02-11 PROCEDURE — 87040 BLOOD CULTURE FOR BACTERIA: CPT

## 2024-02-11 PROCEDURE — 6370000000 HC RX 637 (ALT 250 FOR IP): Performed by: INTERNAL MEDICINE

## 2024-02-11 PROCEDURE — 2580000003 HC RX 258: Performed by: STUDENT IN AN ORGANIZED HEALTH CARE EDUCATION/TRAINING PROGRAM

## 2024-02-11 PROCEDURE — 6370000000 HC RX 637 (ALT 250 FOR IP): Performed by: HOSPITALIST

## 2024-02-11 PROCEDURE — 6370000000 HC RX 637 (ALT 250 FOR IP): Performed by: STUDENT IN AN ORGANIZED HEALTH CARE EDUCATION/TRAINING PROGRAM

## 2024-02-11 PROCEDURE — 1100000000 HC RM PRIVATE

## 2024-02-11 PROCEDURE — 82962 GLUCOSE BLOOD TEST: CPT

## 2024-02-11 PROCEDURE — A4216 STERILE WATER/SALINE, 10 ML: HCPCS | Performed by: STUDENT IN AN ORGANIZED HEALTH CARE EDUCATION/TRAINING PROGRAM

## 2024-02-11 PROCEDURE — 6360000002 HC RX W HCPCS: Performed by: INTERNAL MEDICINE

## 2024-02-11 PROCEDURE — 2580000003 HC RX 258: Performed by: HOSPITALIST

## 2024-02-11 RX ORDER — PANTOPRAZOLE SODIUM 40 MG/1
40 TABLET, DELAYED RELEASE ORAL
Status: DISCONTINUED | OUTPATIENT
Start: 2024-02-12 | End: 2024-02-11

## 2024-02-11 RX ADMIN — SERTRALINE HYDROCHLORIDE 100 MG: 50 TABLET ORAL at 08:37

## 2024-02-11 RX ADMIN — VANCOMYCIN HYDROCHLORIDE 1000 MG: 1 INJECTION, POWDER, LYOPHILIZED, FOR SOLUTION INTRAVENOUS at 11:50

## 2024-02-11 RX ADMIN — DICLOFENAC SODIUM 2 G: 10 GEL TOPICAL at 20:34

## 2024-02-11 RX ADMIN — PREDNISONE 5 MG: 5 TABLET ORAL at 08:37

## 2024-02-11 RX ADMIN — MIRTAZAPINE 7.5 MG: 15 TABLET, FILM COATED ORAL at 20:27

## 2024-02-11 RX ADMIN — LEVOTHYROXINE SODIUM 88 MCG: 0.09 TABLET ORAL at 06:47

## 2024-02-11 RX ADMIN — DICLOFENAC SODIUM 2 G: 10 GEL TOPICAL at 08:47

## 2024-02-11 RX ADMIN — SODIUM CHLORIDE, PRESERVATIVE FREE 5 ML: 5 INJECTION INTRAVENOUS at 20:32

## 2024-02-11 RX ADMIN — MOMETASONE FUROATE AND FORMOTEROL FUMARATE DIHYDRATE 2 PUFF: 200; 5 AEROSOL RESPIRATORY (INHALATION) at 07:38

## 2024-02-11 RX ADMIN — CARVEDILOL 3.12 MG: 3.12 TABLET, FILM COATED ORAL at 16:29

## 2024-02-11 RX ADMIN — ATORVASTATIN CALCIUM 10 MG: 20 TABLET, FILM COATED ORAL at 08:37

## 2024-02-11 RX ADMIN — SODIUM CHLORIDE: 9 INJECTION, SOLUTION INTRAVENOUS at 11:50

## 2024-02-11 RX ADMIN — FUROSEMIDE 20 MG: 20 TABLET ORAL at 08:37

## 2024-02-11 RX ADMIN — SODIUM CHLORIDE, PRESERVATIVE FREE 5 ML: 5 INJECTION INTRAVENOUS at 09:58

## 2024-02-11 RX ADMIN — APIXABAN 2.5 MG: 2.5 TABLET, FILM COATED ORAL at 20:27

## 2024-02-11 RX ADMIN — APIXABAN 2.5 MG: 2.5 TABLET, FILM COATED ORAL at 08:37

## 2024-02-11 RX ADMIN — MOMETASONE FUROATE AND FORMOTEROL FUMARATE DIHYDRATE 2 PUFF: 200; 5 AEROSOL RESPIRATORY (INHALATION) at 20:05

## 2024-02-11 RX ADMIN — PANTOPRAZOLE SODIUM 40 MG: 40 INJECTION, POWDER, FOR SOLUTION INTRAVENOUS at 08:37

## 2024-02-12 LAB
ANION GAP SERPL CALC-SCNC: 2 MMOL/L (ref 5–15)
BUN SERPL-MCNC: 19 MG/DL (ref 6–20)
BUN/CREAT SERPL: 32 (ref 12–20)
CALCIUM SERPL-MCNC: 8.3 MG/DL (ref 8.5–10.1)
CHLORIDE SERPL-SCNC: 107 MMOL/L (ref 97–108)
CO2 SERPL-SCNC: 30 MMOL/L (ref 21–32)
CREAT SERPL-MCNC: 0.6 MG/DL (ref 0.55–1.02)
ERYTHROCYTE [DISTWIDTH] IN BLOOD BY AUTOMATED COUNT: 14 % (ref 11.5–14.5)
GLUCOSE BLD STRIP.AUTO-MCNC: 132 MG/DL (ref 65–117)
GLUCOSE BLD STRIP.AUTO-MCNC: 139 MG/DL (ref 65–117)
GLUCOSE BLD STRIP.AUTO-MCNC: 145 MG/DL (ref 65–117)
GLUCOSE BLD STRIP.AUTO-MCNC: 145 MG/DL (ref 65–117)
GLUCOSE BLD STRIP.AUTO-MCNC: 166 MG/DL (ref 65–117)
GLUCOSE SERPL-MCNC: 139 MG/DL (ref 65–100)
HCT VFR BLD AUTO: 35 % (ref 35–47)
HGB BLD-MCNC: 11.4 G/DL (ref 11.5–16)
MCH RBC QN AUTO: 28.1 PG (ref 26–34)
MCHC RBC AUTO-ENTMCNC: 32.6 G/DL (ref 30–36.5)
MCV RBC AUTO: 86.2 FL (ref 80–99)
NRBC # BLD: 0 K/UL (ref 0–0.01)
NRBC BLD-RTO: 0 PER 100 WBC
PLATELET # BLD AUTO: 168 K/UL (ref 150–400)
PMV BLD AUTO: 11.9 FL (ref 8.9–12.9)
POTASSIUM SERPL-SCNC: 4 MMOL/L (ref 3.5–5.1)
RBC # BLD AUTO: 4.06 M/UL (ref 3.8–5.2)
SERVICE CMNT-IMP: ABNORMAL
SODIUM SERPL-SCNC: 139 MMOL/L (ref 136–145)
WBC # BLD AUTO: 12.7 K/UL (ref 3.6–11)

## 2024-02-12 PROCEDURE — C9113 INJ PANTOPRAZOLE SODIUM, VIA: HCPCS | Performed by: STUDENT IN AN ORGANIZED HEALTH CARE EDUCATION/TRAINING PROGRAM

## 2024-02-12 PROCEDURE — 80048 BASIC METABOLIC PNL TOTAL CA: CPT

## 2024-02-12 PROCEDURE — 1100000000 HC RM PRIVATE

## 2024-02-12 PROCEDURE — 94640 AIRWAY INHALATION TREATMENT: CPT

## 2024-02-12 PROCEDURE — 2580000003 HC RX 258: Performed by: HOSPITALIST

## 2024-02-12 PROCEDURE — 6370000000 HC RX 637 (ALT 250 FOR IP): Performed by: HOSPITALIST

## 2024-02-12 PROCEDURE — 85027 COMPLETE CBC AUTOMATED: CPT

## 2024-02-12 PROCEDURE — 6370000000 HC RX 637 (ALT 250 FOR IP): Performed by: INTERNAL MEDICINE

## 2024-02-12 PROCEDURE — 36415 COLL VENOUS BLD VENIPUNCTURE: CPT

## 2024-02-12 PROCEDURE — 6370000000 HC RX 637 (ALT 250 FOR IP): Performed by: STUDENT IN AN ORGANIZED HEALTH CARE EDUCATION/TRAINING PROGRAM

## 2024-02-12 PROCEDURE — 94761 N-INVAS EAR/PLS OXIMETRY MLT: CPT

## 2024-02-12 PROCEDURE — 6360000002 HC RX W HCPCS: Performed by: STUDENT IN AN ORGANIZED HEALTH CARE EDUCATION/TRAINING PROGRAM

## 2024-02-12 PROCEDURE — A4216 STERILE WATER/SALINE, 10 ML: HCPCS | Performed by: STUDENT IN AN ORGANIZED HEALTH CARE EDUCATION/TRAINING PROGRAM

## 2024-02-12 PROCEDURE — 92526 ORAL FUNCTION THERAPY: CPT

## 2024-02-12 PROCEDURE — 2580000003 HC RX 258: Performed by: STUDENT IN AN ORGANIZED HEALTH CARE EDUCATION/TRAINING PROGRAM

## 2024-02-12 PROCEDURE — 82962 GLUCOSE BLOOD TEST: CPT

## 2024-02-12 PROCEDURE — 2580000003 HC RX 258: Performed by: INTERNAL MEDICINE

## 2024-02-12 PROCEDURE — 6360000002 HC RX W HCPCS: Performed by: INTERNAL MEDICINE

## 2024-02-12 PROCEDURE — 99223 1ST HOSP IP/OBS HIGH 75: CPT | Performed by: INTERNAL MEDICINE

## 2024-02-12 RX ADMIN — PANTOPRAZOLE SODIUM 40 MG: 40 INJECTION, POWDER, FOR SOLUTION INTRAVENOUS at 08:10

## 2024-02-12 RX ADMIN — VANCOMYCIN HYDROCHLORIDE 1000 MG: 1 INJECTION, POWDER, LYOPHILIZED, FOR SOLUTION INTRAVENOUS at 12:19

## 2024-02-12 RX ADMIN — LEVOTHYROXINE SODIUM 88 MCG: 0.09 TABLET ORAL at 05:48

## 2024-02-12 RX ADMIN — MIRTAZAPINE 7.5 MG: 15 TABLET, FILM COATED ORAL at 20:54

## 2024-02-12 RX ADMIN — DICLOFENAC SODIUM 2 G: 10 GEL TOPICAL at 08:16

## 2024-02-12 RX ADMIN — APIXABAN 2.5 MG: 2.5 TABLET, FILM COATED ORAL at 20:54

## 2024-02-12 RX ADMIN — MOMETASONE FUROATE AND FORMOTEROL FUMARATE DIHYDRATE 2 PUFF: 200; 5 AEROSOL RESPIRATORY (INHALATION) at 07:32

## 2024-02-12 RX ADMIN — SODIUM CHLORIDE, PRESERVATIVE FREE 10 ML: 5 INJECTION INTRAVENOUS at 20:55

## 2024-02-12 RX ADMIN — DICLOFENAC SODIUM 2 G: 10 GEL TOPICAL at 21:04

## 2024-02-12 RX ADMIN — SERTRALINE HYDROCHLORIDE 100 MG: 50 TABLET ORAL at 08:13

## 2024-02-12 RX ADMIN — PREDNISONE 5 MG: 5 TABLET ORAL at 08:13

## 2024-02-12 RX ADMIN — APIXABAN 2.5 MG: 2.5 TABLET, FILM COATED ORAL at 08:13

## 2024-02-12 RX ADMIN — ATORVASTATIN CALCIUM 10 MG: 20 TABLET, FILM COATED ORAL at 08:13

## 2024-02-12 RX ADMIN — FUROSEMIDE 20 MG: 20 TABLET ORAL at 08:13

## 2024-02-12 RX ADMIN — CARVEDILOL 3.12 MG: 3.12 TABLET, FILM COATED ORAL at 16:24

## 2024-02-12 RX ADMIN — SODIUM CHLORIDE, PRESERVATIVE FREE 10 ML: 5 INJECTION INTRAVENOUS at 08:11

## 2024-02-12 RX ADMIN — CARVEDILOL 3.12 MG: 3.12 TABLET, FILM COATED ORAL at 08:13

## 2024-02-12 ASSESSMENT — PAIN SCALES - GENERAL: PAINLEVEL_OUTOF10: 0

## 2024-02-13 LAB
CK SERPL-CCNC: 12 U/L (ref 26–192)
CREAT SERPL-MCNC: 0.62 MG/DL (ref 0.55–1.02)
GLUCOSE BLD STRIP.AUTO-MCNC: 140 MG/DL (ref 65–117)
GLUCOSE BLD STRIP.AUTO-MCNC: 152 MG/DL (ref 65–117)
OTHER ANTIBIOTIC SUSC ISLT: NORMAL
SERVICE CMNT-IMP: ABNORMAL
SERVICE CMNT-IMP: ABNORMAL
SPECIMEN SOURCE: NORMAL
VANCOMYCIN SERPL-MCNC: 18.3 UG/ML

## 2024-02-13 PROCEDURE — 82565 ASSAY OF CREATININE: CPT

## 2024-02-13 PROCEDURE — 87040 BLOOD CULTURE FOR BACTERIA: CPT

## 2024-02-13 PROCEDURE — 82962 GLUCOSE BLOOD TEST: CPT

## 2024-02-13 PROCEDURE — 1100000000 HC RM PRIVATE

## 2024-02-13 PROCEDURE — 94640 AIRWAY INHALATION TREATMENT: CPT

## 2024-02-13 PROCEDURE — A4216 STERILE WATER/SALINE, 10 ML: HCPCS | Performed by: STUDENT IN AN ORGANIZED HEALTH CARE EDUCATION/TRAINING PROGRAM

## 2024-02-13 PROCEDURE — 6370000000 HC RX 637 (ALT 250 FOR IP): Performed by: INTERNAL MEDICINE

## 2024-02-13 PROCEDURE — 97530 THERAPEUTIC ACTIVITIES: CPT

## 2024-02-13 PROCEDURE — C9113 INJ PANTOPRAZOLE SODIUM, VIA: HCPCS | Performed by: STUDENT IN AN ORGANIZED HEALTH CARE EDUCATION/TRAINING PROGRAM

## 2024-02-13 PROCEDURE — 6370000000 HC RX 637 (ALT 250 FOR IP): Performed by: HOSPITALIST

## 2024-02-13 PROCEDURE — 2580000003 HC RX 258: Performed by: STUDENT IN AN ORGANIZED HEALTH CARE EDUCATION/TRAINING PROGRAM

## 2024-02-13 PROCEDURE — 2580000003 HC RX 258: Performed by: INTERNAL MEDICINE

## 2024-02-13 PROCEDURE — 36415 COLL VENOUS BLD VENIPUNCTURE: CPT

## 2024-02-13 PROCEDURE — 99233 SBSQ HOSP IP/OBS HIGH 50: CPT | Performed by: INTERNAL MEDICINE

## 2024-02-13 PROCEDURE — 6370000000 HC RX 637 (ALT 250 FOR IP): Performed by: STUDENT IN AN ORGANIZED HEALTH CARE EDUCATION/TRAINING PROGRAM

## 2024-02-13 PROCEDURE — 80202 ASSAY OF VANCOMYCIN: CPT

## 2024-02-13 PROCEDURE — 6360000002 HC RX W HCPCS: Performed by: INTERNAL MEDICINE

## 2024-02-13 PROCEDURE — A4216 STERILE WATER/SALINE, 10 ML: HCPCS | Performed by: INTERNAL MEDICINE

## 2024-02-13 PROCEDURE — 6360000002 HC RX W HCPCS: Performed by: STUDENT IN AN ORGANIZED HEALTH CARE EDUCATION/TRAINING PROGRAM

## 2024-02-13 PROCEDURE — 82550 ASSAY OF CK (CPK): CPT

## 2024-02-13 PROCEDURE — 94761 N-INVAS EAR/PLS OXIMETRY MLT: CPT

## 2024-02-13 PROCEDURE — 2580000003 HC RX 258: Performed by: HOSPITALIST

## 2024-02-13 RX ADMIN — DICLOFENAC SODIUM 2 G: 10 GEL TOPICAL at 10:00

## 2024-02-13 RX ADMIN — SODIUM CHLORIDE, PRESERVATIVE FREE 10 ML: 5 INJECTION INTRAVENOUS at 10:00

## 2024-02-13 RX ADMIN — PREDNISONE 5 MG: 5 TABLET ORAL at 09:59

## 2024-02-13 RX ADMIN — FUROSEMIDE 20 MG: 20 TABLET ORAL at 09:59

## 2024-02-13 RX ADMIN — MOMETASONE FUROATE AND FORMOTEROL FUMARATE DIHYDRATE 2 PUFF: 200; 5 AEROSOL RESPIRATORY (INHALATION) at 20:06

## 2024-02-13 RX ADMIN — PANTOPRAZOLE SODIUM 40 MG: 40 INJECTION, POWDER, FOR SOLUTION INTRAVENOUS at 10:00

## 2024-02-13 RX ADMIN — DAPTOMYCIN 500 MG: 500 INJECTION, POWDER, LYOPHILIZED, FOR SOLUTION INTRAVENOUS at 14:53

## 2024-02-13 RX ADMIN — ATORVASTATIN CALCIUM 10 MG: 20 TABLET, FILM COATED ORAL at 09:59

## 2024-02-13 RX ADMIN — CARVEDILOL 3.12 MG: 3.12 TABLET, FILM COATED ORAL at 09:59

## 2024-02-13 RX ADMIN — DICLOFENAC SODIUM 2 G: 10 GEL TOPICAL at 20:10

## 2024-02-13 RX ADMIN — LEVOTHYROXINE SODIUM 88 MCG: 0.09 TABLET ORAL at 06:42

## 2024-02-13 RX ADMIN — SERTRALINE HYDROCHLORIDE 100 MG: 50 TABLET ORAL at 09:59

## 2024-02-13 RX ADMIN — MIRTAZAPINE 7.5 MG: 15 TABLET, FILM COATED ORAL at 20:10

## 2024-02-13 RX ADMIN — MOMETASONE FUROATE AND FORMOTEROL FUMARATE DIHYDRATE 2 PUFF: 200; 5 AEROSOL RESPIRATORY (INHALATION) at 00:40

## 2024-02-13 RX ADMIN — CARVEDILOL 3.12 MG: 3.12 TABLET, FILM COATED ORAL at 17:30

## 2024-02-13 RX ADMIN — TRAMADOL HYDROCHLORIDE 50 MG: 50 TABLET ORAL at 09:59

## 2024-02-13 RX ADMIN — SODIUM CHLORIDE, PRESERVATIVE FREE 5 ML: 5 INJECTION INTRAVENOUS at 20:10

## 2024-02-13 RX ADMIN — MOMETASONE FUROATE AND FORMOTEROL FUMARATE DIHYDRATE 2 PUFF: 200; 5 AEROSOL RESPIRATORY (INHALATION) at 07:26

## 2024-02-13 RX ADMIN — APIXABAN 2.5 MG: 2.5 TABLET, FILM COATED ORAL at 20:10

## 2024-02-13 RX ADMIN — MORPHINE SULFATE 1 MG: 2 INJECTION, SOLUTION INTRAMUSCULAR; INTRAVENOUS at 13:16

## 2024-02-13 RX ADMIN — APIXABAN 2.5 MG: 2.5 TABLET, FILM COATED ORAL at 09:59

## 2024-02-13 NOTE — CARE COORDINATION
CM sent updated clinicals for Shady Dale Poole to review. CM notified [place] of anticipated discharge for tomorrow 2/14.     CM will continue to follow for discharge planning needs.       SILVIANO Catalan, EAMON  [facility] Care Manager  [phone]

## 2024-02-14 LAB
GLUCOSE BLD STRIP.AUTO-MCNC: 117 MG/DL (ref 65–117)
GLUCOSE BLD STRIP.AUTO-MCNC: 130 MG/DL (ref 65–117)
GLUCOSE BLD STRIP.AUTO-MCNC: 99 MG/DL (ref 65–117)
SERVICE CMNT-IMP: ABNORMAL
SERVICE CMNT-IMP: NORMAL
SERVICE CMNT-IMP: NORMAL

## 2024-02-14 PROCEDURE — 2580000003 HC RX 258: Performed by: HOSPITALIST

## 2024-02-14 PROCEDURE — 1100000000 HC RM PRIVATE

## 2024-02-14 PROCEDURE — A4216 STERILE WATER/SALINE, 10 ML: HCPCS | Performed by: INTERNAL MEDICINE

## 2024-02-14 PROCEDURE — 82962 GLUCOSE BLOOD TEST: CPT

## 2024-02-14 PROCEDURE — 6360000002 HC RX W HCPCS: Performed by: STUDENT IN AN ORGANIZED HEALTH CARE EDUCATION/TRAINING PROGRAM

## 2024-02-14 PROCEDURE — A4216 STERILE WATER/SALINE, 10 ML: HCPCS | Performed by: STUDENT IN AN ORGANIZED HEALTH CARE EDUCATION/TRAINING PROGRAM

## 2024-02-14 PROCEDURE — 6370000000 HC RX 637 (ALT 250 FOR IP): Performed by: STUDENT IN AN ORGANIZED HEALTH CARE EDUCATION/TRAINING PROGRAM

## 2024-02-14 PROCEDURE — 6360000002 HC RX W HCPCS: Performed by: INTERNAL MEDICINE

## 2024-02-14 PROCEDURE — 94761 N-INVAS EAR/PLS OXIMETRY MLT: CPT

## 2024-02-14 PROCEDURE — 94640 AIRWAY INHALATION TREATMENT: CPT

## 2024-02-14 PROCEDURE — 6370000000 HC RX 637 (ALT 250 FOR IP): Performed by: INTERNAL MEDICINE

## 2024-02-14 PROCEDURE — 6370000000 HC RX 637 (ALT 250 FOR IP): Performed by: HOSPITALIST

## 2024-02-14 PROCEDURE — 97530 THERAPEUTIC ACTIVITIES: CPT

## 2024-02-14 PROCEDURE — 99232 SBSQ HOSP IP/OBS MODERATE 35: CPT | Performed by: INTERNAL MEDICINE

## 2024-02-14 PROCEDURE — 2580000003 HC RX 258: Performed by: STUDENT IN AN ORGANIZED HEALTH CARE EDUCATION/TRAINING PROGRAM

## 2024-02-14 PROCEDURE — C9113 INJ PANTOPRAZOLE SODIUM, VIA: HCPCS | Performed by: STUDENT IN AN ORGANIZED HEALTH CARE EDUCATION/TRAINING PROGRAM

## 2024-02-14 PROCEDURE — 2580000003 HC RX 258: Performed by: INTERNAL MEDICINE

## 2024-02-14 RX ADMIN — DAPTOMYCIN 500 MG: 500 INJECTION, POWDER, LYOPHILIZED, FOR SOLUTION INTRAVENOUS at 12:40

## 2024-02-14 RX ADMIN — CYCLOBENZAPRINE 5 MG: 10 TABLET, FILM COATED ORAL at 13:59

## 2024-02-14 RX ADMIN — TRAMADOL HYDROCHLORIDE 50 MG: 50 TABLET ORAL at 13:59

## 2024-02-14 RX ADMIN — APIXABAN 2.5 MG: 2.5 TABLET, FILM COATED ORAL at 08:23

## 2024-02-14 RX ADMIN — APIXABAN 2.5 MG: 2.5 TABLET, FILM COATED ORAL at 21:14

## 2024-02-14 RX ADMIN — DICLOFENAC SODIUM 2 G: 10 GEL TOPICAL at 21:19

## 2024-02-14 RX ADMIN — SODIUM CHLORIDE, PRESERVATIVE FREE 5 ML: 5 INJECTION INTRAVENOUS at 21:20

## 2024-02-14 RX ADMIN — DICLOFENAC SODIUM 2 G: 10 GEL TOPICAL at 11:51

## 2024-02-14 RX ADMIN — MOMETASONE FUROATE AND FORMOTEROL FUMARATE DIHYDRATE 2 PUFF: 200; 5 AEROSOL RESPIRATORY (INHALATION) at 19:30

## 2024-02-14 RX ADMIN — CARVEDILOL 3.12 MG: 3.12 TABLET, FILM COATED ORAL at 08:23

## 2024-02-14 RX ADMIN — PANTOPRAZOLE SODIUM 40 MG: 40 INJECTION, POWDER, FOR SOLUTION INTRAVENOUS at 08:23

## 2024-02-14 RX ADMIN — MORPHINE SULFATE 1 MG: 2 INJECTION, SOLUTION INTRAMUSCULAR; INTRAVENOUS at 14:48

## 2024-02-14 RX ADMIN — SODIUM CHLORIDE, PRESERVATIVE FREE 10 ML: 5 INJECTION INTRAVENOUS at 11:52

## 2024-02-14 RX ADMIN — CARVEDILOL 3.12 MG: 3.12 TABLET, FILM COATED ORAL at 18:44

## 2024-02-14 RX ADMIN — ACETAMINOPHEN 650 MG: 325 TABLET ORAL at 13:59

## 2024-02-14 RX ADMIN — MIRTAZAPINE 7.5 MG: 15 TABLET, FILM COATED ORAL at 21:14

## 2024-02-14 RX ADMIN — MOMETASONE FUROATE AND FORMOTEROL FUMARATE DIHYDRATE 2 PUFF: 200; 5 AEROSOL RESPIRATORY (INHALATION) at 07:08

## 2024-02-14 RX ADMIN — PREDNISONE 5 MG: 5 TABLET ORAL at 08:23

## 2024-02-14 RX ADMIN — SERTRALINE HYDROCHLORIDE 100 MG: 50 TABLET ORAL at 08:23

## 2024-02-14 RX ADMIN — LEVOTHYROXINE SODIUM 88 MCG: 0.09 TABLET ORAL at 06:27

## 2024-02-14 RX ADMIN — FUROSEMIDE 20 MG: 20 TABLET ORAL at 08:23

## 2024-02-14 NOTE — WOUND CARE
Wound Consult:  follow up Visit.  Spoke with patients nurse,  Leonardo SCHUSTER.  Patient is resting on a nilson bed with ESTEFANY mattress. Left  Heel off loaded with heel boot.  Patient is awake, alert, cooperative; requires 2 assists to move side to side in bed.  Kane score 16.  Assessment:  Left heel- 1x0.4cm maroon/purplish  non blanching DTI, improving.  Sacrum- 2x1cm- maroon/purplish nonblanching surrounding slow to ashley redness.fragile skin,DTI    Left ischial- blanching redness  Right posterior thigh- blanching redness  Right BKA stump -blanching pink, painful  Treatment:  Left heel- replaced foam dressing, heel boot  Sacrum- cleansed with blue wipes and placed foam dressing  Turned onto left side  Elevated RBKA on pillows  Wound Recommendations:  Sacrum- cleanse with blue wipes and apply sacral foam, change every other day  Continue left heel foam and heel boot  Elevate right BKA stump  Turn every 2 hours  Skin Care / PI Prevention Recommendations:  1. Minimize friction/shear: minimize layers of linen/pads under patient.  2. Off load pressure/reposition:  turn and reposition approximately every 2 hours; float heels with pillows or use off loading heel boots; waffle cushion for sitting; position wedge.  3. Manage Moisture - keep skin folds dry; incontinence skin care with incontinence wipes; zinc guard barrier ointment; appropriate sized briefs.  4. Continue to monitor nutrition, pain, and skin risk scale, and skin assessment.  Plan:  Spoke with Dr. Andersen regarding findings and proposed orders for treatment.  We will continue to reassess weekly and as needed.  Maliha Domingo RN  [facility], Wound / Ostomy Department  Wound Healing Office [phone]

## 2024-02-15 LAB
ANION GAP SERPL CALC-SCNC: 3 MMOL/L (ref 5–15)
BUN SERPL-MCNC: 20 MG/DL (ref 6–20)
BUN/CREAT SERPL: 26 (ref 12–20)
CALCIUM SERPL-MCNC: 8.4 MG/DL (ref 8.5–10.1)
CHLORIDE SERPL-SCNC: 103 MMOL/L (ref 97–108)
CO2 SERPL-SCNC: 32 MMOL/L (ref 21–32)
CREAT SERPL-MCNC: 0.76 MG/DL (ref 0.55–1.02)
ERYTHROCYTE [DISTWIDTH] IN BLOOD BY AUTOMATED COUNT: 14.1 % (ref 11.5–14.5)
GLUCOSE BLD STRIP.AUTO-MCNC: 128 MG/DL (ref 65–117)
GLUCOSE BLD STRIP.AUTO-MCNC: 129 MG/DL (ref 65–117)
GLUCOSE BLD STRIP.AUTO-MCNC: 143 MG/DL (ref 65–117)
GLUCOSE BLD STRIP.AUTO-MCNC: 178 MG/DL (ref 65–117)
GLUCOSE SERPL-MCNC: 111 MG/DL (ref 65–100)
HCT VFR BLD AUTO: 30.7 % (ref 35–47)
HGB BLD-MCNC: 9.7 G/DL (ref 11.5–16)
MCH RBC QN AUTO: 27.7 PG (ref 26–34)
MCHC RBC AUTO-ENTMCNC: 31.6 G/DL (ref 30–36.5)
MCV RBC AUTO: 87.7 FL (ref 80–99)
NRBC # BLD: 0 K/UL (ref 0–0.01)
NRBC BLD-RTO: 0 PER 100 WBC
PLATELET # BLD AUTO: 186 K/UL (ref 150–400)
PMV BLD AUTO: 11.6 FL (ref 8.9–12.9)
POTASSIUM SERPL-SCNC: 3.8 MMOL/L (ref 3.5–5.1)
RBC # BLD AUTO: 3.5 M/UL (ref 3.8–5.2)
SERVICE CMNT-IMP: ABNORMAL
SODIUM SERPL-SCNC: 138 MMOL/L (ref 136–145)
WBC # BLD AUTO: 13.6 K/UL (ref 3.6–11)

## 2024-02-15 PROCEDURE — 6370000000 HC RX 637 (ALT 250 FOR IP): Performed by: STUDENT IN AN ORGANIZED HEALTH CARE EDUCATION/TRAINING PROGRAM

## 2024-02-15 PROCEDURE — 2580000003 HC RX 258: Performed by: HOSPITALIST

## 2024-02-15 PROCEDURE — 94761 N-INVAS EAR/PLS OXIMETRY MLT: CPT

## 2024-02-15 PROCEDURE — 6360000002 HC RX W HCPCS: Performed by: INTERNAL MEDICINE

## 2024-02-15 PROCEDURE — 80048 BASIC METABOLIC PNL TOTAL CA: CPT

## 2024-02-15 PROCEDURE — 99232 SBSQ HOSP IP/OBS MODERATE 35: CPT | Performed by: INTERNAL MEDICINE

## 2024-02-15 PROCEDURE — 2580000003 HC RX 258: Performed by: INTERNAL MEDICINE

## 2024-02-15 PROCEDURE — 6360000002 HC RX W HCPCS: Performed by: STUDENT IN AN ORGANIZED HEALTH CARE EDUCATION/TRAINING PROGRAM

## 2024-02-15 PROCEDURE — 36415 COLL VENOUS BLD VENIPUNCTURE: CPT

## 2024-02-15 PROCEDURE — 6370000000 HC RX 637 (ALT 250 FOR IP): Performed by: HOSPITALIST

## 2024-02-15 PROCEDURE — A4216 STERILE WATER/SALINE, 10 ML: HCPCS | Performed by: INTERNAL MEDICINE

## 2024-02-15 PROCEDURE — 99233 SBSQ HOSP IP/OBS HIGH 50: CPT | Performed by: STUDENT IN AN ORGANIZED HEALTH CARE EDUCATION/TRAINING PROGRAM

## 2024-02-15 PROCEDURE — 6370000000 HC RX 637 (ALT 250 FOR IP): Performed by: INTERNAL MEDICINE

## 2024-02-15 PROCEDURE — 82962 GLUCOSE BLOOD TEST: CPT

## 2024-02-15 PROCEDURE — A4216 STERILE WATER/SALINE, 10 ML: HCPCS | Performed by: STUDENT IN AN ORGANIZED HEALTH CARE EDUCATION/TRAINING PROGRAM

## 2024-02-15 PROCEDURE — 94640 AIRWAY INHALATION TREATMENT: CPT

## 2024-02-15 PROCEDURE — C9113 INJ PANTOPRAZOLE SODIUM, VIA: HCPCS | Performed by: STUDENT IN AN ORGANIZED HEALTH CARE EDUCATION/TRAINING PROGRAM

## 2024-02-15 PROCEDURE — 2580000003 HC RX 258: Performed by: STUDENT IN AN ORGANIZED HEALTH CARE EDUCATION/TRAINING PROGRAM

## 2024-02-15 PROCEDURE — 85027 COMPLETE CBC AUTOMATED: CPT

## 2024-02-15 PROCEDURE — 1100000000 HC RM PRIVATE

## 2024-02-15 RX ORDER — ACETAMINOPHEN 500 MG
1000 TABLET ORAL EVERY 8 HOURS SCHEDULED
Status: DISCONTINUED | OUTPATIENT
Start: 2024-02-15 | End: 2024-02-19 | Stop reason: HOSPADM

## 2024-02-15 RX ADMIN — PREDNISONE 5 MG: 5 TABLET ORAL at 08:02

## 2024-02-15 RX ADMIN — DICLOFENAC SODIUM 4 G: 10 GEL TOPICAL at 14:49

## 2024-02-15 RX ADMIN — DICLOFENAC SODIUM 2 G: 10 GEL TOPICAL at 08:03

## 2024-02-15 RX ADMIN — PANTOPRAZOLE SODIUM 40 MG: 40 INJECTION, POWDER, FOR SOLUTION INTRAVENOUS at 08:02

## 2024-02-15 RX ADMIN — DAPTOMYCIN 500 MG: 500 INJECTION, POWDER, LYOPHILIZED, FOR SOLUTION INTRAVENOUS at 12:08

## 2024-02-15 RX ADMIN — ACETAMINOPHEN 1000 MG: 500 TABLET ORAL at 14:49

## 2024-02-15 RX ADMIN — CARVEDILOL 3.12 MG: 3.12 TABLET, FILM COATED ORAL at 08:02

## 2024-02-15 RX ADMIN — MORPHINE SULFATE 1 MG: 2 INJECTION, SOLUTION INTRAMUSCULAR; INTRAVENOUS at 00:34

## 2024-02-15 RX ADMIN — MOMETASONE FUROATE AND FORMOTEROL FUMARATE DIHYDRATE 2 PUFF: 200; 5 AEROSOL RESPIRATORY (INHALATION) at 07:51

## 2024-02-15 RX ADMIN — MOMETASONE FUROATE AND FORMOTEROL FUMARATE DIHYDRATE 2 PUFF: 200; 5 AEROSOL RESPIRATORY (INHALATION) at 19:00

## 2024-02-15 RX ADMIN — TRAMADOL HYDROCHLORIDE 50 MG: 50 TABLET ORAL at 20:42

## 2024-02-15 RX ADMIN — APIXABAN 2.5 MG: 2.5 TABLET, FILM COATED ORAL at 08:02

## 2024-02-15 RX ADMIN — ACETAMINOPHEN 1000 MG: 500 TABLET ORAL at 20:42

## 2024-02-15 RX ADMIN — MIRTAZAPINE 7.5 MG: 15 TABLET, FILM COATED ORAL at 20:42

## 2024-02-15 RX ADMIN — SERTRALINE HYDROCHLORIDE 100 MG: 50 TABLET ORAL at 08:02

## 2024-02-15 RX ADMIN — TRAMADOL HYDROCHLORIDE 50 MG: 50 TABLET ORAL at 10:57

## 2024-02-15 RX ADMIN — FUROSEMIDE 20 MG: 20 TABLET ORAL at 08:02

## 2024-02-15 RX ADMIN — APIXABAN 2.5 MG: 2.5 TABLET, FILM COATED ORAL at 20:42

## 2024-02-15 RX ADMIN — LEVOTHYROXINE SODIUM 88 MCG: 0.09 TABLET ORAL at 06:14

## 2024-02-15 RX ADMIN — CARVEDILOL 3.12 MG: 3.12 TABLET, FILM COATED ORAL at 16:54

## 2024-02-15 RX ADMIN — DICLOFENAC SODIUM 4 G: 10 GEL TOPICAL at 20:42

## 2024-02-15 RX ADMIN — SODIUM CHLORIDE, PRESERVATIVE FREE 10 ML: 5 INJECTION INTRAVENOUS at 08:03

## 2024-02-15 RX ADMIN — SODIUM CHLORIDE, PRESERVATIVE FREE 10 ML: 5 INJECTION INTRAVENOUS at 20:43

## 2024-02-15 ASSESSMENT — PAIN SCALES - GENERAL
PAINLEVEL_OUTOF10: 3
PAINLEVEL_OUTOF10: 3
PAINLEVEL_OUTOF10: 8
PAINLEVEL_OUTOF10: 8
PAINLEVEL_OUTOF10: 7
PAINLEVEL_OUTOF10: 9

## 2024-02-15 ASSESSMENT — PAIN DESCRIPTION - LOCATION
LOCATION: HIP
LOCATION: LEG
LOCATION: HIP
LOCATION: GENERALIZED

## 2024-02-15 ASSESSMENT — PAIN DESCRIPTION - DESCRIPTORS
DESCRIPTORS: ACHING

## 2024-02-15 ASSESSMENT — PAIN DESCRIPTION - ORIENTATION
ORIENTATION: LEFT
ORIENTATION: LEFT;RIGHT
ORIENTATION: LEFT;RIGHT

## 2024-02-15 NOTE — CONSULTS
Clinical Ethics Consultation Note    History and Context:  Ms. Lea is a 89-year-old, female who presented to the ED with a UTI and acute hypoxemic respiratory failure. She as a pmh of anxiety/depression, GUILLERMO, hyperlipidemia, hypertension, hypothyroid, dehydration, biventricular cardia pacemaker in situ, among other conditions. She also recently had COVID, was treated with Paxlovid at home, but developed pneumonia.    The pt has 4 children, and an invalid ACP on file. The ACP does not have the required witness signatures, but may still be helpful in outlining the pt's wishes. Ethics was consulted on 2/14 to discuss decision-making concerns for Ms. Lea. Her capacity was in question, but she was refusing a PICC line. The care team was unsure if they should proceed with a PICC line or should continue.    Valid Advanced Medical Directive: No      Ethical Question(s) and Concern(s):  Does Ms. Lea have capacity? If not, since her ACP is legally invalid, who can make decisions for her?    Analysis:  Capacity and the ability to express an autonomous decision that should be respected by the care team is a corner stone of medical ethics. A physician can do a capacity evaluation, and should do a capacity evaluation when appropriate.    If Ms. Lea is not capacitated to make her own decisions, and since her ACP is technically invalid, then legally, a majority of the children rules. (In this case, three out of Ms. Lea's four children would have to provide consent for any procedure.)    Capacity exists on a spectrum though, so if Ms. Lea per medical judgment, cannot make complex medical decisions, is it possible that she would have the capacity to make simpler decisions and be able to appoint a decision-maker?     Given these considerations, ethics recommends the following:    Recommendations:  Do a capacity evaluation and document the results.  If Ms. Lea is incapacitated, then legally a majority of her 
I was asked by Alysia Washington NP to review her chart to see if she is candidate for pacemaker lead extractions  Dr Hoyt thinks her pacemaker lead is infected  She has recurrent GPR bacteremia  Her daughter spoke to me on the phone  She is the primary POA, Pauline    She is 89 yo with COPD and right BKA and has PAD  She is dependent on pacer  She will not have CT surgery back up for laser lead extraction   05/22/2020 V lead, [place] Sci RV lead 05/22/1998; atrial port is capped     She will need general anesthesia if undergoing procedure and will need leadless pacer implant after bacteremia is resolved  She will be externally paced during that time    It will be a lot for her so her daughter will be at family meeting about goal of care with hospital and for now she agrees with 6 weeks antibiotic first    I have informed Dr Hoyt, ID specialist  
Infectious Disease Consult    Impression/Plan   Bacteremia.  Diphtheroids have been isolated in 6/6 bottles with 2 sets from 1/31 and second set from 2/4/2024.  Per discussion with microbiology it looks like this may be the same organism.  Further workup is currently underway.  This organism usually represents a contaminant however patient is at risk for infection with this organism as she has a pacemaker.  Stop azithromycin.  Will repeat blood cultures today and again in the morning tomorrow.  As she is hemodynamically stable we will hold off on further antibiotics at this time until a diagnosis has been confirmed.  If this does turn out to be a real infection will need to determine goals of care with this patient as pacemaker removal would be recommended  Valvular heart disease/atrial fibrillation/ischemic cardiomyopathy.  Patient has pacemaker  CKD stage III  History of penicillin and sulfa allergies.  Able to tolerate cephalexin  Acute on chronic respiratory failure/COVID-19 infection.  Pulmonology managing    Anti-infectives:   Doxycycline    Subjective:   Date of Consultation:  February 7, 2024  Date of Admission: 1/31/2024   Referring Physician:     Patient is a 89 y.o. female with a past medical history significant for valvular heart disease, pacemaker placement, and CKD who is being seen for bacteremia.  Patient was admitted to [facility] on 1/31 with complaints of weakness.  She had tested positive for COVID-19 2 weeks prior to admission and had been treated with Paxlovid and was on prednisone prior to admission.  She was admitted with a diagnosis of UTI and hypoxemic respiratory failure.  Blood cultures were done 1/31 at the time of admission which grew diphtheroids and 4 out of 4 bottles.  This was initially thought to be a contaminant however repeat blood cultures 2/4 are also growing diphtheroids.  Per discussion with microbiology lab it appears that these may be the same organism.  
[facility]ATEL[facility] CARDIOLOGY                     Cardiac Electrophysiology Hospital Care Note     [ xx]Initial Encounter      Patient Name: Irma Lea - :1932 - MRN:[id number]   Primary Cardiologist: Pk Goodman MD   Consulting Cardiologist: Pk Goodman MD     Reason for encounter: bacteremia, possible PPM removal   Consult was requested by Alysia Washington NP and I had given a summary recommendation and spoke to her daughter and also with Dr Hoyt, ID specialist  HPI:       Irma Lea is a 89 y.o. female with PMH significant for Afib s/p AV node ablation, colon cancer, GERD, HLD, HTN, and [employer] biventricular pacemaker (s/p upgrade 2020, [place] Sci RV lead 1998; atrial port is capped). History obtained through chart review. She is currently admitted to [facility] with Corynebacterium striatum bacteremia with multiple blood cultures confirming true infection. She arrived to the ED on 24 with lethargy and fever. EP has been asked to see the patient for consideration of CRT-P removal.     Subjective:      Irma Lea reports lethargy.      Assessment and Plan     Corynebacterium striatum bacteremia: patient has had blood cultures drawn on multiple occasions. 6 out of 6 bottles positive. Per ID this type of infection is not contamination and is associated with foreign body or indwelling device and although TTE is negative, they are requesting advice on whether the PPM can feasibly be removed.   Regardless of CRT-P removal, ID recommends 6 week course of IV ABX. If the pacer cannot be removed it is recommended she receives oral ABX after IV course complete.   Her RV lead was implanted in  with upgrade done in . Since the lead is over 20 years old she would require a laser lead extraction, due to her age and co-morbidities she is high risk for laser lead extraction.   Recommend IV vancomycin for 6 weeks followed by oral ABX for suppression after course completed per ID.   I spoke to her 
Palliative Medicine  Patient Name: Irma Lea  YOB: 1932  MRN: [id number]  Age: 89 y.o.  Gender: female    Date of Initial Consult: 2/1/24  Date of Service: 2/1/2024  Time: 11:57 AM  Provider: EVA Gabriel NP  Hospital Day: 2  Admit Date: 1/31/2024  Referring Provider: Dr. Ibrahim       Reasons for Consultation:  Goals of Care, Overwhelming Symptoms, Psychosocial Distress, and End Stage Disease    HISTORY OF PRESENT ILLNESS (HPI):   Irma Lea is a 89 y.o. female with a past medical history of COPD, CKD3, CHF, ZULEIMA,falls, HTN, DM2,PVD,DJD, L3 compression fx, anxiety, depression, HLD, chronic afib, UTI, hypothyroidism, GERD, psoriasis, rt BKA, wound, who was admitted on 1/31/2024 from [facility] with a diagnosis of Covid pna, acute on chronic resp failure with hypoxia, copd exacerbation,GUILLERMO,    Psychosocial: pt lives in FDC at [facility] with plans to transition to nursing care sector due to increasing care needs.      PALLIATIVE DIAGNOSES:    Shortness of breath  hypoxia  hypoalbuminemia  Delirium   Poor appetite  Palliative care encounter    ASSESSMENT AND PLAN:   Pt seen without family present. Pt does not have capacity for decisions. Spoke with daughter Celia and introduced services along with Rosa MALAGON  Updated daughter of patient's condition and reviewing the chart. She is asking for an update from the attending as well.  Discussed code status and daughter says the pt is a DNR. We have a DDNR on file. Order placed in the EMR  Goal at this time is to continue to address acute issues. Daughter open to some physical therapy upon dc.  Pt at risk of decline both cognitive and physical this admission. Will follow along with you and re-address goals if needed.    Please call with any palliative questions or concerns.  Palliative Care Team is available via perfect serve or via phone.    Referrals to:   [] Outpatient Palliative Care  [] Home Based Palliative Care  [] Home 
04/17/2020    4F MIDLINE PLACED BY ALEJANDRA WHITTAKER RN LEFT BRACHIAL, 13CM    Hx of colon cancer, stage I     Hyperlipidemia     Hypertension     Hypothyroid     Ischemic cardiomyopathy     Migraines     Mild tricuspid regurgitation     Mitral regurgitation     ZULEIMA (obstructive sleep apnea)     Pulmonary hypertension (HCC)        Past Surgical History:   Procedure Laterality Date    BREAST BIOPSY Right 2002    neg; surgical bx    BREAST SURGERY      benign breast tumor excision right breast    CARDIAC CATHETERIZATION N/A 05/22/2020    REMOVE & REPLACE PPM GEN BIV MULTI LEADS performed by Pk Goodman MD at [facility] CARDIAC CATH LAB    CARDIAC SURGERY N/A 05/22/2020    Lv Lead Placement performed by Pk Goodman MD at [facility] CARDIAC CATH LAB    IR KYPHOPLASTY LUMBAR FIRST LEVEL  7/2/2019    IR KYPHOPLASTY LUMBAR FIRST LEVEL 7/2/2019 [facility]GIO IR    IR KYPHOPLASTY LUMBAR FIRST LEVEL  07/02/2019    PACEMAKER      PACEMAKER PLACEMENT  05/2020    RELOCATION OF SKIN POCKET FOR PACEMAKER N/A 10/09/2020    RELOCATE PACEMAKER POCKET performed by Pk Goodman MD at [facility] CARDIAC CATH LAB    TOTAL COLECTOMY  1974    colon    TOTAL KNEE ARTHROPLASTY      right TKA    TOTAL KNEE ARTHROPLASTY      total on R, partial on L    TUBAL LIGATION      UPPER GASTROINTESTINAL ENDOSCOPY  04/11/2022    Dr Escobar       Family History   Problem Relation Age of Onset    Heart Attack Mother     Breast Cancer Daughter 59    Diabetes Maternal Uncle     Cancer Maternal Aunt         uterus    Breast Cancer Sister 90    Stroke Sister     Diabetes Mother     Heart Disease Mother     Heart Disease Father        Social History     Tobacco Use    Smoking status: Never    Smokeless tobacco: Never   Substance Use Topics    Alcohol use: No     Alcohol/week: 0.0 standard drinks of alcohol       Allergies   Allergen Reactions    Diltiazem Hives    Diltiazem Hcl Hives    Penicillins Swelling     Tongue Swelling   Has tolerated cephalexin recently 4/7/20     Primidone Other 
[]Tachypnea  [] Other:  Abdomen: [] Soft/non-tender  [x] Normal appearance  [] Distended  [] Ascites  [] Other:  Neurological: [] Normal speech  [] Normal sensation  [x]Deficits present:  Extremity: [] Normal skin color/temp  [] Clubbing/cyanosis  [] No edema  [x] Other: heel wound; anasarca    Wt Readings from Last 15 Encounters:   02/05/24 62.8 kg (138 lb 8 oz)   10/16/23 54.4 kg (120 lb)   09/21/23 56.7 kg (125 lb)   08/10/23 55.3 kg (122 lb)   08/10/23 55.3 kg (122 lb)   05/16/23 54.4 kg (120 lb)   03/30/23 51.7 kg (114 lb)   07/12/22 63 kg (139 lb)   04/29/22 61.2 kg (135 lb)   09/10/21 60.8 kg (134 lb)   03/04/21 62.1 kg (137 lb)        Current Diet: Diet NPO       PSYCHOSOCIAL/SPIRITUAL SCREENING:   Palliative IDT has assessed this patient for cultural preferences / practices and a referral made as appropriate to needs (Cultural Services, Patient Advocacy, Ethics, etc.)    Spiritual Affiliation: Jain    Any spiritual / Sabianist concerns:  [] Yes /  [x] No   If \"Yes\" to discuss with pastoral care during IDT     Does caregiver feel burdened by caring for their loved one:   [] Yes /  [] No /  [] No Caregiver Present/Available [] No Caregiver [x] Pt Lives at Facility  If \"Yes\" to discuss with social work during IDT    Anticipatory grief assessment:   [x] Normal  / [] Maladaptive     If \"Maladaptive\" to discuss with social work during IDT    ESAS Anxiety: Anxiety Score: Not anxious    ESAS Depression:          LAB AND IMAGING FINDINGS:   Objective data reviewed:  labs, images, records, medication use, vitals, and chart     FINAL COMMENTS   Thank you for allowing Palliative Medicine to participate in the care of Irma Lea.    Only check if applicable and billing time based rather than MDM  [x] The total encounter time on this service date was ___45_ minutes which was spent performing a face-to-face encounter and personally completing the provider-level activities documented in the note. This includes time

## 2024-02-15 NOTE — PLAN OF CARE
Problem: Occupational Therapy - Adult  Goal: By Discharge: Performs self-care activities at highest level of function for planned discharge setting.  See evaluation for individualized goals.  Description: FUNCTIONAL STATUS PRIOR TO ADMISSION: Per family report, patient was Neva for transfers to/from her motor w/c via squat pivot approximately 2 weeks prior to current admission. She toileted with mod I off and on the commode and was able to dress herself.     HOME SUPPORT PRIOR TO ADMISSION: The patient lived at [facility].     Occupational Therapy Goals:  Initiated 2/6/2024  1.  Patient will perform self-feeding with Modified Mobile within 7 day(s).  2.  Patient will perform grooming with Minimal Assist within 7 day(s).  3.  Patient will tolerate sitting edge of bed > or = 5 minutes with minimal complaint of pain within 7 day(s).  4.  Patient will perform bridge with left LE and elevate hips in supine with Minimal Assist  within 7 day(s).  5.  Patient will demonstrate use of call bell to call for assist with toileting needs within 7 day(s)  Outcome: Not Progressing     OCCUPATIONAL THERAPY TREATMENT  Patient: Irma Lea (89 y.o. female)  Date: 2/7/2024  Primary Diagnosis: Metabolic encephalopathy [G93.41]  Septicemia (HCC) [A41.9]  Sepsis (HCC) [A41.9]  COVID [U07.1]       Precautions: Other (comment), Fall Risk, Isolation (skin)                Chart, occupational therapy assessment, plan of care, and goals were reviewed.    ASSESSMENT  Patient continues to benefit from skilled OT services and is not progressing towards goals. Patient received with daughter present who reported she recently repositioned in bed and was changed, patient continues to have significant pain with any movement, discussed with RN who also reported patient refuses pain meds. Patient intermittently confused, will not initiate asking for meds and decreased comprehension of need for meds however severe pain with all 
  Problem: Pain  Goal: Verbalizes/displays adequate comfort level or baseline comfort level  Outcome: Progressing  Note: Patient is reporting no pain at this time.  Pain will continue to be assessed and treated as needed.      
  Problem: Physical Therapy - Adult  Goal: By Discharge: Performs mobility at highest level of function for planned discharge setting.  See evaluation for individualized goals.  Description: FUNCTIONAL STATUS PRIOR TO ADMISSION: Per family report, patient was Neva for transfers to/from her motor w/c via squat pivot approximately 2 weeks prior to current admission. She toileted with Neva off and on the commode and was able to dress herself.     HOME SUPPORT PRIOR TO ADMISSION: The patient lived at [facility].     Physical Therapy Goals  Revised 2/13/2024  1.  Patient will move from supine to sit and sit to supine with maximum assistance within 7 days.  2.  Patient will roll side to side in bed with minimal assistance within 7 day(s).    3.  Patient will sit without trunk support with supervision for static and dynamic activities x 5 mins within 7 days.  4.  Patient will tolerate bed to chair transfer with total A within 7 days.  5.  Patient will instruct caregivers regarding positioning and position changes for optimum skin integrity and comfort within 7 days.      2/13/2024 1337 by Chary Singh, PT  Outcome: Not Progressing     Problem: Physical Therapy - Adult  Goal: By Discharge: Performs mobility at highest level of function for planned discharge setting.  See evaluation for individualized goals.  Description: FUNCTIONAL STATUS PRIOR TO ADMISSION: Per family report, patient was Neva for transfers to/from her motor w/c via squat pivot approximately 2 weeks prior to current admission. She toileted with Neva off and on the commode and was able to dress herself.     HOME SUPPORT PRIOR TO ADMISSION: The patient lived at [facility].     Physical Therapy Goals  Revised 2/13/2024  1.  Patient will move from supine to sit and sit to supine with maximum assistance within 7 days.  2.  Patient will roll side to side in bed with minimal assistance within 7 day(s).    3.  Patient will sit without 
  Problem: Skin/Tissue Integrity  Goal: Absence of new skin breakdown  Description: 1.  Monitor for areas of redness and/or skin breakdown  2.  Assess vascular access sites hourly  3.  Every 4-6 hours minimum:  Change oxygen saturation probe site  4.  Every 4-6 hours:  If on nasal continuous positive airway pressure, respiratory therapy assess nares and determine need for appliance change or resting period.  2/12/2024 2322 by Nona Hemphill RN  Outcome: Progressing  2/12/2024 2322 by Nona Hemphill RN  Outcome: Progressing     Problem: Safety - Adult  Goal: Free from fall injury  2/12/2024 2322 by Nona Hemphill RN  Outcome: Progressing  2/12/2024 2322 by Nona Hemphill RN  Outcome: Progressing     Problem: ABCDS Injury Assessment  Goal: Absence of physical injury  2/12/2024 2322 by Nona Hemphill RN  Outcome: Progressing  2/12/2024 2322 by Nona Hemphill RN  Outcome: Progressing     Problem: Confusion  Goal: Confusion, delirium, dementia, or psychosis is improved or at baseline  Description: INTERVENTIONS:  1. Assess for possible contributors to thought disturbance, including medications, impaired vision or hearing, underlying metabolic abnormalities, dehydration, psychiatric diagnoses, and notify attending LIP  2. [place] high risk fall precautions, as indicated  3. Provide frequent short contacts to provide reality reorientation, refocusing and direction  4. Decrease environmental stimuli, including noise as appropriate  5. Monitor and intervene to maintain adequate nutrition, hydration, elimination, sleep and activity  6. If unable to ensure safety without constant attention obtain sitter and review sitter guidelines with assigned personnel  7. Initiate Psychosocial CNS and Spiritual Care consult, as indicated  2/12/2024 2322 by Nona Hemphill RN  Outcome: Progressing  2/12/2024 2322 by Nona Hemphill RN  Outcome: Progressing     Problem: Chronic Conditions and Co-morbidities  Goal: Patient's 
  Problem: Skin/Tissue Integrity  Goal: Absence of new skin breakdown  Description: 1.  Monitor for areas of redness and/or skin breakdown  2.  Assess vascular access sites hourly  3.  Every 4-6 hours minimum:  Change oxygen saturation probe site  4.  Every 4-6 hours:  If on nasal continuous positive airway pressure, respiratory therapy assess nares and determine need for appliance change or resting period.  2/2/2024 0816 by Susy Phipps RN  Outcome: Progressing  2/2/2024 0816 by Susy Phipps RN  Outcome: Progressing     Problem: Safety - Adult  Goal: Free from fall injury  2/2/2024 0816 by Susy Phipps RN  Outcome: Progressing  2/2/2024 0816 by Susy Phipps RN  Outcome: Progressing     Problem: ABCDS Injury Assessment  Goal: Absence of physical injury  2/2/2024 0816 by Susy Phipps RN  Outcome: Progressing  2/2/2024 0816 by Susy Phipps RN  Outcome: Progressing     Problem: Confusion  Goal: Confusion, delirium, dementia, or psychosis is improved or at baseline  Description: INTERVENTIONS:  1. Assess for possible contributors to thought disturbance, including medications, impaired vision or hearing, underlying metabolic abnormalities, dehydration, psychiatric diagnoses, and notify attending LIP  2. [place] high risk fall precautions, as indicated  3. Provide frequent short contacts to provide reality reorientation, refocusing and direction  4. Decrease environmental stimuli, including noise as appropriate  5. Monitor and intervene to maintain adequate nutrition, hydration, elimination, sleep and activity  6. If unable to ensure safety without constant attention obtain sitter and review sitter guidelines with assigned personnel  7. Initiate Psychosocial CNS and Spiritual Care consult, as indicated  Outcome: Progressing     
  Problem: Skin/Tissue Integrity  Goal: Absence of new skin breakdown  Description: 1.  Monitor for areas of redness and/or skin breakdown  2.  Assess vascular access sites hourly  3.  Every 4-6 hours minimum:  Change oxygen saturation probe site  4.  Every 4-6 hours:  If on nasal continuous positive airway pressure, respiratory therapy assess nares and determine need for appliance change or resting period.  Outcome: Progressing     Problem: Safety - Adult  Goal: Free from fall injury  Outcome: Progressing     Problem: ABCDS Injury Assessment  Goal: Absence of physical injury  Outcome: Progressing     Problem: Confusion  Goal: Confusion, delirium, dementia, or psychosis is improved or at baseline  Description: INTERVENTIONS:  1. Assess for possible contributors to thought disturbance, including medications, impaired vision or hearing, underlying metabolic abnormalities, dehydration, psychiatric diagnoses, and notify attending LIP  2. [place] high risk fall precautions, as indicated  3. Provide frequent short contacts to provide reality reorientation, refocusing and direction  4. Decrease environmental stimuli, including noise as appropriate  5. Monitor and intervene to maintain adequate nutrition, hydration, elimination, sleep and activity  6. If unable to ensure safety without constant attention obtain sitter and review sitter guidelines with assigned personnel  7. Initiate Psychosocial CNS and Spiritual Care consult, as indicated  Outcome: Progressing     Problem: Respiratory - Adult  Goal: Achieves optimal ventilation and oxygenation  2/3/2024 2313 by Francesca Staton RN  Outcome: Progressing  2/3/2024 1930 by Rosa Henley, RT  Outcome: Progressing  2/3/2024 0931 by Denny Geiger, RT  Outcome: Progressing     Problem: Pain  Goal: Verbalizes/displays adequate comfort level or baseline comfort level  Outcome: Progressing     
  Problem: Skin/Tissue Integrity  Goal: Absence of new skin breakdown  Description: 1.  Monitor for areas of redness and/or skin breakdown  2.  Assess vascular access sites hourly  3.  Every 4-6 hours minimum:  Change oxygen saturation probe site  4.  Every 4-6 hours:  If on nasal continuous positive airway pressure, respiratory therapy assess nares and determine need for appliance change or resting period.  Outcome: Progressing     Problem: Safety - Adult  Goal: Free from fall injury  Outcome: Progressing     Problem: ABCDS Injury Assessment  Goal: Absence of physical injury  Outcome: Progressing     Problem: Confusion  Goal: Confusion, delirium, dementia, or psychosis is improved or at baseline  Description: INTERVENTIONS:  1. Assess for possible contributors to thought disturbance, including medications, impaired vision or hearing, underlying metabolic abnormalities, dehydration, psychiatric diagnoses, and notify attending LIP  2. [place] high risk fall precautions, as indicated  3. Provide frequent short contacts to provide reality reorientation, refocusing and direction  4. Decrease environmental stimuli, including noise as appropriate  5. Monitor and intervene to maintain adequate nutrition, hydration, elimination, sleep and activity  6. If unable to ensure safety without constant attention obtain sitter and review sitter guidelines with assigned personnel  7. Initiate Psychosocial CNS and Spiritual Care consult, as indicated  Outcome: Progressing  Flowsheets (Taken 2/14/2024 1941)  Effect of thought disturbance (confusion, delirium, dementia, or psychosis) are managed with adequate functional status: Assess for contributors to thought disturbance, including medications, impaired vision or hearing, underlying metabolic abnormalities, dehydration, psychiatric diagnoses, notify LIP     Problem: Chronic Conditions and Co-morbidities  Goal: Patient's chronic conditions and co-morbidity symptoms are monitored and 
  Problem: Skin/Tissue Integrity  Goal: Absence of new skin breakdown  Description: 1.  Monitor for areas of redness and/or skin breakdown  2.  Assess vascular access sites hourly  3.  Every 4-6 hours minimum:  Change oxygen saturation probe site  4.  Every 4-6 hours:  If on nasal continuous positive airway pressure, respiratory therapy assess nares and determine need for appliance change or resting period.  Outcome: Progressing     Problem: Safety - Adult  Goal: Free from fall injury  Outcome: Progressing     Problem: ABCDS Injury Assessment  Goal: Absence of physical injury  Outcome: Progressing     Problem: Confusion  Goal: Confusion, delirium, dementia, or psychosis is improved or at baseline  Description: INTERVENTIONS:  1. Assess for possible contributors to thought disturbance, including medications, impaired vision or hearing, underlying metabolic abnormalities, dehydration, psychiatric diagnoses, and notify attending LIP  2. [place] high risk fall precautions, as indicated  3. Provide frequent short contacts to provide reality reorientation, refocusing and direction  4. Decrease environmental stimuli, including noise as appropriate  5. Monitor and intervene to maintain adequate nutrition, hydration, elimination, sleep and activity  6. If unable to ensure safety without constant attention obtain sitter and review sitter guidelines with assigned personnel  7. Initiate Psychosocial CNS and Spiritual Care consult, as indicated  Outcome: Progressing     Problem: Chronic Conditions and Co-morbidities  Goal: Patient's chronic conditions and co-morbidity symptoms are monitored and maintained or improved  Outcome: Progressing     Problem: Respiratory - Adult  Goal: Achieves optimal ventilation and oxygenation  2/9/2024 2126 by Haley Regalado  Outcome: Progressing  Flowsheets (Taken 2/9/2024 2118 by Elena Jordan RCP)  Achieves optimal ventilation and oxygenation:   Respiratory therapy support as indicated   
  Problem: Skin/Tissue Integrity  Goal: Absence of new skin breakdown  Description: 1.  Monitor for areas of redness and/or skin breakdown  2.  Assess vascular access sites hourly  3.  Every 4-6 hours minimum:  Change oxygen saturation probe site  4.  Every 4-6 hours:  If on nasal continuous positive airway pressure, respiratory therapy assess nares and determine need for appliance change or resting period.  Outcome: Progressing     Problem: Safety - Adult  Goal: Free from fall injury  Outcome: Progressing     Problem: ABCDS Injury Assessment  Goal: Absence of physical injury  Outcome: Progressing     Problem: Confusion  Goal: Confusion, delirium, dementia, or psychosis is improved or at baseline  Description: INTERVENTIONS:  1. Assess for possible contributors to thought disturbance, including medications, impaired vision or hearing, underlying metabolic abnormalities, dehydration, psychiatric diagnoses, and notify attending LIP  2. [place] high risk fall precautions, as indicated  3. Provide frequent short contacts to provide reality reorientation, refocusing and direction  4. Decrease environmental stimuli, including noise as appropriate  5. Monitor and intervene to maintain adequate nutrition, hydration, elimination, sleep and activity  6. If unable to ensure safety without constant attention obtain sitter and review sitter guidelines with assigned personnel  7. Initiate Psychosocial CNS and Spiritual Care consult, as indicated  Outcome: Progressing  Flowsheets (Taken 2/11/2024 1951)  Effect of thought disturbance (confusion, delirium, dementia, or psychosis) are managed with adequate functional status: Assess for contributors to thought disturbance, including medications, impaired vision or hearing, underlying metabolic abnormalities, dehydration, psychiatric diagnoses, notify LIP     Problem: Chronic Conditions and Co-morbidities  Goal: Patient's chronic conditions and co-morbidity symptoms are monitored and 
  Problem: Skin/Tissue Integrity  Goal: Absence of new skin breakdown  Description: 1.  Monitor for areas of redness and/or skin breakdown  2.  Assess vascular access sites hourly  3.  Every 4-6 hours minimum:  Change oxygen saturation probe site  4.  Every 4-6 hours:  If on nasal continuous positive airway pressure, respiratory therapy assess nares and determine need for appliance change or resting period.  Outcome: Progressing     Problem: Safety - Adult  Goal: Free from fall injury  Outcome: Progressing     Problem: ABCDS Injury Assessment  Goal: Absence of physical injury  Outcome: Progressing     Problem: Confusion  Goal: Confusion, delirium, dementia, or psychosis is improved or at baseline  Description: INTERVENTIONS:  1. Assess for possible contributors to thought disturbance, including medications, impaired vision or hearing, underlying metabolic abnormalities, dehydration, psychiatric diagnoses, and notify attending LIP  2. [place] high risk fall precautions, as indicated  3. Provide frequent short contacts to provide reality reorientation, refocusing and direction  4. Decrease environmental stimuli, including noise as appropriate  5. Monitor and intervene to maintain adequate nutrition, hydration, elimination, sleep and activity  6. If unable to ensure safety without constant attention obtain sitter and review sitter guidelines with assigned personnel  7. Initiate Psychosocial CNS and Spiritual Care consult, as indicated  Outcome: Progressing     Problem: Chronic Conditions and Co-morbidities  Goal: Patient's chronic conditions and co-morbidity symptoms are monitored and maintained or improved  Outcome: Progressing     Problem: Respiratory - Adult  Goal: Achieves optimal ventilation and oxygenation  2/3/2024 0426 by Susy Phipps, RN  Outcome: Progressing  2/3/2024 0117 by Nicole Paige, RT  Outcome: Progressing     
  Problem: Skin/Tissue Integrity  Goal: Absence of new skin breakdown  Description: 1.  Monitor for areas of redness and/or skin breakdown  2.  Assess vascular access sites hourly  3.  Every 4-6 hours minimum:  Change oxygen saturation probe site  4.  Every 4-6 hours:  If on nasal continuous positive airway pressure, respiratory therapy assess nares and determine need for appliance change or resting period.  Outcome: Progressing     Problem: Safety - Adult  Goal: Free from fall injury  Outcome: Progressing     Problem: ABCDS Injury Assessment  Goal: Absence of physical injury  Outcome: Progressing     Problem: Confusion  Goal: Confusion, delirium, dementia, or psychosis is improved or at baseline  Description: INTERVENTIONS:  1. Assess for possible contributors to thought disturbance, including medications, impaired vision or hearing, underlying metabolic abnormalities, dehydration, psychiatric diagnoses, and notify attending LIP  2. [place] high risk fall precautions, as indicated  3. Provide frequent short contacts to provide reality reorientation, refocusing and direction  4. Decrease environmental stimuli, including noise as appropriate  5. Monitor and intervene to maintain adequate nutrition, hydration, elimination, sleep and activity  6. If unable to ensure safety without constant attention obtain sitter and review sitter guidelines with assigned personnel  7. Initiate Psychosocial CNS and Spiritual Care consult, as indicated  Outcome: Progressing  Flowsheets (Taken 2/10/2024 1935 by Haley Regalado)  Effect of thought disturbance (confusion, delirium, dementia, or psychosis) are managed with adequate functional status: Assess for contributors to thought disturbance, including medications, impaired vision or hearing, underlying metabolic abnormalities, dehydration, psychiatric diagnoses, notify LIP     Problem: Chronic Conditions and Co-morbidities  Goal: Patient's chronic conditions and co-morbidity 
  Problem: Skin/Tissue Integrity  Goal: Absence of new skin breakdown  Description: 1.  Monitor for areas of redness and/or skin breakdown  2.  Assess vascular access sites hourly  3.  Every 4-6 hours minimum:  Change oxygen saturation probe site  4.  Every 4-6 hours:  If on nasal continuous positive airway pressure, respiratory therapy assess nares and determine need for appliance change or resting period.  Outcome: Progressing     Problem: Safety - Adult  Goal: Free from fall injury  Outcome: Progressing     Problem: ABCDS Injury Assessment  Goal: Absence of physical injury  Outcome: Progressing     Problem: Confusion  Goal: Confusion, delirium, dementia, or psychosis is improved or at baseline  Description: INTERVENTIONS:  1. Assess for possible contributors to thought disturbance, including medications, impaired vision or hearing, underlying metabolic abnormalities, dehydration, psychiatric diagnoses, and notify attending LIP  2. [place] high risk fall precautions, as indicated  3. Provide frequent short contacts to provide reality reorientation, refocusing and direction  4. Decrease environmental stimuli, including noise as appropriate  5. Monitor and intervene to maintain adequate nutrition, hydration, elimination, sleep and activity  6. If unable to ensure safety without constant attention obtain sitter and review sitter guidelines with assigned personnel  7. Initiate Psychosocial CNS and Spiritual Care consult, as indicated  Outcome: Progressing     Problem: Chronic Conditions and Co-morbidities  Goal: Patient's chronic conditions and co-morbidity symptoms are monitored and maintained or improved  Outcome: Progressing     Problem: Respiratory - Adult  Goal: Achieves optimal ventilation and oxygenation  2/6/2024 0622 by Jersey Gerardo RN  Outcome: Progressing  2/6/2024 0141 by Keira Hussein RT  Outcome: Progressing  Flowsheets (Taken 2/6/2024 0141)  Achieves optimal ventilation and oxygenation:   
  Problem: Skin/Tissue Integrity  Goal: Absence of new skin breakdown  Description: 1.  Monitor for areas of redness and/or skin breakdown  2.  Assess vascular access sites hourly  3.  Every 4-6 hours minimum:  Change oxygen saturation probe site  4.  Every 4-6 hours:  If on nasal continuous positive airway pressure, respiratory therapy assess nares and determine need for appliance change or resting period.  Outcome: Progressing     Problem: Safety - Adult  Goal: Free from fall injury  Outcome: Progressing     Problem: ABCDS Injury Assessment  Goal: Absence of physical injury  Outcome: Progressing     Problem: Confusion  Goal: Confusion, delirium, dementia, or psychosis is improved or at baseline  Description: INTERVENTIONS:  1. Assess for possible contributors to thought disturbance, including medications, impaired vision or hearing, underlying metabolic abnormalities, dehydration, psychiatric diagnoses, and notify attending LIP  2. [place] high risk fall precautions, as indicated  3. Provide frequent short contacts to provide reality reorientation, refocusing and direction  4. Decrease environmental stimuli, including noise as appropriate  5. Monitor and intervene to maintain adequate nutrition, hydration, elimination, sleep and activity  6. If unable to ensure safety without constant attention obtain sitter and review sitter guidelines with assigned personnel  7. Initiate Psychosocial CNS and Spiritual Care consult, as indicated  Outcome: Progressing     Problem: Chronic Conditions and Co-morbidities  Goal: Patient's chronic conditions and co-morbidity symptoms are monitored and maintained or improved  Outcome: Progressing     Problem: Respiratory - Adult  Goal: Achieves optimal ventilation and oxygenation  2/9/2024 0601 by Susy Phipps, RN  Outcome: Progressing  2/9/2024 0149 by Keira Hussein RT  Outcome: Progressing  Flowsheets (Taken 2/9/2024 0149)  Achieves optimal ventilation and oxygenation:   
facility with appropriate resources  Outcome: Progressing     Problem: Nutrition Deficit:  Goal: Optimize nutritional status  Outcome: Progressing     
year)  Cognition  Arousal/Alertness: Delayed responses to stimuli  Following Commands: Inconsistently follows commands  Attention Span: Attends with cues to redirect  Memory: Decreased recall of biographical Information;Decreased recall of recent events  Problem Solving: Assistance required to implement solutions;Assistance required to generate solutions  Insights: Decreased awareness of deficits  Initiation: Requires cues for all  Sequencing: Requires cues for all    Functional Mobility and Transfers for ADLs:  Bed Mobility:  Bed Mobility Training  Bed Mobility Training: Yes  Rolling: Total assistance;Additional time  Scooting: Total assistance;Assist X2 (bed level)     ADL Intervention:     Grooming Skilled Clinical Factors: SBA facial hygiene with LUE    LE Dressing: Dependent/Total  LE Dressing Skilled Clinical Factors: bed level simulated    Toileting: Dependent/Total  Toileting Skilled Clinical Factors: bed level simulated    Pain Rating:  Pt with significant right shoulder pain and left hip pain with activity   Pain Intervention(s):   nursing notified, rest, and repositioning    Activity Tolerance:   Poor and requires rest breaks  Please refer to the flowsheet for vital signs taken during this treatment.    After treatment:   Patient left in no apparent distress in bed, Call bell within reach, Bed/ chair alarm activated, and Side rails x3    COMMUNICATION/EDUCATION:   The patient's plan of care was discussed with: physical therapist and registered nurse    Patient Education  Education Given To: Patient;Family  Education Provided: Role of Therapy;ADL Adaptive Strategies;Family Education;Plan of Care;Precautions;Orientation;Home Exercise Program;Transfer Training  Education Method: Verbal;Demonstration  Barriers to Learning: Cognition  Education Outcome: Verbalized understanding;Continued education needed    Thank you for this referral.  Oralia Flores OT  Minutes: 20    
Non-ambulatory  Transfer Assistance: Needs assistance  Active : No  Patient's  Info: family or transport service  Occupation: Retired  Critical Behavior:  Orientation  Overall Orientation Status: Impaired  Orientation Level: Oriented to time;Oriented to person;Disoriented to place;Disoriented to situation  Cognition  Overall Cognitive Status: Exceptions  Arousal/Alertness: Appropriate responses to stimuli  Following Commands: Follows one step commands with increased time  Attention Span: Appears intact  Memory: Decreased recall of biographical Information;Decreased recall of recent events  Insights: Decreased awareness of deficits  Initiation: Requires cues for all  Sequencing: Requires cues for all    Hearing:   Hearing  Hearing: Exceptions to WFL  Hearing Exceptions: Hard of hearing/hearing concerns    Vision/Perceptual:          Vision  Vision: Impaired  Vision Exceptions: Wears glasses at all times       Strength:    Strength: Generally decreased, functional    Tone & Sensation:   Tone: Normal       Coordination:  Coordination: Generally decreased, functional    Range Of Motion:  AROM: Generally decreased, functional (R BKA; L knee WNL but with increased time for stiffness; lokesh UE significantly limited - non functional)       Functional Mobility:  Bed Mobility:     Bed Mobility Training  Bed Mobility Training: Yes  Rolling: Maximum assistance;Assist X1  Supine to Sit: Maximum assistance;Assist X2  Sit to Supine: Maximum assistance;Assist X2  Scooting: Maximum assistance;Total assistance;Assist X1  Transfers:     Transfer Training  Transfer Training: No  Balance:               Balance  Sitting: Impaired  Sitting - Static: Fair (occasional);Poor (constant support)  Sitting - Dynamic: Poor (constant support)  Ambulation/Gait Training:         NTT                                                                                                                                                                 
rated however moaning and crying   Pain Intervention(s):   nursing notified and addressing, rest, elevation, and repositioning    Activity Tolerance:   Poor  Please refer to the flowsheet for vital signs taken during this treatment.    After treatment:   Call bell within reach, Caregiver / family present, Side rails x3, and Ue's elevated    COMMUNICATION/EDUCATION:   The patient's plan of care was discussed with: physical therapist and registered nurse         Thank you for this referral.  Tamara Walden OTR/L  Minutes: 43       
Therapy;Plan of Care;Precautions;Fall Prevention Strategies  Education Method: Verbal  Education Outcome: Continued education needed    Thank you for this referral.  Chary Singh, PT  Minutes: 25

## 2024-02-16 ENCOUNTER — APPOINTMENT (OUTPATIENT)
Facility: HOSPITAL | Age: 89
DRG: 314 | End: 2024-02-16
Attending: INTERNAL MEDICINE
Payer: MEDICARE

## 2024-02-16 LAB
ANION GAP SERPL CALC-SCNC: 5 MMOL/L (ref 5–15)
BUN SERPL-MCNC: 24 MG/DL (ref 6–20)
BUN/CREAT SERPL: 26 (ref 12–20)
CALCIUM SERPL-MCNC: 8.1 MG/DL (ref 8.5–10.1)
CHLORIDE SERPL-SCNC: 102 MMOL/L (ref 97–108)
CO2 SERPL-SCNC: 30 MMOL/L (ref 21–32)
CREAT SERPL-MCNC: 0.92 MG/DL (ref 0.55–1.02)
ERYTHROCYTE [DISTWIDTH] IN BLOOD BY AUTOMATED COUNT: 14 % (ref 11.5–14.5)
GLUCOSE BLD STRIP.AUTO-MCNC: 118 MG/DL (ref 65–117)
GLUCOSE BLD STRIP.AUTO-MCNC: 171 MG/DL (ref 65–117)
GLUCOSE BLD STRIP.AUTO-MCNC: 214 MG/DL (ref 65–117)
GLUCOSE BLD STRIP.AUTO-MCNC: 240 MG/DL (ref 65–117)
GLUCOSE SERPL-MCNC: 145 MG/DL (ref 65–100)
HCT VFR BLD AUTO: 32.3 % (ref 35–47)
HGB BLD-MCNC: 10.4 G/DL (ref 11.5–16)
MCH RBC QN AUTO: 28 PG (ref 26–34)
MCHC RBC AUTO-ENTMCNC: 32.2 G/DL (ref 30–36.5)
MCV RBC AUTO: 87.1 FL (ref 80–99)
NRBC # BLD: 0 K/UL (ref 0–0.01)
NRBC BLD-RTO: 0 PER 100 WBC
PLATELET # BLD AUTO: 174 K/UL (ref 150–400)
PMV BLD AUTO: 11.9 FL (ref 8.9–12.9)
POTASSIUM SERPL-SCNC: 3.8 MMOL/L (ref 3.5–5.1)
RBC # BLD AUTO: 3.71 M/UL (ref 3.8–5.2)
SERVICE CMNT-IMP: ABNORMAL
SODIUM SERPL-SCNC: 137 MMOL/L (ref 136–145)
WBC # BLD AUTO: 11.7 K/UL (ref 3.6–11)

## 2024-02-16 PROCEDURE — 85027 COMPLETE CBC AUTOMATED: CPT

## 2024-02-16 PROCEDURE — A4216 STERILE WATER/SALINE, 10 ML: HCPCS | Performed by: STUDENT IN AN ORGANIZED HEALTH CARE EDUCATION/TRAINING PROGRAM

## 2024-02-16 PROCEDURE — 80048 BASIC METABOLIC PNL TOTAL CA: CPT

## 2024-02-16 PROCEDURE — 36573 INSJ PICC RS&I 5 YR+: CPT

## 2024-02-16 PROCEDURE — 6370000000 HC RX 637 (ALT 250 FOR IP): Performed by: HOSPITALIST

## 2024-02-16 PROCEDURE — 2580000003 HC RX 258: Performed by: STUDENT IN AN ORGANIZED HEALTH CARE EDUCATION/TRAINING PROGRAM

## 2024-02-16 PROCEDURE — 6360000002 HC RX W HCPCS: Performed by: STUDENT IN AN ORGANIZED HEALTH CARE EDUCATION/TRAINING PROGRAM

## 2024-02-16 PROCEDURE — 82962 GLUCOSE BLOOD TEST: CPT

## 2024-02-16 PROCEDURE — 2580000003 HC RX 258: Performed by: HOSPITALIST

## 2024-02-16 PROCEDURE — 2580000003 HC RX 258: Performed by: INTERNAL MEDICINE

## 2024-02-16 PROCEDURE — 6370000000 HC RX 637 (ALT 250 FOR IP): Performed by: INTERNAL MEDICINE

## 2024-02-16 PROCEDURE — 02HV33Z INSERTION OF INFUSION DEVICE INTO SUPERIOR VENA CAVA, PERCUTANEOUS APPROACH: ICD-10-PCS | Performed by: INTERNAL MEDICINE

## 2024-02-16 PROCEDURE — 6370000000 HC RX 637 (ALT 250 FOR IP): Performed by: STUDENT IN AN ORGANIZED HEALTH CARE EDUCATION/TRAINING PROGRAM

## 2024-02-16 PROCEDURE — 1100000000 HC RM PRIVATE

## 2024-02-16 PROCEDURE — 36415 COLL VENOUS BLD VENIPUNCTURE: CPT

## 2024-02-16 PROCEDURE — 94761 N-INVAS EAR/PLS OXIMETRY MLT: CPT

## 2024-02-16 PROCEDURE — 6360000002 HC RX W HCPCS: Performed by: INTERNAL MEDICINE

## 2024-02-16 PROCEDURE — A4216 STERILE WATER/SALINE, 10 ML: HCPCS | Performed by: INTERNAL MEDICINE

## 2024-02-16 PROCEDURE — C9113 INJ PANTOPRAZOLE SODIUM, VIA: HCPCS | Performed by: STUDENT IN AN ORGANIZED HEALTH CARE EDUCATION/TRAINING PROGRAM

## 2024-02-16 PROCEDURE — 71045 X-RAY EXAM CHEST 1 VIEW: CPT

## 2024-02-16 PROCEDURE — 94640 AIRWAY INHALATION TREATMENT: CPT

## 2024-02-16 RX ORDER — PANTOPRAZOLE SODIUM 40 MG/1
40 TABLET, DELAYED RELEASE ORAL
Status: DISCONTINUED | OUTPATIENT
Start: 2024-02-16 | End: 2024-02-16

## 2024-02-16 RX ORDER — LORAZEPAM 0.5 MG/1
0.5 TABLET ORAL EVERY 4 HOURS PRN
Status: DISCONTINUED | OUTPATIENT
Start: 2024-02-16 | End: 2024-02-19 | Stop reason: HOSPADM

## 2024-02-16 RX ORDER — PANTOPRAZOLE SODIUM 40 MG/1
40 TABLET, DELAYED RELEASE ORAL
Status: DISCONTINUED | OUTPATIENT
Start: 2024-02-17 | End: 2024-02-19 | Stop reason: HOSPADM

## 2024-02-16 RX ORDER — GINSENG 100 MG
CAPSULE ORAL PRN
Status: DISCONTINUED | OUTPATIENT
Start: 2024-02-16 | End: 2024-02-19 | Stop reason: HOSPADM

## 2024-02-16 RX ADMIN — ACETAMINOPHEN 1000 MG: 500 TABLET ORAL at 07:03

## 2024-02-16 RX ADMIN — SODIUM CHLORIDE, PRESERVATIVE FREE 10 ML: 5 INJECTION INTRAVENOUS at 08:36

## 2024-02-16 RX ADMIN — APIXABAN 2.5 MG: 2.5 TABLET, FILM COATED ORAL at 08:36

## 2024-02-16 RX ADMIN — FUROSEMIDE 20 MG: 20 TABLET ORAL at 08:36

## 2024-02-16 RX ADMIN — PREDNISONE 5 MG: 5 TABLET ORAL at 08:36

## 2024-02-16 RX ADMIN — ACETAMINOPHEN 1000 MG: 500 TABLET ORAL at 13:38

## 2024-02-16 RX ADMIN — MORPHINE SULFATE 1 MG: 2 INJECTION, SOLUTION INTRAMUSCULAR; INTRAVENOUS at 14:18

## 2024-02-16 RX ADMIN — DICLOFENAC SODIUM 4 G: 10 GEL TOPICAL at 21:19

## 2024-02-16 RX ADMIN — MIRTAZAPINE 7.5 MG: 15 TABLET, FILM COATED ORAL at 21:18

## 2024-02-16 RX ADMIN — LORAZEPAM 0.5 MG: 0.5 TABLET ORAL at 14:18

## 2024-02-16 RX ADMIN — DICLOFENAC SODIUM 4 G: 10 GEL TOPICAL at 13:51

## 2024-02-16 RX ADMIN — SODIUM CHLORIDE, PRESERVATIVE FREE 10 ML: 5 INJECTION INTRAVENOUS at 21:20

## 2024-02-16 RX ADMIN — ACETAMINOPHEN 1000 MG: 500 TABLET ORAL at 21:18

## 2024-02-16 RX ADMIN — LEVOTHYROXINE SODIUM 88 MCG: 0.09 TABLET ORAL at 07:03

## 2024-02-16 RX ADMIN — PANTOPRAZOLE SODIUM 40 MG: 40 INJECTION, POWDER, FOR SOLUTION INTRAVENOUS at 08:36

## 2024-02-16 RX ADMIN — SERTRALINE HYDROCHLORIDE 100 MG: 50 TABLET ORAL at 08:36

## 2024-02-16 RX ADMIN — TRAMADOL HYDROCHLORIDE 50 MG: 50 TABLET ORAL at 21:19

## 2024-02-16 RX ADMIN — MOMETASONE FUROATE AND FORMOTEROL FUMARATE DIHYDRATE 2 PUFF: 200; 5 AEROSOL RESPIRATORY (INHALATION) at 08:12

## 2024-02-16 RX ADMIN — CARVEDILOL 3.12 MG: 3.12 TABLET, FILM COATED ORAL at 08:36

## 2024-02-16 RX ADMIN — DAPTOMYCIN 500 MG: 500 INJECTION, POWDER, LYOPHILIZED, FOR SOLUTION INTRAVENOUS at 13:38

## 2024-02-16 RX ADMIN — TIOTROPIUM BROMIDE INHALATION SPRAY 2 PUFF: 3.12 SPRAY, METERED RESPIRATORY (INHALATION) at 10:00

## 2024-02-16 RX ADMIN — APIXABAN 2.5 MG: 2.5 TABLET, FILM COATED ORAL at 21:19

## 2024-02-16 RX ADMIN — DICLOFENAC SODIUM 4 G: 10 GEL TOPICAL at 08:43

## 2024-02-16 RX ADMIN — MORPHINE SULFATE 1 MG: 2 INJECTION, SOLUTION INTRAMUSCULAR; INTRAVENOUS at 10:35

## 2024-02-16 ASSESSMENT — PAIN SCALES - GENERAL
PAINLEVEL_OUTOF10: 4
PAINLEVEL_OUTOF10: 8
PAINLEVEL_OUTOF10: 6
PAINLEVEL_OUTOF10: 10
PAINLEVEL_OUTOF10: 7

## 2024-02-16 ASSESSMENT — PAIN DESCRIPTION - DESCRIPTORS: DESCRIPTORS: ACHING;SHOOTING;SORE

## 2024-02-16 ASSESSMENT — PAIN - FUNCTIONAL ASSESSMENT
PAIN_FUNCTIONAL_ASSESSMENT: INTOLERABLE, UNABLE TO DO ANY ACTIVE OR PASSIVE ACTIVITIES
PAIN_FUNCTIONAL_ASSESSMENT: PREVENTS OR INTERFERES WITH ALL ACTIVE AND SOME PASSIVE ACTIVITIES

## 2024-02-16 ASSESSMENT — PAIN DESCRIPTION - LOCATION
LOCATION: BUTTOCKS
LOCATION: HIP

## 2024-02-16 ASSESSMENT — PAIN DESCRIPTION - ORIENTATION
ORIENTATION: LOWER
ORIENTATION: LEFT

## 2024-02-16 NOTE — WOUND CARE
Wound follow up:  Patient awake, pleasant cooperative  Stryekr ESTEFANY mattress  Left heel boot and right BKA elevated on pillows  Assessment:  Left heel- 1x0.4c, purple, maroon DTI unchanged   Sacrum- 2x2x0.1cm maroon purple , nonblanching , surrounding red, fragile skin, blanches .  Left ischial- no redness noted today  Right posterior thigh, no redness noted   Treatment:  Reapplied foams  Will follow  Maliha Domingo RN

## 2024-02-16 NOTE — CARE COORDINATION
4:20pm: Per chart review, pt has received PICC line. CM contacted [facility]. They are jack to accept Pt but cannot admit over the weekend.    CM notified attending of this.       SILVIANO Catalan, [facility] Care Manager  [phone]

## 2024-02-17 LAB
BACTERIA SPEC CULT: NORMAL
BACTERIA SPEC CULT: NORMAL
ERYTHROCYTE [DISTWIDTH] IN BLOOD BY AUTOMATED COUNT: 14.1 % (ref 11.5–14.5)
GLUCOSE BLD STRIP.AUTO-MCNC: 109 MG/DL (ref 65–117)
GLUCOSE BLD STRIP.AUTO-MCNC: 141 MG/DL (ref 65–117)
GLUCOSE BLD STRIP.AUTO-MCNC: 175 MG/DL (ref 65–117)
GLUCOSE BLD STRIP.AUTO-MCNC: 176 MG/DL (ref 65–117)
HCT VFR BLD AUTO: 32.9 % (ref 35–47)
HGB BLD-MCNC: 10.5 G/DL (ref 11.5–16)
MCH RBC QN AUTO: 27.7 PG (ref 26–34)
MCHC RBC AUTO-ENTMCNC: 31.9 G/DL (ref 30–36.5)
MCV RBC AUTO: 86.8 FL (ref 80–99)
NRBC # BLD: 0 K/UL (ref 0–0.01)
NRBC BLD-RTO: 0 PER 100 WBC
PLATELET # BLD AUTO: 224 K/UL (ref 150–400)
PMV BLD AUTO: 11.1 FL (ref 8.9–12.9)
RBC # BLD AUTO: 3.79 M/UL (ref 3.8–5.2)
SERVICE CMNT-IMP: ABNORMAL
SERVICE CMNT-IMP: NORMAL
WBC # BLD AUTO: 12.6 K/UL (ref 3.6–11)

## 2024-02-17 PROCEDURE — 6370000000 HC RX 637 (ALT 250 FOR IP): Performed by: INTERNAL MEDICINE

## 2024-02-17 PROCEDURE — 94640 AIRWAY INHALATION TREATMENT: CPT

## 2024-02-17 PROCEDURE — 82962 GLUCOSE BLOOD TEST: CPT

## 2024-02-17 PROCEDURE — 36415 COLL VENOUS BLD VENIPUNCTURE: CPT

## 2024-02-17 PROCEDURE — 1100000000 HC RM PRIVATE

## 2024-02-17 PROCEDURE — 2580000003 HC RX 258: Performed by: INTERNAL MEDICINE

## 2024-02-17 PROCEDURE — A4216 STERILE WATER/SALINE, 10 ML: HCPCS | Performed by: INTERNAL MEDICINE

## 2024-02-17 PROCEDURE — 6370000000 HC RX 637 (ALT 250 FOR IP): Performed by: STUDENT IN AN ORGANIZED HEALTH CARE EDUCATION/TRAINING PROGRAM

## 2024-02-17 PROCEDURE — 6360000002 HC RX W HCPCS: Performed by: INTERNAL MEDICINE

## 2024-02-17 PROCEDURE — 85027 COMPLETE CBC AUTOMATED: CPT

## 2024-02-17 PROCEDURE — 6370000000 HC RX 637 (ALT 250 FOR IP): Performed by: HOSPITALIST

## 2024-02-17 PROCEDURE — 94761 N-INVAS EAR/PLS OXIMETRY MLT: CPT

## 2024-02-17 PROCEDURE — 2580000003 HC RX 258: Performed by: HOSPITALIST

## 2024-02-17 RX ORDER — FUROSEMIDE 20 MG/1
10 TABLET ORAL DAILY
Status: DISCONTINUED | OUTPATIENT
Start: 2024-02-18 | End: 2024-02-19 | Stop reason: HOSPADM

## 2024-02-17 RX ADMIN — DICLOFENAC SODIUM 4 G: 10 GEL TOPICAL at 21:12

## 2024-02-17 RX ADMIN — PANTOPRAZOLE SODIUM 40 MG: 40 TABLET, DELAYED RELEASE ORAL at 05:07

## 2024-02-17 RX ADMIN — MOMETASONE FUROATE AND FORMOTEROL FUMARATE DIHYDRATE 2 PUFF: 200; 5 AEROSOL RESPIRATORY (INHALATION) at 09:13

## 2024-02-17 RX ADMIN — LEVOTHYROXINE SODIUM 88 MCG: 0.09 TABLET ORAL at 05:07

## 2024-02-17 RX ADMIN — DICLOFENAC SODIUM 4 G: 10 GEL TOPICAL at 14:52

## 2024-02-17 RX ADMIN — TIOTROPIUM BROMIDE INHALATION SPRAY 2 PUFF: 3.12 SPRAY, METERED RESPIRATORY (INHALATION) at 09:13

## 2024-02-17 RX ADMIN — APIXABAN 2.5 MG: 2.5 TABLET, FILM COATED ORAL at 21:12

## 2024-02-17 RX ADMIN — DICLOFENAC SODIUM 4 G: 10 GEL TOPICAL at 09:45

## 2024-02-17 RX ADMIN — ACETAMINOPHEN 1000 MG: 500 TABLET ORAL at 05:07

## 2024-02-17 RX ADMIN — SODIUM CHLORIDE, PRESERVATIVE FREE 10 ML: 5 INJECTION INTRAVENOUS at 21:21

## 2024-02-17 RX ADMIN — ACETAMINOPHEN 1000 MG: 500 TABLET ORAL at 13:16

## 2024-02-17 RX ADMIN — PREDNISONE 5 MG: 5 TABLET ORAL at 09:42

## 2024-02-17 RX ADMIN — TRAMADOL HYDROCHLORIDE 50 MG: 50 TABLET ORAL at 21:12

## 2024-02-17 RX ADMIN — CARVEDILOL 3.12 MG: 3.12 TABLET, FILM COATED ORAL at 09:42

## 2024-02-17 RX ADMIN — SERTRALINE HYDROCHLORIDE 100 MG: 50 TABLET ORAL at 09:42

## 2024-02-17 RX ADMIN — APIXABAN 2.5 MG: 2.5 TABLET, FILM COATED ORAL at 09:42

## 2024-02-17 RX ADMIN — ACETAMINOPHEN 1000 MG: 500 TABLET ORAL at 21:12

## 2024-02-17 RX ADMIN — FUROSEMIDE 20 MG: 20 TABLET ORAL at 09:42

## 2024-02-17 RX ADMIN — MOMETASONE FUROATE AND FORMOTEROL FUMARATE DIHYDRATE 2 PUFF: 200; 5 AEROSOL RESPIRATORY (INHALATION) at 19:11

## 2024-02-17 RX ADMIN — MIRTAZAPINE 7.5 MG: 15 TABLET, FILM COATED ORAL at 21:12

## 2024-02-17 RX ADMIN — SODIUM CHLORIDE, PRESERVATIVE FREE 10 ML: 5 INJECTION INTRAVENOUS at 09:44

## 2024-02-17 RX ADMIN — DAPTOMYCIN 500 MG: 500 INJECTION, POWDER, LYOPHILIZED, FOR SOLUTION INTRAVENOUS at 13:16

## 2024-02-17 ASSESSMENT — PAIN SCALES - GENERAL: PAINLEVEL_OUTOF10: 7

## 2024-02-18 PROCEDURE — 6360000002 HC RX W HCPCS: Performed by: INTERNAL MEDICINE

## 2024-02-18 PROCEDURE — 1100000000 HC RM PRIVATE

## 2024-02-18 PROCEDURE — 2580000003 HC RX 258: Performed by: HOSPITALIST

## 2024-02-18 PROCEDURE — 6370000000 HC RX 637 (ALT 250 FOR IP): Performed by: STUDENT IN AN ORGANIZED HEALTH CARE EDUCATION/TRAINING PROGRAM

## 2024-02-18 PROCEDURE — 6370000000 HC RX 637 (ALT 250 FOR IP): Performed by: INTERNAL MEDICINE

## 2024-02-18 PROCEDURE — 2580000003 HC RX 258: Performed by: INTERNAL MEDICINE

## 2024-02-18 PROCEDURE — 6370000000 HC RX 637 (ALT 250 FOR IP): Performed by: HOSPITALIST

## 2024-02-18 PROCEDURE — A4216 STERILE WATER/SALINE, 10 ML: HCPCS | Performed by: INTERNAL MEDICINE

## 2024-02-18 PROCEDURE — 94640 AIRWAY INHALATION TREATMENT: CPT

## 2024-02-18 PROCEDURE — 94761 N-INVAS EAR/PLS OXIMETRY MLT: CPT

## 2024-02-18 RX ORDER — GINSENG 100 MG
CAPSULE ORAL
Qty: 1 G | Refills: 0 | Status: SHIPPED | OUTPATIENT
Start: 2024-02-18 | End: 2024-02-28

## 2024-02-18 RX ORDER — FUROSEMIDE 20 MG/1
10 TABLET ORAL DAILY
Qty: 60 TABLET | Refills: 3 | Status: SHIPPED | OUTPATIENT
Start: 2024-02-18

## 2024-02-18 RX ORDER — PREDNISONE 5 MG/1
5 TABLET ORAL DAILY
Qty: 10 TABLET | Refills: 0 | Status: SHIPPED | OUTPATIENT
Start: 2024-02-18 | End: 2024-02-28

## 2024-02-18 RX ADMIN — APIXABAN 2.5 MG: 2.5 TABLET, FILM COATED ORAL at 08:07

## 2024-02-18 RX ADMIN — PREDNISONE 5 MG: 5 TABLET ORAL at 08:07

## 2024-02-18 RX ADMIN — DICLOFENAC SODIUM 4 G: 10 GEL TOPICAL at 20:48

## 2024-02-18 RX ADMIN — LEVOTHYROXINE SODIUM 88 MCG: 0.09 TABLET ORAL at 05:38

## 2024-02-18 RX ADMIN — ACETAMINOPHEN 1000 MG: 500 TABLET ORAL at 12:17

## 2024-02-18 RX ADMIN — MOMETASONE FUROATE AND FORMOTEROL FUMARATE DIHYDRATE 2 PUFF: 200; 5 AEROSOL RESPIRATORY (INHALATION) at 07:47

## 2024-02-18 RX ADMIN — MOMETASONE FUROATE AND FORMOTEROL FUMARATE DIHYDRATE 2 PUFF: 200; 5 AEROSOL RESPIRATORY (INHALATION) at 20:23

## 2024-02-18 RX ADMIN — CYCLOBENZAPRINE 5 MG: 10 TABLET, FILM COATED ORAL at 11:46

## 2024-02-18 RX ADMIN — DICLOFENAC SODIUM 4 G: 10 GEL TOPICAL at 08:07

## 2024-02-18 RX ADMIN — ACETAMINOPHEN 1000 MG: 500 TABLET ORAL at 20:41

## 2024-02-18 RX ADMIN — APIXABAN 2.5 MG: 2.5 TABLET, FILM COATED ORAL at 20:41

## 2024-02-18 RX ADMIN — SERTRALINE HYDROCHLORIDE 100 MG: 50 TABLET ORAL at 08:06

## 2024-02-18 RX ADMIN — PANTOPRAZOLE SODIUM 40 MG: 40 TABLET, DELAYED RELEASE ORAL at 05:38

## 2024-02-18 RX ADMIN — FUROSEMIDE 10 MG: 20 TABLET ORAL at 08:06

## 2024-02-18 RX ADMIN — TIOTROPIUM BROMIDE INHALATION SPRAY 2 PUFF: 3.12 SPRAY, METERED RESPIRATORY (INHALATION) at 07:47

## 2024-02-18 RX ADMIN — SODIUM CHLORIDE, PRESERVATIVE FREE 10 ML: 5 INJECTION INTRAVENOUS at 08:07

## 2024-02-18 RX ADMIN — DICLOFENAC SODIUM 4 G: 10 GEL TOPICAL at 13:20

## 2024-02-18 RX ADMIN — SODIUM CHLORIDE, PRESERVATIVE FREE 10 ML: 5 INJECTION INTRAVENOUS at 20:43

## 2024-02-18 RX ADMIN — MIRTAZAPINE 7.5 MG: 15 TABLET, FILM COATED ORAL at 20:41

## 2024-02-18 RX ADMIN — CARVEDILOL 3.12 MG: 3.12 TABLET, FILM COATED ORAL at 08:07

## 2024-02-18 RX ADMIN — DAPTOMYCIN 500 MG: 500 INJECTION, POWDER, LYOPHILIZED, FOR SOLUTION INTRAVENOUS at 12:17

## 2024-02-18 RX ADMIN — ACETAMINOPHEN 1000 MG: 500 TABLET ORAL at 05:38

## 2024-02-18 ASSESSMENT — PAIN SCALES - GENERAL
PAINLEVEL_OUTOF10: 4
PAINLEVEL_OUTOF10: 8

## 2024-02-18 ASSESSMENT — PAIN DESCRIPTION - ONSET: ONSET: ON-GOING

## 2024-02-18 ASSESSMENT — PAIN DESCRIPTION - DESCRIPTORS: DESCRIPTORS: SHARP;SHOOTING

## 2024-02-18 ASSESSMENT — PAIN DESCRIPTION - ORIENTATION: ORIENTATION: LEFT

## 2024-02-18 ASSESSMENT — PAIN DESCRIPTION - FREQUENCY: FREQUENCY: INTERMITTENT

## 2024-02-18 ASSESSMENT — PAIN - FUNCTIONAL ASSESSMENT: PAIN_FUNCTIONAL_ASSESSMENT: PREVENTS OR INTERFERES WITH ALL ACTIVE AND SOME PASSIVE ACTIVITIES

## 2024-02-18 ASSESSMENT — PAIN DESCRIPTION - PAIN TYPE: TYPE: ACUTE PAIN

## 2024-02-18 ASSESSMENT — PAIN DESCRIPTION - LOCATION: LOCATION: SHOULDER

## 2024-02-19 VITALS
HEIGHT: 63 IN | OXYGEN SATURATION: 99 % | SYSTOLIC BLOOD PRESSURE: 120 MMHG | TEMPERATURE: 97.9 F | HEART RATE: 80 BPM | DIASTOLIC BLOOD PRESSURE: 60 MMHG | RESPIRATION RATE: 16 BRPM | WEIGHT: 138 LBS | BODY MASS INDEX: 24.45 KG/M2

## 2024-02-19 LAB
ANION GAP SERPL CALC-SCNC: 4 MMOL/L (ref 5–15)
BACTERIA SPEC CULT: NORMAL
BACTERIA SPEC CULT: NORMAL
BUN SERPL-MCNC: 23 MG/DL (ref 6–20)
BUN/CREAT SERPL: 32 (ref 12–20)
CALCIUM SERPL-MCNC: 8.4 MG/DL (ref 8.5–10.1)
CHLORIDE SERPL-SCNC: 105 MMOL/L (ref 97–108)
CO2 SERPL-SCNC: 32 MMOL/L (ref 21–32)
CREAT SERPL-MCNC: 0.71 MG/DL (ref 0.55–1.02)
ERYTHROCYTE [DISTWIDTH] IN BLOOD BY AUTOMATED COUNT: 14.2 % (ref 11.5–14.5)
GLUCOSE BLD STRIP.AUTO-MCNC: 211 MG/DL (ref 65–117)
GLUCOSE SERPL-MCNC: 99 MG/DL (ref 65–100)
HCT VFR BLD AUTO: 30.9 % (ref 35–47)
HGB BLD-MCNC: 9.7 G/DL (ref 11.5–16)
MAGNESIUM SERPL-MCNC: 1.1 MG/DL (ref 1.6–2.4)
MCH RBC QN AUTO: 27.8 PG (ref 26–34)
MCHC RBC AUTO-ENTMCNC: 31.4 G/DL (ref 30–36.5)
MCV RBC AUTO: 88.5 FL (ref 80–99)
NRBC # BLD: 0 K/UL (ref 0–0.01)
NRBC BLD-RTO: 0 PER 100 WBC
PLATELET # BLD AUTO: 192 K/UL (ref 150–400)
PMV BLD AUTO: 11.1 FL (ref 8.9–12.9)
POTASSIUM SERPL-SCNC: 3.5 MMOL/L (ref 3.5–5.1)
RBC # BLD AUTO: 3.49 M/UL (ref 3.8–5.2)
SARS-COV-2 RDRP RESP QL NAA+PROBE: NOT DETECTED
SERVICE CMNT-IMP: ABNORMAL
SERVICE CMNT-IMP: NORMAL
SERVICE CMNT-IMP: NORMAL
SODIUM SERPL-SCNC: 141 MMOL/L (ref 136–145)
SOURCE: NORMAL
WBC # BLD AUTO: 10.1 K/UL (ref 3.6–11)

## 2024-02-19 PROCEDURE — 6370000000 HC RX 637 (ALT 250 FOR IP): Performed by: HOSPITALIST

## 2024-02-19 PROCEDURE — 85027 COMPLETE CBC AUTOMATED: CPT

## 2024-02-19 PROCEDURE — 36415 COLL VENOUS BLD VENIPUNCTURE: CPT

## 2024-02-19 PROCEDURE — 6360000002 HC RX W HCPCS: Performed by: INTERNAL MEDICINE

## 2024-02-19 PROCEDURE — 82962 GLUCOSE BLOOD TEST: CPT

## 2024-02-19 PROCEDURE — 87635 SARS-COV-2 COVID-19 AMP PRB: CPT

## 2024-02-19 PROCEDURE — 6370000000 HC RX 637 (ALT 250 FOR IP): Performed by: STUDENT IN AN ORGANIZED HEALTH CARE EDUCATION/TRAINING PROGRAM

## 2024-02-19 PROCEDURE — 80048 BASIC METABOLIC PNL TOTAL CA: CPT

## 2024-02-19 PROCEDURE — 83735 ASSAY OF MAGNESIUM: CPT

## 2024-02-19 PROCEDURE — A4216 STERILE WATER/SALINE, 10 ML: HCPCS | Performed by: INTERNAL MEDICINE

## 2024-02-19 PROCEDURE — 6370000000 HC RX 637 (ALT 250 FOR IP): Performed by: INTERNAL MEDICINE

## 2024-02-19 PROCEDURE — 2580000003 HC RX 258: Performed by: INTERNAL MEDICINE

## 2024-02-19 PROCEDURE — 94640 AIRWAY INHALATION TREATMENT: CPT

## 2024-02-19 PROCEDURE — 2580000003 HC RX 258: Performed by: HOSPITALIST

## 2024-02-19 RX ORDER — MAGNESIUM SULFATE 1 G/100ML
1000 INJECTION INTRAVENOUS ONCE
Status: COMPLETED | OUTPATIENT
Start: 2024-02-19 | End: 2024-02-19

## 2024-02-19 RX ORDER — LANOLIN ALCOHOL/MO/W.PET/CERES
400 CREAM (GRAM) TOPICAL DAILY
Status: DISCONTINUED | OUTPATIENT
Start: 2024-02-19 | End: 2024-02-19 | Stop reason: HOSPADM

## 2024-02-19 RX ORDER — SIMETHICONE 40MG/0.6ML
40 SUSPENSION, DROPS(FINAL DOSAGE FORM)(ML) ORAL ONCE
Status: COMPLETED | OUTPATIENT
Start: 2024-02-19 | End: 2024-02-19

## 2024-02-19 RX ORDER — 0.9 % SODIUM CHLORIDE 0.9 %
500 INTRAVENOUS SOLUTION INTRAVENOUS ONCE
Status: COMPLETED | OUTPATIENT
Start: 2024-02-19 | End: 2024-02-19

## 2024-02-19 RX ORDER — LANOLIN ALCOHOL/MO/W.PET/CERES
400 CREAM (GRAM) TOPICAL DAILY
Qty: 30 TABLET | Refills: 0 | Status: SHIPPED | OUTPATIENT
Start: 2024-02-19 | End: 2024-03-20

## 2024-02-19 RX ADMIN — CARVEDILOL 3.12 MG: 3.12 TABLET, FILM COATED ORAL at 09:48

## 2024-02-19 RX ADMIN — Medication 40 MG: at 11:57

## 2024-02-19 RX ADMIN — DOCUSATE SODIUM 50MG AND SENNOSIDES 8.6MG 2 TABLET: 8.6; 5 TABLET, FILM COATED ORAL at 11:17

## 2024-02-19 RX ADMIN — MAGNESIUM SULFATE HEPTAHYDRATE 1000 MG: 1 INJECTION, SOLUTION INTRAVENOUS at 10:05

## 2024-02-19 RX ADMIN — SODIUM CHLORIDE, PRESERVATIVE FREE 10 ML: 5 INJECTION INTRAVENOUS at 09:49

## 2024-02-19 RX ADMIN — ACETAMINOPHEN 650 MG: 325 TABLET ORAL at 14:45

## 2024-02-19 RX ADMIN — MOMETASONE FUROATE AND FORMOTEROL FUMARATE DIHYDRATE 2 PUFF: 200; 5 AEROSOL RESPIRATORY (INHALATION) at 07:37

## 2024-02-19 RX ADMIN — POTASSIUM BICARBONATE 20 MEQ: 782 TABLET, EFFERVESCENT ORAL at 09:58

## 2024-02-19 RX ADMIN — LEVOTHYROXINE SODIUM 88 MCG: 0.09 TABLET ORAL at 06:01

## 2024-02-19 RX ADMIN — POLYETHYLENE GLYCOL 3350 17 G: 17 POWDER, FOR SOLUTION ORAL at 11:17

## 2024-02-19 RX ADMIN — APIXABAN 2.5 MG: 2.5 TABLET, FILM COATED ORAL at 09:48

## 2024-02-19 RX ADMIN — DICLOFENAC SODIUM 4 G: 10 GEL TOPICAL at 09:49

## 2024-02-19 RX ADMIN — SERTRALINE HYDROCHLORIDE 100 MG: 50 TABLET ORAL at 09:48

## 2024-02-19 RX ADMIN — DAPTOMYCIN 500 MG: 500 INJECTION, POWDER, LYOPHILIZED, FOR SOLUTION INTRAVENOUS at 11:58

## 2024-02-19 RX ADMIN — TIOTROPIUM BROMIDE INHALATION SPRAY 2 PUFF: 3.12 SPRAY, METERED RESPIRATORY (INHALATION) at 07:36

## 2024-02-19 RX ADMIN — CYCLOBENZAPRINE 5 MG: 10 TABLET, FILM COATED ORAL at 11:17

## 2024-02-19 RX ADMIN — ACETAMINOPHEN 1000 MG: 500 TABLET ORAL at 06:01

## 2024-02-19 RX ADMIN — Medication 400 MG: at 09:58

## 2024-02-19 RX ADMIN — FUROSEMIDE 10 MG: 20 TABLET ORAL at 09:48

## 2024-02-19 RX ADMIN — PREDNISONE 5 MG: 5 TABLET ORAL at 09:48

## 2024-02-19 RX ADMIN — SODIUM CHLORIDE 500 ML: 9 INJECTION, SOLUTION INTRAVENOUS at 11:56

## 2024-02-19 NOTE — PALLIATIVE CARE DISCHARGE
Goals of Care/Treatment Preferences    The Palliative Medicine team was consulted as part of your/your loved one's care in the hospital. Our team is a supportive service; we strive to relieve suffering and improve quality of life.    We reviewed advance care planning information, which includes the following:    Primary Decision Maker: Pauline Kasper - [phone]  Patient's Healthcare Decision Maker is:: Legal Next of Kin  Confirm Advance Directive: Not on file  Does the patient have other document types?: Do Not Resuscitate    Patient/Health Care Proxy Stated Goals: Recovery from acute illness    We reviewed / discussed your code status as:   Code Status: DNR     “Full Code” means perform CPR in the event of cardiac arrest.      “DNR” means do NOT perform CPR in the event of cardiac arrest.      “Partial Code” means you have specific preferences, please discuss with your healthcare team.      “No Order” means this issue was not addressed / resolved during your stay    Medical Interventions: Limited additional interventions  Other Instructions: You have a Durable Do Not Resuscitate Order in place, which should travel with you.  When you are in a facility, this form should be placed on your chart. Once you are home, it is recommended that the DDNR form be placed in a visible location such as on the refrigerator or bedroom door.    You shared that you have an Advance Medical Directive in place.  It is always recommended that you provide a copy for your medical record.         Because of the importance of this information, we are providing you with a printed copy to share with other healthcare providers after this hospitalization is complete.

## 2024-02-19 NOTE — PROGRESS NOTES
Palliative Medicine Social Work Note      SW made supportive visit to pt, delivered prayer shawl from volunteers in honor of pt's 89nd birthday yesterday.  Local dtr Jamila was at bedside.  Pt stated she feels better today than she did yesterday, no concerns expressed at this time.  SW visited only briefly to allow pt time with family.  Will continue to follow for support.     Thank you for including Palliative Medicine in the care of Ms. Lea.    Rosa Álvarez, MANAS, [facility]-  Palliative Medicine:  [phone]  
     Palliative Medicine Social Work Note      SW made supportive visit to pt.  No family currently present.  Pt was awake in bed, alert and receptive to visit.  [facility]; pt stated today is her 89nd birthday.  Pt shared that she thought she wasn't going to make it, stated an \"omen\" spoke to her and presented her with two choices.  Pt shared that it had not been a frightening experience when she thought she might be dying, though she is happy to have more time.  Pt stated she is not ready to leave her beloved children (2 sons, 2 dtrs) though knows she might leave them soon.  Pt was not requiring O2 via NC at time of visit, denied shortness of breath, denied pain.  Pt reports some difficulty chewing (does not have all of her teeth) but can tolerate soft bite sized foods.  Pt presented with some confusion and possible hallucinations (reports seeing bugs all over the room, none noted by SW during visit) but was very pleasant and thankful for the visit, shared that she does not like to be alone.  Noted potential plan for rehab at [facility] when pt is stable for discharge.  SW will continue to follow for support.      Thank you for including Palliative Medicine in the care of Ms. Lea.     Rosa Álvarez, MANAS, [facility]  Palliative Medicine:  [phone])  
    Hospitalist Progress Note      NAME:  Irma Lae   :  1932  MRM:  [id number]    Date/Time: 2024  11:53 AM           Assessment / Plan:       #Gram-positive evelyn bacteremia, corynebacterium species  -Repeated cultures positive   -TTE negative for vegetations   -ID consulted: can attempt a 6-week course of intravenous antibiotics followed by oral antibiotic suppression indefinitely as long as there is an oral option that organism is sensitive to.   ID following   -Cardiology consulted: to decide if removal of pacemaker device is feasible, reported She will need general anesthesia if undergoing procedure and will need leadless pacer implant after bacteremia is resolved, and will be externally paced during that time   -Meeting with family today at 1:30 to discuss goals of care     #Acute on chronic respiratory failure, resolved, pt Is on room air   #Pneumonia due to COVID-19 virus   #Sepsis (leukocytosis / Fever)  #Asthma exacerbation  - Pulmonary following  -  Xray clear initially clear.    - Procalcitonin 0.37.  CRP 10.  D-dimer 1.8  - d/c Decadron   - Completed course of doxycycline  - brovana, pulmicort (for Advair) and prn duonebs.  -Respiratory status has improved but patient remains very lethargic and debilitated.      Overall prognosis is guarded if not poor.  Goals of care discussions.    Palliative consulted, meeting today with family      #Dysphagia  -Speech consulted, advance diet  -Will convert meds back to p.o.  - repeat chest xray with no acute process     #Interstitial edema  #Volume overload, likely iatrogenic  #CHF (congestive heart failure), NYHA class I, chronic, systolic  #Pulmonary hypertension / MV and TV regurgitation   #Ischemic cardiomyopathy  -Improved with Lasix 20 mg IV twice daily.  Convert to p.o.  -Echo on 8/10/2023 showed EF 46%     #Anasarca  #Hypoalbuminemia  #Severe protein calorie malnutrition  -Nutritional support     #Left heel ulcer  -Wound care following   
    Hospitalist Progress Note      NAME:  Irma Lea   :  1932  MRM:  [id number]    Date/Time: 2/15/2024  4:56 PM           Assessment / Plan:       #Corynobacterium bacteremia: Cx from  and  now negative. TTE neg, but high concern that pacer is infected.   -ID consulted:   Will plan discharge on daptomycin which is considered an alternative antibiotic for severe infections. Once the 6 weeks of antibiotics are complete we will transition to a p.o. regimen and as long as there are appropriate p.o. options. Organism has been sent to reference lab for sensitivities   Pt will FU with Dr. Hoyt as OP    - Plan to attempt PICC tmr afternoon with family present as pt unable to tolerate placement on her own due to anxiety and pain; can use morphine for the latter  -Cardiology consulted: to decide if removal of pacemaker device is feasible, reported CT surgery will not provide back up in case there is bleeding because of her advanced age and usually cannot survive open heart surgery  Therefore the only way to proceed with laser lead removal is that she will accept risk of death if there is bleeding complication with vascular or cardiac perforation ,She will also need leadless pacer if all leads are extracted because she is pacer dependent    #Delirium vs dementia: Patient's capacity is questionable on my exam. She does not fully grasp the complexity of her situation. She does not particularly want a PICC and wants to \"move on beyond this world\" yet also wants to continue living to sit on her porch. We will try to have PICC placed tmr with family at bedside, but if she again refuses, will need to consider hospice care.     #Acute on chronic respiratory failure, resolved, pt Is on room air   #Pneumonia due to COVID-19 virus   #Sepsis (leukocytosis / Fever)  #Asthma exacerbation  -  Xray clear initially clear.    - Procalcitonin 0.37.  CRP 10.  D-dimer 1.8  - d/c Decadron   - Completed course of doxycycline  - 
    Hospitalist Progress Note      NAME:  Irma Lea   :  1932  MRM:  [id number]    Date/Time: 2024  12:26 PM           Assessment / Plan:       #Gram-positive evelyn bacteremia, corynebacterium species  -Repeated cultures positive, will repeat BC today prior to d/c   -TTE negative for vegetations   -ID consulted:   Will plan discharge on daptomycin which is considered an alternative antibiotic for severe infections. Once the 6 weeks of antibiotics are complete we will transition to a p.o. regimen and as long as there are appropriate p.o. options. Organism has been sent to reference lab for sensitivities   Pt will FU with Dr. Hoyt as OP   -Cardiology consulted: to decide if removal of pacemaker device is feasible, reported CT surgery will not provide back up in case there is bleeding because of her advanced age and usually cannot survive open heart surgery  Therefore the only way to proceed with laser lead removal is that she will accept risk of death if there is bleeding complication with vascular or cardiac perforation ,She will also need leadless pacer if all leads are extracted because she is pacer dependent    PICC line ordered today    #Acute on chronic respiratory failure, resolved, pt Is on room air   #Pneumonia due to COVID-19 virus   #Sepsis (leukocytosis / Fever)  #Asthma exacerbation  -  Xray clear initially clear.    - Procalcitonin 0.37.  CRP 10.  D-dimer 1.8  - d/c Decadron   - Completed course of doxycycline  - brovana, pulmicort (for Advair) and prn duonebs.  -Respiratory status has improved, pt has been on room air    Overall prognosis is guarded if not poor.  Goals of care discussions.    Palliative consulted, meeting today with family      #Dysphagia, resolved   -Speech consulted, advance diet  -meds back to p.o.  - repeated chest xray with no acute process    #Interstitial edema  #Volume overload, likely iatrogenic  #CHF (congestive heart failure), NYHA class I, chronic, 
    Hospitalist Progress Note      NAME:  Irma Lea   :  1932  MRM:  [id number]    Date/Time: 2024  10:24 AM           Assessment / Plan:       #Gram-positive evelyn bacteremia, corynebacterium species  -Repeated cultures positive   -TTE negative for vegetations   -ID consulted: can attempt a 6-week course of intravenous antibiotics followed by oral antibiotic suppression indefinitely as long as there is an oral option that organism is sensitive to.   ID following   -Cardiology consulted: to decide if removal of pacemaker device is feasible, reported She will need general anesthesia if undergoing procedure and will need leadless pacer implant after bacteremia is resolved, and will be externally paced during that time   -Had a Meeting with family, discussed with family treatment options, family reported they will discuss with each others, haven't decided yet     #Acute on chronic respiratory failure, resolved, pt Is on room air   #Pneumonia due to COVID-19 virus   #Sepsis (leukocytosis / Fever)  #Asthma exacerbation  -  Xray clear initially clear.    - Procalcitonin 0.37.  CRP 10.  D-dimer 1.8  - d/c Decadron   - Completed course of doxycycline  - brovana, pulmicort (for Advair) and prn duonebs.  -Respiratory status has improved     Overall prognosis is guarded if not poor.  Goals of care discussions.    Palliative consulted, meeting today with family      #Dysphagia  -Speech consulted, advance diet  -Will convert meds back to p.o.  - repeated chest xray with no acute process  -will re consult speech tomorrow to reval the patient      #Interstitial edema  #Volume overload, likely iatrogenic  #CHF (congestive heart failure), NYHA class I, chronic, systolic  #Pulmonary hypertension / MV and TV regurgitation   #Ischemic cardiomyopathy  -Improved with Lasix 20 mg IV twice daily.  Convert to p.o.  -Echo on 8/10/2023 showed EF 46%     #Anasarca  #Hypoalbuminemia  #Severe protein calorie 
    Hospitalist Progress Note      NAME:  Irma Lea   :  1932  MRM:  [id number]    Date/Time: 2024  12:14 PM           Assessment / Plan:       #Gram-positive evelyn bacteremia, corynebacterium species  -Repeated cultures positive   -TTE negative for vegetations   -ID consulted: can attempt a 6-week course of intravenous antibiotics followed by oral antibiotic suppression indefinitely as long as there is an oral option that organism is sensitive to.   ID following   -Cardiology consulted: to decide if removal of pacemaker device is feasible, reported She will need general anesthesia if undergoing procedure and will need leadless pacer implant after bacteremia is resolved, and will be externally paced during that time   -Had a Meeting with family, discussed with family treatment options, family reported they will discuss with each others,   Plan to place PICC line once ID orders the d/c abx, and pt can be d/c home     #Acute on chronic respiratory failure, resolved, pt Is on room air   #Pneumonia due to COVID-19 virus   #Sepsis (leukocytosis / Fever)  #Asthma exacerbation  -  Xray clear initially clear.    - Procalcitonin 0.37.  CRP 10.  D-dimer 1.8  - d/c Decadron   - Completed course of doxycycline  - brovana, pulmicort (for Advair) and prn duonebs.  -Respiratory status has improved     Overall prognosis is guarded if not poor.  Goals of care discussions.    Palliative consulted, meeting today with family      #Dysphagia  -Speech consulted, advance diet  -Will convert meds back to p.o.  - repeated chest xray with no acute process  -will re consult speech tomorrow to reval the patient      #Interstitial edema  #Volume overload, likely iatrogenic  #CHF (congestive heart failure), NYHA class I, chronic, systolic  #Pulmonary hypertension / MV and TV regurgitation   #Ischemic cardiomyopathy  -Improved with Lasix 20 mg IV twice daily.  Convert to p.o.  -Echo on 8/10/2023 showed EF 46%   
    Hospitalist Progress Note      NAME:  Irma Lea   :  1932  MRM:  [id number]    Date/Time: 2024  1:51 PM           Assessment / Plan:       #Corynobacterium bacteremia: Cx from  and  now negative. TTE neg, but high concern that pacer is infected.     -ID consulted:   Will plan discharge on daptomycin which is considered an alternative antibiotic for severe infections. Once the 6 weeks of antibiotics are complete we will transition to a p.o. regimen and as long as there are appropriate p.o. options. Organism has been sent to reference lab for sensitivities   Pt will FU with Dr. Hoyt as OP    - Plan to attempt PICC this afternoon with family present as pt unable to tolerate placement on her own due to anxiety and pain; can use morphine/loraz for the latter. If she still refuses or cannot tolerate PICC, recommend Hospice. If she tolerates procedures, can dc to SNF tmr morning    -Cardiology consulted: to decide if removal of pacemaker device is feasible, reported CT surgery will not provide back up in case there is bleeding because of her advanced age and usually cannot survive open heart surgery. Will not proceed      #Acute on chronic respiratory failure, resolved, pt Is on room air   #Pneumonia due to COVID-19 virus   #Sepsis (leukocytosis / Fever)  #Asthma exacerbation  -  Xray clear initially clear.    - Procalcitonin 0.37.  CRP 10.  D-dimer 1.8  - s/p Decadron, doxycycline  - brovana, pulmicort (for Advair) and prn duonebs.         #Dysphagia, resolved   -Speech consulted, advance diet  -meds back to p.o.  - repeated chest xray with no acute process    #Interstitial edema  #Volume overload, likely iatrogenic  #CHF (congestive heart failure), NYHA class I, chronic, systolic  #Pulmonary hypertension / MV and TV regurgitation   #Ischemic cardiomyopathy  -Improved with Lasix 20 mg IV twice daily.  Converted to p.o.  -Echo on 8/10/2023 showed EF 46%     #Anasarca  #Hypoalbuminemia  #Severe 
    Name: Irma Lea Hospital: [facility]   : 1932 Admit Date: 2024   Phone: [phone]  Room: [facility]/   PCP: Emiliano Lujan MD  MRN: [id number]   Date: 2024  Code: DNR          Chart and notes reviewed. Data reviewed. I review the patient's current medications in the medical record at each encounter.  I have evaluated and examined the patient.     HPI:    8:05 AM       History was obtained from chart review.      I was asked by Robson Krishna MD to see Irma Lea in consultation for a chief complaint of shortness of breath.      History of Present Illness:  Ms. Lea is a 89 yo woman with a history of severe persistent asthma, chronic respiratory failure (previously on supplemental O2), valvular heart disease, CHF, HTN, psoriasis, afib, s/p PPM, and prior R BKA who is admitted with COVID-19 and acute on chronic respiratory failure with hypoxia. She is followed by Dr. Barnard of [facility] and was last seen 2023. At that time she was not requiring O2 and was taking Advair regularly, spiriva was not covered. She has been admitted since  for COVID-19 and acute on chronic respiratory failure. She come in with weakness and had previously tested positive for COVID about two weeks prior. At that time she had a subsequently negative test, but was progressively more weak and fatigued.  She has been treated with steroids and abx were add yesterday due to a new fever.     Labs: Cr 0.71, procalcitonin 1.39, CRP 14.30 (up from 10.80), albumin 1.7,  (worse), ALT 94 (worse), WBC 10.9, d-dimer 2.07 (slowly trending up), COVID positive , ABG 7.42    Images reviewed:  CXR 2024 without acute process    Interval History:    Afebrile  Bp stable  Sats 98% on 2L NC  Mag 1.0  Hgb 10.2--stable  Plts 128--slightly better  Blood cultures with gram positive rods in 1/2 bottles    ROS:  states that her breathing is a little better today.  No cough, sputum production.    Past 
    Pharmacy Note - Renal dose adjustment made per P/T protocol    Original order:  Pepcid 20 mg PO BID    Estimated Creatinine Clearance: 39 mL/min (based on SCr of 0.77 mg/dL).    Recent Labs     02/06/24  0120 02/07/24  1118 02/08/24  0201   BUN 34* 39* 39*   CREATININE 0.74 0.68 0.77       Renally adjusted order:  Pepcid 20 mg PO q 24 h    Please call pharmacy with any questions.    Thank you,  PARK IBARRA [facility] MS  2/8/2024 1:28 PM   
  [facility]    Hospitalist Progress Note    NAME: Irma Lea   :  1932  MRM:  [id number]    Date/Time of service 2024  8:24 AM    To assist coordination of care and communication with nursing and staff, this note may be preliminary early in the day, but finalized by end of the day.        Assessment and Plan:     Pneumonia due to COVID-19 virus / Leukocytosis / Fever - POA, COVID was detected by testing. Xray clear.  No fever.  Procalcitonin 0.37.  CRP 10 and downtrending. WBC stable on steroids. DDIMER 1.8. For now decadron.  Deue to new fever 102 this afternoon I repeated blood cx, added Doxycycline.    Chronic respiratory failure with hypoxia / ZULEIMA (obstructive sleep apnea) - POA, mild, she has her chronic low oxygen saturations.  Presumed due to chronic COPD and now COVID.  Continue oxygen as needed.  May use home CPAP if available.  Resumed tessalon. She developed tachypnea this afternoon.  I transferred to telemetry. An ABG showed hypoxia and mixed respiratory alkalosis and metabolic acidosis.  I will consult pulmonary in AM.      GUILLERMO (acute kidney injury) / Dehydration / Hypomagnesemia / Stage 3 chronic kidney disease - POA and worse than baseline due to poor PO intake and use of diuretics. Hydrated and improved.  Monitor and replete lytes.    COPD (chronic obstructive pulmonary disease) with exacerbation / Restrictive lung disease - Likely mild exacerbation due to COVID. Getting decadron, brovana, pulmicort (for Advair) and prn duonebs.    Lethargy / Fatigue / Frequent falls - Likely worse due to COVID, deconditioning, IVVD, etc.  Also may be side effectrs of remeron, muscle relaxants. PT OT eval.  Likely back to PT OT at [place]. Continue baclofen, flexeril, tylenol.    Arthritis - Hip xray shows severe arthritis, but no fracture    CHF (congestive heart failure), NYHA class I, chronic, systolic /  Pulmonary hypertension / MV and TV regurgitation / Ischemic 
  [facility]    Hospitalist Progress Note    NAME: Irma Lea   :  1932  MRM:  [id number]    Date/Time of service 2024  12:14 PM    To assist coordination of care and communication with nursing and staff, this note may be preliminary early in the day, but finalized by end of the day.        Assessment and Plan:     Pneumonia due to COVID-19 virus / Leukocytosis - POA, COVID was detected by testing. Xray clear.  No fever.  Procalcitonin <0.5.  CRP 15 and downtrending. WBC stable on steroids. DDIMER 1.4 and downtrending. For now decadron, but no Abx    Chronic respiratory failure with hypoxia / ZULEIMA (obstructive sleep apnea) - POA, mild, she has her chronic low oxygen sat in ER.  Presumed due to chronic COPD and now COVID.  Continue oxygen as needed.  May use home CPAP if available.  Resumed tessalon      GUILLERMO (acute kidney injury) / Dehydration / Hypomagnesemia / Stage 3 chronic kidney disease - POA and worse than baseline due to poor PO intake and use of diuretics. Hydrated and improved.  Monitor and replete lytes.    COPD (chronic obstructive pulmonary disease) with exacerbation / Restrictive lung disease - Likely mild exacerbation due to COVID. Getting decadron, brovana, pulmicort (for Advair) and prn duonebs.    Lethargy / Fatigue / Frequent falls / Arthritis - Likely worse due to COVID, deconditioning, IVVD, etc.  Also may be side effectrs of remeron, muscle relaxants. PT OT eval.  Likely back to PT OT at [place]. Continue baclofen, flexeril, tylenol.    CHF (congestive heart failure), NYHA class I, chronic, systolic /  Pulmonary hypertension / MV and TV regurgitation / Ischemic cardiomyopathy / Hypertension - Stable.  Continue coreg, statin, eliquis, holding lasix/bumex due to IVVD    Chronic atrial fibrillation / Third degree AV block / Biventricular cardiac pacemaker in situ / Secondary hypercoagulable state - Stable. Continue Eliqus, coreg    Anxiety and depression / 
  [facility]    Hospitalist Progress Note    NAME: Irma Lea   :  1932  MRM:  [id number]    Date/Time of service 2/3/2024  7:41 AM    To assist coordination of care and communication with nursing and staff, this note may be preliminary early in the day, but finalized by end of the day.        Assessment and Plan:     Pneumonia due to COVID-19 virus / Leukocytosis - POA, COVID was detected by testing. Xray clear.  No fever.  Procalcitonin <0.5.  CRP 15 and downtrending. WBC stable on steroids. DDIMER 1.4 and downtrending. For now decadron, but no Abx    Chronic respiratory failure with hypoxia / ZULEIMA (obstructive sleep apnea) - POA, mild, she has her chronic low oxygen saturations.  Presumed due to chronic COPD and now COVID.  Continue oxygen as needed.  May use home CPAP if available.  Resumed tessalon      GUILLERMO (acute kidney injury) / Dehydration / Hypomagnesemia / Stage 3 chronic kidney disease - POA and worse than baseline due to poor PO intake and use of diuretics. Hydrated and improved.  Monitor and replete lytes.    COPD (chronic obstructive pulmonary disease) with exacerbation / Restrictive lung disease - Likely mild exacerbation due to COVID. Getting decadron, brovana, pulmicort (for Advair) and prn duonebs.    Lethargy / Fatigue / Frequent falls - Likely worse due to COVID, deconditioning, IVVD, etc.  Also may be side effectrs of remeron, muscle relaxants. PT OT eval.  Likely back to PT OT at [place]. Continue baclofen, flexeril, tylenol.    Arthritis - Hip xray shows severe arthritis, but no fracture    CHF (congestive heart failure), NYHA class I, chronic, systolic /  Pulmonary hypertension / MV and TV regurgitation / Ischemic cardiomyopathy / Hypertension - Stable.  Continue coreg, statin, eliquis, holding lasix/bumex due to IVVD    Chronic atrial fibrillation / Third degree AV block / Biventricular cardiac pacemaker in situ / Secondary hypercoagulable state - Stable. 
  [facility]    Hospitalist Progress Note    NAME: Irma Lea   :  1932  MRM:  [id number]    Date/Time of service 2024  7:34 AM    To assist coordination of care and communication with nursing and staff, this note may be preliminary early in the day, but finalized by end of the day.        Assessment and Plan:     Corynobacterium bacteremia / Sepsis / leukocytosis / Fever - POA, presumed pacemaker infection.  Cultures from  and  negative after getting vanco. TTE without visible masses. Pacer removal too high risk.  ID consulted.  Will discharge on 6 weeks IV daptomycin, then PO suppression indefinitely.  Awaiting final sensitivities.  PICC in place, ready to discharge.      Debilitated patient / Frequent falls / Dysphagia - POA, worse due to sepsis, COVID, CHF, deconditioning.  Awaiting SNF.  Appreciate PT OT and speech eval.  Could be an appropriate candidate for hospice at anytime.    CHF (congestive heart failure), NYHA class I, chronic, systolic / Interstitial edema / Volume overload / Ischemic cardiomyopathy / Pulmonary hypertension / MV and TV regurgitation / Biventricular cardiac pacemaker in situ / Hypertension - POA and better after diuresis.  EF 45% months ago.  Continue lasix, coreg     COPD (chronic obstructive pulmonary disease) exacerbation - Continue Dulera (for Advair) and prn duonebs.  Also on spiriva     Chronic atrial fibrillation / Chronic anticoagulation - Continue coreg and Eliquis    Severe protein calorie malnutrition / Anasarca / Hypoalbuminemia - Nutritional support and supplements    Anemia - POA and now stable, due to chronic disease.     Anxiety and depression - Continue sertraline and remeron. Getting prn ativan, a poor choice in the elderly.  I suspect dementia.    Left heel ulcer - Wound care following     ZULEIMA (obstructive sleep apnea) - High risk, avoid CPAP     GERD (gastroesophageal reflux disease) - Continue PPI    Arthritis - Getting 
  [facility]    Hospitalist Progress Note    NAME: Irma Lea   :  1932  MRM:  [id number]    Date/Time of service 2024  7:51 AM    To assist coordination of care and communication with nursing and staff, this note may be preliminary early in the day, but finalized by end of the day.        Assessment and Plan:     Pneumonia due to COVID-19 virus / Leukocytosis - POA, COVID was detected by testing. Xray clear.  No fever.  Procalcitonin <0.5.  Check CRP and DDIMER.  For now decadron, but no Abx    Chronic respiratory failure with hypoxia / ZULEIMA (obstructive sleep apnea) - POA, mild, she has her chronic low oxygen sat in ER.  Presumed due to chronic COPD and now COVID.  Continue oxygen as needed.  May have home CPAP if available.  Resume tessalon      GUILLERMO (acute kidney injury) / Dehydration / Hypomagnesemia / Stage 3 chronic kidney disease - POA and worse than baseline due to poor PO intake and use of diuretics. Hydrated and improved.  Monitor and replete lytes.    COPD (chronic obstructive pulmonary disease) with exacerbation / Restrictive lung disease - Likely mild exacerbation due to COVID. Getting decadron, brovana, pulmicort (for Advair) and prn duonebs.    Lethargy / Fatigue / Frequent falls / Arthritis - Likely worse due to COVID, deconditioning, IVVD, etc.  Also may be side effectrs of remeron, muscle relaxants. PT OT eval.  Likely back to PT OT at [place]. Continue baclofen, flexeril, tylenol,     CHF (congestive heart failure), NYHA class I, chronic, systolic /  Pulmonary hypertension / MV and TV regurgitation / Ischemic cardiomyopathy / Hypertension - Stable.  Continue coreg, statin, eliquis, holding lasix/bumex due to IVVD    Chronic atrial fibrillation / Third degree AV block / Biventricular cardiac pacemaker in situ / Secondary hypercoagulable state - Stable. Continue Eliqus, coreg    Anxiety and depression / Migraines - Continue sertraline. This issue may 
  Hospitalist Progress Note    NAME: Irma Lea   :  1932   MRN:  [id number]     Date/Time:  2024 8:13 AM    Patient PCP: Emiliano Lujan MD       Subjective:   CHIEF COMPLAINT:  dyspnea       Patient is feeling little bit better today.  Less dyspnea.  Less cough.  Remains on 1.5 L of oxygen.  Remains very weak.  Met with daughter at bedside, all questions answered.  Pulmonary following.  On Decadron twice daily.        Objective:   VITALS:    Vitals:    24 0759   BP: 133/71   Pulse: 82   Resp: 17   Temp: 97.5 °F (36.4 °C)   SpO2: 98%       PHYSICAL EXAM:      General:   No acute distress, resting comfortably in bed.  Heart:  RRR, No MRG  Lungs: Bibasilar rales, mildly increased work of breathing, wheezing.  Abdomen:  Soft .  Nondistended.  NTTP.  BS present.  Extremities: Trace edema.  S/p R BKA  Skin:  No rash  Neuro:  Alert and interactive.  No focal deficits.  Generalized weakness  Psych: Mood stable.        LAB DATA REVIEWED:    Recent Results (from the past 24 hour(s))   COVID-19    Collection Time: 24  2:38 PM    Specimen: Nasopharyngeal   Result Value Ref Range    Source Nasopharyngeal      SARS-CoV-2 Presumptive Positive (A) NOTD     POCT Glucose    Collection Time: 24  5:49 PM   Result Value Ref Range    POC Glucose 141 (H) 65 - 117 mg/dL    Performed by: COSTA Baxter    Basic Metabolic Panel    Collection Time: 24  1:20 AM   Result Value Ref Range    Sodium 145 136 - 145 mmol/L    Potassium 4.9 3.5 - 5.1 mmol/L    Chloride 117 (H) 97 - 108 mmol/L    CO2 23 21 - 32 mmol/L    Anion Gap 5 5 - 15 mmol/L    Glucose 185 (H) 65 - 100 mg/dL    BUN 34 (H) 6 - 20 MG/DL    Creatinine 0.74 0.55 - 1.02 MG/DL    Bun/Cre Ratio 46 (H) 12 - 20      Est, Glom Filt Rate >60 >60 ml/min/1.73m2    Calcium 7.9 (L) 8.5 - 10.1 MG/DL   CBC    Collection Time: 24  1:20 AM   Result Value Ref Range    WBC 11.0 3.6 - 11.0 K/uL    RBC 3.62 (L) 3.80 - 5.20 M/uL    Hemoglobin 10.2 (L) 11.5 - 
  Hospitalist Progress Note    NAME: Irma Lea   :  1932   MRN:  [id number]     Date/Time:  2024 1:12 PM    Patient PCP: Emiliano Lujan MD       Subjective:   CHIEF COMPLAINT:  dyspnea       Patient remains very lethargic.  Cleared by speech to start diet.  Discussed  Dysphagia with family at the bedside..  She has chronic difficulty swallowing pills.  Her oxygenation has improved and she is now on room air.  ID is following, started empiric antibiotics for bacteremia.  Family is meeting with palliative care to discuss goals of care.    Objective:   VITALS:    Vitals:    24 1054   BP: (!) 122/50   Pulse: 81   Resp: 16   Temp: 98.4 °F (36.9 °C)   SpO2: 93%       PHYSICAL EXAM:      General:   No acute distress, resting  in bed.  Heart:  RRR, No MRG  Lungs: Scant rales.  Nonlabored breathing.  On room air.  Abdomen:  Soft .  Nondistended.  NTTP.  BS present.  Extremities: No edema.  S/p R BKA  Skin: Ecchymoses and mild erythema bilateral arms with edema, no sign of cellulitis.  Neuro: Somnolent.  Arousable to verbal stimuli.  Disoriented.  No focal deficits.  Generalized weakness  Psych: Mood stable.        LAB DATA REVIEWED:    Recent Results (from the past 24 hour(s))   Urinalysis with Reflex to Culture    Collection Time: 24  5:39 PM    Specimen: Urine   Result Value Ref Range    Color, UA YELLOW/STRAW      Appearance CLEAR CLEAR      Specific Gravity, UA 1.009 1.003 - 1.030      pH, Urine 5.0 5.0 - 8.0      Protein, UA Negative NEG mg/dL    Glucose, UA Negative NEG mg/dL    Ketones, Urine Negative NEG mg/dL    Bilirubin Urine Negative NEG      Blood, Urine Negative NEG      Urobilinogen, Urine 0.2 0.2 - 1.0 EU/dL    Nitrite, Urine Negative NEG      Leukocyte Esterase, Urine Negative NEG      Urine Culture if Indicated CULTURE NOT INDICATED BY UA RESULT CNI      WBC, UA 0-4 0 - 4 /hpf    RBC, UA 0-5 0 - 5 /hpf    Epithelial Cells UA FEW FEW /lpf    BACTERIA, URINE Negative NEG /hpf    
's presence requested for family conference with medical team.  Family meeting with the patient's children~ Yaneli Yang, Nate, and Cliff along with Dr. Andersen and NP Carmine.    met family prior to meeting and listened to their thoughts and held prayer with them at their request. Provided a supportive, prayerful presence throughout the conference meeting and spent time with family at bedside post meeting, discussing what was said in meeting. Family again requested prayer and  prayed for them and for God's wisdom, , direction and peace for them in their decision making. Met family again in waiting area and they shared their final thoughts of continuing antibiotics for 6 weeks and proactive care for Ms. Irma Lea. They bounced their thoughts off  and  and expressed their much appreciation and gratitude for all the time,  and prayers for them.   Chaplains remain available for continued care and support.     Chaplain Chirag Foster M.Div., [facility].  [facility] Team [phone]    
0940- Acknowledged PICC order. Spoke with pt and asked if we could place her line this morning. Pt able to state her name and date of birth and stated she was at [place]. Pt asked if we could call her daughter. Pauline Kasper was called and informed consent was given for PICC placement.   Pt right arm extended and scanning with ultrasound. Pt began crying. Dr. Feng in to see pt. Pt able to answer a few of Dr. Feng questions. Pt crying and stated \"I just want to be a peace\". We will hold off on placement at this. Dr. Feng will speak with pt daughter. We will continue to follow chart and pt wishes. Please call ext #[phone]   
1009 perfect served attending regarding patient complaining of gas. Requested an order for gas-x or something similar.  
1036 spoke with Pauline Kasper @ [phone] to inform that patient has a current discharge order. Patient will be returing to [facility] and transporting the patient around 1200.  
1321 attempted to give report to [place] [phone] was told to call back transferred to the wrong center.    Called back again reached Liseth and was told to call back in 20-30 mins she is on her break at the moment.  
1732: Patient in biventricular rhythm with a RR of 46 and HR of 81. MD made aware and rapid response called on patient. RRT and resp at beside along with charge nurse and primary RN. Stat blood cultures ordered and ABG. Transfer orders in place to tele.     1845: Patients daughter called unit and charge RN provided an update on transferring patient to room 311.   
2/4/24 at 1535 sent message to Dr nikita brown temp and that I gave tylenol also spoke with the charge nurse to let her know and to see if I needed to do something different.  We checked to see if the patient meet the protocol for a code sepsis she did not but we called the rrt rn and she came up to see the patient also.  2/4/24 at 1700 the patent respirations were high so nikita called a rapid on the patient.  Bilateral blood cultures were ordered by Dr Ibrahim at this time along with a new antibiotic. At 1719.  Only one set of cultures were able to be drawn the rrt nurse and myself attempted.   The patent is being transferred to [facility] 311  
6:52:Patient daughter called this morning concerned as to why she was not called last night when her mother was transferred last night. She wanted to know who made the call and who she needed to speak with about this issue I advise her that she could speak with the nursing supervisor. She then proceeded to ask about he mother's condition. I advised her the her mother was doing well and she just was a little confused last night but was ok. She had concerns about the pur wick leaking and I assured her that I had been checking on her mom through out the night and well in the morning to make sure she did not get wet and also I placed a wound consult in for the wound nurse to assess her mother's skin. She was also concerned about her mother's not getting hot packs on her arms and I told her that I could not place them on her arms without an order.   
As requested by Pt's daughter, Jamila,  returned to have some one-on-one time with Ms. Irma Lea on B5.  Reviewed chart for any more new information.   Ms Lea is awake and alert but very weak and tired, more so than yesterday. Ms Marino asked her mother if she would like to talk with , and Ms Lea said yes.  came to bedside and to speak with Ms Lea, and she began to weep and cry out (in whispers) saying things like, she is tired, I love my family so much, should I go be with Akshat or stay for them? I want to stay with them but I'm tired.   listened empathetically and expressed that he cannot make that decision for her but can help her in this important decision.  shared things from Scripture of what it says according to her [employer]ism macarena, and expressed to her to evaluate if she feels if she could still feel like she could speak to her legacy and have a godly influence still in their lives. If she feels she could, then consider with progressive care, if not, it is okay to trust God with your family and go be with our Creator, and other comforting words. Hospitalist and staff came in to evaluate her and help her with some needs and once comfortable again, she again began to cry, saying things like, she is tired of going through this.  began to console her again.  She is conflicted with this decision and desiring resolve.  ended with prayer with her. She became peaceful and expressed much thankfulness, saying that she feels better from the talk and prayers.    shared with Ms Marino of availability of continued care and support. Ms Marino said her sister Celia is scheduled to be here tomorrow for family conference, and she too expressed her gratitude.    Chaplain Chirag Foster M.Div., [facility].  [facility] Team [phone] ([phone])    
Attempted to work with the patient for Physical Therapy, chart reviewed and discussed with nurse cleared for therapy. Patient supine on bed when received very sleepy not able to participate for therapy. Communicated with nurse and agreed to monitor patient.   
Bedside and Verbal shift change report given to MARIELY Arroyo (oncoming nurse) by MARIELY Biswas (offgoing nurse). Report included the following information Nurse Handoff Report, Index, Intake/Output, MAR, Recent Results, Med Rec Status, and Cardiac Rhythm NSR .     
Chart check.  Patient remains on RA and is stable from a pulmonary standpoint.    Cardiology and ID notes reviewed.  Plans noted for family meeting today to determine goals of care.  We will follow peripherally and see again as needed should respiratory status worsen.    Please call with questions.  
Comprehensive Nutrition Assessment    Type and Reason for Visit:  Reassess    Nutrition Recommendations/Plan:   Continue soft & bite sized diet order  Provide Ensure Enlive once daily (350 kcal, 44 g carbs, 20 g protein) to aid in meeting kcal/protein needs.     Malnutrition Assessment:  Malnutrition Status:  At risk for malnutrition (Comment) (02/08/24 1249)  - older adult with developing DTI, poor PO intake per documentation, did not allow RD to complete NFPE   Context:  Acute Illness     Findings of the 6 clinical characteristics of malnutrition:  Energy Intake:  75% or less of estimated energy requirements for 7 or more days  Weight Loss:  No significant weight loss     Body Fat Loss:  Unable to assess     Muscle Mass Loss:  Unable to assess    Fluid Accumulation:  Unable to assess Extremities   Strength:  Not Performed    Nutrition Assessment:     2/15: Follow up. No family in room at time of RD encounter; patient did not provide information, only moaned. Discussed with CM. Ethics is consulted, awaiting input/GOC meeting. Lack of PO intake documentation but patient with poor intake 2/2 refusal of foods. Worked with SLP and diet was upgraded 2/12. Edema worsened since last RD encounter. Last BM 3 days ago. Patient unable to tell me whether she has tried any ONS. Labs WDL. Awaiting PICC placement vs GOC re-visitation? Did not allow RD to perform NFPE.    Meal Intake:   Patient Vitals for the past 168 hrs:   PO Meals Eaten (%)   02/14/24 0800 1 - 25%   02/11/24 1400 26 - 50%   02/1935 26 - 50%   02/10/24 1400 0%   02/10/24 0904 1 - 25%   02/09/24 1400 26 - 50%     Supplement Intake:  No data found.    Nutrition Related Findings:      Wound Type: Deep Tissue Injury (L heel)     Last BM: 02/12/24  Edema: Right upper extremity, Left upper extremity, Right lower extremity, Left lower extremity   Edema Generalized: Trace  RUE Edema: +1, Pitting  LUE Edema: +1, Pitting  RLE Edema: None  LLE Edema: Trace, 
Daughter (Celia) asked this RN to leave a note for Dr Andersen to call her about the name of the rheumatologist.    
[facility] Progress Note  Nate Hoyt DO  [phone] Office  [phone] Fax    2/13/2024      Assessment & Plan:     Corynebacterium striatum bacteremia.  Multiple positive cultures confirming this is a true infection.  This would be very unusual type of infection in the absence of a foreign body or indwelling device.  In this case the patient has a pacemaker which is almost certainly infected although the TTE is negative.  EP has seen the patient.  Family does not wish to pursue device extraction.  Will plan 6-week course of IV antibiotics followed by oral antibiotic suppression indefinitely.  Vancomycin is considered drug of choice for this organism however I am concerned that the patient with history of CKD and 89-years old will not be able to tolerate this antibiotic long-term.  Will plan discharge on daptomycin which is considered an alternative antibiotic for severe infections.  Once the 6 weeks of antibiotics are complete we will transition to a p.o. regimen and as long as there are appropriate p.o. options.  Organism has been sent to reference lab for sensitivities.  Will request that the patient follows up with me in the office prior to conclusion of IV antibiotics to discuss transition to an oral regimen.  This was discussed with patient's daughter at bedside today.  IV antibiotic orders are in the chart.  PICC line has been ordered.  Repeat blood cultures today prior to discharge  Valvular heart disease/atrial fibrillation/ischemic cardiomyopathy.  Patient has pacemaker  CKD stage III  History of penicillin and sulfa allergies.  Able to tolerate cephalexin  Acute on chronic respiratory failure/COVID-19 infection.  Pulmonology managing          Subjective:     Blood cultures have cleared    Objective:     Vitals: /74   Pulse 81   Temp 98.4 °F (36.9 °C) (Oral)   Resp 17   Ht 1.6 m (5' 3\")   Wt 62.6 kg (138 lb)   SpO2 92%   BMI 24.45 kg/m²      Tmax:  Temp 
[facility] Progress Note  Nate Hoyt DO  [phone] Office  [phone] Fax    2/14/2024      Assessment & Plan:     Corynebacterium striatum bacteremia.  Multiple positive cultures confirming this is a true infection.  This would be very unusual type of infection in the absence of a foreign body or indwelling device.  In this case the patient has a pacemaker which is almost certainly infected although the TTE is negative.  EP has seen the patient.  Family does not wish to pursue device extraction.  Will plan 6-week course of IV antibiotics followed by oral antibiotic suppression indefinitely.  Vancomycin is considered drug of choice for this organism however I am concerned that the patient with history of CKD and 89-years old will not be able to tolerate this antibiotic long-term.  Will plan discharge on daptomycin which is considered an alternative antibiotic for severe infections with this organism.  Once the 6 weeks of antibiotics are complete we will transition to a p.o. regimen and as long as there are appropriate p.o. options.  Organism has been sent to reference lab for sensitivities.  Will request that the patient follows up with me in the office prior to conclusion of IV antibiotics to discuss transition to an oral regimen.  This was discussed with patient's daughter at bedside.  IV antibiotic orders are in the chart.  PICC line has been ordered.  Discussed with microbiology lab who discussed with reference lab 2/13.  Sensitivities have been ordered on organism  Valvular heart disease/atrial fibrillation/ischemic cardiomyopathy.  Patient has pacemaker  CKD stage III  History of penicillin and sulfa allergies.  Able to tolerate cephalexin  Acute on chronic respiratory failure/COVID-19 infection.  Pulmonology managing          Subjective:     Blood cultures have cleared    Objective:     Vitals: /63   Pulse 80   Temp 98.1 °F (36.7 °C) (Oral)   Resp 20   Ht 1.6 m 
[facility] Progress Note  Nate Hoyt DO  [phone] Office  [phone] Fax    2024      Assessment & Plan:     Corynebacterium striatum bacteremia.  Multiple positive cultures confirming this is a true infection.  This would be very unusual type of infection in the absence of a foreign body or indwelling device.  In this case the patient has a pacemaker which is almost certainly infected although the TTE is negative.  Patient will need cardiology/EP evaluation to determine if removal of pacemaker device is feasible.  If the patient is felt not to be a candidate for device removal can attempt a 6-week course of intravenous antibiotics followed by oral antibiotic suppression indefinitely as long as there is an oral option that organism is sensitive to.  Organism has been sent to reference lab for sensitivities.  Either way if the device is removed or if it is not removed recommendation would be for a 6-week course of IV antibiotics.  Vancomycin started .  Repeat blood cultures .   Plans noted for goals of care therapy with patient and family today.    Valvular heart disease/atrial fibrillation/ischemic cardiomyopathy.  Patient has pacemaker  CKD stage III  History of penicillin and sulfa allergies.  Able to tolerate cephalexin  Acute on chronic respiratory failure/COVID-19 infection.  Pulmonology managing          Subjective:     Blood cultures remain positive    Objective:     Vitals: /73   Pulse 80   Temp 97.2 °F (36.2 °C) (Oral)   Resp 20   Ht 1.6 m (5' 3\")   Wt 62.6 kg (138 lb)   SpO2 94%   BMI 24.45 kg/m²      Tmax:  Temp (24hrs), Av.8 °F (36.6 °C), Min:97.2 °F (36.2 °C), Max:98.6 °F (37 °C)      Exam:   Patient is intubated:  no    Physical Examination:   General:  Patient sleeping comfortably   Head:  Normocephalic, atraumatic.   Eyes:  Conjunctivae clear   Neck: Supple       Lungs:   No distress.    Chest wall:     Heart:     Abdomen:    
Echo attempted, patient did not think the echo was necessary. Daughter was in the room and we discussed revisiting the echo after the family has spoken with palliative care. Will follow up tomorrow.  
Echo completed at daughters request  
[facility] Dosing Services: Antimicrobial Stewardship Daily Doc  Consult for antibiotic dosing of Vancomycin by Dr. Hoyt  Indication: Corynebacterium striatum bacteremia   Day of Therapy: 1    Ht Readings from Last 1 Encounters:   02/08/24 1.6 m (5' 3\")        Wt Readings from Last 1 Encounters:   02/05/24 62.8 kg (138 lb 8 oz)      Vancomycin therapy:  Loading dose: Vancomycin 1500 mg x1 dose given 2/8 1206  Maintenance dose: Dose calculated to approximate a           a. Target AUC/CHICHI of 400-600          b. Trough of 15-20 mcg/mL   Last level: n/a mcg/mL    Plan: Initiate maintenance dose of 1000 mg Q24 hours for estimated 479 mg/dL (trough 13 mg/L)   Plan to order level within 48 hours  Urine output 1.1 mL/kg/hour; CKD stage III; Scr stable; WBC normal     Dose administration notes: Doses given appropriately as scheduled  Other Antimicrobial   (not dosed by pharmacist) S/p doxycycline course    Cultures 2/4-blood - DIPHTHEROIDS GROWING IN BOTH BOTTLES-MOST CLOSELY RESEMBLING CORYNEBACTERIUM STRIATUM - final  2/7 Blood x2-GRAM POSITIVE RODS (CORYNEFORM)-Prelim  2/8 bloodx1-NGTD x1 hour     Serum Creatinine Lab Results   Component Value Date/Time    CREATININE 0.77 02/08/2024 02:01 AM          Creatinine Clearance Estimated Creatinine Clearance: 39 mL/min (based on SCr of 0.77 mg/dL).     Temp Temp: 98.4 °F (36.9 °C) (Oral)       WBC Lab Results   Component Value Date/Time    WBC 10.1 02/08/2024 02:01 AM          Procalcitonin No results found for: \"PCT\"     For Antifungals, Metronidazole and Nafcillin: Lab Results   Component Value Date/Time    ALT 94 02/04/2024 03:37 AM     02/04/2024 03:37 AM        Pharmacist: Alix Pena, PharmD  [phone]      
Follow up spiritual care assessment and support for Ms. Irma Lea on B5.  Reviewed chart prior to visit. Met Ms Lea at bedside. She stated that her back and leg is hurting and would like to be moved.  called her nurse requesting Pt be repositioned.  sat at bedside hear how she is doing. She expressed the same as before, that she is tired and wants to go be with God and doesn't know what to do about it. She is distressed in making a decision in her care plan. She became weepy asking God what she has done to deserve this pain, that she just wants to be happy again.  began to console her and read Ps 23 and Col. 3:15 to her. She got quite and began to pray, asking God to help her and to take away the pain. She expressed gratitude for reading her Scripture and praying for her. However, she is still in too much discomfort to engage in meaningful conversation,  left the room to talked to her nurse personally to help reposition her. Then her daughter Celia came in with carreon, and being helpful to help comfort her mother. They both expressed their gratitude. Chaplains will remain available for continued care and support.     Chaplain Chirag Foster M.Div., [facility].  [facility] Team [phone] ([phone])    
Follow up visit with Irma [place], on 5 Med surg.  She indicated today has been a busy day, she is tired of laying inn bed.  She shared some about her day.   She indicated she has a strong relationship with God.  Provided calming meditaional prayer, sang Come holy Spirit and had prayer together.   She appeared to relax some.  Provided assurance of prayers for a good rest tonight.  She shared her thanks for the visit.  consulted with Nurse.  Contact Spiritual Health for any further referrals.  Chaplain Mery Padilla MDiv., MS., [facility]  Page a  [phone])      
Notify patient daughter Celia by phone [phone] patient to be transfer to room 523. Acknowledge information.   
Notify via secure message MD Ibrahim patient daughter Celia [phone] received call and states she will like an update either today or tomorrow morning.  
OCCUPATIONAL THERAPY EVALUATION/DISCHARGE  Patient: Irma Lea (89 y.o. female)  Date: 2/1/2024  Primary Diagnosis: Sepsis (HCC) [A41.9]         Precautions:                    ASSESSMENT :  Based on the objective data below, the patient demonstrates baseline ability for performance of ADL tasks and tranfers.  Patient received supine in bed, alert and oriented.  She is able to give appropriate responses but with soft breathy voice.  Patient with few coughing spells throughout activity. Patient noted with R BKA, not mentioned in chart.   Patient cries out with attempts to move reporting L hip pain and RUE pain with movement.  After several attempts patient able to report staff at [facility] assist with all mobility from bed to wheelchair and complete ADL tasks with patient.  Patient at/very near baseline and family desires return to [facility] at discharge.      Further skilled acute occupational therapy is not indicated at this time.     PLAN :  Recommend with staff:     Recommendation for discharge: (in order for the patient to meet his/her long term goals): No skilled occupational therapy    Other factors to consider for discharge: no additional factors    IF patient discharges home will need the following DME:  TBD     SUBJECTIVE:   Patient stated, “I think its been about 5yrs.”  Patient reports BKA  OBJECTIVE DATA SUMMARY:     Past Medical History:   Diagnosis Date    Anxiety and depression     Arthritis     Atrial fibrillation (HCC)     CHF (congestive heart failure), NYHA class I, chronic, systolic (HCC)     GERD (gastroesophageal reflux disease)     History of vascular access device 04/17/2020    4F MIDLINE PLACED BY ALEJANDRA WHITTAKER RN LEFT BRACHIAL, 13CM    Hx of colon cancer, stage I     Hyperlipidemia     Hypertension     Hypothyroid     Ischemic cardiomyopathy     Migraines     Mild tricuspid regurgitation     Mitral regurgitation     ZULEIMA (obstructive sleep apnea)     Pulmonary hypertension (HCC)      Past Surgical History: 
Occupational Therapy Note:  Chart reviewed.  Patient declined OT interventions despite max encouragement d/t pain.  Patient agreeable to OT follow-up next tx day.    Penny Wing, OTR/L  
Occupational Therapy Note: Noted pt pending family meeting with goals of care. Will cont to follow.  
Occupational Therapy Note: Spoke to RN who stated pt not appropriate for therapy at this time. Noted family conference tomorrow re POC.   
Occupational/Physical Therapy Note  2/15/2024    OT/PT treatment attempted at 0944 however patient with PICC team.     Thank you,  Oralia Flores OTR/L      
PHYSICAL THERAPY EVALUATION/DISCHARGE    Patient: Irma Lea (89 y.o. female)  Date: 2/1/2024  Primary Diagnosis: Sepsis (HCC) [A41.9]       Precautions:                        ASSESSMENT AND RECOMMENDATIONS:  Based on the objective data below, the patient presents with significant debility with all functional mobility, severe chronic pain in L hip and R UE, and limited activity tolerance, generally consistent with baseline levels s/p admission for sepsis.  PMH significant for R BKA.  Patient oriented x 4 but somewhat unsure of some PLOF aspects, reports living at [facility] and being fully dependent on staff for mobility and most ADLs.  Patient uses electric scooter at baseline and is dependent on staff for transfer.  Today, attempted to transfer to EOB but not tolerated due to the severe L hip and R shoulder pain.  Patient then reports that she does not sit up on the EOB at facility.    Based on the above, patient appears close to baseline and fully dependent for mobility at her [facility].  No acute therapy services indicated at this time.  Recommend return to [facility] when medically appropriate.     Functional Outcome Measure:  The patient scored 6 on the [facility] outcome measure which is indicative of higher odds of needing IPR or SNF at d/c.    Further skilled acute physical therapy is not indicated at this time.       PLAN :  Recommendation for discharge: (in order for the patient to meet his/her long term goals): No skilled physical therapy    Other factors to consider for discharge: no additional factors    IF patient discharges home will need the following DME: patient owns DME required for discharge       SUBJECTIVE:   Patient stated “I don't walk.”    OBJECTIVE DATA SUMMARY:     Past Medical History:   Diagnosis Date    Anxiety and depression     Arthritis     Atrial fibrillation (HCC)     CHF (congestive heart failure), NYHA class I, chronic, systolic ([facility])     GERD (gastroesophageal reflux disease)     History of 
PICC Placement Note    PRE-PROCEDURE VERIFICATION  Correct Procedure: yes  Correct Site:  yes  Temperature: Temp: 97.3 °F (36.3 °C)   Recent Labs     02/16/24  0110   BUN 24*      WBC 11.7*     Allergies: Diltiazem, Diltiazem hcl, Penicillins, Primidone, Propoxyphene, Codeine, Labetalol, and Sulfa antibiotics  Education materials for PICC Care given: yes. See Patient Education activity for further details.  PICC Booklet placed at bedside: yes    Closed Ended PICC Catheters:  Flush Lumens as Follows:  Intermittent Medication:   Flush before and after each medication with 10 ml NS.   Unused Ports:  Flush every 8 hours with 10 ml NS.  TPN Ports:  Flush every 24 hours with 20 ml NS prior to hanging new bag.  Blood Draws: Stop infusion, draw off and waste 10 ml of blood. Draw sample with 10cc syringe or greater. DO NOT USE VACUTAINER . Transfer with appropriate device to lab  tubes. Flush with 20 ml NS.  Dressing Change:  Every 7 days, and PRN using sterile technique if integrity of dressing is compromised.  Initial dressing change for central line 24-48 hours post insertion if gauze is used. Apply new dressing per policy.    PROCEDURE DETAIL  Consent was obtained and all questions were answered related to risks and benefits.   A single lumen PICC line was inserted, as a sterile procedure using ultrasound and modified Seldinger technique for antibiotic therapy. The following documentation is in addition to the PICC properties in the lines/airways flowsheet :  Lot #: [id number]  Lidocaine 1% administered intradermally :yes  Internal Catheter Total Length: 36 (cm) 0 cm out, arm cir 23.5 cm   Vein Selection for PICC:right basilic  Central Line Bundle followed yes  Complication Related to Insertion:no       X-ray: yes. The x-ray results state the tip location is on the right side and the tip overlies the lower superior vena cava.     Line is okay to use: yes    Sena Alfredo RN    
Palliative Medicine      Code Status: DNR    Advance Care Planning:   Primary Decision Maker: Celia Jansen - Child - [phone]    Pt has incomplete AMD scanned into chart which lists sons as Medical POAs (signature does not have the required two witnesses); however, dtr Celia stated she is Medical POA, will upload copy of document to pt's [facility].  Pt has DDNR on file dated 2017, was also noted to be DNR in paperwork sent by [facility].  Dtr confirmed DNR; code status was updated to reflect pt's wishes.    Patient / Family Encounter Documentation    Participants (names): Pt, son Haroon and dtr Celia by phone, Palliative Medicine (NP Kena, LCSW Rosa)    Narrative: Met with pt in ED; no family currently present.  Pt was awake in bed but unable to engage in lucid conversation.  Call was placed to son Haroon, who was listed as primary Medical POA on copy of AMD in chart.  Haroon stated his sister Celia is actually Medical POA.  Spoke with Celia, who will provide copy of most recent AMD.  Pt is ;  of 56 yrs  in .  Pt has 2 dtrs (Celia and Jamila) and 2 sons (Haroon and Nate).  Pt lived in [facility] at [facility] for 3 yrs, moved to [facility] after right BKA, was able to manage at [facility] for 3 yrs but is now in the process of transitioning to the health care center at [facility] due to overall decline.  Dtr reports pt is typically lucid and able to engage in conversation but lately has had periods of confusion and mumbled speech.      Psychosocial Issues Identified/ Resilience Factors:  Dtr shared that pt has been remarkably strong in the face of all she has been through over the past years.  Pt has good family support though Celia shared that visits will likely be on hold as long as pt is testing positive for covid, expressed hope to be able to speak with pt by phone.  No spiritual concerns identified; Chaplains are available for support as needed, pt is 
Palliative Medicine  Patient Name: Irma Lea  YOB: 1932  MRN: [id number]  Age: 89 y.o.  Gender: female    Date of Initial Consult: 2/1/24  Date of Service: 2/8/2024  Time: 2:19 PM  Provider: EVA Gabriel NP  Hospital Day: 9  Admit Date: 1/31/2024  Referring Provider: Dr. Ibrahim       Reasons for Consultation:  Goals of Care, Overwhelming Symptoms, Psychosocial Distress, and End Stage Disease    HISTORY OF PRESENT ILLNESS (HPI):   Irma Lea is a 89 y.o. female with a past medical history of COPD, CKD3, CHF, ZULEIMA,falls, HTN, DM2,PVD,DJD, L3 compression fx, anxiety, depression, HLD, chronic afib, UTI, hypothyroidism, GERD, psoriasis, rt BKA, wound, who was admitted on 1/31/2024 from [facility] with a diagnosis of Covid pna, acute on chronic resp failure with hypoxia, copd exacerbation,GUILLERMO,    Psychosocial: pt lives in [facility] at [facility] with plans to transition to nursing care sector due to increasing care needs.      PALLIATIVE DIAGNOSES:    Shortness of breath  hypoxia  hypoalbuminemia  Delirium   Poor appetite  Palliative care encounter    ASSESSMENT AND PLAN:   Pt seen with aung Groves at the bedside. Pt is somnolent after pain medication. She appears comfortable.  Spoke with aung Yang today via phone. We touched on the discussion held yesterday with aung Groves along with the patient and I was able to answer some questions.   Celia says she has spoken with the patient but only briefly today by phone.  Explained that blood cultures were drawn today but results pending. Best to wait for results before assuming the worse. We did discuss burden vs benefit of pacemaker removal. Daughter would like meds changed to liquid formulation and she will discuss with pharmacy.  We have planned a family meeting tomorrow at 1.30pm to include the attending physician. Aung Groves is asking if  [place] can be present during the meeting tomorrow. I will place a 
Palliative Medicine  Patient Name: Irma Lea  YOB: 1932  MRN: [id number]  Age: 89 y.o.  Gender: female    Date of Initial Consult: 2/1/24  Date of Service: 2/15/2024  Time: 2:06 PM  Provider: Michael Castro MD  Hospital Day: 16  Admit Date: 1/31/2024  Referring Provider: Dr. Ibrahim       Reasons for Consultation:  Goals of Care, Overwhelming Symptoms, Psychosocial Distress, and End Stage Disease    HISTORY OF PRESENT ILLNESS (HPI):   Irma Lea is a 89 y.o. female with a past medical history of COPD, CKD3, CHF, ZULEIMA,falls, HTN, DM2,PVD,DJD, L3 compression fx, anxiety, depression, HLD, chronic afib, UTI, hypothyroidism, GERD, psoriasis, rt BKA, wound, who was admitted on 1/31/2024 from [facility] with a diagnosis of Covid pna, acute on chronic resp failure with hypoxia, copd exacerbation,GUILLERMO.  Since admission, has been found with corynebacterium bacteremia confirmed on repeat cultures.  ID has seen, recommending 6 weeks of antibiotics and then oral suppression therapy indefinitely.  Patient does have pacemaker in place which is expected to be infected; she is pacemaker dependent and would need replacement but removal in itself could be life-ending as was placed in 1998 and would need laser removal of leads, per EP.  Would not be candidate for any CT intervention if having bleeding from complications of removal.       Psychosocial:   pt lives in senior living at [facility] with plans to transition to nursing care sector due to increasing care needs.      PALLIATIVE DIAGNOSES:    Shortness of breath  hypoxia  hypoalbuminemia  Delirium   Poor appetite  Palliative care encounter    ASSESSMENT AND PLAN:   Reviewed medical chart including admit H&P, consultant notes, MAR, and recent labs/imaging.  Discussed recent events with patient's nurse who voices patient has accepted antibiotics today but does have pain with most manipulation and bed mobility.    Met with patient at bedside along with 
Patient transferred to room 501 at 2115. Assessment and all meds administered that were due for 5035-2558. Patient left the unit in a stable condition. Report given to receiving nurse prior to transfer.   
Pharmacy Dosing Services: 02/06/24    The pharmacist has determined that this patient meets P & T approved criteria for conversion from IV to oral therapy for the following medication:Doxycycline 100 mg IV q12hr x 2 doses left      The pharmacist has written the following order for the patient: Doxycycline 100 mg po q12hr x 2 doses left    The pharmacist will continue to monitor the patient's status and advise the physician if conversion back to IV therapy is recommended.    Signed SUZANNE LOZADA, [facility]   [phone]    
Pharmacy Dosing Services: 2/11/24    The pharmacist has determined that this patient meets P & T approved criteria for conversion from IV to oral therapy for the following medication:Protonix      The pharmacist has written the following order for the patient: Protonix 40 mg daily  The pharmacist will continue to monitor the patient's status and advise the physician if conversion back to IV therapy is recommended.    Signed Tamara Matthews [facility] Contact information:    
Pharmacy Dosing Services: 2/16/24    The pharmacist has determined that this patient meets P & T approved criteria for conversion from IV to oral therapy for the following medication: protonix      The pharmacist has written the following order for the patient: protonix 40mg PO daily   The pharmacist will continue to monitor the patient's status and advise the physician if conversion back to IV therapy is recommended.    Signed CLAUDIO AHUMADA RPH Contact information: [phone]    
Physical Therapy    Chart reviewed in prep for PT session.  RN reporting patient not appropriate for PT at this time.  Also family conference pending for GOC, possible hospice. Will cont to follow as appropriate.    Hilary Lanier MS, PT  
Post Discharge PICC and Antibiotic Orders    1.  Diagnosis: Corynebacterium bacteremia.  Suspect pacemaker infection  2.  Antibiotic: Daptomycin 500 mg IV daily through 3/25/2024                        Hold statin while on daptomycin  3.  Routine PICC/ Becky/ Portacath Care including PRN catheter flow management  4.  Weekly labs:   [x] CBC/diff/platelets   [x] BUN/Creatinine   [x] CPK   [x] AST/TotalBilirubin/AlkalinePhosphatase   [] CRP   []Trough Vancomycin level goal 15-20  5.  Fax lab to [phone]  Call Critical Lab Values to [phone]  6.  May send to IR for line evaluation or replacement [phone]  7.  Home Health to pull PIC line at end of therapy or send to IR for Becky removal  8.  Allergies:    Allergies   Allergen Reactions    Diltiazem Hives    Diltiazem Hcl Hives    Penicillins Swelling     Tongue Swelling   Has tolerated cephalexin recently 4/7/20     Primidone Other (See Comments)     Lightheaded/unsteady gait    Propoxyphene     Codeine Nausea And Vomiting    Labetalol Nausea And Vomiting     Dizzy/Disoriented     Sulfa Antibiotics Nausea And Vomiting         Nate Hoyt, DO     
Pt c/o 10/10 pain to right shoulder this afternoon and received prn fentanyl as ordered. Positive effects noted as pt then stated she felt great and did not feel any pain. Other than this complaint, pt has denied pain throughout the shift when asked by this RN and MILLIE Stewart RN. We have assessed pt multiple times for pain and she has clearly stated that she does not have pain many times.   Pt's family concerned about pain control, requesting ultram be ordered, MD notified and ultram ordered. Will continue to assess pt for pain and medicate as needed per MD orders.  
Pt is ordered for Home O2 evaluation.  Pt is unable to ambulate with RT and unable to assess.  
Pt ordered for Home O2 evaluation.  Pt is a high fall risk and ordered for PT evaluation.   Will coordinate with PT to assess.  
Pt refused morning meds. Pt states \"she just wants to die.\" Attending aware.  
RRT evaluation:Pt is sleeping, no CP, nor SOB, no s/s of any distress, RR is within normal range, chest rises and falls evenly, skin color is appropriate for her ethnicity, keep current POC.     Sepsis Score 2.36    Predictive Model Details          2 (Low)  Factor Value    Calculated 2/10/2024 20:50 24% Age 89    Early Detection of Sepsis Model 15% SIRS respirations criterion met     13% Diagnosis of hypertension present     9% Diagnosis of diabetes mellitus present     8% Diagnosis of chronic kidney disease present        Predictive Model Details          50 (Warning)  Factor Value    Calculated 2/10/2024 20:53 30% Respiratory rate 28    Deterioration Index Model 28% Age 89 years old     18% Neurological exam X     8% Cardiac rhythm Ventricular paced     5% WBC count abnormal (11.6 K/uL)     3% Potassium 4.3 mmol/L     2% Systolic 105     2% Pulse oximetry 93 %     2% BUN abnormal (28 MG/DL)     1% Platelet count abnormal (139 K/uL)     1% Sodium 139 mmol/L     1% Hematocrit abnormal (33.3 %)     0% Pulse 80     0% Temperature 98.2 °F (36.8 °C)          Spoke with primary RN regarding pt status. Pt resting comfortably in bed with no s/s of distress. Denies CP or SOB. No further interventions at this time.     Azar Glass RN               
Rapid Called at 1736  Responded to RRT at 1736 for Respiratory Distress  Provider at bedside: NO  Interventions ordered: Labs and ABG  Sepsis Suspected: Yes  Transfer to Higher Level of Care: Tele  Blood Glucose: 95     Patients lethargic, RR in the 30's, was febrile this afternoon. Temp now 99 after given dose of tylenol. Dr. Ibrahim aware, order for BC and transfer to tele. Patients O2 sat 88%. Placed on 2L sating 92%. ABG completed. BC sent for processing.     Vitals:    02/04/24 1730   BP: (!) 106/39   Pulse: 83   Resp: (!) 32   Temp: 99 °F (37.2 °C)   SpO2: 92%        Rapid Ended at 1805  RRT RN assisted with transport to accepting unit NILSA Rutledge RN   
Report given to MARIELY Foley. RN assumed care of patient at this time.  
Report provide to Adriana PRICE RN. Patient pending to be transfer to room 523. Acknowledge information.   
Report provide to Cathy NICOLE RN nurse. Acknowledge information. Patient care assumed.  
SLP arrived to room for breakfast. Patient asked SLP to help her by shutting the door. After the door was  shut, she started praying out loud. SLP notified the .   From SLP standpoint, continue to offer soft and bite sized diet.  If family wants to bring in food items for comfort, they may, as long as they supervise her eating the items.  She has a probable esophageal stricture that a 13mm barium pill could not pass last year on the MBS. Therefore, if she can not chew difficult foods well, then they may stick as well.   At this time, diet is for comfort and SLP will sign off.   
Speech LAnguage Pathology EVALUATION/DISCHARGE    Patient: Irma Lea (89 y.o. female)  Date: 2/8/2024  Primary Diagnosis: Metabolic encephalopathy [G93.41]  Septicemia (HCC) [A41.9]  Sepsis (HCC) [A41.9]  COVID [U07.1]       Precautions: aspiration Other (comment), Fall Risk, Isolation (skin)                  ASSESSMENT :  Based on the objective data described below, the patient presents with chronic aspiration risks, due to possible narrowing in upper esophagus noted last year on MBS that trapped pill in upper esophagus. Pills should be crushed. She may have difficulty cessating/coordinating cessation of respiration for deglutition. Due to recent COVID.   SLP has seen her this admission. We were reconsulted, b/c she was refusing meds per chart.   .    Patient will be discharged from skilled speech-language pathology services at this time.     PLAN :  Recommendations and Planned Interventions:  Diet: Minced and moist and thin liquids  CRUSH ALL MEDS.   Give 1 sip of liquids at a time.   Upright for all PO  Acute SLP Services: Yes, patient will be followed by speech-language pathology 2x/week to address goals. Patient's rehabilitation potential is considered to be Fair.    Discharge Recommendations: No, additional SLP treatment not indicated at discharge     SUBJECTIVE:   Patient stated, “oh, the cold water is so good.”    OBJECTIVE:     Past Medical History:   Diagnosis Date    Anxiety and depression     Arthritis     Atrial fibrillation (HCC)     CHF (congestive heart failure), NYHA class I, chronic, systolic (HCC)     GERD (gastroesophageal reflux disease)     History of vascular access device 04/17/2020    4F MIDLINE PLACED BY ALEJANDRA WHITTAKER RN LEFT BRACHIAL, 13CM    Hx of colon cancer, stage I     Hyperlipidemia     Hypertension     Hypothyroid     Ischemic cardiomyopathy     Migraines     Mild tricuspid regurgitation     Mitral regurgitation     ZULEIMA (obstructive sleep apnea)     Pulmonary hypertension (HCC)  
Speech LAnguage Pathology TREATMENT    Patient: Irma Lea (89 y.o. female)  Date: 2/12/2024  Primary Diagnosis: Metabolic encephalopathy [G93.41]  Septicemia (HCC) [A41.9]  Sepsis (HCC) [A41.9]  COVID [U07.1]       Precautions: aspiration Other (comment), Fall Risk, Isolation (skin)                  ASSESSMENT :  Based on the objective data described below, the patient presents with functional swallow for soft solids and thins. This patient's main aspiration risk/swallowing deficit involves the esophagus. On MBS in 2023, the pill stuck in the upper esophagus. Suspect a narrowing in this esophagus. Suspect at this time that pills and partially chewed solids may continue to trap in esophagus.    Ok for soft solids and family to bring foods in, if they supervise.  If she starts c/o chest pain or dysphagia, will have to eliminate that food from her diet.     Patient will benefit from skilled intervention to address the above impairments.     PLAN :  Recommendations and Planned Interventions:  Diet: Soft and bite sized and thin liquids  CONTINUE TO CRUSH MEDS      Acute SLP Services: Yes, patient will be followed by speech-language pathology 2x/week to address goals. Patient's rehabilitation potential is considered to be Fair.    Discharge Recommendations: No, additional SLP treatment not indicated at discharge     SUBJECTIVE:   Patient stated, “I really want a hot buttered roll.”    OBJECTIVE:     Past Medical History:   Diagnosis Date    Anxiety and depression     Arthritis     Atrial fibrillation (HCC)     CHF (congestive heart failure), NYHA class I, chronic, systolic (HCC)     GERD (gastroesophageal reflux disease)     History of vascular access device 04/17/2020    4F MIDLINE PLACED BY ALEJANDRA WHITTAKER RN LEFT BRACHIAL, 13CM    Hx of colon cancer, stage I     Hyperlipidemia     Hypertension     Hypothyroid     Ischemic cardiomyopathy     Migraines     Mild tricuspid regurgitation     Mitral regurgitation     ZULEIMA 
Spiritual Care Assessment/Progress Note  [facility] [place]    Name: Irma Lea MRN: [id number]    Age: 89 y.o.     Sex: female   Language: English     Date: 2/7/2024            Total Time Calculated: 52 min              Spiritual Assessment begun in [facility] MULTI-SPECIALTY ONCOLOGY 1  Service Provided For:: Patient and family together  Referral/Consult From:: Palliative Care  Encounter Overview/Reason : Initial Encounter    Spiritual beliefs:      [x] Involved in a macarena tradition/spiritual practice: Latter day      [] Supported by a macarena community:  None      [] Claims no spiritual orientation:      [] Seeking spiritual identity:           [] Adheres to an individual form of spirituality:      [] Not able to assess:                Identified resources for coping and support system:   Support System: Children, Family members       [x] Prayer                  [] Devotional reading               [] Music                  [] Guided Imagery     [] Pet visits                                        [] Other: (COMMENT)     Specific area/focus of visit   Encounter:    Crisis:    Spiritual/Emotional needs: Type: Spiritual Support  Ritual, Rites and Sacraments:    Grief, Loss, and Adjustments:    Ethics/Mediation:    Behavioral Health:    Palliative Care:    Advance Care Planning:      Plan/Referrals: Other (Comment) (Please contact [facility] for further consults.)    Narrative:   Spiritual care assessment with Ms. Irma Lea on B5.  Pt's chart assessed prior to encounter. Pt awake and alert, her daughter, Jamila is at bedside being supportive. NP Kena comes to bedside with  present. Ms Lea is calm and trying to assess everything being said, and her daughter Jamila shares that her sister Lana is POA and they both decided to have family meeting before any final decisions are made about care plan. Jamila shared that Pt is Latter day, and does not currently have a  or home fellowship that 
Spiritual Care Assessment/Progress Note  [facility]    Name: Irma Lea MRN: [id number]    Age: 89 y.o.     Sex: female   Language: English     Date: 2/5/2024            Total Time Calculated: 15 min              Spiritual Assessment begun in [facility] B3 INTERMEDIATE CARE UNIT  Service Provided For:: Patient  Referral/Consult From:: Palliative Care  Encounter Overview/Reason : Palliative Care    Spiritual beliefs:      [x] Involved in a macarena tradition/spiritual practice: Latter day     [] Supported by a macarena community:      [] Claims no spiritual orientation:      [] Seeking spiritual identity:           [] Adheres to an individual form of spirituality:      [] Not able to assess:                Identified resources for coping and support system:   Support System: Family members       [] Prayer                  [] Devotional reading               [] Music                  [] Guided Imagery     [] Pet visits                                        [] Other: (COMMENT)     Specific area/focus of visit   Encounter:    Crisis:    Spiritual/Emotional needs: Type: Spiritual Support  Ritual, Rites and Sacraments:    Grief, Loss, and Adjustments:    Ethics/Mediation:    Behavioral Health:    Palliative Care:    Advance Care Planning:      Plan/Referrals: Other (Comment) (Please contact [facility] for further consults.)    Narrative:   visit for the patient on IMCU. Reviewed pt's chart and spoke with pt's nurse. Upon arrival, pt was resting comfortably and did not rouse to the sound of my voice. No family present at this time. Chart notes indicate pt dean through her Latter day macarena in God and the support of her family. Offered prayer for comfort and peace. Left note sharing  support and availability. Please contact [facility] for further referrals.    Chaplain Ramila, MS, MDiv, [facility]  Spiritual Health Services  Paging service: [phone] (PRAJAVAD)        
[facility] Dosing Services: Antimicrobial Stewardship Daily Doc    Consult for antibiotic dosing of vancomycin by Dr. Hoyt  Indication: Corynebacterium striatum bacteremia in the setting of pacemaker  Day of Therapy: 6    Ht Readings from Last 1 Encounters:   02/08/24 1.6 m (5' 3\")        Wt Readings from Last 1 Encounters:   02/08/24 62.6 kg (138 lb)      Vancomycin therapy:    Maintenance dose: Vancomycin 1000 mg IV every 24 hours   Dose calculated to approximate a           a. Target AUC/CHICHI of 400-600          b. Trough of 15-20 mcg/mL   Assessment/Plan:  Serum creatinine is stable and likely at baseline, leukocytosis from 2/12 = 12.7, no procalcitonin result to evaluate, remains afebrile, urine output documentation appears low but unclear if fully accurate, repeat blood cultures from 2/11 are NGTD x 2 days so far  Vancomycin random level collected on 2/13 at 0225 = 18.3 mcg/mL. [facility] calculates AUC of 500, trough 13.3 mcg/mL, and pAUC of 90%. Continue current regimen for now.Dose = 15.9 mg/kg.  Creatinine ordered every other day per protocol  Dose administration notes: Doses given appropriately as scheduled      Other Antimicrobial   (not dosed by pharmacist) None   Cultures 1/31 - Blood x 2 - Diphtheroids in all bottles most closely resembling Corynebacterium striatum - FINAL  2/4 - Blood x 1 - Diphtheroids growing in both bottles drawn most closely resembling Corynebacterium striatum - FINAL  2/7 - Blood x 2 - Diphtheroids growing in all bottles drawn - FINAL  2/8 - Blood x 1 - Diphtheroids in both bottles drawn - FINAL  2/11 - Blood x 2 - NGTD x 2 days - Prelim   Serum Creatinine Lab Results   Component Value Date/Time    CREATININE 0.62 02/13/2024 02:11 AM          Creatinine Clearance Estimated Creatinine Clearance: 48 mL/min (based on SCr of 0.62 mg/dL).     Temp Temp: 98.4 °F (36.9 °C) (Oral)       WBC Lab Results   Component Value Date/Time    WBC 12.7 02/12/2024 02:15 AM        
TAMMIE spoke with artis Yang by phone again later in day.  Dtr stated pt's son might be able to arrive at the hospital by 2pm tomorrow rather than 2:30, depending on length of his own medical appts.  Dtr verbalized understanding that PICC team will make every effort to place picc when son is at bedside but ability to coordinate schedules was not guaranteed.  Dtr stated she had attempted to upload copy of pt's updated AMD to [employer] as previously discussed but was unable, will plan to email SW a copy at:  [email or web address]  
VAT Note:  PICC order acknowledged and plan on placing PICC today, this afternoon.  1410 At bedside for PICC placement and patient tearful and stating \" Im so tired and just want to go\" Daughter POA at bedside is declining PICC placement today and wants PICC placed tomorrow. Tyra Hernandez RN charge notified of above. Will reassess placement tomorrow.  
Vancomycin level was 15.2 mcg/ml. This predicts an AUC of 583 and is within goal. Will continue same dosing.  
   MCH 27.8 26.0 - 34.0 PG    MCHC 32.7 30.0 - 36.5 g/dL    RDW 14.1 11.5 - 14.5 %    Platelets 166 150 - 400 K/uL    MPV 12.3 8.9 - 12.9 FL    Nucleated RBCs 0.2 (H) 0  WBC    nRBC 0.03 (H) 0.00 - 0.01 K/uL   Magnesium    Collection Time: 02/07/24 11:18 AM   Result Value Ref Range    Magnesium 1.3 (L) 1.6 - 2.4 mg/dL   Culture, Blood 2    Collection Time: 02/07/24 11:18 AM    Specimen: Blood   Result Value Ref Range    Special Requests NO SPECIAL REQUESTS      Culture        ONE OF TWO BOTTLES HAS BEEN FLAGGED POSITIVE BY INSTRUMENT.  BOTTLE HAS BEEN SENT TO [facility] TO ASSESS FOR POSSIBLE GROWTH.   Culture, Blood 1    Collection Time: 02/07/24 11:27 AM    Specimen: Blood   Result Value Ref Range    Special Requests NO SPECIAL REQUESTS      Culture        ONE OF TWO BOTTLES HAS BEEN FLAGGED POSITIVE BY INSTRUMENT.  BOTTLE HAS BEEN SENT TO [facility] TO ASSESS FOR POSSIBLE GROWTH.   POCT Glucose    Collection Time: 02/07/24 12:20 PM   Result Value Ref Range    POC Glucose 126 (H) 65 - 117 mg/dL    Performed by: Mirna Pacheco    Urinalysis with Reflex to Culture    Collection Time: 02/07/24  5:39 PM    Specimen: Urine   Result Value Ref Range    Color, UA YELLOW/STRAW      Appearance CLEAR CLEAR      Specific Gravity, UA 1.009 1.003 - 1.030      pH, Urine 5.0 5.0 - 8.0      Protein, UA Negative NEG mg/dL    Glucose, UA Negative NEG mg/dL    Ketones, Urine Negative NEG mg/dL    Bilirubin Urine Negative NEG      Blood, Urine Negative NEG      Urobilinogen, Urine 0.2 0.2 - 1.0 EU/dL    Nitrite, Urine Negative NEG      Leukocyte Esterase, Urine Negative NEG      Urine Culture if Indicated CULTURE NOT INDICATED BY UA RESULT CNI      WBC, UA 0-4 0 - 4 /hpf    RBC, UA 0-5 0 - 5 /hpf    Epithelial Cells UA FEW FEW /lpf    BACTERIA, URINE Negative NEG /hpf    Hyaline Casts, UA 0-2 0 - 2 /lpf   POCT Glucose    Collection Time: 02/07/24  9:09 PM   Result Value Ref Range    POC Glucose 193 (H) 65 - 117 mg/dL    
  Skin: No acute rash on exposed skin   Neurologic: CNII-XII intact. Normal strength     Labs:        Invalid input(s): \"ITNL\"   No results for input(s): \"CPK\", \"CKMB\" in the last 72 hours.    Invalid input(s): \"TROIQ\"  Recent Labs     02/06/24  0120 02/07/24  1118 02/08/24  0201    139 139   K 4.9 4.3 4.2   * 112* 109*   CO2 23 23 27   BUN 34* 39* 39*   MG 1.0* 1.3* 1.1*   WBC 11.0 13.2* 10.1   HGB 10.2* 11.0* 10.6*   HCT 31.7* 33.6* 33.4*   * 166 145*     No results for input(s): \"INR\", \"APTT\" in the last 72 hours.    Invalid input(s): \"PTP\"  Needs: urine analysis, urine sodium, protein and creatinine  No results found for: \"ODESSA\", \"KU\", \"CREAU\"      Cultures:     Lab Results   Component Value Date/Time    SDES URINE 01/17/2014 10:02 AM     No components found for: \"CULT\"    Radiology:     Medications           Case discussed with: Microbiology lab    Greater than 35 minutes spent in direct patient care and coordination of care with other providers      Nate Hoyt DO              
kg/m²      Tmax:  Temp (24hrs), Av.6 °F (37 °C), Min:98.4 °F (36.9 °C), Max:98.8 °F (37.1 °C)      Exam:   Patient is intubated:  no    Physical Examination:   General:  Moderate distress while being cleaned by nursing staff   Head:  Normocephalic, atraumatic.   Eyes:  Conjunctivae clear   Neck: Supple       Lungs:   No distress.    Chest wall:     Heart:     Abdomen:    non-distended   Extremities: Moves all.    Skin: No acute rash on exposed skin   Neurologic: Unable to assess     Labs:        Invalid input(s): \"ITNL\"   No results for input(s): \"CPK\", \"CKMB\" in the last 72 hours.    Invalid input(s): \"TROIQ\"  Recent Labs     02/15/24  0615      K 3.8      CO2 32   BUN 20   WBC 13.6*   HGB 9.7*   HCT 30.7*          No results for input(s): \"INR\", \"APTT\" in the last 72 hours.    Invalid input(s): \"PTP\"  Needs: urine analysis, urine sodium, protein and creatinine  No results found for: \"ODESSA\", \"KU\", \"CREAU\"      Cultures:     Lab Results   Component Value Date/Time    SDES URINE 2014 10:02 AM     No components found for: \"CULT\"    Radiology:     Medications           Case discussed with: Pharmacy, case management          Nate Hoyt DO              
lengthy hospitalization.  Pt was in relatively good spirits at time of visit but has been experiencing some existential distress throughout hospitalization. Chaplains are following for support.     Caregiver [place]: Pt resides in a facility.  Does the caregiver feel confident administering medication? Meds are administered by facility staff.  Does the caregiver need any help connecting with community resources? Assistance from Care Manager for SNF placement at [place]  Does the caregiver feel confident assisting with activities of daily living? ADL care is provided by facility staff.    Goals of Care / Plan:  Pt's son will plan to be present at bedside tomorrow at 2:30pm; PICC team was notified and will attempt to place PICC when family is present if schedule allows and pt is receptive.  Updated RN re: above.  Will continue to follow.    Thank you for including Palliative Medicine in the care of Ms. Lea.     Rosa Álvarez, MANAS, [facility]-SW  [phone])  
Diet          Objective:     Pt is afebrile, pleasant today   Mood has improved from yesterday     Vitals:          Last 24hrs VS reviewed since prior progress note. Most recent are:    Vitals:    02/13/24 1117   BP: (!) 107/55   Pulse: 80   Resp: 17   Temp: 98.4 °F (36.9 °C)   SpO2: 95%     SpO2 Readings from Last 6 Encounters:   02/13/24 95%   12/13/23 95%   11/29/23 97%   10/26/23 95%   10/16/23 96%   09/21/23 93%        No intake or output data in the 24 hours ending 02/13/24 1143         Exam:     Physical Exam:    Gen:  Elderly, frail, comfortable in room aie   HEENT:  Pink conjunctivae, PERRL, hearing intact to voice, moist mucous membranes  Neck:  Supple, without masses, thyroid non-tender  Resp:  No accessory muscle use, clear breath sounds without wheezes rales or rhonchi  Card:  RRR  Abd:  Soft, non-tender, non-distended, normoactive bowel sounds are present  Musc:  R BKA, L heel ulcer   Skin:  L heel ulcer   Ecchymoses and mild erythema bilateral arms with edema, no sign of cellulitis   Neuro:   Somnolent.  Arousable to verbal stimuli.  Disoriented.  No focal deficits.  Generalized weakness   Psych:  Fair insight, oriented to person, place and time, alert        Medications Reviewed: (see below)    Lab Data Reviewed: (see below)    ______________________________________________________________________    Medications:     Current Facility-Administered Medications   Medication Dose Route Frequency    DAPTOmycin (CUBICIN) 500 mg in sodium chloride (PF) 0.9 % 10 mL IV syringe  8 mg/kg IntraVENous Q24H    pantoprazole (PROTONIX) 40 mg in sodium chloride (PF) 0.9 % 10 mL injection  40 mg IntraVENous Daily    predniSONE (DELTASONE) tablet 5 mg  5 mg Oral Daily    furosemide (LASIX) tablet 20 mg  20 mg Oral Daily    ondansetron (ZOFRAN) injection 4 mg  4 mg IntraVENous Q6H PRN    morphine (PF) injection 1 mg  1 mg IntraVENous Q2H PRN    sertraline (ZOLOFT) tablet 100 mg  100 mg Oral Daily    tiotropium (SPIRIVA 
Natural;Limited  Oral Hygiene Comments: oral-motor exam limited by confusion  P.O. Trials:  PO Trials  Assessment Method(s): Observation;Palpation  Patient Position: upright in bed  Vocal Quality: No Impairment  Consistency Presented: Thin;Pureed (cracker-small bites of each)  How Presented: SLP-fed/Presented;Spoon;Straw  Bolus Acceptance: Impaired (refusing PO \"I just want to go home\")  Bolus Formation/Control: No impairment  Propulsion: No impairment  Oral Residue: None  Initiation of Swallow: No impairment  Laryngeal Elevation: Functional  Aspiration Signs/Symptoms: None            Motor Speech:     wnl    Language Comprehension and Expression:      wnl             Neuro-Linguistics:   Confused, anxious, perseverative.                    Voice:       Respiratory Status/Airway:  Room air      SOB intermittent with anxiety.                          After treatment:   Patient left in no apparent distress in bed and Nursing notified    COMMUNICATION/EDUCATION:   Patient was educated regarding her deficit(s) of pill dysphagia  as this relates to her diagnosis of COVID.  She demonstrated Guarded understanding as evidenced by Limited understanding/response due to cognitive status.    The patient's plan of care including recommendations, planned interventions, and recommended diet changes were discussed with: Registered nurse and Case management    Patient is unable to participate in goal setting and plan of care    Thank you,  Zhanna Johnson SLP  Minutes: 15    
  sepsis (leukocytosis / Fever)  Asthma exacerbation  -  Xray clear initially clear.    - Procalcitonin 0.37.  CRP 10.  D-dimer 1.8  -Weaned from supplemental oxygen to room air.  Change Decadron to once daily.  -Completed course of doxycycline  - brovana, pulmicort (for Advair) and prn duonebs.    Gram-positive evelyn bacteremia, corynebacterium species  -6 out of 6 bottles drawn thus far on 2 separate dates  -Repeat cultures sent  -ID consulted  -Hold off on antibiotics for now.  If true infection, no need pacemaker leads removed versus further consideration of hospice     Dysphagia  - witnessed coking after pills, bushing teeth, and applesauce  - NPO pending speech eval  - convert meds to IV as able  - repeat chest xray in am    Interstitial edema  Volume overload, likely iatrogenic  CHF (congestive heart failure), NYHA class I, chronic, systolic  Pulmonary hypertension / MV and TV regurgitation   Ischemic cardiomyopathy  -Continue Lasix 20 mg IV twice daily  -Echo on 8/10/2023 showed EF 46%    Anasarca  Hypoalbuminemia  Severe protein calorie malnutrition  -Explains edema and ecchymoses of arms  -Nutritional support    Left heel ulcer  -Wound care following    History of obstructive sleep apnea  -Not a candidate for BiPAP per pulmonology due to inability to self rescue     GUILLERMO on CKD stage III  Dehydration   -Discontinue IV fluids, creatinine has trended down and patient still volume overloaded  -Monitor BMP with diuresis    Nausea and vomiting  -Continue Pepcid twice daily, changed to IV  -Zofran as needed  -KUB in a.m.    Deconditioning   frequent falls   History of right BKA  - PT OT eval.    - Plan for discharge to [facility] rehab    Arthritis   - Hip xray shows severe arthritis, but no fracture  - hold baclofen, flexeril, tylenol while npo  - low dose morphine IV for severe pain     Chronic atrial fibrillation   Third degree AV block   S/p Biventricular cardiac pacemaker  Secondary hypercoagulable state   HTN  - 
250 mL IntraVENous PRN    glucagon injection 1 mg  1 mg SubCUTAneous PRN    dextrose 10 % infusion   IntraVENous Continuous PRN    diclofenac sodium (VOLTAREN) 1 % gel 2 g  2 g Topical BID    traMADol (ULTRAM) tablet 50 mg  50 mg Oral Q6H PRN    baclofen (LIORESAL) tablet 5 mg  5 mg Oral BID PRN    mirtazapine (REMERON) tablet 7.5 mg  7.5 mg Oral Nightly    sennosides-docusate sodium (SENOKOT-S) 8.6-50 MG tablet 2 tablet  2 tablet Oral Nightly PRN    benzonatate (TESSALON) capsule 100 mg  100 mg Oral TID PRN    mometasone-formoterol (DULERA) 200-5 MCG/ACT inhaler 2 puff  2 puff Inhalation BID RT    albuterol sulfate HFA (PROVENTIL;VENTOLIN;PROAIR) 108 (90 Base) MCG/ACT inhaler 1 puff  1 puff Inhalation Q6H PRN    apixaban (ELIQUIS) tablet 2.5 mg  2.5 mg Oral BID    atorvastatin (LIPITOR) tablet 10 mg  10 mg Oral Daily    carvedilol (COREG) tablet 3.125 mg  3.125 mg Oral BID WC    cyclobenzaprine (FLEXERIL) tablet 5 mg  5 mg Oral BID PRN    levothyroxine (SYNTHROID) tablet 88 mcg  88 mcg Oral Daily    sodium chloride flush 0.9 % injection 5-40 mL  5-40 mL IntraVENous 2 times per day    sodium chloride flush 0.9 % injection 5-40 mL  5-40 mL IntraVENous PRN    0.9 % sodium chloride infusion   IntraVENous PRN    polyethylene glycol (GLYCOLAX) packet 17 g  17 g Oral Daily PRN    acetaminophen (TYLENOL) tablet 650 mg  650 mg Oral Q6H PRN    Or    acetaminophen (TYLENOL) suppository 650 mg  650 mg Rectal Q6H PRN            Lab Review:     Recent Labs     02/08/24  0201 02/09/24  0224 02/10/24  0737   WBC 10.1 10.0 11.6*   HGB 10.6* 9.9* 10.8*   HCT 33.4* 30.3* 33.3*   * 112* 139*       Recent Labs     02/08/24  0201 02/09/24  0224 02/10/24  0404 02/10/24  0737    137  --  139   K 4.2 4.3  --  4.3   * 107  --  107   CO2 27 26  --  29   BUN 39* 40*  --  28*   MG 1.1* 1.2* 1.2*  --        No components found for: \"GLPOC\"      
Formula  Weight Used for Energy Requirements: Current  Energy (kcal/day): 1309 (MSJ x 1.3)  Weight Used for Protein Requirements: Current  Protein (g/day): 63-75 (1.0-1.2 g/kg)  Method Used for Fluid Requirements: Other (Comment) (Minimum for older adults)  Fluid (ml/day): 1500    Nutrition Diagnosis:   Inadequate oral intake, Predicted inadequate energy intake related to inadequate protein-energy intake, catabolic illness as evidenced by intake 0-25% (COVID-19)    Nutrition Interventions:   Food and/or Nutrient Delivery: Continue Current Diet, Modify Oral Nutrition Supplement  Nutrition Education/Counseling: No recommendation at this time  Coordination of Nutrition Care: Continue to monitor while inpatient, Interdisciplinary Rounds  Plan of Care discussed with: IDR team    Goals:     Goals: PO intake 50% or greater, by next RD assessment       Nutrition Monitoring and Evaluation:   Behavioral-Environmental Outcomes: None Identified  Food/Nutrient Intake Outcomes: Food and Nutrient Intake, Supplement Intake  Physical Signs/Symptoms Outcomes: Biochemical Data, Weight    Discharge Planning:    Too soon to determine     Lulu Toscano RD MS  Contact: Ext: [phone], or via [email or web address]      
initially clear.    - Procalcitonin 0.37.  CRP 10.  D-dimer 1.8  -Continue Decadron, pulmonary increase to twice daily  -Continue doxycycline  - brovana, pulmicort (for Advair) and prn duonebs.  -Blood cultures repeated due to fever of 102 on 2/5/2024  - pulmonary following, rec palliative to address goals of care     Interstitial edema  Volume overload, likely iatrogenic  CHF (congestive heart failure), NYHA class I, chronic, systolic  Pulmonary hypertension / MV and TV regurgitation   -Begin Lasix 20 mg IV twice daily  -Echo on 8/10/2023 showed EF 46%    History of obstructive sleep apnea  -Not a candidate for BiPAP per pulmonology due to inability to self rescue     GUILLERMO on CKD stage III  Dehydration   -Discontinue IV fluids, creatinine has trended down and patient still volume overloaded  -Monitor BMP with diuresis    Deconditioning   frequent falls   - PT OT eval.    - Likely back was on discharge     Arthritis   - Hip xray shows severe arthritis, but no fracture  - continue baclofen, flexeril, tylenol.     Ischemic cardiomyopathy   Hypertension   - Continue coreg, statin, eliquis     Chronic atrial fibrillation   Third degree AV block   S/p Biventricular cardiac pacemaker  Secondary hypercoagulable state   - Stable paced. Continue Eliqus, coreg     Anxiety and depression  Migraines   Possible dementia  - Continue sertraline.     Hyperlipidemia   - Atorvastatin  - LDL 60      Hypothyroid   - Continue synthroid   - TSH 0.68      Wounds   Psoriasis   Hx BKA   - Consult wound care      GERD (gastroesophageal reflux disease)   - PPI and pepcid     Hypoalbuminemia   - Nutrition support.      DVT ppx:  eliquis    Dispo:  SNF      CODE STATUS:  FULL CODE        Risk of deterioration: high         Total time spent with patient care: 40 min, critical care time (excluding procedures):  [ ] yes  [x] no.  I personally saw and examined the patient during this time period.                                                       
  Component Value Date/Time    WBC 11.0 02/06/2024 01:20 AM    HGB 10.2 02/06/2024 01:20 AM     02/06/2024 01:20 AM    MCV 87.6 02/06/2024 01:20 AM       Lab Results   Component Value Date/Time    INR 1.4 07/07/2020 09:52 PM    TP 6.5 06/15/2023 12:00 AM    GLOB 4.2 02/04/2024 03:37 AM       Lab Results   Component Value Date/Time    CRP 14.30 02/05/2024 04:57 AM    TSH 0.26 03/30/2023 02:00 PM       IMPRESSION  Acute on chronic respiratory failure  COVID-19  Asthma    PLAN  Goal sats 90% or higher, wean O2 as tolerated, on RA  Home O2 assessment closer to discharge  Dulera, Spiriva   Continue decadron BID dosing  Start Azithromycin and repeat blood cultures, diphtheroids typically a contaminate but has had two sets of positive cultures  Strict I/O ,diuresis as per primary team  Doxy x5 days  PT/OT following   Palliative following. Continue to address goals of care. I am concerned that weakness/debility is significantly contributing to her shortness of breath and this will continue to progressively worsen.      Judith Sultana, APRN - NP   
No rashes or lesions   Neurologic: Unable to assess       Lab Results   Component Value Date/Time     02/08/2024 02:01 AM    K 4.2 02/08/2024 02:01 AM     02/08/2024 02:01 AM    CO2 27 02/08/2024 02:01 AM    BUN 39 02/08/2024 02:01 AM    MG 1.1 02/08/2024 02:01 AM    PHOS 2.8 02/04/2024 03:37 AM       Lab Results   Component Value Date/Time    WBC 10.1 02/08/2024 02:01 AM    HGB 10.6 02/08/2024 02:01 AM     02/08/2024 02:01 AM    MCV 87.2 02/08/2024 02:01 AM       Lab Results   Component Value Date/Time    INR 1.4 07/07/2020 09:52 PM    TP 6.5 06/15/2023 12:00 AM    GLOB 4.2 02/04/2024 03:37 AM       Lab Results   Component Value Date/Time    CRP 14.30 02/05/2024 04:57 AM    TSH 0.26 03/30/2023 02:00 PM       IMPRESSION  Acute on chronic respiratory failure  COVID-19  Asthma  Bacteremia    PLAN  Goal sats 90% or higher, wean O2 as tolerated, remains on RA  Dulera, Spiriva  Continue decadron BID dosing  ID following.  Vanc initiated.  Fu TTE.  Daughter is now agreeable and wants done to help with next steps/goals of care.    Strict I/O, diuresis prn as per primary team  Completed doxy x 5 days  PT/OT following   Palliative following. Continue to address goals of care. Concerned that weakness/debility is significantly contributing to her shortness of breath and this will continue to progressively worsen.    Discussed with daughter Jamila at the bedside.  Family meeting with multiple family members planned for tomorrow afternoon.      BRITT Hirsch   
Requests --        NO SPECIAL REQUESTS  RIGHT  FOREARM       Culture       DIPHTHEROIDS GROWING IN BOTH BOTTLES DRAWN SITE= R FOREARM MOST CLOSELY RESEMBLING CORYNEBACTERIUM STRIATUM            SENDING TO REFERENCE LAB FOR DEFINITIVE ID AND SENSITIVITIES      REFER TO NEW NUMBERS [id number] AND [id number] FOR ID AND SENSIVITIES    Susceptibility, Aer + Anaerob [[id number]] Collected: 02/04/24 1801    Order Status: Completed Specimen: MICROBIAL ISOLATE Updated: 02/13/24 1536     Sample Site       Bloody specimen received, interpret positive results with caution, bloody specimens can cause false positive or invalid results.           Susceptibility, Aer + Anaerob TEST NOT PERFORMED        Comment: (NOTE)  Please refer to the following specimen for additional lab results.  Refer to [id number] 02/13/2024  Performed At:  [address] [id number]  Mehdi Hameed MD Ph:[phone]         Susceptibility, Aer + Anaerob [[id number]] Collected: 02/04/24 1801    Order Status: No result Updated: 02/15/24 1212    Culture, Blood 1 [[id number]] Collected: 02/04/24 1730    Order Status: Canceled Specimen: Blood             Other pertinent lab: none    Total time spent with patient: 30 Minutes I personally reviewed chart, notes, data and current medications in the medical record.  I have personally examined and treated the patient at bedside during this period.                  Care Plan discussed with: Patient, Care Manager, Nursing Staff, and >50% of time spent in counseling and coordination of care    Discussed:  Care Plan and D/C Planning    Prophylaxis:  H2B/PPI and Eliquis    Disposition:  SNF/LTC           ___________________________________________________    Attending Physician: Yahir Ibrahim MD      
sodium chloride infusion   IntraVENous PRN    polyethylene glycol (GLYCOLAX) packet 17 g  17 g Oral Daily PRN    acetaminophen (TYLENOL) tablet 650 mg  650 mg Oral Q6H PRN    Or    acetaminophen (TYLENOL) suppository 650 mg  650 mg Rectal Q6H PRN          Relevant other informations:     Other medical conditions listed in [facility] problem list section; all of these and other pertinent data were taken into consideration when treatment plan is developed and customized to this patient's unique overall circumstances and needs. We have reviewed available old medical records within the constraints of this admission process.        Data Review:   Recent Days:    Results       Procedure Component Value Units Date/Time    Culture, Blood 2 [[id number]] Collected: 02/13/24 1339    Order Status: Completed Specimen: Blood Updated: 02/17/24 0557     Special Requests --        LEFT  HAND       Culture NO GROWTH 4 DAYS       Culture, Blood 1 [[id number]] Collected: 02/13/24 1339    Order Status: Completed Specimen: Blood Updated: 02/17/24 0557     Special Requests --        RIGHT  HAND       Culture NO GROWTH 4 DAYS       Culture, Blood 1 [[id number]] Collected: 02/11/24 0436    Order Status: Completed Specimen: Blood Updated: 02/17/24 0557     Special Requests NO SPECIAL REQUESTS        Culture NO GROWTH 6 DAYS       Culture, Blood 2 [[id number]] Collected: 02/11/24 0436    Order Status: Completed Specimen: Blood Updated: 02/17/24 0557     Special Requests NO SPECIAL REQUESTS        Culture NO GROWTH 6 DAYS       Culture, Blood 2 [[id number]]     Order Status: Canceled Specimen: Blood     Culture, Blood 1 [[id number]]  (Abnormal) Collected: 02/08/24 0243    Order Status: Completed Specimen: Blood Updated: 02/11/24 0610     Special Requests NO SPECIAL REQUESTS        Culture       DIPHTHEROIDS GROWING IN BOTH BOTTLES DRAWN SITE = L HAND REFER TO [id number] AND [id number] FOR ID AND SENSITIVITIES          Culture, Blood 2

## 2024-02-19 NOTE — DISCHARGE SUMMARY
Physician Discharge Summary     Patient ID:  Irma Lea  [id number]  89 y.o.  2/6/1932    Admit date: 1/31/2024    Discharge date of service and time: 2/2/2024  Greater than 30 minutes were spent providing discharge related services for this patient    Admission Diagnoses: Metabolic encephalopathy [G93.41]  Septicemia (HCC) [A41.9]  Sepsis (HCC) [A41.9]  COVID [U07.1]    Discharge Diagnoses:    Principal Diagnosis   Pneumonia due to COVID-19 virus                                             Hospital Course and other diagnoses  Pneumonia due to COVID-19 virus / Leukocytosis - POA, COVID was detected by testing. Xray clear.  No fever.  Procalcitonin <0.5.  Check CRP and DDIMER.  For now decadron, but no Abx     Chronic respiratory failure with hypoxia / ZULEIMA (obstructive sleep apnea) - POA, mild, she has her chronic low oxygen sat in ER.  Presumed due to chronic COPD and now COVID.  Continue oxygen as needed.  May have home CPAP if available.  Resume tessalon      GUILLERMO (acute kidney injury) / Dehydration / Hypomagnesemia / Stage 3 chronic kidney disease - POA and worse than baseline due to poor PO intake and use of diuretics. Hydrated and improved.  Monitor and replete lytes.     COPD (chronic obstructive pulmonary disease) with exacerbation / Restrictive lung disease - Likely mild exacerbation due to COVID. Getting decadron, brovana, pulmicort (for Advair) and prn duonebs.     Lethargy / Fatigue / Frequent falls / Arthritis - Likely worse due to COVID, deconditioning, IVVD, etc.  Also may be side effectrs of remeron, muscle relaxants. PT OT eval.  Likely back to PT OT at [facility]. Continue baclofen, flexeril, tylenol,      CHF (congestive heart failure), NYHA class I, chronic, systolic /  Pulmonary hypertension / MV and TV regurgitation / Ischemic cardiomyopathy / Hypertension - Stable.  Continue coreg, statin, eliquis, holding lasix/bumex due to IVVD     Chronic atrial fibrillation / Third degree AV block / 
cervical or inguinal adenopathy  Musc:  No cyanosis or clubbing  Skin:  No rashes or ulcers, skin turgor is good  Neuro:  Cranial nerves are grossly intact, general motor weakness, follows commands   Psych:  Poor insight, oriented to person, place     Patient Instructions:   Current Discharge Medication List        START taking these medications    Details   magnesium oxide (MAG-OX) 400 (240 Mg) MG tablet Take 1 tablet by mouth daily  Qty: 30 tablet, Refills: 0      potassium bicarb-citric acid (EFFER-K) 20 MEQ TBEF effervescent tablet Take 1 tablet by mouth daily  Qty: 30 tablet, Refills: 0      bacitracin 500 UNIT/GM ointment Apply topically 2 times daily.  Qty: 1 g, Refills: 0           CONTINUE these medications which have CHANGED    Details   diclofenac sodium (VOLTAREN) 1 % GEL Apply 4 g topically in the morning, at noon, and at bedtime for 10 days  Qty: 4 g, Refills: 0      furosemide (LASIX) 20 MG tablet Take 0.5 tablets by mouth daily  Qty: 60 tablet, Refills: 3      predniSONE (DELTASONE) 5 MG tablet Take 1 tablet by mouth daily for 10 days  Qty: 10 tablet, Refills: 0           CONTINUE these medications which have NOT CHANGED    Details   mirtazapine (REMERON) 7.5 MG tablet Take 1 tablet by mouth nightly  Qty: 30 tablet, Refills: 5      sertraline (ZOLOFT) 100 MG tablet TAKE ONE TABLET BY MOUTH EVERY DAY  Qty: 90 tablet, Refills: 1      apixaban (ELIQUIS) 2.5 MG TABS tablet Take 1 tablet by mouth 2 times daily  Qty: 180 tablet, Refills: 1      Multiple Vitamins-Minerals (PRESERVISION/LUTEIN PO) Take 226 mg by mouth daily      docusate sodium (COLACE) 100 MG capsule Take 1 capsule by mouth daily      cyclobenzaprine (FLEXERIL) 5 MG tablet Take 1 tablet by mouth as needed for Muscle spasms      pantoprazole (PROTONIX) 40 MG tablet Take 1 tablet by mouth 2 times daily  Qty: 180 tablet, Refills: 1    Associated Diagnoses: Gastroesophageal reflux disease with esophagitis, unspecified whether hemorrhage

## 2024-02-19 NOTE — CARE COORDINATION
Transition of Care Plan to SNF/Rehab    Communication to Patient/Family:  Met with patient and family and they are agreeable to the transition plan. The Plan for Transition of Care is related to the following treatment goals: Skilled Nursing facility     The Patient and/or patient representative was provided with a choice of provider and agrees  with the discharge plan.      Yes [x] No []    A Freedom of choice list was provided with basic dialogue that supports the patient's individualized plan of care/goals and shares the quality data associated with the providers.       Yes [x] No []    SNF/Rehab Transition:  Patient has been accepted to [facility]  SNF/Rehab and meets criteria for admission.   Patient will transported by Hospital to Home  and expected to leave at 12 noon     Communication to SNF/Rehab:  Bedside RN, has been notified to update the transition plan to the facility and call report [phone]  Discharge information has been updated on the AVS. And communicated to facility via [employer], or CC link.           Nursing Please include all hard scripts for controlled substances, med rec and dc summary, and AVS in packet.     Reviewed and confirmed with facility, [facility], can manage the patient care needs for the following:     Nate with (X) only those applicable:  Medication:  [x]Medications are available at the facility  [x]IV Antibiotics  Daptomycin 500 mg IV daily through 3/25/2024   []Controlled Substance - hard copies available sent.  [x]Weekly Labs see ID note     Equipment:  []CPAP/BiPAP  []Wound Vacuum  []Flores or Urinary Device  []PICC/Central Line  []Nebulizer  []Ventilator    Treatment:  []Isolation (for MRSA, VRE, etc.)  []Surgical Drain Management  []Tracheostomy Care  []Dressing Changes  []Dialysis with transportation  []PEG Care  []Oxygen  []Daily Weights for Heart Failure    Dietary:  []Any diet limitations  []Tube Feedings   []Total Parenteral

## 2024-02-23 LAB
AEROBIC ID BY SEQUENCING: NORMAL
ORGANISM ID BY SEQUENCING: NORMAL
SPECIMEN SOURCE: NORMAL
SPECIMEN SOURCE: NORMAL

## 2024-02-27 LAB
BACTERIA ISLT: ABNORMAL
OTHER ANTIBIOTIC SUSC ISLT: ABNORMAL
SOURCE: ABNORMAL
SPECIMEN SOURCE: ABNORMAL

## 2024-03-01 LAB
AMPICILLIN MIC: ABNORMAL UG/ML
BACTERIA SPEC: ABNORMAL
ERYTHROMYCIN MIC: ABNORMAL UG/ML
GENTAMICIN MIC: ABNORMAL UG/ML
PENICILLIN MIC: ABNORMAL UG/ML
RIFAMPIN MIC: ABNORMAL UG/ML
TETRACYCLINE MIC: ABNORMAL UG/ML
VANCOMYCIN MIC: ABNORMAL UG/ML

## 2024-03-02 PROBLEM — E86.0 DEHYDRATION: Status: RESOLVED | Noted: 2020-07-08 | Resolved: 2024-03-02

## 2024-03-07 ENCOUNTER — APPOINTMENT (OUTPATIENT)
Facility: HOSPITAL | Age: 89
End: 2024-03-07
Payer: MEDICARE

## 2024-03-07 ENCOUNTER — HOSPITAL ENCOUNTER (INPATIENT)
Facility: HOSPITAL | Age: 89
LOS: 1 days | Discharge: HOME | End: 2024-03-08
Attending: EMERGENCY MEDICINE | Admitting: INTERNAL MEDICINE
Payer: MEDICARE

## 2024-03-07 DIAGNOSIS — I73.9 PERIPHERAL VASCULAR DISEASE (HCC): ICD-10-CM

## 2024-03-07 DIAGNOSIS — L97.429 HEEL ULCERATION, LEFT, WITH UNSPECIFIED SEVERITY (HCC): Primary | ICD-10-CM

## 2024-03-07 LAB
ALBUMIN SERPL-MCNC: 1.9 G/DL (ref 3.5–5)
ALBUMIN/GLOB SERPL: 0.5 (ref 1.1–2.2)
ALP SERPL-CCNC: 268 U/L (ref 45–117)
ALT SERPL-CCNC: 21 U/L (ref 12–78)
ANION GAP SERPL CALC-SCNC: 3 MMOL/L (ref 5–15)
AST SERPL-CCNC: 24 U/L (ref 15–37)
BASOPHILS # BLD: 0 K/UL (ref 0–0.1)
BASOPHILS NFR BLD: 0 % (ref 0–1)
BILIRUB SERPL-MCNC: 0.6 MG/DL (ref 0.2–1)
BUN SERPL-MCNC: 25 MG/DL (ref 6–20)
BUN/CREAT SERPL: 30 (ref 12–20)
CALCIUM SERPL-MCNC: 8.3 MG/DL (ref 8.5–10.1)
CHLORIDE SERPL-SCNC: 108 MMOL/L (ref 97–108)
CO2 SERPL-SCNC: 26 MMOL/L (ref 21–32)
COMMENT:: NORMAL
CREAT SERPL-MCNC: 0.84 MG/DL (ref 0.55–1.02)
CRP SERPL-MCNC: 13.3 MG/DL (ref 0–0.3)
DIFFERENTIAL METHOD BLD: ABNORMAL
EOSINOPHIL # BLD: 0.1 K/UL (ref 0–0.4)
EOSINOPHIL NFR BLD: 1 % (ref 0–7)
ERYTHROCYTE [DISTWIDTH] IN BLOOD BY AUTOMATED COUNT: 14.7 % (ref 11.5–14.5)
ERYTHROCYTE [SEDIMENTATION RATE] IN BLOOD: 67 MM/HR (ref 0–30)
GLOBULIN SER CALC-MCNC: 4 G/DL (ref 2–4)
GLUCOSE SERPL-MCNC: 108 MG/DL (ref 65–100)
HCT VFR BLD AUTO: 30 % (ref 35–47)
HGB BLD-MCNC: 9.5 G/DL (ref 11.5–16)
IMM GRANULOCYTES # BLD AUTO: 0.1 K/UL (ref 0–0.04)
IMM GRANULOCYTES NFR BLD AUTO: 1 % (ref 0–0.5)
LYMPHOCYTES # BLD: 0.8 K/UL (ref 0.8–3.5)
LYMPHOCYTES NFR BLD: 9 % (ref 12–49)
MCH RBC QN AUTO: 27.3 PG (ref 26–34)
MCHC RBC AUTO-ENTMCNC: 31.7 G/DL (ref 30–36.5)
MCV RBC AUTO: 86.2 FL (ref 80–99)
MONOCYTES # BLD: 0.4 K/UL (ref 0–1)
MONOCYTES NFR BLD: 5 % (ref 5–13)
NEUTS SEG # BLD: 7 K/UL (ref 1.8–8)
NEUTS SEG NFR BLD: 84 % (ref 32–75)
NRBC # BLD: 0 K/UL (ref 0–0.01)
NRBC BLD-RTO: 0 PER 100 WBC
PLATELET # BLD AUTO: 159 K/UL (ref 150–400)
PMV BLD AUTO: 11.1 FL (ref 8.9–12.9)
POTASSIUM SERPL-SCNC: 4.8 MMOL/L (ref 3.5–5.1)
PROT SERPL-MCNC: 5.9 G/DL (ref 6.4–8.2)
RBC # BLD AUTO: 3.48 M/UL (ref 3.8–5.2)
RBC MORPH BLD: ABNORMAL
SODIUM SERPL-SCNC: 137 MMOL/L (ref 136–145)
SPECIMEN HOLD: NORMAL
TROPONIN I SERPL HS-MCNC: 22 NG/L (ref 0–51)
WBC # BLD AUTO: 8.4 K/UL (ref 3.6–11)

## 2024-03-07 PROCEDURE — 86140 C-REACTIVE PROTEIN: CPT

## 2024-03-07 PROCEDURE — 73630 X-RAY EXAM OF FOOT: CPT

## 2024-03-07 PROCEDURE — 2700000000 HC OXYGEN THERAPY PER DAY

## 2024-03-07 PROCEDURE — 99285 EMERGENCY DEPT VISIT HI MDM: CPT

## 2024-03-07 PROCEDURE — 96374 THER/PROPH/DIAG INJ IV PUSH: CPT

## 2024-03-07 PROCEDURE — 85652 RBC SED RATE AUTOMATED: CPT

## 2024-03-07 PROCEDURE — 6360000002 HC RX W HCPCS: Performed by: EMERGENCY MEDICINE

## 2024-03-07 PROCEDURE — 1100000000 HC RM PRIVATE

## 2024-03-07 PROCEDURE — 93005 ELECTROCARDIOGRAM TRACING: CPT | Performed by: EMERGENCY MEDICINE

## 2024-03-07 PROCEDURE — 84484 ASSAY OF TROPONIN QUANT: CPT

## 2024-03-07 PROCEDURE — 87040 BLOOD CULTURE FOR BACTERIA: CPT

## 2024-03-07 PROCEDURE — 85025 COMPLETE CBC W/AUTO DIFF WBC: CPT

## 2024-03-07 PROCEDURE — 36415 COLL VENOUS BLD VENIPUNCTURE: CPT

## 2024-03-07 PROCEDURE — 80053 COMPREHEN METABOLIC PANEL: CPT

## 2024-03-07 RX ORDER — ACETAMINOPHEN 325 MG/1
650 TABLET ORAL EVERY 6 HOURS PRN
Status: DISCONTINUED | OUTPATIENT
Start: 2024-03-07 | End: 2024-03-08 | Stop reason: HOSPADM

## 2024-03-07 RX ORDER — ONDANSETRON 4 MG/1
4 TABLET, ORALLY DISINTEGRATING ORAL EVERY 8 HOURS PRN
Status: DISCONTINUED | OUTPATIENT
Start: 2024-03-07 | End: 2024-03-08 | Stop reason: HOSPADM

## 2024-03-07 RX ORDER — BUDESONIDE AND FORMOTEROL FUMARATE DIHYDRATE 80; 4.5 UG/1; UG/1
2 AEROSOL RESPIRATORY (INHALATION)
Status: DISCONTINUED | OUTPATIENT
Start: 2024-03-07 | End: 2024-03-07

## 2024-03-07 RX ORDER — POLYETHYLENE GLYCOL 3350 17 G/17G
17 POWDER, FOR SOLUTION ORAL DAILY PRN
Status: DISCONTINUED | OUTPATIENT
Start: 2024-03-07 | End: 2024-03-08 | Stop reason: HOSPADM

## 2024-03-07 RX ORDER — ONDANSETRON 2 MG/ML
4 INJECTION INTRAMUSCULAR; INTRAVENOUS EVERY 6 HOURS PRN
Status: DISCONTINUED | OUTPATIENT
Start: 2024-03-07 | End: 2024-03-08 | Stop reason: HOSPADM

## 2024-03-07 RX ORDER — MORPHINE SULFATE 2 MG/ML
2 INJECTION, SOLUTION INTRAMUSCULAR; INTRAVENOUS
Status: COMPLETED | OUTPATIENT
Start: 2024-03-07 | End: 2024-03-07

## 2024-03-07 RX ORDER — MORPHINE SULFATE 2 MG/ML
2 INJECTION, SOLUTION INTRAMUSCULAR; INTRAVENOUS EVERY 4 HOURS PRN
Status: DISCONTINUED | OUTPATIENT
Start: 2024-03-07 | End: 2024-03-08 | Stop reason: HOSPADM

## 2024-03-07 RX ORDER — ACETAMINOPHEN 650 MG/1
650 SUPPOSITORY RECTAL EVERY 6 HOURS PRN
Status: DISCONTINUED | OUTPATIENT
Start: 2024-03-07 | End: 2024-03-08 | Stop reason: HOSPADM

## 2024-03-07 RX ORDER — SODIUM CHLORIDE 0.9 % (FLUSH) 0.9 %
5-40 SYRINGE (ML) INJECTION EVERY 12 HOURS SCHEDULED
Status: DISCONTINUED | OUTPATIENT
Start: 2024-03-07 | End: 2024-03-08 | Stop reason: HOSPADM

## 2024-03-07 RX ORDER — MAGNESIUM SULFATE IN WATER 40 MG/ML
2000 INJECTION, SOLUTION INTRAVENOUS PRN
Status: DISCONTINUED | OUTPATIENT
Start: 2024-03-07 | End: 2024-03-08 | Stop reason: HOSPADM

## 2024-03-07 RX ORDER — PANTOPRAZOLE SODIUM 40 MG/1
40 TABLET, DELAYED RELEASE ORAL 2 TIMES DAILY
Status: DISCONTINUED | OUTPATIENT
Start: 2024-03-07 | End: 2024-03-08 | Stop reason: HOSPADM

## 2024-03-07 RX ORDER — POTASSIUM CHLORIDE 7.45 MG/ML
10 INJECTION INTRAVENOUS PRN
Status: DISCONTINUED | OUTPATIENT
Start: 2024-03-07 | End: 2024-03-08 | Stop reason: HOSPADM

## 2024-03-07 RX ORDER — MIRTAZAPINE 15 MG/1
7.5 TABLET, FILM COATED ORAL NIGHTLY
Status: DISCONTINUED | OUTPATIENT
Start: 2024-03-07 | End: 2024-03-08 | Stop reason: HOSPADM

## 2024-03-07 RX ORDER — POTASSIUM CHLORIDE 750 MG/1
40 TABLET, EXTENDED RELEASE ORAL PRN
Status: DISCONTINUED | OUTPATIENT
Start: 2024-03-07 | End: 2024-03-08 | Stop reason: HOSPADM

## 2024-03-07 RX ORDER — SODIUM CHLORIDE 0.9 % (FLUSH) 0.9 %
5-40 SYRINGE (ML) INJECTION PRN
Status: DISCONTINUED | OUTPATIENT
Start: 2024-03-07 | End: 2024-03-08 | Stop reason: HOSPADM

## 2024-03-07 RX ORDER — CARVEDILOL 3.12 MG/1
3.12 TABLET ORAL 2 TIMES DAILY WITH MEALS
Status: DISCONTINUED | OUTPATIENT
Start: 2024-03-07 | End: 2024-03-08 | Stop reason: HOSPADM

## 2024-03-07 RX ORDER — ENOXAPARIN SODIUM 100 MG/ML
30 INJECTION SUBCUTANEOUS DAILY
Status: DISCONTINUED | OUTPATIENT
Start: 2024-03-07 | End: 2024-03-07

## 2024-03-07 RX ORDER — SODIUM CHLORIDE 9 MG/ML
INJECTION, SOLUTION INTRAVENOUS PRN
Status: DISCONTINUED | OUTPATIENT
Start: 2024-03-07 | End: 2024-03-08 | Stop reason: HOSPADM

## 2024-03-07 RX ORDER — IPRATROPIUM BROMIDE AND ALBUTEROL SULFATE 2.5; .5 MG/3ML; MG/3ML
1 SOLUTION RESPIRATORY (INHALATION) EVERY 6 HOURS PRN
Status: DISCONTINUED | OUTPATIENT
Start: 2024-03-07 | End: 2024-03-08 | Stop reason: HOSPADM

## 2024-03-07 RX ADMIN — MORPHINE SULFATE 2 MG: 2 INJECTION, SOLUTION INTRAMUSCULAR; INTRAVENOUS at 17:01

## 2024-03-07 ASSESSMENT — PAIN SCALES - GENERAL
PAINLEVEL_OUTOF10: 3
PAINLEVEL_OUTOF10: 10

## 2024-03-07 ASSESSMENT — PAIN - FUNCTIONAL ASSESSMENT: PAIN_FUNCTIONAL_ASSESSMENT: 0-10

## 2024-03-07 NOTE — ED TRIAGE NOTES
Pt arrives via EMS from SNF reporting poor perfusion of her left leg and facility nursing staff thinks her physical health is deteriorating.    Pt has a PICC in right arm for sepsis of unknown source to EMS.  Pt has wounds and bedsores including on left foot.

## 2024-03-07 NOTE — ED NOTES
Pt's daughter has been out multiple times asking for something strong for pain for the pt.  Have updated daughter each time that her mother's pain is a priority and as soon as the medication is available it will be administered.

## 2024-03-07 NOTE — ED NOTES
Assessed pt's foot with doppler.  Unable to detect a pulse with doppler.  Assessed at dorsal pedal, posterior tibial, and anterior tibial.  Provider notified.

## 2024-03-07 NOTE — H&P
7/2/2019    IR KYPHOPLASTY LUMBAR FIRST LEVEL 7/2/2019 [facility] ANGIO IR    IR KYPHOPLASTY LUMBAR FIRST LEVEL  07/02/2019    PACEMAKER      PACEMAKER PLACEMENT  05/2020    RELOCATION OF SKIN POCKET FOR PACEMAKER N/A 10/09/2020    RELOCATE PACEMAKER POCKET performed by Pk Goodman MD at [facility] CARDIAC C[facility] LAB    TOTAL COLECTOMY  1974    colon    TOTAL KNEE ARTHROPLASTY      right TKA    TOTAL KNEE ARTHROPLASTY      total on R, partial on L    TUBAL LIGATION      UPPER GASTROINTESTINAL ENDOSCOPY  04/11/2022    Dr Escobar       Social History     Tobacco Use    Smoking status: Never    Smokeless tobacco: Never   Substance Use Topics    Alcohol use: No     Alcohol/week: 0.0 standard drinks of alcohol        Family History   Problem Relation Age of Onset    Heart Attack Mother     Breast Cancer Daughter 59    Diabetes Maternal Uncle     Cancer Maternal Aunt         uterus    Breast Cancer Sister 90    Stroke Sister     Diabetes Mother     Heart Disease Mother     Heart Disease Father         Allergies   Allergen Reactions    Diltiazem Hives    Diltiazem Hcl Hives    Penicillins Swelling     Tongue Swelling   Has tolerated cephalexin recently 4/7/20     Primidone Other (See Comments)     Lightheaded/unsteady gait    Propoxyphene     Codeine Nausea And Vomiting    Labetalol Nausea And Vomiting     Dizzy/Disoriented     Sulfa Antibiotics Nausea And Vomiting        Prior to Admission medications    Medication Sig Start Date End Date Taking? Authorizing Provider   magnesium oxide (MAG-OX) 400 (240 Mg) MG tablet Take 1 tablet by mouth daily 2/19/24 3/20/24  Yahir Ibrahim MD   potassium bicarb-citric acid (EFFER-K) 20 MEQ TBEF effervescent tablet Take 1 tablet by mouth daily 2/19/24 3/20/24  aYhir Ibrahim MD   diclofenac sodium (VOLTAREN) 1 % GEL Apply 4 g topically in the morning, at noon, and at bedtime for 10 days 2/18/24 2/28/24  Yahir Ibrahim MD   furosemide (LASIX) 20 MG tablet Take 0.5 tablets by mouth daily  2/18/24   Yahir Ibrahim MD   mirtazapine (REMERON) 7.5 MG tablet Take 1 tablet by mouth nightly 1/27/24   Emiliano Lujan MD   sertraline (ZOLOFT) 100 MG tablet TAKE ONE TABLET BY MOUTH EVERY DAY 1/24/24   Emiliano Lujan MD   apixaban (ELIQUIS) 2.5 MG TABS tablet Take 1 tablet by mouth 2 times daily 12/19/23   Pk Goodman MD   Multiple Vitamins-Minerals (PRESERVISION/LUTEIN PO) Take 226 mg by mouth daily    Rigo Stinson MD   docusate sodium (COLACE) 100 MG capsule Take 1 capsule by mouth daily    Rigo Stinson MD   cyclobenzaprine (FLEXERIL) 5 MG tablet Take 1 tablet by mouth as needed for Muscle spasms    Rigo Stinson MD   pantoprazole (PROTONIX) 40 MG tablet Take 1 tablet by mouth 2 times daily 11/29/23   Emiliano Lujan MD   famotidine (PEPCID) 20 MG tablet  4/28/23   Rigo Stinson MD   acetaminophen (TYLENOL) 500 MG tablet Take by mouth every 8 hours    Automatic Reconciliation, Ar   albuterol sulfate HFA (PROVENTIL;VENTOLIN;PROAIR) 108 (90 Base) MCG/ACT inhaler Inhale 1 puff into the lungs every 6 hours as needed    Automatic Reconciliation, Ar   Benzocaine-Clove Oil 20 % GEL Apply topically 4 times daily as needed    Automatic Reconciliation, Ar   carvedilol (COREG) 3.125 MG tablet TAKE 1 TABLET BY MOUTH TWICE A DAY WITH MEALS 9/21/21   Automatic Reconciliation, Ar   chlorhexidine (PERIDEX) 0.12 % solution Take by mouth 2 times daily (with meals)    Automatic Reconciliation, Ar   fluticasone-salmeterol (ADVAIR) 250-50 MCG/ACT AEPB diskus inhaler Inhale 1 puff into the lungs 2 times daily 1/12/15   Automatic Reconciliation, Ar   loperamide (IMODIUM) 1 MG/5ML solution Take 1.5 tsp by mouth every 8 hours as needed    Automatic Reconciliation, Ar   ondansetron (ZOFRAN) 4 MG tablet Take by mouth every 8 hours as needed    Automatic Reconciliation, Ar   polyethylene glycol (GLYCOLAX) 17 GM/SCOOP powder Take by mouth daily    Automatic Reconciliation, Ar

## 2024-03-07 NOTE — ED PROVIDER NOTES
[facility] EMERGENCY DEPT  EMERGENCY DEPARTMENT ENCOUNTER    Pt Name: Irma Lea  MRN: [id number]  Birthdate 2/6/1932  Date of evaluation: 3/7/2024  Provider: Christo Austin MD  CHIEF COMPLAINT       Chief Complaint   Patient presents with    Poor perfusion of the left leg     HISTORY OF PRESENT ILLNESS   (Location/Symptom, Timing/Onset, Context/Setting, Quality, Duration, Modifying Factors, Severity)  Note limiting factors.   HPI    89-year-old female with history of anxiety depression, arthritis, atrial fibs, CHF, hyperlipidemia, hypertension, hypothyroidism, right BKA and others below here with left foot pain and left heel black area.  Complains of generally not feeling well.  Has chronic bilateral hip pain.  She has had this left heel wound for couple months but worse over the last week.    Additional history from independent historians: daughter    Review of External Medical Records:     Nursing Notes were reviewed.    REVIEW OF SYSTEMS  Review of Systems    PAST MEDICAL HISTORY     Past Medical History:   Diagnosis Date    Anxiety and depression     Arthritis     Atrial fibrillation (HCC)     CHF (congestive heart failure), NYHA class I, chronic, systolic (HCC)     GERD (gastroesophageal reflux disease)     History of vascular access device 04/17/2020    4F MIDLINE PLACED BY ALEJANDRA WHITTAKER RN LEFT BRACHIAL, 13CM    History of vascular access device 02/16/2024    [facility] VAT placed 4 Fr single PICC, RT basilic 36 cm 0 cm out, arm cir 23.5 cm    Hx of colon cancer, stage I     Hyperlipidemia     Hypertension     Hypothyroid     Ischemic cardiomyopathy     Migraines     Mild tricuspid regurgitation     Mitral regurgitation     ZULEIMA (obstructive sleep apnea)     Pulmonary hypertension (HCC)      SURGICAL HISTORY       Past Surgical History:   Procedure Laterality Date    BREAST BIOPSY Right 2002    neg; surgical bx    BREAST SURGERY      benign breast tumor excision right breast    CARDIAC CATHETERIZATION N/A 05/22/2020  medications on file           (Please note that portions of this note were completed with a voice recognition program.  Efforts were made to edit the dictations but occasionally words are mis-transcribed.)    Christo Austin MD (electronically signed)  Emergency Attending Physician / Physician Assistant / Nurse Practitioner        Christo Austin MD  03/07/24 2023       Christo Austin MD  03/07/24 2024

## 2024-03-07 NOTE — ED NOTES
Pt has right arm PICC line due to long term antibiotic use.  Pt has a history of poor vasculature.  Unable to obtain peripheral IV.  Assessed PICC line for patency.  Obtained one set of blood cultures from line.

## 2024-03-08 ENCOUNTER — APPOINTMENT (OUTPATIENT)
Facility: HOSPITAL | Age: 89
End: 2024-03-08
Payer: MEDICARE

## 2024-03-08 VITALS
SYSTOLIC BLOOD PRESSURE: 137 MMHG | RESPIRATION RATE: 15 BRPM | HEART RATE: 106 BPM | TEMPERATURE: 99.1 F | OXYGEN SATURATION: 93 % | HEIGHT: 63 IN | DIASTOLIC BLOOD PRESSURE: 88 MMHG | WEIGHT: 108 LBS | BODY MASS INDEX: 19.14 KG/M2

## 2024-03-08 LAB
EKG ATRIAL RATE: 63 BPM
EKG DIAGNOSIS: NORMAL
EKG Q-T INTERVAL: 430 MS
EKG QRS DURATION: 174 MS
EKG QTC CALCULATION (BAZETT): 502 MS
EKG R AXIS: 223 DEGREES
EKG T AXIS: 50 DEGREES
EKG VENTRICULAR RATE: 82 BPM

## 2024-03-08 PROCEDURE — 2580000003 HC RX 258: Performed by: INTERNAL MEDICINE

## 2024-03-08 PROCEDURE — 6370000000 HC RX 637 (ALT 250 FOR IP): Performed by: INTERNAL MEDICINE

## 2024-03-08 PROCEDURE — 6370000000 HC RX 637 (ALT 250 FOR IP): Performed by: NURSE PRACTITIONER

## 2024-03-08 PROCEDURE — 6360000002 HC RX W HCPCS: Performed by: INTERNAL MEDICINE

## 2024-03-08 PROCEDURE — 93010 ELECTROCARDIOGRAM REPORT: CPT | Performed by: INTERNAL MEDICINE

## 2024-03-08 PROCEDURE — 71045 X-RAY EXAM CHEST 1 VIEW: CPT

## 2024-03-08 PROCEDURE — 99223 1ST HOSP IP/OBS HIGH 75: CPT | Performed by: NURSE PRACTITIONER

## 2024-03-08 PROCEDURE — 94761 N-INVAS EAR/PLS OXIMETRY MLT: CPT

## 2024-03-08 PROCEDURE — 2700000000 HC OXYGEN THERAPY PER DAY

## 2024-03-08 RX ORDER — OXYCODONE HYDROCHLORIDE 5 MG/1
5 TABLET ORAL EVERY 6 HOURS PRN
Status: DISCONTINUED | OUTPATIENT
Start: 2024-03-08 | End: 2024-03-08 | Stop reason: HOSPADM

## 2024-03-08 RX ORDER — HYDROMORPHONE HYDROCHLORIDE 2 MG/1
2 TABLET ORAL EVERY 12 HOURS
Qty: 10 TABLET | Refills: 0 | Status: SHIPPED
Start: 2024-03-08 | End: 2024-03-13

## 2024-03-08 RX ORDER — OXYCODONE HYDROCHLORIDE 5 MG/1
5 TABLET ORAL EVERY 12 HOURS
Status: DISCONTINUED | OUTPATIENT
Start: 2024-03-08 | End: 2024-03-08 | Stop reason: HOSPADM

## 2024-03-08 RX ORDER — OXYCODONE HYDROCHLORIDE 5 MG/1
5 TABLET ORAL EVERY 12 HOURS
Qty: 10 TABLET | Refills: 0 | Status: SHIPPED
Start: 2024-03-08 | End: 2024-03-08 | Stop reason: HOSPADM

## 2024-03-08 RX ORDER — OXYCODONE HYDROCHLORIDE 5 MG/1
5 TABLET ORAL EVERY 6 HOURS PRN
Qty: 10 TABLET | Refills: 0 | Status: SHIPPED
Start: 2024-03-08 | End: 2024-03-08 | Stop reason: HOSPADM

## 2024-03-08 RX ORDER — HYDROMORPHONE HYDROCHLORIDE 2 MG/1
2 TABLET ORAL EVERY 4 HOURS PRN
Qty: 10 TABLET | Refills: 0 | Status: SHIPPED
Start: 2024-03-08 | End: 2024-03-11

## 2024-03-08 RX ADMIN — MORPHINE SULFATE 2 MG: 2 INJECTION, SOLUTION INTRAMUSCULAR; INTRAVENOUS at 09:07

## 2024-03-08 RX ADMIN — MORPHINE SULFATE 2 MG: 2 INJECTION, SOLUTION INTRAMUSCULAR; INTRAVENOUS at 00:01

## 2024-03-08 RX ADMIN — SODIUM CHLORIDE, PRESERVATIVE FREE 10 ML: 5 INJECTION INTRAVENOUS at 01:29

## 2024-03-08 RX ADMIN — ACETAMINOPHEN 650 MG: 325 TABLET ORAL at 02:58

## 2024-03-08 RX ADMIN — MORPHINE SULFATE 2 MG: 2 INJECTION, SOLUTION INTRAMUSCULAR; INTRAVENOUS at 17:36

## 2024-03-08 RX ADMIN — SODIUM CHLORIDE, PRESERVATIVE FREE 20 ML: 5 INJECTION INTRAVENOUS at 09:11

## 2024-03-08 RX ADMIN — SERTRALINE 100 MG: 50 TABLET, FILM COATED ORAL at 08:55

## 2024-03-08 RX ADMIN — MIRTAZAPINE 7.5 MG: 15 TABLET, FILM COATED ORAL at 00:00

## 2024-03-08 ASSESSMENT — PAIN SCALES - PAIN ASSESSMENT IN ADVANCED DEMENTIA (PAINAD)
BODYLANGUAGE: 2
NEGVOCALIZATION: 2
FACIALEXPRESSION: 2
NEGVOCALIZATION: REPEATED TROUBLED CALLING OUT, LOUD MOANING/GROANING, CRYING
FACIALEXPRESSION: FACIAL GRIMACING
CONSOLABILITY: UNABLE TO CONSOLE, DISTRACT OR REASSURE
BODYLANGUAGE: RIGID, FISTS CLENCHED, KNEES UP, PUSHING/PULLING AWAY, STRIKES OUT
BREATHING: NORMAL
BODYLANGUAGE: 2
BREATHING: NORMAL
FACIALEXPRESSION: FACIAL GRIMACING
CONSOLABILITY: 1
CONSOLABILITY: DISTRACTED OR REASSURED BY VOICE/TOUCH
TOTALSCORE: 8
TOTALSCORE: 5
CONSOLABILITY: 2
BODYLANGUAGE: RIGID, FISTS CLENCHED, KNEES UP, PUSHING/PULLING AWAY, STRIKES OUT
NEGVOCALIZATION: 0
FACIALEXPRESSION: 2
BREATHING: 0
BREATHING: 0

## 2024-03-08 ASSESSMENT — PAIN DESCRIPTION - LOCATION
LOCATION: ANKLE;FOOT
LOCATION: SACRUM
LOCATION: GENERALIZED
LOCATION: GENERALIZED;BACK;SACRUM
LOCATION: SACRUM
LOCATION: BACK
LOCATION: GENERALIZED

## 2024-03-08 ASSESSMENT — PAIN DESCRIPTION - ORIENTATION
ORIENTATION: LEFT;POSTERIOR
ORIENTATION: POSTERIOR;ANTERIOR
ORIENTATION: RIGHT;POSTERIOR

## 2024-03-08 ASSESSMENT — PAIN - FUNCTIONAL ASSESSMENT
PAIN_FUNCTIONAL_ASSESSMENT: PREVENTS OR INTERFERES WITH ALL ACTIVE AND SOME PASSIVE ACTIVITIES
PAIN_FUNCTIONAL_ASSESSMENT: INTOLERABLE, UNABLE TO DO ANY ACTIVE OR PASSIVE ACTIVITIES
PAIN_FUNCTIONAL_ASSESSMENT: PREVENTS OR INTERFERES WITH MANY ACTIVE NOT PASSIVE ACTIVITIES
PAIN_FUNCTIONAL_ASSESSMENT: ACTIVITIES ARE NOT PREVENTED
PAIN_FUNCTIONAL_ASSESSMENT: PREVENTS OR INTERFERES SOME ACTIVE ACTIVITIES AND ADLS

## 2024-03-08 ASSESSMENT — PAIN DESCRIPTION - DESCRIPTORS
DESCRIPTORS: ACHING;SORE;SHOOTING
DESCRIPTORS: ACHING;DISCOMFORT
DESCRIPTORS: ACHING
DESCRIPTORS: NAGGING
DESCRIPTORS: ACHING;SORE
DESCRIPTORS: CRUSHING

## 2024-03-08 ASSESSMENT — PAIN SCALES - GENERAL
PAINLEVEL_OUTOF10: 8
PAINLEVEL_OUTOF10: 5
PAINLEVEL_OUTOF10: 2
PAINLEVEL_OUTOF10: 10
PAINLEVEL_OUTOF10: 4
PAINLEVEL_OUTOF10: 8

## 2024-03-08 ASSESSMENT — PAIN DESCRIPTION - PAIN TYPE
TYPE: CHRONIC PAIN
TYPE: CHRONIC PAIN;OTHER (COMMENT)

## 2024-03-08 ASSESSMENT — PAIN DESCRIPTION - FREQUENCY
FREQUENCY: CONTINUOUS

## 2024-03-08 ASSESSMENT — PAIN DESCRIPTION - ONSET
ONSET: ON-GOING

## 2024-03-08 NOTE — CARE COORDINATION
3/8/2024   CARE MANAGEMENT NOTE:  CM reviewed EMR and pt was discussed in IDRs and with Palliative Care.  READMISSION:  Pt was admitted to  from 1/31-24 to 2/19/24 with Covid, sepsis, and hx of right BKA.  She was discharged to [facility] for IV ABX (Dapto thru 3/25/24).  Pt was readmitted to  from SNF ([facility]) on 3/7 with PVD.  Reportedly, pt resides at [facility].    RUR 20%    Transition Plan of Care:  Pt will return to [facility] with continuation of IV ABX (Dapto thru 3/25/24).  RN please call report to [phone].  Pt will go to room 207.  CM emailed family with in network hospice providers at [facility] ([facility]  Family would like to have a meeting with hospice at [facility] on Tues 3/12  Outpatient follow up   [facility] was arranged for [facility] p.m.  Dtr [place].    CM will continue to follow pt until discharged.  Cookie   03/08/24 1333   Readmission Assessment   Number of Days since last admission? 8-30 days   Previous Disposition SNF   Did you visit your Primary Care Physician after you left the hospital, before you returned this time? No   Did you see a specialist, such as Cardiac, Pulmonary, Orthopedic Physician, etc. after you left the hospital? No   Who advised the patient to return to the hospital? Skilled Unit  (SNF - [facility])

## 2024-03-08 NOTE — WOUND CARE
Wound Consult:  new consult Visit. Chart reviewed.  Consulted for heel DTI and sacral maceration.  Spoke with patients nurse,  lyubov SCHUSTER at bedside.  Patient is resting on a nilson bed with ESTEFANY mattress.  Heelleft off loaded with heel boot.  Patient is awake, alert, cooperative; requires 2 assists to move side to side in bed.  Kane score 11.  Assessment:  POA Left heel- 4x3cm nonblanching maroon/purple, surrounding blanching red painful, DTI  POA Left medial heel- 2x2cm nonblanching maroon purple ,surrounding blanching red DTI    Left heel    POA Sacrum/buttocks- 9x9x0.1cm blanching david red area of maceration, denuding, painful ,no drainage.  POA Right upper buttock- 6x4x0.1cm- red, blanches,maceration with denuding, painful.  Right BKA stump , no open areas noted  Treatment:  Left heel- foam dressing and heel boot  Buttock/sacrum- cleanse with blue wipes, honey gel or honey sheet, large sacral foams to cover  Wound Recommendations:  Left heel- foam dressing and heel boot. Change every MWF  Buttock/sacrum- cleanse with blue wipes, honey gel or honey sheet, large sacral foams to cover. Change every MWF  Turn every 2 hours  Air box to bed  Skin Care / PI Prevention Recommendations:  1. Minimize friction/shear: minimize layers of linen/pads under patient.  2. Off load pressure/reposition:  turn and reposition approximately every 2 hours; float heels with pillows or use off loading heel boots; waffle cushion for sitting; position wedge.  3. Manage Moisture - keep skin folds dry; incontinence skin care with incontinence wipes; zinc guard barrier ointment; purewcik in use to help contain urine  4. Continue to monitor nutrition, pain, and skin risk scale, and skin assessment.  Plan:  Spoke with Dr. Grewal  regarding findings and proposed orders for treatment.  We will continue to reassess weekly and as needed.  Maliha Domingo RN  [facility], Wound / Ostomy Department  Wound Healing Office [phone]'

## 2024-03-08 NOTE — DISCHARGE INSTRUCTIONS
DISCHARGE INSTRUCTIONS    Patient: Irma Lea MRN: [id number]  SSN: [id number]    YOB: 1932  Age: 89 y.o.  Sex: female      Primary Diagnosis: peripheral artery disease      Discharge Instructions/Special Treatment/Home Care Needs:   During your hospital stay you were cared for by a hospitalist to provide you the best care. After discharge, your care is transferred back to your outpatient/clinic doctor.       Contact your physician for worsening of your symptoms or if the symptoms that brought you in the hospital happen again.  Please call your physician with any other concerns or questions.        Signed By: Kevin Grewal MD Time: 2:12 PM

## 2024-03-08 NOTE — CONSULTS
Palliative Medicine  Patient Name: Irma Lea  YOB: 1932  MRN: [id number]  Age: 89 y.o.  Gender: female    Date of Initial Consult: March 8, 2024  Date of Service: 3/8/2024  Time: 8:10 AM  Provider: Kena Lou NP  Hospital Day: 2  Admit Date: 3/7/2024  Referring Provider: Dr. Ibrahim       Reasons for Consultation:  Goals of Care, Overwhelming Symptoms, Psychosocial Distress, and End Stage Disease    HISTORY OF PRESENT ILLNESS (HPI):   Irma Lea is a 89 y.o. female  who was admitted on 3/7/2024 from [facility] with a diagnosis of pain of LLE 2/2 PAD.   Pt well known to our team from recent admission. Decisions made for prolonged antibiotic therapy in lieu of hospice. Chart notes indicate family requesting comfort    PMH: corynobacterium bacteremia, COPD, CKD3, CHF, ZULEIMA,falls, HTN, DM2,PVD,DJD, L3 compression fx, anxiety, depression, HLD, chronic afib, UTI, hypothyroidism, GERD, psoriasis, rt BKA, left heel ulcer, debility, dysphagia, OA    Psychosocial: pt lives in [facility] at [facility] with plans to transition to nursing care sector due to increasing care needs.     PALLIATIVE DIAGNOSES:    Left heel pain 2/2 PAD and heel wound~ controlled  Hypoalbuminemia   Poor appetite  Physical debility   Palliative Care Encounter    Pertinent Hospital Diagnoses:  Principal Problem:    PAD (peripheral artery disease) ([facility])  Resolved Problems:    * No resolved hospital problems. *      ASSESSMENT AND PLAN:   Pt seen without family present.  Pt unable to participate in discussions due to medical condition. Call placed to Lesa (dtr).  aPuline knows me from previous admission. She shared that [place] has taken great care of the patient.   Understanding of Illness/Discussion: We discussed the patient's condition and family has a clear understanding. Pauline was informed of the severity of the heel which would likely require amputation. Pt is a high surgical risk and family feels pretty certain that    09/21/23 56.7 kg (125 lb)   08/10/23 55.3 kg (122 lb)   08/10/23 55.3 kg (122 lb)   05/16/23 54.4 kg (120 lb)   03/30/23 51.7 kg (114 lb)   07/12/22 63 kg (139 lb)   04/29/22 61.2 kg (135 lb)   09/10/21 60.8 kg (134 lb)   03/04/21 62.1 kg (137 lb)        Current Diet: ADULT DIET; Regular       PSYCHOSOCIAL/SPIRITUAL SCREENING:   Palliative IDT has assessed this patient for cultural preferences / practices and a referral made as appropriate to needs (Cultural Services, Patient Advocacy, Ethics, etc.)    Spiritual Affiliation: Moravian    Any spiritual / Cheondoism concerns:  [] Yes /  [x] No  [] Unable to obtain this information  If \"Yes\" to discuss with pastoral care during IDT     Does caregiver feel burdened by caring for their loved one:   [] Yes /  [] No /  [] No Caregiver Present/Available [] No Caregiver [x] Pt Lives at Facility  If \"Yes\" to discuss with social work during IDT    Anticipatory grief assessment:   [x] Normal  / [] Maladaptive   [] Unable to obtain this information  If \"Maladaptive\" to discuss with social work during IDT    ESAS Anxiety: Anxiety Score: Not anxious    ESAS Depression:          LAB AND IMAGING FINDINGS:   Objective data reviewed:  labs, images, records, medication use, vitals, and chart     FINAL COMMENTS   Thank you for allowing Palliative Medicine to participate in the care of Irma Lea.    Only check if applicable and billing time based rather than MDM  [x] The total encounter time on this service date was __70__ minutes which was spent performing a face-to-face encounter and personally completing the provider-level activities documented in the note. This includes time spent prior to the visit and after the visit in direct care of the patient. This time does not include time spent in any separately reportable services.    Electronically signed by   Kena Lou, MSN, FNP-BC, [facility]  Palliative Care Team  on 3/8/2024 at 8:10 AM

## 2024-03-08 NOTE — PROGRESS NOTES
Ultrasound IV by Allison Roberts RN :  Procedure Note    Ultrasound IV education provided to patient. Opportunities for questions given.     Ultrasound used for PIV placement:  20gauge 1.25in BD Nexiva  L forearm location.  1 X Attempt(s).    Flushed with ease; vigorous blood return.     Procedure tolerated well. Primary RN aware of IV placement and added to LDA.      Allison Roberts RN

## 2024-03-08 NOTE — ED NOTES
TRANSFER - OUT REPORT:    Verbal report given to  on Irma Lea  being transferred to [facility] for routine progression of patient care       Report consisted of patient's Situation, Background, Assessment and   Recommendations(SBAR).     Information from the following report(s) Nurse Handoff Report, ED Encounter Summary, ED SBAR, Adult Overview, Intake/Output, MAR, and Recent Results was reviewed with the receiving nurse.    [place] Fall Assessment:    Presents to emergency department  because of falls (Syncope, seizure, or loss of consciousness): No  Age > 70: Yes  Altered Mental Status, Intoxication with alcohol or substance confusion (Disorientation, impaired judgment, poor safety awaremess, or inability to follow instructions): Yes  Impaired Mobility: Ambulates or transfers with assistive devices or assistance; Unable to ambulate or transer.: Yes  Nursing Judgement: Yes          Lines:   PICC Single Lumen 02/16/24 Right Basilic (Active)        Opportunity for questions and clarification was provided.      Patient transported with:  Tech

## 2024-03-08 NOTE — PALLIATIVE CARE DISCHARGE
Goals of Care/Treatment Preferences    The Palliative Medicine team was consulted as part of your/your loved one's care in the hospital. Our team is a supportive service; we strive to relieve suffering and improve quality of life.    We reviewed advance care planning information, which includes the following:    Primary Decision Maker: Pauline Kasper - [phone]  Confirm Advance Directive: Not on file      We reviewed / discussed your code status as:   Code Status: DNR     “Full Code” means perform CPR in the event of cardiac arrest.      “DNR” means do NOT perform CPR in the event of cardiac arrest.      “Partial Code” means you have specific preferences, please discuss with your healthcare team.      “No Order” means this issue was not addressed / resolved during your stay      Other Instructions: You have a Durable Do Not Resuscitate Order in place, which should travel with you.  When you are in a facility, this form should be placed on your chart. Once you are home, it is recommended that the DDNR form be placed in a visible location such as on the refrigerator or bedroom door.    You shared that you have an Advance Medical Directive in place.  It is always recommended that you provide a copy for your medical record.      Artificially Administered Nutrition: No feeding tube    Because of the importance of this information, we are providing you with a printed copy to share with other healthcare providers after this hospitalization is complete.

## 2024-03-08 NOTE — ACP (ADVANCE CARE PLANNING)
Advance Care Planning Note    Name: Irma Lea  YOB: 1932  MRN: [id number]  Admission Date: 3/7/2024  3:16 PM    Date of discussion: 3/7/2024    Active Diagnoses:        These active diagnoses are of sufficient risk that focused discussion on advance care planning is indicated in order to allow the patient to thoughtfully consider personal goals of care, and if situations arise that prevent the ability to personally give input, to ensure appropriate representation of their personal desires for different levels and aggressiveness of care.     Discussion:     Persons present and participating in discussion: Irma G Yahir Lea MD, daughters    Discussion: treatment and prognosis of severe PAD and chronic ulcer in setting of dementia, debility, cachexia.  Daughters are spokespersons.  Patient already DNR.  Daughters wish at this piont to transition to comfort measures and seek hospice disposition.    Time Spent:     Total time spent face-to-face in education and discussion: 19 minutes.     Yahir Ibrahim MD  Date of Service:  3/7/2024  9:39 PM

## 2024-03-08 NOTE — PROGRESS NOTES
Pt. Very sensitive to touch.screams even if procedure /treatment is started.On rounds when pt is by heself,calm no c/o pain.

## 2024-03-08 NOTE — PLAN OF CARE
Problem: Pain  Goal: Verbalizes/displays adequate comfort level or baseline comfort level  Outcome: Progressing     Problem: Chronic Conditions and Co-morbidities  Goal: Patient's chronic conditions and co-morbidity symptoms are monitored and maintained or improved  Outcome: Progressing     Problem: Safety - Adult  Goal: Free from fall injury  Outcome: Progressing     Problem: Skin/Tissue Integrity  Goal: Absence of new skin breakdown  Description: 1.  Monitor for areas of redness and/or skin breakdown  2.  Assess vascular access sites hourly  3.  Every 4-6 hours minimum:  Change oxygen saturation probe site  4.  Every 4-6 hours:  If on nasal continuous positive airway pressure, respiratory therapy assess nares and determine need for appliance change or resting period.  Outcome: Progressing     Problem: ABCDS Injury Assessment  Goal: Absence of physical injury  Outcome: Progressing

## 2024-03-08 NOTE — DISCHARGE SUMMARY
Discharge Summary       PATIENT ID: Irma Lea  MRN: [id number]   YOB: 1932    DATE OF ADMISSION: 3/7/2024  3:16 PM    DATE OF DISCHARGE: 3/8/2024   PRIMARY CARE PROVIDER: Emiliano Lujan MD     ATTENDING PHYSICIAN: Kevin Grewal MD  DISCHARGING PROVIDER: Kevin Grewal MD    To contact this individual call [phone] and ask the  to page.  If unavailable ask to be transferred the Adult Hospitalist Department.    CONSULTATIONS: IP CONSULT TO PALLIATIVE CARE  IP WOUND CARE NURSE CONSULT TO EVAL    PROCEDURES/SURGERIES: * No surgery found *    ADMISSION SUMMARY:   Per admission H&P, \"  Ms. Lea is a 89 y.o. female who presented to the Emergency Department complaining of cool LLE.  Recurrent issue.  Painful. Hx of PAD, chronic LLE ulcers.  We will admit her for management. 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  Famil wants comfort measures and so I will not consult vascular... she is not a candidate for procedure or amputation.  Stop Eliquis. Hospice is appropriate.     Corynobacterium bacteremia - Recent admit.  ID was consulted then.  Had discharged on 6 weeks IV daptomycin. continue dapto     Debilitated patient / Frequent falls / Dysphagia / Arthritis - POA, worsen at SNF.  Give prn morphine for pain.  Appropriate candidate for hospice. Discharge on sublingual dilaudid 2 mg q12 scheduled with 2 mg prn q4h. Appreciate palliative consult     CHF (congestive heart failure), NYHA class I, chronic, systolic / Interstitial edema / Ischemic cardiomyopathy / Pulmonary hypertension / MV and TV regurgitation / Biventricular cardiac pacemaker in situ / Hypertension - POA.  EF 45% months ago.  Continue coreg to avoid tachcyardia, hold lasix     COPD (chronic obstructive pulmonary disease) exacerbation - Continue Spiriva  (ADVAIR) 250-50 MCG/ACT AEPB diskus inhaler Inhale 1 puff into the lungs 2 times daily      loperamide (IMODIUM) 1 MG/5ML solution Take 1.5 tsp by mouth every 8 hours as needed      ondansetron (ZOFRAN) 4 MG tablet Take by mouth every 8 hours as needed      polyethylene glycol (GLYCOLAX) 17 GM/SCOOP powder Take by mouth daily      senna-docusate (PERICOLACE) 8.6-50 MG per tablet Take 2 tablets by mouth 2 times daily as needed      tiotropium (SPIRIVA) 18 MCG inhalation capsule Inhale 1 capsule into the lungs daily                NOTIFY YOUR PHYSICIAN FOR ANY OF THE FOLLOWING:   Fever over 101 degrees for 24 hours.   Chest pain, shortness of breath, fever, chills, nausea, vomiting, diarrhea, change in mentation, falling, weakness, bleeding. Severe pain or pain not relieved by medications.  Or, any other signs or symptoms that you may have questions about.    DISPOSITION:    Home With:   OT  PT  HH  RN      x Long term SNF/Inpatient Rehab    Independent/assisted living    Hospice    Other:       PATIENT CONDITION AT DISCHARGE:     Functional status   x Poor     Deconditioned     Independent      Cognition     Lucid     Forgetful    x Dementia      Catheters/lines (plus indication)    Flores     PICC     PEG     None      Code status     Full code    x DNR      PHYSICAL EXAMINATION AT DISCHARGE:    General : alert and awake, no acute distress  HEENT: moist mucus membranes  Chest: CTAB, normal WOB  CVS: S1 and S2 heard, no m/g/r  Abd: soft/non tender, non distended  Ext: chronic wounds  Neuro/Psych: no focal deficits  Skin: no rash      CHRONIC MEDICAL DIAGNOSES:      Greater than 31 minutes were spent with the patient on counseling and coordination of care    Signed:   Kevin Grewal MD  3/8/2024  4:11 PM

## 2024-03-10 LAB
BACTERIA SPEC CULT: ABNORMAL
BACTERIA SPEC CULT: ABNORMAL
SERVICE CMNT-IMP: ABNORMAL

## 2024-03-29 LAB
AEROBIC ID BY SEQUENCING: NORMAL
BACTERIA ISLT: ABNORMAL
SOURCE: ABNORMAL
SPECIMEN SOURCE: NORMAL

## 2024-06-18 LAB
OTHER ANTIBIOTIC SUSC ISLT: NORMAL
SPECIMEN SOURCE: NORMAL

## (undated) DEVICE — PLASMABLADE PS210-030S 3.0S LOCK: Brand: PLASMABLADE™

## (undated) DEVICE — FIXED CORE WIRE GUIDE SAFE-T-J, CURVED: Brand: COOK

## (undated) DEVICE — STRIP SKIN CLSR W0.25XL4IN WHT SPUNBOUND FBR NYL HI ADH

## (undated) DEVICE — GDWIRE WHISPER HITORQ EDS CSJ -- ACUITY SOLD BY BX ONLY 4648

## (undated) DEVICE — CATH BRAID OMNI FLSHSH 5F 65CM -- SOFT-VU

## (undated) DEVICE — SUTURE DEV SZ 3-0 V-LOC 90 L12IN TO L18IN CV-23 VLT VLOCM0844

## (undated) DEVICE — BANDAGE COBAN 4 IN COMPR W4INXL5YD FOAM COHESIVE QUIK STK SELF ADH SFT

## (undated) DEVICE — SUTURE ETHBND EXCEL SZ 2 L30IN NONABSORBABLE GRN L40MM V-37 MX69G

## (undated) DEVICE — INFECTION CONTROL KIT SYS

## (undated) DEVICE — SUTURE VCRL SZ 2-0 L36IN ABSRB UD L40MM CT 1/2 CIR J957H

## (undated) DEVICE — ROCKER SWITCH PENCIL BLADE ELECTRODE, HOLSTER: Brand: EDGE

## (undated) DEVICE — SPONGE GZ W4XL4IN COT 12 PLY TYP VII WVN C FLD DSGN

## (undated) DEVICE — PACEMAKER PACK: Brand: MEDLINE INDUSTRIES, INC.

## (undated) DEVICE — CATHETER DIAG 5FR L75CM ID0.046IN HK CRV VEIN SEL UNIQUE

## (undated) DEVICE — BANDAGE,GAUZE,BULKEE II,4.5"X4.1YD,STRL: Brand: MEDLINE

## (undated) DEVICE — Device: Brand: PADPRO

## (undated) DEVICE — RADIFOCUS GLIDEWIRE: Brand: GLIDEWIRE

## (undated) DEVICE — SOLUTION IV 1000ML 0.9% SOD CHL

## (undated) DEVICE — DBD-PACK,LAPAROTOMY,2 REINFORCED GOWNS: Brand: MEDLINE

## (undated) DEVICE — SUTURE V-LOC 180 SZ 2-0 L9IN ABSRB VLT GS-21 L37MM 1/2 CIR VLOCM0345

## (undated) DEVICE — PAD,ABDOMINAL,5"X9",ST,LF,25/BX: Brand: MEDLINE INDUSTRIES, INC.

## (undated) DEVICE — CATHETER LD DEL OD9FR ID7FR L51CM 115DEG CRV OUTER GUID

## (undated) DEVICE — 3M™ IOBAN™ 2 ANTIMICROBIAL INCISE DRAPE 6640EZ: Brand: IOBAN™ 2

## (undated) DEVICE — DRESSING PETRO W3XL8IN N ADH OIL EMUL GZ CURAD

## (undated) DEVICE — SUTURE VCRL SZ 2-0 L27IN ABSRB UD L26MM SH 1/2 CIR J417H

## (undated) DEVICE — PINNACLE INTRODUCER SHEATH: Brand: PINNACLE

## (undated) DEVICE — 3M™ TEGADERM™ TRANSPARENT FILM DRESSING FRAME STYLE, 1626W, 4 IN X 4-3/4 IN (10 CM X 12 CM), 50/CT 4CT/CASE: Brand: 3M™ TEGADERM™

## (undated) DEVICE — Device

## (undated) DEVICE — SURGICAL PROCEDURE PACK BASIN MAJ SET CUST NO CAUT

## (undated) DEVICE — ANGIO-SEAL STS PLUS VASCULAR CLOSURE DEVICE: Brand: ANGIO-SEAL

## (undated) DEVICE — REM POLYHESIVE ADULT PATIENT RETURN ELECTRODE: Brand: VALLEYLAB

## (undated) DEVICE — TOWEL SURG W17XL27IN STD BLU COT NONFENESTRATED PREWASHED

## (undated) DEVICE — CATHETER ANGIO 5FR L100CM GWIRE 0.038IN BERN NONBRAIDED HI

## (undated) DEVICE — HANDLE LT SNAP ON ULT DURABLE LENS FOR TRUMPF ALC DISPOSABLE

## (undated) DEVICE — PREP SKN CHLRAPRP APL 26ML STR --

## (undated) DEVICE — DERMABOND SKIN ADH 0.7ML -- DERMABOND ADVANCED 12/BX

## (undated) DEVICE — 3M™ IOBAN™ 2 ANTIMICROBIAL INCISE DRAPE 6650EZ: Brand: IOBAN™ 2

## (undated) DEVICE — BULB SYRINGE, IRRIGATION WITH PROTECTIVE CAP, 60 CC, INDIVIDUALLY WRAPPED: Brand: DOVER

## (undated) DEVICE — AGENT HEMSTAT 3GM PURIFIED PLNT STARCH PWD ABSRB ARISTA AH

## (undated) DEVICE — SYSTEM INTRO 9.5FR L13CM STD WHT CAP HEMSTAT SPLITTABLE

## (undated) DEVICE — STOCKINETTE,IMPERVIOUS,12X48,STERILE: Brand: MEDLINE

## (undated) DEVICE — SOLUTION IRRIG 1000ML H2O STRL BLT

## (undated) DEVICE — DRESSING HEMOSTATIC SFT INTVENT W/O SLT DBL WRP QUIKCLOT LF

## (undated) DEVICE — BNDG ELAS HK LOOP 4X5YD NS -- MATRIX

## (undated) DEVICE — SYRINGE IRRIG 60ML SFT PLIABLE BLB EZ TO GRP 1 HND USE W/

## (undated) DEVICE — CATHETER DIAG 6FR L110CM INTRO 6FR BLLN DIA9MM 1CC PULM ART

## (undated) DEVICE — GUIDEWIRE VASC L80CM DIA0.018IN TIP L5CM 15DEG ANG NIT

## (undated) DEVICE — WIRE SAW SURG FOR BONE CUT GIGLI 510 MM LEN UNIV

## (undated) DEVICE — STRAP,POSITIONING,KNEE/BODY,FOAM,4X60": Brand: MEDLINE

## (undated) DEVICE — TAPE ADH CLTH SILK H2O REPELLENT CURAD

## (undated) DEVICE — DRAPE,REIN 53X77,STERILE: Brand: MEDLINE

## (undated) DEVICE — MICROPUNCTURE INTRODUCER SET SILHOUETTE TRANSITIONLESS WITH STAINLESS STEEL WIRE GUIDE: Brand: MICROPUNCTURE

## (undated) DEVICE — DESTINATION PERIPHERAL GUIDING SHEATH: Brand: DESTINATION

## (undated) DEVICE — STERILE POLYISOPRENE POWDER-FREE SURGICAL GLOVES WITH EMOLLIENT COATING: Brand: PROTEXIS

## (undated) DEVICE — CATHETER EP 6FR L92CM 2-5-2MM SPC TIP 1MM 4 ELECTRD D CRV

## (undated) DEVICE — DRESSING FOAM 4X6 DISP POSTOP MEPILEX BORD AG

## (undated) DEVICE — SUTURE VCRL SZ 0 L54IN ABSRB VLT LIGAPAK REEL NDL J207G